# Patient Record
Sex: MALE | Race: OTHER | Employment: OTHER | URBAN - METROPOLITAN AREA
[De-identification: names, ages, dates, MRNs, and addresses within clinical notes are randomized per-mention and may not be internally consistent; named-entity substitution may affect disease eponyms.]

---

## 2023-01-16 ENCOUNTER — HOSPITAL ENCOUNTER (INPATIENT)
Facility: HOSPITAL | Age: 63
LOS: 22 days | Discharge: HOME | DRG: 871 | End: 2023-02-07
Attending: INTERNAL MEDICINE | Admitting: INTERNAL MEDICINE
Payer: MEDICAID

## 2023-01-16 ENCOUNTER — HOSPITAL ENCOUNTER (EMERGENCY)
Facility: HOSPITAL | Age: 63
Discharge: ANOTHER HOSPITAL | End: 2023-01-16
Attending: STUDENT IN AN ORGANIZED HEALTH CARE EDUCATION/TRAINING PROGRAM
Payer: MEDICAID

## 2023-01-16 VITALS
TEMPERATURE: 98 F | WEIGHT: 245 LBS | HEIGHT: 68 IN | BODY MASS INDEX: 37.13 KG/M2 | RESPIRATION RATE: 21 BRPM | OXYGEN SATURATION: 97 % | HEART RATE: 75 BPM | SYSTOLIC BLOOD PRESSURE: 99 MMHG | DIASTOLIC BLOOD PRESSURE: 54 MMHG

## 2023-01-16 DIAGNOSIS — R11.0 NAUSEA: ICD-10-CM

## 2023-01-16 DIAGNOSIS — D64.9 ANEMIA, UNSPECIFIED TYPE: ICD-10-CM

## 2023-01-16 DIAGNOSIS — L03.90 CELLULITIS, UNSPECIFIED CELLULITIS SITE: ICD-10-CM

## 2023-01-16 DIAGNOSIS — R00.0 TACHYARRHYTHMIA: ICD-10-CM

## 2023-01-16 DIAGNOSIS — N17.9 AKI (ACUTE KIDNEY INJURY): ICD-10-CM

## 2023-01-16 DIAGNOSIS — K70.31 ALCOHOLIC CIRRHOSIS OF LIVER WITH ASCITES: ICD-10-CM

## 2023-01-16 DIAGNOSIS — D68.9 COAGULOPATHY: ICD-10-CM

## 2023-01-16 DIAGNOSIS — M48.061 SPINAL STENOSIS OF LUMBAR REGION, UNSPECIFIED WHETHER NEUROGENIC CLAUDICATION PRESENT: ICD-10-CM

## 2023-01-16 DIAGNOSIS — Z51.5 PALLIATIVE CARE ENCOUNTER: ICD-10-CM

## 2023-01-16 DIAGNOSIS — E87.79 OTHER HYPERVOLEMIA: ICD-10-CM

## 2023-01-16 DIAGNOSIS — N17.0 ATN (ACUTE TUBULAR NECROSIS): ICD-10-CM

## 2023-01-16 DIAGNOSIS — I48.92 ATRIAL FLUTTER: ICD-10-CM

## 2023-01-16 DIAGNOSIS — M54.31 SCIATICA OF RIGHT SIDE: Primary | ICD-10-CM

## 2023-01-16 DIAGNOSIS — R94.31 QT PROLONGATION: ICD-10-CM

## 2023-01-16 DIAGNOSIS — Z71.89 ADVANCED CARE PLANNING/COUNSELING DISCUSSION: ICD-10-CM

## 2023-01-16 DIAGNOSIS — K92.1 GASTROINTESTINAL HEMORRHAGE WITH MELENA: Primary | ICD-10-CM

## 2023-01-16 DIAGNOSIS — K92.2 ACUTE UPPER GI BLEED: ICD-10-CM

## 2023-01-16 DIAGNOSIS — I48.0 PAROXYSMAL ATRIAL FIBRILLATION: ICD-10-CM

## 2023-01-16 DIAGNOSIS — R00.0 TACHYCARDIA: ICD-10-CM

## 2023-01-16 DIAGNOSIS — Z71.89 GOALS OF CARE, COUNSELING/DISCUSSION: ICD-10-CM

## 2023-01-16 DIAGNOSIS — I27.20 PULMONARY HYPERTENSION: ICD-10-CM

## 2023-01-16 DIAGNOSIS — A41.9 SEPSIS, DUE TO UNSPECIFIED ORGANISM, UNSPECIFIED WHETHER ACUTE ORGAN DYSFUNCTION PRESENT: ICD-10-CM

## 2023-01-16 DIAGNOSIS — F10.930 ALCOHOL WITHDRAWAL SYNDROME WITHOUT COMPLICATION: ICD-10-CM

## 2023-01-16 DIAGNOSIS — R57.8 HEMORRHAGIC SHOCK: ICD-10-CM

## 2023-01-16 DIAGNOSIS — M54.9 DORSALGIA: ICD-10-CM

## 2023-01-16 DIAGNOSIS — M48.00 SPINAL STENOSIS, UNSPECIFIED SPINAL REGION: ICD-10-CM

## 2023-01-16 DIAGNOSIS — M51.26 LUMBAR HERNIATED DISC: ICD-10-CM

## 2023-01-16 DIAGNOSIS — K70.9 ALCOHOLIC LIVER DISEASE: ICD-10-CM

## 2023-01-16 DIAGNOSIS — I48.91 ATRIAL FIBRILLATION: ICD-10-CM

## 2023-01-16 DIAGNOSIS — G83.4 CAUDA EQUINA SYNDROME: ICD-10-CM

## 2023-01-16 DIAGNOSIS — K92.2 GASTROINTESTINAL HEMORRHAGE, UNSPECIFIED GASTROINTESTINAL HEMORRHAGE TYPE: ICD-10-CM

## 2023-01-16 DIAGNOSIS — M48.00 SPINAL STENOSIS: ICD-10-CM

## 2023-01-16 DIAGNOSIS — I48.91 ATRIAL FIBRILLATION WITH RAPID VENTRICULAR RESPONSE: ICD-10-CM

## 2023-01-16 DIAGNOSIS — R53.81 DEBILITY: ICD-10-CM

## 2023-01-16 DIAGNOSIS — K56.609 SMALL BOWEL OBSTRUCTION: ICD-10-CM

## 2023-01-16 DIAGNOSIS — I49.9 ARRHYTHMIA: ICD-10-CM

## 2023-01-16 DIAGNOSIS — R18.8 OTHER ASCITES: ICD-10-CM

## 2023-01-16 PROBLEM — R74.8 ELEVATED LIVER ENZYMES: Status: ACTIVE | Noted: 2023-01-16

## 2023-01-16 PROBLEM — E11.9 DIABETES: Status: ACTIVE | Noted: 2023-01-16

## 2023-01-16 PROBLEM — F10.10 ALCOHOL ABUSE: Status: ACTIVE | Noted: 2023-01-16

## 2023-01-16 PROBLEM — R65.20 SEVERE SEPSIS: Status: ACTIVE | Noted: 2023-01-16

## 2023-01-16 PROBLEM — R93.3 ABNORMAL CT SCAN, GASTROINTESTINAL TRACT: Status: ACTIVE | Noted: 2023-01-16

## 2023-01-16 LAB
ABO + RH BLD: NORMAL
ABO + RH BLD: NORMAL
AFP SERPL-MCNC: <2 NG/ML (ref 0–8.4)
ALBUMIN SERPL BCP-MCNC: 1.6 G/DL (ref 3.5–5.2)
ALBUMIN SERPL BCP-MCNC: 1.7 G/DL (ref 3.5–5.2)
ALBUMIN SERPL BCP-MCNC: 1.7 G/DL (ref 3.5–5.2)
ALP SERPL-CCNC: 264 U/L (ref 55–135)
ALP SERPL-CCNC: 273 U/L (ref 55–135)
ALP SERPL-CCNC: 274 U/L (ref 55–135)
ALT SERPL W/O P-5'-P-CCNC: 77 U/L (ref 10–44)
ALT SERPL W/O P-5'-P-CCNC: 81 U/L (ref 10–44)
ALT SERPL W/O P-5'-P-CCNC: 81 U/L (ref 10–44)
ANION GAP SERPL CALC-SCNC: 12 MMOL/L (ref 8–16)
ANION GAP SERPL CALC-SCNC: 12 MMOL/L (ref 8–16)
ANION GAP SERPL CALC-SCNC: 13 MMOL/L (ref 8–16)
APAP SERPL-MCNC: <3 UG/ML (ref 10–20)
AST SERPL-CCNC: 177 U/L (ref 10–40)
AST SERPL-CCNC: 181 U/L (ref 10–40)
AST SERPL-CCNC: 186 U/L (ref 10–40)
BACTERIA #/AREA URNS HPF: ABNORMAL /HPF
BASOPHILS # BLD AUTO: 0.03 K/UL (ref 0–0.2)
BASOPHILS # BLD AUTO: 0.04 K/UL (ref 0–0.2)
BASOPHILS NFR BLD: 0.2 % (ref 0–1.9)
BASOPHILS NFR BLD: 0.3 % (ref 0–1.9)
BILIRUB SERPL-MCNC: 1.4 MG/DL (ref 0.1–1)
BILIRUB SERPL-MCNC: 1.5 MG/DL (ref 0.1–1)
BILIRUB SERPL-MCNC: 1.5 MG/DL (ref 0.1–1)
BILIRUB UR QL STRIP: NEGATIVE
BLD GP AB SCN CELLS X3 SERPL QL: NORMAL
BLD GP AB SCN CELLS X3 SERPL QL: NORMAL
BLD PROD TYP BPU: NORMAL
BLOOD UNIT EXPIRATION DATE: NORMAL
BLOOD UNIT TYPE CODE: 600
BLOOD UNIT TYPE: NORMAL
BUN SERPL-MCNC: 26 MG/DL (ref 8–23)
CALCIUM SERPL-MCNC: 6.8 MG/DL (ref 8.7–10.5)
CALCIUM SERPL-MCNC: 6.9 MG/DL (ref 8.7–10.5)
CALCIUM SERPL-MCNC: 7 MG/DL (ref 8.7–10.5)
CHLORIDE SERPL-SCNC: 100 MMOL/L (ref 95–110)
CHLORIDE SERPL-SCNC: 101 MMOL/L (ref 95–110)
CHLORIDE SERPL-SCNC: 101 MMOL/L (ref 95–110)
CLARITY UR: ABNORMAL
CO2 SERPL-SCNC: 17 MMOL/L (ref 23–29)
CODING SYSTEM: NORMAL
COLOR UR: YELLOW
CREAT SERPL-MCNC: 4.6 MG/DL (ref 0.5–1.4)
CREAT SERPL-MCNC: 4.7 MG/DL (ref 0.5–1.4)
CREAT SERPL-MCNC: 4.7 MG/DL (ref 0.5–1.4)
CRP SERPL-MCNC: 100.2 MG/L (ref 0–8.2)
DIFFERENTIAL METHOD: ABNORMAL
DISPENSE STATUS: NORMAL
EOSINOPHIL # BLD AUTO: 0.1 K/UL (ref 0–0.5)
EOSINOPHIL NFR BLD: 0.5 % (ref 0–8)
EOSINOPHIL NFR BLD: 0.5 % (ref 0–8)
EOSINOPHIL NFR BLD: 0.6 % (ref 0–8)
EOSINOPHIL NFR BLD: 0.6 % (ref 0–8)
EOSINOPHIL NFR BLD: 0.7 % (ref 0–8)
EOSINOPHIL NFR BLD: 0.8 % (ref 0–8)
ERYTHROCYTE [DISTWIDTH] IN BLOOD BY AUTOMATED COUNT: 19.4 % (ref 11.5–14.5)
ERYTHROCYTE [DISTWIDTH] IN BLOOD BY AUTOMATED COUNT: 19.6 % (ref 11.5–14.5)
ERYTHROCYTE [DISTWIDTH] IN BLOOD BY AUTOMATED COUNT: 19.8 % (ref 11.5–14.5)
ERYTHROCYTE [DISTWIDTH] IN BLOOD BY AUTOMATED COUNT: 19.9 % (ref 11.5–14.5)
ERYTHROCYTE [DISTWIDTH] IN BLOOD BY AUTOMATED COUNT: 20 % (ref 11.5–14.5)
ERYTHROCYTE [DISTWIDTH] IN BLOOD BY AUTOMATED COUNT: 20.1 % (ref 11.5–14.5)
ERYTHROCYTE [SEDIMENTATION RATE] IN BLOOD BY PHOTOMETRIC METHOD: 38 MM/HR (ref 0–23)
EST. GFR  (NO RACE VARIABLE): 13 ML/MIN/1.73 M^2
EST. GFR  (NO RACE VARIABLE): 13 ML/MIN/1.73 M^2
EST. GFR  (NO RACE VARIABLE): 14 ML/MIN/1.73 M^2
ETHANOL SERPL-MCNC: <10 MG/DL
GGT SERPL-CCNC: 645 U/L (ref 8–55)
GLUCOSE SERPL-MCNC: 85 MG/DL (ref 70–110)
GLUCOSE SERPL-MCNC: 88 MG/DL (ref 70–110)
GLUCOSE SERPL-MCNC: 92 MG/DL (ref 70–110)
GLUCOSE UR QL STRIP: ABNORMAL
HAV IGM SERPL QL IA: NORMAL
HBV CORE IGM SERPL QL IA: NORMAL
HBV SURFACE AG SERPL QL IA: NORMAL
HCT VFR BLD AUTO: 23 % (ref 40–54)
HCT VFR BLD AUTO: 23.3 % (ref 40–54)
HCT VFR BLD AUTO: 23.9 % (ref 40–54)
HCT VFR BLD AUTO: 24.6 % (ref 40–54)
HCT VFR BLD AUTO: 25.8 % (ref 40–54)
HCT VFR BLD AUTO: 26.3 % (ref 40–54)
HCV AB SERPL QL IA: NORMAL
HGB BLD-MCNC: 7.5 G/DL (ref 14–18)
HGB BLD-MCNC: 7.5 G/DL (ref 14–18)
HGB BLD-MCNC: 7.7 G/DL (ref 14–18)
HGB BLD-MCNC: 7.8 G/DL (ref 14–18)
HGB BLD-MCNC: 8.2 G/DL (ref 14–18)
HGB BLD-MCNC: 8.4 G/DL (ref 14–18)
HGB UR QL STRIP: ABNORMAL
HIV 1+2 AB+HIV1 P24 AG SERPL QL IA: NORMAL
HYALINE CASTS #/AREA URNS LPF: 3 /LPF
IMM GRANULOCYTES # BLD AUTO: 0.07 K/UL (ref 0–0.04)
IMM GRANULOCYTES # BLD AUTO: 0.09 K/UL (ref 0–0.04)
IMM GRANULOCYTES # BLD AUTO: 0.09 K/UL (ref 0–0.04)
IMM GRANULOCYTES # BLD AUTO: 0.1 K/UL (ref 0–0.04)
IMM GRANULOCYTES # BLD AUTO: 0.1 K/UL (ref 0–0.04)
IMM GRANULOCYTES # BLD AUTO: 0.11 K/UL (ref 0–0.04)
IMM GRANULOCYTES NFR BLD AUTO: 0.5 % (ref 0–0.5)
IMM GRANULOCYTES NFR BLD AUTO: 0.7 % (ref 0–0.5)
IMM GRANULOCYTES NFR BLD AUTO: 0.7 % (ref 0–0.5)
IMM GRANULOCYTES NFR BLD AUTO: 0.8 % (ref 0–0.5)
IMM GRANULOCYTES NFR BLD AUTO: 0.8 % (ref 0–0.5)
IMM GRANULOCYTES NFR BLD AUTO: 0.9 % (ref 0–0.5)
INR PPP: 2 (ref 0.8–1.2)
KETONES UR QL STRIP: NEGATIVE
LACTATE SERPL-SCNC: 2.3 MMOL/L (ref 0.5–2.2)
LACTATE SERPL-SCNC: 3.8 MMOL/L (ref 0.5–2.2)
LACTATE SERPL-SCNC: 3.9 MMOL/L (ref 0.5–2.2)
LEUKOCYTE ESTERASE UR QL STRIP: ABNORMAL
LIPASE SERPL-CCNC: 74 U/L (ref 4–60)
LYMPHOCYTES # BLD AUTO: 1 K/UL (ref 1–4.8)
LYMPHOCYTES # BLD AUTO: 1.2 K/UL (ref 1–4.8)
LYMPHOCYTES # BLD AUTO: 1.4 K/UL (ref 1–4.8)
LYMPHOCYTES # BLD AUTO: 1.5 K/UL (ref 1–4.8)
LYMPHOCYTES NFR BLD: 10.1 % (ref 18–48)
LYMPHOCYTES NFR BLD: 10.8 % (ref 18–48)
LYMPHOCYTES NFR BLD: 11.3 % (ref 18–48)
LYMPHOCYTES NFR BLD: 7.7 % (ref 18–48)
LYMPHOCYTES NFR BLD: 9.3 % (ref 18–48)
LYMPHOCYTES NFR BLD: 9.5 % (ref 18–48)
MCH RBC QN AUTO: 31.4 PG (ref 27–31)
MCH RBC QN AUTO: 32.4 PG (ref 27–31)
MCH RBC QN AUTO: 32.5 PG (ref 27–31)
MCH RBC QN AUTO: 32.6 PG (ref 27–31)
MCH RBC QN AUTO: 32.9 PG (ref 27–31)
MCH RBC QN AUTO: 32.9 PG (ref 27–31)
MCHC RBC AUTO-ENTMCNC: 31.2 G/DL (ref 32–36)
MCHC RBC AUTO-ENTMCNC: 31.7 G/DL (ref 32–36)
MCHC RBC AUTO-ENTMCNC: 32.2 G/DL (ref 32–36)
MCHC RBC AUTO-ENTMCNC: 32.2 G/DL (ref 32–36)
MCHC RBC AUTO-ENTMCNC: 32.6 G/DL (ref 32–36)
MCHC RBC AUTO-ENTMCNC: 32.6 G/DL (ref 32–36)
MCV RBC AUTO: 100 FL (ref 82–98)
MCV RBC AUTO: 101 FL (ref 82–98)
MCV RBC AUTO: 102 FL (ref 82–98)
MCV RBC AUTO: 102 FL (ref 82–98)
MICROSCOPIC COMMENT: ABNORMAL
MONOCYTES # BLD AUTO: 1.4 K/UL (ref 0.3–1)
MONOCYTES # BLD AUTO: 1.5 K/UL (ref 0.3–1)
MONOCYTES # BLD AUTO: 1.6 K/UL (ref 0.3–1)
MONOCYTES NFR BLD: 10.9 % (ref 4–15)
MONOCYTES NFR BLD: 10.9 % (ref 4–15)
MONOCYTES NFR BLD: 11.2 % (ref 4–15)
MONOCYTES NFR BLD: 11.3 % (ref 4–15)
MONOCYTES NFR BLD: 11.3 % (ref 4–15)
MONOCYTES NFR BLD: 12.2 % (ref 4–15)
NEUTROPHILS # BLD AUTO: 10.1 K/UL (ref 1.8–7.7)
NEUTROPHILS # BLD AUTO: 10.3 K/UL (ref 1.8–7.7)
NEUTROPHILS # BLD AUTO: 9.4 K/UL (ref 1.8–7.7)
NEUTROPHILS # BLD AUTO: 9.7 K/UL (ref 1.8–7.7)
NEUTROPHILS # BLD AUTO: 9.8 K/UL (ref 1.8–7.7)
NEUTROPHILS # BLD AUTO: 9.9 K/UL (ref 1.8–7.7)
NEUTROPHILS NFR BLD: 74.7 % (ref 38–73)
NEUTROPHILS NFR BLD: 76.8 % (ref 38–73)
NEUTROPHILS NFR BLD: 77 % (ref 38–73)
NEUTROPHILS NFR BLD: 77.7 % (ref 38–73)
NEUTROPHILS NFR BLD: 78.6 % (ref 38–73)
NEUTROPHILS NFR BLD: 79.3 % (ref 38–73)
NITRITE UR QL STRIP: NEGATIVE
NRBC BLD-RTO: 1 /100 WBC
NUM UNITS TRANS PACKED RBC: NORMAL
OB PNL STL: POSITIVE
OSMOLALITY UR: 302 MOSM/KG (ref 50–1200)
PH UR STRIP: 6 [PH] (ref 5–8)
PLATELET # BLD AUTO: 212 K/UL (ref 150–450)
PLATELET # BLD AUTO: 219 K/UL (ref 150–450)
PLATELET # BLD AUTO: 233 K/UL (ref 150–450)
PLATELET # BLD AUTO: 235 K/UL (ref 150–450)
PLATELET # BLD AUTO: 237 K/UL (ref 150–450)
PLATELET # BLD AUTO: 247 K/UL (ref 150–450)
PMV BLD AUTO: 10.7 FL (ref 9.2–12.9)
PMV BLD AUTO: 10.9 FL (ref 9.2–12.9)
PMV BLD AUTO: 10.9 FL (ref 9.2–12.9)
PMV BLD AUTO: 11 FL (ref 9.2–12.9)
PMV BLD AUTO: 11.1 FL (ref 9.2–12.9)
PMV BLD AUTO: 11.6 FL (ref 9.2–12.9)
POCT GLUCOSE: 141 MG/DL (ref 70–110)
POCT GLUCOSE: 163 MG/DL (ref 70–110)
POTASSIUM SERPL-SCNC: 4.3 MMOL/L (ref 3.5–5.1)
POTASSIUM SERPL-SCNC: 4.5 MMOL/L (ref 3.5–5.1)
POTASSIUM SERPL-SCNC: 4.5 MMOL/L (ref 3.5–5.1)
PROT SERPL-MCNC: 5.8 G/DL (ref 6–8.4)
PROT SERPL-MCNC: 6 G/DL (ref 6–8.4)
PROT SERPL-MCNC: 6.1 G/DL (ref 6–8.4)
PROT UR QL STRIP: ABNORMAL
PROTHROMBIN TIME: 20.2 SEC (ref 9–12.5)
RBC # BLD AUTO: 2.28 M/UL (ref 4.6–6.2)
RBC # BLD AUTO: 2.31 M/UL (ref 4.6–6.2)
RBC # BLD AUTO: 2.34 M/UL (ref 4.6–6.2)
RBC # BLD AUTO: 2.41 M/UL (ref 4.6–6.2)
RBC # BLD AUTO: 2.58 M/UL (ref 4.6–6.2)
RBC # BLD AUTO: 2.61 M/UL (ref 4.6–6.2)
RBC #/AREA URNS HPF: 2 /HPF (ref 0–4)
SODIUM SERPL-SCNC: 129 MMOL/L (ref 136–145)
SODIUM SERPL-SCNC: 130 MMOL/L (ref 136–145)
SODIUM SERPL-SCNC: 131 MMOL/L (ref 136–145)
SODIUM UR-SCNC: 25 MMOL/L (ref 20–250)
SP GR UR STRIP: 1.03 (ref 1–1.03)
SQUAMOUS #/AREA URNS HPF: 1 /HPF
URN SPEC COLLECT METH UR: ABNORMAL
UROBILINOGEN UR STRIP-ACNC: NEGATIVE EU/DL
WBC # BLD AUTO: 12.27 K/UL (ref 3.9–12.7)
WBC # BLD AUTO: 12.41 K/UL (ref 3.9–12.7)
WBC # BLD AUTO: 12.91 K/UL (ref 3.9–12.7)
WBC # BLD AUTO: 12.99 K/UL (ref 3.9–12.7)
WBC # BLD AUTO: 13.01 K/UL (ref 3.9–12.7)
WBC # BLD AUTO: 13.11 K/UL (ref 3.9–12.7)
WBC #/AREA URNS HPF: 5 /HPF (ref 0–5)

## 2023-01-16 PROCEDURE — 86140 C-REACTIVE PROTEIN: CPT | Performed by: STUDENT IN AN ORGANIZED HEALTH CARE EDUCATION/TRAINING PROGRAM

## 2023-01-16 PROCEDURE — 99285 EMERGENCY DEPT VISIT HI MDM: CPT | Mod: 25

## 2023-01-16 PROCEDURE — 85025 COMPLETE CBC W/AUTO DIFF WBC: CPT | Mod: 91 | Performed by: PLASTIC SURGERY

## 2023-01-16 PROCEDURE — 63600175 PHARM REV CODE 636 W HCPCS: Performed by: EMERGENCY MEDICINE

## 2023-01-16 PROCEDURE — 84300 ASSAY OF URINE SODIUM: CPT | Performed by: INTERNAL MEDICINE

## 2023-01-16 PROCEDURE — 25000003 PHARM REV CODE 250: Performed by: INTERNAL MEDICINE

## 2023-01-16 PROCEDURE — 99292 CRITICAL CARE ADDL 30 MIN: CPT | Mod: ,,, | Performed by: INTERNAL MEDICINE

## 2023-01-16 PROCEDURE — 87040 BLOOD CULTURE FOR BACTERIA: CPT | Performed by: EMERGENCY MEDICINE

## 2023-01-16 PROCEDURE — 81000 URINALYSIS NONAUTO W/SCOPE: CPT | Mod: 59 | Performed by: EMERGENCY MEDICINE

## 2023-01-16 PROCEDURE — 86381 MITOCHONDRIAL ANTIBODY EACH: CPT | Performed by: STUDENT IN AN ORGANIZED HEALTH CARE EDUCATION/TRAINING PROGRAM

## 2023-01-16 PROCEDURE — 99292 PR CRITICAL CARE, ADDL 30 MIN: ICD-10-PCS | Mod: ,,, | Performed by: INTERNAL MEDICINE

## 2023-01-16 PROCEDURE — 80053 COMPREHEN METABOLIC PANEL: CPT | Mod: 91 | Performed by: EMERGENCY MEDICINE

## 2023-01-16 PROCEDURE — 63600175 PHARM REV CODE 636 W HCPCS: Performed by: STUDENT IN AN ORGANIZED HEALTH CARE EDUCATION/TRAINING PROGRAM

## 2023-01-16 PROCEDURE — 82272 OCCULT BLD FECES 1-3 TESTS: CPT | Performed by: STUDENT IN AN ORGANIZED HEALTH CARE EDUCATION/TRAINING PROGRAM

## 2023-01-16 PROCEDURE — 87389 HIV-1 AG W/HIV-1&-2 AB AG IA: CPT | Performed by: STUDENT IN AN ORGANIZED HEALTH CARE EDUCATION/TRAINING PROGRAM

## 2023-01-16 PROCEDURE — 99221 PR INITIAL HOSPITAL CARE,LEVL I: ICD-10-PCS | Mod: ,,, | Performed by: NEUROLOGICAL SURGERY

## 2023-01-16 PROCEDURE — 86235 NUCLEAR ANTIGEN ANTIBODY: CPT | Performed by: STUDENT IN AN ORGANIZED HEALTH CARE EDUCATION/TRAINING PROGRAM

## 2023-01-16 PROCEDURE — 86900 BLOOD TYPING SEROLOGIC ABO: CPT | Performed by: STUDENT IN AN ORGANIZED HEALTH CARE EDUCATION/TRAINING PROGRAM

## 2023-01-16 PROCEDURE — 20000000 HC ICU ROOM

## 2023-01-16 PROCEDURE — 36430 TRANSFUSION BLD/BLD COMPNT: CPT

## 2023-01-16 PROCEDURE — 82977 ASSAY OF GGT: CPT | Performed by: STUDENT IN AN ORGANIZED HEALTH CARE EDUCATION/TRAINING PROGRAM

## 2023-01-16 PROCEDURE — 85652 RBC SED RATE AUTOMATED: CPT | Performed by: STUDENT IN AN ORGANIZED HEALTH CARE EDUCATION/TRAINING PROGRAM

## 2023-01-16 PROCEDURE — 27000221 HC OXYGEN, UP TO 24 HOURS

## 2023-01-16 PROCEDURE — 86920 COMPATIBILITY TEST SPIN: CPT | Performed by: STUDENT IN AN ORGANIZED HEALTH CARE EDUCATION/TRAINING PROGRAM

## 2023-01-16 PROCEDURE — 83605 ASSAY OF LACTIC ACID: CPT | Performed by: STUDENT IN AN ORGANIZED HEALTH CARE EDUCATION/TRAINING PROGRAM

## 2023-01-16 PROCEDURE — 25000003 PHARM REV CODE 250: Performed by: STUDENT IN AN ORGANIZED HEALTH CARE EDUCATION/TRAINING PROGRAM

## 2023-01-16 PROCEDURE — 83605 ASSAY OF LACTIC ACID: CPT | Mod: 91 | Performed by: EMERGENCY MEDICINE

## 2023-01-16 PROCEDURE — 99291 PR CRITICAL CARE, E/M 30-74 MINUTES: ICD-10-PCS | Mod: ,,, | Performed by: INTERNAL MEDICINE

## 2023-01-16 PROCEDURE — 96375 TX/PRO/DX INJ NEW DRUG ADDON: CPT

## 2023-01-16 PROCEDURE — 80053 COMPREHEN METABOLIC PANEL: CPT | Mod: 91 | Performed by: PLASTIC SURGERY

## 2023-01-16 PROCEDURE — 94761 N-INVAS EAR/PLS OXIMETRY MLT: CPT

## 2023-01-16 PROCEDURE — 80053 COMPREHEN METABOLIC PANEL: CPT | Performed by: STUDENT IN AN ORGANIZED HEALTH CARE EDUCATION/TRAINING PROGRAM

## 2023-01-16 PROCEDURE — 86376 MICROSOMAL ANTIBODY EACH: CPT | Performed by: STUDENT IN AN ORGANIZED HEALTH CARE EDUCATION/TRAINING PROGRAM

## 2023-01-16 PROCEDURE — P9016 RBC LEUKOCYTES REDUCED: HCPCS | Performed by: EMERGENCY MEDICINE

## 2023-01-16 PROCEDURE — 86920 COMPATIBILITY TEST SPIN: CPT | Performed by: EMERGENCY MEDICINE

## 2023-01-16 PROCEDURE — 99221 1ST HOSP IP/OBS SF/LOW 40: CPT | Mod: ,,, | Performed by: NEUROLOGICAL SURGERY

## 2023-01-16 PROCEDURE — 85610 PROTHROMBIN TIME: CPT | Performed by: STUDENT IN AN ORGANIZED HEALTH CARE EDUCATION/TRAINING PROGRAM

## 2023-01-16 PROCEDURE — 85025 COMPLETE CBC W/AUTO DIFF WBC: CPT | Mod: 91 | Performed by: EMERGENCY MEDICINE

## 2023-01-16 PROCEDURE — S0030 INJECTION, METRONIDAZOLE: HCPCS | Performed by: STUDENT IN AN ORGANIZED HEALTH CARE EDUCATION/TRAINING PROGRAM

## 2023-01-16 PROCEDURE — 80143 DRUG ASSAY ACETAMINOPHEN: CPT | Performed by: STUDENT IN AN ORGANIZED HEALTH CARE EDUCATION/TRAINING PROGRAM

## 2023-01-16 PROCEDURE — 99223 1ST HOSP IP/OBS HIGH 75: CPT | Mod: ,,, | Performed by: INTERNAL MEDICINE

## 2023-01-16 PROCEDURE — 86900 BLOOD TYPING SEROLOGIC ABO: CPT | Mod: 91 | Performed by: STUDENT IN AN ORGANIZED HEALTH CARE EDUCATION/TRAINING PROGRAM

## 2023-01-16 PROCEDURE — 85025 COMPLETE CBC W/AUTO DIFF WBC: CPT | Mod: 91 | Performed by: STUDENT IN AN ORGANIZED HEALTH CARE EDUCATION/TRAINING PROGRAM

## 2023-01-16 PROCEDURE — 80074 ACUTE HEPATITIS PANEL: CPT | Performed by: STUDENT IN AN ORGANIZED HEALTH CARE EDUCATION/TRAINING PROGRAM

## 2023-01-16 PROCEDURE — 99291 CRITICAL CARE FIRST HOUR: CPT | Mod: ,,, | Performed by: INTERNAL MEDICINE

## 2023-01-16 PROCEDURE — 82103 ALPHA-1-ANTITRYPSIN TOTAL: CPT | Performed by: STUDENT IN AN ORGANIZED HEALTH CARE EDUCATION/TRAINING PROGRAM

## 2023-01-16 PROCEDURE — 80307 DRUG TEST PRSMV CHEM ANLYZR: CPT | Performed by: EMERGENCY MEDICINE

## 2023-01-16 PROCEDURE — 63600175 PHARM REV CODE 636 W HCPCS: Mod: JA | Performed by: STUDENT IN AN ORGANIZED HEALTH CARE EDUCATION/TRAINING PROGRAM

## 2023-01-16 PROCEDURE — 83690 ASSAY OF LIPASE: CPT | Performed by: EMERGENCY MEDICINE

## 2023-01-16 PROCEDURE — 93010 ELECTROCARDIOGRAM REPORT: CPT | Mod: ,,, | Performed by: INTERNAL MEDICINE

## 2023-01-16 PROCEDURE — C9113 INJ PANTOPRAZOLE SODIUM, VIA: HCPCS | Performed by: STUDENT IN AN ORGANIZED HEALTH CARE EDUCATION/TRAINING PROGRAM

## 2023-01-16 PROCEDURE — 80321 ALCOHOLS BIOMARKERS 1OR 2: CPT | Performed by: STUDENT IN AN ORGANIZED HEALTH CARE EDUCATION/TRAINING PROGRAM

## 2023-01-16 PROCEDURE — 83935 ASSAY OF URINE OSMOLALITY: CPT | Performed by: INTERNAL MEDICINE

## 2023-01-16 PROCEDURE — 93010 EKG 12-LEAD: ICD-10-PCS | Mod: ,,, | Performed by: INTERNAL MEDICINE

## 2023-01-16 PROCEDURE — 86036 ANCA SCREEN EACH ANTIBODY: CPT | Performed by: STUDENT IN AN ORGANIZED HEALTH CARE EDUCATION/TRAINING PROGRAM

## 2023-01-16 PROCEDURE — 25000003 PHARM REV CODE 250: Performed by: NURSE PRACTITIONER

## 2023-01-16 PROCEDURE — C9113 INJ PANTOPRAZOLE SODIUM, VIA: HCPCS | Performed by: EMERGENCY MEDICINE

## 2023-01-16 PROCEDURE — 96365 THER/PROPH/DIAG IV INF INIT: CPT

## 2023-01-16 PROCEDURE — 25000003 PHARM REV CODE 250: Performed by: EMERGENCY MEDICINE

## 2023-01-16 PROCEDURE — 82105 ALPHA-FETOPROTEIN SERUM: CPT | Performed by: STUDENT IN AN ORGANIZED HEALTH CARE EDUCATION/TRAINING PROGRAM

## 2023-01-16 PROCEDURE — 82077 ASSAY SPEC XCP UR&BREATH IA: CPT | Performed by: EMERGENCY MEDICINE

## 2023-01-16 PROCEDURE — 99900035 HC TECH TIME PER 15 MIN (STAT)

## 2023-01-16 PROCEDURE — 93005 ELECTROCARDIOGRAM TRACING: CPT

## 2023-01-16 PROCEDURE — 99223 PR INITIAL HOSPITAL CARE,LEVL III: ICD-10-PCS | Mod: ,,, | Performed by: INTERNAL MEDICINE

## 2023-01-16 RX ORDER — RIVAROXABAN 20 MG/1
20 TABLET, FILM COATED ORAL DAILY
COMMUNITY
Start: 2023-01-09

## 2023-01-16 RX ORDER — INSULIN ASPART 100 [IU]/ML
0-5 INJECTION, SOLUTION INTRAVENOUS; SUBCUTANEOUS
Status: DISCONTINUED | OUTPATIENT
Start: 2023-01-16 | End: 2023-02-07 | Stop reason: HOSPADM

## 2023-01-16 RX ORDER — GABAPENTIN 300 MG/1
300 CAPSULE ORAL 2 TIMES DAILY
COMMUNITY
Start: 2022-09-07 | End: 2023-01-16

## 2023-01-16 RX ORDER — PANTOPRAZOLE SODIUM 40 MG/10ML
40 INJECTION, POWDER, LYOPHILIZED, FOR SOLUTION INTRAVENOUS 2 TIMES DAILY
Status: DISCONTINUED | OUTPATIENT
Start: 2023-01-16 | End: 2023-01-17

## 2023-01-16 RX ORDER — OXYCODONE AND ACETAMINOPHEN 5; 325 MG/1; MG/1
1 TABLET ORAL
Status: COMPLETED | OUTPATIENT
Start: 2023-01-16 | End: 2023-01-16

## 2023-01-16 RX ORDER — ASPIRIN 81 MG/1
81 TABLET ORAL DAILY
Status: ON HOLD | COMMUNITY
End: 2023-02-07 | Stop reason: HOSPADM

## 2023-01-16 RX ORDER — LIDOCAINE HYDROCHLORIDE 10 MG/ML
10 INJECTION INFILTRATION; PERINEURAL ONCE
Status: DISCONTINUED | OUTPATIENT
Start: 2023-01-17 | End: 2023-01-16

## 2023-01-16 RX ORDER — TRAMADOL HYDROCHLORIDE 50 MG/1
50 TABLET ORAL
COMMUNITY
Start: 2022-09-14 | End: 2023-01-16

## 2023-01-16 RX ORDER — NOREPINEPHRINE BITARTRATE/D5W 4MG/250ML
0-3 PLASTIC BAG, INJECTION (ML) INTRAVENOUS CONTINUOUS
Status: DISCONTINUED | OUTPATIENT
Start: 2023-01-16 | End: 2023-01-16

## 2023-01-16 RX ORDER — IBUPROFEN 200 MG
24 TABLET ORAL
Status: DISCONTINUED | OUTPATIENT
Start: 2023-01-16 | End: 2023-02-07 | Stop reason: HOSPADM

## 2023-01-16 RX ORDER — CYCLOBENZAPRINE HCL 10 MG
10 TABLET ORAL 2 TIMES DAILY PRN
Status: ON HOLD | COMMUNITY
Start: 2022-09-07 | End: 2023-01-27 | Stop reason: ALTCHOICE

## 2023-01-16 RX ORDER — CEFDINIR 300 MG/1
300 CAPSULE ORAL EVERY 12 HOURS
COMMUNITY
Start: 2022-11-10 | End: 2023-01-16

## 2023-01-16 RX ORDER — LISINOPRIL AND HYDROCHLOROTHIAZIDE 10; 12.5 MG/1; MG/1
1 TABLET ORAL DAILY
Status: ON HOLD | COMMUNITY
Start: 2023-01-12 | End: 2023-02-07 | Stop reason: HOSPADM

## 2023-01-16 RX ORDER — DIAZEPAM 10 MG/2ML
5 INJECTION INTRAMUSCULAR EVERY 8 HOURS
Status: DISCONTINUED | OUTPATIENT
Start: 2023-01-16 | End: 2023-01-17

## 2023-01-16 RX ORDER — IBUPROFEN 200 MG
1 TABLET ORAL EVERY MORNING
Status: ON HOLD | COMMUNITY
Start: 2022-11-10 | End: 2023-01-27 | Stop reason: ALTCHOICE

## 2023-01-16 RX ORDER — DOXYCYCLINE HYCLATE 100 MG
100 TABLET ORAL 2 TIMES DAILY
COMMUNITY
Start: 2022-11-10 | End: 2023-01-16

## 2023-01-16 RX ORDER — MICONAZOLE NITRATE 2 %
CREAM (GRAM) TOPICAL
Status: ON HOLD | COMMUNITY
Start: 2022-11-15 | End: 2023-01-27 | Stop reason: ALTCHOICE

## 2023-01-16 RX ORDER — THIAMINE HCL 100 MG
100 TABLET ORAL EVERY 8 HOURS
Status: DISCONTINUED | OUTPATIENT
Start: 2023-01-18 | End: 2023-01-23

## 2023-01-16 RX ORDER — CEFTRIAXONE 2 G/50ML
2 INJECTION, SOLUTION INTRAVENOUS
Status: COMPLETED | OUTPATIENT
Start: 2023-01-16 | End: 2023-01-16

## 2023-01-16 RX ORDER — PANTOPRAZOLE SODIUM 40 MG/10ML
80 INJECTION, POWDER, LYOPHILIZED, FOR SOLUTION INTRAVENOUS
Status: COMPLETED | OUTPATIENT
Start: 2023-01-16 | End: 2023-01-16

## 2023-01-16 RX ORDER — LIDOCAINE HYDROCHLORIDE 10 MG/ML
1 INJECTION, SOLUTION EPIDURAL; INFILTRATION; INTRACAUDAL; PERINEURAL ONCE
Status: COMPLETED | OUTPATIENT
Start: 2023-01-17 | End: 2023-01-17

## 2023-01-16 RX ORDER — METHOCARBAMOL 500 MG/1
1000 TABLET, FILM COATED ORAL
Status: COMPLETED | OUTPATIENT
Start: 2023-01-16 | End: 2023-01-16

## 2023-01-16 RX ORDER — METOPROLOL TARTRATE 100 MG/1
100 TABLET ORAL DAILY
COMMUNITY
Start: 2023-01-03 | End: 2023-06-05 | Stop reason: ALTCHOICE

## 2023-01-16 RX ORDER — DIAZEPAM 5 MG/1
10 TABLET ORAL 4 TIMES DAILY
Status: CANCELLED | OUTPATIENT
Start: 2023-01-16

## 2023-01-16 RX ORDER — METRONIDAZOLE 500 MG/100ML
500 INJECTION, SOLUTION INTRAVENOUS
Status: DISCONTINUED | OUTPATIENT
Start: 2023-01-16 | End: 2023-01-20

## 2023-01-16 RX ORDER — TRAZODONE HYDROCHLORIDE 50 MG/1
50 TABLET ORAL NIGHTLY PRN
COMMUNITY
Start: 2022-11-02 | End: 2023-12-12 | Stop reason: DRUGHIGH

## 2023-01-16 RX ORDER — GLUCAGON 1 MG
1 KIT INJECTION
Status: DISCONTINUED | OUTPATIENT
Start: 2023-01-16 | End: 2023-02-07 | Stop reason: HOSPADM

## 2023-01-16 RX ORDER — FENTANYL CITRATE 50 UG/ML
25 INJECTION, SOLUTION INTRAMUSCULAR; INTRAVENOUS
Status: COMPLETED | OUTPATIENT
Start: 2023-01-16 | End: 2023-01-16

## 2023-01-16 RX ORDER — MIDODRINE HYDROCHLORIDE 5 MG/1
5 TABLET ORAL EVERY 8 HOURS
Status: DISCONTINUED | OUTPATIENT
Start: 2023-01-16 | End: 2023-01-16

## 2023-01-16 RX ORDER — METFORMIN HYDROCHLORIDE 500 MG/1
1000 TABLET ORAL 2 TIMES DAILY
COMMUNITY
Start: 2022-12-02 | End: 2023-03-22

## 2023-01-16 RX ORDER — SIMVASTATIN 40 MG/1
40 TABLET, FILM COATED ORAL NIGHTLY
Status: ON HOLD | COMMUNITY
Start: 2022-10-31 | End: 2023-02-07 | Stop reason: HOSPADM

## 2023-01-16 RX ORDER — OMEGA-3 FATTY ACIDS 1000 MG
2 CAPSULE ORAL DAILY
COMMUNITY
End: 2023-12-12

## 2023-01-16 RX ORDER — FOLIC ACID 1 MG/1
1 TABLET ORAL DAILY
Status: DISCONTINUED | OUTPATIENT
Start: 2023-01-16 | End: 2023-01-23

## 2023-01-16 RX ORDER — OCTREOTIDE ACETATE 50 UG/ML
50 INJECTION, SOLUTION INTRAVENOUS; SUBCUTANEOUS ONCE
Status: COMPLETED | OUTPATIENT
Start: 2023-01-16 | End: 2023-01-16

## 2023-01-16 RX ORDER — IBUPROFEN 200 MG
16 TABLET ORAL
Status: DISCONTINUED | OUTPATIENT
Start: 2023-01-16 | End: 2023-02-07 | Stop reason: HOSPADM

## 2023-01-16 RX ORDER — LORAZEPAM 2 MG/ML
2 INJECTION INTRAMUSCULAR EVERY 4 HOURS PRN
Status: DISCONTINUED | OUTPATIENT
Start: 2023-01-16 | End: 2023-01-19

## 2023-01-16 RX ORDER — MIDODRINE HYDROCHLORIDE 5 MG/1
10 TABLET ORAL EVERY 8 HOURS
Status: DISCONTINUED | OUTPATIENT
Start: 2023-01-16 | End: 2023-01-23

## 2023-01-16 RX ORDER — NOREPINEPHRINE BITARTRATE/D5W 4MG/250ML
0-.2 PLASTIC BAG, INJECTION (ML) INTRAVENOUS CONTINUOUS
Status: DISCONTINUED | OUTPATIENT
Start: 2023-01-16 | End: 2023-01-17

## 2023-01-16 RX ADMIN — VANCOMYCIN HYDROCHLORIDE 2000 MG: 500 INJECTION, POWDER, LYOPHILIZED, FOR SOLUTION INTRAVENOUS at 09:01

## 2023-01-16 RX ADMIN — OCTREOTIDE ACETATE 50 MCG: 50 INJECTION, SOLUTION INTRAVENOUS; SUBCUTANEOUS at 03:01

## 2023-01-16 RX ADMIN — MIDODRINE HYDROCHLORIDE 10 MG: 5 TABLET ORAL at 10:01

## 2023-01-16 RX ADMIN — THIAMINE HYDROCHLORIDE 500 MG: 100 INJECTION, SOLUTION INTRAMUSCULAR; INTRAVENOUS at 03:01

## 2023-01-16 RX ADMIN — THERA TABS 1 TABLET: TAB at 04:01

## 2023-01-16 RX ADMIN — FOLIC ACID 1 MG: 1 TABLET ORAL at 04:01

## 2023-01-16 RX ADMIN — PANTOPRAZOLE SODIUM 8 MG/HR: 40 INJECTION, POWDER, FOR SOLUTION INTRAVENOUS at 10:01

## 2023-01-16 RX ADMIN — PANTOPRAZOLE SODIUM 40 MG: 40 INJECTION, POWDER, FOR SOLUTION INTRAVENOUS at 08:01

## 2023-01-16 RX ADMIN — DIAZEPAM 5 MG: 5 INJECTION, SOLUTION INTRAMUSCULAR; INTRAVENOUS at 03:01

## 2023-01-16 RX ADMIN — SODIUM CHLORIDE 1000 ML: 0.9 INJECTION, SOLUTION INTRAVENOUS at 06:01

## 2023-01-16 RX ADMIN — SODIUM CHLORIDE, SODIUM LACTATE, POTASSIUM CHLORIDE, AND CALCIUM CHLORIDE 2333 ML: .6; .31; .03; .02 INJECTION, SOLUTION INTRAVENOUS at 08:01

## 2023-01-16 RX ADMIN — METRONIDAZOLE 500 MG: 500 INJECTION, SOLUTION INTRAVENOUS at 08:01

## 2023-01-16 RX ADMIN — THIAMINE HYDROCHLORIDE 500 MG: 100 INJECTION, SOLUTION INTRAMUSCULAR; INTRAVENOUS at 10:01

## 2023-01-16 RX ADMIN — OCTREOTIDE ACETATE 50 MCG/HR: 500 INJECTION, SOLUTION INTRAVENOUS; SUBCUTANEOUS at 03:01

## 2023-01-16 RX ADMIN — NOREPINEPHRINE BITARTRATE 0.02 MCG/KG/MIN: 4 INJECTION, SOLUTION INTRAVENOUS at 10:01

## 2023-01-16 RX ADMIN — MIDODRINE HYDROCHLORIDE 5 MG: 5 TABLET ORAL at 05:01

## 2023-01-16 RX ADMIN — CEFEPIME 2 G: 2 INJECTION, POWDER, FOR SOLUTION INTRAVENOUS at 08:01

## 2023-01-16 RX ADMIN — PIPERACILLIN SODIUM AND TAZOBACTAM SODIUM 4.5 G: 4; .5 INJECTION, POWDER, LYOPHILIZED, FOR SOLUTION INTRAVENOUS at 01:01

## 2023-01-16 RX ADMIN — PANTOPRAZOLE SODIUM 80 MG: 40 INJECTION, POWDER, LYOPHILIZED, FOR SOLUTION INTRAVENOUS at 08:01

## 2023-01-16 RX ADMIN — PHYTONADIONE 10 MG: 10 INJECTION, EMULSION INTRAMUSCULAR; INTRAVENOUS; SUBCUTANEOUS at 04:01

## 2023-01-16 RX ADMIN — FENTANYL CITRATE 25 MCG: 50 INJECTION INTRAMUSCULAR; INTRAVENOUS at 06:01

## 2023-01-16 RX ADMIN — METHOCARBAMOL TABLETS 1000 MG: 500 TABLET, COATED ORAL at 03:01

## 2023-01-16 RX ADMIN — SODIUM CHLORIDE, SODIUM LACTATE, POTASSIUM CHLORIDE, AND CALCIUM CHLORIDE 200 ML: .6; .31; .03; .02 INJECTION, SOLUTION INTRAVENOUS at 08:01

## 2023-01-16 RX ADMIN — CEFTRIAXONE 2 G: 2 INJECTION, SOLUTION INTRAVENOUS at 08:01

## 2023-01-16 RX ADMIN — OXYCODONE HYDROCHLORIDE AND ACETAMINOPHEN 1 TABLET: 5; 325 TABLET ORAL at 03:01

## 2023-01-16 NOTE — ED NOTES
Pt c/o sciatica pain x 2 months that became unbearable today. States he's been unable to stand or walk, and has lost control of his bowels recently. Pt has not seen a neurologist, stating he has been unable to find one that takes Medicaid. Pt AAOx3. Respirations even and unlabored. Skin is warm and dry. NAD noted. Girlfriend at bedside. CB within reach.    APPEARANCE: Alert, oriented and in no acute distress.  CARDIAC: Hypertensive. Normal rate. Pt denies chest pain.   PERIPHERAL VASCULAR: Normal cap refill. No edema. Warm to touch.    RESPIRATORY: Respirations are even and unlabored. Normal rate. Pt denies SOB. Symmetrical chest expansion bilaterally. No obvious signs of distress.  GASTRO: Abdomen soft, non-tender, no distention.  MUSC: Back pain. Full ROM. No bony tenderness or soft tissue tenderness. No obvious deformity.  SKIN: Skin is warm and dry.  NEURO: Cely coma scale: eyes open spontaneously-4, oriented & converses-5, obeys commands-6. No neurological abnormalities.   MENTAL STATUS: Awake, alert and aware of environment.

## 2023-01-16 NOTE — PHARMACY MED REC
"[facility]           Pharmacy  Admission Medication History     The home medication history was taken by Sylvia Vera.      Medication history obtained from Medications listed below were obtained from: Patient/family, Patients partner verified home medications    Based on information gathered for medication list, you may go to "Admission" then "Reconcile Home Medications" tabs to review and/or act upon those items.     The home medication list has been updated by the Pharmacy department.   Please read ALL comments highlighted in yellow.   Please address this information as you see fit.    Feel free to contact us if you have any questions or require assistance.    The medications listed below were removed from the home medication list.  Please reorder if appropriate:    Patient reports NOT TAKING the following medication(s):  Omnicef 300mg  Vibra- tabs 100mg  Gabapentin 300mg  Tramadol 50mg      No current facility-administered medications on file prior to encounter.     Current Outpatient Medications on File Prior to Encounter   Medication Sig Dispense Refill    aspirin (ECOTRIN) 81 MG EC tablet Take 81 mg by mouth once daily.      lisinopriL-hydrochlorothiazide (PRINZIDE,ZESTORETIC) 10-12.5 mg per tablet Take 1 tablet by mouth once daily.      metFORMIN (GLUCOPHAGE) 500 MG tablet Take 1,000 mg by mouth 2 (two) times daily.      metoprolol tartrate (LOPRESSOR) 100 MG tablet Take 100 mg by mouth once daily.      omega-3 fatty acids 1,000 mg Cap Take 2 g by mouth once daily.      simvastatin (ZOCOR) 40 MG tablet Take 40 mg by mouth every evening.      traZODone (DESYREL) 50 MG tablet Take 50 mg by mouth nightly as needed.      VITAMIN B COMPLEX ORAL Take 1 tablet by mouth once daily.      XARELTO 20 mg Tab Take 20 mg by mouth once daily.      cyclobenzaprine (FLEXERIL) 10 MG tablet Take 10 mg by mouth 2 (two) times daily as needed.      nicotine (NICODERM CQ) 14 mg/24 hr 1 patch every morning.  "     nicotine, polacrilex, (NICORETTE) 2 mg Gum SMARTSI Each By Mouth Every 2 Hours PRN         Please address this information as you see fit.  Feel free to contact us if you have any questions or require assistance.    Sylvia Vera  [phone]              .

## 2023-01-16 NOTE — PROGRESS NOTES
Nurses Note -- 4 Eyes      1/16/2023   1:45 PM      Skin assessed during: Admit      [x] No Pressure Injuries Present    [x]Prevention Measures Documented      [] Yes- Altered Skin Integrity Present or Discovered   [] LDA Added if Not in Epic (Describe Wound)   [] New Altered Skin Integrity was Present on Admit and Documented in LDA   [] Wound Image Taken    Wound Care Consulted? No    Attending Nurse:  Luzma Reed RN     Second RN/Staff Member:  Prasad Melendez RN      [FreeTextEntry1] : anterior leg [de-identified] : Healed

## 2023-01-16 NOTE — ASSESSMENT & PLAN NOTE
- Maintain K > 4, Mag > 2 and Ca/iCal WNL to decrease arrhythmogenic potential  - On lopressor 100 mg QD, holding as pt likely has GI bleed  - Holding anticoagulation in the setting of GI bleed

## 2023-01-16 NOTE — CONSULTS
[facility]  Gastroenterology  Consult Note    Patient Name: Jonny Isabel  MRN: [id number]  Admission Date: 1/16/2023  Hospital Length of Stay: 0 days  Code Status: Full Code   Attending Provider: Obdulio Winchester MD   Consulting Provider: Keven Ya MD  Primary Care Physician: Yuli Pérez Mai, MD  Principal Problem:<principal problem not specified>    Inpatient consult to Gastroenterology  Consult performed by: Keven Ya MD  Consult ordered by: Maribel Mariano DO      Subjective:     HPI: Jonny Isabel is a 62 y.o. male with history of alcoholic liver disease, alcohol use disorder, Afib (s/p ablation and DCCV x2, on Xarelto), HTN who presented to [facility] for LE weakness x 1 week. Was also having melenic stools. Imaging showed epidural abscess.  Imaging showed large L3/L4 herniated disc with moderate to severe stenosis. Possible epidural abscess noted on CT. Transferred to [facility] for possible acute NSGY intervention, now evaluated by NSGY since arrival and no acute need for intervention, needs medical optimization.    Melena started 2 weeks ago. Brown stools before. Describes black tarry stools, daily 2-3 times per day. Denies NSAID use. Takes xarelto for Afib. Drinks daily, 6 beers + 1 pint of jaylon daily. Denies abdominal pain, n/v. Last Bm was yesterday and was melenic.  No prior EGDs.    CT A/P suggestive of partial SBO with possible transition point within midline mid-lower abdomen. Denies n/v. Passing stool and gas.    Hgb 8.4 on arrival. Baseline around 10-11. Hypotensive on arrival, 84/49 but improved with IVFs, not currently on pressors.         Past Medical History:   Diagnosis Date    A-fib        History reviewed. No pertinent surgical history.    History reviewed. No pertinent family history.    Social History     Socioeconomic History    Marital status:    Substance and Sexual Activity    Alcohol use: Yes     Alcohol/week: 6.0 standard drinks     Types: 6 Cans  of beer per week       Current Facility-Administered Medications on File Prior to Encounter   Medication Dose Route Frequency Provider Last Rate Last Admin    [COMPLETED] cefTRIAXone 2 gram/50 mL IVPB 2 g  2 g Intravenous ED 1 Time Jelena Isabel MD   Stopped at 01/16/23 0926    [COMPLETED] fentaNYL 50 mcg/mL injection 25 mcg  25 mcg Intravenous ED 1 Time Luan Lemus MD   25 mcg at 01/16/23 0653    [COMPLETED] lactated ringers bolus 200 mL  200 mL Intravenous ED 1 Time Jelena Isabel MD   Stopped at 01/16/23 0841    [COMPLETED] lactated ringers bolus 3,333 mL  30 mL/kg Intravenous ED 1 Time Jelena Isabel MD   Stopped at 01/16/23 0943    [COMPLETED] methocarbamoL tablet 1,000 mg  1,000 mg Oral ED 1 Time Luan Lemus MD   1,000 mg at 01/16/23 0303    [COMPLETED] oxyCODONE-acetaminophen 5-325 mg per tablet 1 tablet  1 tablet Oral ED 1 Time Luan Lemus MD   1 tablet at 01/16/23 0304    [COMPLETED] pantoprazole injection 80 mg  80 mg Intravenous ED 1 Time Jelena Isabel MD   80 mg at 01/16/23 0814    [COMPLETED] sodium chloride 0.9% bolus 1,000 mL 1,000 mL  1,000 mL Intravenous ED 1 Time Luan Lemus MD   Stopped at 01/16/23 0732    [COMPLETED] vancomycin 2 g in dextrose 5 % 500 mL IVPB  2,000 mg Intravenous Once Jelena Isabel  mL/hr at 01/16/23 0945 2,000 mg at 01/16/23 0945    [DISCONTINUED] pantoprazole 40 mg in  mL infusion (ready to mix system)  8 mg/hr Intravenous Continuous Jelena Isabel MD 20 mL/hr at 01/16/23 1041 8 mg/hr at 01/16/23 1041    [DISCONTINUED] piperacillin-tazobactam 4.5 g in dextrose 5 % 100 mL IVPB (ready to mix system)  4.5 g Intravenous ED 1 Time Jelena Isabel MD        [DISCONTINUED] sodium chloride 0.9% bolus 2,052 mL 2,052 mL  30 mL/kg (Ideal) Intravenous Once Jelena Isabel MD        [DISCONTINUED] vancomycin 2 g in dextrose 5 % 500 mL IVPB  2,000 mg Intravenous Once Jelena Isabel MD         Current Outpatient Medications on File Prior to Encounter   Medication Sig  Dispense Refill    aspirin (ECOTRIN) 81 MG EC tablet Take 81 mg by mouth once daily.      cyclobenzaprine (FLEXERIL) 10 MG tablet Take 10 mg by mouth 2 (two) times daily as needed.      lisinopriL-hydrochlorothiazide (PRINZIDE,ZESTORETIC) 10-12.5 mg per tablet Take 1 tablet by mouth once daily.      metFORMIN (GLUCOPHAGE) 500 MG tablet Take 1,000 mg by mouth 2 (two) times daily.      metoprolol tartrate (LOPRESSOR) 100 MG tablet Take 100 mg by mouth once daily.      nicotine (NICODERM CQ) 14 mg/24 hr 1 patch every morning.      nicotine, polacrilex, (NICORETTE) 2 mg Gum SMARTSI Each By Mouth Every 2 Hours PRN      omega-3 fatty acids 1,000 mg Cap Take 2 g by mouth once daily.      simvastatin (ZOCOR) 40 MG tablet Take 40 mg by mouth every evening.      traZODone (DESYREL) 50 MG tablet Take 50 mg by mouth nightly as needed.      VITAMIN B COMPLEX ORAL Take 1 tablet by mouth once daily.      XARELTO 20 mg Tab Take 20 mg by mouth once daily.      [DISCONTINUED] cefdinir (OMNICEF) 300 MG capsule Take 300 mg by mouth every 12 (twelve) hours.      [DISCONTINUED] doxycycline (VIBRA-TABS) 100 MG tablet Take 100 mg by mouth 2 (two) times daily.      [DISCONTINUED] gabapentin (NEURONTIN) 300 MG capsule Take 300 mg by mouth 2 (two) times daily.      [DISCONTINUED] traMADoL (ULTRAM) 50 mg tablet Take 50 mg by mouth.         Review of patient's allergies indicates:  No Known Allergies    Review of Systems   Constitutional:  Negative for chills and fever.   HENT:  Negative for congestion and sore throat.    Eyes:  Negative for blurred vision and double vision.   Respiratory:  Negative for cough and shortness of breath.    Cardiovascular:  Negative for chest pain and palpitations.   Gastrointestinal:         See HPI   Genitourinary:  Negative for frequency and urgency.   Musculoskeletal:  Negative for joint pain and myalgias.   Skin:  Negative for itching and rash.   Neurological:  Negative for sensory change and focal  weakness.      Objective:     Vitals:    01/16/23 1503   BP: (!) 93/52   Pulse: 71   Resp: 10   Temp:          Constitutional:  obese, not in acute distress and well developed  HENT: Head: Normal, normocephalic, atraumatic.  Eyes: conjunctiva clear and sclera nonicteric  Cardiovascular: regular rate and rhythm and no murmur  Respiratory: normal chest expansion & respiratory effort   and no accessory muscle use  GI: soft, distended but nontender, without masses or organomegaly  Musculoskeletal: no muscular tenderness noted  Skin: normal color  Neurological: alert, oriented x3 but somewhat drowsy  Psychiatric: mood and affect are within normal limits, pt is a good historian; no memory problems were noted      Significant Labs:  Recent Labs   Lab 01/16/23  0759 01/16/23  0855 01/16/23  1318   HGB 7.8* 7.7* 8.4*       Lab Results   Component Value Date    WBC 13.11 (H) 01/16/2023    HGB 8.4 (L) 01/16/2023    HCT 25.8 (L) 01/16/2023     (H) 01/16/2023     01/16/2023       Lab Results   Component Value Date     (L) 01/16/2023    K 4.5 01/16/2023     01/16/2023    CO2 17 (L) 01/16/2023    BUN 26 (H) 01/16/2023    CREATININE 4.7 (H) 01/16/2023    CALCIUM 6.8 (LL) 01/16/2023    ANIONGAP 12 01/16/2023       Lab Results   Component Value Date    ALT 81 (H) 01/16/2023     (H) 01/16/2023    ALKPHOS 273 (H) 01/16/2023    BILITOT 1.5 (H) 01/16/2023       Lab Results   Component Value Date    INR 2.0 (H) 01/16/2023       Significant Imaging:  Reviewed pertinent radiology findings.       Assessment/Plan:     Jonny Isabel is a 62 y.o. male with history of alcoholic liver disease, alcohol use disorder, Afib (s/p ablation and DCCV x2, on Xarelto), HTN who was transferred from [place] for [facility] eval with concern for acute LE weakness with cord compression and possible epidural abscess on imaging. Also having melena for past 2 weeks with Hgb below baseline. No acute intervention per NSGY, needs medical  optimization. Hgb below baseline. Hypotensive but not requiring pressors at this time. Has concern for partial SBO on imaging but is passing stool and gas without n/v. Has an element of alcoholic hepatitis present and although plts wnl, presence of ascites is concerning for portal HTN although no prior dx of cirrhosis. Would empirically start octreotide. INR elevated, suspect combination of liver disease and Xarelto. Last dose of Xarelto 1/15 Am. Planning EGD 1/17.    Problem List:  Melena  Alcoholic liver disease  Afib (on xarelto)    Recommendations:  - Plan for EGD 1/17  - Trend Hgb q8 hrs. Transfuse for Hgb > 7, unless otherwise indicated  - Maintain IV access with 2 large bore Ivs  - Intravascular resuscitation/support with IVFs   - Ok for clear liquid diet from GI perspective  - Would hold off on any NG placement with coagulopathy and unknown presence of varices  - Hold all NSAIDs and anticoagulants, unless contraindicated  - Continue IV pantoprazole 40mg BID  - Octreotide bolus 50mcg, then gtt at 50mcg/hr.  - On broad spectrum abx. Complete at least 5 day course for SBP ppx.  - Please correct any coagulopathy with platelets and FFP for goal of platelets >50K and INR <2.0  - Please notify GI team if there is significant change in patient's clinical status      Thank you for involving us in the care of Jonny Isabel. Please call with any additional questions, concerns or changes in the patient's clinical status. We will continue to follow.     Keven Ya MD  Gastroenterology Fellow PGY IV  [facility]

## 2023-01-16 NOTE — HPI
Mr. Fuller is a 62-year-old male with a PMHx of A-fibb (on Eliquis), hypertension, DM 2 (not insulin dependent) presents as transfer from OSH for chronic lower back that has been worsening for the past week. Pt states that the pain is radiating from his right buttock down to his legs. Pt was unable to get out of bed this AM due to the pain. HE endorses having fecal incontinence for the past week with black tarry stools. He denies any fevers, chills, abdominal pain, urinary incontinence, or saddle anesthesia.     At OSH ED CT lumbar spine ordered revealing Prominent, cystic, multiloculated predominantly gas attenuation epidural  structure within the spinal canal at the level of L3-L4 resulting in severe narrowing of the spinal canal with mass effect upon the thecal sac and  vacuum disc phenomena concerning for diskitis or an epidural abscess. MRI shows large disc extrusion at L3-4 causing severe spinal canal stenosis, with moderate spinal canal stenosis at L2-3 and L4-5 and moderate neural foraminal narrowing L4-5 and L5-S1. Pt transferred to [facility] for further intervention with neurosurgery. Admitted to MICU for NSGY and GI evaluation . Hgb stable. EGD shows small varices with gastropathy, no active bleeds. Worsening MARTITA with minimal UOP, HRS protocol started with levo, midodrine, octreotide and albumin trailed, has since been stopped due to low suspicion. Nephrology following recommend HD in setting of Acute Renal Failure. NSGY initially planned surgical intervention for 1/23 but determine patient not medically optimized and currently suggesting multimodal pain control. A-fibb RVR 1/23 overnight, started on Amiodarone gtt, transitioned to home lopressor. Ongoing HD needs.     Stepped down to hospital medicine on 1/25. On 1/27, pt developed afib with rvr, refractory to IV Lopresor x 2, developed hypotension subsequently and was stepped back up to MICU.

## 2023-01-16 NOTE — HPI
61 yo M with PMH of alcoholic hepatitis (drinks a 6 pack of beer per day and bottle of marie), Afib on Xarelto, central obesity, HTN, GI bleed, unknown CKD vs MARTITA on admission, anemia, chronic hyponatremia who presents due to inability to get out of bed. He reports that he has been having fecal incontinence for 2 months now and difficulty walking for the last 4 days (with single person assistance and rolling walker) and inability to get out of bed today. He denies saddle anesthesia or urinary incontinence or issues voiding his bladder. He has also  been having dark and tarry stools.     CT L spine and MRI L spine at OSH showed large L3/L4 herniated disc with moderate to severe stenosis. [place] [facility] [place] intervention.

## 2023-01-16 NOTE — ASSESSMENT & PLAN NOTE
- Vitals q4h while awake  - CIWA monitoring  - Ativan 2mg q4 PRN if 2 criteria are met: Systolic BP>160, Diastolic BP >110 or Pulse >110  - Start Vitamin supplementation- Thiamine, Folic acid, Vit. B12, and Multivitamin   - Will start valium taper at 5 mg Q8H

## 2023-01-16 NOTE — ASSESSMENT & PLAN NOTE
This patient does have evidence of infective focus  My overall impression is septic shock.  Source: Unknown  Antibiotics given-   Antibiotics (From admission, onward)    Start     Stop Route Frequency Ordered    01/16/23 2100  ceFEPIme (MAXIPIME) 2 g in dextrose 5 % in water (D5W) 5 % 50 mL IVPB (MB+)         -- IV Every 24 hours (non-standard times) 01/16/23 1354    01/16/23 2100  metronidazole IVPB 500 mg         -- IV Every 8 hours (non-standard times) 01/16/23 1354    01/16/23 1256  vancomycin - pharmacy to dose  (vancomycin IVPB)        [facility].    -- IV pharmacy to manage frequency 01/16/23 1256        Latest lactate reviewed-  Recent Labs   Lab 01/16/23  0855   LACTATE 3.9*     Organ dysfunction indicated by Acute kidney injury    - Was on Vancomycin and Zosyn   - Will switch to Vancomycin, Cefepime, and Flagyl   - BCx pending   - Will de-escalate once appropriate

## 2023-01-16 NOTE — CONSULTS
[facility]  Neurosurgery  Consult Note    Inpatient consult to Neurosurgery  Consult performed by: Krista Finch MD  Consult ordered by: Maribel Mariano DO      Subjective:     Chief Complaint/Reason for Admission: weakness    History of Present Illness: 63 yo M with PMH of alcoholic hepatitis (drinks a 6 pack of beer per day and bottle of marie), Afib on Xarelto, central obesity, HTN, GI bleed, unknown CKD vs MARTITA on admission, anemia, chronic hyponatremia who presents due to inability to get out of bed. He reports that he has been having fecal incontinence for 2 months now and difficulty walking for the last 4 days (with single person assistance and rolling walker) and inability to get out of bed today. He denies saddle anesthesia or urinary incontinence or issues voiding his bladder. He has also  been having dark and tarry stools.     CT L spine and MRI L spine at OSH showed large L3/L4 herniated disc with moderate to severe stenosis. [place] [facility] [place]       Medications Prior to Admission   Medication Sig Dispense Refill Last Dose    aspirin (ECOTRIN) 81 MG EC tablet Take 81 mg by mouth once daily.       cyclobenzaprine (FLEXERIL) 10 MG tablet Take 10 mg by mouth 2 (two) times daily as needed.       lisinopriL-hydrochlorothiazide (PRINZIDE,ZESTORETIC) 10-12.5 mg per tablet Take 1 tablet by mouth once daily.       metFORMIN (GLUCOPHAGE) 500 MG tablet Take 1,000 mg by mouth 2 (two) times daily.       metoprolol tartrate (LOPRESSOR) 100 MG tablet Take 100 mg by mouth once daily.       nicotine (NICODERM CQ) 14 mg/24 hr 1 patch every morning.       nicotine, polacrilex, (NICORETTE) 2 mg Gum SMARTSI Each By Mouth Every 2 Hours PRN       omega-3 fatty acids 1,000 mg Cap Take 2 g by mouth once daily.       simvastatin (ZOCOR) 40 MG tablet Take 40 mg by mouth every evening.       traZODone (DESYREL) 50 MG tablet Take 50 mg by mouth nightly  as needed.       VITAMIN B COMPLEX ORAL Take 1 tablet by mouth once daily.       XARELTO 20 mg Tab Take 20 mg by mouth once daily.          Review of patient's allergies indicates:  No Known Allergies    No past medical history on file.  No past surgical history on file.  Family History    None       Tobacco Use    Smoking status: Not on file    Smokeless tobacco: Not on file   Substance and Sexual Activity    Alcohol use: Not on file    Drug use: Not on file    Sexual activity: Not on file     Review of Systems   Gastrointestinal:  Positive for blood in stool.   Musculoskeletal:  Positive for back pain.   All other systems reviewed and are negative.  Objective:     Weight: 111.1 kg (244 lb 14.9 oz)  Body mass index is 37.24 kg/m².  Vital Signs (Most Recent):  Temp: 97.4 °F (36.3 °C) (01/16/23 1240)  Pulse: 72 (01/16/23 1400)  Resp: 20 (01/16/23 1400)  BP: (!) 101/47 (01/16/23 1330)  SpO2: 100 % (01/16/23 1400)   Vital Signs (24h Range):  Temp:  [97.4 °F (36.3 °C)-98.7 °F (37.1 °C)] 97.4 °F (36.3 °C)  Pulse:  [64-83] 72  Resp:  [10-42] 20  SpO2:  [90 %-100 %] 100 %  BP: ()/() 101/47                          Urethral Catheter 01/16/23 1347 Latex 14 Fr. (Active)       Physical Exam  Constitutional:       Appearance: He is well-developed and well-nourished.      Comments: obese   Eyes:      Extraocular Movements: EOM normal.      Conjunctiva/sclera: Conjunctivae normal.      Pupils: Pupils are equal, round, and reactive to light.   Cardiovascular:      Pulses: Normal pulses.   Abdominal:      Palpations: Abdomen is soft.   Neurological:      Mental Status: He is alert and oriented to person, place, and time.      Comments: AAOx4  PERRL  CN intact  BUE 5/5  BLE 4- hip flexion, 4 to 4+ ke, 5 df/pf/ehl  Reflexes normal  Poor rectal tone  No saddle anesthesia  SILT   Psychiatric:         Mood and Affect: Mood and affect normal.       Physical Exam:    Constitutional: He appears well-developed and  well-nourished.   obese     Eyes: Pupils are equal, round, and reactive to light. Conjunctivae and EOM are normal.     Cardiovascular: Normal pulses.     Abdominal: Soft.     Psych/Behavior: He is alert. He is oriented to person, place, and time. He has a normal mood and affect.     Neurological:   AAOx4  PERRL  CN intact  BUE 5/5  BLE 4- hip flexion, 4 to 4+ ke, 5 df/pf/ehl  Reflexes normal  Poor rectal tone  No saddle anesthesia  SILT     Significant Labs:  Recent Labs   Lab 01/16/23  0449 01/16/23  0759 01/16/23  0855   GLU 92 85 88   * 130* 129*   K 4.3 4.5 4.5    101 100   CO2 17* 17* 17*   BUN 26* 26* 26*   CREATININE 4.6* 4.7* 4.7*   CALCIUM 7.0* 6.9* 6.8*     Recent Labs   Lab 01/16/23  0759 01/16/23  0855 01/16/23  1318   WBC 13.01* 12.99* 13.11*   HGB 7.8* 7.7* 8.4*   HCT 24.6* 23.9* 25.8*    247 233     Recent Labs   Lab 01/16/23  0449   INR 2.0*     Microbiology Results (last 7 days)       Procedure Component Value Units Date/Time    Blood culture [[id number]]     Order Status: Canceled Specimen: Blood     Blood culture [[id number]]     Order Status: Canceled Specimen: Blood           All pertinent labs from the last 24 hours have been reviewed.    Significant Diagnostics:  I have reviewed all pertinent imaging results/findings within the past 24 hours.    Assessment/Plan:     Lumbar herniated disc  63 yo M with PMH of alcoholic hepatitis (drinks a 6 pack of beer per day and bottle of marie), Afib on Xarelto, central obesity, HTN, GI bleed, unknown CKD vs MARTITA on admission, anemia, chronic hyponatremia who presents due to inability to get out of bed. He reports that he has been having fecal incontinence for 2 months now and difficulty walking for the last 4 days (with single person assistance and rolling walker) and inability to get out of bed today. He denies saddle anesthesia or urinary incontinence or issues voiding his bladder. He has also  been having dark and tarry stools.     -  admitted to MICU, q4 neuro checks  - all labs and diagnostics reviewed  - CT L spine and MRI L spine at OSH showed large L3/L4 herniated disc with moderate to severe stenosis. Some air in disc space.  - no acute neurosurgical intervention, patient with clinical findings of subacute to chronic symptoms  - no HOB restrictions   - hold Xarelto, PT and INR elevated on admission // will need Xarelto held for 3-5 days prior to OR and/or coagulapathy corrected  - GI consulted for GI bleed, f/u recs  - Cr elevated to 4.7 on admission, unclear baseline, workup per MICU  - ok for diet at this time per primary  - will tentatively plan for lumbar decompression/discectomy during this admission  - patient will need medical optimization and clearance for surgery prior to OR, appreciate assistance from primary team  - nsgy will continue to follow    Dispo: ongoing        Thank you for your consult. I will follow-up with patient. Please contact us if you have any additional questions.    Krista Finch MD  Neurosurgery  [facility]

## 2023-01-16 NOTE — PROGRESS NOTES
Pharmacokinetic Initial Assessment: IV Vancomycin    Assessment/Plan:    Vancomycin 2000 mg was initiated prior to transfer from [place] ED on 1/16 at 0945.   MARTITA patient's SCr = 4.7 mg/dL   Desired empiric serum trough concentration is 15 to 20 mcg/mL  Draw vancomycin random level on 1/17 with am labs and redose when level < 20 mcg/mL.   Pharmacy will continue to follow and monitor vancomycin.      Please contact pharmacy at extension [phone] with any questions regarding this assessment.     Thank you for the consult,   Elias Anthony       Patient brief summary:  Jonny Isabel is a 62 y.o. male initiated on antimicrobial therapy with IV Vancomycin for treatment of suspected bacteremia    Drug Allergies:   Review of patient's allergies indicates:  No Known Allergies    Actual Body Weight:   111.1 kg    Renal Function:   Estimated Creatinine Clearance: 20 mL/min (A) (based on SCr of 4.7 mg/dL (H)).,     Dialysis Method (if applicable):  N/A    CBC (last 72 hours):  Recent Labs   Lab Result Units 01/16/23  0449 01/16/23  0603 01/16/23  0759 01/16/23  0855   WBC K/uL 12.27 12.91* 13.01* 12.99*   Hemoglobin g/dL 7.5* 7.5* 7.8* 7.7*   Hematocrit % 23.0* 23.3* 24.6* 23.9*   Platelets K/uL 219 237 235 247   Gran % % 77.0* 76.8* 74.7* 77.7*   Lymph % % 10.1* 10.8* 11.3* 9.5*   Mono % % 11.2 10.9 12.2 11.3   Eosinophil % % 0.7 0.6 0.8 0.5   Basophil % % 0.2 0.2 0.2 0.3   Differential Method  Automated Automated Automated Automated       Metabolic Panel (last 72 hours):  Recent Labs   Lab Result Units 01/16/23  0449 01/16/23  0759 01/16/23  0855 01/16/23  1101   Sodium mmol/L 131* 130* 129*  --    Potassium mmol/L 4.3 4.5 4.5  --    Chloride mmol/L 101 101 100  --    CO2 mmol/L 17* 17* 17*  --    Glucose mg/dL 92 85 88  --    Glucose, UA   --   --   --  Trace*   BUN mg/dL 26* 26* 26*  --    Creatinine mg/dL 4.6* 4.7* 4.7*  --    Albumin g/dL 1.6* 1.7* 1.7*  --    Total Bilirubin mg/dL 1.4* 1.5* 1.5*  --    Alkaline Phosphatase  U/L 264* 274* 273*  --    AST U/L 177* 186* 181*  --    ALT U/L 77* 81* 81*  --        Drug levels (last 3 results):  No results for input(s): VANCOMYCINRA, VANCORANDOM, VANCOMYCINPE, VANCOPEAK, VANCOMYCINTR, VANCOTROUGH in the last 72 hours.    Microbiologic Results:  Microbiology Results (last 7 days)       Procedure Component Value Units Date/Time    Blood culture [[id number]]     Order Status: Canceled Specimen: Blood     Blood culture [[id number]]     Order Status: Canceled Specimen: Blood

## 2023-01-16 NOTE — ED NOTES
Blood consent signed    Pt has received 1000cc of NS prior to sepsis orders   Will stop LR infusion at 200/cc pre hour and hang remains 30ml/kg per sepsis orders of LR

## 2023-01-16 NOTE — ASSESSMENT & PLAN NOTE
Concerning for variceal bleed given hx of alcohol abuse.     - GI consulted, recommend clear liquid for now   -Transfuse pRBC for Hb < 7 g/dL (Consider a higher Hb target if there is clinical evidence of intravascular volume depletion or comorbidities, such as CAD or if high suspicion of vigorous active ongoing bleeding or an uncorrected coagulopathy exists.).  - Will give vitamin K given Pt/INR > 2   - PPI 80 mg IV bolus once, then IV PPI 40 BID  - Octreotide gtt   -Avoid nonsteroidal agents, antiplatelet agents and anticoagulants if possible in patients without absolute contraindications. (consult managing provider if required for cardiovascular protection).

## 2023-01-16 NOTE — PROGRESS NOTES
Pt admitted to SICU 13580 from OSH via transport @ 1230. Attached to bedside monitor and oxygen, all VSS upon arrival. Critical care resident and charge RN notified of patient arrival and at bedside. New orders received and implemented. Full assessment completed, immediate needs of patient addressed. No other significant events upon admit. Dr. Winchester and neurosurgery resident at bedside to assess patient. POC reviewed with patient and significant other, all questions answered.

## 2023-01-16 NOTE — ASSESSMENT & PLAN NOTE
CT Lumbar Spine Without Contrast Result Date: 1/16/2023  1.  Prominent, cystic, multiloculated predominantly gas attenuation structure within the spinal canal at the level of L3-L4 with dimensions as above.  This is favored to be epidural in location and results in severe narrowing of the spinal canal with mass effect upon the thecal sac.  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 Large disc extrusion at L3-4, contributing to severe spinal canal stenosis, as above. Additional lumbar spondylosis, contributing to moderate spinal canal stenosis at L2-3 and L4-5 and moderate neural foraminal narrowing L4-5 and L5-S1    - Neuro checks q1h   - NSGY consulted

## 2023-01-16 NOTE — PROGRESS NOTES
Pharmacist Renal Dose Adjustment Note    Jonny Isabel is a 62 y.o. male being treated with the medication cefepime.     Patient Data:    Vital Signs (Most Recent):  Temp: 97.4 °F (36.3 °C) (01/16/23 1240)  Pulse: 83 (01/16/23 1345)  Resp: (!) 26 (01/16/23 1345)  BP: (!) 101/47 (01/16/23 1330)  SpO2: 98 % (01/16/23 1345)   Vital Signs (72h Range):  Temp:  [97.4 °F (36.3 °C)-98.7 °F (37.1 °C)]   Pulse:  [64-83]   Resp:  [10-42]   BP: ()/()   SpO2:  [90 %-100 %]      Recent Labs   Lab 01/16/23  0449 01/16/23  0759 01/16/23  0855   CREATININE 4.6* 4.7* 4.7*     Serum creatinine: 4.7 mg/dL (H) 01/16/23 0855  Estimated creatinine clearance: 20 mL/min (A)    Medication: Cefepime 2 g every 8 hours dose will be changed to medication cefepime 2 g every 24 hours.     Pharmacist's Name: Elias Anthony  Pharmacist's Extension: [phone]

## 2023-01-16 NOTE — ASSESSMENT & PLAN NOTE
Creatinine 4.7 on admit, baseline around 0.8. Pre-renal vs ATN. Less likely obstruction as pt has gomez     - Check urine lytes and renal ultrasound  - Check urine protein creatinine ratio.  - Strict I&Os and daily weights   - Avoid nephrotoxic agents such as NSAIDs, gadolinium and IV radiocontrast.  - Renally dose meds to current GFR.  - Maintain MAP > 65.

## 2023-01-16 NOTE — ASSESSMENT & PLAN NOTE
63 yo M with PMH of alcoholic hepatitis (drinks a 6 pack of beer per day and bottle of marie), Afib on Xarelto, central obesity, HTN, GI bleed, unknown CKD vs MARTITA on admission, anemia, chronic hyponatremia who presents due to inability to get out of bed. He reports that he has been having fecal incontinence for 2 months now and difficulty walking for the last 4 days (with single person assistance and rolling walker) and inability to get out of bed today. He denies saddle anesthesia or urinary incontinence or issues voiding his bladder. He has also  been having dark and tarry stools.     - admitted to MICU, q4 neuro checks  - all labs and diagnostics reviewed  - CT L spine and MRI L spine at OSH showed large L3/L4 herniated disc with moderate to severe stenosis. Some air in disc space.  - no acute neurosurgical intervention, patient with clinical findings of subacute to chronic symptoms  - no HOB restrictions   - hold Xarelto, PT and INR elevated on admission // will need Xarelto held for 3-5 days prior to OR and/or coagulapathy corrected  - GI consulted for GI bleed, f/u recs  - Cr elevated to 4.7 on admission, unclear baseline, workup per MICU  - ok for diet at this time per primary  - will tentatively plan for lumbar decompression/discectomy during this admission  - patient will need medical optimization and clearance for surgery prior to OR, appreciate assistance from primary team  - nsgy will continue to follow    Dispo: ongoing

## 2023-01-16 NOTE — PROVIDER TRANSFER
Outside Transfer Acceptance Note / Regional Referral Center    Referring facility: [facility]   Referring provider: TOMÁS DUNNE  Accepting facility: [facility]  Accepting provider: BHUMI DE LA FUENTE  Reason for transfer: Higher Level of Care   Transfer diagnosis: Spinal stenosis  Transfer specialty requested: Hospital Medicine  Transfer specialty notified: yes  Transfer level: NUMBER 1-5: 1  Bed type requested: ICU  Isolation status: No active isolations   Admission class or status: IP- Inpatient    Narrative     62-year-old m with PMH chronic back pain which radiates into his right leg presented to [facility] (0300 h) with a one-week h/o increasing back and right leg pain.  Also, patient reports one week hx fecal incontinence and black stools.  He says that he does not have urinary incontinence or saddle anesthesia.  He denies using IV drugs.  He reports taking Eliquis for unknown reason with further hx pending.    On presentation /103, HR 72.  BMI 37.2 PEx pos for decreased rectal tone and dark stool pos for blood.  Abdomen is soft with normal bowel sounds.  No distension or abd tenderness.  Sensation and muscle strength to BL lower extremities intact.    Labs (0449 h) WBC 12.2, Hb 7.5, Plts 219, INR 2.0, Na 131, K 4.3, CO2 17, BUN 26, Cr 4.6 alk phos 264, T bili 1.4, , ALT 77, calcium 7.0, FOBT pos.  Repeat Hb (0759 h) unchanged (7.5 > 7.8)    EKG NSR with low-voltage QRS and prolonged QTc (518).  No acute changes    CT lumbar spine WO contrast pos for prominent cystic multiloculated predominantly gas attenuation structure within the spinal canal at the level of the L3-L4 likely epidural in location and resulting in severe narrowing of the spinal cord with mass effect upon thecal sac.    Patient given IVF (3.3 L).  BC obtained and patient started on zosyn and vancomycin.  pRBC x1 ordered and will be given when available.  Pending labs:  UTOX, ethanol, LA and CBC.       An MRI of the lumbar spine WO contrast is underway.    VS (0905 h) BP 97/51, HR 76    Transfer requested for urgent neurosurgical evaluation with concern for critical spinal stenosis and gastroenterology.      Transfer diagnosis:  Lumbar spinal stenosis with concern for epidural abscess, severe sepsis, hemorrhagic shock, GIB, MARTITA, acute blood loss anemia, coagulopathy (Eliquis).      Level one transfer for admission to surgical intensive care    Update    MRI WO contrast   1. 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  2. Large disc extrusion at L3-4, contributing to severe spinal canal stenosis  3. Additional lumbar spondylosis, contributing to moderate spinal canal stenosis at L2-3 and L4-5 and moderate neural foraminal narrowing L4-5 and L5-S1    CT abdomen without contrast  1. Findings concerning for developing small bowel obstruction with suspected transition point seen within the midline mid to lower abdomen at the level of L4.  2. Hepatomegaly and suspected hepatic steatosis.    3. Findings suggesting fluid overload/third-spacing   4. Question subtle nodularity with thickening of the peritoneum   5. Colonic diverticulosis.  6. Moderate to advanced systemic calcific atherosclerosis

## 2023-01-16 NOTE — H&P
[facility]  Critical Care Medicine  History & Physical    Patient Name: Jonny Isabel  MRN: [id number]  Admission Date: 1/16/2023  Hospital Length of Stay: 0 days  Code Status: Full Code  Attending Physician: Obdulio Winchester MD   Primary Care Provider: Yuli Pérez Mai, MD   Principal Problem: <principal problem not specified>    Subjective:     HPI:  Mr. Fuller is a 62-year-old male with a PMHx of A-fibb (on Eliquis), hypertension, DM 2 (not insulin dependent) presents as transfer from OSH for chronic lower back that has been worsening for the past week. Pt states that the pain is radiating from his right buttock down to his legs. Pt was unable to get out of bed this AM due to the pain. HE endorses having fecal incontinence for the past week with black tarry stools. He denies any fevers, chills, abdominal pain, urinary incontinence, or saddle anesthesia.     At OSH ED CT lumbar spine ordered revealing Prominent, cystic, multiloculated predominantly gas attenuation epidural  structure within the spinal canal at the level of L3-L4 resulting in severe narrowing of the spinal canal with mass effect upon the thecal sac and  vacuum disc phenomena concerning for diskitis or an epidural abscess. MRI shows large disc extrusion at L3-4 causing severe spinal canal stenosis, with moderate spinal canal stenosis at L2-3 and L4-5 and moderate neural foraminal narrowing L4-5 and L5-S1. Patient had no tenderness overlying spinous process but still endorsed sever pain, received percocet and robaxin. Of note, KENNETH showed blood in stool. The patient has a white count of 12.9, hemoglobin of 7.5, creatinine 4.6, Lactate of 3.9. Pt given Rocephin, Zosyn and Vanc.    Pt transferred to [facility] for further intervention with neurosurgery.       Hospital/ICU Course:  No notes on file     Past Medical History:   Diagnosis Date    A-fib        History reviewed. No pertinent surgical history.    Review of patient's allergies  indicates:  No Known Allergies    Family History    None       Tobacco Use    Smoking status: Not on file    Smokeless tobacco: Not on file   Substance and Sexual Activity    Alcohol use: Yes     Alcohol/week: 6.0 standard drinks     Types: 6 Cans of beer per week    Drug use: Not on file    Sexual activity: Not on file      Review of Systems   Constitutional:  Positive for activity change. Negative for chills and fever.   HENT:  Negative for congestion and trouble swallowing.    Eyes:  Negative for visual disturbance.   Respiratory:  Negative for cough, shortness of breath, wheezing and stridor.    Cardiovascular:  Negative for chest pain and leg swelling.   Gastrointestinal:  Positive for abdominal distention and blood in stool. Negative for abdominal pain, diarrhea, nausea and vomiting.        +fecal incontinence   Endocrine: Negative for polyuria.   Genitourinary:  Negative for difficulty urinating and dysuria.   Musculoskeletal:  Positive for back pain. Negative for arthralgias and myalgias.   Skin:  Negative for color change and rash.   Neurological:  Negative for dizziness, weakness, numbness and headaches.   Psychiatric/Behavioral:  Positive for confusion. Negative for agitation.    Objective:     Vital Signs (Most Recent):  Temp: 97.4 °F (36.3 °C) (01/16/23 1240)  Pulse: 72 (01/16/23 1400)  Resp: 20 (01/16/23 1400)  BP: (!) 101/47 (01/16/23 1330)  SpO2: 100 % (01/16/23 1400)   Vital Signs (24h Range):  Temp:  [97.4 °F (36.3 °C)-98.7 °F (37.1 °C)] 97.4 °F (36.3 °C)  Pulse:  [64-83] 72  Resp:  [10-42] 20  SpO2:  [90 %-100 %] 100 %  BP: ()/() 101/47   Weight: 111.1 kg (244 lb 14.9 oz)  Body mass index is 37.24 kg/m².    No intake or output data in the 24 hours ending 01/16/23 1423    Physical Exam  Vitals and nursing note reviewed.   Constitutional:       General: He is not in acute distress.     Appearance: He is obese. He is not ill-appearing.   HENT:      Head: Normocephalic and atraumatic.       Right Ear: External ear normal.      Left Ear: External ear normal.      Nose: Nose normal.      Mouth/Throat:      Mouth: Mucous membranes are moist.      Pharynx: Oropharynx is clear.   Eyes:      General:         Right eye: No discharge.         Left eye: No discharge.      Extraocular Movements: Extraocular movements intact.      Conjunctiva/sclera: Conjunctivae normal.      Pupils: Pupils are equal, round, and reactive to light.   Cardiovascular:      Rate and Rhythm: Normal rate and regular rhythm.      Pulses: Normal pulses.      Heart sounds: Normal heart sounds. No murmur heard.  Pulmonary:      Effort: Pulmonary effort is normal. No respiratory distress.      Breath sounds: No wheezing or rales.   Abdominal:      General: There is distension.      Tenderness: There is no abdominal tenderness. There is no guarding.   Musculoskeletal:         General: No swelling. Normal range of motion.      Cervical back: Normal range of motion.      Right lower leg: No edema.      Left lower leg: No edema.   Skin:     General: Skin is warm and dry.      Coloration: Skin is not jaundiced.   Neurological:      General: No focal deficit present.      Mental Status: He is alert. He is disoriented.      Cranial Nerves: Cranial nerves 2-12 are intact.      Comments: Patient altered  Responding to questions appropriately but confused about timing of events  Poor recall  + fecal incontinence   Psychiatric:         Mood and Affect: Mood normal.         Behavior: Behavior normal.       Vents:     Lines/Drains/Airways       Drain  Duration                  Urethral Catheter 01/16/23 1347 Latex 14 Fr. <1 day                  Significant Labs:    CBC/Anemia Profile:  Recent Labs   Lab 01/16/23  0542 01/16/23  0603 01/16/23  0759 01/16/23  0855 01/16/23  1318   WBC  --    < > 13.01* 12.99* 13.11*   HGB  --    < > 7.8* 7.7* 8.4*   HCT  --    < > 24.6* 23.9* 25.8*   PLT  --    < > 235 247 233   MCV  --    < > 102* 102* 100*   RDW   --    < > 20.1* 20.0* 19.4*   OCCULTBLOOD Positive*  --   --   --   --     < > = values in this interval not displayed.        Chemistries:  Recent Labs   Lab 01/16/23 0449 01/16/23 0759 01/16/23  0855   * 130* 129*   K 4.3 4.5 4.5    101 100   CO2 17* 17* 17*   BUN 26* 26* 26*   CREATININE 4.6* 4.7* 4.7*   CALCIUM 7.0* 6.9* 6.8*   ALBUMIN 1.6* 1.7* 1.7*   PROT 5.8* 6.1 6.0   BILITOT 1.4* 1.5* 1.5*   ALKPHOS 264* 274* 273*   ALT 77* 81* 81*   * 186* 181*         BMP:   Recent Labs   Lab 01/16/23  0855   GLU 88   *   K 4.5      CO2 17*   BUN 26*   CREATININE 4.7*   CALCIUM 6.8*     CMP:   Recent Labs   Lab 01/16/23 0449 01/16/23  0759 01/16/23  0855   * 130* 129*   K 4.3 4.5 4.5    101 100   CO2 17* 17* 17*   GLU 92 85 88   BUN 26* 26* 26*   CREATININE 4.6* 4.7* 4.7*   CALCIUM 7.0* 6.9* 6.8*   PROT 5.8* 6.1 6.0   ALBUMIN 1.6* 1.7* 1.7*   BILITOT 1.4* 1.5* 1.5*   ALKPHOS 264* 274* 273*   * 186* 181*   ALT 77* 81* 81*   ANIONGAP 13 12 12       Coagulation:   Recent Labs   Lab 01/16/23 0449   INR 2.0*     Lactic Acid:   Recent Labs   Lab 01/16/23  0855 01/16/23  1318   LACTATE 3.9* 3.8*     Significant Imaging: I have reviewed all pertinent imaging results/findings within the past 24 hours.    Assessment/Plan:     Neuro  Spinal stenosis  CT Lumbar Spine Without Contrast Result Date: 1/16/2023  1.  Prominent, cystic, multiloculated predominantly gas attenuation structure within the spinal canal at the level of L3-L4 with dimensions as above.  This is favored to be epidural in location and results in severe narrowing of the spinal canal with mass effect upon the thecal sac.  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 Large disc extrusion at L3-4, contributing to severe spinal canal stenosis, as above. Additional lumbar spondylosis, contributing to moderate spinal canal stenosis at  L2-3 and L4-5 and moderate neural foraminal narrowing L4-5 and L5-S1    - Neuro checks q1h   - NSGY consulted     Psychiatric  Alcohol abuse  - Vitals q4h while awake  - CIWA monitoring  - Ativan 2mg q4 PRN if 2 criteria are met: Systolic BP>160, Diastolic BP >110 or Pulse >110  - Start Vitamin supplementation- Thiamine, Folic acid, Vit. B12, and Multivitamin   - Will start valium taper at 5 mg Q8H       Cardiac/Vascular  A-fib  - Maintain K > 4, Mag > 2 and Ca/iCal WNL to decrease arrhythmogenic potential  - On lopressor 100 mg QD, holding as pt likely has GI bleed  - Holding anticoagulation in the setting of GI bleed       Renal/  MARTITA (acute kidney injury)  Creatinine 4.7 on admit, baseline around 0.8. Pre-renal vs ATN. Less likely obstruction as pt has gomez     - Check urine lytes and renal ultrasound  - Check urine protein creatinine ratio.  - Strict I&Os and daily weights   - Avoid nephrotoxic agents such as NSAIDs, gadolinium and IV radiocontrast.  - Renally dose meds to current GFR.  - Maintain MAP > 65.      ID  Severe sepsis  This patient does have evidence of infective focus  My overall impression is septic shock.  Source: Unknown  Antibiotics given-   Antibiotics (From admission, onward)    Start     Stop Route Frequency Ordered    01/16/23 2100  ceFEPIme (MAXIPIME) 2 g in dextrose 5 % in water (D5W) 5 % 50 mL IVPB (MB+)         -- IV Every 24 hours (non-standard times) 01/16/23 1354    01/16/23 2100  metronidazole IVPB 500 mg         -- IV Every 8 hours (non-standard times) 01/16/23 1354    01/16/23 1256  vancomycin - pharmacy to dose  (vancomycin IVPB)        See Hyperspace for full Linked Orders Report.    -- IV pharmacy to manage frequency 01/16/23 1256        Latest lactate reviewed-  Recent Labs   Lab 01/16/23  0855   LACTATE 3.9*     Organ dysfunction indicated by Acute kidney injury    - Was on Vancomycin and Zosyn   - Will switch to Vancomycin, Cefepime, and Flagyl   - BCx pending   - Will  de-escalate once appropriate         Endocrine  Diabetes  -Last A1c reviewed-   Lab Results   Component Value Date    HGBA1C 5.3 11/10/2022       Home Antihyperglycemic Regiment:  - Metformin  1000 mg BID     Inpatient Antihyperglycemic Regiment:  Antihyperglycemics (From admission, onward)    Start     Stop Route Frequency Ordered    01/16/23 1507  insulin aspart U-100 pen 0-5 Units         -- SubQ Before meals & nightly PRN 01/16/23 1407        - LDSSI  - Clear liquid diet for now     Blood Sugars (AccuCheck):  No results for input(s): POCTGLUCOSE in the last 72 hours.        GI  Small bowel obstruction  CT Abdomen Pelvis  Without Contras . Findings concerning for developing small bowel obstruction with suspected transition point seen within the midline mid to lower abdomen at the level of L4  Pt without symptoms of nausea or vomiting. Reportedly had a BM at the outside facility on 1/16/2023. Abdomen is distended.     - Holding of NGT for now given no significant symptoms   - Monitor BM and GI symptoms, if worsen will notify GS       Elevated liver enzymes  Likely 2/2 to underlying alcohol abuse and hepatic steatosis.     - Daily CMP, PT/INR   - Complete workup up with tylenol level, acute hep panel, a1-antitrypsin, anti smith antibody, HIV, anti mitochondrial antibody, anca, anti-liver, aFP, PETH pending   - Liver US with doppler pending     GI bleed  Concerning for variceal bleed given hx of alcohol abuse.     - GI consulted, recommend clear liquid for now   -Transfuse pRBC for Hb < 7 g/dL (Consider a higher Hb target if there is clinical evidence of intravascular volume depletion or comorbidities, such as CAD or if high suspicion of vigorous active ongoing bleeding or an uncorrected coagulopathy exists.).  - Will give vitamin K given Pt/INR > 2   - PPI 80 mg IV bolus once, then IV PPI 40 BID  - Octreotide gtt   -Avoid nonsteroidal agents, antiplatelet agents and anticoagulants if possible in patients without  absolute contraindications. (consult managing provider if required for cardiovascular protection).              Critical Care Daily Checklist:    A: Awake: RASS Goal/Actual Goal:    Actual:     B: Spontaneous Breathing Trial Performed?     C: SAT & SBT Coordinated?  N/A                      D: Delirium: CAM-ICU     E: Early Mobility Performed? Yes   F: Feeding Goal:    Status:     Current Diet Order   Procedures    Diet clear liquid      AS: Analgesia/Sedation N/A   T: Thromboembolic Prophylaxis CI   H: HOB > 300 Yes   U: Stress Ulcer Prophylaxis (if needed) PI   G: Glucose Control Yes   B: Bowel Function     I: Indwelling Catheter (Lines & Bose) Necessity Bose, PIV    D: De-escalation of Antimicrobials/Pharmacotherapies No     Plan for the day/ETD Stabilization     Code Status:  Family/Goals of Care: Full Code         Critical secondary to Patient has a condition that poses threat to life and bodily function: GI bleed, epidural abscess, MARTITA     Critical care was time spent personally by me on the following activities: development of treatment plan with patient or surrogate and bedside caregivers, discussions with consultants, evaluation of patient's response to treatment, examination of patient, ordering and performing treatments and interventions, ordering and review of laboratory studies, ordering and review of radiographic studies, pulse oximetry, re-evaluation of patient's condition. This critical care time did not overlap with that of any other provider or involve time for any procedures.     Maribel Mariano,   Critical Care Medicine  [facility]

## 2023-01-16 NOTE — ASSESSMENT & PLAN NOTE
CT Abdomen Pelvis  Without Contras . Findings concerning for developing small bowel obstruction with suspected transition point seen within the midline mid to lower abdomen at the level of L4  Pt without symptoms of nausea or vomiting. Reportedly had a BM at the outside facility on 1/16/2023. Abdomen is distended.     - Holding of NGT for now given no significant symptoms   - Monitor BM and GI symptoms, if worsen will notify GS

## 2023-01-16 NOTE — PROVIDER PROGRESS NOTES - EMERGENCY DEPT.
Encounter Date: 1/16/2023    ED Physician Progress Notes        Physician Note:   Critical Care    Date/Time: 1/16/2023 9:39 AM  Performed by: Jelena Isabel MD  Authorized by: Jelena Isabel MD   Total critical care time (exclusive of procedural time) : 120 minutes  Critical care time was exclusive of separately billable procedures and treating other patients.  Critical care was necessary to treat or prevent imminent or life-threatening deterioration of the following conditions: dehydration, metabolic crisis, sepsis, renal failure, CNS failure or compromise and hepatic failure.  Critical care was time spent personally by me on the following activities: development of treatment plan with patient or surrogate, discussions with consultants, evaluation of patient's response to treatment, examination of patient, obtaining history from patient or surrogate, ordering and performing treatments and interventions, ordering and review of laboratory studies, ordering and review of radiographic studies, pulse oximetry, re-evaluation of patient's condition and review of old charts.       0800: Dr. Wilfrid Ross, pulmonary critical Care consulted for guidance in management.    The patient was signed out to me at 0700 awaiting transfer for neurosurgery.    0830: The patient was seen by Dr. Medel.  He feels that with the air in the epidural space and in the disc space of the lumbar spine, the patient likely has diskitis or an epidural abscess.  The patient has a white count of 12.9, hemoglobin of 7.5, creatinine 4.6.  At this time, the patient will be given antibiotics, blood cultures drawn, lactic acid sent.  MRI is pending.  Case discussed with the house supervisor, Dr. Vela and the transfer center.  The patient will be escalated to a level 1 transfer for multiple emergent issues including epidural abscess versus diskitis needing emergent neurosurgical evaluation, MARTITA, severe dehydration, sepsis, elevated liver functions  with a history of alcohol abuse, GI bleed with melanotic stools, hypotension.  The case was discussed with Dr. Garces at [facility] main campus and the patient will be accepted in transfer requiring multiple specialty involvement.

## 2023-01-16 NOTE — ASSESSMENT & PLAN NOTE
-Last A1c reviewed-   Lab Results   Component Value Date    HGBA1C 5.3 11/10/2022       Home Antihyperglycemic Regiment:  - Metformin  1000 mg BID     Inpatient Antihyperglycemic Regiment:  Antihyperglycemics (From admission, onward)    Start     Stop Route Frequency Ordered    01/16/23 1507  insulin aspart U-100 pen 0-5 Units         -- SubQ Before meals & nightly PRN 01/16/23 1407        - LDSSI  - Clear liquid diet for now     Blood Sugars (AccuCheck):  No results for input(s): POCTGLUCOSE in the last 72 hours.

## 2023-01-16 NOTE — ED PROVIDER NOTES
Encounter Date: 1/16/2023       History     Chief Complaint   Patient presents with    Sciatica     Presents via  EMS with c/o R sciatica. Pt reports chronic sciatic pain that normally resolves on its own. Pt took an unknown pain pill given to him by his girlfriend but did not get relief.     62-year-old male history afib on Xarelto, hypertension, chronic lower back presents with worsening week.  He reports pain starts in his right buttock and radiates down his legs.  He has been his girlfriend has but does not know the name of it without any relief of his symptoms.  Symptoms were so severe that he was not able to get out of bed so presented to the emergency department for evaluation.  He reports for the past week having fecal incontinence.  Denies any urinary incontinence, or saddle anesthesia.  Reports his stools as being black and tarry.  Denies any abdominal pain.  Denies any fevers or chills.  Denies any IV drug use or skin-popping.      Review of patient's allergies indicates:  No Known Allergies  No past medical history on file.  No past surgical history on file.  No family history on file.     Review of Systems   Constitutional:  Negative for chills and fever.   HENT:  Negative for congestion and rhinorrhea.    Eyes:  Negative for pain.   Respiratory:  Negative for cough and shortness of breath.    Cardiovascular:  Negative for chest pain and leg swelling.   Gastrointestinal:  Negative for abdominal pain, nausea and vomiting.   Endocrine: Negative for polyuria.   Genitourinary:  Negative for dysuria and hematuria.   Musculoskeletal:  Positive for back pain. Negative for gait problem and neck pain.   Skin:  Negative for rash.   Allergic/Immunologic: Negative for immunocompromised state.   Neurological:  Positive for dizziness. Negative for weakness and headaches.     Physical Exam     Initial Vitals [01/16/23 0009]   BP Pulse Resp Temp SpO2   (!) 158/103 72 16 98.7 °F (37.1 °C) 97 %      MAP       --          Physical Exam    Constitutional: He appears well-developed and well-nourished. He is not diaphoretic. No distress.   HENT:   Head: Normocephalic and atraumatic.   Eyes: Conjunctivae and EOM are normal. Pupils are equal, round, and reactive to light.   Neck: No JVD present.   Normal range of motion.  Cardiovascular:  Normal rate and regular rhythm.           Pulses:       Radial pulses are 2+ on the right side and 2+ on the left side.        Posterior tibial pulses are 2+ on the right side and 2+ on the left side.   Pulmonary/Chest: Breath sounds normal. No respiratory distress.   Abdominal: Abdomen is soft. Bowel sounds are normal. He exhibits no distension. There is no abdominal tenderness.   Rectal exam with dark stool, positive for blood. There is no rebound and no guarding.   Musculoskeletal:         General: No tenderness or edema. Normal range of motion.      Cervical back: Normal range of motion.     Lymphadenopathy:     He has no cervical adenopathy.   Neurological: He is alert and oriented to person, place, and time.   Decreased rectal tone, sensation intact to bilateral lower extremities.   Skin: Skin is warm. Capillary refill takes less than 2 seconds.   Psychiatric: He has a normal mood and affect.       ED Course   Procedures  Labs Reviewed   COMPREHENSIVE METABOLIC PANEL - Abnormal; Notable for the following components:       Result Value    Sodium 131 (*)     CO2 17 (*)     BUN 26 (*)     Creatinine 4.6 (*)     Calcium 7.0 (*)     Total Protein 5.8 (*)     Albumin 1.6 (*)     Total Bilirubin 1.4 (*)     Alkaline Phosphatase 264 (*)      (*)     ALT 77 (*)     eGFR 14 (*)     All other components within normal limits   CBC W/ AUTO DIFFERENTIAL - Abnormal; Notable for the following components:    RBC 2.28 (*)     Hemoglobin 7.5 (*)     Hematocrit 23.0 (*)      (*)     MCH 32.9 (*)     RDW 19.6 (*)     Immature Granulocytes 0.8 (*)     Gran # (ANC) 9.4 (*)     Immature Grans (Abs) 0.10  (*)     Mono # 1.4 (*)     nRBC 1 (*)     Gran % 77.0 (*)     Lymph % 10.1 (*)     All other components within normal limits   PROTIME-INR - Abnormal; Notable for the following components:    Prothrombin Time 20.2 (*)     INR 2.0 (*)     All other components within normal limits   OCCULT BLOOD X 1, STOOL - Abnormal; Notable for the following components:    Occult Blood Positive (*)     All other components within normal limits   CBC W/ AUTO DIFFERENTIAL - Abnormal; Notable for the following components:    WBC 12.91 (*)     RBC 2.31 (*)     Hemoglobin 7.5 (*)     Hematocrit 23.3 (*)      (*)     MCH 32.5 (*)     RDW 19.8 (*)     Immature Granulocytes 0.7 (*)     Gran # (ANC) 9.9 (*)     Immature Grans (Abs) 0.09 (*)     Mono # 1.4 (*)     nRBC 1 (*)     Gran % 76.8 (*)     Lymph % 10.8 (*)     All other components within normal limits   URINALYSIS, REFLEX TO URINE CULTURE   TYPE & SCREEN          Imaging Results               CT Lumbar Spine Without Contrast (Final result)  Result time 01/16/23 04:56:44      Final result by Thomas Welsh MD (01/16/23 04:56:44)                   Impression:      1.  Prominent, cystic, multiloculated predominantly gas attenuation structure within the spinal canal at the level of L3-L4 with dimensions as above.  This is favored to be epidural in location and results in severe narrowing of the spinal canal with mass effect upon the thecal sac.  Findings are nonspecific although may represent extruded or sequestered vacuum disc material given proximity to the intervertebral disc space and the presence of vacuum disc phenomena.  Infectious or other neoplastic process not excluded.  Further evaluation advised with contrast enhanced MRI if there are no patient contraindications as well as consideration for neurosurgical consultation.    2.  Additional advanced multilevel degenerative changes of the lumbar spine, noting moderate to severe bilateral neural foraminal narrowing at  L4-L5 and L5-S1.  Please see above for level by level details.  This may also be assessed at the time of MRI.    This report was flagged in Epic as abnormal.      Electronically signed by: Thomas Welsh MD  Date:    01/16/2023  Time:    04:56               Narrative:    EXAMINATION:  CT LUMBAR SPINE WITHOUT CONTRAST    CLINICAL HISTORY:  Low back pain, progressive neurologic deficit;    TECHNIQUE:  Low-dose axial, sagittal and coronal reformations are obtained through the lumbar spine.  Contrast was not administered.    COMPARISON:  Lumbar spine radiograph series 10/07/2009    FINDINGS:  Lumbar spine alignment appears within normal limits.  Lumbar vertebral body heights are adequately maintained.  No definite acute displaced fracture identified.  There is multilevel anterior osteophyte formation.  There is moderate intervertebral disc space height loss at the levels of L3-L4, L4-L5, and L5-S1 with associated vacuum disc phenomena.  There is somewhat prominent epidural fat throughout the lower lumbar spine which may relate to component of epidural lipomatosis.    There are degenerative changes of the lumbar spine as detailed below:    L1-L2: There is a mild circumferential disc bulge.  No significant spinal canal stenosis or neural foraminal narrowing.    L2-L3: There is a circumferential disc bulge, ligamentum flavum thickening, and mild bilateral facet arthropathy.  There is prominence of the epidural fat.  There is mild to moderate spinal canal stenosis.  There is mild to moderate bilateral neural foraminal narrowing.    L3-L4: There is intervertebral disc space height loss and prominent vacuum disc phenomena present.  Along the inferior posterior margin of the L3 vertebral body, there is a prominent multiloculated gas attenuation structure occupying a significant portion of the spinal canal.  This measures approximately 1.6 x 1.3 x 1.7 cm (AP by transverse by craniocaudal dimensions).  This is favored to be  epidural in location and results in severe narrowing of the spinal canal.  This is nonspecific, although may potentially represent extruded or sequestered vacuum disc material given proximity to the intervertebral disc space and presence of vacuum disc phenomena.  Infectious or neoplastic process not excluded, noting the endplates appear relatively well preserved without evidence of aggressive destructive change to suggest discitis at this level.    Additionally, there is bilateral facet arthropathy with moderate right-sided and severe left-sided neural foraminal narrowing.    L4-L5: There is a circumferential disc bulge, ligamentum flavum thickening, and bilateral facet arthropathy.  There is moderate to severe spinal canal stenosis.  There is severe bilateral neural foraminal narrowing, right greater than left.    L5-S1: There is a circumferential disc bulge and bilateral facet arthropathy.  There is mild spinal canal stenosis.  There is severe bilateral neural foraminal narrowing.    Limited views of the posterior abdomen demonstrate moderate calcification of the abdominal aorta and branch vessels.  There is no retroperitoneal hematoma.  Paraspinal soft tissues are unremarkable.                                       Medications   sodium chloride 0.9% bolus 1,000 mL 1,000 mL (1,000 mLs Intravenous New Bag 1/16/23 0655)   oxyCODONE-acetaminophen 5-325 mg per tablet 1 tablet (1 tablet Oral Given 1/16/23 0304)   methocarbamoL tablet 1,000 mg (1,000 mg Oral Given 1/16/23 0303)   fentaNYL 50 mcg/mL injection 25 mcg (25 mcg Intravenous Given 1/16/23 0653)     Medical Decision Making:   Initial Assessment:   62 year old patient presenting 2/2 back pain most consistent with sciatica.  Given decreased rectal tone and reports of fecal incontinence concern for possible cauda equina.  Will obtain CT of the lumbar spine to assess for any evidence of significant spinal canal narrowing  Patient has no unexplained weight loss. No  recent fevers, rigors, malaise, or recent infection.  No history of IVDU or skin-popping.  Patient does not have any history concerning for saddle anesthesia/perianal sensory loss. Patient does not have urinary retention or inability to control urine from overflow.  Patient has no tenderness overlying spinous process.  Will give Percocet and Robaxin for pain.   Clinical Tests:   Lab Tests: Ordered and Reviewed  Radiological Study: Ordered and Reviewed           ED Course as of 01/16/23 1857   Mon Jan 16, 2023   0618 Hemoglobin(!): 7.5 [AS]   0618 Creatinine(!): 4.6 [AS]   0618 AST(!): 177 [AS]   0625 Occult Blood(!): Positive [AS]   0631 Spoke with the transfer center currently on neurosurgical diversion.  Recommendation to admit to the hospital here until neurosurgical diversion is lifted at main [place]. [AS]   0703 Spoke with [facility] who does not feel comfortable admitting patient given concern for possible cauda equina and not having neurosurgery on site.  Spoke with the transfer center and they are currently still on diversion and patient on waiting list.  Given patient needs management of his anemia and MARTITA hospitalization.  Given need for admission patient case escalated to house supervisor.  [AS]   0715 Patient care signed out to Dr. Isabel pending disposition. [AS]   0800 Dr. Wilfrid Ross, pulmonary critical Care consulted for guidance in management. [ST]   0838 The patient was signed out to me at 0700 awaiting transfer for neurosurgery. [ST]      ED Course User Index  [AS] Luan Lemus MD  [ST] Jelena Isabel MD              Please put in 60 minutes of critical care due to patient having a high risk of neurological and cardiopulmonary failure.   Separate from teaching and exclusive of procedure and ekg time  Includes:  Time at bedside  Time reviewing test results  Time discussing case with staff  Time documenting the medical record  Time spent with family members  Time spent with  consults  Management        This dictation has been generated using M-Modal Fluency Direct dictation; some phonetic errors may occur.          Clinical Impression:   Final diagnoses:  [M54.31] Sciatica of right side (Primary)  [N17.9] MARTITA (acute kidney injury)  [D64.9] Anemia, unspecified type               Luan Lemus MD  01/16/23 0716       Luan Lemus MD  01/16/23 9126

## 2023-01-16 NOTE — ASSESSMENT & PLAN NOTE
Likely 2/2 to underlying alcohol abuse and hepatic steatosis.     - Daily CMP, PT/INR   - Complete workup up with tylenol level, acute hep panel, a1-antitrypsin, anti smith antibody, HIV, anti mitochondrial antibody, anca, anti-liver, aFP, PETH pending   - Liver US with doppler pending

## 2023-01-16 NOTE — CONSULTS
Critical Care Consult Note    Inpatient consult to Pulmonology  Consult performed by: Tenzin Parikh MD  Consult ordered by: Jelena Isabel MD  Reason for consult: Hypotension, lumbar spine mass, concern for cauda equina  Assessment/Recommendations:     1. Epidural abscess with spinal compression, lumbar spine  - as noted on CT scan  - obtain BCx  - vanc/rocephin  - still with sensation and mobility of lower extremities  - MRI ordered and pending  - emergent transfer to [facility] for neurosurgical evaluation    2. UGIB  - reports of melanotic stool per ER  - protonix 80mg IV x1, then continue 40mg IV BID  - type and screen, plt >50k and Hgb >7  - per chart review, Xarelto listed as home med, though patient denies anticoagulation use  - GI evaluation    3. Hypotension  - in setting of possible epidural abscess and GIB  - treatment as above  - s/p 30cc/kg fluid resuscitation  - levo if needed to maintain MAP >65    4. MARTITA  - unclear etiology at present, likely pre-renal from problems as above  - monitor UOP, maintain MAP as above, trend Cr  - no hydro on CT abdomen    Patient seen and discussed with Dr. Westbrook. Patient being transferred to [facility] for further management.    Tenzin Parikh MD  Pulmonary / Critical Care      SUBJECTIVE:     History of Present Illness:  Patient is a 62 y.o. male presents with pain in buttocks extending down both legs and melanotic stools. Workup in ER notable for multiloculated gas attenuation structure within the spinal canal at L3-L4 concerning for epidural abscess. Difficulty in transferring to alternative facility with neurosurgical services. Critical care consulted for management.    Evaluated at bedside, patient resting comfortably in no distress. Vitals notable for soft BP with MAPs 60-65. Patient is awake and seemingly alert, answering questions appropriately, though somewhat slow in his responses. He endorses lower back and leg pains. Examination shows ability to raise both  lower extremities against gravity and sustain in the air. Sensation intact. Moving feet and toes bilaterally. Bilateral patellar reflexes diminished. Symptoms present for several weeks and have progressed. Difficulty in standing and walking now. No UOP with no urine noted in bladder. CT imaging with concern for epidural abscess with spinal compression. Vanc and rocephin ordered. ER discussed with [facility], plan for transfer for neurosurgical evaluation.     Review of patient's allergies indicates:  No Known Allergies  No past medical history on file.  No past surgical history on file.  No family history on file.     Review of Systems   Constitutional:  Negative for chills, fever and weight loss.   HENT:  Negative for congestion and sinus pain.    Eyes:  Negative for blurred vision and double vision.   Respiratory:  Negative for cough, hemoptysis, sputum production and shortness of breath.    Cardiovascular:  Positive for leg swelling. Negative for orthopnea and PND.   Gastrointestinal:  Positive for melena. Negative for abdominal pain, heartburn, nausea and vomiting.   Genitourinary:  Negative for dysuria and hematuria.        Decreased UOP   Skin:  Negative for itching and rash.   Neurological:  Positive for weakness (bilateral lower extremities). Negative for sensory change and seizures.   OBJECTIVE:     Vital Signs:  Temp:  [97.4 °F (36.3 °C)-98.4 °F (36.9 °C)]   Pulse:  [64-82]   Resp:  [10-42]   BP: ()/(42-62)   SpO2:  [90 %-100 %]     Physical Exam  Constitutional:       General: He is not in acute distress.     Appearance: He is obese. He is not toxic-appearing.   HENT:      Head: Normocephalic and atraumatic.      Mouth/Throat:      Mouth: Mucous membranes are moist.      Pharynx: Oropharynx is clear.   Eyes:      Extraocular Movements: Extraocular movements intact.      Pupils: Pupils are equal, round, and reactive to light.   Cardiovascular:      Rate and Rhythm: Normal rate and regular rhythm.    Pulmonary:      Effort: Pulmonary effort is normal.      Breath sounds: Normal breath sounds. No wheezing, rhonchi or rales.   Abdominal:      Palpations: Abdomen is soft.      Tenderness: There is no abdominal tenderness. There is no guarding.   Musculoskeletal:      Cervical back: Normal range of motion and neck supple.      Right lower leg: Edema present.      Left lower leg: Edema present.   Skin:     General: Skin is warm and dry.   Neurological:      Comments: Able to lift bilateral lower extremities against gravity and sustain in the air for 5 seconds. Sensation in bilateral lower extremities intact and equal. Negative babinski. Patellar reflexes diminished bilaterally     Laboratory:  I have reviewed all pertinent lab results within the past 24 hours.    Diagnostic Results:  Labs: Reviewed  CT: Reviewed  MRI: Reviewed    ASSESSMENT/PLAN:     See above    Pt seen and examined with Pulmonary/Critical Care team. Critical Care time was spent validating the history and physical exam, reviewing the lab and imaging results, and discussing the care of the patient with the bedside nurse. The following additional comments are made:    Sub acute onset of paraparesis. LE 3/5 bilat.  No sensory deficits. Has absence of LE DTRs but pt is diabetic.  CT shows compression of distal cord and cauda equina by gas containing epidural opacity.  Suspicion for epidural abscess. Also suspicion for discitis vs intervertebral disc vacuum phenomenon.  Also acute GIB with hypotension & MARTITA.  Now on vasopressors. Repeat hH/H pending. Needs urgent Neurosurgical evaluation while we manage his other acute medical problems.  Transfer to [facility].     Critical Care time 55 minutes    Prasad Westbrook MD  Phone [phone]

## 2023-01-16 NOTE — SUBJECTIVE & OBJECTIVE
Medications Prior to Admission   Medication Sig Dispense Refill Last Dose    aspirin (ECOTRIN) 81 MG EC tablet Take 81 mg by mouth once daily.       cyclobenzaprine (FLEXERIL) 10 MG tablet Take 10 mg by mouth 2 (two) times daily as needed.       lisinopriL-hydrochlorothiazide (PRINZIDE,ZESTORETIC) 10-12.5 mg per tablet Take 1 tablet by mouth once daily.       metFORMIN (GLUCOPHAGE) 500 MG tablet Take 1,000 mg by mouth 2 (two) times daily.       metoprolol tartrate (LOPRESSOR) 100 MG tablet Take 100 mg by mouth once daily.       nicotine (NICODERM CQ) 14 mg/24 hr 1 patch every morning.       nicotine, polacrilex, (NICORETTE) 2 mg Gum SMARTSI Each By Mouth Every 2 Hours PRN       omega-3 fatty acids 1,000 mg Cap Take 2 g by mouth once daily.       simvastatin (ZOCOR) 40 MG tablet Take 40 mg by mouth every evening.       traZODone (DESYREL) 50 MG tablet Take 50 mg by mouth nightly as needed.       VITAMIN B COMPLEX ORAL Take 1 tablet by mouth once daily.       XARELTO 20 mg Tab Take 20 mg by mouth once daily.          Review of patient's allergies indicates:  No Known Allergies    No past medical history on file.  No past surgical history on file.  Family History    None       Tobacco Use    Smoking status: Not on file    Smokeless tobacco: Not on file   Substance and Sexual Activity    Alcohol use: Not on file    Drug use: Not on file    Sexual activity: Not on file     Review of Systems   Gastrointestinal:  Positive for blood in stool.   Musculoskeletal:  Positive for back pain.   All other systems reviewed and are negative.  Objective:     Weight: 111.1 kg (244 lb 14.9 oz)  Body mass index is 37.24 kg/m².  Vital Signs (Most Recent):  Temp: 97.4 °F (36.3 °C) (23 1240)  Pulse: 72 (23 1400)  Resp: 20 (23 1400)  BP: (!) 101/47 (23 1330)  SpO2: 100 % (23 1400)   Vital Signs (24h Range):  Temp:  [97.4 °F (36.3 °C)-98.7 °F (37.1 °C)] 97.4 °F (36.3 °C)  Pulse:  [64-83] 72  Resp:  [10-42]  20  SpO2:  [90 %-100 %] 100 %  BP: ()/() 101/47                          Urethral Catheter 01/16/23 1347 Latex 14 Fr. (Active)       Physical Exam  Constitutional:       Appearance: He is well-developed and well-nourished.      Comments: obese   Eyes:      Extraocular Movements: EOM normal.      Conjunctiva/sclera: Conjunctivae normal.      Pupils: Pupils are equal, round, and reactive to light.   Cardiovascular:      Pulses: Normal pulses.   Abdominal:      Palpations: Abdomen is soft.   Neurological:      Mental Status: He is alert and oriented to person, place, and time.      Comments: AAOx4  PERRL  CN intact  BUE 5/5  BLE 4- hip flexion, 4 to 4+ ke, 5 df/pf/ehl  Reflexes normal  Poor rectal tone  No saddle anesthesia  SILT   Psychiatric:         Mood and Affect: Mood and affect normal.       Physical Exam:    Constitutional: He appears well-developed and well-nourished.   obese     Eyes: Pupils are equal, round, and reactive to light. Conjunctivae and EOM are normal.     Cardiovascular: Normal pulses.     Abdominal: Soft.     Psych/Behavior: He is alert. He is oriented to person, place, and time. He has a normal mood and affect.     Neurological:   AAOx4  PERRL  CN intact  BUE 5/5  BLE 4- hip flexion, 4 to 4+ ke, 5 df/pf/ehl  Reflexes normal  Poor rectal tone  No saddle anesthesia  SILT     Significant Labs:  Recent Labs   Lab 01/16/23  0449 01/16/23  0759 01/16/23  0855   GLU 92 85 88   * 130* 129*   K 4.3 4.5 4.5    101 100   CO2 17* 17* 17*   BUN 26* 26* 26*   CREATININE 4.6* 4.7* 4.7*   CALCIUM 7.0* 6.9* 6.8*     Recent Labs   Lab 01/16/23  0759 01/16/23  0855 01/16/23  1318   WBC 13.01* 12.99* 13.11*   HGB 7.8* 7.7* 8.4*   HCT 24.6* 23.9* 25.8*    247 233     Recent Labs   Lab 01/16/23  0449   INR 2.0*     Microbiology Results (last 7 days)       Procedure Component Value Units Date/Time    Blood culture [[id number]]     Order Status: Canceled Specimen: Blood     Blood culture  [[id number]]     Order Status: Canceled Specimen: Blood           All pertinent labs from the last 24 hours have been reviewed.    Significant Diagnostics:  I have reviewed all pertinent imaging results/findings within the past 24 hours.

## 2023-01-16 NOTE — SUBJECTIVE & OBJECTIVE
Past Medical History:   Diagnosis Date    A-fib        History reviewed. No pertinent surgical history.    Review of patient's allergies indicates:  No Known Allergies    Family History    None       Tobacco Use    Smoking status: Not on file    Smokeless tobacco: Not on file   Substance and Sexual Activity    Alcohol use: Yes     Alcohol/week: 6.0 standard drinks     Types: 6 Cans of beer per week    Drug use: Not on file    Sexual activity: Not on file      Review of Systems   Constitutional:  Positive for activity change. Negative for chills and fever.   HENT:  Negative for congestion and trouble swallowing.    Eyes:  Negative for visual disturbance.   Respiratory:  Negative for cough, shortness of breath, wheezing and stridor.    Cardiovascular:  Negative for chest pain and leg swelling.   Gastrointestinal:  Positive for abdominal distention and blood in stool. Negative for abdominal pain, diarrhea, nausea and vomiting.        +fecal incontinence   Endocrine: Negative for polyuria.   Genitourinary:  Negative for difficulty urinating and dysuria.   Musculoskeletal:  Positive for back pain. Negative for arthralgias and myalgias.   Skin:  Negative for color change and rash.   Neurological:  Negative for dizziness, weakness, numbness and headaches.   Psychiatric/Behavioral:  Positive for confusion. Negative for agitation.    Objective:     Vital Signs (Most Recent):  Temp: 97.4 °F (36.3 °C) (01/16/23 1240)  Pulse: 72 (01/16/23 1400)  Resp: 20 (01/16/23 1400)  BP: (!) 101/47 (01/16/23 1330)  SpO2: 100 % (01/16/23 1400)   Vital Signs (24h Range):  Temp:  [97.4 °F (36.3 °C)-98.7 °F (37.1 °C)] 97.4 °F (36.3 °C)  Pulse:  [64-83] 72  Resp:  [10-42] 20  SpO2:  [90 %-100 %] 100 %  BP: ()/() 101/47   Weight: 111.1 kg (244 lb 14.9 oz)  Body mass index is 37.24 kg/m².    No intake or output data in the 24 hours ending 01/16/23 1423    Physical Exam  Vitals and nursing note reviewed.   Constitutional:       General:  He is not in acute distress.     Appearance: He is obese. He is not ill-appearing.   HENT:      Head: Normocephalic and atraumatic.      Right Ear: External ear normal.      Left Ear: External ear normal.      Nose: Nose normal.      Mouth/Throat:      Mouth: Mucous membranes are moist.      Pharynx: Oropharynx is clear.   Eyes:      General:         Right eye: No discharge.         Left eye: No discharge.      Extraocular Movements: Extraocular movements intact.      Conjunctiva/sclera: Conjunctivae normal.      Pupils: Pupils are equal, round, and reactive to light.   Cardiovascular:      Rate and Rhythm: Normal rate and regular rhythm.      Pulses: Normal pulses.      Heart sounds: Normal heart sounds. No murmur heard.  Pulmonary:      Effort: Pulmonary effort is normal. No respiratory distress.      Breath sounds: No wheezing or rales.   Abdominal:      General: There is distension.      Tenderness: There is no abdominal tenderness. There is no guarding.   Musculoskeletal:         General: No swelling. Normal range of motion.      Cervical back: Normal range of motion.      Right lower leg: No edema.      Left lower leg: No edema.   Skin:     General: Skin is warm and dry.      Coloration: Skin is not jaundiced.   Neurological:      General: No focal deficit present.      Mental Status: He is alert. He is disoriented.      Cranial Nerves: Cranial nerves 2-12 are intact.      Comments: Patient altered  Responding to questions appropriately but confused about timing of events  Poor recall  + fecal incontinence   Psychiatric:         Mood and Affect: Mood normal.         Behavior: Behavior normal.       Vents:     Lines/Drains/Airways       Drain  Duration                  Urethral Catheter 01/16/23 1347 Latex 14 Fr. <1 day                  Significant Labs:    CBC/Anemia Profile:  Recent Labs   Lab 01/16/23  0542 01/16/23  0603 01/16/23  0759 01/16/23  0855 01/16/23  1318   WBC  --    < > 13.01* 12.99* 13.11*    HGB  --    < > 7.8* 7.7* 8.4*   HCT  --    < > 24.6* 23.9* 25.8*   PLT  --    < > 235 247 233   MCV  --    < > 102* 102* 100*   RDW  --    < > 20.1* 20.0* 19.4*   OCCULTBLOOD Positive*  --   --   --   --     < > = values in this interval not displayed.        Chemistries:  Recent Labs   Lab 01/16/23 0449 01/16/23 0759 01/16/23  0855   * 130* 129*   K 4.3 4.5 4.5    101 100   CO2 17* 17* 17*   BUN 26* 26* 26*   CREATININE 4.6* 4.7* 4.7*   CALCIUM 7.0* 6.9* 6.8*   ALBUMIN 1.6* 1.7* 1.7*   PROT 5.8* 6.1 6.0   BILITOT 1.4* 1.5* 1.5*   ALKPHOS 264* 274* 273*   ALT 77* 81* 81*   * 186* 181*         BMP:   Recent Labs   Lab 01/16/23  0855   GLU 88   *   K 4.5      CO2 17*   BUN 26*   CREATININE 4.7*   CALCIUM 6.8*     CMP:   Recent Labs   Lab 01/16/23 0449 01/16/23  0759 01/16/23  0855   * 130* 129*   K 4.3 4.5 4.5    101 100   CO2 17* 17* 17*   GLU 92 85 88   BUN 26* 26* 26*   CREATININE 4.6* 4.7* 4.7*   CALCIUM 7.0* 6.9* 6.8*   PROT 5.8* 6.1 6.0   ALBUMIN 1.6* 1.7* 1.7*   BILITOT 1.4* 1.5* 1.5*   ALKPHOS 264* 274* 273*   * 186* 181*   ALT 77* 81* 81*   ANIONGAP 13 12 12       Coagulation:   Recent Labs   Lab 01/16/23  0449   INR 2.0*     Lactic Acid:   Recent Labs   Lab 01/16/23  0855 01/16/23  1318   LACTATE 3.9* 3.8*     Significant Imaging: I have reviewed all pertinent imaging results/findings within the past 24 hours.

## 2023-01-17 ENCOUNTER — ANESTHESIA EVENT (OUTPATIENT)
Dept: ENDOSCOPY | Facility: HOSPITAL | Age: 63
DRG: 871 | End: 2023-01-17
Payer: MEDICAID

## 2023-01-17 ENCOUNTER — ANESTHESIA (OUTPATIENT)
Dept: ENDOSCOPY | Facility: HOSPITAL | Age: 63
DRG: 871 | End: 2023-01-17
Payer: MEDICAID

## 2023-01-17 LAB
ALBUMIN FLD-MCNC: 0.6 G/DL
ALBUMIN SERPL BCP-MCNC: 1.6 G/DL (ref 3.5–5.2)
ALP SERPL-CCNC: 240 U/L (ref 55–135)
ALT SERPL W/O P-5'-P-CCNC: 74 U/L (ref 10–44)
AMMONIA PLAS-SCNC: 75 UMOL/L (ref 10–50)
ANION GAP SERPL CALC-SCNC: 15 MMOL/L (ref 8–16)
APPEARANCE FLD: NORMAL
ASCENDING AORTA: 3.59 CM
AST SERPL-CCNC: 169 U/L (ref 10–40)
AV INDEX (PROSTH): 0.85
AV MEAN GRADIENT: 7 MMHG
AV PEAK GRADIENT: 17 MMHG
AV VALVE AREA: 3.67 CM2
AV VELOCITY RATIO: 0.77
BASOPHILS # BLD AUTO: 0.04 K/UL (ref 0–0.2)
BASOPHILS # BLD AUTO: 0.05 K/UL (ref 0–0.2)
BASOPHILS # BLD AUTO: 0.06 K/UL (ref 0–0.2)
BASOPHILS # BLD AUTO: 0.06 K/UL (ref 0–0.2)
BASOPHILS NFR BLD: 0.3 % (ref 0–1.9)
BASOPHILS NFR BLD: 0.3 % (ref 0–1.9)
BASOPHILS NFR BLD: 0.4 % (ref 0–1.9)
BASOPHILS NFR BLD: 0.4 % (ref 0–1.9)
BILIRUB SERPL-MCNC: 2.3 MG/DL (ref 0.1–1)
BLD PROD TYP BPU: NORMAL
BLOOD UNIT EXPIRATION DATE: NORMAL
BLOOD UNIT TYPE CODE: 6200
BLOOD UNIT TYPE: NORMAL
BNP SERPL-MCNC: 700 PG/ML (ref 0–99)
BODY FLD TYPE: NORMAL
BODY FLUID SOURCE, LDH: NORMAL
BSA FOR ECHO PROCEDURE: 2.3 M2
BUN SERPL-MCNC: 28 MG/DL (ref 8–23)
C3 SERPL-MCNC: 111 MG/DL (ref 50–180)
C4 SERPL-MCNC: 35 MG/DL (ref 11–44)
CALCIUM SERPL-MCNC: 7 MG/DL (ref 8.7–10.5)
CHLORIDE SERPL-SCNC: 99 MMOL/L (ref 95–110)
CK SERPL-CCNC: 179 U/L (ref 20–200)
CK SERPL-CCNC: 254 U/L (ref 20–200)
CO2 SERPL-SCNC: 14 MMOL/L (ref 23–29)
CODING SYSTEM: NORMAL
COLOR FLD: YELLOW
CREAT SERPL-MCNC: 5 MG/DL (ref 0.5–1.4)
CREAT UR-MCNC: 115 MG/DL (ref 23–375)
CV ECHO LV RWT: 0.4 CM
DIFFERENTIAL METHOD: ABNORMAL
DISPENSE STATUS: NORMAL
DOP CALC AO PEAK VEL: 2.05 M/S
DOP CALC AO VTI: 35.77 CM
DOP CALC LVOT AREA: 4.3 CM2
DOP CALC LVOT DIAMETER: 2.34 CM
DOP CALC LVOT PEAK VEL: 1.58 M/S
DOP CALC LVOT STROKE VOLUME: 131.19 CM3
DOP CALCLVOT PEAK VEL VTI: 30.52 CM
E WAVE DECELERATION TIME: 162.82 MSEC
E/A RATIO: 1.32
E/E' RATIO: 12 M/S
ECHO LV POSTERIOR WALL: 0.96 CM (ref 0.6–1.1)
EJECTION FRACTION: 65 %
EOSINOPHIL # BLD AUTO: 0.1 K/UL (ref 0–0.5)
EOSINOPHIL NFR BLD: 0.5 % (ref 0–8)
EOSINOPHIL NFR BLD: 0.7 % (ref 0–8)
EOSINOPHIL NFR BLD: 0.9 % (ref 0–8)
EOSINOPHIL NFR BLD: 0.9 % (ref 0–8)
ERYTHROCYTE [DISTWIDTH] IN BLOOD BY AUTOMATED COUNT: 19.9 % (ref 11.5–14.5)
ERYTHROCYTE [DISTWIDTH] IN BLOOD BY AUTOMATED COUNT: 20.1 % (ref 11.5–14.5)
ERYTHROCYTE [DISTWIDTH] IN BLOOD BY AUTOMATED COUNT: 20.6 % (ref 11.5–14.5)
ERYTHROCYTE [DISTWIDTH] IN BLOOD BY AUTOMATED COUNT: 21.6 % (ref 11.5–14.5)
EST. GFR  (NO RACE VARIABLE): 12.4 ML/MIN/1.73 M^2
FERRITIN SERPL-MCNC: 135 NG/ML (ref 20–300)
FRACTIONAL SHORTENING: 39 % (ref 28–44)
GLUCOSE SERPL-MCNC: 148 MG/DL (ref 70–110)
GRAM STN SPEC: NORMAL
GRAM STN SPEC: NORMAL
HBV SURFACE AG SERPL QL IA: NORMAL
HCT VFR BLD AUTO: 25.5 % (ref 40–54)
HCT VFR BLD AUTO: 25.6 % (ref 40–54)
HCT VFR BLD AUTO: 26.1 % (ref 40–54)
HCT VFR BLD AUTO: 29.6 % (ref 40–54)
HCV AB SERPL QL IA: NORMAL
HGB BLD-MCNC: 8.1 G/DL (ref 14–18)
HGB BLD-MCNC: 8.2 G/DL (ref 14–18)
HGB BLD-MCNC: 8.4 G/DL (ref 14–18)
HGB BLD-MCNC: 9.4 G/DL (ref 14–18)
IMM GRANULOCYTES # BLD AUTO: 0.09 K/UL (ref 0–0.04)
IMM GRANULOCYTES # BLD AUTO: 0.13 K/UL (ref 0–0.04)
IMM GRANULOCYTES # BLD AUTO: 0.18 K/UL (ref 0–0.04)
IMM GRANULOCYTES # BLD AUTO: 0.25 K/UL (ref 0–0.04)
IMM GRANULOCYTES NFR BLD AUTO: 0.7 % (ref 0–0.5)
IMM GRANULOCYTES NFR BLD AUTO: 0.9 % (ref 0–0.5)
IMM GRANULOCYTES NFR BLD AUTO: 1.2 % (ref 0–0.5)
IMM GRANULOCYTES NFR BLD AUTO: 1.8 % (ref 0–0.5)
INR PPP: 1.5 (ref 0.8–1.2)
INTERVENTRICULAR SEPTUM: 0.93 CM (ref 0.6–1.1)
IRON SERPL-MCNC: 31 UG/DL (ref 45–160)
LA MAJOR: 5.97 CM
LA MINOR: 6.24 CM
LA WIDTH: 3.1 CM
LACTATE SERPL-SCNC: 1.8 MMOL/L (ref 0.5–2.2)
LACTATE SERPL-SCNC: 2 MMOL/L (ref 0.5–2.2)
LDH FLD L TO P-CCNC: 102 U/L
LEFT ATRIUM SIZE: 4.59 CM
LEFT ATRIUM VOLUME INDEX MOD: 18.5 ML/M2
LEFT ATRIUM VOLUME INDEX: 33.2 ML/M2
LEFT ATRIUM VOLUME MOD: 41.09 CM3
LEFT ATRIUM VOLUME: 73.8 CM3
LEFT INTERNAL DIMENSION IN SYSTOLE: 2.95 CM (ref 2.1–4)
LEFT VENTRICLE DIASTOLIC VOLUME INDEX: 48.81 ML/M2
LEFT VENTRICLE DIASTOLIC VOLUME: 108.36 ML
LEFT VENTRICLE MASS INDEX: 72 G/M2
LEFT VENTRICLE SYSTOLIC VOLUME INDEX: 15.1 ML/M2
LEFT VENTRICLE SYSTOLIC VOLUME: 33.52 ML
LEFT VENTRICULAR INTERNAL DIMENSION IN DIASTOLE: 4.82 CM (ref 3.5–6)
LEFT VENTRICULAR MASS: 158.79 G
LV LATERAL E/E' RATIO: 12 M/S
LV SEPTAL E/E' RATIO: 12 M/S
LYMPHOCYTES # BLD AUTO: 1.1 K/UL (ref 1–4.8)
LYMPHOCYTES # BLD AUTO: 1.3 K/UL (ref 1–4.8)
LYMPHOCYTES # BLD AUTO: 1.4 K/UL (ref 1–4.8)
LYMPHOCYTES # BLD AUTO: 1.4 K/UL (ref 1–4.8)
LYMPHOCYTES NFR BLD: 7.5 % (ref 18–48)
LYMPHOCYTES NFR BLD: 9.3 % (ref 18–48)
LYMPHOCYTES NFR BLD: 9.5 % (ref 18–48)
LYMPHOCYTES NFR BLD: 9.7 % (ref 18–48)
LYMPHOCYTES NFR FLD MANUAL: 9 %
MAGNESIUM SERPL-MCNC: 1.4 MG/DL (ref 1.6–2.6)
MCH RBC QN AUTO: 31.5 PG (ref 27–31)
MCH RBC QN AUTO: 32 PG (ref 27–31)
MCH RBC QN AUTO: 32.3 PG (ref 27–31)
MCH RBC QN AUTO: 32.8 PG (ref 27–31)
MCHC RBC AUTO-ENTMCNC: 31.8 G/DL (ref 32–36)
MCHC RBC AUTO-ENTMCNC: 31.8 G/DL (ref 32–36)
MCHC RBC AUTO-ENTMCNC: 32 G/DL (ref 32–36)
MCHC RBC AUTO-ENTMCNC: 32.2 G/DL (ref 32–36)
MCV RBC AUTO: 101 FL (ref 82–98)
MCV RBC AUTO: 101 FL (ref 82–98)
MCV RBC AUTO: 102 FL (ref 82–98)
MCV RBC AUTO: 99 FL (ref 82–98)
MESOTHL CELL NFR FLD MANUAL: 3 %
MONOCYTES # BLD AUTO: 1.3 K/UL (ref 0.3–1)
MONOCYTES # BLD AUTO: 1.4 K/UL (ref 0.3–1)
MONOCYTES # BLD AUTO: 1.7 K/UL (ref 0.3–1)
MONOCYTES # BLD AUTO: 1.8 K/UL (ref 0.3–1)
MONOCYTES NFR BLD: 10 % (ref 4–15)
MONOCYTES NFR BLD: 11.6 % (ref 4–15)
MONOCYTES NFR BLD: 11.7 % (ref 4–15)
MONOCYTES NFR BLD: 9.5 % (ref 4–15)
MONOS+MACROS NFR FLD MANUAL: 38 %
MV A" WAVE DURATION": 14.84 MSEC
MV PEAK A VEL: 0.82 M/S
MV PEAK E VEL: 1.08 M/S
MV STENOSIS PRESSURE HALF TIME: 47.22 MS
MV VALVE AREA P 1/2 METHOD: 4.66 CM2
NEUTROPHILS # BLD AUTO: 10.6 K/UL (ref 1.8–7.7)
NEUTROPHILS # BLD AUTO: 10.7 K/UL (ref 1.8–7.7)
NEUTROPHILS # BLD AUTO: 11.4 K/UL (ref 1.8–7.7)
NEUTROPHILS # BLD AUTO: 11.8 K/UL (ref 1.8–7.7)
NEUTROPHILS NFR BLD: 77 % (ref 38–73)
NEUTROPHILS NFR BLD: 77.2 % (ref 38–73)
NEUTROPHILS NFR BLD: 78.5 % (ref 38–73)
NEUTROPHILS NFR BLD: 79.5 % (ref 38–73)
NEUTROPHILS NFR FLD MANUAL: 50 %
NRBC BLD-RTO: 1 /100 WBC
NUM UNITS TRANS PACKED RBC: NORMAL
PHOSPHATE SERPL-MCNC: 5.3 MG/DL (ref 2.7–4.5)
PISA TR MAX VEL: 2.62 M/S
PLATELET # BLD AUTO: 238 K/UL (ref 150–450)
PLATELET # BLD AUTO: 238 K/UL (ref 150–450)
PLATELET # BLD AUTO: 250 K/UL (ref 150–450)
PLATELET # BLD AUTO: 329 K/UL (ref 150–450)
PMV BLD AUTO: 11.1 FL (ref 9.2–12.9)
PMV BLD AUTO: 11.2 FL (ref 9.2–12.9)
PMV BLD AUTO: 11.2 FL (ref 9.2–12.9)
PMV BLD AUTO: 12.2 FL (ref 9.2–12.9)
POCT GLUCOSE: 167 MG/DL (ref 70–110)
POCT GLUCOSE: 179 MG/DL (ref 70–110)
POCT GLUCOSE: 179 MG/DL (ref 70–110)
POCT GLUCOSE: 195 MG/DL (ref 70–110)
POTASSIUM SERPL-SCNC: 4.7 MMOL/L (ref 3.5–5.1)
PROT FLD-MCNC: 1.4 G/DL
PROT SERPL-MCNC: 5.8 G/DL (ref 6–8.4)
PROT UR-MCNC: 569 MG/DL (ref 0–15)
PROT/CREAT UR: 4.95 MG/G{CREAT} (ref 0–0.2)
PROTHROMBIN TIME: 15.2 SEC (ref 9–12.5)
PULM VEIN S/D RATIO: 1.25
PV PEAK D VEL: 0.53 M/S
PV PEAK S VEL: 0.66 M/S
RA MAJOR: 5.64 CM
RA PRESSURE: 3 MMHG
RA WIDTH: 3.46 CM
RBC # BLD AUTO: 2.53 M/UL (ref 4.6–6.2)
RBC # BLD AUTO: 2.54 M/UL (ref 4.6–6.2)
RBC # BLD AUTO: 2.56 M/UL (ref 4.6–6.2)
RBC # BLD AUTO: 2.98 M/UL (ref 4.6–6.2)
RIGHT VENTRICULAR END-DIASTOLIC DIMENSION: 3.37 CM
SATURATED IRON: 21 % (ref 20–50)
SINUS: 3.5 CM
SODIUM SERPL-SCNC: 128 MMOL/L (ref 136–145)
SPECIMEN SOURCE: NORMAL
SPECIMEN SOURCE: NORMAL
STJ: 2.57 CM
TDI LATERAL: 0.09 M/S
TDI SEPTAL: 0.09 M/S
TDI: 0.09 M/S
TOTAL IRON BINDING CAPACITY: 151 UG/DL (ref 250–450)
TR MAX PG: 27 MMHG
TRANSFERRIN SERPL-MCNC: 102 MG/DL (ref 200–375)
TRICUSPID ANNULAR PLANE SYSTOLIC EXCURSION: 2.08 CM
TV REST PULMONARY ARTERY PRESSURE: 30 MMHG
URATE SERPL-MCNC: 13 MG/DL (ref 3.4–7)
VANCOMYCIN SERPL-MCNC: 19.9 UG/ML
WBC # BLD AUTO: 13.31 K/UL (ref 3.9–12.7)
WBC # BLD AUTO: 13.92 K/UL (ref 3.9–12.7)
WBC # BLD AUTO: 14.8 K/UL (ref 3.9–12.7)
WBC # BLD AUTO: 14.98 K/UL (ref 3.9–12.7)
WBC # FLD: 338 /CU MM

## 2023-01-17 PROCEDURE — 43235 EGD DIAGNOSTIC BRUSH WASH: CPT | Mod: ,,, | Performed by: STUDENT IN AN ORGANIZED HEALTH CARE EDUCATION/TRAINING PROGRAM

## 2023-01-17 PROCEDURE — D9220A PRA ANESTHESIA: Mod: ANES,,, | Performed by: ANESTHESIOLOGY

## 2023-01-17 PROCEDURE — 82550 ASSAY OF CK (CPK): CPT | Mod: 91 | Performed by: STUDENT IN AN ORGANIZED HEALTH CARE EDUCATION/TRAINING PROGRAM

## 2023-01-17 PROCEDURE — 83605 ASSAY OF LACTIC ACID: CPT | Mod: 91 | Performed by: STUDENT IN AN ORGANIZED HEALTH CARE EDUCATION/TRAINING PROGRAM

## 2023-01-17 PROCEDURE — 94761 N-INVAS EAR/PLS OXIMETRY MLT: CPT

## 2023-01-17 PROCEDURE — 36430 TRANSFUSION BLD/BLD COMPNT: CPT

## 2023-01-17 PROCEDURE — 83521 IG LIGHT CHAINS FREE EACH: CPT | Performed by: STUDENT IN AN ORGANIZED HEALTH CARE EDUCATION/TRAINING PROGRAM

## 2023-01-17 PROCEDURE — 88112 PR  CYTOPATH, CELL ENHANCE TECH: ICD-10-PCS | Mod: 26,,, | Performed by: STUDENT IN AN ORGANIZED HEALTH CARE EDUCATION/TRAINING PROGRAM

## 2023-01-17 PROCEDURE — 63600175 PHARM REV CODE 636 W HCPCS: Performed by: STUDENT IN AN ORGANIZED HEALTH CARE EDUCATION/TRAINING PROGRAM

## 2023-01-17 PROCEDURE — 88341 IMHCHEM/IMCYTCHM EA ADD ANTB: CPT | Performed by: STUDENT IN AN ORGANIZED HEALTH CARE EDUCATION/TRAINING PROGRAM

## 2023-01-17 PROCEDURE — 88341 PR IHC OR ICC EACH ADD'L SINGLE ANTIBODY  STAINPR: ICD-10-PCS | Mod: 26,,, | Performed by: STUDENT IN AN ORGANIZED HEALTH CARE EDUCATION/TRAINING PROGRAM

## 2023-01-17 PROCEDURE — 85025 COMPLETE CBC W/AUTO DIFF WBC: CPT | Mod: 91 | Performed by: STUDENT IN AN ORGANIZED HEALTH CARE EDUCATION/TRAINING PROGRAM

## 2023-01-17 PROCEDURE — 27000221 HC OXYGEN, UP TO 24 HOURS

## 2023-01-17 PROCEDURE — 99900035 HC TECH TIME PER 15 MIN (STAT)

## 2023-01-17 PROCEDURE — P9016 RBC LEUKOCYTES REDUCED: HCPCS | Performed by: STUDENT IN AN ORGANIZED HEALTH CARE EDUCATION/TRAINING PROGRAM

## 2023-01-17 PROCEDURE — 85610 PROTHROMBIN TIME: CPT | Performed by: STUDENT IN AN ORGANIZED HEALTH CARE EDUCATION/TRAINING PROGRAM

## 2023-01-17 PROCEDURE — D9220A PRA ANESTHESIA: ICD-10-PCS | Mod: CRNA,,, | Performed by: STUDENT IN AN ORGANIZED HEALTH CARE EDUCATION/TRAINING PROGRAM

## 2023-01-17 PROCEDURE — 25000003 PHARM REV CODE 250: Performed by: STUDENT IN AN ORGANIZED HEALTH CARE EDUCATION/TRAINING PROGRAM

## 2023-01-17 PROCEDURE — 89051 BODY FLUID CELL COUNT: CPT | Performed by: STUDENT IN AN ORGANIZED HEALTH CARE EDUCATION/TRAINING PROGRAM

## 2023-01-17 PROCEDURE — 83605 ASSAY OF LACTIC ACID: CPT | Performed by: STUDENT IN AN ORGANIZED HEALTH CARE EDUCATION/TRAINING PROGRAM

## 2023-01-17 PROCEDURE — 82042 OTHER SOURCE ALBUMIN QUAN EA: CPT | Performed by: STUDENT IN AN ORGANIZED HEALTH CARE EDUCATION/TRAINING PROGRAM

## 2023-01-17 PROCEDURE — C9113 INJ PANTOPRAZOLE SODIUM, VIA: HCPCS | Performed by: STUDENT IN AN ORGANIZED HEALTH CARE EDUCATION/TRAINING PROGRAM

## 2023-01-17 PROCEDURE — 88342 IMHCHEM/IMCYTCHM 1ST ANTB: CPT | Performed by: STUDENT IN AN ORGANIZED HEALTH CARE EDUCATION/TRAINING PROGRAM

## 2023-01-17 PROCEDURE — 88342 IMHCHEM/IMCYTCHM 1ST ANTB: CPT | Mod: 26,,, | Performed by: STUDENT IN AN ORGANIZED HEALTH CARE EDUCATION/TRAINING PROGRAM

## 2023-01-17 PROCEDURE — D9220A PRA ANESTHESIA: ICD-10-PCS | Mod: ANES,,, | Performed by: ANESTHESIOLOGY

## 2023-01-17 PROCEDURE — 84466 ASSAY OF TRANSFERRIN: CPT | Performed by: STUDENT IN AN ORGANIZED HEALTH CARE EDUCATION/TRAINING PROGRAM

## 2023-01-17 PROCEDURE — 99223 PR INITIAL HOSPITAL CARE,LEVL III: ICD-10-PCS | Mod: ,,, | Performed by: INTERNAL MEDICINE

## 2023-01-17 PROCEDURE — 25000003 PHARM REV CODE 250: Performed by: INTERNAL MEDICINE

## 2023-01-17 PROCEDURE — 20000000 HC ICU ROOM

## 2023-01-17 PROCEDURE — 80202 ASSAY OF VANCOMYCIN: CPT | Performed by: INTERNAL MEDICINE

## 2023-01-17 PROCEDURE — 99232 PR SUBSEQUENT HOSPITAL CARE,LEVL II: ICD-10-PCS | Mod: ,,, | Performed by: NEUROLOGICAL SURGERY

## 2023-01-17 PROCEDURE — 88342 CHG IMMUNOCYTOCHEMISTRY: ICD-10-PCS | Mod: 26,,, | Performed by: STUDENT IN AN ORGANIZED HEALTH CARE EDUCATION/TRAINING PROGRAM

## 2023-01-17 PROCEDURE — 99232 SBSQ HOSP IP/OBS MODERATE 35: CPT | Mod: ,,, | Performed by: NEUROLOGICAL SURGERY

## 2023-01-17 PROCEDURE — 82550 ASSAY OF CK (CPK): CPT | Performed by: INTERNAL MEDICINE

## 2023-01-17 PROCEDURE — 86334 IMMUNOFIX E-PHORESIS SERUM: CPT | Performed by: STUDENT IN AN ORGANIZED HEALTH CARE EDUCATION/TRAINING PROGRAM

## 2023-01-17 PROCEDURE — 84165 PROTEIN E-PHORESIS SERUM: CPT | Mod: 26,,, | Performed by: PATHOLOGY

## 2023-01-17 PROCEDURE — 86160 COMPLEMENT ANTIGEN: CPT | Mod: 59 | Performed by: STUDENT IN AN ORGANIZED HEALTH CARE EDUCATION/TRAINING PROGRAM

## 2023-01-17 PROCEDURE — 87205 SMEAR GRAM STAIN: CPT | Performed by: STUDENT IN AN ORGANIZED HEALTH CARE EDUCATION/TRAINING PROGRAM

## 2023-01-17 PROCEDURE — 87075 CULTR BACTERIA EXCEPT BLOOD: CPT | Performed by: STUDENT IN AN ORGANIZED HEALTH CARE EDUCATION/TRAINING PROGRAM

## 2023-01-17 PROCEDURE — 43235 PR EGD, FLEX, DIAGNOSTIC: ICD-10-PCS | Mod: ,,, | Performed by: STUDENT IN AN ORGANIZED HEALTH CARE EDUCATION/TRAINING PROGRAM

## 2023-01-17 PROCEDURE — 88305 TISSUE EXAM BY PATHOLOGIST: CPT | Performed by: STUDENT IN AN ORGANIZED HEALTH CARE EDUCATION/TRAINING PROGRAM

## 2023-01-17 PROCEDURE — 88112 CYTOPATH CELL ENHANCE TECH: CPT | Performed by: STUDENT IN AN ORGANIZED HEALTH CARE EDUCATION/TRAINING PROGRAM

## 2023-01-17 PROCEDURE — 86334 PATHOLOGIST INTERPRETATION IFE: ICD-10-PCS | Mod: 26,,, | Performed by: PATHOLOGY

## 2023-01-17 PROCEDURE — 84165 PROTEIN E-PHORESIS SERUM: CPT | Performed by: STUDENT IN AN ORGANIZED HEALTH CARE EDUCATION/TRAINING PROGRAM

## 2023-01-17 PROCEDURE — 37000008 HC ANESTHESIA 1ST 15 MINUTES: Performed by: STUDENT IN AN ORGANIZED HEALTH CARE EDUCATION/TRAINING PROGRAM

## 2023-01-17 PROCEDURE — 63600175 PHARM REV CODE 636 W HCPCS: Mod: JG | Performed by: INTERNAL MEDICINE

## 2023-01-17 PROCEDURE — 82140 ASSAY OF AMMONIA: CPT | Performed by: STUDENT IN AN ORGANIZED HEALTH CARE EDUCATION/TRAINING PROGRAM

## 2023-01-17 PROCEDURE — 84157 ASSAY OF PROTEIN OTHER: CPT | Performed by: STUDENT IN AN ORGANIZED HEALTH CARE EDUCATION/TRAINING PROGRAM

## 2023-01-17 PROCEDURE — 83615 LACTATE (LD) (LDH) ENZYME: CPT | Performed by: STUDENT IN AN ORGANIZED HEALTH CARE EDUCATION/TRAINING PROGRAM

## 2023-01-17 PROCEDURE — 37000009 HC ANESTHESIA EA ADD 15 MINS: Performed by: STUDENT IN AN ORGANIZED HEALTH CARE EDUCATION/TRAINING PROGRAM

## 2023-01-17 PROCEDURE — D9220A PRA ANESTHESIA: Mod: CRNA,,, | Performed by: STUDENT IN AN ORGANIZED HEALTH CARE EDUCATION/TRAINING PROGRAM

## 2023-01-17 PROCEDURE — 83880 ASSAY OF NATRIURETIC PEPTIDE: CPT | Performed by: INTERNAL MEDICINE

## 2023-01-17 PROCEDURE — 87070 CULTURE OTHR SPECIMN AEROBIC: CPT | Performed by: STUDENT IN AN ORGANIZED HEALTH CARE EDUCATION/TRAINING PROGRAM

## 2023-01-17 PROCEDURE — P9047 ALBUMIN (HUMAN), 25%, 50ML: HCPCS | Mod: JG | Performed by: INTERNAL MEDICINE

## 2023-01-17 PROCEDURE — 83735 ASSAY OF MAGNESIUM: CPT | Performed by: STUDENT IN AN ORGANIZED HEALTH CARE EDUCATION/TRAINING PROGRAM

## 2023-01-17 PROCEDURE — 86160 COMPLEMENT ANTIGEN: CPT | Performed by: STUDENT IN AN ORGANIZED HEALTH CARE EDUCATION/TRAINING PROGRAM

## 2023-01-17 PROCEDURE — 86334 IMMUNOFIX E-PHORESIS SERUM: CPT | Mod: 26,,, | Performed by: PATHOLOGY

## 2023-01-17 PROCEDURE — 99291 CRITICAL CARE FIRST HOUR: CPT | Mod: ,,, | Performed by: INTERNAL MEDICINE

## 2023-01-17 PROCEDURE — 84156 ASSAY OF PROTEIN URINE: CPT | Performed by: STUDENT IN AN ORGANIZED HEALTH CARE EDUCATION/TRAINING PROGRAM

## 2023-01-17 PROCEDURE — 80053 COMPREHEN METABOLIC PANEL: CPT | Performed by: STUDENT IN AN ORGANIZED HEALTH CARE EDUCATION/TRAINING PROGRAM

## 2023-01-17 PROCEDURE — 82595 ASSAY OF CRYOGLOBULIN: CPT | Performed by: STUDENT IN AN ORGANIZED HEALTH CARE EDUCATION/TRAINING PROGRAM

## 2023-01-17 PROCEDURE — 86803 HEPATITIS C AB TEST: CPT | Performed by: STUDENT IN AN ORGANIZED HEALTH CARE EDUCATION/TRAINING PROGRAM

## 2023-01-17 PROCEDURE — 84100 ASSAY OF PHOSPHORUS: CPT | Performed by: STUDENT IN AN ORGANIZED HEALTH CARE EDUCATION/TRAINING PROGRAM

## 2023-01-17 PROCEDURE — 88305 TISSUE EXAM BY PATHOLOGIST: CPT | Mod: 26,,, | Performed by: STUDENT IN AN ORGANIZED HEALTH CARE EDUCATION/TRAINING PROGRAM

## 2023-01-17 PROCEDURE — 82728 ASSAY OF FERRITIN: CPT | Performed by: STUDENT IN AN ORGANIZED HEALTH CARE EDUCATION/TRAINING PROGRAM

## 2023-01-17 PROCEDURE — 88305 TISSUE EXAM BY PATHOLOGIST: ICD-10-PCS | Mod: 26,,, | Performed by: STUDENT IN AN ORGANIZED HEALTH CARE EDUCATION/TRAINING PROGRAM

## 2023-01-17 PROCEDURE — 87340 HEPATITIS B SURFACE AG IA: CPT | Performed by: STUDENT IN AN ORGANIZED HEALTH CARE EDUCATION/TRAINING PROGRAM

## 2023-01-17 PROCEDURE — 84165 PATHOLOGIST INTERPRETATION SPE: ICD-10-PCS | Mod: 26,,, | Performed by: PATHOLOGY

## 2023-01-17 PROCEDURE — S0030 INJECTION, METRONIDAZOLE: HCPCS | Performed by: STUDENT IN AN ORGANIZED HEALTH CARE EDUCATION/TRAINING PROGRAM

## 2023-01-17 PROCEDURE — 99291 PR CRITICAL CARE, E/M 30-74 MINUTES: ICD-10-PCS | Mod: ,,, | Performed by: INTERNAL MEDICINE

## 2023-01-17 PROCEDURE — 99223 1ST HOSP IP/OBS HIGH 75: CPT | Mod: ,,, | Performed by: INTERNAL MEDICINE

## 2023-01-17 PROCEDURE — 84550 ASSAY OF BLOOD/URIC ACID: CPT | Performed by: STUDENT IN AN ORGANIZED HEALTH CARE EDUCATION/TRAINING PROGRAM

## 2023-01-17 PROCEDURE — 88341 IMHCHEM/IMCYTCHM EA ADD ANTB: CPT | Mod: 26,,, | Performed by: STUDENT IN AN ORGANIZED HEALTH CARE EDUCATION/TRAINING PROGRAM

## 2023-01-17 PROCEDURE — 88112 CYTOPATH CELL ENHANCE TECH: CPT | Mod: 26,,, | Performed by: STUDENT IN AN ORGANIZED HEALTH CARE EDUCATION/TRAINING PROGRAM

## 2023-01-17 PROCEDURE — 43235 EGD DIAGNOSTIC BRUSH WASH: CPT | Performed by: STUDENT IN AN ORGANIZED HEALTH CARE EDUCATION/TRAINING PROGRAM

## 2023-01-17 RX ORDER — MIDAZOLAM HYDROCHLORIDE 1 MG/ML
INJECTION, SOLUTION INTRAMUSCULAR; INTRAVENOUS
Status: DISCONTINUED | OUTPATIENT
Start: 2023-01-17 | End: 2023-01-17

## 2023-01-17 RX ORDER — NOREPINEPHRINE BITARTRATE/D5W 4MG/250ML
0-.2 PLASTIC BAG, INJECTION (ML) INTRAVENOUS CONTINUOUS
Status: DISPENSED | OUTPATIENT
Start: 2023-01-17 | End: 2023-01-17

## 2023-01-17 RX ORDER — HYDROCODONE BITARTRATE AND ACETAMINOPHEN 500; 5 MG/1; MG/1
TABLET ORAL
Status: DISCONTINUED | OUTPATIENT
Start: 2023-01-17 | End: 2023-02-01

## 2023-01-17 RX ORDER — PROPOFOL 10 MG/ML
VIAL (ML) INTRAVENOUS CONTINUOUS PRN
Status: DISCONTINUED | OUTPATIENT
Start: 2023-01-17 | End: 2023-01-17

## 2023-01-17 RX ORDER — PROPOFOL 10 MG/ML
VIAL (ML) INTRAVENOUS
Status: DISCONTINUED | OUTPATIENT
Start: 2023-01-17 | End: 2023-01-17

## 2023-01-17 RX ORDER — NOREPINEPHRINE BITARTRATE/D5W 4MG/250ML
0-.2 PLASTIC BAG, INJECTION (ML) INTRAVENOUS CONTINUOUS
Status: ACTIVE | OUTPATIENT
Start: 2023-01-18 | End: 2023-01-18

## 2023-01-17 RX ORDER — SODIUM BICARBONATE 650 MG/1
1300 TABLET ORAL 3 TIMES DAILY
Status: DISCONTINUED | OUTPATIENT
Start: 2023-01-17 | End: 2023-01-22

## 2023-01-17 RX ORDER — ALBUMIN HUMAN 250 G/1000ML
25 SOLUTION INTRAVENOUS 2 TIMES DAILY
Status: COMPLETED | OUTPATIENT
Start: 2023-01-17 | End: 2023-01-20

## 2023-01-17 RX ORDER — PANTOPRAZOLE SODIUM 40 MG/1
40 TABLET, DELAYED RELEASE ORAL
Status: DISCONTINUED | OUTPATIENT
Start: 2023-01-17 | End: 2023-01-22

## 2023-01-17 RX ORDER — ALBUMIN HUMAN 250 G/1000ML
25 SOLUTION INTRAVENOUS 3 TIMES DAILY
Status: DISCONTINUED | OUTPATIENT
Start: 2023-01-17 | End: 2023-01-17

## 2023-01-17 RX ORDER — LIDOCAINE HYDROCHLORIDE 20 MG/ML
INJECTION INTRAVENOUS
Status: DISCONTINUED | OUTPATIENT
Start: 2023-01-17 | End: 2023-01-17

## 2023-01-17 RX ORDER — MAGNESIUM SULFATE HEPTAHYDRATE 40 MG/ML
2 INJECTION, SOLUTION INTRAVENOUS ONCE
Status: COMPLETED | OUTPATIENT
Start: 2023-01-17 | End: 2023-01-17

## 2023-01-17 RX ORDER — ETOMIDATE 2 MG/ML
INJECTION INTRAVENOUS
Status: DISCONTINUED | OUTPATIENT
Start: 2023-01-17 | End: 2023-01-17

## 2023-01-17 RX ORDER — LACTULOSE 10 G/15ML
20 SOLUTION ORAL 3 TIMES DAILY
Status: DISCONTINUED | OUTPATIENT
Start: 2023-01-17 | End: 2023-01-18

## 2023-01-17 RX ADMIN — LACTULOSE 20 G: 20 SOLUTION ORAL at 03:01

## 2023-01-17 RX ADMIN — THERA TABS 1 TABLET: TAB at 08:01

## 2023-01-17 RX ADMIN — MIDAZOLAM HYDROCHLORIDE 2 MG: 1 INJECTION, SOLUTION INTRAMUSCULAR; INTRAVENOUS at 01:01

## 2023-01-17 RX ADMIN — ETOMIDATE 2 MG: 2 INJECTION INTRAVENOUS at 01:01

## 2023-01-17 RX ADMIN — SODIUM CHLORIDE: 0.9 INJECTION, SOLUTION INTRAVENOUS at 01:01

## 2023-01-17 RX ADMIN — ALBUMIN (HUMAN) 25 G: 12.5 SOLUTION INTRAVENOUS at 11:01

## 2023-01-17 RX ADMIN — MAGNESIUM SULFATE 2 G: 2 INJECTION INTRAVENOUS at 05:01

## 2023-01-17 RX ADMIN — THIAMINE HYDROCHLORIDE 500 MG: 100 INJECTION, SOLUTION INTRAMUSCULAR; INTRAVENOUS at 11:01

## 2023-01-17 RX ADMIN — ETOMIDATE 6 MG: 2 INJECTION INTRAVENOUS at 01:01

## 2023-01-17 RX ADMIN — PANTOPRAZOLE SODIUM 40 MG: 40 INJECTION, POWDER, FOR SOLUTION INTRAVENOUS at 08:01

## 2023-01-17 RX ADMIN — CEFEPIME 2 G: 2 INJECTION, POWDER, FOR SOLUTION INTRAVENOUS at 09:01

## 2023-01-17 RX ADMIN — MIDODRINE HYDROCHLORIDE 10 MG: 5 TABLET ORAL at 03:01

## 2023-01-17 RX ADMIN — OCTREOTIDE ACETATE 50 MCG/HR: 500 INJECTION, SOLUTION INTRAVENOUS; SUBCUTANEOUS at 10:01

## 2023-01-17 RX ADMIN — THIAMINE HYDROCHLORIDE 500 MG: 100 INJECTION, SOLUTION INTRAMUSCULAR; INTRAVENOUS at 06:01

## 2023-01-17 RX ADMIN — Medication 0.02 MCG/KG/MIN: at 06:01

## 2023-01-17 RX ADMIN — OCTREOTIDE ACETATE 50 MCG/HR: 500 INJECTION, SOLUTION INTRAVENOUS; SUBCUTANEOUS at 11:01

## 2023-01-17 RX ADMIN — GLYCOPYRROLATE 0.2 MG: 0.2 INJECTION, SOLUTION INTRAMUSCULAR; INTRAVENOUS at 01:01

## 2023-01-17 RX ADMIN — PANTOPRAZOLE SODIUM 40 MG: 40 TABLET, DELAYED RELEASE ORAL at 03:01

## 2023-01-17 RX ADMIN — LIDOCAINE HYDROCHLORIDE 100 MG: 20 INJECTION INTRAVENOUS at 01:01

## 2023-01-17 RX ADMIN — METRONIDAZOLE 500 MG: 500 INJECTION, SOLUTION INTRAVENOUS at 01:01

## 2023-01-17 RX ADMIN — MIDODRINE HYDROCHLORIDE 10 MG: 5 TABLET ORAL at 09:01

## 2023-01-17 RX ADMIN — OCTREOTIDE ACETATE 50 MCG/HR: 500 INJECTION, SOLUTION INTRAVENOUS; SUBCUTANEOUS at 12:01

## 2023-01-17 RX ADMIN — SODIUM CHLORIDE, POTASSIUM CHLORIDE, SODIUM LACTATE AND CALCIUM CHLORIDE 1000 ML: 600; 310; 30; 20 INJECTION, SOLUTION INTRAVENOUS at 01:01

## 2023-01-17 RX ADMIN — METRONIDAZOLE 500 MG: 500 INJECTION, SOLUTION INTRAVENOUS at 04:01

## 2023-01-17 RX ADMIN — PROPOFOL 10 MG: 10 INJECTION, EMULSION INTRAVENOUS at 01:01

## 2023-01-17 RX ADMIN — LACTULOSE 20 G: 20 SOLUTION ORAL at 10:01

## 2023-01-17 RX ADMIN — THIAMINE HYDROCHLORIDE 500 MG: 100 INJECTION, SOLUTION INTRAMUSCULAR; INTRAVENOUS at 03:01

## 2023-01-17 RX ADMIN — LIDOCAINE HYDROCHLORIDE 10 MG: 10 INJECTION, SOLUTION EPIDURAL; INFILTRATION; INTRACAUDAL at 12:01

## 2023-01-17 RX ADMIN — SODIUM BICARBONATE 1300 MG: 650 TABLET ORAL at 09:01

## 2023-01-17 RX ADMIN — METRONIDAZOLE 500 MG: 500 INJECTION, SOLUTION INTRAVENOUS at 09:01

## 2023-01-17 RX ADMIN — MIDODRINE HYDROCHLORIDE 10 MG: 5 TABLET ORAL at 06:01

## 2023-01-17 RX ADMIN — Medication 0.06 MCG/KG/MIN: at 05:01

## 2023-01-17 RX ADMIN — FOLIC ACID 1 MG: 1 TABLET ORAL at 08:01

## 2023-01-17 RX ADMIN — Medication 50 MCG/KG/MIN: at 01:01

## 2023-01-17 RX ADMIN — LACTULOSE 20 G: 20 SOLUTION ORAL at 09:01

## 2023-01-17 RX ADMIN — NOREPINEPHRINE BITARTRATE 0.04 MCG/KG/MIN: 4 INJECTION, SOLUTION INTRAVENOUS at 11:01

## 2023-01-17 NOTE — SUBJECTIVE & OBJECTIVE
Interval History/Significant Events: Increase midodrine to 10 mg TID, octreotide for concerning of HRS. Started on Levo overnight. Hgb stable. EGD on 1/17. Worsening MARTITA with minimal UOP. Nephrology consult     Review of Systems   Constitutional:  Positive for activity change. Negative for chills and fever.   HENT:  Negative for congestion and trouble swallowing.    Eyes:  Negative for visual disturbance.   Respiratory:  Negative for cough, shortness of breath, wheezing and stridor.    Cardiovascular:  Negative for chest pain and leg swelling.   Gastrointestinal:  Positive for abdominal distention and blood in stool. Negative for abdominal pain, diarrhea, nausea and vomiting.        +fecal incontinence   Endocrine: Negative for polyuria.   Genitourinary:  Negative for difficulty urinating and dysuria.   Musculoskeletal:  Positive for back pain. Negative for arthralgias and myalgias.   Skin:  Negative for color change and rash.   Neurological:  Negative for dizziness, weakness, numbness and headaches.   Psychiatric/Behavioral:  Positive for confusion. Negative for agitation.    Objective:     Vital Signs (Most Recent):  Temp: 98.4 °F (36.9 °C) (01/17/23 0700)  Pulse: 80 (01/17/23 0815)  Resp: (!) 22 (01/17/23 0815)  BP: (!) 99/57 (01/17/23 0815)  SpO2: 98 % (01/17/23 0815)   Vital Signs (24h Range):  Temp:  [97.4 °F (36.3 °C)-98.4 °F (36.9 °C)] 98.4 °F (36.9 °C)  Pulse:  [64-84] 80  Resp:  [9-29] 22  SpO2:  [92 %-100 %] 98 %  BP: ()/(42-62) 99/57   Weight: 111.1 kg (244 lb 14.9 oz)  Body mass index is 37.24 kg/m².      Intake/Output Summary (Last 24 hours) at 1/17/2023 0841  Last data filed at 1/17/2023 0500  Gross per 24 hour   Intake 767.26 ml   Output 5 ml   Net 762.26 ml       Physical Exam  Vitals and nursing note reviewed.   Constitutional:       General: He is not in acute distress.     Appearance: He is obese. He is not ill-appearing.   HENT:      Head: Normocephalic and atraumatic.      Right Ear:  External ear normal.      Left Ear: External ear normal.      Nose: Nose normal.      Mouth/Throat:      Mouth: Mucous membranes are moist.      Pharynx: Oropharynx is clear.   Eyes:      General:         Right eye: No discharge.         Left eye: No discharge.      Extraocular Movements: Extraocular movements intact.      Conjunctiva/sclera: Conjunctivae normal.      Pupils: Pupils are equal, round, and reactive to light.   Cardiovascular:      Rate and Rhythm: Normal rate and regular rhythm.      Pulses: Normal pulses.      Heart sounds: Normal heart sounds. No murmur heard.  Pulmonary:      Effort: Pulmonary effort is normal. No respiratory distress.      Breath sounds: No wheezing or rales.   Abdominal:      General: There is distension.      Tenderness: There is no abdominal tenderness. There is no guarding.   Musculoskeletal:         General: No swelling. Normal range of motion.      Cervical back: Normal range of motion.      Right lower leg: No edema.      Left lower leg: No edema.   Skin:     General: Skin is warm and dry.      Coloration: Skin is not jaundiced.   Neurological:      General: No focal deficit present.      Mental Status: He is alert. He is disoriented.      Cranial Nerves: Cranial nerves 2-12 are intact.      Comments: Patient altered  Responding to questions appropriately but confused about timing of events  Poor recall  + fecal incontinence   Psychiatric:         Mood and Affect: Mood normal.         Behavior: Behavior normal.       Vents:     Lines/Drains/Airways       Drain  Duration                  Urethral Catheter 01/16/23 1347 Latex 14 Fr. <1 day              Peripheral Intravenous Line  Duration                  Peripheral IV - Single Lumen 01/16/23 0700 20 G Left Forearm 1 day         Peripheral IV - Single Lumen 01/16/23 0700 20 G Left Hand 1 day         Peripheral IV - Single Lumen 01/16/23 0700 20 G Right Wrist 1 day                  Significant Labs:    CBC/Anemia  Profile:  Recent Labs   Lab 01/16/23  0542 01/16/23  0603 01/16/23  1838 01/17/23  0014 01/17/23  0332   WBC  --    < > 12.41 13.31* 13.92*   HGB  --    < > 8.2* 8.1* 8.2*   HCT  --    < > 26.3* 25.5* 25.6*   PLT  --    < > 212 238 238   MCV  --    < > 101* 101* 101*   RDW  --    < > 19.9* 20.1* 19.9*   OCCULTBLOOD Positive*  --   --   --   --     < > = values in this interval not displayed.        Chemistries:  Recent Labs   Lab 01/16/23  0759 01/16/23  0855 01/17/23  0332   * 129* 128*   K 4.5 4.5 4.7    100 99   CO2 17* 17* 14*   BUN 26* 26* 28*   CREATININE 4.7* 4.7* 5.0*   CALCIUM 6.9* 6.8* 7.0*   ALBUMIN 1.7* 1.7* 1.6*   PROT 6.1 6.0 5.8*   BILITOT 1.5* 1.5* 2.3*   ALKPHOS 274* 273* 240*   ALT 81* 81* 74*   * 181* 169*   MG  --   --  1.4*   PHOS  --   --  5.3*       All pertinent labs within the past 24 hours have been reviewed.    Significant Imaging:  I have reviewed all pertinent imaging results/findings within the past 24 hours.

## 2023-01-17 NOTE — EICU
Intervention Initiated From:  Bedside    Dione intervened regarding:  Documentation and Time-Out    Comments: Called into room via eLert to do time-out for L Paracentesis & for assistance w/ documentation (see time-out record).  Clear yellow fluid obtained from paracentesis. Patient tolerated well.   Time elapsed:  15 mins

## 2023-01-17 NOTE — ASSESSMENT & PLAN NOTE
-Last A1c reviewed-   Lab Results   Component Value Date    HGBA1C 5.3 11/10/2022       Home Antihyperglycemic Regiment:  - Metformin  1000 mg BID     Inpatient Antihyperglycemic Regiment:  Antihyperglycemics (From admission, onward)    Start     Stop Route Frequency Ordered    01/16/23 1507  insulin aspart U-100 pen 0-5 Units         -- SubQ Before meals & nightly PRN 01/16/23 1407        - LDSSI  - Clear liquid diet for now     Blood Sugars (AccuCheck):    Recent Labs     01/16/23  1545 01/16/23  2043   POCTGLUCOSE 141* 163*

## 2023-01-17 NOTE — PROGRESS NOTES
Pharmacokinetic Assessment Follow Up: IV Vancomycin    Vancomycin serum concentration assessment(s):    The random level was drawn correctly and can be used to guide therapy at this time. The measurement is within the desired definitive target range of 10 to 20 mcg/mL.    Patient in MARTITA, not making any urine, Nephrology is following and does not plan on RRT today  Vancomycin Regimen Plan:    Will hold on redosing vancomycin today     Re-dose when the random level is less than 20 mcg/mL, next level to be drawn at 0300 on 1/18    Drug levels (last 3 results):  Recent Labs   Lab Result Units 01/17/23  0332   Vancomycin, Random ug/mL 19.9       Pharmacy will continue to follow and monitor vancomycin.    Please contact pharmacy at extension [phone] for questions regarding this assessment.    Thank you for the consult,   Earline Hernandez       Patient brief summary:  Jonny Isabel is a 62 y.o. male initiated on antimicrobial therapy with IV Vancomycin for treatment of bacteremia    The patient's current regimen is pulse dose based on level and renal function    Drug Allergies:   Review of patient's allergies indicates:  No Known Allergies    Actual Body Weight:   110.7 kg    Renal Function:   Estimated Creatinine Clearance: 18.7 mL/min (A) (based on SCr of 5 mg/dL (H)).,     Dialysis Method (if applicable):  N/A    CBC (last 72 hours):  Recent Labs   Lab Result Units 01/16/23  0449 01/16/23  0603 01/16/23  0759 01/16/23  0855 01/16/23  1318 01/16/23  1838 01/17/23  0014 01/17/23  0332 01/17/23  1218   WBC K/uL 12.27 12.91* 13.01* 12.99* 13.11* 12.41 13.31* 13.92* 14.98*   Hemoglobin g/dL 7.5* 7.5* 7.8* 7.7* 8.4* 8.2* 8.1* 8.2* 8.4*   Hematocrit % 23.0* 23.3* 24.6* 23.9* 25.8* 26.3* 25.5* 25.6* 26.1*   Platelets K/uL 219 237 235 247 233 212 238 238 329   Gran % % 77.0* 76.8* 74.7* 77.7* 78.6* 79.3* 79.5* 77.2* 78.5*   Lymph % % 10.1* 10.8* 11.3* 9.5* 9.3* 7.7* 9.5* 9.7* 7.5*   Mono % % 11.2 10.9 12.2 11.3 10.9 11.3 9.5 10.0 11.7    Eosinophil % % 0.7 0.6 0.8 0.5 0.5 0.6 0.5 0.9 0.7   Basophil % % 0.2 0.2 0.2 0.3 0.2 0.2 0.3 0.4 0.4   Differential Method  Automated Automated Automated Automated Automated Automated Automated Automated Automated       Metabolic Panel (last 72 hours):  Recent Labs   Lab Result Units 01/16/23  0449 01/16/23  0759 01/16/23  0855 01/16/23  1101 01/16/23  2135 01/17/23  0332   Sodium mmol/L 131* 130* 129*  --   --  128*   Sodium, Urine mmol/L  --   --   --   --  25  --    Potassium mmol/L 4.3 4.5 4.5  --   --  4.7   Chloride mmol/L 101 101 100  --   --  99   CO2 mmol/L 17* 17* 17*  --   --  14*   Glucose mg/dL 92 85 88  --   --  148*   Glucose, UA   --   --   --  Trace*  --   --    BUN mg/dL 26* 26* 26*  --   --  28*   Creatinine mg/dL 4.6* 4.7* 4.7*  --   --  5.0*   Albumin g/dL 1.6* 1.7* 1.7*  --   --  1.6*   Total Bilirubin mg/dL 1.4* 1.5* 1.5*  --   --  2.3*   Alkaline Phosphatase U/L 264* 274* 273*  --   --  240*   AST U/L 177* 186* 181*  --   --  169*   ALT U/L 77* 81* 81*  --   --  74*   Magnesium mg/dL  --   --   --   --   --  1.4*   Phosphorus mg/dL  --   --   --   --   --  5.3*       Vancomycin Administrations:  vancomycin given in the last 96 hours                     vancomycin 2 g in dextrose 5 % 500 mL IVPB (mg) 2,000 mg New Bag 01/16/23 0945                    Microbiologic Results:  Microbiology Results (last 7 days)       Procedure Component Value Units Date/Time    Gram stain [[id number]] Collected: 01/17/23 0003    Order Status: Completed Specimen: Ascites Updated: 01/17/23 0207     Gram Stain Result Rare WBC's      No organisms seen    Aerobic culture [[id number]] Collected: 01/17/23 0003    Order Status: Sent Specimen: Ascites Updated: 01/17/23 0101    Culture, Anaerobic [[id number]] Collected: 01/17/23 0003    Order Status: Sent Specimen: Ascites Updated: 01/17/23 0100    Blood culture [[id number]]     Order Status: Canceled Specimen: Blood     Blood culture [[id number]]     Order Status: Canceled  Specimen: Blood

## 2023-01-17 NOTE — ASSESSMENT & PLAN NOTE
61 yo M with PMH of alcoholic hepatitis (drinks a 6 pack of beer per day and bottle of marie), Afib on Xarelto, central obesity, HTN, GI bleed, unknown CKD vs MARTITA on admission, anemia, chronic hyponatremia who presents due to inability to get out of bed. He reports that he has been having fecal incontinence for 2 months now and difficulty walking for the last 4 days (with single person assistance and rolling walker) and inability to get out of bed today. He denies saddle anesthesia or urinary incontinence or issues voiding his bladder. He has also  been having dark and tarry stools.     --Admitted to MICU   -q4 neuro checks  --All labs and diagnostics reviewed   -CT L spine and MRI L spine at OSH showed large L3/L4 herniated disc with moderate to severe stenosis. Some air in disc space.   MRI L spine wo 1/16: large L3/L4 herniated disc with moderate to severe stenosis  - no acute neurosurgical intervention, patient with clinical findings of subacute to chronic symptoms  - no HOB restrictions   - hold Xarelto, PT and INR elevated on admission // will need Xarelto held for 3-5 days prior to OR and/or coagulapathy corrected  - GI consulted for GI bleed, f/u recs  - Cr elevated to 4.7 on admission, unclear baseline, workup per MICU  - ok for diet at this time per primary  - will tentatively plan for lumbar decompression/discectomy during this admission  - patient will need medical optimization and clearance for surgery prior to OR, appreciate assistance from primary team  - nsgy will continue to follow    Dispo: ongoing

## 2023-01-17 NOTE — HPI
62 year old male with a history of alcohol abuse, afib/flutter on xarelto, HTN presents with concern for severe spinal canal stenosis/vacuum disc phenomena, concern for a GI bleed and MARTITA. He presents at behest of his girlfriend after being unable to stand, he has a history of sciatica but reports that this is worse. He has had fecal incontinence for the last week.     He has no report of kidney issues in the past and reports no family history of such either. At time of consultation he is pending an EGD for GI bleed. CT abdomen with large liver and concern for SBO per report, no hydronephrosis. UA with 2+ protein, 1+ occult blood, urine sodium 25 and urine osm 302. On admission serum creatinine 5 (baseline 0.8).

## 2023-01-17 NOTE — TREATMENT PLAN
BRIEF GI TREATMENT PLAN    Impression on EGD today:                         - Small (< 5 mm) esophageal varices.                          - Portal hypertensive gastropathy.                          - Erythematous mucosa in the antrum.                          - Normal examined duodenum.                          - No specimens collected.   Recommendation:                                  - The findings and recommendations were discussed                          with the patient.                          - Use a proton pump inhibitor PO BID.                          - Observe patient's clinical course.                          - Observe patient in ICU for observation. Signing off.       Jose Arias MD  PGY-IV, Gastroenterology

## 2023-01-17 NOTE — ASSESSMENT & PLAN NOTE
Unsure of etiology at this time  Size of liver suggest other etiology than cirrhosis, urine sodium 26 additionally argues against ATN. CT with no signs of obstruction.     Urine microscopy: no casts identified, amphophils material (though only 5 cc obtained)    At this time differential is large with possible infiltrative disease on background of alcoholism    Recommendations:  Follow up    -KEATON   -C3/4   -ANCA   -SPEP   -LILO   -light chain   -Hep B   -Hep C   -HIV   -iron/tibc/ferritin   -urine protein creatinine ratio   -cryo   -CK   -uric acid

## 2023-01-17 NOTE — CONSULTS
[facility]  Nephrology  Consult Note    Patient Name: Jonny Isabel  MRN: [id number]  Admission Date: 1/16/2023  Hospital Length of Stay: 1 days  Attending Provider: Genaro Ortiz MD   Primary Care Physician: Yuli Pérez Mai, MD  Principal Problem:Spinal stenosis    Inpatient consult to Nephrology  Consult performed by: Otoniel Cat MD  Consult ordered by: Adelita Shafer DNP        Subjective:     HPI: 62 year old male with a history of alcohol abuse, afib/flutter on xarelto, HTN presents with concern for severe spinal canal stenosis/vacuum disc phenomena, concern for a GI bleed and MARTITA. He presents at behest of his girlfriend after being unable to stand, he has a history of sciatica but reports that this is worse. He has had fecal incontinence for the last week.     He has no report of kidney issues in the past and reports no family history of such either. At time of consultation he is pending an EGD for GI bleed. CT abdomen with large liver and concern for SBO per report, no hydronephrosis. UA with 2+ protein, 1+ occult blood, urine sodium 25 and urine osm 302. On admission serum creatinine 5 (baseline 0.8).      Past Medical History:   Diagnosis Date    A-fib        History reviewed. No pertinent surgical history.    Review of patient's allergies indicates:  No Known Allergies  Current Facility-Administered Medications   Medication Frequency    0.9%  NaCl infusion (for blood administration) Q24H PRN    ceFEPIme (MAXIPIME) 2 g in dextrose 5 % in water (D5W) 5 % 50 mL IVPB (MB+) Q24H    dextrose 10% bolus 125 mL 125 mL PRN    dextrose 10% bolus 250 mL 250 mL PRN    folic acid tablet 1 mg Daily    glucagon (human recombinant) injection 1 mg PRN    glucose chewable tablet 16 g PRN    glucose chewable tablet 24 g PRN    insulin aspart U-100 pen 0-5 Units QID (AC + HS) PRN    lactulose 20 gram/30 mL solution Soln 20 g TID    LORazepam injection 2 mg Q4H PRN    metronidazole  IVPB 500 mg Q8H    midodrine tablet 10 mg Q8H    multivitamin tablet Daily    NORepinephrine bitartrate-D5W 4 mg/250 mL (16 mcg/mL) PERIPHERAL access infusion Continuous    octreotide (SANDOSTATIN) 500 mcg in sodium chloride 0.9% 100 mL infusion Continuous    pantoprazole injection 40 mg BID    thiamine (B-1) 500 mg in dextrose 5 % 100 mL IVPB Q8H    Followed by    [START ON 1/18/2023] thiamine tablet 100 mg Q8H    vancomycin - pharmacy to dose pharmacy to manage frequency     Family History    None       Tobacco Use    Smoking status: Not on file    Smokeless tobacco: Not on file   Substance and Sexual Activity    Alcohol use: Yes     Alcohol/week: 6.0 standard drinks     Types: 6 Cans of beer per week    Drug use: Not on file    Sexual activity: Not on file     Review of Systems   Gastrointestinal:  Positive for abdominal pain.   Musculoskeletal:  Positive for myalgias.   Neurological:  Positive for weakness.   Objective:     Vital Signs (Most Recent):  Temp: 97.8 °F (36.6 °C) (01/17/23 1100)  Pulse: 76 (01/17/23 1330)  Resp: 17 (01/17/23 1330)  BP: (!) 100/53 (01/17/23 1330)  SpO2: 96 % (01/17/23 1330)   Vital Signs (24h Range):  Temp:  [97.4 °F (36.3 °C)-98.4 °F (36.9 °C)] 97.8 °F (36.6 °C)  Pulse:  [67-84] 76  Resp:  [9-30] 17  SpO2:  [78 %-100 %] 96 %  BP: ()/(33-69) 100/53     Weight: 110.7 kg (244 lb) (01/17/23 1030)  Body mass index is 37.1 kg/m².  Body surface area is 2.3 meters squared.    I/O last 3 completed shifts:  In: 767.3 [I.V.:179.6; IV Piggyback:587.6]  Out: 5 [Urine:5]    Physical Exam  Constitutional:       General: He is not in acute distress.     Appearance: He is obese. He is not ill-appearing or toxic-appearing.   HENT:      Head: Normocephalic and atraumatic.      Nose: Nose normal. No congestion or rhinorrhea.      Mouth/Throat:      Mouth: Mucous membranes are moist.   Eyes:      General: No scleral icterus.        Right eye: No discharge.         Left eye: No  discharge.      Pupils: Pupils are equal, round, and reactive to light.   Cardiovascular:      Rate and Rhythm: Normal rate.      Heart sounds: No murmur heard.  Pulmonary:      Effort: Pulmonary effort is normal. No respiratory distress.   Abdominal:      General: There is distension.      Tenderness: There is abdominal tenderness.   Musculoskeletal:      Right lower leg: Edema present.      Left lower leg: Edema present.   Neurological:      Mental Status: He is alert.       Significant Labs:  All labs within the past 24 hours have been reviewed.    Significant Imaging:  Labs: Reviewed    Assessment/Plan:     * Spinal stenosis  Per primary    MARTITA (acute kidney injury)  Unsure of etiology at this time  Size of liver suggest other etiology than cirrhosis, urine sodium 26 additionally argues against ATN    Urine microscopy: no casts identified, amphophils material (though only 5 cc obtained)    At this time differential is large with possible infiltrative disease on background of alcoholism    Recommendations:  Follow up    -KEATON   -C3/4   -ANCA   -SPEP   -LILO   -light chain   -Hep B   -Hep C   -HIV   -iron/tibc/ferritin   -urine protein creatinine ratio   -cryo   -CK   -uric acid        Thank you for your consult. I will follow-up with patient. Please contact us if you have any additional questions.    Otoniel Cat MD  Nephrology  [facility]

## 2023-01-17 NOTE — ASSESSMENT & PLAN NOTE
Concerning for variceal bleed given hx of alcohol abuse.     - GI consulted, recommend clear liquid for now   -Transfuse pRBC for Hb < 7 g/dL (Consider a higher Hb target if there is clinical evidence of intravascular volume depletion or comorbidities, such as CAD or if high suspicion of vigorous active ongoing bleeding or an uncorrected coagulopathy exists.).  - s/p vitamin K given Pt/INR > 2   - PPI 80 mg IV bolus once, then IV PPI 40 BID  - Octreotide gtt   -Avoid nonsteroidal agents, antiplatelet agents and anticoagulants if possible in patients without absolute contraindications. (consult managing provider if required for cardiovascular protection).  - EGD today

## 2023-01-17 NOTE — HPI
Jonny Isabel is a 62M with history of alcoholic liver disease, alcohol use disorder, Afib (s/p ablation and DCCV x2, on Xarelto), HTN, GIB, and chronic hyponatremia who presented as a transfer for epidural abscess and L3/L4 disc herniation. He was admitted to the MICU and NSGY was consulted for evaluation. Ultimately surgical intervention has been deferred until he is medically optimized/stable. GI was also consulted for ongoing melena. An EGD was performed which showed esophageal varices and portal gastropathy but no active bleeds. He has had a prolonged course while here with renal failure now on HD, a fib with RVR, SBP, and cellulitis. General surgery has been previously consulted due to concern for SBO based on a radiology report. At the time he was passing flatus and having melena and so it was felt he was not obstructed and surgery signed off. Yesterday he was noted to be more distended and so an KUB was obtained which showed dilated loops of bowel. This was followed by a CT which showed diffuse small bowel dilatation up to 4.9 cm without a TP. There was no PO contrast in the cecum yet. Also noted to have amuch more ascites in the abdomen compared to his prior scan. He has continued to have bowel function however and is on lactulose to help prevent HE. He denies abdominal pain, nausea, or vomiting. General surgery reconsulted due to concern for SBO. He denies former abdominal surgeries or hernias.

## 2023-01-17 NOTE — PROGRESS NOTES
Mariano, DO made aware of patient with <10cc uop since admit this afternoon, unable to obtain urine protein/creatinine and urine sodium.

## 2023-01-17 NOTE — PROVATION PATIENT INSTRUCTIONS
Discharge Summary/Instructions after an Endoscopic Procedure  Patient Name: Jonny Isabel  Patient MRN: [id number]  Patient YOB: 1960  Tuesday, January 17, 2023  Dewayne Navas MD  Dear patient,  As a result of recent federal legislation (The Federal Cures Act), you may   receive lab or pathology results from your procedure in your [facility]   account before your physician is able to contact you. Your physician or   their representative will relay the results to you with their   recommendations at their soonest availability.  Thank you,  RESTRICTIONS:  During your procedure today, you received medications for sedation.  These   medications may affect your judgment, balance and coordination.  Therefore,   for 24 hours, you have the following restrictions:   - DO NOT drive a car, operate machinery, make legal/financial decisions,   sign important papers or drink alcohol.    ACTIVITY:  Today: no heavy lifting, straining or running due to procedural   sedation/anesthesia.  The following day: return to full activity including work.  DIET:  Eat and drink normally unless instructed otherwise.     TREATMENT FOR COMMON SIDE EFFECTS:  - Mild abdominal pain, nausea, belching, bloating or excessive gas:  rest,   eat lightly and use a heating pad.  - Sore Throat: treat with throat lozenges and/or gargle with warm salt   water.  - Because air was used during the procedure, expelling large amounts of air   from your rectum or belching is normal.  - If a bowel prep was taken, you may not have a bowel movement for 1-3 days.    This is normal.  SYMPTOMS TO WATCH FOR AND REPORT TO YOUR PHYSICIAN:  1. Abdominal pain or bloating, other than gas cramps.  2. Chest pain.  3. Back pain.  4. Signs of infection such as: chills or fever occurring within 24 hours   after the procedure.  5. Rectal bleeding, which would show as bright red, maroon, or black stools.   (A tablespoon of blood from the rectum is not serious, especially  if   hemorrhoids are present.)  6. Vomiting.  7. Weakness or dizziness.  GO DIRECTLY TO THE NEAREST EMERGENCY ROOM IF YOU HAVE ANY OF THE FOLLOWING:      Difficulty breathing              Chills and/or fever over 101 F   Persistent vomiting and/or vomiting blood   Severe abdominal pain   Severe chest pain   Black, tarry stools   Bleeding- more than one tablespoon   Any other symptom or condition that you feel may need urgent attention  Your doctor recommends these additional instructions:  If any biopsies were taken, your doctors clinic will contact you in 1 to 2   weeks with any results.  - The findings and recommendations were discussed with the patient.   - Use a proton pump inhibitor PO BID.   - Observe patient's clinical course.   - Observe patient in ICU for observation.  For questions, problems or results please call your physician - Dewayne Navas MD at Work:  [phone].  [facility] [place] [facility], EMERGENCY ROOM PHONE NUMBER: [phone]  IF A COMPLICATION OR EMERGENCY SITUATION ARISES AND YOU ARE UNABLE TO REACH   YOUR PHYSICIAN - GO DIRECTLY TO THE EMERGENCY ROOM.  Dewayne Navas MD  1/17/2023 2:11:12 PM  This report has been verified and signed electronically.  Dear patient,  As a result of recent federal legislation (The Federal Cures Act), you may   receive lab or pathology results from your procedure in your [facility]   account before your physician is able to contact you. Your physician or   their representative will relay the results to you with their   recommendations at their soonest availability.  Thank you,  PROVATION

## 2023-01-17 NOTE — SUBJECTIVE & OBJECTIVE
"Interval History: 1/17: Patient with hypotension & SOB overnight requiring pressors & emergency paracentesis @ bedside, H/H stable w/ transfusion. No acute NSGY intervention at this time due to systemic illness.    Medications:  Continuous Infusions:   NORepinephrine bitartrate-D5W 0.02 mcg/kg/min (01/17/23 0647)    octreotide (SANDOSTATIN) infusion 50 mcg/hr (01/17/23 0500)     Scheduled Meds:   ceFEPime (MAXIPIME) IVPB  2 g Intravenous Q24H    diazePAM  5 mg Intravenous Q8H    folic acid  1 mg Oral Daily    metronidazole  500 mg Intravenous Q8H    midodrine  10 mg Oral Q8H    multivitamin  1 tablet Oral Daily    pantoprazole  40 mg Intravenous BID    thiamine (VITAMIN B1) IVPB  500 mg Intravenous Q8H    Followed by    [START ON 1/18/2023] thiamine  100 mg Oral Q8H     PRN Meds:dextrose 10%, dextrose 10%, glucagon (human recombinant), glucose, glucose, insulin aspart U-100, lorazepam, Pharmacy to dose Vancomycin consult **AND** vancomycin - pharmacy to dose     Review of Systems  Objective:     Weight: 111.1 kg (244 lb 14.9 oz)  Body mass index is 37.24 kg/m².  Vital Signs (Most Recent):  Temp: 97.7 °F (36.5 °C) (01/17/23 0301)  Pulse: 82 (01/17/23 0715)  Resp: 15 (01/17/23 0715)  BP: (!) 110/56 (01/17/23 0715)  SpO2: 98 % (01/17/23 0715)   Vital Signs (24h Range):  Temp:  [97.4 °F (36.3 °C)-98.4 °F (36.9 °C)] 97.7 °F (36.5 °C)  Pulse:  [64-84] 82  Resp:  [9-42] 15  SpO2:  [92 %-100 %] 98 %  BP: ()/(42-62) 110/56                          Urethral Catheter 01/16/23 1347 Latex 14 Fr. (Active)   Site Assessment Clean;Intact;Dry 01/17/23 0301   Collection Container Urimeter 01/17/23 0301   Securement Method secured to top of thigh w/ adhesive device 01/17/23 0301   Catheter Care Performed yes 01/17/23 0301   Reason for Continuing Urinary Catheterization Critically ill in ICU and requiring hourly monitoring of intake/output 01/17/23 0301   CAUTI Prevention Bundle Securement Device in place with 1" slack;Intact " seal between catheter & drainage tubing;Drainage bag/urimeter off the floor;Sheeting clip in use;No dependent loops or kinks;Drainage bag/urimeter not overfilled (<2/3 full);Drainage bag/urimeter below bladder 01/17/23 0301   Output (mL) 0 mL 01/16/23 2001       Physical Exam  Neurosurgery Physical Exam  Constitutional:       Appearance: He is well-developed and well-nourished.      Comments: obese   Eyes:      Extraocular Movements: EOM normal.      Conjunctiva/sclera: Conjunctivae normal.      Pupils: Pupils are equal, round, and reactive to light.   Cardiovascular:      Pulses: Normal pulses.   Abdominal:      Palpations: Abdomen is soft.   Neurological:      Mental Status: He is alert and oriented to person, place, and time.      Comments: AAOx4  PERRL  CN intact  BUE 5/5  BLE 4- hip flexion, 4 to 4+ ke, 5 df/pf/ehl  Reflexes normal  Poor rectal tone  No saddle anesthesia  SILT   Psychiatric:         Mood and Affect: Mood and affect normal    Significant Labs:  Recent Labs   Lab 01/16/23  0759 01/16/23  0855 01/17/23  0332   GLU 85 88 148*   * 129* 128*   K 4.5 4.5 4.7    100 99   CO2 17* 17* 14*   BUN 26* 26* 28*   CREATININE 4.7* 4.7* 5.0*   CALCIUM 6.9* 6.8* 7.0*   MG  --   --  1.4*     Recent Labs   Lab 01/16/23  1838 01/17/23  0014 01/17/23  0332   WBC 12.41 13.31* 13.92*   HGB 8.2* 8.1* 8.2*   HCT 26.3* 25.5* 25.6*    238 238     Recent Labs   Lab 01/16/23  0449 01/17/23  0332   INR 2.0* 1.5*     Microbiology Results (last 7 days)       Procedure Component Value Units Date/Time    Gram stain [[id number]] Collected: 01/17/23 0003    Order Status: Completed Specimen: Ascites Updated: 01/17/23 0207     Gram Stain Result Rare WBC's      No organisms seen    Aerobic culture [[id number]] Collected: 01/17/23 0003    Order Status: Sent Specimen: Ascites Updated: 01/17/23 0101    Culture, Anaerobic [[id number]] Collected: 01/17/23 0003    Order Status: Sent Specimen: Ascites Updated: 01/17/23 0100     Blood culture [[id number]]     Order Status: Canceled Specimen: Blood     Blood culture [[id number]]     Order Status: Canceled Specimen: Blood           ABGs: No results for input(s): PH, PCO2, PO2, HCO3, POCSATURATED, BE in the last 48 hours.  Cardiac markers: No results for input(s): CKMB, CPKMB, TROPONINT, TROPONINI, MYOGLOBIN in the last 48 hours.  CMP:   Recent Labs   Lab 01/16/23  0759 01/16/23  0855 01/17/23  0332   GLU 85 88 148*   CALCIUM 6.9* 6.8* 7.0*   ALBUMIN 1.7* 1.7* 1.6*   PROT 6.1 6.0 5.8*   * 129* 128*   K 4.5 4.5 4.7   CO2 17* 17* 14*    100 99   BUN 26* 26* 28*   CREATININE 4.7* 4.7* 5.0*   ALKPHOS 274* 273* 240*   ALT 81* 81* 74*   * 181* 169*   BILITOT 1.5* 1.5* 2.3*     CRP:   Recent Labs   Lab 01/16/23  1318   .2*     ESR: No results for input(s): POCESR, ERYTHROCYTES in the last 48 hours.  LFTs:   Recent Labs   Lab 01/16/23  0759 01/16/23  0855 01/17/23  0332   ALT 81* 81* 74*   * 181* 169*   ALKPHOS 274* 273* 240*   BILITOT 1.5* 1.5* 2.3*   PROT 6.1 6.0 5.8*   ALBUMIN 1.7* 1.7* 1.6*     Procalcitonin: No results for input(s): PROCAL in the last 48 hours.    Significant Diagnostics:  I have reviewed all pertinent imaging results/findings within the past 24 hours.

## 2023-01-17 NOTE — ANESTHESIA PREPROCEDURE EVALUATION
[facility]  Anesthesia Pre-Operative Evaluation         Patient Name: Jonny Isabel  YOB: 1960  MRN: [id number]    SUBJECTIVE:     Pre-operative evaluation for Procedure(s) (LRB):  EGD (ESOPHAGOGASTRODUODENOSCOPY) (N/A)     01/17/2023    Jonny Isabel is a 62 y.o. male w/ a significant PMHx of alcohol abuse, afib/flutter on xarelto, HTN presents with concern for severe spinal canal stenosis/vacuum disc phenomena, concern for a GI bleed and MARTITA who presents for the above procedure.      LDA: None documented.    Prev airway: None documented.     Drips: None documented.    Patient Active Problem List   Diagnosis    Spinal stenosis    GI bleed    MARTITA (acute kidney injury)    Diabetes    Elevated liver enzymes    Alcohol abuse    Severe sepsis    A-fib    Lumbar herniated disc    Alcoholic cirrhosis of liver with ascites    Coagulopathy    Alcohol withdrawal syndrome without complication    Abnormal CT scan, gastrointestinal tract       Review of patient's allergies indicates:  No Known Allergies    Current Inpatient Medications:   ceFEPime (MAXIPIME) IVPB  2 g Intravenous Q24H    folic acid  1 mg Oral Daily    lactulose  20 g Oral TID    metronidazole  500 mg Intravenous Q8H    midodrine  10 mg Oral Q8H    multivitamin  1 tablet Oral Daily    pantoprazole  40 mg Oral BID AC    thiamine (VITAMIN B1) IVPB  500 mg Intravenous Q8H    Followed by    [START ON 1/18/2023] thiamine  100 mg Oral Q8H       No current facility-administered medications on file prior to encounter.     Current Outpatient Medications on File Prior to Encounter   Medication Sig Dispense Refill    aspirin (ECOTRIN) 81 MG EC tablet Take 81 mg by mouth once daily.      cyclobenzaprine (FLEXERIL) 10 MG tablet Take 10 mg by mouth 2 (two) times daily as needed.      lisinopriL-hydrochlorothiazide (PRINZIDE,ZESTORETIC) 10-12.5 mg per tablet Take 1 tablet by mouth once daily.      metFORMIN  (GLUCOPHAGE) 500 MG tablet Take 1,000 mg by mouth 2 (two) times daily.      metoprolol tartrate (LOPRESSOR) 100 MG tablet Take 100 mg by mouth once daily.      nicotine (NICODERM CQ) 14 mg/24 hr 1 patch every morning.      nicotine, polacrilex, (NICORETTE) 2 mg Gum SMARTSI Each By Mouth Every 2 Hours PRN      omega-3 fatty acids 1,000 mg Cap Take 2 g by mouth once daily.      simvastatin (ZOCOR) 40 MG tablet Take 40 mg by mouth every evening.      traZODone (DESYREL) 50 MG tablet Take 50 mg by mouth nightly as needed.      VITAMIN B COMPLEX ORAL Take 1 tablet by mouth once daily.      XARELTO 20 mg Tab Take 20 mg by mouth once daily.         History reviewed. No pertinent surgical history.    Social History     Socioeconomic History    Marital status:    Substance and Sexual Activity    Alcohol use: Yes     Alcohol/week: 6.0 standard drinks     Types: 6 Cans of beer per week       OBJECTIVE:     Vital Signs Range (Last 24H):  Temp:  [36.3 °C (97.4 °F)-36.9 °C (98.4 °F)]   Pulse:  [67-96]   Resp:  [9-30]   BP: ()/(33-69)   SpO2:  [78 %-100 %]       CBC:   Recent Labs     23  0332 23  1218   WBC 13.92* 14.98*   RBC 2.54* 2.56*   HGB 8.2* 8.4*   HCT 25.6* 26.1*    329   * 102*   MCH 32.3* 32.8*   MCHC 32.0 32.2       CMP:   Recent Labs     23  0855 23  0332   * 128*   K 4.5 4.7    99   CO2 17* 14*   BUN 26* 28*   CREATININE 4.7* 5.0*   GLU 88 148*   MG  --  1.4*   PHOS  --  5.3*   CALCIUM 6.8* 7.0*   ALBUMIN 1.7* 1.6*   PROT 6.0 5.8*   ALKPHOS 273* 240*   ALT 81* 74*   * 169*   BILITOT 1.5* 2.3*       INR:  Recent Labs     23  0449 23   INR 2.0* 1.5*       Diagnostic Studies: No relevant studies.    EKG: No recent studies available.    2D ECHO:  No results found for this or any previous visit.      ASSESSMENT/PLAN:           Pre-op Assessment    I have reviewed the Patient Summary Reports.       I have reviewed the  Medications.     Review of Systems  Anesthesia Hx:  No problems with previous Anesthesia    Hematology/Oncology:  Hematology Normal   Oncology Normal     EENT/Dental:EENT/Dental Normal   Cardiovascular:  Cardiovascular Normal     Pulmonary:  Pulmonary Normal    Renal/:   Chronic Renal Disease    Hepatic/GI:   Liver Disease,    Neurological:  Neurology Normal    Endocrine:   Diabetes    Psych:  Psychiatric Normal           Physical Exam  General: Well nourished, Cooperative, Alert and Lethargic    Airway:  Mallampati: III / II  Mouth Opening: Normal  TM Distance: Normal  Tongue: Normal  Neck ROM: Normal ROM    Chest/Lungs:  Normal Respiratory Rate    Heart:  Rate: Normal        Anesthesia Plan  Type of Anesthesia, risks & benefits discussed:    Anesthesia Type: Gen ETT, MAC, Gen Natural Airway  Intra-op Monitoring Plan: Standard ASA Monitors  Post Op Pain Control Plan: multimodal analgesia  Induction:  IV  Airway Plan: Direct, Post-Induction  Informed Consent: Informed consent signed with the Patient and all parties understand the risks and agree with anesthesia plan.  All questions answered.   ASA Score: 3  Day of Surgery Review of History & Physical: H&P Update referred to the surgeon/provider.    Ready For Surgery From Anesthesia Perspective.     .

## 2023-01-17 NOTE — TRANSFER OF CARE
"Anesthesia Transfer of Care Note    Patient: Jonny Isabel    Procedure(s) Performed: Procedure(s) (LRB):  EGD (ESOPHAGOGASTRODUODENOSCOPY) (N/A)    Patient location: ICU    Anesthesia Type: general    Post pain: adequate analgesia    Post assessment: no apparent anesthetic complications    Post vital signs: stable    Level of consciousness: sedated    Nausea/Vomiting: no nausea/vomiting    Complications: none    Transfer of care protocol was followedComments: Bedside procedure in ICU [id number]. Report given to ICU RN prior to anesthesia stop.      Last vitals:   Visit Vitals  BP (!) 100/53   Pulse 76   Temp 36.6 °C (97.8 °F) (Oral)   Resp 17   Ht 5' 8" (1.727 m)   Wt 110.7 kg (244 lb)   SpO2 96%   BMI 37.10 kg/m²     "

## 2023-01-17 NOTE — SUBJECTIVE & OBJECTIVE
Current Facility-Administered Medications on File Prior to Encounter   Medication    [COMPLETED] cefTRIAXone 2 gram/50 mL IVPB 2 g    [COMPLETED] fentaNYL 50 mcg/mL injection 25 mcg    [COMPLETED] lactated ringers bolus 200 mL    [COMPLETED] lactated ringers bolus 3,333 mL    [COMPLETED] methocarbamoL tablet 1,000 mg    [COMPLETED] oxyCODONE-acetaminophen 5-325 mg per tablet 1 tablet    [COMPLETED] pantoprazole injection 80 mg    [COMPLETED] sodium chloride 0.9% bolus 1,000 mL 1,000 mL    [COMPLETED] vancomycin 2 g in dextrose 5 % 500 mL IVPB    [DISCONTINUED] pantoprazole 40 mg in  mL infusion (ready to mix system)    [DISCONTINUED] piperacillin-tazobactam 4.5 g in dextrose 5 % 100 mL IVPB (ready to mix system)    [DISCONTINUED] sodium chloride 0.9% bolus 2,052 mL 2,052 mL    [DISCONTINUED] vancomycin 2 g in dextrose 5 % 500 mL IVPB     Current Outpatient Medications on File Prior to Encounter   Medication Sig    aspirin (ECOTRIN) 81 MG EC tablet Take 81 mg by mouth once daily.    cyclobenzaprine (FLEXERIL) 10 MG tablet Take 10 mg by mouth 2 (two) times daily as needed.    lisinopriL-hydrochlorothiazide (PRINZIDE,ZESTORETIC) 10-12.5 mg per tablet Take 1 tablet by mouth once daily.    metFORMIN (GLUCOPHAGE) 500 MG tablet Take 1,000 mg by mouth 2 (two) times daily.    metoprolol tartrate (LOPRESSOR) 100 MG tablet Take 100 mg by mouth once daily.    nicotine (NICODERM CQ) 14 mg/24 hr 1 patch every morning.    nicotine, polacrilex, (NICORETTE) 2 mg Gum SMARTSI Each By Mouth Every 2 Hours PRN    omega-3 fatty acids 1,000 mg Cap Take 2 g by mouth once daily.    simvastatin (ZOCOR) 40 MG tablet Take 40 mg by mouth every evening.    traZODone (DESYREL) 50 MG tablet Take 50 mg by mouth nightly as needed.    VITAMIN B COMPLEX ORAL Take 1 tablet by mouth once daily.    XARELTO 20 mg Tab Take 20 mg by mouth once daily.    [DISCONTINUED] cefdinir (OMNICEF) 300 MG capsule Take 300 mg by mouth every 12 (twelve) hours.     [DISCONTINUED] doxycycline (VIBRA-TABS) 100 MG tablet Take 100 mg by mouth 2 (two) times daily.    [DISCONTINUED] gabapentin (NEURONTIN) 300 MG capsule Take 300 mg by mouth 2 (two) times daily.    [DISCONTINUED] traMADoL (ULTRAM) 50 mg tablet Take 50 mg by mouth.       Review of patient's allergies indicates:  No Known Allergies    Past Medical History:   Diagnosis Date    A-fib      History reviewed. No pertinent surgical history.  Family History    None       Tobacco Use    Smoking status: Not on file    Smokeless tobacco: Not on file   Substance and Sexual Activity    Alcohol use: Yes     Alcohol/week: 6.0 standard drinks     Types: 6 Cans of beer per week    Drug use: Not on file    Sexual activity: Not on file     Review of Systems   Constitutional: Negative.  Negative for activity change, chills and fever.   HENT: Negative.     Eyes: Negative.    Respiratory: Negative.  Negative for cough and shortness of breath.    Cardiovascular: Negative.  Negative for chest pain and palpitations.   Gastrointestinal: Negative.  Negative for abdominal distention, abdominal pain, constipation, diarrhea, nausea and vomiting.   Endocrine: Negative.    Genitourinary: Negative.  Negative for dysuria.   Musculoskeletal: Negative.    Skin: Negative.    Allergic/Immunologic: Negative.    Neurological: Negative.    Hematological: Negative.    Psychiatric/Behavioral: Negative.     Objective:     Vital Signs (Most Recent):  Temp: 97.4 °F (36.3 °C) (01/16/23 1901)  Pulse: 80 (01/16/23 2045)  Resp: 10 (01/16/23 2045)  BP: (!) 97/49 (01/16/23 2030)  SpO2: 98 % (01/16/23 2045)   Vital Signs (24h Range):  Temp:  [97.4 °F (36.3 °C)-98.7 °F (37.1 °C)] 97.4 °F (36.3 °C)  Pulse:  [64-83] 80  Resp:  [9-42] 10  SpO2:  [90 %-100 %] 98 %  BP: ()/() 97/49     Weight: 111.1 kg (244 lb 14.9 oz)  Body mass index is 37.24 kg/m².    Physical Exam  Constitutional:       Appearance: Normal appearance.   HENT:      Head: Normocephalic.    Cardiovascular:      Rate and Rhythm: Normal rate.   Pulmonary:      Effort: Pulmonary effort is normal. No respiratory distress.   Abdominal:      General: Abdomen is flat. There is no distension.      Tenderness: There is no abdominal tenderness.      Hernia: No hernia is present.   Skin:     General: Skin is warm and dry.      Capillary Refill: Capillary refill takes less than 2 seconds.   Neurological:      General: No focal deficit present.      Mental Status: He is alert.   Psychiatric:         Mood and Affect: Mood normal.       Significant Labs:  I have reviewed all pertinent lab results within the past 24 hours.  CBC:   Recent Labs   Lab 01/16/23  1838   WBC 12.41   RBC 2.61*   HGB 8.2*   HCT 26.3*      *   MCH 31.4*   MCHC 31.2*     CMP:   Recent Labs   Lab 01/16/23  0855   GLU 88   CALCIUM 6.8*   ALBUMIN 1.7*   PROT 6.0   *   K 4.5   CO2 17*      BUN 26*   CREATININE 4.7*   ALKPHOS 273*   ALT 81*   *   BILITOT 1.5*       Significant Diagnostics:  I have reviewed all pertinent imaging results/findings within the past 24 hours.    CT scan is not impressive for an SBO.

## 2023-01-17 NOTE — ANESTHESIA POSTPROCEDURE EVALUATION
Anesthesia Post Evaluation    Patient: Jonny Isabel    Procedure(s) Performed: Procedure(s) (LRB):  EGD (ESOPHAGOGASTRODUODENOSCOPY) (N/A)    Final Anesthesia Type: general      Patient location during evaluation: ICU  Patient participation: No - Unable to Participate, Sedation  Level of consciousness: awake and alert and sedated  Post-procedure vital signs: reviewed and stable  Pain management: adequate  Airway patency: patent    PONV status at discharge: No PONV  Anesthetic complications: no      Cardiovascular status: hemodynamically stable  Respiratory status: unassisted and spontaneous ventilation  Hydration status: euvolemic  Follow-up not needed.          Vitals Value Taken Time   BP 96/55 01/17/23 1516   Temp  01/17/23 1524   Pulse 81 01/17/23 1523   Resp 26 01/17/23 1523   SpO2 94 % 01/17/23 1523   Vitals shown include unvalidated device data.      No case tracking events are documented in the log.      Pain/Terrence Score: Pain Rating Prior to Med Admin: 10 (1/16/2023  6:53 AM)  Pain Rating Post Med Admin: 4 (1/16/2023  7:38 AM)

## 2023-01-17 NOTE — HOSPITAL COURSE
Admitted to MICU for NSGY and GI evaluation . Hgb stable. EGD shows small varices with gastropathy, no active bleeds. Worsening MARTITA with minimal UOP, HRS protocol started with levo, midodrine, octreotide and albumin trailed, has since been stopped due to low suspicion. Nephrology following recommend HD in setting of Acute Renal Failure (s/p 1 session SLED, s/p 1 session HD). NSGY initially planned surgical intervention for 1/23 but determine patient not medically optimized and currently suggesting multimodal pain control. A-fibb RVR 1/23 overnight, started on Amiodarone gtt, transitioned to home lopressor. Lasix trailed with good U/O. Added Torsemide for diuresis. Amio gtt restarted due to a-fibb with RVR, transitioned to PO. Continues to have rate control on amiodarone and metoprolol succinate. Stable for discharge to [facility] once approved by his insurance.

## 2023-01-17 NOTE — PROCEDURES
"Jonny Isabel is a 62 y.o. male patient.    Temp: 97.4 °F (36.3 °C) (23)  Pulse: 78 (23)  Resp: 13 (23)  BP: (!) 97/55 (23)  SpO2: 100 % (23)  Weight: 111.1 kg (244 lb 14.9 oz) (23)       Paracentesis    Date/Time: 2023 11:52 PM  Location procedure was performed: [facility] CR[facility]  Performed by: Morro Stout MD  Authorized by: Morro Stout MD   Assisting provider: Lobo Voss MD  Consent Done: Yes  Consent: Written consent obtained.  Patient identity confirmed: , MRN, name and verbally with patient  Time out: Immediately prior to procedure a "time out" was called to verify the correct patient, procedure, equipment, support staff and site/side marked as required.  Initial or subsequent exam: initial  Procedure purpose: diagnostic  Indications: new onset ascites  Anesthesia: local infiltration    Anesthesia:  Local Anesthetic: lidocaine 1% with epinephrine  Needle gauge: 18  Puncture site: left lower quadrant  Fluid appearance: clear  Complications: No  Estimated blood loss (mL): 1  Specimens: Yes  Implants: No  Comments: Total 50 cc ascitic fluid        2023    "

## 2023-01-17 NOTE — PLAN OF CARE
01/17/23 1237   Discharge Assessment   Assessment Type Discharge Planning Assessment   Confirmed/corrected address, phone number and insurance Yes   Confirmed Demographics Correct on Facesheet   Source of Information patient   When was your last doctors appointment? 10/01/22   Does patient/caregiver understand observation status Yes   People in Home significant other   Do you expect to return to your current living situation? Yes   Do you have help at home or someone to help you manage your care at home? Yes   Who are your caregiver(s) and their phone number(s)? Adelita Griffin [phone]   Prior to hospitilization cognitive status: No Deficits   Current cognitive status: No Deficits   Home Layout Other (see comments)  (1 flight of stairs in home)   Equipment Currently Used at Home none   Readmission within 30 days? No   Do you currently have service(s) that help you manage your care at home? No   Do you take prescription medications? Yes   Do you have prescription coverage? Yes   Coverage Medicaid   Do you have any problems affording any of your prescribed medications? No   Is the patient taking medications as prescribed? yes   Who is going to help you get home at discharge? Adelita Zieglerider (S.O.)   How do you get to doctors appointments? family or friend will provide;car, drives self   Are you on dialysis? No   Do you take coumadin? No   Discharge Plan A Home   Discharge Plan B Home with family   Discharge Plan discussed with: Patient     [facility]  Discharge Assessment    Primary Care Provider: Yuli Pérez Mai, MD     SW spoke to pt to discuss discharge planning.   Patient lives with Adelita Griffin, girlfriend. Post hospital stay Adelita will be his support person and patient has transportation at discharge.  There have been no hospitalizations within the last 30 days per patient. Verified patient's PCP and preferred Pharmacy.  Patient states not on Coumadin and is not receiving dialysis.   All questions answered regarding Case Management Discharge Planning, patient verbalized understanding.

## 2023-01-17 NOTE — CONSULTS
[facility]  General Surgery  Consult Note    Patient Name: Jonny Isabel  MRN: [id number]  Code Status: Full Code  Admission Date: 1/16/2023  Hospital Length of Stay: 0 days  Attending Physician: Obdulio Winchester MD  Primary Care Provider: Yuli Pérez Mai, MD    Patient information was obtained from patient and past medical records.     Inpatient consult to General Surgery  Consult performed by: PRATEEK Bustos MD  Consult ordered by: Obdulio Winchester MD  Reason for consult: SBO        Subjective:     Principal Problem: CT scan suggesting SBO    History of Present Illness: Jonny Isabel is a 62M with history of alcoholic liver disease, alcohol use disorder, Afib (s/p ablation and DCCV x2, on Xarelto), HTN who presents with epidural abscess and L3/L4 disc herniation. NSGY following.    A CT showed possible SBO and based on this radiology report,general surgery was consulted for assistance in management.    Has been having melena for a few weeks, including yesterday. He is passing gas. He has no history of nausea or emesis. He does not have abdominal pain. He is not distended.          Current Facility-Administered Medications on File Prior to Encounter   Medication    [COMPLETED] cefTRIAXone 2 gram/50 mL IVPB 2 g    [COMPLETED] fentaNYL 50 mcg/mL injection 25 mcg    [COMPLETED] lactated ringers bolus 200 mL    [COMPLETED] lactated ringers bolus 3,333 mL    [COMPLETED] methocarbamoL tablet 1,000 mg    [COMPLETED] oxyCODONE-acetaminophen 5-325 mg per tablet 1 tablet    [COMPLETED] pantoprazole injection 80 mg    [COMPLETED] sodium chloride 0.9% bolus 1,000 mL 1,000 mL    [COMPLETED] vancomycin 2 g in dextrose 5 % 500 mL IVPB    [DISCONTINUED] pantoprazole 40 mg in  mL infusion (ready to mix system)    [DISCONTINUED] piperacillin-tazobactam 4.5 g in dextrose 5 % 100 mL IVPB (ready to mix system)    [DISCONTINUED] sodium chloride 0.9% bolus 2,052 mL 2,052 mL     [DISCONTINUED] vancomycin 2 g in dextrose 5 % 500 mL IVPB     Current Outpatient Medications on File Prior to Encounter   Medication Sig    aspirin (ECOTRIN) 81 MG EC tablet Take 81 mg by mouth once daily.    cyclobenzaprine (FLEXERIL) 10 MG tablet Take 10 mg by mouth 2 (two) times daily as needed.    lisinopriL-hydrochlorothiazide (PRINZIDE,ZESTORETIC) 10-12.5 mg per tablet Take 1 tablet by mouth once daily.    metFORMIN (GLUCOPHAGE) 500 MG tablet Take 1,000 mg by mouth 2 (two) times daily.    metoprolol tartrate (LOPRESSOR) 100 MG tablet Take 100 mg by mouth once daily.    nicotine (NICODERM CQ) 14 mg/24 hr 1 patch every morning.    nicotine, polacrilex, (NICORETTE) 2 mg Gum SMARTSI Each By Mouth Every 2 Hours PRN    omega-3 fatty acids 1,000 mg Cap Take 2 g by mouth once daily.    simvastatin (ZOCOR) 40 MG tablet Take 40 mg by mouth every evening.    traZODone (DESYREL) 50 MG tablet Take 50 mg by mouth nightly as needed.    VITAMIN B COMPLEX ORAL Take 1 tablet by mouth once daily.    XARELTO 20 mg Tab Take 20 mg by mouth once daily.    [DISCONTINUED] cefdinir (OMNICEF) 300 MG capsule Take 300 mg by mouth every 12 (twelve) hours.    [DISCONTINUED] doxycycline (VIBRA-TABS) 100 MG tablet Take 100 mg by mouth 2 (two) times daily.    [DISCONTINUED] gabapentin (NEURONTIN) 300 MG capsule Take 300 mg by mouth 2 (two) times daily.    [DISCONTINUED] traMADoL (ULTRAM) 50 mg tablet Take 50 mg by mouth.       Review of patient's allergies indicates:  No Known Allergies    Past Medical History:   Diagnosis Date    A-fib      History reviewed. No pertinent surgical history.  Family History    None       Tobacco Use    Smoking status: Not on file    Smokeless tobacco: Not on file   Substance and Sexual Activity    Alcohol use: Yes     Alcohol/week: 6.0 standard drinks     Types: 6 Cans of beer per week    Drug use: Not on file    Sexual activity: Not on file     Review of Systems   Constitutional:  Negative.  Negative for activity change, chills and fever.   HENT: Negative.     Eyes: Negative.    Respiratory: Negative.  Negative for cough and shortness of breath.    Cardiovascular: Negative.  Negative for chest pain and palpitations.   Gastrointestinal: Negative.  Negative for abdominal distention, abdominal pain, constipation, diarrhea, nausea and vomiting.   Endocrine: Negative.    Genitourinary: Negative.  Negative for dysuria.   Musculoskeletal: Negative.    Skin: Negative.    Allergic/Immunologic: Negative.    Neurological: Negative.    Hematological: Negative.    Psychiatric/Behavioral: Negative.     Objective:     Vital Signs (Most Recent):  Temp: 97.4 °F (36.3 °C) (01/16/23 1901)  Pulse: 80 (01/16/23 2045)  Resp: 10 (01/16/23 2045)  BP: (!) 97/49 (01/16/23 2030)  SpO2: 98 % (01/16/23 2045)   Vital Signs (24h Range):  Temp:  [97.4 °F (36.3 °C)-98.7 °F (37.1 °C)] 97.4 °F (36.3 °C)  Pulse:  [64-83] 80  Resp:  [9-42] 10  SpO2:  [90 %-100 %] 98 %  BP: ()/() 97/49     Weight: 111.1 kg (244 lb 14.9 oz)  Body mass index is 37.24 kg/m².    Physical Exam  Constitutional:       Appearance: Normal appearance.   HENT:      Head: Normocephalic.   Cardiovascular:      Rate and Rhythm: Normal rate.   Pulmonary:      Effort: Pulmonary effort is normal. No respiratory distress.   Abdominal:      General: Abdomen is flat. There is no distension.      Tenderness: There is no abdominal tenderness.      Hernia: No hernia is present.   Skin:     General: Skin is warm and dry.      Capillary Refill: Capillary refill takes less than 2 seconds.   Neurological:      General: No focal deficit present.      Mental Status: He is alert.   Psychiatric:         Mood and Affect: Mood normal.       Significant Labs:  I have reviewed all pertinent lab results within the past 24 hours.  CBC:   Recent Labs   Lab 01/16/23  1838   WBC 12.41   RBC 2.61*   HGB 8.2*   HCT 26.3*      *   MCH 31.4*   MCHC 31.2*     CMP:    Recent Labs   Lab 01/16/23  0855   GLU 88   CALCIUM 6.8*   ALBUMIN 1.7*   PROT 6.0   *   K 4.5   CO2 17*      BUN 26*   CREATININE 4.7*   ALKPHOS 273*   ALT 81*   *   BILITOT 1.5*       Significant Diagnostics:  I have reviewed all pertinent imaging results/findings within the past 24 hours.    CT scan is not impressive for an SBO.      Assessment/Plan:     Abnormal CT scan, gastrointestinal tract  Jonny Isabel is a 62M with history of alcoholic liver disease, alcohol use disorder, Afib (s/p ablation and DCCV x2, on Xarelto), HTN who presents with epidural abscess and L3/L4 disc herniation. NSGY following.    A CT showed possible SBO and based on this radiology report, general surgery was consulted for assistance in management.    Has been having melena for a few weeks, including yesterday per GI not and. He is passing gas. He has no history of nausea or emesis. He does not have abdominal pain. He is not distended.    GI is assisting with melena.    While the CT read suggests SBO, it is not impressive on my review, and the patient has no clinical symptom of small bowel obstruction.    Where there is no problem, there can be no solution.     General surgery will sign off.    Don't hesitate to reach out if he develops symptoms of SBO, and we will be happy to assist.        VTE Risk Mitigation (From admission, onward)    None          Thank you for your consult. I will sign off. Please contact us if you have any additional questions.    BRENDA Bustos MD  General Surgery  [facility]

## 2023-01-17 NOTE — NURSING
Pt remains afebrile. Pt became hypotensive. Levo gtt initiated to maintain MAP>60. Vital signs now stable. 3 L NC sat >95%. MD aware of <5cc UOP. Urine protein/creatinine/sodium sample sent. BG monitored. Bedside diagnostic paracentesis performed late PM. Pt tolerated well. No c/o of abdominal pain or nausea at this time. Lytes/Labs monitored and replaced PRN. Bed locked and in low position. Call light and suction within reach. Turned q2h. POC reviewed with patient at bedside. Questions/concerns addressed.

## 2023-01-17 NOTE — ASSESSMENT & PLAN NOTE
Jonny Isabel is a 62M with history of alcoholic liver disease, alcohol use disorder, Afib (s/p ablation and DCCV x2, on Xarelto), HTN who presents with epidural abscess and L3/L4 disc herniation. NSGY following.    A CT showed possible SBO and based on this radiology report,general surgery was consulted for assistance in management.    Has been having melena for a few weeks, including yesterday per GI note and this morning on my history. He is passing gas. He has no history of nausea or emesis. He does not have abdominal pain. He is not distended.    Where there is no problem, there can be no solution.     General surgery will sign off.    Don't hesitate to reach out if he develops symptoms of SBO, and we will be happy to assist.

## 2023-01-17 NOTE — SUBJECTIVE & OBJECTIVE
Past Medical History:   Diagnosis Date    A-fib        History reviewed. No pertinent surgical history.    Review of patient's allergies indicates:  No Known Allergies  Current Facility-Administered Medications   Medication Frequency    0.9%  NaCl infusion (for blood administration) Q24H PRN    ceFEPIme (MAXIPIME) 2 g in dextrose 5 % in water (D5W) 5 % 50 mL IVPB (MB+) Q24H    dextrose 10% bolus 125 mL 125 mL PRN    dextrose 10% bolus 250 mL 250 mL PRN    folic acid tablet 1 mg Daily    glucagon (human recombinant) injection 1 mg PRN    glucose chewable tablet 16 g PRN    glucose chewable tablet 24 g PRN    insulin aspart U-100 pen 0-5 Units QID (AC + HS) PRN    lactulose 20 gram/30 mL solution Soln 20 g TID    LORazepam injection 2 mg Q4H PRN    metronidazole IVPB 500 mg Q8H    midodrine tablet 10 mg Q8H    multivitamin tablet Daily    NORepinephrine bitartrate-D5W 4 mg/250 mL (16 mcg/mL) PERIPHERAL access infusion Continuous    octreotide (SANDOSTATIN) 500 mcg in sodium chloride 0.9% 100 mL infusion Continuous    pantoprazole injection 40 mg BID    thiamine (B-1) 500 mg in dextrose 5 % 100 mL IVPB Q8H    Followed by    [START ON 1/18/2023] thiamine tablet 100 mg Q8H    vancomycin - pharmacy to dose pharmacy to manage frequency     Family History    None       Tobacco Use    Smoking status: Not on file    Smokeless tobacco: Not on file   Substance and Sexual Activity    Alcohol use: Yes     Alcohol/week: 6.0 standard drinks     Types: 6 Cans of beer per week    Drug use: Not on file    Sexual activity: Not on file     Review of Systems   Gastrointestinal:  Positive for abdominal pain.   Musculoskeletal:  Positive for myalgias.   Neurological:  Positive for weakness.   Objective:     Vital Signs (Most Recent):  Temp: 97.8 °F (36.6 °C) (01/17/23 1100)  Pulse: 76 (01/17/23 1330)  Resp: 17 (01/17/23 1330)  BP: (!) 100/53 (01/17/23 1330)  SpO2: 96 % (01/17/23 1330)   Vital Signs (24h Range):  Temp:  [97.4 °F (36.3  °C)-98.4 °F (36.9 °C)] 97.8 °F (36.6 °C)  Pulse:  [67-84] 76  Resp:  [9-30] 17  SpO2:  [78 %-100 %] 96 %  BP: ()/(33-69) 100/53     Weight: 110.7 kg (244 lb) (01/17/23 1030)  Body mass index is 37.1 kg/m².  Body surface area is 2.3 meters squared.    I/O last 3 completed shifts:  In: 767.3 [I.V.:179.6; IV Piggyback:587.6]  Out: 5 [Urine:5]    Physical Exam  Constitutional:       General: He is not in acute distress.     Appearance: He is obese. He is not ill-appearing or toxic-appearing.   HENT:      Head: Normocephalic and atraumatic.      Nose: Nose normal. No congestion or rhinorrhea.      Mouth/Throat:      Mouth: Mucous membranes are moist.   Eyes:      General: No scleral icterus.        Right eye: No discharge.         Left eye: No discharge.      Pupils: Pupils are equal, round, and reactive to light.   Cardiovascular:      Rate and Rhythm: Normal rate.      Heart sounds: No murmur heard.  Pulmonary:      Effort: Pulmonary effort is normal. No respiratory distress.   Abdominal:      General: There is distension.      Tenderness: There is abdominal tenderness.   Musculoskeletal:      Right lower leg: Edema present.      Left lower leg: Edema present.   Neurological:      Mental Status: He is alert.       Significant Labs:  All labs within the past 24 hours have been reviewed.    Significant Imaging:  Labs: Reviewed

## 2023-01-17 NOTE — PROGRESS NOTES
[facility]  Critical Care Medicine  Progress Note    Patient Name: Jonny Isabel  MRN: [id number]  Admission Date: 1/16/2023  Hospital Length of Stay: 1 days  Code Status: Full Code  Attending Provider: Genaro Ortiz MD  Primary Care Provider: Yuli Pérez Mai, MD   Principal Problem: Spinal stenosis    Subjective:     HPI:  Mr. Fuller is a 62-year-old male with a PMHx of A-fibb (on Eliquis), hypertension, DM 2 (not insulin dependent) presents as transfer from OSH for chronic lower back that has been worsening for the past week. Pt states that the pain is radiating from his right buttock down to his legs. Pt was unable to get out of bed this AM due to the pain. HE endorses having fecal incontinence for the past week with black tarry stools. He denies any fevers, chills, abdominal pain, urinary incontinence, or saddle anesthesia.     At OSH ED CT lumbar spine ordered revealing Prominent, cystic, multiloculated predominantly gas attenuation epidural  structure within the spinal canal at the level of L3-L4 resulting in severe narrowing of the spinal canal with mass effect upon the thecal sac and  vacuum disc phenomena concerning for diskitis or an epidural abscess. MRI shows large disc extrusion at L3-4 causing severe spinal canal stenosis, with moderate spinal canal stenosis at L2-3 and L4-5 and moderate neural foraminal narrowing L4-5 and L5-S1. Patient had no tenderness overlying spinous process but still endorsed sever pain, received percocet and robaxin. Of note, KENNETH showed blood in stool. The patient has a white count of 12.9, hemoglobin of 7.5, creatinine 4.6, Lactate of 3.9. Pt given Rocephin, Zosyn and Vanc.    [place] [facility] [place]       Hospital/ICU Course:  Admitted to MICU for NSGY and GI evaluation . Started on midodrine, octreotide for concerning of HRS. Started on Levo overnight. Hgb stable. EGD on 1/17. Worsening MARTITA with minimal UOP.        Interval History/Significant Events: Increase midodrine to 10 mg TID, octreotide for concerning of HRS. Started on Levo overnight. Hgb stable. EGD on 1/17. Worsening MARTITA with minimal UOP. Nephrology consult     Review of Systems   Constitutional:  Positive for activity change. Negative for chills and fever.   HENT:  Negative for congestion and trouble swallowing.    Eyes:  Negative for visual disturbance.   Respiratory:  Negative for cough, shortness of breath, wheezing and stridor.    Cardiovascular:  Negative for chest pain and leg swelling.   Gastrointestinal:  Positive for abdominal distention and blood in stool. Negative for abdominal pain, diarrhea, nausea and vomiting.        +fecal incontinence   Endocrine: Negative for polyuria.   Genitourinary:  Negative for difficulty urinating and dysuria.   Musculoskeletal:  Positive for back pain. Negative for arthralgias and myalgias.   Skin:  Negative for color change and rash.   Neurological:  Negative for dizziness, weakness, numbness and headaches.   Psychiatric/Behavioral:  Positive for confusion. Negative for agitation.    Objective:     Vital Signs (Most Recent):  Temp: 98.4 °F (36.9 °C) (01/17/23 0700)  Pulse: 80 (01/17/23 0815)  Resp: (!) 22 (01/17/23 0815)  BP: (!) 99/57 (01/17/23 0815)  SpO2: 98 % (01/17/23 0815)   Vital Signs (24h Range):  Temp:  [97.4 °F (36.3 °C)-98.4 °F (36.9 °C)] 98.4 °F (36.9 °C)  Pulse:  [64-84] 80  Resp:  [9-29] 22  SpO2:  [92 %-100 %] 98 %  BP: ()/(42-62) 99/57   Weight: 111.1 kg (244 lb 14.9 oz)  Body mass index is 37.24 kg/m².      Intake/Output Summary (Last 24 hours) at 1/17/2023 0841  Last data filed at 1/17/2023 0500  Gross per 24 hour   Intake 767.26 ml   Output 5 ml   Net 762.26 ml       Physical Exam  Vitals and nursing note reviewed.   Constitutional:       General: He is not in acute distress.     Appearance: He is obese. He is not ill-appearing.   HENT:      Head: Normocephalic and atraumatic.      Right  Ear: External ear normal.      Left Ear: External ear normal.      Nose: Nose normal.      Mouth/Throat:      Mouth: Mucous membranes are moist.      Pharynx: Oropharynx is clear.   Eyes:      General:         Right eye: No discharge.         Left eye: No discharge.      Extraocular Movements: Extraocular movements intact.      Conjunctiva/sclera: Conjunctivae normal.      Pupils: Pupils are equal, round, and reactive to light.   Cardiovascular:      Rate and Rhythm: Normal rate and regular rhythm.      Pulses: Normal pulses.      Heart sounds: Normal heart sounds. No murmur heard.  Pulmonary:      Effort: Pulmonary effort is normal. No respiratory distress.      Breath sounds: No wheezing or rales.   Abdominal:      General: There is distension.      Tenderness: There is no abdominal tenderness. There is no guarding.   Musculoskeletal:         General: No swelling. Normal range of motion.      Cervical back: Normal range of motion.      Right lower leg: No edema.      Left lower leg: No edema.   Skin:     General: Skin is warm and dry.      Coloration: Skin is not jaundiced.   Neurological:      General: No focal deficit present.      Mental Status: He is alert. He is disoriented.      Cranial Nerves: Cranial nerves 2-12 are intact.      Comments: Patient altered  Responding to questions appropriately but confused about timing of events  Poor recall  + fecal incontinence   Psychiatric:         Mood and Affect: Mood normal.         Behavior: Behavior normal.       Vents:     Lines/Drains/Airways       Drain  Duration                  Urethral Catheter 01/16/23 1347 Latex 14 Fr. <1 day              Peripheral Intravenous Line  Duration                  Peripheral IV - Single Lumen 01/16/23 0700 20 G Left Forearm 1 day         Peripheral IV - Single Lumen 01/16/23 0700 20 G Left Hand 1 day         Peripheral IV - Single Lumen 01/16/23 0700 20 G Right Wrist 1 day                  Significant Labs:    CBC/Anemia  Profile:  Recent Labs   Lab 01/16/23  0542 01/16/23  0603 01/16/23  1838 01/17/23  0014 01/17/23  0332   WBC  --    < > 12.41 13.31* 13.92*   HGB  --    < > 8.2* 8.1* 8.2*   HCT  --    < > 26.3* 25.5* 25.6*   PLT  --    < > 212 238 238   MCV  --    < > 101* 101* 101*   RDW  --    < > 19.9* 20.1* 19.9*   OCCULTBLOOD Positive*  --   --   --   --     < > = values in this interval not displayed.        Chemistries:  Recent Labs   Lab 01/16/23  0759 01/16/23  0855 01/17/23  0332   * 129* 128*   K 4.5 4.5 4.7    100 99   CO2 17* 17* 14*   BUN 26* 26* 28*   CREATININE 4.7* 4.7* 5.0*   CALCIUM 6.9* 6.8* 7.0*   ALBUMIN 1.7* 1.7* 1.6*   PROT 6.1 6.0 5.8*   BILITOT 1.5* 1.5* 2.3*   ALKPHOS 274* 273* 240*   ALT 81* 81* 74*   * 181* 169*   MG  --   --  1.4*   PHOS  --   --  5.3*       All pertinent labs within the past 24 hours have been reviewed.    Significant Imaging:  I have reviewed all pertinent imaging results/findings within the past 24 hours.      ABG  No results for input(s): PH, PO2, PCO2, HCO3, BE in the last 168 hours.  Assessment/Plan:     Neuro  * Spinal stenosis  CT Lumbar Spine Without Contrast Result Date: 1/16/2023  1.  Prominent, cystic, multiloculated predominantly gas attenuation structure within the spinal canal at the level of L3-L4 with dimensions as above.  This is favored to be epidural in location and results in severe narrowing of the spinal canal with mass effect upon the thecal sac.  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 Large disc extrusion at L3-4, contributing to severe spinal canal stenosis, as above. Additional lumbar spondylosis, contributing to moderate spinal canal stenosis at L2-3 and L4-5 and moderate neural foraminal narrowing L4-5 and L5-S1    - Neuro checks q1h   - NSGY consulted     Psychiatric  Alcohol abuse  - Vitals q4h while awake  - CIWA monitoring  - Ativan 2mg q4 PRN  if 2 criteria are met: Systolic BP>160, Diastolic BP >110 or Pulse >110  - Start Vitamin supplementation- Thiamine, Folic acid, Vit. B12, and Multivitamin   - Will start valium taper at 5 mg Q8H       Cardiac/Vascular  A-fib  - Maintain K > 4, Mag > 2 and Ca/iCal WNL to decrease arrhythmogenic potential  - On lopressor 100 mg QD, holding as pt likely has GI bleed  - Holding anticoagulation in the setting of GI bleed       Renal/  MARTITA (acute kidney injury)  Creatinine 4.7 on admit, baseline around 0.8. Pre-renal vs ATN. Less likely obstruction as pt has gomez     - Check urine lytes and renal ultrasound  - Check urine protein creatinine ratio.  - Strict I&Os and daily weights   - Avoid nephrotoxic agents such as NSAIDs, gadolinium and IV radiocontrast.  - Renally dose meds to current GFR.  - Maintain MAP > 65.      ID  Severe sepsis  This patient does have evidence of infective focus  My overall impression is septic shock.  Source: Unknown  Antibiotics given-   Antibiotics (From admission, onward)    Start     Stop Route Frequency Ordered    01/16/23 2100  ceFEPIme (MAXIPIME) 2 g in dextrose 5 % in water (D5W) 5 % 50 mL IVPB (MB+)         -- IV Every 24 hours (non-standard times) 01/16/23 1354    01/16/23 2100  metronidazole IVPB 500 mg         -- IV Every 8 hours (non-standard times) 01/16/23 1354    01/16/23 1256  vancomycin - pharmacy to dose  (vancomycin IVPB)        See [facility] for full Linked Orders Report.    -- IV pharmacy to manage frequency 01/16/23 1256        Latest lactate reviewed-  Recent Labs   Lab 01/16/23  1838 01/17/23  0014 01/17/23  0332   LACTATE 2.3* 2.0 1.8     Organ dysfunction indicated by Acute kidney injury    - Was on Vancomycin and Zosyn   - Will switch to Vancomycin, Cefepime, and Flagyl   - BCx pending   - Will de-escalate once appropriate         Endocrine  Diabetes  -Last A1c reviewed-   Lab Results   Component Value Date    HGBA1C 5.3 11/10/2022       Home Antihyperglycemic  Regiment:  - Metformin  1000 mg BID     Inpatient Antihyperglycemic Regiment:  Antihyperglycemics (From admission, onward)    Start     Stop Route Frequency Ordered    01/16/23 1507  insulin aspart U-100 pen 0-5 Units         -- SubQ Before meals & nightly PRN 01/16/23 1407        - LDSSI  - Clear liquid diet for now     Blood Sugars (AccuCheck):    Recent Labs     01/16/23  1545 01/16/23  2043   POCTGLUCOSE 141* 163*           GI  Alcoholic cirrhosis of liver with ascites  S/p diagnostic paracentesis on 1/17 with , 50% seg. Cytology pending     Elevated liver enzymes  Likely 2/2 to underlying alcohol abuse and hepatic steatosis.     - Daily CMP, PT/INR   - Complete workup up with tylenol level, acute hep panel, a1-antitrypsin, anti smith antibody, HIV, anti mitochondrial antibody, anca, anti-liver, aFP, PETH pending   - Liver US with doppler pending     GI bleed  Concerning for variceal bleed given hx of alcohol abuse.     - GI consulted, recommend clear liquid for now   -Transfuse pRBC for Hb < 7 g/dL (Consider a higher Hb target if there is clinical evidence of intravascular volume depletion or comorbidities, such as CAD or if high suspicion of vigorous active ongoing bleeding or an uncorrected coagulopathy exists.).  - s/p vitamin K given Pt/INR > 2   - PPI 80 mg IV bolus once, then IV PPI 40 BID  - Octreotide gtt   -Avoid nonsteroidal agents, antiplatelet agents and anticoagulants if possible in patients without absolute contraindications. (consult managing provider if required for cardiovascular protection).  - EGD today          Critical Care Daily Checklist:    A: Awake: RASS Goal/Actual Goal:    Actual: Braxton Agitation Sedation Scale (RASS): Restless   B: Spontaneous Breathing Trial Performed?     C: SAT & SBT Coordinated?  N/A                      D: Delirium: CAM-ICU Overall CAM-ICU: Negative   E: Early Mobility Performed? Yes   F: Feeding Goal:    Status:     Current Diet Order   Procedures     Diet NPO      AS: Analgesia/Sedation N/A   T: Thromboembolic Prophylaxis CI   H: HOB > 300 Yes   U: Stress Ulcer Prophylaxis (if needed) PPI   G: Glucose Control Yes   B: Bowel Function     I: Indwelling Catheter (Lines & Bose) Necessity Bose, PIV   D: De-escalation of Antimicrobials/Pharmacotherapies No    Plan for the day/ETD EGD     Code Status:  Family/Goals of Care: Full Code         Critical secondary to Patient has a condition that poses threat to life and bodily function: GI Bleed, Epidural abscess       Critical care was time spent personally by me on the following activities: development of treatment plan with patient or surrogate and bedside caregivers, discussions with consultants, evaluation of patient's response to treatment, examination of patient, ordering and performing treatments and interventions, ordering and review of laboratory studies, ordering and review of radiographic studies, pulse oximetry, re-evaluation of patient's condition. This critical care time did not overlap with that of any other provider or involve time for any procedures.     Maribel Mariano,   Critical Care Medicine  [facility]

## 2023-01-17 NOTE — PROGRESS NOTES
[facility]  Neurosurgery  Progress Note    Subjective:     History of Present Illness: 63 yo M with PMH of alcoholic hepatitis (drinks a 6 pack of beer per day and bottle of marei), Afib on Xarelto, central obesity, HTN, GI bleed, unknown CKD vs MARTITA on admission, anemia, chronic hyponatremia who presents due to inability to get out of bed. He reports that he has been having fecal incontinence for 2 months now and difficulty walking for the last 4 days (with single person assistance and rolling walker) and inability to get out of bed today. He denies saddle anesthesia or urinary incontinence or issues voiding his bladder. He has also  been having dark and tarry stools.     CT L spine and MRI L spine at OSH showed large L3/L4 herniated disc with moderate to severe stenosis. [place] [facility] [place]       Post-Op Info:  Procedure(s) (LRB):  EGD (ESOPHAGOGASTRODUODENOSCOPY) (N/A)   Day of Surgery     Interval History: 1/17: Patient with hypotension & SOB overnight requiring pressors & emergency paracentesis @ bedside, H/H stable w/ transfusion. No acute NSGY intervention at this time due to systemic illness.    Medications:  Continuous Infusions:   NORepinephrine bitartrate-D5W 0.02 mcg/kg/min (01/17/23 0647)    octreotide (SANDOSTATIN) infusion 50 mcg/hr (01/17/23 0500)     Scheduled Meds:   ceFEPime (MAXIPIME) IVPB  2 g Intravenous Q24H    diazePAM  5 mg Intravenous Q8H    folic acid  1 mg Oral Daily    metronidazole  500 mg Intravenous Q8H    midodrine  10 mg Oral Q8H    multivitamin  1 tablet Oral Daily    pantoprazole  40 mg Intravenous BID    thiamine (VITAMIN B1) IVPB  500 mg Intravenous Q8H    Followed by    [START ON 1/18/2023] thiamine  100 mg Oral Q8H     PRN Meds:dextrose 10%, dextrose 10%, glucagon (human recombinant), glucose, glucose, insulin aspart U-100, lorazepam, Pharmacy to dose Vancomycin consult **AND** vancomycin -  "pharmacy to dose     Review of Systems  Objective:     Weight: 111.1 kg (244 lb 14.9 oz)  Body mass index is 37.24 kg/m².  Vital Signs (Most Recent):  Temp: 97.7 °F (36.5 °C) (01/17/23 0301)  Pulse: 82 (01/17/23 0715)  Resp: 15 (01/17/23 0715)  BP: (!) 110/56 (01/17/23 0715)  SpO2: 98 % (01/17/23 0715)   Vital Signs (24h Range):  Temp:  [97.4 °F (36.3 °C)-98.4 °F (36.9 °C)] 97.7 °F (36.5 °C)  Pulse:  [64-84] 82  Resp:  [9-42] 15  SpO2:  [92 %-100 %] 98 %  BP: ()/(42-62) 110/56                          Urethral Catheter 01/16/23 1347 Latex 14 Fr. (Active)   Site Assessment Clean;Intact;Dry 01/17/23 0301   Collection Container Urimeter 01/17/23 0301   Securement Method secured to top of thigh w/ adhesive device 01/17/23 0301   Catheter Care Performed yes 01/17/23 0301   Reason for Continuing Urinary Catheterization Critically ill in ICU and requiring hourly monitoring of intake/output 01/17/23 0301   CAUTI Prevention Bundle Securement Device in place with 1" slack;Intact seal between catheter & drainage tubing;Drainage bag/urimeter off the floor;Sheeting clip in use;No dependent loops or kinks;Drainage bag/urimeter not overfilled (<2/3 full);Drainage bag/urimeter below bladder 01/17/23 0301   Output (mL) 0 mL 01/16/23 2001       Physical Exam  Neurosurgery Physical Exam  Constitutional:       Appearance: He is well-developed and well-nourished.      Comments: obese   Eyes:      Extraocular Movements: EOM normal.      Conjunctiva/sclera: Conjunctivae normal.      Pupils: Pupils are equal, round, and reactive to light.   Cardiovascular:      Pulses: Normal pulses.   Abdominal:      Palpations: Abdomen is soft.   Neurological:      Mental Status: He is alert and oriented to person, place, and time.      Comments: AAOx4  PERRL  CN intact  BUE 5/5  BLE 4- hip flexion, 4 to 4+ ke, 5 df/pf/ehl  Reflexes normal  Poor rectal tone  No saddle anesthesia  SILT   Psychiatric:         Mood and Affect: Mood and affect " normal    Significant Labs:  Recent Labs   Lab 01/16/23  0759 01/16/23  0855 01/17/23 0332   GLU 85 88 148*   * 129* 128*   K 4.5 4.5 4.7    100 99   CO2 17* 17* 14*   BUN 26* 26* 28*   CREATININE 4.7* 4.7* 5.0*   CALCIUM 6.9* 6.8* 7.0*   MG  --   --  1.4*     Recent Labs   Lab 01/16/23  1838 01/17/23  0014 01/17/23 0332   WBC 12.41 13.31* 13.92*   HGB 8.2* 8.1* 8.2*   HCT 26.3* 25.5* 25.6*    238 238     Recent Labs   Lab 01/16/23  0449 01/17/23 0332   INR 2.0* 1.5*     Microbiology Results (last 7 days)       Procedure Component Value Units Date/Time    Gram stain [[id number]] Collected: 01/17/23 0003    Order Status: Completed Specimen: Ascites Updated: 01/17/23 0207     Gram Stain Result Rare WBC's      No organisms seen    Aerobic culture [[id number]] Collected: 01/17/23 0003    Order Status: Sent Specimen: Ascites Updated: 01/17/23 0101    Culture, Anaerobic [[id number]] Collected: 01/17/23 0003    Order Status: Sent Specimen: Ascites Updated: 01/17/23 0100    Blood culture [[id number]]     Order Status: Canceled Specimen: Blood     Blood culture [[id number]]     Order Status: Canceled Specimen: Blood           ABGs: No results for input(s): PH, PCO2, PO2, HCO3, POCSATURATED, BE in the last 48 hours.  Cardiac markers: No results for input(s): CKMB, CPKMB, TROPONINT, TROPONINI, MYOGLOBIN in the last 48 hours.  CMP:   Recent Labs   Lab 01/16/23  0759 01/16/23  0855 01/17/23 0332   GLU 85 88 148*   CALCIUM 6.9* 6.8* 7.0*   ALBUMIN 1.7* 1.7* 1.6*   PROT 6.1 6.0 5.8*   * 129* 128*   K 4.5 4.5 4.7   CO2 17* 17* 14*    100 99   BUN 26* 26* 28*   CREATININE 4.7* 4.7* 5.0*   ALKPHOS 274* 273* 240*   ALT 81* 81* 74*   * 181* 169*   BILITOT 1.5* 1.5* 2.3*     CRP:   Recent Labs   Lab 01/16/23  1318   .2*     ESR: No results for input(s): POCESR, ERYTHROCYTES in the last 48 hours.  LFTs:   Recent Labs   Lab 01/16/23  0759 01/16/23  0855 01/17/23  0332   ALT 81* 81* 74*   AST  186* 181* 169*   ALKPHOS 274* 273* 240*   BILITOT 1.5* 1.5* 2.3*   PROT 6.1 6.0 5.8*   ALBUMIN 1.7* 1.7* 1.6*     Procalcitonin: No results for input(s): PROCAL in the last 48 hours.    Significant Diagnostics:  I have reviewed all pertinent imaging results/findings within the past 24 hours.    Assessment/Plan:     Lumbar herniated disc  61 yo M with PMH of alcoholic hepatitis (drinks a 6 pack of beer per day and bottle of marie), Afib on Xarelto, central obesity, HTN, GI bleed, unknown CKD vs MARTITA on admission, anemia, chronic hyponatremia who presents due to inability to get out of bed. He reports that he has been having fecal incontinence for 2 months now and difficulty walking for the last 4 days (with single person assistance and rolling walker) and inability to get out of bed today. He denies saddle anesthesia or urinary incontinence or issues voiding his bladder. He has also  been having dark and tarry stools.     --Admitted to MICU   -q4 neuro checks  --All labs and diagnostics reviewed   -CT L spine and MRI L spine at OSH showed large L3/L4 herniated disc with moderate to severe stenosis. Some air in disc space.   MRI L spine wo 1/16: large L3/L4 herniated disc with moderate to severe stenosis  - no acute neurosurgical intervention, patient with clinical findings of subacute to chronic symptoms  - no HOB restrictions   - hold Xarelto, PT and INR elevated on admission // will need Xarelto held for 3-5 days prior to OR and/or coagulapathy corrected  - GI consulted for GI bleed, f/u recs  - Cr elevated to 4.7 on admission, unclear baseline, workup per MICU  - ok for diet at this time per primary  - will tentatively plan for lumbar decompression/discectomy during this admission  - patient will need medical optimization and clearance for surgery prior to OR, appreciate assistance from primary team  - nsgy will continue to follow    Dispo: ongoing        Tim Renae MD  Neurosurgery  [facility] - Surgical  Intensive Care

## 2023-01-17 NOTE — ASSESSMENT & PLAN NOTE
This patient does have evidence of infective focus  My overall impression is septic shock.  Source: Unknown  Antibiotics given-   Antibiotics (From admission, onward)    Start     Stop Route Frequency Ordered    01/16/23 2100  ceFEPIme (MAXIPIME) 2 g in dextrose 5 % in water (D5W) 5 % 50 mL IVPB (MB+)         -- IV Every 24 hours (non-standard times) 01/16/23 1354    01/16/23 2100  metronidazole IVPB 500 mg         -- IV Every 8 hours (non-standard times) 01/16/23 1354    01/16/23 1256  vancomycin - pharmacy to dose  (vancomycin IVPB)        See [facility] for full Linked Orders Report.    -- IV pharmacy to manage frequency 01/16/23 1256        Latest lactate reviewed-  Recent Labs   Lab 01/16/23  1838 01/17/23  0014 01/17/23  0332   LACTATE 2.3* 2.0 1.8     Organ dysfunction indicated by Acute kidney injury    - Was on Vancomycin and Zosyn   - Will switch to Vancomycin, Cefepime, and Flagyl   - BCx pending   - Will de-escalate once appropriate

## 2023-01-18 LAB
A1AT SERPL-MCNC: 217 MG/DL (ref 100–190)
ALBUMIN SERPL BCP-MCNC: 2 G/DL (ref 3.5–5.2)
ALBUMIN SERPL ELPH-MCNC: 1.97 G/DL (ref 3.35–5.55)
ALP SERPL-CCNC: 225 U/L (ref 55–135)
ALPHA1 GLOB SERPL ELPH-MCNC: 0.46 G/DL (ref 0.17–0.41)
ALPHA2 GLOB SERPL ELPH-MCNC: 0.8 G/DL (ref 0.43–0.99)
ALT SERPL W/O P-5'-P-CCNC: 66 U/L (ref 10–44)
ANION GAP SERPL CALC-SCNC: 14 MMOL/L (ref 8–16)
AST SERPL-CCNC: 136 U/L (ref 10–40)
B-GLOBULIN SERPL ELPH-MCNC: 0.91 G/DL (ref 0.5–1.1)
BASOPHILS # BLD AUTO: 0.03 K/UL (ref 0–0.2)
BASOPHILS # BLD AUTO: 0.04 K/UL (ref 0–0.2)
BASOPHILS # BLD AUTO: 0.04 K/UL (ref 0–0.2)
BASOPHILS # BLD AUTO: 0.05 K/UL (ref 0–0.2)
BASOPHILS NFR BLD: 0.2 % (ref 0–1.9)
BASOPHILS NFR BLD: 0.3 % (ref 0–1.9)
BASOPHILS NFR BLD: 0.3 % (ref 0–1.9)
BASOPHILS NFR BLD: 0.4 % (ref 0–1.9)
BILIRUB SERPL-MCNC: 2.4 MG/DL (ref 0.1–1)
BUN SERPL-MCNC: 29 MG/DL (ref 8–23)
CALCIUM SERPL-MCNC: 6.8 MG/DL (ref 8.7–10.5)
CHLORIDE SERPL-SCNC: 99 MMOL/L (ref 95–110)
CO2 SERPL-SCNC: 15 MMOL/L (ref 23–29)
CREAT SERPL-MCNC: 6 MG/DL (ref 0.5–1.4)
CREAT UR-MCNC: 150 MG/DL (ref 23–375)
DIFFERENTIAL METHOD: ABNORMAL
EOSINOPHIL # BLD AUTO: 0.1 K/UL (ref 0–0.5)
EOSINOPHIL NFR BLD: 0.6 % (ref 0–8)
EOSINOPHIL NFR BLD: 0.7 % (ref 0–8)
EOSINOPHIL NFR BLD: 0.8 % (ref 0–8)
EOSINOPHIL NFR BLD: 1 % (ref 0–8)
ERYTHROCYTE [DISTWIDTH] IN BLOOD BY AUTOMATED COUNT: 21 % (ref 11.5–14.5)
ERYTHROCYTE [DISTWIDTH] IN BLOOD BY AUTOMATED COUNT: 21 % (ref 11.5–14.5)
ERYTHROCYTE [DISTWIDTH] IN BLOOD BY AUTOMATED COUNT: 21.1 % (ref 11.5–14.5)
ERYTHROCYTE [DISTWIDTH] IN BLOOD BY AUTOMATED COUNT: 21.1 % (ref 11.5–14.5)
EST. GFR  (NO RACE VARIABLE): 10 ML/MIN/1.73 M^2
GAMMA GLOB SERPL ELPH-MCNC: 1.56 G/DL (ref 0.67–1.58)
GLUCOSE SERPL-MCNC: 178 MG/DL (ref 70–110)
HCT VFR BLD AUTO: 26.7 % (ref 40–54)
HCT VFR BLD AUTO: 27.2 % (ref 40–54)
HCT VFR BLD AUTO: 27.3 % (ref 40–54)
HCT VFR BLD AUTO: 27.6 % (ref 40–54)
HGB BLD-MCNC: 8.5 G/DL (ref 14–18)
HGB BLD-MCNC: 8.5 G/DL (ref 14–18)
HGB BLD-MCNC: 8.6 G/DL (ref 14–18)
HGB BLD-MCNC: 8.9 G/DL (ref 14–18)
IMM GRANULOCYTES # BLD AUTO: 0.13 K/UL (ref 0–0.04)
IMM GRANULOCYTES # BLD AUTO: 0.15 K/UL (ref 0–0.04)
IMM GRANULOCYTES NFR BLD AUTO: 0.9 % (ref 0–0.5)
IMM GRANULOCYTES NFR BLD AUTO: 1 % (ref 0–0.5)
IMM GRANULOCYTES NFR BLD AUTO: 1.1 % (ref 0–0.5)
IMM GRANULOCYTES NFR BLD AUTO: 1.1 % (ref 0–0.5)
INR PPP: 1.3 (ref 0.8–1.2)
INTERPRETATION SERPL IFE-IMP: NORMAL
KAPPA LC SER QL IA: 30.38 MG/DL (ref 0.33–1.94)
KAPPA LC/LAMBDA SER IA: 1.98 (ref 0.26–1.65)
LAMBDA LC SER QL IA: 15.35 MG/DL (ref 0.57–2.63)
LYMPHOCYTES # BLD AUTO: 1.1 K/UL (ref 1–4.8)
LYMPHOCYTES # BLD AUTO: 1.5 K/UL (ref 1–4.8)
LYMPHOCYTES NFR BLD: 10.5 % (ref 18–48)
LYMPHOCYTES NFR BLD: 7.3 % (ref 18–48)
LYMPHOCYTES NFR BLD: 8.2 % (ref 18–48)
LYMPHOCYTES NFR BLD: 8.2 % (ref 18–48)
MAGNESIUM SERPL-MCNC: 1.7 MG/DL (ref 1.6–2.6)
MCH RBC QN AUTO: 30.9 PG (ref 27–31)
MCH RBC QN AUTO: 31 PG (ref 27–31)
MCH RBC QN AUTO: 31.7 PG (ref 27–31)
MCH RBC QN AUTO: 31.8 PG (ref 27–31)
MCHC RBC AUTO-ENTMCNC: 31.3 G/DL (ref 32–36)
MCHC RBC AUTO-ENTMCNC: 31.5 G/DL (ref 32–36)
MCHC RBC AUTO-ENTMCNC: 31.8 G/DL (ref 32–36)
MCHC RBC AUTO-ENTMCNC: 32.2 G/DL (ref 32–36)
MCV RBC AUTO: 100 FL (ref 82–98)
MCV RBC AUTO: 99 FL (ref 82–98)
MITOCHONDRIA AB TITR SER IF: NORMAL {TITER}
MONOCYTES # BLD AUTO: 1.5 K/UL (ref 0.3–1)
MONOCYTES # BLD AUTO: 1.6 K/UL (ref 0.3–1)
MONOCYTES # BLD AUTO: 1.7 K/UL (ref 0.3–1)
MONOCYTES # BLD AUTO: 1.7 K/UL (ref 0.3–1)
MONOCYTES NFR BLD: 10.9 % (ref 4–15)
MONOCYTES NFR BLD: 11.4 % (ref 4–15)
MONOCYTES NFR BLD: 12.1 % (ref 4–15)
MONOCYTES NFR BLD: 12.4 % (ref 4–15)
NEUTROPHILS # BLD AUTO: 10.5 K/UL (ref 1.8–7.7)
NEUTROPHILS # BLD AUTO: 10.5 K/UL (ref 1.8–7.7)
NEUTROPHILS # BLD AUTO: 10.7 K/UL (ref 1.8–7.7)
NEUTROPHILS # BLD AUTO: 11.5 K/UL (ref 1.8–7.7)
NEUTROPHILS NFR BLD: 74.9 % (ref 38–73)
NEUTROPHILS NFR BLD: 77.7 % (ref 38–73)
NEUTROPHILS NFR BLD: 78.3 % (ref 38–73)
NEUTROPHILS NFR BLD: 79.7 % (ref 38–73)
NRBC BLD-RTO: 1 /100 WBC
NRBC BLD-RTO: 2 /100 WBC
PHOSPHATE SERPL-MCNC: 5.1 MG/DL (ref 2.7–4.5)
PLATELET # BLD AUTO: 182 K/UL (ref 150–450)
PLATELET # BLD AUTO: 188 K/UL (ref 150–450)
PLATELET # BLD AUTO: 199 K/UL (ref 150–450)
PLATELET # BLD AUTO: 235 K/UL (ref 150–450)
PMV BLD AUTO: 10.7 FL (ref 9.2–12.9)
PMV BLD AUTO: 10.9 FL (ref 9.2–12.9)
PMV BLD AUTO: 10.9 FL (ref 9.2–12.9)
PMV BLD AUTO: 11 FL (ref 9.2–12.9)
POCT GLUCOSE: 137 MG/DL (ref 70–110)
POCT GLUCOSE: 142 MG/DL (ref 70–110)
POCT GLUCOSE: 182 MG/DL (ref 70–110)
POCT GLUCOSE: 187 MG/DL (ref 70–110)
POTASSIUM SERPL-SCNC: 4.6 MMOL/L (ref 3.5–5.1)
PROT SERPL-MCNC: 5.7 G/DL (ref 6–8.4)
PROT SERPL-MCNC: 5.8 G/DL (ref 6–8.4)
PROT UR-MCNC: 322 MG/DL (ref 0–15)
PROT/CREAT UR: 2.15 MG/G{CREAT} (ref 0–0.2)
PROTHROMBIN TIME: 13.3 SEC (ref 9–12.5)
RBC # BLD AUTO: 2.68 M/UL (ref 4.6–6.2)
RBC # BLD AUTO: 2.75 M/UL (ref 4.6–6.2)
RBC # BLD AUTO: 2.77 M/UL (ref 4.6–6.2)
RBC # BLD AUTO: 2.8 M/UL (ref 4.6–6.2)
SODIUM SERPL-SCNC: 128 MMOL/L (ref 136–145)
TROPONIN I SERPL DL<=0.01 NG/ML-MCNC: 0.01 NG/ML (ref 0–0.03)
TROPONIN I SERPL DL<=0.01 NG/ML-MCNC: 0.01 NG/ML (ref 0–0.03)
VANCOMYCIN SERPL-MCNC: 15.5 UG/ML
WBC # BLD AUTO: 13.39 K/UL (ref 3.9–12.7)
WBC # BLD AUTO: 13.83 K/UL (ref 3.9–12.7)
WBC # BLD AUTO: 13.97 K/UL (ref 3.9–12.7)
WBC # BLD AUTO: 14.46 K/UL (ref 3.9–12.7)

## 2023-01-18 PROCEDURE — 97165 OT EVAL LOW COMPLEX 30 MIN: CPT

## 2023-01-18 PROCEDURE — 99232 SBSQ HOSP IP/OBS MODERATE 35: CPT | Mod: ,,, | Performed by: NEUROLOGICAL SURGERY

## 2023-01-18 PROCEDURE — 25000003 PHARM REV CODE 250

## 2023-01-18 PROCEDURE — 25000003 PHARM REV CODE 250: Performed by: INTERNAL MEDICINE

## 2023-01-18 PROCEDURE — 84484 ASSAY OF TROPONIN QUANT: CPT | Performed by: STUDENT IN AN ORGANIZED HEALTH CARE EDUCATION/TRAINING PROGRAM

## 2023-01-18 PROCEDURE — 99291 CRITICAL CARE FIRST HOUR: CPT | Mod: ,,, | Performed by: INTERNAL MEDICINE

## 2023-01-18 PROCEDURE — 99291 PR CRITICAL CARE, E/M 30-74 MINUTES: ICD-10-PCS | Mod: ,,, | Performed by: INTERNAL MEDICINE

## 2023-01-18 PROCEDURE — 97162 PT EVAL MOD COMPLEX 30 MIN: CPT

## 2023-01-18 PROCEDURE — 99900035 HC TECH TIME PER 15 MIN (STAT)

## 2023-01-18 PROCEDURE — 85025 COMPLETE CBC W/AUTO DIFF WBC: CPT | Mod: 91 | Performed by: STUDENT IN AN ORGANIZED HEALTH CARE EDUCATION/TRAINING PROGRAM

## 2023-01-18 PROCEDURE — 93010 EKG 12-LEAD: ICD-10-PCS | Mod: ,,, | Performed by: INTERNAL MEDICINE

## 2023-01-18 PROCEDURE — 85025 COMPLETE CBC W/AUTO DIFF WBC: CPT | Mod: 91

## 2023-01-18 PROCEDURE — 84100 ASSAY OF PHOSPHORUS: CPT | Performed by: STUDENT IN AN ORGANIZED HEALTH CARE EDUCATION/TRAINING PROGRAM

## 2023-01-18 PROCEDURE — 63600175 PHARM REV CODE 636 W HCPCS: Performed by: INTERNAL MEDICINE

## 2023-01-18 PROCEDURE — 93010 ELECTROCARDIOGRAM REPORT: CPT | Mod: ,,, | Performed by: INTERNAL MEDICINE

## 2023-01-18 PROCEDURE — 51798 US URINE CAPACITY MEASURE: CPT

## 2023-01-18 PROCEDURE — 20000000 HC ICU ROOM

## 2023-01-18 PROCEDURE — 85610 PROTHROMBIN TIME: CPT | Performed by: STUDENT IN AN ORGANIZED HEALTH CARE EDUCATION/TRAINING PROGRAM

## 2023-01-18 PROCEDURE — 27000221 HC OXYGEN, UP TO 24 HOURS

## 2023-01-18 PROCEDURE — 25000003 PHARM REV CODE 250: Performed by: STUDENT IN AN ORGANIZED HEALTH CARE EDUCATION/TRAINING PROGRAM

## 2023-01-18 PROCEDURE — 97112 NEUROMUSCULAR REEDUCATION: CPT

## 2023-01-18 PROCEDURE — 63600175 PHARM REV CODE 636 W HCPCS: Mod: JG | Performed by: INTERNAL MEDICINE

## 2023-01-18 PROCEDURE — 97530 THERAPEUTIC ACTIVITIES: CPT

## 2023-01-18 PROCEDURE — 80053 COMPREHEN METABOLIC PANEL: CPT | Performed by: STUDENT IN AN ORGANIZED HEALTH CARE EDUCATION/TRAINING PROGRAM

## 2023-01-18 PROCEDURE — 99233 PR SUBSEQUENT HOSPITAL CARE,LEVL III: ICD-10-PCS | Mod: ,,, | Performed by: INTERNAL MEDICINE

## 2023-01-18 PROCEDURE — 99233 SBSQ HOSP IP/OBS HIGH 50: CPT | Mod: ,,, | Performed by: INTERNAL MEDICINE

## 2023-01-18 PROCEDURE — 83735 ASSAY OF MAGNESIUM: CPT | Performed by: STUDENT IN AN ORGANIZED HEALTH CARE EDUCATION/TRAINING PROGRAM

## 2023-01-18 PROCEDURE — 80202 ASSAY OF VANCOMYCIN: CPT | Performed by: INTERNAL MEDICINE

## 2023-01-18 PROCEDURE — 93005 ELECTROCARDIOGRAM TRACING: CPT

## 2023-01-18 PROCEDURE — P9047 ALBUMIN (HUMAN), 25%, 50ML: HCPCS | Mod: JG | Performed by: INTERNAL MEDICINE

## 2023-01-18 PROCEDURE — 82570 ASSAY OF URINE CREATININE: CPT | Performed by: STUDENT IN AN ORGANIZED HEALTH CARE EDUCATION/TRAINING PROGRAM

## 2023-01-18 PROCEDURE — 94761 N-INVAS EAR/PLS OXIMETRY MLT: CPT

## 2023-01-18 PROCEDURE — S0030 INJECTION, METRONIDAZOLE: HCPCS | Performed by: STUDENT IN AN ORGANIZED HEALTH CARE EDUCATION/TRAINING PROGRAM

## 2023-01-18 PROCEDURE — 63600175 PHARM REV CODE 636 W HCPCS: Mod: JA | Performed by: STUDENT IN AN ORGANIZED HEALTH CARE EDUCATION/TRAINING PROGRAM

## 2023-01-18 PROCEDURE — 99232 PR SUBSEQUENT HOSPITAL CARE,LEVL II: ICD-10-PCS | Mod: ,,, | Performed by: NEUROLOGICAL SURGERY

## 2023-01-18 RX ORDER — LACTULOSE 10 G/15ML
30 SOLUTION ORAL 3 TIMES DAILY
Status: DISCONTINUED | OUTPATIENT
Start: 2023-01-18 | End: 2023-01-19

## 2023-01-18 RX ORDER — LIDOCAINE AND PRILOCAINE 25; 25 MG/G; MG/G
CREAM TOPICAL ONCE
Status: DISCONTINUED | OUTPATIENT
Start: 2023-01-18 | End: 2023-01-18

## 2023-01-18 RX ORDER — LIDOCAINE AND PRILOCAINE 25; 25 MG/G; MG/G
CREAM TOPICAL
Status: DISCONTINUED | OUTPATIENT
Start: 2023-01-18 | End: 2023-02-07 | Stop reason: HOSPADM

## 2023-01-18 RX ORDER — MUPIROCIN 20 MG/G
OINTMENT TOPICAL 2 TIMES DAILY
Status: COMPLETED | OUTPATIENT
Start: 2023-01-18 | End: 2023-01-22

## 2023-01-18 RX ORDER — LORAZEPAM 0.5 MG/1
1 TABLET ORAL ONCE
Status: COMPLETED | OUTPATIENT
Start: 2023-01-18 | End: 2023-01-18

## 2023-01-18 RX ORDER — SCOLOPAMINE TRANSDERMAL SYSTEM 1 MG/1
1 PATCH, EXTENDED RELEASE TRANSDERMAL
Status: DISCONTINUED | OUTPATIENT
Start: 2023-01-18 | End: 2023-01-22

## 2023-01-18 RX ORDER — LACTULOSE 10 G/15ML
30 SOLUTION ORAL 3 TIMES DAILY
Status: CANCELLED | OUTPATIENT
Start: 2023-01-18

## 2023-01-18 RX ORDER — GABAPENTIN 100 MG/1
100 CAPSULE ORAL ONCE
Status: COMPLETED | OUTPATIENT
Start: 2023-01-18 | End: 2023-01-18

## 2023-01-18 RX ADMIN — MUPIROCIN: 20 OINTMENT TOPICAL at 09:01

## 2023-01-18 RX ADMIN — OCTREOTIDE ACETATE 50 MCG/HR: 500 INJECTION, SOLUTION INTRAVENOUS; SUBCUTANEOUS at 06:01

## 2023-01-18 RX ADMIN — MUPIROCIN: 20 OINTMENT TOPICAL at 08:01

## 2023-01-18 RX ADMIN — PANTOPRAZOLE SODIUM 40 MG: 40 TABLET, DELAYED RELEASE ORAL at 06:01

## 2023-01-18 RX ADMIN — VANCOMYCIN HYDROCHLORIDE 500 MG: 500 INJECTION, POWDER, LYOPHILIZED, FOR SOLUTION INTRAVENOUS at 08:01

## 2023-01-18 RX ADMIN — THIAMINE HCL TAB 100 MG 100 MG: 100 TAB at 01:01

## 2023-01-18 RX ADMIN — SCOPALAMINE 1 PATCH: 1 PATCH, EXTENDED RELEASE TRANSDERMAL at 01:01

## 2023-01-18 RX ADMIN — LACTULOSE 30 G: 20 SOLUTION ORAL at 09:01

## 2023-01-18 RX ADMIN — SODIUM BICARBONATE 1300 MG: 650 TABLET ORAL at 09:01

## 2023-01-18 RX ADMIN — LACTULOSE 20 G: 20 SOLUTION ORAL at 08:01

## 2023-01-18 RX ADMIN — MIDODRINE HYDROCHLORIDE 10 MG: 5 TABLET ORAL at 06:01

## 2023-01-18 RX ADMIN — CEFEPIME 2 G: 2 INJECTION, POWDER, FOR SOLUTION INTRAVENOUS at 09:01

## 2023-01-18 RX ADMIN — SODIUM BICARBONATE 1300 MG: 650 TABLET ORAL at 08:01

## 2023-01-18 RX ADMIN — LACTULOSE 30 G: 20 SOLUTION ORAL at 03:01

## 2023-01-18 RX ADMIN — ALBUMIN (HUMAN) 25 G: 12.5 SOLUTION INTRAVENOUS at 08:01

## 2023-01-18 RX ADMIN — PANTOPRAZOLE SODIUM 40 MG: 40 TABLET, DELAYED RELEASE ORAL at 03:01

## 2023-01-18 RX ADMIN — METRONIDAZOLE 500 MG: 500 INJECTION, SOLUTION INTRAVENOUS at 04:01

## 2023-01-18 RX ADMIN — ALBUMIN (HUMAN) 25 G: 12.5 SOLUTION INTRAVENOUS at 10:01

## 2023-01-18 RX ADMIN — LIDOCAINE AND PRILOCAINE: 25; 25 CREAM TOPICAL at 04:01

## 2023-01-18 RX ADMIN — METRONIDAZOLE 500 MG: 500 INJECTION, SOLUTION INTRAVENOUS at 12:01

## 2023-01-18 RX ADMIN — GABAPENTIN 100 MG: 100 CAPSULE ORAL at 03:01

## 2023-01-18 RX ADMIN — MIDODRINE HYDROCHLORIDE 10 MG: 5 TABLET ORAL at 09:01

## 2023-01-18 RX ADMIN — SODIUM BICARBONATE 1300 MG: 650 TABLET ORAL at 03:01

## 2023-01-18 RX ADMIN — THIAMINE HCL TAB 100 MG 100 MG: 100 TAB at 09:01

## 2023-01-18 RX ADMIN — LORAZEPAM 1 MG: 0.5 TABLET ORAL at 12:01

## 2023-01-18 RX ADMIN — THIAMINE HYDROCHLORIDE 500 MG: 100 INJECTION, SOLUTION INTRAMUSCULAR; INTRAVENOUS at 06:01

## 2023-01-18 RX ADMIN — MIDODRINE HYDROCHLORIDE 10 MG: 5 TABLET ORAL at 01:01

## 2023-01-18 RX ADMIN — THERA TABS 1 TABLET: TAB at 08:01

## 2023-01-18 RX ADMIN — OCTREOTIDE ACETATE 50 MCG/HR: 500 INJECTION, SOLUTION INTRAVENOUS; SUBCUTANEOUS at 04:01

## 2023-01-18 RX ADMIN — FOLIC ACID 1 MG: 1 TABLET ORAL at 08:01

## 2023-01-18 RX ADMIN — METRONIDAZOLE 500 MG: 500 INJECTION, SOLUTION INTRAVENOUS at 09:01

## 2023-01-18 NOTE — PLAN OF CARE
Problem: Physical Therapy  Goal: Physical Therapy Goal  Description: Goals to be met by: 2/15/23     Patient will increase functional independence with mobility by performin. Supine to sit with MInimal Assistance  2. Rolling to either side with Minimal Assistance.  3. Sit to stand transfer with Minimal Assistance  4. Bed to chair transfer with Moderate Assistance using AD if needed   5. Gait  x 30 feet with Minimal Assistance using AD if needed.   6. Sitting at edge of bed x10 minutes with Supervision to increase tolerance to activity.    Outcome: Ongoing, Progressing     Evaluation complete, goals appropriate. 2023

## 2023-01-18 NOTE — ASSESSMENT & PLAN NOTE
Concerning for variceal bleed given hx of alcohol abuse.     - GI consulted, recommend clear liquid for now   -Transfuse pRBC for Hb < 7 g/dL (Consider a higher Hb target if there is clinical evidence of intravascular volume depletion or comorbidities, such as CAD or if high suspicion of vigorous active ongoing bleeding or an uncorrected coagulopathy exists.).  - s/p vitamin K given Pt/INR > 2   - PPI 80 mg IV bolus once, then IV PPI 40 BID  - Octreotide gtt   -Avoid nonsteroidal agents, antiplatelet agents and anticoagulants if possible in patients without absolute contraindications. (consult managing provider if required for cardiovascular protection).  - EGD showed  Small (< 5 mm) esophageal varices with portal hypertensive gastropathy

## 2023-01-18 NOTE — PROGRESS NOTES
Pharmacokinetic Assessment Follow Up: IV Vancomycin    Vancomycin serum concentration assessment(s):    The random level was drawn correctly and can be used to guide therapy at this time. The measurement is within the desired definitive target range of 10 to 20 mcg/mL.    Patient in MARTITA, not making any urine, Nephrology is following and does not plan on RRT today  Vancomycin Regimen Plan:    Will give 500 mg IV once now     Re-dose when the random level is less than 20 mcg/mL, next level to be drawn at 0300 on 1/19    Drug levels (last 3 results):  Recent Labs   Lab Result Units 01/17/23  0332 01/18/23  0452   Vancomycin, Random ug/mL 19.9 15.5         Pharmacy will continue to follow and monitor vancomycin.    Please contact pharmacy at extension [phone] for questions regarding this assessment.    Thank you for the consult,   Earline Hernandez       Patient brief summary:  Jonny Isabel is a 62 y.o. male initiated on antimicrobial therapy with IV Vancomycin for treatment of bacteremia    The patient's current regimen is pulse dose based on level and renal function    Drug Allergies:   Review of patient's allergies indicates:  No Known Allergies    Actual Body Weight:   110.7 kg    Renal Function:   Estimated Creatinine Clearance: 15.6 mL/min (A) (based on SCr of 6 mg/dL (H)).,     Dialysis Method (if applicable):  N/A    CBC (last 72 hours):  Recent Labs   Lab Result Units 01/16/23  0449 01/16/23  0603 01/16/23  0759 01/16/23  0855 01/16/23  1318 01/16/23  1838 01/17/23  0014 01/17/23  0332 01/17/23  1218 01/17/23  1803 01/18/23  0009 01/18/23  0627 01/18/23  1125   WBC K/uL 12.27 12.91* 13.01* 12.99* 13.11* 12.41 13.31* 13.92* 14.98* 14.80* 13.97* 13.83* 13.39*   Hemoglobin g/dL 7.5* 7.5* 7.8* 7.7* 8.4* 8.2* 8.1* 8.2* 8.4* 9.4* 8.5* 8.6* 8.5*   Hematocrit % 23.0* 23.3* 24.6* 23.9* 25.8* 26.3* 25.5* 25.6* 26.1* 29.6* 27.2* 27.3* 26.7*   Platelets K/uL 219 237 235 247 233 212 238 238 329 250 235 199 182   Gran % % 77.0*  76.8* 74.7* 77.7* 78.6* 79.3* 79.5* 77.2* 78.5* 77.0* 74.9* 77.7* 78.3*   Lymph % % 10.1* 10.8* 11.3* 9.5* 9.3* 7.7* 9.5* 9.7* 7.5* 9.3* 10.5* 8.2* 8.2*   Mono % % 11.2 10.9 12.2 11.3 10.9 11.3 9.5 10.0 11.7 11.6 12.1 12.4 11.4   Eosinophil % % 0.7 0.6 0.8 0.5 0.5 0.6 0.5 0.9 0.7 0.9 1.0 0.6 0.7   Basophil % % 0.2 0.2 0.2 0.3 0.2 0.2 0.3 0.4 0.4 0.3 0.4 0.2 0.3   Differential Method  Automated Automated Automated Automated Automated Automated Automated Automated Automated Automated Automated Automated Automated         Metabolic Panel (last 72 hours):  Recent Labs   Lab Result Units 01/16/23  0449 01/16/23  0759 01/16/23  0855 01/16/23  1101 01/16/23  2135 01/17/23  0332 01/17/23  1505 01/17/23  1524 01/18/23  0256 01/18/23  1125   Sodium mmol/L 131* 130* 129*  --   --  128*  --   --  128*  --    Sodium, Urine mmol/L  --   --   --   --  25  --   --   --   --   --    Potassium mmol/L 4.3 4.5 4.5  --   --  4.7  --   --  4.6  --    Chloride mmol/L 101 101 100  --   --  99  --   --  99  --    CO2 mmol/L 17* 17* 17*  --   --  14*  --   --  15*  --    Glucose mg/dL 92 85 88  --   --  148*  --   --  178*  --    Glucose, UA   --   --   --  Trace*  --   --   --   --   --   --    BUN mg/dL 26* 26* 26*  --   --  28*  --   --  29*  --    Creatinine mg/dL 4.6* 4.7* 4.7*  --   --  5.0*  --   --  6.0*  --    Creatinine, Urine mg/dL  --   --   --   --   --   --   --  115.0  --  150.0   Albumin g/dL 1.6* 1.7* 1.7*  --   --  1.6* 1.97*  --  2.0*  --    Total Bilirubin mg/dL 1.4* 1.5* 1.5*  --   --  2.3*  --   --  2.4*  --    Alkaline Phosphatase U/L 264* 274* 273*  --   --  240*  --   --  225*  --    AST U/L 177* 186* 181*  --   --  169*  --   --  136*  --    ALT U/L 77* 81* 81*  --   --  74*  --   --  66*  --    Magnesium mg/dL  --   --   --   --   --  1.4*  --   --  1.7  --    Phosphorus mg/dL  --   --   --   --   --  5.3*  --   --  5.1*  --          Vancomycin Administrations:  vancomycin given in the last 96 hours                      vancomycin 2 g in dextrose 5 % 500 mL IVPB (mg) 2,000 mg New Bag 01/16/23 0945                    Microbiologic Results:  Microbiology Results (last 7 days)       Procedure Component Value Units Date/Time    Culture, Anaerobic [[id number]] Collected: 01/17/23 0003    Order Status: Completed Specimen: Ascites Updated: 01/18/23 0721     Anaerobic Culture Culture in progress    Aerobic culture [[id number]] Collected: 01/17/23 0003    Order Status: Completed Specimen: Ascites Updated: 01/18/23 0633     Aerobic Bacterial Culture No growth    Gram stain [[id number]] Collected: 01/17/23 0003    Order Status: Completed Specimen: Ascites Updated: 01/17/23 0207     Gram Stain Result Rare WBC's      No organisms seen    Blood culture [[id number]]     Order Status: Canceled Specimen: Blood     Blood culture [[id number]]     Order Status: Canceled Specimen: Blood

## 2023-01-18 NOTE — PROGRESS NOTES
[facility]  Critical Care Medicine  Progress Note    Patient Name: Jonny Isabel  MRN: [id number]  Admission Date: 1/16/2023  Hospital Length of Stay: 2 days  Code Status: Full Code  Attending Provider: Genaro Ortiz MD  Primary Care Provider: Yuli Pérez Mai, MD   Principal Problem: Spinal stenosis    Subjective:     HPI:  Mr. Fuller is a 62-year-old male with a PMHx of A-fibb (on Eliquis), hypertension, DM 2 (not insulin dependent) presents as transfer from OSH for chronic lower back that has been worsening for the past week. Pt states that the pain is radiating from his right buttock down to his legs. Pt was unable to get out of bed this AM due to the pain. HE endorses having fecal incontinence for the past week with black tarry stools. He denies any fevers, chills, abdominal pain, urinary incontinence, or saddle anesthesia.     At OSH ED CT lumbar spine ordered revealing Prominent, cystic, multiloculated predominantly gas attenuation epidural  structure within the spinal canal at the level of L3-L4 resulting in severe narrowing of the spinal canal with mass effect upon the thecal sac and  vacuum disc phenomena concerning for diskitis or an epidural abscess. MRI shows large disc extrusion at L3-4 causing severe spinal canal stenosis, with moderate spinal canal stenosis at L2-3 and L4-5 and moderate neural foraminal narrowing L4-5 and L5-S1. Patient had no tenderness overlying spinous process but still endorsed sever pain, received percocet and robaxin. Of note, KENNETH showed blood in stool. The patient has a white count of 12.9, hemoglobin of 7.5, creatinine 4.6, Lactate of 3.9. Pt given Rocephin, Zosyn and Vanc.    Pt transferred to [facility] [place] for further intervention with neurosurgery.       Hospital/ICU Course:  Admitted to MICU for NSGY and GI evaluation . Started on midodrine, octreotide for concerning of HRS. Started on Levo overnight. Hgb stable. EGD shows small varices with gastropathy, no  active bleeds. Worsening MARTITA with minimal UOP, HRS protocol started with midodrine, octreotide and albumin.       Interval History/Significant Events: Worsening MARTITA with minimal UOP. Per Nephro, Albumin added, continue midodrine and octreotide for concerning of HRS. Hgb stable. EGD with small <5mm varies and Portal hypertensive gastropathy.       Review of Systems   Constitutional:  Positive for activity change. Negative for chills and fever.   HENT:  Negative for congestion and trouble swallowing.    Eyes:  Negative for visual disturbance.   Respiratory:  Negative for cough, shortness of breath, wheezing and stridor.    Cardiovascular:  Negative for chest pain and leg swelling.   Gastrointestinal:  Positive for abdominal distention and blood in stool. Negative for abdominal pain, diarrhea, nausea and vomiting.        +fecal incontinence   Endocrine: Negative for polyuria.   Genitourinary:  Negative for difficulty urinating and dysuria.   Musculoskeletal:  Positive for back pain. Negative for arthralgias and myalgias.   Skin:  Negative for color change and rash.   Neurological:  Negative for dizziness, weakness, numbness and headaches.   Psychiatric/Behavioral:  Positive for confusion. Negative for agitation.    Objective:     Vital Signs (Most Recent):  Temp: 98 °F (36.7 °C) (01/18/23 0301)  Pulse: 77 (01/18/23 0700)  Resp: 16 (01/18/23 0700)  BP: (!) 105/53 (01/18/23 0700)  SpO2: 95 % (01/18/23 0700)   Vital Signs (24h Range):  Temp:  [97.7 °F (36.5 °C)-98 °F (36.7 °C)] 98 °F (36.7 °C)  Pulse:  [70-96] 77  Resp:  [10-32] 16  SpO2:  [72 %-100 %] 95 %  BP: ()/(33-80) 105/53   Weight: 110.7 kg (244 lb)  Body mass index is 37.1 kg/m².      Intake/Output Summary (Last 24 hours) at 1/18/2023 0808  Last data filed at 1/18/2023 0700  Gross per 24 hour   Intake 3130.64 ml   Output 40 ml   Net 3090.64 ml         Physical Exam  Vitals and nursing note reviewed.   Constitutional:       General: He is not in acute  distress.     Appearance: He is obese. He is not ill-appearing.   HENT:      Head: Normocephalic and atraumatic.      Right Ear: External ear normal.      Left Ear: External ear normal.      Nose: Nose normal.      Mouth/Throat:      Mouth: Mucous membranes are moist.      Pharynx: Oropharynx is clear.   Eyes:      General:         Right eye: No discharge.         Left eye: No discharge.      Extraocular Movements: Extraocular movements intact.      Conjunctiva/sclera: Conjunctivae normal.      Pupils: Pupils are equal, round, and reactive to light.   Cardiovascular:      Rate and Rhythm: Normal rate and regular rhythm.      Pulses: Normal pulses.      Heart sounds: Normal heart sounds. No murmur heard.  Pulmonary:      Effort: Pulmonary effort is normal. No respiratory distress.      Breath sounds: No wheezing or rales.   Abdominal:      General: There is distension.      Tenderness: There is no abdominal tenderness. There is no guarding.   Musculoskeletal:         General: No swelling. Normal range of motion.      Cervical back: Normal range of motion.      Right lower leg: No edema.      Left lower leg: No edema.   Skin:     General: Skin is warm and dry.      Coloration: Skin is not jaundiced.   Neurological:      General: No focal deficit present.      Mental Status: He is alert. He is disoriented.      Cranial Nerves: Cranial nerves 2-12 are intact.      Comments: Patient altered  Responding to questions appropriately but confused about timing of events  Poor recall  + fecal incontinence   Psychiatric:         Mood and Affect: Mood normal.         Behavior: Behavior normal.       Vents:     Lines/Drains/Airways       Drain  Duration                  Urethral Catheter 01/16/23 1347 Latex 14 Fr. 1 day              Peripheral Intravenous Line  Duration                  Peripheral IV - Single Lumen 01/16/23 0700 20 G Left Forearm 2 days         Peripheral IV - Single Lumen 01/16/23 0700 20 G Left Hand 2 days          Peripheral IV - Single Lumen 01/16/23 0700 20 G Right Wrist 2 days                  Significant Labs:    CBC/Anemia Profile:  Recent Labs   Lab 01/17/23  1505 01/17/23  1803 01/18/23  0009 01/18/23  0627   WBC  --  14.80* 13.97* 13.83*   HGB  --  9.4* 8.5* 8.6*   HCT  --  29.6* 27.2* 27.3*   PLT  --  250 235 199   MCV  --  99* 99* 99*   RDW  --  20.6* 21.0* 21.1*   IRON 31*  --   --   --    FERRITIN 135  --   --   --           Chemistries:  Recent Labs   Lab 01/16/23  0855 01/17/23  0332 01/18/23  0256   * 128* 128*   K 4.5 4.7 4.6    99 99   CO2 17* 14* 15*   BUN 26* 28* 29*   CREATININE 4.7* 5.0* 6.0*   CALCIUM 6.8* 7.0* 6.8*   ALBUMIN 1.7* 1.6* 2.0*   PROT 6.0 5.8* 5.8*   BILITOT 1.5* 2.3* 2.4*   ALKPHOS 273* 240* 225*   ALT 81* 74* 66*   * 169* 136*   MG  --  1.4* 1.7   PHOS  --  5.3* 5.1*         All pertinent labs within the past 24 hours have been reviewed.    Significant Imaging:  I have reviewed all pertinent imaging results/findings within the past 24 hours.      ABG  No results for input(s): PH, PO2, PCO2, HCO3, BE in the last 168 hours.  Assessment/Plan:     Neuro  * Spinal stenosis  CT Lumbar Spine Without Contrast Result Date: 1/16/2023  1.  Prominent, cystic, multiloculated predominantly gas attenuation structure within the spinal canal at the level of L3-L4 with dimensions as above.  This is favored to be epidural in location and results in severe narrowing of the spinal canal with mass effect upon the thecal sac.  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 Large disc extrusion at L3-4, contributing to severe spinal canal stenosis, as above. Additional lumbar spondylosis, contributing to moderate spinal canal stenosis at L2-3 and L4-5 and moderate neural foraminal narrowing L4-5 and L5-S1    - Neuro checks q1h   - NSGY consulted, will wait till pt more medically stable before intervening      Psychiatric  Alcohol abuse  - Vitals q4h while awake  - CIWA monitoring  - Ativan 2mg q4 PRN if 2 criteria are met: Systolic BP>160, Diastolic BP >110 or Pulse >110  - Start Vitamin supplementation- Thiamine, Folic acid, Vit. B12, and Multivitamin   - Will start valium taper at 5 mg Q8H       Cardiac/Vascular  A-fib  - Maintain K > 4, Mag > 2 and Ca/iCal WNL to decrease arrhythmogenic potential  - On lopressor 100 mg QD, holding as pt likely has GI bleed  - Holding anticoagulation in the setting of GI bleed       Renal/  MARTITA (acute kidney injury)  Creatinine 4.7 on admit, baseline around 0.8. Pre-renal vs ATN. Less likely obstruction as pt has gomez     - Check urine lytes and renal ultrasound  - Check urine protein creatinine ratio.  - Strict I&Os and daily weights   - Avoid nephrotoxic agents such as NSAIDs, gadolinium and IV radiocontrast.  - Renally dose meds to current GFR.  - Maintain MAP > 65. Using levophed as needed  - Started on HRS protocol with midodrine 10mg TID, Octreotide, and Albumin 25g BID.       ID  Severe sepsis  This patient does have evidence of infective focus  My overall impression is septic shock.  Source: Unknown  Antibiotics given-   Antibiotics (From admission, onward)    Start     Stop Route Frequency Ordered    01/16/23 2100  ceFEPIme (MAXIPIME) 2 g in dextrose 5 % in water (D5W) 5 % 50 mL IVPB (MB+)         -- IV Every 24 hours (non-standard times) 01/16/23 1354    01/16/23 2100  metronidazole IVPB 500 mg         -- IV Every 8 hours (non-standard times) 01/16/23 1354    01/16/23 1256  vancomycin - pharmacy to dose  (vancomycin IVPB)        See Hyperspace for full Linked Orders Report.    -- IV pharmacy to manage frequency 01/16/23 1256        Latest lactate reviewed-  Recent Labs   Lab 01/16/23  1838 01/17/23  0014 01/17/23  0332   LACTATE 2.3* 2.0 1.8     Organ dysfunction indicated by Acute kidney injury    - Was on Vancomycin and Zosyn   - Will switch to Vancomycin, Cefepime,  and Flagyl   - BCx pending   - Will de-escalate once appropriate         Endocrine  Diabetes  -Last A1c reviewed-   Lab Results   Component Value Date    HGBA1C 5.3 11/10/2022       Home Antihyperglycemic Regiment:  - Metformin  1000 mg BID     Inpatient Antihyperglycemic Regiment:  Antihyperglycemics (From admission, onward)    Start     Stop Route Frequency Ordered    01/16/23 1507  insulin aspart U-100 pen 0-5 Units         -- SubQ Before meals & nightly PRN 01/16/23 1407        - LDSSI  - Clear liquid diet for now     Blood Sugars (AccuCheck):    Recent Labs     01/16/23  1545 01/16/23  2043   POCTGLUCOSE 141* 163*           GI  Alcoholic cirrhosis of liver with ascites  S/p diagnostic paracentesis on 1/17 with , 50% seg. Cytology pending     Elevated liver enzymes  Likely 2/2 to underlying alcohol abuse and hepatic steatosis.     - Daily CMP, PT/INR   - Complete workup up with tylenol level, acute hep panel, a1-antitrypsin, anti smith antibody, HIV, anti mitochondrial antibody, anca, anti-liver, aFP, PETH pending   - Liver US with doppler pending     GI bleed  Concerning for variceal bleed given hx of alcohol abuse.     - GI consulted, recommend clear liquid for now   -Transfuse pRBC for Hb < 7 g/dL (Consider a higher Hb target if there is clinical evidence of intravascular volume depletion or comorbidities, such as CAD or if high suspicion of vigorous active ongoing bleeding or an uncorrected coagulopathy exists.).  - s/p vitamin K given Pt/INR > 2   - PPI 80 mg IV bolus once, then IV PPI 40 BID  - Octreotide gtt   -Avoid nonsteroidal agents, antiplatelet agents and anticoagulants if possible in patients without absolute contraindications. (consult managing provider if required for cardiovascular protection).  - EGD showed  Small (< 5 mm) esophageal varices with portal hypertensive gastropathy           Critical Care Daily Checklist:    A: Awake: RASS Goal/Actual Goal:    Actual: Braxton Agitation  Sedation Scale (RASS): Alert and calm   B: Spontaneous Breathing Trial Performed?     C: SAT & SBT Coordinated?  N/A                      D: Delirium: CAM-ICU Overall CAM-ICU: Negative   E: Early Mobility Performed? Yes   F: Feeding Goal:    Status:     Current Diet Order   Procedures    Diet clear liquid      AS: Analgesia/Sedation No   T: Thromboembolic Prophylaxis No   H: HOB > 300 Yes   U: Stress Ulcer Prophylaxis (if needed) Yes   G: Glucose Control Yes   B: Bowel Function     I: Indwelling Catheter (Lines & Bose) Necessity Yes   D: De-escalation of Antimicrobials/Pharmacotherapies Yes    Plan for the day/ETD Continue to monitor    Code Status:  Family/Goals of Care: Full Code         Critical secondary to Patient has a condition that poses threat to life and bodily function: severe spinal stenosis and HRS requiring levophed.     Critical care was time spent personally by me on the following activities: development of treatment plan with patient or surrogate and bedside caregivers, discussions with consultants, evaluation of patient's response to treatment, examination of patient, ordering and performing treatments and interventions, ordering and review of laboratory studies, ordering and review of radiographic studies, pulse oximetry, re-evaluation of patient's condition. This critical care time did not overlap with that of any other provider or involve time for any procedures.     Erika Ocasio,   Critical Care Medicine  [facility]

## 2023-01-18 NOTE — SUBJECTIVE & OBJECTIVE
Interval History: no acute events overnight    Review of patient's allergies indicates:  No Known Allergies  Current Facility-Administered Medications   Medication Frequency    0.9%  NaCl infusion (for blood administration) Q24H PRN    albumin human 25% bottle 25 g BID    ceFEPIme (MAXIPIME) 2 g in dextrose 5 % in water (D5W) 5 % 50 mL IVPB (MB+) Q24H    dextrose 10% bolus 125 mL 125 mL PRN    dextrose 10% bolus 250 mL 250 mL PRN    folic acid tablet 1 mg Daily    glucagon (human recombinant) injection 1 mg PRN    glucose chewable tablet 16 g PRN    glucose chewable tablet 24 g PRN    insulin aspart U-100 pen 0-5 Units QID (AC + HS) PRN    lactulose 20 gram/30 mL solution Soln 20 g TID    LIDOcaine-prilocaine cream PRN    LORazepam injection 2 mg Q4H PRN    metronidazole IVPB 500 mg Q8H    midodrine tablet 10 mg Q8H    multivitamin tablet Daily    mupirocin 2 % ointment BID    NORepinephrine bitartrate-D5W 4 mg/250 mL (16 mcg/mL) PERIPHERAL access infusion Continuous    octreotide (SANDOSTATIN) 500 mcg in sodium chloride 0.9% 100 mL infusion Continuous    pantoprazole EC tablet 40 mg BID AC    scopolamine 1.3-1.5 mg (1 mg over 3 days) 1 patch Q3 Days    sodium bicarbonate tablet 1,300 mg TID    thiamine tablet 100 mg Q8H    vancomycin - pharmacy to dose pharmacy to manage frequency       Objective:     Vital Signs (Most Recent):  Temp: 98 °F (36.7 °C) (01/18/23 0800)  Pulse: 71 (01/18/23 1315)  Resp: 12 (01/18/23 1315)  BP: (!) 101/56 (01/18/23 1315)  SpO2: 97 % (01/18/23 1315)   Vital Signs (24h Range):  Temp:  [97.7 °F (36.5 °C)-98 °F (36.7 °C)] 98 °F (36.7 °C)  Pulse:  [69-94] 71  Resp:  [10-32] 12  SpO2:  [72 %-100 %] 97 %  BP: ()/(38-80) 101/56     Weight: 110.7 kg (244 lb) (01/17/23 1030)  Body mass index is 37.1 kg/m².  Body surface area is 2.3 meters squared.    I/O last 3 completed shifts:  In: 3826.1 [P.O.:480; I.V.:924.6; Blood:350; IV Piggyback:2071.6]  Out: 45 [Urine:45]    Physical  Exam  Constitutional:       General: He is not in acute distress.     Appearance: He is obese. He is not ill-appearing or toxic-appearing.   HENT:      Nose: Nose normal. No congestion or rhinorrhea.      Mouth/Throat:      Mouth: Mucous membranes are moist.   Eyes:      General: No scleral icterus.        Right eye: No discharge.         Left eye: No discharge.      Pupils: Pupils are equal, round, and reactive to light.   Cardiovascular:      Rate and Rhythm: Normal rate.   Pulmonary:      Effort: Pulmonary effort is normal. No respiratory distress.   Abdominal:      General: Abdomen is flat. There is distension.      Tenderness: There is no abdominal tenderness.   Musculoskeletal:      Cervical back: Normal range of motion. No rigidity.      Right lower leg: Edema present.      Left lower leg: Edema present.   Lymphadenopathy:      Cervical: No cervical adenopathy.   Skin:     Coloration: Skin is not jaundiced.      Findings: No lesion.   Neurological:      General: No focal deficit present.      Mental Status: He is alert.       Significant Labs:  All labs within the past 24 hours have been reviewed.     Significant Imaging:  Labs: Reviewed

## 2023-01-18 NOTE — SUBJECTIVE & OBJECTIVE
Interval History: 1/18: NAEON, AFVSS, Exam stable, off of levophed for pressures. Still poor candidate for surgical intervention but improving; will consider for OR Monday pending interval medical optimization & risk stratification     Medications:  Continuous Infusions:   octreotide (SANDOSTATIN) infusion 50 mcg/hr (01/18/23 1600)     Scheduled Meds:   albumin human 25%  25 g Intravenous BID    ceFEPime (MAXIPIME) IVPB  2 g Intravenous Q24H    folic acid  1 mg Oral Daily    lactulose  30 g Oral TID    metronidazole  500 mg Intravenous Q8H    midodrine  10 mg Oral Q8H    multivitamin  1 tablet Oral Daily    mupirocin   Nasal BID    pantoprazole  40 mg Oral BID AC    scopolamine  1 patch Transdermal Q3 Days    sodium bicarbonate  1,300 mg Oral TID    thiamine  100 mg Oral Q8H     PRN Meds:sodium chloride, dextrose 10%, dextrose 10%, glucagon (human recombinant), glucose, glucose, insulin aspart U-100, LIDOcaine-prilocaine, lorazepam, Pharmacy to dose Vancomycin consult **AND** vancomycin - pharmacy to dose     Review of Systems  Objective:     Weight: 110.7 kg (244 lb)  Body mass index is 37.1 kg/m².  Vital Signs (Most Recent):  Temp: 98 °F (36.7 °C) (01/18/23 0800)  Pulse: 69 (01/18/23 1430)  Resp: 10 (01/18/23 1430)  BP: (!) 103/59 (01/18/23 1430)  SpO2: 96 % (01/18/23 1430)   Vital Signs (24h Range):  Temp:  [97.7 °F (36.5 °C)-98 °F (36.7 °C)] 98 °F (36.7 °C)  Pulse:  [69-84] 69  Resp:  [10-32] 10  SpO2:  [80 %-98 %] 96 %  BP: ()/(41-80) 103/59     Date 01/18/23 0700 - 01/19/23 0659   Shift 2889-8706 5643-1358 7824-2573 24 Hour Total   INTAKE   I.V.(mL/kg) 212.2(1.9) 28.3(0.3)  240.5(2.2)   IV Piggyback 297.1   297.1   Shift Total(mL/kg) 509.3(4.6) 28.3(0.3)  537.7(4.9)   OUTPUT   Urine(mL/kg/hr) 25(0) 15  40   Emesis/NG output 100   100   Shift Total(mL/kg) 125(1.1) 15(0.1)  140(1.3)   Weight (kg) 110.7 110.7 110.7 110.7                        Urethral Catheter 01/16/23 1347 Latex 14 Fr. (Active)   Site  "Assessment Clean;Intact;Dry 01/17/23 0301   Collection Container Urimeter 01/17/23 0301   Securement Method secured to top of thigh w/ adhesive device 01/17/23 0301   Catheter Care Performed yes 01/17/23 0301   Reason for Continuing Urinary Catheterization Critically ill in ICU and requiring hourly monitoring of intake/output 01/17/23 0301   CAUTI Prevention Bundle Securement Device in place with 1" slack;Intact seal between catheter & drainage tubing;Drainage bag/urimeter off the floor;Sheeting clip in use;No dependent loops or kinks;Drainage bag/urimeter not overfilled (<2/3 full);Drainage bag/urimeter below bladder 01/17/23 0301   Output (mL) 0 mL 01/16/23 2001       Physical Exam  Neurosurgery Physical Exam  Constitutional:       Appearance: He is well-developed and well-nourished.      Comments: obese   Eyes:      Extraocular Movements: EOM normal.      Conjunctiva/sclera: Conjunctivae normal.      Pupils: Pupils are equal, round, and reactive to light.   Cardiovascular:      Pulses: Normal pulses.   Abdominal:      Palpations: Abdomen is soft.   Neurological:      Mental Status: He is alert and oriented to person, place, and time.      Comments: AAOx4  PERRL  CN intact  BUE 5/5  BLE 4- hip flexion, 4 to 4+ ke, 5 df/pf/ehl  Reflexes normal  Poor rectal tone  No saddle anesthesia  SILT   Psychiatric:         Mood and Affect: Mood and affect normal    Significant Labs:  Recent Labs   Lab 01/17/23  0332 01/18/23  0256   * 178*   * 128*   K 4.7 4.6   CL 99 99   CO2 14* 15*   BUN 28* 29*   CREATININE 5.0* 6.0*   CALCIUM 7.0* 6.8*   MG 1.4* 1.7       Recent Labs   Lab 01/18/23  0009 01/18/23  0627 01/18/23  1125   WBC 13.97* 13.83* 13.39*   HGB 8.5* 8.6* 8.5*   HCT 27.2* 27.3* 26.7*    199 182       Recent Labs   Lab 01/17/23  0332 01/18/23  0256   INR 1.5* 1.3*       Microbiology Results (last 7 days)       Procedure Component Value Units Date/Time    Culture, Anaerobic [[id number]] Collected: " 01/17/23 0003    Order Status: Completed Specimen: Ascites Updated: 01/18/23 0721     Anaerobic Culture Culture in progress    Aerobic culture [[id number]] Collected: 01/17/23 0003    Order Status: Completed Specimen: Ascites Updated: 01/18/23 0633     Aerobic Bacterial Culture No growth    Gram stain [[id number]] Collected: 01/17/23 0003    Order Status: Completed Specimen: Ascites Updated: 01/17/23 0207     Gram Stain Result Rare WBC's      No organisms seen    Blood culture [[id number]]     Order Status: Canceled Specimen: Blood     Blood culture [[id number]]     Order Status: Canceled Specimen: Blood           ABGs: No results for input(s): PH, PCO2, PO2, HCO3, POCSATURATED, BE in the last 48 hours.  Cardiac markers:   Recent Labs   Lab 01/18/23 0256 01/18/23 0627   TROPONINI 0.015 0.014     CMP:   Recent Labs   Lab 01/17/23 0332 01/18/23 0256   * 178*   CALCIUM 7.0* 6.8*   ALBUMIN 1.6* 2.0*   PROT 5.8* 5.8*   * 128*   K 4.7 4.6   CO2 14* 15*   CL 99 99   BUN 28* 29*   CREATININE 5.0* 6.0*   ALKPHOS 240* 225*   ALT 74* 66*   * 136*   BILITOT 2.3* 2.4*       CRP:   No results for input(s): CRP in the last 48 hours.    ESR: No results for input(s): POCESR, ERYTHROCYTES in the last 48 hours.  LFTs:   Recent Labs   Lab 01/17/23 0332 01/18/23 0256   ALT 74* 66*   * 136*   ALKPHOS 240* 225*   BILITOT 2.3* 2.4*   PROT 5.8* 5.8*   ALBUMIN 1.6* 2.0*       Procalcitonin: No results for input(s): PROCAL in the last 48 hours.    Significant Diagnostics:  I have reviewed all pertinent imaging results/findings within the past 24 hours.

## 2023-01-18 NOTE — ASSESSMENT & PLAN NOTE
Creatinine 4.7 on admit, baseline around 0.8. Pre-renal vs ATN. Less likely obstruction as pt has gomez     - Check urine lytes and renal ultrasound  - Check urine protein creatinine ratio.  - Strict I&Os and daily weights   - Avoid nephrotoxic agents such as NSAIDs, gadolinium and IV radiocontrast.  - Renally dose meds to current GFR.  - Maintain MAP > 65. Using levophed as needed  - Started on HRS protocol with midodrine 10mg TID, Octreotide, and Albumin 25g BID.

## 2023-01-18 NOTE — SUBJECTIVE & OBJECTIVE
Interval History/Significant Events: Worsening MARTITA with minimal UOP. Per Nephro, Albumin added, continue midodrine and octreotide for concerning of HRS. Hgb stable. EGD with small <5mm varies and Portal hypertensive gastropathy.       Review of Systems   Constitutional:  Positive for activity change. Negative for chills and fever.   HENT:  Negative for congestion and trouble swallowing.    Eyes:  Negative for visual disturbance.   Respiratory:  Negative for cough, shortness of breath, wheezing and stridor.    Cardiovascular:  Negative for chest pain and leg swelling.   Gastrointestinal:  Positive for abdominal distention and blood in stool. Negative for abdominal pain, diarrhea, nausea and vomiting.        +fecal incontinence   Endocrine: Negative for polyuria.   Genitourinary:  Negative for difficulty urinating and dysuria.   Musculoskeletal:  Positive for back pain. Negative for arthralgias and myalgias.   Skin:  Negative for color change and rash.   Neurological:  Negative for dizziness, weakness, numbness and headaches.   Psychiatric/Behavioral:  Positive for confusion. Negative for agitation.    Objective:     Vital Signs (Most Recent):  Temp: 98 °F (36.7 °C) (01/18/23 0301)  Pulse: 77 (01/18/23 0700)  Resp: 16 (01/18/23 0700)  BP: (!) 105/53 (01/18/23 0700)  SpO2: 95 % (01/18/23 0700)   Vital Signs (24h Range):  Temp:  [97.7 °F (36.5 °C)-98 °F (36.7 °C)] 98 °F (36.7 °C)  Pulse:  [70-96] 77  Resp:  [10-32] 16  SpO2:  [72 %-100 %] 95 %  BP: ()/(33-80) 105/53   Weight: 110.7 kg (244 lb)  Body mass index is 37.1 kg/m².      Intake/Output Summary (Last 24 hours) at 1/18/2023 0808  Last data filed at 1/18/2023 0700  Gross per 24 hour   Intake 3130.64 ml   Output 40 ml   Net 3090.64 ml         Physical Exam  Vitals and nursing note reviewed.   Constitutional:       General: He is not in acute distress.     Appearance: He is obese. He is not ill-appearing.   HENT:      Head: Normocephalic and atraumatic.       Right Ear: External ear normal.      Left Ear: External ear normal.      Nose: Nose normal.      Mouth/Throat:      Mouth: Mucous membranes are moist.      Pharynx: Oropharynx is clear.   Eyes:      General:         Right eye: No discharge.         Left eye: No discharge.      Extraocular Movements: Extraocular movements intact.      Conjunctiva/sclera: Conjunctivae normal.      Pupils: Pupils are equal, round, and reactive to light.   Cardiovascular:      Rate and Rhythm: Normal rate and regular rhythm.      Pulses: Normal pulses.      Heart sounds: Normal heart sounds. No murmur heard.  Pulmonary:      Effort: Pulmonary effort is normal. No respiratory distress.      Breath sounds: No wheezing or rales.   Abdominal:      General: There is distension.      Tenderness: There is no abdominal tenderness. There is no guarding.   Musculoskeletal:         General: No swelling. Normal range of motion.      Cervical back: Normal range of motion.      Right lower leg: No edema.      Left lower leg: No edema.   Skin:     General: Skin is warm and dry.      Coloration: Skin is not jaundiced.   Neurological:      General: No focal deficit present.      Mental Status: He is alert. He is disoriented.      Cranial Nerves: Cranial nerves 2-12 are intact.      Comments: Patient altered  Responding to questions appropriately but confused about timing of events  Poor recall  + fecal incontinence   Psychiatric:         Mood and Affect: Mood normal.         Behavior: Behavior normal.       Vents:     Lines/Drains/Airways       Drain  Duration                  Urethral Catheter 01/16/23 1347 Latex 14 Fr. 1 day              Peripheral Intravenous Line  Duration                  Peripheral IV - Single Lumen 01/16/23 0700 20 G Left Forearm 2 days         Peripheral IV - Single Lumen 01/16/23 0700 20 G Left Hand 2 days         Peripheral IV - Single Lumen 01/16/23 0700 20 G Right Wrist 2 days                  Significant Labs:    CBC/Anemia  Profile:  Recent Labs   Lab 01/17/23  1505 01/17/23  1803 01/18/23  0009 01/18/23  0627   WBC  --  14.80* 13.97* 13.83*   HGB  --  9.4* 8.5* 8.6*   HCT  --  29.6* 27.2* 27.3*   PLT  --  250 235 199   MCV  --  99* 99* 99*   RDW  --  20.6* 21.0* 21.1*   IRON 31*  --   --   --    FERRITIN 135  --   --   --           Chemistries:  Recent Labs   Lab 01/16/23  0855 01/17/23  0332 01/18/23  0256   * 128* 128*   K 4.5 4.7 4.6    99 99   CO2 17* 14* 15*   BUN 26* 28* 29*   CREATININE 4.7* 5.0* 6.0*   CALCIUM 6.8* 7.0* 6.8*   ALBUMIN 1.7* 1.6* 2.0*   PROT 6.0 5.8* 5.8*   BILITOT 1.5* 2.3* 2.4*   ALKPHOS 273* 240* 225*   ALT 81* 74* 66*   * 169* 136*   MG  --  1.4* 1.7   PHOS  --  5.3* 5.1*         All pertinent labs within the past 24 hours have been reviewed.    Significant Imaging:  I have reviewed all pertinent imaging results/findings within the past 24 hours.

## 2023-01-18 NOTE — ASSESSMENT & PLAN NOTE
Unsure of etiology at this time  Size of liver suggest other etiology than cirrhosis, urine sodium 26 additionally argues for ATN. CT and US renal with no signs of obstruction.     Urine microscopy: (1/17) no casts identified, amorphous material (though only 5 cc obtained). Repeat with few granular casts (1/18).   Urine protein 4.59g and repeat with 2.5  C3/4 - 111/35  LILO with ratio of 1.98  Hep B and C negative  Uric acid 13    HIV negative  Iron 31, TIBC 151, Tsat 21, Transferrin 102, ferritin 135  -KEATON  -ANCA  -Cryo     At this time differential is large with possible infiltrative disease on background of alcoholism; EGD with small varices. There may be a component of HRS physiology but unsure of overall time course regarding MARTITA.     Recommendations:  Need outside records from OSH.  Continue with HRS treatment  Would recommend hepatology consult  -Keep MAP > 65  -Keep hemoglobin > 7  -Strict ins and outs  -Avoid nephrotoxic agents if possible  -Avoid drastic hemodynamic changes if possible

## 2023-01-18 NOTE — ASSESSMENT & PLAN NOTE
CT Lumbar Spine Without Contrast Result Date: 1/16/2023  1.  Prominent, cystic, multiloculated predominantly gas attenuation structure within the spinal canal at the level of L3-L4 with dimensions as above.  This is favored to be epidural in location and results in severe narrowing of the spinal canal with mass effect upon the thecal sac.  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 Large disc extrusion at L3-4, contributing to severe spinal canal stenosis, as above. Additional lumbar spondylosis, contributing to moderate spinal canal stenosis at L2-3 and L4-5 and moderate neural foraminal narrowing L4-5 and L5-S1    - Neuro checks q1h   - NSGY consulted, will wait till pt more medically stable before intervening

## 2023-01-18 NOTE — PLAN OF CARE
"      SICU PLAN OF CARE NOTE    Dx: Spinal stenosis    Vital Signs: BP (!) 92/55   Pulse 76   Temp 97.7 °F (36.5 °C) (Oral)   Resp 17   Ht 5' 8" (1.727 m)   Wt 110.7 kg (244 lb)   SpO2 (!) 93%   BMI 37.10 kg/m²     SHIFT EVENTS:  VSS / No acute events this shift.     Neuro: AAO x4, Follows Commands, and Moves All Extremities    Respiratory: Nasal Cannula 2L    Cardiac: NSR; HR 70s    Diet: Clear Liquid    Gtts: Norepinephrine and Octreotide    Urine Output: Urinary Catheter 35 ml/shift     Labs: CBC Q6; daily    Accuchecks: ACHS.    SKIN NOTE:  Skin: No skin tears not skin breakdown noted at this time.    Skin precautions maintained including:  Sacrum and heels with foam dressing in place for pressure protection. Frequent weight shift assistance provided Q2 hr to prevent breakdown. Bed plugged in and mattress inflated; Adhesive use limited. Heels elevated off bed. Pressure points protected and positioning supports utilized.  Skin-to-device areas padded. Skin-to-skin areas padded    POC reviewed with patient and family; questions and concerns addressed. See flow sheets for full assessment details.     "

## 2023-01-18 NOTE — PT/OT/SLP EVAL
Occupational Therapy  Co Evaluation    Name: Jonny Isabel  MRN: [id number]  Admitting Diagnosis: Spinal stenosis  Recent Surgery: Procedure(s) (LRB):  EGD (ESOPHAGOGASTRODUODENOSCOPY) (N/A) 1 Day Post-Op  Pt with hx of ETOH abuse. Admitted from OSH with low back pain with large L3/4 herniated disc. Emergent paracentesis completed.   Potential surgical candidate this admission.     Recommendations:     Discharge Recommendations: rehabilitation facility      Assessment:     Jonny Isabel is a 62 y.o. male with a medical diagnosis of Spinal stenosis.  Performance deficits affecting function: weakness, impaired endurance, impaired self care skills, impaired functional mobility, gait instability, impaired balance, impaired cognition, decreased coordination, decreased safety awareness, pain, impaired coordination, impaired fine motor.  Pt tolerated session fair. MAP remained >64, but nsg did increase pressor support prior to session. Pt is not safe to return home and not performing at prior level of function. Anticipate pt will benefit from IP REHAB prior to return home.     Rehab Prognosis: Good; patient would benefit from acute skilled OT services to address these deficits and reach maximum level of function.       Plan:     Patient to be seen 3 x/week to address the above listed problems via self-care/home management, therapeutic activities, therapeutic exercises  Plan of Care Expires:    Plan of Care Reviewed with: patient    Subjective     Pt reports persistent dizziness.   Pt agreeable to therapy.       Occupational Profile:  Pt lives with girlfriend in first floor apartment. Pt using SPC prior to arrival. Pt reports girlfriend assists with dressing and bathing and he reports independence with toileting and standing g/h skills. Girlfriend works and can provide some assist upon d/c.   Pt with SPC, shower chair.   Pt does not drive or work.     Pain/Comfort:  Pain Rating 1: 10/10  Location 1: back  Pain Addressed 1:  Reposition, Distraction, Nurse notified    Patients cultural, spiritual, Restorationism conflicts given the current situation: no    Objective:     Communicated with: nsg prior to session.  Patient found supine with 2 LPM via NC, gomez, tele, pulse ox, BP cuff and 3 peripheral IV.   Coeval completed this date to optimize functional performance and safety given impaired tolerance for activity in setting of ICU.     General Precautions: Standard, fall  O  Occupational Performance:    Bed Mobility:    Rolling left with MAX A x 2.   Rolling right with MOD A   Supine<>sit with TOTAL A x 3.     Activities of Daily Living:  Pt able to wash face with left dominant hand with MIN A for functional use of UE and MOD A for postural control.  LE dressing: TOTAL A     Cognitive/Visual Perceptual:   Pt alert and oriented to self and place.   Pt following most simple one step commands.     Physical Exam:  Pt is left hand dominant and demo Good  and grossly 3/5 strength. Impaired coordination noted which limits functional use of UE's.     AMPAC 6 Click ADL:  AMPAC Total Score: 8    Treatment & Education:  Pt tolerated sitting EOB approx 10 min with MOD A for postural control. Pt with posterior lean. Pt reports persistent dizziness with BP monitored and remained stable. Impaired sitting balance limiting progression standing trial this date  Education provided re: OT POC and safety with functional mobility/ADL skills.       Patient left supine with all lines intact, call button in reach, and nsg notified    GOALS:   Multidisciplinary Problems       Occupational Therapy Goals          Problem: Occupational Therapy    Goal Priority Disciplines Outcome Interventions   Occupational Therapy Goal     OT, PT/OT Ongoing, Progressing    Description: Goals to be met by: 14 days 2/1/23     Patient will increase functional independence with ADLs by performing:    Pt to completed self feeding independently.   Pt to complete standing g/h skills with  CGA.   Pt to complete UE dressing with set-up  Pt to complete LE dressing with MIN with AE as needed.   Pt to complete toileting with MIN A   Pt to complete t/f to BSC with MIN A                        History:     Past Medical History:   Diagnosis Date    A-fib          Past Surgical History:   Procedure Laterality Date    ESOPHAGOGASTRODUODENOSCOPY N/A 1/17/2023    Procedure: EGD (ESOPHAGOGASTRODUODENOSCOPY);  Surgeon: Dewayne Navas MD;  Location: [address];  Service: Endoscopy;  Laterality: N/A;       Time Tracking:     OT Date of Treatment: 01/18/23  OT Start Time: 0816  OT Stop Time: 0840  OT Total Time (min): 24 min    Billable Minutes:Evaluation 12  Therapeutic Activity 12    1/18/2023

## 2023-01-18 NOTE — PT/OT/SLP EVAL
Physical Therapy Co-Evaluation and Treatment with OT    Patient Name:  Jonny Isabel   MRN:  [id number]    Recommendations:     Discharge Recommendations: rehabilitation facility   Discharge Equipment Recommendations:  (will determine closer to discharge)   Barriers to discharge: Decreased caregiver support    Assessment:     Jonny Isabel is a 62 y.o. male admitted with a medical diagnosis of Spinal stenosis.  He presents with the following impairments/functional limitations: weakness, impaired endurance, impaired functional mobility, gait instability, impaired balance, decreased coordination, decreased safety awareness, pain. Pt tolerated treatment well, but pain and dizziness limited activity. Pt has an increased risk of falls and an increased burden of care. Pt functions at a level significantly lower than baseline and would benefit from skilled therapy 3x/wk to progress towards goals. Upon discharge, pt would benefit from further therapy at IP rehab.      Rehab Prognosis: Good; patient would benefit from acute skilled PT services to address these deficits and reach maximum level of function.    Recent Surgery: Procedure(s) (LRB):  EGD (ESOPHAGOGASTRODUODENOSCOPY) (N/A) 1 Day Post-Op    Plan:     During this hospitalization, patient to be seen 3 x/week to address the identified rehab impairments via gait training, therapeutic activities, neuromuscular re-education and progress toward the following goals:    Plan of Care Expires:  02/23/23    Subjective     Chief Complaint: pain  Patient/Family Comments/goals: decrease pain and return to PLOF  Pain/Comfort:  Pain Rating 1: 10/10 (back)  Pain Addressed 1: Distraction, Reposition    Patients cultural, spiritual, Christian conflicts given the current situation: no    Living Environment:  Pt lives in first-floor apt and no ALYSSA with significant other who works. Pt is retired and drives self.    Prior to admission, patient required assistance from his girlfriend with  bathing and dressing. Pt was IND with toileting and ambulation.  Equipment used at home: shower chair, cane, straight, walker, standard (tub/shower combo).  DME owned (not currently used): none.  Upon discharge, patient will have assistance from girlfriend at times.    Objective:     Communicated with RN prior to session.  Patient found supine with gomez catheter, blood pressure cuff, telemetry, pulse ox (continuous), oxygen, peripheral IV  upon PT entry to room.    General Precautions: Standard, fall  Orthopedic Precautions:    Braces:    Respiratory Status: Nasal cannula, flow 2 L/min    Exams:  Cognitive Exam:  Patient is oriented to Person, Place, Time, and Situation  RLE ROM: WNL  RLE Strength: WNL  LLE ROM: WNL  LLE Strength: WNL    Functional Mobility:  Bed Mobility:   PT provided verbal cues for sequencing and U/LE placement for logrolling.    Rolling Left:  maximal assistance and of 2 persons  Rolling Right: moderate assistance  Supine to Sit: total assistance and of 3 persons  Sit to Supine: total assistance    Balance: Pt sat EOB ~10 min with ModA. PT provided verbal and tactile cues to lean forward and maintain neutral position. PT also provided verbal cues for hand placement and scooting to improve balance. Pt reported some dizziness, but VSS and MAP>64. RN administered levo during session.     Due to pt complex medical condition, the skill of 2 licensed therapists is needed to maximize treatment session and progression towards goals   Pt white board updated with current therapists name and level of mobility assistance needed.      AM-PAC 6 CLICK MOBILITY  Total Score:8       Treatment & Education:  Pt verbally instructed in role of PT and POC. Pt expressed verbal understanding of such. Pt also verbally instructed in log rolling technique. Pt expressed verbal understanding of such and demonstrated technique with cues.     Patient left supine with all lines intact, call button in reach, and RN  notified.    GOALS:   Multidisciplinary Problems       Physical Therapy Goals          Problem: Physical Therapy    Goal Priority Disciplines Outcome Goal Variances Interventions   Physical Therapy Goal     PT, PT/OT Ongoing, Progressing     Description: Goals to be met by: 2/15/23     Patient will increase functional independence with mobility by performin. Supine to sit with MInimal Assistance  2. Rolling to Right with Minimal Assistance.  3. Sit to stand transfer with Minimal Assistance  4. Bed to chair transfer with Moderate Assistance using AD if needed   5. Gait  x 30 feet with Minimal Assistance using AD if needed.   6. Sitting at edge of bed x10 minutes with Supervision to increase tolerance to activity                          History:     Past Medical History:   Diagnosis Date    A-fib        Past Surgical History:   Procedure Laterality Date    ESOPHAGOGASTRODUODENOSCOPY N/A 2023    Procedure: EGD (ESOPHAGOGASTRODUODENOSCOPY);  Surgeon: Dewayne Navas MD;  Location: [address]);  Service: Endoscopy;  Laterality: N/A;       Time Tracking:     PT Received On: 23  PT Start Time: 0816     PT Stop Time: 08  PT Total Time (min): 23 min     Billable Minutes: Evaluation 10 min and Neuromuscular Re-education 13 min      2023

## 2023-01-18 NOTE — SUBJECTIVE & OBJECTIVE
Interval History/Significant Events: Worsening MARTITA with minimal UOP. Awaiting nephrology recc's. Levophed started to maintain MAP of >80-85      Review of Systems   Constitutional:  Positive for activity change. Negative for chills and fever.   HENT:  Negative for congestion and trouble swallowing.    Eyes:  Negative for visual disturbance.   Respiratory:  Negative for cough, shortness of breath, wheezing and stridor.    Cardiovascular:  Negative for chest pain and leg swelling.   Gastrointestinal:  Positive for abdominal distention and blood in stool. Negative for abdominal pain, diarrhea, nausea and vomiting.        +fecal incontinence   Endocrine: Negative for polyuria.   Genitourinary:  Negative for difficulty urinating and dysuria.   Musculoskeletal:  Positive for back pain. Negative for arthralgias and myalgias.   Skin:  Negative for color change and rash.   Neurological:  Negative for dizziness, weakness, numbness and headaches.   Psychiatric/Behavioral:  Positive for confusion. Negative for agitation.    Objective:     Vital Signs (Most Recent):  Temp: 98 °F (36.7 °C) (01/18/23 0301)  Pulse: 71 (01/18/23 1315)  Resp: 12 (01/18/23 1315)  BP: (!) 101/56 (01/18/23 1315)  SpO2: 97 % (01/18/23 1315)   Vital Signs (24h Range):  Temp:  [97.7 °F (36.5 °C)-98 °F (36.7 °C)] 98 °F (36.7 °C)  Pulse:  [69-96] 71  Resp:  [10-32] 12  SpO2:  [72 %-100 %] 97 %  BP: ()/(38-80) 101/56   Weight: 110.7 kg (244 lb)  Body mass index is 37.1 kg/m².      Intake/Output Summary (Last 24 hours) at 1/18/2023 1335  Last data filed at 1/18/2023 1300  Gross per 24 hour   Intake 3406.99 ml   Output 160 ml   Net 3246.99 ml         Physical Exam  Vitals and nursing note reviewed.   Constitutional:       General: He is not in acute distress.     Appearance: He is obese. He is not ill-appearing.   HENT:      Head: Normocephalic and atraumatic.      Right Ear: External ear normal.      Left Ear: External ear normal.      Nose: Nose normal.       Mouth/Throat:      Mouth: Mucous membranes are moist.      Pharynx: Oropharynx is clear.   Eyes:      General:         Right eye: No discharge.         Left eye: No discharge.      Extraocular Movements: Extraocular movements intact.      Conjunctiva/sclera: Conjunctivae normal.      Pupils: Pupils are equal, round, and reactive to light.   Cardiovascular:      Rate and Rhythm: Normal rate and regular rhythm.      Pulses: Normal pulses.      Heart sounds: Normal heart sounds. No murmur heard.  Pulmonary:      Effort: Pulmonary effort is normal. No respiratory distress.      Breath sounds: No wheezing or rales.   Abdominal:      General: There is distension.      Tenderness: There is no abdominal tenderness. There is no guarding.   Musculoskeletal:         General: No swelling. Normal range of motion.      Cervical back: Normal range of motion.      Right lower leg: No edema.      Left lower leg: No edema.   Skin:     General: Skin is warm and dry.      Coloration: Skin is not jaundiced.   Neurological:      General: No focal deficit present.      Mental Status: He is alert. He is disoriented.      Cranial Nerves: Cranial nerves 2-12 are intact.      Comments: Patient altered  Responding to questions appropriately but confused about timing of events  Poor recall  + fecal incontinence   Psychiatric:         Mood and Affect: Mood normal.         Behavior: Behavior normal.       Vents:     Lines/Drains/Airways       Drain  Duration                  Urethral Catheter 01/16/23 1347 Latex 14 Fr. 1 day              Peripheral Intravenous Line  Duration                  Peripheral IV - Single Lumen 01/16/23 0700 20 G Left Forearm 2 days         Peripheral IV - Single Lumen 01/16/23 0700 20 G Left Hand 2 days         Peripheral IV - Single Lumen 01/16/23 0700 20 G Right Wrist 2 days                  Significant Labs:    CBC/Anemia Profile:  Recent Labs   Lab 01/17/23  1505 01/17/23  1803 01/18/23  0009 01/18/23  0627  01/18/23  1125   WBC  --    < > 13.97* 13.83* 13.39*   HGB  --    < > 8.5* 8.6* 8.5*   HCT  --    < > 27.2* 27.3* 26.7*   PLT  --    < > 235 199 182   MCV  --    < > 99* 99* 100*   RDW  --    < > 21.0* 21.1* 21.0*   IRON 31*  --   --   --   --    FERRITIN 135  --   --   --   --     < > = values in this interval not displayed.          Chemistries:  Recent Labs   Lab 01/17/23  0332 01/18/23  0256   * 128*   K 4.7 4.6   CL 99 99   CO2 14* 15*   BUN 28* 29*   CREATININE 5.0* 6.0*   CALCIUM 7.0* 6.8*   ALBUMIN 1.6* 2.0*   PROT 5.8* 5.8*   BILITOT 2.3* 2.4*   ALKPHOS 240* 225*   ALT 74* 66*   * 136*   MG 1.4* 1.7   PHOS 5.3* 5.1*         All pertinent labs within the past 24 hours have been reviewed.    Significant Imaging:  I have reviewed all pertinent imaging results/findings within the past 24 hours.

## 2023-01-18 NOTE — PROGRESS NOTES
[facility]  Neurosurgery  Progress Note    Subjective:     History of Present Illness: 61 yo M with PMH of alcoholic hepatitis (drinks a 6 pack of beer per day and bottle of marie), Afib on Xarelto, central obesity, HTN, GI bleed, unknown CKD vs MARTITA on admission, anemia, chronic hyponatremia who presents due to inability to get out of bed. He reports that he has been having fecal incontinence for 2 months now and difficulty walking for the last 4 days (with single person assistance and rolling walker) and inability to get out of bed today. He denies saddle anesthesia or urinary incontinence or issues voiding his bladder. He has also  been having dark and tarry stools.     CT L spine and MRI L spine at OSH showed large L3/L4 herniated disc with moderate to severe stenosis. Patient transported to [facility] [place] for possible neurosurgical intervention.       Post-Op Info:  Procedure(s) (LRB):  EGD (ESOPHAGOGASTRODUODENOSCOPY) (N/A)   1 Day Post-Op     Interval History: 1/18: NAEON, JAMESVSS, Exam stable, off of levophed for pressures. Still poor candidate for surgical intervention but improving; will consider for OR Monday pending interval medical optimization & risk stratification     Medications:  Continuous Infusions:   octreotide (SANDOSTATIN) infusion 50 mcg/hr (01/18/23 1600)     Scheduled Meds:   albumin human 25%  25 g Intravenous BID    ceFEPime (MAXIPIME) IVPB  2 g Intravenous Q24H    folic acid  1 mg Oral Daily    lactulose  30 g Oral TID    metronidazole  500 mg Intravenous Q8H    midodrine  10 mg Oral Q8H    multivitamin  1 tablet Oral Daily    mupirocin   Nasal BID    pantoprazole  40 mg Oral BID AC    scopolamine  1 patch Transdermal Q3 Days    sodium bicarbonate  1,300 mg Oral TID    thiamine  100 mg Oral Q8H     PRN Meds:sodium chloride, dextrose 10%, dextrose 10%, glucagon (human recombinant), glucose, glucose, insulin aspart U-100, LIDOcaine-prilocaine, lorazepam, Pharmacy to  "dose Vancomycin consult **AND** vancomycin - pharmacy to dose     Review of Systems  Objective:     Weight: 110.7 kg (244 lb)  Body mass index is 37.1 kg/m².  Vital Signs (Most Recent):  Temp: 98 °F (36.7 °C) (01/18/23 0800)  Pulse: 69 (01/18/23 1430)  Resp: 10 (01/18/23 1430)  BP: (!) 103/59 (01/18/23 1430)  SpO2: 96 % (01/18/23 1430)   Vital Signs (24h Range):  Temp:  [97.7 °F (36.5 °C)-98 °F (36.7 °C)] 98 °F (36.7 °C)  Pulse:  [69-84] 69  Resp:  [10-32] 10  SpO2:  [80 %-98 %] 96 %  BP: ()/(41-80) 103/59     Date 01/18/23 0700 - 01/19/23 0659   Shift 3809-8891 5594-3593 1622-5266 24 Hour Total   INTAKE   I.V.(mL/kg) 212.2(1.9) 28.3(0.3)  240.5(2.2)   IV Piggyback 297.1   297.1   Shift Total(mL/kg) 509.3(4.6) 28.3(0.3)  537.7(4.9)   OUTPUT   Urine(mL/kg/hr) 25(0) 15  40   Emesis/NG output 100   100   Shift Total(mL/kg) 125(1.1) 15(0.1)  140(1.3)   Weight (kg) 110.7 110.7 110.7 110.7                        Urethral Catheter 01/16/23 1347 Latex 14 Fr. (Active)   Site Assessment Clean;Intact;Dry 01/17/23 0301   Collection Container Urimeter 01/17/23 0301   Securement Method secured to top of thigh w/ adhesive device 01/17/23 0301   Catheter Care Performed yes 01/17/23 0301   Reason for Continuing Urinary Catheterization Critically ill in ICU and requiring hourly monitoring of intake/output 01/17/23 0301   CAUTI Prevention Bundle Securement Device in place with 1" slack;Intact seal between catheter & drainage tubing;Drainage bag/urimeter off the floor;Sheeting clip in use;No dependent loops or kinks;Drainage bag/urimeter not overfilled (<2/3 full);Drainage bag/urimeter below bladder 01/17/23 0301   Output (mL) 0 mL 01/16/23 2001       Physical Exam  Neurosurgery Physical Exam  Constitutional:       Appearance: He is well-developed and well-nourished.      Comments: obese   Eyes:      Extraocular Movements: EOM normal.      Conjunctiva/sclera: Conjunctivae normal.      Pupils: Pupils are equal, round, and reactive " to light.   Cardiovascular:      Pulses: Normal pulses.   Abdominal:      Palpations: Abdomen is soft.   Neurological:      Mental Status: He is alert and oriented to person, place, and time.      Comments: AAOx4  PERRL  CN intact  BUE 5/5  BLE 4- hip flexion, 4 to 4+ ke, 5 df/pf/ehl  Reflexes normal  Poor rectal tone  No saddle anesthesia  SILT   Psychiatric:         Mood and Affect: Mood and affect normal    Significant Labs:  Recent Labs   Lab 01/17/23 0332 01/18/23  0256   * 178*   * 128*   K 4.7 4.6   CL 99 99   CO2 14* 15*   BUN 28* 29*   CREATININE 5.0* 6.0*   CALCIUM 7.0* 6.8*   MG 1.4* 1.7       Recent Labs   Lab 01/18/23  0009 01/18/23  0627 01/18/23  1125   WBC 13.97* 13.83* 13.39*   HGB 8.5* 8.6* 8.5*   HCT 27.2* 27.3* 26.7*    199 182       Recent Labs   Lab 01/17/23  0332 01/18/23  0256   INR 1.5* 1.3*       Microbiology Results (last 7 days)       Procedure Component Value Units Date/Time    Culture, Anaerobic [[id number]] Collected: 01/17/23 0003    Order Status: Completed Specimen: Ascites Updated: 01/18/23 0721     Anaerobic Culture Culture in progress    Aerobic culture [[id number]] Collected: 01/17/23 0003    Order Status: Completed Specimen: Ascites Updated: 01/18/23 0633     Aerobic Bacterial Culture No growth    Gram stain [[id number]] Collected: 01/17/23 0003    Order Status: Completed Specimen: Ascites Updated: 01/17/23 0207     Gram Stain Result Rare WBC's      No organisms seen    Blood culture [[id number]]     Order Status: Canceled Specimen: Blood     Blood culture [[id number]]     Order Status: Canceled Specimen: Blood           ABGs: No results for input(s): PH, PCO2, PO2, HCO3, POCSATURATED, BE in the last 48 hours.  Cardiac markers:   Recent Labs   Lab 01/18/23  0256 01/18/23 0627   TROPONINI 0.015 0.014     CMP:   Recent Labs   Lab 01/17/23 0332 01/18/23  0256   * 178*   CALCIUM 7.0* 6.8*   ALBUMIN 1.6* 2.0*   PROT 5.8* 5.8*   * 128*   K 4.7 4.6   CO2  14* 15*   CL 99 99   BUN 28* 29*   CREATININE 5.0* 6.0*   ALKPHOS 240* 225*   ALT 74* 66*   * 136*   BILITOT 2.3* 2.4*       CRP:   No results for input(s): CRP in the last 48 hours.    ESR: No results for input(s): POCESR, ERYTHROCYTES in the last 48 hours.  LFTs:   Recent Labs   Lab 01/17/23  0332 01/18/23  0256   ALT 74* 66*   * 136*   ALKPHOS 240* 225*   BILITOT 2.3* 2.4*   PROT 5.8* 5.8*   ALBUMIN 1.6* 2.0*       Procalcitonin: No results for input(s): PROCAL in the last 48 hours.    Significant Diagnostics:  I have reviewed all pertinent imaging results/findings within the past 24 hours.    Assessment/Plan:     Lumbar herniated disc  63 yo M with PMH of alcoholic hepatitis (drinks a 6 pack of beer per day and bottle of marie), Afib on Xarelto, central obesity, HTN, GI bleed, unknown CKD vs MARTITA on admission, anemia, chronic hyponatremia who presents due to inability to get out of bed. He reports that he has been having fecal incontinence for 2 months now and difficulty walking for the last 4 days (with single person assistance and rolling walker) and inability to get out of bed today. He denies saddle anesthesia or urinary incontinence or issues voiding his bladder. He has also  been having dark and tarry stools.     --Admitted to MICU   -q4 neuro checks  --All labs and diagnostics reviewed   -CT L spine and MRI L spine at OSH showed large L3/L4 herniated disc with moderate to severe stenosis. Some air in disc space.   MRI L spine wo 1/16: large L3/L4 herniated disc with moderate to severe stenosis  - no acute neurosurgical intervention, patient with clinical findings of subacute to chronic symptoms   -Will tentatively plan for OR Monday for laminectomy/discectomy pending medical stablization/optimization &  Documented risk stratification  - no HOB restrictions   - hold Xarelto, PT and INR elevated on admission // will need Xarelto held for 3-5 days prior to OR and/or coagulapathy corrected  - GI  consulted for GI bleed, f/u recs  - Cr elevated to 4.7 on admission, unclear baseline, workup per MICU  - ok for diet at this time per primary  - MICU/HM for all primary medical issues  - nsgy will continue to follow    Dispo: ongoing        Tim Renae MD  Neurosurgery  [facility]

## 2023-01-18 NOTE — PLAN OF CARE
Plan of care reviewed with pt and pt's significant other present at bedside, and verbalization of understanding obtained (but reinforcement of education required).  Emotional support offered.    Problem: Adult Inpatient Plan of Care  Goal: Plan of Care Review  Outcome: Ongoing, Progressing

## 2023-01-18 NOTE — ASSESSMENT & PLAN NOTE
61 yo M with PMH of alcoholic hepatitis (drinks a 6 pack of beer per day and bottle of marie), Afib on Xarelto, central obesity, HTN, GI bleed, unknown CKD vs MARTITA on admission, anemia, chronic hyponatremia who presents due to inability to get out of bed. He reports that he has been having fecal incontinence for 2 months now and difficulty walking for the last 4 days (with single person assistance and rolling walker) and inability to get out of bed today. He denies saddle anesthesia or urinary incontinence or issues voiding his bladder. He has also  been having dark and tarry stools.     --Admitted to MICU   -q4 neuro checks  --All labs and diagnostics reviewed   -CT L spine and MRI L spine at OSH showed large L3/L4 herniated disc with moderate to severe stenosis. Some air in disc space.   MRI L spine wo 1/16: large L3/L4 herniated disc with moderate to severe stenosis  - no acute neurosurgical intervention, patient with clinical findings of subacute to chronic symptoms   -Will tentatively plan for OR Monday for laminectomy/discectomy pending medical stablization/optimization &  Documented risk stratification  - no HOB restrictions   - hold Xarelto, PT and INR elevated on admission // will need Xarelto held for 3-5 days prior to OR and/or coagulapathy corrected  - GI consulted for GI bleed, f/u recs  - Cr elevated to 4.7 on admission, unclear baseline, workup per MICU  - ok for diet at this time per primary  - MICU/ for all primary medical issues  - nsgy will continue to follow    Dispo: ongoing

## 2023-01-18 NOTE — PLAN OF CARE
Problem: Occupational Therapy  Goal: Occupational Therapy Goal  Description: Goals to be met by: 14 days 2/1/23     Patient will increase functional independence with ADLs by performing:    Pt to completed self feeding independently.   Pt to complete standing g/h skills with CGA.   Pt to complete UE dressing with set-up  Pt to complete LE dressing with MIN with AE as needed.   Pt to complete toileting with MIN A   Pt to complete t/f to BSC with MIN A   Outcome: Ongoing, Progressing

## 2023-01-18 NOTE — PROGRESS NOTES
[facility]  Nephrology  Progress Note    Patient Name: Jonny Isabel  MRN: [id number]  Admission Date: 1/16/2023  Hospital Length of Stay: 2 days  Attending Provider: Genaro Ortiz MD   Primary Care Physician: Yuli Pérez Mai, MD  Principal Problem:Spinal stenosis    Subjective:     HPI: 62 year old male with a history of alcohol abuse, afib/flutter on xarelto, HTN presents with concern for severe spinal canal stenosis/vacuum disc phenomena, concern for a GI bleed and MARTITA. He presents at behest of his girlfriend after being unable to stand, he has a history of sciatica but reports that this is worse. He has had fecal incontinence for the last week.     He has no report of kidney issues in the past and reports no family history of such either. At time of consultation he is pending an EGD for GI bleed. CT abdomen with large liver and concern for SBO per report, no hydronephrosis. UA with 2+ protein, 1+ occult blood, urine sodium 25 and urine osm 302. On admission serum creatinine 5 (baseline 0.8).      Interval History: no acute events overnight    Review of patient's allergies indicates:  No Known Allergies  Current Facility-Administered Medications   Medication Frequency    0.9%  NaCl infusion (for blood administration) Q24H PRN    albumin human 25% bottle 25 g BID    ceFEPIme (MAXIPIME) 2 g in dextrose 5 % in water (D5W) 5 % 50 mL IVPB (MB+) Q24H    dextrose 10% bolus 125 mL 125 mL PRN    dextrose 10% bolus 250 mL 250 mL PRN    folic acid tablet 1 mg Daily    glucagon (human recombinant) injection 1 mg PRN    glucose chewable tablet 16 g PRN    glucose chewable tablet 24 g PRN    insulin aspart U-100 pen 0-5 Units QID (AC + HS) PRN    lactulose 20 gram/30 mL solution Soln 20 g TID    LIDOcaine-prilocaine cream PRN    LORazepam injection 2 mg Q4H PRN    metronidazole IVPB 500 mg Q8H    midodrine tablet 10 mg Q8H    multivitamin tablet Daily    mupirocin 2 % ointment BID    NORepinephrine  bitartrate-D5W 4 mg/250 mL (16 mcg/mL) PERIPHERAL access infusion Continuous    octreotide (SANDOSTATIN) 500 mcg in sodium chloride 0.9% 100 mL infusion Continuous    pantoprazole EC tablet 40 mg BID AC    scopolamine 1.3-1.5 mg (1 mg over 3 days) 1 patch Q3 Days    sodium bicarbonate tablet 1,300 mg TID    thiamine tablet 100 mg Q8H    vancomycin - pharmacy to dose pharmacy to manage frequency       Objective:     Vital Signs (Most Recent):  Temp: 98 °F (36.7 °C) (01/18/23 0800)  Pulse: 71 (01/18/23 1315)  Resp: 12 (01/18/23 1315)  BP: (!) 101/56 (01/18/23 1315)  SpO2: 97 % (01/18/23 1315)   Vital Signs (24h Range):  Temp:  [97.7 °F (36.5 °C)-98 °F (36.7 °C)] 98 °F (36.7 °C)  Pulse:  [69-94] 71  Resp:  [10-32] 12  SpO2:  [72 %-100 %] 97 %  BP: ()/(38-80) 101/56     Weight: 110.7 kg (244 lb) (01/17/23 1030)  Body mass index is 37.1 kg/m².  Body surface area is 2.3 meters squared.    I/O last 3 completed shifts:  In: 3826.1 [P.O.:480; I.V.:924.6; Blood:350; IV Piggyback:2071.6]  Out: 45 [Urine:45]    Physical Exam  Constitutional:       General: He is not in acute distress.     Appearance: He is obese. He is not ill-appearing or toxic-appearing.   HENT:      Nose: Nose normal. No congestion or rhinorrhea.      Mouth/Throat:      Mouth: Mucous membranes are moist.   Eyes:      General: No scleral icterus.        Right eye: No discharge.         Left eye: No discharge.      Pupils: Pupils are equal, round, and reactive to light.   Cardiovascular:      Rate and Rhythm: Normal rate.   Pulmonary:      Effort: Pulmonary effort is normal. No respiratory distress.   Abdominal:      General: Abdomen is flat. There is distension.      Tenderness: There is no abdominal tenderness.   Musculoskeletal:      Cervical back: Normal range of motion. No rigidity.      Right lower leg: Edema present.      Left lower leg: Edema present.   Lymphadenopathy:      Cervical: No cervical adenopathy.   Skin:     Coloration: Skin is not  jaundiced.      Findings: No lesion.   Neurological:      General: No focal deficit present.      Mental Status: He is alert.       Significant Labs:  All labs within the past 24 hours have been reviewed.     Significant Imaging:  Labs: Reviewed    Assessment/Plan:     * Spinal stenosis  Per primary    MARTITA (acute kidney injury)  Unsure of etiology at this time  Size of liver suggest other etiology than cirrhosis, urine sodium 26 additionally argues for ATN. CT and US renal with no signs of obstruction.     Urine microscopy: (1/17) no casts identified, amorphous material (though only 5 cc obtained). Repeat with few granular casts (1/18).   Urine protein 4.59g and repeat with 2.5  C3/4 - 111/35  LILO with ratio of 1.98  Hep B and C negative  Uric acid 13    HIV negative  Iron 31, TIBC 151, Tsat 21, Transferrin 102, ferritin 135  -KEATON  -ANCA  -Cryo     At this time differential is large with possible infiltrative disease on background of alcoholism; EGD with small varices. There may be a component of HRS physiology but unsure of overall time course regarding MARTITA.     Recommendations:  Need outside records from OSH.  Continue with HRS treatment  Would recommend hepatology consult  -Keep MAP > 65  -Keep hemoglobin > 7  -Strict ins and outs  -Avoid nephrotoxic agents if possible  -Avoid drastic hemodynamic changes if possible               Thank you for your consult. I will follow-up with patient. Please contact us if you have any additional questions.    Otoniel Cat MD  Nephrology  [facility]    ATTENDING PHYSICIAN ATTESTATION  I have personally verified the history and examined the patient. I thoroughly reviewed the demographic, clinical, laboratorial and imaging information available in medical records. I agree with the assessment and recommendations provided by the subspecialty resident who was under my supervision.

## 2023-01-19 LAB
ALBUMIN SERPL BCP-MCNC: 2.4 G/DL (ref 3.5–5.2)
ALP SERPL-CCNC: 194 U/L (ref 55–135)
ALT SERPL W/O P-5'-P-CCNC: 56 U/L (ref 10–44)
AMPHETAMINES SERPL QL: NEGATIVE
ANCA AB TITR SER IF: NORMAL TITER
ANION GAP SERPL CALC-SCNC: 11 MMOL/L (ref 8–16)
AST SERPL-CCNC: 99 U/L (ref 10–40)
BARBITURATES SERPL QL SCN: NEGATIVE
BASOPHILS # BLD AUTO: 0.04 K/UL (ref 0–0.2)
BASOPHILS # BLD AUTO: 0.06 K/UL (ref 0–0.2)
BASOPHILS # BLD AUTO: 0.06 K/UL (ref 0–0.2)
BASOPHILS NFR BLD: 0.3 % (ref 0–1.9)
BASOPHILS NFR BLD: 0.3 % (ref 0–1.9)
BASOPHILS NFR BLD: 0.4 % (ref 0–1.9)
BENZODIAZ SERPL QL SCN: NEGATIVE
BILIRUB SERPL-MCNC: 2 MG/DL (ref 0.1–1)
BUN SERPL-MCNC: 32 MG/DL (ref 8–23)
BZE SERPL QL: NEGATIVE
CALCIUM SERPL-MCNC: 7.3 MG/DL (ref 8.7–10.5)
CARBOXYTHC SERPL QL SCN: POSITIVE
CHLORIDE SERPL-SCNC: 101 MMOL/L (ref 95–110)
CLINICAL BIOCHEMIST REVIEW: NORMAL
CO2 SERPL-SCNC: 16 MMOL/L (ref 23–29)
CREAT SERPL-MCNC: 6.5 MG/DL (ref 0.5–1.4)
CREAT UR-MCNC: 197 MG/DL (ref 23–375)
CREAT UR-MCNC: 197 MG/DL (ref 23–375)
DIFFERENTIAL METHOD: ABNORMAL
EOSINOPHIL # BLD AUTO: 0.1 K/UL (ref 0–0.5)
EOSINOPHIL NFR BLD: 0.7 % (ref 0–8)
ERYTHROCYTE [DISTWIDTH] IN BLOOD BY AUTOMATED COUNT: 21.2 % (ref 11.5–14.5)
ERYTHROCYTE [DISTWIDTH] IN BLOOD BY AUTOMATED COUNT: 21.2 % (ref 11.5–14.5)
ERYTHROCYTE [DISTWIDTH] IN BLOOD BY AUTOMATED COUNT: 21.4 % (ref 11.5–14.5)
EST. GFR  (NO RACE VARIABLE): 9.1 ML/MIN/1.73 M^2
ETHANOL SERPL QL SCN: NEGATIVE
GLUCOSE SERPL-MCNC: 137 MG/DL (ref 70–110)
HCT VFR BLD AUTO: 27.5 % (ref 40–54)
HCT VFR BLD AUTO: 27.7 % (ref 40–54)
HCT VFR BLD AUTO: 29.5 % (ref 40–54)
HGB BLD-MCNC: 8.8 G/DL (ref 14–18)
HGB BLD-MCNC: 9 G/DL (ref 14–18)
HGB BLD-MCNC: 9.6 G/DL (ref 14–18)
IGA SERPL-MCNC: 505 MG/DL (ref 40–350)
IMM GRANULOCYTES # BLD AUTO: 0.12 K/UL (ref 0–0.04)
IMM GRANULOCYTES # BLD AUTO: 0.14 K/UL (ref 0–0.04)
IMM GRANULOCYTES # BLD AUTO: 0.19 K/UL (ref 0–0.04)
IMM GRANULOCYTES NFR BLD AUTO: 0.8 % (ref 0–0.5)
IMM GRANULOCYTES NFR BLD AUTO: 0.9 % (ref 0–0.5)
IMM GRANULOCYTES NFR BLD AUTO: 1.1 % (ref 0–0.5)
INR PPP: 1.4 (ref 0.8–1.2)
LKM AB SER-ACNC: 1.5 UNITS
LYMPHOCYTES # BLD AUTO: 0.9 K/UL (ref 1–4.8)
LYMPHOCYTES # BLD AUTO: 0.9 K/UL (ref 1–4.8)
LYMPHOCYTES # BLD AUTO: 1 K/UL (ref 1–4.8)
LYMPHOCYTES NFR BLD: 5.3 % (ref 18–48)
LYMPHOCYTES NFR BLD: 6.2 % (ref 18–48)
LYMPHOCYTES NFR BLD: 6.3 % (ref 18–48)
MAGNESIUM SERPL-MCNC: 1.6 MG/DL (ref 1.6–2.6)
MCH RBC QN AUTO: 31.7 PG (ref 27–31)
MCH RBC QN AUTO: 31.8 PG (ref 27–31)
MCH RBC QN AUTO: 32 PG (ref 27–31)
MCHC RBC AUTO-ENTMCNC: 31.8 G/DL (ref 32–36)
MCHC RBC AUTO-ENTMCNC: 32.5 G/DL (ref 32–36)
MCHC RBC AUTO-ENTMCNC: 32.7 G/DL (ref 32–36)
MCV RBC AUTO: 100 FL (ref 82–98)
MCV RBC AUTO: 97 FL (ref 82–98)
MCV RBC AUTO: 98 FL (ref 82–98)
METHADONE SERPL QL SCN: NEGATIVE
MONOCYTES # BLD AUTO: 1.7 K/UL (ref 0.3–1)
MONOCYTES # BLD AUTO: 1.7 K/UL (ref 0.3–1)
MONOCYTES # BLD AUTO: 2 K/UL (ref 0.3–1)
MONOCYTES NFR BLD: 11.1 % (ref 4–15)
MONOCYTES NFR BLD: 11.4 % (ref 4–15)
MONOCYTES NFR BLD: 11.4 % (ref 4–15)
NEUTROPHILS # BLD AUTO: 12.1 K/UL (ref 1.8–7.7)
NEUTROPHILS # BLD AUTO: 12.2 K/UL (ref 1.8–7.7)
NEUTROPHILS # BLD AUTO: 13.9 K/UL (ref 1.8–7.7)
NEUTROPHILS NFR BLD: 80.6 % (ref 38–73)
NEUTROPHILS NFR BLD: 80.6 % (ref 38–73)
NEUTROPHILS NFR BLD: 81.2 % (ref 38–73)
NRBC BLD-RTO: 2 /100 WBC
OPIATES SERPL QL SCN: NEGATIVE
P-ANCA TITR SER IF: NORMAL TITER
PATHOLOGIST INTERPRETATION IFE: NORMAL
PATHOLOGIST INTERPRETATION SPE: NORMAL
PCP SERPL QL SCN: NEGATIVE
PHOSPHATE SERPL-MCNC: 5.1 MG/DL (ref 2.7–4.5)
PLATELET # BLD AUTO: 170 K/UL (ref 150–450)
PLATELET # BLD AUTO: 182 K/UL (ref 150–450)
PLATELET # BLD AUTO: 185 K/UL (ref 150–450)
PLPETH BLD-MCNC: 591 NG/ML
PMV BLD AUTO: 10.7 FL (ref 9.2–12.9)
PMV BLD AUTO: 10.7 FL (ref 9.2–12.9)
PMV BLD AUTO: 10.8 FL (ref 9.2–12.9)
POCT GLUCOSE: 140 MG/DL (ref 70–110)
POCT GLUCOSE: 140 MG/DL (ref 70–110)
POCT GLUCOSE: 141 MG/DL (ref 70–110)
POPETH BLD-MCNC: 903 NG/ML
POTASSIUM SERPL-SCNC: 4.3 MMOL/L (ref 3.5–5.1)
PROPOXYPH SERPL QL: NEGATIVE
PROT SERPL-MCNC: 6.1 G/DL (ref 6–8.4)
PROT UR-MCNC: 176 MG/DL (ref 0–15)
PROT/CREAT UR: 0.89 MG/G{CREAT} (ref 0–0.2)
PROTHROMBIN TIME: 14.2 SEC (ref 9–12.5)
RBC # BLD AUTO: 2.78 M/UL (ref 4.6–6.2)
RBC # BLD AUTO: 2.83 M/UL (ref 4.6–6.2)
RBC # BLD AUTO: 3 M/UL (ref 4.6–6.2)
SODIUM SERPL-SCNC: 128 MMOL/L (ref 136–145)
SODIUM UR-SCNC: 10 MMOL/L (ref 20–250)
VANCOMYCIN SERPL-MCNC: 20.3 UG/ML
WBC # BLD AUTO: 15.04 K/UL (ref 3.9–12.7)
WBC # BLD AUTO: 15.12 K/UL (ref 3.9–12.7)
WBC # BLD AUTO: 17.15 K/UL (ref 3.9–12.7)

## 2023-01-19 PROCEDURE — 80202 ASSAY OF VANCOMYCIN: CPT | Performed by: INTERNAL MEDICINE

## 2023-01-19 PROCEDURE — 84300 ASSAY OF URINE SODIUM: CPT | Performed by: STUDENT IN AN ORGANIZED HEALTH CARE EDUCATION/TRAINING PROGRAM

## 2023-01-19 PROCEDURE — 63600175 PHARM REV CODE 636 W HCPCS: Mod: JG | Performed by: INTERNAL MEDICINE

## 2023-01-19 PROCEDURE — 85025 COMPLETE CBC W/AUTO DIFF WBC: CPT

## 2023-01-19 PROCEDURE — 25000003 PHARM REV CODE 250

## 2023-01-19 PROCEDURE — 25000003 PHARM REV CODE 250: Performed by: INTERNAL MEDICINE

## 2023-01-19 PROCEDURE — 20000000 HC ICU ROOM

## 2023-01-19 PROCEDURE — 84100 ASSAY OF PHOSPHORUS: CPT | Performed by: STUDENT IN AN ORGANIZED HEALTH CARE EDUCATION/TRAINING PROGRAM

## 2023-01-19 PROCEDURE — 27000221 HC OXYGEN, UP TO 24 HOURS

## 2023-01-19 PROCEDURE — 99900035 HC TECH TIME PER 15 MIN (STAT)

## 2023-01-19 PROCEDURE — 99233 PR SUBSEQUENT HOSPITAL CARE,LEVL III: ICD-10-PCS | Mod: ,,, | Performed by: INTERNAL MEDICINE

## 2023-01-19 PROCEDURE — 99291 CRITICAL CARE FIRST HOUR: CPT | Mod: ,,, | Performed by: INTERNAL MEDICINE

## 2023-01-19 PROCEDURE — 85610 PROTHROMBIN TIME: CPT | Performed by: STUDENT IN AN ORGANIZED HEALTH CARE EDUCATION/TRAINING PROGRAM

## 2023-01-19 PROCEDURE — 83735 ASSAY OF MAGNESIUM: CPT | Performed by: STUDENT IN AN ORGANIZED HEALTH CARE EDUCATION/TRAINING PROGRAM

## 2023-01-19 PROCEDURE — P9047 ALBUMIN (HUMAN), 25%, 50ML: HCPCS | Mod: JG | Performed by: INTERNAL MEDICINE

## 2023-01-19 PROCEDURE — S0030 INJECTION, METRONIDAZOLE: HCPCS | Performed by: STUDENT IN AN ORGANIZED HEALTH CARE EDUCATION/TRAINING PROGRAM

## 2023-01-19 PROCEDURE — 80053 COMPREHEN METABOLIC PANEL: CPT | Performed by: STUDENT IN AN ORGANIZED HEALTH CARE EDUCATION/TRAINING PROGRAM

## 2023-01-19 PROCEDURE — 25000003 PHARM REV CODE 250: Performed by: STUDENT IN AN ORGANIZED HEALTH CARE EDUCATION/TRAINING PROGRAM

## 2023-01-19 PROCEDURE — 82784 ASSAY IGA/IGD/IGG/IGM EACH: CPT | Performed by: STUDENT IN AN ORGANIZED HEALTH CARE EDUCATION/TRAINING PROGRAM

## 2023-01-19 PROCEDURE — 82570 ASSAY OF URINE CREATININE: CPT | Performed by: STUDENT IN AN ORGANIZED HEALTH CARE EDUCATION/TRAINING PROGRAM

## 2023-01-19 PROCEDURE — 94761 N-INVAS EAR/PLS OXIMETRY MLT: CPT

## 2023-01-19 PROCEDURE — 63600175 PHARM REV CODE 636 W HCPCS: Mod: JA | Performed by: STUDENT IN AN ORGANIZED HEALTH CARE EDUCATION/TRAINING PROGRAM

## 2023-01-19 PROCEDURE — 99291 PR CRITICAL CARE, E/M 30-74 MINUTES: ICD-10-PCS | Mod: ,,, | Performed by: INTERNAL MEDICINE

## 2023-01-19 PROCEDURE — 99233 SBSQ HOSP IP/OBS HIGH 50: CPT | Mod: ,,, | Performed by: INTERNAL MEDICINE

## 2023-01-19 PROCEDURE — 63600175 PHARM REV CODE 636 W HCPCS: Mod: JB

## 2023-01-19 RX ORDER — OCTREOTIDE ACETATE 50 UG/ML
100 INJECTION, SOLUTION INTRAVENOUS; SUBCUTANEOUS EVERY 8 HOURS
Status: DISCONTINUED | OUTPATIENT
Start: 2023-01-19 | End: 2023-01-20

## 2023-01-19 RX ORDER — NOREPINEPHRINE BITARTRATE/D5W 4MG/250ML
0-3 PLASTIC BAG, INJECTION (ML) INTRAVENOUS CONTINUOUS
Status: DISCONTINUED | OUTPATIENT
Start: 2023-01-19 | End: 2023-01-19

## 2023-01-19 RX ORDER — LACTULOSE 10 G/15ML
30 SOLUTION ORAL 4 TIMES DAILY
Status: DISCONTINUED | OUTPATIENT
Start: 2023-01-19 | End: 2023-01-22

## 2023-01-19 RX ORDER — LORAZEPAM 2 MG/ML
1 INJECTION INTRAMUSCULAR EVERY 4 HOURS PRN
Status: DISCONTINUED | OUTPATIENT
Start: 2023-01-19 | End: 2023-01-24

## 2023-01-19 RX ORDER — NOREPINEPHRINE BITARTRATE/D5W 4MG/250ML
0-3 PLASTIC BAG, INJECTION (ML) INTRAVENOUS CONTINUOUS
Status: DISCONTINUED | OUTPATIENT
Start: 2023-01-19 | End: 2023-01-20

## 2023-01-19 RX ADMIN — LACTULOSE 30 G: 20 SOLUTION ORAL at 01:01

## 2023-01-19 RX ADMIN — CEFEPIME 2 G: 2 INJECTION, POWDER, FOR SOLUTION INTRAVENOUS at 09:01

## 2023-01-19 RX ADMIN — SODIUM BICARBONATE 1300 MG: 650 TABLET ORAL at 08:01

## 2023-01-19 RX ADMIN — MIDODRINE HYDROCHLORIDE 10 MG: 5 TABLET ORAL at 03:01

## 2023-01-19 RX ADMIN — SODIUM BICARBONATE 1300 MG: 650 TABLET ORAL at 03:01

## 2023-01-19 RX ADMIN — OCTREOTIDE ACETATE 100 MCG: 50 INJECTION, SOLUTION INTRAVENOUS; SUBCUTANEOUS at 09:01

## 2023-01-19 RX ADMIN — OCTREOTIDE ACETATE 100 MCG: 50 INJECTION, SOLUTION INTRAVENOUS; SUBCUTANEOUS at 03:01

## 2023-01-19 RX ADMIN — FOLIC ACID 1 MG: 1 TABLET ORAL at 08:01

## 2023-01-19 RX ADMIN — ALBUMIN (HUMAN) 25 G: 12.5 SOLUTION INTRAVENOUS at 09:01

## 2023-01-19 RX ADMIN — MIDODRINE HYDROCHLORIDE 10 MG: 5 TABLET ORAL at 05:01

## 2023-01-19 RX ADMIN — METRONIDAZOLE 500 MG: 500 INJECTION, SOLUTION INTRAVENOUS at 08:01

## 2023-01-19 RX ADMIN — PANTOPRAZOLE SODIUM 40 MG: 40 TABLET, DELAYED RELEASE ORAL at 05:01

## 2023-01-19 RX ADMIN — THIAMINE HCL TAB 100 MG 100 MG: 100 TAB at 05:01

## 2023-01-19 RX ADMIN — METRONIDAZOLE 500 MG: 500 INJECTION, SOLUTION INTRAVENOUS at 04:01

## 2023-01-19 RX ADMIN — LACTULOSE 30 G: 20 SOLUTION ORAL at 05:01

## 2023-01-19 RX ADMIN — PANTOPRAZOLE SODIUM 40 MG: 40 TABLET, DELAYED RELEASE ORAL at 03:01

## 2023-01-19 RX ADMIN — MUPIROCIN: 20 OINTMENT TOPICAL at 08:01

## 2023-01-19 RX ADMIN — THERA TABS 1 TABLET: TAB at 08:01

## 2023-01-19 RX ADMIN — LORAZEPAM 2 MG: 2 INJECTION INTRAMUSCULAR; INTRAVENOUS at 07:01

## 2023-01-19 RX ADMIN — NOREPINEPHRINE BITARTRATE 0.02 MCG/KG/MIN: 4 INJECTION, SOLUTION INTRAVENOUS at 04:01

## 2023-01-19 RX ADMIN — METRONIDAZOLE 500 MG: 500 INJECTION, SOLUTION INTRAVENOUS at 01:01

## 2023-01-19 RX ADMIN — OCTREOTIDE ACETATE 50 MCG/HR: 500 INJECTION, SOLUTION INTRAVENOUS; SUBCUTANEOUS at 03:01

## 2023-01-19 RX ADMIN — MUPIROCIN: 20 OINTMENT TOPICAL at 10:01

## 2023-01-19 RX ADMIN — THIAMINE HCL TAB 100 MG 100 MG: 100 TAB at 09:01

## 2023-01-19 RX ADMIN — LACTULOSE 30 G: 20 SOLUTION ORAL at 08:01

## 2023-01-19 RX ADMIN — THIAMINE HCL TAB 100 MG 100 MG: 100 TAB at 03:01

## 2023-01-19 NOTE — PROGRESS NOTES
Pharmacokinetic Assessment Follow Up: IV Vancomycin    Vancomycin serum concentration assessment(s):    The random level was drawn correctly and can be used to guide therapy at this time. The measurement is within the desired definitive target range of 10 to 20 mcg/mL.    Patient in MARTITA, not making any urine, Nephrology is following and does not plan on RRT today    Vancomycin Regimen Plan:    Will hold on redosing vancomycin today    Re-dose when the random level is less than 20 mcg/mL, next level to be drawn at 0300 on 1/20 with AM labs    Drug levels (last 3 results):  Recent Labs   Lab Result Units 01/17/23  0332 01/18/23  0452 01/19/23  0420   Vancomycin, Random ug/mL 19.9 15.5 20.3         Pharmacy will continue to follow and monitor vancomycin.    Please contact pharmacy at extension [phone] for questions regarding this assessment.    Thank you for the consult,   Earline Hernandez       Patient brief summary:  Jonny Isabel is a 62 y.o. male initiated on antimicrobial therapy with IV Vancomycin for treatment of bacteremia    The patient's current regimen is pulse dose based on level and renal function    Drug Allergies:   Review of patient's allergies indicates:  No Known Allergies    Actual Body Weight:   110.7 kg    Renal Function:   Estimated Creatinine Clearance: 14.4 mL/min (A) (based on SCr of 6.5 mg/dL (H)).,     Dialysis Method (if applicable):  N/A    CBC (last 72 hours):  Recent Labs   Lab Result Units 01/16/23  0759 01/16/23  0855 01/16/23  1318 01/16/23  1838 01/17/23  0014 01/17/23  0332 01/17/23  1218 01/17/23  1803 01/18/23  0009 01/18/23  0627 01/18/23  1125 01/18/23  1928 01/19/23  0420   WBC K/uL 13.01* 12.99* 13.11* 12.41 13.31* 13.92* 14.98* 14.80* 13.97* 13.83* 13.39* 14.46* 15.12*   Hemoglobin g/dL 7.8* 7.7* 8.4* 8.2* 8.1* 8.2* 8.4* 9.4* 8.5* 8.6* 8.5* 8.9* 9.0*   Hematocrit % 24.6* 23.9* 25.8* 26.3* 25.5* 25.6* 26.1* 29.6* 27.2* 27.3* 26.7* 27.6* 27.5*   Platelets K/uL 235 247 233 212 238 238  329 250 235 199 182 188 185   Gran % % 74.7* 77.7* 78.6* 79.3* 79.5* 77.2* 78.5* 77.0* 74.9* 77.7* 78.3* 79.7* 80.6*   Lymph % % 11.3* 9.5* 9.3* 7.7* 9.5* 9.7* 7.5* 9.3* 10.5* 8.2* 8.2* 7.3* 6.2*   Mono % % 12.2 11.3 10.9 11.3 9.5 10.0 11.7 11.6 12.1 12.4 11.4 10.9 11.4   Eosinophil % % 0.8 0.5 0.5 0.6 0.5 0.9 0.7 0.9 1.0 0.6 0.7 0.8 0.7   Basophil % % 0.2 0.3 0.2 0.2 0.3 0.4 0.4 0.3 0.4 0.2 0.3 0.3 0.3   Differential Method  Automated Automated Automated Automated Automated Automated Automated Automated Automated Automated Automated Automated Automated         Metabolic Panel (last 72 hours):  Recent Labs   Lab Result Units 01/16/23  0759 01/16/23  0855 01/16/23  1101 01/16/23  2135 01/17/23  0332 01/17/23  1505 01/17/23  1524 01/18/23  0256 01/18/23  1125 01/19/23  0420   Sodium mmol/L 130* 129*  --   --  128*  --   --  128*  --  128*   Sodium, Urine mmol/L  --   --   --  25  --   --   --   --   --   --    Potassium mmol/L 4.5 4.5  --   --  4.7  --   --  4.6  --  4.3   Chloride mmol/L 101 100  --   --  99  --   --  99  --  101   CO2 mmol/L 17* 17*  --   --  14*  --   --  15*  --  16*   Glucose mg/dL 85 88  --   --  148*  --   --  178*  --  137*   Glucose, UA   --   --  Trace*  --   --   --   --   --   --   --    BUN mg/dL 26* 26*  --   --  28*  --   --  29*  --  32*   Creatinine mg/dL 4.7* 4.7*  --   --  5.0*  --   --  6.0*  --  6.5*   Creatinine, Urine mg/dL  --   --   --   --   --   --  115.0  --  150.0  --    Albumin g/dL 1.7* 1.7*  --   --  1.6* 1.97*  --  2.0*  --  2.4*   Total Bilirubin mg/dL 1.5* 1.5*  --   --  2.3*  --   --  2.4*  --  2.0*   Alkaline Phosphatase U/L 274* 273*  --   --  240*  --   --  225*  --  194*   AST U/L 186* 181*  --   --  169*  --   --  136*  --  99*   ALT U/L 81* 81*  --   --  74*  --   --  66*  --  56*   Magnesium mg/dL  --   --   --   --  1.4*  --   --  1.7  --  1.6   Phosphorus mg/dL  --   --   --   --  5.3*  --   --  5.1*  --  5.1*         Vancomycin Administrations:  vancomycin  given in the last 96 hours                     vancomycin 2 g in dextrose 5 % 500 mL IVPB (mg) 2,000 mg New Bag 01/16/23 0945                    Microbiologic Results:  Microbiology Results (last 7 days)       Procedure Component Value Units Date/Time    Culture, Anaerobic [[id number]] Collected: 01/17/23 0003    Order Status: Completed Specimen: Ascites Updated: 01/18/23 0721     Anaerobic Culture Culture in progress    Aerobic culture [[id number]] Collected: 01/17/23 0003    Order Status: Completed Specimen: Ascites Updated: 01/18/23 0633     Aerobic Bacterial Culture No growth    Gram stain [[id number]] Collected: 01/17/23 0003    Order Status: Completed Specimen: Ascites Updated: 01/17/23 0207     Gram Stain Result Rare WBC's      No organisms seen    Blood culture [[id number]]     Order Status: Canceled Specimen: Blood     Blood culture [[id number]]     Order Status: Canceled Specimen: Blood

## 2023-01-19 NOTE — ASSESSMENT & PLAN NOTE
Unsure of etiology at this time  Size of liver suggest other etiology than cirrhosis, urine sodium 26 additionally argues for ATN. CT and US renal with no signs of obstruction.     Urine microscopy: (1/17) no casts identified, amorphous material (though only 5 cc obtained). Repeat with few granular casts (1/18). Repeat on 1/19 with granular casts  Urine protein 4.59g and repeat with 2.5  C3/4 - 111/35  LILO with ratio of 1.98  Hep B and C negative  Uric acid 13    HIV negative  Iron 31, TIBC 151, Tsat 21, Transferrin 102, ferritin 135  -KEATON  -ANCA  -Cryo     At this time differential is large with possible infiltrative disease on background of alcoholism; EGD with small varices. There may be a component of HRS physiology but unsure of overall time course regarding MARTITA.     Recommendations:  Need outside records from OSH.    Would recommend hepatology consult  Repeat urine protein creatinine ratio, repeat urine sodium and creatinine.   Ordered IgA level  -Keep MAP > 65  -Keep hemoglobin > 7  -Strict ins and outs  -Avoid nephrotoxic agents if possible  -Avoid drastic hemodynamic changes if possible

## 2023-01-19 NOTE — ASSESSMENT & PLAN NOTE
63 yo M with PMH of alcoholic hepatitis (drinks a 6 pack of beer per day and bottle of marie), Afib on Xarelto, central obesity, HTN, GI bleed, unknown CKD vs MARTITA on admission, anemia, chronic hyponatremia who presents due to inability to get out of bed. He reports that he has been having fecal incontinence for 2 months now and difficulty walking for the last 4 days (with single person assistance and rolling walker) and inability to get out of bed today. He denies saddle anesthesia or urinary incontinence or issues voiding his bladder. He has also  been having dark and tarry stools.     --Admitted to MICU   -q4 neuro checks  --All labs and diagnostics reviewed   -CT L spine and MRI L spine at OSH showed large L3/L4 herniated disc with moderate to severe stenosis. Some air in disc space.   MRI L spine wo 1/16: large L3/L4 herniated disc with moderate to severe stenosis  - no acute neurosurgical intervention, patient with clinical findings of subacute to chronic symptoms   -Will tentatively plan for OR Monday for laminectomy/discectomy pending medical stablization/optimization &  Documented risk stratification  - no HOB restrictions   - hold Xarelto, PT and INR elevated on admission // will need Xarelto held for 3-5 days prior to OR and/or coagulapathy corrected  - GI consulted for GI bleed, f/u recs  - Cr elevated to 4.7 on admission, unclear baseline, workup per MICU  - ok for diet at this time per primary  - MICU/ for all primary medical issues  - nsgy will continue to follow    Dispo: ongoing

## 2023-01-19 NOTE — SUBJECTIVE & OBJECTIVE
Interval History: 1/19: Exam stable,     Medications:  Continuous Infusions:   NORepinephrine bitartrate-D5W 0.02 mcg/kg/min (01/19/23 1700)     Scheduled Meds:   albumin human 25%  25 g Intravenous BID    ceFEPime (MAXIPIME) IVPB  2 g Intravenous Q24H    folic acid  1 mg Oral Daily    lactulose  30 g Oral QID    metronidazole  500 mg Intravenous Q8H    midodrine  10 mg Oral Q8H    multivitamin  1 tablet Oral Daily    mupirocin   Nasal BID    octreotide  100 mcg Subcutaneous Q8H    pantoprazole  40 mg Oral BID AC    scopolamine  1 patch Transdermal Q3 Days    sodium bicarbonate  1,300 mg Oral TID    thiamine  100 mg Oral Q8H     PRN Meds:sodium chloride, dextrose 10%, dextrose 10%, glucagon (human recombinant), glucose, glucose, insulin aspart U-100, LIDOcaine-prilocaine, lorazepam, Pharmacy to dose Vancomycin consult **AND** vancomycin - pharmacy to dose     Review of Systems  Objective:     Weight: 110.7 kg (244 lb)  Body mass index is 37.1 kg/m².  Vital Signs (Most Recent):  Temp: 97.7 °F (36.5 °C) (01/19/23 1500)  Pulse: 70 (01/19/23 1700)  Resp: 13 (01/19/23 1700)  BP: 124/62 (01/19/23 1645)  SpO2: (!) 92 % (01/19/23 1700)   Vital Signs (24h Range):  Temp:  [97.5 °F (36.4 °C)-97.7 °F (36.5 °C)] 97.7 °F (36.5 °C)  Pulse:  [61-80] 70  Resp:  [9-29] 13  SpO2:  [84 %-100 %] 92 %  BP: ()/(51-65) 124/62     Date 01/19/23 0700 - 01/20/23 0659   Shift 6847-2151 1289-6396 7743-9534 24 Hour Total   INTAKE   I.V.(mL/kg) 155.7(1.4) 4(0)  159.7(1.4)   IV Piggyback 43.9 55  98.9   Shift Total(mL/kg) 199.5(1.8) 59(0.5)  258.5(2.3)   OUTPUT   Urine(mL/kg/hr) 10(0)   10   Shift Total(mL/kg) 10(0.1)   10(0.1)   Weight (kg) 110.7 110.7 110.7 110.7                Oxygen Concentration (%):  [28] 28         Urethral Catheter 01/16/23 1347 Latex 14 Fr. (Active)   Site Assessment Clean;Intact;Dry 01/17/23 0301   Collection Container Urimeter 01/17/23 0301   Securement Method secured to top of thigh w/ adhesive device 01/17/23  "0301   Catheter Care Performed yes 01/17/23 0301   Reason for Continuing Urinary Catheterization Critically ill in ICU and requiring hourly monitoring of intake/output 01/17/23 0301   CAUTI Prevention Bundle Securement Device in place with 1" slack;Intact seal between catheter & drainage tubing;Drainage bag/urimeter off the floor;Sheeting clip in use;No dependent loops or kinks;Drainage bag/urimeter not overfilled (<2/3 full);Drainage bag/urimeter below bladder 01/17/23 0301   Output (mL) 0 mL 01/16/23 2001       Physical Exam  Neurosurgery Physical Exam  Constitutional:       Appearance: He is well-developed and well-nourished.      Comments: obese   Eyes:      Extraocular Movements: EOM normal.      Conjunctiva/sclera: Conjunctivae normal.      Pupils: Pupils are equal, round, and reactive to light.   Cardiovascular:      Pulses: Normal pulses.   Abdominal:      Palpations: Abdomen is soft.   Neurological:      Mental Status: He is alert and oriented to person, place, and time.      Comments: AAOx4  PERRL  CN intact  BUE 5/5  BLE 4- hip flexion, 4 to 4+ ke, 5 df/pf/ehl  Reflexes normal  Poor rectal tone  No saddle anesthesia  SILT   Psychiatric:         Mood and Affect: Mood and affect normal    Significant Labs:  Recent Labs   Lab 01/18/23 0256 01/19/23  0420   * 137*   * 128*   K 4.6 4.3   CL 99 101   CO2 15* 16*   BUN 29* 32*   CREATININE 6.0* 6.5*   CALCIUM 6.8* 7.3*   MG 1.7 1.6       Recent Labs   Lab 01/18/23  1928 01/19/23  0420 01/19/23  1152   WBC 14.46* 15.12* 15.04*   HGB 8.9* 9.0* 8.8*   HCT 27.6* 27.5* 27.7*    185 170       Recent Labs   Lab 01/18/23  0256 01/19/23  0420   INR 1.3* 1.4*       Microbiology Results (last 7 days)       Procedure Component Value Units Date/Time    Culture, Anaerobic [[id number]] Collected: 01/17/23 0003    Order Status: Completed Specimen: Ascites Updated: 01/19/23 0849     Anaerobic Culture Culture in progress    Aerobic culture [[id number]] " Collected: 01/17/23 0003    Order Status: Completed Specimen: Ascites Updated: 01/18/23 0633     Aerobic Bacterial Culture No growth    Gram stain [[id number]] Collected: 01/17/23 0003    Order Status: Completed Specimen: Ascites Updated: 01/17/23 0207     Gram Stain Result Rare WBC's      No organisms seen    Blood culture [[id number]]     Order Status: Canceled Specimen: Blood     Blood culture [[id number]]     Order Status: Canceled Specimen: Blood           ABGs: No results for input(s): PH, PCO2, PO2, HCO3, POCSATURATED, BE in the last 48 hours.  Cardiac markers:   Recent Labs   Lab 01/18/23 0256 01/18/23  0627   TROPONINI 0.015 0.014       CMP:   Recent Labs   Lab 01/18/23 0256 01/19/23  0420   * 137*   CALCIUM 6.8* 7.3*   ALBUMIN 2.0* 2.4*   PROT 5.8* 6.1   * 128*   K 4.6 4.3   CO2 15* 16*   CL 99 101   BUN 29* 32*   CREATININE 6.0* 6.5*   ALKPHOS 225* 194*   ALT 66* 56*   * 99*   BILITOT 2.4* 2.0*       CRP:   No results for input(s): CRP in the last 48 hours.    ESR: No results for input(s): POCESR, ERYTHROCYTES in the last 48 hours.  LFTs:   Recent Labs   Lab 01/18/23 0256 01/19/23  0420   ALT 66* 56*   * 99*   ALKPHOS 225* 194*   BILITOT 2.4* 2.0*   PROT 5.8* 6.1   ALBUMIN 2.0* 2.4*       Procalcitonin: No results for input(s): PROCAL in the last 48 hours.    Significant Diagnostics:  I have reviewed all pertinent imaging results/findings within the past 24 hours.

## 2023-01-19 NOTE — SUBJECTIVE & OBJECTIVE
Interval History: no acute events overnight. Worsening serum creatinine.     Review of patient's allergies indicates:  No Known Allergies  Current Facility-Administered Medications   Medication Frequency    0.9%  NaCl infusion (for blood administration) Q24H PRN    albumin human 25% bottle 25 g BID    ceFEPIme (MAXIPIME) 2 g in dextrose 5 % in water (D5W) 5 % 50 mL IVPB (MB+) Q24H    dextrose 10% bolus 125 mL 125 mL PRN    dextrose 10% bolus 250 mL 250 mL PRN    folic acid tablet 1 mg Daily    glucagon (human recombinant) injection 1 mg PRN    glucose chewable tablet 16 g PRN    glucose chewable tablet 24 g PRN    insulin aspart U-100 pen 0-5 Units QID (AC + HS) PRN    lactulose 20 gram/30 mL solution Soln 30 g QID    LIDOcaine-prilocaine cream PRN    LORazepam injection 2 mg Q4H PRN    metronidazole IVPB 500 mg Q8H    midodrine tablet 10 mg Q8H    multivitamin tablet Daily    mupirocin 2 % ointment BID    NORepinephrine 4 mg in dextrose 5% 250 mL infusion (premix) (titrating) Continuous    octreotide injection 100 mcg Q8H    pantoprazole EC tablet 40 mg BID AC    scopolamine 1.3-1.5 mg (1 mg over 3 days) 1 patch Q3 Days    sodium bicarbonate tablet 1,300 mg TID    thiamine tablet 100 mg Q8H    vancomycin - pharmacy to dose pharmacy to manage frequency       Objective:     Vital Signs (Most Recent):  Temp: 97.7 °F (36.5 °C) (01/19/23 1100)  Pulse: 71 (01/19/23 1200)  Resp: 19 (01/19/23 1200)  BP: (!) 102/55 (01/19/23 1200)  SpO2: 100 % (01/19/23 1200)   Vital Signs (24h Range):  Temp:  [97.5 °F (36.4 °C)-97.7 °F (36.5 °C)] 97.7 °F (36.5 °C)  Pulse:  [61-80] 71  Resp:  [9-29] 19  SpO2:  [84 %-100 %] 100 %  BP: ()/(51-65) 102/55     Weight: 110.7 kg (244 lb) (01/17/23 1030)  Body mass index is 37.1 kg/m².  Body surface area is 2.3 meters squared.    I/O last 3 completed shifts:  In: 1798.1 [I.V.:916.4; IV Piggyback:881.7]  Out: 205 [Urine:105; Emesis/NG output:100]    Physical Exam  Constitutional:        General: He is not in acute distress.     Appearance: He is obese. He is not ill-appearing or toxic-appearing.   HENT:      Nose: Nose normal. No congestion or rhinorrhea.      Mouth/Throat:      Mouth: Mucous membranes are moist.   Eyes:      General: No scleral icterus.        Right eye: No discharge.         Left eye: No discharge.      Pupils: Pupils are equal, round, and reactive to light.   Cardiovascular:      Rate and Rhythm: Normal rate.   Pulmonary:      Effort: Pulmonary effort is normal. No respiratory distress.   Abdominal:      General: Abdomen is flat. There is distension.      Tenderness: There is no abdominal tenderness.   Musculoskeletal:      Cervical back: Normal range of motion. No rigidity.      Right lower leg: Edema present.      Left lower leg: Edema present.   Lymphadenopathy:      Cervical: No cervical adenopathy.   Skin:     Coloration: Skin is not jaundiced.      Findings: No lesion.   Neurological:      General: No focal deficit present.      Mental Status: He is alert.       Significant Labs:  All labs within the past 24 hours have been reviewed.     Significant Imaging:  Labs: Reviewed

## 2023-01-19 NOTE — PROGRESS NOTES
[facility]  Neurosurgery  Progress Note    Subjective:     History of Present Illness: 61 yo M with PMH of alcoholic hepatitis (drinks a 6 pack of beer per day and bottle of marie), Afib on Xarelto, central obesity, HTN, GI bleed, unknown CKD vs MARTITA on admission, anemia, chronic hyponatremia who presents due to inability to get out of bed. He reports that he has been having fecal incontinence for 2 months now and difficulty walking for the last 4 days (with single person assistance and rolling walker) and inability to get out of bed today. He denies saddle anesthesia or urinary incontinence or issues voiding his bladder. He has also  been having dark and tarry stools.     CT L spine and MRI L spine at OSH showed large L3/L4 herniated disc with moderate to severe stenosis. Patient transported to [facility] [place] for possible neurosurgical intervention.       Post-Op Info:  Procedure(s) (LRB):  EGD (ESOPHAGOGASTRODUODENOSCOPY) (N/A)   2 Days Post-Op     Interval History: 1/19: Exam stable,     Medications:  Continuous Infusions:   NORepinephrine bitartrate-D5W 0.02 mcg/kg/min (01/19/23 1700)     Scheduled Meds:   albumin human 25%  25 g Intravenous BID    ceFEPime (MAXIPIME) IVPB  2 g Intravenous Q24H    folic acid  1 mg Oral Daily    lactulose  30 g Oral QID    metronidazole  500 mg Intravenous Q8H    midodrine  10 mg Oral Q8H    multivitamin  1 tablet Oral Daily    mupirocin   Nasal BID    octreotide  100 mcg Subcutaneous Q8H    pantoprazole  40 mg Oral BID AC    scopolamine  1 patch Transdermal Q3 Days    sodium bicarbonate  1,300 mg Oral TID    thiamine  100 mg Oral Q8H     PRN Meds:sodium chloride, dextrose 10%, dextrose 10%, glucagon (human recombinant), glucose, glucose, insulin aspart U-100, LIDOcaine-prilocaine, lorazepam, Pharmacy to dose Vancomycin consult **AND** vancomycin - pharmacy to dose     Review of Systems  Objective:     Weight: 110.7 kg (244 lb)  Body mass index is 37.1  "kg/m².  Vital Signs (Most Recent):  Temp: 97.7 °F (36.5 °C) (01/19/23 1500)  Pulse: 70 (01/19/23 1700)  Resp: 13 (01/19/23 1700)  BP: 124/62 (01/19/23 1645)  SpO2: (!) 92 % (01/19/23 1700)   Vital Signs (24h Range):  Temp:  [97.5 °F (36.4 °C)-97.7 °F (36.5 °C)] 97.7 °F (36.5 °C)  Pulse:  [61-80] 70  Resp:  [9-29] 13  SpO2:  [84 %-100 %] 92 %  BP: ()/(51-65) 124/62     Date 01/19/23 0700 - 01/20/23 0659   Shift 9664-8142 9785-0739 9942-6924 24 Hour Total   INTAKE   I.V.(mL/kg) 155.7(1.4) 4(0)  159.7(1.4)   IV Piggyback 43.9 55  98.9   Shift Total(mL/kg) 199.5(1.8) 59(0.5)  258.5(2.3)   OUTPUT   Urine(mL/kg/hr) 10(0)   10   Shift Total(mL/kg) 10(0.1)   10(0.1)   Weight (kg) 110.7 110.7 110.7 110.7                Oxygen Concentration (%):  [28] 28         Urethral Catheter 01/16/23 1347 Latex 14 Fr. (Active)   Site Assessment Clean;Intact;Dry 01/17/23 0301   Collection Container Urimeter 01/17/23 0301   Securement Method secured to top of thigh w/ adhesive device 01/17/23 0301   Catheter Care Performed yes 01/17/23 0301   Reason for Continuing Urinary Catheterization Critically ill in ICU and requiring hourly monitoring of intake/output 01/17/23 0301   CAUTI Prevention Bundle Securement Device in place with 1" slack;Intact seal between catheter & drainage tubing;Drainage bag/urimeter off the floor;Sheeting clip in use;No dependent loops or kinks;Drainage bag/urimeter not overfilled (<2/3 full);Drainage bag/urimeter below bladder 01/17/23 0301   Output (mL) 0 mL 01/16/23 2001       Physical Exam  Neurosurgery Physical Exam  Constitutional:       Appearance: He is well-developed and well-nourished.      Comments: obese   Eyes:      Extraocular Movements: EOM normal.      Conjunctiva/sclera: Conjunctivae normal.      Pupils: Pupils are equal, round, and reactive to light.   Cardiovascular:      Pulses: Normal pulses.   Abdominal:      Palpations: Abdomen is soft.   Neurological:      Mental Status: He is alert and " oriented to person, place, and time.      Comments: AAOx4  PERRL  CN intact  BUE 5/5  BLE 4- hip flexion, 4 to 4+ ke, 5 df/pf/ehl  Reflexes normal  Poor rectal tone  No saddle anesthesia  SILT   Psychiatric:         Mood and Affect: Mood and affect normal    Significant Labs:  Recent Labs   Lab 01/18/23 0256 01/19/23  0420   * 137*   * 128*   K 4.6 4.3   CL 99 101   CO2 15* 16*   BUN 29* 32*   CREATININE 6.0* 6.5*   CALCIUM 6.8* 7.3*   MG 1.7 1.6       Recent Labs   Lab 01/18/23  1928 01/19/23  0420 01/19/23  1152   WBC 14.46* 15.12* 15.04*   HGB 8.9* 9.0* 8.8*   HCT 27.6* 27.5* 27.7*    185 170       Recent Labs   Lab 01/18/23 0256 01/19/23  0420   INR 1.3* 1.4*       Microbiology Results (last 7 days)       Procedure Component Value Units Date/Time    Culture, Anaerobic [[id number]] Collected: 01/17/23 0003    Order Status: Completed Specimen: Ascites Updated: 01/19/23 0849     Anaerobic Culture Culture in progress    Aerobic culture [[id number]] Collected: 01/17/23 0003    Order Status: Completed Specimen: Ascites Updated: 01/18/23 0633     Aerobic Bacterial Culture No growth    Gram stain [[id number]] Collected: 01/17/23 0003    Order Status: Completed Specimen: Ascites Updated: 01/17/23 0207     Gram Stain Result Rare WBC's      No organisms seen    Blood culture [[id number]]     Order Status: Canceled Specimen: Blood     Blood culture [[id number]]     Order Status: Canceled Specimen: Blood           ABGs: No results for input(s): PH, PCO2, PO2, HCO3, POCSATURATED, BE in the last 48 hours.  Cardiac markers:   Recent Labs   Lab 01/18/23 0256 01/18/23  0627   TROPONINI 0.015 0.014       CMP:   Recent Labs   Lab 01/18/23 0256 01/19/23  0420   * 137*   CALCIUM 6.8* 7.3*   ALBUMIN 2.0* 2.4*   PROT 5.8* 6.1   * 128*   K 4.6 4.3   CO2 15* 16*   CL 99 101   BUN 29* 32*   CREATININE 6.0* 6.5*   ALKPHOS 225* 194*   ALT 66* 56*   * 99*   BILITOT 2.4* 2.0*       CRP:   No results for  input(s): CRP in the last 48 hours.    ESR: No results for input(s): POCESR, ERYTHROCYTES in the last 48 hours.  LFTs:   Recent Labs   Lab 01/18/23  0256 01/19/23  0420   ALT 66* 56*   * 99*   ALKPHOS 225* 194*   BILITOT 2.4* 2.0*   PROT 5.8* 6.1   ALBUMIN 2.0* 2.4*       Procalcitonin: No results for input(s): PROCAL in the last 48 hours.    Significant Diagnostics:  I have reviewed all pertinent imaging results/findings within the past 24 hours.    Assessment/Plan:     Lumbar herniated disc  63 yo M with PMH of alcoholic hepatitis (drinks a 6 pack of beer per day and bottle of marie), Afib on Xarelto, central obesity, HTN, GI bleed, unknown CKD vs MARTITA on admission, anemia, chronic hyponatremia who presents due to inability to get out of bed. He reports that he has been having fecal incontinence for 2 months now and difficulty walking for the last 4 days (with single person assistance and rolling walker) and inability to get out of bed today. He denies saddle anesthesia or urinary incontinence or issues voiding his bladder. He has also  been having dark and tarry stools.     --Admitted to MICU   -q4 neuro checks  --All labs and diagnostics reviewed   -CT L spine and MRI L spine at OSH showed large L3/L4 herniated disc with moderate to severe stenosis. Some air in disc space.   MRI L spine wo 1/16: large L3/L4 herniated disc with moderate to severe stenosis  - no acute neurosurgical intervention, patient with clinical findings of subacute to chronic symptoms   -Will tentatively plan for OR Monday for laminectomy/discectomy pending medical stablization/optimization &  Documented risk stratification  - no HOB restrictions   - hold Xarelto, PT and INR elevated on admission // will need Xarelto held for 3-5 days prior to OR and/or coagulapathy corrected  - GI consulted for GI bleed, f/u recs  - Cr elevated to 4.7 on admission, unclear baseline, workup per MICU  - ok for diet at this time per primary  - MICU/  for all primary medical issues  - nsgy will continue to follow    Dispo: ongoing        Aamir Celaya MD  Neurosurgery  [facility]

## 2023-01-19 NOTE — PLAN OF CARE
"      SICU PLAN OF CARE NOTE    Dx: Spinal stenosis    Vital Signs: BP (!) 118/54   Pulse 77   Temp 97.7 °F (36.5 °C) (Oral)   Resp 15   Ht 5' 8" (1.727 m)   Wt 110.7 kg (244 lb)   SpO2 (!) 94%   BMI 37.10 kg/m²     SHIFT EVENTS:  VSS / No acute events this shift.     Neuro: AAO x4, Follows Commands, and Moves All Extremities    Respiratory: Nasal Cannula    Cardiac: NSR; HR 60-70s    Diet: Clear Liquid    Gtts: Octreotide    Urine Output: Urinary Catheter 30 ml/shift     Labs: CBC Q8; daily     Accuchecks: HILDA.    SKIN NOTE:  Skin: No skin tears not skin breakdown noted at this time.     Skin precautions maintained including:  Sacrum and heels with foam dressing in place for pressure protection. Frequent weight shift assistance provided Q2 hr to prevent breakdown. Bed plugged in and mattress inflated; Adhesive use limited. Heels elevated off bed. Pressure points protected and positioning supports utilized.  Skin-to-device areas padded. Skin-to-skin areas padded     POC reviewed with patient and family; questions and concerns addressed. See flow sheets for full assessment details.     "

## 2023-01-19 NOTE — PROGRESS NOTES
[facility]  Nephrology  Progress Note    Patient Name: Jonny Isabel  MRN: [id number]  Admission Date: 1/16/2023  Hospital Length of Stay: 3 days  Attending Provider: Genaro Ortiz MD   Primary Care Physician: Yuli Pérez Mai, MD  Principal Problem:Spinal stenosis    Subjective:     HPI: 62 year old male with a history of alcohol abuse, afib/flutter on xarelto, HTN presents with concern for severe spinal canal stenosis/vacuum disc phenomena, concern for a GI bleed and MARTITA. He presents at behest of his girlfriend after being unable to stand, he has a history of sciatica but reports that this is worse. He has had fecal incontinence for the last week.     He has no report of kidney issues in the past and reports no family history of such either. At time of consultation he is pending an EGD for GI bleed. CT abdomen with large liver and concern for SBO per report, no hydronephrosis. UA with 2+ protein, 1+ occult blood, urine sodium 25 and urine osm 302. On admission serum creatinine 5 (baseline 0.8).      Interval History: no acute events overnight. Worsening serum creatinine.     Review of patient's allergies indicates:  No Known Allergies  Current Facility-Administered Medications   Medication Frequency    0.9%  NaCl infusion (for blood administration) Q24H PRN    albumin human 25% bottle 25 g BID    ceFEPIme (MAXIPIME) 2 g in dextrose 5 % in water (D5W) 5 % 50 mL IVPB (MB+) Q24H    dextrose 10% bolus 125 mL 125 mL PRN    dextrose 10% bolus 250 mL 250 mL PRN    folic acid tablet 1 mg Daily    glucagon (human recombinant) injection 1 mg PRN    glucose chewable tablet 16 g PRN    glucose chewable tablet 24 g PRN    insulin aspart U-100 pen 0-5 Units QID (AC + HS) PRN    lactulose 20 gram/30 mL solution Soln 30 g QID    LIDOcaine-prilocaine cream PRN    LORazepam injection 2 mg Q4H PRN    metronidazole IVPB 500 mg Q8H    midodrine tablet 10 mg Q8H    multivitamin tablet Daily    mupirocin 2 % ointment  BID    NORepinephrine 4 mg in dextrose 5% 250 mL infusion (premix) (titrating) Continuous    octreotide injection 100 mcg Q8H    pantoprazole EC tablet 40 mg BID AC    scopolamine 1.3-1.5 mg (1 mg over 3 days) 1 patch Q3 Days    sodium bicarbonate tablet 1,300 mg TID    thiamine tablet 100 mg Q8H    vancomycin - pharmacy to dose pharmacy to manage frequency       Objective:     Vital Signs (Most Recent):  Temp: 97.7 °F (36.5 °C) (01/19/23 1100)  Pulse: 71 (01/19/23 1200)  Resp: 19 (01/19/23 1200)  BP: (!) 102/55 (01/19/23 1200)  SpO2: 100 % (01/19/23 1200)   Vital Signs (24h Range):  Temp:  [97.5 °F (36.4 °C)-97.7 °F (36.5 °C)] 97.7 °F (36.5 °C)  Pulse:  [61-80] 71  Resp:  [9-29] 19  SpO2:  [84 %-100 %] 100 %  BP: ()/(51-65) 102/55     Weight: 110.7 kg (244 lb) (01/17/23 1030)  Body mass index is 37.1 kg/m².  Body surface area is 2.3 meters squared.    I/O last 3 completed shifts:  In: 1798.1 [I.V.:916.4; IV Piggyback:881.7]  Out: 205 [Urine:105; Emesis/NG output:100]    Physical Exam  Constitutional:       General: He is not in acute distress.     Appearance: He is obese. He is not ill-appearing or toxic-appearing.   HENT:      Nose: Nose normal. No congestion or rhinorrhea.      Mouth/Throat:      Mouth: Mucous membranes are moist.   Eyes:      General: No scleral icterus.        Right eye: No discharge.         Left eye: No discharge.      Pupils: Pupils are equal, round, and reactive to light.   Cardiovascular:      Rate and Rhythm: Normal rate.   Pulmonary:      Effort: Pulmonary effort is normal. No respiratory distress.   Abdominal:      General: Abdomen is flat. There is distension.      Tenderness: There is no abdominal tenderness.   Musculoskeletal:      Cervical back: Normal range of motion. No rigidity.      Right lower leg: Edema present.      Left lower leg: Edema present.   Lymphadenopathy:      Cervical: No cervical adenopathy.   Skin:     Coloration: Skin is not jaundiced.      Findings: No  lesion.   Neurological:      General: No focal deficit present.      Mental Status: He is alert.       Significant Labs:  All labs within the past 24 hours have been reviewed.     Significant Imaging:  Labs: Reviewed    Assessment/Plan:     * Spinal stenosis  Per primary    MARTITA (acute kidney injury)  Unsure of etiology at this time  Size of liver suggest other etiology than cirrhosis, urine sodium 26 additionally argues for ATN. CT and US renal with no signs of obstruction.     Urine microscopy: (1/17) no casts identified, amorphous material (though only 5 cc obtained). Repeat with few granular casts (1/18). Repeat on 1/19 with granular casts  Urine protein 4.59g and repeat with 2.5  C3/4 - 111/35  LILO with ratio of 1.98  Hep B and C negative  Uric acid 13    HIV negative  Iron 31, TIBC 151, Tsat 21, Transferrin 102, ferritin 135  -KEATON  -ANCA  -Cryo     At this time differential is large with possible infiltrative disease on background of alcoholism; EGD with small varices. There may be a component of HRS physiology but unsure of overall time course regarding MARTITA.     Recommendations:  Need outside records from OSH.    Would recommend hepatology consult  Repeat urine protein creatinine ratio, repeat urine sodium and creatinine.   Ordered IgA level  -Keep MAP > 65  -Keep hemoglobin > 7  -Strict ins and outs  -Avoid nephrotoxic agents if possible  -Avoid drastic hemodynamic changes if possible               Thank you for your consult. I will follow-up with patient. Please contact us if you have any additional questions.    Otoniel Cat MD  Nephrology  [facility]    ATTENDING PHYSICIAN ATTESTATION  I have personally verified the history and examined the patient. I thoroughly reviewed the demographic, clinical, laboratorial and imaging information available in medical records. I agree with the assessment and recommendations provided by the subspecialty resident who was under my supervision.

## 2023-01-19 NOTE — CONSULTS
"20g x 1 3/4" PIV placed to left forearm using usg and 18g x 1 3/4" PIV placed to right upper arm using usg  "

## 2023-01-20 PROBLEM — N17.0 ATN (ACUTE TUBULAR NECROSIS): Status: ACTIVE | Noted: 2023-01-20

## 2023-01-20 LAB
ALBUMIN SERPL BCP-MCNC: 2.6 G/DL (ref 3.5–5.2)
ALLENS TEST: ABNORMAL
ALP SERPL-CCNC: 188 U/L (ref 55–135)
ALT SERPL W/O P-5'-P-CCNC: 44 U/L (ref 10–44)
ANION GAP SERPL CALC-SCNC: 17 MMOL/L (ref 8–16)
ANTI SM ANTIBODY: 0.1 RATIO (ref 0–0.99)
ANTI-SM INTERPRETATION: NEGATIVE
AST SERPL-CCNC: 76 U/L (ref 10–40)
BACTERIA SPEC AEROBE CULT: NO GROWTH
BASOPHILS # BLD AUTO: 0.06 K/UL (ref 0–0.2)
BASOPHILS # BLD AUTO: 0.09 K/UL (ref 0–0.2)
BASOPHILS # BLD AUTO: 0.1 K/UL (ref 0–0.2)
BASOPHILS NFR BLD: 0.4 % (ref 0–1.9)
BASOPHILS NFR BLD: 0.6 % (ref 0–1.9)
BASOPHILS NFR BLD: 0.7 % (ref 0–1.9)
BILIRUB SERPL-MCNC: 2.4 MG/DL (ref 0.1–1)
BUN SERPL-MCNC: 35 MG/DL (ref 8–23)
CALCIUM SERPL-MCNC: 7.7 MG/DL (ref 8.7–10.5)
CHLORIDE SERPL-SCNC: 98 MMOL/L (ref 95–110)
CO2 SERPL-SCNC: 14 MMOL/L (ref 23–29)
CREAT SERPL-MCNC: 6.9 MG/DL (ref 0.5–1.4)
DELSYS: ABNORMAL
DIFFERENTIAL METHOD: ABNORMAL
EOSINOPHIL # BLD AUTO: 0.1 K/UL (ref 0–0.5)
EOSINOPHIL # BLD AUTO: 0.1 K/UL (ref 0–0.5)
EOSINOPHIL # BLD AUTO: 0.2 K/UL (ref 0–0.5)
EOSINOPHIL NFR BLD: 0.8 % (ref 0–8)
EOSINOPHIL NFR BLD: 0.9 % (ref 0–8)
EOSINOPHIL NFR BLD: 1 % (ref 0–8)
ERYTHROCYTE [DISTWIDTH] IN BLOOD BY AUTOMATED COUNT: 21.4 % (ref 11.5–14.5)
ERYTHROCYTE [DISTWIDTH] IN BLOOD BY AUTOMATED COUNT: 21.6 % (ref 11.5–14.5)
ERYTHROCYTE [DISTWIDTH] IN BLOOD BY AUTOMATED COUNT: 21.7 % (ref 11.5–14.5)
EST. GFR  (NO RACE VARIABLE): 8.4 ML/MIN/1.73 M^2
GLUCOSE SERPL-MCNC: 131 MG/DL (ref 70–110)
HCO3 UR-SCNC: 15.6 MMOL/L (ref 24–28)
HCT VFR BLD AUTO: 29.4 % (ref 40–54)
HCT VFR BLD AUTO: 29.5 % (ref 40–54)
HCT VFR BLD AUTO: 29.7 % (ref 40–54)
HGB BLD-MCNC: 9.3 G/DL (ref 14–18)
HGB BLD-MCNC: 9.4 G/DL (ref 14–18)
HGB BLD-MCNC: 9.5 G/DL (ref 14–18)
IMM GRANULOCYTES # BLD AUTO: 0.16 K/UL (ref 0–0.04)
IMM GRANULOCYTES # BLD AUTO: 0.18 K/UL (ref 0–0.04)
IMM GRANULOCYTES # BLD AUTO: 0.19 K/UL (ref 0–0.04)
IMM GRANULOCYTES NFR BLD AUTO: 1 % (ref 0–0.5)
IMM GRANULOCYTES NFR BLD AUTO: 1.2 % (ref 0–0.5)
IMM GRANULOCYTES NFR BLD AUTO: 1.3 % (ref 0–0.5)
INR PPP: 1.5 (ref 0.8–1.2)
LYMPHOCYTES # BLD AUTO: 0.8 K/UL (ref 1–4.8)
LYMPHOCYTES # BLD AUTO: 1 K/UL (ref 1–4.8)
LYMPHOCYTES # BLD AUTO: 1.2 K/UL (ref 1–4.8)
LYMPHOCYTES NFR BLD: 5.6 % (ref 18–48)
LYMPHOCYTES NFR BLD: 7 % (ref 18–48)
LYMPHOCYTES NFR BLD: 7.4 % (ref 18–48)
MAGNESIUM SERPL-MCNC: 1.6 MG/DL (ref 1.6–2.6)
MCH RBC QN AUTO: 31.2 PG (ref 27–31)
MCH RBC QN AUTO: 31.4 PG (ref 27–31)
MCH RBC QN AUTO: 31.8 PG (ref 27–31)
MCHC RBC AUTO-ENTMCNC: 31.6 G/DL (ref 32–36)
MCHC RBC AUTO-ENTMCNC: 31.6 G/DL (ref 32–36)
MCHC RBC AUTO-ENTMCNC: 32.2 G/DL (ref 32–36)
MCV RBC AUTO: 99 FL (ref 82–98)
MODE: ABNORMAL
MONOCYTES # BLD AUTO: 1.8 K/UL (ref 0.3–1)
MONOCYTES # BLD AUTO: 1.9 K/UL (ref 0.3–1)
MONOCYTES # BLD AUTO: 1.9 K/UL (ref 0.3–1)
MONOCYTES NFR BLD: 11.9 % (ref 4–15)
MONOCYTES NFR BLD: 11.9 % (ref 4–15)
MONOCYTES NFR BLD: 13.3 % (ref 4–15)
NEUTROPHILS # BLD AUTO: 11.2 K/UL (ref 1.8–7.7)
NEUTROPHILS # BLD AUTO: 11.9 K/UL (ref 1.8–7.7)
NEUTROPHILS # BLD AUTO: 12.8 K/UL (ref 1.8–7.7)
NEUTROPHILS NFR BLD: 76.8 % (ref 38–73)
NEUTROPHILS NFR BLD: 78.2 % (ref 38–73)
NEUTROPHILS NFR BLD: 80 % (ref 38–73)
NRBC BLD-RTO: 2 /100 WBC
NRBC BLD-RTO: 3 /100 WBC
NRBC BLD-RTO: 3 /100 WBC
PCO2 BLDA: 34 MMHG (ref 35–45)
PH SMN: 7.27 [PH] (ref 7.35–7.45)
PHOSPHATE SERPL-MCNC: 5.2 MG/DL (ref 2.7–4.5)
PLATELET # BLD AUTO: 178 K/UL (ref 150–450)
PLATELET # BLD AUTO: 185 K/UL (ref 150–450)
PLATELET # BLD AUTO: 187 K/UL (ref 150–450)
PMV BLD AUTO: 10.7 FL (ref 9.2–12.9)
PMV BLD AUTO: 10.9 FL (ref 9.2–12.9)
PMV BLD AUTO: 11.1 FL (ref 9.2–12.9)
PO2 BLDA: 66 MMHG (ref 80–100)
POC BE: -11 MMOL/L
POC SATURATED O2: 90 % (ref 95–100)
POC TCO2: 17 MMOL/L (ref 23–27)
POCT GLUCOSE: 140 MG/DL (ref 70–110)
POCT GLUCOSE: 142 MG/DL (ref 70–110)
POCT GLUCOSE: 154 MG/DL (ref 70–110)
POCT GLUCOSE: 165 MG/DL (ref 70–110)
POTASSIUM SERPL-SCNC: 4.3 MMOL/L (ref 3.5–5.1)
PROT SERPL-MCNC: 6.4 G/DL (ref 6–8.4)
PROTHROMBIN TIME: 15.7 SEC (ref 9–12.5)
RBC # BLD AUTO: 2.96 M/UL (ref 4.6–6.2)
RBC # BLD AUTO: 2.99 M/UL (ref 4.6–6.2)
RBC # BLD AUTO: 3.01 M/UL (ref 4.6–6.2)
SAMPLE: ABNORMAL
SITE: ABNORMAL
SODIUM SERPL-SCNC: 129 MMOL/L (ref 136–145)
SP02: 93
VANCOMYCIN SERPL-MCNC: 21 UG/ML
WBC # BLD AUTO: 14.55 K/UL (ref 3.9–12.7)
WBC # BLD AUTO: 14.9 K/UL (ref 3.9–12.7)
WBC # BLD AUTO: 16.36 K/UL (ref 3.9–12.7)

## 2023-01-20 PROCEDURE — 63600175 PHARM REV CODE 636 W HCPCS: Performed by: STUDENT IN AN ORGANIZED HEALTH CARE EDUCATION/TRAINING PROGRAM

## 2023-01-20 PROCEDURE — S0030 INJECTION, METRONIDAZOLE: HCPCS | Performed by: STUDENT IN AN ORGANIZED HEALTH CARE EDUCATION/TRAINING PROGRAM

## 2023-01-20 PROCEDURE — 25000003 PHARM REV CODE 250: Performed by: STUDENT IN AN ORGANIZED HEALTH CARE EDUCATION/TRAINING PROGRAM

## 2023-01-20 PROCEDURE — 83735 ASSAY OF MAGNESIUM: CPT | Performed by: STUDENT IN AN ORGANIZED HEALTH CARE EDUCATION/TRAINING PROGRAM

## 2023-01-20 PROCEDURE — 99233 SBSQ HOSP IP/OBS HIGH 50: CPT | Mod: ,,, | Performed by: INTERNAL MEDICINE

## 2023-01-20 PROCEDURE — 84100 ASSAY OF PHOSPHORUS: CPT | Performed by: STUDENT IN AN ORGANIZED HEALTH CARE EDUCATION/TRAINING PROGRAM

## 2023-01-20 PROCEDURE — 99900035 HC TECH TIME PER 15 MIN (STAT)

## 2023-01-20 PROCEDURE — 80202 ASSAY OF VANCOMYCIN: CPT | Performed by: INTERNAL MEDICINE

## 2023-01-20 PROCEDURE — 99233 PR SUBSEQUENT HOSPITAL CARE,LEVL III: ICD-10-PCS | Mod: ,,, | Performed by: INTERNAL MEDICINE

## 2023-01-20 PROCEDURE — 99232 PR SUBSEQUENT HOSPITAL CARE,LEVL II: ICD-10-PCS | Mod: ,,, | Performed by: NEUROLOGICAL SURGERY

## 2023-01-20 PROCEDURE — 94761 N-INVAS EAR/PLS OXIMETRY MLT: CPT

## 2023-01-20 PROCEDURE — 63600175 PHARM REV CODE 636 W HCPCS: Mod: JG | Performed by: INTERNAL MEDICINE

## 2023-01-20 PROCEDURE — 97535 SELF CARE MNGMENT TRAINING: CPT

## 2023-01-20 PROCEDURE — 20000000 HC ICU ROOM

## 2023-01-20 PROCEDURE — 25000003 PHARM REV CODE 250

## 2023-01-20 PROCEDURE — 63600175 PHARM REV CODE 636 W HCPCS: Mod: JB

## 2023-01-20 PROCEDURE — 25000003 PHARM REV CODE 250: Performed by: INTERNAL MEDICINE

## 2023-01-20 PROCEDURE — 80053 COMPREHEN METABOLIC PANEL: CPT | Performed by: STUDENT IN AN ORGANIZED HEALTH CARE EDUCATION/TRAINING PROGRAM

## 2023-01-20 PROCEDURE — 85025 COMPLETE CBC W/AUTO DIFF WBC: CPT

## 2023-01-20 PROCEDURE — 85610 PROTHROMBIN TIME: CPT | Performed by: STUDENT IN AN ORGANIZED HEALTH CARE EDUCATION/TRAINING PROGRAM

## 2023-01-20 PROCEDURE — 82803 BLOOD GASES ANY COMBINATION: CPT

## 2023-01-20 PROCEDURE — 36600 WITHDRAWAL OF ARTERIAL BLOOD: CPT

## 2023-01-20 PROCEDURE — 97530 THERAPEUTIC ACTIVITIES: CPT

## 2023-01-20 PROCEDURE — 99232 SBSQ HOSP IP/OBS MODERATE 35: CPT | Mod: ,,, | Performed by: NEUROLOGICAL SURGERY

## 2023-01-20 PROCEDURE — P9047 ALBUMIN (HUMAN), 25%, 50ML: HCPCS | Mod: JG | Performed by: INTERNAL MEDICINE

## 2023-01-20 RX ORDER — METRONIDAZOLE 500 MG/1
500 TABLET ORAL EVERY 8 HOURS
Status: DISCONTINUED | OUTPATIENT
Start: 2023-01-20 | End: 2023-01-22

## 2023-01-20 RX ORDER — HEPARIN SODIUM 5000 [USP'U]/ML
5000 INJECTION, SOLUTION INTRAVENOUS; SUBCUTANEOUS EVERY 8 HOURS
Status: DISCONTINUED | OUTPATIENT
Start: 2023-01-20 | End: 2023-01-31

## 2023-01-20 RX ORDER — NOREPINEPHRINE BITARTRATE/D5W 4MG/250ML
0-.2 PLASTIC BAG, INJECTION (ML) INTRAVENOUS CONTINUOUS
Status: DISCONTINUED | OUTPATIENT
Start: 2023-01-20 | End: 2023-01-20

## 2023-01-20 RX ADMIN — LACTULOSE 30 G: 20 SOLUTION ORAL at 01:01

## 2023-01-20 RX ADMIN — SODIUM BICARBONATE 1300 MG: 650 TABLET ORAL at 09:01

## 2023-01-20 RX ADMIN — MIDODRINE HYDROCHLORIDE 10 MG: 5 TABLET ORAL at 09:01

## 2023-01-20 RX ADMIN — THIAMINE HCL TAB 100 MG 100 MG: 100 TAB at 05:01

## 2023-01-20 RX ADMIN — MUPIROCIN: 20 OINTMENT TOPICAL at 09:01

## 2023-01-20 RX ADMIN — MUPIROCIN: 20 OINTMENT TOPICAL at 08:01

## 2023-01-20 RX ADMIN — PANTOPRAZOLE SODIUM 40 MG: 40 TABLET, DELAYED RELEASE ORAL at 04:01

## 2023-01-20 RX ADMIN — HEPARIN SODIUM 5000 UNITS: 5000 INJECTION INTRAVENOUS; SUBCUTANEOUS at 02:01

## 2023-01-20 RX ADMIN — MIDODRINE HYDROCHLORIDE 10 MG: 5 TABLET ORAL at 03:01

## 2023-01-20 RX ADMIN — METRONIDAZOLE 500 MG: 500 TABLET ORAL at 03:01

## 2023-01-20 RX ADMIN — ALBUMIN (HUMAN) 25 G: 12.5 SOLUTION INTRAVENOUS at 09:01

## 2023-01-20 RX ADMIN — THERA TABS 1 TABLET: TAB at 09:01

## 2023-01-20 RX ADMIN — PANTOPRAZOLE SODIUM 40 MG: 40 TABLET, DELAYED RELEASE ORAL at 06:01

## 2023-01-20 RX ADMIN — OCTREOTIDE ACETATE 100 MCG: 50 INJECTION, SOLUTION INTRAVENOUS; SUBCUTANEOUS at 05:01

## 2023-01-20 RX ADMIN — HEPARIN SODIUM 5000 UNITS: 5000 INJECTION INTRAVENOUS; SUBCUTANEOUS at 09:01

## 2023-01-20 RX ADMIN — THIAMINE HCL TAB 100 MG 100 MG: 100 TAB at 09:01

## 2023-01-20 RX ADMIN — MIDODRINE HYDROCHLORIDE 10 MG: 5 TABLET ORAL at 05:01

## 2023-01-20 RX ADMIN — CEFEPIME 2 G: 2 INJECTION, POWDER, FOR SOLUTION INTRAVENOUS at 09:01

## 2023-01-20 RX ADMIN — FOLIC ACID 1 MG: 1 TABLET ORAL at 09:01

## 2023-01-20 RX ADMIN — LACTULOSE 30 G: 20 SOLUTION ORAL at 09:01

## 2023-01-20 RX ADMIN — OCTREOTIDE ACETATE 100 MCG: 50 INJECTION, SOLUTION INTRAVENOUS; SUBCUTANEOUS at 02:01

## 2023-01-20 RX ADMIN — METRONIDAZOLE 500 MG: 500 TABLET ORAL at 09:01

## 2023-01-20 RX ADMIN — METRONIDAZOLE 500 MG: 500 INJECTION, SOLUTION INTRAVENOUS at 05:01

## 2023-01-20 RX ADMIN — THIAMINE HCL TAB 100 MG 100 MG: 100 TAB at 03:01

## 2023-01-20 RX ADMIN — SODIUM BICARBONATE 1300 MG: 650 TABLET ORAL at 03:01

## 2023-01-20 NOTE — ASSESSMENT & PLAN NOTE
Unsure of etiology at this time  Size of liver suggest other etiology than cirrhosis, however also has esophageal varices, possible HRS. CT and US renal with no signs of obstruction.     Urine microscopy: (1/17) no casts identified, amorphous material (though only 5 cc obtained). Repeat with few granular casts (1/18). Repeat on 1/19 with granular casts  Urine protein 4.59g and repeat with 2.5  C3/4 - 111/35  LILO with ratio of 1.98  Hep B and C negative  Uric acid 13    HIV negative  Iron 31, TIBC 151, Tsat 21, Transferrin 102, ferritin 135  -KEATON  -ANCA  -Cryo     At this time differential is large with possible infiltrative disease on background of alcoholism; EGD with small varices. There may be a component of HRS physiology but unsure of overall time course regarding MARTITA.     1/19: repeat Urine sodium 10, urine protein creatinine ratio 0.89. IgA 505    Recommendations:    -Keep MAP > 65  -Keep hemoglobin > 7  -Strict ins and outs  -Avoid nephrotoxic agents if possible  -Avoid drastic hemodynamic changes if possible

## 2023-01-20 NOTE — SUBJECTIVE & OBJECTIVE
Interval History: 1/20: poor exam this morning, neuro exam confounded. patient was recently given ativan and difficult to keep awake. Worsening Cr, unclear what process is going on still.         Medications:  Continuous Infusions:      Scheduled Meds:   ceFEPime (MAXIPIME) IVPB  2 g Intravenous Q24H    folic acid  1 mg Oral Daily    heparin (porcine)  5,000 Units Subcutaneous Q8H    lactulose  30 g Oral QID    metroNIDAZOLE  500 mg Oral Q8H    midodrine  10 mg Oral Q8H    multivitamin  1 tablet Oral Daily    mupirocin   Nasal BID    pantoprazole  40 mg Oral BID AC    scopolamine  1 patch Transdermal Q3 Days    sodium bicarbonate  1,300 mg Oral TID    thiamine  100 mg Oral Q8H     PRN Meds:sodium chloride, dextrose 10%, dextrose 10%, glucagon (human recombinant), glucose, glucose, insulin aspart U-100, LIDOcaine-prilocaine, lorazepam     Review of Systems  Objective:     Weight: 110.7 kg (244 lb)  Body mass index is 37.1 kg/m².  Vital Signs (Most Recent):  Temp: 97.6 °F (36.4 °C) (01/20/23 1615)  Pulse: 88 (01/20/23 1715)  Resp: (!) 22 (01/20/23 1715)  BP: 127/61 (01/20/23 1700)  SpO2: 95 % (01/20/23 1715)   Vital Signs (24h Range):  Temp:  [97.6 °F (36.4 °C)-97.9 °F (36.6 °C)] 97.6 °F (36.4 °C)  Pulse:  [69-98] 88  Resp:  [10-59] 22  SpO2:  [88 %-97 %] 95 %  BP: (109-135)/(51-82) 127/61     Date 01/20/23 0700 - 01/21/23 0659   Shift 4386-4240 0726-0227 1142-7385 24 Hour Total   INTAKE   P.O. 100   100   I.V.(mL/kg) 27.3(0.2)   27.3(0.2)   Blood 100   100   IV Piggyback 99.2   99.2   Shift Total(mL/kg) 326.5(3)   326.5(3)   OUTPUT   Urine(mL/kg/hr) 60(0.1) 22  82   Shift Total(mL/kg) 60(0.5) 22(0.2)  82(0.7)   Weight (kg) 110.7 110.7 110.7 110.7                          Urethral Catheter 01/16/23 1347 Latex 14 Fr. (Active)   Site Assessment Clean;Intact;Dry 01/17/23 0301   Collection Container Urimeter 01/17/23 0301   Securement Method secured to top of thigh w/ adhesive device 01/17/23 0301   Catheter Care  "Performed yes 01/17/23 0301   Reason for Continuing Urinary Catheterization Critically ill in ICU and requiring hourly monitoring of intake/output 01/17/23 0301   CAUTI Prevention Bundle Securement Device in place with 1" slack;Intact seal between catheter & drainage tubing;Drainage bag/urimeter off the floor;Sheeting clip in use;No dependent loops or kinks;Drainage bag/urimeter not overfilled (<2/3 full);Drainage bag/urimeter below bladder 01/17/23 0301   Output (mL) 0 mL 01/16/23 2001       Physical Exam  Neurosurgery Physical Exam  Constitutional:       Appearance: He is well-developed and well-nourished.      Comments: obese   Eyes:      Extraocular Movements: EOM normal.      Conjunctiva/sclera: Conjunctivae normal.      Pupils: Pupils are equal, round, and reactive to light.   Cardiovascular:      Pulses: Normal pulses.   Abdominal:      Palpations: Abdomen is soft.   Neurological:      Mental Status: He is alert and oriented to person, place, and time.       AAOx1, hard to awaken and keep awake  PERRL  CN grossly intact  BUE 4-/5  BLE 1 hip flexion, 2 ke, 4+ df/pf/ehl  Reflexes normal  Poor rectal tone  No saddle anesthesia  SILT     Patient somewhat sedated this morning, neuro exam with confounding factors        Psychiatric:         Mood and Affect: Mood and affect normal    Significant Labs:  Recent Labs   Lab 01/19/23 0420 01/20/23 0316   * 131*   * 129*   K 4.3 4.3    98   CO2 16* 14*   BUN 32* 35*   CREATININE 6.5* 6.9*   CALCIUM 7.3* 7.7*   MG 1.6 1.6       Recent Labs   Lab 01/19/23 2014 01/20/23 0316 01/20/23  1130   WBC 17.15* 16.36* 14.90*   HGB 9.6* 9.4* 9.3*   HCT 29.5* 29.7* 29.4*    185 187       Recent Labs   Lab 01/19/23 0420 01/20/23 0316   INR 1.4* 1.5*       Microbiology Results (last 7 days)       Procedure Component Value Units Date/Time    Aerobic culture [[id number]] Collected: 01/17/23 0003    Order Status: Completed Specimen: Ascites Updated: 01/20/23 " 1239     Aerobic Bacterial Culture No growth    Culture, Anaerobic [[id number]] Collected: 01/17/23 0003    Order Status: Completed Specimen: Ascites Updated: 01/19/23 0849     Anaerobic Culture Culture in progress    Gram stain [[id number]] Collected: 01/17/23 0003    Order Status: Completed Specimen: Ascites Updated: 01/17/23 0207     Gram Stain Result Rare WBC's      No organisms seen    Blood culture [[id number]]     Order Status: Canceled Specimen: Blood     Blood culture [[id number]]     Order Status: Canceled Specimen: Blood           ABGs:   Recent Labs   Lab 01/20/23  1356   PH 7.270*   PCO2 34.0*   PO2 66*   HCO3 15.6*   POCSATURATED 90*   BE -11     Cardiac markers: No results for input(s): CKMB, CPKMB, TROPONINT, TROPONINI, MYOGLOBIN in the last 48 hours.    CMP:   Recent Labs   Lab 01/19/23  0420 01/20/23  0316   * 131*   CALCIUM 7.3* 7.7*   ALBUMIN 2.4* 2.6*   PROT 6.1 6.4   * 129*   K 4.3 4.3   CO2 16* 14*    98   BUN 32* 35*   CREATININE 6.5* 6.9*   ALKPHOS 194* 188*   ALT 56* 44   AST 99* 76*   BILITOT 2.0* 2.4*       CRP:   No results for input(s): CRP in the last 48 hours.    ESR: No results for input(s): POCESR, ERYTHROCYTES in the last 48 hours.  LFTs:   Recent Labs   Lab 01/19/23  0420 01/20/23  0316   ALT 56* 44   AST 99* 76*   ALKPHOS 194* 188*   BILITOT 2.0* 2.4*   PROT 6.1 6.4   ALBUMIN 2.4* 2.6*       Procalcitonin: No results for input(s): PROCAL in the last 48 hours.    Significant Diagnostics:  I have reviewed all pertinent imaging results/findings within the past 24 hours.

## 2023-01-20 NOTE — PT/OT/SLP PROGRESS
Occupational Therapy  Co-Treatment    Name: Jonny Isabel  MRN: [id number]  Admitting Diagnosis:  Spinal stenosis  3 Days Post-Op  Pt is scheduled for surgery 1/23/12.     Recommendations:     Discharge Recommendations: rehabilitation facility    Assessment:     Jonny Isabel is a 62 y.o. male with a medical diagnosis of Spinal stenosis.  Performance deficits affecting function are strength, weakness, impaired endurance, impaired self care skills, impaired functional mobility, impaired balance, decreased coordination, decreased upper extremity function, decreased lower extremity function, decreased safety awareness. Pt tolerated activity and participated in bed mobility and attempted self-care skills but was limited by his poor coordination and overall impaired strength. Pt is not safe to return home at this time and anticipate he will need IP rehab to return home.     Rehab Prognosis:  Fair; patient would benefit from acute skilled OT services to address these deficits and reach maximum level of function.     Co-tx completed to optimize functional performance and safety given pt impaired tolerance for activity in the setting of the ICU.      Plan:     Patient to be seen 3 x/week to address the above listed problems via self-care/home management, therapeutic activities, therapeutic exercises  Plan of Care Expires: 02/17/23  Plan of Care Reviewed with: patient, girlfriend    Subjective     Pt denied pain after asked.   Pt expressed need for BM multiple times and was able to have BM in bed pan.     Objective:     Communicated with: nsmichael prior to session.  Patient found supine with gomez catheter, peripheral IV, telemetry, blood pressure cuff upon OT entry to room.  Girlfriend present in room.     General Precautions: Standard, fall       Occupational Performance:     Bed Mobility:    Patient completed Rolling/Turning to Left with  total assistance x 2  Patient completed Rolling/Turning to Right with total assistance x  2  Patient completed Supine to Sit with total assistance x 3  Patient completed Sit to Supine with total assistance x 3    Balance:  Pt sat EOB for 6 mins with CGA.     Activities of Daily Living:  Grooming: Wash face with Total A, attempted hand over hand at EOB, but therapist completed the activity due to pt poor coordination.   Toileting: Pt required total A for perineal hygiene following BM supine on bed pan.       AMPAC 6 Click ADL: 14    Treatment & Education:  Ed pt with OT POC and safety with functional mobility and ADL skills.  Pt sat EOB and attempted to don/doff  glasses, but due to  poor B coordination and unstable hands, therapist don/doff his glasses.  Pt attempted to wash his face EOB, but required Total A. OT attempted hand over hand, but was unsuccessful, therapist completed washing his face.      Patient left supine HOB elevated with all lines intact, call button in reach, nsg notified, and nsg and girlfriend present    GOALS:   Multidisciplinary Problems       Occupational Therapy Goals          Problem: Occupational Therapy    Goal Priority Disciplines Outcome Interventions   Occupational Therapy Goal     OT, PT/OT Ongoing, Progressing    Description: Goals to be met by: 14 days 2/1/23     Patient will increase functional independence with ADLs by performing:    Pt to completed self feeding independently.   Pt to complete standing g/h skills with CGA.   Pt to complete UE dressing with set-up  Pt to complete LE dressing with MIN with AE as needed.   Pt to complete toileting with MIN A   Pt to complete t/f to BSC with MIN A                        Time Tracking:     OT Date of Treatment: 01/20/23  OT Start Time: 1050  OT Stop Time: 1055  OT Start Time 2: 11:00  OT Stop Time 2: 11:15  OT Total Time (min): 20 min  Pt requested therapist to leave during BM at 10:55, then after pt completed BM, therapists returned at 11:15 to continue session.     Billable Minutes:Self Care/Home Management 20  min    OT/KARLEE: OT          1/20/2023

## 2023-01-20 NOTE — ASSESSMENT & PLAN NOTE
Likely 2/2 to underlying alcohol abuse and hepatic steatosis.     - Daily CMP, PT/INR   - Complete workup up with tylenol level, acute hep panel, a1-antitrypsin, anti smith antibody, HIV, anti mitochondrial antibody, anca, anti-liver, aFP, PETH pending   - Liver US with doppler shows epatomegaly with hepatic steatosis, cholelithiasis versus gallbladder sludge, small volume ascites, and left pleural effusion.

## 2023-01-20 NOTE — ASSESSMENT & PLAN NOTE
63 yo M with PMH of alcoholic hepatitis (drinks a 6 pack of beer per day and bottle of marie), Afib on Xarelto, central obesity, HTN, GI bleed, unknown CKD vs MARTITA on admission, anemia, chronic hyponatremia who presents due to inability to get out of bed. He reports that he has been having fecal incontinence for 2 months now and difficulty walking for the last 4 days (with single person assistance and rolling walker) and inability to get out of bed today. He denies saddle anesthesia or urinary incontinence or issues voiding his bladder. He has also  been having dark and tarry stools.     --Admitted to MICU   -q4 neuro checks  --All labs and diagnostics reviewed   -CT L spine and MRI L spine at OSH showed large L3/L4 herniated disc with moderate to severe stenosis. Some air in disc space.   MRI L spine wo 1/16: large L3/L4 herniated disc with moderate to severe stenosis  - no acute neurosurgical intervention, patient with clinical findings of subacute to chronic symptoms   -Will tentatively plan for OR Monday for laminectomy/discectomy pending medical stablization/optimization &  documented risk stratification  - appreciated risk stratification from primary team, patient with multiple ongoing comorbidities  - no HOB restrictions   - MAPs >65, requiring pressors  - hold Xarelto, PT and INR elevated on admission // will need Xarelto held for 3-5 days prior to OR and/or coagulapathy corrected  - GI consulted for GI bleed, EGD negative for bleed but showed esophageal varices. Concern for infiltrative liver process on top of alcoholic cirrhosis  - nephro consulted; Cr continues to rise  - ok for diet at this time per primary, NPO Sunday night for tentative surgery on Monday for lumbar decompression  - MICU/ for all primary medical issues  - nsgy will continue to follow    Dispo: ongoing, tentative plan for OR Monday with neurosurgery for lumbar decompression if medically stable

## 2023-01-20 NOTE — ASSESSMENT & PLAN NOTE
This patient does have evidence of infective focus  My overall impression is septic shock.  Source: Unknown  Antibiotics given-   Antibiotics (From admission, onward)    Start     Stop Route Frequency Ordered    01/20/23 1400  metroNIDAZOLE tablet 500 mg         -- Oral Every 8 hours 01/20/23 1109    01/18/23 0900  mupirocin 2 % ointment         01/23 0859 Nasl 2 times daily 01/18/23 0354    01/16/23 2100  ceFEPIme (MAXIPIME) 2 g in dextrose 5 % in water (D5W) 5 % 50 mL IVPB (MB+)         -- IV Every 24 hours (non-standard times) 01/16/23 1354        Latest lactate reviewed-  No results for input(s): LACTATE in the last 72 hours.  Organ dysfunction indicated by Acute kidney injury    - On Vancomycin, Cefepime, and Flagyl, Vanc d/c 1/20  - BCx negative  - SBP work up negative  - Will de-escalate once appropriate

## 2023-01-20 NOTE — PROGRESS NOTES
[facility]  Nephrology  Progress Note    Patient Name: Jonny Isabel  MRN: [id number]  Admission Date: 1/16/2023  Hospital Length of Stay: 4 days  Attending Provider: Genaro Ortiz MD   Primary Care Physician: Yuli Pérez Mai, MD  Principal Problem:Spinal stenosis    Subjective:     HPI: 62 year old male with a history of alcohol abuse, afib/flutter on xarelto, HTN presents with concern for severe spinal canal stenosis/vacuum disc phenomena, concern for a GI bleed and MARTITA. He presents at behest of his girlfriend after being unable to stand, he has a history of sciatica but reports that this is worse. He has had fecal incontinence for the last week.     He has no report of kidney issues in the past and reports no family history of such either. At time of consultation he is pending an EGD for GI bleed. CT abdomen with large liver and concern for SBO per report, no hydronephrosis. UA with 2+ protein, 1+ occult blood, urine sodium 25 and urine osm 302. On admission serum creatinine 5 (baseline 0.8).      Interval History: IgA 505, urine protein 0.89, urine sodium 10    Review of patient's allergies indicates:  No Known Allergies  Current Facility-Administered Medications   Medication Frequency    0.9%  NaCl infusion (for blood administration) Q24H PRN    ceFEPIme (MAXIPIME) 2 g in dextrose 5 % in water (D5W) 5 % 50 mL IVPB (MB+) Q24H    dextrose 10% bolus 125 mL 125 mL PRN    dextrose 10% bolus 250 mL 250 mL PRN    folic acid tablet 1 mg Daily    glucagon (human recombinant) injection 1 mg PRN    glucose chewable tablet 16 g PRN    glucose chewable tablet 24 g PRN    heparin (porcine) injection 5,000 Units Q8H    insulin aspart U-100 pen 0-5 Units QID (AC + HS) PRN    lactulose 20 gram/30 mL solution Soln 30 g QID    LIDOcaine-prilocaine cream PRN    LORazepam injection 1 mg Q4H PRN    metroNIDAZOLE tablet 500 mg Q8H    midodrine tablet 10 mg Q8H    multivitamin tablet Daily    mupirocin 2 %  ointment BID    NORepinephrine bitartrate-D5W 4 mg/250 mL (16 mcg/mL) PERIPHERAL access infusion Continuous    octreotide injection 100 mcg Q8H    pantoprazole EC tablet 40 mg BID AC    scopolamine 1.3-1.5 mg (1 mg over 3 days) 1 patch Q3 Days    sodium bicarbonate tablet 1,300 mg TID    thiamine tablet 100 mg Q8H       Objective:     Vital Signs (Most Recent):  Temp: 97.8 °F (36.6 °C) (01/20/23 1200)  Pulse: 75 (01/20/23 1215)  Resp: (!) 23 (01/20/23 1215)  BP: 115/65 (01/20/23 1200)  SpO2: 95 % (01/20/23 1215)   Vital Signs (24h Range):  Temp:  [97.6 °F (36.4 °C)-97.9 °F (36.6 °C)] 97.8 °F (36.6 °C)  Pulse:  [67-98] 75  Resp:  [10-59] 23  SpO2:  [88 %-97 %] 95 %  BP: ()/(51-82) 115/65     Weight: 110.7 kg (244 lb) (01/17/23 1030)  Body mass index is 37.1 kg/m².  Body surface area is 2.3 meters squared.    I/O last 3 completed shifts:  In: 1075.3 [I.V.:590.9; IV Piggyback:484.3]  Out: 150 [Urine:150]    Physical Exam  Constitutional:       General: He is not in acute distress.     Appearance: He is obese. He is not ill-appearing or toxic-appearing.   HENT:      Nose: Nose normal. No congestion or rhinorrhea.      Mouth/Throat:      Mouth: Mucous membranes are moist.   Eyes:      General: No scleral icterus.        Right eye: No discharge.         Left eye: No discharge.      Pupils: Pupils are equal, round, and reactive to light.   Cardiovascular:      Rate and Rhythm: Normal rate.   Pulmonary:      Effort: Pulmonary effort is normal. No respiratory distress.   Abdominal:      General: Abdomen is flat. There is distension.      Tenderness: There is no abdominal tenderness.   Musculoskeletal:      Cervical back: Normal range of motion. No rigidity.      Right lower leg: Edema present.      Left lower leg: Edema present.   Lymphadenopathy:      Cervical: No cervical adenopathy.   Skin:     Coloration: Skin is not jaundiced.      Findings: No lesion.   Neurological:      General: No focal deficit present.       Mental Status: He is alert.       Significant Labs:  All labs within the past 24 hours have been reviewed.     Significant Imaging:  Labs: Reviewed    Assessment/Plan:     * Spinal stenosis  Per primary    MARTITA (acute kidney injury)  Unsure of etiology at this time  Size of liver suggest other etiology than cirrhosis, however also has esophageal varices, possible HRS. CT and US renal with no signs of obstruction.     Urine microscopy: (1/17) no casts identified, amorphous material (though only 5 cc obtained). Repeat with few granular casts (1/18). Repeat on 1/19 with granular casts  Urine protein 4.59g and repeat with 2.5  C3/4 - 111/35  LILO with ratio of 1.98  Hep B and C negative  Uric acid 13    HIV negative  Iron 31, TIBC 151, Tsat 21, Transferrin 102, ferritin 135  -KEATON  -ANCA  -Cryo     At this time differential is large with possible infiltrative disease on background of alcoholism; EGD with small varices. There may be a component of HRS physiology but unsure of overall time course regarding MARTITA.     1/19: repeat Urine sodium 10, urine protein creatinine ratio 0.89. IgA 505.     Recommendations:  -Repeating urine microscopy, difficult to call this HRS at this time with MAP already at 85 and worsening outcome  -Currently on room air and laying flat without issue, no need for RRT or lasix for volume removal at this time.  -Keep MAP > 65  -Keep hemoglobin > 7  -Strict ins and outs  -Avoid nephrotoxic agents if possible  -Avoid drastic hemodynamic changes if possible               Thank you for your consult. I will follow-up with patient. Please contact us if you have any additional questions.    Otoniel Cat MD  Nephrology  [facility]    ATTENDING PHYSICIAN ATTESTATION  I have personally verified the history and examined the patient. I thoroughly reviewed the demographic, clinical, laboratorial and imaging information available in medical records. I agree with the assessment and  recommendations provided by the subspecialty resident who was under my supervision.

## 2023-01-20 NOTE — PLAN OF CARE
"      SICU PLAN OF CARE NOTE    Dx: Spinal stenosis    Shift Events: Levophed d/c'd, multiple bowel movements    Goals of Care: comfort, maintain MAP >65, encourage activity, nutrition    Neuro: Follows Commands and Moves All Extremities; pt slow to respond, oriented x4    Vital Signs: /64   Pulse 81   Temp 97.8 °F (36.6 °C) (Oral)   Resp (!) 34   Ht 5' 8" (1.727 m)   Wt 110.7 kg (244 lb)   SpO2 (!) 94%   BMI 37.10 kg/m²     Respiratory: Room Air    Diet: Clear Liquid    Gtts: n/a    Urine Output: Urinary Catheter 5-10 cc/hour    Drains:                  Labs/Accuchecks: accuchecks ac & HS, labs qam.    Skin: multiple tatoos, skin intact       "

## 2023-01-20 NOTE — PROGRESS NOTES
[facility]  Critical Care Medicine  Progress Note    Patient Name: Jonny Isabel  MRN: [id number]  Admission Date: 1/16/2023  Hospital Length of Stay: 4 days  Code Status: Full Code  Attending Provider: Genaro Ortiz MD  Primary Care Provider: Yuli Pérez Mai, MD   Principal Problem: Spinal stenosis    Subjective:     HPI:  Mr. Fuller is a 62-year-old male with a PMHx of A-fibb (on Eliquis), hypertension, DM 2 (not insulin dependent) presents as transfer from OSH for chronic lower back that has been worsening for the past week. Pt states that the pain is radiating from his right buttock down to his legs. Pt was unable to get out of bed this AM due to the pain. HE endorses having fecal incontinence for the past week with black tarry stools. He denies any fevers, chills, abdominal pain, urinary incontinence, or saddle anesthesia.     At OSH ED CT lumbar spine ordered revealing Prominent, cystic, multiloculated predominantly gas attenuation epidural  structure within the spinal canal at the level of L3-L4 resulting in severe narrowing of the spinal canal with mass effect upon the thecal sac and  vacuum disc phenomena concerning for diskitis or an epidural abscess. MRI shows large disc extrusion at L3-4 causing severe spinal canal stenosis, with moderate spinal canal stenosis at L2-3 and L4-5 and moderate neural foraminal narrowing L4-5 and L5-S1. Patient had no tenderness overlying spinous process but still endorsed sever pain, received percocet and robaxin. Of note, KENNETH showed blood in stool. The patient has a white count of 12.9, hemoglobin of 7.5, creatinine 4.6, Lactate of 3.9. Pt given Rocephin, Zosyn and Vanc.    [place] [facility] [place] with neurosurgery.       Hospital/ICU Course:  Admitted to MICU for NSGY and GI evaluation . Started on midodrine, octreotide for concerning of HRS. Started on Levo overnight. Hgb stable. EGD shows small varices with gastropathy, no  active bleeds. Worsening MARTITA with minimal UOP, HRS protocol started with midodrine, octreotide and albumin.       Interval History/Significant Events: Worsening MARTITA with minimal UOP. Awaiting nephrology recc's. Levophed started to maintain MAP of >80-85      Review of Systems   Constitutional:  Positive for activity change. Negative for chills and fever.   HENT:  Negative for congestion and trouble swallowing.    Eyes:  Negative for visual disturbance.   Respiratory:  Negative for cough, shortness of breath, wheezing and stridor.    Cardiovascular:  Negative for chest pain and leg swelling.   Gastrointestinal:  Positive for abdominal distention and blood in stool. Negative for abdominal pain, diarrhea, nausea and vomiting.        +fecal incontinence   Endocrine: Negative for polyuria.   Genitourinary:  Negative for difficulty urinating and dysuria.   Musculoskeletal:  Positive for back pain. Negative for arthralgias and myalgias.   Skin:  Negative for color change and rash.   Neurological:  Negative for dizziness, weakness, numbness and headaches.   Psychiatric/Behavioral:  Positive for confusion. Negative for agitation.    Objective:     Vital Signs (Most Recent):  Temp: 98 °F (36.7 °C) (01/18/23 0301)  Pulse: 71 (01/18/23 1315)  Resp: 12 (01/18/23 1315)  BP: (!) 101/56 (01/18/23 1315)  SpO2: 97 % (01/18/23 1315)   Vital Signs (24h Range):  Temp:  [97.7 °F (36.5 °C)-98 °F (36.7 °C)] 98 °F (36.7 °C)  Pulse:  [69-96] 71  Resp:  [10-32] 12  SpO2:  [72 %-100 %] 97 %  BP: ()/(38-80) 101/56   Weight: 110.7 kg (244 lb)  Body mass index is 37.1 kg/m².      Intake/Output Summary (Last 24 hours) at 1/18/2023 1335  Last data filed at 1/18/2023 1300  Gross per 24 hour   Intake 3406.99 ml   Output 160 ml   Net 3246.99 ml         Physical Exam  Vitals and nursing note reviewed.   Constitutional:       General: He is not in acute distress.     Appearance: He is obese. He is not ill-appearing.   HENT:      Head:  Normocephalic and atraumatic.      Right Ear: External ear normal.      Left Ear: External ear normal.      Nose: Nose normal.      Mouth/Throat:      Mouth: Mucous membranes are moist.      Pharynx: Oropharynx is clear.   Eyes:      General:         Right eye: No discharge.         Left eye: No discharge.      Extraocular Movements: Extraocular movements intact.      Conjunctiva/sclera: Conjunctivae normal.      Pupils: Pupils are equal, round, and reactive to light.   Cardiovascular:      Rate and Rhythm: Normal rate and regular rhythm.      Pulses: Normal pulses.      Heart sounds: Normal heart sounds. No murmur heard.  Pulmonary:      Effort: Pulmonary effort is normal. No respiratory distress.      Breath sounds: No wheezing or rales.   Abdominal:      General: There is distension.      Tenderness: There is no abdominal tenderness. There is no guarding.   Musculoskeletal:         General: No swelling. Normal range of motion.      Cervical back: Normal range of motion.      Right lower leg: No edema.      Left lower leg: No edema.   Skin:     General: Skin is warm and dry.      Coloration: Skin is not jaundiced.   Neurological:      General: No focal deficit present.      Mental Status: He is alert. He is disoriented.      Cranial Nerves: Cranial nerves 2-12 are intact.      Comments: Patient altered  Responding to questions appropriately but confused about timing of events  Poor recall  + fecal incontinence   Psychiatric:         Mood and Affect: Mood normal.         Behavior: Behavior normal.       Vents:     Lines/Drains/Airways       Drain  Duration                  Urethral Catheter 01/16/23 1347 Latex 14 Fr. 1 day              Peripheral Intravenous Line  Duration                  Peripheral IV - Single Lumen 01/16/23 0700 20 G Left Forearm 2 days         Peripheral IV - Single Lumen 01/16/23 0700 20 G Left Hand 2 days         Peripheral IV - Single Lumen 01/16/23 0700 20 G Right Wrist 2 days                   Significant Labs:    CBC/Anemia Profile:  Recent Labs   Lab 01/17/23  1505 01/17/23  1803 01/18/23  0009 01/18/23  0627 01/18/23  1125   WBC  --    < > 13.97* 13.83* 13.39*   HGB  --    < > 8.5* 8.6* 8.5*   HCT  --    < > 27.2* 27.3* 26.7*   PLT  --    < > 235 199 182   MCV  --    < > 99* 99* 100*   RDW  --    < > 21.0* 21.1* 21.0*   IRON 31*  --   --   --   --    FERRITIN 135  --   --   --   --     < > = values in this interval not displayed.          Chemistries:  Recent Labs   Lab 01/17/23  0332 01/18/23  0256   * 128*   K 4.7 4.6   CL 99 99   CO2 14* 15*   BUN 28* 29*   CREATININE 5.0* 6.0*   CALCIUM 7.0* 6.8*   ALBUMIN 1.6* 2.0*   PROT 5.8* 5.8*   BILITOT 2.3* 2.4*   ALKPHOS 240* 225*   ALT 74* 66*   * 136*   MG 1.4* 1.7   PHOS 5.3* 5.1*         All pertinent labs within the past 24 hours have been reviewed.    Significant Imaging:  I have reviewed all pertinent imaging results/findings within the past 24 hours.      ABG  No results for input(s): PH, PO2, PCO2, HCO3, BE in the last 168 hours.  Assessment/Plan:     Neuro  * Spinal stenosis  CT Lumbar Spine Without Contrast Result Date: 1/16/2023  1.  Prominent, cystic, multiloculated predominantly gas attenuation structure within the spinal canal at the level of L3-L4 with dimensions as above.  This is favored to be epidural in location and results in severe narrowing of the spinal canal with mass effect upon the thecal sac.  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 Large disc extrusion at L3-4, contributing to severe spinal canal stenosis, as above. Additional lumbar spondylosis, contributing to moderate spinal canal stenosis at L2-3 and L4-5 and moderate neural foraminal narrowing L4-5 and L5-S1    - Neuro checks q1h   - NSGY consulted, will wait till pt more medically stable before intervening     Psychiatric  Alcohol abuse  - Vitals q4h while awake  -  CIWA monitoring  - Ativan 2mg q4 PRN if 2 criteria are met: Systolic BP>160, Diastolic BP >110 or Pulse >110  - Start Vitamin supplementation- Thiamine, Folic acid, Vit. B12, and Multivitamin   - Will start valium taper at 5 mg Q8H       Cardiac/Vascular  A-fib  - Maintain K > 4, Mag > 2 and Ca/iCal WNL to decrease arrhythmogenic potential  - On lopressor 100 mg QD, holding as pt likely has GI bleed  - Holding anticoagulation in the setting of GI bleed       Renal/  MARTITA (acute kidney injury)  Creatinine 4.7 on admit, baseline around 0.8. Pre-renal vs ATN. Less likely obstruction as pt has gomez     - Check urine lytes and renal ultrasound  - Check urine protein creatinine ratio.  - Strict I&Os and daily weights   - Avoid nephrotoxic agents such as NSAIDs, gadolinium and IV radiocontrast.  - Renally dose meds to current GFR.  - Maintain MAP > 65. Using levophed as needed  - Started on HRS protocol with midodrine 10mg TID, Octreotide, and Albumin 25g BID.       ID  Severe sepsis  This patient does have evidence of infective focus  My overall impression is septic shock.  Source: Unknown  Antibiotics given-   Antibiotics (From admission, onward)    Start     Stop Route Frequency Ordered    01/16/23 2100  ceFEPIme (MAXIPIME) 2 g in dextrose 5 % in water (D5W) 5 % 50 mL IVPB (MB+)         -- IV Every 24 hours (non-standard times) 01/16/23 1354    01/16/23 2100  metronidazole IVPB 500 mg         -- IV Every 8 hours (non-standard times) 01/16/23 1354    01/16/23 1256  vancomycin - pharmacy to dose  (vancomycin IVPB)        See Hyperspace for full Linked Orders Report.    -- IV pharmacy to manage frequency 01/16/23 1256        Latest lactate reviewed-  Recent Labs   Lab 01/16/23  1838 01/17/23  0014 01/17/23  0332   LACTATE 2.3* 2.0 1.8     Organ dysfunction indicated by Acute kidney injury    - Was on Vancomycin and Zosyn   - Will switch to Vancomycin, Cefepime, and Flagyl   - BCx pending   - Will de-escalate once  appropriate         Endocrine  Diabetes  -Last A1c reviewed-   Lab Results   Component Value Date    HGBA1C 5.3 11/10/2022       Home Antihyperglycemic Regiment:  - Metformin  1000 mg BID     Inpatient Antihyperglycemic Regiment:  Antihyperglycemics (From admission, onward)    Start     Stop Route Frequency Ordered    01/16/23 1507  insulin aspart U-100 pen 0-5 Units         -- SubQ Before meals & nightly PRN 01/16/23 1407        - LDSSI  - Clear liquid diet for now     Blood Sugars (AccuCheck):    Recent Labs     01/16/23  1545 01/16/23  2043   POCTGLUCOSE 141* 163*           GI  Alcoholic cirrhosis of liver with ascites  S/p diagnostic paracentesis on 1/17 with , 50% seg. Cytology pending     Elevated liver enzymes  Likely 2/2 to underlying alcohol abuse and hepatic steatosis.     - Daily CMP, PT/INR   - Complete workup up with tylenol level, acute hep panel, a1-antitrypsin, anti smith antibody, HIV, anti mitochondrial antibody, anca, anti-liver, aFP, PETH pending   - Liver US with doppler pending     GI bleed  Concerning for variceal bleed given hx of alcohol abuse.     - GI consulted, recommend clear liquid for now   -Transfuse pRBC for Hb < 7 g/dL (Consider a higher Hb target if there is clinical evidence of intravascular volume depletion or comorbidities, such as CAD or if high suspicion of vigorous active ongoing bleeding or an uncorrected coagulopathy exists.).  - s/p vitamin K given Pt/INR > 2   - PPI 80 mg IV bolus once, then IV PPI 40 BID  - Octreotide gtt   -Avoid nonsteroidal agents, antiplatelet agents and anticoagulants if possible in patients without absolute contraindications. (consult managing provider if required for cardiovascular protection).  - EGD showed  Small (< 5 mm) esophageal varices with portal hypertensive gastropathy           Critical Care Daily Checklist:    A: Awake: RASS Goal/Actual Goal:    Actual: Braxton Agitation Sedation Scale (RASS): Drowsy   B: Spontaneous Breathing  Trial Performed?     C: SAT & SBT Coordinated?  N/A                      D: Delirium: CAM-ICU Overall CAM-ICU: Negative   E: Early Mobility Performed? Yes   F: Feeding Goal:    Status:     Current Diet Order   Procedures    Diet clear liquid      AS: Analgesia/Sedation No   T: Thromboembolic Prophylaxis YEs   H: HOB > 300 Yes   U: Stress Ulcer Prophylaxis (if needed) Yes   G: Glucose Control Yes   B: Bowel Function     I: Indwelling Catheter (Lines & Bose) Necessity Yes   D: De-escalation of Antimicrobials/Pharmacotherapies Yes    Plan for the day/ETD Continue to monitor    Code Status:  Family/Goals of Care: Full Code         Critical secondary to Patient has a condition that poses threat to life and bodily function: Acute Renal Failure      Critical care was time spent personally by me on the following activities: development of treatment plan with patient or surrogate and bedside caregivers, discussions with consultants, evaluation of patient's response to treatment, examination of patient, ordering and performing treatments and interventions, ordering and review of laboratory studies, ordering and review of radiographic studies, pulse oximetry, re-evaluation of patient's condition. This critical care time did not overlap with that of any other provider or involve time for any procedures.     Erika Ocasio,   Critical Care Medicine  [facility]

## 2023-01-20 NOTE — PLAN OF CARE
Problem: Physical Therapy  Goal: Physical Therapy Goal  Description: Goals to be met by: 2/15/23     Patient will increase functional independence with mobility by performin. Supine to sit with MInimal Assistance-not met  2. Rolling to Right with Minimal Assistance. -not met  3. Sit to stand transfer with Minimal Assistance-not met  4. Bed to chair transfer with Moderate Assistance using AD if needed -not met   5. Gait  x 30 feet with Minimal Assistance using AD if needed. -not met  6. Sitting at edge of bed x10 minutes with Supervision -not met    Outcome: Ongoing, Progressing   Goals remain appropriate. 2023

## 2023-01-20 NOTE — PROGRESS NOTES
[facility]  Critical Care Medicine  Progress Note    Patient Name: Jonny Isabel  MRN: [id number]  Admission Date: 1/16/2023  Hospital Length of Stay: 4 days  Code Status: Full Code  Attending Provider: Genaro Ortiz MD  Primary Care Provider: Yuli Pérez Mai, MD   Principal Problem: Spinal stenosis    Subjective:     HPI:  Mr. Fuller is a 62-year-old male with a PMHx of A-fibb (on Eliquis), hypertension, DM 2 (not insulin dependent) presents as transfer from OSH for chronic lower back that has been worsening for the past week. Pt states that the pain is radiating from his right buttock down to his legs. Pt was unable to get out of bed this AM due to the pain. HE endorses having fecal incontinence for the past week with black tarry stools. He denies any fevers, chills, abdominal pain, urinary incontinence, or saddle anesthesia.     At OSH ED CT lumbar spine ordered revealing Prominent, cystic, multiloculated predominantly gas attenuation epidural  structure within the spinal canal at the level of L3-L4 resulting in severe narrowing of the spinal canal with mass effect upon the thecal sac and  vacuum disc phenomena concerning for diskitis or an epidural abscess. MRI shows large disc extrusion at L3-4 causing severe spinal canal stenosis, with moderate spinal canal stenosis at L2-3 and L4-5 and moderate neural foraminal narrowing L4-5 and L5-S1. Patient had no tenderness overlying spinous process but still endorsed sever pain, received percocet and robaxin. Of note, KENNETH showed blood in stool. The patient has a white count of 12.9, hemoglobin of 7.5, creatinine 4.6, Lactate of 3.9. Pt given Rocephin, Zosyn and Vanc.    Pt transferred to [facility] for further intervention with neurosurgery.       Hospital/ICU Course:  Admitted to MICU for NSGY and GI evaluation . Started on midodrine, octreotide for concerning of HRS. Started on Levo overnight. Hgb stable. EGD shows small varices with gastropathy, no  active bleeds. Worsening MARTITA with minimal UOP, HRS protocol started with midodrine, octreotide and albumin. MARTITA continues to worsen <150cc U/O over 24 hours. NSGY planned for surgery on Monday.       Interval History/Significant Events: Worsening MARTITA with minimal UOP. Awaiting nephrology recc's. MAP's better controlled with Levo gtt overnight, off this morning. NSGY planned for surgery on Monday.       Review of Systems   Constitutional:  Positive for activity change. Negative for chills and fever.   HENT:  Negative for congestion and trouble swallowing.    Eyes:  Negative for visual disturbance.   Respiratory:  Negative for cough, shortness of breath, wheezing and stridor.    Cardiovascular:  Negative for chest pain and leg swelling.   Gastrointestinal:  Positive for abdominal distention and blood in stool. Negative for abdominal pain, diarrhea, nausea and vomiting.        +fecal incontinence   Endocrine: Negative for polyuria.   Genitourinary:  Negative for difficulty urinating and dysuria.   Musculoskeletal:  Positive for back pain. Negative for arthralgias and myalgias.   Skin:  Negative for color change and rash.   Neurological:  Negative for dizziness, weakness, numbness and headaches.   Psychiatric/Behavioral:  Positive for confusion. Negative for agitation.    Objective:     Vital Signs (Most Recent):  Temp: 97.8 °F (36.6 °C) (01/20/23 1115)  Pulse: 92 (01/20/23 1115)  Resp: 20 (01/20/23 1115)  BP: 120/82 (01/20/23 1100)  SpO2: (!) 88 % (01/20/23 1115)   Vital Signs (24h Range):  Temp:  [97.6 °F (36.4 °C)-97.9 °F (36.6 °C)] 97.8 °F (36.6 °C)  Pulse:  [67-98] 92  Resp:  [10-59] 20  SpO2:  [88 %-100 %] 88 %  BP: ()/(51-82) 120/82   Weight: 110.7 kg (244 lb)  Body mass index is 37.1 kg/m².      Intake/Output Summary (Last 24 hours) at 1/20/2023 1125  Last data filed at 1/20/2023 1100  Gross per 24 hour   Intake 678.09 ml   Output 140 ml   Net 538.09 ml         Physical Exam  Vitals and nursing note  reviewed.   Constitutional:       General: He is not in acute distress.     Appearance: He is obese. He is not ill-appearing.   HENT:      Head: Normocephalic and atraumatic.      Right Ear: External ear normal.      Left Ear: External ear normal.      Nose: Nose normal.      Mouth/Throat:      Mouth: Mucous membranes are moist.      Pharynx: Oropharynx is clear.   Eyes:      General:         Right eye: No discharge.         Left eye: No discharge.      Extraocular Movements: Extraocular movements intact.      Conjunctiva/sclera: Conjunctivae normal.      Pupils: Pupils are equal, round, and reactive to light.   Cardiovascular:      Rate and Rhythm: Normal rate and regular rhythm.      Pulses: Normal pulses.      Heart sounds: Normal heart sounds. No murmur heard.  Pulmonary:      Effort: Pulmonary effort is normal. No respiratory distress.      Breath sounds: No wheezing or rales.   Abdominal:      General: There is distension.      Tenderness: There is no abdominal tenderness. There is no guarding.   Musculoskeletal:         General: No swelling. Normal range of motion.      Cervical back: Normal range of motion.      Right lower leg: No edema.      Left lower leg: No edema.   Skin:     General: Skin is warm and dry.      Coloration: Skin is not jaundiced.   Neurological:      General: No focal deficit present.      Mental Status: He is alert. He is disoriented.      Cranial Nerves: Cranial nerves 2-12 are intact.      Comments: Patient altered  Responding to questions appropriately but confused about timing of events  Poor recall  + fecal incontinence   Psychiatric:         Mood and Affect: Mood normal.         Behavior: Behavior normal.       Vents:  Oxygen Concentration (%): 28 (01/19/23 0407)  Lines/Drains/Airways       Drain  Duration                  Urethral Catheter 01/16/23 1347 Latex 14 Fr. 3 days              Peripheral Intravenous Line  Duration                  Peripheral IV - Single Lumen 01/19/23  1515 18 G;1 3/4 in Right Upper Arm <1 day         Peripheral IV - Single Lumen 01/19/23 1515 20 G;1 3/4 in Left Forearm <1 day                  Significant Labs:    CBC/Anemia Profile:  Recent Labs   Lab 01/19/23  1152 01/19/23 2014 01/20/23  0316   WBC 15.04* 17.15* 16.36*   HGB 8.8* 9.6* 9.4*   HCT 27.7* 29.5* 29.7*    182 185   * 98 99*   RDW 21.2* 21.4* 21.4*          Chemistries:  Recent Labs   Lab 01/19/23  0420 01/20/23  0316   * 129*   K 4.3 4.3    98   CO2 16* 14*   BUN 32* 35*   CREATININE 6.5* 6.9*   CALCIUM 7.3* 7.7*   ALBUMIN 2.4* 2.6*   PROT 6.1 6.4   BILITOT 2.0* 2.4*   ALKPHOS 194* 188*   ALT 56* 44   AST 99* 76*   MG 1.6 1.6   PHOS 5.1* 5.2*         All pertinent labs within the past 24 hours have been reviewed.    Significant Imaging:  I have reviewed all pertinent imaging results/findings within the past 24 hours.    ABG  No results for input(s): PH, PO2, PCO2, HCO3, BE in the last 168 hours.  Assessment/Plan:     Neuro  * Spinal stenosis  CT Lumbar Spine Without Contrast Result Date: 1/16/2023  1.  Prominent, cystic, multiloculated predominantly gas attenuation structure within the spinal canal at the level of L3-L4 with dimensions as above.  This is favored to be epidural in location and results in severe narrowing of the spinal canal with mass effect upon the thecal sac.  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 Large disc extrusion at L3-4, contributing to severe spinal canal stenosis, as above. Additional lumbar spondylosis, contributing to moderate spinal canal stenosis at L2-3 and L4-5 and moderate neural foraminal narrowing L4-5 and L5-S1    - Neuro checks q1h   - NSGY consulted, planning for surgery on Monday    Psychiatric  Alcohol abuse  - Vitals q4h while awake  - CIWA monitoring  - Ativan 2mg q4 PRN if 2 criteria are met: Systolic BP>160, Diastolic BP >110 or Pulse  >110  - Start Vitamin supplementation- Thiamine, Folic acid, Vit. B12, and Multivitamin   - Will start valium taper at 5 mg Q8H       Cardiac/Vascular  A-fib  - Maintain K > 4, Mag > 2 and Ca/iCal WNL to decrease arrhythmogenic potential  - On lopressor 100 mg QD, holding as pt likely has GI bleed  - Holding anticoagulation in the setting of GI bleed   - Starting Heparin gtt ppx in the setting of no acute GI bleed      Renal/  MARTITA (acute kidney injury)  Creatinine 4.7 on admit, baseline around 0.8. Pre-renal vs ATN. Less likely obstruction as pt has gomez     - Check urine lytes and renal ultrasound  - Check urine protein creatinine ratio.  - Strict I&Os and daily weights   - Avoid nephrotoxic agents such as NSAIDs, gadolinium and IV radiocontrast.  - Renally dose meds to current GFR.  - Maintain MAP > 65. Using levophed as needed  - Started on HRS protocol with midodrine 10mg TID, Octreotide, and Albumin 25g BID.       ID  Severe sepsis  This patient does have evidence of infective focus  My overall impression is septic shock.  Source: Unknown  Antibiotics given-   Antibiotics (From admission, onward)      Start     Stop Route Frequency Ordered    01/20/23 1400  metroNIDAZOLE tablet 500 mg         -- Oral Every 8 hours 01/20/23 1109    01/18/23 0900  mupirocin 2 % ointment         01/23 0859 Nasl 2 times daily 01/18/23 0354    01/16/23 2100  ceFEPIme (MAXIPIME) 2 g in dextrose 5 % in water (D5W) 5 % 50 mL IVPB (MB+)         -- IV Every 24 hours (non-standard times) 01/16/23 1354          Latest lactate reviewed-  No results for input(s): LACTATE in the last 72 hours.  Organ dysfunction indicated by Acute kidney injury    - On Vancomycin, Cefepime, and Flagyl, Vanc d/c 1/20  - BCx negative  - SBP work up negative  - Will de-escalate once appropriate         Endocrine  Diabetes  -Last A1c reviewed-   Lab Results   Component Value Date    HGBA1C 5.3 11/10/2022       Home Antihyperglycemic Regiment:  - Metformin   1000 mg BID     Inpatient Antihyperglycemic Regiment:  Antihyperglycemics (From admission, onward)      Start     Stop Route Frequency Ordered    01/16/23 1507  insulin aspart U-100 pen 0-5 Units         -- SubQ Before meals & nightly PRN 01/16/23 1407          - LDSSI  - Clear liquid diet for now     Blood Sugars (AccuCheck):    Recent Labs     01/16/23  1545 01/16/23  2043   POCTGLUCOSE 141* 163*           GI  Alcoholic cirrhosis of liver with ascites  S/p diagnostic paracentesis on 1/17 with , 50% seg. Cytology pending     Elevated liver enzymes  Likely 2/2 to underlying alcohol abuse and hepatic steatosis.     - Daily CMP, PT/INR   - Complete workup up with tylenol level, acute hep panel, a1-antitrypsin, anti smith antibody, HIV, anti mitochondrial antibody, anca, anti-liver, aFP, PETH pending   - Liver US with doppler pending     GI bleed  Concerning for variceal bleed given hx of alcohol abuse.     - GI consulted, recommend clear liquid for now   -Transfuse pRBC for Hb < 7 g/dL (Consider a higher Hb target if there is clinical evidence of intravascular volume depletion or comorbidities, such as CAD or if high suspicion of vigorous active ongoing bleeding or an uncorrected coagulopathy exists.).  - s/p vitamin K given Pt/INR > 2   - PPI 80 mg IV bolus once, then IV PPI 40 BID  - Octreotide gtt   -Avoid nonsteroidal agents, antiplatelet agents and anticoagulants if possible in patients without absolute contraindications. (consult managing provider if required for cardiovascular protection).  - EGD showed  Small (< 5 mm) esophageal varices with portal hypertensive gastropathy           Critical Care Daily Checklist:    A: Awake: RASS Goal/Actual Goal:    Actual: Braxton Agitation Sedation Scale (RASS): Drowsy   B: Spontaneous Breathing Trial Performed?     C: SAT & SBT Coordinated?  N/A                      D: Delirium: CAM-ICU Overall CAM-ICU: Negative   E: Early Mobility Performed? No   F: Feeding Goal:     Status:     Current Diet Order   Procedures    Diet clear liquid      AS: Analgesia/Sedation No   T: Thromboembolic Prophylaxis Yes   H: HOB > 300 Yes   U: Stress Ulcer Prophylaxis (if needed) Yes   G: Glucose Control Yes   B: Bowel Function Stool Occurrence: 1   I: Indwelling Catheter (Lines & Bose) Necessity Yes   D: De-escalation of Antimicrobials/Pharmacotherapies Yes    Plan for the day/ETD Continue to monitor    Code Status:  Family/Goals of Care: Full Code         Critical secondary to Patient has a condition that poses threat to life and bodily function: Acute Renal Failure      Critical care was time spent personally by me on the following activities: development of treatment plan with patient or surrogate and bedside caregivers, discussions with consultants, evaluation of patient's response to treatment, examination of patient, ordering and performing treatments and interventions, ordering and review of laboratory studies, ordering and review of radiographic studies, pulse oximetry, re-evaluation of patient's condition. This critical care time did not overlap with that of any other provider or involve time for any procedures.     Erika Ocasio,   Critical Care Medicine  [facility]

## 2023-01-20 NOTE — PLAN OF CARE
Pt free from falls and injury this shift. All VSS at this time. Sats >95% on RA currently. MAPs maintained above 85. Levo @ 0.02. Abdomen rounded/distended/taut, no BM this shift. Bose intact, 15cc uop this shift, team aware. No other significant events this shift. POC reviewed with pt and SO, all questions answered.

## 2023-01-20 NOTE — PROGRESS NOTES
Therapy with Vancomycin complete and/or consult discontinued by provider.  Pharmacy will sign off, please re-consult as needed.     Salvador Dumont Pharm.D  Ext: [phone]

## 2023-01-20 NOTE — ASSESSMENT & PLAN NOTE
Creatinine 4.7 on admit, baseline around 0.8. Pre-renal vs ATN. Less likely obstruction as pt has gomez     - Check urine lytes and renal ultrasound  - Check urine protein creatinine ratio.  - Strict I&Os and daily weights   - Avoid nephrotoxic agents such as NSAIDs, gadolinium and IV radiocontrast.  - Renally dose meds to current GFR.  - Maintain MAP > 65. Off of levophed  - Started on HRS protocol with midodrine 10mg TID, Octreotide, and Albumin 25g BID. Stopped due to low suspicion

## 2023-01-20 NOTE — PT/OT/SLP PROGRESS
Physical Therapy  Co-Treatment with OT    Patient Name:  Jonny Isabel   MRN:  [id number]    Recommendations:     Discharge Recommendations: rehabilitation facility  Discharge Equipment Recommendations:  (will determine DME Needs closer to discharge)  Barriers to discharge: Decreased caregiver support family will not be able to assist at current functional level.     Assessment:     Jonny Isabel is a 62 y.o. male admitted with a medical diagnosis of Spinal stenosis.  He presents with the following impairments/functional limitations: weakness, impaired endurance, impaired functional mobility, gait instability, impaired balance, decreased safety awareness, decreased coordination, impaired cognition pt tolerated treatment fair but cont at significant increased burden of care and significant increased fall risk. Pt will benefit from cont skilled PT 3x/wk to progress physically and will need inpt rehab when medically stable. Pt was slightly confused during treatment and has tremors in BUE during treatment.     Rehab Prognosis: Good; patient would benefit from acute skilled PT services to address these deficits and reach maximum level of function.    Recent Surgery: Procedure(s) (LRB):  EGD (ESOPHAGOGASTRODUODENOSCOPY) (N/A) 3 Days Post-Op    Plan:     During this hospitalization, patient to be seen 3 x/week to address the identified rehab impairments via gait training, therapeutic activities, therapeutic exercises, neuromuscular re-education and progress toward the following goals:    Plan of Care Expires:  02/23/23    Subjective     Chief Complaint: pt stated that he needed to have a bowel movement.   Patient/Family Comments/goals: to be able to get around without pain .  Pain/Comfort:  Pain Rating 1: 0/10  Pain Rating Post-Intervention 1: 0/10      Objective:     Communicated with nurse prior to session.  Patient found supine with telemetry, pulse ox (continuous), blood pressure cuff, SCD, gomez catheter (hep lock IV)  upon PT entry to room.     General Precautions: Standard, fall  Orthopedic Precautions:    Braces:    Respiratory Status: Room air     Functional Mobility:  Bed Mobility:  pt needed verbal cues for hand placement and sequencing for functional mobility.    Rolling Left:  total assistance and of 2 persons  Rolling Right: total assistance and of 2 persons  Supine to Sit: total assistance and of 3 persons  Sit to Supine: total assistance and of 3 persons    Balance: pt sat on EOB x 6 min with CGA.      Due to pt complex medical condition, the skill of 2 licensed therapists is needed to maximize treatment session and progression towards goals  Pt white board updated with current therapists name and level of mobility assistance needed.         AM-PAC 6 CLICK MOBILITY  Turning over in bed (including adjusting bedclothes, sheets and blankets)?: 2  Sitting down on and standing up from a chair with arms (e.g., wheelchair, bedside commode, etc.): 1  Moving from lying on back to sitting on the side of the bed?: 2  Moving to and from a bed to a chair (including a wheelchair)?: 1  Need to walk in hospital room?: 1  Climbing 3-5 steps with a railing?: 1  Basic Mobility Total Score: 8       Treatment & Education:  Pt and girlfriend received verbal instructions in PT POC and both verbally expressed understanding of such.     Patient left supine with all lines intact, call button in reach, RN  notified, and girlfriend present..    GOALS:   Multidisciplinary Problems       Physical Therapy Goals          Problem: Physical Therapy    Goal Priority Disciplines Outcome Goal Variances Interventions   Physical Therapy Goal     PT, PT/OT Ongoing, Progressing     Description: Goals to be met by: 2/15/23     Patient will increase functional independence with mobility by performin. Supine to sit with MInimal Assistance-not met  2. Rolling to Right with Minimal Assistance. -not met  3. Sit to stand transfer with Minimal Assistance-not  met  4. Bed to chair transfer with Moderate Assistance using AD if needed -not met   5. Gait  x 30 feet with Minimal Assistance using AD if needed. -not met  6. Sitting at edge of bed x10 minutes with Supervision -not met                         Time Tracking:     PT Received On: 01/20/23  PT Start Time: 1050     PT Stop Time: 1055  PT Total Time (min): 5 min     2nd PT start time: 10:59  2nd PT end time: 11:13  2nd PT time 14 min  (5 min+ 14 min = 19 min total)    Billable Minutes: Therapeutic Activity 19 min       PT/PTA: PT     PTA Visit Number: 0     01/20/2023

## 2023-01-20 NOTE — SUBJECTIVE & OBJECTIVE
Interval History/Significant Events: Worsening MARTITA with minimal UOP. Awaiting nephrology recc's. MAP's better controlled with Levo gtt.       Review of Systems   Constitutional:  Positive for activity change. Negative for chills and fever.   HENT:  Negative for congestion and trouble swallowing.    Eyes:  Negative for visual disturbance.   Respiratory:  Negative for cough, shortness of breath, wheezing and stridor.    Cardiovascular:  Negative for chest pain and leg swelling.   Gastrointestinal:  Positive for abdominal distention and blood in stool. Negative for abdominal pain, diarrhea, nausea and vomiting.        +fecal incontinence   Endocrine: Negative for polyuria.   Genitourinary:  Negative for difficulty urinating and dysuria.   Musculoskeletal:  Positive for back pain. Negative for arthralgias and myalgias.   Skin:  Negative for color change and rash.   Neurological:  Negative for dizziness, weakness, numbness and headaches.   Psychiatric/Behavioral:  Positive for confusion. Negative for agitation.    Objective:     Vital Signs (Most Recent):  Temp: 97.8 °F (36.6 °C) (01/20/23 0715)  Pulse: 92 (01/20/23 0830)  Resp: (!) 59 (01/20/23 0830)  BP: 131/75 (01/20/23 0815)  SpO2: (!) 93 % (01/20/23 0830)   Vital Signs (24h Range):  Temp:  [97.6 °F (36.4 °C)-97.9 °F (36.6 °C)] 97.8 °F (36.6 °C)  Pulse:  [67-93] 92  Resp:  [10-59] 59  SpO2:  [92 %-100 %] 93 %  BP: ()/(51-75) 131/75   Weight: 110.7 kg (244 lb)  Body mass index is 37.1 kg/m².      Intake/Output Summary (Last 24 hours) at 1/20/2023 0848  Last data filed at 1/20/2023 0835  Gross per 24 hour   Intake 699.43 ml   Output 135 ml   Net 564.43 ml         Physical Exam  Vitals and nursing note reviewed.   Constitutional:       General: He is not in acute distress.     Appearance: He is obese. He is not ill-appearing.   HENT:      Head: Normocephalic and atraumatic.      Right Ear: External ear normal.      Left Ear: External ear normal.      Nose: Nose  normal.      Mouth/Throat:      Mouth: Mucous membranes are moist.      Pharynx: Oropharynx is clear.   Eyes:      General:         Right eye: No discharge.         Left eye: No discharge.      Extraocular Movements: Extraocular movements intact.      Conjunctiva/sclera: Conjunctivae normal.      Pupils: Pupils are equal, round, and reactive to light.   Cardiovascular:      Rate and Rhythm: Normal rate and regular rhythm.      Pulses: Normal pulses.      Heart sounds: Normal heart sounds. No murmur heard.  Pulmonary:      Effort: Pulmonary effort is normal. No respiratory distress.      Breath sounds: No wheezing or rales.   Abdominal:      General: There is distension.      Tenderness: There is no abdominal tenderness. There is no guarding.   Musculoskeletal:         General: No swelling. Normal range of motion.      Cervical back: Normal range of motion.      Right lower leg: No edema.      Left lower leg: No edema.   Skin:     General: Skin is warm and dry.      Coloration: Skin is not jaundiced.   Neurological:      General: No focal deficit present.      Mental Status: He is alert. He is disoriented.      Cranial Nerves: Cranial nerves 2-12 are intact.      Comments: Patient altered  Responding to questions appropriately but confused about timing of events  Poor recall  + fecal incontinence   Psychiatric:         Mood and Affect: Mood normal.         Behavior: Behavior normal.       Vents:  Oxygen Concentration (%): 28 (01/19/23 0407)  Lines/Drains/Airways       Drain  Duration                  Urethral Catheter 01/16/23 1347 Latex 14 Fr. 3 days              Peripheral Intravenous Line  Duration                  Peripheral IV - Single Lumen 01/19/23 1515 18 G;1 3/4 in Right Upper Arm <1 day         Peripheral IV - Single Lumen 01/19/23 1515 20 G;1 3/4 in Left Forearm <1 day                  Significant Labs:    CBC/Anemia Profile:  Recent Labs   Lab 01/19/23  1152 01/19/23 2014 01/20/23  0316   WBC 15.04*  17.15* 16.36*   HGB 8.8* 9.6* 9.4*   HCT 27.7* 29.5* 29.7*    182 185   * 98 99*   RDW 21.2* 21.4* 21.4*          Chemistries:  Recent Labs   Lab 01/19/23  0420 01/20/23  0316   * 129*   K 4.3 4.3    98   CO2 16* 14*   BUN 32* 35*   CREATININE 6.5* 6.9*   CALCIUM 7.3* 7.7*   ALBUMIN 2.4* 2.6*   PROT 6.1 6.4   BILITOT 2.0* 2.4*   ALKPHOS 194* 188*   ALT 56* 44   AST 99* 76*   MG 1.6 1.6   PHOS 5.1* 5.2*         All pertinent labs within the past 24 hours have been reviewed.    Significant Imaging:  I have reviewed all pertinent imaging results/findings within the past 24 hours.

## 2023-01-20 NOTE — SUBJECTIVE & OBJECTIVE
Interval History/Significant Events: Worsening MARTITA however U/O slightly improving. Per nephrology, serum creatinine appears to be cresting, no need for dialysis at this time. NSGY planned for surgery on Monday.       Review of Systems   Constitutional:  Positive for activity change. Negative for chills and fever.   HENT:  Negative for congestion and trouble swallowing.    Eyes:  Negative for visual disturbance.   Respiratory:  Negative for cough, shortness of breath, wheezing and stridor.    Cardiovascular:  Negative for chest pain and leg swelling.   Gastrointestinal:  Positive for abdominal distention and blood in stool. Negative for abdominal pain, diarrhea, nausea and vomiting.        +fecal incontinence   Endocrine: Negative for polyuria.   Genitourinary:  Negative for difficulty urinating and dysuria.   Musculoskeletal:  Positive for back pain. Negative for arthralgias and myalgias.   Skin:  Negative for color change and rash.   Neurological:  Negative for dizziness, weakness, numbness and headaches.   Psychiatric/Behavioral:  Positive for confusion. Negative for agitation.    Objective:     Vital Signs (Most Recent):  Temp: 97.8 °F (36.6 °C) (01/20/23 1115)  Pulse: 92 (01/20/23 1115)  Resp: 20 (01/20/23 1115)  BP: 120/82 (01/20/23 1100)  SpO2: (!) 88 % (01/20/23 1115)   Vital Signs (24h Range):  Temp:  [97.6 °F (36.4 °C)-97.9 °F (36.6 °C)] 97.8 °F (36.6 °C)  Pulse:  [67-98] 92  Resp:  [10-59] 20  SpO2:  [88 %-100 %] 88 %  BP: ()/(51-82) 120/82   Weight: 110.7 kg (244 lb)  Body mass index is 37.1 kg/m².      Intake/Output Summary (Last 24 hours) at 1/20/2023 1125  Last data filed at 1/20/2023 1100  Gross per 24 hour   Intake 678.09 ml   Output 140 ml   Net 538.09 ml         Physical Exam  Vitals and nursing note reviewed.   Constitutional:       General: He is not in acute distress.     Appearance: He is obese. He is not ill-appearing.   HENT:      Head: Normocephalic and atraumatic.      Right Ear:  External ear normal.      Left Ear: External ear normal.      Nose: Nose normal.      Mouth/Throat:      Mouth: Mucous membranes are moist.      Pharynx: Oropharynx is clear.   Eyes:      General:         Right eye: No discharge.         Left eye: No discharge.      Extraocular Movements: Extraocular movements intact.      Conjunctiva/sclera: Conjunctivae normal.      Pupils: Pupils are equal, round, and reactive to light.   Cardiovascular:      Rate and Rhythm: Normal rate and regular rhythm.      Pulses: Normal pulses.      Heart sounds: Normal heart sounds. No murmur heard.  Pulmonary:      Effort: Pulmonary effort is normal. No respiratory distress.      Breath sounds: No wheezing or rales.   Abdominal:      General: There is distension.      Tenderness: There is no abdominal tenderness. There is no guarding.   Musculoskeletal:         General: No swelling. Normal range of motion.      Cervical back: Normal range of motion.      Right lower leg: No edema.      Left lower leg: No edema.   Skin:     General: Skin is warm and dry.      Coloration: Skin is not jaundiced.   Neurological:      General: No focal deficit present.      Mental Status: He is alert. He is disoriented.      Cranial Nerves: Cranial nerves 2-12 are intact.      Comments: Patient altered  Responding to questions appropriately but confused about timing of events  Poor recall  + fecal incontinence   Psychiatric:         Mood and Affect: Mood normal.         Behavior: Behavior normal.       Vents:  Oxygen Concentration (%): 28 (01/19/23 0407)  Lines/Drains/Airways       Drain  Duration                  Urethral Catheter 01/16/23 1347 Latex 14 Fr. 3 days              Peripheral Intravenous Line  Duration                  Peripheral IV - Single Lumen 01/19/23 1515 18 G;1 3/4 in Right Upper Arm <1 day         Peripheral IV - Single Lumen 01/19/23 1515 20 G;1 3/4 in Left Forearm <1 day                  Significant Labs:    CBC/Anemia Profile:  Recent  Labs   Lab 01/19/23  1152 01/19/23 2014 01/20/23 0316   WBC 15.04* 17.15* 16.36*   HGB 8.8* 9.6* 9.4*   HCT 27.7* 29.5* 29.7*    182 185   * 98 99*   RDW 21.2* 21.4* 21.4*          Chemistries:  Recent Labs   Lab 01/19/23  0420 01/20/23 0316   * 129*   K 4.3 4.3    98   CO2 16* 14*   BUN 32* 35*   CREATININE 6.5* 6.9*   CALCIUM 7.3* 7.7*   ALBUMIN 2.4* 2.6*   PROT 6.1 6.4   BILITOT 2.0* 2.4*   ALKPHOS 194* 188*   ALT 56* 44   AST 99* 76*   MG 1.6 1.6   PHOS 5.1* 5.2*         All pertinent labs within the past 24 hours have been reviewed.    Significant Imaging:  I have reviewed all pertinent imaging results/findings within the past 24 hours.

## 2023-01-20 NOTE — ASSESSMENT & PLAN NOTE
CT Lumbar Spine Without Contrast Result Date: 1/16/2023  1.  Prominent, cystic, multiloculated predominantly gas attenuation structure within the spinal canal at the level of L3-L4 with dimensions as above.  This is favored to be epidural in location and results in severe narrowing of the spinal canal with mass effect upon the thecal sac.  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 Large disc extrusion at L3-4, contributing to severe spinal canal stenosis, as above. Additional lumbar spondylosis, contributing to moderate spinal canal stenosis at L2-3 and L4-5 and moderate neural foraminal narrowing L4-5 and L5-S1    - Neuro checks q1h   - NSGY consulted, planning for surgery on Monday

## 2023-01-20 NOTE — ASSESSMENT & PLAN NOTE
- Maintain K > 4, Mag > 2 and Ca/iCal WNL to decrease arrhythmogenic potential  - On lopressor 100 mg QD, holding as pt likely has GI bleed  - Holding anticoagulation in the setting of GI bleed   - Starting Heparin gtt ppx in the setting of no acute GI bleed

## 2023-01-21 PROBLEM — R93.3 ABNORMAL CT SCAN, GASTROINTESTINAL TRACT: Status: RESOLVED | Noted: 2023-01-16 | Resolved: 2023-01-21

## 2023-01-21 PROBLEM — E87.70 HYPERVOLEMIA: Status: ACTIVE | Noted: 2023-01-21

## 2023-01-21 PROBLEM — Z71.89 GOALS OF CARE, COUNSELING/DISCUSSION: Status: ACTIVE | Noted: 2023-01-21

## 2023-01-21 LAB
ALBUMIN SERPL BCP-MCNC: 2.4 G/DL (ref 3.5–5.2)
ALBUMIN SERPL BCP-MCNC: 2.4 G/DL (ref 3.5–5.2)
ALLENS TEST: ABNORMAL
ALP SERPL-CCNC: 170 U/L (ref 55–135)
ALP SERPL-CCNC: 184 U/L (ref 55–135)
ALT SERPL W/O P-5'-P-CCNC: 36 U/L (ref 10–44)
ALT SERPL W/O P-5'-P-CCNC: 41 U/L (ref 10–44)
ANION GAP SERPL CALC-SCNC: 16 MMOL/L (ref 8–16)
ANION GAP SERPL CALC-SCNC: 18 MMOL/L (ref 8–16)
ANISOCYTOSIS BLD QL SMEAR: SLIGHT
ANISOCYTOSIS BLD QL SMEAR: SLIGHT
AST SERPL-CCNC: 65 U/L (ref 10–40)
AST SERPL-CCNC: 70 U/L (ref 10–40)
BACTERIA BLD CULT: NORMAL
BACTERIA BLD CULT: NORMAL
BASOPHILS # BLD AUTO: 0.09 K/UL (ref 0–0.2)
BASOPHILS # BLD AUTO: 0.12 K/UL (ref 0–0.2)
BASOPHILS NFR BLD: 0.7 % (ref 0–1.9)
BASOPHILS NFR BLD: 0.9 % (ref 0–1.9)
BILIRUB SERPL-MCNC: 2.8 MG/DL (ref 0.1–1)
BILIRUB SERPL-MCNC: 3.1 MG/DL (ref 0.1–1)
BUN SERPL-MCNC: 39 MG/DL (ref 8–23)
BUN SERPL-MCNC: 39 MG/DL (ref 8–23)
BURR CELLS BLD QL SMEAR: ABNORMAL
CALCIUM SERPL-MCNC: 7.7 MG/DL (ref 8.7–10.5)
CALCIUM SERPL-MCNC: 8 MG/DL (ref 8.7–10.5)
CHLORIDE SERPL-SCNC: 100 MMOL/L (ref 95–110)
CHLORIDE SERPL-SCNC: 99 MMOL/L (ref 95–110)
CO2 SERPL-SCNC: 15 MMOL/L (ref 23–29)
CO2 SERPL-SCNC: 15 MMOL/L (ref 23–29)
CREAT SERPL-MCNC: 7.3 MG/DL (ref 0.5–1.4)
CREAT SERPL-MCNC: 7.5 MG/DL (ref 0.5–1.4)
DELSYS: ABNORMAL
DIFFERENTIAL METHOD: ABNORMAL
DIFFERENTIAL METHOD: ABNORMAL
EOSINOPHIL # BLD AUTO: 0.1 K/UL (ref 0–0.5)
EOSINOPHIL # BLD AUTO: 0.1 K/UL (ref 0–0.5)
EOSINOPHIL NFR BLD: 0.8 % (ref 0–8)
EOSINOPHIL NFR BLD: 1 % (ref 0–8)
ERYTHROCYTE [DISTWIDTH] IN BLOOD BY AUTOMATED COUNT: 21.3 % (ref 11.5–14.5)
ERYTHROCYTE [DISTWIDTH] IN BLOOD BY AUTOMATED COUNT: 21.7 % (ref 11.5–14.5)
EST. GFR  (NO RACE VARIABLE): 7.6 ML/MIN/1.73 M^2
EST. GFR  (NO RACE VARIABLE): 7.9 ML/MIN/1.73 M^2
FLOW: 2
GLUCOSE SERPL-MCNC: 121 MG/DL (ref 70–110)
GLUCOSE SERPL-MCNC: 124 MG/DL (ref 70–110)
HCO3 UR-SCNC: 17.4 MMOL/L (ref 24–28)
HCT VFR BLD AUTO: 27.5 % (ref 40–54)
HCT VFR BLD AUTO: 27.9 % (ref 40–54)
HGB BLD-MCNC: 8.8 G/DL (ref 14–18)
HGB BLD-MCNC: 9.1 G/DL (ref 14–18)
HYPOCHROMIA BLD QL SMEAR: ABNORMAL
HYPOCHROMIA BLD QL SMEAR: ABNORMAL
IMM GRANULOCYTES # BLD AUTO: 0.21 K/UL (ref 0–0.04)
IMM GRANULOCYTES # BLD AUTO: 0.3 K/UL (ref 0–0.04)
IMM GRANULOCYTES NFR BLD AUTO: 1.5 % (ref 0–0.5)
IMM GRANULOCYTES NFR BLD AUTO: 2.2 % (ref 0–0.5)
INR PPP: 1.7 (ref 0.8–1.2)
LACTATE SERPL-SCNC: 1 MMOL/L (ref 0.5–2.2)
LYMPHOCYTES # BLD AUTO: 1 K/UL (ref 1–4.8)
LYMPHOCYTES # BLD AUTO: 1.2 K/UL (ref 1–4.8)
LYMPHOCYTES NFR BLD: 7.6 % (ref 18–48)
LYMPHOCYTES NFR BLD: 8.5 % (ref 18–48)
MAGNESIUM SERPL-MCNC: 1.6 MG/DL (ref 1.6–2.6)
MCH RBC QN AUTO: 31.5 PG (ref 27–31)
MCH RBC QN AUTO: 31.7 PG (ref 27–31)
MCHC RBC AUTO-ENTMCNC: 32 G/DL (ref 32–36)
MCHC RBC AUTO-ENTMCNC: 32.6 G/DL (ref 32–36)
MCV RBC AUTO: 97 FL (ref 82–98)
MCV RBC AUTO: 99 FL (ref 82–98)
MODE: ABNORMAL
MONOCYTES # BLD AUTO: 2 K/UL (ref 0.3–1)
MONOCYTES # BLD AUTO: 2.1 K/UL (ref 0.3–1)
MONOCYTES NFR BLD: 14.9 % (ref 4–15)
MONOCYTES NFR BLD: 15.1 % (ref 4–15)
NEUTROPHILS # BLD AUTO: 10 K/UL (ref 1.8–7.7)
NEUTROPHILS # BLD AUTO: 10.1 K/UL (ref 1.8–7.7)
NEUTROPHILS NFR BLD: 73.2 % (ref 38–73)
NEUTROPHILS NFR BLD: 73.6 % (ref 38–73)
NRBC BLD-RTO: 3 /100 WBC
NRBC BLD-RTO: 3 /100 WBC
PCO2 BLDA: 40.5 MMHG (ref 35–45)
PH SMN: 7.24 [PH] (ref 7.35–7.45)
PHOSPHATE SERPL-MCNC: 5.3 MG/DL (ref 2.7–4.5)
PLATELET # BLD AUTO: 174 K/UL (ref 150–450)
PLATELET # BLD AUTO: 184 K/UL (ref 150–450)
PLATELET BLD QL SMEAR: ABNORMAL
PLATELET BLD QL SMEAR: ABNORMAL
PMV BLD AUTO: 11.3 FL (ref 9.2–12.9)
PMV BLD AUTO: 11.5 FL (ref 9.2–12.9)
PO2 BLDA: 38 MMHG (ref 40–60)
POC BE: -10 MMOL/L
POC SATURATED O2: 63 % (ref 95–100)
POC TCO2: 19 MMOL/L (ref 24–29)
POCT GLUCOSE: 124 MG/DL (ref 70–110)
POCT GLUCOSE: 131 MG/DL (ref 70–110)
POIKILOCYTOSIS BLD QL SMEAR: SLIGHT
POIKILOCYTOSIS BLD QL SMEAR: SLIGHT
POLYCHROMASIA BLD QL SMEAR: ABNORMAL
POLYCHROMASIA BLD QL SMEAR: ABNORMAL
POTASSIUM SERPL-SCNC: 4.1 MMOL/L (ref 3.5–5.1)
POTASSIUM SERPL-SCNC: 4.2 MMOL/L (ref 3.5–5.1)
PROT SERPL-MCNC: 5.9 G/DL (ref 6–8.4)
PROT SERPL-MCNC: 6.4 G/DL (ref 6–8.4)
PROTHROMBIN TIME: 16.9 SEC (ref 9–12.5)
RBC # BLD AUTO: 2.78 M/UL (ref 4.6–6.2)
RBC # BLD AUTO: 2.89 M/UL (ref 4.6–6.2)
SAMPLE: ABNORMAL
SITE: ABNORMAL
SODIUM SERPL-SCNC: 130 MMOL/L (ref 136–145)
SODIUM SERPL-SCNC: 133 MMOL/L (ref 136–145)
TARGETS BLD QL SMEAR: ABNORMAL
TARGETS BLD QL SMEAR: ABNORMAL
WBC # BLD AUTO: 13.65 K/UL (ref 3.9–12.7)
WBC # BLD AUTO: 13.74 K/UL (ref 3.9–12.7)

## 2023-01-21 PROCEDURE — 25000003 PHARM REV CODE 250: Performed by: STUDENT IN AN ORGANIZED HEALTH CARE EDUCATION/TRAINING PROGRAM

## 2023-01-21 PROCEDURE — 63600175 PHARM REV CODE 636 W HCPCS: Performed by: STUDENT IN AN ORGANIZED HEALTH CARE EDUCATION/TRAINING PROGRAM

## 2023-01-21 PROCEDURE — 99291 CRITICAL CARE FIRST HOUR: CPT | Mod: ,,, | Performed by: INTERNAL MEDICINE

## 2023-01-21 PROCEDURE — 25000003 PHARM REV CODE 250: Performed by: INTERNAL MEDICINE

## 2023-01-21 PROCEDURE — 83735 ASSAY OF MAGNESIUM: CPT | Performed by: STUDENT IN AN ORGANIZED HEALTH CARE EDUCATION/TRAINING PROGRAM

## 2023-01-21 PROCEDURE — 82803 BLOOD GASES ANY COMBINATION: CPT

## 2023-01-21 PROCEDURE — 82800 BLOOD PH: CPT

## 2023-01-21 PROCEDURE — 94761 N-INVAS EAR/PLS OXIMETRY MLT: CPT

## 2023-01-21 PROCEDURE — 20600001 HC STEP DOWN PRIVATE ROOM

## 2023-01-21 PROCEDURE — 99233 SBSQ HOSP IP/OBS HIGH 50: CPT | Mod: ,,, | Performed by: INTERNAL MEDICINE

## 2023-01-21 PROCEDURE — 84100 ASSAY OF PHOSPHORUS: CPT | Performed by: STUDENT IN AN ORGANIZED HEALTH CARE EDUCATION/TRAINING PROGRAM

## 2023-01-21 PROCEDURE — 80053 COMPREHEN METABOLIC PANEL: CPT | Performed by: STUDENT IN AN ORGANIZED HEALTH CARE EDUCATION/TRAINING PROGRAM

## 2023-01-21 PROCEDURE — 99233 SBSQ HOSP IP/OBS HIGH 50: CPT | Mod: ,,, | Performed by: NEUROLOGICAL SURGERY

## 2023-01-21 PROCEDURE — 85610 PROTHROMBIN TIME: CPT | Performed by: STUDENT IN AN ORGANIZED HEALTH CARE EDUCATION/TRAINING PROGRAM

## 2023-01-21 PROCEDURE — 99233 PR SUBSEQUENT HOSPITAL CARE,LEVL III: ICD-10-PCS | Mod: ,,, | Performed by: NEUROLOGICAL SURGERY

## 2023-01-21 PROCEDURE — 25000003 PHARM REV CODE 250

## 2023-01-21 PROCEDURE — 83605 ASSAY OF LACTIC ACID: CPT | Performed by: STUDENT IN AN ORGANIZED HEALTH CARE EDUCATION/TRAINING PROGRAM

## 2023-01-21 PROCEDURE — 80053 COMPREHEN METABOLIC PANEL: CPT | Mod: 91 | Performed by: STUDENT IN AN ORGANIZED HEALTH CARE EDUCATION/TRAINING PROGRAM

## 2023-01-21 PROCEDURE — 99233 PR SUBSEQUENT HOSPITAL CARE,LEVL III: ICD-10-PCS | Mod: ,,, | Performed by: INTERNAL MEDICINE

## 2023-01-21 PROCEDURE — 99291 PR CRITICAL CARE, E/M 30-74 MINUTES: ICD-10-PCS | Mod: ,,, | Performed by: INTERNAL MEDICINE

## 2023-01-21 PROCEDURE — 85025 COMPLETE CBC W/AUTO DIFF WBC: CPT | Mod: 91

## 2023-01-21 PROCEDURE — 99900035 HC TECH TIME PER 15 MIN (STAT)

## 2023-01-21 RX ORDER — MAGNESIUM SULFATE HEPTAHYDRATE 40 MG/ML
2 INJECTION, SOLUTION INTRAVENOUS ONCE
Status: COMPLETED | OUTPATIENT
Start: 2023-01-21 | End: 2023-01-21

## 2023-01-21 RX ORDER — FUROSEMIDE 10 MG/ML
INJECTION INTRAMUSCULAR; INTRAVENOUS
Status: CANCELLED | OUTPATIENT
Start: 2023-01-21

## 2023-01-21 RX ADMIN — HEPARIN SODIUM 5000 UNITS: 5000 INJECTION INTRAVENOUS; SUBCUTANEOUS at 10:01

## 2023-01-21 RX ADMIN — FOLIC ACID 1 MG: 1 TABLET ORAL at 08:01

## 2023-01-21 RX ADMIN — HEPARIN SODIUM 5000 UNITS: 5000 INJECTION INTRAVENOUS; SUBCUTANEOUS at 02:01

## 2023-01-21 RX ADMIN — CHLOROTHIAZIDE SODIUM 250 MG: 500 INJECTION, POWDER, LYOPHILIZED, FOR SOLUTION INTRAVENOUS at 03:01

## 2023-01-21 RX ADMIN — THERA TABS 1 TABLET: TAB at 08:01

## 2023-01-21 RX ADMIN — SCOPALAMINE 1 PATCH: 1 PATCH, EXTENDED RELEASE TRANSDERMAL at 02:01

## 2023-01-21 RX ADMIN — MIDODRINE HYDROCHLORIDE 10 MG: 5 TABLET ORAL at 10:01

## 2023-01-21 RX ADMIN — MUPIROCIN: 20 OINTMENT TOPICAL at 08:01

## 2023-01-21 RX ADMIN — THIAMINE HCL TAB 100 MG 100 MG: 100 TAB at 10:01

## 2023-01-21 RX ADMIN — CEFEPIME 2 G: 2 INJECTION, POWDER, FOR SOLUTION INTRAVENOUS at 08:01

## 2023-01-21 RX ADMIN — LACTULOSE 30 G: 20 SOLUTION ORAL at 08:01

## 2023-01-21 RX ADMIN — HEPARIN SODIUM 5000 UNITS: 5000 INJECTION INTRAVENOUS; SUBCUTANEOUS at 05:01

## 2023-01-21 RX ADMIN — PANTOPRAZOLE SODIUM 40 MG: 40 TABLET, DELAYED RELEASE ORAL at 05:01

## 2023-01-21 RX ADMIN — SODIUM BICARBONATE 1300 MG: 650 TABLET ORAL at 08:01

## 2023-01-21 RX ADMIN — THIAMINE HCL TAB 100 MG 100 MG: 100 TAB at 05:01

## 2023-01-21 RX ADMIN — MIDODRINE HYDROCHLORIDE 10 MG: 5 TABLET ORAL at 05:01

## 2023-01-21 RX ADMIN — LACTULOSE 30 G: 20 SOLUTION ORAL at 09:01

## 2023-01-21 RX ADMIN — METRONIDAZOLE 500 MG: 500 TABLET ORAL at 05:01

## 2023-01-21 RX ADMIN — METRONIDAZOLE 500 MG: 500 TABLET ORAL at 10:01

## 2023-01-21 RX ADMIN — FUROSEMIDE 200 MG: 10 INJECTION, SOLUTION INTRAMUSCULAR; INTRAVENOUS at 04:01

## 2023-01-21 RX ADMIN — LACTULOSE 30 G: 20 SOLUTION ORAL at 02:01

## 2023-01-21 RX ADMIN — MAGNESIUM SULFATE 2 G: 2 INJECTION INTRAVENOUS at 04:01

## 2023-01-21 NOTE — ASSESSMENT & PLAN NOTE
Surgery planned for Monday  Needs Risk Stratification. So far:  High risk due to recent GI bleed, coagulapathy, sepsis, SPB with negative cultures, acute renal failure, MELD 31.   Likely has chronic liver and kidney disease. Suspicion for infiltrative process in kidneys.   - may need to push back surgery to a later date  - follow pt through weekend  - xarelto on hold, on heparin    ECHO  /117/23:    The left ventricle is small with normal systolic function.   The estimated ejection fraction is 65%.   Normal left ventricular diastolic function.   Normal right ventricular size with normal right ventricular systolic function.   Normal central venous pressure (3 mmHg).   The estimated PA systolic pressure is 30 mmHg.   Mild tricuspid regurgitation.

## 2023-01-21 NOTE — ASSESSMENT & PLAN NOTE
Likely 2/2 to underlying alcohol abuse and hepatic steatosis.      - Daily CMP, PT/INR   - Complete workup up with tylenol level, acute hep panel, a1-antitrypsin, anti smith antibody, HIV, anti mitochondrial antibody, anca, anti-liver, aFP, PETH pending   - Liver US with doppler shows epatomegaly with hepatic steatosis, cholelithiasis versus gallbladder sludge, small volume ascites, and left pleural effusion.     MELD-Na score: 31 at 1/21/2023  3:11 PM  MELD score: 30 at 1/21/2023  3:11 PM  Calculated from:  Serum Creatinine: 7.5 mg/dL (Using max of 4 mg/dL) at 1/21/2023  3:11 PM  Serum Sodium: 133 mmol/L at 1/21/2023  3:11 PM  Total Bilirubin: 3.1 mg/dL at 1/21/2023  3:11 PM  INR(ratio): 1.7 at 1/21/2023  3:14 AM  Age: 62 years

## 2023-01-21 NOTE — ASSESSMENT & PLAN NOTE
S/p diagnostic paracentesis on 1/17 with , 50% seg. Cytology pending    EGD revealed esophageal varicies  - On Lactulose, midodrine  - cefepime and flagyl started 1/16 for presumed SBP, cultures for ascites 1/17 are no growth so far

## 2023-01-21 NOTE — PROGRESS NOTES
[facility] - [facility]ensive [facility]  Critical Care Medicine  Progress Note    Patient Name: Jonny Isabel  MRN: [id number]  Admission Date: 1/16/2023  Hospital Length of Stay: 5 days  Code Status: Full Code  Attending Provider: Genaro Ortiz MD  Primary Care Provider: Yuli Pérez Mai, MD   Principal Problem: Spinal stenosis    Subjective:     HPI:  Mr. Fuller is a 62-year-old male with a PMHx of A-fibb (on Eliquis), hypertension, DM 2 (not insulin dependent) presents as transfer from OSH for chronic lower back that has been worsening for the past week. Pt states that the pain is radiating from his right buttock down to his legs. Pt was unable to get out of bed this AM due to the pain. HE endorses having fecal incontinence for the past week with black tarry stools. He denies any fevers, chills, abdominal pain, urinary incontinence, or saddle anesthesia.     At OSH ED CT lumbar spine ordered revealing Prominent, cystic, multiloculated predominantly gas attenuation epidural  structure within the spinal canal at the level of L3-L4 resulting in severe narrowing of the spinal canal with mass effect upon the thecal sac and  vacuum disc phenomena concerning for diskitis or an epidural abscess. MRI shows large disc extrusion at L3-4 causing severe spinal canal stenosis, with moderate spinal canal stenosis at L2-3 and L4-5 and moderate neural foraminal narrowing L4-5 and L5-S1. Patient had no tenderness overlying spinous process but still endorsed sever pain, received percocet and robaxin. Of note, KENNETH showed blood in stool. The patient has a white count of 12.9, hemoglobin of 7.5, creatinine 4.6, Lactate of 3.9. Pt given Rocephin, Zosyn and Vanc.    [place] [facility] [place]       Hospital/ICU Course:  Admitted to MICU for NSGY and GI evaluation . Hgb stable. EGD shows small varices with gastropathy, no active bleeds. Worsening MARTITA with minimal UOP, HRS protocol started with levo,  midodrine, octreotide and albumin trailed, has since been stopped due to low suspicion. Nephrology following. NSGY planned for surgery on Monday. U/O seems to be slightly improving. HDS.      Interval History/Significant Events: Worsening MARTITA however U/O slightly improving. Per nephrology, serum creatinine appears to be cresting, no need for dialysis at this time. NSGY planned for surgery on Monday.       Review of Systems   Constitutional:  Positive for activity change. Negative for chills and fever.   HENT:  Negative for congestion and trouble swallowing.    Eyes:  Negative for visual disturbance.   Respiratory:  Negative for cough, shortness of breath, wheezing and stridor.    Cardiovascular:  Negative for chest pain and leg swelling.   Gastrointestinal:  Positive for abdominal distention and blood in stool. Negative for abdominal pain, diarrhea, nausea and vomiting.        +fecal incontinence   Endocrine: Negative for polyuria.   Genitourinary:  Negative for difficulty urinating and dysuria.   Musculoskeletal:  Positive for back pain. Negative for arthralgias and myalgias.   Skin:  Negative for color change and rash.   Neurological:  Negative for dizziness, weakness, numbness and headaches.   Psychiatric/Behavioral:  Positive for confusion. Negative for agitation.    Objective:     Vital Signs (Most Recent):  Temp: 98.1 °F (36.7 °C) (01/21/23 0700)  Pulse: 82 (01/21/23 1000)  Resp: 13 (01/21/23 1000)  BP: 136/69 (01/21/23 1000)  SpO2: 96 % (01/21/23 1000)   Vital Signs (24h Range):  Temp:  [97.4 °F (36.3 °C)-98.1 °F (36.7 °C)] 98.1 °F (36.7 °C)  Pulse:  [] 82  Resp:  [10-34] 13  SpO2:  [88 %-97 %] 96 %  BP: ()/(53-69) 136/69   Weight: 110 kg (242 lb 8.1 oz)  Body mass index is 36.87 kg/m².      Intake/Output Summary (Last 24 hours) at 1/21/2023 1114  Last data filed at 1/21/2023 0600  Gross per 24 hour   Intake 298.99 ml   Output 162 ml   Net 136.99 ml       Physical Exam  Vitals and nursing note  reviewed.   Constitutional:       General: He is not in acute distress.     Appearance: He is obese. He is not ill-appearing.   HENT:      Head: Normocephalic and atraumatic.      Right Ear: External ear normal.      Left Ear: External ear normal.      Nose: Nose normal.      Mouth/Throat:      Mouth: Mucous membranes are moist.      Pharynx: Oropharynx is clear.   Eyes:      General:         Right eye: No discharge.         Left eye: No discharge.      Extraocular Movements: Extraocular movements intact.      Conjunctiva/sclera: Conjunctivae normal.      Pupils: Pupils are equal, round, and reactive to light.   Cardiovascular:      Rate and Rhythm: Normal rate and regular rhythm.      Pulses: Normal pulses.      Heart sounds: Normal heart sounds. No murmur heard.  Pulmonary:      Effort: Pulmonary effort is normal. No respiratory distress.      Breath sounds: No wheezing or rales.   Abdominal:      General: There is distension.      Tenderness: There is no abdominal tenderness. There is no guarding.   Musculoskeletal:         General: No swelling. Normal range of motion.      Cervical back: Normal range of motion.      Right lower leg: No edema.      Left lower leg: No edema.   Skin:     General: Skin is warm and dry.      Coloration: Skin is not jaundiced.   Neurological:      General: No focal deficit present.      Mental Status: He is alert. He is disoriented.      Cranial Nerves: Cranial nerves 2-12 are intact.      Comments: Patient altered  Responding to questions appropriately but confused about timing of events  Poor recall  + fecal incontinence   Psychiatric:         Mood and Affect: Mood normal.         Behavior: Behavior normal.       Vents:  Oxygen Concentration (%): 28 (01/19/23 0407)  Lines/Drains/Airways       Drain  Duration                  Urethral Catheter 01/16/23 1347 Latex 14 Fr. 4 days              Peripheral Intravenous Line  Duration                  Peripheral IV - Single Lumen 01/19/23  1515 18 G;1 3/4 in Right Upper Arm 1 day         Peripheral IV - Single Lumen 01/19/23 1515 20 G;1 3/4 in Left Forearm 1 day                  Significant Labs:    CBC/Anemia Profile:  Recent Labs   Lab 01/20/23  1130 01/20/23  1948 01/21/23  0314   WBC 14.90* 14.55* 13.74*   HGB 9.3* 9.5* 8.8*   HCT 29.4* 29.5* 27.5*    178 174   MCV 99* 99* 99*   RDW 21.6* 21.7* 21.3*        Chemistries:  Recent Labs   Lab 01/20/23  0316 01/21/23  0314   * 130*   K 4.3 4.1   CL 98 99   CO2 14* 15*   BUN 35* 39*   CREATININE 6.9* 7.3*   CALCIUM 7.7* 7.7*   ALBUMIN 2.6* 2.4*   PROT 6.4 5.9*   BILITOT 2.4* 2.8*   ALKPHOS 188* 170*   ALT 44 36   AST 76* 65*   MG 1.6 1.6   PHOS 5.2* 5.3*       All pertinent labs within the past 24 hours have been reviewed.    Significant Imaging:  I have reviewed all pertinent imaging results/findings within the past 24 hours.    ABG  Recent Labs   Lab 01/20/23  1356   PH 7.270*   PO2 66*   PCO2 34.0*   HCO3 15.6*   BE -11     Assessment/Plan:     Neuro  * Spinal stenosis  CT Lumbar Spine Without Contrast Result Date: 1/16/2023  1.  Prominent, cystic, multiloculated predominantly gas attenuation structure within the spinal canal at the level of L3-L4 with dimensions as above.  This is favored to be epidural in location and results in severe narrowing of the spinal canal with mass effect upon the thecal sac.  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 Large disc extrusion at L3-4, contributing to severe spinal canal stenosis, as above. Additional lumbar spondylosis, contributing to moderate spinal canal stenosis at L2-3 and L4-5 and moderate neural foraminal narrowing L4-5 and L5-S1    - Neuro checks q1h   - NSGY consulted, planning for surgery on Monday    Psychiatric  Alcohol abuse  - Vitals q4h while awake  - CIWA monitoring  - Ativan 2mg q4 PRN if 2 criteria are met: Systolic BP>160, Diastolic BP >110  or Pulse >110  - Start Vitamin supplementation- Thiamine, Folic acid, Vit. B12, and Multivitamin   - Will start valium taper at 5 mg Q8H       Cardiac/Vascular  A-fib  - Maintain K > 4, Mag > 2 and Ca/iCal WNL to decrease arrhythmogenic potential  - On lopressor 100 mg QD, holding as pt likely has GI bleed  - Holding anticoagulation in the setting of GI bleed   - Starting Heparin gtt ppx in the setting of no acute GI bleed      Renal/  MARTITA (acute kidney injury)  Creatinine 4.7 on admit, baseline around 0.8. Pre-renal vs ATN. Less likely obstruction as pt has gomez     - Check urine lytes and renal ultrasound  - Check urine protein creatinine ratio.  - Strict I&Os and daily weights   - Avoid nephrotoxic agents such as NSAIDs, gadolinium and IV radiocontrast.  - Renally dose meds to current GFR.  - Maintain MAP > 65. Off of levophed  - Started on HRS protocol with midodrine 10mg TID, Octreotide, and Albumin 25g BID. Stopped due to low suspicion      ID  Severe sepsis  This patient does have evidence of infective focus  My overall impression is septic shock.  Source: Unknown  Antibiotics given-   Antibiotics (From admission, onward)      Start     Stop Route Frequency Ordered    01/20/23 1400  metroNIDAZOLE tablet 500 mg         -- Oral Every 8 hours 01/20/23 1109    01/18/23 0900  mupirocin 2 % ointment         01/23 0859 Nasl 2 times daily 01/18/23 0354    01/16/23 2100  ceFEPIme (MAXIPIME) 2 g in dextrose 5 % in water (D5W) 5 % 50 mL IVPB (MB+)         -- IV Every 24 hours (non-standard times) 01/16/23 1354          Latest lactate reviewed-  No results for input(s): LACTATE in the last 72 hours.  Organ dysfunction indicated by Acute kidney injury    - On Vancomycin, Cefepime, and Flagyl, Vanc d/c 1/20  - BCx negative  - SBP work up negative  - Will de-escalate once appropriate         Endocrine  Diabetes  -Last A1c reviewed-   Lab Results   Component Value Date    HGBA1C 5.3 11/10/2022       Home Antihyperglycemic  Regiment:  - Metformin  1000 mg BID     Inpatient Antihyperglycemic Regiment:  Antihyperglycemics (From admission, onward)      Start     Stop Route Frequency Ordered    01/16/23 1507  insulin aspart U-100 pen 0-5 Units         -- SubQ Before meals & nightly PRN 01/16/23 1407          - LDSSI  - Clear liquid diet for now     Blood Sugars (AccuCheck):    Recent Labs     01/16/23  1545 01/16/23  2043   POCTGLUCOSE 141* 163*           GI  Alcoholic cirrhosis of liver with ascites  S/p diagnostic paracentesis on 1/17 with , 50% seg. Cytology pending     Elevated liver enzymes  Likely 2/2 to underlying alcohol abuse and hepatic steatosis.     - Daily CMP, PT/INR   - Complete workup up with tylenol level, acute hep panel, a1-antitrypsin, anti smith antibody, HIV, anti mitochondrial antibody, anca, anti-liver, aFP, PETH pending   - Liver US with doppler shows epatomegaly with hepatic steatosis, cholelithiasis versus gallbladder sludge, small volume ascites, and left pleural effusion.    GI bleed  Concerning for variceal bleed given hx of alcohol abuse.     - GI consulted, recommend clear liquid for now   -Transfuse pRBC for Hb < 7 g/dL (Consider a higher Hb target if there is clinical evidence of intravascular volume depletion or comorbidities, such as CAD or if high suspicion of vigorous active ongoing bleeding or an uncorrected coagulopathy exists.).  - s/p vitamin K given Pt/INR > 2   - PPI 80 mg IV bolus once, then IV PPI 40 BID  -Avoid nonsteroidal agents, antiplatelet agents and anticoagulants if possible in patients without absolute contraindications. (consult managing provider if required for cardiovascular protection).  - EGD showed  Small (< 5 mm) esophageal varices with portal hypertensive gastropathy             Critical Care Daily Checklist:    A: Awake: RASS Goal/Actual Goal:    Actual: Braxton Agitation Sedation Scale (RASS): Drowsy   B: Spontaneous Breathing Trial Performed?     C: SAT & SBT  Coordinated?  N/A                      D: Delirium: CAM-ICU Overall CAM-ICU: Negative   E: Early Mobility Performed? No   F: Feeding Goal:    Status:     Current Diet Order   Procedures    Diet clear liquid      AS: Analgesia/Sedation No   T: Thromboembolic Prophylaxis Yes   H: HOB > 300 Yes   U: Stress Ulcer Prophylaxis (if needed) Yes   G: Glucose Control Yes   B: Bowel Function Stool Occurrence: 1   I: Indwelling Catheter (Lines & Bose) Necessity Yes   D: De-escalation of Antimicrobials/Pharmacotherapies Yes    Plan for the day/ETD Continue to monitor    Code Status:  Family/Goals of Care: Full Code         Critical secondary to Patient has a condition that poses threat to life and bodily function: Acute Renal Failure      Critical care was time spent personally by me on the following activities: development of treatment plan with patient or surrogate and bedside caregivers, discussions with consultants, evaluation of patient's response to treatment, examination of patient, ordering and performing treatments and interventions, ordering and review of laboratory studies, ordering and review of radiographic studies, pulse oximetry, re-evaluation of patient's condition. This critical care time did not overlap with that of any other provider or involve time for any procedures.     Erika Ocasio,   Critical Care Medicine  [facility] - [facility]

## 2023-01-21 NOTE — ASSESSMENT & PLAN NOTE
61 yo M with PMH of alcoholic hepatitis (drinks a 6 pack of beer per day and bottle of marie), Afib on Xarelto, central obesity, HTN, GI bleed, unknown CKD vs MARTITA on admission, anemia, chronic hyponatremia who presents due to inability to get out of bed. He reports that he has been having fecal incontinence for 2 months now and difficulty walking for the last 4 days (with single person assistance and rolling walker) and inability to get out of bed today. He denies saddle anesthesia or urinary incontinence or issues voiding his bladder. He has also  been having dark and tarry stools.     1/21 pt still with ongoing medical issues, confused this morning with poor effort and cooperation on exam. Requesting medical optimization and risk stratification before clearing and proceeding with surgery.    --Admitted to MICU   -q1 neuro checks  --All labs and diagnostics reviewed   -CT L spine and MRI L spine at OSH showed large L3/L4 herniated disc with moderate to severe stenosis. Some air in disc space.   MRI L spine wo 1/16: large L3/L4 herniated disc with moderate to severe stenosis  - no acute neurosurgical intervention, patient with clinical findings of subacute to chronic symptoms   -Will tentatively plan for OR Monday for laminectomy/discectomy pending medical stablization/optimization &  documented risk stratification  - appreciated risk stratification from primary team, patient with multiple ongoing comorbidities  - no HOB restrictions   - MAPs >65, requiring pressors  - hold Xarelto, PT and INR elevated on admission // will need Xarelto held for 3-5 days prior to OR and/or coagulapathy corrected  - GI consulted for GI bleed, EGD negative for bleed but showed esophageal varices. Concern for infiltrative liver process on top of alcoholic cirrhosis  - nephro consulted; Cr continues to rise  - ok for diet at this time per primary, NPO Sunday night for tentative surgery on Monday for lumbar decompression  - MICU/ for  all primary medical issues  - nsgy will continue to follow    Dispo: ongoing, tentative plan for OR Monday with neurosurgery for lumbar decompression if medically stable

## 2023-01-21 NOTE — RESIDENT HANDOFF
Critical Care Handoff     Primary Team: [facility] CRITICAL CARE MEDICINE TEAM 1 Room Number: [id number]     Patient Name: Jonny Isabel MRN: [id number]     Date of Birth: [id number] Allergies: Patient has no known allergies.     Age: 62 y.o. Admit Date: 1/16/2023     Sex: male  BMI: Body mass index is 36.87 kg/m².     Code Status: Full Code        llness Level (current clinical status): Watcher - No    Reason for Admission: Spinal stenosis    Brief HPI (pertinent PMH and diagnosis or differential diagnosis): Mr. Fuller is a 62-year-old male with a PMHx of A-fibb (on Eliquis), hypertension, DM 2 (not insulin dependent) presents as transfer from OSH for chronic lower back that has been worsening for the past week. Pt states that the pain is radiating from his right buttock down to his legs. Pt was unable to get out of bed this AM due to the pain. HE endorses having fecal incontinence for the past week with black tarry stools. He denies any fevers, chills, abdominal pain, urinary incontinence, or saddle anesthesia.     At OSH ED CT lumbar spine ordered revealing Prominent, cystic, multiloculated predominantly gas attenuation epidural  structure within the spinal canal at the level of L3-L4 resulting in severe narrowing of the spinal canal with mass effect upon the thecal sac and  vacuum disc phenomena concerning for diskitis or an epidural abscess. MRI shows large disc extrusion at L3-4 causing severe spinal canal stenosis, with moderate spinal canal stenosis at L2-3 and L4-5 and moderate neural foraminal narrowing L4-5 and L5-S1. Patient had no tenderness overlying spinous process but still endorsed sever pain, received percocet and robaxin. Of note, KENNETH showed blood in stool. The patient has a white count of 12.9, hemoglobin of 7.5, creatinine 4.6, Lactate of 3.9. Pt given Rocephin, Zosyn and Vanc.    Pt transferred to [facility] for further intervention with neurosurgery.        Hospital Course (updated, brief assessment by system or  problem, significant events): Admitted to MICU for NSGY and GI evaluation . Hgb stable. EGD shows small varices with gastropathy, no active bleeds. Worsening MARTITA with minimal UOP, HRS protocol started with levo, midodrine, octreotide and albumin trailed, has since been stopped due to low suspicion. Nephrology following. NSGY planned for surgery on Monday. U/O seems to be slightly improving. HDS.     Tasks (specific, using if-then statements): If Hbg <7, transfuse with pRBC, stop ppx heparin, repeat EGD    Contingency Plan (special circumstances anticipated and plan):   Nephrology following regarding worsening kidney function  Planned for NSGY on Monday for L3/L4 compression

## 2023-01-21 NOTE — ASSESSMENT & PLAN NOTE
Patient with Persistent (7 days or more) atrial fibrillation which is controlled currently with Beta Blocker. Patient is currently in atrial fibrillation.[id number] Score: 1  Hx of GI bleeding-  varicies on EGD. Anticoagulation not indicated due to holding NOAC in anticipation of surgery..    - Maintain K > 4, Mag > 2 and Ca/iCal WNL to decrease arrhythmogenic potential  - On lopressor 100 mg QD, holding as pt likely has GI bleed  - Holding anticoagulation in the setting of GI bleed   - Starting Heparin gtt ppx in the setting of no acute GI bleed

## 2023-01-21 NOTE — ASSESSMENT & PLAN NOTE
Concerning for variceal bleed given hx of alcohol abuse.     - GI consulted, recommend clear liquid for now   -Transfuse pRBC for Hb < 7 g/dL (Consider a higher Hb target if there is clinical evidence of intravascular volume depletion or comorbidities, such as CAD or if high suspicion of vigorous active ongoing bleeding or an uncorrected coagulopathy exists.).  - s/p vitamin K given Pt/INR > 2   - PPI 80 mg IV bolus once, then IV PPI 40 BID  -Avoid nonsteroidal agents, antiplatelet agents and anticoagulants if possible in patients without absolute contraindications. (consult managing provider if required for cardiovascular protection).  - EGD showed  Small (< 5 mm) esophageal varices with portal hypertensive gastropathy

## 2023-01-21 NOTE — PLAN OF CARE
Pt free from falls and injury this shift. All VSS at this time. Patient disoriented to time this afternoon. Was able to take meds this morning with sips, this afternoon patient had more delayed responses and not alert while sipping water, coughing as well. Sats >95% on RA currently. MAPs maintained above 65 without issues. Abdomen rounded/distended/taut, multiple BM's this shift. Bose intact, diuril and lasix given without significant response. 235cc uop total this shift, team aware. No other significant events this shift. POC reviewed with pt and SO, all questions answered.

## 2023-01-21 NOTE — SUBJECTIVE & OBJECTIVE
Interval History: see above    Review of Systems  Objective:     Vital Signs (Most Recent):  Temp: 97.7 °F (36.5 °C) (01/21/23 1500)  Pulse: 90 (01/21/23 1615)  Resp: 13 (01/21/23 1615)  BP: 138/72 (01/21/23 1600)  SpO2: 96 % (01/21/23 1615) Vital Signs (24h Range):  Temp:  [97.4 °F (36.3 °C)-98.1 °F (36.7 °C)] 97.7 °F (36.5 °C)  Pulse:  [] 90  Resp:  [10-30] 13  SpO2:  [93 %-100 %] 96 %  BP: ()/(53-72) 138/72     Weight: 110 kg (242 lb 8.1 oz)  Body mass index is 36.87 kg/m².    Intake/Output Summary (Last 24 hours) at 1/21/2023 1646  Last data filed at 1/21/2023 1400  Gross per 24 hour   Intake 198.99 ml   Output 232 ml   Net -33.01 ml      Physical Exam    Significant Labs: All pertinent labs within the past 24 hours have been reviewed.  CBC:   Recent Labs   Lab 01/20/23  1948 01/21/23  0314 01/21/23  1211   WBC 14.55* 13.74* 13.65*   HGB 9.5* 8.8* 9.1*   HCT 29.5* 27.5* 27.9*    174 184     CMP:   Recent Labs   Lab 01/20/23  0316 01/21/23  0314 01/21/23  1511   * 130* 133*   K 4.3 4.1 4.2   CL 98 99 100   CO2 14* 15* 15*   * 121* 124*   BUN 35* 39* 39*   CREATININE 6.9* 7.3* 7.5*   CALCIUM 7.7* 7.7* 8.0*   PROT 6.4 5.9* 6.4   ALBUMIN 2.6* 2.4* 2.4*   BILITOT 2.4* 2.8* 3.1*   ALKPHOS 188* 170* 184*   AST 76* 65* 70*   ALT 44 36 41   ANIONGAP 17* 16 18*       Significant Imaging: I have reviewed all pertinent imaging results/findings within the past 24 hours.

## 2023-01-21 NOTE — PROGRESS NOTES
[facility]  Hospital Medicine  IMR Transfer Acceptance  Note    Patient Name: Jonny Isabel  MRN: [id number]  Patient Class: IP- Inpatient   Admission Date: 1/16/2023  Length of Stay: 5 days  Attending Physician: Genaro Ortiz MD  Primary Care Provider: Yuli Pérez Mai, MD        Subjective:     Principal Problem:Spinal stenosis        HPI:  62-year-old male with a PMHx of A-fibb (on Eliquis), hypertension, DM 2 (not insulin dependent) presents as transfer from OSH for chronic lower back pain  that has been worsening for the past week. Pt states that the pain is radiating from his right buttock down to his legs. Pt was unable to get out of bed this AM due to the pain. HE endorses having fecal incontinence for the past week with black tarry stools. He denies any fevers, chills, abdominal pain, urinary incontinence, or saddle anesthesia.      At OSH ED CT lumbar spine ordered revealing Prominent, cystic, multiloculated predominantly gas attenuation epidural  structure within the spinal canal at the level of L3-L4 resulting in severe narrowing of the spinal canal with mass effect upon the thecal sac and  vacuum disc phenomena concerning for diskitis or an epidural abscess. MRI shows large disc extrusion at L3-4 causing severe spinal canal stenosis, with moderate spinal canal stenosis at L2-3 and L4-5 and moderate neural foraminal narrowing L4-5 and L5-S1. Patient had no tenderness overlying spinous process but still endorsed sever pain, received percocet and robaxin. Of note, KENNETH showed blood in stool. The patient has a white count of 12.9, hemoglobin of 7.5, creatinine 4.6, Lactate of 3.9. Pt given Rocephin, Zosyn and Vanc.     Pt transferred to [facility] for further intervention with neurosurgery.          Hospital Course (updated, brief assessment by system or problem, significant events): Admitted to MICU for NSGY and GI evaluation . Hgb stable. EGD shows small varices with gastropathy, no active  bleeds. Worsening MARTITA with minimal UOP, HRS protocol started with levo, midodrine, octreotide and albumin trailed, has since been stopped due to low suspicion. Nephrology following. NSGY planned for surgery on Monday. U/O seems to be slightly improving. HDS.      Tasks (specific, using if-then statements): If Hbg <7, transfuse with pRBC, stop ppx heparin, repeat EGD     Contingency Plan (special circumstances anticipated and plan):   Nephrology following regarding worsening kidney function  Planned for NSGY on Monday for L3/L4 compression          Overview/Hospital Course:  No notes on file    Interval History: see above    Review of Systems  Objective:     Vital Signs (Most Recent):  Temp: 97.7 °F (36.5 °C) (01/21/23 1500)  Pulse: 90 (01/21/23 1615)  Resp: 13 (01/21/23 1615)  BP: 138/72 (01/21/23 1600)  SpO2: 96 % (01/21/23 1615) Vital Signs (24h Range):  Temp:  [97.4 °F (36.3 °C)-98.1 °F (36.7 °C)] 97.7 °F (36.5 °C)  Pulse:  [] 90  Resp:  [10-30] 13  SpO2:  [93 %-100 %] 96 %  BP: ()/(53-72) 138/72     Weight: 110 kg (242 lb 8.1 oz)  Body mass index is 36.87 kg/m².    Intake/Output Summary (Last 24 hours) at 1/21/2023 1646  Last data filed at 1/21/2023 1400  Gross per 24 hour   Intake 198.99 ml   Output 232 ml   Net -33.01 ml      Physical Exam    Significant Labs: All pertinent labs within the past 24 hours have been reviewed.  CBC:   Recent Labs   Lab 01/20/23  1948 01/21/23  0314 01/21/23  1211   WBC 14.55* 13.74* 13.65*   HGB 9.5* 8.8* 9.1*   HCT 29.5* 27.5* 27.9*    174 184     CMP:   Recent Labs   Lab 01/20/23  0316 01/21/23  0314 01/21/23  1511   * 130* 133*   K 4.3 4.1 4.2   CL 98 99 100   CO2 14* 15* 15*   * 121* 124*   BUN 35* 39* 39*   CREATININE 6.9* 7.3* 7.5*   CALCIUM 7.7* 7.7* 8.0*   PROT 6.4 5.9* 6.4   ALBUMIN 2.6* 2.4* 2.4*   BILITOT 2.4* 2.8* 3.1*   ALKPHOS 188* 170* 184*   AST 76* 65* 70*   ALT 44 36 41   ANIONGAP 17* 16 18*       Significant Imaging: I have  reviewed all pertinent imaging results/findings within the past 24 hours.      Assessment/Plan:      * Spinal stenosis  Surgery planned for Monday  Needs Risk Stratification. So far:  High risk due to recent GI bleed, coagulapathy, sepsis, SPB with negative cultures, acute renal failure, MELD 31.   Likely has chronic liver and kidney disease. Suspicion for infiltrative process in kidneys.   - may need to push back surgery to a later date  - follow pt through weekend  - xarelto on hold, on heparin    ECHO  /117/23:    The left ventricle is small with normal systolic function.   The estimated ejection fraction is 65%.   Normal left ventricular diastolic function.   Normal right ventricular size with normal right ventricular systolic function.   Normal central venous pressure (3 mmHg).   The estimated PA systolic pressure is 30 mmHg.   Mild tricuspid regurgitation.    Lumbar herniated disc  With Spinal stenosis, concern for epidural abscess  CT Lumbar Spine Without Contrast Result Date: 1/16/2023  1.  Prominent, cystic, multiloculated predominantly gas attenuation structure within the spinal canal at the level of L3-L4 with dimensions as above.  This is favored to be epidural in location and results in severe narrowing of the spinal canal with mass effect upon the thecal sac.  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 Large disc extrusion at L3-4, contributing to severe spinal canal stenosis, as above. Additional lumbar spondylosis, contributing to moderate spinal canal stenosis at L2-3 and L4-5 and moderate neural foraminal narrowing L4-5 and L5-S1      Per Neurosurgery    -Will tentatively plan for OR Monday for laminectomy/discectomy pending medical stablization/optimization &  documented risk stratification  - appreciated risk stratification from primary team, patient with multiple ongoing comorbidities  - no HOB  restrictions   - MAPs >65, requiring pressors  - hold Xarelto, PT and INR elevated on admission // will need Xarelto held for 3-5 days prior to OR and/or coagulapathy corrected      Diabetes    -Last A1c reviewed-         Lab Results   Component Value Date     HGBA1C 5.3 11/10/2022         Home Antihyperglycemic Regiment:  - Metformin  1000 mg BID      Inpatient Antihyperglycemic Regiment:  Antihyperglycemics (From admission, onward)        Start     Stop Route Frequency Ordered     01/16/23 1507   insulin aspart U-100 pen 0-5 Units         -- SubQ Before meals & nightly PRN 01/16/23 1407             - LDSSI  - Clear liquid diet for now      Blood Sugars (AccuCheck):          Recent Labs     01/16/23  1545 01/16/23 2043   POCTGLUCOSE 141* 163*       ATN (acute tubular necrosis)  Patient with acute kidney injury likely due to acute tubular necrosis vs ARS.  MARTITA is currently worsening. Labs reviewed- Renal function/electrolytes with Estimated Creatinine Clearance: 12.4 mL/min (A) (based on SCr of 7.5 mg/dL (H)). according to latest data. Monitor urine output and serial BMP and adjust therapy as needed. Avoid nephrotoxins and renally dose meds for GFR listed above.     Creatinine 4.7 on admit, baseline around 0.8. Pre-renal vs ATN. Less likely obstruction as pt has gomez      - Check urine lytes and renal ultrasound  - Check urine protein creatinine ratio.  - Strict I&Os and daily weights   - Avoid nephrotoxic agents such as NSAIDs, gadolinium and IV radiocontrast.  - Renally dose meds to current GFR.  - Maintain MAP > 65. Off of levophed  - Started on HRS protocol with midodrine 10mg TID, Octreotide, and Albumin 25g BID. Stopped due to low suspicion    1/21-  Cr 7.5 , Nephrology following:  Urine sodium 10, urine protein creatinine ratio 0.89. IgA 505. Urine microscopy: moderated (2-3 per field) granular casts with calcium oxylate crystals embedded in casts.  Uo improving.          MARTITA (acute kidney injury)  Present  upon admission  MARTITA on CKD  US - suggestive of medical renal disease  See ATN      JAYY-fib  Patient with Persistent (7 days or more) atrial fibrillation which is controlled currently with Beta Blocker. Patient is currently in atrial fibrillation.[id number] Score: 1  Hx of GI bleeding-  varicies on EGD. Anticoagulation not indicated due to holding NOAC in anticipation of surgery..    - Maintain K > 4, Mag > 2 and Ca/iCal WNL to decrease arrhythmogenic potential  - On lopressor 100 mg QD, holding as pt likely has GI bleed  - Holding anticoagulation in the setting of GI bleed   - Starting Heparin gtt ppx in the setting of no acute GI bleed          Alcohol withdrawal syndrome without complication  On thiamine,   /72  Pulse 90     Alcohol abuse   Vitals q4h while awake  - CIWA monitoring  - Ativan 2mg q4 PRN if 2 criteria are met: Systolic BP>160, Diastolic BP >110 or Pulse >110  - Start Vitamin supplementation- Thiamine, Folic acid, Vit. B12, and Multivitamin   - Will start valium taper at 5 mg Q8H       Alcoholic cirrhosis of liver with ascites  S/p diagnostic paracentesis on 1/17 with , 50% seg. Cytology pending    EGD revealed esophageal varicies  - On Lactulose, midodrine  - cefepime and flagyl started 1/16 for presumed SBP, cultures for ascites 1/17 are no growth so far    Elevated liver enzymes  Likely 2/2 to underlying alcohol abuse and hepatic steatosis.      - Daily CMP, PT/INR   - Complete workup up with tylenol level, acute hep panel, a1-antitrypsin, anti smith antibody, HIV, anti mitochondrial antibody, anca, anti-liver, aFP, PETH pending   - Liver US with doppler shows epatomegaly with hepatic steatosis, cholelithiasis versus gallbladder sludge, small volume ascites, and left pleural effusion.     MELD-Na score: 31 at 1/21/2023  3:11 PM  MELD score: 30 at 1/21/2023  3:11 PM  Calculated from:  Serum Creatinine: 7.5 mg/dL (Using max of 4 mg/dL) at 1/21/2023  3:11 PM  Serum Sodium: 133 mmol/L at  1/21/2023  3:11 PM  Total Bilirubin: 3.1 mg/dL at 1/21/2023  3:11 PM  INR(ratio): 1.7 at 1/21/2023  3:14 AM  Age: 62 years    Coagulopathy  INR 1.7    Severe sepsis  This patient does have evidence of infective focus  My overall impression is septic shock.  Source: Unknown  Antibiotics given-   Antibiotics (From admission, onward)        Start     Stop Route Frequency Ordered     01/20/23 1400   metroNIDAZOLE tablet 500 mg         -- Oral Every 8 hours 01/20/23 1109     01/18/23 0900   mupirocin 2 % ointment         01/23 0859 Nasl 2 times daily 01/18/23 0354     01/16/23 2100   ceFEPIme (MAXIPIME) 2 g in dextrose 5 % in water (D5W) 5 % 50 mL IVPB (MB+)         -- IV Every 24 hours (non-standard times) 01/16/23 1354             Latest lactate reviewed-  No results for input(s): LACTATE in the last 72 hours.  Organ dysfunction indicated by Acute kidney injury     - On Vancomycin, Cefepime, and Flagyl, Vanc d/c 1/20  - BCx negative  - SBP work up negative  - Will de-escalate once appropriate     GI bleed  Concerning for variceal bleed given hx of alcohol abuse.      - GI consulted, recommend clear liquid for now   -Transfuse pRBC for Hb < 7 g/dL (Consider a higher Hb target if there is clinical evidence of intravascular volume depletion or comorbidities, such as CAD or if high suspicion of vigorous active ongoing bleeding or an uncorrected coagulopathy exists.).  - s/p vitamin K given Pt/INR > 2   - PPI 80 mg IV bolus once, then IV PPI 40 BID  -Avoid nonsteroidal agents, antiplatelet agents and anticoagulants if possible in patients without absolute contraindications. (consult managing provider if required for cardiovascular protection).  - EGD showed  Small (< 5 mm) esophageal varices with portal hypertensive gastropathy      Goals of care, counseling/discussion  Advance Care Planning   Per ICU upon admission:  - Pt has three sons that he is estranged from & has not seen in 20 years. Pt identified his girlfriend of  15 years, Adelita Bingham, as his surrogate decision maker in the event that he becomes incapacitated & cannot make decisions for himself           VTE Risk Mitigation (From admission, onward)         Ordered     heparin (porcine) injection 5,000 Units  Every 8 hours         01/20/23 1108     Place sequential compression device  Until discontinued         01/18/23 0912                Discharge Planning   DALILA: 1/26/2023     Code Status: Full Code   Is the patient medically ready for discharge?:     Reason for patient still in hospital (select all that apply): Patient trending condition  Discharge Plan A: Home          Pushpa Rasmussen MD  Senior Hospitalist  Department of Hospital Medicine  [facility]  [id number], [phone]    [facility] - Surgical Intensive Care

## 2023-01-21 NOTE — PROGRESS NOTES
[facility]  Nephrology  Progress Note    Patient Name: Jonny Isabel  MRN: [id number]  Admission Date: 1/16/2023  Hospital Length of Stay: 5 days  Attending Provider: Genaro Ortiz MD   Primary Care Physician: Yuli Pérez Mai, MD  Principal Problem:Spinal stenosis    Subjective:     HPI: 62 year old male with a history of alcohol abuse, afib/flutter on xarelto, HTN presents with concern for severe spinal canal stenosis/vacuum disc phenomena, concern for a GI bleed and MARTITA. He presents at behest of his girlfriend after being unable to stand, he has a history of sciatica but reports that this is worse. He has had fecal incontinence for the last week.     He has no report of kidney issues in the past and reports no family history of such either. At time of consultation he is pending an EGD for GI bleed. CT abdomen with large liver and concern for SBO per report, no hydronephrosis. UA with 2+ protein, 1+ occult blood, urine sodium 25 and urine osm 302. On admission serum creatinine 5 (baseline 0.8).      Interval History: T bili increasing, however UOP appears to be improving, 1/20 with 120 UOP and then 1/21 with 192 UOP. Serum creatinine also appears to be cresting with delta from 1/19 to 1/21 improving as well    Review of patient's allergies indicates:  No Known Allergies  Current Facility-Administered Medications   Medication Frequency    0.9%  NaCl infusion (for blood administration) Q24H PRN    ceFEPIme (MAXIPIME) 2 g in dextrose 5 % in water (D5W) 5 % 50 mL IVPB (MB+) Q24H    dextrose 10% bolus 125 mL 125 mL PRN    dextrose 10% bolus 250 mL 250 mL PRN    folic acid tablet 1 mg Daily    glucagon (human recombinant) injection 1 mg PRN    glucose chewable tablet 16 g PRN    glucose chewable tablet 24 g PRN    heparin (porcine) injection 5,000 Units Q8H    insulin aspart U-100 pen 0-5 Units QID (AC + HS) PRN    lactulose 20 gram/30 mL solution Soln 30 g QID    LIDOcaine-prilocaine cream PRN     LORazepam injection 1 mg Q4H PRN    metroNIDAZOLE tablet 500 mg Q8H    midodrine tablet 10 mg Q8H    multivitamin tablet Daily    mupirocin 2 % ointment BID    pantoprazole EC tablet 40 mg BID AC    scopolamine 1.3-1.5 mg (1 mg over 3 days) 1 patch Q3 Days    sodium bicarbonate tablet 1,300 mg TID    thiamine tablet 100 mg Q8H       Objective:     Vital Signs (Most Recent):  Temp: 98.1 °F (36.7 °C) (01/21/23 0700)  Pulse: 88 (01/21/23 0800)  Resp: (!) 22 (01/21/23 0800)  BP: 121/60 (01/21/23 0800)  SpO2: 96 % (01/21/23 0800)   Vital Signs (24h Range):  Temp:  [97.4 °F (36.3 °C)-98.1 °F (36.7 °C)] 98.1 °F (36.7 °C)  Pulse:  [] 88  Resp:  [10-59] 22  SpO2:  [88 %-97 %] 96 %  BP: ()/(53-82) 121/60     Weight: 110 kg (242 lb 8.1 oz) (01/18/23 1808)  Body mass index is 36.87 kg/m².  Body surface area is 2.3 meters squared.    I/O last 3 completed shifts:  In: 843.3 [P.O.:150; I.V.:299; Blood:100; IV Piggyback:294.3]  Out: 302 [Urine:302]    Physical Exam  Constitutional:       General: He is not in acute distress.     Appearance: He is obese. He is not ill-appearing or toxic-appearing.   HENT:      Nose: Nose normal. No congestion or rhinorrhea.      Mouth/Throat:      Mouth: Mucous membranes are moist.   Eyes:      General: No scleral icterus.        Right eye: No discharge.         Left eye: No discharge.      Pupils: Pupils are equal, round, and reactive to light.   Cardiovascular:      Rate and Rhythm: Normal rate.   Pulmonary:      Effort: Pulmonary effort is normal. No respiratory distress.   Abdominal:      General: Abdomen is flat. There is distension.      Tenderness: There is no abdominal tenderness.   Musculoskeletal:      Cervical back: Normal range of motion. No rigidity.      Right lower leg: Edema present.      Left lower leg: Edema present.   Lymphadenopathy:      Cervical: No cervical adenopathy.   Skin:     Coloration: Skin is not jaundiced.      Findings: No lesion.   Neurological:       General: No focal deficit present.      Mental Status: He is alert.       Significant Labs:  All labs within the past 24 hours have been reviewed.     Significant Imaging:  Labs: Reviewed    Assessment/Plan:     * Spinal stenosis  Per primary, possible surgery on 1/23    MARTITA (acute kidney injury)  Unsure of etiology at this time  Size of liver suggest other etiology than cirrhosis, however also has esophageal varices, possible HRS. CT and US renal with no signs of obstruction.     Urine microscopy: (1/17) no casts identified, amorphous material (though only 5 cc obtained). Repeat with few granular casts (1/18). Repeat on 1/19 with granular casts. 1/20: moderated (2-3 per field) granular casts with calcium oxylate crystals embedded in casts.     Urine protein 4.59g and repeat with 2.5  C3/4 - 111/35  LILO with ratio of 1.98  Hep B and C negative  Uric acid 13    HIV negative  Iron 31, TIBC 151, Tsat 21, Transferrin 102, ferritin 135  KEATON negative  ANCA negative  -Cryo pending    At this time differential is large with possible infiltrative disease on background of alcoholism; EGD with small varices. There may be a component of HRS physiology but unsure of overall time course regarding MARTITA.     1/19: repeat Urine sodium 10, urine protein creatinine ratio 0.89. IgA 505. Urine microscopy: moderated (2-3 per field) granular casts with calcium oxylate crystals embedded in casts.       Overall impression: appears to be improving with UOP increasing from 1/20 to 1/21 and serum creatinine appears to be cresting.     Recommendations:  - Urine microscopy   - No need for RRT or lasix for volume removal  - No need for IVF  -Keep MAP > 65  -Keep hemoglobin > 7  -Strict ins and outs  -Avoid nephrotoxic agents if possible  -Avoid drastic hemodynamic changes if possible               Thank you for your consult. I will follow-up with patient. Please contact us if you have any additional questions.    Otoniel Cat  MD  Nephrology  [facility]    ATTENDING PHYSICIAN ATTESTATION  I have personally verified the history and examined the patient. I thoroughly reviewed the demographic, clinical, laboratorial and imaging information available in medical records. I agree with the assessment and recommendations provided by the subspecialty resident who was under my supervision.

## 2023-01-21 NOTE — SUBJECTIVE & OBJECTIVE
Interval History: T bili increasing, however UOP appears to be improving, 1/20 with 120 UOP and then 1/21 with 192 UOP. Serum creatinine also appears to be cresting with delta from 1/19 to 1/21 improving as well    Review of patient's allergies indicates:  No Known Allergies  Current Facility-Administered Medications   Medication Frequency    0.9%  NaCl infusion (for blood administration) Q24H PRN    ceFEPIme (MAXIPIME) 2 g in dextrose 5 % in water (D5W) 5 % 50 mL IVPB (MB+) Q24H    dextrose 10% bolus 125 mL 125 mL PRN    dextrose 10% bolus 250 mL 250 mL PRN    folic acid tablet 1 mg Daily    glucagon (human recombinant) injection 1 mg PRN    glucose chewable tablet 16 g PRN    glucose chewable tablet 24 g PRN    heparin (porcine) injection 5,000 Units Q8H    insulin aspart U-100 pen 0-5 Units QID (AC + HS) PRN    lactulose 20 gram/30 mL solution Soln 30 g QID    LIDOcaine-prilocaine cream PRN    LORazepam injection 1 mg Q4H PRN    metroNIDAZOLE tablet 500 mg Q8H    midodrine tablet 10 mg Q8H    multivitamin tablet Daily    mupirocin 2 % ointment BID    pantoprazole EC tablet 40 mg BID AC    scopolamine 1.3-1.5 mg (1 mg over 3 days) 1 patch Q3 Days    sodium bicarbonate tablet 1,300 mg TID    thiamine tablet 100 mg Q8H       Objective:     Vital Signs (Most Recent):  Temp: 98.1 °F (36.7 °C) (01/21/23 0700)  Pulse: 88 (01/21/23 0800)  Resp: (!) 22 (01/21/23 0800)  BP: 121/60 (01/21/23 0800)  SpO2: 96 % (01/21/23 0800)   Vital Signs (24h Range):  Temp:  [97.4 °F (36.3 °C)-98.1 °F (36.7 °C)] 98.1 °F (36.7 °C)  Pulse:  [] 88  Resp:  [10-59] 22  SpO2:  [88 %-97 %] 96 %  BP: ()/(53-82) 121/60     Weight: 110 kg (242 lb 8.1 oz) (01/18/23 1808)  Body mass index is 36.87 kg/m².  Body surface area is 2.3 meters squared.    I/O last 3 completed shifts:  In: 843.3 [P.O.:150; I.V.:299; Blood:100; IV Piggyback:294.3]  Out: 302 [Urine:302]    Physical Exam  Constitutional:       General: He is not in acute distress.      Appearance: He is obese. He is not ill-appearing or toxic-appearing.   HENT:      Nose: Nose normal. No congestion or rhinorrhea.      Mouth/Throat:      Mouth: Mucous membranes are moist.   Eyes:      General: No scleral icterus.        Right eye: No discharge.         Left eye: No discharge.      Pupils: Pupils are equal, round, and reactive to light.   Cardiovascular:      Rate and Rhythm: Normal rate.   Pulmonary:      Effort: Pulmonary effort is normal. No respiratory distress.   Abdominal:      General: Abdomen is flat. There is distension.      Tenderness: There is no abdominal tenderness.   Musculoskeletal:      Cervical back: Normal range of motion. No rigidity.      Right lower leg: Edema present.      Left lower leg: Edema present.   Lymphadenopathy:      Cervical: No cervical adenopathy.   Skin:     Coloration: Skin is not jaundiced.      Findings: No lesion.   Neurological:      General: No focal deficit present.      Mental Status: He is alert.       Significant Labs:  All labs within the past 24 hours have been reviewed.     Significant Imaging:  Labs: Reviewed

## 2023-01-21 NOTE — PROGRESS NOTES
[facility]  Neurosurgery  Progress Note    Subjective:     History of Present Illness: 63 yo M with PMH of alcoholic hepatitis (drinks a 6 pack of beer per day and bottle of marie), Afib on Xarelto, central obesity, HTN, GI bleed, unknown CKD vs MARTITA on admission, anemia, chronic hyponatremia who presents due to inability to get out of bed. He reports that he has been having fecal incontinence for 2 months now and difficulty walking for the last 4 days (with single person assistance and rolling walker) and inability to get out of bed today. He denies saddle anesthesia or urinary incontinence or issues voiding his bladder. He has also  been having dark and tarry stools.     CT L spine and MRI L spine at OSH showed large L3/L4 herniated disc with moderate to severe stenosis. Patient transported to [facility] for possible neurosurgical intervention.       Post-Op Info:  Procedure(s) (LRB):  EGD (ESOPHAGOGASTRODUODENOSCOPY) (N/A)   4 Days Post-Op     Interval History: pt still with ongoing medical issues, confused this morning with poor effort and cooperation on exam. Requesting medical optimization and risk stratification before clearing and proceeding with surgery.        Medications:  Continuous Infusions:      Scheduled Meds:   ceFEPime (MAXIPIME) IVPB  2 g Intravenous Q24H    folic acid  1 mg Oral Daily    heparin (porcine)  5,000 Units Subcutaneous Q8H    lactulose  30 g Oral QID    metroNIDAZOLE  500 mg Oral Q8H    midodrine  10 mg Oral Q8H    multivitamin  1 tablet Oral Daily    mupirocin   Nasal BID    pantoprazole  40 mg Oral BID AC    scopolamine  1 patch Transdermal Q3 Days    sodium bicarbonate  1,300 mg Oral TID    thiamine  100 mg Oral Q8H     PRN Meds:sodium chloride, dextrose 10%, dextrose 10%, glucagon (human recombinant), glucose, glucose, insulin aspart U-100, LIDOcaine-prilocaine, lorazepam     Review of Systems  Objective:     Weight: 110 kg (242 lb 8.1 oz)  Body mass  "index is 36.87 kg/m².  Vital Signs (Most Recent):  Temp: 98.1 °F (36.7 °C) (01/21/23 0700)  Pulse: 82 (01/21/23 1000)  Resp: 13 (01/21/23 1000)  BP: 136/69 (01/21/23 1000)  SpO2: 96 % (01/21/23 1000)   Vital Signs (24h Range):  Temp:  [97.4 °F (36.3 °C)-98.1 °F (36.7 °C)] 98.1 °F (36.7 °C)  Pulse:  [] 82  Resp:  [10-34] 13  SpO2:  [88 %-97 %] 96 %  BP: ()/(53-82) 136/69                            Urethral Catheter 01/16/23 1347 Latex 14 Fr. (Active)   Site Assessment Clean;Intact;Dry 01/17/23 0301   Collection Container Urimeter 01/17/23 0301   Securement Method secured to top of thigh w/ adhesive device 01/17/23 0301   Catheter Care Performed yes 01/17/23 0301   Reason for Continuing Urinary Catheterization Critically ill in ICU and requiring hourly monitoring of intake/output 01/17/23 0301   CAUTI Prevention Bundle Securement Device in place with 1" slack;Intact seal between catheter & drainage tubing;Drainage bag/urimeter off the floor;Sheeting clip in use;No dependent loops or kinks;Drainage bag/urimeter not overfilled (<2/3 full);Drainage bag/urimeter below bladder 01/17/23 0301   Output (mL) 0 mL 01/16/23 2001       Physical Exam  Neurosurgery Physical Exam  Constitutional:       Appearance: He is well-developed and well-nourished.      Comments: obese   Eyes:      Extraocular Movements: EOM normal.      Conjunctiva/sclera: Conjunctivae normal.      Pupils: Pupils are equal, round, and reactive to light.   Cardiovascular:      Pulses: Normal pulses.   Abdominal:      Palpations: Abdomen is soft.   Neurological:      Mental Status: He is alert and oriented to person, place, and time.       AAOx1, confused, hard to awaken and keep awake  PERRL  CN grossly intact  BUE 4-/5  BLE 1 hip flexion, 2 ke, 3 df/pf/ehl  SILT     Patient somewhat sedated this morning, difficult to max assess        Psychiatric:         Mood and Affect: Mood and affect normal    Significant Labs:  Recent Labs   Lab " 01/20/23  0316 01/21/23  0314   * 121*   * 130*   K 4.3 4.1   CL 98 99   CO2 14* 15*   BUN 35* 39*   CREATININE 6.9* 7.3*   CALCIUM 7.7* 7.7*   MG 1.6 1.6       Recent Labs   Lab 01/20/23  1130 01/20/23  1948 01/21/23  0314   WBC 14.90* 14.55* 13.74*   HGB 9.3* 9.5* 8.8*   HCT 29.4* 29.5* 27.5*    178 174       Recent Labs   Lab 01/20/23 0316 01/21/23  0314   INR 1.5* 1.7*       Microbiology Results (last 7 days)       Procedure Component Value Units Date/Time    Aerobic culture [[id number]] Collected: 01/17/23 0003    Order Status: Completed Specimen: Ascites Updated: 01/20/23 1239     Aerobic Bacterial Culture No growth    Culture, Anaerobic [[id number]] Collected: 01/17/23 0003    Order Status: Completed Specimen: Ascites Updated: 01/19/23 0849     Anaerobic Culture Culture in progress    Gram stain [[id number]] Collected: 01/17/23 0003    Order Status: Completed Specimen: Ascites Updated: 01/17/23 0207     Gram Stain Result Rare WBC's      No organisms seen    Blood culture [[id number]]     Order Status: Canceled Specimen: Blood     Blood culture [[id number]]     Order Status: Canceled Specimen: Blood           ABGs:   Recent Labs   Lab 01/20/23  1356   PH 7.270*   PCO2 34.0*   PO2 66*   HCO3 15.6*   POCSATURATED 90*   BE -11       Cardiac markers: No results for input(s): CKMB, CPKMB, TROPONINT, TROPONINI, MYOGLOBIN in the last 48 hours.    CMP:   Recent Labs   Lab 01/20/23 0316 01/21/23  0314   * 121*   CALCIUM 7.7* 7.7*   ALBUMIN 2.6* 2.4*   PROT 6.4 5.9*   * 130*   K 4.3 4.1   CO2 14* 15*   CL 98 99   BUN 35* 39*   CREATININE 6.9* 7.3*   ALKPHOS 188* 170*   ALT 44 36   AST 76* 65*   BILITOT 2.4* 2.8*       CRP:   No results for input(s): CRP in the last 48 hours.    ESR: No results for input(s): POCESR, ERYTHROCYTES in the last 48 hours.  LFTs:   Recent Labs   Lab 01/20/23 0316 01/21/23 0314   ALT 44 36   AST 76* 65*   ALKPHOS 188* 170*   BILITOT 2.4* 2.8*   PROT 6.4 5.9*    ALBUMIN 2.6* 2.4*       Procalcitonin: No results for input(s): PROCAL in the last 48 hours.    Significant Diagnostics:  I have reviewed all pertinent imaging results/findings within the past 24 hours.    Assessment/Plan:     Lumbar herniated disc  63 yo M with PMH of alcoholic hepatitis (drinks a 6 pack of beer per day and bottle of marie), Afib on Xarelto, central obesity, HTN, GI bleed, unknown CKD vs MARTITA on admission, anemia, chronic hyponatremia who presents due to inability to get out of bed. He reports that he has been having fecal incontinence for 2 months now and difficulty walking for the last 4 days (with single person assistance and rolling walker) and inability to get out of bed today. He denies saddle anesthesia or urinary incontinence or issues voiding his bladder. He has also  been having dark and tarry stools.     1/21 pt still with ongoing medical issues, confused this morning with poor effort and cooperation on exam. Requesting medical optimization and risk stratification before clearing and proceeding with surgery.    --Admitted to MICU   -q1 neuro checks  --All labs and diagnostics reviewed   -CT L spine and MRI L spine at OSH showed large L3/L4 herniated disc with moderate to severe stenosis. Some air in disc space.   MRI L spine wo 1/16: large L3/L4 herniated disc with moderate to severe stenosis  - no acute neurosurgical intervention, patient with clinical findings of subacute to chronic symptoms   -Will tentatively plan for OR Monday for laminectomy/discectomy pending medical stablization/optimization &  documented risk stratification  - appreciated risk stratification from primary team, patient with multiple ongoing comorbidities  - no HOB restrictions   - MAPs >65, requiring pressors  - hold Xarelto, PT and INR elevated on admission // will need Xarelto held for 3-5 days prior to OR and/or coagulapathy corrected  - GI consulted for GI bleed, EGD negative for bleed but showed esophageal  varices. Concern for infiltrative liver process on top of alcoholic cirrhosis  - nephro consulted; Cr continues to rise  - ok for diet at this time per primary, NPO Sunday night for tentative surgery on Monday for lumbar decompression  - MICU/HM for all primary medical issues  - nsgy will continue to follow    Dispo: ongoing, tentative plan for OR Monday with neurosurgery for lumbar decompression if medically stable        Tyler Ruelas MD  Neurosurgery  [facility]

## 2023-01-21 NOTE — ASSESSMENT & PLAN NOTE
Advance Care Planning   Per ICU upon admission:  - Pt has three sons that he is estranged from & has not seen in 20 years. Pt identified his girlfriend of 15 years, Adelita Bingham, as his surrogate decision maker in the event that he becomes incapacitated & cannot make decisions for himself

## 2023-01-21 NOTE — SUBJECTIVE & OBJECTIVE
Interval History/Significant Events: Mentation worsening, somnolent. Worsening MARTITA. Triaylsis line placed for HD. NSGY planned for surgery on Monday.       Review of Systems   Constitutional:  Positive for activity change. Negative for chills and fever.   HENT:  Negative for congestion and trouble swallowing.    Eyes:  Negative for visual disturbance.   Respiratory:  Negative for cough, shortness of breath, wheezing and stridor.    Cardiovascular:  Negative for chest pain and leg swelling.   Gastrointestinal:  Positive for abdominal distention and blood in stool. Negative for abdominal pain, diarrhea, nausea and vomiting.        +fecal incontinence   Endocrine: Negative for polyuria.   Genitourinary:  Negative for difficulty urinating and dysuria.   Musculoskeletal:  Positive for back pain. Negative for arthralgias and myalgias.   Skin:  Negative for color change and rash.   Neurological:  Negative for dizziness, weakness, numbness and headaches.   Psychiatric/Behavioral:  Positive for confusion. Negative for agitation.    Objective:     Vital Signs (Most Recent):  Temp: 98.1 °F (36.7 °C) (01/21/23 0700)  Pulse: 82 (01/21/23 1000)  Resp: 13 (01/21/23 1000)  BP: 136/69 (01/21/23 1000)  SpO2: 96 % (01/21/23 1000)   Vital Signs (24h Range):  Temp:  [97.4 °F (36.3 °C)-98.1 °F (36.7 °C)] 98.1 °F (36.7 °C)  Pulse:  [] 82  Resp:  [10-34] 13  SpO2:  [88 %-97 %] 96 %  BP: ()/(53-69) 136/69   Weight: 110 kg (242 lb 8.1 oz)  Body mass index is 36.87 kg/m².      Intake/Output Summary (Last 24 hours) at 1/21/2023 1114  Last data filed at 1/21/2023 0600  Gross per 24 hour   Intake 298.99 ml   Output 162 ml   Net 136.99 ml       Physical Exam  Vitals and nursing note reviewed.   Constitutional:       General: He is not in acute distress.     Appearance: He is obese. He is not ill-appearing.   HENT:      Head: Normocephalic and atraumatic.      Right Ear: External ear normal.      Left Ear: External ear normal.       Nose: Nose normal.      Mouth/Throat:      Mouth: Mucous membranes are moist.      Pharynx: Oropharynx is clear.   Eyes:      General:         Right eye: No discharge.         Left eye: No discharge.      Extraocular Movements: Extraocular movements intact.      Conjunctiva/sclera: Conjunctivae normal.      Pupils: Pupils are equal, round, and reactive to light.   Cardiovascular:      Rate and Rhythm: Normal rate and regular rhythm.      Pulses: Normal pulses.      Heart sounds: Normal heart sounds. No murmur heard.  Pulmonary:      Effort: Pulmonary effort is normal. No respiratory distress.      Breath sounds: No wheezing or rales.   Abdominal:      General: There is distension.      Tenderness: There is no abdominal tenderness. There is no guarding.   Musculoskeletal:         General: No swelling. Normal range of motion.      Cervical back: Normal range of motion.      Right lower leg: No edema.      Left lower leg: No edema.   Skin:     General: Skin is warm and dry.      Coloration: Skin is not jaundiced.   Neurological:      General: No focal deficit present.      Mental Status: He is alert. He is disoriented.      Cranial Nerves: Cranial nerves 2-12 are intact.      Comments: Patient altered  Responding to questions appropriately but confused about timing of events  Poor recall  + fecal incontinence   Psychiatric:         Mood and Affect: Mood normal.         Behavior: Behavior normal.       Vents:  Oxygen Concentration (%): 28 (01/19/23 0407)  Lines/Drains/Airways       Drain  Duration                  Urethral Catheter 01/16/23 1347 Latex 14 Fr. 4 days              Peripheral Intravenous Line  Duration                  Peripheral IV - Single Lumen 01/19/23 1515 18 G;1 3/4 in Right Upper Arm 1 day         Peripheral IV - Single Lumen 01/19/23 1515 20 G;1 3/4 in Left Forearm 1 day                  Significant Labs:    CBC/Anemia Profile:  Recent Labs   Lab 01/20/23  1130 01/20/23  1948 01/21/23  0314   WBC  14.90* 14.55* 13.74*   HGB 9.3* 9.5* 8.8*   HCT 29.4* 29.5* 27.5*    178 174   MCV 99* 99* 99*   RDW 21.6* 21.7* 21.3*        Chemistries:  Recent Labs   Lab 01/20/23 0316 01/21/23  0314   * 130*   K 4.3 4.1   CL 98 99   CO2 14* 15*   BUN 35* 39*   CREATININE 6.9* 7.3*   CALCIUM 7.7* 7.7*   ALBUMIN 2.6* 2.4*   PROT 6.4 5.9*   BILITOT 2.4* 2.8*   ALKPHOS 188* 170*   ALT 44 36   AST 76* 65*   MG 1.6 1.6   PHOS 5.2* 5.3*       All pertinent labs within the past 24 hours have been reviewed.    Significant Imaging:  I have reviewed all pertinent imaging results/findings within the past 24 hours.

## 2023-01-21 NOTE — ASSESSMENT & PLAN NOTE
With Spinal stenosis, concern for epidural abscess  CT Lumbar Spine Without Contrast Result Date: 1/16/2023  1.  Prominent, cystic, multiloculated predominantly gas attenuation structure within the spinal canal at the level of L3-L4 with dimensions as above.  This is favored to be epidural in location and results in severe narrowing of the spinal canal with mass effect upon the thecal sac.  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 Large disc extrusion at L3-4, contributing to severe spinal canal stenosis, as above. Additional lumbar spondylosis, contributing to moderate spinal canal stenosis at L2-3 and L4-5 and moderate neural foraminal narrowing L4-5 and L5-S1      Per Neurosurgery    -Will tentatively plan for OR Monday for laminectomy/discectomy pending medical stablization/optimization &  documented risk stratification  - appreciated risk stratification from primary team, patient with multiple ongoing comorbidities  - no HOB restrictions   - MAPs >65, requiring pressors  - hold Xarelto, PT and INR elevated on admission // will need Xarelto held for 3-5 days prior to OR and/or coagulapathy corrected

## 2023-01-21 NOTE — PROGRESS NOTES
[facility] - [facility]  Neurosurgery  Progress Note    Subjective:     History of Present Illness: 63 yo M with PMH of alcoholic hepatitis (drinks a 6 pack of beer per day and bottle of marie), Afib on Xarelto, central obesity, HTN, GI bleed, unknown CKD vs MARTITA on admission, anemia, chronic hyponatremia who presents due to inability to get out of bed. He reports that he has been having fecal incontinence for 2 months now and difficulty walking for the last 4 days (with single person assistance and rolling walker) and inability to get out of bed today. He denies saddle anesthesia or urinary incontinence or issues voiding his bladder. He has also  been having dark and tarry stools.     CT L spine and MRI L spine at OSH showed large L3/L4 herniated disc with moderate to severe stenosis. Patient transported to [facility] for possible neurosurgical intervention.       Post-Op Info:  Procedure(s) (LRB):  EGD (ESOPHAGOGASTRODUODENOSCOPY) (N/A)   3 Days Post-Op     Interval History: 1/20: poor exam this morning, neuro exam confounded. patient was recently given ativan and difficult to keep awake. Worsening Cr, unclear what process is going on still.         Medications:  Continuous Infusions:      Scheduled Meds:   ceFEPime (MAXIPIME) IVPB  2 g Intravenous Q24H    folic acid  1 mg Oral Daily    heparin (porcine)  5,000 Units Subcutaneous Q8H    lactulose  30 g Oral QID    metroNIDAZOLE  500 mg Oral Q8H    midodrine  10 mg Oral Q8H    multivitamin  1 tablet Oral Daily    mupirocin   Nasal BID    pantoprazole  40 mg Oral BID AC    scopolamine  1 patch Transdermal Q3 Days    sodium bicarbonate  1,300 mg Oral TID    thiamine  100 mg Oral Q8H     PRN Meds:sodium chloride, dextrose 10%, dextrose 10%, glucagon (human recombinant), glucose, glucose, insulin aspart U-100, LIDOcaine-prilocaine, lorazepam     Review of Systems  Objective:     Weight: 110.7 kg (244 lb)  Body mass index is 37.1 kg/m².  Vital Signs  "(Most Recent):  Temp: 97.6 °F (36.4 °C) (01/20/23 1615)  Pulse: 88 (01/20/23 1715)  Resp: (!) 22 (01/20/23 1715)  BP: 127/61 (01/20/23 1700)  SpO2: 95 % (01/20/23 1715)   Vital Signs (24h Range):  Temp:  [97.6 °F (36.4 °C)-97.9 °F (36.6 °C)] 97.6 °F (36.4 °C)  Pulse:  [69-98] 88  Resp:  [10-59] 22  SpO2:  [88 %-97 %] 95 %  BP: (109-135)/(51-82) 127/61     Date 01/20/23 0700 - 01/21/23 0659   Shift 4800-2538 1451-4329 3575-6231 24 Hour Total   INTAKE   P.O. 100   100   I.V.(mL/kg) 27.3(0.2)   27.3(0.2)   Blood 100   100   IV Piggyback 99.2   99.2   Shift Total(mL/kg) 326.5(3)   326.5(3)   OUTPUT   Urine(mL/kg/hr) 60(0.1) 22  82   Shift Total(mL/kg) 60(0.5) 22(0.2)  82(0.7)   Weight (kg) 110.7 110.7 110.7 110.7                          Urethral Catheter 01/16/23 1347 Latex 14 Fr. (Active)   Site Assessment Clean;Intact;Dry 01/17/23 0301   Collection Container Urimeter 01/17/23 0301   Securement Method secured to top of thigh w/ adhesive device 01/17/23 0301   Catheter Care Performed yes 01/17/23 0301   Reason for Continuing Urinary Catheterization Critically ill in ICU and requiring hourly monitoring of intake/output 01/17/23 0301   CAUTI Prevention Bundle Securement Device in place with 1" slack;Intact seal between catheter & drainage tubing;Drainage bag/urimeter off the floor;Sheeting clip in use;No dependent loops or kinks;Drainage bag/urimeter not overfilled (<2/3 full);Drainage bag/urimeter below bladder 01/17/23 0301   Output (mL) 0 mL 01/16/23 2001       Physical Exam  Neurosurgery Physical Exam  Constitutional:       Appearance: He is well-developed and well-nourished.      Comments: obese   Eyes:      Extraocular Movements: EOM normal.      Conjunctiva/sclera: Conjunctivae normal.      Pupils: Pupils are equal, round, and reactive to light.   Cardiovascular:      Pulses: Normal pulses.   Abdominal:      Palpations: Abdomen is soft.   Neurological:      Mental Status: He is alert and oriented to person, place, " and time.       AAOx1, hard to awaken and keep awake  PERRL  CN grossly intact  BUE 4-/5  BLE 1 hip flexion, 2 ke, 4+ df/pf/ehl  Reflexes normal  Poor rectal tone  No saddle anesthesia  SILT     Patient somewhat sedated this morning, neuro exam with confounding factors        Psychiatric:         Mood and Affect: Mood and affect normal    Significant Labs:  Recent Labs   Lab 01/19/23 0420 01/20/23  0316   * 131*   * 129*   K 4.3 4.3    98   CO2 16* 14*   BUN 32* 35*   CREATININE 6.5* 6.9*   CALCIUM 7.3* 7.7*   MG 1.6 1.6       Recent Labs   Lab 01/19/23 2014 01/20/23 0316 01/20/23  1130   WBC 17.15* 16.36* 14.90*   HGB 9.6* 9.4* 9.3*   HCT 29.5* 29.7* 29.4*    185 187       Recent Labs   Lab 01/19/23 0420 01/20/23 0316   INR 1.4* 1.5*       Microbiology Results (last 7 days)       Procedure Component Value Units Date/Time    Aerobic culture [[id number]] Collected: 01/17/23 0003    Order Status: Completed Specimen: Ascites Updated: 01/20/23 1239     Aerobic Bacterial Culture No growth    Culture, Anaerobic [[id number]] Collected: 01/17/23 0003    Order Status: Completed Specimen: Ascites Updated: 01/19/23 0849     Anaerobic Culture Culture in progress    Gram stain [[id number]] Collected: 01/17/23 0003    Order Status: Completed Specimen: Ascites Updated: 01/17/23 0207     Gram Stain Result Rare WBC's      No organisms seen    Blood culture [[id number]]     Order Status: Canceled Specimen: Blood     Blood culture [[id number]]     Order Status: Canceled Specimen: Blood           ABGs:   Recent Labs   Lab 01/20/23  1356   PH 7.270*   PCO2 34.0*   PO2 66*   HCO3 15.6*   POCSATURATED 90*   BE -11     Cardiac markers: No results for input(s): CKMB, CPKMB, TROPONINT, TROPONINI, MYOGLOBIN in the last 48 hours.    CMP:   Recent Labs   Lab 01/19/23 0420 01/20/23 0316   * 131*   CALCIUM 7.3* 7.7*   ALBUMIN 2.4* 2.6*   PROT 6.1 6.4   * 129*   K 4.3 4.3   CO2 16* 14*    98   BUN 32*  35*   CREATININE 6.5* 6.9*   ALKPHOS 194* 188*   ALT 56* 44   AST 99* 76*   BILITOT 2.0* 2.4*       CRP:   No results for input(s): CRP in the last 48 hours.    ESR: No results for input(s): POCESR, ERYTHROCYTES in the last 48 hours.  LFTs:   Recent Labs   Lab 01/19/23  0420 01/20/23  0316   ALT 56* 44   AST 99* 76*   ALKPHOS 194* 188*   BILITOT 2.0* 2.4*   PROT 6.1 6.4   ALBUMIN 2.4* 2.6*       Procalcitonin: No results for input(s): PROCAL in the last 48 hours.    Significant Diagnostics:  I have reviewed all pertinent imaging results/findings within the past 24 hours.    Assessment/Plan:     Lumbar herniated disc  63 yo M with PMH of alcoholic hepatitis (drinks a 6 pack of beer per day and bottle of marie), Afib on Xarelto, central obesity, HTN, GI bleed, unknown CKD vs MARTITA on admission, anemia, chronic hyponatremia who presents due to inability to get out of bed. He reports that he has been having fecal incontinence for 2 months now and difficulty walking for the last 4 days (with single person assistance and rolling walker) and inability to get out of bed today. He denies saddle anesthesia or urinary incontinence or issues voiding his bladder. He has also  been having dark and tarry stools.     --Admitted to MICU   -q4 neuro checks  --All labs and diagnostics reviewed   -CT L spine and MRI L spine at OSH showed large L3/L4 herniated disc with moderate to severe stenosis. Some air in disc space.   MRI L spine wo 1/16: large L3/L4 herniated disc with moderate to severe stenosis  - no acute neurosurgical intervention, patient with clinical findings of subacute to chronic symptoms   -Will tentatively plan for OR Monday for laminectomy/discectomy pending medical stablization/optimization &  documented risk stratification  - appreciated risk stratification from primary team, patient with multiple ongoing comorbidities  - no HOB restrictions   - MAPs >65, requiring pressors  - hold Xarelto, PT and INR elevated on  admission // will need Xarelto held for 3-5 days prior to OR and/or coagulapathy corrected  - GI consulted for GI bleed, EGD negative for bleed but showed esophageal varices. Concern for infiltrative liver process on top of alcoholic cirrhosis  - nephro consulted; Cr continues to rise  - ok for diet at this time per primary, NPO Sunday night for tentative surgery on Monday for lumbar decompression  - MICU/ for all primary medical issues  - nsgy will continue to follow    Dispo: ongoing, tentative plan for OR Monday with neurosurgery for lumbar decompression if medically stable        Krista Finch MD  Neurosurgery  [facility]

## 2023-01-21 NOTE — ASSESSMENT & PLAN NOTE
Vitals q4h while awake  - CIWA monitoring  - Ativan 2mg q4 PRN if 2 criteria are met: Systolic BP>160, Diastolic BP >110 or Pulse >110  - Start Vitamin supplementation- Thiamine, Folic acid, Vit. B12, and Multivitamin   - Will start valium taper at 5 mg Q8H

## 2023-01-21 NOTE — HPI
62-year-old male with a PMHx of A-fibb (on Eliquis), hypertension, DM 2 (not insulin dependent) presents as transfer from OSH for chronic lower back pain  that has been worsening for the past week. Pt states that the pain is radiating from his right buttock down to his legs. Pt was unable to get out of bed this AM due to the pain. HE endorses having fecal incontinence for the past week with black tarry stools. He denies any fevers, chills, abdominal pain, urinary incontinence, or saddle anesthesia.      At OSH ED CT lumbar spine ordered revealing Prominent, cystic, multiloculated predominantly gas attenuation epidural  structure within the spinal canal at the level of L3-L4 resulting in severe narrowing of the spinal canal with mass effect upon the thecal sac and  vacuum disc phenomena concerning for diskitis or an epidural abscess. MRI shows large disc extrusion at L3-4 causing severe spinal canal stenosis, with moderate spinal canal stenosis at L2-3 and L4-5 and moderate neural foraminal narrowing L4-5 and L5-S1. Patient had no tenderness overlying spinous process but still endorsed sever pain, received percocet and robaxin. Of note, KENNETH showed blood in stool. The patient has a white count of 12.9, hemoglobin of 7.5, creatinine 4.6, Lactate of 3.9. Pt given Rocephin, Zosyn and Vanc.     Pt transferred to [facility] [place] for further intervention with neurosurgery.          Hospital Course (updated, brief assessment by system or problem, significant events): Admitted to MICU for NSGY and GI evaluation . Hgb stable. EGD shows small varices with gastropathy, no active bleeds. Worsening MARTITA with minimal UOP, HRS protocol started with levo, midodrine, octreotide and albumin trailed, has since been stopped due to low suspicion. Nephrology following. NSGY planned for surgery on Monday. U/O seems to be slightly improving. HDS.      Tasks (specific, using if-then statements): If Hbg <7, transfuse with pRBC, stop ppx heparin, repeat  EGD     Contingency Plan (special circumstances anticipated and plan):   Nephrology following regarding worsening kidney function  Planned for NSGY on Monday for L3/L4 compression

## 2023-01-21 NOTE — ASSESSMENT & PLAN NOTE
Unsure of etiology at this time  Size of liver suggest other etiology than cirrhosis, however also has esophageal varices, possible HRS. CT and US renal with no signs of obstruction.     Urine microscopy: (1/17) no casts identified, amorphous material (though only 5 cc obtained). Repeat with few granular casts (1/18). Repeat on 1/19 with granular casts. 1/20: moderated (2-3 per field) granular casts with calcium oxylate crystals embedded in casts.     Urine protein 4.59g and repeat with 2.5  C3/4 - 111/35  LILO with ratio of 1.98  Hep B and C negative  Uric acid 13    HIV negative  Iron 31, TIBC 151, Tsat 21, Transferrin 102, ferritin 135  KEATON negative  ANCA negative  -Cryo pending    At this time differential is large with possible infiltrative disease on background of alcoholism; EGD with small varices. There may be a component of HRS physiology but unsure of overall time course regarding MARTITA.     1/19: repeat Urine sodium 10, urine protein creatinine ratio 0.89. IgA 505. Urine microscopy: moderated (2-3 per field) granular casts with calcium oxylate crystals embedded in casts.       Overall impression: appears to be improving with UOP increasing from 1/20 to 1/21 and serum creatinine appears to be cresting.     Recommendations:  - Urine microscopy   - No need for RRT or lasix for volume removal  - No need for IVF  -Keep MAP > 65  -Keep hemoglobin > 7  -Strict ins and outs  -Avoid nephrotoxic agents if possible  -Avoid drastic hemodynamic changes if possible

## 2023-01-21 NOTE — PROGRESS NOTES
MD Nathan and CC team made aware of pt increase in AMS and choking while swallowing water, 2pm and 3pm meds held. VBG obtained and reviewed with MD Nathan, no new orders at this time.

## 2023-01-21 NOTE — ASSESSMENT & PLAN NOTE
Patient with acute kidney injury likely due to acute tubular necrosis vs ARS.  MARTITA is currently worsening. Labs reviewed- Renal function/electrolytes with Estimated Creatinine Clearance: 12.4 mL/min (A) (based on SCr of 7.5 mg/dL (H)). according to latest data. Monitor urine output and serial BMP and adjust therapy as needed. Avoid nephrotoxins and renally dose meds for GFR listed above.     Creatinine 4.7 on admit, baseline around 0.8. Pre-renal vs ATN. Less likely obstruction as pt has gomez      - Check urine lytes and renal ultrasound  - Check urine protein creatinine ratio.  - Strict I&Os and daily weights   - Avoid nephrotoxic agents such as NSAIDs, gadolinium and IV radiocontrast.  - Renally dose meds to current GFR.  - Maintain MAP > 65. Off of levophed  - Started on HRS protocol with midodrine 10mg TID, Octreotide, and Albumin 25g BID. Stopped due to low suspicion    1/21-  Cr 7.5 , Nephrology following:  Urine sodium 10, urine protein creatinine ratio 0.89. IgA 505. Urine microscopy: moderated (2-3 per field) granular casts with calcium oxylate crystals embedded in casts.  Uo improving.

## 2023-01-21 NOTE — SUBJECTIVE & OBJECTIVE
"Interval History: pt still with ongoing medical issues, confused this morning with poor effort and cooperation on exam. Requesting medical optimization and risk stratification before clearing and proceeding with surgery.        Medications:  Continuous Infusions:      Scheduled Meds:   ceFEPime (MAXIPIME) IVPB  2 g Intravenous Q24H    folic acid  1 mg Oral Daily    heparin (porcine)  5,000 Units Subcutaneous Q8H    lactulose  30 g Oral QID    metroNIDAZOLE  500 mg Oral Q8H    midodrine  10 mg Oral Q8H    multivitamin  1 tablet Oral Daily    mupirocin   Nasal BID    pantoprazole  40 mg Oral BID AC    scopolamine  1 patch Transdermal Q3 Days    sodium bicarbonate  1,300 mg Oral TID    thiamine  100 mg Oral Q8H     PRN Meds:sodium chloride, dextrose 10%, dextrose 10%, glucagon (human recombinant), glucose, glucose, insulin aspart U-100, LIDOcaine-prilocaine, lorazepam     Review of Systems  Objective:     Weight: 110 kg (242 lb 8.1 oz)  Body mass index is 36.87 kg/m².  Vital Signs (Most Recent):  Temp: 98.1 °F (36.7 °C) (01/21/23 0700)  Pulse: 82 (01/21/23 1000)  Resp: 13 (01/21/23 1000)  BP: 136/69 (01/21/23 1000)  SpO2: 96 % (01/21/23 1000)   Vital Signs (24h Range):  Temp:  [97.4 °F (36.3 °C)-98.1 °F (36.7 °C)] 98.1 °F (36.7 °C)  Pulse:  [] 82  Resp:  [10-34] 13  SpO2:  [88 %-97 %] 96 %  BP: ()/(53-82) 136/69                            Urethral Catheter 01/16/23 1347 Latex 14 Fr. (Active)   Site Assessment Clean;Intact;Dry 01/17/23 0301   Collection Container Urimeter 01/17/23 0301   Securement Method secured to top of thigh w/ adhesive device 01/17/23 0301   Catheter Care Performed yes 01/17/23 0301   Reason for Continuing Urinary Catheterization Critically ill in ICU and requiring hourly monitoring of intake/output 01/17/23 0301   CAUTI Prevention Bundle Securement Device in place with 1" slack;Intact seal between catheter & drainage tubing;Drainage bag/urimeter off the floor;Sheeting clip in use;No " dependent loops or kinks;Drainage bag/urimeter not overfilled (<2/3 full);Drainage bag/urimeter below bladder 01/17/23 0301   Output (mL) 0 mL 01/16/23 2001       Physical Exam  Neurosurgery Physical Exam  Constitutional:       Appearance: He is well-developed and well-nourished.      Comments: obese   Eyes:      Extraocular Movements: EOM normal.      Conjunctiva/sclera: Conjunctivae normal.      Pupils: Pupils are equal, round, and reactive to light.   Cardiovascular:      Pulses: Normal pulses.   Abdominal:      Palpations: Abdomen is soft.   Neurological:      Mental Status: He is alert and oriented to person, place, and time.       AAOx1, confused, hard to awaken and keep awake  PERRL  CN grossly intact  BUE 4-/5  BLE 1 hip flexion, 2 ke, 3 df/pf/ehl  SILT     Patient somewhat sedated this morning, difficult to max assess        Psychiatric:         Mood and Affect: Mood and affect normal    Significant Labs:  Recent Labs   Lab 01/20/23 0316 01/21/23  0314   * 121*   * 130*   K 4.3 4.1   CL 98 99   CO2 14* 15*   BUN 35* 39*   CREATININE 6.9* 7.3*   CALCIUM 7.7* 7.7*   MG 1.6 1.6       Recent Labs   Lab 01/20/23  1130 01/20/23  1948 01/21/23  0314   WBC 14.90* 14.55* 13.74*   HGB 9.3* 9.5* 8.8*   HCT 29.4* 29.5* 27.5*    178 174       Recent Labs   Lab 01/20/23  0316 01/21/23  0314   INR 1.5* 1.7*       Microbiology Results (last 7 days)       Procedure Component Value Units Date/Time    Aerobic culture [[id number]] Collected: 01/17/23 0003    Order Status: Completed Specimen: Ascites Updated: 01/20/23 1239     Aerobic Bacterial Culture No growth    Culture, Anaerobic [[id number]] Collected: 01/17/23 0003    Order Status: Completed Specimen: Ascites Updated: 01/19/23 0849     Anaerobic Culture Culture in progress    Gram stain [[id number]] Collected: 01/17/23 0003    Order Status: Completed Specimen: Ascites Updated: 01/17/23 0207     Gram Stain Result Rare WBC's      No organisms seen     Blood culture [[id number]]     Order Status: Canceled Specimen: Blood     Blood culture [[id number]]     Order Status: Canceled Specimen: Blood           ABGs:   Recent Labs   Lab 01/20/23  1356   PH 7.270*   PCO2 34.0*   PO2 66*   HCO3 15.6*   POCSATURATED 90*   BE -11       Cardiac markers: No results for input(s): CKMB, CPKMB, TROPONINT, TROPONINI, MYOGLOBIN in the last 48 hours.    CMP:   Recent Labs   Lab 01/20/23  0316 01/21/23  0314   * 121*   CALCIUM 7.7* 7.7*   ALBUMIN 2.6* 2.4*   PROT 6.4 5.9*   * 130*   K 4.3 4.1   CO2 14* 15*   CL 98 99   BUN 35* 39*   CREATININE 6.9* 7.3*   ALKPHOS 188* 170*   ALT 44 36   AST 76* 65*   BILITOT 2.4* 2.8*       CRP:   No results for input(s): CRP in the last 48 hours.    ESR: No results for input(s): POCESR, ERYTHROCYTES in the last 48 hours.  LFTs:   Recent Labs   Lab 01/20/23  0316 01/21/23 0314   ALT 44 36   AST 76* 65*   ALKPHOS 188* 170*   BILITOT 2.4* 2.8*   PROT 6.4 5.9*   ALBUMIN 2.6* 2.4*       Procalcitonin: No results for input(s): PROCAL in the last 48 hours.    Significant Diagnostics:  I have reviewed all pertinent imaging results/findings within the past 24 hours.

## 2023-01-22 LAB
ALBUMIN SERPL BCP-MCNC: 2.4 G/DL (ref 3.5–5.2)
ALLENS TEST: ABNORMAL
ALP SERPL-CCNC: 192 U/L (ref 55–135)
ALT SERPL W/O P-5'-P-CCNC: 43 U/L (ref 10–44)
AMMONIA PLAS-SCNC: 86 UMOL/L (ref 10–50)
ANION GAP SERPL CALC-SCNC: 19 MMOL/L (ref 8–16)
ANISOCYTOSIS BLD QL SMEAR: SLIGHT
AST SERPL-CCNC: 72 U/L (ref 10–40)
BASOPHILS # BLD AUTO: 0.15 K/UL (ref 0–0.2)
BASOPHILS NFR BLD: 1 % (ref 0–1.9)
BILIRUB SERPL-MCNC: 3.2 MG/DL (ref 0.1–1)
BUN SERPL-MCNC: 45 MG/DL (ref 8–23)
CALCIUM SERPL-MCNC: 8.3 MG/DL (ref 8.7–10.5)
CHLORIDE SERPL-SCNC: 99 MMOL/L (ref 95–110)
CO2 SERPL-SCNC: 12 MMOL/L (ref 23–29)
CREAT SERPL-MCNC: 7.6 MG/DL (ref 0.5–1.4)
DELSYS: ABNORMAL
DIFFERENTIAL METHOD: ABNORMAL
EOSINOPHIL # BLD AUTO: 0.1 K/UL (ref 0–0.5)
EOSINOPHIL NFR BLD: 0.7 % (ref 0–8)
ERYTHROCYTE [DISTWIDTH] IN BLOOD BY AUTOMATED COUNT: 21.9 % (ref 11.5–14.5)
EST. GFR  (NO RACE VARIABLE): 7.5 ML/MIN/1.73 M^2
GLUCOSE SERPL-MCNC: 138 MG/DL (ref 70–110)
HCO3 UR-SCNC: 16.2 MMOL/L (ref 24–28)
HCT VFR BLD AUTO: 28.9 % (ref 40–54)
HGB BLD-MCNC: 9.2 G/DL (ref 14–18)
IMM GRANULOCYTES # BLD AUTO: 0.49 K/UL (ref 0–0.04)
IMM GRANULOCYTES NFR BLD AUTO: 3.2 % (ref 0–0.5)
INR PPP: 1.6 (ref 0.8–1.2)
LACTATE SERPL-SCNC: 1 MMOL/L (ref 0.5–2.2)
LYMPHOCYTES # BLD AUTO: 1.2 K/UL (ref 1–4.8)
LYMPHOCYTES NFR BLD: 7.9 % (ref 18–48)
MAGNESIUM SERPL-MCNC: 1.7 MG/DL (ref 1.6–2.6)
MCH RBC QN AUTO: 31.2 PG (ref 27–31)
MCHC RBC AUTO-ENTMCNC: 31.8 G/DL (ref 32–36)
MCV RBC AUTO: 98 FL (ref 82–98)
MONOCYTES # BLD AUTO: 2.4 K/UL (ref 0.3–1)
MONOCYTES NFR BLD: 16 % (ref 4–15)
NEUTROPHILS # BLD AUTO: 10.9 K/UL (ref 1.8–7.7)
NEUTROPHILS NFR BLD: 71.2 % (ref 38–73)
NRBC BLD-RTO: 3 /100 WBC
PCO2 BLDA: 37.5 MMHG (ref 35–45)
PH SMN: 7.24 [PH] (ref 7.35–7.45)
PHOSPHATE SERPL-MCNC: 5.3 MG/DL (ref 2.7–4.5)
PLATELET # BLD AUTO: 190 K/UL (ref 150–450)
PLATELET BLD QL SMEAR: ABNORMAL
PMV BLD AUTO: 11.2 FL (ref 9.2–12.9)
PO2 BLDA: 34 MMHG (ref 40–60)
POC BE: -11 MMOL/L
POC SATURATED O2: 57 % (ref 95–100)
POC TCO2: 17 MMOL/L (ref 24–29)
POCT GLUCOSE: 121 MG/DL (ref 70–110)
POCT GLUCOSE: 125 MG/DL (ref 70–110)
POCT GLUCOSE: 133 MG/DL (ref 70–110)
POIKILOCYTOSIS BLD QL SMEAR: SLIGHT
POTASSIUM SERPL-SCNC: 4.3 MMOL/L (ref 3.5–5.1)
PROT SERPL-MCNC: 6.7 G/DL (ref 6–8.4)
PROTHROMBIN TIME: 16.1 SEC (ref 9–12.5)
RBC # BLD AUTO: 2.95 M/UL (ref 4.6–6.2)
SAMPLE: ABNORMAL
SITE: ABNORMAL
SODIUM SERPL-SCNC: 130 MMOL/L (ref 136–145)
TARGETS BLD QL SMEAR: ABNORMAL
WBC # BLD AUTO: 15.25 K/UL (ref 3.9–12.7)

## 2023-01-22 PROCEDURE — 99233 PR SUBSEQUENT HOSPITAL CARE,LEVL III: ICD-10-PCS | Mod: ,,, | Performed by: HOSPITALIST

## 2023-01-22 PROCEDURE — 20000000 HC ICU ROOM

## 2023-01-22 PROCEDURE — 25000003 PHARM REV CODE 250

## 2023-01-22 PROCEDURE — C9113 INJ PANTOPRAZOLE SODIUM, VIA: HCPCS | Performed by: STUDENT IN AN ORGANIZED HEALTH CARE EDUCATION/TRAINING PROGRAM

## 2023-01-22 PROCEDURE — 27201109 HC SYSTEM FECAL MANAGEMENT

## 2023-01-22 PROCEDURE — 36415 COLL VENOUS BLD VENIPUNCTURE: CPT | Performed by: STUDENT IN AN ORGANIZED HEALTH CARE EDUCATION/TRAINING PROGRAM

## 2023-01-22 PROCEDURE — 36415 COLL VENOUS BLD VENIPUNCTURE: CPT | Performed by: HOSPITALIST

## 2023-01-22 PROCEDURE — 25000003 PHARM REV CODE 250: Performed by: INTERNAL MEDICINE

## 2023-01-22 PROCEDURE — 82803 BLOOD GASES ANY COMBINATION: CPT

## 2023-01-22 PROCEDURE — 25000003 PHARM REV CODE 250: Performed by: STUDENT IN AN ORGANIZED HEALTH CARE EDUCATION/TRAINING PROGRAM

## 2023-01-22 PROCEDURE — 99900035 HC TECH TIME PER 15 MIN (STAT)

## 2023-01-22 PROCEDURE — 99291 CRITICAL CARE FIRST HOUR: CPT | Mod: ,,, | Performed by: INTERNAL MEDICINE

## 2023-01-22 PROCEDURE — 63600175 PHARM REV CODE 636 W HCPCS: Performed by: STUDENT IN AN ORGANIZED HEALTH CARE EDUCATION/TRAINING PROGRAM

## 2023-01-22 PROCEDURE — 94761 N-INVAS EAR/PLS OXIMETRY MLT: CPT

## 2023-01-22 PROCEDURE — 83605 ASSAY OF LACTIC ACID: CPT | Performed by: HOSPITALIST

## 2023-01-22 PROCEDURE — 63600175 PHARM REV CODE 636 W HCPCS

## 2023-01-22 PROCEDURE — 99233 SBSQ HOSP IP/OBS HIGH 50: CPT | Mod: ,,, | Performed by: HOSPITALIST

## 2023-01-22 PROCEDURE — 27000221 HC OXYGEN, UP TO 24 HOURS

## 2023-01-22 PROCEDURE — 90945 DIALYSIS ONE EVALUATION: CPT

## 2023-01-22 PROCEDURE — 85025 COMPLETE CBC W/AUTO DIFF WBC: CPT | Performed by: HOSPITALIST

## 2023-01-22 PROCEDURE — 83735 ASSAY OF MAGNESIUM: CPT | Performed by: HOSPITALIST

## 2023-01-22 PROCEDURE — 87040 BLOOD CULTURE FOR BACTERIA: CPT | Performed by: HOSPITALIST

## 2023-01-22 PROCEDURE — 99233 SBSQ HOSP IP/OBS HIGH 50: CPT | Mod: ,,, | Performed by: NEUROLOGICAL SURGERY

## 2023-01-22 PROCEDURE — 80053 COMPREHEN METABOLIC PANEL: CPT | Performed by: HOSPITALIST

## 2023-01-22 PROCEDURE — 25000003 PHARM REV CODE 250: Performed by: HOSPITALIST

## 2023-01-22 PROCEDURE — 84100 ASSAY OF PHOSPHORUS: CPT | Performed by: HOSPITALIST

## 2023-01-22 PROCEDURE — 99291 PR CRITICAL CARE, E/M 30-74 MINUTES: ICD-10-PCS | Mod: ,,, | Performed by: INTERNAL MEDICINE

## 2023-01-22 PROCEDURE — 85610 PROTHROMBIN TIME: CPT | Performed by: HOSPITALIST

## 2023-01-22 PROCEDURE — 82140 ASSAY OF AMMONIA: CPT | Performed by: HOSPITALIST

## 2023-01-22 PROCEDURE — 99233 PR SUBSEQUENT HOSPITAL CARE,LEVL III: ICD-10-PCS | Mod: ,,, | Performed by: NEUROLOGICAL SURGERY

## 2023-01-22 RX ORDER — HYDROCODONE BITARTRATE AND ACETAMINOPHEN 500; 5 MG/1; MG/1
TABLET ORAL CONTINUOUS
Status: DISCONTINUED | OUTPATIENT
Start: 2023-01-22 | End: 2023-01-23

## 2023-01-22 RX ORDER — MAGNESIUM SULFATE HEPTAHYDRATE 40 MG/ML
2 INJECTION, SOLUTION INTRAVENOUS
Status: DISCONTINUED | OUTPATIENT
Start: 2023-01-22 | End: 2023-01-23

## 2023-01-22 RX ORDER — LIDOCAINE HYDROCHLORIDE 10 MG/ML
INJECTION, SOLUTION EPIDURAL; INFILTRATION; INTRACAUDAL; PERINEURAL
Status: COMPLETED
Start: 2023-01-22 | End: 2023-01-22

## 2023-01-22 RX ORDER — LACTULOSE 10 G/15ML
200 SOLUTION ORAL; RECTAL 2 TIMES DAILY
Status: DISCONTINUED | OUTPATIENT
Start: 2023-01-22 | End: 2023-01-23

## 2023-01-22 RX ORDER — LACTULOSE 10 G/15ML
200 SOLUTION ORAL; RECTAL ONCE
Status: COMPLETED | OUTPATIENT
Start: 2023-01-22 | End: 2023-01-22

## 2023-01-22 RX ADMIN — PANTOPRAZOLE SODIUM 40 MG: 40 INJECTION, POWDER, FOR SOLUTION INTRAVENOUS at 09:01

## 2023-01-22 RX ADMIN — THERA TABS 1 TABLET: TAB at 08:01

## 2023-01-22 RX ADMIN — HEPARIN SODIUM 5000 UNITS: 5000 INJECTION INTRAVENOUS; SUBCUTANEOUS at 09:01

## 2023-01-22 RX ADMIN — LIDOCAINE HYDROCHLORIDE: 10 INJECTION, SOLUTION EPIDURAL; INFILTRATION; INTRACAUDAL at 02:01

## 2023-01-22 RX ADMIN — FOLIC ACID 1 MG: 1 TABLET ORAL at 08:01

## 2023-01-22 RX ADMIN — THIAMINE HCL TAB 100 MG 100 MG: 100 TAB at 06:01

## 2023-01-22 RX ADMIN — HEPARIN SODIUM 5000 UNITS: 5000 INJECTION INTRAVENOUS; SUBCUTANEOUS at 06:01

## 2023-01-22 RX ADMIN — MUPIROCIN: 20 OINTMENT TOPICAL at 08:01

## 2023-01-22 RX ADMIN — LACTULOSE 200 G: 10 SOLUTION ORAL at 05:01

## 2023-01-22 RX ADMIN — MIDODRINE HYDROCHLORIDE 10 MG: 5 TABLET ORAL at 06:01

## 2023-01-22 RX ADMIN — LACTULOSE 30 G: 20 SOLUTION ORAL at 08:01

## 2023-01-22 RX ADMIN — PANTOPRAZOLE SODIUM 40 MG: 40 TABLET, DELAYED RELEASE ORAL at 06:01

## 2023-01-22 RX ADMIN — SODIUM CHLORIDE: 9 INJECTION, SOLUTION INTRAVENOUS at 06:01

## 2023-01-22 RX ADMIN — LACTULOSE 200 G: 10 SOLUTION ORAL at 08:01

## 2023-01-22 RX ADMIN — METRONIDAZOLE 500 MG: 500 TABLET ORAL at 06:01

## 2023-01-22 RX ADMIN — CEFEPIME 2 G: 2 INJECTION, POWDER, FOR SOLUTION INTRAVENOUS at 08:01

## 2023-01-22 RX ADMIN — LORAZEPAM 1 MG: 2 INJECTION INTRAMUSCULAR; INTRAVENOUS at 10:01

## 2023-01-22 RX ADMIN — HEPARIN SODIUM 5000 UNITS: 5000 INJECTION INTRAVENOUS; SUBCUTANEOUS at 02:01

## 2023-01-22 RX ADMIN — SODIUM BICARBONATE 1300 MG: 650 TABLET ORAL at 08:01

## 2023-01-22 NOTE — SUBJECTIVE & OBJECTIVE
Interval History:  Much improved mentation this AM from previous day, status post SLED treatment     Review of patient's allergies indicates:  No Known Allergies  Current Facility-Administered Medications   Medication Frequency    0.9%  NaCl infusion (for blood administration) Q24H PRN    ceFEPIme (MAXIPIME) 2 g in dextrose 5 % in water (D5W) 5 % 50 mL IVPB (MB+) Q24H    dextrose 10% bolus 125 mL 125 mL PRN    dextrose 10% bolus 250 mL 250 mL PRN    folic acid tablet 1 mg Daily    glucagon (human recombinant) injection 1 mg PRN    glucose chewable tablet 16 g PRN    glucose chewable tablet 24 g PRN    heparin (porcine) injection 5,000 Units Q8H    insulin aspart U-100 pen 0-5 Units QID (AC + HS) PRN    lactulose 20 gram/30 mL solution Soln 30 g QID    LIDOcaine-prilocaine cream PRN    LORazepam injection 1 mg Q4H PRN    metroNIDAZOLE tablet 500 mg Q8H    midodrine tablet 10 mg Q8H    multivitamin tablet Daily    mupirocin 2 % ointment BID    pantoprazole EC tablet 40 mg BID AC    scopolamine 1.3-1.5 mg (1 mg over 3 days) 1 patch Q3 Days    sodium bicarbonate tablet 1,300 mg TID    thiamine tablet 100 mg Q8H       Objective:     Vital Signs (Most Recent):  Temp: 98.1 °F (36.7 °C) (01/22/23 0755)  Pulse: 95 (01/22/23 0755)  Resp: 18 (01/22/23 0755)  BP: 126/63 (01/22/23 0755)  SpO2: (!) 91 % (01/22/23 0755)   Vital Signs (24h Range):  Temp:  [97.5 °F (36.4 °C)-98.7 °F (37.1 °C)] 98.1 °F (36.7 °C)  Pulse:  [] 95  Resp:  [10-29] 18  SpO2:  [91 %-100 %] 91 %  BP: (103-142)/(58-72) 126/63     Weight: 110 kg (242 lb 8.1 oz) (01/18/23 1808)  Body mass index is 36.87 kg/m².  Body surface area is 2.3 meters squared.    I/O last 3 completed shifts:  In: 198.3 [P.O.:50; IV Piggyback:148.3]  Out: 535 [Urine:535]    Physical Exam  Constitutional:       General: He is not in acute distress.     Appearance: He is obese. He is not ill-appearing or toxic-appearing.   HENT:      Head: Normocephalic and atraumatic.      Nose:  Nose normal.      Mouth/Throat:      Mouth: Mucous membranes are moist.   Eyes:      General:         Right eye: No discharge.         Left eye: No discharge.      Pupils: Pupils are equal, round, and reactive to light.   Cardiovascular:      Heart sounds: Murmur heard.   Pulmonary:      Comments: tachypneic  Abdominal:      General: There is distension.      Tenderness: There is no abdominal tenderness.   Musculoskeletal:         General: Normal range of motion.      Cervical back: Normal range of motion.      Right lower leg: Edema present.      Left lower leg: Edema present.   Skin:     General: Skin is warm.      Coloration: Skin is pale.      Findings: No lesion.   Neurological:      Mental Status: He is alert. He is disoriented.      Comments: Improved from 1/22       Significant Labs:  All labs within the past 24 hours have been reviewed.     Significant Imaging:  Labs: Reviewed

## 2023-01-22 NOTE — EICU
Intervention Initiated From:  Bedside    Dione intervened regarding:  Documentation and Time-Out    Comments: call received for time out prior to trialysis insertion.  Time out completed.  Dr Ocasio and Dr Reynolds @ bs for procedure.  RIJ trialysis inserted.  +blood aspirated x 3 ports.  Stat cxr ordered.    Procedure end 1540

## 2023-01-22 NOTE — ASSESSMENT & PLAN NOTE
This patient does have evidence of infective focus  My overall impression is septic shock.  Source: Unknown  Antibiotics given-   Antibiotics (From admission, onward)        Start     Stop Route Frequency Ordered     01/20/23 1400   metroNIDAZOLE tablet 500 mg         -- Oral Every 8 hours 01/20/23 1109     01/18/23 0900   mupirocin 2 % ointment         01/23 0859 Nasl 2 times daily 01/18/23 0354     01/16/23 2100   ceFEPIme (MAXIPIME) 2 g in dextrose 5 % in water (D5W) 5 % 50 mL IVPB (MB+)         -- IV Every 24 hours (non-standard times) 01/16/23 1354             Latest lactate reviewed-  No results for input(s): LACTATE in the last 72 hours.  Organ dysfunction indicated by Acute kidney injury     - On Vancomycin, Cefepime, and Flagyl, Vanc d/c 1/20  - BCx negative - checked 1/22  - SBP work up negative  - Will de-escalate once appropriate   -covering for SBP

## 2023-01-22 NOTE — PROGRESS NOTES
[facility] - Telemetry [facility] Medicine  Progress Note    Patient Name: Jonny Isabel  MRN: [id number]  Patient Class: IP- Inpatient   Admission Date: 1/16/2023  Length of Stay: 6 days  Attending Physician: Pushpa Rasmussen MD  Primary Care Provider: Yuli Pérez Mai, MD        Subjective:     Principal Problem:Spinal stenosis        HPI:  62-year-old male with a PMHx of A-fibb (on Eliquis), hypertension, DM 2 (not insulin dependent) presents as transfer from OSH for chronic lower back pain  that has been worsening for the past week. Pt states that the pain is radiating from his right buttock down to his legs. Pt was unable to get out of bed this AM due to the pain. HE endorses having fecal incontinence for the past week with black tarry stools. He denies any fevers, chills, abdominal pain, urinary incontinence, or saddle anesthesia.      At OSH ED CT lumbar spine ordered revealing Prominent, cystic, multiloculated predominantly gas attenuation epidural  structure within the spinal canal at the level of L3-L4 resulting in severe narrowing of the spinal canal with mass effect upon the thecal sac and  vacuum disc phenomena concerning for diskitis or an epidural abscess. MRI shows large disc extrusion at L3-4 causing severe spinal canal stenosis, with moderate spinal canal stenosis at L2-3 and L4-5 and moderate neural foraminal narrowing L4-5 and L5-S1. Patient had no tenderness overlying spinous process but still endorsed sever pain, received percocet and robaxin. Of note, KENNETH showed blood in stool. The patient has a white count of 12.9, hemoglobin of 7.5, creatinine 4.6, Lactate of 3.9. Pt given Rocephin, Zosyn and Vanc.     Pt transferred to [facility] for further intervention with neurosurgery.          Hospital Course (updated, brief assessment by system or problem, significant events): Admitted to MICU for NSGY and GI evaluation . Hgb stable. EGD shows small varices with gastropathy, no active bleeds. Worsening MARTITA  with minimal UOP, HRS protocol started with levo, midodrine, octreotide and albumin trailed, has since been stopped due to low suspicion. Nephrology following. NSGY planned for surgery on Monday. U/O seems to be slightly improving. HDS.      Tasks (specific, using if-then statements): If Hbg <7, transfuse with pRBC, stop ppx heparin, repeat EGD     Contingency Plan (special circumstances anticipated and plan):   Nephrology following regarding worsening kidney function  Planned for NSGY on Monday for L3/L4 compression          Overview/Hospital Course:  1/22 -  cc. Lab pending. Transferred to step down unit. He may need a line a dialysis today. Nephrology following. /67   Pulse 101   Temp 98.7 °F (37.1 °C) (Axillary)   Resp 21 SpO2 98%  on 2 liters NC. Cultures still neg.   -  getting more and more confused and currently scoring a 14 on his CIWA. He states he is seeing bugs, tremors, confusion, anxious. give 1mg PRN ativan for CIWA.   - going back to ICU for line and HD, lactulose, xray abd, cxr done and pending. Pt is encephalpathic due to high ammonia, Uremia, alcohol withdrawal, and alcohol encephalopathy, and metabolic encephopathy due to sepsis.       Interval History: see above    Review of Systems   Constitutional:  Positive for activity change.   HENT:  Negative for trouble swallowing.    Respiratory:  Negative for shortness of breath.    Cardiovascular:  Negative for chest pain.   Gastrointestinal:  Positive for abdominal distention and diarrhea. Negative for constipation and nausea.   Genitourinary:  Negative for difficulty urinating.   Musculoskeletal:  Negative for neck stiffness.   Neurological:  Positive for speech difficulty.   Psychiatric/Behavioral:  Positive for agitation, confusion and hallucinations. Negative for behavioral problems. The patient is hyperactive.    Objective:     Vital Signs (Most Recent):  Temp: 98.2 °F (36.8 °C) (01/22/23 1132)  Pulse: 87 (01/22/23 1132)  Resp: 18  (01/22/23 1132)  BP: 123/62 (01/22/23 1132)  SpO2: 95 % (01/22/23 1132) Vital Signs (24h Range):  Temp:  [97.5 °F (36.4 °C)-98.7 °F (37.1 °C)] 98.2 °F (36.8 °C)  Pulse:  [] 87  Resp:  [10-29] 18  SpO2:  [91 %-100 %] 95 %  BP: (103-142)/(58-72) 123/62     Weight: 110 kg (242 lb 8.1 oz)  Body mass index is 36.87 kg/m².    Intake/Output Summary (Last 24 hours) at 1/22/2023 1216  Last data filed at 1/22/2023 1122  Gross per 24 hour   Intake 99.34 ml   Output 710 ml   Net -610.66 ml      Physical Exam  Constitutional:       General: He is not in acute distress.     Appearance: Normal appearance. He is obese. He is ill-appearing. He is not toxic-appearing or diaphoretic.   HENT:      Head: Normocephalic and atraumatic.      Nose: Nose normal.      Mouth/Throat:      Mouth: Mucous membranes are moist.      Pharynx: Oropharynx is clear.   Eyes:      General: No scleral icterus.     Extraocular Movements: Extraocular movements intact.      Conjunctiva/sclera: Conjunctivae normal.      Pupils: Pupils are equal, round, and reactive to light.   Cardiovascular:      Rate and Rhythm: Regular rhythm. Tachycardia present.      Pulses: Normal pulses.      Heart sounds: Normal heart sounds.   Pulmonary:      Effort: Pulmonary effort is normal. No respiratory distress.      Breath sounds: Normal breath sounds. No stridor. No wheezing, rhonchi or rales.   Chest:      Chest wall: No tenderness.   Abdominal:      General: Abdomen is flat. Bowel sounds are normal. There is distension.      Palpations: Abdomen is soft. There is no mass.      Tenderness: There is no abdominal tenderness. There is no right CVA tenderness, guarding or rebound.   Musculoskeletal:         General: No swelling, tenderness, deformity or signs of injury. Normal range of motion.      Cervical back: Normal range of motion and neck supple. No rigidity or tenderness.      Right lower leg: Edema present.      Left lower leg: Edema present.   Skin:     General:  Skin is warm and dry.      Coloration: Skin is not jaundiced.      Findings: No erythema or rash.   Neurological:      General: No focal deficit present.      Mental Status: He is alert and oriented to person, place, and time. Mental status is at baseline.      Motor: No weakness.   Psychiatric:      Comments: Agitated with tremor earlier today, now somnolent after ativan       Significant Labs: All pertinent labs within the past 24 hours have been reviewed.  CBC:   Recent Labs   Lab 01/21/23  0314 01/21/23  1211 01/22/23  0937   WBC 13.74* 13.65* 15.25*   HGB 8.8* 9.1* 9.2*   HCT 27.5* 27.9* 28.9*    184 190     CMP:   Recent Labs   Lab 01/21/23  0314 01/21/23  1511 01/22/23  0936   * 133* 130*   K 4.1 4.2 4.3   CL 99 100 99   CO2 15* 15* 12*   * 124* 138*   BUN 39* 39* 45*   CREATININE 7.3* 7.5* 7.6*   CALCIUM 7.7* 8.0* 8.3*   PROT 5.9* 6.4 6.7   ALBUMIN 2.4* 2.4* 2.4*   BILITOT 2.8* 3.1* 3.2*   ALKPHOS 170* 184* 192*   AST 65* 70* 72*   ALT 36 41 43   ANIONGAP 16 18* 19*       Significant Imaging: I have reviewed all pertinent imaging results/findings within the past 24 hours.      Assessment/Plan:      * Spinal stenosis  Surgery planned for Monday  Needs Risk Stratification. So far:  High risk due to recent GI bleed, coagulapathy, sepsis, SPB with negative cultures, acute renal failure and may start HD soon, MELD 31.   Likely has chronic liver and kidney disease. Suspicion for infiltrative process in kidneys.   - may need to push back surgery to a later date  - follow pt through weekend  - xarelto on hold, on heparin    ECHO  /117/23:    The left ventricle is small with normal systolic function.   The estimated ejection fraction is 65%.   Normal left ventricular diastolic function.   Normal right ventricular size with normal right ventricular systolic function.   Normal central venous pressure (3 mmHg).   The estimated PA systolic pressure is 30 mmHg.   Mild tricuspid  regurgitation.    Lumbar herniated disc  With Spinal stenosis, concern for epidural abscess  CT Lumbar Spine Without Contrast Result Date: 1/16/2023  1.  Prominent, cystic, multiloculated predominantly gas attenuation structure within the spinal canal at the level of L3-L4 with dimensions as above.  This is favored to be epidural in location and results in severe narrowing of the spinal canal with mass effect upon the thecal sac.  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 Large disc extrusion at L3-4, contributing to severe spinal canal stenosis, as above. Additional lumbar spondylosis, contributing to moderate spinal canal stenosis at L2-3 and L4-5 and moderate neural foraminal narrowing L4-5 and L5-S1      Per Neurosurgery    -Will tentatively plan for OR Monday for laminectomy/discectomy pending medical stablization/optimization &  documented risk stratification  - appreciated risk stratification from primary team, patient with multiple ongoing comorbidities  - no HOB restrictions   - MAPs >65, requiring pressors  - hold Xarelto, PT and INR elevated on admission // will need Xarelto held for 3-5 days prior to OR and/or coagulapathy corrected      Diabetes    -Last A1c reviewed-         Lab Results   Component Value Date     HGBA1C 5.3 11/10/2022         Home Antihyperglycemic Regiment:  - Metformin  1000 mg BID      Inpatient Antihyperglycemic Regiment:  Antihyperglycemics (From admission, onward)        Start     Stop Route Frequency Ordered     01/16/23 1507   insulin aspart U-100 pen 0-5 Units         -- SubQ Before meals & nightly PRN 01/16/23 1407             - LDSSI  - Clear liquid diet for now      Blood Sugars (AccuCheck):     Recent Labs     01/20/23  0914 01/20/23  1117 01/20/23  1657 01/20/23  2118 01/21/23  0729 01/21/23  1211   POCTGLUCOSE 140* 165* 142* 154* 124* 131*         ATN (acute tubular  necrosis)  Patient with acute kidney injury likely due to acute tubular necrosis vs ARS.  MARTITA is currently worsening. Labs reviewed- Renal function/electrolytes with Estimated Creatinine Clearance: 12.4 mL/min (A) (based on SCr of 7.5 mg/dL (H)). according to latest data. Monitor urine output and serial BMP and adjust therapy as needed. Avoid nephrotoxins and renally dose meds for GFR listed above.     Creatinine 4.7 on admit, baseline around 0.8. Pre-renal vs ATN. Less likely obstruction as pt has gomez      - Check urine lytes and renal ultrasound  - Check urine protein creatinine ratio.  - Strict I&Os and daily weights   - Avoid nephrotoxic agents such as NSAIDs, gadolinium and IV radiocontrast.  - Renally dose meds to current GFR.  - Maintain MAP > 65. Off of levophed  - Started on HRS protocol with midodrine 10mg TID, Octreotide, and Albumin 25g BID. Stopped due to low suspicion    1/21-  Cr 7.5 , Nephrology following:  Urine sodium 10, urine protein creatinine ratio 0.89. IgA 505. Urine microscopy: moderated (2-3 per field) granular casts with calcium oxylate crystals embedded in casts.  Uo improving.   1/22- discuss w Nephrology, Hos Med and cc . May need line and HD         MARTITA (acute kidney injury)  Present upon admission  MARTITA on CKD  US - suggestive of medical renal disease  See ATN  - to ICU for line and HD      A-fib  Patient with Persistent (7 days or more) atrial fibrillation which is controlled currently with Beta Blocker. Patient is currently in atrial fibrillation.[id number] Score: 1  Hx of GI bleeding-  varicies on EGD. Anticoagulation not indicated due to holding NOAC in anticipation of surgery..    - Maintain K > 4, Mag > 2 and Ca/iCal WNL to decrease arrhythmogenic potential  - On lopressor 100 mg QD, holding as pt likely has GI bleed  - Holding anticoagulation in the setting of GI bleed   - Starting Heparin gtt ppx in the setting of no acute GI bleed          Alcohol withdrawal syndrome without  complication  On thiamine,   /72  Pulse 90   1/22- received ativan IV for CIWA protocol, hallucinations, agitation,  tremor, tachycarda.    Alcohol abuse   Vitals q4h while awake  - CIWA monitoring  - Ativan 2mg q4 PRN if 2 criteria are met: Systolic BP>160, Diastolic BP >110 or Pulse >110  - Start Vitamin supplementation- Thiamine, Folic acid, Vit. B12, and Multivitamin   - Will start valium taper at 5 mg Q8H       Alcoholic cirrhosis of liver with ascites  S/p diagnostic paracentesis on 1/17 with , 50% seg. Cytology pending , Consistent w SBP ( Subacute Bacterial peritonitis)   EGD revealed esophageal varicies  - On Lactulose, midodrine  - cefepime and flagyl started 1/16 for presumed SBP, cultures for ascites 1/17 are no growth so far  1/22- lactuose enema, ammonia 86    Elevated liver enzymes  Likely 2/2 to underlying alcohol abuse and hepatic steatosis.      - Daily CMP, PT/INR   - Complete workup up with tylenol level, acute hep panel, a1-antitrypsin, anti smith antibody, HIV, anti mitochondrial antibody, anca, anti-liver, aFP, PETH pending   - Liver US with doppler shows hepatomegaly with hepatic steatosis, cholelithiasis versus gallbladder sludge, small volume ascites, and left pleural effusion.     MELD-Na score: 31 at 1/21/2023  3:11 PM  MELD score: 30 at 1/21/2023  3:11 PM  Calculated from:  Serum Creatinine: 7.5 mg/dL (Using max of 4 mg/dL) at 1/21/2023  3:11 PM  Serum Sodium: 133 mmol/L at 1/21/2023  3:11 PM  Total Bilirubin: 3.1 mg/dL at 1/21/2023  3:11 PM  INR(ratio): 1.7 at 1/21/2023  3:14 AM  Age: 62 years    Coagulopathy  INR 1.7  Consider vit K    Severe sepsis  This patient does have evidence of infective focus  My overall impression is septic shock.  Source: Unknown  Antibiotics given-   Antibiotics (From admission, onward)        Start     Stop Route Frequency Ordered     01/20/23 1400   metroNIDAZOLE tablet 500 mg         -- Oral Every 8 hours 01/20/23 1109     01/18/23 0900    mupirocin 2 % ointment         01/23 0859 Nasl 2 times daily 01/18/23 0354     01/16/23 2100   ceFEPIme (MAXIPIME) 2 g in dextrose 5 % in water (D5W) 5 % 50 mL IVPB (MB+)         -- IV Every 24 hours (non-standard times) 01/16/23 1354             Latest lactate reviewed-  No results for input(s): LACTATE in the last 72 hours.  Organ dysfunction indicated by Acute kidney injury     - On Vancomycin, Cefepime, and Flagyl, Vanc d/c 1/20  - BCx negative - checked 1/22  - SBP work up negative  - Will de-escalate once appropriate   -covering for SBP    GI bleed  Concerning for variceal bleed given hx of alcohol abuse.      - GI consulted, recommend clear liquid for now   -Transfuse pRBC for Hb < 7 g/dL (Consider a higher Hb target if there is clinical evidence of intravascular volume depletion or comorbidities, such as CAD or if high suspicion of vigorous active ongoing bleeding or an uncorrected coagulopathy exists.).  - s/p vitamin K given Pt/INR > 2   - PPI 80 mg IV bolus once, then IV PPI 40 BID  -Avoid nonsteroidal agents, antiplatelet agents and anticoagulants if possible in patients without absolute contraindications. (consult managing provider if required for cardiovascular protection).  - EGD showed  Small (< 5 mm) esophageal varices with portal hypertensive gastropathy      Hypervolemia        Goals of care, counseling/discussion  Advance Care Planning   Per ICU upon admission:  - Pt has three sons that he is estranged from & has not seen in 20 years. Pt identified his girlfriend of 15 years, Adelita Bingham, as his surrogate decision maker in the event that he becomes incapacitated & cannot make decisions for himself         VTE Risk Mitigation (From admission, onward)         Ordered     heparin (porcine) injection 5,000 Units  Every 8 hours         01/20/23 1108     Place sequential compression device  Until discontinued         01/18/23 0912                Discharge Planning   DALILA: 1/26/2023     Code Status:  Full Code   Is the patient medically ready for discharge?: No    Reason for patient still in hospital (select all that apply): Patient trending condition  Discharge Plan A: Home          Pushpa Rasmussen MD  Senior Hospitalist  Department of Hospital Medicine  [facility]  [id number], [phone]    [facility] - Telemetry Stepdown

## 2023-01-22 NOTE — ASSESSMENT & PLAN NOTE
S/p diagnostic paracentesis on 1/17 with , 50% seg. Cytology pending , Consistent w SBP ( Subacute Bacterial peritonitis)   EGD revealed esophageal varicies  - On Lactulose, midodrine  - cefepime and flagyl started 1/16 for presumed SBP, cultures for ascites 1/17 are no growth so far  1/22- lactuose enema, ammonia 86

## 2023-01-22 NOTE — ASSESSMENT & PLAN NOTE
Patient with Persistent (7 days or more) atrial fibrillation which is controlled currently with Beta Blocker. Patient is currently in atrial fibrillation.[id number] Score: 1  Hx of GI bleeding-  varicies on EGD. Anticoagulation not indicated due to holding NOAC in anticipation of surgery..    - Maintain K > 4, Mag > 2 and Ca/iCal WNL to decrease arrhythmogenic potential  - On lopressor 100 mg QD, holding as pt likely has GI bleed  - Holding anticoagulation in the setting of GI bleed   - Starting Heparin gtt ppx in the setting of no acute GI bleed

## 2023-01-22 NOTE — HOSPITAL COURSE
Admitted to MICU for NSGY and GI evaluation . Hgb stable. EGD shows small varices with gastropathy, no active bleeds. Worsening MARTITA with minimal UOP, HRS protocol started with levo, midodrine, octreotide and albumin trailed, has since been stopped due to low suspicion. Nephrology following recommend HD in setting of Acute Renal Failure (s/p 1 session SLED, s/p 1 session HD). NSGY initially planned surgical intervention for 1/23 but determine patient not medically optimized and currently suggesting multimodal pain control. A-fib RVR 1/23 overnight, started on Amiodarone gtt, transitioned to home lopressor. Lasix trailed with good U/O. Added Torsemide for diuresis. Amio gtt restarted due to a-fib with RVR, transitioned to PO. Continues to have rate control on amiodarone and metoprolol succinate.  Hemoglobin stable with anticoagulation.  Creatinine improved to baseline with good urine output while inpatient.  Per nephrology recommendations, patient discharged without diuretics.  Follow-up requested with primary care and hepatology.    Patient declined placement to rehab and was discharged home with with outpatient therapy due to insurance not approving home health PT/OT.

## 2023-01-22 NOTE — ASSESSMENT & PLAN NOTE
Present upon admission  MARTITA on CKD  US - suggestive of medical renal disease  See ATN  - to ICU for line and HD

## 2023-01-22 NOTE — ASSESSMENT & PLAN NOTE
On thiamine,   /72  Pulse 90   1/22- received ativan IV for CIWA protocol, hallucinations, agitation,  tremor, tachycarda.

## 2023-01-22 NOTE — ASSESSMENT & PLAN NOTE
Likely 2/2 to underlying alcohol abuse and hepatic steatosis.      - Daily CMP, PT/INR   - Complete workup up with tylenol level, acute hep panel, a1-antitrypsin, anti smith antibody, HIV, anti mitochondrial antibody, anca, anti-liver, aFP, PETH pending   - Liver US with doppler shows hepatomegaly with hepatic steatosis, cholelithiasis versus gallbladder sludge, small volume ascites, and left pleural effusion.     MELD-Na score: 31 at 1/21/2023  3:11 PM  MELD score: 30 at 1/21/2023  3:11 PM  Calculated from:  Serum Creatinine: 7.5 mg/dL (Using max of 4 mg/dL) at 1/21/2023  3:11 PM  Serum Sodium: 133 mmol/L at 1/21/2023  3:11 PM  Total Bilirubin: 3.1 mg/dL at 1/21/2023  3:11 PM  INR(ratio): 1.7 at 1/21/2023  3:14 AM  Age: 62 years

## 2023-01-22 NOTE — CONSULTS
Consult received, chart reviewed, and patient evaluated at bedside. Medicine consulted for placement of temporary HD catheter for acute renal failure. Patient was encephalopathic and had BL upper extremity weakness. He was not consentable for procedure. No updated labs were available at the time of my evaluation. Per new hospital policy, any temporary catheters (non-urgent or urgent) should be placed by Anesthesia. If patient has need for emergent line or has other indication for critical care services, recommend escalation of care to ICU. Recommend STAT labs +/- imaging to evaluate worsening encephalopathy + weakness. If no urgent findings, recommend calling Anesthesia for HD catheter placement. Recommendations relayed to primary team.    Jose Preston MD  Department of Internal Medicine  [facility]

## 2023-01-22 NOTE — NURSING TRANSFER
Nursing Transfer Note      1/21/2023     Reason patient is being transferred: step down    Transfer To: [id number]    Transfer via bed    Transfer with cardiac monitoring    Transported by RN    Medicines sent: n/a    Any special needs or follow-up needed: n/a    Chart send with patient: Yes    Notified: n/a    Patient reassessed at: 1/21/23 1920    Upon arrival to floor: cardiac monitor applied, patient oriented to room, call bell in reach, and bed in lowest position

## 2023-01-22 NOTE — ASSESSMENT & PLAN NOTE
-Last A1c reviewed-         Lab Results   Component Value Date     HGBA1C 5.3 11/10/2022         Home Antihyperglycemic Regiment:  - Metformin  1000 mg BID      Inpatient Antihyperglycemic Regiment:  Antihyperglycemics (From admission, onward)        Start     Stop Route Frequency Ordered     01/16/23 1507   insulin aspart U-100 pen 0-5 Units         -- SubQ Before meals & nightly PRN 01/16/23 1407             - LDSSI  - Clear liquid diet for now      Blood Sugars (AccuCheck):     Recent Labs     01/20/23  0914 01/20/23  1117 01/20/23  1657 01/20/23  2118 01/21/23  0729 01/21/23  1211   POCTGLUCOSE 140* 165* 142* 154* 124* 131*

## 2023-01-22 NOTE — PROCEDURES
"Jonny Isabel is a 62 y.o. male patient.    Temp: 97.9 °F (36.6 °C) (01/22/23 1300)  Pulse: 84 (01/22/23 1501)  Resp: 14 (01/22/23 1501)  BP: 137/80 (01/22/23 1501)  SpO2: 97 % (01/22/23 1501)  Weight: 110 kg (242 lb 8.1 oz) (01/18/23 1808)  Height: 5' 8" (172.7 cm) (01/18/23 1808)       Central Line    Date/Time: 1/22/2023 4:08 PM  Performed by: Erika Ocasio DO  Authorized by: Erika Ocasio DO     Location procedure was performed:  [facility]  Assisting Provider:  Marielena Reynolds MD  Pre-operative diagnosis:  Acute Renal Failure  Post-operative diagnosis:  Acute Renal Failure  Indications:  Hemodialysis  Anesthesia:  Local infiltration  Local anesthetic:  Lidocaine 1% without epinephrine  Preparation:  Skin prepped with ChloraPrep  Location:  Right subclavian  Catheter type:  Trialysis  Catheter size:  13 Fr  Ultrasound guidance: Yes    Vessel Caliber:  Large  Manometry: No    Complications: No    Specimens: No    Implants: No  Termination Site: superior vena cava    1/22/2023    "

## 2023-01-22 NOTE — NURSING
Patient awake and alert, disorient to time but is fully aware of person, place and situation.arrived to unit transported by RN. Easily aroused throughout night, clear but delayed speech. CIWA & Neuro checks in place, minimal muscle tremors noted, generalized weakness, pupils reactive to light. Placed on 2L of O2 via nasal canula r/t patient c/o SOB, but of sats remained above 95 without o2.  Urinary Catheter noted for retention and reduced output. Clear liquid diet followed. During medication admin, coordination with swallowing noted, patient exhales instead of inhaling water, meds crushed and liquids thicken and tolerated well, required coaching with liquids. No apparent distress noted, will continue to follow plan of care.

## 2023-01-22 NOTE — AI DETERIORATION ALERT
RAPID RESPONSE NURSE PROACTIVE ROUNDING NOTE       Time of Visit: 1035    Admit Date: 2023  LOS: 6  Code Status: Full Code   Date of Visit: 2023  : 1960  Age: 62 y.o.  Sex: male  Race: Other  Bed: 8086/8086 A:   MRN: [id number]  Was the patient discharged from an ICU this admission? Yes   Was the patient discharged from a PACU within last 24 hours? No   Did the patient receive conscious sedation/general anesthesia in last 24 hours? No  Was the patient in the ED within the past 24 hours? No  Was the patient on NIPPV within the past 24 hours? No   Attending Physician: Pushpa Rasmussen MD  Primary Service: [facility] MED    Time spent at the bedside: 15 -30 min    SITUATION    Notified by Epic patient alert.  Reason for alert: AMS  Called to evaluate the patient for Neuro    BACKGROUND     Why is the patient in the hospital?: Spinal stenosis    Patient has a past medical history of A-fib.    Last Vitals:  Temp: 98.1 °F (36.7 °C) ( 0755)  Pulse: 88 ( 1049)  Resp: 18 ( 0755)  BP: 126/63 ( 0755)  SpO2: 99 % ( 1049)    24 Hours Vitals Range:  Temp:  [97.5 °F (36.4 °C)-98.7 °F (37.1 °C)]   Pulse:  []   Resp:  [10-29]   BP: (103-142)/(58-72)   SpO2:  [91 %-100 %]     Labs:  Recent Labs     23  0314 23  1211 23  0937   WBC 13.74* 13.65* 15.25*   HGB 8.8* 9.1* 9.2*   HCT 27.5* 27.9* 28.9*    184 190       Recent Labs     23  0316 23  0314 23  1511 23  0936   * 130* 133* 130*   K 4.3 4.1 4.2 4.3   CL 98 99 100 99   CO2 14* 15* 15* 12*   CREATININE 6.9* 7.3* 7.5* 7.6*   * 121* 124* 138*   PHOS 5.2* 5.3*  --  5.3*   MG 1.6 1.6  --  1.7        Recent Labs     23  1356 23  1510 23  1034   PH 7.270* 7.241* 7.244*   PCO2 34.0* 40.5 37.5   PO2 66* 38* 34*   HCO3 15.6* 17.4* 16.2*   POCSATURATED 90* 63* 57*   BE -11 -10 -11        ASSESSMENT    Physical Exam    INTERVENTIONS    The patient was seen for  Neurological problem. Staff concerns included mental status change. The following interventions were performed: POCT glucose.    RECOMMENDATIONS    -delirium percautions  -vbg  -continuous tele  -kub for abdominal distention     PROVIDER ESCALATION    Yes/No  no    Orders received and case discussed with NA.    Disposition: Remain in room 8086.    FOLLOW-UP    bedside RNElizabeth  updated on plan of care. Instructed to call the Rapid Response Nurse, Reddy Little RN at [phone] for additional questions or concerns.

## 2023-01-22 NOTE — ASSESSMENT & PLAN NOTE
Surgery planned for Monday  Needs Risk Stratification. So far:  High risk due to recent GI bleed, coagulapathy, sepsis, SPB with negative cultures, acute renal failure and may start HD soon, MELD 31.   Likely has chronic liver and kidney disease. Suspicion for infiltrative process in kidneys.   - may need to push back surgery to a later date  - follow pt through weekend  - xarelto on hold, on heparin    ECHO  /117/23:    The left ventricle is small with normal systolic function.   The estimated ejection fraction is 65%.   Normal left ventricular diastolic function.   Normal right ventricular size with normal right ventricular systolic function.   Normal central venous pressure (3 mmHg).   The estimated PA systolic pressure is 30 mmHg.   Mild tricuspid regurgitation.

## 2023-01-22 NOTE — SUBJECTIVE & OBJECTIVE
Interval History: see above    Review of Systems   Constitutional:  Positive for activity change.   HENT:  Negative for trouble swallowing.    Respiratory:  Negative for shortness of breath.    Cardiovascular:  Negative for chest pain.   Gastrointestinal:  Positive for abdominal distention and diarrhea. Negative for constipation and nausea.   Genitourinary:  Negative for difficulty urinating.   Musculoskeletal:  Negative for neck stiffness.   Neurological:  Positive for speech difficulty.   Psychiatric/Behavioral:  Positive for agitation, confusion and hallucinations. Negative for behavioral problems. The patient is hyperactive.    Objective:     Vital Signs (Most Recent):  Temp: 98.2 °F (36.8 °C) (01/22/23 1132)  Pulse: 87 (01/22/23 1132)  Resp: 18 (01/22/23 1132)  BP: 123/62 (01/22/23 1132)  SpO2: 95 % (01/22/23 1132) Vital Signs (24h Range):  Temp:  [97.5 °F (36.4 °C)-98.7 °F (37.1 °C)] 98.2 °F (36.8 °C)  Pulse:  [] 87  Resp:  [10-29] 18  SpO2:  [91 %-100 %] 95 %  BP: (103-142)/(58-72) 123/62     Weight: 110 kg (242 lb 8.1 oz)  Body mass index is 36.87 kg/m².    Intake/Output Summary (Last 24 hours) at 1/22/2023 1216  Last data filed at 1/22/2023 1122  Gross per 24 hour   Intake 99.34 ml   Output 710 ml   Net -610.66 ml      Physical Exam  Constitutional:       General: He is not in acute distress.     Appearance: Normal appearance. He is obese. He is ill-appearing. He is not toxic-appearing or diaphoretic.   HENT:      Head: Normocephalic and atraumatic.      Nose: Nose normal.      Mouth/Throat:      Mouth: Mucous membranes are moist.      Pharynx: Oropharynx is clear.   Eyes:      General: No scleral icterus.     Extraocular Movements: Extraocular movements intact.      Conjunctiva/sclera: Conjunctivae normal.      Pupils: Pupils are equal, round, and reactive to light.   Cardiovascular:      Rate and Rhythm: Regular rhythm. Tachycardia present.      Pulses: Normal pulses.      Heart sounds: Normal heart  sounds.   Pulmonary:      Effort: Pulmonary effort is normal. No respiratory distress.      Breath sounds: Normal breath sounds. No stridor. No wheezing, rhonchi or rales.   Chest:      Chest wall: No tenderness.   Abdominal:      General: Abdomen is flat. Bowel sounds are normal. There is distension.      Palpations: Abdomen is soft. There is no mass.      Tenderness: There is no abdominal tenderness. There is no right CVA tenderness, guarding or rebound.   Musculoskeletal:         General: No swelling, tenderness, deformity or signs of injury. Normal range of motion.      Cervical back: Normal range of motion and neck supple. No rigidity or tenderness.      Right lower leg: Edema present.      Left lower leg: Edema present.   Skin:     General: Skin is warm and dry.      Coloration: Skin is not jaundiced.      Findings: No erythema or rash.   Neurological:      General: No focal deficit present.      Mental Status: He is alert and oriented to person, place, and time. Mental status is at baseline.      Motor: No weakness.   Psychiatric:      Comments: Agitated with tremor earlier today, now somnolent after ativan       Significant Labs: All pertinent labs within the past 24 hours have been reviewed.  CBC:   Recent Labs   Lab 01/21/23  0314 01/21/23  1211 01/22/23  0937   WBC 13.74* 13.65* 15.25*   HGB 8.8* 9.1* 9.2*   HCT 27.5* 27.9* 28.9*    184 190     CMP:   Recent Labs   Lab 01/21/23  0314 01/21/23  1511 01/22/23  0936   * 133* 130*   K 4.1 4.2 4.3   CL 99 100 99   CO2 15* 15* 12*   * 124* 138*   BUN 39* 39* 45*   CREATININE 7.3* 7.5* 7.6*   CALCIUM 7.7* 8.0* 8.3*   PROT 5.9* 6.4 6.7   ALBUMIN 2.4* 2.4* 2.4*   BILITOT 2.8* 3.1* 3.2*   ALKPHOS 170* 184* 192*   AST 65* 70* 72*   ALT 36 41 43   ANIONGAP 16 18* 19*       Significant Imaging: I have reviewed all pertinent imaging results/findings within the past 24 hours.

## 2023-01-22 NOTE — PLAN OF CARE
CMICU DAILY GOALS       A: Awake    RASS: Goal -    Actual - RASS (Braxton Agitation-Sedation Scale): 1-->restless   Restraint necessity:    B: Breathe   SBT: Not intubated   C: Coordinate A & B, analgesics/sedatives   Pain: managed    SAT: Not intubated  D: Delirium   CAM-ICU: Overall CAM-ICU: Positive  E: Early(intubated/ Progressive (non-intubated) Mobility   MOVE Screen: Pass   Activity: Activity Management: Rolling - L1  FAS: Feeding/Nutrition   Diet order: Diet/Nutrition Received: clear liquid, Specialty Diet/Nutrition Received: dysphagia mechanically altered  T: Thrombus   DVT prophylaxis: VTE Required Core Measure: Pharmacological prophylaxis initiated/maintained  H: HOB Elevation   Head of Bed (HOB) Positioning: HOB at 30-45 degrees  U: Ulcer Prophylaxis   GI: yes  G: Glucose control   managed Glycemic Management: blood glucose monitored  S: Skin   Bathing/Skin Care: bath, partial, dressed/undressed, linen changed, incontinence care  Device Skin Pressure Protection: adhesive use limited, absorbent pad utilized/changed, skin-to-device areas padded, skin-to-skin areas padded  Pressure Reduction Devices: specialty bed utilized  Pressure Reduction Techniques: weight shift assistance provided, pressure points protected, positioned off wounds  Skin Protection: adhesive use limited, incontinence pads utilized, tubing/devices free from skin contact  B: Bowel Function   diarrhea   I: Indwelling Catheters   Bose necessity:      Urethral Catheter 01/16/23 1347 Latex 14 Fr.-Reason for Continuing Urinary Catheterization: Urinary retention   CVC necessity: Yes  D: De-escalation Antibiotics   Yes    Family/Goals of care/Code Status   Code Status: Full Code    24H Vital Sign Range  Temp:  [97.5 °F (36.4 °C)-98.7 °F (37.1 °C)]   Pulse:  []   Resp:  [11-24]   BP: (117-142)/(62-80)   SpO2:  [91 %-100 %]      Shift Events   No acute events throughout shift, Pt stepped up to MICU at 1300 for worsening mental status and  MARTITA . Trialysis line placed for CRRT without complication. Dialysis to begin as soon as RN is available for set up. Surgery still scheduled for tomorrow AM.     VS and assessment per flow sheet, patient progressing towards goals as tolerated, plan of care reviewed with family, all concerns addressed, will continue to monitor.    Erlin Villavicencio

## 2023-01-22 NOTE — PROGRESS NOTES
[facility] - Cardiac Medical ICU  Critical Care Medicine  Progress Note    Patient Name: Jonny Isabel  MRN: [id number]  Admission Date: 1/16/2023  Hospital Length of Stay: 6 days  Code Status: Full Code  Attending Provider: Genaro Ortiz MD  Primary Care Provider: Yuli Pérez Mai, MD   Principal Problem: Spinal stenosis    Subjective:     HPI:  Mr. Fuller is a 62-year-old male with a PMHx of A-fibb (on Eliquis), hypertension, DM 2 (not insulin dependent) presents as transfer from OSH for chronic lower back that has been worsening for the past week. Pt states that the pain is radiating from his right buttock down to his legs. Pt was unable to get out of bed this AM due to the pain. HE endorses having fecal incontinence for the past week with black tarry stools. He denies any fevers, chills, abdominal pain, urinary incontinence, or saddle anesthesia.     At OSH ED CT lumbar spine ordered revealing Prominent, cystic, multiloculated predominantly gas attenuation epidural  structure within the spinal canal at the level of L3-L4 resulting in severe narrowing of the spinal canal with mass effect upon the thecal sac and  vacuum disc phenomena concerning for diskitis or an epidural abscess. MRI shows large disc extrusion at L3-4 causing severe spinal canal stenosis, with moderate spinal canal stenosis at L2-3 and L4-5 and moderate neural foraminal narrowing L4-5 and L5-S1. Patient had no tenderness overlying spinous process but still endorsed sever pain, received percocet and robaxin. Of note, KENNETH showed blood in stool. The patient has a white count of 12.9, hemoglobin of 7.5, creatinine 4.6, Lactate of 3.9. Pt given Rocephin, Zosyn and Vanc.    Pt transferred to [place] for further intervention with neurosurgery.       Hospital/ICU Course:  Admitted to MICU for NSGY and GI evaluation . Hgb stable. EGD shows small varices with gastropathy, no active bleeds. Worsening MARTITA with minimal UOP, HRS protocol started with levo, midodrine,  octreotide and albumin trailed, has since been stopped due to low suspicion. Nephrology following. NSGY planned for surgery on Monday. U/O seems to be slightly improving. HDS.      Interval History/Significant Events: Mentation worsening, somnolent. Worsening MARTITA. Triaylsis line placed for HD. NSGY planned for surgery on Monday.       Review of Systems   Constitutional:  Positive for activity change. Negative for chills and fever.   HENT:  Negative for congestion and trouble swallowing.    Eyes:  Negative for visual disturbance.   Respiratory:  Negative for cough, shortness of breath, wheezing and stridor.    Cardiovascular:  Negative for chest pain and leg swelling.   Gastrointestinal:  Positive for abdominal distention and blood in stool. Negative for abdominal pain, diarrhea, nausea and vomiting.        +fecal incontinence   Endocrine: Negative for polyuria.   Genitourinary:  Negative for difficulty urinating and dysuria.   Musculoskeletal:  Positive for back pain. Negative for arthralgias and myalgias.   Skin:  Negative for color change and rash.   Neurological:  Negative for dizziness, weakness, numbness and headaches.   Psychiatric/Behavioral:  Positive for confusion. Negative for agitation.    Objective:     Vital Signs (Most Recent):  Temp: 98.1 °F (36.7 °C) (01/21/23 0700)  Pulse: 82 (01/21/23 1000)  Resp: 13 (01/21/23 1000)  BP: 136/69 (01/21/23 1000)  SpO2: 96 % (01/21/23 1000)   Vital Signs (24h Range):  Temp:  [97.4 °F (36.3 °C)-98.1 °F (36.7 °C)] 98.1 °F (36.7 °C)  Pulse:  [] 82  Resp:  [10-34] 13  SpO2:  [88 %-97 %] 96 %  BP: ()/(53-69) 136/69   Weight: 110 kg (242 lb 8.1 oz)  Body mass index is 36.87 kg/m².      Intake/Output Summary (Last 24 hours) at 1/21/2023 1114  Last data filed at 1/21/2023 0600  Gross per 24 hour   Intake 298.99 ml   Output 162 ml   Net 136.99 ml       Physical Exam  Vitals and nursing note reviewed.   Constitutional:       General: He is not in acute distress.      Appearance: He is obese. He is not ill-appearing.   HENT:      Head: Normocephalic and atraumatic.      Right Ear: External ear normal.      Left Ear: External ear normal.      Nose: Nose normal.      Mouth/Throat:      Mouth: Mucous membranes are moist.      Pharynx: Oropharynx is clear.   Eyes:      General:         Right eye: No discharge.         Left eye: No discharge.      Extraocular Movements: Extraocular movements intact.      Conjunctiva/sclera: Conjunctivae normal.      Pupils: Pupils are equal, round, and reactive to light.   Cardiovascular:      Rate and Rhythm: Normal rate and regular rhythm.      Pulses: Normal pulses.      Heart sounds: Normal heart sounds. No murmur heard.  Pulmonary:      Effort: Pulmonary effort is normal. No respiratory distress.      Breath sounds: No wheezing or rales.   Abdominal:      General: There is distension.      Tenderness: There is no abdominal tenderness. There is no guarding.   Musculoskeletal:         General: No swelling. Normal range of motion.      Cervical back: Normal range of motion.      Right lower leg: No edema.      Left lower leg: No edema.   Skin:     General: Skin is warm and dry.      Coloration: Skin is not jaundiced.   Neurological:      General: No focal deficit present.      Mental Status: He is alert. He is disoriented.      Cranial Nerves: Cranial nerves 2-12 are intact.      Comments: Patient altered  Responding to questions appropriately but confused about timing of events  Poor recall  + fecal incontinence   Psychiatric:         Mood and Affect: Mood normal.         Behavior: Behavior normal.       Vents:  Oxygen Concentration (%): 28 (01/19/23 0407)  Lines/Drains/Airways       Drain  Duration                  Urethral Catheter 01/16/23 1347 Latex 14 Fr. 4 days              Peripheral Intravenous Line  Duration                  Peripheral IV - Single Lumen 01/19/23 1515 18 G;1 3/4 in Right Upper Arm 1 day         Peripheral IV - Single Lumen  01/19/23 1515 20 G;1 3/4 in Left Forearm 1 day                  Significant Labs:    CBC/Anemia Profile:  Recent Labs   Lab 01/20/23  1130 01/20/23  1948 01/21/23  0314   WBC 14.90* 14.55* 13.74*   HGB 9.3* 9.5* 8.8*   HCT 29.4* 29.5* 27.5*    178 174   MCV 99* 99* 99*   RDW 21.6* 21.7* 21.3*        Chemistries:  Recent Labs   Lab 01/20/23  0316 01/21/23  0314   * 130*   K 4.3 4.1   CL 98 99   CO2 14* 15*   BUN 35* 39*   CREATININE 6.9* 7.3*   CALCIUM 7.7* 7.7*   ALBUMIN 2.6* 2.4*   PROT 6.4 5.9*   BILITOT 2.4* 2.8*   ALKPHOS 188* 170*   ALT 44 36   AST 76* 65*   MG 1.6 1.6   PHOS 5.2* 5.3*       All pertinent labs within the past 24 hours have been reviewed.    Significant Imaging:  I have reviewed all pertinent imaging results/findings within the past 24 hours.      ABG  Recent Labs   Lab 01/22/23  1034   PH 7.244*   PO2 34*   PCO2 37.5   HCO3 16.2*   BE -11     Assessment/Plan:     Neuro  * Spinal stenosis  CT Lumbar Spine Without Contrast Result Date: 1/16/2023  1.  Prominent, cystic, multiloculated predominantly gas attenuation structure within the spinal canal at the level of L3-L4 with dimensions as above.  This is favored to be epidural in location and results in severe narrowing of the spinal canal with mass effect upon the thecal sac.  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 Large disc extrusion at L3-4, contributing to severe spinal canal stenosis, as above. Additional lumbar spondylosis, contributing to moderate spinal canal stenosis at L2-3 and L4-5 and moderate neural foraminal narrowing L4-5 and L5-S1    - Neuro checks q1h   - NSGY consulted, planning for surgery on Monday    Psychiatric  Alcohol abuse  - Vitals q4h while awake  - CIWA monitoring  - Ativan 2mg q4 PRN if 2 criteria are met: Systolic BP>160, Diastolic BP >110 or Pulse >110  - Start Vitamin supplementation- Thiamine, Folic acid, Vit.  B12, and Multivitamin   - Will start valium taper at 5 mg Q8H       Cardiac/Vascular  A-fib  - Maintain K > 4, Mag > 2 and Ca/iCal WNL to decrease arrhythmogenic potential  - On lopressor 100 mg QD, holding as pt likely has GI bleed  - Holding anticoagulation in the setting of GI bleed   - Starting Heparin gtt ppx in the setting of no acute GI bleed      Renal/  MARTITA (acute kidney injury)  Creatinine 4.7 on admit, baseline around 0.8. Pre-renal vs ATN. Less likely obstruction as pt has gomez     - Check urine lytes and renal ultrasound  - Check urine protein creatinine ratio.  - Strict I&Os and daily weights   - Avoid nephrotoxic agents such as NSAIDs, gadolinium and IV radiocontrast.  - Renally dose meds to current GFR.  - Maintain MAP > 65. Off of levophed  - Started on HRS protocol with midodrine 10mg TID, Octreotide, and Albumin 25g BID. Stopped due to low suspicion      ID  Severe sepsis  This patient does have evidence of infective focus  My overall impression is septic shock.  Source: Unknown  Antibiotics given-   Antibiotics (From admission, onward)    Start     Stop Route Frequency Ordered    01/20/23 1400  metroNIDAZOLE tablet 500 mg         -- Oral Every 8 hours 01/20/23 1109    01/18/23 0900  mupirocin 2 % ointment         01/23 0859 Nasl 2 times daily 01/18/23 0354    01/16/23 2100  ceFEPIme (MAXIPIME) 2 g in dextrose 5 % in water (D5W) 5 % 50 mL IVPB (MB+)         -- IV Every 24 hours (non-standard times) 01/16/23 1354        Latest lactate reviewed-  No results for input(s): LACTATE in the last 72 hours.  Organ dysfunction indicated by Acute kidney injury    - On Vancomycin, Cefepime, and Flagyl, Vanc d/c 1/20  - BCx negative  - SBP work up negative  - Will de-escalate once appropriate         Endocrine  Diabetes  -Last A1c reviewed-   Lab Results   Component Value Date    HGBA1C 5.3 11/10/2022       Home Antihyperglycemic Regiment:  - Metformin  1000 mg BID     Inpatient Antihyperglycemic  Regiment:  Antihyperglycemics (From admission, onward)    Start     Stop Route Frequency Ordered    01/16/23 1507  insulin aspart U-100 pen 0-5 Units         -- SubQ Before meals & nightly PRN 01/16/23 1407        - LDSSI  - Clear liquid diet for now     Blood Sugars (AccuCheck):    Recent Labs     01/16/23  1545 01/16/23  2043   POCTGLUCOSE 141* 163*           GI  Alcoholic cirrhosis of liver with ascites  S/p diagnostic paracentesis on 1/17 with , 50% seg. Cytology pending     Elevated liver enzymes  Likely 2/2 to underlying alcohol abuse and hepatic steatosis.     - Daily CMP, PT/INR   - Complete workup up with tylenol level, acute hep panel, a1-antitrypsin, anti smith antibody, HIV, anti mitochondrial antibody, anca, anti-liver, aFP, PETH pending   - Liver US with doppler shows epatomegaly with hepatic steatosis, cholelithiasis versus gallbladder sludge, small volume ascites, and left pleural effusion.    GI bleed  Concerning for variceal bleed given hx of alcohol abuse.     - GI consulted, recommend clear liquid for now   -Transfuse pRBC for Hb < 7 g/dL (Consider a higher Hb target if there is clinical evidence of intravascular volume depletion or comorbidities, such as CAD or if high suspicion of vigorous active ongoing bleeding or an uncorrected coagulopathy exists.).  - s/p vitamin K given Pt/INR > 2   - PPI 80 mg IV bolus once, then IV PPI 40 BID  -Avoid nonsteroidal agents, antiplatelet agents and anticoagulants if possible in patients without absolute contraindications. (consult managing provider if required for cardiovascular protection).  - EGD showed  Small (< 5 mm) esophageal varices with portal hypertensive gastropathy             Critical Care Daily Checklist:    A: Awake: RASS Goal/Actual Goal:    Actual: Braxton Agitation Sedation Scale (RASS): Restless   B: Spontaneous Breathing Trial Performed?     C: SAT & SBT Coordinated?  N/A                      D: Delirium: CAM-ICU Overall CAM-ICU:  Positive   E: Early Mobility Performed? No   F: Feeding Goal:    Status:     Current Diet Order   Procedures    Diet clear liquid      AS: Analgesia/Sedation No     T: Thromboembolic Prophylaxis Yes   H: HOB > 300 Yes   U: Stress Ulcer Prophylaxis (if needed) Yes   G: Glucose Control Yes   B: Bowel Function Stool Occurrence: 1   I: Indwelling Catheter (Lines & Bose) Necessity Yes   D: De-escalation of Antimicrobials/Pharmacotherapies Yes    Plan for the day/ETD HD     Code Status:  Family/Goals of Care: Full Code         Critical secondary to Patient has a condition that poses threat to life and bodily function: Acute Renal Failure     Critical care was time spent personally by me on the following activities: development of treatment plan with patient or surrogate and bedside caregivers, discussions with consultants, evaluation of patient's response to treatment, examination of patient, ordering and performing treatments and interventions, ordering and review of laboratory studies, ordering and review of radiographic studies, pulse oximetry, re-evaluation of patient's condition. This critical care time did not overlap with that of any other provider or involve time for any procedures.     Erika Ocasio,   Critical Care Medicine  [facility] - Cardiac Medical ICU

## 2023-01-22 NOTE — ASSESSMENT & PLAN NOTE
Patient with acute kidney injury likely due to acute tubular necrosis vs ARS.  MARTITA is currently worsening. Labs reviewed- Renal function/electrolytes with Estimated Creatinine Clearance: 12.4 mL/min (A) (based on SCr of 7.5 mg/dL (H)). according to latest data. Monitor urine output and serial BMP and adjust therapy as needed. Avoid nephrotoxins and renally dose meds for GFR listed above.     Creatinine 4.7 on admit, baseline around 0.8. Pre-renal vs ATN. Less likely obstruction as pt has gomez      - Check urine lytes and renal ultrasound  - Check urine protein creatinine ratio.  - Strict I&Os and daily weights   - Avoid nephrotoxic agents such as NSAIDs, gadolinium and IV radiocontrast.  - Renally dose meds to current GFR.  - Maintain MAP > 65. Off of levophed  - Started on HRS protocol with midodrine 10mg TID, Octreotide, and Albumin 25g BID. Stopped due to low suspicion    1/21-  Cr 7.5 , Nephrology following:  Urine sodium 10, urine protein creatinine ratio 0.89. IgA 505. Urine microscopy: moderated (2-3 per field) granular casts with calcium oxylate crystals embedded in casts.  Uo improving.   1/22- discuss w Nephrology, Hos Med and cc . May need line and HD

## 2023-01-23 PROBLEM — Z76.89 ENCOUNTER FOR SOCIAL WORK INTERVENTION: Status: ACTIVE | Noted: 2023-01-23

## 2023-01-23 LAB
ALBUMIN SERPL BCP-MCNC: 2.5 G/DL (ref 3.5–5.2)
ALBUMIN SERPL BCP-MCNC: 2.5 G/DL (ref 3.5–5.2)
ALLENS TEST: ABNORMAL
ALP SERPL-CCNC: 222 U/L (ref 55–135)
ALT SERPL W/O P-5'-P-CCNC: 46 U/L (ref 10–44)
AMMONIA PLAS-SCNC: 65 UMOL/L (ref 10–50)
ANION GAP SERPL CALC-SCNC: 13 MMOL/L (ref 8–16)
ANION GAP SERPL CALC-SCNC: 15 MMOL/L (ref 8–16)
ANISOCYTOSIS BLD QL SMEAR: SLIGHT
AST SERPL-CCNC: 86 U/L (ref 10–40)
BASOPHILS # BLD AUTO: 0.15 K/UL (ref 0–0.2)
BASOPHILS NFR BLD: 1.1 % (ref 0–1.9)
BILIRUB SERPL-MCNC: 4 MG/DL (ref 0.1–1)
BUN SERPL-MCNC: 14 MG/DL (ref 8–23)
BUN SERPL-MCNC: 14 MG/DL (ref 8–23)
CALCIUM SERPL-MCNC: 7.9 MG/DL (ref 8.7–10.5)
CALCIUM SERPL-MCNC: 8 MG/DL (ref 8.7–10.5)
CHLORIDE SERPL-SCNC: 103 MMOL/L (ref 95–110)
CHLORIDE SERPL-SCNC: 104 MMOL/L (ref 95–110)
CO2 SERPL-SCNC: 19 MMOL/L (ref 23–29)
CO2 SERPL-SCNC: 20 MMOL/L (ref 23–29)
CREAT SERPL-MCNC: 2.9 MG/DL (ref 0.5–1.4)
CREAT SERPL-MCNC: 3 MG/DL (ref 0.5–1.4)
DELSYS: ABNORMAL
DIFFERENTIAL METHOD: ABNORMAL
EOSINOPHIL # BLD AUTO: 0.1 K/UL (ref 0–0.5)
EOSINOPHIL NFR BLD: 0.6 % (ref 0–8)
ERYTHROCYTE [DISTWIDTH] IN BLOOD BY AUTOMATED COUNT: 22.3 % (ref 11.5–14.5)
EST. GFR  (NO RACE VARIABLE): 22.9 ML/MIN/1.73 M^2
EST. GFR  (NO RACE VARIABLE): 23.9 ML/MIN/1.73 M^2
FINAL PATHOLOGIC DIAGNOSIS: NORMAL
FIO2: 21
GLUCOSE SERPL-MCNC: 113 MG/DL (ref 70–110)
GLUCOSE SERPL-MCNC: 113 MG/DL (ref 70–110)
HCO3 UR-SCNC: 19.7 MMOL/L (ref 24–28)
HCT VFR BLD AUTO: 29.1 % (ref 40–54)
HGB BLD-MCNC: 9.4 G/DL (ref 14–18)
HYPOCHROMIA BLD QL SMEAR: ABNORMAL
IMM GRANULOCYTES # BLD AUTO: 0.3 K/UL (ref 0–0.04)
IMM GRANULOCYTES NFR BLD AUTO: 2.2 % (ref 0–0.5)
INR PPP: 1.5 (ref 0.8–1.2)
LYMPHOCYTES # BLD AUTO: 0.9 K/UL (ref 1–4.8)
LYMPHOCYTES NFR BLD: 6.6 % (ref 18–48)
Lab: NORMAL
MAGNESIUM SERPL-MCNC: 1.7 MG/DL (ref 1.6–2.6)
MAGNESIUM SERPL-MCNC: 1.7 MG/DL (ref 1.6–2.6)
MCH RBC QN AUTO: 31.4 PG (ref 27–31)
MCHC RBC AUTO-ENTMCNC: 32.3 G/DL (ref 32–36)
MCV RBC AUTO: 97 FL (ref 82–98)
MICROSCOPIC EXAM: NORMAL
MODE: ABNORMAL
MONOCYTES # BLD AUTO: 2.4 K/UL (ref 0.3–1)
MONOCYTES NFR BLD: 17.4 % (ref 4–15)
NEUTROPHILS # BLD AUTO: 9.9 K/UL (ref 1.8–7.7)
NEUTROPHILS NFR BLD: 72.1 % (ref 38–73)
NRBC BLD-RTO: 3 /100 WBC
OVALOCYTES BLD QL SMEAR: ABNORMAL
PCO2 BLDA: 36.1 MMHG (ref 35–45)
PH SMN: 7.34 [PH] (ref 7.35–7.45)
PHOSPHATE SERPL-MCNC: 2.3 MG/DL (ref 2.7–4.5)
PHOSPHATE SERPL-MCNC: 2.3 MG/DL (ref 2.7–4.5)
PLATELET # BLD AUTO: 164 K/UL (ref 150–450)
PMV BLD AUTO: 11.7 FL (ref 9.2–12.9)
PO2 BLDA: 35 MMHG (ref 40–60)
POC BE: -6 MMOL/L
POC SATURATED O2: 64 % (ref 95–100)
POC TCO2: 21 MMOL/L (ref 24–29)
POCT GLUCOSE: 135 MG/DL (ref 70–110)
POCT GLUCOSE: 151 MG/DL (ref 70–110)
POCT GLUCOSE: 174 MG/DL (ref 70–110)
POIKILOCYTOSIS BLD QL SMEAR: SLIGHT
POLYCHROMASIA BLD QL SMEAR: ABNORMAL
POTASSIUM SERPL-SCNC: 4.4 MMOL/L (ref 3.5–5.1)
POTASSIUM SERPL-SCNC: 4.4 MMOL/L (ref 3.5–5.1)
PROT SERPL-MCNC: 6.6 G/DL (ref 6–8.4)
PROTHROMBIN TIME: 14.9 SEC (ref 9–12.5)
RBC # BLD AUTO: 2.99 M/UL (ref 4.6–6.2)
SAMPLE: ABNORMAL
SITE: ABNORMAL
SODIUM SERPL-SCNC: 137 MMOL/L (ref 136–145)
SODIUM SERPL-SCNC: 137 MMOL/L (ref 136–145)
SPHEROCYTES BLD QL SMEAR: ABNORMAL
TARGETS BLD QL SMEAR: ABNORMAL
VANCOMYCIN SERPL-MCNC: 10.8 UG/ML
WBC # BLD AUTO: 13.71 K/UL (ref 3.9–12.7)

## 2023-01-23 PROCEDURE — 25000003 PHARM REV CODE 250: Performed by: STUDENT IN AN ORGANIZED HEALTH CARE EDUCATION/TRAINING PROGRAM

## 2023-01-23 PROCEDURE — 80053 COMPREHEN METABOLIC PANEL: CPT | Performed by: STUDENT IN AN ORGANIZED HEALTH CARE EDUCATION/TRAINING PROGRAM

## 2023-01-23 PROCEDURE — 99232 PR SUBSEQUENT HOSPITAL CARE,LEVL II: ICD-10-PCS | Mod: ,,, | Performed by: NEUROLOGICAL SURGERY

## 2023-01-23 PROCEDURE — 83735 ASSAY OF MAGNESIUM: CPT | Performed by: STUDENT IN AN ORGANIZED HEALTH CARE EDUCATION/TRAINING PROGRAM

## 2023-01-23 PROCEDURE — 84100 ASSAY OF PHOSPHORUS: CPT | Performed by: STUDENT IN AN ORGANIZED HEALTH CARE EDUCATION/TRAINING PROGRAM

## 2023-01-23 PROCEDURE — 63600175 PHARM REV CODE 636 W HCPCS: Performed by: INTERNAL MEDICINE

## 2023-01-23 PROCEDURE — 82140 ASSAY OF AMMONIA: CPT | Performed by: STUDENT IN AN ORGANIZED HEALTH CARE EDUCATION/TRAINING PROGRAM

## 2023-01-23 PROCEDURE — 99900035 HC TECH TIME PER 15 MIN (STAT)

## 2023-01-23 PROCEDURE — 99233 SBSQ HOSP IP/OBS HIGH 50: CPT | Mod: ,,, | Performed by: INTERNAL MEDICINE

## 2023-01-23 PROCEDURE — 80100014 HC HEMODIALYSIS 1:1

## 2023-01-23 PROCEDURE — 25000003 PHARM REV CODE 250: Performed by: NURSE PRACTITIONER

## 2023-01-23 PROCEDURE — 63600175 PHARM REV CODE 636 W HCPCS

## 2023-01-23 PROCEDURE — 63600175 PHARM REV CODE 636 W HCPCS: Performed by: STUDENT IN AN ORGANIZED HEALTH CARE EDUCATION/TRAINING PROGRAM

## 2023-01-23 PROCEDURE — 20000000 HC ICU ROOM

## 2023-01-23 PROCEDURE — 94761 N-INVAS EAR/PLS OXIMETRY MLT: CPT

## 2023-01-23 PROCEDURE — 99233 PR SUBSEQUENT HOSPITAL CARE,LEVL III: ICD-10-PCS | Mod: ,,, | Performed by: INTERNAL MEDICINE

## 2023-01-23 PROCEDURE — 25000003 PHARM REV CODE 250: Performed by: INTERNAL MEDICINE

## 2023-01-23 PROCEDURE — C9113 INJ PANTOPRAZOLE SODIUM, VIA: HCPCS | Performed by: STUDENT IN AN ORGANIZED HEALTH CARE EDUCATION/TRAINING PROGRAM

## 2023-01-23 PROCEDURE — 80202 ASSAY OF VANCOMYCIN: CPT | Performed by: INTERNAL MEDICINE

## 2023-01-23 PROCEDURE — 83735 ASSAY OF MAGNESIUM: CPT | Mod: 91 | Performed by: STUDENT IN AN ORGANIZED HEALTH CARE EDUCATION/TRAINING PROGRAM

## 2023-01-23 PROCEDURE — 99232 SBSQ HOSP IP/OBS MODERATE 35: CPT | Mod: ,,, | Performed by: NEUROLOGICAL SURGERY

## 2023-01-23 PROCEDURE — 80069 RENAL FUNCTION PANEL: CPT | Performed by: STUDENT IN AN ORGANIZED HEALTH CARE EDUCATION/TRAINING PROGRAM

## 2023-01-23 PROCEDURE — 85025 COMPLETE CBC W/AUTO DIFF WBC: CPT | Performed by: STUDENT IN AN ORGANIZED HEALTH CARE EDUCATION/TRAINING PROGRAM

## 2023-01-23 PROCEDURE — C9113 INJ PANTOPRAZOLE SODIUM, VIA: HCPCS

## 2023-01-23 PROCEDURE — 82803 BLOOD GASES ANY COMBINATION: CPT

## 2023-01-23 PROCEDURE — 85610 PROTHROMBIN TIME: CPT | Performed by: STUDENT IN AN ORGANIZED HEALTH CARE EDUCATION/TRAINING PROGRAM

## 2023-01-23 RX ORDER — LACTULOSE 10 G/15ML
20 SOLUTION ORAL 3 TIMES DAILY
Status: DISCONTINUED | OUTPATIENT
Start: 2023-01-23 | End: 2023-01-23

## 2023-01-23 RX ORDER — THIAMINE HCL 100 MG
100 TABLET ORAL DAILY
Status: DISCONTINUED | OUTPATIENT
Start: 2023-01-24 | End: 2023-01-23

## 2023-01-23 RX ORDER — PANTOPRAZOLE SODIUM 40 MG/10ML
40 INJECTION, POWDER, LYOPHILIZED, FOR SOLUTION INTRAVENOUS DAILY
Status: DISCONTINUED | OUTPATIENT
Start: 2023-01-23 | End: 2023-01-23

## 2023-01-23 RX ORDER — LACTULOSE 10 G/15ML
20 SOLUTION ORAL; RECTAL 3 TIMES DAILY
Status: DISCONTINUED | OUTPATIENT
Start: 2023-01-23 | End: 2023-01-24

## 2023-01-23 RX ORDER — SODIUM CHLORIDE 9 MG/ML
INJECTION, SOLUTION INTRAVENOUS ONCE
Status: DISCONTINUED | OUTPATIENT
Start: 2023-01-23 | End: 2023-01-25

## 2023-01-23 RX ORDER — FOLIC ACID 5 MG/ML
1 INJECTION, SOLUTION INTRAMUSCULAR; INTRAVENOUS; SUBCUTANEOUS DAILY
Status: DISCONTINUED | OUTPATIENT
Start: 2023-01-23 | End: 2023-01-23

## 2023-01-23 RX ORDER — PANTOPRAZOLE SODIUM 40 MG/10ML
40 INJECTION, POWDER, LYOPHILIZED, FOR SOLUTION INTRAVENOUS 2 TIMES DAILY
Status: DISCONTINUED | OUTPATIENT
Start: 2023-01-23 | End: 2023-01-25

## 2023-01-23 RX ADMIN — LACTULOSE 200 G: 10 SOLUTION ORAL at 09:01

## 2023-01-23 RX ADMIN — HEPARIN SODIUM 5000 UNITS: 5000 INJECTION INTRAVENOUS; SUBCUTANEOUS at 05:01

## 2023-01-23 RX ADMIN — CEFEPIME 2 G: 2 INJECTION, POWDER, FOR SOLUTION INTRAVENOUS at 08:01

## 2023-01-23 RX ADMIN — PANTOPRAZOLE SODIUM 40 MG: 40 INJECTION, POWDER, FOR SOLUTION INTRAVENOUS at 09:01

## 2023-01-23 RX ADMIN — HEPARIN SODIUM 5000 UNITS: 5000 INJECTION INTRAVENOUS; SUBCUTANEOUS at 01:01

## 2023-01-23 RX ADMIN — HEPARIN SODIUM 5000 UNITS: 5000 INJECTION INTRAVENOUS; SUBCUTANEOUS at 10:01

## 2023-01-23 RX ADMIN — FOLIC ACID 1 MG: 5 INJECTION, SOLUTION INTRAMUSCULAR; INTRAVENOUS; SUBCUTANEOUS at 07:01

## 2023-01-23 RX ADMIN — PANTOPRAZOLE SODIUM 40 MG: 40 INJECTION, POWDER, FOR SOLUTION INTRAVENOUS at 10:01

## 2023-01-23 RX ADMIN — MAGNESIUM SULFATE 2 G: 2 INJECTION INTRAVENOUS at 06:01

## 2023-01-23 RX ADMIN — LACTULOSE 20 G: 10 SOLUTION ORAL at 10:01

## 2023-01-23 RX ADMIN — MIDODRINE HYDROCHLORIDE 10 MG: 5 TABLET ORAL at 05:01

## 2023-01-23 RX ADMIN — VANCOMYCIN HYDROCHLORIDE 500 MG: 500 INJECTION, POWDER, LYOPHILIZED, FOR SOLUTION INTRAVENOUS at 04:01

## 2023-01-23 RX ADMIN — SODIUM PHOSPHATE, MONOBASIC, MONOHYDRATE AND SODIUM PHOSPHATE, DIBASIC, ANHYDROUS 30 MMOL: 142; 276 INJECTION, SOLUTION INTRAVENOUS at 09:01

## 2023-01-23 RX ADMIN — THIAMINE HYDROCHLORIDE 100 MG: 100 INJECTION, SOLUTION INTRAMUSCULAR; INTRAVENOUS at 06:01

## 2023-01-23 NOTE — SUBJECTIVE & OBJECTIVE
Interval History/Significant Events:  NAEON.  Bicarb and Flagyl discontinued.  Patient still with continued change in mentation.  Will check blood gas to evaluate for possible CO2 retention.  Neurosurgery team will not perform surgical intervention today as patient is not medically optimized from their standpoint and intervention is not acutely necessary, they recommend multimodal pain management. Will restart vancomycin as patients with concern for right arm cellulitis. Will undergo iHD per Nephrology.  SLP evaluation for ability to take PO medications/aspiration risk.  SW/CM consult for family support status.    Review of Systems   Constitutional:  Positive for activity change. Negative for chills and fever.   HENT:  Negative for congestion and trouble swallowing.    Eyes:  Negative for visual disturbance.   Respiratory:  Negative for cough, shortness of breath, wheezing and stridor.    Cardiovascular:  Negative for chest pain and leg swelling.   Gastrointestinal:  Positive for abdominal distention and blood in stool. Negative for abdominal pain, diarrhea, nausea and vomiting. +fecal incontinence   Endocrine: Negative for polyuria.   Genitourinary:  Negative for difficulty urinating and dysuria.   Musculoskeletal:  Positive for back pain. Negative for arthralgias and myalgias.   Skin:  Negative for color change and rash.   Neurological:  Negative for dizziness, weakness, numbness and headaches.   Psychiatric/Behavioral:  Positive for confusion. Negative for agitation.    Objective:     Vital Signs (Most Recent):  Temp: 99.8 °F (37.7 °C) (01/23/23 1501)  Pulse: 102 (01/23/23 1617)  Resp: 18 (01/23/23 1617)  BP: (!) 168/89 (01/23/23 1600)  SpO2: (!) 94 % (01/23/23 1617)   Vital Signs (24h Range):  Temp:  [98.3 °F (36.8 °C)-99.8 °F (37.7 °C)] 99.8 °F (37.7 °C)  Pulse:  [102-121] 102  Resp:  [14-28] 18  SpO2:  [92 %-97 %] 94 %  BP: (120-169)/() 168/89   Weight: 110 kg (242 lb 8.1 oz)  Body mass index is 36.87  kg/m².      Intake/Output Summary (Last 24 hours) at 1/23/2023 1659  Last data filed at 1/23/2023 1600  Gross per 24 hour   Intake 3118.96 ml   Output 4942 ml   Net -1823.04 ml       Physical Exam  Vitals and nursing note reviewed.   Constitutional:       General: He is not in acute distress.     Appearance: He is obese. He is not ill-appearing.   HENT:      Head: Normocephalic and atraumatic.      Right Ear: External ear normal.      Left Ear: External ear normal.      Nose: Nose normal.      Mouth/Throat:      Mouth: Mucous membranes are moist.      Pharynx: Oropharynx is clear.   Eyes:      General:         Right eye: No discharge.         Left eye: No discharge.      Extraocular Movements: Extraocular movements intact.      Conjunctiva/sclera: Conjunctivae normal.      Pupils: Pupils are equal, round, and reactive to light.   Cardiovascular:      Rate and Rhythm: Normal rate and regular rhythm.      Pulses: Normal pulses.      Heart sounds: Normal heart sounds. No murmur heard.  Pulmonary:      Effort: Pulmonary effort is normal. No respiratory distress.      Breath sounds: No wheezing or rales.   Abdominal:      General: There is distension.      Tenderness: There is no abdominal tenderness. There is no guarding.   Musculoskeletal:         General: No swelling. Normal range of motion.      Cervical back: Normal range of motion.      Right lower leg: No edema.      Left lower leg: No edema.   Skin:     General: Skin is warm and dry. R arm with erythema      Coloration: Skin is not jaundiced.   Neurological:      General: No focal deficit present.      Mental Status: He is alert. He is disoriented.      Cranial Nerves: Cranial nerves 2-12 are intact.      Comments: Patient altered  Responding to questions appropriately but confused about timing of events  Poor recall  + fecal incontinence   Psychiatric:         Mood and Affect: Mood normal.         Behavior: Behavior normal.     Vents:  Oxygen Concentration (%):  36 (01/22/23 2040)  Lines/Drains/Airways       Central Venous Catheter Line  Duration             Trialysis (Dialysis) Catheter 01/22/23 1530 right internal jugular 1 day              Drain  Duration                  Urethral Catheter 01/16/23 1347 Latex 14 Fr. 7 days         Rectal Tube 01/22/23 1723 rectal tube w/ balloon (indicate number of mLs) <1 day              Peripheral Intravenous Line  Duration                  Peripheral IV - Single Lumen 01/19/23 1515 18 G;1 3/4 in Right Upper Arm 4 days                  Significant Labs:    CBC/Anemia Profile:  Recent Labs   Lab 01/22/23  0937 01/23/23  0444   WBC 15.25* 13.71*   HGB 9.2* 9.4*   HCT 28.9* 29.1*    164   MCV 98 97   RDW 21.9* 22.3*        Chemistries:  Recent Labs   Lab 01/22/23  0936 01/23/23  0444   * 137  137   K 4.3 4.4  4.4   CL 99 103  104   CO2 12* 19*  20*   BUN 45* 14  14   CREATININE 7.6* 3.0*  2.9*   CALCIUM 8.3* 8.0*  7.9*   ALBUMIN 2.4* 2.5*  2.5*   PROT 6.7 6.6   BILITOT 3.2* 4.0*   ALKPHOS 192* 222*   ALT 43 46*   AST 72* 86*   MG 1.7 1.7  1.7   PHOS 5.3* 2.3*  2.3*       BMP:   Recent Labs   Lab 01/23/23  0444   *  113*     137   K 4.4  4.4     104   CO2 19*  20*   BUN 14  14   CREATININE 3.0*  2.9*   CALCIUM 8.0*  7.9*   MG 1.7  1.7       Significant Imaging:  I have reviewed all pertinent imaging results/findings within the past 24 hours.

## 2023-01-23 NOTE — PROGRESS NOTES
[facility]  Critical Care Medicine  Progress Note    Patient Name: Jonny Isabel  MRN: [id number]  Admission Date: 1/16/2023  Hospital Length of Stay: 7 days  Code Status: Full Code  Attending Provider: Lance Martin*  Primary Care Provider: Yuli Pérez Mai, MD   Principal Problem: Spinal stenosis    Subjective:     HPI:  Mr. Fuller is a 62-year-old male with a PMHx of A-fibb (on Eliquis), hypertension, DM 2 (not insulin dependent) presents as transfer from OSH for chronic lower back that has been worsening for the past week. Pt states that the pain is radiating from his right buttock down to his legs. Pt was unable to get out of bed this AM due to the pain. HE endorses having fecal incontinence for the past week with black tarry stools. He denies any fevers, chills, abdominal pain, urinary incontinence, or saddle anesthesia.     At OSH ED CT lumbar spine ordered revealing Prominent, cystic, multiloculated predominantly gas attenuation epidural  structure within the spinal canal at the level of L3-L4 resulting in severe narrowing of the spinal canal with mass effect upon the thecal sac and  vacuum disc phenomena concerning for diskitis or an epidural abscess. MRI shows large disc extrusion at L3-4 causing severe spinal canal stenosis, with moderate spinal canal stenosis at L2-3 and L4-5 and moderate neural foraminal narrowing L4-5 and L5-S1. Patient had no tenderness overlying spinous process but still endorsed sever pain, received percocet and robaxin. Of note, KENNETH showed blood in stool. The patient has a white count of 12.9, hemoglobin of 7.5, creatinine 4.6, Lactate of 3.9. Pt given Rocephin, Zosyn and Vanc.    Pt transferred to [facility] for further intervention with neurosurgery.       Hospital/ICU Course:  Admitted to MICU for NSGY and GI evaluation . Hgb stable. EGD shows small varices with gastropathy, no active bleeds. Worsening MARTITA with minimal UOP, HRS protocol started with levo,  midodrine, octreotide and albumin trailed, has since been stopped due to low suspicion. Nephrology following. NSGY initially planned surgical intervention for 1/23 but determine patient not medically optimized and currently suggesting multimodal pain control.      Interval History/Significant Events:  NAEON.  Bicarb and Flagyl discontinued.  Patient still with continued change in mentation.  Will check blood gas to evaluate for possible CO2 retention.  Neurosurgery team will not perform surgical intervention today as patient is not medically optimized from their standpoint and intervention is not acutely necessary, they recommend multimodal pain management. Will restart vancomycin as patients with concern for right arm cellulitis. Will undergo iHD per Nephrology.  SLP evaluation for ability to take PO medications/aspiration risk.  SW/CM consult for family support status.    Review of Systems   Constitutional:  Positive for activity change. Negative for chills and fever.   HENT:  Negative for congestion and trouble swallowing.    Eyes:  Negative for visual disturbance.   Respiratory:  Negative for cough, shortness of breath, wheezing and stridor.    Cardiovascular:  Negative for chest pain and leg swelling.   Gastrointestinal:  Positive for abdominal distention and blood in stool. Negative for abdominal pain, diarrhea, nausea and vomiting. +fecal incontinence   Endocrine: Negative for polyuria.   Genitourinary:  Negative for difficulty urinating and dysuria.   Musculoskeletal:  Positive for back pain. Negative for arthralgias and myalgias.   Skin:  Negative for color change and rash.   Neurological:  Negative for dizziness, weakness, numbness and headaches.   Psychiatric/Behavioral:  Positive for confusion. Negative for agitation.    Objective:     Vital Signs (Most Recent):  Temp: 99.8 °F (37.7 °C) (01/23/23 1501)  Pulse: 102 (01/23/23 1617)  Resp: 18 (01/23/23 1617)  BP: (!) 168/89 (01/23/23 1600)  SpO2: (!) 94 %  (01/23/23 1617)   Vital Signs (24h Range):  Temp:  [98.3 °F (36.8 °C)-99.8 °F (37.7 °C)] 99.8 °F (37.7 °C)  Pulse:  [102-121] 102  Resp:  [14-28] 18  SpO2:  [92 %-97 %] 94 %  BP: (120-169)/() 168/89   Weight: 110 kg (242 lb 8.1 oz)  Body mass index is 36.87 kg/m².      Intake/Output Summary (Last 24 hours) at 1/23/2023 1659  Last data filed at 1/23/2023 1600  Gross per 24 hour   Intake 3118.96 ml   Output 4942 ml   Net -1823.04 ml       Physical Exam  Vitals and nursing note reviewed.   Constitutional:       General: He is not in acute distress.     Appearance: He is obese. He is not ill-appearing.   HENT:      Head: Normocephalic and atraumatic.      Right Ear: External ear normal.      Left Ear: External ear normal.      Nose: Nose normal.      Mouth/Throat:      Mouth: Mucous membranes are moist.      Pharynx: Oropharynx is clear.   Eyes:      General:         Right eye: No discharge.         Left eye: No discharge.      Extraocular Movements: Extraocular movements intact.      Conjunctiva/sclera: Conjunctivae normal.      Pupils: Pupils are equal, round, and reactive to light.   Cardiovascular:      Rate and Rhythm: Normal rate and regular rhythm.      Pulses: Normal pulses.      Heart sounds: Normal heart sounds. No murmur heard.  Pulmonary:      Effort: Pulmonary effort is normal. No respiratory distress.      Breath sounds: No wheezing or rales.   Abdominal:      General: There is distension.      Tenderness: There is no abdominal tenderness. There is no guarding.   Musculoskeletal:         General: No swelling. Normal range of motion.      Cervical back: Normal range of motion.      Right lower leg: No edema.      Left lower leg: No edema.   Skin:     General: Skin is warm and dry. R arm with erythema      Coloration: Skin is not jaundiced.   Neurological:      General: No focal deficit present.      Mental Status: He is alert. He is disoriented.      Cranial Nerves: Cranial nerves 2-12 are intact.       Comments: Patient altered  Responding to questions appropriately but confused about timing of events  Poor recall  + fecal incontinence   Psychiatric:         Mood and Affect: Mood normal.         Behavior: Behavior normal.     Vents:  Oxygen Concentration (%): 36 (01/22/23 2040)  Lines/Drains/Airways       Central Venous Catheter Line  Duration             Trialysis (Dialysis) Catheter 01/22/23 1530 right internal jugular 1 day              Drain  Duration                  Urethral Catheter 01/16/23 1347 Latex 14 Fr. 7 days         Rectal Tube 01/22/23 1723 rectal tube w/ balloon (indicate number of mLs) <1 day              Peripheral Intravenous Line  Duration                  Peripheral IV - Single Lumen 01/19/23 1515 18 G;1 3/4 in Right Upper Arm 4 days                  Significant Labs:    CBC/Anemia Profile:  Recent Labs   Lab 01/22/23  0937 01/23/23  0444   WBC 15.25* 13.71*   HGB 9.2* 9.4*   HCT 28.9* 29.1*    164   MCV 98 97   RDW 21.9* 22.3*        Chemistries:  Recent Labs   Lab 01/22/23  0936 01/23/23  0444   * 137  137   K 4.3 4.4  4.4   CL 99 103  104   CO2 12* 19*  20*   BUN 45* 14  14   CREATININE 7.6* 3.0*  2.9*   CALCIUM 8.3* 8.0*  7.9*   ALBUMIN 2.4* 2.5*  2.5*   PROT 6.7 6.6   BILITOT 3.2* 4.0*   ALKPHOS 192* 222*   ALT 43 46*   AST 72* 86*   MG 1.7 1.7  1.7   PHOS 5.3* 2.3*  2.3*       BMP:   Recent Labs   Lab 01/23/23  0444   *  113*     137   K 4.4  4.4     104   CO2 19*  20*   BUN 14  14   CREATININE 3.0*  2.9*   CALCIUM 8.0*  7.9*   MG 1.7  1.7       Significant Imaging:  I have reviewed all pertinent imaging results/findings within the past 24 hours.      ABG  Recent Labs   Lab 01/23/23  1618   PH 7.344*   PO2 35*   PCO2 36.1   HCO3 19.7*   BE -6     Assessment/Plan:     Neuro  * Spinal stenosis  CT Lumbar Spine Without Contrast Result Date: 1/16/2023  1.  Prominent, cystic, multiloculated predominantly gas attenuation structure within the  spinal canal at the level of L3-L4 with dimensions as above.  This is favored to be epidural in location and results in severe narrowing of the spinal canal with mass effect upon the thecal sac.  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 Large disc extrusion at L3-4, contributing to severe spinal canal stenosis, as above. Additional lumbar spondylosis, contributing to moderate spinal canal stenosis at L2-3 and L4-5 and moderate neural foraminal narrowing L4-5 and L5-S1    - neuro checks Q4H  - NSGY consulted, appreciate recommendations  - per Neurosurgery, patient is not currently medically optimized for intervention - suggesting multimodal pain control at this time    Psychiatric  Alcohol abuse  - Vitals q4h while awake  - CIWA monitoring  - Ativan 2mg q4 PRN if 2 criteria are met: Systolic BP>160, Diastolic BP >110 or Pulse >110  - Vitamin supplementation- Thiamine, Folic acid, Vit. B12, and Multivitamin   - S/P valium taper ?      Cardiac/Vascular  A-fib  - Maintain K > 4, Mag > 2 and Ca/iCal WNL to decrease arrhythmogenic potential  - Holding lopressor 100 mg QD,  given recent GI bleed and current AMS  - Holding anticoagulation in the setting of recent GI bleed and current AMS        Renal/  MARTITA (acute kidney injury)  Creatinine 4.7 on admit, baseline around 0.8. Pre-renal vs ATN. Less likely obstruction as pt has gomez.  Renal ultrasound with medical renal disease and no evidence of hydronephrosis.    - Nephrology consulted, appreciate recommendations  - iHD per nephrology  - Strict I&Os and daily weights   - Avoid nephrotoxic agents such as NSAIDs, gadolinium and IV radiocontrast.  - Renally dose meds to current GFR.  - Maintain MAP > 65. Off of levophed  - Started on HRS protocol with midodrine 10mg TID, Octreotide, and Albumin 25g BID. Stopped due to low suspicion and hypertension.      ID  Severe sepsis  This patient  does have evidence of infective focus  My overall impression is septic shock.  Source: Unknown  Antibiotics given-   Antibiotics (From admission, onward)    Start     Stop Route Frequency Ordered    01/23/23 1645  vancomycin (VANCOCIN) 500 mg in dextrose 5 % in water (D5W) 5 % 100 mL IVPB (MB+)         -- IV Once 01/23/23 1534    01/23/23 1630  vancomycin - pharmacy to dose  (vancomycin IVPB)        See [facility] for full Linked Orders Report.    -- IV pharmacy to manage frequency 01/23/23 1531    01/16/23 2100  ceFEPIme (MAXIPIME) 2 g in dextrose 5 % in water (D5W) 5 % 50 mL IVPB (MB+)         -- IV Every 24 hours (non-standard times) 01/16/23 1354        Latest lactate reviewed-  Recent Labs   Lab 01/21/23  1511 01/22/23  0936   LACTATE 1.0 1.0     Organ dysfunction indicated by Acute kidney injury    - On Vancomycin, Cefepime, and Flagyl, Vanc d/c 1/20 - vancomycin restarted given concern for right arm cellulitis  - BCx negative  - SBP work up negative  - Will de-escalate once appropriate         Endocrine  Diabetes  -Last A1c reviewed-   Lab Results   Component Value Date    HGBA1C 5.3 11/10/2022       Home Antihyperglycemic Regiment:  - Metformin  1000 mg BID     Inpatient Antihyperglycemic Regiment:  Antihyperglycemics (From admission, onward)    Start     Stop Route Frequency Ordered    01/16/23 1507  insulin aspart U-100 pen 0-5 Units         -- SubQ Before meals & nightly PRN 01/16/23 1407        - LDSSI  - Clear liquid diet for now     Blood Sugars (AccuCheck):    Recent Labs     01/22/23  0753 01/22/23  1130 01/22/23  2115 01/23/23  0919 01/23/23  1154 01/23/23  1615   POCTGLUCOSE 121* 133* 125* 135* 174* 151*           GI  Alcoholic cirrhosis of liver with ascites  S/p diagnostic paracentesis on 1/17 with , 50% seg. Cytology pending     Elevated liver enzymes  Likely 2/2 to underlying alcohol abuse and hepatic steatosis.     - Daily CMP, PT/INR   - Complete workup up with tylenol level, acute hep  panel, a1-antitrypsin, anti smith antibody, HIV, anti mitochondrial antibody, anca, anti-liver, aFP, PETH pending   - Liver US with doppler shows epatomegaly with hepatic steatosis, cholelithiasis versus gallbladder sludge, small volume ascites, and left pleural effusion.    GI bleed  Concerning for variceal bleed given hx of alcohol abuse.     - GI consulted, recommend clear liquid for now but SLP evaluation pending given AMS  -Transfuse pRBC for Hb < 7 g/dL (Consider a higher Hb target if there is clinical evidence of intravascular volume depletion or comorbidities, such as CAD or if high suspicion of vigorous active ongoing bleeding or an uncorrected coagulopathy exists.).  - s/p vitamin K given Pt/INR > 2   - PPI 80 mg IV bolus once, then IV PPI 40 BID - currently on IV PPI given AMS  -Avoid nonsteroidal agents, antiplatelet agents and anticoagulants if possible in patients without absolute contraindications. (consult managing provider if required for cardiovascular protection).  - EGD showed  Small (< 5 mm) esophageal varices with portal hypertensive gastropathy          Other  Encounter for social work intervention  Patient does not appear to have relatives.  He will need possible decision maker/power of  if his overall mental status does not improve.    - social work consulted for family assessment       Critical Care Daily Checklist:    A: Awake: RASS Goal/Actual Goal: RASS Goal: 0-->alert and calm  Actual: Braxton Agitation Sedation Scale (RASS): Restless   B: Spontaneous Breathing Trial Performed?     C: SAT & SBT Coordinated?  N/A                      D: Delirium: CAM-ICU Overall CAM-ICU: Positive   E: Early Mobility Performed? No   F: Feeding Goal: Goals: Meet % EEN, EPN by RD f/u date  Status: Nutrition Goal Status: new   Current Diet Order   Procedures    Diet clear liquid      AS: Analgesia/Sedation none   T: Thromboembolic Prophylaxis Yes   H: HOB > 300 Yes   U: Stress Ulcer  Prophylaxis (if needed) Yes   G: Glucose Control Yes   B: Bowel Function Stool Occurrence: 0   I: Indwelling Catheter (Lines & Bose) Necessity Yes   D: De-escalation of Antimicrobials/Pharmacotherapies Vancomycin + cefepime    Plan for the day/ETD HD, AMS eval    Code Status:  Family/Goals of Care: Full Code         Critical secondary to Patient has a condition that poses threat to life and bodily function: Acute Renal Failure     Critical care was time spent personally by me on the following activities: development of treatment plan with patient or surrogate and bedside caregivers, discussions with consultants, evaluation of patient's response to treatment, examination of patient, ordering and performing treatments and interventions, ordering and review of laboratory studies, ordering and review of radiographic studies, pulse oximetry, re-evaluation of patient's condition. This critical care time did not overlap with that of any other provider or involve time for any procedures.     Billie Tadeo MD  Internal Medicine, PGY-1  [facility]

## 2023-01-23 NOTE — ASSESSMENT & PLAN NOTE
Creatinine 4.7 on admit, baseline around 0.8. Pre-renal vs ATN. Less likely obstruction as pt has gomez.  Renal ultrasound with medical renal disease and no evidence of hydronephrosis.    - Nephrology consulted, appreciate recommendations  - iHD per nephrology  - Strict I&Os and daily weights   - Avoid nephrotoxic agents such as NSAIDs, gadolinium and IV radiocontrast.  - Renally dose meds to current GFR.  - Maintain MAP > 65. Off of levophed  - Started on HRS protocol with midodrine 10mg TID, Octreotide, and Albumin 25g BID. Stopped due to low suspicion and hypertension.

## 2023-01-23 NOTE — PLAN OF CARE
Recommendations     Diet per MD; Rec'd Regular diet.  TF recommendations: Novasource @ 40 mL/hr to provide 1920 kcals, 87 g of protein, 688 mL fluid.  TPN recommendations: 300g D, 90g AA + IV Lipids to provide 1880 kcals & 90 g of protein (GIR: 1.89).  RD to monitor & follow-up.     Goals: Meet % EEN, EPN by RD f/u date  Nutrition Goal Status: new  Communication of RD Recs: reviewed with RN

## 2023-01-23 NOTE — ASSESSMENT & PLAN NOTE
CT Lumbar Spine Without Contrast Result Date: 1/16/2023  1.  Prominent, cystic, multiloculated predominantly gas attenuation structure within the spinal canal at the level of L3-L4 with dimensions as above.  This is favored to be epidural in location and results in severe narrowing of the spinal canal with mass effect upon the thecal sac.  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 Large disc extrusion at L3-4, contributing to severe spinal canal stenosis, as above. Additional lumbar spondylosis, contributing to moderate spinal canal stenosis at L2-3 and L4-5 and moderate neural foraminal narrowing L4-5 and L5-S1    - neuro checks Q4H  - NSGY consulted, appreciate recommendations  - per Neurosurgery, patient is not currently medically optimized for intervention - suggesting multimodal pain control at this time

## 2023-01-23 NOTE — PT/OT/SLP DISCHARGE
Occupational Therapy Discharge Summary    Jonny Isabel  MRN: [id number]   Principal Problem: Spinal stenosis      Patient Discharged from acute Occupational Therapy on 1/23/2023  .  Please refer to prior OT note dated 1/20/23 for functional status.    Assessment:      Patient appropriate for care in another setting.    Objective:     GOALS:   Multidisciplinary Problems       Occupational Therapy Goals          Problem: Occupational Therapy    Goal Priority Disciplines Outcome Interventions   Occupational Therapy Goal     OT, PT/OT Ongoing, Progressing    Description: Goals to be met by: 14 days 2/1/23     Patient will increase functional independence with ADLs by performing:    Pt to completed self feeding independently.   Pt to complete standing g/h skills with CGA.   Pt to complete UE dressing with set-up  Pt to complete LE dressing with MIN with AE as needed.   Pt to complete toileting with MIN A   Pt to complete t/f to BSC with MIN A                        Reasons for Discontinuation of Therapy Services  Transfer to alternate level of care.      Plan:     Patient Discharged to: pt was t/f to ICU with change of MS and hypotension. Initial OT orders no longer valid and will need new orders prior to continued therapy.     1/23/2023

## 2023-01-23 NOTE — ASSESSMENT & PLAN NOTE
- Maintain K > 4, Mag > 2 and Ca/iCal WNL to decrease arrhythmogenic potential  - Holding lopressor 100 mg QD,  given recent GI bleed and current AMS  - Holding anticoagulation in the setting of recent GI bleed and current AMS

## 2023-01-23 NOTE — ASSESSMENT & PLAN NOTE
-Last A1c reviewed-   Lab Results   Component Value Date    HGBA1C 5.3 11/10/2022       Home Antihyperglycemic Regiment:  - Metformin  1000 mg BID     Inpatient Antihyperglycemic Regiment:  Antihyperglycemics (From admission, onward)    Start     Stop Route Frequency Ordered    01/16/23 1507  insulin aspart U-100 pen 0-5 Units         -- SubQ Before meals & nightly PRN 01/16/23 1407        - LDSSI  - Clear liquid diet for now     Blood Sugars (AccuCheck):    Recent Labs     01/22/23  0753 01/22/23  1130 01/22/23  2115 01/23/23  0919 01/23/23  1154 01/23/23  1615   POCTGLUCOSE 121* 133* 125* 135* 174* 151*

## 2023-01-23 NOTE — ASSESSMENT & PLAN NOTE
Concerning for variceal bleed given hx of alcohol abuse.     - GI consulted, recommend clear liquid for now but SLP evaluation pending given AMS  -Transfuse pRBC for Hb < 7 g/dL (Consider a higher Hb target if there is clinical evidence of intravascular volume depletion or comorbidities, such as CAD or if high suspicion of vigorous active ongoing bleeding or an uncorrected coagulopathy exists.).  - s/p vitamin K given Pt/INR > 2   - PPI 80 mg IV bolus once, then IV PPI 40 BID - currently on IV PPI given AMS  -Avoid nonsteroidal agents, antiplatelet agents and anticoagulants if possible in patients without absolute contraindications. (consult managing provider if required for cardiovascular protection).  - EGD showed  Small (< 5 mm) esophageal varices with portal hypertensive gastropathy

## 2023-01-23 NOTE — PLAN OF CARE
[facility] - Card[facility] Medical [facility]  Discharge Reassessment    Primary Care Provider: Yuli Pérez Mai, MD    Expected Discharge Date: 1/26/2023    Reassessment (most recent)       Discharge Reassessment - 01/23/23 1219          Discharge Reassessment    Assessment Type Discharge Planning Reassessment     Did the patient's condition or plan change since previous assessment? Yes     Discharge Plan discussed with: Spouse/sig other     Name(s) and Number(s) Adelita Elias, significant other/cp# [phone]     Communicated DALILA with patient/caregiver Date not available/Unable to determine     Discharge Plan A Rehab     Discharge Plan B Home Health     DME Needed Upon Discharge  other (see comments)   TBD    Discharge Barriers Identified None     Why the patient remains in the hospital Requires continued medical care        Post-Acute Status    Post-Acute Authorization Placement     Post-Acute Placement Status Pending medical clearance/testing     Coverage Medicaid - [facility]     Discharge Delays None known at this time                   Patient not stable for discharge at this time.   SW will continue to follow patient's progress to discharge from MICU.  SW remains available for any family concerns or needs.    Lynn Smith LMSW  [facility]  [phone]

## 2023-01-23 NOTE — PLAN OF CARE
CMICU DAILY GOALS       A: Awake    RASS: Goal - RASS Goal: 0-->alert and calm  Actual - RASS (Braxton Agitation-Sedation Scale): 1-->restless   Restraint necessity:    B: Breathe   SBT: Not intubated   C: Coordinate A & B, analgesics/sedatives   Pain: managed    SAT: Not intubated  D: Delirium   CAM-ICU: Overall CAM-ICU: Positive  E: Early(intubated/ Progressive (non-intubated) Mobility   MOVE Screen: Pass   Activity: Activity Management: Rolling - L1  FAS: Feeding/Nutrition   Diet order: Diet/Nutrition Received: NPO, Specialty Diet/Nutrition Received: dysphagia mechanically altered  T: Thrombus   DVT prophylaxis: VTE Required Core Measure: Pharmacological prophylaxis initiated/maintained  H: HOB Elevation   Head of Bed (HOB) Positioning: HOB at 30 degrees  U: Ulcer Prophylaxis   GI: yes  G: Glucose control   managed Glycemic Management: blood glucose monitored  S: Skin   Bathing/Skin Care: shaved face  Device Skin Pressure Protection: absorbent pad utilized/changed, adhesive use limited, positioning supports utilized, pressure points protected, skin-to-device areas padded, skin-to-skin areas padded  Pressure Reduction Devices: elbow protectors utilized, foam padding utilized, heel offloading device utilized, positioning supports utilized, pressure-redistributing mattress utilized  Pressure Reduction Techniques: frequent weight shift encouraged, heels elevated off bed, pressure points protected, weight shift assistance provided  Skin Protection: adhesive use limited, incontinence pads utilized, protective footwear used, silicone foam dressing in place, skin-to-device areas padded, skin-to-skin areas padded, transparent dressing maintained, tubing/devices free from skin contact  B: Bowel Function   no issues   I: Indwelling Catheters   Bose necessity:      Urethral Catheter 01/16/23 1347 Latex 14 Fr.-Reason for Continuing Urinary Catheterization: Critically ill in ICU and requiring hourly monitoring of  intake/output   CVC necessity: Yes  D: De-escalation Antibiotics   Yes    Family/Goals of care/Code Status   Code Status: Full Code    24H Vital Sign Range  Temp:  [98.3 °F (36.8 °C)-99.8 °F (37.7 °C)]   Pulse:  [102-121]   Resp:  [14-28]   BP: (120-169)/()   SpO2:  [92 %-97 %]      Shift Events   No acute events throughout shift, HD this afternoon.    VS and assessment per flow sheet, patient progressing towards goals as tolerated, plan of care reviewed with  Jonny Isabel and family , all concerns addressed, will continue to monitor.    Aurelia Briceño

## 2023-01-23 NOTE — ASSESSMENT & PLAN NOTE
63 yo M with PMH of alcoholic hepatitis (drinks a 6 pack of beer per day and bottle of marie), Afib on Xarelto, central obesity, HTN, GI bleed, unknown CKD vs MARTITA on admission, anemia, chronic hyponatremia who presents due to inability to get out of bed. He reports that he has been having fecal incontinence for 2 months now and difficulty walking for the last 4 days (with single person assistance and rolling walker) and inability to get out of bed today. He denies saddle anesthesia or urinary incontinence or issues voiding his bladder. He has also  been having dark and tarry stools.     --Admitted to MICU   -q1 neuro checks  --All labs and diagnostics reviewed   -CT L spine and MRI L spine at OSH showed large L3/L4 herniated disc with moderate to severe stenosis. Some air in disc space.   -MRI L spine wo 1/16: large L3/L4 herniated disc with moderate to severe stenosis  --no acute neurosurgical intervention, patient with clinical findings of subacute to chronic symptoms   -if medically stabilized, will plan for laminectomy/discectomy   --no HOB restrictions   --MAPs >65, requiring pressors  --hold Xarelto, PT and INR elevated on admission // will need Xarelto held for 3-5 days prior to OR and/or coagulapathy corrected  --GI consulted for GI bleed, EGD negative for bleed but showed esophageal varices. Concern for infiltrative liver process on top of alcoholic cirrhosis  --nephro consulted; Cr continues to rise, pt receiving SLED today and stepped back up to MICU  --ok for diet at this time per primary  --MICU/ for all primary medical issues  --NSGY will sign-off and defer all surgical intervention until patient is stable and optimized for surgery with favorable risk profile; please reach out when optimized    Dispo: ongoing per primary team, OR pending medical stabilization

## 2023-01-23 NOTE — ASSESSMENT & PLAN NOTE
Patient does not appear to have relatives.  He will need possible decision maker/power of  if his overall mental status does not improve.    - social work consulted for family assessment

## 2023-01-23 NOTE — ASSESSMENT & PLAN NOTE
This patient does have evidence of infective focus  My overall impression is septic shock.  Source: Unknown  Antibiotics given-   Antibiotics (From admission, onward)    Start     Stop Route Frequency Ordered    01/23/23 1645  vancomycin (VANCOCIN) 500 mg in dextrose 5 % in water (D5W) 5 % 100 mL IVPB (MB+)         -- IV Once 01/23/23 1534    01/23/23 1630  vancomycin - pharmacy to dose  (vancomycin IVPB)        [facility]    -- IV pharmacy to manage frequency 01/23/23 1531    01/16/23 2100  ceFEPIme (MAXIPIME) 2 g in dextrose 5 % in water (D5W) 5 % 50 mL IVPB (MB+)         -- IV Every 24 hours (non-standard times) 01/16/23 1354        Latest lactate reviewed-  Recent Labs   Lab 01/21/23  1511 01/22/23  0936   LACTATE 1.0 1.0     Organ dysfunction indicated by Acute kidney injury    - On Vancomycin, Cefepime, and Flagyl, Vanc d/c 1/20 - vancomycin restarted given concern for right arm cellulitis  - BCx negative  - SBP work up negative  - Will de-escalate once appropriate

## 2023-01-23 NOTE — SUBJECTIVE & OBJECTIVE
Interval History 1/23: Patient stepped intervally stepped back up to unit over the weekend w/ increasing AMS & MARTITA for CRRT. NSGY will defer all surgical intervention until patient is stable and optimized for surgery with favorable risk profile.      Medications:  Continuous Infusions:   sodium chloride 0.9% Stopped (01/23/23 0444)       Scheduled Meds:   ceFEPime (MAXIPIME) IVPB  2 g Intravenous Q24H    folic acid  1 mg Oral Daily    heparin (porcine)  5,000 Units Subcutaneous Q8H    lactulose  200 g Rectal BID    midodrine  10 mg Oral Q8H    multivitamin  1 tablet Oral Daily    pantoprozole (PROTONIX) IV  40 mg Intravenous Q12H    thiamine  100 mg Oral Q8H     PRN Meds:sodium chloride, dextrose 10%, dextrose 10%, glucagon (human recombinant), glucose, glucose, insulin aspart U-100, LIDOcaine-prilocaine, lorazepam, magnesium sulfate IVPB, sodium phosphate IVPB, sodium phosphate IVPB, sodium phosphate IVPB     Review of Systems  Objective:     Weight: 110 kg (242 lb 8.1 oz)  Body mass index is 36.87 kg/m².  Vital Signs (Most Recent):  Temp: 99.5 °F (37.5 °C) (01/23/23 0701)  Pulse: (!) 116 (01/23/23 0800)  Resp: (!) 24 (01/23/23 0800)  BP: 136/82 (01/23/23 0800)  SpO2: 96 % (01/23/23 0800)   Vital Signs (24h Range):  Temp:  [97.9 °F (36.6 °C)-99.5 °F (37.5 °C)] 99.5 °F (37.5 °C)  Pulse:  [] 116  Resp:  [11-27] 24  SpO2:  [92 %-100 %] 96 %  BP: (117-145)/() 136/82     Date 01/23/23 0700 - 01/24/23 0659   Shift 6434-6819 1546-0307 7519-9154 24 Hour Total   INTAKE   I.V.(mL/kg) 2151.5(19.6)   2151.5(19.6)   IV Piggyback 98.9   98.9   Shift Total(mL/kg) 2250.5(20.5)   2250.5(20.5)   OUTPUT   Shift Total(mL/kg)       Weight (kg) 110 110 110 110                Oxygen Concentration (%):  [36] 36         Urethral Catheter 01/16/23 1347 Latex 14 Fr. (Active)   Site Assessment Clean;Intact;Dry 01/17/23 0301   Collection Container Urimeter 01/17/23 0301   Securement Method secured to top of thigh w/ adhesive device  "01/17/23 0301   Catheter Care Performed yes 01/17/23 0301   Reason for Continuing Urinary Catheterization Critically ill in ICU and requiring hourly monitoring of intake/output 01/17/23 0301   CAUTI Prevention Bundle Securement Device in place with 1" slack;Intact seal between catheter & drainage tubing;Drainage bag/urimeter off the floor;Sheeting clip in use;No dependent loops or kinks;Drainage bag/urimeter not overfilled (<2/3 full);Drainage bag/urimeter below bladder 01/17/23 0301   Output (mL) 0 mL 01/16/23 2001       Physical Exam  Neurosurgery Physical Exam  Constitutional:       Appearance: He is well-developed and well-nourished.      Comments: obese   Eyes:      Extraocular Movements: EOM normal.      Conjunctiva/sclera: Conjunctivae normal.      Pupils: Pupils are equal, round, and reactive to light.   Cardiovascular:      Pulses: Normal pulses.   Abdominal:      Palpations: Abdomen is soft.   Neurological:      Mental Status: He is alert and oriented to person, place, and time.       AAOx1, confused, hard to awaken and keep awake  PERRL  CN grossly intact  BUE 4-/5  BLE 1 hip flexion, 2 ke, 3 df/pf/ehl  SILT     Psychiatric:         Mood and Affect: AMS    Significant Labs:  Recent Labs   Lab 01/21/23  1511 01/22/23  0936 01/23/23  0444   * 138* 113*  113*   * 130* 137  137   K 4.2 4.3 4.4  4.4    99 103  104   CO2 15* 12* 19*  20*   BUN 39* 45* 14  14   CREATININE 7.5* 7.6* 3.0*  2.9*   CALCIUM 8.0* 8.3* 8.0*  7.9*   MG  --  1.7 1.7  1.7       Recent Labs   Lab 01/21/23  1211 01/22/23  0937 01/23/23  0444   WBC 13.65* 15.25* 13.71*   HGB 9.1* 9.2* 9.4*   HCT 27.9* 28.9* 29.1*    190 164       Recent Labs   Lab 01/22/23  0937 01/23/23  0444   INR 1.6* 1.5*       Microbiology Results (last 7 days)       Procedure Component Value Units Date/Time    Blood culture [[id number]] Collected: 01/22/23 1052    Order Status: Completed Specimen: Blood from Peripheral, Right Wrist " Updated: 01/22/23 1915     Blood Culture, Routine No Growth to date    Aerobic culture [[id number]] Collected: 01/17/23 0003    Order Status: Completed Specimen: Ascites Updated: 01/20/23 1239     Aerobic Bacterial Culture No growth    Culture, Anaerobic [[id number]] Collected: 01/17/23 0003    Order Status: Completed Specimen: Ascites Updated: 01/19/23 0849     Anaerobic Culture Culture in progress    Gram stain [[id number]] Collected: 01/17/23 0003    Order Status: Completed Specimen: Ascites Updated: 01/17/23 0207     Gram Stain Result Rare WBC's      No organisms seen    Blood culture [[id number]]     Order Status: Canceled Specimen: Blood     Blood culture [[id number]]     Order Status: Canceled Specimen: Blood           ABGs:   Recent Labs   Lab 01/21/23  1510 01/22/23  1034   PH 7.241* 7.244*   PCO2 40.5 37.5   PO2 38* 34*   HCO3 17.4* 16.2*   POCSATURATED 63* 57*   BE -10 -11       Cardiac markers: No results for input(s): CKMB, CPKMB, TROPONINT, TROPONINI, MYOGLOBIN in the last 48 hours.    CMP:   Recent Labs   Lab 01/21/23  1511 01/22/23  0936 01/23/23  0444   * 138* 113*  113*   CALCIUM 8.0* 8.3* 8.0*  7.9*   ALBUMIN 2.4* 2.4* 2.5*  2.5*   PROT 6.4 6.7 6.6   * 130* 137  137   K 4.2 4.3 4.4  4.4   CO2 15* 12* 19*  20*    99 103  104   BUN 39* 45* 14  14   CREATININE 7.5* 7.6* 3.0*  2.9*   ALKPHOS 184* 192* 222*   ALT 41 43 46*   AST 70* 72* 86*   BILITOT 3.1* 3.2* 4.0*       CRP:   No results for input(s): CRP in the last 48 hours.    ESR: No results for input(s): POCESR, ERYTHROCYTES in the last 48 hours.  LFTs:   Recent Labs   Lab 01/21/23  1511 01/22/23  0936 01/23/23  0444   ALT 41 43 46*   AST 70* 72* 86*   ALKPHOS 184* 192* 222*   BILITOT 3.1* 3.2* 4.0*   PROT 6.4 6.7 6.6   ALBUMIN 2.4* 2.4* 2.5*  2.5*       Procalcitonin: No results for input(s): PROCAL in the last 48 hours.    Significant Diagnostics:  I have reviewed all pertinent imaging results/findings within the past 24  hours.

## 2023-01-23 NOTE — PROGRESS NOTES
[facility] - [facility]  Nephrology  Progress Note    Patient Name: Jonny Isabel  MRN: [id number]  Admission Date: 1/16/2023  Hospital Length of Stay: 7 days  Attending Provider: Lance Martin*   Primary Care Physician: Yuli Pérez Mai, MD  Principal Problem:Spinal stenosis    Subjective:     HPI: 62 year old male with a history of alcohol abuse, afib/flutter on xarelto, HTN presents with concern for severe spinal canal stenosis/vacuum disc phenomena, concern for a GI bleed and MARTITA. He presents at behest of his girlfriend after being unable to stand, he has a history of sciatica but reports that this is worse. He has had fecal incontinence for the last week.     He has no report of kidney issues in the past and reports no family history of such either. At time of consultation he is pending an EGD for GI bleed. CT abdomen with large liver and concern for SBO per report, no hydronephrosis. UA with 2+ protein, 1+ occult blood, urine sodium 25 and urine osm 302. On admission serum creatinine 5 (baseline 0.8).      Interval History:  Much improved mentation this AM from previous day, status post SLED treatment     Review of patient's allergies indicates:  No Known Allergies  Current Facility-Administered Medications   Medication Frequency    0.9%  NaCl infusion (for blood administration) Q24H PRN    ceFEPIme (MAXIPIME) 2 g in dextrose 5 % in water (D5W) 5 % 50 mL IVPB (MB+) Q24H    dextrose 10% bolus 125 mL 125 mL PRN    dextrose 10% bolus 250 mL 250 mL PRN    folic acid tablet 1 mg Daily    glucagon (human recombinant) injection 1 mg PRN    glucose chewable tablet 16 g PRN    glucose chewable tablet 24 g PRN    heparin (porcine) injection 5,000 Units Q8H    insulin aspart U-100 pen 0-5 Units QID (AC + HS) PRN    lactulose 20 gram/30 mL solution Soln 30 g QID    LIDOcaine-prilocaine cream PRN    LORazepam injection 1 mg Q4H PRN    metroNIDAZOLE tablet 500 mg Q8H    midodrine tablet 10 mg Q8H     multivitamin tablet Daily    mupirocin 2 % ointment BID    pantoprazole EC tablet 40 mg BID AC    scopolamine 1.3-1.5 mg (1 mg over 3 days) 1 patch Q3 Days    sodium bicarbonate tablet 1,300 mg TID    thiamine tablet 100 mg Q8H       Objective:     Vital Signs (Most Recent):  Temp: 98.1 °F (36.7 °C) (01/22/23 0755)  Pulse: 95 (01/22/23 0755)  Resp: 18 (01/22/23 0755)  BP: 126/63 (01/22/23 0755)  SpO2: (!) 91 % (01/22/23 0755)   Vital Signs (24h Range):  Temp:  [97.5 °F (36.4 °C)-98.7 °F (37.1 °C)] 98.1 °F (36.7 °C)  Pulse:  [] 95  Resp:  [10-29] 18  SpO2:  [91 %-100 %] 91 %  BP: (103-142)/(58-72) 126/63     Weight: 110 kg (242 lb 8.1 oz) (01/18/23 1808)  Body mass index is 36.87 kg/m².  Body surface area is 2.3 meters squared.    I/O last 3 completed shifts:  In: 198.3 [P.O.:50; IV Piggyback:148.3]  Out: 535 [Urine:535]    Physical Exam  Constitutional:       General: He is not in acute distress.     Appearance: He is obese. He is not ill-appearing or toxic-appearing.   HENT:      Head: Normocephalic and atraumatic.      Nose: Nose normal.      Mouth/Throat:      Mouth: Mucous membranes are moist.   Eyes:      General:         Right eye: No discharge.         Left eye: No discharge.      Pupils: Pupils are equal, round, and reactive to light.   Cardiovascular:      Heart sounds: Murmur heard.   Pulmonary:      Comments: tachypneic  Abdominal:      General: There is distension.      Tenderness: There is no abdominal tenderness.   Musculoskeletal:         General: Normal range of motion.      Cervical back: Normal range of motion.      Right lower leg: Edema present.      Left lower leg: Edema present.   Skin:     General: Skin is warm.      Coloration: Skin is pale.      Findings: No lesion.   Neurological:      Mental Status: He is alert. He is disoriented.      Comments: Improved from 1/22       Significant Labs:  All labs within the past 24 hours have been reviewed.     Significant Imaging:  Labs:  Reviewed    Assessment/Plan:     * Spinal stenosis  NSGY deferring surgery for later date    MARTITA (acute kidney injury)  Unsure of etiology at this time  Size of liver suggest other etiology than cirrhosis, however also has esophageal varices, possible HRS. CT and US renal with no signs of obstruction.     Urine microscopy: (1/17) no casts identified, amorphous material (though only 5 cc obtained). Repeat with few granular casts (1/18). Repeat on 1/19 with granular casts. 1/20: moderated (2-3 per field) granular casts with calcium oxylate crystals embedded in casts.     Urine protein 4.59g and repeat with 2.5  C3/4 - 111/35  LILO with ratio of 1.98  Hep B and C negative  Uric acid 13    HIV negative  Iron 31, TIBC 151, Tsat 21, Transferrin 102, ferritin 135  KEATON negative  ANCA negative  -Cryo pending    1/19: repeat Urine sodium 10, urine protein creatinine ratio 0.89. IgA 505. Urine microscopy: moderated (2-3 per field) granular casts with calcium oxylate crystals embedded in casts, overall findings consistent with ATN    Overall impression: appears to be improving with UOP increasing. He did require RRT 1/22 for metabolic encephalopathy and work of breathing    Recommendations:  -will order iHD 4 hours treatment with goal net 3 liter removal  - No need for RRT or lasix for volume removal  - No need for IVF  -Keep MAP > 65  -Keep hemoglobin > 7  -Strict ins and outs  -Avoid nephrotoxic agents if possible  -Avoid drastic hemodynamic changes if possible               Thank you for your consult. I will follow-up with patient. Please contact us if you have any additional questions.    Otoniel Cat MD  Nephrology  [facility] - Cardiac Medical ICU    ATTENDING PHYSICIAN ATTESTATION  I have personally verified the history and examined the patient. I thoroughly reviewed the demographic, clinical, laboratorial and imaging information available in medical records. I agree with the assessment and recommendations provided by  the subspecialty resident who was under my supervision.

## 2023-01-23 NOTE — ASSESSMENT & PLAN NOTE
- Vitals q4h while awake  - CIWA monitoring  - Ativan 2mg q4 PRN if 2 criteria are met: Systolic BP>160, Diastolic BP >110 or Pulse >110  - Vitamin supplementation- Thiamine, Folic acid, Vit. B12, and Multivitamin   - S/P valium taper ?

## 2023-01-23 NOTE — PT/OT/SLP DISCHARGE
Physical Therapy Discharge Summary    Name: Jonny Isabel  MRN: [id number]   Principal Problem: Spinal stenosis     Patient Discharged from acute Physical Therapy on  due to transfer to ICU  with  mentation, somnolent and worsening MARTITA. .  Please refer to prior PT noted date on  for functional status.     Assessment:     Patient has not met goals.    Objective:     GOALS:   Multidisciplinary Problems       Physical Therapy Goals          Problem: Physical Therapy    Goal Priority Disciplines Outcome Goal Variances Interventions   Physical Therapy Goal     PT, PT/OT Ongoing, Progressing     Description: Goals to be met by: 2/15/23     Patient will increase functional independence with mobility by performin. Supine to sit with MInimal Assistance-not met  2. Rolling to Right with Minimal Assistance. -not met  3. Sit to stand transfer with Minimal Assistance-not met  4. Bed to chair transfer with Moderate Assistance using AD if needed -not met   5. Gait  x 30 feet with Minimal Assistance using AD if needed. -not met  6. Sitting at edge of bed x10 minutes with Supervision -not met                         Reasons for Discontinuation of Therapy Services  Transfer to alternate level of care.      Plan:     Patient Discharged to:  to ICU, new orders will be needed when medically stable.  .      2023

## 2023-01-23 NOTE — SUBJECTIVE & OBJECTIVE
Interval History: 1/22: pt appears encephalopathic, poor effort on exam. Increasing WBC, Cr.     Medications:  Continuous Infusions:   sodium chloride 0.9% 200 mL/hr at 01/22/23 1852     Scheduled Meds:   ceFEPime (MAXIPIME) IVPB  2 g Intravenous Q24H    folic acid  1 mg Oral Daily    heparin (porcine)  5,000 Units Subcutaneous Q8H    lactulose  200 g Rectal BID    midodrine  10 mg Oral Q8H    multivitamin  1 tablet Oral Daily    pantoprozole (PROTONIX) IV  40 mg Intravenous Q12H    thiamine  100 mg Oral Q8H     PRN Meds:sodium chloride, dextrose 10%, dextrose 10%, glucagon (human recombinant), glucose, glucose, insulin aspart U-100, LIDOcaine-prilocaine, lorazepam, magnesium sulfate IVPB, sodium phosphate IVPB, sodium phosphate IVPB, sodium phosphate IVPB     Review of Systems  Unable to obtain due to AMS  Objective:     Weight: 110 kg (242 lb 8.1 oz)  Body mass index is 36.87 kg/m².  Vital Signs (Most Recent):  Temp: 98.3 °F (36.8 °C) (01/22/23 1905)  Pulse: (!) 118 (01/22/23 2040)  Resp: (!) 22 (01/22/23 2040)  BP: 133/75 (01/22/23 1905)  SpO2: 95 % (01/22/23 2040)   Vital Signs (24h Range):  Temp:  [97.5 °F (36.4 °C)-98.7 °F (37.1 °C)] 98.3 °F (36.8 °C)  Pulse:  [] 118  Resp:  [11-24] 22  SpO2:  [91 %-100 %] 95 %  BP: (117-142)/(62-80) 133/75     Date 01/22/23 0700 - 01/23/23 0659   Shift 2317-6790 3762-7754 7014-9328 24 Hour Total   INTAKE   Shift Total(mL/kg)       OUTPUT   Urine(mL/kg/hr) 250(0.3) 250  500   Drains 50 400  450   Other  501  501   Stool  500  500   Shift Total(mL/kg) 300(2.7) 1651(15)  1951(17.7)   Weight (kg) 110 110 110 110              Oxygen Concentration (%):  [36] 36         Open Drain 01/17/23 0800 Left;Anterior LLQ (Active)   Site Description Healing 01/22/23 1300   Dressing Status Clean;Dry;Intact 01/22/23 1905   Drainage Bright red 01/22/23 1905   Status Unclamped 01/22/23 1905   Output (mL) 400 mL 01/22/23 1800            Urethral Catheter 01/16/23 1347 Latex 14 Fr. (Active)  "  Site Assessment Clean;Intact 01/22/23 1905   Collection Container Urimeter 01/22/23 1905   Securement Method secured to top of thigh w/ adhesive device 01/22/23 1905   Catheter Care Performed yes 01/22/23 1300   Reason for Continuing Urinary Catheterization Critically ill in ICU and requiring hourly monitoring of intake/output 01/22/23 1905   CAUTI Prevention Bundle Securement Device in place with 1" slack;Intact seal between catheter & drainage tubing;Drainage bag/urimeter off the floor;Sheeting clip in use;No dependent loops or kinks 01/22/23 1905   Output (mL) 250 mL 01/22/23 1500            Fecal Incontinence  01/22/23 1723 (Active)   $ Fecal Management Supplies Fecal Management System (Supply) 01/22/23 1723   Application fecal incontinence  in place 01/22/23 1905   Drainage Method attached to drainage bag 01/22/23 1905   Securement secured to leg with leg strap 01/22/23 1905   Skin no breakdown 01/22/23 1905   Tolerance no signs/symptoms of discomfort 01/22/23 1905   Stool (mL) 500 mL 01/22/23 1800       Trialysis (Dialysis) Catheter 01/22/23 1530 right internal jugular (Active)   Line Necessity Review CRRT/HD 01/22/23 1905   Verification by X-ray Yes 01/22/23 1905   Site Assessment No drainage;No redness;No swelling;No warmth 01/22/23 1905   Line Securement Device Secured with sutures 01/22/23 1905   Dressing Type Biopatch in place 01/22/23 1905   Dressing Status Clean;Dry;Intact 01/22/23 1905   Dressing Intervention Integrity maintained 01/22/23 1905   Date on Dressing 01/22/23 01/22/23 1905   Dressing Due to be Changed 01/29/23 01/22/23 1905   Venous Patency/Care infusing 01/22/23 1905   Arterial Patency/Care infusing 01/22/23 1905   Distal Patency/Care normal saline locked 01/22/23 1905   Flows Good 01/22/23 1905   Waveform Not being transduced 01/22/23 1905       Physical Exam    Neurosurgery Physical Exam    Constitutional:       Appearance: He is well-developed and well-nourished.     "  Comments: obese   Eyes:      Extraocular Movements: EOM normal.      Conjunctiva/sclera: Conjunctivae normal.      Pupils: Pupils are equal, round, and reactive to light.   Cardiovascular:      Pulses: Normal pulses.   Abdominal:      Palpations: Abdomen is soft, distended   Neurological:      Mental Status: He is alert and oriented to person, place, not oriented to time        AAOx2, 3 with choices, confused  PERRL  CN grossly intact  BUE D 2/5, otherwise 3/5  BLE 2 HF/KE, 3+ DF/PF, limited effort    Significant Labs:  Recent Labs   Lab 01/21/23  0314 01/21/23  1511 01/22/23  0936   * 124* 138*   * 133* 130*   K 4.1 4.2 4.3   CL 99 100 99   CO2 15* 15* 12*   BUN 39* 39* 45*   CREATININE 7.3* 7.5* 7.6*   CALCIUM 7.7* 8.0* 8.3*   MG 1.6  --  1.7     Recent Labs   Lab 01/21/23  0314 01/21/23  1211 01/22/23  0937   WBC 13.74* 13.65* 15.25*   HGB 8.8* 9.1* 9.2*   HCT 27.5* 27.9* 28.9*    184 190     Recent Labs   Lab 01/21/23  0314 01/22/23  0937   INR 1.7* 1.6*     Microbiology Results (last 7 days)       Procedure Component Value Units Date/Time    Blood culture [[id number]] Collected: 01/22/23 1052    Order Status: Completed Specimen: Blood from Peripheral, Right Wrist Updated: 01/22/23 1915     Blood Culture, Routine No Growth to date    Aerobic culture [[id number]] Collected: 01/17/23 0003    Order Status: Completed Specimen: Ascites Updated: 01/20/23 1239     Aerobic Bacterial Culture No growth    Culture, Anaerobic [[id number]] Collected: 01/17/23 0003    Order Status: Completed Specimen: Ascites Updated: 01/19/23 0849     Anaerobic Culture Culture in progress    Gram stain [[id number]] Collected: 01/17/23 0003    Order Status: Completed Specimen: Ascites Updated: 01/17/23 0207     Gram Stain Result Rare WBC's      No organisms seen    Blood culture [[id number]]     Order Status: Canceled Specimen: Blood     Blood culture [[id number]]     Order Status: Canceled Specimen: Blood           All  pertinent labs from the last 24 hours have been reviewed.    Significant Diagnostics:  CT: No results found in the last 24 hours.  I have reviewed all pertinent imaging results/findings within the past 24 hours.

## 2023-01-23 NOTE — PLAN OF CARE
[facility] DAILY GOALS       A: Awake    RASS: Goal -    Actual - RASS (Braxton Agitation-Sedation Scale): 1-->restless   Restraint necessity:    B: Breathe   SBT: Not intubated   C: Coordinate A & B, analgesics/sedatives   Pain: managed    SAT: Not intubated  D: Delirium   CAM-ICU: Overall CAM-ICU: Positive  E: Early(intubated/ Progressive (non-intubated) Mobility   MOVE Screen: Fail   Activity: Activity Management: Rolling - L1  FAS: Feeding/Nutrition   Diet order: Diet/Nutrition Received: NPO, Specialty Diet/Nutrition Received: dysphagia mechanically altered  T: Thrombus   DVT prophylaxis: VTE Required Core Measure: Pharmacological prophylaxis initiated/maintained  H: HOB Elevation   Head of Bed (HOB) Positioning: HOB at 20-30 degrees  U: Ulcer Prophylaxis   GI: yes  G: Glucose control   managed Glycemic Management: blood glucose monitored  S: Skin   Bathing/Skin Care: bath, partial, dressed/undressed, linen changed, incontinence care  Device Skin Pressure Protection: absorbent pad utilized/changed  Pressure Reduction Devices: specialty bed utilized  Pressure Reduction Techniques: weight shift assistance provided  Skin Protection: protective footwear used  B: Bowel Function   no issues   I: Indwelling Catheters   Bose necessity:      Urethral Catheter 01/16/23 1347 Latex 14 Fr.-Reason for Continuing Urinary Catheterization: Critically ill in ICU and requiring hourly monitoring of intake/output   CVC necessity: No  D: De-escalation Antibiotics   Yes    Family/Goals of care/Code Status   Code Status: Full Code    24H Vital Sign Range  Temp:  [97.9 °F (36.6 °C)-98.7 °F (37.1 °C)]   Pulse:  []   Resp:  [11-27]   BP: (117-140)/(61-80)   SpO2:  [91 %-100 %]      Shift Events   Management of comfort and pain, 10 hours of crrt completed and tolerated well    VS and assessment per flow sheet, patient progressing towards goals as tolerated, plan of care reviewed with family, all concerns addressed, will continue to  monitor.    [facility] Ernie

## 2023-01-23 NOTE — ASSESSMENT & PLAN NOTE
61 yo M with PMH of alcoholic hepatitis (drinks a 6 pack of beer per day and bottle of marie), Afib on Xarelto, central obesity, HTN, GI bleed, unknown CKD vs MARTITA on admission, anemia, chronic hyponatremia who presents due to inability to get out of bed. He reports that he has been having fecal incontinence for 2 months now and difficulty walking for the last 4 days (with single person assistance and rolling walker) and inability to get out of bed today. He denies saddle anesthesia or urinary incontinence or issues voiding his bladder. He has also  been having dark and tarry stools.     --Admitted to MICU   -q4 neuro checks  --All labs and diagnostics reviewed   -CT L spine and MRI L spine at OSH showed large L3/L4 herniated disc with moderate to severe stenosis. Some air in disc space.   -MRI L spine wo 1/16: large L3/L4 herniated disc with moderate to severe stenosis  --no emergent neurosurgical intervention, patient with clinical findings of subacute to chronic symptoms   -if medically stabilized, will plan for laminectomy/discectomy    -OR tentatively scheduled for Monday 1/30, pending medical stability and ongoing risk vs benefit discussion. Pt will be high risk for surgery due to his medical co-morbidities, in particular cirrhosis with baseline elevated INR.  --Continue to hold systemic AC prior to possible surgery. Okay for SQH, hold Sunday evening. PT/INR elevated but improved since admission, continue to trend and optimize.   --no HOB restrictions from NSGY standpoint  --GIB: GI consulted for GI bleed, EGD negative for bleed but showed esophageal varices. Concern for infiltrative liver process on top of alcoholic cirrhosis  --MARTITA: Nephrology consulted, Cr improving, iHD per Nephrology   --ok for diet at this time per primary  --MICU/ for all primary medical issues  --NSGY will continue to follow peripherally, will plan to see pt on Sunday to reassess whether surgery would be beneficial and of greater  benefit over risk at this time. Please contact us with any questions/concerns.    Dispo: ongoing per primary team, OR pending medical stabilization

## 2023-01-23 NOTE — PROGRESS NOTES
[facility]  Neurosurgery  Progress Note    Subjective:     History of Present Illness: 61 yo M with PMH of alcoholic hepatitis (drinks a 6 pack of beer per day and bottle of marie), Afib on Xarelto, central obesity, HTN, GI bleed, unknown CKD vs MARTITA on admission, anemia, chronic hyponatremia who presents due to inability to get out of bed. He reports that he has been having fecal incontinence for 2 months now and difficulty walking for the last 4 days (with single person assistance and rolling walker) and inability to get out of bed today. He denies saddle anesthesia or urinary incontinence or issues voiding his bladder. He has also  been having dark and tarry stools.     CT L spine and MRI L spine at OSH showed large L3/L4 herniated disc with moderate to severe stenosis. Patient transported to [facility] for possible neurosurgical intervention.       Post-Op Info:  Procedure(s) (LRB):  EGD (ESOPHAGOGASTRODUODENOSCOPY) (N/A)   5 Days Post-Op     Interval History: 1/22: pt appears encephalopathic, poor effort on exam. Increasing WBC, Cr.     Medications:  Continuous Infusions:   sodium chloride 0.9% 200 mL/hr at 01/22/23 1852     Scheduled Meds:   ceFEPime (MAXIPIME) IVPB  2 g Intravenous Q24H    folic acid  1 mg Oral Daily    heparin (porcine)  5,000 Units Subcutaneous Q8H    lactulose  200 g Rectal BID    midodrine  10 mg Oral Q8H    multivitamin  1 tablet Oral Daily    pantoprozole (PROTONIX) IV  40 mg Intravenous Q12H    thiamine  100 mg Oral Q8H     PRN Meds:sodium chloride, dextrose 10%, dextrose 10%, glucagon (human recombinant), glucose, glucose, insulin aspart U-100, LIDOcaine-prilocaine, lorazepam, magnesium sulfate IVPB, sodium phosphate IVPB, sodium phosphate IVPB, sodium phosphate IVPB     Review of Systems  Unable to obtain due to AMS  Objective:     Weight: 110 kg (242 lb 8.1 oz)  Body mass index is 36.87 kg/m².  Vital Signs (Most Recent):  Temp: 98.3 °F (36.8 °C) (01/22/23  "1905)  Pulse: (!) 118 (01/22/23 2040)  Resp: (!) 22 (01/22/23 2040)  BP: 133/75 (01/22/23 1905)  SpO2: 95 % (01/22/23 2040)   Vital Signs (24h Range):  Temp:  [97.5 °F (36.4 °C)-98.7 °F (37.1 °C)] 98.3 °F (36.8 °C)  Pulse:  [] 118  Resp:  [11-24] 22  SpO2:  [91 %-100 %] 95 %  BP: (117-142)/(62-80) 133/75     Date 01/22/23 0700 - 01/23/23 0659   Shift 7398-8771 4776-7488 4850-4681 24 Hour Total   INTAKE   Shift Total(mL/kg)       OUTPUT   Urine(mL/kg/hr) 250(0.3) 250  500   Drains 50 400  450   Other  501  501   Stool  500  500   Shift Total(mL/kg) 300(2.7) 1651(15)  1951(17.7)   Weight (kg) 110 110 110 110              Oxygen Concentration (%):  [36] 36         Open Drain 01/17/23 0800 Left;Anterior LLQ (Active)   Site Description Healing 01/22/23 1300   Dressing Status Clean;Dry;Intact 01/22/23 1905   Drainage Bright red 01/22/23 1905   Status Unclamped 01/22/23 1905   Output (mL) 400 mL 01/22/23 1800            Urethral Catheter 01/16/23 1347 Latex 14 Fr. (Active)   Site Assessment Clean;Intact 01/22/23 1905   Collection Container Urimeter 01/22/23 1905   Securement Method secured to top of thigh w/ adhesive device 01/22/23 1905   Catheter Care Performed yes 01/22/23 1300   Reason for Continuing Urinary Catheterization Critically ill in ICU and requiring hourly monitoring of intake/output 01/22/23 1905   CAUTI Prevention Bundle Securement Device in place with 1" slack;Intact seal between catheter & drainage tubing;Drainage bag/urimeter off the floor;Sheeting clip in use;No dependent loops or kinks 01/22/23 1905   Output (mL) 250 mL 01/22/23 1500            Fecal Incontinence  01/22/23 1723 (Active)   $ Fecal Management Supplies Fecal Management System (Supply) 01/22/23 1723   Application fecal incontinence  in place 01/22/23 1905   Drainage Method attached to drainage bag 01/22/23 1905   Securement secured to leg with leg strap 01/22/23 1905   Skin no breakdown 01/22/23 1905   Tolerance no " signs/symptoms of discomfort 01/22/23 1905   Stool (mL) 500 mL 01/22/23 1800       Trialysis (Dialysis) Catheter 01/22/23 1530 right internal jugular (Active)   Line Necessity Review CRRT/HD 01/22/23 1905   Verification by X-ray Yes 01/22/23 1905   Site Assessment No drainage;No redness;No swelling;No warmth 01/22/23 1905   Line Securement Device Secured with sutures 01/22/23 1905   Dressing Type Biopatch in place 01/22/23 1905   Dressing Status Clean;Dry;Intact 01/22/23 1905   Dressing Intervention Integrity maintained 01/22/23 1905   Date on Dressing 01/22/23 01/22/23 1905   Dressing Due to be Changed 01/29/23 01/22/23 1905   Venous Patency/Care infusing 01/22/23 1905   Arterial Patency/Care infusing 01/22/23 1905   Distal Patency/Care normal saline locked 01/22/23 1905   Flows Good 01/22/23 1905   Waveform Not being transduced 01/22/23 1905       Physical Exam    Neurosurgery Physical Exam    Constitutional:       Appearance: He is well-developed and well-nourished.      Comments: obese   Eyes:      Extraocular Movements: EOM normal.      Conjunctiva/sclera: Conjunctivae normal.      Pupils: Pupils are equal, round, and reactive to light.   Cardiovascular:      Pulses: Normal pulses.   Abdominal:      Palpations: Abdomen is soft, distended   Neurological:      Mental Status: He is alert and oriented to person, place, not oriented to time        AAOx2, 3 with choices, confused  PERRL  CN grossly intact  BUE D 2/5, otherwise 3/5  BLE 2 HF/KE, 3+ DF/PF, limited effort    Significant Labs:  Recent Labs   Lab 01/21/23  0314 01/21/23  1511 01/22/23  0936   * 124* 138*   * 133* 130*   K 4.1 4.2 4.3   CL 99 100 99   CO2 15* 15* 12*   BUN 39* 39* 45*   CREATININE 7.3* 7.5* 7.6*   CALCIUM 7.7* 8.0* 8.3*   MG 1.6  --  1.7     Recent Labs   Lab 01/21/23  0314 01/21/23  1211 01/22/23  0937   WBC 13.74* 13.65* 15.25*   HGB 8.8* 9.1* 9.2*   HCT 27.5* 27.9* 28.9*    184 190     Recent Labs   Lab  01/21/23  0314 01/22/23  0937   INR 1.7* 1.6*     Microbiology Results (last 7 days)       Procedure Component Value Units Date/Time    Blood culture [[id number]] Collected: 01/22/23 1052    Order Status: Completed Specimen: Blood from Peripheral, Right Wrist Updated: 01/22/23 1915     Blood Culture, Routine No Growth to date    Aerobic culture [[id number]] Collected: 01/17/23 0003    Order Status: Completed Specimen: Ascites Updated: 01/20/23 1239     Aerobic Bacterial Culture No growth    Culture, Anaerobic [[id number]] Collected: 01/17/23 0003    Order Status: Completed Specimen: Ascites Updated: 01/19/23 0849     Anaerobic Culture Culture in progress    Gram stain [[id number]] Collected: 01/17/23 0003    Order Status: Completed Specimen: Ascites Updated: 01/17/23 0207     Gram Stain Result Rare WBC's      No organisms seen    Blood culture [[id number]]     Order Status: Canceled Specimen: Blood     Blood culture [[id number]]     Order Status: Canceled Specimen: Blood           All pertinent labs from the last 24 hours have been reviewed.    Significant Diagnostics:  CT: No results found in the last 24 hours.  I have reviewed all pertinent imaging results/findings within the past 24 hours.    Assessment/Plan:     Lumbar herniated disc  61 yo M with PMH of alcoholic hepatitis (drinks a 6 pack of beer per day and bottle of marie), Afib on Xarelto, central obesity, HTN, GI bleed, unknown CKD vs MARTITA on admission, anemia, chronic hyponatremia who presents due to inability to get out of bed. He reports that he has been having fecal incontinence for 2 months now and difficulty walking for the last 4 days (with single person assistance and rolling walker) and inability to get out of bed today. He denies saddle anesthesia or urinary incontinence or issues voiding his bladder. He has also  been having dark and tarry stools.     1/22 pt still with ongoing medical issues, stepped back up to MICU, getting CRRT. will delay surgery  in light of high risk stratification    --Admitted to MICU   -q1 neuro checks  --All labs and diagnostics reviewed   -CT L spine and MRI L spine at OSH showed large L3/L4 herniated disc with moderate to severe stenosis. Some air in disc space.   MRI L spine wo 1/16: large L3/L4 herniated disc with moderate to severe stenosis  - no acute neurosurgical intervention, patient with clinical findings of subacute to chronic symptoms   -if medically stabilized, will plan for laminectomy/discectomy   - no HOB restrictions   - MAPs >65, requiring pressors  - hold Xarelto, PT and INR elevated on admission // will need Xarelto held for 3-5 days prior to OR and/or coagulapathy corrected  - GI consulted for GI bleed, EGD negative for bleed but showed esophageal varices. Concern for infiltrative liver process on top of alcoholic cirrhosis  - nephro consulted; Cr continues to rise, pt receiving SLED today and stepped back up to MICU  - ok for diet at this time per primary  - MICU/ for all primary medical issues  - nsgy will continue to follow    Dispo: ongoing, OR pending medical stabilization         Harriett Lion MD  Neurosurgery  [facility] - [facility]

## 2023-01-23 NOTE — PROGRESS NOTES
Pharmacokinetic Initial Assessment: IV Vancomycin    Assessment/Plan:    Initiate intravenous vancomycin with loading dose of 500 mg once with subsequent doses when random concentrations are less than 20 mcg/mL  Desired empiric serum trough concentration is 10 to 20 mcg/mL  Draw vancomycin random level on 1/24 at 0300.  Pharmacy will continue to follow and monitor vancomycin.      Please contact pharmacy at extension [phone] with any questions regarding this assessment.     Thank you for the consult,   Earline Hernandez       Patient brief summary:  Jonny Isabel is a 62 y.o. male initiated on antimicrobial therapy with IV Vancomycin for treatment of suspected skin & soft tissue infection    Drug Allergies:   Review of patient's allergies indicates:  No Known Allergies    Actual Body Weight:   110 kg    Renal Function:   Estimated Creatinine Clearance: 32.2 mL/min (A) (based on SCr of 2.9 mg/dL (H)).,     Dialysis Method (if applicable):  intermittent HD    CBC (last 72 hours):  Recent Labs   Lab Result Units 01/20/23  1948 01/21/23  0314 01/21/23  1211 01/22/23  0937 01/23/23  0444   WBC K/uL 14.55* 13.74* 13.65* 15.25* 13.71*   Hemoglobin g/dL 9.5* 8.8* 9.1* 9.2* 9.4*   Hematocrit % 29.5* 27.5* 27.9* 28.9* 29.1*   Platelets K/uL 178 174 184 190 164   Gran % % 76.8* 73.2* 73.6* 71.2 72.1   Lymph % % 7.0* 8.5* 7.6* 7.9* 6.6*   Mono % % 13.3 15.1* 14.9 16.0* 17.4*   Eosinophil % % 1.0 1.0 0.8 0.7 0.6   Basophil % % 0.7 0.7 0.9 1.0 1.1   Differential Method  Automated Automated Automated Automated Automated       Metabolic Panel (last 72 hours):  Recent Labs   Lab Result Units 01/21/23  0314 01/21/23  1511 01/22/23  0936 01/23/23  0444   Sodium mmol/L 130* 133* 130* 137  137   Potassium mmol/L 4.1 4.2 4.3 4.4  4.4   Chloride mmol/L 99 100 99 103  104   CO2 mmol/L 15* 15* 12* 19*  20*   Glucose mg/dL 121* 124* 138* 113*  113*   BUN mg/dL 39* 39* 45* 14  14   Creatinine mg/dL 7.3* 7.5* 7.6* 3.0*  2.9*   Albumin g/dL 2.4*  2.4* 2.4* 2.5*  2.5*   Total Bilirubin mg/dL 2.8* 3.1* 3.2* 4.0*   Alkaline Phosphatase U/L 170* 184* 192* 222*   AST U/L 65* 70* 72* 86*   ALT U/L 36 41 43 46*   Magnesium mg/dL 1.6  --  1.7 1.7  1.7   Phosphorus mg/dL 5.3*  --  5.3* 2.3*  2.3*       Drug levels (last 3 results):  Recent Labs   Lab Result Units 01/23/23  1152   Vancomycin, Random ug/mL 10.8       Microbiologic Results:  Microbiology Results (last 7 days)       Procedure Component Value Units Date/Time    Blood culture [[id number]] Collected: 01/22/23 1052    Order Status: Completed Specimen: Blood from Peripheral, Right Wrist Updated: 01/23/23 1212     Blood Culture, Routine No Growth to date      No Growth to date    Culture, Anaerobic [[id number]] Collected: 01/17/23 0003    Order Status: Completed Specimen: Ascites Updated: 01/23/23 1046     Anaerobic Culture Culture in progress    Aerobic culture [[id number]] Collected: 01/17/23 0003    Order Status: Completed Specimen: Ascites Updated: 01/20/23 1239     Aerobic Bacterial Culture No growth    Gram stain [[id number]] Collected: 01/17/23 0003    Order Status: Completed Specimen: Ascites Updated: 01/17/23 0207     Gram Stain Result Rare WBC's      No organisms seen

## 2023-01-23 NOTE — PROGRESS NOTES
[facility]  Neurosurgery  Progress Note    Subjective:     History of Present Illness: 61 yo M with PMH of alcoholic hepatitis (drinks a 6 pack of beer per day and bottle of marie), Afib on Xarelto, central obesity, HTN, GI bleed, unknown CKD vs MARTITA on admission, anemia, chronic hyponatremia who presents due to inability to get out of bed. He reports that he has been having fecal incontinence for 2 months now and difficulty walking for the last 4 days (with single person assistance and rolling walker) and inability to get out of bed today. He denies saddle anesthesia or urinary incontinence or issues voiding his bladder. He has also  been having dark and tarry stools.     CT L spine and MRI L spine at OSH showed large L3/L4 herniated disc with moderate to severe stenosis. Patient [place] [facility] [place] possible [place] intervention.       Post-Op Info:  Procedure(s) (LRB):  EGD (ESOPHAGOGASTRODUODENOSCOPY) (N/A)   6 Days Post-Op     Interval History 1/23: Patient stepped intervally stepped back up to unit over the weekend w/ increasing AMS & MARTITA for CRRT. NSGY will defer all surgical intervention until patient is stable and optimized for surgery with favorable risk profile.      Medications:  Continuous Infusions:   sodium chloride 0.9% Stopped (01/23/23 5905)       Scheduled Meds:   ceFEPime (MAXIPIME) IVPB  2 g Intravenous Q24H    folic acid  1 mg Oral Daily    heparin (porcine)  5,000 Units Subcutaneous Q8H    lactulose  200 g Rectal BID    midodrine  10 mg Oral Q8H    multivitamin  1 tablet Oral Daily    pantoprozole (PROTONIX) IV  40 mg Intravenous Q12H    thiamine  100 mg Oral Q8H     PRN Meds:sodium chloride, dextrose 10%, dextrose 10%, glucagon (human recombinant), glucose, glucose, insulin aspart U-100, LIDOcaine-prilocaine, lorazepam, magnesium sulfate IVPB, sodium phosphate IVPB, sodium phosphate IVPB, sodium phosphate IVPB     Review of Systems  Objective:     Weight: 110 kg  "(242 lb 8.1 oz)  Body mass index is 36.87 kg/m².  Vital Signs (Most Recent):  Temp: 99.5 °F (37.5 °C) (01/23/23 0701)  Pulse: (!) 116 (01/23/23 0800)  Resp: (!) 24 (01/23/23 0800)  BP: 136/82 (01/23/23 0800)  SpO2: 96 % (01/23/23 0800)   Vital Signs (24h Range):  Temp:  [97.9 °F (36.6 °C)-99.5 °F (37.5 °C)] 99.5 °F (37.5 °C)  Pulse:  [] 116  Resp:  [11-27] 24  SpO2:  [92 %-100 %] 96 %  BP: (117-145)/() 136/82     Date 01/23/23 0700 - 01/24/23 0659   Shift 9035-4586 3916-3189 7453-1409 24 Hour Total   INTAKE   I.V.(mL/kg) 2151.5(19.6)   2151.5(19.6)   IV Piggyback 98.9   98.9   Shift Total(mL/kg) 2250.5(20.5)   2250.5(20.5)   OUTPUT   Shift Total(mL/kg)       Weight (kg) 110 110 110 110                Oxygen Concentration (%):  [36] 36         Urethral Catheter 01/16/23 1347 Latex 14 Fr. (Active)   Site Assessment Clean;Intact;Dry 01/17/23 0301   Collection Container Urimeter 01/17/23 0301   Securement Method secured to top of thigh w/ adhesive device 01/17/23 0301   Catheter Care Performed yes 01/17/23 0301   Reason for Continuing Urinary Catheterization Critically ill in ICU and requiring hourly monitoring of intake/output 01/17/23 0301   CAUTI Prevention Bundle Securement Device in place with 1" slack;Intact seal between catheter & drainage tubing;Drainage bag/urimeter off the floor;Sheeting clip in use;No dependent loops or kinks;Drainage bag/urimeter not overfilled (<2/3 full);Drainage bag/urimeter below bladder 01/17/23 0301   Output (mL) 0 mL 01/16/23 2001       Physical Exam  Neurosurgery Physical Exam  Constitutional:       Appearance: He is well-developed and well-nourished.      Comments: obese   Eyes:      Extraocular Movements: EOM normal.      Conjunctiva/sclera: Conjunctivae normal.      Pupils: Pupils are equal, round, and reactive to light.   Cardiovascular:      Pulses: Normal pulses.   Abdominal:      Palpations: Abdomen is soft.   Neurological:      Mental Status: He is alert and " oriented to person, place, and time.       AAOx1, confused, hard to awaken and keep awake  PERRL  CN grossly intact  BUE 4-/5  BLE 1 hip flexion, 2 ke, 3 df/pf/ehl  SILT     Psychiatric:         Mood and Affect: AMS    Significant Labs:  Recent Labs   Lab 01/21/23  1511 01/22/23  0936 01/23/23  0444   * 138* 113*  113*   * 130* 137  137   K 4.2 4.3 4.4  4.4    99 103  104   CO2 15* 12* 19*  20*   BUN 39* 45* 14  14   CREATININE 7.5* 7.6* 3.0*  2.9*   CALCIUM 8.0* 8.3* 8.0*  7.9*   MG  --  1.7 1.7  1.7       Recent Labs   Lab 01/21/23  1211 01/22/23  0937 01/23/23  0444   WBC 13.65* 15.25* 13.71*   HGB 9.1* 9.2* 9.4*   HCT 27.9* 28.9* 29.1*    190 164       Recent Labs   Lab 01/22/23  0937 01/23/23  0444   INR 1.6* 1.5*       Microbiology Results (last 7 days)       Procedure Component Value Units Date/Time    Blood culture [[id number]] Collected: 01/22/23 1052    Order Status: Completed Specimen: Blood from Peripheral, Right Wrist Updated: 01/22/23 1915     Blood Culture, Routine No Growth to date    Aerobic culture [[id number]] Collected: 01/17/23 0003    Order Status: Completed Specimen: Ascites Updated: 01/20/23 1239     Aerobic Bacterial Culture No growth    Culture, Anaerobic [[id number]] Collected: 01/17/23 0003    Order Status: Completed Specimen: Ascites Updated: 01/19/23 0849     Anaerobic Culture Culture in progress    Gram stain [[id number]] Collected: 01/17/23 0003    Order Status: Completed Specimen: Ascites Updated: 01/17/23 0207     Gram Stain Result Rare WBC's      No organisms seen    Blood culture [[id number]]     Order Status: Canceled Specimen: Blood     Blood culture [[id number]]     Order Status: Canceled Specimen: Blood           ABGs:   Recent Labs   Lab 01/21/23  1510 01/22/23  1034   PH 7.241* 7.244*   PCO2 40.5 37.5   PO2 38* 34*   HCO3 17.4* 16.2*   POCSATURATED 63* 57*   BE -10 -11       Cardiac markers: No results for input(s): CKMB, CPKMB, TROPONINT,  TROPONINI, MYOGLOBIN in the last 48 hours.    CMP:   Recent Labs   Lab 01/21/23  1511 01/22/23  0936 01/23/23  0444   * 138* 113*  113*   CALCIUM 8.0* 8.3* 8.0*  7.9*   ALBUMIN 2.4* 2.4* 2.5*  2.5*   PROT 6.4 6.7 6.6   * 130* 137  137   K 4.2 4.3 4.4  4.4   CO2 15* 12* 19*  20*    99 103  104   BUN 39* 45* 14  14   CREATININE 7.5* 7.6* 3.0*  2.9*   ALKPHOS 184* 192* 222*   ALT 41 43 46*   AST 70* 72* 86*   BILITOT 3.1* 3.2* 4.0*       CRP:   No results for input(s): CRP in the last 48 hours.    ESR: No results for input(s): POCESR, ERYTHROCYTES in the last 48 hours.  LFTs:   Recent Labs   Lab 01/21/23  1511 01/22/23  0936 01/23/23  0444   ALT 41 43 46*   AST 70* 72* 86*   ALKPHOS 184* 192* 222*   BILITOT 3.1* 3.2* 4.0*   PROT 6.4 6.7 6.6   ALBUMIN 2.4* 2.4* 2.5*  2.5*       Procalcitonin: No results for input(s): PROCAL in the last 48 hours.    Significant Diagnostics:  I have reviewed all pertinent imaging results/findings within the past 24 hours.    Assessment/Plan:     Lumbar herniated disc  63 yo M with PMH of alcoholic hepatitis (drinks a 6 pack of beer per day and bottle of marie), Afib on Xarelto, central obesity, HTN, GI bleed, unknown CKD vs MARTITA on admission, anemia, chronic hyponatremia who presents due to inability to get out of bed. He reports that he has been having fecal incontinence for 2 months now and difficulty walking for the last 4 days (with single person assistance and rolling walker) and inability to get out of bed today. He denies saddle anesthesia or urinary incontinence or issues voiding his bladder. He has also  been having dark and tarry stools.     --Admitted to MICU   -q4 neuro checks   -Stable for TTF to   --All labs and diagnostics reviewed   -CT L spine and MRI L spine at OSH showed large L3/L4 herniated disc with moderate to severe stenosis. Some air in disc space.   -MRI L spine wo 1/16: large L3/L4 herniated disc with moderate to severe  stenosis  --no acute neurosurgical intervention, patient with clinical findings of subacute to chronic symptoms   -if medically stabilized, will plan for laminectomy/discectomy   --no HOB restrictions   --MAPs >65, requiring pressors  --hold Xarelto, PT and INR elevated on admission // will need Xarelto held for 3-5 days prior to OR and/or coagulapathy corrected  --GI consulted for GI bleed, EGD negative for bleed but showed esophageal varices. Concern for infiltrative liver process on top of alcoholic cirrhosis  --nephro consulted; Cr continues to rise, pt receiving SLED today and stepped back up to MICU  --ok for diet at this time per primary  --MICU/HM for all primary medical issues  --NSGY will sign-off and defer all surgical intervention until patient is stable and optimized for surgery with favorable risk profile; please reach out when optimized    Dispo: ongoing per primary team, OR pending medical stabilization         Tim Renae MD  Neurosurgery  [facility]

## 2023-01-23 NOTE — ASSESSMENT & PLAN NOTE
Unsure of etiology at this time  Size of liver suggest other etiology than cirrhosis, however also has esophageal varices, possible HRS. CT and US renal with no signs of obstruction.     Urine microscopy: (1/17) no casts identified, amorphous material (though only 5 cc obtained). Repeat with few granular casts (1/18). Repeat on 1/19 with granular casts. 1/20: moderated (2-3 per field) granular casts with calcium oxylate crystals embedded in casts.     Urine protein 4.59g and repeat with 2.5  C3/4 - 111/35  LILO with ratio of 1.98  Hep B and C negative  Uric acid 13    HIV negative  Iron 31, TIBC 151, Tsat 21, Transferrin 102, ferritin 135  KEATON negative  ANCA negative  -Cryo pending    1/19: repeat Urine sodium 10, urine protein creatinine ratio 0.89. IgA 505. Urine microscopy: moderated (2-3 per field) granular casts with calcium oxylate crystals embedded in casts, overall findings consistent with ATN    Overall impression: appears to be improving with UOP increasing. He did require RRT 1/22 for metabolic encephalopathy and work of breathing    Recommendations:  -will order iHD 4 hours treatment with goal net 3 liter removal  - No need for RRT or lasix for volume removal  - No need for IVF  -Keep MAP > 65  -Keep hemoglobin > 7  -Strict ins and outs  -Avoid nephrotoxic agents if possible  -Avoid drastic hemodynamic changes if possible

## 2023-01-23 NOTE — PROGRESS NOTES
Pt unable to hold lactulose enema per flexiseal. Remains restless and only oriented to person. Unable to take PO meds at this time.

## 2023-01-23 NOTE — PROGRESS NOTES
"[facility] - [facility]  Adult Nutrition  Progress Note    SUMMARY       Recommendations    Diet per MD; Rec'd Regular diet.  TF recommendations: Novasource @ 40 mL/hr to provide 1920 kcals, 87 g of protein, 688 mL fluid.  TPN recommendations: 300g D, 90g AA + IV Lipids to provide 1880 kcals & 90 g of protein (GIR: 1.89).  RD to monitor & follow-up.    Goals: Meet % EEN, EPN by RD f/u date  Nutrition Goal Status: new  Communication of RD Recs: reviewed with RN    Assessment and Plan    Nutrition Problem:  Inadequate energy intake    Related to (etiology):   Inability to consume sufficient energy     Signs and Symptoms (as evidenced by):   NPO/CLD x 5 days     Interventions(treatment strategy):  Collabartion of nutrition care w/ other providers  ADAT vs TF vs TPN    Nutrition Diagnosis Status:   New    Reason for Assessment    Reason For Assessment: NPO/clear liquids x 5 days  Diagnosis: other (see comments) (Spinal stenosis)  Relevant Medical History: Etoh abuse, HTN  Interdisciplinary Rounds: attended    General Information Comments: NPO/Clear liquid diet x 5 days - pt w/ poor energy intake, per RN documentation. Unsure of energy intake PTA/UBW (no wt hx in chart). Appears nourished, at risk for acute malnutrition. Will monitor. Pt pending spinal surgery - 6 days s/p EGD. SLED initiated over the weekend.  Nutrition Discharge Planning: Pending clinical course    Nutrition/Diet History    Spiritual, Cultural Beliefs, Protestant Practices, Values that Affect Care: no  Factors Affecting Nutritional Intake: clear liquid diet    Anthropometrics    Temp: 99.3 °F (37.4 °C)  Height: 5' 8" (172.7 cm)  Height (inches): 68 in  Weight Method: Bed Scale  Weight: 110 kg (242 lb 8.1 oz)  Weight (lb): 242.51 lb  Ideal Body Weight (IBW), Male: 154 lb  % Ideal Body Weight, Male (lb): 157.47 %  BMI (Calculated): 36.9  BMI Grade: 35 - 39.9 - obesity - grade II    Lab/Procedures/Meds    Pertinent Labs Reviewed: " reviewed  Pertinent Labs Comments: Creat 2.9, GFR 23.9  Pertinent Medications Reviewed: reviewed  Pertinent Medications Comments: Folic acid, Thiamine, Lactulose    Estimated/Assessed Needs    Weight Used For Calorie Calculations: 110 kg (242 lb 8.1 oz)    Energy Calorie Requirements (kcal): 1880 kcal/d  Energy Need Method: [place]-St Jeor (1.0 PAL)    Protein Requirements: 88-99 g/d (.8-.9 g/kg)  Weight Used For Protein Calculations: 110 kg (242 lb 8.1 oz)    Estimated Fluid Requirement Method: other (see comments) (Per MD or 1 mL/kcal)  RDA Method (mL): 1880    Nutrition Prescription Ordered    Current Diet Order: Clear liquids    Evaluation of Received Nutrient/Fluid Intake    I/O: +3L since admit    Comments: LBM: 1/22    Nutrition Risk    Level of Risk/Frequency of Follow-up:  (1x/week)     Monitor and Evaluation    Food and Nutrient Intake: food and beverage intake, energy intake, enteral nutrition intake, parenteral nutrition intake  Food and Nutrient Adminstration: diet order, enteral and parenteral nutrition administration  Physical Activity and Function: nutrition-related ADLs and IADLs  Anthropometric Measurements: weight, weight change  Biochemical Data, Medical Tests and Procedures: inflammatory profile, lipid profile, glucose/endocrine profile, gastrointestinal profile, electrolyte and renal panel  Nutrition-Focused Physical Findings: overall appearance     Nutrition Follow-Up    RD Follow-up?: Yes

## 2023-01-23 NOTE — ASSESSMENT & PLAN NOTE
63 yo M with PMH of alcoholic hepatitis (drinks a 6 pack of beer per day and bottle of marie), Afib on Xarelto, central obesity, HTN, GI bleed, unknown CKD vs MARTITA on admission, anemia, chronic hyponatremia who presents due to inability to get out of bed. He reports that he has been having fecal incontinence for 2 months now and difficulty walking for the last 4 days (with single person assistance and rolling walker) and inability to get out of bed today. He denies saddle anesthesia or urinary incontinence or issues voiding his bladder. He has also  been having dark and tarry stools.     1/22 pt still with ongoing medical issues, stepped back up to MICU, getting CRRT. will delay surgery in light of high risk stratification    --Admitted to MICU   -q1 neuro checks  --All labs and diagnostics reviewed   -CT L spine and MRI L spine at OSH showed large L3/L4 herniated disc with moderate to severe stenosis. Some air in disc space.   MRI L spine wo 1/16: large L3/L4 herniated disc with moderate to severe stenosis  - no acute neurosurgical intervention, patient with clinical findings of subacute to chronic symptoms   -if medically stabilized, will plan for laminectomy/discectomy   - no HOB restrictions   - MAPs >65, requiring pressors  - hold Xarelto, PT and INR elevated on admission // will need Xarelto held for 3-5 days prior to OR and/or coagulapathy corrected  - GI consulted for GI bleed, EGD negative for bleed but showed esophageal varices. Concern for infiltrative liver process on top of alcoholic cirrhosis  - nephro consulted; Cr continues to rise, pt receiving SLED today and stepped back up to MICU  - ok for diet at this time per primary  - MICU/ for all primary medical issues  - nsgy will continue to follow    Dispo: ongoing, OR pending medical stabilization

## 2023-01-24 LAB
ALBUMIN SERPL BCP-MCNC: 2.4 G/DL (ref 3.5–5.2)
ALP SERPL-CCNC: 223 U/L (ref 55–135)
ALT SERPL W/O P-5'-P-CCNC: 45 U/L (ref 10–44)
ANION GAP SERPL CALC-SCNC: 15 MMOL/L (ref 8–16)
ANISOCYTOSIS BLD QL SMEAR: SLIGHT
AST SERPL-CCNC: 95 U/L (ref 10–40)
BACTERIA SPEC ANAEROBE CULT: NORMAL
BASOPHILS # BLD AUTO: 0.12 K/UL (ref 0–0.2)
BASOPHILS NFR BLD: 0.8 % (ref 0–1.9)
BILIRUB SERPL-MCNC: 4.1 MG/DL (ref 0.1–1)
BUN SERPL-MCNC: 13 MG/DL (ref 8–23)
CALCIUM SERPL-MCNC: 8.1 MG/DL (ref 8.7–10.5)
CHLORIDE SERPL-SCNC: 102 MMOL/L (ref 95–110)
CO2 SERPL-SCNC: 20 MMOL/L (ref 23–29)
CREAT SERPL-MCNC: 2.6 MG/DL (ref 0.5–1.4)
CREAT UR-MCNC: 242 MG/DL (ref 23–375)
DIFFERENTIAL METHOD: ABNORMAL
EOSINOPHIL # BLD AUTO: 0.1 K/UL (ref 0–0.5)
EOSINOPHIL NFR BLD: 0.4 % (ref 0–8)
ERYTHROCYTE [DISTWIDTH] IN BLOOD BY AUTOMATED COUNT: 22.7 % (ref 11.5–14.5)
EST. GFR  (NO RACE VARIABLE): 27.2 ML/MIN/1.73 M^2
GLUCOSE SERPL-MCNC: 125 MG/DL (ref 70–110)
HCT VFR BLD AUTO: 28 % (ref 40–54)
HGB BLD-MCNC: 9.1 G/DL (ref 14–18)
HYPOCHROMIA BLD QL SMEAR: ABNORMAL
IMM GRANULOCYTES # BLD AUTO: 0.35 K/UL (ref 0–0.04)
IMM GRANULOCYTES NFR BLD AUTO: 2.4 % (ref 0–0.5)
INR PPP: 1.4 (ref 0.8–1.2)
LYMPHOCYTES # BLD AUTO: 0.9 K/UL (ref 1–4.8)
LYMPHOCYTES NFR BLD: 6.3 % (ref 18–48)
MAGNESIUM SERPL-MCNC: 1.9 MG/DL (ref 1.6–2.6)
MCH RBC QN AUTO: 31.4 PG (ref 27–31)
MCHC RBC AUTO-ENTMCNC: 32.5 G/DL (ref 32–36)
MCV RBC AUTO: 97 FL (ref 82–98)
MONOCYTES # BLD AUTO: 2.9 K/UL (ref 0.3–1)
MONOCYTES NFR BLD: 20 % (ref 4–15)
NEUTROPHILS # BLD AUTO: 10.2 K/UL (ref 1.8–7.7)
NEUTROPHILS NFR BLD: 70.1 % (ref 38–73)
NRBC BLD-RTO: 2 /100 WBC
OVALOCYTES BLD QL SMEAR: ABNORMAL
PHOSPHATE SERPL-MCNC: 3.6 MG/DL (ref 2.7–4.5)
PLATELET # BLD AUTO: 153 K/UL (ref 150–450)
PMV BLD AUTO: 11.3 FL (ref 9.2–12.9)
POCT GLUCOSE: 137 MG/DL (ref 70–110)
POCT GLUCOSE: 154 MG/DL (ref 70–110)
POIKILOCYTOSIS BLD QL SMEAR: SLIGHT
POLYCHROMASIA BLD QL SMEAR: ABNORMAL
POTASSIUM SERPL-SCNC: 3.9 MMOL/L (ref 3.5–5.1)
PROT SERPL-MCNC: 6.5 G/DL (ref 6–8.4)
PROT UR-MCNC: 340 MG/DL (ref 0–15)
PROT/CREAT UR: 1.4 MG/G{CREAT} (ref 0–0.2)
PROTHROMBIN TIME: 14.1 SEC (ref 9–12.5)
RBC # BLD AUTO: 2.9 M/UL (ref 4.6–6.2)
SODIUM SERPL-SCNC: 137 MMOL/L (ref 136–145)
SODIUM UR-SCNC: 16 MMOL/L (ref 20–250)
SPHEROCYTES BLD QL SMEAR: ABNORMAL
TARGETS BLD QL SMEAR: ABNORMAL
VANCOMYCIN SERPL-MCNC: 11.1 UG/ML
WBC # BLD AUTO: 14.56 K/UL (ref 3.9–12.7)

## 2023-01-24 PROCEDURE — 99233 PR SUBSEQUENT HOSPITAL CARE,LEVL III: ICD-10-PCS | Mod: ,,, | Performed by: INTERNAL MEDICINE

## 2023-01-24 PROCEDURE — 63600175 PHARM REV CODE 636 W HCPCS: Performed by: STUDENT IN AN ORGANIZED HEALTH CARE EDUCATION/TRAINING PROGRAM

## 2023-01-24 PROCEDURE — 20000000 HC ICU ROOM

## 2023-01-24 PROCEDURE — 83735 ASSAY OF MAGNESIUM: CPT | Performed by: STUDENT IN AN ORGANIZED HEALTH CARE EDUCATION/TRAINING PROGRAM

## 2023-01-24 PROCEDURE — 84300 ASSAY OF URINE SODIUM: CPT | Performed by: STUDENT IN AN ORGANIZED HEALTH CARE EDUCATION/TRAINING PROGRAM

## 2023-01-24 PROCEDURE — 84100 ASSAY OF PHOSPHORUS: CPT | Performed by: STUDENT IN AN ORGANIZED HEALTH CARE EDUCATION/TRAINING PROGRAM

## 2023-01-24 PROCEDURE — 80053 COMPREHEN METABOLIC PANEL: CPT | Performed by: STUDENT IN AN ORGANIZED HEALTH CARE EDUCATION/TRAINING PROGRAM

## 2023-01-24 PROCEDURE — 25000003 PHARM REV CODE 250: Performed by: STUDENT IN AN ORGANIZED HEALTH CARE EDUCATION/TRAINING PROGRAM

## 2023-01-24 PROCEDURE — 63600175 PHARM REV CODE 636 W HCPCS

## 2023-01-24 PROCEDURE — 80202 ASSAY OF VANCOMYCIN: CPT | Performed by: INTERNAL MEDICINE

## 2023-01-24 PROCEDURE — 99900035 HC TECH TIME PER 15 MIN (STAT)

## 2023-01-24 PROCEDURE — 25000003 PHARM REV CODE 250: Performed by: NURSE PRACTITIONER

## 2023-01-24 PROCEDURE — C9113 INJ PANTOPRAZOLE SODIUM, VIA: HCPCS

## 2023-01-24 PROCEDURE — 85610 PROTHROMBIN TIME: CPT | Performed by: STUDENT IN AN ORGANIZED HEALTH CARE EDUCATION/TRAINING PROGRAM

## 2023-01-24 PROCEDURE — 63600175 PHARM REV CODE 636 W HCPCS: Performed by: INTERNAL MEDICINE

## 2023-01-24 PROCEDURE — 94761 N-INVAS EAR/PLS OXIMETRY MLT: CPT

## 2023-01-24 PROCEDURE — 27000221 HC OXYGEN, UP TO 24 HOURS

## 2023-01-24 PROCEDURE — 25000003 PHARM REV CODE 250

## 2023-01-24 PROCEDURE — 99233 SBSQ HOSP IP/OBS HIGH 50: CPT | Mod: ,,, | Performed by: INTERNAL MEDICINE

## 2023-01-24 PROCEDURE — 85025 COMPLETE CBC W/AUTO DIFF WBC: CPT | Performed by: STUDENT IN AN ORGANIZED HEALTH CARE EDUCATION/TRAINING PROGRAM

## 2023-01-24 PROCEDURE — 92610 EVALUATE SWALLOWING FUNCTION: CPT

## 2023-01-24 PROCEDURE — 82570 ASSAY OF URINE CREATININE: CPT | Performed by: STUDENT IN AN ORGANIZED HEALTH CARE EDUCATION/TRAINING PROGRAM

## 2023-01-24 PROCEDURE — 25000003 PHARM REV CODE 250: Performed by: INTERNAL MEDICINE

## 2023-01-24 RX ORDER — MAGNESIUM SULFATE HEPTAHYDRATE 40 MG/ML
INJECTION, SOLUTION INTRAVENOUS
Status: COMPLETED
Start: 2023-01-24 | End: 2023-01-24

## 2023-01-24 RX ORDER — HYDROMORPHONE HYDROCHLORIDE 1 MG/ML
0.2 INJECTION, SOLUTION INTRAMUSCULAR; INTRAVENOUS; SUBCUTANEOUS EVERY 6 HOURS PRN
Status: DISCONTINUED | OUTPATIENT
Start: 2023-01-24 | End: 2023-02-03

## 2023-01-24 RX ORDER — METOPROLOL TARTRATE 1 MG/ML
INJECTION, SOLUTION INTRAVENOUS
Status: COMPLETED
Start: 2023-01-24 | End: 2023-01-24

## 2023-01-24 RX ORDER — LACTULOSE 10 G/15ML
10 SOLUTION ORAL 3 TIMES DAILY
Status: DISCONTINUED | OUTPATIENT
Start: 2023-01-24 | End: 2023-01-31

## 2023-01-24 RX ORDER — POTASSIUM CHLORIDE 7.45 MG/ML
10 INJECTION INTRAVENOUS ONCE
Status: COMPLETED | OUTPATIENT
Start: 2023-01-24 | End: 2023-01-24

## 2023-01-24 RX ORDER — MAGNESIUM SULFATE HEPTAHYDRATE 40 MG/ML
2 INJECTION, SOLUTION INTRAVENOUS ONCE
Status: COMPLETED | OUTPATIENT
Start: 2023-01-24 | End: 2023-01-24

## 2023-01-24 RX ORDER — METOPROLOL TARTRATE 1 MG/ML
5 INJECTION, SOLUTION INTRAVENOUS EVERY 5 MIN PRN
Status: COMPLETED | OUTPATIENT
Start: 2023-01-24 | End: 2023-01-27

## 2023-01-24 RX ADMIN — FUROSEMIDE 160 MG: 10 INJECTION, SOLUTION INTRAMUSCULAR; INTRAVENOUS at 11:01

## 2023-01-24 RX ADMIN — METOROPROLOL TARTRATE 5 MG: 5 INJECTION, SOLUTION INTRAVENOUS at 03:01

## 2023-01-24 RX ADMIN — HYDROMORPHONE HYDROCHLORIDE 0.2 MG: 1 INJECTION, SOLUTION INTRAMUSCULAR; INTRAVENOUS; SUBCUTANEOUS at 12:01

## 2023-01-24 RX ADMIN — HEPARIN SODIUM 5000 UNITS: 5000 INJECTION INTRAVENOUS; SUBCUTANEOUS at 06:01

## 2023-01-24 RX ADMIN — FOLIC ACID 1 MG: 5 INJECTION, SOLUTION INTRAMUSCULAR; INTRAVENOUS; SUBCUTANEOUS at 08:01

## 2023-01-24 RX ADMIN — LACTULOSE 10 G: 20 SOLUTION ORAL at 08:01

## 2023-01-24 RX ADMIN — MAGNESIUM SULFATE HEPTAHYDRATE 2 G: 40 INJECTION, SOLUTION INTRAVENOUS at 03:01

## 2023-01-24 RX ADMIN — AMIODARONE HYDROCHLORIDE 1 MG/MIN: 1.8 INJECTION, SOLUTION INTRAVENOUS at 04:01

## 2023-01-24 RX ADMIN — HEPARIN SODIUM 5000 UNITS: 5000 INJECTION INTRAVENOUS; SUBCUTANEOUS at 10:01

## 2023-01-24 RX ADMIN — THIAMINE HYDROCHLORIDE 100 MG: 100 INJECTION, SOLUTION INTRAMUSCULAR; INTRAVENOUS at 09:01

## 2023-01-24 RX ADMIN — METOROPROLOL TARTRATE 5 MG: 5 INJECTION, SOLUTION INTRAVENOUS at 04:01

## 2023-01-24 RX ADMIN — PANTOPRAZOLE SODIUM 40 MG: 40 INJECTION, POWDER, FOR SOLUTION INTRAVENOUS at 08:01

## 2023-01-24 RX ADMIN — POTASSIUM CHLORIDE 10 MEQ: 7.46 INJECTION, SOLUTION INTRAVENOUS at 06:01

## 2023-01-24 RX ADMIN — HEPARIN SODIUM 5000 UNITS: 5000 INJECTION INTRAVENOUS; SUBCUTANEOUS at 02:01

## 2023-01-24 RX ADMIN — CEFEPIME 2 G: 2 INJECTION, POWDER, FOR SOLUTION INTRAVENOUS at 08:01

## 2023-01-24 RX ADMIN — VANCOMYCIN HYDROCHLORIDE 500 MG: 500 INJECTION, POWDER, LYOPHILIZED, FOR SOLUTION INTRAVENOUS at 10:01

## 2023-01-24 RX ADMIN — LACTULOSE 20 G: 10 SOLUTION ORAL at 08:01

## 2023-01-24 RX ADMIN — AMIODARONE HYDROCHLORIDE 150 MG: 1.5 INJECTION, SOLUTION INTRAVENOUS at 04:01

## 2023-01-24 RX ADMIN — LACTULOSE 10 G: 20 SOLUTION ORAL at 02:01

## 2023-01-24 RX ADMIN — MAGNESIUM SULFATE 2 G: 2 INJECTION INTRAVENOUS at 03:01

## 2023-01-24 RX ADMIN — LORAZEPAM 1 MG: 2 INJECTION INTRAMUSCULAR; INTRAVENOUS at 12:01

## 2023-01-24 NOTE — PT/OT/SLP EVAL
Speech Language Pathology Evaluation  Bedside Swallow    Patient Name:  Jonny Isabel   MRN:  [id number]  Admitting Diagnosis: Spinal stenosis    Recommendations:                 General Recommendations:  Dysphagia therapy  Diet recommendations:  NPO, NPO   Aspiration Precautions: Meds crushed in puree and Strict aspiration precautions   General Precautions: Standard, aspiration, fall  Communication strategies:  none    History:     Past Medical History:   Diagnosis Date    A-fib        Past Surgical History:   Procedure Laterality Date    ESOPHAGOGASTRODUODENOSCOPY N/A 1/17/2023    Procedure: EGD (ESOPHAGOGASTRODUODENOSCOPY);  Surgeon: Dewayne Navas MD;  Location: [address];  Service: Endoscopy;  Laterality: N/A;       Social History: Patient lives with [facility].      Prior diet: david.      Subjective     No family present  Patient goals: david     Pain/Comfort:  Pain Rating 1: 0/10  Pain Rating Post-Intervention 1: 0/10    Respiratory Status: nasal cannula    Objective:     Oral Musculature Evaluation  Oral Musculature: general weakness  Dentition: present and adequate  Secretion Management: adequate  Mucosal Quality: dry  Oral Labial Strength and Mobility: WFL  Lingual Strength and Mobility: impaired strength  Velar Elevation: WFL  Buccal Strength and Mobility: WFL  Volitional Cough: not elicited  Volitional Swallow: not elicit  Voice Prior to PO Intake: wfl    Bedside Swallow Eval:   Consistencies Assessed:  Thin liquids 2 teaspoons of water then pt. Took 6 sips of water via cup with a straw  Puree 4 teaspoons pudding      Oral Phase:   WFL    Pharyngeal Phase:   no overt clinical signs/symptoms of aspiration    Compensatory Strategies  None    Treatment: Pt. Presents with decreased level of alertness requiring frequent cues to maintain level of alertness.  He also required encouragement/cueing to take po trials.  No overt s/s of aspiration noted on this date however recommending npo except meds crushed at  this time to to lethargy.       Assessment:     Jonny Isabel is a 62 y.o. male with an SLP diagnosis of Dysphagia and Cognitive-Linguistic Impairment..    Goals:   Multidisciplinary Problems       SLP Goals          Problem: SLP    Goal Priority Disciplines Outcome   SLP Goal     SLP Ongoing, Progressing   Description: Goals due 1/31  1.  Pt. Will participate in ongoing assessment of swallow at bedside                       Plan:     Patient to be seen:  4 x/week   Plan of Care expires:  02/15/23  Plan of Care reviewed with:  patient   SLP Follow-Up:  Yes       Discharge recommendations:  other (see comments)   Barriers to Discharge:  Level of Skilled Assistance Needed 24 hour    Time Tracking:     SLP Treatment Date:   01/24/23  Speech Start Time:  1005  Speech Stop Time:  1015     Speech Total Time (min):  10 min    Billable Minutes: Eval Swallow and Oral Function 10    01/24/2023

## 2023-01-24 NOTE — ASSESSMENT & PLAN NOTE
-Last A1c reviewed-   Lab Results   Component Value Date    HGBA1C 5.3 11/10/2022       Home Antihyperglycemic Regiment:  - Metformin  1000 mg BID     Inpatient Antihyperglycemic Regiment:  Antihyperglycemics (From admission, onward)    Start     Stop Route Frequency Ordered    01/16/23 1507  insulin aspart U-100 pen 0-5 Units         -- SubQ Before meals & nightly PRN 01/16/23 1407        - LDSSI  - Clear liquid diet for now     Blood Sugars (AccuCheck):    Recent Labs     01/22/23  1130 01/22/23  2115 01/23/23  0919 01/23/23  1154 01/23/23  1615 01/23/23  2235   POCTGLUCOSE 133* 125* 135* 174* 151* 154*

## 2023-01-24 NOTE — PROGRESS NOTES
Pharmacokinetic Assessment Follow Up: IV Vancomycin    Vancomycin serum concentration assessment(s):    The random level was drawn correctly and can be used to guide therapy at this time. The measurement is within the desired definitive target range of 10 to 20 mcg/mL.    Patient with MARTITA, now requiring RRT. Received HD overnight.    Vancomycin Regimen Plan:    Will give 500 mg now    Re-dose when the random level is less than 20 mcg/mL, next level to be drawn at 0300 on 1/25    Drug levels (last 3 results):  Recent Labs   Lab Result Units 01/23/23  1152 01/24/23  0350   Vancomycin, Random ug/mL 10.8 11.1       Pharmacy will continue to follow and monitor vancomycin.    Please contact pharmacy at extension [phone] for questions regarding this assessment.    Thank you for the consult,   Earline Hernandez       Patient brief summary:  Jonny Isabel is a 62 y.o. male initiated on antimicrobial therapy with IV Vancomycin for treatment of skin & soft tissue infection    The patient's current regimen is pulse dose based on level    Drug Allergies:   Review of patient's allergies indicates:  No Known Allergies    Actual Body Weight:   110 kg    Renal Function:   Estimated Creatinine Clearance: 35.9 mL/min (A) (based on SCr of 2.6 mg/dL (H)).,     Dialysis Method (if applicable):  intermittent HD    CBC (last 72 hours):  Recent Labs   Lab Result Units 01/21/23  1211 01/22/23  0937 01/23/23  0444 01/24/23  0350   WBC K/uL 13.65* 15.25* 13.71* 14.56*   Hemoglobin g/dL 9.1* 9.2* 9.4* 9.1*   Hematocrit % 27.9* 28.9* 29.1* 28.0*   Platelets K/uL 184 190 164 153   Gran % % 73.6* 71.2 72.1 70.1   Lymph % % 7.6* 7.9* 6.6* 6.3*   Mono % % 14.9 16.0* 17.4* 20.0*   Eosinophil % % 0.8 0.7 0.6 0.4   Basophil % % 0.9 1.0 1.1 0.8   Differential Method  Automated Automated Automated Automated       Metabolic Panel (last 72 hours):  Recent Labs   Lab Result Units 01/21/23  1511 01/22/23  0936 01/23/23  0444 01/24/23  0350   Sodium mmol/L 133* 130*  137  137 137   Potassium mmol/L 4.2 4.3 4.4  4.4 3.9   Chloride mmol/L 100 99 103  104 102   CO2 mmol/L 15* 12* 19*  20* 20*   Glucose mg/dL 124* 138* 113*  113* 125*   BUN mg/dL 39* 45* 14  14 13   Creatinine mg/dL 7.5* 7.6* 3.0*  2.9* 2.6*   Albumin g/dL 2.4* 2.4* 2.5*  2.5* 2.4*   Total Bilirubin mg/dL 3.1* 3.2* 4.0* 4.1*   Alkaline Phosphatase U/L 184* 192* 222* 223*   AST U/L 70* 72* 86* 95*   ALT U/L 41 43 46* 45*   Magnesium mg/dL  --  1.7 1.7  1.7 1.9   Phosphorus mg/dL  --  5.3* 2.3*  2.3* 3.6       Vancomycin Administrations:  vancomycin given in the last 96 hours                     vancomycin (VANCOCIN) 500 mg in dextrose 5 % in water (D5W) 5 % 100 mL IVPB (MB+) (mg) 500 mg New Bag 01/23/23 1617                    Microbiologic Results:  Microbiology Results (last 7 days)       Procedure Component Value Units Date/Time    Culture, Anaerobic [[id number]] Collected: 01/17/23 0003    Order Status: Completed Specimen: Ascites Updated: 01/24/23 0703     Anaerobic Culture No anaerobes isolated    Blood culture [[id number]] Collected: 01/22/23 1052    Order Status: Completed Specimen: Blood from Peripheral, Right Wrist Updated: 01/23/23 1212     Blood Culture, Routine No Growth to date      No Growth to date    Aerobic culture [[id number]] Collected: 01/17/23 0003    Order Status: Completed Specimen: Ascites Updated: 01/20/23 1239     Aerobic Bacterial Culture No growth

## 2023-01-24 NOTE — PROGRESS NOTES
RN called CT to coordinate timing for STAT head scan for AMS that was placed at 1815. CT stated there were multiple scans in front of pt and we would likely have to wait. Night shift RN to call back to coordinate better timing after shift change. [facility] resident notified.

## 2023-01-24 NOTE — PLAN OF CARE
Recommend that pt. Be npo except for meds crushed in puree bolus.  ST will reasess on 1/25  Problem: SLP  Goal: SLP Goal  Description: Goals due 1/31  1.  Pt. Will participate in ongoing assessment of swallow at bedside  Outcome: Ongoing, Progressing

## 2023-01-24 NOTE — ASSESSMENT & PLAN NOTE
Unsure of etiology at this time  Size of liver suggest other etiology than cirrhosis, however also has esophageal varices, possible HRS. CT and US renal with no signs of obstruction.     Urine microscopy: (1/17) no casts identified, amorphous material (though only 5 cc obtained). Repeat with few granular casts (1/18). Repeat on 1/19 with granular casts. 1/20: moderated (2-3 per field) granular casts with calcium oxylate crystals embedded in casts.     Urine protein 4.59g and repeat with 2.5  C3/4 - 111/35  LILO with ratio of 1.98  Hep B and C negative  Uric acid 13    HIV negative  Iron 31, TIBC 151, Tsat 21, Transferrin 102, ferritin 135  KEATON negative  ANCA negative  -Cryo pending    1/19: repeat Urine sodium 10, urine protein creatinine ratio 0.89. IgA 505. Urine microscopy: moderated (2-3 per field) granular casts with calcium oxylate crystals embedded in casts, overall findings consistent with ATN    Overall impression: appears to be improving with UOP increasing. He did require RRT 1/22 for metabolic encephalopathy and work of breathing    Recommendations:  -trial lasix 120 and monitor response (within 3 hours). If adequate response can schedule lasix 120 q8  -will assess daily for RRT needs  -Keep MAP > 65  -Keep hemoglobin > 7  -Strict ins and outs  -Avoid nephrotoxic agents if possible  -Avoid drastic hemodynamic changes if possible

## 2023-01-24 NOTE — PROGRESS NOTES
01/24/23 0226   Assessments (Pre/Post)   Blood Liters Processed (BLP) 49.3   Pre-Hemodialysis Assessment   Treatment Status Completed   Trialysis (Dialysis) Catheter 01/22/23 1530 right internal jugular   Placement Date/Time: 01/22/23 1530   Hand Hygiene: Performed  Barrier Precautions: Performed  Skin Antisepsis: ChloraPrep  Location: right internal jugular  Insertion attempts (enter comment if more than 2 attempts): 1  Guidewire Removed?: Yes  Guidew...   Line Necessity Review CRRT/HD   Verification by X-ray Yes   Site Assessment No drainage;No redness;No swelling;No warmth   Line Securement Device Secured with sutures   Dressing Type Biopatch in place;Central line dressing   Dressing Status Clean;Dry;Intact   Dressing Intervention Integrity maintained   Date on Dressing 01/23/23   Dressing Due to be Changed 01/30/23   Venous Patency/Care normal saline locked   Arterial Patency/Care normal saline locked   Flows Reversed   Waveform Not being transduced   During Hemodialysis Assessment   Blood Volume Processed (Liters) 49.3 L   UF Removed (mL) 3300 mL   Intra-Hemodialysis Comments Tx completed   Post-Hemodialysis Assessment   Rinseback Volume (mL) 250 mL   Blood Volume Processed (Liters) 49.3 L   Dialyzer Clearance Moderately streaked   Duration of Treatment 180 minutes   Additional Fluid Intake (mL) 800 mL   Total UF (mL) 3300 mL   Net Fluid Removal 2500   Patient Response to Treatment tolerated   Post-Hemodialysis Comments 2.5 L removed

## 2023-01-24 NOTE — NURSING
CMICU DAILY GOALS     Shift Events   Pt went into afib with RVR overnight. MD called to bedside, lopressor x2 given, then amio bolus and gtt started. HD overnight, K and mag replaced.     VS and assessment per flow sheet, patient progressing towards goals as tolerated, plan of care reviewed with  Jonny, all concerns addressed.          A: Awake    RASS: Goal - RASS Goal: 0-->alert and calm  Actual - RASS (Braxton Agitation-Sedation Scale): 1-->restless   Restraint necessity:    B: Breathe   SBT: Not intubated   C: Coordinate A & B, analgesics/sedatives   Pain: managed    SAT: Not intubated  D: Delirium   CAM-ICU: Overall CAM-ICU: Positive  E: Early(intubated/ Progressive (non-intubated) Mobility   MOVE Screen: Pass   Activity: Activity Management: Heel slide - L1, Arm raise - L1  FAS: Feeding/Nutrition   Diet order: Diet/Nutrition Received: NPO, Specialty Diet/Nutrition Received: dysphagia mechanically altered  T: Thrombus   DVT prophylaxis: VTE Required Core Measure: Pharmacological prophylaxis initiated/maintained  H: HOB Elevation   Head of Bed (HOB) Positioning: HOB at 30 degrees  U: Ulcer Prophylaxis   GI: yes  G: Glucose control   managed Glycemic Management: blood glucose monitored  S: Skin   Bathing/Skin Care: electrode patches/site rotation, back care, bath, complete, dressed/undressed  Device Skin Pressure Protection: absorbent pad utilized/changed, adhesive use limited, pressure points protected, positioning supports utilized, skin-to-device areas padded, skin-to-skin areas padded  Pressure Reduction Devices: foam padding utilized, positioning supports utilized, pressure-redistributing mattress utilized  Pressure Reduction Techniques: frequent weight shift encouraged, positioned off wounds, pressure points protected, weight shift assistance provided  Skin Protection: adhesive use limited, incontinence pads utilized, protective footwear used, skin-to-device areas padded, skin-to-skin areas padded,  transparent dressing maintained, tubing/devices free from skin contact  B: Bowel Function   no issues   I: Indwelling Catheters   Bose necessity:      Urethral Catheter 01/16/23 1347 Latex 14 Fr.-Reason for Continuing Urinary Catheterization: Critically ill in ICU and requiring hourly monitoring of intake/output   CVC necessity: Yes  D: De-escalation Antibiotics   No    Family/Goals of care/Code Status   Code Status: Full Code    24H Vital Sign Range  Temp:  [99.3 °F (37.4 °C)-100.8 °F (38.2 °C)]   Pulse:  []   Resp:  [14-28]   BP: (110-174)/(62-99)   SpO2:  [89 %-97 %]

## 2023-01-24 NOTE — SUBJECTIVE & OBJECTIVE
Interval History: much improved mentation after 2 sessions of iHD. Will trial lasix today    Review of patient's allergies indicates:  No Known Allergies  Current Facility-Administered Medications   Medication Frequency    0.9%  NaCl infusion (for blood administration) Q24H PRN    0.9%  NaCl infusion Once    amiodarone 360 mg/200 mL (1.8 mg/mL) infusion Continuous    ceFEPIme (MAXIPIME) 2 g in dextrose 5 % in water (D5W) 5 % 50 mL IVPB (MB+) Q24H    dextrose 10% bolus 125 mL 125 mL PRN    dextrose 10% bolus 250 mL 250 mL PRN    folic acid 1 mg in dextrose 5 % in water (D5W) 5 % 100 mL IVPB Daily    glucagon (human recombinant) injection 1 mg PRN    glucose chewable tablet 16 g PRN    glucose chewable tablet 24 g PRN    heparin (porcine) injection 5,000 Units Q8H    HYDROmorphone injection 0.2 mg Q6H PRN    insulin aspart U-100 pen 0-5 Units QID (AC + HS) PRN    lactulose 20 gram/30 mL solution Soln 10 g TID    LIDOcaine-prilocaine cream PRN    metoprolol injection 5 mg Q5 Min PRN    multivitamin tablet Daily    pantoprazole injection 40 mg BID    thiamine (B-1) 100 mg in dextrose 5 % (D5W) 100 mL IVPB Daily    vancomycin - pharmacy to dose pharmacy to manage frequency       Objective:     Vital Signs (Most Recent):  Temp: 99 °F (37.2 °C) (01/24/23 1145)  Pulse: 88 (01/24/23 1300)  Resp: (!) 27 (01/24/23 1300)  BP: 126/66 (01/24/23 1300)  SpO2: (!) 94 % (01/24/23 1300)   Vital Signs (24h Range):  Temp:  [98.4 °F (36.9 °C)-100.8 °F (38.2 °C)] 99 °F (37.2 °C)  Pulse:  [] 88  Resp:  [14-44] 27  SpO2:  [89 %-96 %] 94 %  BP: (107-174)/(59-99) 126/66     Weight: 110 kg (242 lb 8.1 oz) (01/23/23 1408)  Body mass index is 36.87 kg/m².  Body surface area is 2.3 meters squared.    I/O last 3 completed shifts:  In: 4565.8 [I.V.:2617.7; Other:800; NG/GT:380; IV Piggyback:768.1]  Out: 8357 [Urine:635; Drains:280; Other:6492; Stool:950]    Physical Exam  Constitutional:       General: He is not in acute distress.      Appearance: He is obese. He is not ill-appearing or toxic-appearing.   HENT:      Head: Normocephalic and atraumatic.      Nose: Nose normal.      Mouth/Throat:      Mouth: Mucous membranes are moist.   Eyes:      General:         Right eye: No discharge.         Left eye: No discharge.      Pupils: Pupils are equal, round, and reactive to light.   Cardiovascular:      Heart sounds: Murmur heard.   Pulmonary:      Comments: tachypneic  Abdominal:      General: There is distension.      Tenderness: There is no abdominal tenderness.   Musculoskeletal:         General: Normal range of motion.      Cervical back: Normal range of motion.      Right lower leg: Edema present.      Left lower leg: Edema present.   Skin:     General: Skin is warm.      Coloration: Skin is pale.      Findings: No lesion.   Neurological:      Mental Status: He is alert. He is disoriented.      Comments: Improved from 1/22       Significant Labs:  All labs within the past 24 hours have been reviewed.     Significant Imaging:  Labs: Reviewed

## 2023-01-24 NOTE — PROGRESS NOTES
[facility] - Card[facility] Medical ICU  Critical Care Medicine  Progress Note    Patient Name: Jonny Isabel  MRN: [id number]  Admission Date: 1/16/2023  Hospital Length of Stay: 8 days  Code Status: Full Code  Attending Provider: Lance Martin*  Primary Care Provider: Yuli Pérez Mai, MD   Principal Problem: Spinal stenosis    Subjective:     HPI:  Mr. Fuller is a 62-year-old male with a PMHx of A-fibb (on Eliquis), hypertension, DM 2 (not insulin dependent) presents as transfer from OSH for chronic lower back that has been worsening for the past week. Pt states that the pain is radiating from his right buttock down to his legs. Pt was unable to get out of bed this AM due to the pain. HE endorses having fecal incontinence for the past week with black tarry stools. He denies any fevers, chills, abdominal pain, urinary incontinence, or saddle anesthesia.     At OSH ED CT lumbar spine ordered revealing Prominent, cystic, multiloculated predominantly gas attenuation epidural  structure within the spinal canal at the level of L3-L4 resulting in severe narrowing of the spinal canal with mass effect upon the thecal sac and  vacuum disc phenomena concerning for diskitis or an epidural abscess. MRI shows large disc extrusion at L3-4 causing severe spinal canal stenosis, with moderate spinal canal stenosis at L2-3 and L4-5 and moderate neural foraminal narrowing L4-5 and L5-S1. Patient had no tenderness overlying spinous process but still endorsed sever pain, received percocet and robaxin. Of note, KENNETH showed blood in stool. The patient has a white count of 12.9, hemoglobin of 7.5, creatinine 4.6, Lactate of 3.9. Pt given Rocephin, Zosyn and Vanc.    Pt transferred to [facility] [place] [facility] for further intervention with neurosurgery.       Hospital/ICU Course:  Admitted to MICU for NSGY and GI evaluation . Hgb stable. EGD shows small varices with gastropathy, no active bleeds. Worsening MARTITA with minimal UOP, HRS protocol started with levo,  midodrine, octreotide and albumin trailed, has since been stopped due to low suspicion. Nephrology following. NSGY initially planned surgical intervention for 1/23 but determine patient not medically optimized and currently suggesting multimodal pain control.      Interval History/Significant Events: Pt recived HD without any complications. Overnight went into A-fibb RVR and nonsustained vtach, was given lopressor, loaded with amnio and started on amnio gtt. Mentation seems slightly improved. CT head negative for acute intracranial pathology       Review of Systems   Constitutional:  Positive for activity change. Negative for chills and fever.   HENT:  Negative for congestion and trouble swallowing.    Eyes:  Negative for visual disturbance.   Respiratory:  Negative for cough, shortness of breath, wheezing and stridor.    Cardiovascular:  Negative for chest pain and leg swelling.   Gastrointestinal:  Positive for abdominal distention and blood in stool. Negative for abdominal pain, diarrhea, nausea and vomiting.        +fecal incontinence   Endocrine: Negative for polyuria.   Genitourinary:  Negative for difficulty urinating and dysuria.   Musculoskeletal:  Positive for back pain. Negative for arthralgias and myalgias.   Skin:  Negative for color change and rash.   Neurological:  Negative for dizziness, weakness, numbness and headaches.   Psychiatric/Behavioral:  Positive for confusion. Negative for agitation.    Objective:     Vital Signs (Most Recent):  Temp: 98.4 °F (36.9 °C) (01/24/23 0715)  Pulse: 63 (01/24/23 0751)  Resp: 15 (01/24/23 0751)  BP: 123/74 (01/24/23 0751)  SpO2: 96 % (01/24/23 0751)   Vital Signs (24h Range):  Temp:  [98.4 °F (36.9 °C)-100.8 °F (38.2 °C)] 98.4 °F (36.9 °C)  Pulse:  [] 63  Resp:  [14-28] 15  SpO2:  [89 %-97 %] 96 %  BP: (110-174)/(62-99) 123/74   Weight: 110 kg (242 lb 8.1 oz)  Body mass index is 36.87 kg/m².      Intake/Output Summary (Last 24 hours) at 1/24/2023 0832  Last  data filed at 1/24/2023 0600  Gross per 24 hour   Intake 2065.37 ml   Output 4605 ml   Net -2539.63 ml         Physical Exam  Vitals and nursing note reviewed.   Constitutional:       General: He is not in acute distress.     Appearance: He is obese. He is not ill-appearing.   HENT:      Head: Normocephalic and atraumatic.      Right Ear: External ear normal.      Left Ear: External ear normal.      Nose: Nose normal.      Mouth/Throat:      Mouth: Mucous membranes are moist.      Pharynx: Oropharynx is clear.   Eyes:      General:         Right eye: No discharge.         Left eye: No discharge.      Extraocular Movements: Extraocular movements intact.      Conjunctiva/sclera: Conjunctivae normal.      Pupils: Pupils are equal, round, and reactive to light.   Cardiovascular:      Rate and Rhythm: Normal rate and regular rhythm.      Pulses: Normal pulses.      Heart sounds: Normal heart sounds. No murmur heard.  Pulmonary:      Effort: Pulmonary effort is normal. No respiratory distress.      Breath sounds: No wheezing or rales.   Abdominal:      General: There is distension.      Tenderness: There is no abdominal tenderness. There is no guarding.   Musculoskeletal:         General: No swelling. Normal range of motion.      Cervical back: Normal range of motion.      Right lower leg: No edema.      Left lower leg: No edema.   Skin:     General: Skin is warm and dry.      Coloration: Skin is not jaundiced.   Neurological:      General: No focal deficit present.      Mental Status: He is alert. He is disoriented.      Cranial Nerves: Cranial nerves 2-12 are intact.      Comments: Patient altered  Responding to questions appropriately but confused about timing of events  Poor recall  + fecal incontinence   Psychiatric:         Mood and Affect: Mood normal.         Behavior: Behavior normal.       Vents:  Oxygen Concentration (%): 36 (01/22/23 2040)  Lines/Drains/Airways       Central Venous Catheter Line  Duration              Trialysis (Dialysis) Catheter 01/22/23 1530 right internal jugular 1 day              Drain  Duration                  Open Drain 01/17/23 0800 Left;Anterior LLQ 7 days         Urethral Catheter 01/16/23 1347 Latex 14 Fr. 7 days         Rectal Tube 01/22/23 1723 rectal tube w/ balloon (indicate number of mLs) 1 day              Peripheral Intravenous Line  Duration                  Peripheral IV - Single Lumen 01/19/23 1515 18 G;1 3/4 in Right Upper Arm 4 days                  Significant Labs:    CBC/Anemia Profile:  Recent Labs   Lab 01/22/23  0937 01/23/23  0444 01/24/23  0350   WBC 15.25* 13.71* 14.56*   HGB 9.2* 9.4* 9.1*   HCT 28.9* 29.1* 28.0*    164 153   MCV 98 97 97   RDW 21.9* 22.3* 22.7*          Chemistries:  Recent Labs   Lab 01/22/23  0936 01/23/23  0444 01/24/23  0350   * 137  137 137   K 4.3 4.4  4.4 3.9   CL 99 103  104 102   CO2 12* 19*  20* 20*   BUN 45* 14  14 13   CREATININE 7.6* 3.0*  2.9* 2.6*   CALCIUM 8.3* 8.0*  7.9* 8.1*   ALBUMIN 2.4* 2.5*  2.5* 2.4*   PROT 6.7 6.6 6.5   BILITOT 3.2* 4.0* 4.1*   ALKPHOS 192* 222* 223*   ALT 43 46* 45*   AST 72* 86* 95*   MG 1.7 1.7  1.7 1.9   PHOS 5.3* 2.3*  2.3* 3.6         All pertinent labs within the past 24 hours have been reviewed.    Significant Imaging:  I have reviewed all pertinent imaging results/findings within the past 24 hours.      ABG  Recent Labs   Lab 01/23/23  1618   PH 7.344*   PO2 35*   PCO2 36.1   HCO3 19.7*   BE -6     Assessment/Plan:     Neuro  * Spinal stenosis  CT Lumbar Spine Without Contrast Result Date: 1/16/2023  1.  Prominent, cystic, multiloculated predominantly gas attenuation structure within the spinal canal at the level of L3-L4 with dimensions as above.  This is favored to be epidural in location and results in severe narrowing of the spinal canal with mass effect upon the thecal sac.  MRI Lumbar Spine Without Contrast Result Date: 1/16/2023  Congenital narrowing of lumbar spinal canal  with prominent epidural fat. Large disc extrusion at L3-4, contributing to severe spinal canal stenosis, as above. Additional lumbar spondylosis, contributing to moderate spinal canal stenosis at L2-3 and L4-5 and moderate neural foraminal narrowing L4-5 and L5-S1    - neuro checks Q4H  - NSGY consulted, appreciate recommendations  - per Neurosurgery, patient is not currently medically optimized for intervention - suggesting multimodal pain control at this time    Psychiatric  Alcohol abuse  - Vitals q4h while awake  - CIWA monitoring  - Ativan 2mg q4 PRN if 2 criteria are met: Systolic BP>160, Diastolic BP >110 or Pulse >110  - Vitamin supplementation- Thiamine, Folic acid, Vit. B12, and Multivitamin   - S/P valium taper ?      Cardiac/Vascular  Atrial fibrillation  - Maintain K > 4, Mag > 2 and Ca/iCal WNL to decrease arrhythmogenic potential  - Holding lopressor 100 mg QD,  given recent GI bleed and current AMS  - Holding anticoagulation in the setting of recent GI bleed and current AMS  - Amnio gtt started         Renal/  MARTITA (acute kidney injury)  Creatinine 4.7 on admit, baseline around 0.8. Pre-renal vs ATN. Less likely obstruction as pt has gomez.  Renal ultrasound with medical renal disease and no evidence of hydronephrosis.    - Nephrology consulted, appreciate recommendations  - iHD per nephrology  - Strict I&Os and daily weights   - Avoid nephrotoxic agents such as NSAIDs, gadolinium and IV radiocontrast.  - Renally dose meds to current GFR.  - Maintain MAP > 65. Off of levophed  - Started on HRS protocol with midodrine 10mg TID, Octreotide, and Albumin 25g BID. Stopped due to low suspicion and hypertension.      ID  Severe sepsis  This patient does have evidence of infective focus  My overall impression is septic shock.  Source: Unknown  Antibiotics given-   Antibiotics (From admission, onward)    Start     Stop Route Frequency Ordered    01/24/23 0983  vancomycin (VANCOCIN) 500 mg in dextrose 5 % in  water (D5W) 5 % 100 mL IVPB (MB+)         -- IV Once 01/24/23 0808    01/23/23 1630  vancomycin - pharmacy to dose  (vancomycin IVPB)        See [facility] for full Linked Orders Report.    -- IV pharmacy to manage frequency 01/23/23 1531    01/16/23 2100  ceFEPIme (MAXIPIME) 2 g in dextrose 5 % in water (D5W) 5 % 50 mL IVPB (MB+)         -- IV Every 24 hours (non-standard times) 01/16/23 1354        Latest lactate reviewed-  Recent Labs   Lab 01/21/23  1511 01/22/23  0936   LACTATE 1.0 1.0     Organ dysfunction indicated by Acute kidney injury    - On Vancomycin, Cefepime, and Flagyl, Vanc d/c 1/20 - vancomycin restarted given concern for right arm cellulitis  - BCx negative  - SBP work up negative  - Will de-escalate once appropriate         Endocrine  Diabetes  -Last A1c reviewed-   Lab Results   Component Value Date    HGBA1C 5.3 11/10/2022       Home Antihyperglycemic Regiment:  - Metformin  1000 mg BID     Inpatient Antihyperglycemic Regiment:  Antihyperglycemics (From admission, onward)    Start     Stop Route Frequency Ordered    01/16/23 1507  insulin aspart U-100 pen 0-5 Units         -- SubQ Before meals & nightly PRN 01/16/23 1407        - LDSSI  - Clear liquid diet for now     Blood Sugars (AccuCheck):    Recent Labs     01/22/23  1130 01/22/23  2115 01/23/23  0919 01/23/23  1154 01/23/23  1615 01/23/23  2235   POCTGLUCOSE 133* 125* 135* 174* 151* 154*           GI  Alcoholic cirrhosis of liver with ascites  S/p diagnostic paracentesis on 1/17 with , 50% seg. Cytology pending     - lactulose enema    Elevated liver enzymes  Likely 2/2 to underlying alcohol abuse and hepatic steatosis.     - Daily CMP, PT/INR   - Complete workup up with tylenol level, acute hep panel, a1-antitrypsin, anti smith antibody, HIV, anti mitochondrial antibody, anca, anti-liver, aFP, PETH pending   - Liver US with doppler shows epatomegaly with hepatic steatosis, cholelithiasis versus gallbladder sludge, small volume  ascites, and left pleural effusion.    GI bleed  Concerning for variceal bleed given hx of alcohol abuse.     - GI consulted, recommend clear liquid for now but SLP evaluation pending given AMS  -Transfuse pRBC for Hb < 7 g/dL (Consider a higher Hb target if there is clinical evidence of intravascular volume depletion or comorbidities, such as CAD or if high suspicion of vigorous active ongoing bleeding or an uncorrected coagulopathy exists.).  - s/p vitamin K given Pt/INR > 2   - PPI 80 mg IV bolus once, then IV PPI 40 BID - currently on IV PPI given AMS  -Avoid nonsteroidal agents, antiplatelet agents and anticoagulants if possible in patients without absolute contraindications. (consult managing provider if required for cardiovascular protection).  - EGD showed  Small (< 5 mm) esophageal varices with portal hypertensive gastropathy          Other  Encounter for social work intervention  Patient does not appear to have relatives.  He will need possible decision maker/power of  if his overall mental status does not improve.    - social work consulted for family assessment       Critical Care Daily Checklist:    A: Awake: RASS Goal/Actual Goal: RASS Goal: 0-->alert and calm  Actual: Braxton Agitation Sedation Scale (RASS): Alert and calm   B: Spontaneous Breathing Trial Performed?     C: SAT & SBT Coordinated?  N/A                      D: Delirium: CAM-ICU Overall CAM-ICU: Positive   E: Early Mobility Performed? No   F: Feeding Goal: Goals: Meet % EEN, EPN by RD f/u date  Status: Nutrition Goal Status: new   Current Diet Order   Procedures    Diet clear liquid      AS: Analgesia/Sedation No   T: Thromboembolic Prophylaxis Yes   H: HOB > 300 Yes   U: Stress Ulcer Prophylaxis (if needed) Yes   G: Glucose Control Yes   B: Bowel Function Stool Occurrence: 0   I: Indwelling Catheter (Lines & Bose) Necessity Yes   D: De-escalation of Antimicrobials/Pharmacotherapies Yes    Plan for the day/ETD Continue to  monitor    Code Status:  Family/Goals of Care: Full Code         Critical secondary to Patient has a condition that poses threat to life and bodily function: Acute Renal Failure      Critical care was time spent personally by me on the following activities: development of treatment plan with patient or surrogate and bedside caregivers, discussions with consultants, evaluation of patient's response to treatment, examination of patient, ordering and performing treatments and interventions, ordering and review of laboratory studies, ordering and review of radiographic studies, pulse oximetry, re-evaluation of patient's condition. This critical care time did not overlap with that of any other provider or involve time for any procedures.     Erika Ocasio,   Critical Care Medicine  [facility] - Cardiac Medical ICU

## 2023-01-24 NOTE — SUBJECTIVE & OBJECTIVE
Interval History/Significant Events: Pt recived HD without any complications. Overnight went into A-fibb RVR and nonsustained vtach, was given lopressor, loaded with amnio and started on amnio gtt. Mentation seems slightly improved. CT head negative for acute intracranial pathology       Review of Systems   Constitutional:  Positive for activity change. Negative for chills and fever.   HENT:  Negative for congestion and trouble swallowing.    Eyes:  Negative for visual disturbance.   Respiratory:  Negative for cough, shortness of breath, wheezing and stridor.    Cardiovascular:  Negative for chest pain and leg swelling.   Gastrointestinal:  Positive for abdominal distention and blood in stool. Negative for abdominal pain, diarrhea, nausea and vomiting.        +fecal incontinence   Endocrine: Negative for polyuria.   Genitourinary:  Negative for difficulty urinating and dysuria.   Musculoskeletal:  Positive for back pain. Negative for arthralgias and myalgias.   Skin:  Negative for color change and rash.   Neurological:  Negative for dizziness, weakness, numbness and headaches.   Psychiatric/Behavioral:  Positive for confusion. Negative for agitation.    Objective:     Vital Signs (Most Recent):  Temp: 98.4 °F (36.9 °C) (01/24/23 0715)  Pulse: 63 (01/24/23 0751)  Resp: 15 (01/24/23 0751)  BP: 123/74 (01/24/23 0751)  SpO2: 96 % (01/24/23 0751)   Vital Signs (24h Range):  Temp:  [98.4 °F (36.9 °C)-100.8 °F (38.2 °C)] 98.4 °F (36.9 °C)  Pulse:  [] 63  Resp:  [14-28] 15  SpO2:  [89 %-97 %] 96 %  BP: (110-174)/(62-99) 123/74   Weight: 110 kg (242 lb 8.1 oz)  Body mass index is 36.87 kg/m².      Intake/Output Summary (Last 24 hours) at 1/24/2023 0832  Last data filed at 1/24/2023 0600  Gross per 24 hour   Intake 2065.37 ml   Output 4605 ml   Net -2539.63 ml         Physical Exam  Vitals and nursing note reviewed.   Constitutional:       General: He is not in acute distress.     Appearance: He is obese. He is not  ill-appearing.   HENT:      Head: Normocephalic and atraumatic.      Right Ear: External ear normal.      Left Ear: External ear normal.      Nose: Nose normal.      Mouth/Throat:      Mouth: Mucous membranes are moist.      Pharynx: Oropharynx is clear.   Eyes:      General:         Right eye: No discharge.         Left eye: No discharge.      Extraocular Movements: Extraocular movements intact.      Conjunctiva/sclera: Conjunctivae normal.      Pupils: Pupils are equal, round, and reactive to light.   Cardiovascular:      Rate and Rhythm: Normal rate and regular rhythm.      Pulses: Normal pulses.      Heart sounds: Normal heart sounds. No murmur heard.  Pulmonary:      Effort: Pulmonary effort is normal. No respiratory distress.      Breath sounds: No wheezing or rales.   Abdominal:      General: There is distension.      Tenderness: There is no abdominal tenderness. There is no guarding.   Musculoskeletal:         General: No swelling. Normal range of motion.      Cervical back: Normal range of motion.      Right lower leg: No edema.      Left lower leg: No edema.   Skin:     General: Skin is warm and dry.      Coloration: Skin is not jaundiced.   Neurological:      General: No focal deficit present.      Mental Status: He is alert. He is disoriented.      Cranial Nerves: Cranial nerves 2-12 are intact.      Comments: Patient altered  Responding to questions appropriately but confused about timing of events  Poor recall  + fecal incontinence   Psychiatric:         Mood and Affect: Mood normal.         Behavior: Behavior normal.       Vents:  Oxygen Concentration (%): 36 (01/22/23 2040)  Lines/Drains/Airways       Central Venous Catheter Line  Duration             Trialysis (Dialysis) Catheter 01/22/23 1530 right internal jugular 1 day              Drain  Duration                  Open Drain 01/17/23 0800 Left;Anterior LLQ 7 days         Urethral Catheter 01/16/23 1347 Latex 14 Fr. 7 days         Rectal Tube  01/22/23 1723 rectal tube w/ balloon (indicate number of mLs) 1 day              Peripheral Intravenous Line  Duration                  Peripheral IV - Single Lumen 01/19/23 1515 18 G;1 3/4 in Right Upper Arm 4 days                  Significant Labs:    CBC/Anemia Profile:  Recent Labs   Lab 01/22/23  0937 01/23/23  0444 01/24/23  0350   WBC 15.25* 13.71* 14.56*   HGB 9.2* 9.4* 9.1*   HCT 28.9* 29.1* 28.0*    164 153   MCV 98 97 97   RDW 21.9* 22.3* 22.7*          Chemistries:  Recent Labs   Lab 01/22/23  0936 01/23/23  0444 01/24/23  0350   * 137  137 137   K 4.3 4.4  4.4 3.9   CL 99 103  104 102   CO2 12* 19*  20* 20*   BUN 45* 14  14 13   CREATININE 7.6* 3.0*  2.9* 2.6*   CALCIUM 8.3* 8.0*  7.9* 8.1*   ALBUMIN 2.4* 2.5*  2.5* 2.4*   PROT 6.7 6.6 6.5   BILITOT 3.2* 4.0* 4.1*   ALKPHOS 192* 222* 223*   ALT 43 46* 45*   AST 72* 86* 95*   MG 1.7 1.7  1.7 1.9   PHOS 5.3* 2.3*  2.3* 3.6         All pertinent labs within the past 24 hours have been reviewed.    Significant Imaging:  I have reviewed all pertinent imaging results/findings within the past 24 hours.

## 2023-01-24 NOTE — PROGRESS NOTES
[facility]  Nephrology  Progress Note    Patient Name: Jonny Isabel  MRN: [id number]  Admission Date: 1/16/2023  Hospital Length of Stay: 8 days  Attending Provider: Lance Martin*   Primary Care Physician: Yuli Pérez Mai, MD  Principal Problem:Spinal stenosis    Subjective:     HPI: 62 year old male with a history of alcohol abuse, afib/flutter on xarelto, HTN presents with concern for severe spinal canal stenosis/vacuum disc phenomena, concern for a GI bleed and MARTITA. He presents at behest of his girlfriend after being unable to stand, he has a history of sciatica but reports that this is worse. He has had fecal incontinence for the last week.     He has no report of kidney issues in the past and reports no family history of such either. At time of consultation he is pending an EGD for GI bleed. CT abdomen with large liver and concern for SBO per report, no hydronephrosis. UA with 2+ protein, 1+ occult blood, urine sodium 25 and urine osm 302. On admission serum creatinine 5 (baseline 0.8).      Interval History: much improved mentation after 2 sessions of iHD. Will trial lasix today    Review of patient's allergies indicates:  No Known Allergies  Current Facility-Administered Medications   Medication Frequency    0.9%  NaCl infusion (for blood administration) Q24H PRN    0.9%  NaCl infusion Once    amiodarone 360 mg/200 mL (1.8 mg/mL) infusion Continuous    ceFEPIme (MAXIPIME) 2 g in dextrose 5 % in water (D5W) 5 % 50 mL IVPB (MB+) Q24H    dextrose 10% bolus 125 mL 125 mL PRN    dextrose 10% bolus 250 mL 250 mL PRN    folic acid 1 mg in dextrose 5 % in water (D5W) 5 % 100 mL IVPB Daily    glucagon (human recombinant) injection 1 mg PRN    glucose chewable tablet 16 g PRN    glucose chewable tablet 24 g PRN    heparin (porcine) injection 5,000 Units Q8H    HYDROmorphone injection 0.2 mg Q6H PRN    insulin aspart U-100 pen 0-5 Units QID (AC + HS) PRN    lactulose 20 gram/30 mL solution  Soln 10 g TID    LIDOcaine-prilocaine cream PRN    metoprolol injection 5 mg Q5 Min PRN    multivitamin tablet Daily    pantoprazole injection 40 mg BID    thiamine (B-1) 100 mg in dextrose 5 % (D5W) 100 mL IVPB Daily    vancomycin - pharmacy to dose pharmacy to manage frequency       Objective:     Vital Signs (Most Recent):  Temp: 99 °F (37.2 °C) (01/24/23 1145)  Pulse: 88 (01/24/23 1300)  Resp: (!) 27 (01/24/23 1300)  BP: 126/66 (01/24/23 1300)  SpO2: (!) 94 % (01/24/23 1300)   Vital Signs (24h Range):  Temp:  [98.4 °F (36.9 °C)-100.8 °F (38.2 °C)] 99 °F (37.2 °C)  Pulse:  [] 88  Resp:  [14-44] 27  SpO2:  [89 %-96 %] 94 %  BP: (107-174)/(59-99) 126/66     Weight: 110 kg (242 lb 8.1 oz) (01/23/23 1408)  Body mass index is 36.87 kg/m².  Body surface area is 2.3 meters squared.    I/O last 3 completed shifts:  In: 4565.8 [I.V.:2617.7; Other:800; NG/GT:380; IV Piggyback:768.1]  Out: 8357 [Urine:635; Drains:280; Other:6492; Stool:950]    Physical Exam  Constitutional:       General: He is not in acute distress.     Appearance: He is obese. He is not ill-appearing or toxic-appearing.   HENT:      Head: Normocephalic and atraumatic.      Nose: Nose normal.      Mouth/Throat:      Mouth: Mucous membranes are moist.   Eyes:      General:         Right eye: No discharge.         Left eye: No discharge.      Pupils: Pupils are equal, round, and reactive to light.   Cardiovascular:      Heart sounds: Murmur heard.   Pulmonary:      Comments: tachypneic  Abdominal:      General: There is distension.      Tenderness: There is no abdominal tenderness.   Musculoskeletal:         General: Normal range of motion.      Cervical back: Normal range of motion.      Right lower leg: Edema present.      Left lower leg: Edema present.   Skin:     General: Skin is warm.      Coloration: Skin is pale.      Findings: No lesion.   Neurological:      Mental Status: He is alert. He is disoriented.      Comments: Improved from 1/22        Significant Labs:  All labs within the past 24 hours have been reviewed.     Significant Imaging:  Labs: Reviewed    Assessment/Plan:     * Spinal stenosis  NSGY deferring surgery for later date    MARTITA (acute kidney injury)  Unsure of etiology at this time  Size of liver suggest other etiology than cirrhosis, however also has esophageal varices, possible HRS. CT and US renal with no signs of obstruction.     Urine microscopy: (1/17) no casts identified, amorphous material (though only 5 cc obtained). Repeat with few granular casts (1/18). Repeat on 1/19 with granular casts. 1/20: moderated (2-3 per field) granular casts with calcium oxylate crystals embedded in casts.     Urine protein 4.59g and repeat with 2.5  C3/4 - 111/35  LILO with ratio of 1.98  Hep B and C negative  Uric acid 13    HIV negative  Iron 31, TIBC 151, Tsat 21, Transferrin 102, ferritin 135  KEATON negative  ANCA negative  -Cryo pending    1/19: repeat Urine sodium 10, urine protein creatinine ratio 0.89. IgA 505. Urine microscopy: moderated (2-3 per field) granular casts with calcium oxylate crystals embedded in casts, overall findings consistent with ATN    Overall impression: appears to be improving with UOP increasing. He did require RRT 1/22 for metabolic encephalopathy and work of breathing    Recommendations:  -trial lasix 120 and monitor response (within 3 hours). If adequate response can schedule lasix 120 q8  -will assess daily for RRT needs  -Keep MAP > 65  -Keep hemoglobin > 7  -Strict ins and outs  -Avoid nephrotoxic agents if possible  -Avoid drastic hemodynamic changes if possible               Thank you for your consult. I will follow-up with patient. Please contact us if you have any additional questions.    Otoniel Cat MD  Nephrology  [facility]    ATTENDING PHYSICIAN ATTESTATION  I have personally verified the history and examined the patient. I thoroughly reviewed the demographic, clinical,  laboratorial and imaging information available in medical records. I agree with the assessment and recommendations provided by the subspecialty resident who was under my supervision.

## 2023-01-24 NOTE — ASSESSMENT & PLAN NOTE
This patient does have evidence of infective focus  My overall impression is septic shock.  Source: Unknown  Antibiotics given-   Antibiotics (From admission, onward)    Start     Stop Route Frequency Ordered    01/24/23 0915  vancomycin (VANCOCIN) 500 mg in dextrose 5 % in water (D5W) 5 % 100 mL IVPB (MB+)         -- IV Once 01/24/23 0808    01/23/23 1630  vancomycin - pharmacy to dose  (vancomycin IVPB)        [facility].    -- IV pharmacy to manage frequency 01/23/23 1531    01/16/23 2100  ceFEPIme (MAXIPIME) 2 g in dextrose 5 % in water (D5W) 5 % 50 mL IVPB (MB+)         -- IV Every 24 hours (non-standard times) 01/16/23 1354        Latest lactate reviewed-  Recent Labs   Lab 01/21/23  1511 01/22/23  0936   LACTATE 1.0 1.0     Organ dysfunction indicated by Acute kidney injury    - On Vancomycin, Cefepime, and Flagyl, Vanc d/c 1/20 - vancomycin restarted given concern for right arm cellulitis  - BCx negative  - SBP work up negative  - Will de-escalate once appropriate

## 2023-01-24 NOTE — ASSESSMENT & PLAN NOTE
- Maintain K > 4, Mag > 2 and Ca/iCal WNL to decrease arrhythmogenic potential  - Holding lopressor 100 mg QD,  given recent GI bleed and current AMS  - Holding anticoagulation in the setting of recent GI bleed and current AMS  - Amnio gtt started

## 2023-01-24 NOTE — PROGRESS NOTES
01/23/23 2323   Vital Signs   Pulse 105   Resp 17   SpO2 (!) 92 %   BP (!) 169/84   Pre-Hemodialysis Assessment   Treatment Status Started   Gross Bleach Negative Yes   Machine Number [id number]   Alarms Verified Yes   pH 7   Machine Temperature 98.6 °F (37 °C)   Dialyzer F-160   Machine Conductivity 14.3   Dialysate Na (mEq/L) 140   Dialysate K (mEq/L) 3   Dialysate CA (mEq/L) 2.5   Dialysate HCO3 (mEq/L) 35   Prime Ordered (mL) 250 mL   Net UF Goal 2500   Total UF Goal 3400   UF Rate 850   RO # / DI #  48   RO Rejection Within Limits? Yes   DI Lights Green   Trialysis (Dialysis) Catheter 01/22/23 1530 right internal jugular   Placement Date/Time: 01/22/23 1530   Hand Hygiene: Performed  Barrier Precautions: Performed  Skin Antisepsis: ChloraPrep  Location: right internal jugular  Insertion attempts (enter comment if more than 2 attempts): 1  Guidewire Removed?: Yes  Guidew...   Line Necessity Review CRRT/HD   Site Assessment No drainage;No redness;No warmth;No swelling   Line Securement Device Secured with sutures   Dressing Type Biopatch in place   Dressing Status Clean;Dry;Intact   Dressing Intervention Integrity maintained   Venous Patency/Care flushed w/o difficulty;blood return present   Arterial Patency/Care flushed w/o difficulty;blood return present   Flows Good   During Hemodialysis Assessment   Blood Flow Rate (mL/min) 350 mL/min   Dialysate Flow Rate (mL/min) 800 ml/min   Ultrafiltration Rate (mL/Hr) 850 mL/Hr   Arteriovenous Lines Secure Yes   Arterial Pressure (mmHg) -120 mmHg   Venous Pressure (mmHg) 100   Blood Volume Processed (Liters) 0 L   UF Removed (mL) 0 mL   TMP 40   Intra-Hemodialysis Comments NS running at 100 mL/hr     HD at the bedside started via R IJ CVC. VSS. Primary nurse at the bedside.

## 2023-01-25 PROBLEM — L03.90 CELLULITIS: Status: ACTIVE | Noted: 2023-01-25

## 2023-01-25 LAB
ALBUMIN SERPL BCP-MCNC: 2.1 G/DL (ref 3.5–5.2)
ALP SERPL-CCNC: 211 U/L (ref 55–135)
ALT SERPL W/O P-5'-P-CCNC: 42 U/L (ref 10–44)
ANION GAP SERPL CALC-SCNC: 13 MMOL/L (ref 8–16)
AST SERPL-CCNC: 105 U/L (ref 10–40)
BASOPHILS # BLD AUTO: 0.15 K/UL (ref 0–0.2)
BASOPHILS NFR BLD: 1.1 % (ref 0–1.9)
BILIRUB SERPL-MCNC: 3.7 MG/DL (ref 0.1–1)
BUN SERPL-MCNC: 20 MG/DL (ref 8–23)
CALCIUM SERPL-MCNC: 8.3 MG/DL (ref 8.7–10.5)
CHLORIDE SERPL-SCNC: 102 MMOL/L (ref 95–110)
CO2 SERPL-SCNC: 21 MMOL/L (ref 23–29)
CREAT SERPL-MCNC: 3 MG/DL (ref 0.5–1.4)
DIFFERENTIAL METHOD: ABNORMAL
EOSINOPHIL # BLD AUTO: 0.1 K/UL (ref 0–0.5)
EOSINOPHIL NFR BLD: 0.6 % (ref 0–8)
ERYTHROCYTE [DISTWIDTH] IN BLOOD BY AUTOMATED COUNT: 22.5 % (ref 11.5–14.5)
EST. GFR  (NO RACE VARIABLE): 22.9 ML/MIN/1.73 M^2
GLUCOSE SERPL-MCNC: 133 MG/DL (ref 70–110)
HCT VFR BLD AUTO: 28.6 % (ref 40–54)
HGB BLD-MCNC: 9.2 G/DL (ref 14–18)
IMM GRANULOCYTES # BLD AUTO: 0.3 K/UL (ref 0–0.04)
IMM GRANULOCYTES NFR BLD AUTO: 2.1 % (ref 0–0.5)
INR PPP: 1.2 (ref 0.8–1.2)
LYMPHOCYTES # BLD AUTO: 1.3 K/UL (ref 1–4.8)
LYMPHOCYTES NFR BLD: 9.1 % (ref 18–48)
MAGNESIUM SERPL-MCNC: 2.1 MG/DL (ref 1.6–2.6)
MCH RBC QN AUTO: 31.1 PG (ref 27–31)
MCHC RBC AUTO-ENTMCNC: 32.2 G/DL (ref 32–36)
MCV RBC AUTO: 97 FL (ref 82–98)
MONOCYTES # BLD AUTO: 1.8 K/UL (ref 0.3–1)
MONOCYTES NFR BLD: 13 % (ref 4–15)
NEUTROPHILS # BLD AUTO: 10.4 K/UL (ref 1.8–7.7)
NEUTROPHILS NFR BLD: 74.1 % (ref 38–73)
NRBC BLD-RTO: 0 /100 WBC
PHOSPHATE SERPL-MCNC: 4.2 MG/DL (ref 2.7–4.5)
PLATELET # BLD AUTO: 144 K/UL (ref 150–450)
PMV BLD AUTO: 11.6 FL (ref 9.2–12.9)
POCT GLUCOSE: 129 MG/DL (ref 70–110)
POCT GLUCOSE: 143 MG/DL (ref 70–110)
POCT GLUCOSE: 160 MG/DL (ref 70–110)
POTASSIUM SERPL-SCNC: 3.7 MMOL/L (ref 3.5–5.1)
PROT SERPL-MCNC: 6.7 G/DL (ref 6–8.4)
PROTHROMBIN TIME: 12.5 SEC (ref 9–12.5)
RBC # BLD AUTO: 2.96 M/UL (ref 4.6–6.2)
SODIUM SERPL-SCNC: 136 MMOL/L (ref 136–145)
VANCOMYCIN SERPL-MCNC: 14 UG/ML
WBC # BLD AUTO: 14.01 K/UL (ref 3.9–12.7)

## 2023-01-25 PROCEDURE — 25000003 PHARM REV CODE 250: Performed by: STUDENT IN AN ORGANIZED HEALTH CARE EDUCATION/TRAINING PROGRAM

## 2023-01-25 PROCEDURE — 25000003 PHARM REV CODE 250: Performed by: INTERNAL MEDICINE

## 2023-01-25 PROCEDURE — 63600175 PHARM REV CODE 636 W HCPCS

## 2023-01-25 PROCEDURE — 25500020 PHARM REV CODE 255: Performed by: STUDENT IN AN ORGANIZED HEALTH CARE EDUCATION/TRAINING PROGRAM

## 2023-01-25 PROCEDURE — 99233 SBSQ HOSP IP/OBS HIGH 50: CPT | Mod: ,,, | Performed by: INTERNAL MEDICINE

## 2023-01-25 PROCEDURE — 11000001 HC ACUTE MED/SURG PRIVATE ROOM

## 2023-01-25 PROCEDURE — 99233 PR SUBSEQUENT HOSPITAL CARE,LEVL III: ICD-10-PCS | Mod: ,,, | Performed by: INTERNAL MEDICINE

## 2023-01-25 PROCEDURE — 85610 PROTHROMBIN TIME: CPT | Performed by: STUDENT IN AN ORGANIZED HEALTH CARE EDUCATION/TRAINING PROGRAM

## 2023-01-25 PROCEDURE — 94640 AIRWAY INHALATION TREATMENT: CPT

## 2023-01-25 PROCEDURE — 63600175 PHARM REV CODE 636 W HCPCS: Performed by: STUDENT IN AN ORGANIZED HEALTH CARE EDUCATION/TRAINING PROGRAM

## 2023-01-25 PROCEDURE — 27000221 HC OXYGEN, UP TO 24 HOURS

## 2023-01-25 PROCEDURE — C9113 INJ PANTOPRAZOLE SODIUM, VIA: HCPCS

## 2023-01-25 PROCEDURE — 93010 EKG 12-LEAD: ICD-10-PCS | Mod: ,,, | Performed by: INTERNAL MEDICINE

## 2023-01-25 PROCEDURE — 25000242 PHARM REV CODE 250 ALT 637 W/ HCPCS

## 2023-01-25 PROCEDURE — 80202 ASSAY OF VANCOMYCIN: CPT | Performed by: INTERNAL MEDICINE

## 2023-01-25 PROCEDURE — 92526 ORAL FUNCTION THERAPY: CPT

## 2023-01-25 PROCEDURE — 80053 COMPREHEN METABOLIC PANEL: CPT | Performed by: STUDENT IN AN ORGANIZED HEALTH CARE EDUCATION/TRAINING PROGRAM

## 2023-01-25 PROCEDURE — 94761 N-INVAS EAR/PLS OXIMETRY MLT: CPT

## 2023-01-25 PROCEDURE — 93005 ELECTROCARDIOGRAM TRACING: CPT

## 2023-01-25 PROCEDURE — 99900035 HC TECH TIME PER 15 MIN (STAT)

## 2023-01-25 PROCEDURE — 25000003 PHARM REV CODE 250

## 2023-01-25 PROCEDURE — 85025 COMPLETE CBC W/AUTO DIFF WBC: CPT | Performed by: STUDENT IN AN ORGANIZED HEALTH CARE EDUCATION/TRAINING PROGRAM

## 2023-01-25 PROCEDURE — 83735 ASSAY OF MAGNESIUM: CPT | Performed by: STUDENT IN AN ORGANIZED HEALTH CARE EDUCATION/TRAINING PROGRAM

## 2023-01-25 PROCEDURE — 84100 ASSAY OF PHOSPHORUS: CPT | Performed by: STUDENT IN AN ORGANIZED HEALTH CARE EDUCATION/TRAINING PROGRAM

## 2023-01-25 PROCEDURE — 93010 ELECTROCARDIOGRAM REPORT: CPT | Mod: ,,, | Performed by: INTERNAL MEDICINE

## 2023-01-25 RX ORDER — FOLIC ACID 1 MG/1
1 TABLET ORAL DAILY
Status: DISCONTINUED | OUTPATIENT
Start: 2023-01-26 | End: 2023-02-07 | Stop reason: HOSPADM

## 2023-01-25 RX ORDER — THIAMINE HCL 100 MG
100 TABLET ORAL DAILY
Status: DISCONTINUED | OUTPATIENT
Start: 2023-01-26 | End: 2023-02-07 | Stop reason: HOSPADM

## 2023-01-25 RX ORDER — SODIUM CHLORIDE 9 MG/ML
INJECTION, SOLUTION INTRAVENOUS ONCE
Status: DISCONTINUED | OUTPATIENT
Start: 2023-01-25 | End: 2023-01-25

## 2023-01-25 RX ORDER — PANTOPRAZOLE SODIUM 40 MG/1
40 TABLET, DELAYED RELEASE ORAL
Status: DISCONTINUED | OUTPATIENT
Start: 2023-01-25 | End: 2023-02-07 | Stop reason: HOSPADM

## 2023-01-25 RX ORDER — METOPROLOL TARTRATE 25 MG/1
25 TABLET, FILM COATED ORAL 2 TIMES DAILY
Status: DISCONTINUED | OUTPATIENT
Start: 2023-01-25 | End: 2023-01-27

## 2023-01-25 RX ORDER — METOPROLOL TARTRATE 50 MG/1
50 TABLET ORAL DAILY
Status: DISCONTINUED | OUTPATIENT
Start: 2023-01-25 | End: 2023-01-25

## 2023-01-25 RX ORDER — IPRATROPIUM BROMIDE AND ALBUTEROL SULFATE 2.5; .5 MG/3ML; MG/3ML
3 SOLUTION RESPIRATORY (INHALATION) EVERY 6 HOURS PRN
Status: DISCONTINUED | OUTPATIENT
Start: 2023-01-25 | End: 2023-02-07 | Stop reason: HOSPADM

## 2023-01-25 RX ADMIN — FUROSEMIDE 240 MG: 10 INJECTION, SOLUTION INTRAMUSCULAR; INTRAVENOUS at 04:01

## 2023-01-25 RX ADMIN — THIAMINE HYDROCHLORIDE 100 MG: 100 INJECTION, SOLUTION INTRAMUSCULAR; INTRAVENOUS at 11:01

## 2023-01-25 RX ADMIN — CEFEPIME 2 G: 2 INJECTION, POWDER, FOR SOLUTION INTRAVENOUS at 09:01

## 2023-01-25 RX ADMIN — FOLIC ACID 1 MG: 5 INJECTION, SOLUTION INTRAMUSCULAR; INTRAVENOUS; SUBCUTANEOUS at 09:01

## 2023-01-25 RX ADMIN — LACTULOSE 10 G: 20 SOLUTION ORAL at 03:01

## 2023-01-25 RX ADMIN — IPRATROPIUM BROMIDE AND ALBUTEROL SULFATE 3 ML: 2.5; .5 SOLUTION RESPIRATORY (INHALATION) at 10:01

## 2023-01-25 RX ADMIN — LACTULOSE 10 G: 20 SOLUTION ORAL at 09:01

## 2023-01-25 RX ADMIN — METOPROLOL TARTRATE 50 MG: 50 TABLET, FILM COATED ORAL at 09:01

## 2023-01-25 RX ADMIN — IOHEXOL 15 ML: 350 INJECTION, SOLUTION INTRAVENOUS at 09:01

## 2023-01-25 RX ADMIN — HEPARIN SODIUM 5000 UNITS: 5000 INJECTION INTRAVENOUS; SUBCUTANEOUS at 09:01

## 2023-01-25 RX ADMIN — PANTOPRAZOLE SODIUM 40 MG: 40 INJECTION, POWDER, FOR SOLUTION INTRAVENOUS at 09:01

## 2023-01-25 RX ADMIN — HEPARIN SODIUM 5000 UNITS: 5000 INJECTION INTRAVENOUS; SUBCUTANEOUS at 03:01

## 2023-01-25 RX ADMIN — IOHEXOL 15 ML: 350 INJECTION, SOLUTION INTRAVENOUS at 11:01

## 2023-01-25 RX ADMIN — HEPARIN SODIUM 5000 UNITS: 5000 INJECTION INTRAVENOUS; SUBCUTANEOUS at 06:01

## 2023-01-25 RX ADMIN — THERA TABS 1 TABLET: TAB at 09:01

## 2023-01-25 NOTE — RESIDENT HANDOFF
Critical Care Handoff     Primary Team: Networked reference to record [facility]  Room Number: 6081/6081 A     Patient Name: Jonny Isabel MRN: [id number]     Date of Birth: [id number] Allergies: Patient has no known allergies.     Age: 62 y.o. Admit Date: 1/16/2023     Sex: male  BMI: Body mass index is 36.87 kg/m².     Code Status: Full Code        llness Level (current clinical status): Watcher - No    Reason for Admission: Spinal stenosis    Brief HPI (pertinent PMH and diagnosis or differential diagnosis): Mr. Fuller is a 62-year-old male with a PMHx of A-fibb (on Eliquis), hypertension, DM 2 (not insulin dependent) presents as transfer from OSH for chronic lower back that has been worsening for the past week. Pt states that the pain is radiating from his right buttock down to his legs. Pt was unable to get out of bed this AM due to the pain. HE endorses having fecal incontinence for the past week with black tarry stools. He denies any fevers, chills, abdominal pain, urinary incontinence, or saddle anesthesia.     At OSH ED CT lumbar spine ordered revealing Prominent, cystic, multiloculated predominantly gas attenuation epidural  structure within the spinal canal at the level of L3-L4 resulting in severe narrowing of the spinal canal with mass effect upon the thecal sac and  vacuum disc phenomena concerning for diskitis or an epidural abscess. MRI shows large disc extrusion at L3-4 causing severe spinal canal stenosis, with moderate spinal canal stenosis at L2-3 and L4-5 and moderate neural foraminal narrowing L4-5 and L5-S1. Patient had no tenderness overlying spinous process but still endorsed sever pain, received percocet and robaxin. Of note, KENNETH showed blood in stool. The patient has a white count of 12.9, hemoglobin of 7.5, creatinine 4.6, Lactate of 3.9. Pt given Rocephin, Zosyn and Vanc.    Pt transferred to [facility] for further intervention with neurosurgery.          Hospital Course (updated, brief assessment by system  or problem, significant events): Admitted to MICU for NSGY and GI evaluation . Hgb stable. EGD shows small varices with gastropathy, no active bleeds. Worsening MARTITA with minimal UOP, HRS protocol started with levo, midodrine, octreotide and albumin trailed, has since been stopped due to low suspicion. Nephrology following recommend HD in setting of Acute Renal Failure. NSGY initially planned surgical intervention for 1/23 but determine patient not medically optimized and currently suggesting multimodal pain control. A-fibb RVR 1/23 overnight, started on Amiodarone gtt, transitioned to home lopressor. Ongoing HD needs.      To do:   - Was noted to have dilated bowel; ordered CT abdomen with oral contrast. Keeping him NPO except meds. He is still passing gas and needs lactulose to prevent hepatic encephalopathy.  Please follow up with CT abdomen with oral contrast.     Contingency Plan (special circumstances anticipated and plan):   - Mentation improving   - On going HD needs  - R hand cellulitis improving   - Touch base with [facility] regarding surgery date

## 2023-01-25 NOTE — ASSESSMENT & PLAN NOTE
R hand seems to be cellulitic in appearance. Extending up to forearm.     Plan:  - Vanc and Cefepime

## 2023-01-25 NOTE — PROGRESS NOTES
[facility]  Critical Care Medicine  Progress Note    Patient Name: Jonny Isabel  MRN: [id number]  Admission Date: 1/16/2023  Hospital Length of Stay: 9 days  Code Status: Full Code  Attending Provider: Lance Martin*  Primary Care Provider: Yuli Pérez Mai, MD   Principal Problem: Spinal stenosis    Subjective:     HPI:  Mr. Fuller is a 62-year-old male with a PMHx of A-fibb (on Eliquis), hypertension, DM 2 (not insulin dependent) presents as transfer from OSH for chronic lower back that has been worsening for the past week. Pt states that the pain is radiating from his right buttock down to his legs. Pt was unable to get out of bed this AM due to the pain. HE endorses having fecal incontinence for the past week with black tarry stools. He denies any fevers, chills, abdominal pain, urinary incontinence, or saddle anesthesia.     At OSH ED CT lumbar spine ordered revealing Prominent, cystic, multiloculated predominantly gas attenuation epidural  structure within the spinal canal at the level of L3-L4 resulting in severe narrowing of the spinal canal with mass effect upon the thecal sac and  vacuum disc phenomena concerning for diskitis or an epidural abscess. MRI shows large disc extrusion at L3-4 causing severe spinal canal stenosis, with moderate spinal canal stenosis at L2-3 and L4-5 and moderate neural foraminal narrowing L4-5 and L5-S1. Patient had no tenderness overlying spinous process but still endorsed sever pain, received percocet and robaxin. Of note, KENNETH showed blood in stool. The patient has a white count of 12.9, hemoglobin of 7.5, creatinine 4.6, Lactate of 3.9. Pt given Rocephin, Zosyn and Vanc.    [place] [facility] [place]       Hospital/ICU Course:  Admitted to MICU for NSGY and GI evaluation . Hgb stable. EGD shows small varices with gastropathy, no active bleeds. Worsening MARTITA with minimal UOP, HRS protocol started with levo,  midodrine, octreotide and albumin trailed, has since been stopped due to low suspicion. Nephrology following recommend HD in setting of Acute Renal Failure. NSGY initially planned surgical intervention for 1/23 but determine patient not medically optimized and currently suggesting multimodal pain control. A-fibb RVR 1/23 overnight, started on Amiodarone gtt, transitioned to home lopressor. Ongoing HD needs.       Interval History/Significant Events: NAEON. Lasix trial with minimal UOP. Plans for HD today. Mentation slightly improving. Home Lopressor added at half dose for HR control. Pt stable for stepdown      Review of Systems   Constitutional:  Positive for activity change. Negative for chills and fever.   HENT:  Negative for congestion and trouble swallowing.    Eyes:  Negative for visual disturbance.   Respiratory:  Negative for cough, shortness of breath, wheezing and stridor.    Cardiovascular:  Negative for chest pain and leg swelling.   Gastrointestinal:  Positive for abdominal distention and blood in stool. Negative for abdominal pain, diarrhea, nausea and vomiting.        +fecal incontinence   Endocrine: Negative for polyuria.   Genitourinary:  Negative for difficulty urinating and dysuria.   Musculoskeletal:  Positive for back pain. Negative for arthralgias and myalgias.   Skin:  Negative for color change and rash.   Neurological:  Negative for dizziness, weakness, numbness and headaches.   Psychiatric/Behavioral:  Positive for confusion. Negative for agitation.    Objective:     Vital Signs (Most Recent):  Temp: 96.9 °F (36.1 °C) (01/25/23 0301)  Pulse: 104 (01/25/23 0615)  Resp: (!) 24 (01/25/23 0615)  BP: (!) 160/74 (01/25/23 0615)  SpO2: 98 % (01/25/23 0500)   Vital Signs (24h Range):  Temp:  [96.9 °F (36.1 °C)-99 °F (37.2 °C)] 96.9 °F (36.1 °C)  Pulse:  [] 104  Resp:  [12-44] 24  SpO2:  [92 %-99 %] 98 %  BP: ()/(51-93) 160/74   Weight: 110 kg (242 lb 8.1 oz)  Body mass index is 36.87  kg/m².      Intake/Output Summary (Last 24 hours) at 1/25/2023 0758  Last data filed at 1/25/2023 0600  Gross per 24 hour   Intake 1031.71 ml   Output 790 ml   Net 241.71 ml         Physical Exam  Vitals and nursing note reviewed.   Constitutional:       General: He is not in acute distress.     Appearance: He is obese. He is not ill-appearing.   HENT:      Head: Normocephalic and atraumatic.      Right Ear: External ear normal.      Left Ear: External ear normal.      Nose: Nose normal.      Mouth/Throat:      Mouth: Mucous membranes are moist.      Pharynx: Oropharynx is clear.   Eyes:      General:         Right eye: No discharge.         Left eye: No discharge.      Extraocular Movements: Extraocular movements intact.      Conjunctiva/sclera: Conjunctivae normal.      Pupils: Pupils are equal, round, and reactive to light.   Cardiovascular:      Rate and Rhythm: Normal rate and regular rhythm.      Pulses: Normal pulses.      Heart sounds: Normal heart sounds. No murmur heard.  Pulmonary:      Effort: Pulmonary effort is normal. No respiratory distress.      Breath sounds: No wheezing or rales.   Abdominal:      General: There is distension.      Tenderness: There is no abdominal tenderness. There is no guarding.   Musculoskeletal:         General: No swelling. Normal range of motion.      Cervical back: Normal range of motion.      Right lower leg: No edema.      Left lower leg: No edema.   Skin:     General: Skin is warm and dry.      Coloration: Skin is not jaundiced.   Neurological:      General: No focal deficit present.      Mental Status: He is alert. He is disoriented.      Cranial Nerves: Cranial nerves 2-12 are intact.      Comments: Patient altered  Responding to questions appropriately but confused about timing of events  Poor recall  + fecal incontinence   Psychiatric:         Mood and Affect: Mood normal.         Behavior: Behavior normal.       Vents:  Oxygen Concentration (%): 36 (01/22/23  2040)  Lines/Drains/Airways       Central Venous Catheter Line  Duration             Trialysis (Dialysis) Catheter 01/22/23 1530 right internal jugular 2 days              Drain  Duration                  Urethral Catheter 01/16/23 1347 Latex 14 Fr. 8 days         Open Drain 01/17/23 0800 Left;Anterior LLQ 7 days         Rectal Tube 01/22/23 1723 rectal tube w/ balloon (indicate number of mLs) 2 days              Peripheral Intravenous Line  Duration                  Peripheral IV - Single Lumen 01/19/23 1515 18 G;1 3/4 in Right Upper Arm 5 days                  Significant Labs:    CBC/Anemia Profile:  Recent Labs   Lab 01/24/23  0350 01/25/23  0329   WBC 14.56* 14.01*   HGB 9.1* 9.2*   HCT 28.0* 28.6*    144*   MCV 97 97   RDW 22.7* 22.5*          Chemistries:  Recent Labs   Lab 01/24/23  0350 01/25/23  0329    136   K 3.9 3.7    102   CO2 20* 21*   BUN 13 20   CREATININE 2.6* 3.0*   CALCIUM 8.1* 8.3*   ALBUMIN 2.4* 2.1*   PROT 6.5 6.7   BILITOT 4.1* 3.7*   ALKPHOS 223* 211*   ALT 45* 42   AST 95* 105*   MG 1.9 2.1   PHOS 3.6 4.2         All pertinent labs within the past 24 hours have been reviewed.    Significant Imaging:  I have reviewed all pertinent imaging results/findings within the past 24 hours.      ABG  Recent Labs   Lab 01/23/23  1618   PH 7.344*   PO2 35*   PCO2 36.1   HCO3 19.7*   BE -6     Assessment/Plan:     Neuro  * Spinal stenosis  CT Lumbar Spine Without Contrast Result Date: 1/16/2023  1.  Prominent, cystic, multiloculated predominantly gas attenuation structure within the spinal canal at the level of L3-L4 with dimensions as above.  This is favored to be epidural in location and results in severe narrowing of the spinal canal with mass effect upon the thecal sac.  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 Large disc extrusion at L3-4, contributing to severe spinal canal stenosis, as  above. Additional lumbar spondylosis, contributing to moderate spinal canal stenosis at L2-3 and L4-5 and moderate neural foraminal narrowing L4-5 and L5-S1    - neuro checks Q4H  - NSGY consulted, appreciate recommendations  - per Neurosurgery, patient is not currently medically optimized for intervention - suggesting multimodal pain control at this time    Psychiatric  Alcohol abuse  - Vitals q4h while awake  - CIWA monitoring  - Ativan 2mg q4 PRN if 2 criteria are met: Systolic BP>160, Diastolic BP >110 or Pulse >110  - Vitamin supplementation- Thiamine, Folic acid, Vit. B12, and Multivitamin   - S/P valium taper ?      Cardiac/Vascular  Atrial fibrillation  - Maintain K > 4, Mag > 2 and Ca/iCal WNL to decrease arrhythmogenic potential  - Holding lopressor 100 mg QD,  given recent GI bleed and current AMS  - Holding anticoagulation in the setting of recent GI bleed and current AMS  - Amnio gtt started, transition to home lopressor at 1/2 dose 50 q day        Renal/  MARTITA (acute kidney injury)  Creatinine 4.7 on admit, baseline around 0.8. Pre-renal vs ATN. Less likely obstruction as pt has gomez.  Renal ultrasound with medical renal disease and no evidence of hydronephrosis.    - Nephrology consulted, appreciate recommendations  - iHD per nephrology  - Strict I&Os and daily weights   - Avoid nephrotoxic agents such as NSAIDs, gadolinium and IV radiocontrast.  - Renally dose meds to current GFR.  - Maintain MAP > 65. Off of levophed  - Started on HRS protocol with midodrine 10mg TID, Octreotide, and Albumin 25g BID. Stopped due to low suspicion and hypertension.      ID  Cellulitis  R hand seems to be cellulitic in appearance. Extending up to forearm.     Plan:  - Vanc and Cefepime    Severe sepsis  This patient does have evidence of infective focus  My overall impression is septic shock.  Source: Unknown  Antibiotics given-   Antibiotics (From admission, onward)    Start     Stop Route Frequency Ordered     01/24/23 0915  vancomycin (VANCOCIN) 500 mg in dextrose 5 % in water (D5W) 5 % 100 mL IVPB (MB+)         -- IV Once 01/24/23 0808    01/23/23 1630  vancomycin - pharmacy to dose  (vancomycin IVPB)        See [facility] for full Linked Orders Report.    -- IV pharmacy to manage frequency 01/23/23 1531    01/16/23 2100  ceFEPIme (MAXIPIME) 2 g in dextrose 5 % in water (D5W) 5 % 50 mL IVPB (MB+)         -- IV Every 24 hours (non-standard times) 01/16/23 1354        Latest lactate reviewed-  Recent Labs   Lab 01/21/23  1511 01/22/23  0936   LACTATE 1.0 1.0     Organ dysfunction indicated by Acute kidney injury    - On Vancomycin, Cefepime, and Flagyl, Vanc d/c 1/20 - vancomycin restarted given concern for right arm cellulitis  - BCx negative  - SBP work up negative  - Will de-escalate once appropriate         Endocrine  Diabetes  -Last A1c reviewed-   Lab Results   Component Value Date    HGBA1C 5.3 11/10/2022       Home Antihyperglycemic Regiment:  - Metformin  1000 mg BID     Inpatient Antihyperglycemic Regiment:  Antihyperglycemics (From admission, onward)    Start     Stop Route Frequency Ordered    01/16/23 1507  insulin aspart U-100 pen 0-5 Units         -- SubQ Before meals & nightly PRN 01/16/23 1407        - LDSSI  - Clear liquid diet for now     Blood Sugars (AccuCheck):    Recent Labs     01/22/23  1130 01/22/23  2115 01/23/23  0919 01/23/23  1154 01/23/23  1615 01/23/23  2235   POCTGLUCOSE 133* 125* 135* 174* 151* 154*           GI  Alcoholic cirrhosis of liver with ascites  S/p diagnostic paracentesis on 1/17 with , 50% seg. Cytology pending     - lactulose enema    Elevated liver enzymes  Likely 2/2 to underlying alcohol abuse and hepatic steatosis.     - Daily CMP, PT/INR   - Complete workup up with tylenol level, acute hep panel, a1-antitrypsin, anti smith antibody, HIV, anti mitochondrial antibody, anca, anti-liver, aFP, PETH pending   - Liver US with doppler shows epatomegaly with hepatic  steatosis, cholelithiasis versus gallbladder sludge, small volume ascites, and left pleural effusion.    GI bleed  Concerning for variceal bleed given hx of alcohol abuse.     - GI consulted, recommend clear liquid for now but SLP evaluation pending given AMS  -Transfuse pRBC for Hb < 7 g/dL (Consider a higher Hb target if there is clinical evidence of intravascular volume depletion or comorbidities, such as CAD or if high suspicion of vigorous active ongoing bleeding or an uncorrected coagulopathy exists.).  - s/p vitamin K given Pt/INR > 2   - PPI 80 mg IV bolus once, then IV PPI 40 BID - currently on IV PPI given AMS  -Avoid nonsteroidal agents, antiplatelet agents and anticoagulants if possible in patients without absolute contraindications. (consult managing provider if required for cardiovascular protection).  - EGD showed  Small (< 5 mm) esophageal varices with portal hypertensive gastropathy          Other  Encounter for social work intervention  Patient does not appear to have relatives.  He will need possible decision maker/power of  if his overall mental status does not improve.    - social work consulted for family assessment       Critical Care Daily Checklist:    A: Awake: RASS Goal/Actual Goal: RASS Goal: 0-->alert and calm  Actual: Braxton Agitation Sedation Scale (RASS): Alert and calm   B: Spontaneous Breathing Trial Performed?     C: SAT & SBT Coordinated?  N/A                      D: Delirium: CAM-ICU Overall CAM-ICU: Negative   E: Early Mobility Performed? No   F: Feeding Goal: Goals: Meet % EEN, EPN by RD f/u date  Status: Nutrition Goal Status: new   Current Diet Order   Procedures    Diet clear liquid      AS: Analgesia/Sedation No   T: Thromboembolic Prophylaxis Yes   H: HOB > 300 Yes   U: Stress Ulcer Prophylaxis (if needed) Yes   G: Glucose Control Yes   B: Bowel Function Stool Occurrence: 0   I: Indwelling Catheter (Lines & Bose) Necessity Yes   D: De-escalation of  Antimicrobials/Pharmacotherapies Yes    Plan for the day/ETD Continue to monitor     Code Status:  Family/Goals of Care: Full Code         Critical secondary to Patient has a condition that poses threat to life and bodily function: Acute Renal Failure      Critical care was time spent personally by me on the following activities: development of treatment plan with patient or surrogate and bedside caregivers, discussions with consultants, evaluation of patient's response to treatment, examination of patient, ordering and performing treatments and interventions, ordering and review of laboratory studies, ordering and review of radiographic studies, pulse oximetry, re-evaluation of patient's condition. This critical care time did not overlap with that of any other provider or involve time for any procedures.     Erika Ocasio,   Critical Care Medicine  [facility] - Cardiac Medical ICU

## 2023-01-25 NOTE — ASSESSMENT & PLAN NOTE
Unsure of etiology at this time  Size of liver suggest other etiology than cirrhosis, however also has esophageal varices, possible HRS. CT and US renal with no signs of obstruction.     Urine microscopy: (1/17) no casts identified, amorphous material (though only 5 cc obtained). Repeat with few granular casts (1/18). Repeat on 1/19 with granular casts. 1/20: moderated (2-3 per field) granular casts with calcium oxylate crystals embedded in casts.     Urine protein 4.59g and repeat with 2.5  C3/4 - 111/35  LILO with ratio of 1.98  Hep B and C negative  Uric acid 13    HIV negative  Iron 31, TIBC 151, Tsat 21, Transferrin 102, ferritin 135  KEATON negative  ANCA negative  -Cryo pending    1/19: repeat Urine sodium 10, urine protein creatinine ratio 0.89. IgA 505. Urine microscopy: moderated (2-3 per field) granular casts with calcium oxylate crystals embedded in casts, overall findings consistent with ATN    Overall impression: appears to be improving with UOP increasing. He did require RRT 1/22 for metabolic encephalopathy and work of breathing    Recommendations:  -trial lasix 240 and assess response  If adequate schedule 240 q8  -Keep MAP > 65  -Keep hemoglobin > 7  -Strict ins and outs  -Avoid nephrotoxic agents if possible  -Avoid drastic hemodynamic changes if possible

## 2023-01-25 NOTE — PROGRESS NOTES
Pharmacokinetic Sign-off: IV Vancomycin    Therapy with vancomycin complete and/or consult discontinued by provider.  Pharmacy will sign off, please re-consult as needed.    Earline Hernandez, PharmD, BCPS  EXT [id number]

## 2023-01-25 NOTE — PROGRESS NOTES
[facility]  Nephrology  Progress Note    Patient Name: Jonny Isabel  MRN: [id number]  Admission Date: 1/16/2023  Hospital Length of Stay: 9 days  Attending Provider: Lance Martin*   Primary Care Physician: Yuli Pérez Mai, MD  Principal Problem:Spinal stenosis    Subjective:     HPI: 62 year old male with a history of alcohol abuse, afib/flutter on xarelto, HTN presents with concern for severe spinal canal stenosis/vacuum disc phenomena, concern for a GI bleed and MARTITA. He presents at behest of his girlfriend after being unable to stand, he has a history of sciatica but reports that this is worse. He has had fecal incontinence for the last week.     He has no report of kidney issues in the past and reports no family history of such either. At time of consultation he is pending an EGD for GI bleed. CT abdomen with large liver and concern for SBO per report, no hydronephrosis. UA with 2+ protein, 1+ occult blood, urine sodium 25 and urine osm 302. On admission serum creatinine 5 (baseline 0.8).      Interval History: much improved mentation this AM    Review of patient's allergies indicates:  No Known Allergies  Current Facility-Administered Medications   Medication Frequency    0.9%  NaCl infusion (for blood administration) Q24H PRN    albuterol-ipratropium 2.5 mg-0.5 mg/3 mL nebulizer solution 3 mL Q6H PRN    ceFEPIme (MAXIPIME) 2 g in dextrose 5 % in water (D5W) 5 % 50 mL IVPB (MB+) Q24H    dextrose 10% bolus 125 mL 125 mL PRN    dextrose 10% bolus 250 mL 250 mL PRN    [START ON 1/26/2023] folic acid tablet 1 mg Daily    glucagon (human recombinant) injection 1 mg PRN    glucose chewable tablet 16 g PRN    glucose chewable tablet 24 g PRN    heparin (porcine) injection 5,000 Units Q8H    HYDROmorphone injection 0.2 mg Q6H PRN    insulin aspart U-100 pen 0-5 Units QID (AC + HS) PRN    lactulose 20 gram/30 mL solution Soln 10 g TID    LIDOcaine-prilocaine cream PRN    metoprolol injection  5 mg Q5 Min PRN    metoprolol tartrate (LOPRESSOR) tablet 25 mg BID    multivitamin tablet Daily    pantoprazole EC tablet 40 mg BID AC    [START ON 1/26/2023] thiamine tablet 100 mg Daily       Objective:     Vital Signs (Most Recent):  Temp: 98.4 °F (36.9 °C) (01/25/23 1120)  Pulse: 83 (01/25/23 1200)  Resp: 18 (01/25/23 1200)  BP: 131/81 (01/25/23 1200)  SpO2: 97 % (01/25/23 1200) Vital Signs (24h Range):  Temp:  [96.9 °F (36.1 °C)-98.9 °F (37.2 °C)] 98.4 °F (36.9 °C)  Pulse:  [] 83  Resp:  [12-37] 18  SpO2:  [92 %-99 %] 97 %  BP: ()/(51-93) 131/81     Weight: 110 kg (242 lb 8.1 oz) (01/23/23 1408)  Body mass index is 36.87 kg/m².  Body surface area is 2.3 meters squared.    I/O last 3 completed shifts:  In: 2380 [P.O.:480; I.V.:199.8; Other:800; NG/GT:130; IV Piggyback:770.3]  Out: 4650 [Urine:840; Drains:110; Other:3300; Stool:400]    Physical Exam  Constitutional:       General: He is not in acute distress.     Appearance: He is obese. He is not ill-appearing or toxic-appearing.   HENT:      Head: Normocephalic and atraumatic.      Nose: Nose normal.      Mouth/Throat:      Mouth: Mucous membranes are moist.   Eyes:      General:         Right eye: No discharge.         Left eye: No discharge.      Pupils: Pupils are equal, round, and reactive to light.   Cardiovascular:      Heart sounds: Murmur heard.   Abdominal:      General: There is distension.      Tenderness: There is no abdominal tenderness.   Musculoskeletal:         General: Normal range of motion.      Cervical back: Normal range of motion.      Right lower leg: Edema present.      Left lower leg: Edema present.   Skin:     General: Skin is warm.      Coloration: Skin is pale.      Findings: No lesion.   Neurological:      Mental Status: He is alert and oriented to person, place, and time.       Significant Labs:  All labs within the past 24 hours have been reviewed.     Significant Imaging:  Labs: Reviewed    Assessment/Plan:     *  Spinal stenosis  NSGY deferring surgery for later date    MARTITA (acute kidney injury)  Unsure of etiology at this time  Size of liver suggest other etiology than cirrhosis, however also has esophageal varices, possible HRS. CT and US renal with no signs of obstruction.     Urine microscopy: (1/17) no casts identified, amorphous material (though only 5 cc obtained). Repeat with few granular casts (1/18). Repeat on 1/19 with granular casts. 1/20: moderated (2-3 per field) granular casts with calcium oxylate crystals embedded in casts.     Urine protein 4.59g and repeat with 2.5  C3/4 - 111/35  LILO with ratio of 1.98  Hep B and C negative  Uric acid 13    HIV negative  Iron 31, TIBC 151, Tsat 21, Transferrin 102, ferritin 135  KEATON negative  ANCA negative  -Cryo pending    1/19: repeat Urine sodium 10, urine protein creatinine ratio 0.89. IgA 505. Urine microscopy: moderated (2-3 per field) granular casts with calcium oxylate crystals embedded in casts, overall findings consistent with ATN    Overall impression: appears to be improving with UOP increasing. He did require RRT 1/22 for metabolic encephalopathy and work of breathing    Recommendations:  -trial lasix 240 and assess response  If adequate schedule 240 q8  -Keep MAP > 65  -Keep hemoglobin > 7  -Strict ins and outs  -Avoid nephrotoxic agents if possible  -Avoid drastic hemodynamic changes if possible               Thank you for your consult. I will follow-up with patient. Please contact us if you have any additional questions.    Otoniel Cat MD  Nephrology  [facility] - Cardiac Medical ICU    ATTENDING PHYSICIAN ATTESTATION  I have personally verified the history and examined the patient. I thoroughly reviewed the demographic, clinical, laboratorial and imaging information available in medical records. I agree with the assessment and recommendations provided by the subspecialty resident who was under my supervision.

## 2023-01-25 NOTE — SUBJECTIVE & OBJECTIVE
Interval History: much improved mentation this AM    Review of patient's allergies indicates:  No Known Allergies  Current Facility-Administered Medications   Medication Frequency    0.9%  NaCl infusion (for blood administration) Q24H PRN    albuterol-ipratropium 2.5 mg-0.5 mg/3 mL nebulizer solution 3 mL Q6H PRN    ceFEPIme (MAXIPIME) 2 g in dextrose 5 % in water (D5W) 5 % 50 mL IVPB (MB+) Q24H    dextrose 10% bolus 125 mL 125 mL PRN    dextrose 10% bolus 250 mL 250 mL PRN    [START ON 1/26/2023] folic acid tablet 1 mg Daily    glucagon (human recombinant) injection 1 mg PRN    glucose chewable tablet 16 g PRN    glucose chewable tablet 24 g PRN    heparin (porcine) injection 5,000 Units Q8H    HYDROmorphone injection 0.2 mg Q6H PRN    insulin aspart U-100 pen 0-5 Units QID (AC + HS) PRN    lactulose 20 gram/30 mL solution Soln 10 g TID    LIDOcaine-prilocaine cream PRN    metoprolol injection 5 mg Q5 Min PRN    metoprolol tartrate (LOPRESSOR) tablet 25 mg BID    multivitamin tablet Daily    pantoprazole EC tablet 40 mg BID AC    [START ON 1/26/2023] thiamine tablet 100 mg Daily       Objective:     Vital Signs (Most Recent):  Temp: 98.4 °F (36.9 °C) (01/25/23 1120)  Pulse: 83 (01/25/23 1200)  Resp: 18 (01/25/23 1200)  BP: 131/81 (01/25/23 1200)  SpO2: 97 % (01/25/23 1200) Vital Signs (24h Range):  Temp:  [96.9 °F (36.1 °C)-98.9 °F (37.2 °C)] 98.4 °F (36.9 °C)  Pulse:  [] 83  Resp:  [12-37] 18  SpO2:  [92 %-99 %] 97 %  BP: ()/(51-93) 131/81     Weight: 110 kg (242 lb 8.1 oz) (01/23/23 1408)  Body mass index is 36.87 kg/m².  Body surface area is 2.3 meters squared.    I/O last 3 completed shifts:  In: 2380 [P.O.:480; I.V.:199.8; Other:800; NG/GT:130; IV Piggyback:770.3]  Out: 4650 [Urine:840; Drains:110; Other:3300; Stool:400]    Physical Exam  Constitutional:       General: He is not in acute distress.     Appearance: He is obese. He is not ill-appearing or toxic-appearing.   HENT:      Head:  Normocephalic and atraumatic.      Nose: Nose normal.      Mouth/Throat:      Mouth: Mucous membranes are moist.   Eyes:      General:         Right eye: No discharge.         Left eye: No discharge.      Pupils: Pupils are equal, round, and reactive to light.   Cardiovascular:      Heart sounds: Murmur heard.   Abdominal:      General: There is distension.      Tenderness: There is no abdominal tenderness.   Musculoskeletal:         General: Normal range of motion.      Cervical back: Normal range of motion.      Right lower leg: Edema present.      Left lower leg: Edema present.   Skin:     General: Skin is warm.      Coloration: Skin is pale.      Findings: No lesion.   Neurological:      Mental Status: He is alert and oriented to person, place, and time.       Significant Labs:  All labs within the past 24 hours have been reviewed.     Significant Imaging:  Labs: Reviewed

## 2023-01-25 NOTE — PLAN OF CARE
SW has completed PMR eval for ip rehab placement.    SW has send ip rehab referrals via [facility] [employer] system.    Lynn Smith LMSW  [facility]  [phone]

## 2023-01-25 NOTE — PLAN OF CARE
CMICU DAILY GOALS       A: Awake    RASS: Goal - RASS Goal: 0-->alert and calm  Actual - RASS (Braxton Agitation-Sedation Scale): 1-->restless   Restraint necessity:    B: Breathe   SBT: NA   C: Coordinate A & B, analgesics/sedatives   Pain: managed    SAT: NA  D: Delirium   CAM-ICU: Overall CAM-ICU: Positive  E: Early(intubated/ Progressive (non-intubated) Mobility   MOVE Screen: Fail   Activity: Activity Management: Patient unable to perform activities  FAS: Feeding/Nutrition   Diet order: Diet/Nutrition Received: clear liquid, Specialty Diet/Nutrition Received: dysphagia mechanically altered  T: Thrombus   DVT prophylaxis: VTE Required Core Measure: Pharmacological prophylaxis initiated/maintained  H: HOB Elevation   Head of Bed (HOB) Positioning: HOB at 45 degrees  U: Ulcer Prophylaxis   GI: no  G: Glucose control   managed Glycemic Management: blood glucose monitored  S: Skin   Bathing/Skin Care: electrode patches/site rotation, back care, bath, complete, dressed/undressed  Device Skin Pressure Protection: pressure points protected  Pressure Reduction Devices: pressure-redistributing mattress utilized  Pressure Reduction Techniques: frequent weight shift encouraged, positioned off wounds, pressure points protected, weight shift assistance provided  Skin Protection: adhesive use limited, incontinence pads utilized, protective footwear used, skin-to-device areas padded, skin-to-skin areas padded, transparent dressing maintained, tubing/devices free from skin contact  B: Bowel Function   diarrhea   I: Indwelling Catheters   Bose necessity:      Urethral Catheter 01/16/23 1347 Latex 14 Fr.-Reason for Continuing Urinary Catheterization: Critically ill in ICU and requiring hourly monitoring of intake/output   CVC necessity: Yes  D: De-escalation Antibiotics   No    Family/Goals of care/Code Status   Code Status: Full Code    24H Vital Sign Range  Temp:  [98.4 °F (36.9 °C)-100.8 °F (38.2 °C)]   Pulse:  []    Resp:  [12-44]   BP: ()/(55-99)   SpO2:  [89 %-96 %]      Shift Events   No acute events throughout shift    VS and assessment per flow sheet, patient progressing towards goals as tolerated, plan of care reviewed with  Adelita , all concerns addressed, will continue to monitor.    Giovana Purvis

## 2023-01-25 NOTE — ASSESSMENT & PLAN NOTE
- Maintain K > 4, Mag > 2 and Ca/iCal WNL to decrease arrhythmogenic potential  - Holding lopressor 100 mg QD,  given recent GI bleed and current AMS  - Holding anticoagulation in the setting of recent GI bleed and current AMS  - Amnio gtt started, transition to home lopressor at 1/2 dose 50 q day

## 2023-01-25 NOTE — PT/OT/SLP PROGRESS
Speech Language Pathology Treatment  Discharge Summary    Patient Name:  Jonny Isabel   MRN:  [id number]  Admitting Diagnosis: Spinal stenosis    Recommendations:                 General Recommendations:  Follow-up not indicated  Diet recommendations:  Regular, Liquid Diet Level: Thin   Aspiration Precautions: Standard aspiration precautions   General Precautions: Standard, aspiration, fall  Communication strategies:  none    Subjective     Patient awake and alert. Cleared with RN prior to session.     Pain/Comfort:       Respiratory Status: Nasal cannula, flow . L/min    Objective:     Has the patient been evaluated by SLP for swallowing?   Yes  Keep patient NPO? No   Current Respiratory Status:        Patient tolerated thin liquids via straw sips along with regular solids x3 with no overt signs of airway compromise.   Patient with difficulty feeding self and dropping cup/utensils. Requires assistance with meals. Skilled education was provided to patient and family members re: diet recs, standard aspiration precautions of which to follow, and discharge ST plan of care.      Assessment:     Jonny Isabel is a 62 y.o. male with an SLP diagnosis of  no oropharyngeal dysphagia .  He presents with no further acute speech therapy needs at this time.    Goals:   Multidisciplinary Problems       SLP Goals          Problem: SLP    Goal Priority Disciplines Outcome   SLP Goal     SLP Ongoing, Progressing   Description: Goals due 1/31  1.  Pt. Will participate in ongoing assessment of swallow at bedside                       Plan:     Patient to be seen:  4 x/week   Plan of Care expires:  02/15/23  Plan of Care reviewed with:  patient   SLP Follow-Up:  No       Discharge recommendations:  other (see comments)   Barriers to Discharge:  None    Time Tracking:     SLP Treatment Date:   01/25/23  Speech Start Time:  0911  Speech Stop Time:  0920     Speech Total Time (min):  9 min    Billable Minutes: Treatment Swallowing  Dysfunction 9    01/25/2023

## 2023-01-25 NOTE — SUBJECTIVE & OBJECTIVE
Interval History/Significant Events: NAEON. Lasix trial with minimal UOP. Plans for HD today. Mentation slightly improving. Home Lopressor added at half dose for HR control. Pt stable for stepdown      Review of Systems   Constitutional:  Positive for activity change. Negative for chills and fever.   HENT:  Negative for congestion and trouble swallowing.    Eyes:  Negative for visual disturbance.   Respiratory:  Negative for cough, shortness of breath, wheezing and stridor.    Cardiovascular:  Negative for chest pain and leg swelling.   Gastrointestinal:  Positive for abdominal distention and blood in stool. Negative for abdominal pain, diarrhea, nausea and vomiting.        +fecal incontinence   Endocrine: Negative for polyuria.   Genitourinary:  Negative for difficulty urinating and dysuria.   Musculoskeletal:  Positive for back pain. Negative for arthralgias and myalgias.   Skin:  Negative for color change and rash.   Neurological:  Negative for dizziness, weakness, numbness and headaches.   Psychiatric/Behavioral:  Positive for confusion. Negative for agitation.    Objective:     Vital Signs (Most Recent):  Temp: 96.9 °F (36.1 °C) (01/25/23 0301)  Pulse: 104 (01/25/23 0615)  Resp: (!) 24 (01/25/23 0615)  BP: (!) 160/74 (01/25/23 0615)  SpO2: 98 % (01/25/23 0500)   Vital Signs (24h Range):  Temp:  [96.9 °F (36.1 °C)-99 °F (37.2 °C)] 96.9 °F (36.1 °C)  Pulse:  [] 104  Resp:  [12-44] 24  SpO2:  [92 %-99 %] 98 %  BP: ()/(51-93) 160/74   Weight: 110 kg (242 lb 8.1 oz)  Body mass index is 36.87 kg/m².      Intake/Output Summary (Last 24 hours) at 1/25/2023 0758  Last data filed at 1/25/2023 0600  Gross per 24 hour   Intake 1031.71 ml   Output 790 ml   Net 241.71 ml         Physical Exam  Vitals and nursing note reviewed.   Constitutional:       General: He is not in acute distress.     Appearance: He is obese. He is not ill-appearing.   HENT:      Head: Normocephalic and atraumatic.      Right Ear: External  ear normal.      Left Ear: External ear normal.      Nose: Nose normal.      Mouth/Throat:      Mouth: Mucous membranes are moist.      Pharynx: Oropharynx is clear.   Eyes:      General:         Right eye: No discharge.         Left eye: No discharge.      Extraocular Movements: Extraocular movements intact.      Conjunctiva/sclera: Conjunctivae normal.      Pupils: Pupils are equal, round, and reactive to light.   Cardiovascular:      Rate and Rhythm: Normal rate and regular rhythm.      Pulses: Normal pulses.      Heart sounds: Normal heart sounds. No murmur heard.  Pulmonary:      Effort: Pulmonary effort is normal. No respiratory distress.      Breath sounds: No wheezing or rales.   Abdominal:      General: There is distension.      Tenderness: There is no abdominal tenderness. There is no guarding.   Musculoskeletal:         General: No swelling. Normal range of motion.      Cervical back: Normal range of motion.      Right lower leg: No edema.      Left lower leg: No edema.   Skin:     General: Skin is warm and dry.      Coloration: Skin is not jaundiced.   Neurological:      General: No focal deficit present.      Mental Status: He is alert. He is disoriented.      Cranial Nerves: Cranial nerves 2-12 are intact.      Comments: Patient altered  Responding to questions appropriately but confused about timing of events  Poor recall  + fecal incontinence   Psychiatric:         Mood and Affect: Mood normal.         Behavior: Behavior normal.       Vents:  Oxygen Concentration (%): 36 (01/22/23 2040)  Lines/Drains/Airways       Central Venous Catheter Line  Duration             Trialysis (Dialysis) Catheter 01/22/23 1530 right internal jugular 2 days              Drain  Duration                  Urethral Catheter 01/16/23 1347 Latex 14 Fr. 8 days         Open Drain 01/17/23 0800 Left;Anterior LLQ 7 days         Rectal Tube 01/22/23 1723 rectal tube w/ balloon (indicate number of mLs) 2 days              Peripheral  Intravenous Line  Duration                  Peripheral IV - Single Lumen 01/19/23 1515 18 G;1 3/4 in Right Upper Arm 5 days                  Significant Labs:    CBC/Anemia Profile:  Recent Labs   Lab 01/24/23  0350 01/25/23  0329   WBC 14.56* 14.01*   HGB 9.1* 9.2*   HCT 28.0* 28.6*    144*   MCV 97 97   RDW 22.7* 22.5*          Chemistries:  Recent Labs   Lab 01/24/23  0350 01/25/23  0329    136   K 3.9 3.7    102   CO2 20* 21*   BUN 13 20   CREATININE 2.6* 3.0*   CALCIUM 8.1* 8.3*   ALBUMIN 2.4* 2.1*   PROT 6.5 6.7   BILITOT 4.1* 3.7*   ALKPHOS 223* 211*   ALT 45* 42   AST 95* 105*   MG 1.9 2.1   PHOS 3.6 4.2         All pertinent labs within the past 24 hours have been reviewed.    Significant Imaging:  I have reviewed all pertinent imaging results/findings within the past 24 hours.

## 2023-01-26 DIAGNOSIS — J44.9 CHRONIC OBSTRUCTIVE PULMONARY DISEASE, UNSPECIFIED COPD TYPE: Primary | ICD-10-CM

## 2023-01-26 DIAGNOSIS — K70.31 ALCOHOLIC CIRRHOSIS OF LIVER WITH ASCITES: ICD-10-CM

## 2023-01-26 LAB
ALBUMIN SERPL BCP-MCNC: 2 G/DL (ref 3.5–5.2)
ALP SERPL-CCNC: 182 U/L (ref 55–135)
ALT SERPL W/O P-5'-P-CCNC: 35 U/L (ref 10–44)
ANION GAP SERPL CALC-SCNC: 11 MMOL/L (ref 8–16)
AST SERPL-CCNC: 97 U/L (ref 10–40)
BASOPHILS # BLD AUTO: 0.12 K/UL (ref 0–0.2)
BASOPHILS NFR BLD: 0.9 % (ref 0–1.9)
BILIRUB SERPL-MCNC: 3.6 MG/DL (ref 0.1–1)
BUN SERPL-MCNC: 25 MG/DL (ref 8–23)
CALCIUM SERPL-MCNC: 8.2 MG/DL (ref 8.7–10.5)
CHLORIDE SERPL-SCNC: 100 MMOL/L (ref 95–110)
CO2 SERPL-SCNC: 23 MMOL/L (ref 23–29)
CREAT SERPL-MCNC: 3.1 MG/DL (ref 0.5–1.4)
DIFFERENTIAL METHOD: ABNORMAL
EOSINOPHIL # BLD AUTO: 0 K/UL (ref 0–0.5)
EOSINOPHIL NFR BLD: 0.3 % (ref 0–8)
ERYTHROCYTE [DISTWIDTH] IN BLOOD BY AUTOMATED COUNT: 22.2 % (ref 11.5–14.5)
EST. GFR  (NO RACE VARIABLE): 22 ML/MIN/1.73 M^2
GLUCOSE SERPL-MCNC: 117 MG/DL (ref 70–110)
HCT VFR BLD AUTO: 27 % (ref 40–54)
HGB BLD-MCNC: 8.9 G/DL (ref 14–18)
IMM GRANULOCYTES # BLD AUTO: 0.22 K/UL (ref 0–0.04)
IMM GRANULOCYTES NFR BLD AUTO: 1.6 % (ref 0–0.5)
INR PPP: 1.3 (ref 0.8–1.2)
LYMPHOCYTES # BLD AUTO: 1.2 K/UL (ref 1–4.8)
LYMPHOCYTES NFR BLD: 8.7 % (ref 18–48)
MAGNESIUM SERPL-MCNC: 1.9 MG/DL (ref 1.6–2.6)
MCH RBC QN AUTO: 31.3 PG (ref 27–31)
MCHC RBC AUTO-ENTMCNC: 33 G/DL (ref 32–36)
MCV RBC AUTO: 95 FL (ref 82–98)
MONOCYTES # BLD AUTO: 1.4 K/UL (ref 0.3–1)
MONOCYTES NFR BLD: 10.4 % (ref 4–15)
NEUTROPHILS # BLD AUTO: 10.6 K/UL (ref 1.8–7.7)
NEUTROPHILS NFR BLD: 78.1 % (ref 38–73)
NRBC BLD-RTO: 0 /100 WBC
PHOSPHATE SERPL-MCNC: 4.2 MG/DL (ref 2.7–4.5)
PLATELET # BLD AUTO: 135 K/UL (ref 150–450)
PMV BLD AUTO: 11.5 FL (ref 9.2–12.9)
POCT GLUCOSE: 109 MG/DL (ref 70–110)
POTASSIUM SERPL-SCNC: 3.6 MMOL/L (ref 3.5–5.1)
PROT SERPL-MCNC: 6.4 G/DL (ref 6–8.4)
PROTHROMBIN TIME: 13 SEC (ref 9–12.5)
RBC # BLD AUTO: 2.84 M/UL (ref 4.6–6.2)
SODIUM SERPL-SCNC: 134 MMOL/L (ref 136–145)
WBC # BLD AUTO: 13.6 K/UL (ref 3.9–12.7)

## 2023-01-26 PROCEDURE — 97535 SELF CARE MNGMENT TRAINING: CPT

## 2023-01-26 PROCEDURE — 97164 PT RE-EVAL EST PLAN CARE: CPT

## 2023-01-26 PROCEDURE — 99232 PR SUBSEQUENT HOSPITAL CARE,LEVL II: ICD-10-PCS | Mod: ,,, | Performed by: INTERNAL MEDICINE

## 2023-01-26 PROCEDURE — 99222 PR INITIAL HOSPITAL CARE,LEVL II: ICD-10-PCS | Mod: ,,, | Performed by: NURSE PRACTITIONER

## 2023-01-26 PROCEDURE — 97168 OT RE-EVAL EST PLAN CARE: CPT

## 2023-01-26 PROCEDURE — 97116 GAIT TRAINING THERAPY: CPT

## 2023-01-26 PROCEDURE — 84100 ASSAY OF PHOSPHORUS: CPT | Performed by: STUDENT IN AN ORGANIZED HEALTH CARE EDUCATION/TRAINING PROGRAM

## 2023-01-26 PROCEDURE — 25500020 PHARM REV CODE 255: Performed by: HOSPITALIST

## 2023-01-26 PROCEDURE — 25000003 PHARM REV CODE 250: Performed by: INTERNAL MEDICINE

## 2023-01-26 PROCEDURE — 25000003 PHARM REV CODE 250

## 2023-01-26 PROCEDURE — 63600175 PHARM REV CODE 636 W HCPCS: Performed by: INTERNAL MEDICINE

## 2023-01-26 PROCEDURE — 85025 COMPLETE CBC W/AUTO DIFF WBC: CPT | Performed by: STUDENT IN AN ORGANIZED HEALTH CARE EDUCATION/TRAINING PROGRAM

## 2023-01-26 PROCEDURE — 11000001 HC ACUTE MED/SURG PRIVATE ROOM

## 2023-01-26 PROCEDURE — 97530 THERAPEUTIC ACTIVITIES: CPT

## 2023-01-26 PROCEDURE — 80053 COMPREHEN METABOLIC PANEL: CPT | Performed by: STUDENT IN AN ORGANIZED HEALTH CARE EDUCATION/TRAINING PROGRAM

## 2023-01-26 PROCEDURE — 85610 PROTHROMBIN TIME: CPT | Performed by: STUDENT IN AN ORGANIZED HEALTH CARE EDUCATION/TRAINING PROGRAM

## 2023-01-26 PROCEDURE — 25000003 PHARM REV CODE 250: Performed by: STUDENT IN AN ORGANIZED HEALTH CARE EDUCATION/TRAINING PROGRAM

## 2023-01-26 PROCEDURE — 99232 SBSQ HOSP IP/OBS MODERATE 35: CPT | Mod: ,,, | Performed by: INTERNAL MEDICINE

## 2023-01-26 PROCEDURE — 63600175 PHARM REV CODE 636 W HCPCS: Performed by: STUDENT IN AN ORGANIZED HEALTH CARE EDUCATION/TRAINING PROGRAM

## 2023-01-26 PROCEDURE — 99233 PR SUBSEQUENT HOSPITAL CARE,LEVL III: ICD-10-PCS | Mod: ,,, | Performed by: INTERNAL MEDICINE

## 2023-01-26 PROCEDURE — 83735 ASSAY OF MAGNESIUM: CPT | Performed by: STUDENT IN AN ORGANIZED HEALTH CARE EDUCATION/TRAINING PROGRAM

## 2023-01-26 PROCEDURE — 99222 1ST HOSP IP/OBS MODERATE 55: CPT | Mod: ,,, | Performed by: NURSE PRACTITIONER

## 2023-01-26 PROCEDURE — 99233 SBSQ HOSP IP/OBS HIGH 50: CPT | Mod: ,,, | Performed by: INTERNAL MEDICINE

## 2023-01-26 PROCEDURE — 97112 NEUROMUSCULAR REEDUCATION: CPT

## 2023-01-26 RX ORDER — POTASSIUM CHLORIDE 7.45 MG/ML
10 INJECTION INTRAVENOUS
Status: COMPLETED | OUTPATIENT
Start: 2023-01-26 | End: 2023-01-26

## 2023-01-26 RX ORDER — BENZONATATE 100 MG/1
200 CAPSULE ORAL ONCE
Status: DISCONTINUED | OUTPATIENT
Start: 2023-01-26 | End: 2023-01-27

## 2023-01-26 RX ORDER — MAGNESIUM SULFATE HEPTAHYDRATE 40 MG/ML
2 INJECTION, SOLUTION INTRAVENOUS ONCE
Status: COMPLETED | OUTPATIENT
Start: 2023-01-26 | End: 2023-01-26

## 2023-01-26 RX ORDER — PROMETHAZINE HYDROCHLORIDE AND CODEINE PHOSPHATE 6.25; 1 MG/5ML; MG/5ML
5 SOLUTION ORAL EVERY 8 HOURS PRN
Status: DISCONTINUED | OUTPATIENT
Start: 2023-01-26 | End: 2023-01-30

## 2023-01-26 RX ORDER — BENZONATATE 100 MG/1
200 CAPSULE ORAL 3 TIMES DAILY
Status: DISCONTINUED | OUTPATIENT
Start: 2023-01-26 | End: 2023-01-26

## 2023-01-26 RX ORDER — BENZONATATE 100 MG/1
200 CAPSULE ORAL 3 TIMES DAILY PRN
Status: DISCONTINUED | OUTPATIENT
Start: 2023-01-26 | End: 2023-02-07 | Stop reason: HOSPADM

## 2023-01-26 RX ADMIN — THERA TABS 1 TABLET: TAB at 09:01

## 2023-01-26 RX ADMIN — METOPROLOL TARTRATE 25 MG: 25 TABLET, FILM COATED ORAL at 01:01

## 2023-01-26 RX ADMIN — LACTULOSE 10 G: 20 SOLUTION ORAL at 09:01

## 2023-01-26 RX ADMIN — LACTULOSE 10 G: 20 SOLUTION ORAL at 03:01

## 2023-01-26 RX ADMIN — POTASSIUM CHLORIDE 10 MEQ: 7.46 INJECTION, SOLUTION INTRAVENOUS at 09:01

## 2023-01-26 RX ADMIN — HEPARIN SODIUM 5000 UNITS: 5000 INJECTION INTRAVENOUS; SUBCUTANEOUS at 03:01

## 2023-01-26 RX ADMIN — MAGNESIUM SULFATE 2 G: 2 INJECTION INTRAVENOUS at 06:01

## 2023-01-26 RX ADMIN — HEPARIN SODIUM 5000 UNITS: 5000 INJECTION INTRAVENOUS; SUBCUTANEOUS at 05:01

## 2023-01-26 RX ADMIN — PANTOPRAZOLE SODIUM 40 MG: 40 TABLET, DELAYED RELEASE ORAL at 03:01

## 2023-01-26 RX ADMIN — HEPARIN SODIUM 5000 UNITS: 5000 INJECTION INTRAVENOUS; SUBCUTANEOUS at 09:01

## 2023-01-26 RX ADMIN — FOLIC ACID 1 MG: 1 TABLET ORAL at 09:01

## 2023-01-26 RX ADMIN — METOPROLOL TARTRATE 25 MG: 25 TABLET, FILM COATED ORAL at 09:01

## 2023-01-26 RX ADMIN — POTASSIUM CHLORIDE 10 MEQ: 7.46 INJECTION, SOLUTION INTRAVENOUS at 08:01

## 2023-01-26 RX ADMIN — POTASSIUM CHLORIDE 10 MEQ: 7.46 INJECTION, SOLUTION INTRAVENOUS at 07:01

## 2023-01-26 RX ADMIN — TORSEMIDE 50 MG: 20 TABLET ORAL at 05:01

## 2023-01-26 RX ADMIN — GUAIFENESIN AND DEXTROMETHORPHAN HYDROBROMIDE 2 TABLET: 600; 30 TABLET, EXTENDED RELEASE ORAL at 09:01

## 2023-01-26 RX ADMIN — IOHEXOL 100 ML: 350 INJECTION, SOLUTION INTRAVENOUS at 12:01

## 2023-01-26 RX ADMIN — THIAMINE HCL TAB 100 MG 100 MG: 100 TAB at 09:01

## 2023-01-26 NOTE — ASSESSMENT & PLAN NOTE
CT Lumbar Spine Without Contrast Result Date: 1/16/2023  1.  Prominent, cystic, multiloculated predominantly gas attenuation structure within the spinal canal at the level of L3-L4 with dimensions as above.  This is favored to be epidural in location and results in severe narrowing of the spinal canal with mass effect upon the thecal sac.  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 Large disc extrusion at L3-4, contributing to severe spinal canal stenosis, as above. Additional lumbar spondylosis, contributing to moderate spinal canal stenosis at L2-3 and L4-5 and moderate neural foraminal narrowing L4-5 and L5-S1     - neuro checks Q4H  - NSGY consulted, appreciate recommendations  - per Neurosurgery, patient is not currently medically optimized for intervention - suggesting multimodal pain control at this time       Needs Risk Stratification. So far:  High risk due to recent GI bleed, coagulapathy, sepsis, SPB with negative cultures, acute renal failure and may start HD soon, MELD 31.   Likely has chronic liver and kidney disease. Suspicion for infiltrative process in kidneys.   - may need to push back surgery to a later date  - follow pt through weekend  - xarelto on hold, on heparin    ECHO  /117/23:    The left ventricle is small with normal systolic function.   The estimated ejection fraction is 65%.   Normal left ventricular diastolic function.   Normal right ventricular size with normal right ventricular systolic function.   Normal central venous pressure (3 mmHg).   The estimated PA systolic pressure is 30 mmHg.   Mild tricuspid regurgitation.

## 2023-01-26 NOTE — ASSESSMENT & PLAN NOTE
Patient with Persistent (7 days or more) atrial fibrillation which is controlled currently with Beta Blocker. Patient is currently in atrial fibrillation.[id number] Score: 1  Hx of GI bleeding-  varicies on EGD. Anticoagulation not indicated due to holding NOAC in anticipation of surgery..    - Maintain K > 4, Mag > 2 and Ca/iCal WNL to decrease arrhythmogenic potential  - On lopressor 100 mg QD, holding as pt likely has GI bleed  - Starting Heparin gtt ppx in the setting of no acute GI bleed  - Holding lopressor 100 mg QD,  given recent GI bleed and current AMS  - Holding anticoagulation in the setting of recent GI bleed and current AMS  - Amnio gtt started, transition to home lopressor at 1/2 dose 50 q day

## 2023-01-26 NOTE — SUBJECTIVE & OBJECTIVE
Interval History: Patient lying in bed, no acute distress. No acute events overnight. Patient continues to note abdominal distension. He also reports continuing to passing gas and having bowel movement. Right hand cellulitis improving. Per General surgery recommendations, will followup repeat CT A/P and keep patient NPO except meds. Also continuing on lactulose to prevent hepatic encephalopathy. Mentation improving. Nephrology following. Tolerating HD well. NSGY planned for surgery next week.     Review of Systems   Constitutional:  Positive for activity change. Negative for chills, fatigue and fever.   HENT:  Negative for congestion and trouble swallowing.    Eyes:  Negative for visual disturbance.   Respiratory:  Negative for cough and shortness of breath.    Cardiovascular:  Negative for chest pain and leg swelling.   Gastrointestinal:  Positive for abdominal distention. Negative for abdominal pain, nausea and vomiting.   Endocrine: Negative for cold intolerance, heat intolerance, polydipsia and polyuria.   Genitourinary:  Negative for difficulty urinating and dysuria.   Musculoskeletal:  Negative for back pain and myalgias.   Skin:  Negative for rash and wound.   Neurological:  Positive for weakness. Negative for dizziness and light-headedness.   Hematological:  Negative for adenopathy. Does not bruise/bleed easily.   Psychiatric/Behavioral:  Negative for confusion and sleep disturbance.      Objective:     Vital Signs (Most Recent):  Temp: 98 °F (36.7 °C) (01/26/23 0813)  Pulse: 84 (01/26/23 0813)  Resp: 18 (01/26/23 0813)  BP: 113/63 (01/26/23 0813)  SpO2: (!) 94 % (01/26/23 0813)   Vital Signs (24h Range):  Temp:  [97.4 °F (36.3 °C)-98.9 °F (37.2 °C)] 98 °F (36.7 °C)  Pulse:  [] 84  Resp:  [17-40] 18  SpO2:  [92 %-97 %] 94 %  BP: (101-135)/(53-88) 113/63     Weight: 110 kg (242 lb 8.1 oz)  Body mass index is 36.87 kg/m².    Intake/Output Summary (Last 24 hours) at 1/26/2023 0908  Last data filed at  1/26/2023 0409  Gross per 24 hour   Intake 1379.43 ml   Output 985 ml   Net 394.43 ml        Physical Exam  Constitutional:       Appearance: He is well-developed.   HENT:      Head: Normocephalic and atraumatic.   Eyes:      General: No scleral icterus.     Pupils: Pupils are equal, round, and reactive to light.   Neck:      Vascular: No JVD.   Cardiovascular:      Rate and Rhythm: Normal rate and regular rhythm.      Heart sounds: No murmur heard.    No friction rub. No gallop.   Pulmonary:      Effort: Pulmonary effort is normal. No respiratory distress.      Breath sounds: Normal breath sounds. No wheezing or rales.   Abdominal:      General: Bowel sounds are normal. There is distension.      Palpations: Abdomen is soft.      Tenderness: There is no abdominal tenderness. There is no guarding or rebound.   Musculoskeletal:         General: No deformity. Normal range of motion.      Cervical back: Neck supple.   Lymphadenopathy:      Cervical: No cervical adenopathy.   Skin:     General: Skin is warm and dry.      Capillary Refill: Capillary refill takes less than 2 seconds.      Findings: No erythema or rash.   Neurological:      Mental Status: He is alert and oriented to person, place, and time.      Cranial Nerves: No cranial nerve deficit.      Sensory: No sensory deficit.   Psychiatric:         Judgment: Judgment normal.       Significant Labs: All pertinent labs within the past 24 hours have been reviewed.  CBC:   Recent Labs   Lab 01/25/23 0329 01/26/23  0346   WBC 14.01* 13.60*   HGB 9.2* 8.9*   HCT 28.6* 27.0*   * 135*       CMP:   Recent Labs   Lab 01/25/23 0329 01/26/23  0346    134*   K 3.7 3.6    100   CO2 21* 23   * 117*   BUN 20 25*   CREATININE 3.0* 3.1*   CALCIUM 8.3* 8.2*   PROT 6.7 6.4   ALBUMIN 2.1* 2.0*   BILITOT 3.7* 3.6*   ALKPHOS 211* 182*   * 97*   ALT 42 35   ANIONGAP 13 11         Significant Imaging: I have reviewed all pertinent imaging  results/findings within the past 24 hours.

## 2023-01-26 NOTE — ASSESSMENT & PLAN NOTE
Unsure of etiology at this time  Size of liver suggest other etiology than cirrhosis, however also has esophageal varices, possible HRS. CT and US renal with no signs of obstruction.     Urine microscopy: (1/17) no casts identified, amorphous material (though only 5 cc obtained). Repeat with few granular casts (1/18). Repeat on 1/19 with granular casts. 1/20: moderated (2-3 per field) granular casts with calcium oxylate crystals embedded in casts.     Urine protein 4.59g and repeat with 2.5  C3/4 - 111/35  LILO with ratio of 1.98  Hep B and C negative  Uric acid 13    HIV negative  Iron 31, TIBC 151, Tsat 21, Transferrin 102, ferritin 135  KEATON negative  ANCA negative  -Cryo pending    1/19: repeat Urine sodium 10, urine protein creatinine ratio 0.89. IgA 505. Urine microscopy: moderated (2-3 per field) granular casts with calcium oxylate crystals embedded in casts, overall findings consistent with ATN    Overall impression: UOP responsive to 240 IV lasix 1/25. He did require RRT 1/22 for metabolic encephalopathy and work of breathing    Recommendations:  - ORAL torsemide 50 BID, assess uop response  -please keep gomez for 1 more day to assess response to PO regiment. If adequate okay to remove  -Keep MAP > 65  -Keep hemoglobin > 7  -Strict ins and outs  -Avoid nephrotoxic agents if possible  -Avoid drastic hemodynamic changes if possible

## 2023-01-26 NOTE — CONSULTS
Inpatient consult to Physical Medicine Rehab  Consult performed by: Shelby Jack NP  Consult ordered by: Lance Martin MD    Consult received.  Reviewed patient history and current admission.  PM&R following.    Shelby Jack NP  Physical Medicine & Rehabilitation   01/26/2023

## 2023-01-26 NOTE — SUBJECTIVE & OBJECTIVE
No current facility-administered medications on file prior to encounter.     Current Outpatient Medications on File Prior to Encounter   Medication Sig    aspirin (ECOTRIN) 81 MG EC tablet Take 81 mg by mouth once daily.    cyclobenzaprine (FLEXERIL) 10 MG tablet Take 10 mg by mouth 2 (two) times daily as needed.    lisinopriL-hydrochlorothiazide (PRINZIDE,ZESTORETIC) 10-12.5 mg per tablet Take 1 tablet by mouth once daily.    metFORMIN (GLUCOPHAGE) 500 MG tablet Take 1,000 mg by mouth 2 (two) times daily.    metoprolol tartrate (LOPRESSOR) 100 MG tablet Take 100 mg by mouth once daily.    nicotine (NICODERM CQ) 14 mg/24 hr 1 patch every morning.    nicotine, polacrilex, (NICORETTE) 2 mg Gum SMARTSI Each By Mouth Every 2 Hours PRN    omega-3 fatty acids 1,000 mg Cap Take 2 g by mouth once daily.    simvastatin (ZOCOR) 40 MG tablet Take 40 mg by mouth every evening.    traZODone (DESYREL) 50 MG tablet Take 50 mg by mouth nightly as needed.    VITAMIN B COMPLEX ORAL Take 1 tablet by mouth once daily.    XARELTO 20 mg Tab Take 20 mg by mouth once daily.       Review of patient's allergies indicates:  No Known Allergies    Past Medical History:   Diagnosis Date    A-fib      Past Surgical History:   Procedure Laterality Date    ESOPHAGOGASTRODUODENOSCOPY N/A 2023    Procedure: EGD (ESOPHAGOGASTRODUODENOSCOPY);  Surgeon: Dewayne Navas MD;  Location: [address]);  Service: Endoscopy;  Laterality: N/A;     Family History    None       Tobacco Use    Smoking status: Not on file    Smokeless tobacco: Not on file   Substance and Sexual Activity    Alcohol use: Yes     Alcohol/week: 6.0 standard drinks     Types: 6 Cans of beer per week    Drug use: Not on file    Sexual activity: Not on file     Review of Systems   Constitutional:  Positive for activity change. Negative for chills and fever.   HENT:  Negative for congestion and trouble swallowing.    Eyes:  Negative for pain and visual disturbance.    Respiratory:  Negative for cough and shortness of breath.    Cardiovascular:  Negative for chest pain and leg swelling.   Gastrointestinal:  Positive for abdominal distention and blood in stool. Negative for abdominal pain, diarrhea, nausea and vomiting.        +fecal incontinence   Genitourinary:  Negative for difficulty urinating and dysuria.   Musculoskeletal:  Positive for back pain. Negative for myalgias.   Skin:  Negative for color change and rash.   Neurological:  Negative for weakness and headaches.   Psychiatric/Behavioral:  Positive for confusion. Negative for agitation.    Objective:     Vital Signs (Most Recent):  Temp: 98.3 °F (36.8 °C) (01/26/23 0445)  Pulse: 83 (01/26/23 0445)  Resp: 18 (01/26/23 0445)  BP: 120/69 (01/26/23 0445)  SpO2: (!) 94 % (01/26/23 0445)   Vital Signs (24h Range):  Temp:  [97.4 °F (36.3 °C)-98.9 °F (37.2 °C)] 98.3 °F (36.8 °C)  Pulse:  [] 83  Resp:  [17-40] 18  SpO2:  [92 %-97 %] 94 %  BP: (101-160)/(53-88) 120/69     Weight: 110 kg (242 lb 8.1 oz)  Body mass index is 36.87 kg/m².    Physical Exam  Vitals and nursing note reviewed.   Constitutional:       General: He is not in acute distress.     Appearance: He is not ill-appearing, toxic-appearing or diaphoretic.   HENT:      Mouth/Throat:      Pharynx: Oropharynx is clear. No oropharyngeal exudate.   Eyes:      General: No scleral icterus.     Extraocular Movements: Extraocular movements intact.   Cardiovascular:      Rate and Rhythm: Normal rate and regular rhythm.   Pulmonary:      Effort: Pulmonary effort is normal. No respiratory distress.   Abdominal:      Comments: Rounded abdomen with distension. Soft and non-tender.  No hernias   Genitourinary:     Comments: Bose present  Musculoskeletal:         General: Normal range of motion.      Comments: 2+ pitting edema BLE   Neurological:      General: No focal deficit present.      Cranial Nerves: No cranial nerve deficit.   Psychiatric:         Mood and Affect: Mood  normal.         Behavior: Behavior normal.       Significant Labs:  I have reviewed all pertinent lab results within the past 24 hours.  CBC:   Recent Labs   Lab 01/26/23  0346   WBC 13.60*   RBC 2.84*   HGB 8.9*   HCT 27.0*   *   MCV 95   MCH 31.3*   MCHC 33.0     CMP:   Recent Labs   Lab 01/26/23  0346   *   CALCIUM 8.2*   ALBUMIN 2.0*   PROT 6.4   *   K 3.6   CO2 23      BUN 25*   CREATININE 3.1*   ALKPHOS 182*   ALT 35   AST 97*   BILITOT 3.6*       Significant Diagnostics:  I have reviewed all pertinent imaging results/findings within the past 24 hours.

## 2023-01-26 NOTE — ASSESSMENT & PLAN NOTE
This patient does have evidence of infective focus  My overall impression is septic shock.  Source: Unknown  Antibiotics given-   Antibiotics (From admission, onward)        Start     Stop Route Frequency Ordered     01/20/23 1400   metroNIDAZOLE tablet 500 mg         -- Oral Every 8 hours 01/20/23 1109     01/18/23 0900   mupirocin 2 % ointment         01/23 0859 Nasl 2 times daily 01/18/23 0354     01/16/23 2100   ceFEPIme (MAXIPIME) 2 g in dextrose 5 % in water (D5W) 5 % 50 mL IVPB (MB+)         -- IV Every 24 hours (non-standard times) 01/16/23 1354             Latest lactate reviewed-  No results for input(s): LACTATE in the last 72 hours.  Organ dysfunction indicated by Acute kidney injury     - On Vancomycin, Cefepime, and Flagyl, Vanc d/c 1/20  - BCx negative - checked 1/22  - SBP work up negative  - Will de-escalate once appropriate   -covering for SBP      This patient does have evidence of infective focus  My overall impression is septic shock.  Source: Unknown  Antibiotics given-             Antibiotics (From admission, onward)     Start     Stop Route Frequency Ordered     01/24/23 0915   vancomycin (VANCOCIN) 500 mg in dextrose 5 % in water (D5W) 5 % 100 mL IVPB (MB+)         -- IV Once 01/24/23 0808     01/23/23 1630   vancomycin - pharmacy to dose  (vancomycin IVPB)        See [facility] for full Linked Orders Report.    -- IV pharmacy to manage frequency 01/23/23 1531     01/16/23 2100   ceFEPIme (MAXIPIME) 2 g in dextrose 5 % in water (D5W) 5 % 50 mL IVPB (MB+)         -- IV Every 24 hours (non-standard times) 01/16/23 1354          Latest lactate reviewed-       Recent Labs   Lab 01/21/23  1511 01/22/23  0936   LACTATE 1.0 1.0      Organ dysfunction indicated by Acute kidney injury     - On Vancomycin, Cefepime, and Flagyl, Vanc d/c 1/20 - vancomycin restarted given concern for right arm cellulitis  - BCx negative  - SBP work up negative  - Will de-escalate once appropriate

## 2023-01-26 NOTE — ASSESSMENT & PLAN NOTE
CT Lumbar Spine Without Contrast Result Date: 1/16/2023  1.  Prominent, cystic, multiloculated predominantly gas attenuation structure within the spinal canal at the level of L3-L4 with dimensions as above.  This is favored to be epidural in location and results in severe narrowing of the spinal canal with mass effect upon the thecal sac.  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 Large disc extrusion at L3-4, contributing to severe spinal canal stenosis, as above. Additional lumbar spondylosis, contributing to moderate spinal canal stenosis at L2-3 and L4-5 and moderate neural foraminal narrowing L4-5 and L5-S1     - neuro checks Q4H  - NSGY consulted, appreciate recommendations  - per Neurosurgery, patient is not currently medically optimized for intervention - suggesting multimodal pain control at this time       Needs Risk Stratification. So far:  High risk due to recent GI bleed, coagulapathy, sepsis, SPB with negative cultures, acute renal failure and may start HD soon, MELD 31.   Likely has chronic liver and kidney disease. Suspicion for infiltrative process in kidneys.   - pending surgery next week  - follow pt through weekend  - xarelto on hold, on heparin

## 2023-01-26 NOTE — ASSESSMENT & PLAN NOTE
Vitals q4h while awake  - CIWA monitoring  - Ativan 2mg q4 PRN if 2 criteria are met: Systolic BP>160, Diastolic BP >110 or Pulse >110  - Start Vitamin supplementation- Thiamine, Folic acid, Vit. B12, and Multivitamin   - S/P valium taper

## 2023-01-26 NOTE — PT/OT/SLP RE-EVAL
Physical Therapy [id number] and Co-Treatment    Patient Name:  Jonny Isabel   MRN:  [id number]    [id number] and co-treatment performed for this visit due to suspected patient need for two skilled therapists to ensure patient and staff safety and to accommodate for patient activity tolerance/pain management   Recommendations:     Discharge Recommendations: rehabilitation facility   Discharge Equipment Recommendations:  (TBD)   Barriers to discharge: Decreased caregiver support    Assessment:     Jonny Isabel is a 62 y.o. male admitted with a medical diagnosis of Spinal stenosis. He presents with the following impairments/functional limitations: weakness, impaired endurance, impaired self care skills, impaired functional mobility, gait instability, impaired balance, decreased upper extremity function, decreased lower extremity function. Patient presents for re-evaluation s/p transfer to ICU. Patient tolerated session well with improved functional mobility noted compared to last therapy session however remains below baseline functional status. Patient presents with good participation in therapy and good motivation to improve functional mobility to return to prior level of function. Patient is currently most appropriate for inpatient rehab, as patient demonstrates appropriate motivation to participate in 3 hours per day, 5x/week of combined therapy..    Rehab Prognosis: Good; patient would benefit from acute skilled PT services 4 x/week to address these deficits and reach maximum level of function. Patient is most appropriate to go to rehabilitation facility.  Recent Surgery: Procedure(s) (LRB):  EGD (ESOPHAGOGASTRODUODENOSCOPY) (N/A) 9 Days Post-Op    Plan:     During this hospitalization, patient to be seen 4 x/week to address the identified rehab impairments via gait training, therapeutic activities, therapeutic exercises, neuromuscular re-education and progress toward the following goals:    Plan of  "Care Expires:  02/26/23    Subjective     Chief Complaint: Dizziness once initially sitting edge of bed  Patient/Family Comments/Goals: "It's been almost three weeks"  Pain/Comfort:  Pain Rating 1: 0/10  Pain Rating Post-Intervention 1: 0/10    Patients cultural, spiritual, Restorationist conflicts given the current situation: no    Objective:     Communicated with nursing prior to session.  Patient found HOB elevated with telemetry, oxygen, gomez catheter, Trialysis (abdominal drain) upon PT entry to room.    General Precautions: Standard, aspiration, fall   Orthopedic Precautions:N/A   Braces: N/A     Exams:  Cognitive Exam:  Patient is oriented to Person (cuing for , Place, Time, Situation  RLE ROM: WFL  RLE Strength: hip flexion 1/5, knee extension 2/5, knee flexion 3+/5, and ankle dorsiflexion 4-/5  LLE ROM: WFL  LLE Strength: hip flexion 2/5, knee extension 4/5, knee flexion 4/5, and ankle dorsiflexion 4/5  Sensation: Intact light touch to BLEs  Coordination: noted discoordination with alternating PF/DF    Functional Mobility:  Bed Mobility:     Rolling Left:  minimum assistance  Rolling Right: moderate assistance  Supine to Sit: moderate assistance  Sit to Supine: minimum assistance  Transfers:     Sit to Stand: moderate assistance of 2 persons with hand-held assist  Gait: Patient ambulated 4 steps to the right with hand-held assist and moderate assistance of 2 persons. Patient demonstrates unsteady gait, decreased step length, wide base of support, decreased weight shift, decreased foot clearance, and decreased luis. Cuing for increased step size and clearance. All lines remained intact throughout ambulation trial.  Balance:   Static Sitting: Fair, able to maintain for 10 minute(s) with stand by -  minimum assistance, provided cuing for upright posture  Dynamic Sitting: Fair: Patient accepts minimal challenge, minimum assistance  Static Standing: Poor, able to maintain for 30 seconds with moderate assistance " of 2 persons, cuing for upright posture and gluteal activation  Dynamic Standing: Poor: Patient unable to accept challenge or move without loss of balance, moderate assistance of 2 persons    Therapeutic Activities and Exercises:  Patient educated on role of acute care PT and PT POC and call light usage  Patient is clear to stand pivot transfer with RN/PCT, assist x2    AM-PAC 6 CLICK MOBILITY  Total Score:11     Patient left HOB elevated with all lines intact and call button in reach.    GOALS:   Multidisciplinary Problems       Physical Therapy Goals          Problem: Physical Therapy    Goal Priority Disciplines Outcome Goal Variances Interventions   Physical Therapy Goal     PT, PT/OT Ongoing, Progressing     Description: Goals to be met by: 23     Patient will increase functional independence with mobility by performin. Supine to sit with Contact Guard Assistance-not met  2. Rolling to Right with Contact Guard Assistance. -not met  3. Sit to stand transfer with  Contact Guard Assistance-not met  4. Bed to chair transfer with Minimal Assistance using AD if needed -not met   5. Gait  x 30 feet with Minimal Assistance using AD if needed. -not met  6. Sitting at edge of bed x8 minutes with Supervision -not met                         History:     Past Medical History:   Diagnosis Date    A-fib        Past Surgical History:   Procedure Laterality Date    ESOPHAGOGASTRODUODENOSCOPY N/A 2023    Procedure: EGD (ESOPHAGOGASTRODUODENOSCOPY);  Surgeon: Dewayne Navas MD;  Location: [address]);  Service: Endoscopy;  Laterality: N/A;       Time Tracking:     PT Received On: 23  PT Start Time: 0930     PT Stop Time: 1002  PT Total Time (min): 32 min     Billable Minutes: Re-Evaluation 9 min Gait Training 8 min and Neuromuscular Re-education 15 min    2023

## 2023-01-26 NOTE — PROGRESS NOTES
Carlos Leung - Neurosurgery ([facility]  Hospital Medicine  Progress Note    Patient Name: Jonny Isabel  MRN: [id number]  Patient Class: IP- Inpatient   Admission Date: 1/16/2023  Length of Stay: 10 days  Attending Physician: Oscar Hubbard MD  Primary Care Provider: Yuli Pérez Mai, MD        Subjective:     Principal Problem:Spinal stenosis        HPI:  62-year-old male with a PMHx of A-fibb (on Eliquis), hypertension, DM 2 (not insulin dependent) presents as transfer from OSH for chronic lower back pain  that has been worsening for the past week. Pt states that the pain is radiating from his right buttock down to his legs. Pt was unable to get out of bed this AM due to the pain. HE endorses having fecal incontinence for the past week with black tarry stools. He denies any fevers, chills, abdominal pain, urinary incontinence, or saddle anesthesia.      At OSH ED CT lumbar spine ordered revealing Prominent, cystic, multiloculated predominantly gas attenuation epidural  structure within the spinal canal at the level of L3-L4 resulting in severe narrowing of the spinal canal with mass effect upon the thecal sac and  vacuum disc phenomena concerning for diskitis or an epidural abscess. MRI shows large disc extrusion at L3-4 causing severe spinal canal stenosis, with moderate spinal canal stenosis at L2-3 and L4-5 and moderate neural foraminal narrowing L4-5 and L5-S1. Patient had no tenderness overlying spinous process but still endorsed sever pain, received percocet and robaxin. Of note, KENNETH showed blood in stool. The patient has a white count of 12.9, hemoglobin of 7.5, creatinine 4.6, Lactate of 3.9. Pt given Rocephin, Zosyn and Vanc.     [place] [facility] [place].          Hospital Course (updated, brief assessment by system or problem, significant events): Admitted to MICU for NSGY and GI evaluation . Hgb stable. EGD shows small varices with gastropathy, no active bleeds.  Worsening MARITTA with minimal UOP, HRS protocol started with levo, midodrine, octreotide and albumin trailed, has since been stopped due to low suspicion. Nephrology following. NSGY planned for surgery on Monday. U/O seems to be slightly improving. HDS.      Tasks (specific, using if-then statements): If Hbg <7, transfuse with pRBC, stop ppx heparin, repeat EGD     Contingency Plan (special circumstances anticipated and plan):   Nephrology following regarding worsening kidney function  Planned for NSGY on Monday for L3/L4 compression          Overview/Hospital Course:  1/22 -  cc. Lab pending. Transferred to step down unit. He may need a line a dialysis today. Nephrology following. /67   Pulse 101   Temp 98.7 °F (37.1 °C) (Axillary)   Resp 21 SpO2 98%  on 2 liters NC. Cultures still neg.   -  getting more and more confused and currently scoring a 14 on his CIWA. He states he is seeing bugs, tremors, confusion, anxious. give 1mg PRN ativan for CIWA.   - going back to ICU for line and HD, lactulose, xray abd, cxr done and pending. Pt is encephalpathic due to high ammonia, Uremia, alcohol withdrawal, and alcohol encephalopathy, and metabolic encephopathy due to sepsis.     ICU Hospital Course (updated, brief assessment by system or problem, significant events): Admitted to MICU for NSGY and GI evaluation . Hgb stable. EGD shows small varices with gastropathy, no active bleeds. Worsening MARTITA with minimal UOP, HRS protocol started with levo, midodrine, octreotide and albumin trailed, has since been stopped due to low suspicion. Nephrology following recommend HD in setting of Acute Renal Failure. NSGY initially planned surgical intervention for 1/23 but determine patient not medically optimized and currently suggesting multimodal pain control. A-fibb RVR 1/23 overnight, started on Amiodarone gtt, transitioned to home lopressor. Ongoing HD needs.       Contingency Plan (special circumstances anticipated and  plan):   - Mentation improving   - On going HD needs  - R hand cellulitis improving   - Touch base with NGSY regarding surgery date       Interval History: Patient lying in bed, no acute distress. No acute events overnight. Patient continues to note abdominal distension. He also reports continuing to passing gas and having bowel movement. Right hand cellulitis improving. Per General surgery recommendations, will followup repeat CT A/P and keep patient NPO except meds. Also continuing on lactulose to prevent hepatic encephalopathy. Mentation improving. Nephrology following. Tolerating HD well. NSGY planned for surgery next week.     Review of Systems   Constitutional:  Positive for activity change. Negative for chills, fatigue and fever.   HENT:  Negative for congestion and trouble swallowing.    Eyes:  Negative for visual disturbance.   Respiratory:  Negative for cough and shortness of breath.    Cardiovascular:  Negative for chest pain and leg swelling.   Gastrointestinal:  Positive for abdominal distention. Negative for abdominal pain, nausea and vomiting.   Endocrine: Negative for cold intolerance, heat intolerance, polydipsia and polyuria.   Genitourinary:  Negative for difficulty urinating and dysuria.   Musculoskeletal:  Negative for back pain and myalgias.   Skin:  Negative for rash and wound.   Neurological:  Positive for weakness. Negative for dizziness and light-headedness.   Hematological:  Negative for adenopathy. Does not bruise/bleed easily.   Psychiatric/Behavioral:  Negative for confusion and sleep disturbance.      Objective:     Vital Signs (Most Recent):  Temp: 98 °F (36.7 °C) (01/26/23 0813)  Pulse: 84 (01/26/23 0813)  Resp: 18 (01/26/23 0813)  BP: 113/63 (01/26/23 0813)  SpO2: (!) 94 % (01/26/23 0813)   Vital Signs (24h Range):  Temp:  [97.4 °F (36.3 °C)-98.9 °F (37.2 °C)] 98 °F (36.7 °C)  Pulse:  [] 84  Resp:  [17-40] 18  SpO2:  [92 %-97 %] 94 %  BP: (101-135)/(53-88) 113/63     Weight:  110 kg (242 lb 8.1 oz)  Body mass index is 36.87 kg/m².    Intake/Output Summary (Last 24 hours) at 1/26/2023 0908  Last data filed at 1/26/2023 0409  Gross per 24 hour   Intake 1379.43 ml   Output 985 ml   Net 394.43 ml        Physical Exam  Constitutional:       Appearance: He is well-developed.   HENT:      Head: Normocephalic and atraumatic.   Eyes:      General: No scleral icterus.     Pupils: Pupils are equal, round, and reactive to light.   Neck:      Vascular: No JVD.   Cardiovascular:      Rate and Rhythm: Normal rate and regular rhythm.      Heart sounds: No murmur heard.    No friction rub. No gallop.   Pulmonary:      Effort: Pulmonary effort is normal. No respiratory distress.      Breath sounds: Normal breath sounds. No wheezing or rales.   Abdominal:      General: Bowel sounds are normal. There is distension.      Palpations: Abdomen is soft.      Tenderness: There is no abdominal tenderness. There is no guarding or rebound.   Musculoskeletal:         General: No deformity. Normal range of motion.      Cervical back: Neck supple.   Lymphadenopathy:      Cervical: No cervical adenopathy.   Skin:     General: Skin is warm and dry.      Capillary Refill: Capillary refill takes less than 2 seconds.      Findings: No erythema or rash.   Neurological:      Mental Status: He is alert and oriented to person, place, and time.      Cranial Nerves: No cranial nerve deficit.      Sensory: No sensory deficit.   Psychiatric:         Judgment: Judgment normal.       Significant Labs: All pertinent labs within the past 24 hours have been reviewed.  CBC:   Recent Labs   Lab 01/25/23  0329 01/26/23  0346   WBC 14.01* 13.60*   HGB 9.2* 8.9*   HCT 28.6* 27.0*   * 135*       CMP:   Recent Labs   Lab 01/25/23  0329 01/26/23  0346    134*   K 3.7 3.6    100   CO2 21* 23   * 117*   BUN 20 25*   CREATININE 3.0* 3.1*   CALCIUM 8.3* 8.2*   PROT 6.7 6.4   ALBUMIN 2.1* 2.0*   BILITOT 3.7* 3.6*   ALKPHOS  211* 182*   * 97*   ALT 42 35   ANIONGAP 13 11         Significant Imaging: I have reviewed all pertinent imaging results/findings within the past 24 hours.      Assessment/Plan:      * Spinal stenosis  CT Lumbar Spine Without Contrast Result Date: 1/16/2023  1.  Prominent, cystic, multiloculated predominantly gas attenuation structure within the spinal canal at the level of L3-L4 with dimensions as above.  This is favored to be epidural in location and results in severe narrowing of the spinal canal with mass effect upon the thecal sac.  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 Large disc extrusion at L3-4, contributing to severe spinal canal stenosis, as above. Additional lumbar spondylosis, contributing to moderate spinal canal stenosis at L2-3 and L4-5 and moderate neural foraminal narrowing L4-5 and L5-S1     - neuro checks Q4H  - NSGY consulted, appreciate recommendations  - per Neurosurgery, patient is not currently medically optimized for intervention - suggesting multimodal pain control at this time       Needs Risk Stratification. So far:  High risk due to recent GI bleed, coagulapathy, sepsis, SPB with negative cultures, acute renal failure and may start HD soon, MELD 31.   Likely has chronic liver and kidney disease. Suspicion for infiltrative process in kidneys.   - pending surgery next week  - follow pt through weekend  - xarelto on hold, on heparin    Cellulitis  R hand seems to be cellulitic in appearance. Extending up to forearm.      Plan:  - Vanc and Cefepime    Hypervolemia        Goals of care, counseling/discussion  Advance Care Planning   Per ICU upon admission:  - Pt has three sons that he is estranged from & has not seen in 20 years. Pt identified his girlfriend of 15 years, Adelita Bingham, as his surrogate decision maker in the event that he becomes incapacitated & cannot make decisions for  himself          Patient does not appear to have relatives.  He will need possible decision maker/power of  if his overall mental status does not improve.     - social work consulted for family assessment                                ATN (acute tubular necrosis)  Patient with acute kidney injury likely due to acute tubular necrosis vs ARS.  MARTITA is currently worsening. Labs reviewed- Renal function/electrolytes with Estimated Creatinine Clearance: 31.1 mL/min (A) (based on SCr of 3 mg/dL (H)). according to latest data. Monitor urine output and serial BMP and adjust therapy as needed. Avoid nephrotoxins and renally dose meds for GFR listed above.     Creatinine 4.7 on admit, baseline around 0.8. Pre-renal vs ATN. Less likely obstruction as pt has gomez      - Check urine lytes and renal ultrasound  - Check urine protein creatinine ratio.  - Strict I&Os and daily weights   - Avoid nephrotoxic agents such as NSAIDs, gadolinium and IV radiocontrast.  - Renally dose meds to current GFR.  - Maintain MAP > 65. Off of levophed  - Started on HRS protocol with midodrine 10mg TID, Octreotide, and Albumin 25g BID. Stopped due to low suspicion    1/21-  Cr 7.5 , Nephrology following:  Urine sodium 10, urine protein creatinine ratio 0.89. IgA 505. Urine microscopy: moderated (2-3 per field) granular casts with calcium oxylate crystals embedded in casts.  Uo improving.   1/22- discuss w Nephrology, Hos Med and cc . May need line and HD    1/25- cr improved. Nephrology following         Alcohol withdrawal syndrome without complication  On thiamine,   /72  Pulse 90   1/22- received ativan IV for CIWA protocol, hallucinations, agitation,  tremor, tachycarda.    Coagulopathy  INR 1.7  Consider vit K    Alcoholic cirrhosis of liver with ascites  S/p diagnostic paracentesis on 1/17 with , 50% seg. Cytology pending , Consistent w SBP ( Subacute Bacterial peritonitis)   EGD revealed esophageal varicies  - On  Lactulose, midodrine  - cefepime and flagyl started 1/16 for presumed SBP, cultures for ascites 1/17 are no growth so far  1/22- lactuose enema, ammonia 86  - lactulose enema    Lumbar herniated disc  With Spinal stenosis, concern for epidural abscess  CT Lumbar Spine Without Contrast Result Date: 1/16/2023  1.  Prominent, cystic, multiloculated predominantly gas attenuation structure within the spinal canal at the level of L3-L4 with dimensions as above.  This is favored to be epidural in location and results in severe narrowing of the spinal canal with mass effect upon the thecal sac.  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 Large disc extrusion at L3-4, contributing to severe spinal canal stenosis, as above. Additional lumbar spondylosis, contributing to moderate spinal canal stenosis at L2-3 and L4-5 and moderate neural foraminal narrowing L4-5 and L5-S1      Per Neurosurgery    -Will tentatively plan for OR Monday for laminectomy/discectomy pending medical stablization/optimization &  documented risk stratification  - appreciated risk stratification from primary team, patient with multiple ongoing comorbidities  - no HOB restrictions   - MAPs >65, requiring pressors  - hold Xarelto, PT and INR elevated on admission // will need Xarelto held for 3-5 days prior to OR and/or coagulapathy corrected      Small bowel obstruction  - General surgery following. apprec recs  -- NPO except for meds  -- KUB showed findings concerning for bowel obstruction   -- followup repeat CT A/P      Atrial fibrillation  Patient with Persistent (7 days or more) atrial fibrillation which is controlled currently with Beta Blocker. Patient is currently in atrial fibrillation.[id number] Score: 1  Hx of GI bleeding-  varicies on EGD. Anticoagulation not indicated due to holding NOAC in anticipation of surgery..    - Maintain K > 4, Mag > 2 and Ca/iCal  WNL to decrease arrhythmogenic potential  - On lopressor 100 mg QD, holding as pt likely has GI bleed  - Starting Heparin gtt ppx in the setting of no acute GI bleed  - Holding lopressor 100 mg QD,  given recent GI bleed and current AMS  - Holding anticoagulation in the setting of recent GI bleed and current AMS  - Amnio gtt started, transition to home lopressor at 1/2 dose 50 q day    Severe sepsis  This patient does have evidence of infective focus  My overall impression is septic shock.  Source: Unknown  Antibiotics given-   Antibiotics (From admission, onward)        Start     Stop Route Frequency Ordered     01/20/23 1400   metroNIDAZOLE tablet 500 mg         -- Oral Every 8 hours 01/20/23 1109     01/18/23 0900   mupirocin 2 % ointment         01/23 0859 Nasl 2 times daily 01/18/23 0354     01/16/23 2100   ceFEPIme (MAXIPIME) 2 g in dextrose 5 % in water (D5W) 5 % 50 mL IVPB (MB+)         -- IV Every 24 hours (non-standard times) 01/16/23 1354             Latest lactate reviewed-  No results for input(s): LACTATE in the last 72 hours.  Organ dysfunction indicated by Acute kidney injury     - On Vancomycin, Cefepime, and Flagyl, Vanc d/c 1/20  - BCx negative - checked 1/22  - SBP work up negative  - Will de-escalate once appropriate   -covering for SBP      This patient does have evidence of infective focus  My overall impression is septic shock.  Source: Unknown  Antibiotics given-             Antibiotics (From admission, onward)     Start     Stop Route Frequency Ordered     01/24/23 0915   vancomycin (VANCOCIN) 500 mg in dextrose 5 % in water (D5W) 5 % 100 mL IVPB (MB+)         -- IV Once 01/24/23 0808     01/23/23 1630   vancomycin - pharmacy to dose  (vancomycin IVPB)        See Hyperspace for full Linked Orders Report.    -- IV pharmacy to manage frequency 01/23/23 1531     01/16/23 2100   ceFEPIme (MAXIPIME) 2 g in dextrose 5 % in water (D5W) 5 % 50 mL IVPB (MB+)         -- IV Every 24 hours  (non-standard times) 01/16/23 1354          Latest lactate reviewed-       Recent Labs   Lab 01/21/23  1511 01/22/23  0936   LACTATE 1.0 1.0      Organ dysfunction indicated by Acute kidney injury     - On Vancomycin, Cefepime, and Flagyl, Vanc d/c 1/20 - vancomycin restarted given concern for right arm cellulitis  - BCx negative  - SBP work up negative  - Will de-escalate once appropriate     Alcohol abuse   Vitals q4h while awake  - CIWA monitoring  - Ativan 2mg q4 PRN if 2 criteria are met: Systolic BP>160, Diastolic BP >110 or Pulse >110  - Start Vitamin supplementation- Thiamine, Folic acid, Vit. B12, and Multivitamin   - S/P valium taper    Elevated liver enzymes  Likely 2/2 to underlying alcohol abuse and hepatic steatosis.      - Daily CMP, PT/INR   - Complete workup up with tylenol level, acute hep panel, a1-antitrypsin, anti smith antibody, HIV, anti mitochondrial antibody, anca, anti-liver, aFP, PETH pending   - Liver US with doppler shows hepatomegaly with hepatic steatosis, cholelithiasis versus gallbladder sludge, small volume ascites, and left pleural effusion.  - MELD 31-> 25     MELD-Na score: 25 at 1/25/2023  3:29 AM  MELD score: 24 at 1/25/2023  3:29 AM  Calculated from:  Serum Creatinine: 3.0 mg/dL at 1/25/2023  3:29 AM  Serum Sodium: 136 mmol/L at 1/25/2023  3:29 AM  Total Bilirubin: 3.7 mg/dL at 1/25/2023  3:29 AM  INR(ratio): 1.2 at 1/25/2023  3:29 AM  Age: 62 years   Likely 2/2 to underlying alcohol abuse and hepatic steatosis.      - Daily CMP, PT/INR   - Complete workup up with tylenol level, acute hep panel, a1-antitrypsin, anti smith antibody, HIV, anti mitochondrial antibody, anca, anti-liver, aFP, PETH pending   - Liver US with doppler shows epatomegaly with hepatic steatosis, cholelithiasis versus gallbladder sludge, small volume ascites, and left pleural effusion.         Diabetes    -Last A1c reviewed-         Lab Results   Component Value Date     HGBA1C 5.3 11/10/2022         Home  Antihyperglycemic Regiment:  - Metformin  1000 mg BID      Inpatient Antihyperglycemic Regiment:  Antihyperglycemics (From admission, onward)        Start     Stop Route Frequency Ordered     01/16/23 1507   insulin aspart U-100 pen 0-5 Units         -- SubQ Before meals & nightly PRN 01/16/23 1407             - LDSSI  - Clear liquid diet for now      Blood Sugars (AccuCheck):     Recent Labs     01/23/23  1154 01/23/23  1615 01/23/23  2235 01/24/23  1235 01/24/23  2236 01/25/23  1119   POCTGLUCOSE 174* 151* 154* 137* 160* 143*        - LDSSI  - Clear liquid diet for now           MARTITA (acute kidney injury)  Present upon admission: Creatinine 4.7 on admit, baseline around 0.8. Pre-renal vs ATN. Less likely obstruction as pt has gomez.  Renal ultrasound with medical renal disease and no evidence of hydronephrosis.  MARTITA on CKD  US - suggestive of medical renal disease  See ATN  - to ICU for line and HD  - Nephrology consulted, appreciate recommendations  - iHD per nephrology  - Strict I&Os and daily weights   - Avoid nephrotoxic agents such as NSAIDs, gadolinium and IV radiocontrast.  - Renally dose meds to current GFR.  - Maintain MAP > 65. Off of levophed  - Started on HRS protocol with midodrine 10mg TID, Octreotide, and Albumin 25g BID. Stopped due to low suspicion and hypertension.    1/25- on HD, failed lasix trial. CT scan w contrast ordered to eval for abd distension and renal obstruction.             GI bleed  Concerning for variceal bleed given hx of alcohol abuse.      - GI consulted, recommend clear liquid for now   -Transfuse pRBC for Hb < 7 g/dL (Consider a higher Hb target if there is clinical evidence of intravascular volume depletion or comorbidities, such as CAD or if high suspicion of vigorous active ongoing bleeding or an uncorrected coagulopathy exists.).  - s/p vitamin K given Pt/INR > 2   - PPI 80 mg IV bolus once, then IV PPI 40 BID  -Avoid nonsteroidal agents, antiplatelet agents and  anticoagulants if possible in patients without absolute contraindications. (consult managing provider if required for cardiovascular protection).  - EGD showed  Small (< 5 mm) esophageal varices with portal hypertensive gastropathy      VTE Risk Mitigation (From admission, onward)         Ordered     heparin (porcine) injection 5,000 Units  Every 8 hours         01/20/23 1108     Place sequential compression device  Until discontinued         01/18/23 0912                Discharge Planning   DALILA: 1/26/2023     Code Status: Full Code   Is the patient medically ready for discharge?: No    Reason for patient still in hospital (select all that apply): Patient unstable, Patient trending condition, Laboratory test, Treatment, Imaging, Consult recommendations and Pending disposition  Discharge Plan A: Rehab   Discharge Delays: None known at this time        Time spent in care of patient: > 35 minutes         Oscar Hubbard MD  Department of Hospital Medicine   [facility] - Neurosurgery ([facility])

## 2023-01-26 NOTE — ASSESSMENT & PLAN NOTE
Advance Care Planning   Per ICU upon admission:  - Pt has three sons that he is estranged from & has not seen in 20 years. Pt identified his girlfriend of 15 years, Adelita Bingham, as his surrogate decision maker in the event that he becomes incapacitated & cannot make decisions for himself           Patient does not appear to have relatives.  He will need possible decision maker/power of  if his overall mental status does not improve.     - social work consulted for family assessment

## 2023-01-26 NOTE — SUBJECTIVE & OBJECTIVE
Interval History: see above    Review of Systems  Objective:     Vital Signs (Most Recent):  Temp: 98.4 °F (36.9 °C) (01/25/23 1120)  Pulse: 92 (01/25/23 1500)  Resp: (!) 23 (01/25/23 1500)  BP: 135/84 (01/25/23 1500)  SpO2: 96 % (01/25/23 1500)   Vital Signs (24h Range):  Temp:  [96.9 °F (36.1 °C)-98.4 °F (36.9 °C)] 98.4 °F (36.9 °C)  Pulse:  [] 92  Resp:  [12-40] 23  SpO2:  [92 %-99 %] 96 %  BP: ()/(51-93) 135/84     Weight: 110 kg (242 lb 8.1 oz)  Body mass index is 36.87 kg/m².    Intake/Output Summary (Last 24 hours) at 1/25/2023 1819  Last data filed at 1/25/2023 1030  Gross per 24 hour   Intake 379.43 ml   Output 805 ml   Net -425.57 ml      Physical Exam    Significant Labs: All pertinent labs within the past 24 hours have been reviewed.  CBC:   Recent Labs   Lab 01/24/23  0350 01/25/23  0329   WBC 14.56* 14.01*   HGB 9.1* 9.2*   HCT 28.0* 28.6*    144*     CMP:   Recent Labs   Lab 01/24/23  0350 01/25/23  0329    136   K 3.9 3.7    102   CO2 20* 21*   * 133*   BUN 13 20   CREATININE 2.6* 3.0*   CALCIUM 8.1* 8.3*   PROT 6.5 6.7   ALBUMIN 2.4* 2.1*   BILITOT 4.1* 3.7*   ALKPHOS 223* 211*   AST 95* 105*   ALT 45* 42   ANIONGAP 15 13       Significant Imaging: I have reviewed all pertinent imaging results/findings within the past 24 hours.

## 2023-01-26 NOTE — CARE UPDATE
Was noted to have dilated bowel in KUB. Ordered CT abdomen with oral contrast. He had similar finding on CT scan on 1/16 and gen surg was consulted. General surgery noted that because he is passing gas, abdomen not distended, and having bowel movement, surgical intervention is not needed. However, this time his abdomen is more distended. Keeping him NPO except meds. He is still passing gas and needs lactulose to prevent hepatic encephalopathy.  Updated resident handoff for the hospitalist to follow up in the morning.

## 2023-01-26 NOTE — ASSESSMENT & PLAN NOTE
- General surgery following. apprec recs  -- NPO except for meds  -- KUB showed findings concerning for bowel obstruction   -- followup repeat CT A/P

## 2023-01-26 NOTE — PLAN OF CARE
Pt had CT abdomen done overnight. Attending consulted general surgery. Patient's family requesting updates from providers.   Problem: Adult Inpatient Plan of Care  Goal: Plan of Care Review  Outcome: Ongoing, Progressing     Problem: Diabetes Comorbidity  Goal: Blood Glucose Level Within Targeted Range  Outcome: Ongoing, Progressing     Problem: Bleeding (Sepsis/Septic Shock)  Goal: Absence of Bleeding  Outcome: Ongoing, Progressing     Problem: Infection Progression (Sepsis/Septic Shock)  Goal: Absence of Infection Signs and Symptoms  Outcome: Ongoing, Progressing     Problem: Oral Intake Inadequate (Acute Kidney Injury/Impairment)  Goal: Optimal Nutrition Intake  Outcome: Ongoing, Progressing     Problem: Renal Function Impairment (Acute Kidney Injury/Impairment)  Goal: Effective Renal Function  Outcome: Ongoing, Progressing     Problem: Infection (Hemodialysis)  Goal: Absence of Infection Signs and Symptoms  Outcome: Ongoing, Progressing

## 2023-01-26 NOTE — PROGRESS NOTES
[facility] - Neurosurgery ([facility])  Nephrology  Progress Note    Patient Name: Jonny Isabel  MRN: [id number]  Admission Date: 1/16/2023  Hospital Length of Stay: 10 days  Attending Provider: Oscar Hubbard MD   Primary Care Physician: Yuli Pérez Mai, MD  Principal Problem:Spinal stenosis    Subjective:     HPI: 62 year old male with a history of alcohol abuse, afib/flutter on xarelto, HTN presents with concern for severe spinal canal stenosis/vacuum disc phenomena, concern for a GI bleed and MARTITA. He presents at behest of his girlfriend after being unable to stand, he has a history of sciatica but reports that this is worse. He has had fecal incontinence for the last week.     He has no report of kidney issues in the past and reports no family history of such either. At time of consultation he is pending an EGD for GI bleed. CT abdomen with large liver and concern for SBO per report, no hydronephrosis. UA with 2+ protein, 1+ occult blood, urine sodium 25 and urine osm 302. On admission serum creatinine 5 (baseline 0.8).      Interval History: stepped down to hospital medicine. Mentation continues to be stable. Received 240 mg of lasix yesterday and had 700 cc UOP    Review of patient's allergies indicates:  No Known Allergies  Current Facility-Administered Medications   Medication Frequency    0.9%  NaCl infusion (for blood administration) Q24H PRN    albuterol-ipratropium 2.5 mg-0.5 mg/3 mL nebulizer solution 3 mL Q6H PRN    dextrose 10% bolus 125 mL 125 mL PRN    dextrose 10% bolus 250 mL 250 mL PRN    folic acid tablet 1 mg Daily    glucagon (human recombinant) injection 1 mg PRN    glucose chewable tablet 16 g PRN    glucose chewable tablet 24 g PRN    heparin (porcine) injection 5,000 Units Q8H    HYDROmorphone injection 0.2 mg Q6H PRN    insulin aspart U-100 pen 0-5 Units QID (AC + HS) PRN    lactulose 20 gram/30 mL solution Soln 10 g TID    LIDOcaine-prilocaine cream PRN    metoprolol injection 5 mg Q5 Min  PRN    metoprolol tartrate (LOPRESSOR) tablet 25 mg BID    multivitamin tablet Daily    pantoprazole EC tablet 40 mg BID AC    thiamine tablet 100 mg Daily       Objective:     Vital Signs (Most Recent):  Temp: 98 °F (36.7 °C) (01/26/23 0813)  Pulse: 84 (01/26/23 0813)  Resp: 18 (01/26/23 0813)  BP: 113/63 (01/26/23 0813)  SpO2: (!) 94 % (01/26/23 0813)   Vital Signs (24h Range):  Temp:  [97.4 °F (36.3 °C)-98.9 °F (37.2 °C)] 98 °F (36.7 °C)  Pulse:  [] 84  Resp:  [17-40] 18  SpO2:  [92 %-97 %] 94 %  BP: (101-135)/(59-88) 113/63     Weight: 110 kg (242 lb 8.1 oz) (01/23/23 1408)  Body mass index is 36.87 kg/m².  Body surface area is 2.3 meters squared.    I/O last 3 completed shifts:  In: 1379.4 [P.O.:1190; IV Piggyback:189.4]  Out: 1505 [Urine:1155; Stool:350]    Physical Exam  Constitutional:       General: He is not in acute distress.     Appearance: He is obese. He is not ill-appearing or toxic-appearing.   HENT:      Head: Normocephalic and atraumatic.      Nose: Nose normal.      Mouth/Throat:      Mouth: Mucous membranes are moist.   Eyes:      General:         Right eye: No discharge.         Left eye: No discharge.      Pupils: Pupils are equal, round, and reactive to light.   Cardiovascular:      Heart sounds: Murmur heard.   Abdominal:      General: There is distension.      Tenderness: There is no abdominal tenderness.   Musculoskeletal:         General: Normal range of motion.      Cervical back: Normal range of motion.      Right lower leg: Edema present.      Left lower leg: Edema present.   Skin:     General: Skin is warm.      Coloration: Skin is pale.      Findings: No lesion.   Neurological:      Mental Status: He is alert and oriented to person, place, and time.       Significant Labs:  All labs within the past 24 hours have been reviewed.     Significant Imaging:  Labs: Reviewed    Assessment/Plan:     * Spinal stenosis  NSGY deferring surgery for later date  From a nephrology stand point,  okay to have surgery    MARTITA (acute kidney injury)  Unsure of etiology at this time  Size of liver suggest other etiology than cirrhosis, however also has esophageal varices, possible HRS. CT and US renal with no signs of obstruction.     Urine microscopy: (1/17) no casts identified, amorphous material (though only 5 cc obtained). Repeat with few granular casts (1/18). Repeat on 1/19 with granular casts. 1/20: moderated (2-3 per field) granular casts with calcium oxylate crystals embedded in casts.     Urine protein 4.59g and repeat with 2.5  C3/4 - 111/35  LILO with ratio of 1.98  Hep B and C negative  Uric acid 13    HIV negative  Iron 31, TIBC 151, Tsat 21, Transferrin 102, ferritin 135  KEATON negative  ANCA negative  -Cryo pending    1/19: repeat Urine sodium 10, urine protein creatinine ratio 0.89. IgA 505. Urine microscopy: moderated (2-3 per field) granular casts with calcium oxylate crystals embedded in casts, overall findings consistent with ATN    Overall impression: UOP responsive to 240 IV lasix 1/25. He did require RRT 1/22 for metabolic encephalopathy and work of breathing    Recommendations:  - ORAL torsemide 50 BID, assess uop response  -please keep gomez for 1 more day to assess response to PO regiment. If adequate okay to remove  -Keep MAP > 65  -Keep hemoglobin > 7  -Strict ins and outs  -Avoid nephrotoxic agents if possible  -Avoid drastic hemodynamic changes if possible               Thank you for your consult. I will follow-up with patient. Please contact us if you have any additional questions.    Otoniel Cat MD  Nephrology  [facility])    ATTENDING PHYSICIAN ATTESTATION  I have personally verified the history and examined the patient. I thoroughly reviewed the demographic, clinical, laboratorial and imaging information available in medical records. I agree with the assessment and recommendations provided by the subspecialty resident who was under my supervision.

## 2023-01-26 NOTE — ASSESSMENT & PLAN NOTE
Likely 2/2 to underlying alcohol abuse and hepatic steatosis.      - Daily CMP, PT/INR   - Complete workup up with tylenol level, acute hep panel, a1-antitrypsin, anti smith antibody, HIV, anti mitochondrial antibody, anca, anti-liver, aFP, PETH pending   - Liver US with doppler shows hepatomegaly with hepatic steatosis, cholelithiasis versus gallbladder sludge, small volume ascites, and left pleural effusion.  - MELD 31-> 25     MELD-Na score: 25 at 1/25/2023  3:29 AM  MELD score: 24 at 1/25/2023  3:29 AM  Calculated from:  Serum Creatinine: 3.0 mg/dL at 1/25/2023  3:29 AM  Serum Sodium: 136 mmol/L at 1/25/2023  3:29 AM  Total Bilirubin: 3.7 mg/dL at 1/25/2023  3:29 AM  INR(ratio): 1.2 at 1/25/2023  3:29 AM  Age: 62 years   Likely 2/2 to underlying alcohol abuse and hepatic steatosis.      - Daily CMP, PT/INR   - Complete workup up with tylenol level, acute hep panel, a1-antitrypsin, anti smith antibody, HIV, anti mitochondrial antibody, anca, anti-liver, aFP, PETH pending   - Liver US with doppler shows epatomegaly with hepatic steatosis, cholelithiasis versus gallbladder sludge, small volume ascites, and left pleural effusion.

## 2023-01-26 NOTE — ASSESSMENT & PLAN NOTE
S/p diagnostic paracentesis on 1/17 with , 50% seg. Cytology pending , Consistent w SBP ( Subacute Bacterial peritonitis)   EGD revealed esophageal varicies  - On Lactulose, midodrine  - cefepime and flagyl started 1/16 for presumed SBP, cultures for ascites 1/17 are no growth so far  1/22- lactuose enema, ammonia 86  - lactulose enema

## 2023-01-26 NOTE — PROGRESS NOTES
Carlos Leung - Neurosurgery ([facility])  [facility] Transfer Acceptance Medicine  Progress Note    Patient Name: Jonny Isabel  MRN: [id number]  Patient Class: IP- Inpatient   Admission Date: 1/16/2023  Length of Stay: 9 days  Attending Physician: Pushpa Rasmussen MD  Primary Care Provider: Yuli Pérez Mai, MD        Subjective:     Principal Problem:Spinal stenosis        HPI:  62-year-old male with a PMHx of A-fibb (on Eliquis), hypertension, DM 2 (not insulin dependent) presents as transfer from OSH for chronic lower back pain  that has been worsening for the past week. Pt states that the pain is radiating from his right buttock down to his legs. Pt was unable to get out of bed this AM due to the pain. HE endorses having fecal incontinence for the past week with black tarry stools. He denies any fevers, chills, abdominal pain, urinary incontinence, or saddle anesthesia.      At OSH ED CT lumbar spine ordered revealing Prominent, cystic, multiloculated predominantly gas attenuation epidural  structure within the spinal canal at the level of L3-L4 resulting in severe narrowing of the spinal canal with mass effect upon the thecal sac and  vacuum disc phenomena concerning for diskitis or an epidural abscess. MRI shows large disc extrusion at L3-4 causing severe spinal canal stenosis, with moderate spinal canal stenosis at L2-3 and L4-5 and moderate neural foraminal narrowing L4-5 and L5-S1. Patient had no tenderness overlying spinous process but still endorsed sever pain, received percocet and robaxin. Of note, KENNETH showed blood in stool. The patient has a white count of 12.9, hemoglobin of 7.5, creatinine 4.6, Lactate of 3.9. Pt given Rocephin, Zosyn and Vanc.     Pt transferred to [facility] for further intervention with neurosurgery.          Hospital Course (updated, brief assessment by system or problem, significant events): Admitted to MICU for NSGY and GI evaluation . Hgb stable. EGD shows small varices with gastropathy, no active  bleeds. Worsening MARTITA with minimal UOP, HRS protocol started with levo, midodrine, octreotide and albumin trailed, has since been stopped due to low suspicion. Nephrology following. NSGY planned for surgery on Monday. U/O seems to be slightly improving. HDS.      Tasks (specific, using if-then statements): If Hbg <7, transfuse with pRBC, stop ppx heparin, repeat EGD     Contingency Plan (special circumstances anticipated and plan):   Nephrology following regarding worsening kidney function  Planned for NSGY on Monday for L3/L4 compression          Overview/Hospital Course:  1/22 -  cc. Lab pending. Transferred to step down unit. He may need a line a dialysis today. Nephrology following. /67   Pulse 101   Temp 98.7 °F (37.1 °C) (Axillary)   Resp 21 SpO2 98%  on 2 liters NC. Cultures still neg.   -  getting more and more confused and currently scoring a 14 on his CIWA. He states he is seeing bugs, tremors, confusion, anxious. give 1mg PRN ativan for CIWA.   - going back to ICU for line and HD, lactulose, xray abd, cxr done and pending. Pt is encephalpathic due to high ammonia, Uremia, alcohol withdrawal, and alcohol encephalopathy, and metabolic encephopathy due to sepsis.     ICU Hospital Course (updated, brief assessment by system or problem, significant events): Admitted to MICU for NSGY and GI evaluation . Hgb stable. EGD shows small varices with gastropathy, no active bleeds. Worsening MARTITA with minimal UOP, HRS protocol started with levo, midodrine, octreotide and albumin trailed, has since been stopped due to low suspicion. Nephrology following recommend HD in setting of Acute Renal Failure. NSGY initially planned surgical intervention for 1/23 but determine patient not medically optimized and currently suggesting multimodal pain control. A-fibb RVR 1/23 overnight, started on Amiodarone gtt, transitioned to home lopressor. Ongoing HD needs.       Contingency Plan (special circumstances anticipated  and plan):   - Mentation improving   - On going HD needs  - R hand cellulitis improving   - Touch base with [facility]GSY regarding surgery date       Interval History: see above    Review of Systems  Objective:     Vital Signs (Most Recent):  Temp: 98.4 °F (36.9 °C) (01/25/23 1120)  Pulse: 92 (01/25/23 1500)  Resp: (!) 23 (01/25/23 1500)  BP: 135/84 (01/25/23 1500)  SpO2: 96 % (01/25/23 1500)   Vital Signs (24h Range):  Temp:  [96.9 °F (36.1 °C)-98.4 °F (36.9 °C)] 98.4 °F (36.9 °C)  Pulse:  [] 92  Resp:  [12-40] 23  SpO2:  [92 %-99 %] 96 %  BP: ()/(51-93) 135/84     Weight: 110 kg (242 lb 8.1 oz)  Body mass index is 36.87 kg/m².    Intake/Output Summary (Last 24 hours) at 1/25/2023 1819  Last data filed at 1/25/2023 1030  Gross per 24 hour   Intake 379.43 ml   Output 805 ml   Net -425.57 ml      Physical Exam    Significant Labs: All pertinent labs within the past 24 hours have been reviewed.  CBC:   Recent Labs   Lab 01/24/23  0350 01/25/23  0329   WBC 14.56* 14.01*   HGB 9.1* 9.2*   HCT 28.0* 28.6*    144*     CMP:   Recent Labs   Lab 01/24/23  0350 01/25/23  0329    136   K 3.9 3.7    102   CO2 20* 21*   * 133*   BUN 13 20   CREATININE 2.6* 3.0*   CALCIUM 8.1* 8.3*   PROT 6.5 6.7   ALBUMIN 2.4* 2.1*   BILITOT 4.1* 3.7*   ALKPHOS 223* 211*   AST 95* 105*   ALT 45* 42   ANIONGAP 15 13       Significant Imaging: I have reviewed all pertinent imaging results/findings within the past 24 hours.      Assessment/Plan:      * Spinal stenosis  CT Lumbar Spine Without Contrast Result Date: 1/16/2023  1.  Prominent, cystic, multiloculated predominantly gas attenuation structure within the spinal canal at the level of L3-L4 with dimensions as above.  This is favored to be epidural in location and results in severe narrowing of the spinal canal with mass effect upon the thecal sac.  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 Large disc extrusion at L3-4, contributing to severe spinal canal stenosis, as above. Additional lumbar spondylosis, contributing to moderate spinal canal stenosis at L2-3 and L4-5 and moderate neural foraminal narrowing L4-5 and L5-S1     - neuro checks Q4H  - NSGY consulted, appreciate recommendations  - per Neurosurgery, patient is not currently medically optimized for intervention - suggesting multimodal pain control at this time       Needs Risk Stratification. So far:  High risk due to recent GI bleed, coagulapathy, sepsis, SPB with negative cultures, acute renal failure and may start HD soon, MELD 31.   Likely has chronic liver and kidney disease. Suspicion for infiltrative process in kidneys.   - may need to push back surgery to a later date  - follow pt through weekend  - xarelto on hold, on heparin    ECHO  /117/23:   The left ventricle is small with normal systolic function.  The estimated ejection fraction is 65%.  Normal left ventricular diastolic function.  Normal right ventricular size with normal right ventricular systolic function.  Normal central venous pressure (3 mmHg).  The estimated PA systolic pressure is 30 mmHg.  Mild tricuspid regurgitation.    Lumbar herniated disc  With Spinal stenosis, concern for epidural abscess  CT Lumbar Spine Without Contrast Result Date: 1/16/2023  1.  Prominent, cystic, multiloculated predominantly gas attenuation structure within the spinal canal at the level of L3-L4 with dimensions as above.  This is favored to be epidural in location and results in severe narrowing of the spinal canal with mass effect upon the thecal sac.  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 Large disc extrusion at L3-4, contributing to severe spinal canal stenosis, as above. Additional lumbar spondylosis, contributing to moderate spinal canal stenosis at L2-3 and L4-5 and moderate  neural foraminal narrowing L4-5 and L5-S1      Per Neurosurgery    -Will tentatively plan for OR Monday for laminectomy/discectomy pending medical stablization/optimization &  documented risk stratification  - appreciated risk stratification from primary team, patient with multiple ongoing comorbidities  - no HOB restrictions   - MAPs >65, requiring pressors  - hold Xarelto, PT and INR elevated on admission // will need Xarelto held for 3-5 days prior to OR and/or coagulapathy corrected      Diabetes    -Last A1c reviewed-         Lab Results   Component Value Date     HGBA1C 5.3 11/10/2022         Home Antihyperglycemic Regiment:  - Metformin  1000 mg BID      Inpatient Antihyperglycemic Regiment:  Antihyperglycemics (From admission, onward)        Start     Stop Route Frequency Ordered     01/16/23 1507   insulin aspart U-100 pen 0-5 Units         -- SubQ Before meals & nightly PRN 01/16/23 1407             - LDSSI  - Clear liquid diet for now      Blood Sugars (AccuCheck):     Recent Labs     01/23/23  1154 01/23/23  1615 01/23/23  2235 01/24/23  1235 01/24/23  2236 01/25/23  1119   POCTGLUCOSE 174* 151* 154* 137* 160* 143*        - LDSSI  - Clear liquid diet for now           ATN (acute tubular necrosis)  Patient with acute kidney injury likely due to acute tubular necrosis vs ARS.  MARTITA is currently worsening. Labs reviewed- Renal function/electrolytes with Estimated Creatinine Clearance: 31.1 mL/min (A) (based on SCr of 3 mg/dL (H)). according to latest data. Monitor urine output and serial BMP and adjust therapy as needed. Avoid nephrotoxins and renally dose meds for GFR listed above.     Creatinine 4.7 on admit, baseline around 0.8. Pre-renal vs ATN. Less likely obstruction as pt has gomez      - Check urine lytes and renal ultrasound  - Check urine protein creatinine ratio.  - Strict I&Os and daily weights   - Avoid nephrotoxic agents such as NSAIDs, gadolinium and IV radiocontrast.  - Renally dose meds to  current GFR.  - Maintain MAP > 65. Off of levophed  - Started on HRS protocol with midodrine 10mg TID, Octreotide, and Albumin 25g BID. Stopped due to low suspicion    1/21-  Cr 7.5 , Nephrology following:  Urine sodium 10, urine protein creatinine ratio 0.89. IgA 505. Urine microscopy: moderated (2-3 per field) granular casts with calcium oxylate crystals embedded in casts.  Uo improving.   1/22- discuss w Nephrology, Hos Med and cc . May need line and HD    1/25- cr improved. Nephrology following         MARTITA (acute kidney injury)  Present upon admission: Creatinine 4.7 on admit, baseline around 0.8. Pre-renal vs ATN. Less likely obstruction as pt has gomez.  Renal ultrasound with medical renal disease and no evidence of hydronephrosis.  MARTITA on CKD  US - suggestive of medical renal disease  See ATN  - to ICU for line and HD  - Nephrology consulted, appreciate recommendations  - iHD per nephrology  - Strict I&Os and daily weights   - Avoid nephrotoxic agents such as NSAIDs, gadolinium and IV radiocontrast.  - Renally dose meds to current GFR.  - Maintain MAP > 65. Off of levophed  - Started on HRS protocol with midodrine 10mg TID, Octreotide, and Albumin 25g BID. Stopped due to low suspicion and hypertension.    1/25- on HD, failed lasix trial. CT scan w contrast ordered to eval for abd distension and renal obstruction.             Atrial fibrillation  Patient with Persistent (7 days or more) atrial fibrillation which is controlled currently with Beta Blocker. Patient is currently in atrial fibrillation.[id number] Score: 1  Hx of GI bleeding-  varicies on EGD. Anticoagulation not indicated due to holding NOAC in anticipation of surgery..    - Maintain K > 4, Mag > 2 and Ca/iCal WNL to decrease arrhythmogenic potential  - On lopressor 100 mg QD, holding as pt likely has GI bleed  - Starting Heparin gtt ppx in the setting of no acute GI bleed  - Holding lopressor 100 mg QD,  given recent GI bleed and current AMS  -  Holding anticoagulation in the setting of recent GI bleed and current AMS  - Amnio gtt started, transition to home lopressor at 1/2 dose 50 q day    Alcohol withdrawal syndrome without complication  On thiamine,   /72  Pulse 90   1/22- received ativan IV for CIWA protocol, hallucinations, agitation,  tremor, tachycarda.    Alcohol abuse   Vitals q4h while awake  - CIWA monitoring  - Ativan 2mg q4 PRN if 2 criteria are met: Systolic BP>160, Diastolic BP >110 or Pulse >110  - Start Vitamin supplementation- Thiamine, Folic acid, Vit. B12, and Multivitamin   - S/P valium taper    Alcoholic cirrhosis of liver with ascites  S/p diagnostic paracentesis on 1/17 with , 50% seg. Cytology pending , Consistent w SBP ( Subacute Bacterial peritonitis)   EGD revealed esophageal varicies  - On Lactulose, midodrine  - cefepime and flagyl started 1/16 for presumed SBP, cultures for ascites 1/17 are no growth so far  1/22- lactuose enema, ammonia 86  - lactulose enema    Elevated liver enzymes  Likely 2/2 to underlying alcohol abuse and hepatic steatosis.      - Daily CMP, PT/INR   - Complete workup up with tylenol level, acute hep panel, a1-antitrypsin, anti smith antibody, HIV, anti mitochondrial antibody, anca, anti-liver, aFP, PETH pending   - Liver US with doppler shows hepatomegaly with hepatic steatosis, cholelithiasis versus gallbladder sludge, small volume ascites, and left pleural effusion.  - MELD 31-> 25     MELD-Na score: 25 at 1/25/2023  3:29 AM  MELD score: 24 at 1/25/2023  3:29 AM  Calculated from:  Serum Creatinine: 3.0 mg/dL at 1/25/2023  3:29 AM  Serum Sodium: 136 mmol/L at 1/25/2023  3:29 AM  Total Bilirubin: 3.7 mg/dL at 1/25/2023  3:29 AM  INR(ratio): 1.2 at 1/25/2023  3:29 AM  Age: 62 years   Likely 2/2 to underlying alcohol abuse and hepatic steatosis.      - Daily CMP, PT/INR   - Complete workup up with tylenol level, acute hep panel, a1-antitrypsin, anti smith antibody, HIV, anti mitochondrial  antibody, anca, anti-liver, aFP, PETH pending   - Liver US with doppler shows epatomegaly with hepatic steatosis, cholelithiasis versus gallbladder sludge, small volume ascites, and left pleural effusion.         Coagulopathy  INR 1.7  Consider vit K    Severe sepsis  This patient does have evidence of infective focus  My overall impression is septic shock.  Source: Unknown  Antibiotics given-   Antibiotics (From admission, onward)        Start     Stop Route Frequency Ordered     01/20/23 1400   metroNIDAZOLE tablet 500 mg         -- Oral Every 8 hours 01/20/23 1109     01/18/23 0900   mupirocin 2 % ointment         01/23 0859 Nasl 2 times daily 01/18/23 0354     01/16/23 2100   ceFEPIme (MAXIPIME) 2 g in dextrose 5 % in water (D5W) 5 % 50 mL IVPB (MB+)         -- IV Every 24 hours (non-standard times) 01/16/23 1354             Latest lactate reviewed-  No results for input(s): LACTATE in the last 72 hours.  Organ dysfunction indicated by Acute kidney injury     - On Vancomycin, Cefepime, and Flagyl, Vanc d/c 1/20  - BCx negative - checked 1/22  - SBP work up negative  - Will de-escalate once appropriate   -covering for SBP      This patient does have evidence of infective focus  My overall impression is septic shock.  Source: Unknown  Antibiotics given-             Antibiotics (From admission, onward)     Start     Stop Route Frequency Ordered     01/24/23 0915   vancomycin (VANCOCIN) 500 mg in dextrose 5 % in water (D5W) 5 % 100 mL IVPB (MB+)         -- IV Once 01/24/23 0808     01/23/23 1630   vancomycin - pharmacy to dose  (vancomycin IVPB)        See Hyperspace for full Linked Orders Report.    -- IV pharmacy to manage frequency 01/23/23 1531     01/16/23 2100   ceFEPIme (MAXIPIME) 2 g in dextrose 5 % in water (D5W) 5 % 50 mL IVPB (MB+)         -- IV Every 24 hours (non-standard times) 01/16/23 1354          Latest lactate reviewed-       Recent Labs   Lab 01/21/23  1511 01/22/23  0936   LACTATE 1.0 1.0       Organ dysfunction indicated by Acute kidney injury     - On Vancomycin, Cefepime, and Flagyl, Vanc d/c 1/20 - vancomycin restarted given concern for right arm cellulitis  - BCx negative  - SBP work up negative  - Will de-escalate once appropriate     GI bleed  Concerning for variceal bleed given hx of alcohol abuse.      - GI consulted, recommend clear liquid for now   -Transfuse pRBC for Hb < 7 g/dL (Consider a higher Hb target if there is clinical evidence of intravascular volume depletion or comorbidities, such as CAD or if high suspicion of vigorous active ongoing bleeding or an uncorrected coagulopathy exists.).  - s/p vitamin K given Pt/INR > 2   - PPI 80 mg IV bolus once, then IV PPI 40 BID  -Avoid nonsteroidal agents, antiplatelet agents and anticoagulants if possible in patients without absolute contraindications. (consult managing provider if required for cardiovascular protection).  - EGD showed  Small (< 5 mm) esophageal varices with portal hypertensive gastropathy    Cellulitis  R hand seems to be cellulitic in appearance. Extending up to forearm.      Plan:  - Vanc and Cefepime    Hypervolemia        Goals of care, counseling/discussion  Advance Care Planning   Per ICU upon admission:  - Pt has three sons that he is estranged from & has not seen in 20 years. Pt identified his girlfriend of 15 years, Adelita Bingham, as his surrogate decision maker in the event that he becomes incapacitated & cannot make decisions for himself          Patient does not appear to have relatives.  He will need possible decision maker/power of  if his overall mental status does not improve.     - social work consulted for family assessment                                  VTE Risk Mitigation (From admission, onward)           Ordered     heparin (porcine) injection 5,000 Units  Every 8 hours         01/20/23 1108     Place sequential compression device  Until discontinued         01/18/23 0912                     Discharge Planning   DALILA: 1/26/2023     Code Status: Full Code   Is the patient medically ready for discharge?: No    Reason for patient still in hospital (select all that apply): Patient trending condition  Discharge Plan A: Rehab   Discharge Delays: None known at this time          Pushpa Rasmussen MD  Senior Hospitalist  Department of Hospital Medicine  [facility]  [id number], [phone]     [facility] - Neurosurgery ([facility])

## 2023-01-26 NOTE — PT/OT/SLP RE-EVAL
"Occupational Therapy   [facility] and Treatment     Name: Jonny Isabel  MRN: [id number]  Admitting Diagnosis:  Spinal stenosis  Recent Surgery: Procedure(s) (LRB):  EGD (ESOPHAGOGASTRODUODENOSCOPY) (N/A) 9 Days Post-Op    Recommendations:     Discharge Recommendations: rehabilitation facility  Discharge Equipment Recommendations: other (see comments) (TBD)  Barriers to discharge:  Other (Comment) (increased (A) required)    Assessment:     Jonny Isabel is a 62 y.o. male with a medical diagnosis of Spinal stenosis.  He presents with the following performance deficits affecting function are weakness, impaired endurance, impaired self care skills, impaired functional mobility, gait instability, impaired balance, decreased coordination, decreased upper extremity function, decreased lower extremity function, impaired fine motor.  Pt presents with good participation for therapy and demonstrated good motivation. Pt currently requires increased (A) for all ADLs and functional mobility due to aforementioned deficits, generalized weakness, and is a high fall risk at this time. Pt would benefit from continued skilled acute OT services in order to maximize (I) and with ADLs and functional mobility to ensure safe return to PLOF in the least restrictive environment. OT recommending IPR once pt is medically appropriate for d/c.     Rehab Prognosis:  Good; patient would benefit from acute skilled OT services to address these deficits and reach maximum level of function.       Plan:     Patient to be seen 4 x/week to address the above listed problems via self-care/home management, therapeutic activities, therapeutic exercises, neuromuscular re-education  Plan of Care Expires: 02/25/23  Plan of Care Reviewed with: patient    Subjective   "It feels good to sit up"  Chief Complaint: Dizziness when sitting EOB intially   Patient/Family stated goals: Return to PLOF  Pain/Comfort:  Pain Rating 1: 0/10    Objective:     Communicated " with: RN prior to session.  Patient found HOB elevated with: telemetry, gomez catheter, Trialysis, bed alarm upon OT entry to room.    General Precautions: Standard, aspiration, fall  Orthopedic Precautions: N/A  Braces: N/A    Occupational Performance:    Bed Mobility:    Patient completed Rolling/Turning to Left with  minimum assistance  Patient completed Rolling/Turning to Right with moderate assistance  Patient completed Supine to Sit with moderate assistance  Patient completed Sit to Supine with minimum assistance    Functional Mobility/Transfers:  Patient completed Sit <> Stand Transfer with moderate assistance and of 2 persons  with  no assistive device   Functional Mobility: Pt demonstrated ability to take 4 R lateral steps with Mod A x2 and verbal cues for sequencing and safety.     Activities of Daily Living:  Grooming: denied oral hygeine at this time pt left with supplies on bedside table   Upper Body Dressing: moderate assistance to doff/don hospital gown while sitting EOB   Toileting: pt presents with gomez     Cognitive/Visual Perceptual:  Cognitive/Psychosocial Skills:     -       Oriented to: Person, ( required verbal cues for Place, Time, and Situation)   -       Follows Commands/attention: One step commands   -       Communication: Clear  Visual/Perceptual: Intact, pt presents with glasses    Physical Exam:  Balance:    -       Fair sitting balance and Poor standing balance   Postural examination/scapula alignment:    -       Rounded shoulders  Edema:  None noted  Sensation:    -       Intact  Upper Extremity Range of Motion:     -       Right Upper Extremity: WFL  -       Left Upper Extremity: WFL  Upper Extremity Strength:    -       Right Upper Extremity: Fair  -       Left Upper Extremity: Fair   Strength:    -       Right Upper Extremity: WFL  -       Left Upper Extremity: WFL   Fine Motor: Intact R/L thumb opposition, Impaired dysdiadochokinesia in B hands. Pt endorses having difficulty  with fine motor tasks.     [facility] 6 Click:  [facility] Total Score: 15    Treatment & Education:  Pt sat EOB with SBA-Min A depending on BUE support and fatigue. Pt required verbal and tactile cues for facilitating trunk extension for upright posture.   Pt  educated on:   Role of OT, POC, and d/c planning.   Importance of OOB activities to increase endurance and tolerance for increased participation in daily ADLs.   Utilizing the call bell to request for assistance with all functional mobility to ensure safety during hospital stay.      Pt verbalized understanding and all questions were addressed within the scope of OT.       Patient left HOB elevated with all lines intact, call button in reach, bed alarm on, and RN notified    GOALS:   Multidisciplinary Problems       Occupational Therapy Goals          Problem: Occupational Therapy    Goal Priority Disciplines Outcome Interventions   Occupational Therapy Goal     OT, PT/OT Ongoing, Progressing    Description: Goals to be met by: 14 days 2/9/23    Patient will increase functional independence with ADLs by performing:    Pt to completed self feeding independently.   Pt to complete standing g/h skills with CGA.   Pt to complete UE dressing with set-up  Pt to complete LE dressing with MIN with AE as needed.   Pt to complete toileting with MIN A   Pt to complete t/f to BSC with MIN A   Pt to complete sit to stand for improved (I) with occupations of choice with MIN A                       History:     Past Medical History:   Diagnosis Date    A-fib          Past Surgical History:   Procedure Laterality Date    ESOPHAGOGASTRODUODENOSCOPY N/A 1/17/2023    Procedure: EGD (ESOPHAGOGASTRODUODENOSCOPY);  Surgeon: Dewayne Navas MD;  Location: [address]);  Service: Endoscopy;  Laterality: N/A;       Time Tracking:     OT Date of Treatment: 01/26/23  OT Start Time: 0930  OT Stop Time: 1002  OT Total Time (min): 32 min    Billable Minutes:Re-Evaluation 9  Therapeutic  Activity 23    1/26/2023    Co-evaluation/treatment performed due to patient's multiple deficits requiring two skilled therapists to appropriately and safely assess patient's strength, endurance, functional mobility, and ADL performance while facilitating functional tasks in addition to accommodating for patient's activity tolerance and medical acuity.

## 2023-01-26 NOTE — PLAN OF CARE
OT re-eval completed. OT goals and POC updated.     Problem: Occupational Therapy  Goal: Occupational Therapy Goal  Description: Goals to be met by: 14 days 2/9/23    Patient will increase functional independence with ADLs by performing:    Pt to completed self feeding independently.   Pt to complete standing g/h skills with CGA.   Pt to complete UE dressing with set-up  Pt to complete LE dressing with MIN with AE as needed.   Pt to complete toileting with MIN A   Pt to complete t/f to BSC with MIN A   Pt to complete sit to stand for improved (I) with occupations of choice with MIN A  1/26/2023 1515 by Rambo Parnell, OT  Outcome: Ongoing, Progressing  1/26/2023 1128 by Rambo Parnell, OT  Outcome: Ongoing, Progressing

## 2023-01-26 NOTE — HPI
Jonny Isabel is a 62-year-old male with PMHx of HTN, DM, alcohol abuse, afib/flutter (Xarelto). Patient presented to [facility] on 1/16/23 for chronic lower back pain. On arrival, MRI Lumbar Spine revealed congenital narrowing of lumbar spinal canal with prominent epidural fat. Large disc extrusion at L3-4, contributing to severe spinal canal stenosis, as above. Additional lumbar spondylosis, contributing to moderate spinal canal stenosis at L2-3 and L4-5 and moderate neural foraminal narrowing L4-5 and L5-S1. Per Neurosurgery, patient is not currently medically optimized for intervention - suggesting multimodal pain control at this time. Hospital course complicated by alcoholic cirrhosis with ascites (S/p diagnostic paracentesis on 1/17, consistent w SBP (subacute Bacterial peritonitis). EGD revealed esophageal varices. Continues cn Lactulose, midodrine, cefepime and flagyl started 1/16 for presumed SBP, cultures for ascites 1/17 are no growth so far, abx stopped), R hand cellulitis (was on Vanc and Cefepime, no further abx) and MARTITA (Nephrology consulted and UOP responsive to 240 IV lasix 1/25. He did require RRT 1/22 for metabolic encephalopathy and work of breathing).      Functional History: Patient lives with girlfriend in first floor apartment. Prior to admission, Anam. DME: .

## 2023-01-26 NOTE — SUBJECTIVE & OBJECTIVE
Interval History: stepped down to hospital medicine. Mentation continues to be stable. Received 240 mg of lasix yesterday and had 700 cc UOP    Review of patient's allergies indicates:  No Known Allergies  Current Facility-Administered Medications   Medication Frequency    0.9%  NaCl infusion (for blood administration) Q24H PRN    albuterol-ipratropium 2.5 mg-0.5 mg/3 mL nebulizer solution 3 mL Q6H PRN    dextrose 10% bolus 125 mL 125 mL PRN    dextrose 10% bolus 250 mL 250 mL PRN    folic acid tablet 1 mg Daily    glucagon (human recombinant) injection 1 mg PRN    glucose chewable tablet 16 g PRN    glucose chewable tablet 24 g PRN    heparin (porcine) injection 5,000 Units Q8H    HYDROmorphone injection 0.2 mg Q6H PRN    insulin aspart U-100 pen 0-5 Units QID (AC + HS) PRN    lactulose 20 gram/30 mL solution Soln 10 g TID    LIDOcaine-prilocaine cream PRN    metoprolol injection 5 mg Q5 Min PRN    metoprolol tartrate (LOPRESSOR) tablet 25 mg BID    multivitamin tablet Daily    pantoprazole EC tablet 40 mg BID AC    thiamine tablet 100 mg Daily       Objective:     Vital Signs (Most Recent):  Temp: 98 °F (36.7 °C) (01/26/23 0813)  Pulse: 84 (01/26/23 0813)  Resp: 18 (01/26/23 0813)  BP: 113/63 (01/26/23 0813)  SpO2: (!) 94 % (01/26/23 0813)   Vital Signs (24h Range):  Temp:  [97.4 °F (36.3 °C)-98.9 °F (37.2 °C)] 98 °F (36.7 °C)  Pulse:  [] 84  Resp:  [17-40] 18  SpO2:  [92 %-97 %] 94 %  BP: (101-135)/(59-88) 113/63     Weight: 110 kg (242 lb 8.1 oz) (01/23/23 1408)  Body mass index is 36.87 kg/m².  Body surface area is 2.3 meters squared.    I/O last 3 completed shifts:  In: 1379.4 [P.O.:1190; IV Piggyback:189.4]  Out: 1505 [Urine:1155; Stool:350]    Physical Exam  Constitutional:       General: He is not in acute distress.     Appearance: He is obese. He is not ill-appearing or toxic-appearing.   HENT:      Head: Normocephalic and atraumatic.      Nose: Nose normal.      Mouth/Throat:      Mouth: Mucous  membranes are moist.   Eyes:      General:         Right eye: No discharge.         Left eye: No discharge.      Pupils: Pupils are equal, round, and reactive to light.   Cardiovascular:      Heart sounds: Murmur heard.   Abdominal:      General: There is distension.      Tenderness: There is no abdominal tenderness.   Musculoskeletal:         General: Normal range of motion.      Cervical back: Normal range of motion.      Right lower leg: Edema present.      Left lower leg: Edema present.   Skin:     General: Skin is warm.      Coloration: Skin is pale.      Findings: No lesion.   Neurological:      Mental Status: He is alert and oriented to person, place, and time.       Significant Labs:  All labs within the past 24 hours have been reviewed.     Significant Imaging:  Labs: Reviewed

## 2023-01-26 NOTE — ASSESSMENT & PLAN NOTE
Present upon admission: Creatinine 4.7 on admit, baseline around 0.8. Pre-renal vs ATN. Less likely obstruction as pt has gomez.  Renal ultrasound with medical renal disease and no evidence of hydronephrosis.  MARTITA on CKD  US - suggestive of medical renal disease  See ATN  - to ICU for line and HD  - Nephrology consulted, appreciate recommendations  - iHD per nephrology  - Strict I&Os and daily weights   - Avoid nephrotoxic agents such as NSAIDs, gadolinium and IV radiocontrast.  - Renally dose meds to current GFR.  - Maintain MAP > 65. Off of levophed  - Started on HRS protocol with midodrine 10mg TID, Octreotide, and Albumin 25g BID. Stopped due to low suspicion and hypertension.    1/25- on HD, failed lasix trial. CT scan w contrast ordered to eval for abd distension and renal obstruction.

## 2023-01-26 NOTE — ASSESSMENT & PLAN NOTE
-Last A1c reviewed-         Lab Results   Component Value Date     HGBA1C 5.3 11/10/2022         Home Antihyperglycemic Regiment:  - Metformin  1000 mg BID      Inpatient Antihyperglycemic Regiment:  Antihyperglycemics (From admission, onward)        Start     Stop Route Frequency Ordered     01/16/23 1507   insulin aspart U-100 pen 0-5 Units         -- SubQ Before meals & nightly PRN 01/16/23 1407             - LDSSI  - Clear liquid diet for now      Blood Sugars (AccuCheck):     Recent Labs     01/23/23  1154 01/23/23  1615 01/23/23  2235 01/24/23  1235 01/24/23  2236 01/25/23  1119   POCTGLUCOSE 174* 151* 154* 137* 160* 143*        - LDSSI  - Clear liquid diet for now

## 2023-01-26 NOTE — PLAN OF CARE
PT re-evaluation completed, goals reviewed and revised as appropriate    Problem: Physical Therapy  Goal: Physical Therapy Goal  Description: Goals to be met by: 23     Patient will increase functional independence with mobility by performin. Supine to sit with Contact Guard Assistance-not met  2. Rolling to Right with Contact Guard Assistance. -not met  3. Sit to stand transfer with  Contact Guard Assistance-not met  4. Bed to chair transfer with Minimal Assistance using AD if needed -not met   5. Gait  x 30 feet with Minimal Assistance using AD if needed. -not met  6. Sitting at edge of bed x8 minutes with Supervision -not met    Outcome: Ongoing, Progressing

## 2023-01-26 NOTE — ASSESSMENT & PLAN NOTE
Jonny Isabel is a 62 y.o. male with alcoholic liver disease, alcohol use disorder, Afib (s/p ablation and DCCV x2, on Xarelto), and HTN who presented with an epidural abscess and spinal stenosis. Prolonged course since admission. Has a recent CT concerning for SBO based off small bowel dilatation.     - Patient seen and examined. Labs and imaging reviewed  - Small bowel is more dilated compared to previous CT scan, however clinically he is not obstructed. He is passing flatus and having bowel movements and has no nausea or vomiting. Has more ascites on the scan and suspect this is contributing to his increasing distension  - No role for surgical intervention at this time  - KUB this AM to see if contrast has continued to progress through abdomen  - Should he develop nausea or vomiting then can consider NGT placement. Would hold off for now however given he is having bowel function and does not have this.  - Remainder of care per primary. Please call with questions.

## 2023-01-26 NOTE — CONSULTS
[facility] - Neurosurgery ([facility])  General Surgery  Consult Note    Patient Name: Jonny Isabel  MRN: [id number]  Code Status: Full Code  Admission Date: 1/16/2023  Hospital Length of Stay: 10 days  Attending Physician: Pushpa Rasmussen MD  Primary Care Provider: Yuli Pérez Mai, MD    Patient information was obtained from patient, past medical records and ER records.     Inpatient consult to General Surgery  Consult performed by: Raine Mock MD  Consult ordered by: Olu Oquendo MD        Subjective:     Principal Problem: Spinal stenosis    History of Present Illness: Jonny Isabel is a 62M with history of alcoholic liver disease, alcohol use disorder, Afib (s/p ablation and DCCV x2, on Xarelto), HTN, GIB, and chronic hyponatremia who presented as a transfer for epidural abscess and L3/L4 disc herniation. He was admitted to the MICU and NSGY was consulted for evaluation. Ultimately surgical intervention has been deferred until he is medically optimized/stable. GI was also consulted for ongoing melena. An EGD was performed which showed esophageal varices and portal gastropathy but no active bleeds. He has had a prolonged course while here with renal failure now on HD, a fib with RVR, SBP, and cellulitis. General surgery has been previously consulted due to concern for SBO based on a radiology report. At the time he was passing flatus and having melena and so it was felt he was not obstructed and surgery signed off. Yesterday he was noted to be more distended and so an KUB was obtained which showed dilated loops of bowel. This was followed by a CT which showed diffuse small bowel dilatation up to 4.9 cm without a TP. There was no PO contrast in the cecum yet. Also noted to have amuch more ascites in the abdomen compared to his prior scan. He has continued to have bowel function however and is on lactulose to help prevent HE. He denies abdominal pain, nausea, or vomiting. General surgery reconsulted due to  concern for SBO. He denies former abdominal surgeries or hernias.       No current facility-administered medications on file prior to encounter.     Current Outpatient Medications on File Prior to Encounter   Medication Sig    aspirin (ECOTRIN) 81 MG EC tablet Take 81 mg by mouth once daily.    cyclobenzaprine (FLEXERIL) 10 MG tablet Take 10 mg by mouth 2 (two) times daily as needed.    lisinopriL-hydrochlorothiazide (PRINZIDE,ZESTORETIC) 10-12.5 mg per tablet Take 1 tablet by mouth once daily.    metFORMIN (GLUCOPHAGE) 500 MG tablet Take 1,000 mg by mouth 2 (two) times daily.    metoprolol tartrate (LOPRESSOR) 100 MG tablet Take 100 mg by mouth once daily.    nicotine (NICODERM CQ) 14 mg/24 hr 1 patch every morning.    nicotine, polacrilex, (NICORETTE) 2 mg Gum SMARTSI Each By Mouth Every 2 Hours PRN    omega-3 fatty acids 1,000 mg Cap Take 2 g by mouth once daily.    simvastatin (ZOCOR) 40 MG tablet Take 40 mg by mouth every evening.    traZODone (DESYREL) 50 MG tablet Take 50 mg by mouth nightly as needed.    VITAMIN B COMPLEX ORAL Take 1 tablet by mouth once daily.    XARELTO 20 mg Tab Take 20 mg by mouth once daily.       Review of patient's allergies indicates:  No Known Allergies    Past Medical History:   Diagnosis Date    A-fib      Past Surgical History:   Procedure Laterality Date    ESOPHAGOGASTRODUODENOSCOPY N/A 2023    Procedure: EGD (ESOPHAGOGASTRODUODENOSCOPY);  Surgeon: Dewayne Navas MD;  Location: [address];  Service: Endoscopy;  Laterality: N/A;     Family History    None       Tobacco Use    Smoking status: Not on file    Smokeless tobacco: Not on file   Substance and Sexual Activity    Alcohol use: Yes     Alcohol/week: 6.0 standard drinks     Types: 6 Cans of beer per week    Drug use: Not on file    Sexual activity: Not on file     Review of Systems   Constitutional:  Positive for activity change. Negative for chills and fever.   HENT:  Negative for  congestion and trouble swallowing.    Eyes:  Negative for pain and visual disturbance.   Respiratory:  Negative for cough and shortness of breath.    Cardiovascular:  Negative for chest pain and leg swelling.   Gastrointestinal:  Positive for abdominal distention and blood in stool. Negative for abdominal pain, diarrhea, nausea and vomiting.        +fecal incontinence   Genitourinary:  Negative for difficulty urinating and dysuria.   Musculoskeletal:  Positive for back pain. Negative for myalgias.   Skin:  Negative for color change and rash.   Neurological:  Negative for weakness and headaches.   Psychiatric/Behavioral:  Positive for confusion. Negative for agitation.    Objective:     Vital Signs (Most Recent):  Temp: 98.3 °F (36.8 °C) (01/26/23 0445)  Pulse: 83 (01/26/23 0445)  Resp: 18 (01/26/23 0445)  BP: 120/69 (01/26/23 0445)  SpO2: (!) 94 % (01/26/23 0445)   Vital Signs (24h Range):  Temp:  [97.4 °F (36.3 °C)-98.9 °F (37.2 °C)] 98.3 °F (36.8 °C)  Pulse:  [] 83  Resp:  [17-40] 18  SpO2:  [92 %-97 %] 94 %  BP: (101-160)/(53-88) 120/69     Weight: 110 kg (242 lb 8.1 oz)  Body mass index is 36.87 kg/m².    Physical Exam  Vitals and nursing note reviewed.   Constitutional:       General: He is not in acute distress.     Appearance: He is not ill-appearing, toxic-appearing or diaphoretic.   HENT:      Mouth/Throat:      Pharynx: Oropharynx is clear. No oropharyngeal exudate.   Eyes:      General: No scleral icterus.     Extraocular Movements: Extraocular movements intact.   Cardiovascular:      Rate and Rhythm: Normal rate and regular rhythm.   Pulmonary:      Effort: Pulmonary effort is normal. No respiratory distress.   Abdominal:      Comments: Rounded abdomen with distension. Soft and non-tender.  No hernias   Genitourinary:     Comments: Bose present  Musculoskeletal:         General: Normal range of motion.      Comments: 2+ pitting edema BLE   Neurological:      General: No focal deficit present.       Cranial Nerves: No cranial nerve deficit.   Psychiatric:         Mood and Affect: Mood normal.         Behavior: Behavior normal.       Significant Labs:  I have reviewed all pertinent lab results within the past 24 hours.  CBC:   Recent Labs   Lab 01/26/23  0346   WBC 13.60*   RBC 2.84*   HGB 8.9*   HCT 27.0*   *   MCV 95   MCH 31.3*   MCHC 33.0     CMP:   Recent Labs   Lab 01/26/23  0346   *   CALCIUM 8.2*   ALBUMIN 2.0*   PROT 6.4   *   K 3.6   CO2 23      BUN 25*   CREATININE 3.1*   ALKPHOS 182*   ALT 35   AST 97*   BILITOT 3.6*       Significant Diagnostics:  I have reviewed all pertinent imaging results/findings within the past 24 hours.        Assessment/Plan:     * Spinal stenosis  Jonny Isabel is a 62 y.o. male with alcoholic liver disease, alcohol use disorder, Afib (s/p ablation and DCCV x2, on Xarelto), and HTN who presented with an epidural abscess and spinal stenosis. Prolonged course since admission. Has a recent CT concerning for SBO based off small bowel dilatation.     - Patient seen and examined. Labs and imaging reviewed  - Small bowel is more dilated compared to previous CT scan, however clinically he is not obstructed. He is passing flatus and having bowel movements and has no nausea or vomiting. Has more ascites on the scan and suspect this is contributing to his increasing distension  - No role for surgical intervention at this time  - KUB this AM to see if contrast has continued to progress through abdomen  - Should he develop nausea or vomiting then can consider NGT placement. Would hold off for now however given he is having bowel function and does not have this.  - Remainder of care per primary. Please call with questions.       VTE Risk Mitigation (From admission, onward)         Ordered     heparin (porcine) injection 5,000 Units  Every 8 hours         01/20/23 1108     Place sequential compression device  Until discontinued         01/18/23 0912                 Thank you for your consult. I will follow-up with patient. Please contact us if you have any additional questions.    Raine Mock MD  General Surgery  [facility] ([facility])   No

## 2023-01-26 NOTE — ASSESSMENT & PLAN NOTE
Patient with acute kidney injury likely due to acute tubular necrosis vs ARS.  MARTITA is currently worsening. Labs reviewed- Renal function/electrolytes with Estimated Creatinine Clearance: 31.1 mL/min (A) (based on SCr of 3 mg/dL (H)). according to latest data. Monitor urine output and serial BMP and adjust therapy as needed. Avoid nephrotoxins and renally dose meds for GFR listed above.     Creatinine 4.7 on admit, baseline around 0.8. Pre-renal vs ATN. Less likely obstruction as pt has gomez      - Check urine lytes and renal ultrasound  - Check urine protein creatinine ratio.  - Strict I&Os and daily weights   - Avoid nephrotoxic agents such as NSAIDs, gadolinium and IV radiocontrast.  - Renally dose meds to current GFR.  - Maintain MAP > 65. Off of levophed  - Started on HRS protocol with midodrine 10mg TID, Octreotide, and Albumin 25g BID. Stopped due to low suspicion    1/21-  Cr 7.5 , Nephrology following:  Urine sodium 10, urine protein creatinine ratio 0.89. IgA 505. Urine microscopy: moderated (2-3 per field) granular casts with calcium oxylate crystals embedded in casts.  Uo improving.   1/22- discuss w Nephrology, Hos Med and cc . May need line and HD    1/25- cr improved. Nephrology following

## 2023-01-27 PROBLEM — R53.81 DEBILITY: Status: ACTIVE | Noted: 2023-01-27

## 2023-01-27 PROBLEM — Z51.5 PALLIATIVE CARE ENCOUNTER: Status: ACTIVE | Noted: 2023-01-27

## 2023-01-27 PROBLEM — Z71.89 ADVANCED CARE PLANNING/COUNSELING DISCUSSION: Status: ACTIVE | Noted: 2023-01-27

## 2023-01-27 PROBLEM — A41.9 SEVERE SEPSIS: Status: RESOLVED | Noted: 2023-01-16 | Resolved: 2023-01-27

## 2023-01-27 PROBLEM — R65.20 SEVERE SEPSIS: Status: RESOLVED | Noted: 2023-01-16 | Resolved: 2023-01-27

## 2023-01-27 LAB
ALBUMIN SERPL BCP-MCNC: 2 G/DL (ref 3.5–5.2)
ALLENS TEST: ABNORMAL
ALLENS TEST: ABNORMAL
ALP SERPL-CCNC: 193 U/L (ref 55–135)
ALT SERPL W/O P-5'-P-CCNC: 38 U/L (ref 10–44)
ANION GAP SERPL CALC-SCNC: 13 MMOL/L (ref 8–16)
ANION GAP SERPL CALC-SCNC: 14 MMOL/L (ref 8–16)
AST SERPL-CCNC: 101 U/L (ref 10–40)
BACTERIA BLD CULT: NORMAL
BASOPHILS # BLD AUTO: 0.13 K/UL (ref 0–0.2)
BASOPHILS # BLD AUTO: 0.16 K/UL (ref 0–0.2)
BASOPHILS NFR BLD: 0.8 % (ref 0–1.9)
BASOPHILS NFR BLD: 1 % (ref 0–1.9)
BILIRUB SERPL-MCNC: 3.7 MG/DL (ref 0.1–1)
BNP SERPL-MCNC: 512 PG/ML (ref 0–99)
BUN SERPL-MCNC: 28 MG/DL (ref 8–23)
BUN SERPL-MCNC: 28 MG/DL (ref 8–23)
CALCIUM SERPL-MCNC: 8.4 MG/DL (ref 8.7–10.5)
CALCIUM SERPL-MCNC: 8.6 MG/DL (ref 8.7–10.5)
CHLORIDE SERPL-SCNC: 101 MMOL/L (ref 95–110)
CHLORIDE SERPL-SCNC: 102 MMOL/L (ref 95–110)
CO2 SERPL-SCNC: 18 MMOL/L (ref 23–29)
CO2 SERPL-SCNC: 20 MMOL/L (ref 23–29)
CREAT SERPL-MCNC: 2.6 MG/DL (ref 0.5–1.4)
CREAT SERPL-MCNC: 3 MG/DL (ref 0.5–1.4)
CRYOGLOB SER QL: NORMAL
DELSYS: ABNORMAL
DELSYS: ABNORMAL
DIFFERENTIAL METHOD: ABNORMAL
DIFFERENTIAL METHOD: ABNORMAL
EOSINOPHIL # BLD AUTO: 0.1 K/UL (ref 0–0.5)
EOSINOPHIL # BLD AUTO: 0.1 K/UL (ref 0–0.5)
EOSINOPHIL NFR BLD: 0.4 % (ref 0–8)
EOSINOPHIL NFR BLD: 0.5 % (ref 0–8)
ERYTHROCYTE [DISTWIDTH] IN BLOOD BY AUTOMATED COUNT: 22.1 % (ref 11.5–14.5)
ERYTHROCYTE [DISTWIDTH] IN BLOOD BY AUTOMATED COUNT: 22.3 % (ref 11.5–14.5)
EST. GFR  (NO RACE VARIABLE): 22.9 ML/MIN/1.73 M^2
EST. GFR  (NO RACE VARIABLE): 27.2 ML/MIN/1.73 M^2
FLOW: 2
FLOW: 5
GLUCOSE SERPL-MCNC: 108 MG/DL (ref 70–110)
GLUCOSE SERPL-MCNC: 121 MG/DL (ref 70–110)
HCO3 UR-SCNC: 22.1 MMOL/L (ref 24–28)
HCO3 UR-SCNC: 24.3 MMOL/L (ref 24–28)
HCT VFR BLD AUTO: 30.1 % (ref 40–54)
HCT VFR BLD AUTO: 32.8 % (ref 40–54)
HCT VFR BLD CALC: 33 %PCV (ref 36–54)
HCT VFR BLD CALC: 34 %PCV (ref 36–54)
HGB BLD-MCNC: 10.6 G/DL (ref 14–18)
HGB BLD-MCNC: 9.5 G/DL (ref 14–18)
IMM GRANULOCYTES # BLD AUTO: 0.14 K/UL (ref 0–0.04)
IMM GRANULOCYTES # BLD AUTO: 0.14 K/UL (ref 0–0.04)
IMM GRANULOCYTES NFR BLD AUTO: 0.9 % (ref 0–0.5)
IMM GRANULOCYTES NFR BLD AUTO: 0.9 % (ref 0–0.5)
INR PPP: 1.3 (ref 0.8–1.2)
LACTATE SERPL-SCNC: 0.9 MMOL/L (ref 0.5–2.2)
LYMPHOCYTES # BLD AUTO: 1.3 K/UL (ref 1–4.8)
LYMPHOCYTES # BLD AUTO: 1.3 K/UL (ref 1–4.8)
LYMPHOCYTES NFR BLD: 8.2 % (ref 18–48)
LYMPHOCYTES NFR BLD: 8.6 % (ref 18–48)
MAGNESIUM SERPL-MCNC: 2.1 MG/DL (ref 1.6–2.6)
MAGNESIUM SERPL-MCNC: 2.2 MG/DL (ref 1.6–2.6)
MCH RBC QN AUTO: 30.6 PG (ref 27–31)
MCH RBC QN AUTO: 30.9 PG (ref 27–31)
MCHC RBC AUTO-ENTMCNC: 31.6 G/DL (ref 32–36)
MCHC RBC AUTO-ENTMCNC: 32.3 G/DL (ref 32–36)
MCV RBC AUTO: 96 FL (ref 82–98)
MCV RBC AUTO: 97 FL (ref 82–98)
MODE: ABNORMAL
MODE: ABNORMAL
MONOCYTES # BLD AUTO: 1.2 K/UL (ref 0.3–1)
MONOCYTES # BLD AUTO: 1.4 K/UL (ref 0.3–1)
MONOCYTES NFR BLD: 7.7 % (ref 4–15)
MONOCYTES NFR BLD: 8.4 % (ref 4–15)
NEUTROPHILS # BLD AUTO: 12.6 K/UL (ref 1.8–7.7)
NEUTROPHILS # BLD AUTO: 13.3 K/UL (ref 1.8–7.7)
NEUTROPHILS NFR BLD: 81.3 % (ref 38–73)
NEUTROPHILS NFR BLD: 81.3 % (ref 38–73)
NRBC BLD-RTO: 0 /100 WBC
NRBC BLD-RTO: 0 /100 WBC
PCO2 BLDA: 36.9 MMHG (ref 35–45)
PCO2 BLDA: 43.4 MMHG (ref 35–45)
PH SMN: 7.36 [PH] (ref 7.35–7.45)
PH SMN: 7.38 [PH] (ref 7.35–7.45)
PHOSPHATE SERPL-MCNC: 4 MG/DL (ref 2.7–4.5)
PLATELET # BLD AUTO: 141 K/UL (ref 150–450)
PLATELET # BLD AUTO: 143 K/UL (ref 150–450)
PMV BLD AUTO: 11.8 FL (ref 9.2–12.9)
PMV BLD AUTO: 11.9 FL (ref 9.2–12.9)
PO2 BLDA: 31 MMHG (ref 40–60)
PO2 BLDA: 71 MMHG (ref 80–100)
POC BE: -1 MMOL/L
POC BE: -3 MMOL/L
POC IONIZED CALCIUM: 1.14 MMOL/L (ref 1.06–1.42)
POC IONIZED CALCIUM: 1.15 MMOL/L (ref 1.06–1.42)
POC SATURATED O2: 56 % (ref 95–100)
POC SATURATED O2: 94 % (ref 95–100)
POC TCO2: 23 MMOL/L (ref 23–27)
POC TCO2: 26 MMOL/L (ref 24–29)
POCT GLUCOSE: 113 MG/DL (ref 70–110)
POTASSIUM BLD-SCNC: 3.6 MMOL/L (ref 3.5–5.1)
POTASSIUM BLD-SCNC: 3.6 MMOL/L (ref 3.5–5.1)
POTASSIUM SERPL-SCNC: 3.4 MMOL/L (ref 3.5–5.1)
POTASSIUM SERPL-SCNC: 3.7 MMOL/L (ref 3.5–5.1)
PROT SERPL-MCNC: 6.8 G/DL (ref 6–8.4)
PROTHROMBIN TIME: 12.8 SEC (ref 9–12.5)
RBC # BLD AUTO: 3.1 M/UL (ref 4.6–6.2)
RBC # BLD AUTO: 3.43 M/UL (ref 4.6–6.2)
SAMPLE: ABNORMAL
SAMPLE: ABNORMAL
SITE: ABNORMAL
SITE: ABNORMAL
SODIUM BLD-SCNC: 136 MMOL/L (ref 136–145)
SODIUM BLD-SCNC: 138 MMOL/L (ref 136–145)
SODIUM SERPL-SCNC: 134 MMOL/L (ref 136–145)
SODIUM SERPL-SCNC: 134 MMOL/L (ref 136–145)
SP02: 98
TROPONIN I SERPL DL<=0.01 NG/ML-MCNC: 0.01 NG/ML (ref 0–0.03)
WBC # BLD AUTO: 15.49 K/UL (ref 3.9–12.7)
WBC # BLD AUTO: 16.32 K/UL (ref 3.9–12.7)

## 2023-01-27 PROCEDURE — 99900035 HC TECH TIME PER 15 MIN (STAT)

## 2023-01-27 PROCEDURE — 83735 ASSAY OF MAGNESIUM: CPT | Performed by: STUDENT IN AN ORGANIZED HEALTH CARE EDUCATION/TRAINING PROGRAM

## 2023-01-27 PROCEDURE — 82330 ASSAY OF CALCIUM: CPT

## 2023-01-27 PROCEDURE — 63600175 PHARM REV CODE 636 W HCPCS: Performed by: STUDENT IN AN ORGANIZED HEALTH CARE EDUCATION/TRAINING PROGRAM

## 2023-01-27 PROCEDURE — 99233 PR SUBSEQUENT HOSPITAL CARE,LEVL III: ICD-10-PCS | Mod: ,,, | Performed by: PHYSICIAN ASSISTANT

## 2023-01-27 PROCEDURE — 99233 SBSQ HOSP IP/OBS HIGH 50: CPT | Mod: ,,, | Performed by: PHYSICIAN ASSISTANT

## 2023-01-27 PROCEDURE — 93010 EKG 12-LEAD: ICD-10-PCS | Mod: ,,, | Performed by: INTERNAL MEDICINE

## 2023-01-27 PROCEDURE — 63600175 PHARM REV CODE 636 W HCPCS: Performed by: INTERNAL MEDICINE

## 2023-01-27 PROCEDURE — 36415 COLL VENOUS BLD VENIPUNCTURE: CPT | Performed by: STUDENT IN AN ORGANIZED HEALTH CARE EDUCATION/TRAINING PROGRAM

## 2023-01-27 PROCEDURE — 36415 COLL VENOUS BLD VENIPUNCTURE: CPT | Performed by: INTERNAL MEDICINE

## 2023-01-27 PROCEDURE — 20000000 HC ICU ROOM

## 2023-01-27 PROCEDURE — 83605 ASSAY OF LACTIC ACID: CPT | Performed by: INTERNAL MEDICINE

## 2023-01-27 PROCEDURE — 93010 ELECTROCARDIOGRAM REPORT: CPT | Mod: ,,, | Performed by: INTERNAL MEDICINE

## 2023-01-27 PROCEDURE — 25000003 PHARM REV CODE 250

## 2023-01-27 PROCEDURE — 63600175 PHARM REV CODE 636 W HCPCS: Mod: TB,JG | Performed by: NURSE PRACTITIONER

## 2023-01-27 PROCEDURE — 84484 ASSAY OF TROPONIN QUANT: CPT | Performed by: INTERNAL MEDICINE

## 2023-01-27 PROCEDURE — 84132 ASSAY OF SERUM POTASSIUM: CPT

## 2023-01-27 PROCEDURE — 36600 WITHDRAWAL OF ARTERIAL BLOOD: CPT

## 2023-01-27 PROCEDURE — 80053 COMPREHEN METABOLIC PANEL: CPT | Performed by: STUDENT IN AN ORGANIZED HEALTH CARE EDUCATION/TRAINING PROGRAM

## 2023-01-27 PROCEDURE — 25000003 PHARM REV CODE 250: Performed by: STUDENT IN AN ORGANIZED HEALTH CARE EDUCATION/TRAINING PROGRAM

## 2023-01-27 PROCEDURE — 85025 COMPLETE CBC W/AUTO DIFF WBC: CPT | Performed by: INTERNAL MEDICINE

## 2023-01-27 PROCEDURE — 85025 COMPLETE CBC W/AUTO DIFF WBC: CPT | Mod: 91 | Performed by: STUDENT IN AN ORGANIZED HEALTH CARE EDUCATION/TRAINING PROGRAM

## 2023-01-27 PROCEDURE — 82800 BLOOD PH: CPT

## 2023-01-27 PROCEDURE — 83735 ASSAY OF MAGNESIUM: CPT | Mod: 91 | Performed by: INTERNAL MEDICINE

## 2023-01-27 PROCEDURE — 25000003 PHARM REV CODE 250: Performed by: INTERNAL MEDICINE

## 2023-01-27 PROCEDURE — 83605 ASSAY OF LACTIC ACID: CPT

## 2023-01-27 PROCEDURE — 83880 ASSAY OF NATRIURETIC PEPTIDE: CPT | Performed by: INTERNAL MEDICINE

## 2023-01-27 PROCEDURE — 99233 PR SUBSEQUENT HOSPITAL CARE,LEVL III: ICD-10-PCS | Mod: ,,, | Performed by: INTERNAL MEDICINE

## 2023-01-27 PROCEDURE — 25000242 PHARM REV CODE 250 ALT 637 W/ HCPCS

## 2023-01-27 PROCEDURE — 85014 HEMATOCRIT: CPT

## 2023-01-27 PROCEDURE — 93005 ELECTROCARDIOGRAM TRACING: CPT

## 2023-01-27 PROCEDURE — 84100 ASSAY OF PHOSPHORUS: CPT | Performed by: STUDENT IN AN ORGANIZED HEALTH CARE EDUCATION/TRAINING PROGRAM

## 2023-01-27 PROCEDURE — 82803 BLOOD GASES ANY COMBINATION: CPT

## 2023-01-27 PROCEDURE — 27000221 HC OXYGEN, UP TO 24 HOURS

## 2023-01-27 PROCEDURE — 99223 PR INITIAL HOSPITAL CARE,LEVL III: ICD-10-PCS | Mod: ,,, | Performed by: CLINICAL NURSE SPECIALIST

## 2023-01-27 PROCEDURE — 94761 N-INVAS EAR/PLS OXIMETRY MLT: CPT

## 2023-01-27 PROCEDURE — 94640 AIRWAY INHALATION TREATMENT: CPT

## 2023-01-27 PROCEDURE — 80048 BASIC METABOLIC PNL TOTAL CA: CPT | Mod: XB | Performed by: INTERNAL MEDICINE

## 2023-01-27 PROCEDURE — 99233 SBSQ HOSP IP/OBS HIGH 50: CPT | Mod: ,,, | Performed by: INTERNAL MEDICINE

## 2023-01-27 PROCEDURE — 99497 PR ADVNCD CARE PLAN 30 MIN: ICD-10-PCS | Mod: 25,,, | Performed by: CLINICAL NURSE SPECIALIST

## 2023-01-27 PROCEDURE — 85610 PROTHROMBIN TIME: CPT | Performed by: STUDENT IN AN ORGANIZED HEALTH CARE EDUCATION/TRAINING PROGRAM

## 2023-01-27 PROCEDURE — 99497 ADVNCD CARE PLAN 30 MIN: CPT | Mod: 25,,, | Performed by: CLINICAL NURSE SPECIALIST

## 2023-01-27 PROCEDURE — 84295 ASSAY OF SERUM SODIUM: CPT

## 2023-01-27 PROCEDURE — 99223 1ST HOSP IP/OBS HIGH 75: CPT | Mod: ,,, | Performed by: CLINICAL NURSE SPECIALIST

## 2023-01-27 RX ORDER — POTASSIUM CHLORIDE 7.45 MG/ML
10 INJECTION INTRAVENOUS
Status: DISPENSED | OUTPATIENT
Start: 2023-01-27 | End: 2023-01-27

## 2023-01-27 RX ORDER — POTASSIUM CHLORIDE 14.9 MG/ML
20 INJECTION INTRAVENOUS ONCE
Status: DISCONTINUED | OUTPATIENT
Start: 2023-01-27 | End: 2023-01-27

## 2023-01-27 RX ORDER — MIDAZOLAM HYDROCHLORIDE 1 MG/ML
2 INJECTION INTRAMUSCULAR; INTRAVENOUS ONCE
Status: DISCONTINUED | OUTPATIENT
Start: 2023-01-27 | End: 2023-01-27

## 2023-01-27 RX ORDER — MIDODRINE HYDROCHLORIDE 5 MG/1
10 TABLET ORAL EVERY 8 HOURS
Status: DISCONTINUED | OUTPATIENT
Start: 2023-01-27 | End: 2023-01-31

## 2023-01-27 RX ADMIN — FOLIC ACID 1 MG: 1 TABLET ORAL at 09:01

## 2023-01-27 RX ADMIN — HEPARIN SODIUM 5000 UNITS: 5000 INJECTION INTRAVENOUS; SUBCUTANEOUS at 01:01

## 2023-01-27 RX ADMIN — SODIUM CHLORIDE 250 ML: 0.9 INJECTION, SOLUTION INTRAVENOUS at 09:01

## 2023-01-27 RX ADMIN — FUROSEMIDE 240 MG: 10 INJECTION, SOLUTION INTRAMUSCULAR; INTRAVENOUS at 10:01

## 2023-01-27 RX ADMIN — HEPARIN SODIUM 5000 UNITS: 5000 INJECTION INTRAVENOUS; SUBCUTANEOUS at 05:01

## 2023-01-27 RX ADMIN — METOROPROLOL TARTRATE 5 MG: 5 INJECTION, SOLUTION INTRAVENOUS at 08:01

## 2023-01-27 RX ADMIN — AMIODARONE HYDROCHLORIDE 1 MG/MIN: 1.8 INJECTION, SOLUTION INTRAVENOUS at 08:01

## 2023-01-27 RX ADMIN — POTASSIUM CHLORIDE 10 MEQ: 7.46 INJECTION, SOLUTION INTRAVENOUS at 09:01

## 2023-01-27 RX ADMIN — TORSEMIDE 50 MG: 20 TABLET ORAL at 04:01

## 2023-01-27 RX ADMIN — THERA TABS 1 TABLET: TAB at 09:01

## 2023-01-27 RX ADMIN — IPRATROPIUM BROMIDE AND ALBUTEROL SULFATE 3 ML: 2.5; .5 SOLUTION RESPIRATORY (INHALATION) at 08:01

## 2023-01-27 RX ADMIN — AMIODARONE HYDROCHLORIDE 1 MG/MIN: 1.8 INJECTION, SOLUTION INTRAVENOUS at 09:01

## 2023-01-27 RX ADMIN — POTASSIUM CHLORIDE 10 MEQ: 7.46 INJECTION, SOLUTION INTRAVENOUS at 10:01

## 2023-01-27 RX ADMIN — AMIODARONE HYDROCHLORIDE 150 MG: 1.5 INJECTION, SOLUTION INTRAVENOUS at 09:01

## 2023-01-27 RX ADMIN — GUAIFENESIN AND DEXTROMETHORPHAN HYDROBROMIDE 2 TABLET: 600; 30 TABLET, EXTENDED RELEASE ORAL at 09:01

## 2023-01-27 RX ADMIN — PANTOPRAZOLE SODIUM 40 MG: 40 TABLET, DELAYED RELEASE ORAL at 04:01

## 2023-01-27 RX ADMIN — HEPARIN SODIUM 5000 UNITS: 5000 INJECTION INTRAVENOUS; SUBCUTANEOUS at 09:01

## 2023-01-27 RX ADMIN — MIDODRINE HYDROCHLORIDE 10 MG: 5 TABLET ORAL at 11:01

## 2023-01-27 RX ADMIN — MIDODRINE HYDROCHLORIDE 10 MG: 5 TABLET ORAL at 09:01

## 2023-01-27 RX ADMIN — LACTULOSE 10 G: 20 SOLUTION ORAL at 09:01

## 2023-01-27 RX ADMIN — PANTOPRAZOLE SODIUM 40 MG: 40 TABLET, DELAYED RELEASE ORAL at 05:01

## 2023-01-27 RX ADMIN — THIAMINE HCL TAB 100 MG 100 MG: 100 TAB at 09:01

## 2023-01-27 RX ADMIN — LACTULOSE 10 G: 20 SOLUTION ORAL at 04:01

## 2023-01-27 RX ADMIN — AMIODARONE HYDROCHLORIDE 0.5 MG/MIN: 1.8 INJECTION, SOLUTION INTRAVENOUS at 01:01

## 2023-01-27 NOTE — PLAN OF CARE
CMICU DAILY GOALS       A: Awake    RASS: Goal - RASS Goal: 0-->alert and calm  Actual - RASS (Braxton Agitation-Sedation Scale): 0-->alert and calm   Restraint necessity:    B: Breathe   SBT: Not intubated   C: Coordinate A & B, analgesics/sedatives   Pain: managed    SAT: Not intubated  D: Delirium   CAM-ICU: Overall CAM-ICU: Negative  E: Early(intubated/ Progressive (non-intubated) Mobility   MOVE Screen: Pass   Activity: Activity Management: Arm raise - L1, Rolling - L1  FAS: Feeding/Nutrition   Diet order: Diet/Nutrition Received: NPO, Specialty Diet/Nutrition Received: dysphagia mechanically altered  T: Thrombus   DVT prophylaxis: VTE Required Core Measure: (SCDs) Sequential compression device initiated/maintained  H: HOB Elevation   Head of Bed (HOB) Positioning: HOB at 45 degrees  U: Ulcer Prophylaxis   GI: yes  G: Glucose control   managed Glycemic Management: blood glucose monitored  S: Skin   Bathing/Skin Care: dressed/undressed, incontinence care, linen changed, bath, complete  Device Skin Pressure Protection: absorbent pad utilized/changed, adhesive use limited  Pressure Reduction Devices: positioning supports utilized  Pressure Reduction Techniques: positioned off wounds, pressure points protected  Skin Protection: adhesive use limited  B: Bowel Function   diarrhea   I: Indwelling Catheters   Bose necessity:      Urethral Catheter 01/16/23 1347 Latex 14 Fr.-Reason for Continuing Urinary Catheterization: Critically ill in ICU and requiring hourly monitoring of intake/output   CVC necessity: Yes  D: De-escalation Antibiotics   No    Family/Goals of care/Code Status   Code Status: Full Code    24H Vital Sign Range  Temp:  [97.4 °F (36.3 °C)-100.9 °F (38.3 °C)]   Pulse:  []   Resp:  [13-28]   BP: ()/(46-74)   SpO2:  [88 %-98 %]      Shift Events   RRT to MICU for tachy arrhythmia and hypotension. Amiodarone infusion started. Midodrine PO administered. HR reduced to 70-90s NSR. BP remained low  but maintaining MAP>60. VSS. Will continue to monitor.      VS and assessment per flow sheet, patient progressing towards goals as tolerated, plan of care reviewed with family, all concerns addressed, will continue to monitor.    Elizabeth Leon

## 2023-01-27 NOTE — NURSING
Completed morning rounds at 0730 and found patient resting in bed AAOx4, able to follow commands with VSS. He did not appear or report to be in any distress and had no requests at that time. At 0850 pt HR increased to 160s on telemetry monitor with 8 beat run of Vtach. RN and charge RN to bedside to assess patient. Pt w/ manual BP 80/64, , O2 88% on room air, RR 24. 3L O2 applied to patient and notified primary team / rapid response team. Given metoprolol 5mg IV x2, Amio 150mg bolus, 10mEq Kcl, 500ml NS bolus. Collected BMP, Mag, CBC,Troponin, CXR, EKG, continuous cardiac monitoring, continuous pulse ox monitoring. Critical care consulted and arrived to bedside. Pt transferred to higher level of care. Report called to critical care RN and pt transferred.

## 2023-01-27 NOTE — ASSESSMENT & PLAN NOTE
This patient does have evidence of infective focus  My overall impression is septic shock.  Source: Unknown  Antibiotics given-   Antibiotics (From admission, onward)    None        Latest lactate reviewed-  Recent Labs   Lab 01/27/23  0828   LACTATE 0.9     Organ dysfunction indicated by Acute kidney injury    - On Vancomycin, Cefepime, and Flagyl, Vanc d/c 1/20 - vancomycin restarted given concern for right arm cellulitis  - BCx negative  - SBP work up negative  - Will de-escalate once appropriate

## 2023-01-27 NOTE — H&P
[facility] - Cardiac Medical ICU  Critical Care Medicine  History & Physical    Patient Name: Jonny Isabel  MRN: [id number]  Admission Date: 1/16/2023  Hospital Length of Stay: 11 days  Code Status: Full Code  Attending Physician: Lance Martin*   Primary Care Provider: Yuli Pérez Mai, MD   Principal Problem: Atrial fibrillation with rapid ventricular response    Subjective:     HPI:  Mr. Fuller is a 62-year-old male with a PMHx of A-fibb (on Eliquis), hypertension, DM 2 (not insulin dependent) presents as transfer from OSH for chronic lower back that has been worsening for the past week. Pt states that the pain is radiating from his right buttock down to his legs. Pt was unable to get out of bed this AM due to the pain. HE endorses having fecal incontinence for the past week with black tarry stools. He denies any fevers, chills, abdominal pain, urinary incontinence, or saddle anesthesia.     At OSH ED CT lumbar spine ordered revealing Prominent, cystic, multiloculated predominantly gas attenuation epidural  structure within the spinal canal at the level of L3-L4 resulting in severe narrowing of the spinal canal with mass effect upon the thecal sac and  vacuum disc phenomena concerning for diskitis or an epidural abscess. MRI shows large disc extrusion at L3-4 causing severe spinal canal stenosis, with moderate spinal canal stenosis at L2-3 and L4-5 and moderate neural foraminal narrowing L4-5 and L5-S1. Pt transferred to C for further intervention with neurosurgery. Admitted to MICU for NSGY and GI evaluation . Hgb stable. EGD shows small varices with gastropathy, no active bleeds. Worsening MARTITA with minimal UOP, HRS protocol started with levo, midodrine, octreotide and albumin trailed, has since been stopped due to low suspicion. Nephrology following recommend HD in setting of Acute Renal Failure. NSGY initially planned surgical intervention for 1/23 but determine patient not medically optimized and  currently suggesting multimodal pain control. A-fibb RVR 1/23 overnight, started on Amiodarone gtt, transitioned to home lopressor. Ongoing HD needs.     Stepped down to hospital medicine on 1/25. On 1/27, pt developed afib with rvr, refractory to IV Lopresor x 2, developed hypotension subsequently and was stepped back up to MICU.          Hospital/ICU Course:  Admitted to MICU for NSGY and GI evaluation . Hgb stable. EGD shows small varices with gastropathy, no active bleeds. Worsening MARTITA with minimal UOP, HRS protocol started with levo, midodrine, octreotide and albumin trailed, has since been stopped due to low suspicion. Nephrology following recommend HD in setting of Acute Renal Failure. NSGY initially planned surgical intervention for 1/23 but determine patient not medically optimized and currently suggesting multimodal pain control. A-fibb RVR 1/23 overnight, started on Amiodarone gtt, transitioned to home lopressor. Ongoing HD needs.        Past Medical History:   Diagnosis Date    A-fib        Past Surgical History:   Procedure Laterality Date    ESOPHAGOGASTRODUODENOSCOPY N/A 1/17/2023    Procedure: EGD (ESOPHAGOGASTRODUODENOSCOPY);  Surgeon: Dewayne Navas MD;  Location: [facility] ([address] [place] [address]);  Service: Endoscopy;  Laterality: N/A;       Review of patient's allergies indicates:  No Known Allergies    Family History    None       Tobacco Use    Smoking status: Not on file    Smokeless tobacco: Not on file   Substance and Sexual Activity    Alcohol use: Yes     Alcohol/week: 6.0 standard drinks     Types: 6 Cans of beer per week    Drug use: Not on file    Sexual activity: Not on file      Review of Systems   Constitutional:  Positive for activity change. Negative for chills, fatigue and fever.   HENT:  Negative for congestion and trouble swallowing.    Eyes:  Negative for visual disturbance.   Respiratory:  Negative for cough and shortness of breath.    Cardiovascular:  Negative for chest pain  and leg swelling.   Gastrointestinal:  Positive for abdominal distention. Negative for abdominal pain, nausea and vomiting.   Endocrine: Negative for cold intolerance, heat intolerance, polydipsia and polyuria.   Genitourinary:  Negative for difficulty urinating and dysuria.   Musculoskeletal:  Negative for back pain and myalgias.   Skin:  Negative for rash and wound.   Neurological:  Positive for weakness. Negative for dizziness and light-headedness.   Hematological:  Negative for adenopathy. Does not bruise/bleed easily.   Psychiatric/Behavioral:  Negative for confusion and sleep disturbance.    Objective:     Vital Signs (Most Recent):  Temp: 97.9 °F (36.6 °C) (01/27/23 0914)  Pulse: (!) 128 (01/27/23 0900)  Resp: (!) 28 (01/27/23 0900)  BP: 97/68 (01/27/23 0900)  SpO2: (!) 91 % (01/27/23 0900)   Vital Signs (24h Range):  Temp:  [97.4 °F (36.3 °C)-98.7 °F (37.1 °C)] 97.9 °F (36.6 °C)  Pulse:  [] 128  Resp:  [16-28] 28  SpO2:  [91 %-98 %] 91 %  BP: ()/(57-75) 97/68   Weight: 110 kg (242 lb 8.1 oz)  Body mass index is 36.87 kg/m².      Intake/Output Summary (Last 24 hours) at 1/27/2023 0918  Last data filed at 1/27/2023 0917  Gross per 24 hour   Intake 240 ml   Output 1275 ml   Net -1035 ml       Physical Exam  Constitutional:       Appearance: He is well-developed.   HENT:      Head: Normocephalic and atraumatic.   Eyes:      General: No scleral icterus.     Pupils: Pupils are equal, round, and reactive to light.   Neck:      Vascular: No JVD.   Cardiovascular:      Rate and Rhythm: Normal rate and regular rhythm.      Heart sounds: No murmur heard.    No friction rub. No gallop.   Pulmonary:      Effort: Pulmonary effort is normal. No respiratory distress.      Breath sounds: Normal breath sounds. No wheezing or rales.   Abdominal:      General: Bowel sounds are normal. There is distension.      Palpations: Abdomen is soft.      Tenderness: There is no abdominal tenderness. There is no guarding or  rebound.   Musculoskeletal:         General: No deformity. Normal range of motion.      Cervical back: Neck supple.   Lymphadenopathy:      Cervical: No cervical adenopathy.   Skin:     General: Skin is warm and dry.      Capillary Refill: Capillary refill takes less than 2 seconds.      Findings: No erythema or rash.   Neurological:      Mental Status: He is alert and oriented to person, place, and time.      Cranial Nerves: No cranial nerve deficit.      Sensory: No sensory deficit.   Psychiatric:         Judgment: Judgment normal.       Vents:  Oxygen Concentration (%): 100 (OFF WALL) (01/27/23 0900)  Lines/Drains/Airways       Central Venous Catheter Line  Duration             Trialysis (Dialysis) Catheter 01/22/23 1530 right internal jugular 4 days              Drain  Duration                  Open Drain 01/17/23 0800 Left;Anterior LLQ 10 days         Urethral Catheter 01/16/23 1347 Latex 14 Fr. 10 days              Peripheral Intravenous Line  Duration                  Peripheral IV - Single Lumen 01/25/23 1530 22 G Left;Posterior Hand 1 day                  Significant Labs:    CBC/Anemia Profile:  Recent Labs   Lab 01/26/23  0346 01/27/23  0537 01/27/23  0826 01/27/23  0828   WBC 13.60* 15.49*  --  16.32*   HGB 8.9* 10.6*  --  9.5*   HCT 27.0* 32.8* 34* 30.1*   * 143*  --  141*   MCV 95 96  --  97   RDW 22.2* 22.3*  --  22.1*        Chemistries:  Recent Labs   Lab 01/26/23  0346 01/27/23  0537   * 134*   K 3.6 3.4*    101   CO2 23 20*   BUN 25* 28*   CREATININE 3.1* 3.0*   CALCIUM 8.2* 8.6*   ALBUMIN 2.0* 2.0*   PROT 6.4 6.8   BILITOT 3.6* 3.7*   ALKPHOS 182* 193*   ALT 35 38   AST 97* 101*   MG 1.9 2.2   PHOS 4.2 4.0       All pertinent labs within the past 24 hours have been reviewed.    Significant Imaging: I have reviewed all pertinent imaging results/findings within the past 24 hours.    Assessment/Plan:     Neuro  Spinal stenosis  CT Lumbar Spine Without Contrast Result Date:  1/16/2023  1.  Prominent, cystic, multiloculated predominantly gas attenuation structure within the spinal canal at the level of L3-L4 with dimensions as above.  This is favored to be epidural in location and results in severe narrowing of the spinal canal with mass effect upon the thecal sac.  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 Large disc extrusion at L3-4, contributing to severe spinal canal stenosis, as above. Additional lumbar spondylosis, contributing to moderate spinal canal stenosis at L2-3 and L4-5 and moderate neural foraminal narrowing L4-5 and L5-S1    - neuro checks Q4H  - NSGY consulted, appreciate recommendations, to undergo a laminectomy on 1/30    Psychiatric  Alcohol abuse  - Vitals q4h while awake  - CIWA monitoring        Cardiac/Vascular  * Atrial fibrillation with rapid ventricular response  - Maintain K > 4, Mag > 2 and Ca/iCal WNL to decrease arrhythmogenic potential  - Holding anticoagulation in the setting of recent GI bleed and current AMS  - Lopressor was started while on hospital medicine at 25 mg BID (home does lopressor 100 mg QD). Pt went into afib with rvr on 1/27, IV lopressor given x 2 with subsequent drop of BP, re-started on amiodarone gtt       Renal/  MARTITA (acute kidney injury)  Creatinine 4.7 on admit, baseline around 0.8. Pre-renal vs ATN. Less likely obstruction as pt has gomez.  Renal ultrasound with medical renal disease and no evidence of hydronephrosis.    - Nephrology consulted, appreciate recommendations  - iHD per nephrology  - Strict I&Os and daily weights   - Avoid nephrotoxic agents such as NSAIDs, gadolinium and IV radiocontrast.  - Renally dose meds to current GFR.  - Maintain MAP > 65.   - On IV Lasix 240 mg QD, and torsemide 50 mg BID         ID  Cellulitis  R hand seems to be cellulitic in appearance. Extending up to forearm.     Plan:  - Completed 10 days of  Cefepime on 1/25      Endocrine  Diabetes  -Last A1c reviewed-   Lab Results   Component Value Date    HGBA1C 5.3 11/10/2022       Home Antihyperglycemic Regiment:  - Metformin  1000 mg BID     Inpatient Antihyperglycemic Regiment:  Antihyperglycemics (From admission, onward)    Start     Stop Route Frequency Ordered    01/16/23 1507  insulin aspart U-100 pen 0-5 Units         -- SubQ Before meals & nightly PRN 01/16/23 1407        - LDSSI  - Clear liquid diet for now     Blood Sugars (AccuCheck):    Recent Labs     01/24/23  1235 01/24/23  2236 01/25/23  1119 01/25/23  2115 01/26/23  0342   POCTGLUCOSE 137* 160* 143* 129* 109           GI  Alcoholic cirrhosis of liver with ascites  S/p diagnostic paracentesis on 1/17 with , 50% seg. Cytology negative for malignant cell     - lactulose enema  - Starting midodrine 10 mg TID   - Palliative care consult     Small bowel obstruction  CT Abdomen Pelvis  Without Contras . Findings concerning for developing small bowel obstruction with suspected transition point seen within the midline mid to lower abdomen at the level of L4  Pt without symptoms of nausea or vomiting. Reportedly had a BM at the outside facility on 1/16/2023. Abdomen is distended.     - Holding of NGT for now given no significant symptoms   - Monitor BM and GI symptoms, if worsen will notify GS       Elevated liver enzymes  Likely 2/2 to underlying alcohol abuse and hepatic steatosis.     - Daily CMP, PT/INR   - Complete workup up with tylenol level, acute hep panel, a1-antitrypsin, anti smith antibody, HIV, anti mitochondrial antibody, anca, anti-liver, aFP, PETH pending   - Liver US with doppler shows epatomegaly with hepatic steatosis, cholelithiasis versus gallbladder sludge, small volume ascites, and left pleural effusion.  - Starting midodrine 10 mg TID     GI bleed  Concerning for variceal bleed given hx of alcohol abuse.     - GI consulted, recommend clear liquid for now but SLP evaluation  pending given AMS  -Transfuse pRBC for Hb < 7 g/dL (Consider a higher Hb target if there is clinical evidence of intravascular volume depletion or comorbidities, such as CAD or if high suspicion of vigorous active ongoing bleeding or an uncorrected coagulopathy exists.).  - s/p vitamin K given Pt/INR > 2   -Avoid nonsteroidal agents, antiplatelet agents and anticoagulants if possible in patients without absolute contraindications. (consult managing provider if required for cardiovascular protection).  - EGD showed  Small (< 5 mm) esophageal varices with portal hypertensive gastropathy  - Continue PO PPI 40 mg           Other  Encounter for social work intervention  Patient does not appear to have relatives.  He will need possible decision maker/power of  if his overall mental status does not improve.    - social work consulted for family assessment        Critical Care Daily Checklist:    A: Awake: RASS Goal/Actual Goal: RASS Goal: 0-->alert and calm  Actual: Braxton Agitation Sedation Scale (RASS): Alert and calm   B: Spontaneous Breathing Trial Performed?     C: SAT & SBT Coordinated?  N/A                      D: Delirium: CAM-ICU Overall CAM-ICU: Negative   E: Early Mobility Performed? Yes   F: Feeding Goal: Goals: Meet % EEN, EPN by RD f/u date  Status: Nutrition Goal Status: new   Current Diet Order   Procedures    Diet Adult Regular (IDDSI Level 7)      AS: Analgesia/Sedation N/A   T: Thromboembolic Prophylaxis Heparin   H: HOB > 300 Yes   U: Stress Ulcer Prophylaxis (if needed) PPI   G: Glucose Control Yes   B: Bowel Function Stool Occurrence: 1   I: Indwelling Catheter (Lines & Bose) Necessity PIV, Bose, Trialysis   D: De-escalation of Antimicrobials/Pharmacotherapies N/A    Plan for the day/ETD Rate control    Code Status:  Family/Goals of Care: Full Code         Critical secondary to Patient has a condition that poses threat to life and bodily function: Atrial Fibrillation with rapid  ventricular response     Critical care was time spent personally by me on the following activities: development of treatment plan with patient or surrogate and bedside caregivers, discussions with consultants, evaluation of patient's response to treatment, examination of patient, ordering and performing treatments and interventions, ordering and review of laboratory studies, ordering and review of radiographic studies, pulse oximetry, re-evaluation of patient's condition. This critical care time did not overlap with that of any other provider or involve time for any procedures.     Maribel Mariano, DO  Critical Care Medicine  [facility] - Cardiac Medical ICU

## 2023-01-27 NOTE — SUBJECTIVE & OBJECTIVE
Interval History:  Surgery on Monday for spinal stenosis.     Past Medical History:   Diagnosis Date    A-fib        Past Surgical History:   Procedure Laterality Date    ESOPHAGOGASTRODUODENOSCOPY N/A 1/17/2023    Procedure: EGD (ESOPHAGOGASTRODUODENOSCOPY);  Surgeon: Dewayne Navas MD;  Location: [address]);  Service: Endoscopy;  Laterality: N/A;       Review of patient's allergies indicates:  No Known Allergies    Medications:  Continuous Infusions:   amiodarone in dextrose 5% 1 mg/min (01/27/23 0907)    amiodarone in dextrose 5% 0.5 mg/min (01/27/23 1323)     Scheduled Meds:   dextromethorphan-guaiFENesin  mg  2 tablet Oral BID    folic acid  1 mg Oral Daily    heparin (porcine)  5,000 Units Subcutaneous Q8H    lactulose  10 g Oral TID    midazolam  2 mg Intravenous Once    midodrine  10 mg Oral Q8H    multivitamin  1 tablet Oral Daily    pantoprazole  40 mg Oral BID AC    thiamine  100 mg Oral Daily    torsemide  50 mg Oral BID loop     PRN Meds:sodium chloride, albuterol-ipratropium, benzonatate, dextrose 10%, dextrose 10%, glucagon (human recombinant), glucose, glucose, HYDROmorphone, insulin aspart U-100, LIDOcaine-prilocaine, promethazine-codeine 6.25-10 mg/5 ml    Family History    None       Tobacco Use    Smoking status: Not on file    Smokeless tobacco: Not on file   Substance and Sexual Activity    Alcohol use: Yes     Alcohol/week: 6.0 standard drinks     Types: 6 Cans of beer per week    Drug use: Not on file    Sexual activity: Not on file       Review of Systems   Constitutional:  Positive for activity change and fatigue.   HENT:  Negative for trouble swallowing.    Respiratory:  Positive for shortness of breath.    Cardiovascular:  Positive for leg swelling.   Gastrointestinal:  Positive for abdominal distention and diarrhea.   Psychiatric/Behavioral:  Positive for decreased concentration.    Objective:     Vital Signs (Most Recent):  Temp: 99.7 °F (37.6 °C) (01/27/23  1300)  Pulse: 68 (01/27/23 1300)  Resp: 14 (01/27/23 1300)  BP: (!) 83/51 (01/27/23 1300)  SpO2: (!) 92 % (01/27/23 1300)   Vital Signs (24h Range):  Temp:  [97.4 °F (36.3 °C)-100.9 °F (38.3 °C)] 99.7 °F (37.6 °C)  Pulse:  [] 68  Resp:  [13-28] 14  SpO2:  [88 %-98 %] 92 %  BP: ()/(46-74) 83/51     Weight: 110 kg (242 lb 8.1 oz)  Body mass index is 36.87 kg/m².    Physical Exam  Constitutional:       General: He is not in acute distress.     Appearance: He is obese.   HENT:      Head: Normocephalic and atraumatic.   Eyes:      General: Lids are normal.      Conjunctiva/sclera: Conjunctivae normal.   Cardiovascular:      Rate and Rhythm: Normal rate and regular rhythm.   Abdominal:      Tenderness: There is no abdominal tenderness.      Comments: Distension noted   Musculoskeletal:      Cervical back: No edema or erythema. Decreased range of motion.   Skin:     General: Skin is warm and dry.   Neurological:      Mental Status: He is alert and oriented to person, place, and time.   Psychiatric:         Attention and Perception: Attention normal.         Speech: Speech normal.       Review of Symptoms      Symptom Assessment (ESAS 0-10 Scale)  Pain:  0  Dyspnea:  0  Anxiety:  0  Nausea:  0  Depression:  0  Anorexia:  0  Fatigue:  5  Insomnia:  0  Restlessness:  0  Agitation:  0         Performance Status:  40    Living Arrangements:  Lives with spouse    Psychosocial/Cultural:   See Palliative Psychosocial Note: No  Pt lives wit his SO of 15 years, Adelita. Pt is estranged from his adult children.   **Primary  to Follow**  Palliative Care  Consult: Yes      Advance Care Planning   Advance Directives:   Do Not Resuscitate Status: No    Medical Power of : Yes    Agent's Name:  Adelita Glaser   Agent's Contact Number:  Pt's significant other.    Decision Making:  Patient answered questions and Family answered questions  Goals of Care: The patient and healthcare power of    endorses that what is most important right now is continuing medical management            Significant Labs: All pertinent labs within the past 24 hours have been reviewed.  CBC:   Recent Labs   Lab 01/27/23  0828 01/27/23  0954   WBC 16.32*  --    HGB 9.5*  --    HCT 30.1* 33*   MCV 97  --    *  --      BMP:  Recent Labs   Lab 01/27/23  0828   *   *   K 3.7      CO2 18*   BUN 28*   CREATININE 2.6*   CALCIUM 8.4*   MG 2.1     LFT:  Lab Results   Component Value Date     (H) 01/27/2023     (H) 01/16/2023    ALKPHOS 193 (H) 01/27/2023    BILITOT 3.7 (H) 01/27/2023     Albumin:   Albumin   Date Value Ref Range Status   01/27/2023 2.0 (L) 3.5 - 5.2 g/dL Final     Protein:   Total Protein   Date Value Ref Range Status   01/27/2023 6.8 6.0 - 8.4 g/dL Final     Lactic acid:   Lab Results   Component Value Date    LACTATE 0.9 01/27/2023    LACTATE 1.0 01/22/2023       Significant Imaging: I have reviewed all pertinent imaging results/findings within the past 24 hours.

## 2023-01-27 NOTE — HPI
Pt is a 62-year-old male with a PMHx of A-fib (on Eliquis), hypertension, DM 2 (not insulin dependent)  who presented as a transfer from OSH for chronic lower back pain that has been worsening for the past week. Pt stated that the pain is radiating from his right buttock down to his legs. Pt was unable to get out of bed this AM due to the pain. He endorsed having fecal incontinence for the past week with black tarry stools. He denies any fevers, chills, abdominal pain, urinary incontinence, or saddle anesthesia.      Per chart review:   At OSH ED CT lumbar spine ordered revealing Prominent, cystic, multiloculated predominantly gas attenuation epidural  structure within the spinal canal at the level of L3-L4 resulting in severe narrowing of the spinal canal with mass effect upon the thecal sac and  vacuum disc phenomena concerning for diskitis or an epidural abscess. MRI shows large disc extrusion at L3-4 causing severe spinal canal stenosis, with moderate spinal canal stenosis at L2-3 and L4-5 and moderate neural foraminal narrowing L4-5 and L5-S1. Pt transferred to [facility] for further intervention with neurosurgery. Admitted to MICU for NSGY and GI evaluation . Hgb stable. EGD shows small varices with gastropathy, no active bleeds. Worsening MARTITA with minimal UOP, HRS protocol started with levo, midodrine, octreotide and albumin trailed, has since been stopped due to low suspicion. Nephrology following recommend HD in setting of Acute Renal Failure. NSGY initially planned surgical intervention for 1/23 but determine patient not medically optimized and currently suggesting multimodal pain control. A-fibb RVR 1/23 overnight, started on Amiodarone gtt, transitioned to home lopressor. Ongoing HD needs.      Stepped down to hospital medicine on 1/25. On 1/27, pt developed afib with rvr, refractory to IV Lopresor x 2, developed hypotension subsequently and was stepped back up to MICU.            Hospital/ICU Course:  Admitted  to MICU for NSGY and GI evaluation . Hgb stable. EGD shows small varices with gastropathy, no active bleeds. Worsening MARTITA with minimal UOP, HRS protocol started with levo, midodrine, octreotide and albumin trailed, has since been stopped due to low suspicion. Nephrology following recommend HD in setting of Acute Renal Failure. NSGY initially planned surgical intervention for 1/23 but determine patient not medically optimized and currently suggesting multimodal pain control. A-fibb RVR 1/23 overnight, started on Amiodarone gtt, transitioned to home lopressor. Ongoing HD needs.

## 2023-01-27 NOTE — RESPIRATORY THERAPY
RAPID RESPONSE RESPIRATORY THERAPY NOTE             Code Status: Full Code   : 1960  Bed: 6095/6095 A:   MRN: [id number]  Time page Received: 0749  Time Rapid Response RT at Bedside: 075  Time Rapid Response RT left Bedside: 0855    SITUATION    Evaluated patient for: Hypotension/arhythmia and SOB    BACKGROUND    Why is the patient in the hospital?: Atrial fibrillation with rapid ventricular response    Patient has a past medical history of A-fib.    24 Hours Vitals Range:  Temp:  [97.4 °F (36.3 °C)-100.9 °F (38.3 °C)]   Pulse:  []   Resp:  [16-28]   BP: ()/(57-75)   SpO2:  [88 %-98 %]     Labs:    Recent Labs     23  0329 23  0346 23  0537 23  0828    134* 134* 134*   K 3.7 3.6 3.4* 3.7    100 101 102   CO2 21* 23 20* 18*   CREATININE 3.0* 3.1* 3.0* 2.6*   * 117* 108 121*   PHOS 4.2 4.2 4.0  --    MG 2.1 1.9 2.2 2.1        Recent Labs     23  0826 23  0954   PH 7.355 7.384   PCO2 43.4 36.9   PO2 31* 71*   HCO3 24.3 22.1*   POCSATURATED 56* 94*   BE -1 -3       ASSESSMENT/INTERVENTIONS  Pt in bed HOB down RN monitoring BP and HR.  Pt assessed for WOB Wheezing and c/o SOB    Last Vitals: Temp: 100.9 °F (38.3 °C) ( 1000)  Pulse: 136 ( 1000)  Resp: 25 ( 1000)  BP: 100/62 ( 1000)  SpO2: 88 % ( 1000)  Level of Consciousness: Level of Consciousness (AVPU): alert  Respiratory Effort: Respiratory Effort: Mild  Expansion/Accessory Muscle Usage: Expansion/Accessory Muscles/Retractions: expansion symmetric, abdominal muscle use  All Lung Field Breath Sounds: All Lung Fields Breath Sounds: Anterior:, Lateral:, wheezes, inspiratory, wheezes, expiratory  MARY Breath Sounds: clear  LLL Breath Sounds: crackles  RUL Breath Sounds: clear  RML Breath Sounds: clear  RLL Breath Sounds: crackles  O2 Device/Concentration: 3LPM N.C.  NIPPV: No Surgical airway: No Vent: No  ETCO2 monitored:    Ambu at bedside: Ambu bag with the patient?: Yes,  Adult Ambu    Active Orders   Respiratory Care    Inhalation Treatment Q6H PRN     Frequency: Q6H PRN     Number of Occurrences: Until Specified    Oxygen Continuous     Frequency: Continuous     Number of Occurrences: Until Specified     Order Questions:      Device type: Low flow      Device: Nasal Cannula (1- 5 Liters)      LPM: 1-5      Titrate O2 per Oxygen Titration Protocol: Yes      To maintain SpO2 goal of: >= 90%      Notify MD of: Inability to achieve desired SpO2; Sudden change in patient status and requires 20% increase in FiO2; Patient requires >60% FiO2    POCT ARTERIAL BLOOD GAS Blood Gas     Frequency: Once     Number of Occurrences: 1 Occurrences     Order Comments: Notify Physician if: see parameters below.       Order Questions:      Component: Blood Gas    Pulse Oximetry Continuous     Frequency: Continuous     Number of Occurrences: Until Specified       RECOMMENDATIONS  ?  We recommend: RRT Recs: pt given PRN duoneb.  Nursing and critical care bedside addressing tachycardia    ESCALATION        FOLLOW-UP    Disposition: Tx in ICU bed 6095.    Please call back the Rapid Response RTFrancois RRT at x [phone] for any questions or concerns.

## 2023-01-27 NOTE — ASSESSMENT & PLAN NOTE
R hand seems to be cellulitic in appearance. Extending up to forearm.     Plan:  - Completed 10 days of Cefepime on 1/25

## 2023-01-27 NOTE — ASSESSMENT & PLAN NOTE
CT Lumbar Spine Without Contrast Result Date: 1/16/2023  1.  Prominent, cystic, multiloculated predominantly gas attenuation structure within the spinal canal at the level of L3-L4 with dimensions as above.  This is favored to be epidural in location and results in severe narrowing of the spinal canal with mass effect upon the thecal sac.  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 Large disc extrusion at L3-4, contributing to severe spinal canal stenosis, as above. Additional lumbar spondylosis, contributing to moderate spinal canal stenosis at L2-3 and L4-5 and moderate neural foraminal narrowing L4-5 and L5-S1    - neuro checks Q4H  - NSGY consulted, appreciate recommendations, to undergo a laminectomy on 1/30

## 2023-01-27 NOTE — SUBJECTIVE & OBJECTIVE
Interval History: Pt stepped down to  on 1/25, was medically doing better and rescheduled tentatively for lumbar lami on Monday 1/30. However, stepped back up to MICU this am due to Afib with RVR and hypotension. Upon exam today, pt reports some low back pain but denies current radicular leg pain, denies new/worsening weakness or sensory changes. Reports intact sensation of groin area.    Medications:  Continuous Infusions:   amiodarone in dextrose 5% 1 mg/min (01/27/23 0907)    amiodarone in dextrose 5%       Scheduled Meds:   dextromethorphan-guaiFENesin  mg  2 tablet Oral BID    folic acid  1 mg Oral Daily    heparin (porcine)  5,000 Units Subcutaneous Q8H    lactulose  10 g Oral TID    midazolam  2 mg Intravenous Once    midodrine  10 mg Oral Q8H    multivitamin  1 tablet Oral Daily    pantoprazole  40 mg Oral BID AC    thiamine  100 mg Oral Daily    torsemide  50 mg Oral BID loop     PRN Meds:sodium chloride, albuterol-ipratropium, benzonatate, dextrose 10%, dextrose 10%, glucagon (human recombinant), glucose, glucose, HYDROmorphone, insulin aspart U-100, LIDOcaine-prilocaine, promethazine-codeine 6.25-10 mg/5 ml     Review of Systems   Constitutional:  Negative for chills and fever.   Respiratory:  Positive for shortness of breath.    Gastrointestinal:  Negative for nausea and vomiting.   Musculoskeletal:  Positive for back pain and gait problem.   Skin:  Negative for rash and wound.   Neurological:  Positive for weakness. Negative for numbness.   Objective:     Weight: 110 kg (242 lb 8.1 oz)  Body mass index is 36.87 kg/m².  Vital Signs (Most Recent):  Temp: 100.2 °F (37.9 °C) (01/27/23 1100)  Pulse: 73 (01/27/23 1100)  Resp: 13 (01/27/23 1100)  BP: (!) 76/46 (01/27/23 1100)  SpO2: (!) 94 % (01/27/23 1100)   Vital Signs (24h Range):  Temp:  [97.4 °F (36.3 °C)-100.9 °F (38.3 °C)] 100.2 °F (37.9 °C)  Pulse:  [] 73  Resp:  [13-28] 13  SpO2:  [88 %-98 %] 94 %  BP: ()/(46-75) 76/46     Date  "01/27/23 0700 - 01/28/23 0659   Shift 8308-8378 8491-0236 7039-8136 24 Hour Total   INTAKE   Shift Total(mL/kg)       OUTPUT   Urine(mL/kg/hr) 650   650   Shift Total(mL/kg) 650(5.9)   650(5.9)   Weight (kg) 110 110 110 110              Oxygen Concentration (%):  [100] 100         Open Drain 01/17/23 0800 Left;Anterior LLQ (Active)   Site Description Healing 01/27/23 1009   Dressing Status Clean;Dry;Intact 01/27/23 1009   Drainage Yellow 01/27/23 1009   Status Unclamped 01/27/23 1009   Output (mL) 110 mL 01/24/23 0600            Urethral Catheter 01/16/23 1347 Latex 14 Fr. (Active)   Site Assessment Clean;Intact 01/27/23 1009   Collection Container Standard drainage bag 01/27/23 1009   Securement Method secured to top of thigh w/ adhesive device 01/27/23 1009   Catheter Care Performed yes 01/27/23 1009   Reason for Continuing Urinary Catheterization Critically ill in ICU and requiring hourly monitoring of intake/output 01/27/23 1009   CAUTI Prevention Bundle Securement Device in place with 1" slack;Intact seal between catheter & drainage tubing;Drainage bag/urimeter off the floor;Sheeting clip in use;No dependent loops or kinks;Drainage bag/urimeter not overfilled (<2/3 full);Drainage bag/urimeter below bladder 01/27/23 1009   Output (mL) 275 mL 01/27/23 1126       Trialysis (Dialysis) Catheter 01/22/23 1530 right internal jugular (Active)   Line Necessity Review CRRT/HD 01/27/23 1009   Verification by X-ray Yes 01/27/23 1009   Site Assessment No swelling;No redness;No drainage 01/27/23 1009   Line Securement Device Secured with sutures 01/27/23 1009   Dressing Type Biopatch in place;Central line dressing 01/27/23 1009   Dressing Status Clean;Dry;Intact 01/27/23 1009   Dressing Intervention Integrity maintained 01/27/23 1009   Date on Dressing 01/22/23 01/27/23 1009   Dressing Due to be Changed 01/29/23 01/27/23 1009   Venous Patency/Care infusing 01/27/23 1009   Arterial Patency/Care flushed w/o difficulty 01/27/23 " 1009   Distal Patency/Care infusing 01/27/23 1009   Flows Good 01/25/23 1510   Waveform Not being transduced 01/27/23 1009       Physical Exam    Neurosurgery Physical Exam  Constitutional:       Appearance: He is well-developed and well-nourished.      Comments: obese   Eyes:      Extraocular Movements: EOM normal.      Conjunctiva/sclera: Conjunctivae normal.      Pupils: Pupils are equal, round, and reactive to light.   Abdominal:      Palpations: Abdomen is soft, non-tender  Neurological:      Mental Status: He is alert and oriented to person, place, and time.         Awake, alert, & oriented  PERRL  CN grossly intact  BUE/BLE grossly 4/5 throughout  SILT, no saddle anesthesia      Psychiatric:         Mood and Affect: normal, mildly decreased concentration    Significant Labs:  Recent Labs   Lab 01/26/23 0346 01/27/23  0537 01/27/23  0828   * 108 121*   * 134* 134*   K 3.6 3.4* 3.7    101 102   CO2 23 20* 18*   BUN 25* 28* 28*   CREATININE 3.1* 3.0* 2.6*   CALCIUM 8.2* 8.6* 8.4*   MG 1.9 2.2 2.1     Recent Labs   Lab 01/26/23  0346 01/27/23  0537 01/27/23  0826 01/27/23  0828 01/27/23  0954   WBC 13.60* 15.49*  --  16.32*  --    HGB 8.9* 10.6*  --  9.5*  --    HCT 27.0* 32.8* 34* 30.1* 33*   * 143*  --  141*  --      Recent Labs   Lab 01/26/23 0346 01/27/23  0537   INR 1.3* 1.3*     Microbiology Results (last 7 days)       Procedure Component Value Units Date/Time    Blood culture [[id number]] Collected: 01/22/23 1052    Order Status: Completed Specimen: Blood from Peripheral, Right Wrist Updated: 01/26/23 1212     Blood Culture, Routine No Growth to date      No Growth to date      No Growth to date      No Growth to date      No Growth to date    Culture, Anaerobic [[id number]] Collected: 01/17/23 0003    Order Status: Completed Specimen: Ascites Updated: 01/24/23 0703     Anaerobic Culture No anaerobes isolated    Aerobic culture [[id number]] Collected: 01/17/23 0003    Order Status:  Completed Specimen: Ascites Updated: 01/20/23 1239     Aerobic Bacterial Culture No growth          All pertinent labs from the last 24 hours have been reviewed.    Significant Diagnostics:  I have reviewed and interpreted all pertinent imaging results/findings within the past 24 hours.

## 2023-01-27 NOTE — CONSULTS
Inpatient consult to Physical Medicine Rehab  Consult performed by: Shelby Jack NP  Consult ordered by: Oscar Hubbard MD    Consult received.  Reviewed patient history and current admission.  PM&R following.    Shelby Jack NP  Physical Medicine & Rehabilitation   01/27/2023

## 2023-01-27 NOTE — ASSESSMENT & PLAN NOTE
S/p diagnostic paracentesis on 1/17 with , 50% seg. Cytology negative for malignant cell     - lactulose enema  - Starting midodrine 10 mg TID   - Palliative care consult

## 2023-01-27 NOTE — ASSESSMENT & PLAN NOTE
Concerning for variceal bleed given hx of alcohol abuse.     - GI consulted, recommend clear liquid for now but SLP evaluation pending given AMS  -Transfuse pRBC for Hb < 7 g/dL (Consider a higher Hb target if there is clinical evidence of intravascular volume depletion or comorbidities, such as CAD or if high suspicion of vigorous active ongoing bleeding or an uncorrected coagulopathy exists.).  - s/p vitamin K given Pt/INR > 2   -Avoid nonsteroidal agents, antiplatelet agents and anticoagulants if possible in patients without absolute contraindications. (consult managing provider if required for cardiovascular protection).  - EGD showed  Small (< 5 mm) esophageal varices with portal hypertensive gastropathy  - Continue PO PPI 40 mg

## 2023-01-27 NOTE — ASSESSMENT & PLAN NOTE
- Maintain K > 4, Mag > 2 and Ca/iCal WNL to decrease arrhythmogenic potential  - Holding anticoagulation in the setting of recent GI bleed and current AMS  - Lopressor was started while on hospital medicine at 25 mg BID (home does lopressor 100 mg QD). Pt went into afib with rvr on 1/27, IV lopressor given x 2 with subsequent drop of BP, re-started on amiodarone gtt

## 2023-01-27 NOTE — CONSULTS
[facility]  Palliative Medicine  Consult Note    Patient Name: Jonny Isabel  MRN: [id number]  Admission Date: 1/16/2023  Hospital Length of Stay: 11 days  Code Status: Full Code   Attending Provider: Lance Martin*  Consulting Provider: PHAM Sierra  Primary Care Physician: Yuli Pérez Mai, MD  Principal Problem:Atrial fibrillation with rapid ventricular response    Patient information was obtained from patient, spouse/SO and primary team.      Inpatient consult to Palliative Care  Consult performed by: PHAM Zavala  Consult ordered by: Maribel Mariano DO      Assessment/Plan:     Palliative care encounter  Impression: Pt is a 63 y/o malee with a PMHx of A-fib (on Eliquis), hypertension, DM 2 (not insulin dependent) who has spinal stenosis, alcoholic cirrhosis, MARTITA, sepsis. Pt to have laminectomy on Monday per chart review. Pt is alert, oriented to person, place, situation. Pt is a Full code.     Reason for consult: ACP. Communicated with Dr. Mariano. Surgery planned for Monday for spinal stenosis. Pt has alcoholic cirrhosis, MARTITA.     Goals of care/ACP:   Met with pt's significant other, Adelita Glaser. Introduced role of palliative care in pt with multiple chronic illnesses. Pt and SO verbalized understanding. Per pt goal is to continue medical treatment at this time. Pt is aware of his spinal issues and his liver issues. Pt is an active drinker prior tot admit. Pt stepped up to ICU for A-fib, hypotension.   Per pt, if he is unable to make his own medical decision, he would want Adelita Glaser to be MPOA. MPOA paperwork education done with pt. Forms completed. Per pt he is estranged from his adult children.     Pt's SO left hospital tot go to work and will be back on Monday. She can be reach by phone if needed.     MPOA: Adelita Glaser.    Symptom management:     Debility r/t to spinal stenosis  PT/OT to work with pt when medically stable.     Pain r/t to spinal stenosis. Pt  has pain to legs on admit. Pt denies pain at this time.     Current meds:  Pt on Dilaudid .2 mg q 4 hrs prn pain.   No doses used today.     Plan:   Pt to have surgery Monday.   Will f/u with pt and pt's MPOA for continued GOC. Adelita will be back on Monday.             Thank you for your consult. I will follow-up with patient. Please contact us if you have any additional questions.    Subjective:     HPI:   Pt is a 62-year-old male with a PMHx of A-fib (on Eliquis), hypertension, DM 2 (not insulin dependent)  who presented as a transfer from OSH for chronic lower back pain that has been worsening for the past week. Pt stated that the pain is radiating from his right buttock down to his legs. Pt was unable to get out of bed this AM due to the pain. He endorsed having fecal incontinence for the past week with black tarry stools. He denies any fevers, chills, abdominal pain, urinary incontinence, or saddle anesthesia.      Per chart review:   At OSH ED CT lumbar spine ordered revealing Prominent, cystic, multiloculated predominantly gas attenuation epidural  structure within the spinal canal at the level of L3-L4 resulting in severe narrowing of the spinal canal with mass effect upon the thecal sac and  vacuum disc phenomena concerning for diskitis or an epidural abscess. MRI shows large disc extrusion at L3-4 causing severe spinal canal stenosis, with moderate spinal canal stenosis at L2-3 and L4-5 and moderate neural foraminal narrowing L4-5 and L5-S1. Pt transferred to [facility] for further intervention with neurosurgery. Admitted to MICU for NSGY and GI evaluation . Hgb stable. EGD shows small varices with gastropathy, no active bleeds. Worsening MARTITA with minimal UOP, HRS protocol started with levo, midodrine, octreotide and albumin trailed, has since been stopped due to low suspicion. Nephrology following recommend HD in setting of Acute Renal Failure. NSGY initially planned surgical intervention for 1/23 but determine  patient not medically optimized and currently suggesting multimodal pain control. A-fibb RVR 1/23 overnight, started on Amiodarone gtt, transitioned to home lopressor. Ongoing HD needs.      Stepped down to hospital medicine on 1/25. On 1/27, pt developed afib with rvr, refractory to IV Lopresor x 2, developed hypotension subsequently and was stepped back up to MICU.            Hospital/ICU Course:  Admitted to MICU for NSGY and GI evaluation . Hgb stable. EGD shows small varices with gastropathy, no active bleeds. Worsening MARTITA with minimal UOP, HRS protocol started with levo, midodrine, octreotide and albumin trailed, has since been stopped due to low suspicion. Nephrology following recommend HD in setting of Acute Renal Failure. NSGY initially planned surgical intervention for 1/23 but determine patient not medically optimized and currently suggesting multimodal pain control. A-fibb RVR 1/23 overnight, started on Amiodarone gtt, transitioned to home lopressor. Ongoing HD needs.             Hospital Course:  No notes on file    Interval History:  Surgery on Monday for spinal stenosis.     Past Medical History:   Diagnosis Date    A-fib        Past Surgical History:   Procedure Laterality Date    ESOPHAGOGASTRODUODENOSCOPY N/A 1/17/2023    Procedure: EGD (ESOPHAGOGASTRODUODENOSCOPY);  Surgeon: Dewayne Navas MD;  Location: [facility] ([address] [place] [address]);  Service: Endoscopy;  Laterality: N/A;       Review of patient's allergies indicates:  No Known Allergies    Medications:  Continuous Infusions:   amiodarone in dextrose 5% 1 mg/min (01/27/23 0907)    amiodarone in dextrose 5% 0.5 mg/min (01/27/23 1323)     Scheduled Meds:   dextromethorphan-guaiFENesin  mg  2 tablet Oral BID    folic acid  1 mg Oral Daily    heparin (porcine)  5,000 Units Subcutaneous Q8H    lactulose  10 g Oral TID    midazolam  2 mg Intravenous Once    midodrine  10 mg Oral Q8H    multivitamin  1 tablet Oral Daily    pantoprazole  40 mg Oral BID  AC    thiamine  100 mg Oral Daily    torsemide  50 mg Oral BID loop     PRN Meds:sodium chloride, albuterol-ipratropium, benzonatate, dextrose 10%, dextrose 10%, glucagon (human recombinant), glucose, glucose, HYDROmorphone, insulin aspart U-100, LIDOcaine-prilocaine, promethazine-codeine 6.25-10 mg/5 ml    Family History    None       Tobacco Use    Smoking status: Not on file    Smokeless tobacco: Not on file   Substance and Sexual Activity    Alcohol use: Yes     Alcohol/week: 6.0 standard drinks     Types: 6 Cans of beer per week    Drug use: Not on file    Sexual activity: Not on file       Review of Systems   Constitutional:  Positive for activity change and fatigue.   HENT:  Negative for trouble swallowing.    Respiratory:  Positive for shortness of breath.    Cardiovascular:  Positive for leg swelling.   Gastrointestinal:  Positive for abdominal distention and diarrhea.   Psychiatric/Behavioral:  Positive for decreased concentration.    Objective:     Vital Signs (Most Recent):  Temp: 99.7 °F (37.6 °C) (01/27/23 1300)  Pulse: 68 (01/27/23 1300)  Resp: 14 (01/27/23 1300)  BP: (!) 83/51 (01/27/23 1300)  SpO2: (!) 92 % (01/27/23 1300)   Vital Signs (24h Range):  Temp:  [97.4 °F (36.3 °C)-100.9 °F (38.3 °C)] 99.7 °F (37.6 °C)  Pulse:  [] 68  Resp:  [13-28] 14  SpO2:  [88 %-98 %] 92 %  BP: ()/(46-74) 83/51     Weight: 110 kg (242 lb 8.1 oz)  Body mass index is 36.87 kg/m².    Physical Exam  Constitutional:       General: He is not in acute distress.     Appearance: He is obese.   HENT:      Head: Normocephalic and atraumatic.   Eyes:      General: Lids are normal.      Conjunctiva/sclera: Conjunctivae normal.   Cardiovascular:      Rate and Rhythm: Normal rate and regular rhythm.   Abdominal:      Tenderness: There is no abdominal tenderness.      Comments: Distension noted   Musculoskeletal:      Cervical back: No edema or erythema. Decreased range of motion.   Skin:     General: Skin is warm and  dry.   Neurological:      Mental Status: He is alert and oriented to person, place, and time.   Psychiatric:         Attention and Perception: Attention normal.         Speech: Speech normal.       Review of Symptoms      Symptom Assessment (ESAS 0-10 Scale)  Pain:  0  Dyspnea:  0  Anxiety:  0  Nausea:  0  Depression:  0  Anorexia:  0  Fatigue:  5  Insomnia:  0  Restlessness:  0  Agitation:  0         Performance Status:  40    Living Arrangements:  Lives with spouse    Psychosocial/Cultural:   See Palliative Psychosocial Note: No  Pt lives wit his SO of 15 years, Adelita. Pt is estranged from his adult children.   **Primary  to Follow**  Palliative Care  Consult: Yes      Advance Care Planning   Advance Directives:   Do Not Resuscitate Status: No    Medical Power of : Yes    Agent's Name:  Adelita Glaser   Agent's Contact Number:  Pt's significant other.    Decision Making:  Patient answered questions and Family answered questions  Goals of Care: The patient and healthcare power of   endorses that what is most important right now is continuing medical management            Significant Labs: All pertinent labs within the past 24 hours have been reviewed.  CBC:   Recent Labs   Lab 01/27/23  0828 01/27/23  0954   WBC 16.32*  --    HGB 9.5*  --    HCT 30.1* 33*   MCV 97  --    *  --      BMP:  Recent Labs   Lab 01/27/23  0828   *   *   K 3.7      CO2 18*   BUN 28*   CREATININE 2.6*   CALCIUM 8.4*   MG 2.1     LFT:  Lab Results   Component Value Date     (H) 01/27/2023     (H) 01/16/2023    ALKPHOS 193 (H) 01/27/2023    BILITOT 3.7 (H) 01/27/2023     Albumin:   Albumin   Date Value Ref Range Status   01/27/2023 2.0 (L) 3.5 - 5.2 g/dL Final     Protein:   Total Protein   Date Value Ref Range Status   01/27/2023 6.8 6.0 - 8.4 g/dL Final     Lactic acid:   Lab Results   Component Value Date    LACTATE 0.9 01/27/2023    LACTATE 1.0 01/22/2023        Significant Imaging: I have reviewed all pertinent imaging results/findings within the past 24 hours.    20 minutes of ACP completed.       Soni Mesa, CNS  Palliative Medicine  [facility]

## 2023-01-27 NOTE — ASSESSMENT & PLAN NOTE
Likely 2/2 to underlying alcohol abuse and hepatic steatosis.     - Daily CMP, PT/INR   - Complete workup up with tylenol level, acute hep panel, a1-antitrypsin, anti smith antibody, HIV, anti mitochondrial antibody, anca, anti-liver, aFP, PETH pending   - Liver US with doppler shows epatomegaly with hepatic steatosis, cholelithiasis versus gallbladder sludge, small volume ascites, and left pleural effusion.  - Starting midodrine 10 mg TID

## 2023-01-27 NOTE — PROGRESS NOTES
[facility]  Neurosurgery  Progress Note    Subjective:     History of Present Illness: 61 yo M with PMH of alcoholic hepatitis (drinks a 6 pack of beer per day and bottle of marie), Afib on Xarelto, central obesity, HTN, GI bleed, unknown CKD vs MARTITA on admission, anemia, chronic hyponatremia who presents due to inability to get out of bed. He reports that he has been having fecal incontinence for 2 months now and difficulty walking for the last 4 days (with single person assistance and rolling walker) and inability to get out of bed today. He denies saddle anesthesia or urinary incontinence or issues voiding his bladder. He has also  been having dark and tarry stools.     CT L spine and MRI L spine at OSH showed large L3/L4 herniated disc with moderate to severe stenosis. Patient transported to [facility] for possible neurosurgical intervention.       Post-Op Info:  Procedure(s) (LRB):  EGD (ESOPHAGOGASTRODUODENOSCOPY) (N/A)   10 Days Post-Op     Interval History: Pt stepped down to  on 1/25, was medically doing better and rescheduled tentatively for lumbar lami on Monday 1/30. However, stepped back up to MICU this am due to Afib with RVR and hypotension. Upon exam today, pt reports some low back pain but denies current radicular leg pain, denies new/worsening weakness or sensory changes. Reports intact sensation of groin area.    Medications:  Continuous Infusions:   amiodarone in dextrose 5% 1 mg/min (01/27/23 0907)    amiodarone in dextrose 5%       Scheduled Meds:   dextromethorphan-guaiFENesin  mg  2 tablet Oral BID    folic acid  1 mg Oral Daily    heparin (porcine)  5,000 Units Subcutaneous Q8H    lactulose  10 g Oral TID    midazolam  2 mg Intravenous Once    midodrine  10 mg Oral Q8H    multivitamin  1 tablet Oral Daily    pantoprazole  40 mg Oral BID AC    thiamine  100 mg Oral Daily    torsemide  50 mg Oral BID loop     PRN Meds:sodium chloride, albuterol-ipratropium,  benzonatate, dextrose 10%, dextrose 10%, glucagon (human recombinant), glucose, glucose, HYDROmorphone, insulin aspart U-100, LIDOcaine-prilocaine, promethazine-codeine 6.25-10 mg/5 ml     Review of Systems   Constitutional:  Negative for chills and fever.   Respiratory:  Positive for shortness of breath.    Gastrointestinal:  Negative for nausea and vomiting.   Musculoskeletal:  Positive for back pain and gait problem.   Skin:  Negative for rash and wound.   Neurological:  Positive for weakness. Negative for numbness.   Objective:     Weight: 110 kg (242 lb 8.1 oz)  Body mass index is 36.87 kg/m².  Vital Signs (Most Recent):  Temp: 100.2 °F (37.9 °C) (01/27/23 1100)  Pulse: 73 (01/27/23 1100)  Resp: 13 (01/27/23 1100)  BP: (!) 76/46 (01/27/23 1100)  SpO2: (!) 94 % (01/27/23 1100)   Vital Signs (24h Range):  Temp:  [97.4 °F (36.3 °C)-100.9 °F (38.3 °C)] 100.2 °F (37.9 °C)  Pulse:  [] 73  Resp:  [13-28] 13  SpO2:  [88 %-98 %] 94 %  BP: ()/(46-75) 76/46     Date 01/27/23 0700 - 01/28/23 0659   Shift 1483-6448 9052-6184 1412-0843 24 Hour Total   INTAKE   Shift Total(mL/kg)       OUTPUT   Urine(mL/kg/hr) 650   650   Shift Total(mL/kg) 650(5.9)   650(5.9)   Weight (kg) 110 110 110 110              Oxygen Concentration (%):  [100] 100         Open Drain 01/17/23 0800 Left;Anterior LLQ (Active)   Site Description Healing 01/27/23 1009   Dressing Status Clean;Dry;Intact 01/27/23 1009   Drainage Yellow 01/27/23 1009   Status Unclamped 01/27/23 1009   Output (mL) 110 mL 01/24/23 0600            Urethral Catheter 01/16/23 1347 Latex 14 Fr. (Active)   Site Assessment Clean;Intact 01/27/23 1009   Collection Container Standard drainage bag 01/27/23 1009   Securement Method secured to top of thigh w/ adhesive device 01/27/23 1009   Catheter Care Performed yes 01/27/23 1009   Reason for Continuing Urinary Catheterization Critically ill in ICU and requiring hourly monitoring of intake/output 01/27/23 1009   CAUTI  "Prevention Bundle Securement Device in place with 1" slack;Intact seal between catheter & drainage tubing;Drainage bag/urimeter off the floor;Sheeting clip in use;No dependent loops or kinks;Drainage bag/urimeter not overfilled (<2/3 full);Drainage bag/urimeter below bladder 01/27/23 1009   Output (mL) 275 mL 01/27/23 1126       Trialysis (Dialysis) Catheter 01/22/23 1530 right internal jugular (Active)   Line Necessity Review CRRT/HD 01/27/23 1009   Verification by X-ray Yes 01/27/23 1009   Site Assessment No swelling;No redness;No drainage 01/27/23 1009   Line Securement Device Secured with sutures 01/27/23 1009   Dressing Type Biopatch in place;Central line dressing 01/27/23 1009   Dressing Status Clean;Dry;Intact 01/27/23 1009   Dressing Intervention Integrity maintained 01/27/23 1009   Date on Dressing 01/22/23 01/27/23 1009   Dressing Due to be Changed 01/29/23 01/27/23 1009   Venous Patency/Care infusing 01/27/23 1009   Arterial Patency/Care flushed w/o difficulty 01/27/23 1009   Distal Patency/Care infusing 01/27/23 1009   Flows Good 01/25/23 1510   Waveform Not being transduced 01/27/23 1009       Physical Exam    Neurosurgery Physical Exam  Constitutional:       Appearance: He is well-developed and well-nourished.      Comments: obese   Eyes:      Extraocular Movements: EOM normal.      Conjunctiva/sclera: Conjunctivae normal.      Pupils: Pupils are equal, round, and reactive to light.   Abdominal:      Palpations: Abdomen is soft, non-tender  Neurological:      Mental Status: He is alert and oriented to person, place, and time.         Awake, alert, & oriented  PERRL  CN grossly intact  BUE/BLE grossly 4/5 throughout  SILT, no saddle anesthesia      Psychiatric:         Mood and Affect: normal, mildly decreased concentration    Significant Labs:  Recent Labs   Lab 01/26/23  0346 01/27/23  0537 01/27/23  0828   * 108 121*   * 134* 134*   K 3.6 3.4* 3.7    101 102   CO2 23 20* 18*   BUN " 25* 28* 28*   CREATININE 3.1* 3.0* 2.6*   CALCIUM 8.2* 8.6* 8.4*   MG 1.9 2.2 2.1     Recent Labs   Lab 01/26/23  0346 01/27/23  0537 01/27/23  0826 01/27/23  0828 01/27/23  0954   WBC 13.60* 15.49*  --  16.32*  --    HGB 8.9* 10.6*  --  9.5*  --    HCT 27.0* 32.8* 34* 30.1* 33*   * 143*  --  141*  --      Recent Labs   Lab 01/26/23  0346 01/27/23  0537   INR 1.3* 1.3*     Microbiology Results (last 7 days)       Procedure Component Value Units Date/Time    Blood culture [[id number]] Collected: 01/22/23 1052    Order Status: Completed Specimen: Blood from Peripheral, Right Wrist Updated: 01/26/23 1212     Blood Culture, Routine No Growth to date      No Growth to date      No Growth to date      No Growth to date      No Growth to date    Culture, Anaerobic [[id number]] Collected: 01/17/23 0003    Order Status: Completed Specimen: Ascites Updated: 01/24/23 0703     Anaerobic Culture No anaerobes isolated    Aerobic culture [[id number]] Collected: 01/17/23 0003    Order Status: Completed Specimen: Ascites Updated: 01/20/23 1239     Aerobic Bacterial Culture No growth          All pertinent labs from the last 24 hours have been reviewed.    Significant Diagnostics:  I have reviewed and interpreted all pertinent imaging results/findings within the past 24 hours.    Assessment/Plan:     Lumbar herniated disc  61 yo M with PMH of alcoholic hepatitis (drinks a 6 pack of beer per day and bottle of marie), Afib on Xarelto, central obesity, HTN, GI bleed, unknown CKD vs MARTITA on admission, anemia, chronic hyponatremia who presents due to inability to get out of bed. He reports that he has been having fecal incontinence for 2 months now and difficulty walking for the last 4 days (with single person assistance and rolling walker) and inability to get out of bed today. He denies saddle anesthesia or urinary incontinence or issues voiding his bladder. He has also  been having dark and tarry stools.     --Admitted to  MICU   -q4 neuro checks  --All labs and diagnostics reviewed   -CT L spine and MRI L spine at OSH showed large L3/L4 herniated disc with moderate to severe stenosis. Some air in disc space.   -MRI L spine wo 1/16: large L3/L4 herniated disc with moderate to severe stenosis  --no emergent neurosurgical intervention, patient with clinical findings of subacute to chronic symptoms   -if medically stabilized, will plan for laminectomy/discectomy    -OR tentatively scheduled for Monday 1/30, pending medical stability and ongoing risk vs benefit discussion. Pt will be high risk for surgery due to his medical co-morbidities, in particular cirrhosis with baseline elevated INR.  --Continue to hold systemic AC prior to possible surgery. Okay for SQH, hold Sunday evening. PT/INR elevated but improved since admission, continue to trend and optimize.   --no HOB restrictions from NSGY standpoint  --GIB: GI consulted for GI bleed, EGD negative for bleed but showed esophageal varices. Concern for infiltrative liver process on top of alcoholic cirrhosis  --MARTITA: Nephrology consulted, Cr improving, iHD per Nephrology   --ok for diet at this time per primary  --MICU/ for all primary medical issues  --NSGY will continue to follow peripherally, will plan to see pt on Sunday to reassess whether surgery would be beneficial and of greater benefit over risk at this time. Please contact us with any questions/concerns.    Dispo: ongoing per primary team, OR pending medical stabilization         Magalys Dunlap PA-C  Neurosurgery  [facility]

## 2023-01-27 NOTE — ASSESSMENT & PLAN NOTE
Impression: Pt is a 63 y/o malee with a PMHx of A-fib (on Eliquis), hypertension, DM 2 (not insulin dependent) who has spinal stenosis, alcoholic cirrhosis, MARTITA, sepsis. Pt to have laminectomy on Monday per chart review. Pt is alert, oriented to person, place, situation. Pt is a Full code.     Reason for consult: ACP. Communicated with Dr. Mariano. Surgery planned for Monday for spinal stenosis. Pt has alcoholic cirrhosis, MARTITA.     Goals of care/ACP:   Met with pt's significant other, Adelita Glaser. Introduced role of palliative care in pt with multiple chronic illnesses. Pt and SO verbalized understanding. Per pt goal is to continue medical treatment at this time. Pt is aware of his spinal issues and his liver issues. Pt is an active drinker prior tot admit. Pt stepped up to ICU for A-fib, hypotension.   Per pt, if he is unable to make his own medical decision, he would want Adelita Glaser to be MPOA. MPOA paperwork education done with pt. Forms completed. Per pt he is estranged from his adult children.     Pt's SO left hospital tot go to work and will be back on Monday. She can be reach by phone if needed.     MPOA: Adelita Glaser.    Symptom management:     Debility r/t to spinal stenosis  PT/OT to work with pt when medically stable.     Pain r/t to spinal stenosis. Pt has pain to legs on admit. Pt denies pain at this time.     Current meds:  Pt on Dilaudid .2 mg q 4 hrs prn pain.   No doses used today.     Plan:   Pt to have surgery Monday.   Will f/u with pt and pt's MPOA for continued GOC. Adelita will be back on Monday.

## 2023-01-27 NOTE — ASSESSMENT & PLAN NOTE
- Per Neurosurgery, patient is not currently medically optimized for intervention - suggesting multimodal pain control at this time.     - Related to prolonged/acute hospital course.     Recommendations  -  Encourage mobility, OOB in chair at least 3 hours per day, and early ambulation as appropriate  -  PT/OT evaluate and treat  -  Pain management  -  Monitor for and prevent skin breakdown and pressure ulcers  · Early mobility, repositioning/weight shifting every 20-30 minutes when sitting, turn patient every 2 hours, proper mattress/overlay and chair cushioning, pressure relief/heel protector boots  -  DVT prophylaxis    -  Reviewed discharge options (IP rehab, SNF, HH therapy, and OP therapy)

## 2023-01-27 NOTE — PLAN OF CARE
[facility]  Discharge Reassessment    Primary Care Provider: Yuli Pérze Mai, MD    Expected Discharge Date: 1/31/2023    Patient not medically ready to discharge per MD.    Reassessment (most recent)       Discharge Reassessment - 01/27/23 1620          Discharge Reassessment    Assessment Type Discharge Planning Reassessment     Did the patient's condition or plan change since previous assessment? No     Discharge Plan A Rehab     Discharge Plan B Home     DME Needed Upon Discharge  other (see comments)   TBD    Discharge Barriers Identified Underinsured     Why the patient remains in the hospital Requires continued medical care        Post-Acute Status    Post-Acute Authorization Placement     Post-Acute Placement Status Referrals Sent     Discharge Delays None known at this time                   Rachel Whelan RN     [phone]

## 2023-01-27 NOTE — ASSESSMENT & PLAN NOTE
-Last A1c reviewed-   Lab Results   Component Value Date    HGBA1C 5.3 11/10/2022       Home Antihyperglycemic Regiment:  - Metformin  1000 mg BID     Inpatient Antihyperglycemic Regiment:  Antihyperglycemics (From admission, onward)    Start     Stop Route Frequency Ordered    01/16/23 1507  insulin aspart U-100 pen 0-5 Units         -- SubQ Before meals & nightly PRN 01/16/23 1407        - LDSSI  - Clear liquid diet for now     Blood Sugars (AccuCheck):    Recent Labs     01/24/23  1235 01/24/23  2236 01/25/23  1119 01/25/23  2115 01/26/23  0342   POCTGLUCOSE 137* 160* 143* 129* 109

## 2023-01-27 NOTE — CODE/ RAPID DOCUMENTATION
RAPID RESPONSE NURSE NOTE        Admit Date: 2023  LOS: 11  Code Status: Full Code   Date of Consult: 2023  : 1960  Age: 62 y.o.  Weight:   Wt Readings from Last 1 Encounters:   23 110 kg (242 lb 8.1 oz)     Sex: male  Race: Other   Bed: [address] [place] [address] A:   MRN: [id number]  Time Rapid Response Team page Received: 0754  Time Rapid Response Team at Bedside: 0755  Time Rapid Response Team left Bedside: 97.9  Was the patient discharged from an ICU this admission? Yes   Was the patient discharged from a PACU within last 24 hours? No   Did the patient receive conscious sedation/general anesthesia in last 24 hours? No  Was the patient in the ED within the past 24 hours? No  Was the patient on NIPPV within the past 24 hours? No   Did this progress into an ARC or CPA: no  Attending Physician: Lance Martin*  Primary Service: [facility] [place] [facility]IC[facility] TEAM 1       SITUATION    Notified by pager.  Reason for alert: afib RVR; hypotension  Called to evaluate the patient for Circulatory    BACKGROUND     Why is the patient in the hospital?: Atrial fibrillation with rapid ventricular response    Patient has a past medical history of A-fib.    Last Vitals:  Temp: 97.9 °F (36.6 °C) (914)  Pulse: 128 (900)  Resp: 28 (900)  BP: 97/68 (900)  SpO2: 91 % (900)    24 Hours Vitals Range:  Temp:  [97.4 °F (36.3 °C)-98.7 °F (37.1 °C)]   Pulse:  []   Resp:  [16-28]   BP: ()/(57-75)   SpO2:  [91 %-98 %]     Labs:  Recent Labs     23  0346 23  0537 23  0826 23  0828   WBC 13.60* 15.49*  --  16.32*   HGB 8.9* 10.6*  --  9.5*   HCT 27.0* 32.8* 34* 30.1*   * 143*  --  141*       Recent Labs     23  0329 23  0346 23  0537 23  0828    134* 134* 134*   K 3.7 3.6 3.4* 3.7    100 101 102   CO2 21* 23 20* 18*   CREATININE 3.0* 3.1* 3.0* 2.6*   * 117* 108 121*   PHOS 4.2 4.2 4.0  --    MG 2.1 1.9 2.2 2.1         Recent Labs     23  0826   PH 7.355   PCO2 43.4   PO2 31*   HCO3 24.3   POCSATURATED 56*   BE -1        ASSESSMENT    Physical Exam  Vitals reviewed.   Constitutional:       Appearance: He is ill-appearing.   HENT:      Mouth/Throat:      Mouth: Mucous membranes are dry.   Cardiovascular:      Rate and Rhythm: Tachycardia present. Rhythm irregular.   Pulmonary:      Effort: Tachypnea present.      Breath sounds: Wheezing present.   Abdominal:      General: There is distension.      Tenderness: There is no abdominal tenderness.   Musculoskeletal:         General: Swelling present.      Right lower le+ Pitting Edema present.      Left lower le+ Pitting Edema present.   Skin:     General: Skin is warm and dry.      Coloration: Skin is pale.      Findings: Bruising present.   Neurological:      Mental Status: He is alert and oriented to person, place, and time. Mental status is at baseline.       INTERVENTIONS    The patient was seen for Cardiac problem. Staff concerns included tachycardia and hypotension. The following interventions were performed: BMP, CBC, Magnesium, Troponin, portable chest x-ray, EKG, continuous cardiac monitoring continued, continuous pulse ox monitoring continued, critical care consult, and Metoprolol 5mg IV x2; Amio 150mg bolus.    RECOMMENDATIONS    We recommend: - Transfer to critical care    PROVIDER ESCALATION    Orders received and case discussed with Dr. Hubbard, Dr. Peng, Dr. Cat .    Primary team arrival time: 0900    Disposition: Tx in ICU bed 6095.    FOLLOW UP    Charge Naida HENNING  updated on plan of care. Instructed to call the Rapid Response Nurse, Fanny Suresh RN at [phone] for additional questions or concerns.

## 2023-01-27 NOTE — ASSESSMENT & PLAN NOTE
Creatinine 4.7 on admit, baseline around 0.8. Pre-renal vs ATN. Less likely obstruction as pt has gomez.  Renal ultrasound with medical renal disease and no evidence of hydronephrosis.    - Nephrology consulted, appreciate recommendations  - iHD per nephrology  - Strict I&Os and daily weights   - Avoid nephrotoxic agents such as NSAIDs, gadolinium and IV radiocontrast.  - Renally dose meds to current GFR.  - Maintain MAP > 65.   - On IV Lasix 240 mg QD, and torsemide 50 mg BID

## 2023-01-27 NOTE — PT/OT/SLP PROGRESS
Physical Therapy      Patient Name:  Jonny Isabel   MRN:  [id number]    Patient not seen today secondary to Transfer to ICU, await clearance. PT will follow-up for re-evaluation once new orders received.

## 2023-01-27 NOTE — SUBJECTIVE & OBJECTIVE
Past Medical History:   Diagnosis Date    A-fib        Past Surgical History:   Procedure Laterality Date    ESOPHAGOGASTRODUODENOSCOPY N/A 1/17/2023    Procedure: EGD (ESOPHAGOGASTRODUODENOSCOPY);  Surgeon: Dewayne Navas MD;  Location: [address]);  Service: Endoscopy;  Laterality: N/A;       Review of patient's allergies indicates:  No Known Allergies    Family History    None       Tobacco Use    Smoking status: Not on file    Smokeless tobacco: Not on file   Substance and Sexual Activity    Alcohol use: Yes     Alcohol/week: 6.0 standard drinks     Types: 6 Cans of beer per week    Drug use: Not on file    Sexual activity: Not on file      Review of Systems   Constitutional:  Positive for activity change. Negative for chills, fatigue and fever.   HENT:  Negative for congestion and trouble swallowing.    Eyes:  Negative for visual disturbance.   Respiratory:  Negative for cough and shortness of breath.    Cardiovascular:  Negative for chest pain and leg swelling.   Gastrointestinal:  Positive for abdominal distention. Negative for abdominal pain, nausea and vomiting.   Endocrine: Negative for cold intolerance, heat intolerance, polydipsia and polyuria.   Genitourinary:  Negative for difficulty urinating and dysuria.   Musculoskeletal:  Negative for back pain and myalgias.   Skin:  Negative for rash and wound.   Neurological:  Positive for weakness. Negative for dizziness and light-headedness.   Hematological:  Negative for adenopathy. Does not bruise/bleed easily.   Psychiatric/Behavioral:  Negative for confusion and sleep disturbance.    Objective:     Vital Signs (Most Recent):  Temp: 97.9 °F (36.6 °C) (01/27/23 0914)  Pulse: (!) 128 (01/27/23 0900)  Resp: (!) 28 (01/27/23 0900)  BP: 97/68 (01/27/23 0900)  SpO2: (!) 91 % (01/27/23 0900)   Vital Signs (24h Range):  Temp:  [97.4 °F (36.3 °C)-98.7 °F (37.1 °C)] 97.9 °F (36.6 °C)  Pulse:  [] 128  Resp:  [16-28] 28  SpO2:  [91 %-98 %] 91 %  BP:  ()/(57-75) 97/68   Weight: 110 kg (242 lb 8.1 oz)  Body mass index is 36.87 kg/m².      Intake/Output Summary (Last 24 hours) at 1/27/2023 0918  Last data filed at 1/27/2023 0917  Gross per 24 hour   Intake 240 ml   Output 1275 ml   Net -1035 ml       Physical Exam  Constitutional:       Appearance: He is well-developed.   HENT:      Head: Normocephalic and atraumatic.   Eyes:      General: No scleral icterus.     Pupils: Pupils are equal, round, and reactive to light.   Neck:      Vascular: No JVD.   Cardiovascular:      Rate and Rhythm: Normal rate and regular rhythm.      Heart sounds: No murmur heard.    No friction rub. No gallop.   Pulmonary:      Effort: Pulmonary effort is normal. No respiratory distress.      Breath sounds: Normal breath sounds. No wheezing or rales.   Abdominal:      General: Bowel sounds are normal. There is distension.      Palpations: Abdomen is soft.      Tenderness: There is no abdominal tenderness. There is no guarding or rebound.   Musculoskeletal:         General: No deformity. Normal range of motion.      Cervical back: Neck supple.   Lymphadenopathy:      Cervical: No cervical adenopathy.   Skin:     General: Skin is warm and dry.      Capillary Refill: Capillary refill takes less than 2 seconds.      Findings: No erythema or rash.   Neurological:      Mental Status: He is alert and oriented to person, place, and time.      Cranial Nerves: No cranial nerve deficit.      Sensory: No sensory deficit.   Psychiatric:         Judgment: Judgment normal.       Vents:  Oxygen Concentration (%): 100 (OFF WALL) (01/27/23 0900)  Lines/Drains/Airways       Central Venous Catheter Line  Duration             Trialysis (Dialysis) Catheter 01/22/23 1530 right internal jugular 4 days              Drain  Duration                  Open Drain 01/17/23 0800 Left;Anterior LLQ 10 days         Urethral Catheter 01/16/23 1347 Latex 14 Fr. 10 days              Peripheral Intravenous Line  Duration                   Peripheral IV - Single Lumen 01/25/23 1530 22 G Left;Posterior Hand 1 day                  Significant Labs:    CBC/Anemia Profile:  Recent Labs   Lab 01/26/23  0346 01/27/23  0537 01/27/23  0826 01/27/23  0828   WBC 13.60* 15.49*  --  16.32*   HGB 8.9* 10.6*  --  9.5*   HCT 27.0* 32.8* 34* 30.1*   * 143*  --  141*   MCV 95 96  --  97   RDW 22.2* 22.3*  --  22.1*        Chemistries:  Recent Labs   Lab 01/26/23 0346 01/27/23  0537   * 134*   K 3.6 3.4*    101   CO2 23 20*   BUN 25* 28*   CREATININE 3.1* 3.0*   CALCIUM 8.2* 8.6*   ALBUMIN 2.0* 2.0*   PROT 6.4 6.8   BILITOT 3.6* 3.7*   ALKPHOS 182* 193*   ALT 35 38   AST 97* 101*   MG 1.9 2.2   PHOS 4.2 4.0       All pertinent labs within the past 24 hours have been reviewed.    Significant Imaging: I have reviewed all pertinent imaging results/findings within the past 24 hours.

## 2023-01-27 NOTE — CONSULTS
[facility] ([facility])  Physical Medicine & Rehab  Consult Note    Patient Name: Jonny Isabel  MRN: [id number]  Admission Date: 1/16/2023  Hospital Length of Stay: 11 days  Attending Physician: Oscar Hubbard MD     Inpatient consult to Physical Medicine & Rehabilitation  Consult performed by: Delmi Cruz NP  Consult requested by:  Oscar Hubbard MD    Collaborating Physician: Kathryn Rodas MD  Reason for Consult:  Assess rehabilitation needs     Consults  Subjective:     Principal Problem: Spinal stenosis    HPI: Jonny Isabel is a 62-year-old male with PMHx of HTN, DM, alcohol abuse, afib/flutter (Xarelto). Patient presented to [facility] on 1/16/23 for chronic lower back pain. On arrival, MRI Lumbar Spine revealed congenital narrowing of lumbar spinal canal with prominent epidural fat. Large disc extrusion at L3-4, contributing to severe spinal canal stenosis, as above. Additional lumbar spondylosis, contributing to moderate spinal canal stenosis at L2-3 and L4-5 and moderate neural foraminal narrowing L4-5 and L5-S1. Per Neurosurgery, patient is not currently medically optimized for intervention - suggesting multimodal pain control at this time. Hospital course complicated by alcoholic cirrhosis with ascites (S/p diagnostic paracentesis on 1/17, consistent w SBP (subacute Bacterial peritonitis). EGD revealed esophageal varices. Continues cn Lactulose, midodrine, cefepime and flagyl started 1/16 for presumed SBP, cultures for ascites 1/17 are no growth so far, abx stopped), R hand cellulitis (was on Vanc and Cefepime, no further abx) and MARTITA (Nephrology consulted and UOP responsive to 240 IV lasix 1/25. He did require RRT 1/22 for metabolic encephalopathy and work of breathing).      Functional History: Patient lives with girlfriend in first floor apartment. Prior to admission, Anam. DME: [facility].       Hospital Course: 1/26/23: Participated w/ OT. Bed mob min- modA. Sit to stand modA x 2 ppl. UBD  Mckenzie.       Past Medical History:   Diagnosis Date    A-fib      Past Surgical History:   Procedure Laterality Date    ESOPHAGOGASTRODUODENOSCOPY N/A 1/17/2023    Procedure: EGD (ESOPHAGOGASTRODUODENOSCOPY);  Surgeon: Dewayne Navas MD;  Location: [address]);  Service: Endoscopy;  Laterality: N/A;     Review of patient's allergies indicates:  No Known Allergies    Scheduled Medications:    benzonatate  200 mg Oral Once    dextromethorphan-guaiFENesin  mg  2 tablet Oral BID    folic acid  1 mg Oral Daily    heparin (porcine)  5,000 Units Subcutaneous Q8H    lactulose  10 g Oral TID    metoprolol tartrate  25 mg Oral BID    multivitamin  1 tablet Oral Daily    pantoprazole  40 mg Oral BID AC    thiamine  100 mg Oral Daily    torsemide  50 mg Oral BID loop       PRN Medications: sodium chloride, albuterol-ipratropium, benzonatate, dextrose 10%, dextrose 10%, glucagon (human recombinant), glucose, glucose, HYDROmorphone, insulin aspart U-100, LIDOcaine-prilocaine, metoprolol, promethazine-codeine 6.25-10 mg/5 ml    Family History    None       Tobacco Use    Smoking status: Not on file    Smokeless tobacco: Not on file   Substance and Sexual Activity    Alcohol use: Yes     Alcohol/week: 6.0 standard drinks     Types: 6 Cans of beer per week    Drug use: Not on file    Sexual activity: Not on file     Review of Systems   Constitutional:  Positive for activity change. Negative for fatigue and fever.   HENT:  Negative for trouble swallowing and voice change.    Respiratory:  Negative for cough and shortness of breath.    Cardiovascular:  Negative for chest pain and leg swelling.   Gastrointestinal:  Positive for abdominal distention. Negative for abdominal pain.   Genitourinary:  Negative for difficulty urinating and flank pain.   Musculoskeletal:  Positive for gait problem. Negative for back pain.   Skin:  Negative for color change and rash.   Neurological:  Positive for weakness.  Negative for speech difficulty and numbness.   Psychiatric/Behavioral:  Negative for agitation and confusion.      Objective:     Vital Signs (Most Recent):  Temp: 98.4 °F (36.9 °C) (01/27/23 0728)  Pulse: (!) 155 (01/27/23 0747)  Resp: 18 (01/27/23 0728)  BP: (!) 129/57 (01/27/23 0728)  SpO2: 98 % (01/27/23 0728)      Vital Signs (24h Range):  Temp:  [97.4 °F (36.3 °C)-98.7 °F (37.1 °C)] 98.4 °F (36.9 °C)  Pulse:  [] 155  Resp:  [16-18] 18  SpO2:  [91 %-98 %] 98 %  BP: (110-133)/(57-75) 129/57     Body mass index is 36.87 kg/m².    Physical Exam  Vitals and nursing note reviewed.   Constitutional:       Appearance: He is well-developed.   HENT:      Head: Normocephalic and atraumatic.   Eyes:      General:         Right eye: No discharge.         Left eye: No discharge.      Pupils: Pupils are equal, round, and reactive to light.   Pulmonary:      Effort: Pulmonary effort is normal. No respiratory distress.   Abdominal:      General: There is distension.      Tenderness: There is no abdominal tenderness.   Musculoskeletal:         General: No deformity.      Cervical back: Neck supple.      Comments: Deconditioned and generalized weakness   Skin:     General: Skin is warm and dry.   Neurological:      Sensory: No sensory deficit.      Motor: Weakness present. No abnormal muscle tone.      Gait: Gait abnormal.   Psychiatric:         Mood and Affect: Mood normal.         Behavior: Behavior normal.     Diagnostic Results:   Labs: Reviewed  ECG: Reviewed  CT: Reviewed    Assessment/Plan:     * Spinal stenosis  - Per Neurosurgery, patient is not currently medically optimized for intervention - suggesting multimodal pain control at this time.     - Related to prolonged/acute hospital course.     Recommendations  -  Encourage mobility, OOB in chair at least 3 hours per day, and early ambulation as appropriate  -  PT/OT evaluate and treat  -  Pain management  -  Monitor for and prevent skin breakdown and pressure  ulcers  · Early mobility, repositioning/weight shifting every 20-30 minutes when sitting, turn patient every 2 hours, proper mattress/overlay and chair cushioning, pressure relief/heel protector boots  -  DVT prophylaxis    -  Reviewed discharge options (IP rehab, SNF, HH therapy, and OP therapy)    Cellulitis  - was on Vanc and Cefepime, no further abx    Alcoholic cirrhosis of liver with ascites  - S/p diagnostic paracentesis on 1/17, consistent w SBP      MARTITA (acute kidney injury)  - Nephrology managing and urine out put improving        PM&R Recommendation:     At this time, the PM&R team has reviewed this patient's ongoing medical case including inpatient diagnosis, medical history, clinical examination, labs, vitals, current social and functional history.     We will continue to follow for final plan for spinal stenosis.     Thank you for your consult.     Delmi Cruz NP  Department of Physical Medicine & Rehab  [facility] ([facility])

## 2023-01-27 NOTE — SUBJECTIVE & OBJECTIVE
Past Medical History:   Diagnosis Date    A-fib      Past Surgical History:   Procedure Laterality Date    ESOPHAGOGASTRODUODENOSCOPY N/A 1/17/2023    Procedure: EGD (ESOPHAGOGASTRODUODENOSCOPY);  Surgeon: Dewayne Navas MD;  Location: [facility] ([address] [place] [address]);  Service: Endoscopy;  Laterality: N/A;     Review of patient's allergies indicates:  No Known Allergies    Scheduled Medications:    benzonatate  200 mg Oral Once    dextromethorphan-guaiFENesin  mg  2 tablet Oral BID    folic acid  1 mg Oral Daily    heparin (porcine)  5,000 Units Subcutaneous Q8H    lactulose  10 g Oral TID    metoprolol tartrate  25 mg Oral BID    multivitamin  1 tablet Oral Daily    pantoprazole  40 mg Oral BID AC    thiamine  100 mg Oral Daily    torsemide  50 mg Oral BID loop       PRN Medications: sodium chloride, albuterol-ipratropium, benzonatate, dextrose 10%, dextrose 10%, glucagon (human recombinant), glucose, glucose, HYDROmorphone, insulin aspart U-100, LIDOcaine-prilocaine, metoprolol, promethazine-codeine 6.25-10 mg/5 ml    Family History    None       Tobacco Use    Smoking status: Not on file    Smokeless tobacco: Not on file   Substance and Sexual Activity    Alcohol use: Yes     Alcohol/week: 6.0 standard drinks     Types: 6 Cans of beer per week    Drug use: Not on file    Sexual activity: Not on file     Review of Systems   Constitutional:  Positive for activity change. Negative for fatigue and fever.   HENT:  Negative for trouble swallowing and voice change.    Respiratory:  Negative for cough and shortness of breath.    Cardiovascular:  Negative for chest pain and leg swelling.   Gastrointestinal:  Positive for abdominal distention. Negative for abdominal pain.   Genitourinary:  Negative for difficulty urinating and flank pain.   Musculoskeletal:  Positive for gait problem. Negative for back pain.   Skin:  Negative for color change and rash.   Neurological:  Positive for weakness. Negative for speech difficulty  and numbness.   Psychiatric/Behavioral:  Negative for agitation and confusion.    Objective:     Vital Signs (Most Recent):  Temp: 98.4 °F (36.9 °C) (01/27/23 0728)  Pulse: (!) 155 (01/27/23 0747)  Resp: 18 (01/27/23 0728)  BP: (!) 129/57 (01/27/23 0728)  SpO2: 98 % (01/27/23 0728)      Vital Signs (24h Range):  Temp:  [97.4 °F (36.3 °C)-98.7 °F (37.1 °C)] 98.4 °F (36.9 °C)  Pulse:  [] 155  Resp:  [16-18] 18  SpO2:  [91 %-98 %] 98 %  BP: (110-133)/(57-75) 129/57     Body mass index is 36.87 kg/m².    Physical Exam  Vitals and nursing note reviewed.   Constitutional:       Appearance: He is well-developed.   HENT:      Head: Normocephalic and atraumatic.   Eyes:      General:         Right eye: No discharge.         Left eye: No discharge.      Pupils: Pupils are equal, round, and reactive to light.   Pulmonary:      Effort: Pulmonary effort is normal. No respiratory distress.   Abdominal:      General: There is distension.      Tenderness: There is no abdominal tenderness.   Musculoskeletal:         General: No deformity.      Cervical back: Neck supple.      Comments: Deconditioned and generalized weakness   Skin:     General: Skin is warm and dry.   Neurological:      Sensory: No sensory deficit.      Motor: Weakness present. No abnormal muscle tone.      Gait: Gait abnormal.   Psychiatric:         Mood and Affect: Mood normal.         Behavior: Behavior normal.     NEUROLOGICAL EXAMINATION:     CRANIAL NERVES     CN III, IV, VI   Pupils are equal, round, and reactive to light.    Diagnostic Results: Labs: Reviewed  ECG: Reviewed  CT: Reviewed

## 2023-01-28 PROBLEM — R00.0 TACHYCARDIA: Status: ACTIVE | Noted: 2023-01-28

## 2023-01-28 PROBLEM — R57.8 HEMORRHAGIC SHOCK: Status: ACTIVE | Noted: 2023-01-28

## 2023-01-28 LAB
ALBUMIN SERPL BCP-MCNC: 1.9 G/DL (ref 3.5–5.2)
ALBUMIN SERPL BCP-MCNC: 2 G/DL (ref 3.5–5.2)
ALP SERPL-CCNC: 169 U/L (ref 55–135)
ALP SERPL-CCNC: 177 U/L (ref 55–135)
ALT SERPL W/O P-5'-P-CCNC: 36 U/L (ref 10–44)
ALT SERPL W/O P-5'-P-CCNC: 41 U/L (ref 10–44)
ANION GAP SERPL CALC-SCNC: 11 MMOL/L (ref 8–16)
ANION GAP SERPL CALC-SCNC: 11 MMOL/L (ref 8–16)
AST SERPL-CCNC: 104 U/L (ref 10–40)
AST SERPL-CCNC: 99 U/L (ref 10–40)
BASOPHILS # BLD AUTO: 0.12 K/UL (ref 0–0.2)
BASOPHILS NFR BLD: 0.8 % (ref 0–1.9)
BILIRUB SERPL-MCNC: 3.2 MG/DL (ref 0.1–1)
BILIRUB SERPL-MCNC: 3.2 MG/DL (ref 0.1–1)
BUN SERPL-MCNC: 29 MG/DL (ref 8–23)
BUN SERPL-MCNC: 29 MG/DL (ref 8–23)
CALCIUM SERPL-MCNC: 8.1 MG/DL (ref 8.7–10.5)
CALCIUM SERPL-MCNC: 8.2 MG/DL (ref 8.7–10.5)
CHLORIDE SERPL-SCNC: 102 MMOL/L (ref 95–110)
CHLORIDE SERPL-SCNC: 99 MMOL/L (ref 95–110)
CO2 SERPL-SCNC: 22 MMOL/L (ref 23–29)
CO2 SERPL-SCNC: 25 MMOL/L (ref 23–29)
CREAT SERPL-MCNC: 2.3 MG/DL (ref 0.5–1.4)
CREAT SERPL-MCNC: 2.3 MG/DL (ref 0.5–1.4)
DIFFERENTIAL METHOD: ABNORMAL
EOSINOPHIL # BLD AUTO: 0.1 K/UL (ref 0–0.5)
EOSINOPHIL NFR BLD: 0.6 % (ref 0–8)
ERYTHROCYTE [DISTWIDTH] IN BLOOD BY AUTOMATED COUNT: 21.6 % (ref 11.5–14.5)
EST. GFR  (NO RACE VARIABLE): 31.5 ML/MIN/1.73 M^2
EST. GFR  (NO RACE VARIABLE): 31.5 ML/MIN/1.73 M^2
GLUCOSE SERPL-MCNC: 119 MG/DL (ref 70–110)
GLUCOSE SERPL-MCNC: 145 MG/DL (ref 70–110)
HCT VFR BLD AUTO: 26.8 % (ref 40–54)
HGB BLD-MCNC: 8.7 G/DL (ref 14–18)
IMM GRANULOCYTES # BLD AUTO: 0.18 K/UL (ref 0–0.04)
IMM GRANULOCYTES NFR BLD AUTO: 1.1 % (ref 0–0.5)
INR PPP: 1.2 (ref 0.8–1.2)
LYMPHOCYTES # BLD AUTO: 1.2 K/UL (ref 1–4.8)
LYMPHOCYTES NFR BLD: 7.6 % (ref 18–48)
MAGNESIUM SERPL-MCNC: 1.8 MG/DL (ref 1.6–2.6)
MCH RBC QN AUTO: 31.1 PG (ref 27–31)
MCHC RBC AUTO-ENTMCNC: 32.5 G/DL (ref 32–36)
MCV RBC AUTO: 96 FL (ref 82–98)
MONOCYTES # BLD AUTO: 1.1 K/UL (ref 0.3–1)
MONOCYTES NFR BLD: 6.8 % (ref 4–15)
NEUTROPHILS # BLD AUTO: 13.1 K/UL (ref 1.8–7.7)
NEUTROPHILS NFR BLD: 83.1 % (ref 38–73)
NRBC BLD-RTO: 0 /100 WBC
PHOSPHATE SERPL-MCNC: 4.6 MG/DL (ref 2.7–4.5)
PLATELET # BLD AUTO: 152 K/UL (ref 150–450)
PMV BLD AUTO: 12.2 FL (ref 9.2–12.9)
POCT GLUCOSE: 120 MG/DL (ref 70–110)
POCT GLUCOSE: 129 MG/DL (ref 70–110)
POCT GLUCOSE: 145 MG/DL (ref 70–110)
POCT GLUCOSE: 146 MG/DL (ref 70–110)
POTASSIUM SERPL-SCNC: 3.3 MMOL/L (ref 3.5–5.1)
POTASSIUM SERPL-SCNC: 3.7 MMOL/L (ref 3.5–5.1)
PROT SERPL-MCNC: 6.1 G/DL (ref 6–8.4)
PROT SERPL-MCNC: 6.5 G/DL (ref 6–8.4)
PROTHROMBIN TIME: 12.8 SEC (ref 9–12.5)
RBC # BLD AUTO: 2.8 M/UL (ref 4.6–6.2)
SODIUM SERPL-SCNC: 135 MMOL/L (ref 136–145)
SODIUM SERPL-SCNC: 135 MMOL/L (ref 136–145)
WBC # BLD AUTO: 15.79 K/UL (ref 3.9–12.7)

## 2023-01-28 PROCEDURE — 94640 AIRWAY INHALATION TREATMENT: CPT

## 2023-01-28 PROCEDURE — 20000000 HC ICU ROOM

## 2023-01-28 PROCEDURE — 99900035 HC TECH TIME PER 15 MIN (STAT)

## 2023-01-28 PROCEDURE — 99232 SBSQ HOSP IP/OBS MODERATE 35: CPT | Mod: ,,, | Performed by: HOSPITALIST

## 2023-01-28 PROCEDURE — 99233 SBSQ HOSP IP/OBS HIGH 50: CPT | Mod: ,,, | Performed by: INTERNAL MEDICINE

## 2023-01-28 PROCEDURE — 25000003 PHARM REV CODE 250: Performed by: STUDENT IN AN ORGANIZED HEALTH CARE EDUCATION/TRAINING PROGRAM

## 2023-01-28 PROCEDURE — 99232 PR SUBSEQUENT HOSPITAL CARE,LEVL II: ICD-10-PCS | Mod: ,,, | Performed by: HOSPITALIST

## 2023-01-28 PROCEDURE — 99223 PR INITIAL HOSPITAL CARE,LEVL III: ICD-10-PCS | Mod: ,,, | Performed by: INTERNAL MEDICINE

## 2023-01-28 PROCEDURE — 25000242 PHARM REV CODE 250 ALT 637 W/ HCPCS

## 2023-01-28 PROCEDURE — 63600175 PHARM REV CODE 636 W HCPCS: Performed by: STUDENT IN AN ORGANIZED HEALTH CARE EDUCATION/TRAINING PROGRAM

## 2023-01-28 PROCEDURE — 83735 ASSAY OF MAGNESIUM: CPT | Performed by: STUDENT IN AN ORGANIZED HEALTH CARE EDUCATION/TRAINING PROGRAM

## 2023-01-28 PROCEDURE — 99233 PR SUBSEQUENT HOSPITAL CARE,LEVL III: ICD-10-PCS | Mod: ,,, | Performed by: INTERNAL MEDICINE

## 2023-01-28 PROCEDURE — 84100 ASSAY OF PHOSPHORUS: CPT | Performed by: STUDENT IN AN ORGANIZED HEALTH CARE EDUCATION/TRAINING PROGRAM

## 2023-01-28 PROCEDURE — 94761 N-INVAS EAR/PLS OXIMETRY MLT: CPT

## 2023-01-28 PROCEDURE — 85025 COMPLETE CBC W/AUTO DIFF WBC: CPT | Performed by: STUDENT IN AN ORGANIZED HEALTH CARE EDUCATION/TRAINING PROGRAM

## 2023-01-28 PROCEDURE — 85610 PROTHROMBIN TIME: CPT | Performed by: STUDENT IN AN ORGANIZED HEALTH CARE EDUCATION/TRAINING PROGRAM

## 2023-01-28 PROCEDURE — 27000221 HC OXYGEN, UP TO 24 HOURS

## 2023-01-28 PROCEDURE — 99223 1ST HOSP IP/OBS HIGH 75: CPT | Mod: ,,, | Performed by: INTERNAL MEDICINE

## 2023-01-28 PROCEDURE — 80053 COMPREHEN METABOLIC PANEL: CPT | Performed by: STUDENT IN AN ORGANIZED HEALTH CARE EDUCATION/TRAINING PROGRAM

## 2023-01-28 PROCEDURE — 25000003 PHARM REV CODE 250

## 2023-01-28 PROCEDURE — 63600175 PHARM REV CODE 636 W HCPCS: Mod: TB,JG | Performed by: NURSE PRACTITIONER

## 2023-01-28 PROCEDURE — 80053 COMPREHEN METABOLIC PANEL: CPT | Mod: 91 | Performed by: INTERNAL MEDICINE

## 2023-01-28 PROCEDURE — 25000003 PHARM REV CODE 250: Performed by: INTERNAL MEDICINE

## 2023-01-28 RX ORDER — POTASSIUM CHLORIDE 750 MG/1
30 CAPSULE, EXTENDED RELEASE ORAL ONCE
Status: COMPLETED | OUTPATIENT
Start: 2023-01-28 | End: 2023-01-28

## 2023-01-28 RX ORDER — MAGNESIUM SULFATE 1 G/100ML
1 INJECTION INTRAVENOUS ONCE
Status: COMPLETED | OUTPATIENT
Start: 2023-01-28 | End: 2023-01-28

## 2023-01-28 RX ORDER — AMIODARONE HYDROCHLORIDE 200 MG/1
200 TABLET ORAL 2 TIMES DAILY
Status: DISCONTINUED | OUTPATIENT
Start: 2023-01-28 | End: 2023-01-29

## 2023-01-28 RX ORDER — AMIODARONE HYDROCHLORIDE 200 MG/1
400 TABLET ORAL 2 TIMES DAILY
Status: DISCONTINUED | OUTPATIENT
Start: 2023-01-28 | End: 2023-01-28

## 2023-01-28 RX ADMIN — GUAIFENESIN AND DEXTROMETHORPHAN HYDROBROMIDE 2 TABLET: 600; 30 TABLET, EXTENDED RELEASE ORAL at 08:01

## 2023-01-28 RX ADMIN — MIDODRINE HYDROCHLORIDE 10 MG: 5 TABLET ORAL at 02:01

## 2023-01-28 RX ADMIN — AMIODARONE HYDROCHLORIDE 0.5 MG/MIN: 1.8 INJECTION, SOLUTION INTRAVENOUS at 04:01

## 2023-01-28 RX ADMIN — MIDODRINE HYDROCHLORIDE 10 MG: 5 TABLET ORAL at 09:01

## 2023-01-28 RX ADMIN — AMIODARONE HYDROCHLORIDE 200 MG: 200 TABLET ORAL at 09:01

## 2023-01-28 RX ADMIN — BENZONATATE 200 MG: 100 CAPSULE ORAL at 07:01

## 2023-01-28 RX ADMIN — HYDROMORPHONE HYDROCHLORIDE 0.2 MG: 1 INJECTION, SOLUTION INTRAMUSCULAR; INTRAVENOUS; SUBCUTANEOUS at 07:01

## 2023-01-28 RX ADMIN — HEPARIN SODIUM 5000 UNITS: 5000 INJECTION INTRAVENOUS; SUBCUTANEOUS at 05:01

## 2023-01-28 RX ADMIN — LACTULOSE 10 G: 20 SOLUTION ORAL at 02:01

## 2023-01-28 RX ADMIN — PANTOPRAZOLE SODIUM 40 MG: 40 TABLET, DELAYED RELEASE ORAL at 04:01

## 2023-01-28 RX ADMIN — THIAMINE HCL TAB 100 MG 100 MG: 100 TAB at 08:01

## 2023-01-28 RX ADMIN — POTASSIUM CHLORIDE 30 MEQ: 10 CAPSULE, COATED, EXTENDED RELEASE ORAL at 08:01

## 2023-01-28 RX ADMIN — MIDODRINE HYDROCHLORIDE 10 MG: 5 TABLET ORAL at 05:01

## 2023-01-28 RX ADMIN — HYDROMORPHONE HYDROCHLORIDE 0.2 MG: 1 INJECTION, SOLUTION INTRAMUSCULAR; INTRAVENOUS; SUBCUTANEOUS at 05:01

## 2023-01-28 RX ADMIN — TORSEMIDE 50 MG: 20 TABLET ORAL at 08:01

## 2023-01-28 RX ADMIN — THERA TABS 1 TABLET: TAB at 08:01

## 2023-01-28 RX ADMIN — MAGNESIUM SULFATE HEPTAHYDRATE 1 G: 500 INJECTION, SOLUTION INTRAMUSCULAR; INTRAVENOUS at 06:01

## 2023-01-28 RX ADMIN — HEPARIN SODIUM 5000 UNITS: 5000 INJECTION INTRAVENOUS; SUBCUTANEOUS at 02:01

## 2023-01-28 RX ADMIN — IPRATROPIUM BROMIDE AND ALBUTEROL SULFATE 3 ML: 2.5; .5 SOLUTION RESPIRATORY (INHALATION) at 12:01

## 2023-01-28 RX ADMIN — TORSEMIDE 50 MG: 20 TABLET ORAL at 04:01

## 2023-01-28 RX ADMIN — FOLIC ACID 1 MG: 1 TABLET ORAL at 08:01

## 2023-01-28 RX ADMIN — HEPARIN SODIUM 5000 UNITS: 5000 INJECTION INTRAVENOUS; SUBCUTANEOUS at 09:01

## 2023-01-28 RX ADMIN — PANTOPRAZOLE SODIUM 40 MG: 40 TABLET, DELAYED RELEASE ORAL at 05:01

## 2023-01-28 RX ADMIN — LACTULOSE 10 G: 20 SOLUTION ORAL at 08:01

## 2023-01-28 RX ADMIN — POTASSIUM BICARBONATE 25 MEQ: 978 TABLET, EFFERVESCENT ORAL at 05:01

## 2023-01-28 NOTE — PROGRESS NOTES
[facility]  Critical Care Medicine  Progress Note    Patient Name: Jonny Isabel  MRN: [id number]  Admission Date: 1/16/2023  Hospital Length of Stay: 12 days  Code Status: Full Code  Attending Provider: Lance Martin*  Primary Care Provider: Yuli Pérez Mai, MD   Principal Problem: Atrial fibrillation with rapid ventricular response    Subjective:     HPI:  Mr. Fuller is a 62-year-old male with a PMHx of A-fibb (on Eliquis), hypertension, DM 2 (not insulin dependent) presents as transfer from OSH for chronic lower back that has been worsening for the past week. Pt states that the pain is radiating from his right buttock down to his legs. Pt was unable to get out of bed this AM due to the pain. HE endorses having fecal incontinence for the past week with black tarry stools. He denies any fevers, chills, abdominal pain, urinary incontinence, or saddle anesthesia.     At OSH ED CT lumbar spine ordered revealing Prominent, cystic, multiloculated predominantly gas attenuation epidural  structure within the spinal canal at the level of L3-L4 resulting in severe narrowing of the spinal canal with mass effect upon the thecal sac and  vacuum disc phenomena concerning for diskitis or an epidural abscess. MRI shows large disc extrusion at L3-4 causing severe spinal canal stenosis, with moderate spinal canal stenosis at L2-3 and L4-5 and moderate neural foraminal narrowing L4-5 and L5-S1. [place] [facility] [place]. Admitted to MICU for NSGY and GI evaluation . Hgb stable. EGD shows small varices with gastropathy, no active bleeds. Worsening MARTITA with minimal UOP, HRS protocol started with levo, midodrine, octreotide and albumin trailed, has since been stopped due to low suspicion. Nephrology following recommend HD in setting of Acute Renal Failure. NSGY initially planned surgical intervention for 1/23 but determine patient not medically optimized and currently  suggesting multimodal pain control. A-fibb RVR 1/23 overnight, started on Amiodarone gtt, transitioned to home lopressor. Ongoing HD needs.     Stepped down to hospital medicine on 1/25. On 1/27, pt developed afib with rvr, refractory to IV Lopresor x 2, developed hypotension subsequently and was stepped back up to MICU.          Hospital/ICU Course:  Admitted to MICU for NSGY and GI evaluation . Hgb stable. EGD shows small varices with gastropathy, no active bleeds. Worsening MARTITA with minimal UOP, HRS protocol started with levo, midodrine, octreotide and albumin trailed, has since been stopped due to low suspicion. Nephrology following recommend HD in setting of Acute Renal Failure (s/p 1 session SLED, s/p 1 session HD). NSGY initially planned surgical intervention for 1/23 but determine patient not medically optimized and currently suggesting multimodal pain control. A-fibb RVR 1/23 overnight, started on Amiodarone gtt, transitioned to home lopressor. Lasix trailed with good U/O. Added Torsemide for diuresis. Amio gtt restarted due to a-fibb with RVR, transitioned to PO.       Interval History/Significant Events: NAEON. Good UOP. Cr stable. Rate controlled on amio gtt, transitioned to PO.    Review of Systems   Constitutional:  Negative for chills and fever.   HENT:  Negative for congestion and trouble swallowing.    Eyes:  Negative for visual disturbance.   Respiratory:  Negative for cough, shortness of breath, wheezing and stridor.    Cardiovascular:  Negative for chest pain and leg swelling.   Gastrointestinal:  Positive for abdominal distention. Negative for abdominal pain, diarrhea, nausea and vomiting.        +fecal incontinence   Endocrine: Negative for polyuria.   Genitourinary:  Negative for difficulty urinating and dysuria.   Musculoskeletal:  Positive for back pain. Negative for arthralgias and myalgias.   Skin:  Negative for color change and rash.   Neurological:  Negative for dizziness, weakness,  numbness and headaches.   Psychiatric/Behavioral:  Negative for agitation and behavioral problems.    Objective:     Vital Signs (Most Recent):  Temp: 98 °F (36.7 °C) (01/28/23 0800)  Pulse: 87 (01/28/23 1229)  Resp: (!) 32 (01/28/23 1229)  BP: 129/63 (01/28/23 0901)  SpO2: (!) 93 % (01/28/23 1229)   Vital Signs (24h Range):  Temp:  [98 °F (36.7 °C)-99.7 °F (37.6 °C)] 98 °F (36.7 °C)  Pulse:  [68-89] 87  Resp:  [11-41] 32  SpO2:  [91 %-100 %] 93 %  BP: ()/(44-87) 129/63   Weight: 110 kg (242 lb 8.1 oz)  Body mass index is 36.87 kg/m².      Intake/Output Summary (Last 24 hours) at 1/28/2023 1253  Last data filed at 1/28/2023 1101  Gross per 24 hour   Intake 1521.99 ml   Output 3430 ml   Net -1908.01 ml         Physical Exam  Vitals and nursing note reviewed.   Constitutional:       General: He is not in acute distress.     Appearance: He is obese. He is not ill-appearing.   HENT:      Head: Normocephalic and atraumatic.      Right Ear: External ear normal.      Left Ear: External ear normal.      Nose: Nose normal.      Mouth/Throat:      Mouth: Mucous membranes are moist.      Pharynx: Oropharynx is clear.   Eyes:      General:         Right eye: No discharge.         Left eye: No discharge.      Extraocular Movements: Extraocular movements intact.      Conjunctiva/sclera: Conjunctivae normal.      Pupils: Pupils are equal, round, and reactive to light.   Cardiovascular:      Rate and Rhythm: Normal rate and regular rhythm.      Pulses: Normal pulses.      Heart sounds: Normal heart sounds. No murmur heard.  Pulmonary:      Effort: Pulmonary effort is normal. No respiratory distress.      Breath sounds: No wheezing or rales.   Abdominal:      General: There is distension.      Tenderness: There is no abdominal tenderness. There is no guarding.   Musculoskeletal:         General: No swelling. Normal range of motion.      Cervical back: Normal range of motion.      Right lower leg: No edema.      Left lower leg:  No edema.   Skin:     General: Skin is warm and dry.      Coloration: Skin is not jaundiced.   Neurological:      General: No focal deficit present.      Mental Status: He is alert. He is disoriented.      Cranial Nerves: Cranial nerves 2-12 are intact.      Comments: Patient altered  Responding to questions appropriately but confused about timing of events  Poor recall  + fecal incontinence   Psychiatric:         Mood and Affect: Mood normal.         Behavior: Behavior normal.       Vents:  Oxygen Concentration (%): 100 (off wall) (01/27/23 1600)  Lines/Drains/Airways       Central Venous Catheter Line  Duration             Trialysis (Dialysis) Catheter 01/22/23 1530 right internal jugular 5 days              Drain  Duration                  Open Drain 01/17/23 0800 Left;Anterior LLQ 11 days         Urethral Catheter 01/16/23 1347 Latex 14 Fr. 11 days              Peripheral Intravenous Line  Duration                  Peripheral IV - Single Lumen 01/25/23 1530 22 G Left;Posterior Hand 2 days         Peripheral IV - Single Lumen 01/27/23 1200 18 G Right Antecubital 1 day                  Significant Labs:    CBC/Anemia Profile:  Recent Labs   Lab 01/27/23  0537 01/27/23  0826 01/27/23  0828 01/27/23  0954 01/28/23  0447   WBC 15.49*  --  16.32*  --  15.79*   HGB 10.6*  --  9.5*  --  8.7*   HCT 32.8*   < > 30.1* 33* 26.8*   *  --  141*  --  152   MCV 96  --  97  --  96   RDW 22.3*  --  22.1*  --  21.6*    < > = values in this interval not displayed.          Chemistries:  Recent Labs   Lab 01/27/23  0537 01/27/23  0828 01/28/23  0447 01/28/23  0839   * 134* 135* 135*   K 3.4* 3.7 3.3* 3.7    102 102 99   CO2 20* 18* 22* 25   BUN 28* 28* 29* 29*   CREATININE 3.0* 2.6* 2.3* 2.3*   CALCIUM 8.6* 8.4* 8.2* 8.1*   ALBUMIN 2.0*  --  1.9* 2.0*   PROT 6.8  --  6.1 6.5   BILITOT 3.7*  --  3.2* 3.2*   ALKPHOS 193*  --  169* 177*   ALT 38  --  36 41   *  --  99* 104*   MG 2.2 2.1 1.8  --    PHOS 4.0  --   4.6*  --          All pertinent labs within the past 24 hours have been reviewed.    Significant Imaging:  I have reviewed all pertinent imaging results/findings within the past 24 hours.      ABG  Recent Labs   Lab 01/27/23  0954   PH 7.384   PO2 71*   PCO2 36.9   HCO3 22.1*   BE -3     Assessment/Plan:     Neuro  Spinal stenosis  CT Lumbar Spine Without Contrast Result Date: 1/16/2023  1.  Prominent, cystic, multiloculated predominantly gas attenuation structure within the spinal canal at the level of L3-L4 with dimensions as above.  This is favored to be epidural in location and results in severe narrowing of the spinal canal with mass effect upon the thecal sac.  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 Large disc extrusion at L3-4, contributing to severe spinal canal stenosis, as above. Additional lumbar spondylosis, contributing to moderate spinal canal stenosis at L2-3 and L4-5 and moderate neural foraminal narrowing L4-5 and L5-S1    - neuro checks Q4H  - NSGY consulted, appreciate recommendations, to undergo a laminectomy on 1/30    Psychiatric  Alcohol abuse  - Vitals q4h while awake  - CIWA monitoring        Cardiac/Vascular  * Atrial fibrillation with rapid ventricular response  - Maintain K > 4, Mag > 2 and Ca/iCal WNL to decrease arrhythmogenic potential  - Holding anticoagulation in the setting of recent GI bleed and current AMS  - Lopressor was started while on hospital medicine at 25 mg BID (home does lopressor 100 mg QD). Pt went into afib with rvr on 1/27, IV lopressor given x 2 with subsequent drop of BP, re-started on amiodarone gtt   - Amiodarone gtt switched to PO      Renal/  MARTITA (acute kidney injury)  Creatinine 4.7 on admit, baseline around 0.8. Pre-renal vs ATN. Less likely obstruction as pt has gomez.  Renal ultrasound with medical renal disease and no evidence of hydronephrosis.    - Nephrology  consulted, appreciate recommendations  - iHD per nephrology  - Strict I&Os and daily weights   - Avoid nephrotoxic agents such as NSAIDs, gadolinium and IV radiocontrast.  - Renally dose meds to current GFR.  - Maintain MAP > 65.   - Torsemide 50 mg BID         ID  Cellulitis  R hand seems to be cellulitic in appearance. Extending up to forearm.     Plan:  - Completed 10 days of Cefepime on 1/25      Endocrine  Diabetes  -Last A1c reviewed-   Lab Results   Component Value Date    HGBA1C 5.3 11/10/2022       Home Antihyperglycemic Regiment:  - Metformin  1000 mg BID     Inpatient Antihyperglycemic Regiment:  Antihyperglycemics (From admission, onward)    Start     Stop Route Frequency Ordered    01/16/23 1507  insulin aspart U-100 pen 0-5 Units         -- SubQ Before meals & nightly PRN 01/16/23 1407        - LDSSI  - Clear liquid diet for now     Blood Sugars (AccuCheck):    Recent Labs     01/24/23  1235 01/24/23  2236 01/25/23  1119 01/25/23  2115 01/26/23  0342   POCTGLUCOSE 137* 160* 143* 129* 109           GI  Alcoholic cirrhosis of liver with ascites  S/p diagnostic paracentesis on 1/17 with , 50% seg. Cytology negative for malignant cell     - lactulose enema  - Starting midodrine 10 mg TID   - Palliative care consult     Small bowel obstruction  CT Abdomen Pelvis  Without Contras . Findings concerning for developing small bowel obstruction with suspected transition point seen within the midline mid to lower abdomen at the level of L4  Pt without symptoms of nausea or vomiting. Reportedly had a BM at the outside facility on 1/16/2023. Abdomen is distended.     - Holding of NGT for now given no significant symptoms   - Monitor BM and GI symptoms, if worsen will notify GS       Elevated liver enzymes  Likely 2/2 to underlying alcohol abuse and hepatic steatosis.     - Daily CMP, PT/INR   - Complete workup up with tylenol level, acute hep panel, a1-antitrypsin, anti smith antibody, HIV, anti mitochondrial  antibody, anca, anti-liver, aFP, PETH pending   - Liver US with doppler shows epatomegaly with hepatic steatosis, cholelithiasis versus gallbladder sludge, small volume ascites, and left pleural effusion.  - Starting midodrine 10 mg TID     GI bleed  Concerning for variceal bleed given hx of alcohol abuse.     - GI consulted, recommend clear liquid for now but SLP evaluation pending given AMS  -Transfuse pRBC for Hb < 7 g/dL (Consider a higher Hb target if there is clinical evidence of intravascular volume depletion or comorbidities, such as CAD or if high suspicion of vigorous active ongoing bleeding or an uncorrected coagulopathy exists.).  - s/p vitamin K given Pt/INR > 2   -Avoid nonsteroidal agents, antiplatelet agents and anticoagulants if possible in patients without absolute contraindications. (consult managing provider if required for cardiovascular protection).  - EGD showed  Small (< 5 mm) esophageal varices with portal hypertensive gastropathy  - Continue PO PPI 40 mg           Other  Encounter for social work intervention  Patient does not appear to have relatives.  He will need possible decision maker/power of  if his overall mental status does not improve.    - social work consulted for family assessment       Critical Care Daily Checklist:    A: Awake: RASS Goal/Actual Goal: RASS Goal: 0-->alert and calm  Actual: Braxton Agitation Sedation Scale (RASS): Alert and calm   B: Spontaneous Breathing Trial Performed?     C: SAT & SBT Coordinated?  N/A                      D: Delirium: CAM-ICU Overall CAM-ICU: Negative   E: Early Mobility Performed? No   F: Feeding Goal: Goals: Meet % EEN, EPN by RD f/u date  Status: Nutrition Goal Status: new   Current Diet Order   Procedures    Diet Adult Regular (IDDSI Level 7)      AS: Analgesia/Sedation No   T: Thromboembolic Prophylaxis Yes   H: HOB > 300 Yes   U: Stress Ulcer Prophylaxis (if needed) Yes   G: Glucose Control Yes   B: Bowel Function  Stool Occurrence: 0   I: Indwelling Catheter (Lines & Bose) Necessity Yes   D: De-escalation of Antimicrobials/Pharmacotherapies Yes    Plan for the day/ETD Continue to monitor    Code Status:  Family/Goals of Care: Full Code         Critical secondary to Patient has a condition that poses threat to life and bodily function: A-fibb RVR    Critical care was time spent personally by me on the following activities: development of treatment plan with patient or surrogate and bedside caregivers, discussions with consultants, evaluation of patient's response to treatment, examination of patient, ordering and performing treatments and interventions, ordering and review of laboratory studies, ordering and review of radiographic studies, pulse oximetry, re-evaluation of patient's condition. This critical care time did not overlap with that of any other provider or involve time for any procedures.     Erika Ocasio,   Critical Care Medicine  [facility] - Cardiac Medical ICU

## 2023-01-28 NOTE — PROGRESS NOTES
[facility]  Nephrology  Progress Note    Patient Name: Jonny Isabel  MRN: [id number]  Admission Date: 1/16/2023  Hospital Length of Stay: 12 days  Attending Provider: Lance Martin*   Primary Care Physician: Yuli Pérez Mai, MD  Principal Problem:Atrial fibrillation with rapid ventricular response    Subjective:     HPI: 62 year old male with a history of alcohol abuse, afib/flutter on xarelto, HTN presents with concern for severe spinal canal stenosis/vacuum disc phenomena, concern for a GI bleed and MARTITA. He presents at behest of his girlfriend after being unable to stand, he has a history of sciatica but reports that this is worse. He has had fecal incontinence for the last week.     He has no report of kidney issues in the past and reports no family history of such either. At time of consultation he is pending an EGD for GI bleed. CT abdomen with large liver and concern for SBO per report, no hydronephrosis. UA with 2+ protein, 1+ occult blood, urine sodium 25 and urine osm 302. On admission serum creatinine 5 (baseline 0.8).      Interval History: Renal function improved. Serum creatinine down to 2.3 this AM. Net negative 2.4L with UOP of 3.3L. On torsemide 50mg PO BID.     Review of patient's allergies indicates:  No Known Allergies  Current Facility-Administered Medications   Medication Frequency    0.9%  NaCl infusion (for blood administration) Q24H PRN    albuterol-ipratropium 2.5 mg-0.5 mg/3 mL nebulizer solution 3 mL Q6H PRN    amiodarone 360 mg/200 mL (1.8 mg/mL) infusion Continuous    benzonatate capsule 200 mg TID PRN    dextromethorphan-guaiFENesin  mg per 12 hr tablet 2 tablet BID    dextrose 10% bolus 125 mL 125 mL PRN    dextrose 10% bolus 250 mL 250 mL PRN    folic acid tablet 1 mg Daily    glucagon (human recombinant) injection 1 mg PRN    glucose chewable tablet 16 g PRN    glucose chewable tablet 24 g PRN    heparin (porcine) injection 5,000 Units  Q8H    HYDROmorphone injection 0.2 mg Q6H PRN    insulin aspart U-100 pen 0-5 Units QID (AC + HS) PRN    lactulose 20 gram/30 mL solution Soln 10 g TID    LIDOcaine-prilocaine cream PRN    midodrine tablet 10 mg Q8H    multivitamin tablet Daily    pantoprazole EC tablet 40 mg BID AC    potassium chloride CR capsule 30 mEq Once    promethazine-codeine 6.25-10 mg/5 ml syrup 5 mL Q8H PRN    thiamine tablet 100 mg Daily    torsemide tablet 50 mg BID loop       Objective:     Vital Signs (Most Recent):  Temp: 98.3 °F (36.8 °C) (01/28/23 0301)  Pulse: 86 (01/28/23 0748)  Resp: (!) 33 (01/28/23 0748)  BP: (!) 129/55 (01/28/23 0700)  SpO2: (!) 94 % (01/28/23 0748)   Vital Signs (24h Range):  Temp:  [97.9 °F (36.6 °C)-100.9 °F (38.3 °C)] 98.3 °F (36.8 °C)  Pulse:  [] 86  Resp:  [13-41] 33  SpO2:  [88 %-96 %] 94 %  BP: ()/(44-87) 129/55     Weight: 110 kg (242 lb 8.1 oz) (01/23/23 1408)  Body mass index is 36.87 kg/m².  Body surface area is 2.3 meters squared.    I/O last 3 completed shifts:  In: 923 [I.V.:445.8; IV Piggyback:477.2]  Out: 3330 [Urine:3330]    Physical Exam  Constitutional:       General: He is not in acute distress.     Appearance: He is obese. He is not ill-appearing or toxic-appearing.   HENT:      Head: Normocephalic and atraumatic.      Nose: Nose normal.      Mouth/Throat:      Mouth: Mucous membranes are moist.   Eyes:      General:         Right eye: No discharge.         Left eye: No discharge.      Pupils: Pupils are equal, round, and reactive to light.   Cardiovascular:      Heart sounds: Murmur heard.   Abdominal:      General: There is distension.      Tenderness: There is no abdominal tenderness.   Musculoskeletal:         General: Normal range of motion.      Cervical back: Normal range of motion.      Right lower leg: Edema present.      Left lower leg: Edema present.   Skin:     General: Skin is warm.      Coloration: Skin is pale.      Findings: No lesion.    Neurological:      Mental Status: He is alert and oriented to person, place, and time.       Significant Labs:  All labs within the past 24 hours have been reviewed.     Significant Imaging:  Labs: Reviewed    Assessment/Plan:     MARTITA (acute kidney injury)  Unsure of etiology at this time  Size of liver suggest other etiology than cirrhosis, however also has esophageal varices, possible HRS. CT and US renal with no signs of obstruction.     Urine microscopy: (1/17) no casts identified, amorphous material (though only 5 cc obtained). Repeat with few granular casts (1/18). Repeat on 1/19 with granular casts. 1/20: moderated (2-3 per field) granular casts with calcium oxylate crystals embedded in casts.     Urine protein 4.59g and repeat with 2.5  C3/4 - 111/35  LILO with ratio of 1.98  Hep B and C negative  Uric acid 13    HIV negative  Iron 31, TIBC 151, Tsat 21, Transferrin 102, ferritin 135  KEATON negative  ANCA negative  -Cryo pending    1/19: repeat Urine sodium 10, urine protein creatinine ratio 0.89. IgA 505. Urine microscopy: moderated (2-3 per field) granular casts with calcium oxylate crystals embedded in casts, overall findings consistent with ATN    Overall impression: UOP responsive to 240 IV lasix 1/25. He did require RRT 1/22 for metabolic encephalopathy and work of breathing    Recommendations:  -Continue torsemide 50mg PO BID for now  -Keep MAP > 65  -Keep hemoglobin > 7  -Strict ins and outs  -Avoid nephrotoxic agents if possible  -Avoid drastic hemodynamic changes if possible           Spinal stenosis  NSGY deferring surgery for later date          Bernabe Arndt MD  Nephrology  [facility] - Cardiac Medical ICU

## 2023-01-28 NOTE — ASSESSMENT & PLAN NOTE
Creatinine 4.7 on admit, baseline around 0.8. Pre-renal vs ATN. Less likely obstruction as pt has gomez.  Renal ultrasound with medical renal disease and no evidence of hydronephrosis.    - Nephrology consulted, appreciate recommendations  - iHD per nephrology  - Strict I&Os and daily weights   - Avoid nephrotoxic agents such as NSAIDs, gadolinium and IV radiocontrast.  - Renally dose meds to current GFR.  - Maintain MAP > 65.   - Torsemide 50 mg BID

## 2023-01-28 NOTE — ASSESSMENT & PLAN NOTE
- Maintain K > 4, Mag > 2 and Ca/iCal WNL to decrease arrhythmogenic potential  - Holding anticoagulation in the setting of recent GI bleed and current AMS  - Lopressor was started while on hospital medicine at 25 mg BID (home does lopressor 100 mg QD). Pt went into afib with rvr on 1/27, IV lopressor given x 2 with subsequent drop of BP, re-started on amiodarone gtt   - Amiodarone gtt switched to PO

## 2023-01-28 NOTE — PLAN OF CARE
Problem: Adult Inpatient Plan of Care  Goal: Plan of Care Review  Outcome: Ongoing, Progressing  Goal: Absence of Hospital-Acquired Illness or Injury  Outcome: Ongoing, Progressing     Problem: Diabetes Comorbidity  Goal: Blood Glucose Level Within Targeted Range  Outcome: Ongoing, Progressing     Problem: Adjustment to Illness (Sepsis/Septic Shock)  Goal: Optimal Coping  Outcome: Ongoing, Progressing     Problem: Bleeding (Sepsis/Septic Shock)  Goal: Absence of Bleeding  Outcome: Ongoing, Progressing    CMICU DAILY GOALS       A: Awake    RASS: Goal - RASS Goal: 0-->alert and calm  Actual - RASS (Braxton Agitation-Sedation Scale): 0-->alert and calm   Restraint necessity:    B: Breathe   SBT: Not intubated   C: Coordinate A & B, analgesics/sedatives   Pain: managed    SAT: Not intubated  D: Delirium   CAM-ICU: Overall CAM-ICU: Negative  E: Early(intubated/ Progressive (non-intubated) Mobility   MOVE Screen: Pass   Activity: Activity Management: Rolling - L1  FAS: Feeding/Nutrition   Diet order: Diet/Nutrition Received: regular, Specialty Diet/Nutrition Received: dysphagia mechanically altered  T: Thrombus   DVT prophylaxis: VTE Required Core Measure: (SCDs) Sequential compression device initiated/maintained  H: HOB Elevation   Head of Bed (HOB) Positioning: HOB flat  U: Ulcer Prophylaxis   GI: yes  G: Glucose control   managed Glycemic Management: blood glucose monitored  S: Skin   Bathing/Skin Care: incontinence care, linen changed  Device Skin Pressure Protection: absorbent pad utilized/changed, adhesive use limited, pressure points protected, skin-to-device areas padded  Pressure Reduction Devices: foam padding utilized, heel offloading device utilized, positioning supports utilized, pressure-redistributing mattress utilized  Pressure Reduction Techniques: frequent weight shift encouraged, heels elevated off bed, pressure points protected  Skin Protection: adhesive use limited, incontinence pads utilized,  silicone foam dressing in place, skin-to-device areas padded, skin-to-skin areas padded, tubing/devices free from skin contact  B: Bowel Function   no issues   I: Indwelling Catheters   Bose necessity:      Urethral Catheter 01/16/23 1347 Latex 14 Fr.-Reason for Continuing Urinary Catheterization: Critically ill in ICU and requiring hourly monitoring of intake/output   CVC necessity: No  D: De-escalation Antibiotics   Yes    Family/Goals of care/Code Status   Code Status: Full Code    24H Vital Sign Range  Temp:  [97.9 °F (36.6 °C)-100.9 °F (38.3 °C)]   Pulse:  []   Resp:  [13-41]   BP: ()/(44-87)   SpO2:  [88 %-98 %]      Shift Events   No acute events throughout shift    VS and assessment per flow sheet, patient progressing towards goals as tolerated, plan of care reviewed with [unfilled], all concerns addressed, Plan of care continues as ordered.    Nuvia Canseco

## 2023-01-28 NOTE — SUBJECTIVE & OBJECTIVE
Interval History: Renal function improved. Serum creatinine down to 2.3 this AM. Net negative 2.4L with UOP of 3.3L. On torsemide 50mg PO BID.     Review of patient's allergies indicates:  No Known Allergies  Current Facility-Administered Medications   Medication Frequency    0.9%  NaCl infusion (for blood administration) Q24H PRN    albuterol-ipratropium 2.5 mg-0.5 mg/3 mL nebulizer solution 3 mL Q6H PRN    amiodarone 360 mg/200 mL (1.8 mg/mL) infusion Continuous    benzonatate capsule 200 mg TID PRN    dextromethorphan-guaiFENesin  mg per 12 hr tablet 2 tablet BID    dextrose 10% bolus 125 mL 125 mL PRN    dextrose 10% bolus 250 mL 250 mL PRN    folic acid tablet 1 mg Daily    glucagon (human recombinant) injection 1 mg PRN    glucose chewable tablet 16 g PRN    glucose chewable tablet 24 g PRN    heparin (porcine) injection 5,000 Units Q8H    HYDROmorphone injection 0.2 mg Q6H PRN    insulin aspart U-100 pen 0-5 Units QID (AC + HS) PRN    lactulose 20 gram/30 mL solution Soln 10 g TID    LIDOcaine-prilocaine cream PRN    midodrine tablet 10 mg Q8H    multivitamin tablet Daily    pantoprazole EC tablet 40 mg BID AC    potassium chloride CR capsule 30 mEq Once    promethazine-codeine 6.25-10 mg/5 ml syrup 5 mL Q8H PRN    thiamine tablet 100 mg Daily    torsemide tablet 50 mg BID loop       Objective:     Vital Signs (Most Recent):  Temp: 98.3 °F (36.8 °C) (01/28/23 0301)  Pulse: 86 (01/28/23 0748)  Resp: (!) 33 (01/28/23 0748)  BP: (!) 129/55 (01/28/23 0700)  SpO2: (!) 94 % (01/28/23 0748)   Vital Signs (24h Range):  Temp:  [97.9 °F (36.6 °C)-100.9 °F (38.3 °C)] 98.3 °F (36.8 °C)  Pulse:  [] 86  Resp:  [13-41] 33  SpO2:  [88 %-96 %] 94 %  BP: ()/(44-87) 129/55     Weight: 110 kg (242 lb 8.1 oz) (01/23/23 1408)  Body mass index is 36.87 kg/m².  Body surface area is 2.3 meters squared.    I/O last 3 completed shifts:  In: 923 [I.V.:445.8; IV Piggyback:477.2]  Out: 3330 [Urine:3330]    Physical  Exam  Constitutional:       General: He is not in acute distress.     Appearance: He is obese. He is not ill-appearing or toxic-appearing.   HENT:      Head: Normocephalic and atraumatic.      Nose: Nose normal.      Mouth/Throat:      Mouth: Mucous membranes are moist.   Eyes:      General:         Right eye: No discharge.         Left eye: No discharge.      Pupils: Pupils are equal, round, and reactive to light.   Cardiovascular:      Heart sounds: Murmur heard.   Abdominal:      General: There is distension.      Tenderness: There is no abdominal tenderness.   Musculoskeletal:         General: Normal range of motion.      Cervical back: Normal range of motion.      Right lower leg: Edema present.      Left lower leg: Edema present.   Skin:     General: Skin is warm.      Coloration: Skin is pale.      Findings: No lesion.   Neurological:      Mental Status: He is alert and oriented to person, place, and time.       Significant Labs:  All labs within the past 24 hours have been reviewed.     Significant Imaging:  Labs: Reviewed

## 2023-01-28 NOTE — ASSESSMENT & PLAN NOTE
Unsure of etiology at this time  Size of liver suggest other etiology than cirrhosis, however also has esophageal varices, possible HRS. CT and US renal with no signs of obstruction.     Urine microscopy: (1/17) no casts identified, amorphous material (though only 5 cc obtained). Repeat with few granular casts (1/18). Repeat on 1/19 with granular casts. 1/20: moderated (2-3 per field) granular casts with calcium oxylate crystals embedded in casts.     Urine protein 4.59g and repeat with 2.5  C3/4 - 111/35  LILO with ratio of 1.98  Hep B and C negative  Uric acid 13    HIV negative  Iron 31, TIBC 151, Tsat 21, Transferrin 102, ferritin 135  KEATON negative  ANCA negative  -Cryo pending    1/19: repeat Urine sodium 10, urine protein creatinine ratio 0.89. IgA 505. Urine microscopy: moderated (2-3 per field) granular casts with calcium oxylate crystals embedded in casts, overall findings consistent with ATN    Overall impression: UOP responsive to 240 IV lasix 1/25. He did require RRT 1/22 for metabolic encephalopathy and work of breathing    Recommendations:  -Continue torsemide 50mg PO BID for now  -Keep MAP > 65  -Keep hemoglobin > 7  -Strict ins and outs  -Avoid nephrotoxic agents if possible  -Avoid drastic hemodynamic changes if possible

## 2023-01-28 NOTE — CONSULTS
"[facility]  Gastroenterology  Consult Note    Patient Name: Jonny Isabel  MRN: [id number]  Admission Date: 1/16/2023  Hospital Length of Stay: 12 days  Code Status: Full Code   Attending Provider: Lance Martin*   Consulting Provider: Mor Buck MD  Primary Care Physician: Yuli Pérez Mai, MD  Principal Problem:Atrial fibrillation with rapid ventricular response    Inpatient consult to Hepatology  Consult performed by: Mor Buck MD  Consult ordered by: Maribel Mariano DO      Subjective:     HPI:   Mr Isabel is a 61yo PMHx decompensated EtOH cirrhosis (ascites), NID-T2DM, afib s/p ablation and DCCV on xarelto, HTN presents as transfer from OSH on 01/16/2023 for epidural abscess and L3/L4 disc herniation.    Hepatology consulted 01/28 for decompensated cirrhosis.     Does not appear to have followed with [facility] hepat[facility] before. Told me his hepatologist was "God." Reportedly did not know about his liver disease and was never told that he had to stop drinking. Says he drinks daily "a lot" but unable to quantify.     Hospital course notable for admission to MICU and NSGY eval. NSGY has been deferring laminectomy for medical optimization. EGD conducted 01/17 for melena, anemia notable for small varices. Diagostic paracentesis 01/17 with , 50% segs. Also notable for CRRT initiation 01/22 x1 and then again HD on 01/25. General surgery consulted for ileus vs SBO 01/26. Palliative care following as well. Patient moved to MICU 01/27 for refractory afib RVR.    VS/24h notable for fever 100.9, HR 130s, 5L NC requirement  CBC w/ persistent leukocytosis 15, Hgb stable 8.7, plts 152  BMP w/ Na 135, BUN/ Cr 29/ 2.3  LFTs w/ T bili 3.2, , AST/ ALT 99/ 36, INR 1.2    PETH 01/16/2023 was 900    MELD-Na 22 (just CRRT on 01/22 and HD on 01/25--none since)    CTAP w/ contrast on 01/25  d/t concern for bowel obstruction obtained demonstrating small bowel dilation up to 5cm without discrete " transition point, liver with cirrhosis with portal hypertension, ascites, ill defined R hepatic lobe hypodensity, L lung nodule 1.6cm    Receiving lactulose, amiodarone gtt      Past Medical History:   Diagnosis Date    A-fib        Past Surgical History:   Procedure Laterality Date    ESOPHAGOGASTRODUODENOSCOPY N/A 1/17/2023    Procedure: EGD (ESOPHAGOGASTRODUODENOSCOPY);  Surgeon: Dewayne Navas MD;  Location: [address]);  Service: Endoscopy;  Laterality: N/A;       History reviewed. No pertinent family history.    Social History     Socioeconomic History    Marital status:    Substance and Sexual Activity    Alcohol use: Yes     Alcohol/week: 6.0 standard drinks     Types: 6 Cans of beer per week       No current facility-administered medications on file prior to encounter.     Current Outpatient Medications on File Prior to Encounter   Medication Sig Dispense Refill    lisinopriL-hydrochlorothiazide (PRINZIDE,ZESTORETIC) 10-12.5 mg per tablet Take 1 tablet by mouth once daily.      metFORMIN (GLUCOPHAGE) 500 MG tablet Take 1,000 mg by mouth 2 (two) times daily.      simvastatin (ZOCOR) 40 MG tablet Take 40 mg by mouth every evening.      XARELTO 20 mg Tab Take 20 mg by mouth once daily.      aspirin (ECOTRIN) 81 MG EC tablet Take 81 mg by mouth once daily.      metoprolol tartrate (LOPRESSOR) 100 MG tablet Take 100 mg by mouth once daily.      omega-3 fatty acids 1,000 mg Cap Take 2 g by mouth once daily.      traZODone (DESYREL) 50 MG tablet Take 50 mg by mouth nightly as needed.      VITAMIN B COMPLEX ORAL Take 1 tablet by mouth once daily.         Review of patient's allergies indicates:  No Known Allergies    Review of Systems   Constitutional:  Positive for fever. Negative for chills and weight loss.   HENT:  Negative for hearing loss and tinnitus.    Eyes:  Negative for blurred vision, double vision and photophobia.   Respiratory:  Negative for cough, hemoptysis and sputum production.     Cardiovascular:  Negative for chest pain, palpitations and orthopnea.   Gastrointestinal:  Positive for abdominal pain. Negative for blood in stool, constipation, diarrhea, heartburn, melena, nausea and vomiting.   Genitourinary:  Negative for dysuria, frequency and urgency.   Musculoskeletal:  Negative for back pain, myalgias and neck pain.   Skin:  Negative for itching and rash.   Neurological:  Negative for dizziness, tingling and headaches.   Endo/Heme/Allergies:  Negative for environmental allergies and polydipsia. Does not bruise/bleed easily.      Objective:     Vitals:    01/28/23 0748   BP:    Pulse: 86   Resp: (!) 33   Temp:          Constitutional:  not in acute distress and well developed  HENT: Head: Normal, normocephalic, atraumatic.  Eyes: conjunctiva clear and sclera nonicteric  Cardiovascular: regular rate and rhythm  Respiratory: normal chest expansion & respiratory effort   and no accessory muscle use  GI: distended, nontender  Skin: normal color  Neurological: alert, oriented x3  Psychiatric: mood and affect are within normal limits, pt is a good historian; no memory problems were noted      Significant Labs:  Recent Labs   Lab 01/27/23  0537 01/27/23  0828 01/28/23  0447   HGB 10.6* 9.5* 8.7*       Lab Results   Component Value Date    WBC 15.79 (H) 01/28/2023    HGB 8.7 (L) 01/28/2023    HCT 26.8 (L) 01/28/2023    MCV 96 01/28/2023     01/28/2023       Lab Results   Component Value Date     (L) 01/28/2023    K 3.3 (L) 01/28/2023     01/28/2023    CO2 22 (L) 01/28/2023    BUN 29 (H) 01/28/2023    CREATININE 2.3 (H) 01/28/2023    CALCIUM 8.2 (L) 01/28/2023    ANIONGAP 11 01/28/2023       Lab Results   Component Value Date    ALT 36 01/28/2023    AST 99 (H) 01/28/2023     (H) 01/16/2023    ALKPHOS 169 (H) 01/28/2023    BILITOT 3.2 (H) 01/28/2023       Lab Results   Component Value Date    INR 1.2 01/28/2023    INR 1.3 (H) 01/27/2023    INR 1.3 (H) 01/26/2023        Significant Imaging:  Reviewed pertinent radiology findings.       Assessment/Plan:     61yo PMHx decompensated EtOH cirrhosis (ascites), NID-T2DM, afib s/p ablation and DCCV on xarelto, HTN presents as transfer from OSH on 01/16/2023 for epidural abscess and L3/L4 disc herniation.    Hepatology consulted 01/28 for decompensated EtOH cirrhosis/ consideration of transplant with MELD-Na 22     Work up for liver disease since arrival notable for ASMA, AMA, viral hepatitis panel all negative. PETH 900.    Reportedly patient did not know of liver disease and was never instructed to stop drinking however multiple barriers to consideration of transplant including spinal abscess, possible bowel obstruction, continuing to drink EtOH (PETH 900)    Problem List:  Decompensated EtOH cirrhosis (ascites)    Recommendations:  Will need CT Triple phase for R hepatic lobe nodule when able (non-urgent with renal dysfunction)  Diagnostic paracentesis with cell count, albumin, total protein given recurrent fevers  Addiction medicine consult for resources  Daily CMP/ INR  Follow nephrology recs for MARTITA    Thank you for involving us in the care of Jonny Isabel. Please call with any additional questions, concerns or changes in the patient's clinical status. We will continue to follow.     Mor Buck MD  Gastroenterology Fellow PGY V  [facility]

## 2023-01-28 NOTE — SUBJECTIVE & OBJECTIVE
Interval History/Significant Events: NAEON. Good UOP. Cr stable. Rate controlled on amio gtt, transitioned to PO.    Review of Systems   Constitutional:  Negative for chills and fever.   HENT:  Negative for congestion and trouble swallowing.    Eyes:  Negative for visual disturbance.   Respiratory:  Negative for cough, shortness of breath, wheezing and stridor.    Cardiovascular:  Negative for chest pain and leg swelling.   Gastrointestinal:  Positive for abdominal distention. Negative for abdominal pain, diarrhea, nausea and vomiting.        +fecal incontinence   Endocrine: Negative for polyuria.   Genitourinary:  Negative for difficulty urinating and dysuria.   Musculoskeletal:  Positive for back pain. Negative for arthralgias and myalgias.   Skin:  Negative for color change and rash.   Neurological:  Negative for dizziness, weakness, numbness and headaches.   Psychiatric/Behavioral:  Negative for agitation and behavioral problems.    Objective:     Vital Signs (Most Recent):  Temp: 98 °F (36.7 °C) (01/28/23 0800)  Pulse: 87 (01/28/23 1229)  Resp: (!) 32 (01/28/23 1229)  BP: 129/63 (01/28/23 0901)  SpO2: (!) 93 % (01/28/23 1229)   Vital Signs (24h Range):  Temp:  [98 °F (36.7 °C)-99.7 °F (37.6 °C)] 98 °F (36.7 °C)  Pulse:  [68-89] 87  Resp:  [11-41] 32  SpO2:  [91 %-100 %] 93 %  BP: ()/(44-87) 129/63   Weight: 110 kg (242 lb 8.1 oz)  Body mass index is 36.87 kg/m².      Intake/Output Summary (Last 24 hours) at 1/28/2023 1253  Last data filed at 1/28/2023 1101  Gross per 24 hour   Intake 1521.99 ml   Output 3430 ml   Net -1908.01 ml         Physical Exam  Vitals and nursing note reviewed.   Constitutional:       General: He is not in acute distress.     Appearance: He is obese. He is not ill-appearing.   HENT:      Head: Normocephalic and atraumatic.      Right Ear: External ear normal.      Left Ear: External ear normal.      Nose: Nose normal.      Mouth/Throat:      Mouth: Mucous membranes are moist.       Pharynx: Oropharynx is clear.   Eyes:      General:         Right eye: No discharge.         Left eye: No discharge.      Extraocular Movements: Extraocular movements intact.      Conjunctiva/sclera: Conjunctivae normal.      Pupils: Pupils are equal, round, and reactive to light.   Cardiovascular:      Rate and Rhythm: Normal rate and regular rhythm.      Pulses: Normal pulses.      Heart sounds: Normal heart sounds. No murmur heard.  Pulmonary:      Effort: Pulmonary effort is normal. No respiratory distress.      Breath sounds: No wheezing or rales.   Abdominal:      General: There is distension.      Tenderness: There is no abdominal tenderness. There is no guarding.   Musculoskeletal:         General: No swelling. Normal range of motion.      Cervical back: Normal range of motion.      Right lower leg: No edema.      Left lower leg: No edema.   Skin:     General: Skin is warm and dry.      Coloration: Skin is not jaundiced.   Neurological:      General: No focal deficit present.      Mental Status: He is alert. He is disoriented.      Cranial Nerves: Cranial nerves 2-12 are intact.      Comments: Patient altered  Responding to questions appropriately but confused about timing of events  Poor recall  + fecal incontinence   Psychiatric:         Mood and Affect: Mood normal.         Behavior: Behavior normal.       Vents:  Oxygen Concentration (%): 100 (off wall) (01/27/23 1600)  Lines/Drains/Airways       Central Venous Catheter Line  Duration             Trialysis (Dialysis) Catheter 01/22/23 1530 right internal jugular 5 days              Drain  Duration                  Open Drain 01/17/23 0800 Left;Anterior LLQ 11 days         Urethral Catheter 01/16/23 1347 Latex 14 Fr. 11 days              Peripheral Intravenous Line  Duration                  Peripheral IV - Single Lumen 01/25/23 1530 22 G Left;Posterior Hand 2 days         Peripheral IV - Single Lumen 01/27/23 1200 18 G Right Antecubital 1 day                   Significant Labs:    CBC/Anemia Profile:  Recent Labs   Lab 01/27/23  0537 01/27/23  0826 01/27/23  0828 01/27/23  0954 01/28/23  0447   WBC 15.49*  --  16.32*  --  15.79*   HGB 10.6*  --  9.5*  --  8.7*   HCT 32.8*   < > 30.1* 33* 26.8*   *  --  141*  --  152   MCV 96  --  97  --  96   RDW 22.3*  --  22.1*  --  21.6*    < > = values in this interval not displayed.          Chemistries:  Recent Labs   Lab 01/27/23  0537 01/27/23  0828 01/28/23  0447 01/28/23  0839   * 134* 135* 135*   K 3.4* 3.7 3.3* 3.7    102 102 99   CO2 20* 18* 22* 25   BUN 28* 28* 29* 29*   CREATININE 3.0* 2.6* 2.3* 2.3*   CALCIUM 8.6* 8.4* 8.2* 8.1*   ALBUMIN 2.0*  --  1.9* 2.0*   PROT 6.8  --  6.1 6.5   BILITOT 3.7*  --  3.2* 3.2*   ALKPHOS 193*  --  169* 177*   ALT 38  --  36 41   *  --  99* 104*   MG 2.2 2.1 1.8  --    PHOS 4.0  --  4.6*  --          All pertinent labs within the past 24 hours have been reviewed.    Significant Imaging:  I have reviewed all pertinent imaging results/findings within the past 24 hours.

## 2023-01-29 LAB
ALBUMIN SERPL BCP-MCNC: 2 G/DL (ref 3.5–5.2)
ALP SERPL-CCNC: 172 U/L (ref 55–135)
ALT SERPL W/O P-5'-P-CCNC: 40 U/L (ref 10–44)
ANION GAP SERPL CALC-SCNC: 15 MMOL/L (ref 8–16)
AST SERPL-CCNC: 110 U/L (ref 10–40)
BASOPHILS # BLD AUTO: 0.17 K/UL (ref 0–0.2)
BASOPHILS NFR BLD: 1 % (ref 0–1.9)
BILIRUB SERPL-MCNC: 3 MG/DL (ref 0.1–1)
BUN SERPL-MCNC: 30 MG/DL (ref 8–23)
CALCIUM SERPL-MCNC: 8.4 MG/DL (ref 8.7–10.5)
CHLORIDE SERPL-SCNC: 99 MMOL/L (ref 95–110)
CO2 SERPL-SCNC: 23 MMOL/L (ref 23–29)
CREAT SERPL-MCNC: 2 MG/DL (ref 0.5–1.4)
DIFFERENTIAL METHOD: ABNORMAL
EOSINOPHIL # BLD AUTO: 0.1 K/UL (ref 0–0.5)
EOSINOPHIL NFR BLD: 0.7 % (ref 0–8)
ERYTHROCYTE [DISTWIDTH] IN BLOOD BY AUTOMATED COUNT: 21.5 % (ref 11.5–14.5)
EST. GFR  (NO RACE VARIABLE): 37.3 ML/MIN/1.73 M^2
GLUCOSE SERPL-MCNC: 109 MG/DL (ref 70–110)
HCT VFR BLD AUTO: 28 % (ref 40–54)
HGB BLD-MCNC: 9 G/DL (ref 14–18)
IMM GRANULOCYTES # BLD AUTO: 0.15 K/UL (ref 0–0.04)
IMM GRANULOCYTES NFR BLD AUTO: 0.9 % (ref 0–0.5)
INR PPP: 1.2 (ref 0.8–1.2)
LYMPHOCYTES # BLD AUTO: 1.1 K/UL (ref 1–4.8)
LYMPHOCYTES NFR BLD: 6.6 % (ref 18–48)
MAGNESIUM SERPL-MCNC: 1.5 MG/DL (ref 1.6–2.6)
MCH RBC QN AUTO: 30.5 PG (ref 27–31)
MCHC RBC AUTO-ENTMCNC: 32.1 G/DL (ref 32–36)
MCV RBC AUTO: 95 FL (ref 82–98)
MONOCYTES # BLD AUTO: 1.2 K/UL (ref 0.3–1)
MONOCYTES NFR BLD: 7 % (ref 4–15)
NEUTROPHILS # BLD AUTO: 14.1 K/UL (ref 1.8–7.7)
NEUTROPHILS NFR BLD: 83.8 % (ref 38–73)
NRBC BLD-RTO: 0 /100 WBC
PHOSPHATE SERPL-MCNC: 3.7 MG/DL (ref 2.7–4.5)
PLATELET # BLD AUTO: 174 K/UL (ref 150–450)
PMV BLD AUTO: 11.8 FL (ref 9.2–12.9)
POCT GLUCOSE: 116 MG/DL (ref 70–110)
POCT GLUCOSE: 120 MG/DL (ref 70–110)
POCT GLUCOSE: 120 MG/DL (ref 70–110)
POCT GLUCOSE: 121 MG/DL (ref 70–110)
POCT GLUCOSE: 136 MG/DL (ref 70–110)
POTASSIUM SERPL-SCNC: 3.4 MMOL/L (ref 3.5–5.1)
PROT SERPL-MCNC: 6.5 G/DL (ref 6–8.4)
PROTHROMBIN TIME: 12.7 SEC (ref 9–12.5)
RBC # BLD AUTO: 2.95 M/UL (ref 4.6–6.2)
SODIUM SERPL-SCNC: 137 MMOL/L (ref 136–145)
WBC # BLD AUTO: 16.85 K/UL (ref 3.9–12.7)

## 2023-01-29 PROCEDURE — 97164 PT RE-EVAL EST PLAN CARE: CPT

## 2023-01-29 PROCEDURE — 25000003 PHARM REV CODE 250: Performed by: INTERNAL MEDICINE

## 2023-01-29 PROCEDURE — 97530 THERAPEUTIC ACTIVITIES: CPT

## 2023-01-29 PROCEDURE — 63600175 PHARM REV CODE 636 W HCPCS: Performed by: PHYSICIAN ASSISTANT

## 2023-01-29 PROCEDURE — 99232 PR SUBSEQUENT HOSPITAL CARE,LEVL II: ICD-10-PCS | Mod: ,,, | Performed by: INTERNAL MEDICINE

## 2023-01-29 PROCEDURE — 97168 OT RE-EVAL EST PLAN CARE: CPT

## 2023-01-29 PROCEDURE — 63600175 PHARM REV CODE 636 W HCPCS: Performed by: STUDENT IN AN ORGANIZED HEALTH CARE EDUCATION/TRAINING PROGRAM

## 2023-01-29 PROCEDURE — 20000000 HC ICU ROOM

## 2023-01-29 PROCEDURE — 99232 SBSQ HOSP IP/OBS MODERATE 35: CPT | Mod: ,,, | Performed by: INTERNAL MEDICINE

## 2023-01-29 PROCEDURE — 83735 ASSAY OF MAGNESIUM: CPT | Performed by: STUDENT IN AN ORGANIZED HEALTH CARE EDUCATION/TRAINING PROGRAM

## 2023-01-29 PROCEDURE — 25000003 PHARM REV CODE 250: Performed by: STUDENT IN AN ORGANIZED HEALTH CARE EDUCATION/TRAINING PROGRAM

## 2023-01-29 PROCEDURE — 25000003 PHARM REV CODE 250: Performed by: PHYSICIAN ASSISTANT

## 2023-01-29 PROCEDURE — 97110 THERAPEUTIC EXERCISES: CPT

## 2023-01-29 PROCEDURE — 85610 PROTHROMBIN TIME: CPT | Performed by: STUDENT IN AN ORGANIZED HEALTH CARE EDUCATION/TRAINING PROGRAM

## 2023-01-29 PROCEDURE — 85025 COMPLETE CBC W/AUTO DIFF WBC: CPT | Performed by: INTERNAL MEDICINE

## 2023-01-29 PROCEDURE — 80053 COMPREHEN METABOLIC PANEL: CPT | Performed by: STUDENT IN AN ORGANIZED HEALTH CARE EDUCATION/TRAINING PROGRAM

## 2023-01-29 PROCEDURE — 84100 ASSAY OF PHOSPHORUS: CPT | Performed by: STUDENT IN AN ORGANIZED HEALTH CARE EDUCATION/TRAINING PROGRAM

## 2023-01-29 PROCEDURE — 25000003 PHARM REV CODE 250

## 2023-01-29 RX ORDER — MAGNESIUM SULFATE HEPTAHYDRATE 40 MG/ML
2 INJECTION, SOLUTION INTRAVENOUS ONCE
Status: COMPLETED | OUTPATIENT
Start: 2023-01-29 | End: 2023-01-29

## 2023-01-29 RX ORDER — POTASSIUM CHLORIDE 20 MEQ/1
20 TABLET, EXTENDED RELEASE ORAL ONCE
Status: COMPLETED | OUTPATIENT
Start: 2023-01-29 | End: 2023-01-29

## 2023-01-29 RX ORDER — ONDANSETRON 2 MG/ML
INJECTION INTRAMUSCULAR; INTRAVENOUS
Status: COMPLETED
Start: 2023-01-29 | End: 2023-01-29

## 2023-01-29 RX ORDER — ONDANSETRON 2 MG/ML
4 INJECTION INTRAMUSCULAR; INTRAVENOUS EVERY 6 HOURS PRN
Status: DISCONTINUED | OUTPATIENT
Start: 2023-01-29 | End: 2023-01-30

## 2023-01-29 RX ORDER — AMIODARONE HYDROCHLORIDE 200 MG/1
400 TABLET ORAL 2 TIMES DAILY
Status: DISCONTINUED | OUTPATIENT
Start: 2023-01-29 | End: 2023-01-31

## 2023-01-29 RX ORDER — PROCHLORPERAZINE EDISYLATE 5 MG/ML
5 INJECTION INTRAMUSCULAR; INTRAVENOUS EVERY 4 HOURS PRN
Status: DISCONTINUED | OUTPATIENT
Start: 2023-01-29 | End: 2023-01-30

## 2023-01-29 RX ADMIN — BENZONATATE 200 MG: 100 CAPSULE ORAL at 07:01

## 2023-01-29 RX ADMIN — GUAIFENESIN AND DEXTROMETHORPHAN HYDROBROMIDE 2 TABLET: 600; 30 TABLET, EXTENDED RELEASE ORAL at 10:01

## 2023-01-29 RX ADMIN — THERA TABS 1 TABLET: TAB at 10:01

## 2023-01-29 RX ADMIN — LACTULOSE 10 G: 20 SOLUTION ORAL at 08:01

## 2023-01-29 RX ADMIN — ONDANSETRON 4 MG: 2 INJECTION INTRAMUSCULAR; INTRAVENOUS at 07:01

## 2023-01-29 RX ADMIN — PROCHLORPERAZINE EDISYLATE 5 MG: 5 INJECTION INTRAMUSCULAR; INTRAVENOUS at 09:01

## 2023-01-29 RX ADMIN — HEPARIN SODIUM 5000 UNITS: 5000 INJECTION INTRAVENOUS; SUBCUTANEOUS at 05:01

## 2023-01-29 RX ADMIN — HEPARIN SODIUM 5000 UNITS: 5000 INJECTION INTRAVENOUS; SUBCUTANEOUS at 02:01

## 2023-01-29 RX ADMIN — TORSEMIDE 50 MG: 20 TABLET ORAL at 10:01

## 2023-01-29 RX ADMIN — BENZONATATE 200 MG: 100 CAPSULE ORAL at 03:01

## 2023-01-29 RX ADMIN — TORSEMIDE 50 MG: 20 TABLET ORAL at 05:01

## 2023-01-29 RX ADMIN — HYDROMORPHONE HYDROCHLORIDE 0.2 MG: 1 INJECTION, SOLUTION INTRAMUSCULAR; INTRAVENOUS; SUBCUTANEOUS at 09:01

## 2023-01-29 RX ADMIN — HEPARIN SODIUM 5000 UNITS: 5000 INJECTION INTRAVENOUS; SUBCUTANEOUS at 09:01

## 2023-01-29 RX ADMIN — MIDODRINE HYDROCHLORIDE 10 MG: 5 TABLET ORAL at 05:01

## 2023-01-29 RX ADMIN — THIAMINE HCL TAB 100 MG 100 MG: 100 TAB at 10:01

## 2023-01-29 RX ADMIN — GUAIFENESIN AND DEXTROMETHORPHAN HYDROBROMIDE 2 TABLET: 600; 30 TABLET, EXTENDED RELEASE ORAL at 08:01

## 2023-01-29 RX ADMIN — MAGNESIUM SULFATE 2 G: 2 INJECTION INTRAVENOUS at 06:01

## 2023-01-29 RX ADMIN — FOLIC ACID 1 MG: 1 TABLET ORAL at 10:01

## 2023-01-29 RX ADMIN — MIDODRINE HYDROCHLORIDE 10 MG: 5 TABLET ORAL at 09:01

## 2023-01-29 RX ADMIN — PANTOPRAZOLE SODIUM 40 MG: 40 TABLET, DELAYED RELEASE ORAL at 06:01

## 2023-01-29 RX ADMIN — MIDODRINE HYDROCHLORIDE 10 MG: 5 TABLET ORAL at 02:01

## 2023-01-29 RX ADMIN — AMIODARONE HYDROCHLORIDE 400 MG: 200 TABLET ORAL at 08:01

## 2023-01-29 RX ADMIN — POTASSIUM CHLORIDE 20 MEQ: 1500 TABLET, EXTENDED RELEASE ORAL at 06:01

## 2023-01-29 RX ADMIN — AMIODARONE HYDROCHLORIDE 400 MG: 200 TABLET ORAL at 10:01

## 2023-01-29 RX ADMIN — PANTOPRAZOLE SODIUM 40 MG: 40 TABLET, DELAYED RELEASE ORAL at 05:01

## 2023-01-29 NOTE — PROGRESS NOTES
[facility]  Neurosurgery  Progress Note    Subjective:     History of Present Illness: 63 yo M with PMH of alcoholic hepatitis (drinks a 6 pack of beer per day and bottle of marie), Afib on Xarelto, central obesity, HTN, GI bleed, unknown CKD vs MARTITA on admission, anemia, chronic hyponatremia who presents due to inability to get out of bed. He reports that he has been having fecal incontinence for 2 months now and difficulty walking for the last 4 days (with single person assistance and rolling walker) and inability to get out of bed today. He denies saddle anesthesia or urinary incontinence or issues voiding his bladder. He has also  been having dark and tarry stools.     CT L spine and MRI L spine at OSH showed large L3/L4 herniated disc with moderate to severe stenosis. Patient [place] to [facility] [place] possible neurosurgical intervention.       Post-Op Info:  Procedure(s) (LRB):  EGD (ESOPHAGOGASTRODUODENOSCOPY) (N/A)   12 Days Post-Op     Interval History: still having RVR, increasing amio today. NSGY will postpone surgery again given ongoing medical commodities. Neuro exam is stable    Medications:  Continuous Infusions:      Scheduled Meds:   amiodarone  400 mg Oral BID    dextromethorphan-guaiFENesin  mg  2 tablet Oral BID    folic acid  1 mg Oral Daily    heparin (porcine)  5,000 Units Subcutaneous Q8H    lactulose  10 g Oral TID    midodrine  10 mg Oral Q8H    multivitamin  1 tablet Oral Daily    pantoprazole  40 mg Oral BID AC    thiamine  100 mg Oral Daily    torsemide  50 mg Oral BID loop     PRN Meds:sodium chloride, albuterol-ipratropium, benzonatate, dextrose 10%, dextrose 10%, glucagon (human recombinant), glucose, glucose, HYDROmorphone, insulin aspart U-100, LIDOcaine-prilocaine, ondansetron, promethazine-codeine 6.25-10 mg/5 ml     Review of Systems   Constitutional:  Negative for chills and fever.   Respiratory:  Positive for shortness of breath.   "  Gastrointestinal:  Negative for nausea and vomiting.   Musculoskeletal:  Positive for back pain and gait problem.   Skin:  Negative for rash and wound.   Neurological:  Positive for weakness. Negative for numbness.   Objective:     Weight: 110 kg (242 lb 8.1 oz)  Body mass index is 36.87 kg/m².  Vital Signs (Most Recent):  Temp: 98.2 °F (36.8 °C) (01/29/23 0701)  Pulse: 107 (01/29/23 0800)  Resp: (!) 22 (01/29/23 0800)  BP: 135/80 (01/29/23 0800)  SpO2: (!) 92 % (01/29/23 0800)   Vital Signs (24h Range):  Temp:  [98 °F (36.7 °C)-98.9 °F (37.2 °C)] 98.2 °F (36.8 °C)  Pulse:  [] 107  Resp:  [11-54] 22  SpO2:  [86 %-100 %] 92 %  BP: (111-147)/(57-92) 135/80     Date 01/29/23 0700 - 01/30/23 0659   Shift 5478-9855 4789-3264 9631-8562 24 Hour Total   INTAKE   I.V.(mL/kg) 44.4(0.4)   44.4(0.4)   Shift Total(mL/kg) 44.4(0.4)   44.4(0.4)   OUTPUT   Urine(mL/kg/hr) 200   200   Shift Total(mL/kg) 200(1.8)   200(1.8)   Weight (kg) 110 110 110 110                Oxygen Concentration (%):  [100] 100         Open Drain 01/17/23 0800 Left;Anterior LLQ (Active)   Site Description Healing 01/27/23 1009   Dressing Status Clean;Dry;Intact 01/27/23 1009   Drainage Yellow 01/27/23 1009   Status Unclamped 01/27/23 1009   Output (mL) 110 mL 01/24/23 0600            Urethral Catheter 01/16/23 1347 Latex 14 Fr. (Active)   Site Assessment Clean;Intact 01/27/23 1009   Collection Container Standard drainage bag 01/27/23 1009   Securement Method secured to top of thigh w/ adhesive device 01/27/23 1009   Catheter Care Performed yes 01/27/23 1009   Reason for Continuing Urinary Catheterization Critically ill in ICU and requiring hourly monitoring of intake/output 01/27/23 1009   CAUTI Prevention Bundle Securement Device in place with 1" slack;Intact seal between catheter & drainage tubing;Drainage bag/urimeter off the floor;Sheeting clip in use;No dependent loops or kinks;Drainage bag/urimeter not overfilled (<2/3 full);Drainage " bag/urimeter below bladder 01/27/23 1009   Output (mL) 275 mL 01/27/23 1126       Trialysis (Dialysis) Catheter 01/22/23 1530 right internal jugular (Active)   Line Necessity Review CRRT/HD 01/27/23 1009   Verification by X-ray Yes 01/27/23 1009   Site Assessment No swelling;No redness;No drainage 01/27/23 1009   Line Securement Device Secured with sutures 01/27/23 1009   Dressing Type Biopatch in place;Central line dressing 01/27/23 1009   Dressing Status Clean;Dry;Intact 01/27/23 1009   Dressing Intervention Integrity maintained 01/27/23 1009   Date on Dressing 01/22/23 01/27/23 1009   Dressing Due to be Changed 01/29/23 01/27/23 1009   Venous Patency/Care infusing 01/27/23 1009   Arterial Patency/Care flushed w/o difficulty 01/27/23 1009   Distal Patency/Care infusing 01/27/23 1009   Flows Good 01/25/23 1510   Waveform Not being transduced 01/27/23 1009       Physical Exam    Neurosurgery Physical Exam  Constitutional:       Appearance: He is well-developed and well-nourished.      Comments: obese   Eyes:      Extraocular Movements: EOM normal.      Conjunctiva/sclera: Conjunctivae normal.      Pupils: Pupils are equal, round, and reactive to light.   Abdominal:      Palpations: Abdomen is soft, non-tender  Neurological:      Mental Status: He is alert and oriented to person, place, and time.         Awake, alert, & oriented  PERRL  CN grossly intact  BUE 5/5  BLE 4- hf, 4 ke, 4+ df/pf  SILT, no saddle anesthesia   Poor rectal tone     Psychiatric:         Mood and Affect: normal, mildly decreased concentration    Significant Labs:  Recent Labs   Lab 01/28/23  0447 01/28/23  0839 01/29/23  0328   * 145* 109   * 135* 137   K 3.3* 3.7 3.4*    99 99   CO2 22* 25 23   BUN 29* 29* 30*   CREATININE 2.3* 2.3* 2.0*   CALCIUM 8.2* 8.1* 8.4*   MG 1.8  --  1.5*       Recent Labs   Lab 01/27/23  0954 01/28/23  0447   WBC  --  15.79*   HGB  --  8.7*   HCT 33* 26.8*   PLT  --  152       Recent Labs   Lab  01/28/23  0447 01/29/23  0328   INR 1.2 1.2       Microbiology Results (last 7 days)       Procedure Component Value Units Date/Time    Blood culture [[id number]] Collected: 01/22/23 1052    Order Status: Completed Specimen: Blood from Peripheral, Right Wrist Updated: 01/27/23 1212     Blood Culture, Routine No growth after 5 days.    Culture, Anaerobic [[id number]] Collected: 01/17/23 0003    Order Status: Completed Specimen: Ascites Updated: 01/24/23 0703     Anaerobic Culture No anaerobes isolated          All pertinent labs from the last 24 hours have been reviewed.    Significant Diagnostics:  I have reviewed and interpreted all pertinent imaging results/findings within the past 24 hours.    Assessment/Plan:     Lumbar herniated disc  63 yo M with PMH of alcoholic hepatitis (drinks a 6 pack of beer per day and bottle of marie), Afib on Xarelto, central obesity, HTN, GI bleed, unknown CKD vs MARTITA on admission, anemia, chronic hyponatremia who presents due to inability to get out of bed. He reports that he has been having fecal incontinence for 2 months now and difficulty walking for the last 4 days (with single person assistance and rolling walker) and inability to get out of bed today. He denies saddle anesthesia or urinary incontinence or issues voiding his bladder. He has also  been having dark and tarry stools.     --Admitted to MICU   -q4 neuro checks  --All labs and diagnostics reviewed   -CT L spine and MRI L spine at OSH showed large L3/L4 herniated disc with moderate to severe stenosis. Some air in disc space.   -MRI L spine wo 1/16: large L3/L4 herniated disc with moderate to severe stenosis  --no emergent neurosurgical intervention, patient with clinical findings of subacute to chronic symptoms   -will post pone lumbar decompression given ongoing medical comorbidities, in particular cirrhosis with baseline elevated INR.   -will plan to see patient as outpatient to schedule surgery; if he improves  medically during this admission, please reach out to neurosurgery and we will reevaluate for operative intervention while inpatient  --ok for SQH  --primary team holding xarelto given coagulopathy and GI bleed; if patient were to go to OR with nsgy this admission, will have to have anticoagulation held prior to OR and reasonable correction of his coagulopathy  --no HOB restrictions from NSGY standpoint  --GIB: GI consulted for GI bleed, EGD negative for bleed but showed esophageal varices. Concern for infiltrative liver process on top of alcoholic cirrhosis  --MARTITA: Nephrology consulted, Cr improving, iHD per Nephrology   --ok for diet at this time per primary  --nsgy will sign off at this time, please call with any changes or worsening of neuro exam  --MICU/HM for all primary medical issues    Dispo: ongoing per primary team        Krista Finch MD  Neurosurgery  [facility]

## 2023-01-29 NOTE — PLAN OF CARE
Discharge Recommendation: Rehab.    Re-evaluation completed today. PT goals appropriate.    Patient is safe to perform sitting EOB for meals with nursing staff.    Please continue Progressive Mobility Protocol as appropriate.    Maame Willis, PT, DPT  2023  Pager: [phone]      Problem: Physical Therapy  Goal: Physical Therapy Goal  Description: Goals to be met by: 23     Patient will increase functional independence with mobility by performin. Supine to sit with Minimal Assistance-not met  2. Sit to stand transfer with  Moderate Assistance-not met  4. Bed to chair transfer with Moderate Assistance using AD if needed via stand pivot or squat pivot - not met  5. Gait  x 30 feet with Minimal Assistance using AD if needed. -not met  6. Sitting at edge of bed x8 minutes with Supervision -not met  7. Standing x 1 minutes with minimal assistance using LRAD - not met    Outcome: Ongoing, Progressing

## 2023-01-29 NOTE — PT/OT/SLP RE-EVAL
Physical Therapy Co-Reevaluation and Co-Treatment  Patient Name:  Jonny Isabel   MRN:  [id number]  Admit Date: 1/16/2023  Admitting Diagnosis:  Atrial fibrillation with rapid ventricular response   Length of Stay: 13 days  Recent Surgery: Procedure(s) (LRB):  EGD (ESOPHAGOGASTRODUODENOSCOPY) (N/A) 12 Days Post-Op    Hospital Course:  1/16/2023: Admit date  1/18/2023: PT initial evaluation    Pt presents for re-evaluation after transfer to ICU for hypotension + afib with RVR.  Please refer to PT initial evaluation for PLOF and social history.  Recommendations:     Discharge Recommendations:  rehabilitation facility   Discharge Equipment Recommendations: other (see comments) (tbd pending progress)   Barriers to discharge:  increased assist needed    Plan:     During this hospitalization, patient to be seen 3 x/week to address the identified rehab impairments via therapeutic activities, therapeutic exercises, neuromuscular re-education and progress towards the established goals.  Plan of Care Expires:  02/28/23  Plan of Care Reviewed with: patient    Assessment:     Jonny Isabel is a 62 y.o. male admitted with a medical diagnosis of Atrial fibrillation with rapid ventricular response. Pt tolerated re-evaluation fairly on this date. Re-evaluation limited on this date due top pt declining further mobility after sitting EOB for 6 minutes. PT encouraged pt to sit up as participating with PT improved strength and endurance, pt stated he knew this and still wanted to lay down thus further mobility not attempted. Pt with diffuse BLE weakness and impaired cardiopulmonary endurance and is not safe to go home at this time. Patient presents with fair participation in therapy and fair motivation to improve functional mobility to return to prior level of function. Patient is currently most appropriate for inpatient rehab, as patient demonstrates appropriate endurance to participate in 3 hours per day, 5x/week of combined therapy.  "Pt's motivation and participation are expected to improve with improved pain control as well as increased rest.    Problem List: weakness, impaired endurance, impaired self care skills, impaired functional mobility, gait instability, decreased upper extremity function, decreased lower extremity function, decreased safety awareness, pain, impaired skin.  Rehab Prognosis: Fair; patient would benefit from acute skilled PT services to address these deficits and reach maximum level of function.      Goals:   Multidisciplinary Problems       Physical Therapy Goals          Problem: Physical Therapy    Goal Priority Disciplines Outcome Goal Variances Interventions   Physical Therapy Goal     PT, PT/OT Ongoing, Progressing     Description: Goals to be met by: 23     Patient will increase functional independence with mobility by performin. Supine to sit with Minimal Assistance-not met  2. Sit to stand transfer with  Moderate Assistance-not met  4. Bed to chair transfer with Moderate Assistance using AD if needed via stand pivot or squat pivot - not met  5. Gait  x 30 feet with Minimal Assistance using AD if needed. -not met  6. Sitting at edge of bed x8 minutes with Supervision -not met  7. Standing x 1 minutes with minimal assistance using LRAD - not met                         Subjective     RN notified prior to session. No family/friends present upon PT entrance into room. Patient agreeable to PT re-evaluation    Chief Complaint: back pain, tired  Patient/Family Comments/goals: "I don't care. I want to lay down."  Pain/Comfort:  Pain Rating 1: 9/10  Location - Orientation 1: lower  Location 1: back  Pain Addressed 1: Reposition, Distraction  Pain Rating Post-Intervention 1: other (see comments) (did not report any increase or decrease)    Objective:     Additional staff present: OT; OT for co-evaluation due to suspected patient need for two skilled therapists to appropriately assess patient's functional " "deficits as well as ensure patient safety, accommodate for limited activity tolerance, and provide appropriate, skilled assistance to maximize functional potential during evaluation.    Patient found HOB elevated with: telemetry, blood pressure cuff, pulse ox (continuous), gomez catheter, peripheral IV     General Precautions: Standard, Cardiac fall   Orthopedic Precautions:N/A   Braces: N/A   Respiratory Status: Nasal Cannula 4L  Vitals: /73 (BP Location: Left arm, Patient Position: Lying)   Pulse 91   Temp 98.3 °F (36.8 °C) (Oral)   Resp 15   Ht 5' 8" (1.727 m)   Wt 110 kg (242 lb 8.1 oz)   SpO2 96%   BMI 36.87 kg/m²      Exam:  Cognition:   Alert  AxOx4  Command following: Follows multistep verbal commands  Fluency: clear/fluent  Hearing: Intact  Vision:  Intact  Skin Integrity: Visible skin intact  Posture: slouched posture and rounded shoulders  Physical Exam:    Left UE Left LE Right UE Right LE   Edema absent absent absent absent   ROM AROM WFL AAROM WFL AROM WFL AAROM WFL   Sensation intact to light touch intact to light touch intact to light touch intact to light touch   Coordination normal not tested due to strength or pain deficits normal not tested due to strength or pain deficits   Strength Exam:    Lower Extremity Strength:   Right LE  Left LE    Knee extension: 2/5 Knee extension: 4/5   Knee flexion: 3+/5 Knee flexion: 4/5   Hip flexion: trace Hip flexion: 2-/5   Ankle dorsiflexion:   4-/5 Ankle dorsiflexion:   4-/5   Ankle plantarflexion: 4-/5 Ankle plantarflexion: 4-/5           Outcome Measures:  AM-PAC 6 CLICK MOBILITY  Turning over in bed (including adjusting bedclothes, sheets and blankets)?: 2  Sitting down on and standing up from a chair with arms (e.g., wheelchair, bedside commode, etc.): 1  Moving from lying on back to sitting on the side of the bed?: 2  Moving to and from a bed to a chair (including a wheelchair)?: 1  Need to walk in hospital room?: 1  Climbing 3-5 steps with a " "railing?: 1  Basic Mobility Total Score: 8     Functional Mobility:    Bed Mobility:   Rolling/Turning to Left: maximal assistance and with side rail  Scooting to HOB via supine bridge: total assistance and 2 persons  Supine to Sit: maximal assistance and 2 persons; from Lt side of bed  Scooting anteriorly to EOB to have both feet planted on floor: total assistance  Sit to Supine: maximal assistance and 2 persons; to Lt side of bed    Sitting Balance at Edge of Bed:  Static Sitting Balance: Good- : able to accept minimal resistance  Dynamic Sitting Balance: Fair : minimal weight shifting ipsilaterally or anteriorly. Difficulty crossing midline  Assistance Level Required: Stand-by Assistance  Time: 6 minutes  Postural deviations noted: slouched posture, rounded shoulders, and forward head  Comments: Time EOB focused primarily on tolerance to upright positioning, cardiopulmonary response and endurance for activities, and strength of postural musculature to perform dynamic sitting to prepare for tasks in the home. Pt with c/o dizziness. BP assessed and found to be 142/82. Pt reported dizziness improved. After ~6 minutes pt stated "I want to lay down." Not receptive to PT encouragement and education and continued to request to lay down. Pt returned to supine. Pt reported after returning to supine that he wanted to lay down because he was tired.    Transfers: Deferred due to pt requesting to return to supine and declining further mobility    Education:  Time provided for education, counseling and discussion of health disposition in regards to patient's current status  All questions answered within PT scope of practice and to patient's satisfaction  PT role in POC to address current functional deficits  Pt educated on proper body mechanics, safety techniques, and energy conservation with PT facilitation and cueing throughout session  Whiteboard updated with therapist name and pt's current mobility status documented " above    Patient left HOB elevated with all lines intact, call button in reach, and RN notified.    Time Tracking:     PT Received On: 01/29/23  PT Start Time: 0953     PT Stop Time: 1018  PT Total Time (min): 25 min     Billable Minutes: Re-eval 8 minutes and Therapeutic Exercise 17 minutes    Maame Willis, PT, DPT  1/29/2023  Pager: [phone]

## 2023-01-29 NOTE — PLAN OF CARE
CM[facility] DAILY GOALS       A: Awake    RASS: Goal - RASS Goal: 0-->alert and calm  Actual - RASS (Braxton Agitation-Sedation Scale): 0-->alert and calm   Restraint necessity:    B: Breathe   SBT: NA   C: Coordinate A & B, analgesics/sedatives   Pain: managed    SAT: NA  D: Delirium   CAM-ICU: Overall CAM-ICU: Negative  E: Early(intubated/ Progressive (non-intubated) Mobility   MOVE Screen: pass   Activity: Activity Management: Heel slide - L1  FAS: Feeding/Nutrition   Diet order: Diet/Nutrition Received: regular, Specialty Diet/Nutrition Received: dysphagia mechanically altered  T: Thrombus   DVT prophylaxis: VTE Required Core Measure: (SCDs) Sequential compression device initiated/maintained  H: HOB Elevation   Head of Bed (HOB) Positioning: HOB at 20-30 degrees  U: Ulcer Prophylaxis   GI: yes  G: Glucose control   managed Glycemic Management: blood glucose monitored  S: Skin   Bathing/Skin Care: bath, complete, dressed/undressed, electrode patches/site rotation, incontinence care  Device Skin Pressure Protection: absorbent pad utilized/changed, adhesive use limited, positioning supports utilized, pressure points protected  Pressure Reduction Devices: foam padding utilized, heel offloading device utilized, pressure-redistributing mattress utilized, positioning supports utilized  Pressure Reduction Techniques: pressure points protected, weight shift assistance provided  Skin Protection: adhesive use limited, incontinence pads utilized, silicone foam dressing in place, transparent dressing maintained  B: Bowel Function   diarrhea   I: Indwelling Catheters   Bose necessity:      Urethral Catheter 01/16/23 1347 Latex 14 Fr.-Reason for Continuing Urinary Catheterization: Critically ill in ICU and requiring hourly monitoring of intake/output   CVC necessity: No  D: De-escalation Antibiotics   No    Family/Goals of care/Code Status   Code Status: Full Code    24H Vital Sign Range  Temp:  [98 °F (36.7 °C)-98.9 °F (37.2 °C)]    Pulse:  []   Resp:  [13-39]   BP: (110-159)/()   SpO2:  [91 %-97 %]      Shift Events   No acute events throughout shift. Oral amio increased, has remained in SR and ST throughout shift. Patient had episodes of nausea this morning that was relieved with compazine. PRN given for back pain as well.   Patient has stepdown orders.     VS and assessment per flow sheet, patient progressing towards goals as tolerated, plan of care reviewed with family, all concerns addressed, will continue to monitor.    Krystyna Vera

## 2023-01-29 NOTE — PROGRESS NOTES
[facility]  Critical Care Medicine  Progress Note    Patient Name: Jonny Isabel  MRN: [id number]  Admission Date: 1/16/2023  Hospital Length of Stay: 13 days  Code Status: Full Code  Attending Provider: Trevon Isabel MD  Primary Care Provider: Yuli Pérez Mai, MD   Principal Problem: Atrial fibrillation with rapid ventricular response    Subjective:     HPI:  Mr. Fuller is a 62-year-old male with a PMHx of A-fibb (on Eliquis), hypertension, DM 2 (not insulin dependent) presents as transfer from OSH for chronic lower back that has been worsening for the past week. Pt states that the pain is radiating from his right buttock down to his legs. Pt was unable to get out of bed this AM due to the pain. HE endorses having fecal incontinence for the past week with black tarry stools. He denies any fevers, chills, abdominal pain, urinary incontinence, or saddle anesthesia.     At OSH ED CT lumbar spine ordered revealing Prominent, cystic, multiloculated predominantly gas attenuation epidural  structure within the spinal canal at the level of L3-L4 resulting in severe narrowing of the spinal canal with mass effect upon the thecal sac and  vacuum disc phenomena concerning for diskitis or an epidural abscess. MRI shows large disc extrusion at L3-4 causing severe spinal canal stenosis, with moderate spinal canal stenosis at L2-3 and L4-5 and moderate neural foraminal narrowing L4-5 and L5-S1. Pt transferred to [facility] for further intervention with neurosurgery. Admitted to MICU for NSGY and GI evaluation . Hgb stable. EGD shows small varices with gastropathy, no active bleeds. Worsening MARTITA with minimal UOP, HRS protocol started with levo, midodrine, octreotide and albumin trailed, has since been stopped due to low suspicion. Nephrology following recommend HD in setting of Acute Renal Failure. NSGY initially planned surgical intervention for 1/23 but determine patient not medically optimized and currently  suggesting multimodal pain control. A-fibb RVR 1/23 overnight, started on Amiodarone gtt, transitioned to home lopressor. Ongoing HD needs.     Stepped down to hospital medicine on 1/25. On 1/27, pt developed afib with rvr, refractory to IV Lopresor x 2, developed hypotension subsequently and was stepped back up to MICU.          Hospital/ICU Course:  Admitted to MICU for NSGY and GI evaluation . Hgb stable. EGD shows small varices with gastropathy, no active bleeds. Worsening MARTITA with minimal UOP, HRS protocol started with levo, midodrine, octreotide and albumin trailed, has since been stopped due to low suspicion. Nephrology following recommend HD in setting of Acute Renal Failure (s/p 1 session SLED, s/p 1 session HD). NSGY initially planned surgical intervention for 1/23 but determine patient not medically optimized and currently suggesting multimodal pain control. A-fibb RVR 1/23 overnight, started on Amiodarone gtt, transitioned to home lopressor. Lasix trailed with good U/O. Added Torsemide for diuresis. Amio gtt restarted due to a-fibb with RVR, transitioned to PO.       Interval History/Significant Events: Nauseous this morning, getting compazine. Good UOP. Cr down trending. Rate controlled on PO amiodarone.    Review of Systems   Constitutional:  Negative for chills and fever.   HENT:  Negative for congestion and trouble swallowing.    Eyes:  Negative for visual disturbance.   Respiratory:  Negative for cough, shortness of breath, wheezing and stridor.    Cardiovascular:  Negative for chest pain and leg swelling.   Gastrointestinal:  Positive for abdominal distention. Negative for abdominal pain, diarrhea, nausea and vomiting.        +fecal incontinence   Endocrine: Negative for polyuria.   Genitourinary:  Negative for difficulty urinating and dysuria.   Musculoskeletal:  Positive for back pain. Negative for arthralgias and myalgias.   Skin:  Negative for color change and rash.   Neurological:  Negative  for dizziness, weakness, numbness and headaches.   Psychiatric/Behavioral:  Negative for agitation and behavioral problems.    Objective:     Vital Signs (Most Recent):  Temp: 98.3 °F (36.8 °C) (01/29/23 1100)  Pulse: 95 (01/29/23 1100)  Resp: 15 (01/29/23 1100)  BP: 136/78 (01/29/23 1100)  SpO2: 96 % (01/29/23 1100)   Vital Signs (24h Range):  Temp:  [98 °F (36.7 °C)-98.9 °F (37.2 °C)] 98.3 °F (36.8 °C)  Pulse:  [] 95  Resp:  [13-54] 15  SpO2:  [86 %-97 %] 96 %  BP: (111-147)/() 136/78   Weight: 110 kg (242 lb 8.1 oz)  Body mass index is 36.87 kg/m².      Intake/Output Summary (Last 24 hours) at 1/29/2023 1218  Last data filed at 1/29/2023 1100  Gross per 24 hour   Intake 207.25 ml   Output 4277 ml   Net -4069.75 ml         Physical Exam  Vitals and nursing note reviewed.   Constitutional:       General: He is not in acute distress.     Appearance: He is obese. He is not ill-appearing.   HENT:      Head: Normocephalic and atraumatic.      Right Ear: External ear normal.      Left Ear: External ear normal.      Nose: Nose normal.      Mouth/Throat:      Mouth: Mucous membranes are moist.      Pharynx: Oropharynx is clear.   Eyes:      General:         Right eye: No discharge.         Left eye: No discharge.      Extraocular Movements: Extraocular movements intact.      Conjunctiva/sclera: Conjunctivae normal.      Pupils: Pupils are equal, round, and reactive to light.   Cardiovascular:      Rate and Rhythm: Normal rate and regular rhythm.      Pulses: Normal pulses.      Heart sounds: Normal heart sounds. No murmur heard.  Pulmonary:      Effort: Pulmonary effort is normal. No respiratory distress.      Breath sounds: No wheezing or rales.   Abdominal:      General: There is distension.      Tenderness: There is no abdominal tenderness. There is no guarding.   Musculoskeletal:         General: No swelling. Normal range of motion.      Cervical back: Normal range of motion.      Right lower leg: No  edema.      Left lower leg: No edema.   Skin:     General: Skin is warm and dry.      Coloration: Skin is not jaundiced.   Neurological:      General: No focal deficit present.      Mental Status: He is alert. He is disoriented.      Cranial Nerves: Cranial nerves 2-12 are intact.      Comments: Patient altered  Responding to questions appropriately but confused about timing of events  Poor recall  + fecal incontinence   Psychiatric:         Mood and Affect: Mood normal.         Behavior: Behavior normal.       Vents:  Oxygen Concentration (%): 100 (01/29/23 0600)  Lines/Drains/Airways       Central Venous Catheter Line  Duration             Trialysis (Dialysis) Catheter 01/22/23 1530 right internal jugular 6 days              Drain  Duration                  Open Drain 01/17/23 0800 Left;Anterior LLQ 12 days         Urethral Catheter 01/16/23 1347 Latex 14 Fr. 12 days              Peripheral Intravenous Line  Duration                  Peripheral IV - Single Lumen 01/27/23 1200 18 G Right Antecubital 2 days                  Significant Labs:    CBC/Anemia Profile:  Recent Labs   Lab 01/28/23  0447 01/29/23  0914   WBC 15.79* 16.85*   HGB 8.7* 9.0*   HCT 26.8* 28.0*    174   MCV 96 95   RDW 21.6* 21.5*          Chemistries:  Recent Labs   Lab 01/28/23  0447 01/28/23  0839 01/29/23  0328   * 135* 137   K 3.3* 3.7 3.4*    99 99   CO2 22* 25 23   BUN 29* 29* 30*   CREATININE 2.3* 2.3* 2.0*   CALCIUM 8.2* 8.1* 8.4*   ALBUMIN 1.9* 2.0* 2.0*   PROT 6.1 6.5 6.5   BILITOT 3.2* 3.2* 3.0*   ALKPHOS 169* 177* 172*   ALT 36 41 40   AST 99* 104* 110*   MG 1.8  --  1.5*   PHOS 4.6*  --  3.7         All pertinent labs within the past 24 hours have been reviewed.    Significant Imaging:  I have reviewed all pertinent imaging results/findings within the past 24 hours.    ABG  Recent Labs   Lab 01/27/23  0954   PH 7.384   PO2 71*   PCO2 36.9   HCO3 22.1*   BE -3     Assessment/Plan:     Neuro  Spinal stenosis  CT  Lumbar Spine Without Contrast Result Date: 1/16/2023  1.  Prominent, cystic, multiloculated predominantly gas attenuation structure within the spinal canal at the level of L3-L4 with dimensions as above.  This is favored to be epidural in location and results in severe narrowing of the spinal canal with mass effect upon the thecal sac.  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 Large disc extrusion at L3-4, contributing to severe spinal canal stenosis, as above. Additional lumbar spondylosis, contributing to moderate spinal canal stenosis at L2-3 and L4-5 and moderate neural foraminal narrowing L4-5 and L5-S1    - neuro checks Q4H  - NSGY consulted, appreciate recommendations, to undergo a laminectomy on 1/30    Psychiatric  Alcohol abuse  - Vitals q4h while awake  - CIWA monitoring        Cardiac/Vascular  * Atrial fibrillation with rapid ventricular response  - Maintain K > 4, Mag > 2 and Ca/iCal WNL to decrease arrhythmogenic potential  - Holding anticoagulation in the setting of recent GI bleed and current AMS  - Lopressor was started while on hospital medicine at 25 mg BID (home does lopressor 100 mg QD). Pt went into afib with rvr on 1/27, IV lopressor given x 2 with subsequent drop of BP, re-started on amiodarone gtt   - Amiodarone gtt switched to PO      Renal/  MARTITA (acute kidney injury)  Creatinine 4.7 on admit, baseline around 0.8. Pre-renal vs ATN. Less likely obstruction as pt has gomez.  Renal ultrasound with medical renal disease and no evidence of hydronephrosis.    - Nephrology consulted, appreciate recommendations  - iHD per nephrology  - Strict I&Os and daily weights   - Avoid nephrotoxic agents such as NSAIDs, gadolinium and IV radiocontrast.  - Renally dose meds to current GFR.  - Maintain MAP > 65.   - Torsemide 50 mg BID         ID  Cellulitis  R hand seems to be cellulitic in appearance. Extending up to  forearm.     Plan:  - Completed 10 days of Cefepime on 1/25      Endocrine  Diabetes  -Last A1c reviewed-   Lab Results   Component Value Date    HGBA1C 5.3 11/10/2022       Home Antihyperglycemic Regiment:  - Metformin  1000 mg BID     Inpatient Antihyperglycemic Regiment:  Antihyperglycemics (From admission, onward)      Start     Stop Route Frequency Ordered    01/16/23 1507  insulin aspart U-100 pen 0-5 Units         -- SubQ Before meals & nightly PRN 01/16/23 1407          - LDSSI  - Clear liquid diet for now     Blood Sugars (AccuCheck):    Recent Labs     01/24/23  1235 01/24/23  2236 01/25/23  1119 01/25/23  2115 01/26/23  0342   POCTGLUCOSE 137* 160* 143* 129* 109           GI  Alcoholic cirrhosis of liver with ascites  S/p diagnostic paracentesis on 1/17 with , 50% seg. Cytology negative for malignant cell     - lactulose enema  - Starting midodrine 10 mg TID   - Palliative care consult     Small bowel obstruction  CT Abdomen Pelvis  Without Contras . Findings concerning for developing small bowel obstruction with suspected transition point seen within the midline mid to lower abdomen at the level of L4  Pt without symptoms of nausea or vomiting. Reportedly had a BM at the outside facility on 1/16/2023. Abdomen is distended.     - Holding of NGT for now given no significant symptoms   - Monitor BM and GI symptoms, if worsen will notify GS       Elevated liver enzymes  Likely 2/2 to underlying alcohol abuse and hepatic steatosis.     - Daily CMP, PT/INR   - Complete workup up with tylenol level, acute hep panel, a1-antitrypsin, anti smith antibody, HIV, anti mitochondrial antibody, anca, anti-liver, aFP, PETH pending   - Liver US with doppler shows epatomegaly with hepatic steatosis, cholelithiasis versus gallbladder sludge, small volume ascites, and left pleural effusion.  - Starting midodrine 10 mg TID     GI bleed  Concerning for variceal bleed given hx of alcohol abuse.     - GI consulted,  recommend clear liquid for now but SLP evaluation pending given AMS  -Transfuse pRBC for Hb < 7 g/dL (Consider a higher Hb target if there is clinical evidence of intravascular volume depletion or comorbidities, such as CAD or if high suspicion of vigorous active ongoing bleeding or an uncorrected coagulopathy exists.).  - s/p vitamin K given Pt/INR > 2   -Avoid nonsteroidal agents, antiplatelet agents and anticoagulants if possible in patients without absolute contraindications. (consult managing provider if required for cardiovascular protection).  - EGD showed  Small (< 5 mm) esophageal varices with portal hypertensive gastropathy  - Continue PO PPI 40 mg           Other  Encounter for social work intervention  Patient does not appear to have relatives.  He will need possible decision maker/power of  if his overall mental status does not improve.    - social work consulted for family assessment       Critical Care Daily Checklist:    A: Awake: RASS Goal/Actual Goal: RASS Goal: 0-->alert and calm  Actual: Braxton Agitation Sedation Scale (RASS): Alert and calm   B: Spontaneous Breathing Trial Performed?     C: SAT & SBT Coordinated?  N/A                      D: Delirium: CAM-ICU Overall CAM-ICU: Negative   E: Early Mobility Performed? No   F: Feeding Goal: Goals: Meet % EEN, EPN by RD f/u date  Status: Nutrition Goal Status: new   Current Diet Order   Procedures    Diet Adult Regular (IDDSI Level 7)      AS: Analgesia/Sedation No   T: Thromboembolic Prophylaxis Yes   H: HOB > 300 Yes   U: Stress Ulcer Prophylaxis (if needed) Yes   G: Glucose Control Yes   B: Bowel Function Stool Occurrence: 1   I: Indwelling Catheter (Lines & Bose) Necessity Yes   D: De-escalation of Antimicrobials/Pharmacotherapies Yes    Plan for the day/ETD Continue to monitor    Code Status:  Family/Goals of Care: Full Code         Critical secondary to Patient has a condition that poses threat to life and bodily function:  Acute Renal Failure      Critical care was time spent personally by me on the following activities: development of treatment plan with patient or surrogate and bedside caregivers, discussions with consultants, evaluation of patient's response to treatment, examination of patient, ordering and performing treatments and interventions, ordering and review of laboratory studies, ordering and review of radiographic studies, pulse oximetry, re-evaluation of patient's condition. This critical care time did not overlap with that of any other provider or involve time for any procedures.     Erika Ocasio,   Critical Care Medicine  [facility] - Cardiac Medical ICU

## 2023-01-29 NOTE — RESIDENT HANDOFF
Critical Care Handoff     Primary Team: [facility] CRITICAL CARE MEDICINE TEAM 1 Room Number: [id number]     Patient Name: Jonny Isabel MRN: [id number]     Date of Birth: [id number] Allergies: Patient has no known allergies.     Age: 62 y.o. Admit Date: 1/16/2023     Sex: male  BMI: Body mass index is 36.87 kg/m².     Code Status: Full Code        llness Level (current clinical status): Watcher - No    Reason for Admission: Atrial fibrillation with rapid ventricular response    Brief HPI (pertinent PMH and diagnosis or differential diagnosis): Mr. Fuller is a 62-year-old male with a PMHx of A-fibb (on Eliquis), hypertension, DM 2 (not insulin dependent) presents as transfer from OSH for chronic lower back that has been worsening for the past week. Pt states that the pain is radiating from his right buttock down to his legs. Pt was unable to get out of bed this AM due to the pain. HE endorses having fecal incontinence for the past week with black tarry stools. He denies any fevers, chills, abdominal pain, urinary incontinence, or saddle anesthesia.     At OSH ED CT lumbar spine ordered revealing Prominent, cystic, multiloculated predominantly gas attenuation epidural  structure within the spinal canal at the level of L3-L4 resulting in severe narrowing of the spinal canal with mass effect upon the thecal sac and  vacuum disc phenomena concerning for diskitis or an epidural abscess. MRI shows large disc extrusion at L3-4 causing severe spinal canal stenosis, with moderate spinal canal stenosis at L2-3 and L4-5 and moderate neural foraminal narrowing L4-5 and L5-S1. Pt transferred to [facility] for further intervention with neurosurgery. Admitted to MICU for NSGY and GI evaluation . Hgb stable. EGD shows small varices with gastropathy, no active bleeds. Worsening MARTITA with minimal UOP, HRS protocol started with levo, midodrine, octreotide and albumin trailed, has since been stopped due to low suspicion. Nephrology following recommend HD in  setting of Acute Renal Failure. NSGY initially planned surgical intervention for 1/23 but determine patient not medically optimized and currently suggesting multimodal pain control. A-fibb RVR 1/23 overnight, started on Amiodarone gtt, transitioned to home lopressor. Ongoing HD needs.     Stepped down to hospital medicine on 1/25. On 1/27, pt developed afib with rvr, refractory to IV Lopresor x 2, developed hypotension subsequently and was stepped back up to MICU.         Hospital Course (updated, brief assessment by system or problem, significant events): Admitted to MICU for NSGY and GI evaluation . Hgb stable. EGD shows small varices with gastropathy, no active bleeds. Worsening MARTITA with minimal UOP, HRS protocol started with levo, midodrine, octreotide and albumin trailed, has since been stopped due to low suspicion. Nephrology following recommend HD in setting of Acute Renal Failure (s/p 1 session SLED, s/p 1 session HD). NSGY initially planned surgical intervention for 1/23 but determine patient not medically optimized and currently suggesting multimodal pain control. A-fibb RVR 1/23 overnight, started on Amiodarone gtt, transitioned to home lopressor. Lasix trailed with good U/O. Added Torsemide for diuresis. Amio gtt restarted due to a-fibb with RVR, transitioned to PO.

## 2023-01-29 NOTE — SUBJECTIVE & OBJECTIVE
Interval History/Significant Events: Nauseous this morning, getting phenergan. Good UOP. Cr down trending. Rate controlled on PO amiodarone.    Review of Systems   Constitutional:  Negative for chills and fever.   HENT:  Negative for congestion and trouble swallowing.    Eyes:  Negative for visual disturbance.   Respiratory:  Negative for cough, shortness of breath, wheezing and stridor.    Cardiovascular:  Negative for chest pain and leg swelling.   Gastrointestinal:  Positive for abdominal distention. Negative for abdominal pain, diarrhea, nausea and vomiting.        +fecal incontinence   Endocrine: Negative for polyuria.   Genitourinary:  Negative for difficulty urinating and dysuria.   Musculoskeletal:  Positive for back pain. Negative for arthralgias and myalgias.   Skin:  Negative for color change and rash.   Neurological:  Negative for dizziness, weakness, numbness and headaches.   Psychiatric/Behavioral:  Negative for agitation and behavioral problems.    Objective:     Vital Signs (Most Recent):  Temp: 98.2 °F (36.8 °C) (01/29/23 0701)  Pulse: 107 (01/29/23 0800)  Resp: (!) 22 (01/29/23 0800)  BP: 135/80 (01/29/23 0800)  SpO2: (!) 92 % (01/29/23 0800)   Vital Signs (24h Range):  Temp:  [98 °F (36.7 °C)-98.9 °F (37.2 °C)] 98.2 °F (36.8 °C)  Pulse:  [] 107  Resp:  [11-54] 22  SpO2:  [86 %-100 %] 92 %  BP: (111-147)/(57-92) 135/80   Weight: 110 kg (242 lb 8.1 oz)  Body mass index is 36.87 kg/m².      Intake/Output Summary (Last 24 hours) at 1/29/2023 0821  Last data filed at 1/29/2023 0800  Gross per 24 hour   Intake 573.64 ml   Output 4777 ml   Net -4203.36 ml         Physical Exam  Vitals and nursing note reviewed.   Constitutional:       General: He is not in acute distress.     Appearance: He is obese. He is not ill-appearing.   HENT:      Head: Normocephalic and atraumatic.      Right Ear: External ear normal.      Left Ear: External ear normal.      Nose: Nose normal.      Mouth/Throat:      Mouth:  Mucous membranes are moist.      Pharynx: Oropharynx is clear.   Eyes:      General:         Right eye: No discharge.         Left eye: No discharge.      Extraocular Movements: Extraocular movements intact.      Conjunctiva/sclera: Conjunctivae normal.      Pupils: Pupils are equal, round, and reactive to light.   Cardiovascular:      Rate and Rhythm: Normal rate and regular rhythm.      Pulses: Normal pulses.      Heart sounds: Normal heart sounds. No murmur heard.  Pulmonary:      Effort: Pulmonary effort is normal. No respiratory distress.      Breath sounds: No wheezing or rales.   Abdominal:      General: There is distension.      Tenderness: There is no abdominal tenderness. There is no guarding.   Musculoskeletal:         General: No swelling. Normal range of motion.      Cervical back: Normal range of motion.      Right lower leg: No edema.      Left lower leg: No edema.   Skin:     General: Skin is warm and dry.      Coloration: Skin is not jaundiced.   Neurological:      General: No focal deficit present.      Mental Status: He is alert. He is disoriented.      Cranial Nerves: Cranial nerves 2-12 are intact.      Comments: Patient altered  Responding to questions appropriately but confused about timing of events  Poor recall  + fecal incontinence   Psychiatric:         Mood and Affect: Mood normal.         Behavior: Behavior normal.       Vents:  Oxygen Concentration (%): 100 (01/29/23 0600)  Lines/Drains/Airways       Central Venous Catheter Line  Duration             Trialysis (Dialysis) Catheter 01/22/23 1530 right internal jugular 6 days              Drain  Duration                  Open Drain 01/17/23 0800 Left;Anterior LLQ 12 days         Urethral Catheter 01/16/23 1347 Latex 14 Fr. 12 days              Peripheral Intravenous Line  Duration                  Peripheral IV - Single Lumen 01/25/23 1530 22 G Left;Posterior Hand 3 days         Peripheral IV - Single Lumen 01/27/23 1200 18 G Right  Antecubital 1 day                  Significant Labs:    CBC/Anemia Profile:  Recent Labs   Lab 01/27/23  0828 01/27/23  0954 01/28/23  0447   WBC 16.32*  --  15.79*   HGB 9.5*  --  8.7*   HCT 30.1* 33* 26.8*   *  --  152   MCV 97  --  96   RDW 22.1*  --  21.6*          Chemistries:  Recent Labs   Lab 01/27/23  0828 01/28/23  0447 01/28/23  0839 01/29/23  0328   * 135* 135* 137   K 3.7 3.3* 3.7 3.4*    102 99 99   CO2 18* 22* 25 23   BUN 28* 29* 29* 30*   CREATININE 2.6* 2.3* 2.3* 2.0*   CALCIUM 8.4* 8.2* 8.1* 8.4*   ALBUMIN  --  1.9* 2.0* 2.0*   PROT  --  6.1 6.5 6.5   BILITOT  --  3.2* 3.2* 3.0*   ALKPHOS  --  169* 177* 172*   ALT  --  36 41 40   AST  --  99* 104* 110*   MG 2.1 1.8  --  1.5*   PHOS  --  4.6*  --  3.7         All pertinent labs within the past 24 hours have been reviewed.    Significant Imaging:  I have reviewed all pertinent imaging results/findings within the past 24 hours.

## 2023-01-29 NOTE — NURSING
CMICU DAILY GOALS       A: Awake    RASS: Goal - RASS Goal: 0-->alert and calm  Actual - RASS (Braxton Agitation-Sedation Scale): 0-->alert and calm   Restraint necessity:    B: Breathe   SBT: Not intubated   C: Coordinate A & B, analgesics/sedatives   Pain: managed    SAT: Not intubated  D: Delirium   CAM-ICU: Overall CAM-ICU: Negative  E: Early(intubated/ Progressive (non-intubated) Mobility   MOVE Screen: Pass   Activity: Activity Management: Rolling - L1  FAS: Feeding/Nutrition   Diet order: Diet/Nutrition Received: regular, Specialty Diet/Nutrition Received: dysphagia mechanically altered  T: Thrombus   DVT prophylaxis: VTE Required Core Measure: (SCDs) Sequential compression device initiated/maintained  H: HOB Elevation   Head of Bed (HOB) Positioning: HOB at 20-30 degrees  U: Ulcer Prophylaxis   GI: yes  G: Glucose control   managed Glycemic Management: blood glucose monitored  S: Skin   Bathing/Skin Care: bath, complete, dressed/undressed, incontinence care, linen changed  Device Skin Pressure Protection: absorbent pad utilized/changed  Pressure Reduction Devices: pressure-redistributing mattress utilized  Pressure Reduction Techniques: weight shift assistance provided  Skin Protection: adhesive use limited  B: Bowel Function   diarrhea   I: Indwelling Catheters   Bose necessity:      Urethral Catheter 01/16/23 1347 Latex 14 Fr.-Reason for Continuing Urinary Catheterization: Critically ill in ICU and requiring hourly monitoring of intake/output   CVC necessity: Yes  D: De-escalation Antibiotics   No    Family/Goals of care/Code Status   Code Status: Full Code    24H Vital Sign Range  Temp:  [98 °F (36.7 °C)-98.9 °F (37.2 °C)]   Pulse:  []   Resp:  [11-54]   BP: ()/(44-87)   SpO2:  [86 %-100 %]      Shift Events   No acute events throughout shift, pt has NO appetite and denies wanting any food. Pt had 3 green, liquidity bowel movements. Amio gtt stopped and P.O. amio is scheduled for tonight.        Nickie Wu

## 2023-01-29 NOTE — PLAN OF CARE
Problem: Occupational Therapy  Goal: Occupational Therapy Goal  Description: Goals to be met by: 30 days 2/28/23    Patient will increase functional independence with ADLs by performing:    Pt to completed self feeding independently.   Pt to complete standing g/h skills with CGA.   Pt to complete UE dressing with set-up  Pt to complete LE dressing with MIN with AE as needed.   Pt to complete toileting with MIN A   Pt to complete t/f to BSC with MIN A   Pt to complete sit to stand for improved (I) with occupations of choice with MIN A  Outcome: Ongoing, Progressing

## 2023-01-29 NOTE — PT/OT/SLP RE-EVAL
"Occupational Therapy  [id number] and Treatment    Name: Jonny Isabel  MRN: [id number]  Admitting Diagnosis:  Atrial fibrillation with rapid ventricular response  Recent Surgery: Procedure(s) (LRB):  EGD (ESOPHAGOGASTRODUODENOSCOPY) (N/A) 12 Days Post-Op  Pt re-eval due to t/f to ICU 1/27/23 due to pt A-fib RVR and hypertension.  Pt neuro surgery postponed due to medical status    Recommendations:     Discharge Recommendations: rehabilitation facility    Assessment:     Jonny Isabel is a 62 y.o. male with a medical diagnosis of Atrial fibrillation with rapid ventricular response.  Performance deficits affecting function are weakness, impaired endurance, impaired self care skills, impaired functional mobility, decreased upper extremity function, decreased lower extremity function, pain.  Pt tolerated therapy fair and VSS throughout session. Pt declined to continue therapy for the day after sitting EOB for ~6 mins. Pt anticipated to benefit from IP rehab pending participation when medically appropriate for D/C. Co-re-eval completed to optimize functional performance and safety given pt impaired tolerance for activity in the setting of the ICU.    Rehab Prognosis:  Fair; patient would benefit from acute skilled OT services to address these deficits and reach maximum level of function.       Plan:     Patient to be seen 3 x/week to address the above listed problems via self-care/home management, therapeutic activities, therapeutic exercises  Plan of Care Expires: 02/28/23  Plan of Care Reviewed with: patient    Subjective     Pt said "I am tired"   Pt stated that he wanted to lay back down.  Pt stated that he was dizzy after sitting up at EOB.     Communicated with: marni  prior to session.    Pain/Comfort:  Pain Rating 1: 9/10  Location 1: back  Pain Addressed 1: Reposition, Distraction  Pt was given medication for pain before therapists arrived.     Objective:     Communicated with: nsg  prior to session.  Patient " found supine with: blood pressure cuff, gomez catheter, peripheral IV, pulse ox (continuous), telemetry upon OT entry to room.      General Precautions: Standard, fall  Respiratory Status: Nasal cannula, flow 4 L/min    Occupational Performance:    Bed Mobility:    Patient completed Rolling/Turning to Left with  maximal assistance  Patient completed Supine to Sit with maximal assistance x 2  Patient completed Sit to Supine with maximal assistance    Functional Mobility/Transfers:  Functional Mobility: Pt sat EOB with SBA for 6 mins.   Pt requested to lay down at first due to dizziness and then tiredness.     Activities of Daily Living:  Pt declined completing ADLSs because he was tired.   Pt drank water through straw with set-up and SBA in bed with HOB elevated.  TOT A for incontinent bowel episode, hygiene performed via rolling to left side    Cognitive/Visual Perceptual:  Pt AxO x 4.  Pt vision intact.    Physical Exam:  Pt UE 3+/5 strength.  Pt good  strength.  Pt UE ROM WFL  Pt appeared to have less shaky movements in UE compared to previous sessions.      [facility] 6 Click:  [facility] Total Score: 13    Treatment & Education:  Ed pt with OT POC and safety with functional mobility and ADL skills.  Pt reminded that physical movement will benefit him and pt verbalized understanding.       Patient left supine with all lines intact, call button in reach, and nsg notified    GOALS:   Multidisciplinary Problems       Occupational Therapy Goals          Problem: Occupational Therapy    Goal Priority Disciplines Outcome Interventions   Occupational Therapy Goal     OT, PT/OT Ongoing, Progressing    Description: Goals to be met by: 30 days 2/28/23    Patient will increase functional independence with ADLs by performing:    Pt to completed self feeding independently.   Pt to complete standing g/h skills with CGA.   Pt to complete UE dressing with set-up  Pt to complete LE dressing with MIN with AE as needed.   Pt to complete  toileting with MIN A   Pt to complete t/f to BSC with MIN A   Pt to complete sit to stand for improved (I) with occupations of choice with MIN A                       History:     Past Medical History:   Diagnosis Date    A-fib          Past Surgical History:   Procedure Laterality Date    ESOPHAGOGASTRODUODENOSCOPY N/A 1/17/2023    Procedure: EGD (ESOPHAGOGASTRODUODENOSCOPY);  Surgeon: Dewayne Navas MD;  Location: [address]);  Service: Endoscopy;  Laterality: N/A;       Time Tracking:     OT Date of Treatment: 01/29/23  OT Start Time: 0953  OT Stop Time: 1017  OT Total Time (min): 24 min    Billable Minutes:Re-eval 10  Therapeutic Activity 14    1/29/2023

## 2023-01-29 NOTE — PROVIDER TRANSFER
Hospital Medicine ICU Acceptance Note    Date of Admit: 1/16/2023  Date of Transfer / Stepdown: 1/29/2023  Enoch, IMLADIS/J, L, Onc (IV chemo w/in 1 month), Gyn/Onc, or other special case?: no   ICU team stepping patient down: MICU  ICU team member giving verbal handoff: Erika Ocasio DO  Accepting  team: Hosp Med R    Brief History of Present Illness:      Jonny Isabel is a 62-year-old male with a PMHx of A-fibb (on Eliquis), hypertension, DM 2 (not insulin dependent) presents as transfer from OSH for chronic lower back that has been worsening for the past week. Pt states that the pain is radiating from his right buttock down to his legs. Pt was unable to get out of bed this AM due to the pain. HE endorses having fecal incontinence for the past week with black tarry stools. He denies any fevers, chills, abdominal pain, urinary incontinence, or saddle anesthesia.      At OSH ED CT lumbar spine ordered revealing Prominent, cystic, multiloculated predominantly gas attenuation epidural  structure within the spinal canal at the level of L3-L4 resulting in severe narrowing of the spinal canal with mass effect upon the thecal sac and  vacuum disc phenomena concerning for diskitis or an epidural abscess. MRI shows large disc extrusion at L3-4 causing severe spinal canal stenosis, with moderate spinal canal stenosis at L2-3 and L4-5 and moderate neural foraminal narrowing L4-5 and L5-S1. Pt transferred to [facility] for further intervention with neurosurgery. Admitted to MICU for NSGY and GI evaluation . Hgb stable. EGD shows small varices with gastropathy, no active bleeds. Worsening MARTITA with minimal UOP, HRS protocol started with levo, midodrine, octreotide and albumin trailed, has since been stopped due to low suspicion. Nephrology following recommend HD in setting of Acute Renal Failure. NSGY initially planned surgical intervention for 1/23 but determine patient not medically optimized and currently suggesting multimodal  pain control. A-fibb RVR 1/23 overnight, started on Amiodarone gtt, transitioned to home lopressor. Ongoing HD needs.      Stepped down to hospital medicine on 1/25. On 1/27, pt developed afib with rvr, refractory to IV Lopresor x 2, developed hypotension subsequently and was stepped back up to MICU.     Hospital/ICU Course:      Admitted to MICU for NSGY and GI evaluation . Hgb stable. EGD shows small varices with gastropathy, no active bleeds. Worsening MARTITA with minimal UOP, HRS protocol started with levo, midodrine, octreotide and albumin trailed, has since been stopped due to low suspicion. Nephrology following recommend HD in setting of Acute Renal Failure (s/p 1 session SLED, s/p 1 session HD). NSGY initially planned surgical intervention for 1/23 but determine patient not medically optimized and currently suggesting multimodal pain control. A-fibb RVR 1/23 overnight, started on Amiodarone gtt, transitioned to home lopressor. Lasix trailed with good U/O. Added Torsemide for diuresis. Amio gtt restarted due to a-fibb with RVR, transitioned to PO.           Consultants and Procedures:     Consultants:  GI  Palliative Care  PMR  General Surgery  Nephrology  NSGY    Procedures:    Central line on 1/22    Transfer Information:     Diet:  Regular diet     Physical Activity:  PT/OT    To Do / Pending Studies / Follow ups:  - PT/OT. Followup PMR recs for inpatient rehab  - continue amiodarone   - on tele  - followup gen surgery, nephrology and NSGY recs    Patient has been accepted by Hospital Medicine Team [facility], who will assume care of the patient upon arrival to the floor from the ICU. Please contact ICU team with any concerns prior to arrival. Please contact Hospital Medicine at [phone] or [phone] (please do NOT leave a voicemail) when patient arrives to the floor.    Oscar Hubbard MD  Hospital Medicine Staff  Pager: [phone]; Spectra: [phone]

## 2023-01-29 NOTE — ASSESSMENT & PLAN NOTE
61 yo M with PMH of alcoholic hepatitis (drinks a 6 pack of beer per day and bottle of marie), Afib on Xarelto, central obesity, HTN, GI bleed, unknown CKD vs MARTITA on admission, anemia, chronic hyponatremia who presents due to inability to get out of bed. He reports that he has been having fecal incontinence for 2 months now and difficulty walking for the last 4 days (with single person assistance and rolling walker) and inability to get out of bed today. He denies saddle anesthesia or urinary incontinence or issues voiding his bladder. He has also  been having dark and tarry stools.     --Admitted to MICU   -q4 neuro checks  --All labs and diagnostics reviewed   -CT L spine and MRI L spine at OSH showed large L3/L4 herniated disc with moderate to severe stenosis. Some air in disc space.   -MRI L spine wo 1/16: large L3/L4 herniated disc with moderate to severe stenosis  --no emergent neurosurgical intervention, patient with clinical findings of subacute to chronic symptoms   -will post pone lumbar decompression given ongoing medical comorbidities, in particular cirrhosis with baseline elevated INR.   -will plan to see patient as outpatient to schedule surgery; if he improves medically during this admission, please reach out to neurosurgery and we will reevaluate for operative intervention while inpatient  --ok for [facility]  --primary team holding xarelto given coagulopathy and GI bleed; if patient were to go to OR with nsgy this admission, will have to have anticoagulation held prior to OR and reasonable correction of his coagulopathy  --no HOB restrictions from NSGY standpoint  --GIB: GI consulted for GI bleed, EGD negative for bleed but showed esophageal varices. Concern for infiltrative liver process on top of alcoholic cirrhosis  --MARTITA: Nephrology consulted, Cr improving, iHD per Nephrology   --ok for diet at this time per primary  --nsgy will sign off at this time, please call with any changes or worsening of neuro  exam  --MICU/HM for all primary medical issues    Dispo: ongoing per primary team

## 2023-01-30 ENCOUNTER — TELEPHONE (OUTPATIENT)
Dept: NEUROSURGERY | Facility: CLINIC | Age: 63
End: 2023-01-30
Payer: MEDICAID

## 2023-01-30 PROBLEM — D68.9 COAGULOPATHY: Status: RESOLVED | Noted: 2023-01-16 | Resolved: 2023-01-30

## 2023-01-30 LAB
ALBUMIN SERPL BCP-MCNC: 2.1 G/DL (ref 3.5–5.2)
ALP SERPL-CCNC: 170 U/L (ref 55–135)
ALT SERPL W/O P-5'-P-CCNC: 44 U/L (ref 10–44)
ANION GAP SERPL CALC-SCNC: 11 MMOL/L (ref 8–16)
ANION GAP SERPL CALC-SCNC: 13 MMOL/L (ref 8–16)
AST SERPL-CCNC: 121 U/L (ref 10–40)
BASOPHILS # BLD AUTO: 0.15 K/UL (ref 0–0.2)
BASOPHILS NFR BLD: 1 % (ref 0–1.9)
BILIRUB SERPL-MCNC: 3.5 MG/DL (ref 0.1–1)
BUN SERPL-MCNC: 30 MG/DL (ref 8–23)
BUN SERPL-MCNC: 31 MG/DL (ref 8–23)
CALCIUM SERPL-MCNC: 8.1 MG/DL (ref 8.7–10.5)
CALCIUM SERPL-MCNC: 8.2 MG/DL (ref 8.7–10.5)
CHLORIDE SERPL-SCNC: 96 MMOL/L (ref 95–110)
CHLORIDE SERPL-SCNC: 96 MMOL/L (ref 95–110)
CO2 SERPL-SCNC: 27 MMOL/L (ref 23–29)
CO2 SERPL-SCNC: 29 MMOL/L (ref 23–29)
CREAT SERPL-MCNC: 1.8 MG/DL (ref 0.5–1.4)
CREAT SERPL-MCNC: 1.8 MG/DL (ref 0.5–1.4)
DIFFERENTIAL METHOD: ABNORMAL
EOSINOPHIL # BLD AUTO: 0.1 K/UL (ref 0–0.5)
EOSINOPHIL NFR BLD: 0.5 % (ref 0–8)
ERYTHROCYTE [DISTWIDTH] IN BLOOD BY AUTOMATED COUNT: 21.8 % (ref 11.5–14.5)
EST. GFR  (NO RACE VARIABLE): 42.3 ML/MIN/1.73 M^2
EST. GFR  (NO RACE VARIABLE): 42.3 ML/MIN/1.73 M^2
GLUCOSE SERPL-MCNC: 115 MG/DL (ref 70–110)
GLUCOSE SERPL-MCNC: 116 MG/DL (ref 70–110)
HCT VFR BLD AUTO: 28.9 % (ref 40–54)
HGB BLD-MCNC: 9.3 G/DL (ref 14–18)
IMM GRANULOCYTES # BLD AUTO: 0.09 K/UL (ref 0–0.04)
IMM GRANULOCYTES NFR BLD AUTO: 0.6 % (ref 0–0.5)
INR PPP: 1.2 (ref 0.8–1.2)
LYMPHOCYTES # BLD AUTO: 1.3 K/UL (ref 1–4.8)
LYMPHOCYTES NFR BLD: 8.6 % (ref 18–48)
MAGNESIUM SERPL-MCNC: 1.6 MG/DL (ref 1.6–2.6)
MAGNESIUM SERPL-MCNC: 2 MG/DL (ref 1.6–2.6)
MCH RBC QN AUTO: 31.2 PG (ref 27–31)
MCHC RBC AUTO-ENTMCNC: 32.2 G/DL (ref 32–36)
MCV RBC AUTO: 97 FL (ref 82–98)
MONOCYTES # BLD AUTO: 1 K/UL (ref 0.3–1)
MONOCYTES NFR BLD: 6.9 % (ref 4–15)
NEUTROPHILS # BLD AUTO: 12 K/UL (ref 1.8–7.7)
NEUTROPHILS NFR BLD: 82.4 % (ref 38–73)
NRBC BLD-RTO: 0 /100 WBC
PHOSPHATE SERPL-MCNC: 3.8 MG/DL (ref 2.7–4.5)
PLATELET # BLD AUTO: 207 K/UL (ref 150–450)
PMV BLD AUTO: 12 FL (ref 9.2–12.9)
POCT GLUCOSE: 118 MG/DL (ref 70–110)
POCT GLUCOSE: 128 MG/DL (ref 70–110)
POCT GLUCOSE: 128 MG/DL (ref 70–110)
POTASSIUM SERPL-SCNC: 3.1 MMOL/L (ref 3.5–5.1)
POTASSIUM SERPL-SCNC: 3.4 MMOL/L (ref 3.5–5.1)
PROT SERPL-MCNC: 7 G/DL (ref 6–8.4)
PROTHROMBIN TIME: 12.4 SEC (ref 9–12.5)
RBC # BLD AUTO: 2.98 M/UL (ref 4.6–6.2)
SODIUM SERPL-SCNC: 136 MMOL/L (ref 136–145)
SODIUM SERPL-SCNC: 136 MMOL/L (ref 136–145)
WBC # BLD AUTO: 14.6 K/UL (ref 3.9–12.7)

## 2023-01-30 PROCEDURE — 85025 COMPLETE CBC W/AUTO DIFF WBC: CPT | Performed by: STUDENT IN AN ORGANIZED HEALTH CARE EDUCATION/TRAINING PROGRAM

## 2023-01-30 PROCEDURE — 83735 ASSAY OF MAGNESIUM: CPT | Performed by: STUDENT IN AN ORGANIZED HEALTH CARE EDUCATION/TRAINING PROGRAM

## 2023-01-30 PROCEDURE — 63600175 PHARM REV CODE 636 W HCPCS: Performed by: STUDENT IN AN ORGANIZED HEALTH CARE EDUCATION/TRAINING PROGRAM

## 2023-01-30 PROCEDURE — 25000003 PHARM REV CODE 250

## 2023-01-30 PROCEDURE — 80053 COMPREHEN METABOLIC PANEL: CPT | Performed by: STUDENT IN AN ORGANIZED HEALTH CARE EDUCATION/TRAINING PROGRAM

## 2023-01-30 PROCEDURE — 99232 PR SUBSEQUENT HOSPITAL CARE,LEVL II: ICD-10-PCS | Mod: ,,, | Performed by: INTERNAL MEDICINE

## 2023-01-30 PROCEDURE — 80048 BASIC METABOLIC PNL TOTAL CA: CPT | Mod: XB | Performed by: STUDENT IN AN ORGANIZED HEALTH CARE EDUCATION/TRAINING PROGRAM

## 2023-01-30 PROCEDURE — 84100 ASSAY OF PHOSPHORUS: CPT | Performed by: STUDENT IN AN ORGANIZED HEALTH CARE EDUCATION/TRAINING PROGRAM

## 2023-01-30 PROCEDURE — 25000003 PHARM REV CODE 250: Performed by: STUDENT IN AN ORGANIZED HEALTH CARE EDUCATION/TRAINING PROGRAM

## 2023-01-30 PROCEDURE — 83735 ASSAY OF MAGNESIUM: CPT | Mod: 91 | Performed by: STUDENT IN AN ORGANIZED HEALTH CARE EDUCATION/TRAINING PROGRAM

## 2023-01-30 PROCEDURE — 99291 CRITICAL CARE FIRST HOUR: CPT | Mod: ,,, | Performed by: INTERNAL MEDICINE

## 2023-01-30 PROCEDURE — 94761 N-INVAS EAR/PLS OXIMETRY MLT: CPT

## 2023-01-30 PROCEDURE — 25000003 PHARM REV CODE 250: Performed by: INTERNAL MEDICINE

## 2023-01-30 PROCEDURE — 99222 1ST HOSP IP/OBS MODERATE 55: CPT | Mod: ,,, | Performed by: STUDENT IN AN ORGANIZED HEALTH CARE EDUCATION/TRAINING PROGRAM

## 2023-01-30 PROCEDURE — 99232 SBSQ HOSP IP/OBS MODERATE 35: CPT | Mod: ,,, | Performed by: INTERNAL MEDICINE

## 2023-01-30 PROCEDURE — 99222 PR INITIAL HOSPITAL CARE,LEVL II: ICD-10-PCS | Mod: ,,, | Performed by: STUDENT IN AN ORGANIZED HEALTH CARE EDUCATION/TRAINING PROGRAM

## 2023-01-30 PROCEDURE — 20000000 HC ICU ROOM

## 2023-01-30 PROCEDURE — 99291 PR CRITICAL CARE, E/M 30-74 MINUTES: ICD-10-PCS | Mod: ,,, | Performed by: INTERNAL MEDICINE

## 2023-01-30 PROCEDURE — 85610 PROTHROMBIN TIME: CPT | Performed by: STUDENT IN AN ORGANIZED HEALTH CARE EDUCATION/TRAINING PROGRAM

## 2023-01-30 RX ORDER — METOPROLOL TARTRATE 1 MG/ML
5 INJECTION, SOLUTION INTRAVENOUS EVERY 5 MIN PRN
Status: DISCONTINUED | OUTPATIENT
Start: 2023-01-30 | End: 2023-02-07 | Stop reason: HOSPADM

## 2023-01-30 RX ORDER — POTASSIUM CHLORIDE 7.45 MG/ML
10 INJECTION INTRAVENOUS
Status: COMPLETED | OUTPATIENT
Start: 2023-01-30 | End: 2023-01-30

## 2023-01-30 RX ORDER — METOPROLOL SUCCINATE 25 MG/1
25 TABLET, EXTENDED RELEASE ORAL DAILY
Status: DISCONTINUED | OUTPATIENT
Start: 2023-01-30 | End: 2023-02-01

## 2023-01-30 RX ORDER — POTASSIUM CHLORIDE 20 MEQ/1
20 TABLET, EXTENDED RELEASE ORAL ONCE
Status: COMPLETED | OUTPATIENT
Start: 2023-01-30 | End: 2023-01-30

## 2023-01-30 RX ORDER — POTASSIUM CHLORIDE 750 MG/1
30 CAPSULE, EXTENDED RELEASE ORAL
Status: COMPLETED | OUTPATIENT
Start: 2023-01-30 | End: 2023-01-30

## 2023-01-30 RX ORDER — MAGNESIUM SULFATE HEPTAHYDRATE 40 MG/ML
2 INJECTION, SOLUTION INTRAVENOUS ONCE
Status: COMPLETED | OUTPATIENT
Start: 2023-01-30 | End: 2023-01-30

## 2023-01-30 RX ADMIN — HEPARIN SODIUM 5000 UNITS: 5000 INJECTION INTRAVENOUS; SUBCUTANEOUS at 05:01

## 2023-01-30 RX ADMIN — POTASSIUM CHLORIDE 30 MEQ: 10 CAPSULE, COATED, EXTENDED RELEASE ORAL at 09:01

## 2023-01-30 RX ADMIN — GUAIFENESIN AND DEXTROMETHORPHAN HYDROBROMIDE 2 TABLET: 600; 30 TABLET, EXTENDED RELEASE ORAL at 08:01

## 2023-01-30 RX ADMIN — FOLIC ACID 1 MG: 1 TABLET ORAL at 08:01

## 2023-01-30 RX ADMIN — PANTOPRAZOLE SODIUM 40 MG: 40 TABLET, DELAYED RELEASE ORAL at 03:01

## 2023-01-30 RX ADMIN — POTASSIUM CHLORIDE 10 MEQ: 7.46 INJECTION, SOLUTION INTRAVENOUS at 09:01

## 2023-01-30 RX ADMIN — AMIODARONE HYDROCHLORIDE 400 MG: 200 TABLET ORAL at 08:01

## 2023-01-30 RX ADMIN — HYDROMORPHONE HYDROCHLORIDE 0.2 MG: 1 INJECTION, SOLUTION INTRAMUSCULAR; INTRAVENOUS; SUBCUTANEOUS at 07:01

## 2023-01-30 RX ADMIN — MIDODRINE HYDROCHLORIDE 10 MG: 5 TABLET ORAL at 03:01

## 2023-01-30 RX ADMIN — LACTULOSE 10 G: 20 SOLUTION ORAL at 08:01

## 2023-01-30 RX ADMIN — METOROPROLOL TARTRATE 5 MG: 5 INJECTION, SOLUTION INTRAVENOUS at 04:01

## 2023-01-30 RX ADMIN — MIDODRINE HYDROCHLORIDE 10 MG: 5 TABLET ORAL at 09:01

## 2023-01-30 RX ADMIN — MAGNESIUM SULFATE 2 G: 2 INJECTION INTRAVENOUS at 10:01

## 2023-01-30 RX ADMIN — BENZONATATE 200 MG: 100 CAPSULE ORAL at 04:01

## 2023-01-30 RX ADMIN — POTASSIUM CHLORIDE 20 MEQ: 1500 TABLET, EXTENDED RELEASE ORAL at 05:01

## 2023-01-30 RX ADMIN — THERA TABS 1 TABLET: TAB at 08:01

## 2023-01-30 RX ADMIN — HEPARIN SODIUM 5000 UNITS: 5000 INJECTION INTRAVENOUS; SUBCUTANEOUS at 09:01

## 2023-01-30 RX ADMIN — POTASSIUM CHLORIDE 10 MEQ: 7.46 INJECTION, SOLUTION INTRAVENOUS at 06:01

## 2023-01-30 RX ADMIN — LACTULOSE 10 G: 20 SOLUTION ORAL at 03:01

## 2023-01-30 RX ADMIN — MIDODRINE HYDROCHLORIDE 10 MG: 5 TABLET ORAL at 05:01

## 2023-01-30 RX ADMIN — HYDROMORPHONE HYDROCHLORIDE 0.2 MG: 1 INJECTION, SOLUTION INTRAMUSCULAR; INTRAVENOUS; SUBCUTANEOUS at 04:01

## 2023-01-30 RX ADMIN — POTASSIUM CHLORIDE 30 MEQ: 10 CAPSULE, COATED, EXTENDED RELEASE ORAL at 03:01

## 2023-01-30 RX ADMIN — POTASSIUM CHLORIDE 10 MEQ: 7.46 INJECTION, SOLUTION INTRAVENOUS at 07:01

## 2023-01-30 RX ADMIN — THIAMINE HCL TAB 100 MG 100 MG: 100 TAB at 08:01

## 2023-01-30 RX ADMIN — HEPARIN SODIUM 5000 UNITS: 5000 INJECTION INTRAVENOUS; SUBCUTANEOUS at 03:01

## 2023-01-30 RX ADMIN — POTASSIUM CHLORIDE 10 MEQ: 7.46 INJECTION, SOLUTION INTRAVENOUS at 05:01

## 2023-01-30 RX ADMIN — PANTOPRAZOLE SODIUM 40 MG: 40 TABLET, DELAYED RELEASE ORAL at 05:01

## 2023-01-30 RX ADMIN — METOROPROLOL TARTRATE 5 MG: 5 INJECTION, SOLUTION INTRAVENOUS at 10:01

## 2023-01-30 RX ADMIN — POTASSIUM CHLORIDE 30 MEQ: 10 CAPSULE, COATED, EXTENDED RELEASE ORAL at 11:01

## 2023-01-30 RX ADMIN — TORSEMIDE 50 MG: 20 TABLET ORAL at 08:01

## 2023-01-30 RX ADMIN — METOPROLOL SUCCINATE 25 MG: 25 TABLET, EXTENDED RELEASE ORAL at 06:01

## 2023-01-30 NOTE — HPI
Mr. Fuller is a 62yoM with PMHx of Afib on Xarelto, THN, DM who was initially [place] [facility] [place] after presenting to OSH with chronic lower back pain worsening over the past week associated with fecal incontinence with black tarry stools.  Patient was admitted to MICU with NSGY and GI consultation.  Hgb stable and EGD with no active bleeding.  NSGY with initial planned surgery however canceled 2/2 medical optimization.  Hospital course c/b worsening MARTITA with minimal UOP with nephrology consulted and patient on intermittent HD with acute renal failure.  Patient on night of 11/23 with afib w/ RVR and started on amio gtt with improvement.  Transitioned to to home lopressor however transitioned back to amio  BID with uncontrolled rate.  Cardiology consulted for NSVT on amio with prolonged QT noted on EKG.

## 2023-01-30 NOTE — ASSESSMENT & PLAN NOTE
Creatinine 4.7 on admit, baseline around 0.8. Pre-renal vs ATN. Less likely obstruction as pt has gomez.  Renal ultrasound with medical renal disease and no evidence of hydronephrosis.    - Nephrology consulted, appreciate recommendations  - iHD per nephrology  - Strict I&Os and daily weights   - Avoid nephrotoxic agents such as NSAIDs, gadolinium and IV radiocontrast.  - Renally dose meds to current GFR.  - Maintain MAP > 65.   - Torsemide 50 mg daily  - Remove IJ CVC

## 2023-01-30 NOTE — SUBJECTIVE & OBJECTIVE
Past Medical History:   Diagnosis Date    A-fib        Past Surgical History:   Procedure Laterality Date    ESOPHAGOGASTRODUODENOSCOPY N/A 1/17/2023    Procedure: EGD (ESOPHAGOGASTRODUODENOSCOPY);  Surgeon: Dewayne Navas MD;  Location: [facility] ([address] [place] [address]);  Service: Endoscopy;  Laterality: N/A;       Review of patient's allergies indicates:  No Known Allergies    No current facility-administered medications on file prior to encounter.     Current Outpatient Medications on File Prior to Encounter   Medication Sig    lisinopriL-hydrochlorothiazide (PRINZIDE,ZESTORETIC) 10-12.5 mg per tablet Take 1 tablet by mouth once daily.    metFORMIN (GLUCOPHAGE) 500 MG tablet Take 1,000 mg by mouth 2 (two) times daily.    simvastatin (ZOCOR) 40 MG tablet Take 40 mg by mouth every evening.    XARELTO 20 mg Tab Take 20 mg by mouth once daily.    aspirin (ECOTRIN) 81 MG EC tablet Take 81 mg by mouth once daily.    metoprolol tartrate (LOPRESSOR) 100 MG tablet Take 100 mg by mouth once daily.    omega-3 fatty acids 1,000 mg Cap Take 2 g by mouth once daily.    traZODone (DESYREL) 50 MG tablet Take 50 mg by mouth nightly as needed.    VITAMIN B COMPLEX ORAL Take 1 tablet by mouth once daily.     Family History    None       Tobacco Use    Smoking status: Not on file    Smokeless tobacco: Not on file   Substance and Sexual Activity    Alcohol use: Yes     Alcohol/week: 6.0 standard drinks     Types: 6 Cans of beer per week    Drug use: Not on file    Sexual activity: Not on file     Review of Systems   Constitutional: Negative for chills and fever.   Cardiovascular:  Negative for chest pain, dyspnea on exertion, orthopnea and palpitations.   Respiratory:  Negative for cough and shortness of breath.    Musculoskeletal:  Positive for back pain. Negative for falls.   Gastrointestinal:  Positive for nausea. Negative for abdominal pain and vomiting.   Neurological:  Negative for dizziness and light-headedness.   Objective:     Vital  Signs (Most Recent):  Temp: 97.6 °F (36.4 °C) (01/30/23 0700)  Pulse: 108 (01/30/23 1200)  Resp: 15 (01/30/23 1200)  BP: (!) 96/54 (01/30/23 1200)  SpO2: 96 % (01/30/23 1200)   Vital Signs (24h Range):  Temp:  [97.6 °F (36.4 °C)-98.9 °F (37.2 °C)] 97.6 °F (36.4 °C)  Pulse:  [] 108  Resp:  [14-37] 15  SpO2:  [91 %-98 %] 96 %  BP: ()/(54-89) 96/54     Weight: 110 kg (242 lb 8.1 oz)  Body mass index is 36.87 kg/m².    SpO2: 96 %         Intake/Output Summary (Last 24 hours) at 1/30/2023 1331  Last data filed at 1/30/2023 1200  Gross per 24 hour   Intake 33.14 ml   Output 3278 ml   Net -3244.86 ml       Lines/Drains/Airways       Central Venous Catheter Line  Duration             Trialysis (Dialysis) Catheter 01/22/23 1530 right internal jugular 7 days              Drain  Duration                  Open Drain 01/17/23 0800 Left;Anterior LLQ 13 days         Urethral Catheter 01/16/23 1347 Latex 14 Fr. 13 days              Peripheral Intravenous Line  Duration                  Peripheral IV - Single Lumen 01/27/23 1200 18 G Right Antecubital 3 days                    Physical Exam  Vitals and nursing note reviewed.   Constitutional:       General: He is not in acute distress.     Appearance: He is obese.   HENT:      Head: Normocephalic and atraumatic.      Mouth/Throat:      Mouth: Mucous membranes are moist.      Pharynx: Oropharynx is clear.   Eyes:      General: No scleral icterus.     Extraocular Movements: Extraocular movements intact.      Conjunctiva/sclera: Conjunctivae normal.   Cardiovascular:      Rate and Rhythm: Tachycardia present. Rhythm irregular.      Heart sounds: No murmur heard.  Pulmonary:      Effort: Pulmonary effort is normal. No respiratory distress.      Breath sounds: No wheezing or rales.   Abdominal:      General: There is distension.      Palpations: Abdomen is soft.      Tenderness: There is no abdominal tenderness. There is no guarding.   Musculoskeletal:         General: No  "tenderness.      Cervical back: Normal range of motion and neck supple.      Right lower leg: No edema.      Left lower leg: No edema.   Skin:     General: Skin is warm and dry.   Neurological:      Mental Status: He is alert and oriented to person, place, and time.       Significant Labs: CMP   Recent Labs   Lab 01/29/23  0328 01/30/23  0420    136   K 3.4* 3.1*   CL 99 96   CO2 23 27    115*   BUN 30* 31*   CREATININE 2.0* 1.8*   CALCIUM 8.4* 8.1*   PROT 6.5 7.0   ALBUMIN 2.0* 2.1*   BILITOT 3.0* 3.5*   ALKPHOS 172* 170*   * 121*   ALT 40 44   ANIONGAP 15 13   , CBC   Recent Labs   Lab 01/29/23  0914 01/30/23  1049   WBC 16.85* 14.60*   HGB 9.0* 9.3*   HCT 28.0* 28.9*    207   , and All pertinent lab results from the last 24 hours have been reviewed.    Significant Imaging: Echocardiogram: 2D echo with color flow doppler: No results found for this or any previous visit. and Transthoracic echo (TTE) complete (Cupid Only):   Results for orders placed or performed during the hospital encounter of 01/16/23   Echo   Result Value Ref Range    BSA 2.3 m2    TDI SEPTAL 0.09 m/s    LV LATERAL E/E' RATIO 12.00 m/s    LV SEPTAL E/E' RATIO 12.00 m/s    LA WIDTH 3.10 cm    TDI LATERAL 0.09 m/s    LVIDd 4.82 3.5 - 6.0 cm    IVS 0.93 0.6 - 1.1 cm    Posterior Wall 0.96 0.6 - 1.1 cm    LVIDs 2.95 2.1 - 4.0 cm    FS 39 28 - 44 %    LA volume 73.80 cm3    Sinus 3.50 cm    STJ 2.57 cm    Ascending aorta 3.59 cm    LV mass 158.79 g    LA size 4.59 cm    RVDD 3.37 cm    TAPSE 2.08 cm    Left Ventricle Relative Wall Thickness 0.40 cm    AV mean gradient 7 mmHg    AV valve area 3.67 cm2    AV Velocity Ratio 0.77     AV index (prosthetic) 0.85     MV valve area p 1/2 method 4.66 cm2    E/A ratio 1.32     Mean e' 0.09 m/s    E wave deceleration time 162.82 msec    MV "A" wave duration 14.84 msec    Pulm vein S/D ratio 1.25     LVOT diameter 2.34 cm    LVOT area 4.3 cm2    LVOT peak marcial 1.58 m/s    LVOT peak VTI " 30.52 cm    Ao peak jamal 2.05 m/s    Ao VTI 35.77 cm    LVOT stroke volume 131.19 cm3    AV peak gradient 17 mmHg    E/E' ratio 12.00 m/s    MV Peak E Jamal 1.08 m/s    TR Max Jamal 2.62 m/s    MV stenosis pressure 1/2 time 47.22 ms    MV Peak A Jamal 0.82 m/s    PV Peak S Jamal 0.66 m/s    PV Peak D Jamal 0.53 m/s    LV Systolic Volume 33.52 mL    LV Systolic Volume Index 15.1 mL/m2    LV Diastolic Volume 108.36 mL    LV Diastolic Volume Index 48.81 mL/m2    LA Volume Index 33.2 mL/m2    LV Mass Index 72 g/m2    RA Major Axis 5.64 cm    Left Atrium Minor Axis 6.24 cm    Left Atrium Major Axis 5.97 cm    Triscuspid Valve Regurgitation Peak Gradient 27 mmHg    LA Volume Index (Mod) 18.5 mL/m2    LA volume (mod) 41.09 cm3    RA Width 3.46 cm    Right Atrial Pressure (from IVC) 3 mmHg    EF 65 %    TV rest pulmonary artery pressure 30 mmHg    Narrative    · The left ventricle is small with normal systolic function.  · The estimated ejection fraction is 65%.  · Normal left ventricular diastolic function.  · Normal right ventricular size with normal right ventricular systolic   function.  · Normal central venous pressure (3 mmHg).  · The estimated PA systolic pressure is 30 mmHg.  · Mild tricuspid regurgitation.       and EKG: most recent EKG with NSR and HR of 98 on morning of 1/30.  Qtc of 714

## 2023-01-30 NOTE — ASSESSMENT & PLAN NOTE
-Last A1c reviewed-   Lab Results   Component Value Date    HGBA1C 5.3 11/10/2022       Home Antihyperglycemic Regiment:  - Metformin  1000 mg BID     Inpatient Antihyperglycemic Regiment:  Antihyperglycemics (From admission, onward)    Start     Stop Route Frequency Ordered    01/16/23 1507  insulin aspart U-100 pen 0-5 Units         -- SubQ Before meals & nightly PRN 01/16/23 1407        - LDSSI  - Clear liquid diet for now     Blood Sugars (AccuCheck):    Recent Labs     01/29/23  0811 01/29/23  1215 01/29/23  1711 01/29/23  2018 01/30/23  0837 01/30/23  1048   POCTGLUCOSE 116* 120* 120* 136* 118* 128*

## 2023-01-30 NOTE — ASSESSMENT & PLAN NOTE
CT Abdomen Pelvis  Without Contrast. Findings concerning for developing small bowel obstruction with suspected transition point seen within the midline mid to lower abdomen at the level of L4  Pt without symptoms of nausea or vomiting. Reportedly had a BM at the outside facility on 1/16/2023. Abdomen is distended.     - Holding of NGT for now given no significant symptoms   - Monitor BM and GI symptoms, if worsen will notify GS

## 2023-01-30 NOTE — TELEPHONE ENCOUNTER
Pt scheduled with Dr. Gonzalez 3/1/23. Letter mailed.     ----- Message from Krista Finch MD sent at 1/29/2023  9:07 AM CST -----  Hi there    Could you please arrange for this patient to see Dr Gonzalez in clinic in 1 month to discuss lumbar lami/fusion and surgical optimization?     Thank you!  Krista

## 2023-01-30 NOTE — SUBJECTIVE & OBJECTIVE
Interval History: continues to improve. Robust response to torsemide 50 bid, 3500 cc out in last 24 hours    Review of patient's allergies indicates:  No Known Allergies  Current Facility-Administered Medications   Medication Frequency    0.9%  NaCl infusion (for blood administration) Q24H PRN    albuterol-ipratropium 2.5 mg-0.5 mg/3 mL nebulizer solution 3 mL Q6H PRN    amiodarone tablet 400 mg BID    benzonatate capsule 200 mg TID PRN    dextromethorphan-guaiFENesin  mg per 12 hr tablet 2 tablet BID    dextrose 10% bolus 125 mL 125 mL PRN    dextrose 10% bolus 250 mL 250 mL PRN    folic acid tablet 1 mg Daily    glucagon (human recombinant) injection 1 mg PRN    glucose chewable tablet 16 g PRN    glucose chewable tablet 24 g PRN    heparin (porcine) injection 5,000 Units Q8H    HYDROmorphone injection 0.2 mg Q6H PRN    insulin aspart U-100 pen 0-5 Units QID (AC + HS) PRN    lactulose 20 gram/30 mL solution Soln 10 g TID    LIDOcaine-prilocaine cream PRN    midodrine tablet 10 mg Q8H    multivitamin tablet Daily    ondansetron injection 4 mg Q6H PRN    pantoprazole EC tablet 40 mg BID AC    potassium chloride CR capsule 30 mEq Q3H    prochlorperazine injection Soln 5 mg Q4H PRN    promethazine-codeine 6.25-10 mg/5 ml syrup 5 mL Q8H PRN    thiamine tablet 100 mg Daily    torsemide tablet 50 mg BID loop       Objective:     Vital Signs (Most Recent):  Temp: 97.6 °F (36.4 °C) (01/30/23 0700)  Pulse: (!) 129 (01/30/23 0900)  Resp: 15 (01/30/23 0900)  BP: 124/60 (01/30/23 0800)  SpO2: (!) 92 % (01/30/23 0900)   Vital Signs (24h Range):  Temp:  [97.6 °F (36.4 °C)-98.9 °F (37.2 °C)] 97.6 °F (36.4 °C)  Pulse:  [] 129  Resp:  [14-37] 15  SpO2:  [92 %-98 %] 92 %  BP: (103-159)/(55-89) 124/60     Weight: 110 kg (242 lb 8.1 oz) (01/23/23 1408)  Body mass index is 36.87 kg/m².  Body surface area is 2.3 meters squared.    I/O last 3 completed shifts:  In: 144.4 [P.O.:100; I.V.:44.4]  Out: 6103 [Urine:6100;  Stool:3]    Physical Exam  Constitutional:       General: He is not in acute distress.     Appearance: He is obese. He is not ill-appearing or toxic-appearing.   HENT:      Head: Normocephalic and atraumatic.      Nose: Nose normal.      Mouth/Throat:      Mouth: Mucous membranes are moist.   Eyes:      General:         Right eye: No discharge.         Left eye: No discharge.      Pupils: Pupils are equal, round, and reactive to light.   Cardiovascular:      Heart sounds: Murmur heard.   Abdominal:      General: There is distension.      Tenderness: There is no abdominal tenderness.   Musculoskeletal:         General: Normal range of motion.      Cervical back: Normal range of motion.      Right lower leg: Edema present.      Left lower leg: Edema present.   Skin:     General: Skin is warm.      Coloration: Skin is pale.      Findings: No lesion.   Neurological:      Mental Status: He is alert and oriented to person, place, and time.       Significant Labs:  All labs within the past 24 hours have been reviewed.     Significant Imaging:  Labs: Reviewed

## 2023-01-30 NOTE — ASSESSMENT & PLAN NOTE
Likely 2/2 to underlying alcohol abuse and hepatic steatosis.     - Daily CMP, PT/INR   - Complete workup up with tylenol level, acute hep panel, a1-antitrypsin, anti smith antibody, HIV, anti mitochondrial antibody, anca, anti-liver, aFP,   - Liver US with doppler shows epatomegaly with hepatic steatosis, cholelithiasis versus gallbladder sludge, small volume ascites, and left pleural effusion.  - Starting midodrine 10 mg TID   - Lactulose 1g TID

## 2023-01-30 NOTE — ASSESSMENT & PLAN NOTE
- Maintain K > 4, Mag > 2 and Ca/iCal WNL to decrease arrhythmogenic potential  - Recommend continuing PO amio 400mg BID for total of 2 weeks for loading dose with plan to transition to 200mg daily afterwards.  - Would start low dose beta blocker, Toprol 25mg daily, for rate control   --> [id number] score 2   --> HASBLED score of 1  - Recommend restarting anticoagulation when able to, holding per primary team and surgery planning however patient with high risk for developing thrombus with afib  - Would repeat EKG with unclear of QTc accuracy on most recent and continue tele, noted to have 5 beat nonsustained of v tach, would correct and trend electrolytes as above

## 2023-01-30 NOTE — ASSESSMENT & PLAN NOTE
S/p diagnostic paracentesis on 1/17 with , 50% seg. Cytology negative for malignant cell     - lactulose 1g TID PO  - Starting midodrine 10 mg TID   - Palliative care consult

## 2023-01-30 NOTE — ASSESSMENT & PLAN NOTE
Lopressor was started while on hospital medicine at 25 mg BID (home does lopressor 100 mg QD). Pt went into afib with rvr on 1/27, IV lopressor given x 2 with subsequent drop of BP, re-started on amiodarone gtt, Amiodarone gtt switched to PO. Runs of Vtach on 1/30 with highest at 11 beats. Sustained tachycardia in 150s.    -Consulting cardiology for runs of Vtach this morning. Will likely need EP evaluation  -Lopressor IV with possible restart of IV amio  -Maintain K > 4, Mag > 2 and Ca/iCal WNL to decrease arrhythmogenic potential

## 2023-01-30 NOTE — PROGRESS NOTES
[facility]  Nephrology  Progress Note    Patient Name: Jonny Isabel  MRN: [id number]  Admission Date: 1/16/2023  Hospital Length of Stay: 14 days  Attending Provider: Benita Little MD   Primary Care Physician: Yuli Pérez Mai, MD  Principal Problem:Atrial fibrillation with rapid ventricular response    Subjective:     HPI: 62 year old male with a history of alcohol abuse, afib/flutter on xarelto, HTN presents with concern for severe spinal canal stenosis/vacuum disc phenomena, concern for a GI bleed and MARTITA. He presents at behest of his girlfriend after being unable to stand, he has a history of sciatica but reports that this is worse. He has had fecal incontinence for the last week.     He has no report of kidney issues in the past and reports no family history of such either. At time of consultation he is pending an EGD for GI bleed. CT abdomen with large liver and concern for SBO per report, no hydronephrosis. UA with 2+ protein, 1+ occult blood, urine sodium 25 and urine osm 302. On admission serum creatinine 5 (baseline 0.8).      Interval History: continues to improve. Robust response to torsemide 50 bid, 3500 cc out in last 24 hours    Review of patient's allergies indicates:  No Known Allergies  Current Facility-Administered Medications   Medication Frequency    0.9%  NaCl infusion (for blood administration) Q24H PRN    albuterol-ipratropium 2.5 mg-0.5 mg/3 mL nebulizer solution 3 mL Q6H PRN    amiodarone tablet 400 mg BID    benzonatate capsule 200 mg TID PRN    dextromethorphan-guaiFENesin  mg per 12 hr tablet 2 tablet BID    dextrose 10% bolus 125 mL 125 mL PRN    dextrose 10% bolus 250 mL 250 mL PRN    folic acid tablet 1 mg Daily    glucagon (human recombinant) injection 1 mg PRN    glucose chewable tablet 16 g PRN    glucose chewable tablet 24 g PRN    heparin (porcine) injection 5,000 Units Q8H    HYDROmorphone injection 0.2 mg Q6H PRN    insulin aspart U-100 pen 0-5 Units QID  (AC + HS) PRN    lactulose 20 gram/30 mL solution Soln 10 g TID    LIDOcaine-prilocaine cream PRN    midodrine tablet 10 mg Q8H    multivitamin tablet Daily    ondansetron injection 4 mg Q6H PRN    pantoprazole EC tablet 40 mg BID AC    potassium chloride CR capsule 30 mEq Q3H    prochlorperazine injection Soln 5 mg Q4H PRN    promethazine-codeine 6.25-10 mg/5 ml syrup 5 mL Q8H PRN    thiamine tablet 100 mg Daily    torsemide tablet 50 mg BID loop       Objective:     Vital Signs (Most Recent):  Temp: 97.6 °F (36.4 °C) (01/30/23 0700)  Pulse: (!) 129 (01/30/23 0900)  Resp: 15 (01/30/23 0900)  BP: 124/60 (01/30/23 0800)  SpO2: (!) 92 % (01/30/23 0900)   Vital Signs (24h Range):  Temp:  [97.6 °F (36.4 °C)-98.9 °F (37.2 °C)] 97.6 °F (36.4 °C)  Pulse:  [] 129  Resp:  [14-37] 15  SpO2:  [92 %-98 %] 92 %  BP: (103-159)/(55-89) 124/60     Weight: 110 kg (242 lb 8.1 oz) (01/23/23 1408)  Body mass index is 36.87 kg/m².  Body surface area is 2.3 meters squared.    I/O last 3 completed shifts:  In: 144.4 [P.O.:100; I.V.:44.4]  Out: 6103 [Urine:6100; Stool:3]    Physical Exam  Constitutional:       General: He is not in acute distress.     Appearance: He is obese. He is not ill-appearing or toxic-appearing.   HENT:      Head: Normocephalic and atraumatic.      Nose: Nose normal.      Mouth/Throat:      Mouth: Mucous membranes are moist.   Eyes:      General:         Right eye: No discharge.         Left eye: No discharge.      Pupils: Pupils are equal, round, and reactive to light.   Cardiovascular:      Heart sounds: Murmur heard.   Abdominal:      General: There is distension.      Tenderness: There is no abdominal tenderness.   Musculoskeletal:         General: Normal range of motion.      Cervical back: Normal range of motion.      Right lower leg: Edema present.      Left lower leg: Edema present.   Skin:     General: Skin is warm.      Coloration: Skin is pale.      Findings: No lesion.   Neurological:      Mental  Status: He is alert and oriented to person, place, and time.       Significant Labs:  All labs within the past 24 hours have been reviewed.     Significant Imaging:  Labs: Reviewed    Assessment/Plan:     MARTITA (acute kidney injury)  Size of liver suggest other etiology than cirrhosis, however also has esophageal varices, possible HRS. CT and US renal with no signs of obstruction.     Urine microscopy: (1/17) no casts identified, amorphous material (though only 5 cc obtained). Repeat with few granular casts (1/18). Repeat on 1/19 with granular casts. 1/20: moderated (2-3 per field) granular casts with calcium oxylate crystals embedded in casts.     Urine protein 4.59g and repeat with 2.5  C3/4 - 111/35  LILO with ratio of 1.98  Hep B and C negative  Uric acid 13    HIV negative  Iron 31, TIBC 151, Tsat 21, Transferrin 102, ferritin 135  KEATON negative  ANCA negative  Cryo negative    1/19: repeat Urine sodium 10, urine protein creatinine ratio 0.89. IgA 505. Urine microscopy: moderated (2-3 per field) granular casts with calcium oxylate crystals embedded in casts, overall findings consistent with ATN    Overall impression: UOP responsive to 240 IV lasix 1/25. He did require RRT 1/22 for metabolic encephalopathy and work of breathing    Recommendations:  -Decrease torsemide 50 from BID to once daily  -Can remove IJ CVC  -Keep MAP > 65  -Keep hemoglobin > 7  -Strict ins and outs  -Avoid nephrotoxic agents if possible  -Avoid drastic hemodynamic changes if possible           Spinal stenosis  NSGY deferring surgery for later date          Thank you for your consult. I will follow-up with patient. Please contact us if you have any additional questions.    Otoniel Cat MD  Nephrology  [facility]    ATTENDING PHYSICIAN ATTESTATION  I have personally verified the history and examined the patient. I thoroughly reviewed the demographic, clinical, laboratorial and imaging information available in medical  records. I agree with the assessment and recommendations provided by the subspecialty resident who was under my supervision.

## 2023-01-30 NOTE — PLAN OF CARE
[facility] DAILY GOALS       A: Awake    RASS: Goal - RASS Goal: 0-->alert and calm  Actual - RASS (Braxton Agitation-Sedation Scale): 0-->alert and calm   Restraint necessity:    B: Breathe   SBT: Not intubated   C: Coordinate A & B, analgesics/sedatives   Pain: managed    SAT: Not intubated  D: Delirium   CAM-ICU: Overall CAM-ICU: Negative  E: Early(intubated/ Progressive (non-intubated) Mobility   MOVE Screen: Pass   Activity: Activity Management: Rolling - L1  FAS: Feeding/Nutrition   Diet order: Diet/Nutrition Received: regular, Specialty Diet/Nutrition Received: dysphagia mechanically altered  T: Thrombus   DVT prophylaxis: VTE Required Core Measure: Pharmacological prophylaxis initiated/maintained  H: HOB Elevation   Head of Bed (HOB) Positioning: HOB at 30-45 degrees  U: Ulcer Prophylaxis   GI: yes  G: Glucose control   managed Glycemic Management: blood glucose monitored  S: Skin   Bathing/Skin Care: bath, partial, incontinence care, linen changed  Device Skin Pressure Protection: absorbent pad utilized/changed, adhesive use limited, skin-to-device areas padded, skin-to-skin areas padded  Pressure Reduction Devices: pressure-redistributing mattress utilized, specialty bed utilized  Pressure Reduction Techniques: frequent weight shift encouraged, pressure points protected, positioned off wounds  Skin Protection: adhesive use limited, incontinence pads utilized, skin-to-device areas padded, skin-to-skin areas padded  B: Bowel Function   no issues   I: Indwelling Catheters   Bose necessity:      Urethral Catheter 01/16/23 1347 Latex 14 Fr.-Reason for Continuing Urinary Catheterization: Critically ill in ICU and requiring hourly monitoring of intake/output   CVC necessity: Yes  D: De-escalation Antibiotics   No    Family/Goals of care/Code Status   Code Status: Full Code    24H Vital Sign Range  Temp:  [97.6 °F (36.4 °C)-98.6 °F (37 °C)]   Pulse:  []   Resp:  [14-37]   BP: ()/(54-89)   SpO2:  [91 %-98  %]      Shift Events   No acute events throughout shift. Pt had multiple runs of vtach throughout shift, team notified. Metoprolol ordered and cardiology consulted. Awaiting orders from cardiology for better control of a fib with rvr and vtach episodes. Continuing to monitor pt closely for associated s/sx.     VS and assessment per flow sheet, patient progressing towards goals as tolerated, plan of care reviewed with  patient and significant other , all concerns addressed, will continue to monitor.    Erlin Villavicencio

## 2023-01-30 NOTE — ASSESSMENT & PLAN NOTE
CT Lumbar Spine Without Contrast Result Date: 1/16/2023  1.  Prominent, cystic, multiloculated predominantly gas attenuation structure within the spinal canal at the level of L3-L4 with dimensions as above.  This is favored to be epidural in location and results in severe narrowing of the spinal canal with mass effect upon the thecal sac.  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 Large disc extrusion at L3-4, contributing to severe spinal canal stenosis, as above. Additional lumbar spondylosis, contributing to moderate spinal canal stenosis at L2-3 and L4-5 and moderate neural foraminal narrowing L4-5 and L5-S1    - neuro checks Q4H

## 2023-01-30 NOTE — ASSESSMENT & PLAN NOTE
Size of liver suggest other etiology than cirrhosis, however also has esophageal varices, possible HRS. CT and US renal with no signs of obstruction.     Urine microscopy: (1/17) no casts identified, amorphous material (though only 5 cc obtained). Repeat with few granular casts (1/18). Repeat on 1/19 with granular casts. 1/20: moderated (2-3 per field) granular casts with calcium oxylate crystals embedded in casts.     Urine protein 4.59g and repeat with 2.5  C3/4 - 111/35  LILO with ratio of 1.98  Hep B and C negative  Uric acid 13    HIV negative  Iron 31, TIBC 151, Tsat 21, Transferrin 102, ferritin 135  KEATON negative  ANCA negative  Cryo negative    1/19: repeat Urine sodium 10, urine protein creatinine ratio 0.89. IgA 505. Urine microscopy: moderated (2-3 per field) granular casts with calcium oxylate crystals embedded in casts, overall findings consistent with ATN    Overall impression: UOP responsive to 240 IV lasix 1/25. He did require RRT 1/22 for metabolic encephalopathy and work of breathing    Recommendations:  -Decrease torsemide 50 from BID to once daily  -Keep MAP > 65  -Keep hemoglobin > 7  -Strict ins and outs  -Avoid nephrotoxic agents if possible  -Avoid drastic hemodynamic changes if possible

## 2023-01-30 NOTE — NURSING
Patient assessed; vitals stable; weaning down supplemental O2; 1x moderate bowel movement noted at shift change, patient cleaned and repositioned; sterile dressing change performed on R IJ trialysis, no issues or complaints; denies any further issues or discomfort; transfer orders in place, will continue to monitor.

## 2023-01-30 NOTE — ASSESSMENT & PLAN NOTE
Concerning for variceal bleed given hx of alcohol abuse.     - Regular diet  -Transfuse pRBC for Hb < 7 g/dL (Consider a higher Hb target if there is clinical evidence of intravascular volume depletion or comorbidities, such as CAD or if high suspicion of vigorous active ongoing bleeding or an uncorrected coagulopathy exists.).  - s/p vitamin K given Pt/INR > 2   -Avoid nonsteroidal agents, antiplatelet agents and anticoagulants if possible in patients without absolute contraindications. (consult managing provider if required for cardiovascular protection).  - EGD showed  Small (< 5 mm) esophageal varices with portal hypertensive gastropathy  - Continue PO PPI 40 mg

## 2023-01-30 NOTE — SUBJECTIVE & OBJECTIVE
Interval History/Significant Events: Pt with 4 episodes of Vtach this morning on telemetry with highest run at 11 beats. PT asymptomatic during runs of Vtach. Pt with sustained HR in the 150s.     Review of Systems   Constitutional:  Negative for chills and fever.   HENT:  Negative for congestion and trouble swallowing.    Eyes:  Negative for visual disturbance.   Respiratory:  Negative for cough, shortness of breath, wheezing and stridor.    Cardiovascular:  Negative for chest pain and leg swelling.   Gastrointestinal:  Positive for abdominal distention. Negative for abdominal pain, diarrhea, nausea and vomiting.        +fecal incontinence   Endocrine: Negative for polyuria.   Genitourinary:  Negative for difficulty urinating and dysuria.   Musculoskeletal:  Positive for back pain. Negative for arthralgias and myalgias.   Skin:  Negative for color change and rash.   Neurological:  Negative for dizziness, weakness, numbness and headaches.   Psychiatric/Behavioral:  Negative for agitation and behavioral problems.    Objective:     Vital Signs (Most Recent):  Temp: 97.6 °F (36.4 °C) (01/30/23 0700)  Pulse: 108 (01/30/23 1200)  Resp: 15 (01/30/23 1200)  BP: (!) 96/54 (01/30/23 1200)  SpO2: 96 % (01/30/23 1200)   Vital Signs (24h Range):  Temp:  [97.6 °F (36.4 °C)-98.9 °F (37.2 °C)] 97.6 °F (36.4 °C)  Pulse:  [] 108  Resp:  [14-37] 15  SpO2:  [91 %-98 %] 96 %  BP: ()/(54-89) 96/54   Weight: 110 kg (242 lb 8.1 oz)  Body mass index is 36.87 kg/m².      Intake/Output Summary (Last 24 hours) at 1/30/2023 1332  Last data filed at 1/30/2023 1200  Gross per 24 hour   Intake 33.14 ml   Output 3278 ml   Net -3244.86 ml       Physical Exam  Vitals and nursing note reviewed.   Constitutional:       General: He is not in acute distress.     Appearance: Normal appearance. He is not ill-appearing or toxic-appearing.   HENT:      Head: Normocephalic and atraumatic.      Nose: Nose normal.      Mouth/Throat:      Mouth:  Mucous membranes are moist.      Pharynx: Oropharynx is clear.   Eyes:      General: No scleral icterus.     Extraocular Movements: Extraocular movements intact.      Conjunctiva/sclera: Conjunctivae normal.      Pupils: Pupils are equal, round, and reactive to light.   Cardiovascular:      Rate and Rhythm: Regular rhythm. Tachycardia present.      Heart sounds: Normal heart sounds. No murmur heard.     Comments: Irregularly irregular pulses  Pulmonary:      Effort: Pulmonary effort is normal. No respiratory distress.      Breath sounds: Normal breath sounds. No stridor. No wheezing.   Abdominal:      General: Abdomen is flat. Bowel sounds are normal. There is no distension.      Palpations: Abdomen is soft.      Tenderness: There is no abdominal tenderness. There is no guarding or rebound.   Musculoskeletal:      Cervical back: Normal range of motion and neck supple. No rigidity.   Skin:     General: Skin is warm and dry.      Capillary Refill: Capillary refill takes less than 2 seconds.      Coloration: Skin is not jaundiced or pale.      Findings: No bruising or rash.   Neurological:      General: No focal deficit present.      Mental Status: He is alert and oriented to person, place, and time. Mental status is at baseline.      Sensory: No sensory deficit.      Motor: No weakness.   Psychiatric:         Mood and Affect: Mood normal.         Behavior: Behavior normal.       Vents:  Oxygen Concentration (%): 100 (01/30/23 0300)  Lines/Drains/Airways       Central Venous Catheter Line  Duration             Trialysis (Dialysis) Catheter 01/22/23 1530 right internal jugular 7 days              Drain  Duration                  Open Drain 01/17/23 0800 Left;Anterior LLQ 13 days         Urethral Catheter 01/16/23 1347 Latex 14 Fr. 13 days              Peripheral Intravenous Line  Duration                  Peripheral IV - Single Lumen 01/27/23 1200 18 G Right Antecubital 3 days                  Significant  Labs:    CBC/Anemia Profile:  Recent Labs   Lab 01/29/23  0914 01/30/23  1049   WBC 16.85* 14.60*   HGB 9.0* 9.3*   HCT 28.0* 28.9*    207   MCV 95 97   RDW 21.5* 21.8*        Chemistries:  Recent Labs   Lab 01/29/23  0328 01/30/23  0420    136   K 3.4* 3.1*   CL 99 96   CO2 23 27   BUN 30* 31*   CREATININE 2.0* 1.8*   CALCIUM 8.4* 8.1*   ALBUMIN 2.0* 2.1*   PROT 6.5 7.0   BILITOT 3.0* 3.5*   ALKPHOS 172* 170*   ALT 40 44   * 121*   MG 1.5* 1.6   PHOS 3.7 3.8       All pertinent labs within the past 24 hours have been reviewed.    Significant Imaging:  I have reviewed all pertinent imaging results/findings within the past 24 hours.

## 2023-01-30 NOTE — PROGRESS NOTES
[facility]  Critical Care Medicine  Progress Note    Patient Name: Jonny Isabel  MRN: [id number]  Admission Date: 1/16/2023  Hospital Length of Stay: 14 days  Code Status: Full Code  Attending Provider: Benita Little MD  Primary Care Provider: Yuli Pérez Mai, MD   Principal Problem: Atrial fibrillation with rapid ventricular response    Subjective:     HPI:  Mr. Fuller is a 62-year-old male with a PMHx of A-fibb (on Eliquis), hypertension, DM 2 (not insulin dependent) presents as transfer from OSH for chronic lower back that has been worsening for the past week. Pt states that the pain is radiating from his right buttock down to his legs. Pt was unable to get out of bed this AM due to the pain. HE endorses having fecal incontinence for the past week with black tarry stools. He denies any fevers, chills, abdominal pain, urinary incontinence, or saddle anesthesia.     At OSH ED CT lumbar spine ordered revealing Prominent, cystic, multiloculated predominantly gas attenuation epidural  structure within the spinal canal at the level of L3-L4 resulting in severe narrowing of the spinal canal with mass effect upon the thecal sac and  vacuum disc phenomena concerning for diskitis or an epidural abscess. MRI shows large disc extrusion at L3-4 causing severe spinal canal stenosis, with moderate spinal canal stenosis at L2-3 and L4-5 and moderate neural foraminal narrowing L4-5 and L5-S1. [place] [facility] [place]. Admitted to MICU for NSGY and GI evaluation . Hgb stable. EGD shows small varices with gastropathy, no active bleeds. Worsening MARTITA with minimal UOP, HRS protocol started with levo, midodrine, octreotide and albumin trailed, has since been stopped due to low suspicion. Nephrology following recommend HD in setting of Acute Renal Failure. NSGY initially planned surgical intervention for 1/23 but determine patient not medically optimized and currently suggesting  multimodal pain control. A-fibb RVR 1/23 overnight, started on Amiodarone gtt, transitioned to home lopressor. Ongoing HD needs.     Stepped down to hospital medicine on 1/25. On 1/27, pt developed afib with rvr, refractory to IV Lopresor x 2, developed hypotension subsequently and was stepped back up to MICU.          Hospital/ICU Course:  Admitted to MICU for NSGY and GI evaluation . Hgb stable. EGD shows small varices with gastropathy, no active bleeds. Worsening MARTITA with minimal UOP, HRS protocol started with levo, midodrine, octreotide and albumin trailed, has since been stopped due to low suspicion. Nephrology following recommend HD in setting of Acute Renal Failure (s/p 1 session SLED, s/p 1 session HD). NSGY initially planned surgical intervention for 1/23 but determine patient not medically optimized and currently suggesting multimodal pain control. A-fibb RVR 1/23 overnight, started on Amiodarone gtt, transitioned to home lopressor. Lasix trailed with good U/O. Added Torsemide for diuresis. Amio gtt restarted due to a-fibb with RVR, transitioned to PO.       Interval History/Significant Events: Pt with 4 episodes of Vtach this morning on telemetry with highest run at 11 beats. PT asymptomatic during runs of Vtach. Pt with sustained HR in the 150s.     Review of Systems   Constitutional:  Negative for chills and fever.   HENT:  Negative for congestion and trouble swallowing.    Eyes:  Negative for visual disturbance.   Respiratory:  Negative for cough, shortness of breath, wheezing and stridor.    Cardiovascular:  Negative for chest pain and leg swelling.   Gastrointestinal:  Positive for abdominal distention. Negative for abdominal pain, diarrhea, nausea and vomiting.        +fecal incontinence   Endocrine: Negative for polyuria.   Genitourinary:  Negative for difficulty urinating and dysuria.   Musculoskeletal:  Positive for back pain. Negative for arthralgias and myalgias.   Skin:  Negative for color  change and rash.   Neurological:  Negative for dizziness, weakness, numbness and headaches.   Psychiatric/Behavioral:  Negative for agitation and behavioral problems.    Objective:     Vital Signs (Most Recent):  Temp: 97.6 °F (36.4 °C) (01/30/23 0700)  Pulse: 108 (01/30/23 1200)  Resp: 15 (01/30/23 1200)  BP: (!) 96/54 (01/30/23 1200)  SpO2: 96 % (01/30/23 1200)   Vital Signs (24h Range):  Temp:  [97.6 °F (36.4 °C)-98.9 °F (37.2 °C)] 97.6 °F (36.4 °C)  Pulse:  [] 108  Resp:  [14-37] 15  SpO2:  [91 %-98 %] 96 %  BP: ()/(54-89) 96/54   Weight: 110 kg (242 lb 8.1 oz)  Body mass index is 36.87 kg/m².      Intake/Output Summary (Last 24 hours) at 1/30/2023 1332  Last data filed at 1/30/2023 1200  Gross per 24 hour   Intake 33.14 ml   Output 3278 ml   Net -3244.86 ml       Physical Exam  Vitals and nursing note reviewed.   Constitutional:       General: He is not in acute distress.     Appearance: Normal appearance. He is not ill-appearing or toxic-appearing.   HENT:      Head: Normocephalic and atraumatic.      Nose: Nose normal.      Mouth/Throat:      Mouth: Mucous membranes are moist.      Pharynx: Oropharynx is clear.   Eyes:      General: No scleral icterus.     Extraocular Movements: Extraocular movements intact.      Conjunctiva/sclera: Conjunctivae normal.      Pupils: Pupils are equal, round, and reactive to light.   Cardiovascular:      Rate and Rhythm: Regular rhythm. Tachycardia present.      Heart sounds: Normal heart sounds. No murmur heard.     Comments: Irregularly irregular pulses  Pulmonary:      Effort: Pulmonary effort is normal. No respiratory distress.      Breath sounds: Normal breath sounds. No stridor. No wheezing.   Abdominal:      General: Abdomen is flat. Bowel sounds are normal. There is no distension.      Palpations: Abdomen is soft.      Tenderness: There is no abdominal tenderness. There is no guarding or rebound.   Musculoskeletal:      Cervical back: Normal range of motion  and neck supple. No rigidity.   Skin:     General: Skin is warm and dry.      Capillary Refill: Capillary refill takes less than 2 seconds.      Coloration: Skin is not jaundiced or pale.      Findings: No bruising or rash.   Neurological:      General: No focal deficit present.      Mental Status: He is alert and oriented to person, place, and time. Mental status is at baseline.      Sensory: No sensory deficit.      Motor: No weakness.   Psychiatric:         Mood and Affect: Mood normal.         Behavior: Behavior normal.       Vents:  Oxygen Concentration (%): 100 (01/30/23 0300)  Lines/Drains/Airways       Central Venous Catheter Line  Duration             Trialysis (Dialysis) Catheter 01/22/23 1530 right internal jugular 7 days              Drain  Duration                  Open Drain 01/17/23 0800 Left;Anterior LLQ 13 days         Urethral Catheter 01/16/23 1347 Latex 14 Fr. 13 days              Peripheral Intravenous Line  Duration                  Peripheral IV - Single Lumen 01/27/23 1200 18 G Right Antecubital 3 days                  Significant Labs:    CBC/Anemia Profile:  Recent Labs   Lab 01/29/23  0914 01/30/23  1049   WBC 16.85* 14.60*   HGB 9.0* 9.3*   HCT 28.0* 28.9*    207   MCV 95 97   RDW 21.5* 21.8*        Chemistries:  Recent Labs   Lab 01/29/23  0328 01/30/23  0420    136   K 3.4* 3.1*   CL 99 96   CO2 23 27   BUN 30* 31*   CREATININE 2.0* 1.8*   CALCIUM 8.4* 8.1*   ALBUMIN 2.0* 2.1*   PROT 6.5 7.0   BILITOT 3.0* 3.5*   ALKPHOS 172* 170*   ALT 40 44   * 121*   MG 1.5* 1.6   PHOS 3.7 3.8       All pertinent labs within the past 24 hours have been reviewed.    Significant Imaging:  I have reviewed all pertinent imaging results/findings within the past 24 hours.      ABG  Recent Labs   Lab 01/27/23  0954   PH 7.384   PO2 71*   PCO2 36.9   HCO3 22.1*   BE -3     Assessment/Plan:     Neuro  Lumbar herniated disc  NSGY holding surgery due to unstable status.    Spinal  stenosis  CT Lumbar Spine Without Contrast Result Date: 1/16/2023  1.  Prominent, cystic, multiloculated predominantly gas attenuation structure within the spinal canal at the level of L3-L4 with dimensions as above.  This is favored to be epidural in location and results in severe narrowing of the spinal canal with mass effect upon the thecal sac.  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 Large disc extrusion at L3-4, contributing to severe spinal canal stenosis, as above. Additional lumbar spondylosis, contributing to moderate spinal canal stenosis at L2-3 and L4-5 and moderate neural foraminal narrowing L4-5 and L5-S1    - neuro checks Q4H      Psychiatric  Alcohol withdrawal syndrome without complication  CIWA daily    Alcohol abuse  - Vitals q4h while awake  - CIWA monitoring        Cardiac/Vascular  * Atrial fibrillation with rapid ventricular response  Lopressor was started while on hospital medicine at 25 mg BID (home does lopressor 100 mg QD). Pt went into afib with rvr on 1/27, IV lopressor given x 2 with subsequent drop of BP, re-started on amiodarone gtt, Amiodarone gtt switched to PO. Runs of Vtach on 1/30 with highest at 11 beats. Sustained tachycardia in 150s.    -Consulting cardiology for runs of Vtach this morning. Will likely need EP evaluation  -Lopressor IV with possible restart of IV amio  -Maintain K > 4, Mag > 2 and Ca/iCal WNL to decrease arrhythmogenic potential      Hemorrhagic shock  Resolved     Tachycardia  See primary problem    Renal/  ATN (acute tubular necrosis)  Likely secondary to acute hypotension    -IVF rehydration    MARTITA (acute kidney injury)  Creatinine 4.7 on admit, baseline around 0.8. Pre-renal vs ATN. Less likely obstruction as pt has gomez.  Renal ultrasound with medical renal disease and no evidence of hydronephrosis.    - Nephrology consulted, appreciate recommendations  - iHD  per nephrology  - Strict I&Os and daily weights   - Avoid nephrotoxic agents such as NSAIDs, gadolinium and IV radiocontrast.  - Renally dose meds to current GFR.  - Maintain MAP > 65.   - Torsemide 50 mg daily  - Remove IJ CVC         ID  Cellulitis  R hand seems to be cellulitic in appearance. Extending up to forearm.     Plan:  - Completed 10 days of Cefepime on 1/25      Endocrine  Diabetes  -Last A1c reviewed-   Lab Results   Component Value Date    HGBA1C 5.3 11/10/2022       Home Antihyperglycemic Regiment:  - Metformin  1000 mg BID     Inpatient Antihyperglycemic Regiment:  Antihyperglycemics (From admission, onward)    Start     Stop Route Frequency Ordered    01/16/23 1507  insulin aspart U-100 pen 0-5 Units         -- SubQ Before meals & nightly PRN 01/16/23 1407        - LDSSI  - Clear liquid diet for now     Blood Sugars (AccuCheck):    Recent Labs     01/29/23  0811 01/29/23  1215 01/29/23  1711 01/29/23  2018 01/30/23  0837 01/30/23  1048   POCTGLUCOSE 116* 120* 120* 136* 118* 128*           GI  Alcoholic cirrhosis of liver with ascites  S/p diagnostic paracentesis on 1/17 with , 50% seg. Cytology negative for malignant cell     - lactulose 1g TID PO  - Starting midodrine 10 mg TID   - Palliative care consult     Small bowel obstruction  CT Abdomen Pelvis  Without Contrast. Findings concerning for developing small bowel obstruction with suspected transition point seen within the midline mid to lower abdomen at the level of L4  Pt without symptoms of nausea or vomiting. Reportedly had a BM at the outside facility on 1/16/2023. Abdomen is distended.     - Holding of NGT for now given no significant symptoms   - Monitor BM and GI symptoms, if worsen will notify GS       Elevated liver enzymes  Likely 2/2 to underlying alcohol abuse and hepatic steatosis.     - Daily CMP, PT/INR   - Complete workup up with tylenol level, acute hep panel, a1-antitrypsin, anti smith antibody, HIV, anti mitochondrial  antibody, anca, anti-liver, aFP,   - Liver US with doppler shows epatomegaly with hepatic steatosis, cholelithiasis versus gallbladder sludge, small volume ascites, and left pleural effusion.  - Starting midodrine 10 mg TID   - Lactulose 1g TID    GI bleed  Concerning for variceal bleed given hx of alcohol abuse.     - Regular diet  -Transfuse pRBC for Hb < 7 g/dL (Consider a higher Hb target if there is clinical evidence of intravascular volume depletion or comorbidities, such as CAD or if high suspicion of vigorous active ongoing bleeding or an uncorrected coagulopathy exists.).  - s/p vitamin K given Pt/INR > 2   -Avoid nonsteroidal agents, antiplatelet agents and anticoagulants if possible in patients without absolute contraindications. (consult managing provider if required for cardiovascular protection).  - EGD showed  Small (< 5 mm) esophageal varices with portal hypertensive gastropathy  - Continue PO PPI 40 mg           Palliative Care  Advanced care planning/counseling discussion  Daily discussions with patient and family    Palliative care encounter  Palliative following    Goals of care, counseling/discussion  Palliative following  Full code    Other  Debility  PT/OT  Early mobility if possible    Encounter for social work intervention  Patient does not appear to have relatives.  He will need possible decision maker/power of  if his overall mental status does not improve.    - social work consulted for family assessment     Critical Care Daily Checklist:    A: Awake: RASS Goal/Actual Goal: RASS Goal: 0-->alert and calm  Actual: Braxton Agitation Sedation Scale (RASS): Alert and calm   B: Spontaneous Breathing Trial Performed?     C: SAT & SBT Coordinated?  N/a                      D: Delirium: CAM-ICU Overall CAM-ICU: Negative   E: Early Mobility Performed? Yes   F: Feeding Goal: Goals: Meet % EEN, EPN by RD f/u date  Status: Nutrition Goal Status: new   Current Diet Order   Procedures     Diet Adult Regular (IDDSI Level 7)      AS: Analgesia/Sedation N/a   T: Thromboembolic Prophylaxis Heparin   H: HOB > 300 No   U: Stress Ulcer Prophylaxis (if needed) No   G: Glucose Control Yes   B: Bowel Function Stool Occurrence: 1   I: Indwelling Catheter (Lines & Bose) Necessity Bose d/t debility   D: De-escalation of Antimicrobials/Pharmacotherapies Finished abx course    Plan for the day/ETD Cardiology consult; Vtach control    Code Status:  Family/Goals of Care: Full Code         Critical secondary to Patient has a condition that poses threat to life and bodily function: Vtach/Afib w/ RVR      Critical care was time spent personally by me on the following activities: development of treatment plan with patient or surrogate and bedside caregivers, discussions with consultants, evaluation of patient's response to treatment, examination of patient, ordering and performing treatments and interventions, ordering and review of laboratory studies, ordering and review of radiographic studies, pulse oximetry, re-evaluation of patient's condition. This critical care time did not overlap with that of any other provider or involve time for any procedures.     Douglas Shelley MD  Critical Care Medicine  [facility] - Cardiac Medical ICU

## 2023-01-31 LAB
ALBUMIN SERPL BCP-MCNC: 2 G/DL (ref 3.5–5.2)
ALP SERPL-CCNC: 152 U/L (ref 55–135)
ALT SERPL W/O P-5'-P-CCNC: 41 U/L (ref 10–44)
ANION GAP SERPL CALC-SCNC: 10 MMOL/L (ref 8–16)
AST SERPL-CCNC: 117 U/L (ref 10–40)
BASOPHILS # BLD AUTO: 0.17 K/UL (ref 0–0.2)
BASOPHILS NFR BLD: 1.2 % (ref 0–1.9)
BILIRUB SERPL-MCNC: 3.3 MG/DL (ref 0.1–1)
BUN SERPL-MCNC: 34 MG/DL (ref 8–23)
CALCIUM SERPL-MCNC: 8.2 MG/DL (ref 8.7–10.5)
CHLORIDE SERPL-SCNC: 96 MMOL/L (ref 95–110)
CO2 SERPL-SCNC: 27 MMOL/L (ref 23–29)
CREAT SERPL-MCNC: 2 MG/DL (ref 0.5–1.4)
DIFFERENTIAL METHOD: ABNORMAL
EOSINOPHIL # BLD AUTO: 0.1 K/UL (ref 0–0.5)
EOSINOPHIL NFR BLD: 1 % (ref 0–8)
ERYTHROCYTE [DISTWIDTH] IN BLOOD BY AUTOMATED COUNT: 21.6 % (ref 11.5–14.5)
EST. GFR  (NO RACE VARIABLE): 37.3 ML/MIN/1.73 M^2
GLUCOSE SERPL-MCNC: 105 MG/DL (ref 70–110)
HCT VFR BLD AUTO: 28 % (ref 40–54)
HGB BLD-MCNC: 8.6 G/DL (ref 14–18)
IMM GRANULOCYTES # BLD AUTO: 0.13 K/UL (ref 0–0.04)
IMM GRANULOCYTES NFR BLD AUTO: 0.9 % (ref 0–0.5)
INR PPP: 1.2 (ref 0.8–1.2)
LYMPHOCYTES # BLD AUTO: 1.4 K/UL (ref 1–4.8)
LYMPHOCYTES NFR BLD: 9.7 % (ref 18–48)
MAGNESIUM SERPL-MCNC: 1.9 MG/DL (ref 1.6–2.6)
MCH RBC QN AUTO: 30.5 PG (ref 27–31)
MCHC RBC AUTO-ENTMCNC: 30.7 G/DL (ref 32–36)
MCV RBC AUTO: 99 FL (ref 82–98)
MONOCYTES # BLD AUTO: 1.3 K/UL (ref 0.3–1)
MONOCYTES NFR BLD: 8.9 % (ref 4–15)
NEUTROPHILS # BLD AUTO: 11.5 K/UL (ref 1.8–7.7)
NEUTROPHILS NFR BLD: 78.3 % (ref 38–73)
NRBC BLD-RTO: 0 /100 WBC
PHOSPHATE SERPL-MCNC: 3.8 MG/DL (ref 2.7–4.5)
PLATELET # BLD AUTO: 217 K/UL (ref 150–450)
PMV BLD AUTO: 11.9 FL (ref 9.2–12.9)
POCT GLUCOSE: 106 MG/DL (ref 70–110)
POCT GLUCOSE: 120 MG/DL (ref 70–110)
POTASSIUM SERPL-SCNC: 4.2 MMOL/L (ref 3.5–5.1)
PROT SERPL-MCNC: 7 G/DL (ref 6–8.4)
PROTHROMBIN TIME: 12.2 SEC (ref 9–12.5)
RBC # BLD AUTO: 2.82 M/UL (ref 4.6–6.2)
SODIUM SERPL-SCNC: 133 MMOL/L (ref 136–145)
WBC # BLD AUTO: 14.67 K/UL (ref 3.9–12.7)

## 2023-01-31 PROCEDURE — 27000221 HC OXYGEN, UP TO 24 HOURS

## 2023-01-31 PROCEDURE — 25000003 PHARM REV CODE 250: Performed by: INTERNAL MEDICINE

## 2023-01-31 PROCEDURE — 94761 N-INVAS EAR/PLS OXIMETRY MLT: CPT

## 2023-01-31 PROCEDURE — 84100 ASSAY OF PHOSPHORUS: CPT | Performed by: STUDENT IN AN ORGANIZED HEALTH CARE EDUCATION/TRAINING PROGRAM

## 2023-01-31 PROCEDURE — 93010 EKG 12-LEAD: ICD-10-PCS | Mod: ,,, | Performed by: INTERNAL MEDICINE

## 2023-01-31 PROCEDURE — 20000000 HC ICU ROOM

## 2023-01-31 PROCEDURE — 99900035 HC TECH TIME PER 15 MIN (STAT)

## 2023-01-31 PROCEDURE — 63600175 PHARM REV CODE 636 W HCPCS: Performed by: STUDENT IN AN ORGANIZED HEALTH CARE EDUCATION/TRAINING PROGRAM

## 2023-01-31 PROCEDURE — 25000003 PHARM REV CODE 250: Performed by: STUDENT IN AN ORGANIZED HEALTH CARE EDUCATION/TRAINING PROGRAM

## 2023-01-31 PROCEDURE — 99232 SBSQ HOSP IP/OBS MODERATE 35: CPT | Mod: ,,, | Performed by: INTERNAL MEDICINE

## 2023-01-31 PROCEDURE — 93005 ELECTROCARDIOGRAM TRACING: CPT

## 2023-01-31 PROCEDURE — 99233 SBSQ HOSP IP/OBS HIGH 50: CPT | Mod: ,,, | Performed by: INTERNAL MEDICINE

## 2023-01-31 PROCEDURE — 85610 PROTHROMBIN TIME: CPT | Performed by: STUDENT IN AN ORGANIZED HEALTH CARE EDUCATION/TRAINING PROGRAM

## 2023-01-31 PROCEDURE — 97535 SELF CARE MNGMENT TRAINING: CPT

## 2023-01-31 PROCEDURE — 25000003 PHARM REV CODE 250

## 2023-01-31 PROCEDURE — 85025 COMPLETE CBC W/AUTO DIFF WBC: CPT | Performed by: STUDENT IN AN ORGANIZED HEALTH CARE EDUCATION/TRAINING PROGRAM

## 2023-01-31 PROCEDURE — 83735 ASSAY OF MAGNESIUM: CPT | Performed by: STUDENT IN AN ORGANIZED HEALTH CARE EDUCATION/TRAINING PROGRAM

## 2023-01-31 PROCEDURE — 99233 PR SUBSEQUENT HOSPITAL CARE,LEVL III: ICD-10-PCS | Mod: ,,, | Performed by: INTERNAL MEDICINE

## 2023-01-31 PROCEDURE — 99232 PR SUBSEQUENT HOSPITAL CARE,LEVL II: ICD-10-PCS | Mod: ,,, | Performed by: INTERNAL MEDICINE

## 2023-01-31 PROCEDURE — 97530 THERAPEUTIC ACTIVITIES: CPT

## 2023-01-31 PROCEDURE — 80053 COMPREHEN METABOLIC PANEL: CPT | Performed by: STUDENT IN AN ORGANIZED HEALTH CARE EDUCATION/TRAINING PROGRAM

## 2023-01-31 PROCEDURE — 93010 ELECTROCARDIOGRAM REPORT: CPT | Mod: ,,, | Performed by: INTERNAL MEDICINE

## 2023-01-31 RX ORDER — MAGNESIUM SULFATE HEPTAHYDRATE 40 MG/ML
2 INJECTION, SOLUTION INTRAVENOUS ONCE
Status: COMPLETED | OUTPATIENT
Start: 2023-01-31 | End: 2023-01-31

## 2023-01-31 RX ORDER — LACTULOSE 10 G/15ML
10 SOLUTION ORAL 2 TIMES DAILY
Status: DISCONTINUED | OUTPATIENT
Start: 2023-01-31 | End: 2023-02-02

## 2023-01-31 RX ORDER — MIDODRINE HYDROCHLORIDE 5 MG/1
5 TABLET ORAL EVERY 8 HOURS
Status: DISCONTINUED | OUTPATIENT
Start: 2023-01-31 | End: 2023-01-31

## 2023-01-31 RX ORDER — AMIODARONE HYDROCHLORIDE 200 MG/1
200 TABLET ORAL DAILY
Status: DISCONTINUED | OUTPATIENT
Start: 2023-02-09 | End: 2023-02-07 | Stop reason: HOSPADM

## 2023-01-31 RX ORDER — AMIODARONE HYDROCHLORIDE 200 MG/1
400 TABLET ORAL 2 TIMES DAILY
Status: DISCONTINUED | OUTPATIENT
Start: 2023-01-31 | End: 2023-02-07 | Stop reason: HOSPADM

## 2023-01-31 RX ADMIN — LACTULOSE 10 G: 20 SOLUTION ORAL at 08:01

## 2023-01-31 RX ADMIN — METOPROLOL SUCCINATE 25 MG: 25 TABLET, EXTENDED RELEASE ORAL at 08:01

## 2023-01-31 RX ADMIN — MIDODRINE HYDROCHLORIDE 10 MG: 5 TABLET ORAL at 05:01

## 2023-01-31 RX ADMIN — RIVAROXABAN 15 MG: 15 TABLET, FILM COATED ORAL at 04:01

## 2023-01-31 RX ADMIN — AMIODARONE HYDROCHLORIDE 400 MG: 200 TABLET ORAL at 08:01

## 2023-01-31 RX ADMIN — THERA TABS 1 TABLET: TAB at 08:01

## 2023-01-31 RX ADMIN — HYDROMORPHONE HYDROCHLORIDE 0.2 MG: 1 INJECTION, SOLUTION INTRAMUSCULAR; INTRAVENOUS; SUBCUTANEOUS at 08:01

## 2023-01-31 RX ADMIN — HYDROMORPHONE HYDROCHLORIDE 0.2 MG: 1 INJECTION, SOLUTION INTRAMUSCULAR; INTRAVENOUS; SUBCUTANEOUS at 01:01

## 2023-01-31 RX ADMIN — PANTOPRAZOLE SODIUM 40 MG: 40 TABLET, DELAYED RELEASE ORAL at 04:01

## 2023-01-31 RX ADMIN — LIDOCAINE AND PRILOCAINE: 25; 25 CREAM TOPICAL at 08:01

## 2023-01-31 RX ADMIN — THIAMINE HCL TAB 100 MG 100 MG: 100 TAB at 08:01

## 2023-01-31 RX ADMIN — MAGNESIUM SULFATE 2 G: 2 INJECTION INTRAVENOUS at 08:01

## 2023-01-31 RX ADMIN — GUAIFENESIN AND DEXTROMETHORPHAN HYDROBROMIDE 2 TABLET: 600; 30 TABLET, EXTENDED RELEASE ORAL at 08:01

## 2023-01-31 RX ADMIN — PANTOPRAZOLE SODIUM 40 MG: 40 TABLET, DELAYED RELEASE ORAL at 05:01

## 2023-01-31 RX ADMIN — TORSEMIDE 50 MG: 20 TABLET ORAL at 08:01

## 2023-01-31 RX ADMIN — HEPARIN SODIUM 5000 UNITS: 5000 INJECTION INTRAVENOUS; SUBCUTANEOUS at 05:01

## 2023-01-31 RX ADMIN — FOLIC ACID 1 MG: 1 TABLET ORAL at 08:01

## 2023-01-31 NOTE — SUBJECTIVE & OBJECTIVE
Interval History: 2 liters out with 50 torsemide qd    Review of patient's allergies indicates:  No Known Allergies  Current Facility-Administered Medications   Medication Frequency    0.9%  NaCl infusion (for blood administration) Q24H PRN    albuterol-ipratropium 2.5 mg-0.5 mg/3 mL nebulizer solution 3 mL Q6H PRN    amiodarone tablet 400 mg BID    Followed by    [START ON 2/9/2023] amiodarone tablet 200 mg Daily    benzonatate capsule 200 mg TID PRN    dextromethorphan-guaiFENesin  mg per 12 hr tablet 2 tablet BID    dextrose 10% bolus 125 mL 125 mL PRN    dextrose 10% bolus 250 mL 250 mL PRN    folic acid tablet 1 mg Daily    glucagon (human recombinant) injection 1 mg PRN    glucose chewable tablet 16 g PRN    glucose chewable tablet 24 g PRN    heparin (porcine) injection 5,000 Units Q8H    HYDROmorphone injection 0.2 mg Q6H PRN    insulin aspart U-100 pen 0-5 Units QID (AC + HS) PRN    lactulose 20 gram/30 mL solution Soln 10 g BID    LIDOcaine-prilocaine cream PRN    metoprolol injection 5 mg Q5 Min PRN    metoprolol succinate (TOPROL-XL) 24 hr tablet 25 mg Daily    multivitamin tablet Daily    pantoprazole EC tablet 40 mg BID AC    thiamine tablet 100 mg Daily    torsemide tablet 50 mg Daily       Objective:     Vital Signs (Most Recent):  Temp: 98.9 °F (37.2 °C) (01/31/23 1100)  Pulse: 78 (01/31/23 1100)  Resp: (!) 26 (01/31/23 1100)  BP: 129/68 (01/31/23 1100)  SpO2: (!) 94 % (01/31/23 1100)   Vital Signs (24h Range):  Temp:  [97.9 °F (36.6 °C)-98.9 °F (37.2 °C)] 98.9 °F (37.2 °C)  Pulse:  [] 78  Resp:  [10-54] 26  SpO2:  [92 %-98 %] 94 %  BP: ()/(50-75) 129/68     Weight: 110 kg (242 lb 8.1 oz) (01/31/23 1031)  Body mass index is 36.87 kg/m².  Body surface area is 2.3 meters squared.    I/O last 3 completed shifts:  In: 455.1 [I.V.:55.2; IV Piggyback:399.9]  Out: 4128 [Urine:4125; Stool:3]    Physical Exam  Constitutional:       General: He is not in acute distress.     Appearance: He is  obese. He is not ill-appearing or toxic-appearing.   HENT:      Head: Normocephalic and atraumatic.      Nose: Nose normal.      Mouth/Throat:      Mouth: Mucous membranes are moist.   Eyes:      General:         Right eye: No discharge.         Left eye: No discharge.      Pupils: Pupils are equal, round, and reactive to light.   Cardiovascular:      Heart sounds: Murmur heard.   Abdominal:      General: There is distension.      Tenderness: There is no abdominal tenderness.   Musculoskeletal:         General: Normal range of motion.      Cervical back: Normal range of motion.      Right lower leg: No edema.      Left lower leg: No edema.   Skin:     General: Skin is warm.      Coloration: Skin is pale.      Findings: No lesion.   Neurological:      Mental Status: He is alert and oriented to person, place, and time.       Significant Labs:  All labs within the past 24 hours have been reviewed.     Significant Imaging:  Labs: Reviewed

## 2023-01-31 NOTE — ASSESSMENT & PLAN NOTE
Size of liver suggest other etiology than cirrhosis, however also has esophageal varices, possible HRS. CT and US renal with no signs of obstruction.     Urine microscopy: (1/17) no casts identified, amorphous material (though only 5 cc obtained). Repeat with few granular casts (1/18). Repeat on 1/19 with granular casts. 1/20: moderated (2-3 per field) granular casts with calcium oxylate crystals embedded in casts.     Urine protein 4.59g and repeat with 2.5  C3/4 - 111/35  LILO with ratio of 1.98  Hep B and C negative  Uric acid 13    HIV negative  Iron 31, TIBC 151, Tsat 21, Transferrin 102, ferritin 135  KEATON negative  ANCA negative  Cryo negative    1/19: repeat Urine sodium 10, urine protein creatinine ratio 0.89. IgA 505. Urine microscopy: moderated (2-3 per field) granular casts with calcium oxylate crystals embedded in casts, overall findings consistent with ATN    Overall impression: UOP responsive to 240 IV lasix 1/25. He did require RRT 1/22 for metabolic encephalopathy and work of breathing    Recommendations:  -continue with torsemide 50 qd  -Keep MAP > 65  -Keep hemoglobin > 7  -Strict ins and outs  -Avoid nephrotoxic agents if possible  -Avoid drastic hemodynamic changes if possible

## 2023-01-31 NOTE — PROGRESS NOTES
"[facility]  Adult Nutrition  Progress Note    SUMMARY       Recommendations    1. Continue current Regular diet. Encourage PO intake as tolerated.   2. Boost Breeze ONS added.   3. RD to monitor & follow-up.    Goals: Meet % EEN, EPN by RD f/u date  Nutrition Goal Status: progressing towards goal  Communication of RD Recs: reviewed with RN    Assessment and Plan    Nutrition Problem:  Inadequate energy intake     Related to (etiology):   Inability to consume sufficient energy      Signs and Symptoms (as evidenced by):   NPO/CLD x 5 days      Interventions(treatment strategy):  Collabartion of nutrition care w/ other providers  ONS     Nutrition Diagnosis Status:   Resolved    Reason for Assessment    Reason For Assessment: RD follow-up  Diagnosis: other (see comments) (Spinal stenosis)  Relevant Medical History: Etoh abuse, HTN  Interdisciplinary Rounds: attended    General Information Comments: Pt reports poor appetite, willing to try ONS - given menu to hopefully promote PO intake. Reports decreased appetite PTA x 1 week & UBW of 225# (2-3 edema noted). No physical signs of malnutrition found on physical exam; at risk for acute malnutrition if PO intake remains poor. Will monitor.  Nutrition Discharge Planning: Pending clinical course    Nutrition/Diet History    Spiritual, Cultural Beliefs, Gnosticist Practices, Values that Affect Care: no  Factors Affecting Nutritional Intake: decreased appetite    Anthropometrics    Temp: 98.2 °F (36.8 °C)  Height: 5' 8" (172.7 cm)  Height (inches): 68 in  Weight Method: Bed Scale  Weight: 110 kg (242 lb 8.1 oz)  Weight (lb): 242.51 lb  Ideal Body Weight (IBW), Male: 154 lb  % Ideal Body Weight, Male (lb): 157.47 %  BMI (Calculated): 36.9  BMI Grade: 35 - 39.9 - obesity - grade II    Lab/Procedures/Meds    Pertinent Labs Reviewed: reviewed  Pertinent Labs Comments: Na 133, BUN 34, Creat 2, GFR 37.3  Pertinent Medications Reviewed: reviewed  Pertinent " Medications Comments: Folic acid, Thiamine, Lactulose    Estimated/Assessed Needs    Weight Used For Calorie Calculations: 110 kg (242 lb 8.1 oz)    Energy Calorie Requirements (kcal): 1880 kcal/d  Energy Need Method: [place]-St Jeor (1.0 PAL)    Protein Requirements: 99 g/d (.9 g/kg)  Weight Used For Protein Calculations: 110 kg (242 lb 8.1 oz)    Estimated Fluid Requirement Method: other (see comments) (Per MD or 1 mL/kcal)  RDA Method (mL): 1880    Nutrition Prescription Ordered    Current Diet Order: Regular    Evaluation of Received Nutrient/Fluid Intake    I/O: -11.7L since 1/17    Comments: LBM: 1/30    Tolerance: tolerating    Nutrition Risk    Level of Risk/Frequency of Follow-up:  (1x/week)     Monitor and Evaluation    Food and Nutrient Intake: food and beverage intake, energy intake, enteral nutrition intake, parenteral nutrition intake  Food and Nutrient Adminstration: diet order, enteral and parenteral nutrition administration  Physical Activity and Function: nutrition-related ADLs and IADLs  Anthropometric Measurements: weight, weight change  Biochemical Data, Medical Tests and Procedures: inflammatory profile, lipid profile, glucose/endocrine profile, gastrointestinal profile, electrolyte and renal panel  Nutrition-Focused Physical Findings: overall appearance     Nutrition Follow-Up    RD Follow-up?: Yes

## 2023-01-31 NOTE — PLAN OF CARE
Problem: Physical Therapy  Goal: Physical Therapy Goal  Description: Goals to be met by: 23     Patient will increase functional independence with mobility by performin. Supine to sit with Minimal Assistance-not met  2. Sit to stand transfer with  Moderate Assistance- met   4. Bed to chair transfer with Moderate Assistance using AD if needed via stand pivot or squat pivot - not met  5. Gait  x 30 feet with Minimal Assistance using AD if needed. -not met  6. Sitting at edge of bed x8 minutes with Supervision -not met  7. Standing x 1 minutes with minimal assistance using LRAD - not met  8. Pt sit to stand with AD and CGA.    Outcome: Ongoing, Progressing   Goals updated for content and are appropriate. 2023

## 2023-01-31 NOTE — PLAN OF CARE
Recommendations     1. Continue current Regular diet. Encourage PO intake as tolerated.   2. Boost Breeze ONS added.   3. RD to monitor & follow-up.     Goals: Meet % EEN, EPN by RD f/u date  Nutrition Goal Status: progressing towards goal  Communication of RD Recs: reviewed with RN

## 2023-01-31 NOTE — SUBJECTIVE & OBJECTIVE
Interval History: Patient feeling well.  Denies chest pain, dyspnea, palpitations, lightheadedness, or dizziness.  HR improved s/p BB.  No NSVT noted on tele overnight with electrolyte correction.    Review of Systems   Constitutional: Negative for chills and fever.   Cardiovascular:  Negative for chest pain, dyspnea on exertion, orthopnea and palpitations.   Respiratory:  Negative for cough and shortness of breath.    Musculoskeletal:  Positive for back pain. Negative for falls.   Gastrointestinal:  Negative for abdominal pain, nausea and vomiting.   Neurological:  Negative for dizziness and light-headedness.   Objective:     Vital Signs (Most Recent):  Temp: 98.2 °F (36.8 °C) (01/31/23 0700)  Pulse: 77 (01/31/23 0730)  Resp: (!) 21 (01/31/23 0730)  BP: 136/67 (01/31/23 0700)  SpO2: (!) 94 % (01/31/23 0730)   Vital Signs (24h Range):  Temp:  [97.9 °F (36.6 °C)-98.2 °F (36.8 °C)] 98.2 °F (36.8 °C)  Pulse:  [] 77  Resp:  [10-54] 21  SpO2:  [91 %-98 %] 94 %  BP: ()/(50-75) 136/67     Weight: 110 kg (242 lb 8.1 oz)  Body mass index is 36.87 kg/m².     SpO2: (!) 94 %         Intake/Output Summary (Last 24 hours) at 1/31/2023 0824  Last data filed at 1/31/2023 0600  Gross per 24 hour   Intake 455.09 ml   Output 1800 ml   Net -1344.91 ml       Lines/Drains/Airways       Central Venous Catheter Line  Duration             Trialysis (Dialysis) Catheter 01/22/23 1530 right internal jugular 8 days              Drain  Duration                  Open Drain 01/17/23 0800 Left;Anterior LLQ 14 days         Urethral Catheter 01/16/23 1347 Latex 14 Fr. 14 days              Peripheral Intravenous Line  Duration                  Peripheral IV - Single Lumen 01/27/23 1200 18 G Right Antecubital 3 days                    Physical Exam  Vitals and nursing note reviewed.   Constitutional:       General: He is not in acute distress.     Appearance: He is obese.   HENT:      Head: Normocephalic and atraumatic.      Mouth/Throat:       Mouth: Mucous membranes are moist.      Pharynx: Oropharynx is clear.   Eyes:      General: No scleral icterus.     Extraocular Movements: Extraocular movements intact.      Conjunctiva/sclera: Conjunctivae normal.   Cardiovascular:      Rate and Rhythm: Normal rate and regular rhythm.      Heart sounds: No murmur heard.  Pulmonary:      Effort: Pulmonary effort is normal. No respiratory distress.      Breath sounds: No wheezing or rales.   Abdominal:      Palpations: Abdomen is soft.      Tenderness: There is no abdominal tenderness. There is no guarding.   Musculoskeletal:         General: No tenderness.      Cervical back: Normal range of motion and neck supple.      Right lower leg: No edema.      Left lower leg: No edema.   Skin:     General: Skin is warm and dry.   Neurological:      Mental Status: He is alert and oriented to person, place, and time.       Significant Labs: CMP   Recent Labs   Lab 23  0420 23  1531 23  0544    136 133*   K 3.1* 3.4* 4.2   CL 96 96 96   CO2 27 29 27   * 116* 105   BUN 31* 30* 34*   CREATININE 1.8* 1.8* 2.0*   CALCIUM 8.1* 8.2* 8.2*   PROT 7.0  --  7.0   ALBUMIN 2.1*  --  2.0*   BILITOT 3.5*  --  3.3*   ALKPHOS 170*  --  152*   *  --  117*   ALT 44  --  41   ANIONGAP 13 11 10   , CBC   Recent Labs   Lab 23  0914 23  1049 23  0544   WBC 16.85* 14.60* 14.67*   HGB 9.0* 9.3* 8.6*   HCT 28.0* 28.9* 28.0*    207 217   , and All pertinent lab results from the last 24 hours have been reviewed.    Significant Imaging: {Imagin}

## 2023-01-31 NOTE — PT/OT/SLP PROGRESS
Physical Therapy  Co-Treatment with OT    Patient Name:  Jonny Isabel   MRN:  [id number]    Recommendations:     Discharge Recommendations: rehabilitation facility  Discharge Equipment Recommendations:  (will determine DME Needs closer to discharge)  Barriers to discharge: Decreased caregiver support family will not be able to assist at current functional level.     Assessment:     Jonny Isabel is a 62 y.o. male admitted with a medical diagnosis of Atrial fibrillation with rapid ventricular response.  He presents with the following impairments/functional limitations: weakness, impaired endurance, impaired functional mobility, gait instability, impaired balance, decreased safety awareness, decreased lower extremity function pt tolerated treatment better needing less assistance for mobility and being able to begin standing and gait training. Pt is at significantly decreased functional level, increased burden of care and increased fall risk at current level. Pt will benefit from cont skilled PT 3x/wk and will need inpt rehab when medically stable.     Rehab Prognosis: Good; patient would benefit from acute skilled PT services to address these deficits and reach maximum level of function.    Recent Surgery: Procedure(s) (LRB):  EGD (ESOPHAGOGASTRODUODENOSCOPY) (N/A) 14 Days Post-Op    Plan:     During this hospitalization, patient to be seen 3 x/week to address the identified rehab impairments via therapeutic activities, gait training, therapeutic exercises, neuromuscular re-education and progress toward the following goals:    Plan of Care Expires:  02/28/23    Subjective     Chief Complaint: pt c/o being tired during treatment.   Patient/Family Comments/goals:  to get better and go home.   Pain/Comfort:  Pain Rating 1: 0/10  Pain Rating Post-Intervention 1: 0/10      Objective:     Communicated with nurse prior to session.  Patient found supine with telemetry, blood pressure cuff, pulse ox (continuous), oxygen,  peripheral IV, gomez catheter upon PT entry to room.     General Precautions: Standard, fall  Orthopedic Precautions: N/A  Braces: N/A  Respiratory Status: Nasal cannula, flow 3 L/min     Functional Mobility:  Bed Mobility:   pt needed verbal cues for hand placement and sequencing for log rolling with bed mobility.   Rolling Left:  stand by assistance  Supine to Sit: moderate assistance  Sit to Supine: moderate assistance    Transfers:     Sit to Stand:  minimum assistance and of 2 persons with hand-held assist.  Pt stood x 2 reps. Pt was CGA standing balance. Pt leaned posteriorly on bed with BLE.     Gait: pt received gait training ~ 2 side steps to head of bed with mod assist x 2.    Balance: pt sat on EOB with SBA.     Due to pt complex medical condition, the skill of 2 licensed therapists is needed to maximize treatment session and progression towards goals    Pt white board updated with current therapists name and level of mobility assistance needed.           AM-PAC 6 CLICK MOBILITY  Turning over in bed (including adjusting bedclothes, sheets and blankets)?: 3  Sitting down on and standing up from a chair with arms (e.g., wheelchair, bedside commode, etc.): 2  Moving from lying on back to sitting on the side of the bed?: 2  Moving to and from a bed to a chair (including a wheelchair)?: 2  Need to walk in hospital room?: 1  Climbing 3-5 steps with a railing?: 1  Basic Mobility Total Score: 11       Treatment & Education:  Pt and significant other received verbal instructions in PT POC and both verbally expressed understanding of such.     Patient left with bed in chair position with all lines intact, call button in reach, and RN and significant other notified..    GOALS:   Multidisciplinary Problems       Physical Therapy Goals          Problem: Physical Therapy    Goal Priority Disciplines Outcome Goal Variances Interventions   Physical Therapy Goal     PT, PT/OT Ongoing, Progressing     Description: Goals to  be met by: 23     Patient will increase functional independence with mobility by performin. Supine to sit with Minimal Assistance-not met  2. Sit to stand transfer with  Moderate Assistance- met   4. Bed to chair transfer with Moderate Assistance using AD if needed via stand pivot or squat pivot - not met  5. Gait  x 30 feet with Minimal Assistance using AD if needed. -not met  6. Sitting at edge of bed x8 minutes with Supervision -not met  7. Standing x 1 minutes with minimal assistance using LRAD - not met  8. Pt sit to stand with AD and CGA.                         Time Tracking:     PT Received On: 23  PT Start Time: 907     PT Stop Time: 923  PT Total Time (min): 16 min     Billable Minutes: Therapeutic Activity 16 min        PT/PTA: PT     PTA Visit Number: 0     2023

## 2023-01-31 NOTE — PROGRESS NOTES
[facility]  Nephrology  Progress Note    Patient Name: Jonny Isabel  MRN: [id number]  Admission Date: 1/16/2023  Hospital Length of Stay: 15 days  Attending Provider: Benita Little MD   Primary Care Physician: Yuli Pérez Mai, MD  Principal Problem:Atrial fibrillation with rapid ventricular response    Subjective:     HPI: 62 year old male with a history of alcohol abuse, afib/flutter on xarelto, HTN presents with concern for severe spinal canal stenosis/vacuum disc phenomena, concern for a GI bleed and MARTITA. He presents at behest of his girlfriend after being unable to stand, he has a history of sciatica but reports that this is worse. He has had fecal incontinence for the last week.     He has no report of kidney issues in the past and reports no family history of such either. At time of consultation he is pending an EGD for GI bleed. CT abdomen with large liver and concern for SBO per report, no hydronephrosis. UA with 2+ protein, 1+ occult blood, urine sodium 25 and urine osm 302. On admission serum creatinine 5 (baseline 0.8).      Interval History: 2 liters out with 50 torsemide qd    Review of patient's allergies indicates:  No Known Allergies  Current Facility-Administered Medications   Medication Frequency    0.9%  NaCl infusion (for blood administration) Q24H PRN    albuterol-ipratropium 2.5 mg-0.5 mg/3 mL nebulizer solution 3 mL Q6H PRN    amiodarone tablet 400 mg BID    Followed by    [START ON 2/9/2023] amiodarone tablet 200 mg Daily    benzonatate capsule 200 mg TID PRN    dextromethorphan-guaiFENesin  mg per 12 hr tablet 2 tablet BID    dextrose 10% bolus 125 mL 125 mL PRN    dextrose 10% bolus 250 mL 250 mL PRN    folic acid tablet 1 mg Daily    glucagon (human recombinant) injection 1 mg PRN    glucose chewable tablet 16 g PRN    glucose chewable tablet 24 g PRN    heparin (porcine) injection 5,000 Units Q8H    HYDROmorphone injection 0.2 mg Q6H PRN    insulin aspart U-100 pen  0-5 Units QID (AC + HS) PRN    lactulose 20 gram/30 mL solution Soln 10 g BID    LIDOcaine-prilocaine cream PRN    metoprolol injection 5 mg Q5 Min PRN    metoprolol succinate (TOPROL-XL) 24 hr tablet 25 mg Daily    multivitamin tablet Daily    pantoprazole EC tablet 40 mg BID AC    thiamine tablet 100 mg Daily    torsemide tablet 50 mg Daily       Objective:     Vital Signs (Most Recent):  Temp: 98.9 °F (37.2 °C) (01/31/23 1100)  Pulse: 78 (01/31/23 1100)  Resp: (!) 26 (01/31/23 1100)  BP: 129/68 (01/31/23 1100)  SpO2: (!) 94 % (01/31/23 1100)   Vital Signs (24h Range):  Temp:  [97.9 °F (36.6 °C)-98.9 °F (37.2 °C)] 98.9 °F (37.2 °C)  Pulse:  [] 78  Resp:  [10-54] 26  SpO2:  [92 %-98 %] 94 %  BP: ()/(50-75) 129/68     Weight: 110 kg (242 lb 8.1 oz) (01/31/23 1031)  Body mass index is 36.87 kg/m².  Body surface area is 2.3 meters squared.    I/O last 3 completed shifts:  In: 455.1 [I.V.:55.2; IV Piggyback:399.9]  Out: 4128 [Urine:4125; Stool:3]    Physical Exam  Constitutional:       General: He is not in acute distress.     Appearance: He is obese. He is not ill-appearing or toxic-appearing.   HENT:      Head: Normocephalic and atraumatic.      Nose: Nose normal.      Mouth/Throat:      Mouth: Mucous membranes are moist.   Eyes:      General:         Right eye: No discharge.         Left eye: No discharge.      Pupils: Pupils are equal, round, and reactive to light.   Cardiovascular:      Heart sounds: Murmur heard.   Abdominal:      General: There is distension.      Tenderness: There is no abdominal tenderness.   Musculoskeletal:         General: Normal range of motion.      Cervical back: Normal range of motion.      Right lower leg: No edema.      Left lower leg: No edema.   Skin:     General: Skin is warm.      Coloration: Skin is pale.      Findings: No lesion.   Neurological:      Mental Status: He is alert and oriented to person, place, and time.       Significant Labs:  All labs within the past  24 hours have been reviewed.     Significant Imaging:  Labs: Reviewed    Assessment/Plan:     MARTITA (acute kidney injury)  Size of liver suggest other etiology than cirrhosis, however also has esophageal varices, possible HRS. CT and US renal with no signs of obstruction.     Urine microscopy: (1/17) no casts identified, amorphous material (though only 5 cc obtained). Repeat with few granular casts (1/18). Repeat on 1/19 with granular casts. 1/20: moderated (2-3 per field) granular casts with calcium oxylate crystals embedded in casts.     Urine protein 4.59g and repeat with 2.5  C3/4 - 111/35  LILO with ratio of 1.98  Hep B and C negative  Uric acid 13    HIV negative  Iron 31, TIBC 151, Tsat 21, Transferrin 102, ferritin 135  KEATON negative  ANCA negative  Cryo negative    1/19: repeat Urine sodium 10, urine protein creatinine ratio 0.89. IgA 505. Urine microscopy: moderated (2-3 per field) granular casts with calcium oxylate crystals embedded in casts, overall findings consistent with ATN    Overall impression: UOP responsive to 240 IV lasix 1/25. He did require RRT 1/22 for metabolic encephalopathy and work of breathing    Recommendations:  -continue with torsemide 50 qd  -Keep MAP > 65  -Keep hemoglobin > 7  -Strict ins and outs  -Avoid nephrotoxic agents if possible  -Avoid drastic hemodynamic changes if possible           Spinal stenosis  NSGY deferring surgery for later date          Thank you for your consult. I will follow-up with patient. Please contact us if you have any additional questions.    Otoniel Cat MD  Nephrology  [facility] - Cardiac Medical ICU    ATTENDING PHYSICIAN ATTESTATION  I have personally verified the history and examined the patient. I thoroughly reviewed the demographic, clinical, laboratorial and imaging information available in medical records. I agree with the assessment and recommendations provided by the subspecialty resident who was under my supervision.

## 2023-01-31 NOTE — PT/OT/SLP PROGRESS
Occupational Therapy   Co-Treatment    Name: Jonny Isabel  MRN: [id number]  Admitting Diagnosis:  Atrial fibrillation with rapid ventricular response  14 Days Post-Op    Recommendations:     Discharge Recommendations: rehabilitation facility  Discharge Equipment Recommendations:   (TBD closer to d/c)  Barriers to discharge:  None    Assessment:     Jonny Isabel is a 62 y.o. male with a medical diagnosis of Atrial fibrillation with rapid ventricular response.  He presents with progress towards goals as evidenced by decreased assistance needed with bed mobility and ability to tolerate standing while engaged in grooming tasks. Performance deficits affecting function are weakness, impaired endurance, impaired self care skills, impaired functional mobility, gait instability, impaired balance, impaired cardiopulmonary response to activity, decreased safety awareness, decreased lower extremity function. Pt benefits from co-treatment with PT to accommodate pt's activity tolerance and progression with therapy.      Rehab Prognosis:  Good; patient would benefit from acute skilled OT services to address these deficits and reach maximum level of function.       Plan:     Patient to be seen 3 x/week to address the above listed problems via self-care/home management, therapeutic activities, therapeutic exercises  Plan of Care Expires: 02/28/23  Plan of Care Reviewed with: patient, significant other    Subjective     Pain/Comfort:  Pain Rating 1: 0/10  Pain Rating Post-Intervention 1: 0/10    Objective:     Communicated with: RN prior to session.  Patient found supine with blood pressure cuff, pulse ox (continuous), telemetry, gomez catheter, Trialysis, oxygen upon OT entry to room.    General Precautions: Standard, fall    Orthopedic Precautions:N/A  Braces: N/A  Respiratory Status: 3L O2 Nasal cannula     Occupational Performance:     Bed Mobility:    Patient completed Rolling/Turning to Left with  stand by assistance and with  side rail  Patient completed Scooting/Bridging with contact guard assistance  Patient completed Supine to Sit with moderate assistance  Patient completed Sit to Supine with moderate assistance     Functional Mobility/Transfers:  Patient completed Sit <> Stand Transfer with minimum assistance and of 2 persons  with  hand-held assist   Functional Mobility: Mod A x 2 persons with B HHA for two sidesteps along EOB    Activities of Daily Living:  Grooming: contact guard assistance for tasks with CGA for static standing at b/s table (support from bed)  Upper Body Dressing: minimum assistance    Lower Body Dressing: maximal assistance      AMPAC 6 Click ADL: 15    Treatment & Education:  Pt ed on OT POC  Pt ed on log rolling for back pain  Pt ed on upright positioning and use of IS (RN informed that pt did not have one at b/s) 2* wheezing    Patient left with bed in chair position with all lines intact, call button in reach, and RN notified and SO entering room at end of session    GOALS:   Multidisciplinary Problems       Occupational Therapy Goals          Problem: Occupational Therapy    Goal Priority Disciplines Outcome Interventions   Occupational Therapy Goal     OT, PT/OT Ongoing, Progressing    Description: Goals to be met by: 30 days 2/28/23    Patient will increase functional independence with ADLs by performing:    Pt to completed self feeding independently.   Pt to complete standing g/h skills with CGA.   Pt to complete UE dressing with set-up  Pt to complete LE dressing with MIN with AE as needed.   Pt to complete toileting with MIN A   Pt to complete t/f to BSC with MIN A   Pt to complete sit to stand for improved (I) with occupations of choice with MIN A                       Time Tracking:     OT Date of Treatment: 01/31/23  OT Start Time: 0905  OT Stop Time: 0924  OT Total Time (min): 19 min    Billable Minutes:Self Care/Home Management 18               1/31/2023

## 2023-01-31 NOTE — PLAN OF CARE
HPI:   Mr. Fuller is a 62yoM with PMHx of Afib on Xarelto, THN, DM who was initially transferred to [facility] for NSGY eval after presenting to OSH with chronic lower back pain worsening over the past week associated with fecal incontinence with black tarry stools.  Patient was admitted to MICU with NSGY and GI consultation.  Hgb stable and EGD with no active bleeding.  NSGY with initial planned surgery however canceled 2/2 medical optimization.  Hospital course c/b worsening MARTITA with minimal UOP with nephrology consulted and patient on intermittent HD with acute renal failure.  Patient on night of 11/23 with afib w/ RVR and started on amio gtt with improvement.  Transitioned to to home lopressor however transitioned back to amio  BID with uncontrolled rate.  Cardiology consulted for NSVT on amio with prolonged QT noted on EKG.    A/P:  Patient with improvement in rate as well as no longer having NSVT on tele this am with correction of electrolytes and starting Toprol 25mg qd.    Repeat EKG with improved Qtc.  Recommend continuing loading dose of PO amio for total of 14 days with eventual transition to 200mg qd prior to discharge.  Maintain K > 4, Mag > 2 and Ca/iCal WNL to decrease arrhythmogenic potential  Will need to resume AC once cleared from surgical standpoint, can consider heparin in meantime prior to restarting home Xarelto, will defer to primary and surgical team.      Thank you for the consult.  Will sign off.  Please constact us if you have any additional questions.      Tyler Mckeon MD  [facility]

## 2023-01-31 NOTE — PLAN OF CARE
CMICU DAILY GOALS       A: Awake    RASS: Goal - RASS Goal: 0-->alert and calm  Actual - RASS (Braxton Agitation-Sedation Scale): 0-->alert and calm   Restraint necessity: Clinical Justification: Removing medical devices, Climbing out of bed  B: Breathe   SBT: Not intubated   C: Coordinate A & B, analgesics/sedatives   Pain: managed    SAT: Not intubated  D: Delirium   CAM-ICU: Overall CAM-ICU: Negative  E: Early(intubated/ Progressive (non-intubated) Mobility   MOVE Screen: Pass   Activity: Activity Management: Arm raise - L1, Rolling - L1  FAS: Feeding/Nutrition   Diet order: Diet/Nutrition Received: regular, Specialty Diet/Nutrition Received: dysphagia mechanically altered  T: Thrombus   DVT prophylaxis: VTE Required Core Measure: Pharmacological prophylaxis initiated/maintained  H: HOB Elevation   Head of Bed (HOB) Positioning: HOB elevated  U: Ulcer Prophylaxis   GI: yes  G: Glucose control   managed Glycemic Management: blood glucose monitored  S: Skin   Bathing/Skin Care: incontinence care  Device Skin Pressure Protection: adhesive use limited, absorbent pad utilized/changed  Pressure Reduction Devices: pressure-redistributing mattress utilized  Pressure Reduction Techniques: frequent weight shift encouraged, positioned off wounds, pressure points protected  Skin Protection: adhesive use limited  B: Bowel Function   no issues   I: Indwelling Catheters   Bose necessity:      Urethral Catheter 01/16/23 1347 Latex 14 Fr.-Reason for Continuing Urinary Catheterization: Critically ill in ICU and requiring hourly monitoring of intake/output   CVC necessity: No  D: De-escalation Antibiotics   Yes    Family/Goals of care/Code Status   Code Status: Full Code    24H Vital Sign Range  Temp:  [97.9 °F (36.6 °C)-98.9 °F (37.2 °C)]   Pulse:  []   Resp:  [10-54]   BP: ()/(50-75)   SpO2:  [92 %-99 %]      Shift Events   No acute events throughout shift    VS and assessment per flow sheet, patient progressing  towards goals as tolerated, plan of care reviewed with family, all concerns addressed, will continue to monitor.    Elizabeth Leon

## 2023-02-01 DIAGNOSIS — N17.9 AKI (ACUTE KIDNEY INJURY): Primary | ICD-10-CM

## 2023-02-01 LAB
ALBUMIN SERPL BCP-MCNC: 2.2 G/DL (ref 3.5–5.2)
ALP SERPL-CCNC: 148 U/L (ref 55–135)
ALT SERPL W/O P-5'-P-CCNC: 46 U/L (ref 10–44)
ANION GAP SERPL CALC-SCNC: 12 MMOL/L (ref 8–16)
ANISOCYTOSIS BLD QL SMEAR: SLIGHT
AST SERPL-CCNC: 125 U/L (ref 10–40)
BASOPHILS # BLD AUTO: 0.19 K/UL (ref 0–0.2)
BASOPHILS NFR BLD: 1.4 % (ref 0–1.9)
BILIRUB SERPL-MCNC: 3.6 MG/DL (ref 0.1–1)
BUN SERPL-MCNC: 35 MG/DL (ref 8–23)
CALCIUM SERPL-MCNC: 8.5 MG/DL (ref 8.7–10.5)
CHLORIDE SERPL-SCNC: 98 MMOL/L (ref 95–110)
CO2 SERPL-SCNC: 26 MMOL/L (ref 23–29)
CREAT SERPL-MCNC: 2.1 MG/DL (ref 0.5–1.4)
CREAT UR-MCNC: 38 MG/DL (ref 23–375)
DIFFERENTIAL METHOD: ABNORMAL
EOSINOPHIL # BLD AUTO: 0.2 K/UL (ref 0–0.5)
EOSINOPHIL NFR BLD: 1.3 % (ref 0–8)
ERYTHROCYTE [DISTWIDTH] IN BLOOD BY AUTOMATED COUNT: 21.7 % (ref 11.5–14.5)
EST. GFR  (NO RACE VARIABLE): 35.2 ML/MIN/1.73 M^2
GLUCOSE SERPL-MCNC: 107 MG/DL (ref 70–110)
HCT VFR BLD AUTO: 29.9 % (ref 40–54)
HGB BLD-MCNC: 9.4 G/DL (ref 14–18)
HYPOCHROMIA BLD QL SMEAR: ABNORMAL
IMM GRANULOCYTES # BLD AUTO: 0.15 K/UL (ref 0–0.04)
IMM GRANULOCYTES NFR BLD AUTO: 1.1 % (ref 0–0.5)
INR PPP: 1.3 (ref 0.8–1.2)
LYMPHOCYTES # BLD AUTO: 1.5 K/UL (ref 1–4.8)
LYMPHOCYTES NFR BLD: 10.9 % (ref 18–48)
MAGNESIUM SERPL-MCNC: 1.9 MG/DL (ref 1.6–2.6)
MCH RBC QN AUTO: 30.9 PG (ref 27–31)
MCHC RBC AUTO-ENTMCNC: 31.4 G/DL (ref 32–36)
MCV RBC AUTO: 98 FL (ref 82–98)
MONOCYTES # BLD AUTO: 1.3 K/UL (ref 0.3–1)
MONOCYTES NFR BLD: 9.6 % (ref 4–15)
NEUTROPHILS # BLD AUTO: 10.2 K/UL (ref 1.8–7.7)
NEUTROPHILS NFR BLD: 75.7 % (ref 38–73)
NRBC BLD-RTO: 0 /100 WBC
OVALOCYTES BLD QL SMEAR: ABNORMAL
PHOSPHATE SERPL-MCNC: 2.8 MG/DL (ref 2.7–4.5)
PLATELET # BLD AUTO: 233 K/UL (ref 150–450)
PMV BLD AUTO: 11.6 FL (ref 9.2–12.9)
POCT GLUCOSE: 117 MG/DL (ref 70–110)
POIKILOCYTOSIS BLD QL SMEAR: SLIGHT
POLYCHROMASIA BLD QL SMEAR: ABNORMAL
POTASSIUM SERPL-SCNC: 3.6 MMOL/L (ref 3.5–5.1)
PROT SERPL-MCNC: 7.2 G/DL (ref 6–8.4)
PROT UR-MCNC: 40 MG/DL (ref 0–15)
PROT/CREAT UR: 1.05 MG/G{CREAT} (ref 0–0.2)
PROTHROMBIN TIME: 13 SEC (ref 9–12.5)
RBC # BLD AUTO: 3.04 M/UL (ref 4.6–6.2)
SODIUM SERPL-SCNC: 136 MMOL/L (ref 136–145)
SPHEROCYTES BLD QL SMEAR: ABNORMAL
TARGETS BLD QL SMEAR: ABNORMAL
WBC # BLD AUTO: 13.48 K/UL (ref 3.9–12.7)

## 2023-02-01 PROCEDURE — 27000221 HC OXYGEN, UP TO 24 HOURS

## 2023-02-01 PROCEDURE — 25000003 PHARM REV CODE 250: Performed by: INTERNAL MEDICINE

## 2023-02-01 PROCEDURE — 99232 PR SUBSEQUENT HOSPITAL CARE,LEVL II: ICD-10-PCS | Mod: ,,, | Performed by: INTERNAL MEDICINE

## 2023-02-01 PROCEDURE — 85025 COMPLETE CBC W/AUTO DIFF WBC: CPT | Performed by: STUDENT IN AN ORGANIZED HEALTH CARE EDUCATION/TRAINING PROGRAM

## 2023-02-01 PROCEDURE — 20000000 HC ICU ROOM

## 2023-02-01 PROCEDURE — 25000003 PHARM REV CODE 250: Performed by: STUDENT IN AN ORGANIZED HEALTH CARE EDUCATION/TRAINING PROGRAM

## 2023-02-01 PROCEDURE — 99232 SBSQ HOSP IP/OBS MODERATE 35: CPT | Mod: ,,, | Performed by: INTERNAL MEDICINE

## 2023-02-01 PROCEDURE — 94761 N-INVAS EAR/PLS OXIMETRY MLT: CPT

## 2023-02-01 PROCEDURE — 99900035 HC TECH TIME PER 15 MIN (STAT)

## 2023-02-01 PROCEDURE — 63600175 PHARM REV CODE 636 W HCPCS: Performed by: STUDENT IN AN ORGANIZED HEALTH CARE EDUCATION/TRAINING PROGRAM

## 2023-02-01 PROCEDURE — 99233 SBSQ HOSP IP/OBS HIGH 50: CPT | Mod: ,,, | Performed by: INTERNAL MEDICINE

## 2023-02-01 PROCEDURE — 83735 ASSAY OF MAGNESIUM: CPT | Performed by: STUDENT IN AN ORGANIZED HEALTH CARE EDUCATION/TRAINING PROGRAM

## 2023-02-01 PROCEDURE — 85610 PROTHROMBIN TIME: CPT | Performed by: STUDENT IN AN ORGANIZED HEALTH CARE EDUCATION/TRAINING PROGRAM

## 2023-02-01 PROCEDURE — 99233 PR SUBSEQUENT HOSPITAL CARE,LEVL III: ICD-10-PCS | Mod: ,,, | Performed by: INTERNAL MEDICINE

## 2023-02-01 PROCEDURE — 25000003 PHARM REV CODE 250

## 2023-02-01 PROCEDURE — 82570 ASSAY OF URINE CREATININE: CPT | Performed by: STUDENT IN AN ORGANIZED HEALTH CARE EDUCATION/TRAINING PROGRAM

## 2023-02-01 PROCEDURE — 84100 ASSAY OF PHOSPHORUS: CPT | Performed by: STUDENT IN AN ORGANIZED HEALTH CARE EDUCATION/TRAINING PROGRAM

## 2023-02-01 PROCEDURE — 80053 COMPREHEN METABOLIC PANEL: CPT | Performed by: STUDENT IN AN ORGANIZED HEALTH CARE EDUCATION/TRAINING PROGRAM

## 2023-02-01 RX ORDER — MAGNESIUM SULFATE HEPTAHYDRATE 40 MG/ML
2 INJECTION, SOLUTION INTRAVENOUS ONCE
Status: COMPLETED | OUTPATIENT
Start: 2023-02-01 | End: 2023-02-01

## 2023-02-01 RX ORDER — METOPROLOL SUCCINATE 50 MG/1
50 TABLET, EXTENDED RELEASE ORAL DAILY
Status: DISCONTINUED | OUTPATIENT
Start: 2023-02-02 | End: 2023-02-03

## 2023-02-01 RX ORDER — METOPROLOL SUCCINATE 25 MG/1
25 TABLET, EXTENDED RELEASE ORAL ONCE
Status: COMPLETED | OUTPATIENT
Start: 2023-02-01 | End: 2023-02-01

## 2023-02-01 RX ORDER — TORSEMIDE 10 MG/1
10 TABLET ORAL DAILY
Status: DISCONTINUED | OUTPATIENT
Start: 2023-02-02 | End: 2023-02-06

## 2023-02-01 RX ADMIN — AMIODARONE HYDROCHLORIDE 400 MG: 200 TABLET ORAL at 08:02

## 2023-02-01 RX ADMIN — PANTOPRAZOLE SODIUM 40 MG: 40 TABLET, DELAYED RELEASE ORAL at 04:02

## 2023-02-01 RX ADMIN — LACTULOSE 10 G: 20 SOLUTION ORAL at 08:02

## 2023-02-01 RX ADMIN — PANTOPRAZOLE SODIUM 40 MG: 40 TABLET, DELAYED RELEASE ORAL at 06:02

## 2023-02-01 RX ADMIN — RIVAROXABAN 15 MG: 15 TABLET, FILM COATED ORAL at 04:02

## 2023-02-01 RX ADMIN — MAGNESIUM SULFATE 2 G: 2 INJECTION INTRAVENOUS at 11:02

## 2023-02-01 RX ADMIN — THERA TABS 1 TABLET: TAB at 08:02

## 2023-02-01 RX ADMIN — FOLIC ACID 1 MG: 1 TABLET ORAL at 08:02

## 2023-02-01 RX ADMIN — METOPROLOL SUCCINATE 25 MG: 25 TABLET, EXTENDED RELEASE ORAL at 08:02

## 2023-02-01 RX ADMIN — METOPROLOL SUCCINATE 25 MG: 25 TABLET, EXTENDED RELEASE ORAL at 11:02

## 2023-02-01 RX ADMIN — TORSEMIDE 50 MG: 20 TABLET ORAL at 08:02

## 2023-02-01 RX ADMIN — THIAMINE HCL TAB 100 MG 100 MG: 100 TAB at 08:02

## 2023-02-01 NOTE — PLAN OF CARE
Pt monitored this shift. 4 loose Bms post lactulose PO. Patient bathed and linens changed. Remained on 3L/M NC. Bose UOP 1000. AM labs drawn, no replacements needed.       Problem: Adult Inpatient Plan of Care  Goal: Plan of Care Review  Outcome: Ongoing, Progressing  Goal: Patient-Specific Goal (Individualized)  Outcome: Ongoing, Progressing  Goal: Absence of Hospital-Acquired Illness or Injury  Outcome: Ongoing, Progressing  Goal: Optimal Comfort and Wellbeing  Outcome: Ongoing, Progressing  Goal: Readiness for Transition of Care  Outcome: Ongoing, Progressing     Problem: Diabetes Comorbidity  Goal: Blood Glucose Level Within Targeted Range  Outcome: Ongoing, Progressing     Problem: Adjustment to Illness (Sepsis/Septic Shock)  Goal: Optimal Coping  Outcome: Ongoing, Progressing     Problem: Bleeding (Sepsis/Septic Shock)  Goal: Absence of Bleeding  Outcome: Ongoing, Progressing     Problem: Glycemic Control Impaired (Sepsis/Septic Shock)  Goal: Blood Glucose Level Within Desired Range  Outcome: Ongoing, Progressing     Problem: Infection Progression (Sepsis/Septic Shock)  Goal: Absence of Infection Signs and Symptoms  Outcome: Ongoing, Progressing     Problem: Nutrition Impaired (Sepsis/Septic Shock)  Goal: Optimal Nutrition Intake  Outcome: Ongoing, Progressing     Problem: Fluid and Electrolyte Imbalance (Acute Kidney Injury/Impairment)  Goal: Fluid and Electrolyte Balance  Outcome: Ongoing, Progressing     Problem: Oral Intake Inadequate (Acute Kidney Injury/Impairment)  Goal: Optimal Nutrition Intake  Outcome: Ongoing, Progressing     Problem: Renal Function Impairment (Acute Kidney Injury/Impairment)  Goal: Effective Renal Function  Outcome: Ongoing, Progressing     Problem: Infection  Goal: Absence of Infection Signs and Symptoms  Outcome: Ongoing, Progressing     Problem: Skin Injury Risk Increased  Goal: Skin Health and Integrity  Outcome: Ongoing, Progressing     Problem: Fall Injury Risk  Goal:  Absence of Fall and Fall-Related Injury  Outcome: Ongoing, Progressing     Problem: Device-Related Complication Risk (Hemodialysis)  Goal: Safe, Effective Therapy Delivery  Outcome: Ongoing, Progressing     Problem: Hemodynamic Instability (Hemodialysis)  Goal: Effective Tissue Perfusion  Outcome: Ongoing, Progressing     Problem: Infection (Hemodialysis)  Goal: Absence of Infection Signs and Symptoms  Outcome: Ongoing, Progressing     Problem: Coping Ineffective  Goal: Effective Coping  Outcome: Ongoing, Progressing     Problem: Restraint, Nonbehavioral (Nonviolent)  Goal: Absence of Harm or Injury  Outcome: Ongoing, Progressing

## 2023-02-01 NOTE — PLAN OF CARE
[facility]  Discharge Reassessment    Primary Care Provider: Yuli Pérez Mai, MD    Expected Discharge Date: 2/3/2023    Patient medically ready to discharge per MD. Pending acceptance and authorization for [facility].    Reassessment (most recent)       Discharge Reassessment - 02/01/23 1532          Discharge Reassessment    Assessment Type Discharge Planning Reassessment     Did the patient's condition or plan change since previous assessment? No     Discharge Plan discussed with: Spouse/sig other     Name(s) and Number(s) Adelita Griffin, significant other [phone]     Communicated DALILA with patient/caregiver Date not available/Unable to determine     Discharge Plan A Rehab     Discharge Plan B Home Health     DME Needed Upon Discharge  other (see comments)   TBD    Discharge Barriers Identified Underinsured     Why the patient remains in the hospital Requires continued medical care        Post-Acute Status    Post-Acute Authorization Placement     Post-Acute Placement Status Pending payor review/awaiting authorization (if required)     Discharge Delays None known at this time                   Rachel Whelan RN     [phone]

## 2023-02-01 NOTE — PROGRESS NOTES
[facility] - Card[facility]  Critical Care Medicine  Progress Note    Patient Name: Jonny Isabel  MRN: [id number]  Admission Date: 1/16/2023  Hospital Length of Stay: 16 days  Code Status: Full Code  Attending Provider: Benita Little MD  Primary Care Provider: Yuli Pérez Mai, MD   Principal Problem: Atrial fibrillation with rapid ventricular response    Subjective:     HPI:  Mr. Fuller is a 62-year-old male with a PMHx of A-fibb (on Eliquis), hypertension, DM 2 (not insulin dependent) presents as transfer from OSH for chronic lower back that has been worsening for the past week. Pt states that the pain is radiating from his right buttock down to his legs. Pt was unable to get out of bed this AM due to the pain. HE endorses having fecal incontinence for the past week with black tarry stools. He denies any fevers, chills, abdominal pain, urinary incontinence, or saddle anesthesia.     At OSH ED CT lumbar spine ordered revealing Prominent, cystic, multiloculated predominantly gas attenuation epidural  structure within the spinal canal at the level of L3-L4 resulting in severe narrowing of the spinal canal with mass effect upon the thecal sac and  vacuum disc phenomena concerning for diskitis or an epidural abscess. MRI shows large disc extrusion at L3-4 causing severe spinal canal stenosis, with moderate spinal canal stenosis at L2-3 and L4-5 and moderate neural foraminal narrowing L4-5 and L5-S1. Pt transferred to [facility] for further intervention with neurosurgery. Admitted to MICU for NSGY and GI evaluation . Hgb stable. EGD shows small varices with gastropathy, no active bleeds. Worsening MARTITA with minimal UOP, HRS protocol started with levo, midodrine, octreotide and albumin trailed, has since been stopped due to low suspicion. Nephrology following recommend HD in setting of Acute Renal Failure. NSGY initially planned surgical intervention for 1/23 but determine patient not medically optimized and currently suggesting  multimodal pain control. A-fibb RVR 1/23 overnight, started on Amiodarone gtt, transitioned to home lopressor. Ongoing HD needs.     Stepped down to hospital medicine on 1/25. On 1/27, pt developed afib with rvr, refractory to IV Lopresor x 2, developed hypotension subsequently and was stepped back up to MICU.          Hospital/ICU Course:  Admitted to MICU for NSGY and GI evaluation . Hgb stable. EGD shows small varices with gastropathy, no active bleeds. Worsening MARTITA with minimal UOP, HRS protocol started with levo, midodrine, octreotide and albumin trailed, has since been stopped due to low suspicion. Nephrology following recommend HD in setting of Acute Renal Failure (s/p 1 session SLED, s/p 1 session HD). NSGY initially planned surgical intervention for 1/23 but determine patient not medically optimized and currently suggesting multimodal pain control. A-fibb RVR 1/23 overnight, started on Amiodarone gtt, transitioned to home lopressor. Lasix trailed with good U/O. Added Torsemide for diuresis. Amio gtt restarted due to a-fibb with RVR, transitioned to PO. Continues to have rate control on amiodarone and metoprolol succinate. Stable for discharge with close follow up 02/01.       Interval History/Significant Events: No events    Review of Systems   Constitutional:  Negative for chills and fever.   HENT:  Negative for congestion and sore throat.    Eyes:  Negative for discharge and redness.   Respiratory:  Negative for shortness of breath, wheezing and stridor.    Cardiovascular:  Negative for chest pain, palpitations and leg swelling.   Gastrointestinal:  Negative for diarrhea, nausea, rectal pain and vomiting.   Genitourinary:  Negative for dysuria and urgency.   Musculoskeletal:  Positive for back pain.   Neurological:  Negative for dizziness.   Psychiatric/Behavioral:  Negative for agitation.    Objective:     Vital Signs (Most Recent):  Temp: 99.1 °F (37.3 °C) (02/01/23 1101)  Pulse: 88 (02/01/23  1200)  Resp: (!) 21 (02/01/23 1200)  BP: 130/67 (02/01/23 1200)  SpO2: 95 % (02/01/23 1200)   Vital Signs (24h Range):  Temp:  [98.3 °F (36.8 °C)-99.1 °F (37.3 °C)] 99.1 °F (37.3 °C)  Pulse:  [75-99] 88  Resp:  [13-72] 21  SpO2:  [89 %-99 %] 95 %  BP: (100-141)/(51-75) 130/67   Weight: 110 kg (242 lb 8.1 oz)  Body mass index is 36.87 kg/m².      Intake/Output Summary (Last 24 hours) at 2/1/2023 1247  Last data filed at 2/1/2023 1132  Gross per 24 hour   Intake 360 ml   Output 2400 ml   Net -2040 ml       Physical Exam  Vitals and nursing note reviewed.   Constitutional:       General: He is not in acute distress.     Appearance: Normal appearance. He is not ill-appearing or toxic-appearing.   HENT:      Head: Normocephalic and atraumatic.      Nose: Nose normal.      Mouth/Throat:      Mouth: Mucous membranes are moist.      Pharynx: Oropharynx is clear.   Eyes:      General: No scleral icterus.     Extraocular Movements: Extraocular movements intact.      Conjunctiva/sclera: Conjunctivae normal.      Pupils: Pupils are equal, round, and reactive to light.   Cardiovascular:      Rate and Rhythm: Normal rate. Rhythm irregular.      Heart sounds: Normal heart sounds. No murmur heard.     Comments: Irregularly irregular pulses  Pulmonary:      Effort: Pulmonary effort is normal. No respiratory distress.      Breath sounds: Normal breath sounds. No stridor. No wheezing.   Abdominal:      General: Abdomen is flat. Bowel sounds are normal. There is distension.      Palpations: Abdomen is soft.      Tenderness: There is no abdominal tenderness. There is no guarding or rebound.   Musculoskeletal:      Cervical back: Normal range of motion and neck supple. No rigidity.   Skin:     General: Skin is warm and dry.      Capillary Refill: Capillary refill takes less than 2 seconds.      Coloration: Skin is not jaundiced or pale.      Findings: No bruising or rash.   Neurological:      General: No focal deficit present.       Mental Status: He is alert and oriented to person, place, and time. Mental status is at baseline.      Sensory: No sensory deficit.      Motor: No weakness.   Psychiatric:         Mood and Affect: Mood normal.         Behavior: Behavior normal.       Vents:  Oxygen Concentration (%): 100 (01/31/23 0600)  Lines/Drains/Airways       Drain  Duration                  Open Drain 01/17/23 0800 Left;Anterior LLQ 15 days         Urethral Catheter 01/16/23 1347 Latex 14 Fr. 15 days              Peripheral Intravenous Line  Duration                  Peripheral IV - Single Lumen 01/27/23 1200 18 G Right Antecubital 5 days                  Significant Labs:    CBC/Anemia Profile:  Recent Labs   Lab 01/31/23  0544 02/01/23  0403   WBC 14.67* 13.48*   HGB 8.6* 9.4*   HCT 28.0* 29.9*    233   MCV 99* 98   RDW 21.6* 21.7*        Chemistries:  Recent Labs   Lab 01/30/23  1531 01/31/23  0544 02/01/23  0403    133* 136   K 3.4* 4.2 3.6   CL 96 96 98   CO2 29 27 26   BUN 30* 34* 35*   CREATININE 1.8* 2.0* 2.1*   CALCIUM 8.2* 8.2* 8.5*   ALBUMIN  --  2.0* 2.2*   PROT  --  7.0 7.2   BILITOT  --  3.3* 3.6*   ALKPHOS  --  152* 148*   ALT  --  41 46*   AST  --  117* 125*   MG 2.0 1.9 1.9   PHOS  --  3.8 2.8       All pertinent labs within the past 24 hours have been reviewed.    Significant Imaging:  I have reviewed all pertinent imaging results/findings within the past 24 hours.      ABG  Recent Labs   Lab 01/27/23  0954   PH 7.384   PO2 71*   PCO2 36.9   HCO3 22.1*   BE -3     Assessment/Plan:     Neuro  Lumbar herniated disc  NSGY holding surgery due to unstable status.    Spinal stenosis  CT Lumbar Spine Without Contrast Result Date: 1/16/2023  1.  Prominent, cystic, multiloculated predominantly gas attenuation structure within the spinal canal at the level of L3-L4 with dimensions as above.  This is favored to be epidural in location and results in severe narrowing of the spinal canal with mass effect upon the thecal  sac.  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 Large disc extrusion at L3-4, contributing to severe spinal canal stenosis, as above. Additional lumbar spondylosis, contributing to moderate spinal canal stenosis at L2-3 and L4-5 and moderate neural foraminal narrowing L4-5 and L5-S1    - Discharge with appointment on 03/01 in NSGY clinic  - Skilled PT/OT to maximize mobility      Psychiatric  Alcohol withdrawal syndrome without complication  Out of acute withdrawal period     Alcohol abuse  Out of acute withdrawal period       Cardiac/Vascular  * Atrial fibrillation with rapid ventricular response  Lopressor was started while on hospital medicine at 25 mg BID (home does lopressor 100 mg QD). Pt went into afib with rvr on 1/27, IV lopressor given x 2 with subsequent drop of BP, re-started on amiodarone gtt, Amiodarone gtt switched to PO. Runs of Vtach on 1/30 with highest at 11 beats. Sustained tachycardia in 150s.    -Cardiology recommended lopressor   -Will discharge on  Amiodarone 400 bid loading dose x2 weeks the transition to 200mg bid  - Re-initiated xarelto 1/31  -Maintain K > 4, Mag > 2 and Ca/iCal WNL to decrease arrhythmogenic potential  - Will discharge on metoprolol succinate at 50mg daily       Hemorrhagic shock  Resolved     Tachycardia  See primary problem    Renal/  ATN (acute tubular necrosis)  Likely secondary to acute hypotension. Cr steadily improving    -IVF rehydration      MARTITA (acute kidney injury)  Creatinine 4.7 on admit, baseline around 0.8. Pre-renal vs ATN. Less likely obstruction as pt has gomez.  Renal ultrasound with medical renal disease and no evidence of hydronephrosis.    - Avoid nephrotoxic agents such as NSAIDs, gadolinium and IV radiocontrast.  - Renally dose meds to current GFR.  - Torsemide 50 mg daily, continue OP  - Follow up with nephrology in 2 weeks          ID  Cellulitis  R hand  seems to be cellulitic in appearance. Extending up to forearm.     Plan:  - Completed 10 days of Cefepime on 1/25  RESOLVED      Endocrine  Diabetes  -Last A1c reviewed-   Lab Results   Component Value Date    HGBA1C 5.3 11/10/2022       Home Antihyperglycemic Regiment:  - Metformin  1000 mg BID     Inpatient Antihyperglycemic Regiment:  Antihyperglycemics (From admission, onward)    Start     Stop Route Frequency Ordered    01/16/23 1507  insulin aspart U-100 pen 0-5 Units         -- SubQ Before meals & nightly PRN 01/16/23 1407        - LDSSI  - diabetic diet     Blood Sugars (AccuCheck):    Recent Labs     01/29/23 2018 01/30/23  0837 01/30/23  1048 01/30/23  1528 01/30/23 2014 01/31/23  2045   POCTGLUCOSE 136* 118* 128* 128* 106 120*           GI  Alcoholic cirrhosis of liver with ascites  S/p diagnostic paracentesis on 1/17 with , 50% seg. Cytology negative for malignant cell     - Palliative care consult     Small bowel obstruction  CT Abdomen Pelvis  Without Contrast. Findings concerning for developing small bowel obstruction with suspected transition point seen within the midline mid to lower abdomen at the level of L4  Pt without symptoms of nausea or vomiting. Reportedly had a BM at the outside facility on 1/16/2023. Abdomen is distended.     - Resolved will have patient monitor at home      Elevated liver enzymes  Likely 2/2 to underlying alcohol abuse and hepatic steatosis. Complete workup done with tylenol level, acute hep panel, a1-antitrypsin, anti smith antibody, HIV, anti mitochondrial antibody, anca, anti-liver, aFP. Liver US with doppler shows epatomegaly with hepatic steatosis, cholelithiasis versus gallbladder sludge, small volume ascites, and left pleural effusion.    - Daily CMP, PT/INR   - Lactulose 10g bid at home    GI bleed  Concerning for variceal bleed given hx of alcohol abuse.     - Regular diet  -Transfuse pRBC for Hb < 7 g/dL (Consider a higher Hb target if there is clinical  evidence of intravascular volume depletion or comorbidities, such as CAD or if high suspicion of vigorous active ongoing bleeding or an uncorrected coagulopathy exists.).  - s/p vitamin K given Pt/INR > 2   - EGD showed  Small (< 5 mm) esophageal varices with portal hypertensive gastropathy  - Continue PO PPI 40 mg   - Stable after re-initiation of apixiban           Palliative Care  Advanced care planning/counseling discussion  Daily discussions with patient and family    Palliative care encounter  Palliative following    Goals of care, counseling/discussion  Palliative following  Full code    Other  Debility  PT/OT  Early mobility if possible    Encounter for social work intervention  Patient does not appear to have relatives.  He will need possible decision maker/power of  if his overall mental status does not improve.    - social work consulted for family assessment       Critical Care Daily Checklist:    A: Awake: RASS Goal/Actual Goal: RASS Goal: 0-->alert and calm  Actual: Braxton Agitation Sedation Scale (RASS): Alert and calm   B: Spontaneous Breathing Trial Performed?     C: SAT & SBT Coordinated?  n/a                      D: Delirium: CAM-ICU Overall CAM-ICU: Negative   E: Early Mobility Performed? Yes   F: Feeding Goal: Goals: Meet % EEN, EPN by RD f/u date  Status: Nutrition Goal Status: progressing towards goal   Current Diet Order   Procedures    Diet Adult Regular (IDDSI Level 7)      AS: Analgesia/Sedation hydromorphone   T: Thromboembolic Prophylaxis therapeutic   H: HOB > 300 Yes   U: Stress Ulcer Prophylaxis (if needed) pepcid    G: Glucose Control none   B: Bowel Function Stool Occurrence: 1   I: Indwelling Catheter (Lines & Bose) Necessity none   D: De-escalation of Antimicrobials/Pharmacotherapies none    Plan for the day/ETD Discharge to [facility]     Code Status:  Family/Goals of Care: Full Code         No longer requiring ICU level care      Critical care was time spent  personally by me on the following activities: development of treatment plan with patient or surrogate and bedside caregivers, discussions with consultants, evaluation of patient's response to treatment, examination of patient, ordering and performing treatments and interventions, ordering and review of laboratory studies, ordering and review of radiographic studies, pulse oximetry, re-evaluation of patient's condition. This critical care time did not overlap with that of any other provider or involve time for any procedures.     Harriett Bird MD  Critical Care Medicine  [facility] - Cardiac Medical [facility]

## 2023-02-01 NOTE — ASSESSMENT & PLAN NOTE
-Last A1c reviewed-   Lab Results   Component Value Date    HGBA1C 5.3 11/10/2022       Home Antihyperglycemic Regiment:  - Metformin  1000 mg BID     Inpatient Antihyperglycemic Regiment:  Antihyperglycemics (From admission, onward)    Start     Stop Route Frequency Ordered    01/16/23 1507  insulin aspart U-100 pen 0-5 Units         -- SubQ Before meals & nightly PRN 01/16/23 1407        - LDSSI  - Clear liquid diet for now     Blood Sugars (AccuCheck):    Recent Labs     01/29/23  1711 01/29/23  2018 01/30/23  0837 01/30/23  1048 01/30/23  1528 01/30/23 2014   POCTGLUCOSE 120* 136* 118* 128* 128* 106

## 2023-02-01 NOTE — PLAN OF CARE
CMICU DAILY GOALS       A: Awake    RASS: Goal - RASS Goal: 0-->alert and calm  Actual - RASS (Braxton Agitation-Sedation Scale): 0-->alert and calm   Restraint necessity: Clinical Justification: Removing medical devices, Climbing out of bed  B: Breathe   SBT: Not intubated   C: Coordinate A & B, analgesics/sedatives   Pain: managed    SAT: Not intubated  D: Delirium   CAM-ICU: Overall CAM-ICU: Negative  E: Early(intubated/ Progressive (non-intubated) Mobility   MOVE Screen: Pass   Activity: Activity Management: Arm raise - L1, Rolling - L1  FAS: Feeding/Nutrition   Diet order: Diet/Nutrition Received: regular, Specialty Diet/Nutrition Received: dysphagia mechanically altered  T: Thrombus   DVT prophylaxis: VTE Required Core Measure: Pharmacological prophylaxis initiated/maintained  H: HOB Elevation   Head of Bed (HOB) Positioning: HOB elevated  U: Ulcer Prophylaxis   GI: no  G: Glucose control   managed Glycemic Management: blood glucose monitored  S: Skin   Bathing/Skin Care: incontinence care  Device Skin Pressure Protection: absorbent pad utilized/changed, adhesive use limited  Pressure Reduction Devices: specialty bed utilized  Pressure Reduction Techniques: frequent weight shift encouraged, positioned off wounds, pressure points protected  Skin Protection: adhesive use limited  B: Bowel Function   diarrhea   I: Indwelling Catheters   Bose necessity:      Urethral Catheter 01/16/23 1347 Latex 14 Fr.-Reason for Continuing Urinary Catheterization: Critically ill in ICU and requiring hourly monitoring of intake/output   CVC necessity: Yes  D: De-escalation Antibiotics   Yes    Family/Goals of care/Code Status   Code Status: Full Code    24H Vital Sign Range  Temp:  [98.3 °F (36.8 °C)-99.1 °F (37.3 °C)]   Pulse:  [75-99]   Resp:  [13-72]   BP: (100-158)/(51-75)   SpO2:  [89 %-99 %]      Shift Events   No acute events throughout shift    VS and assessment per flow sheet, patient progressing towards goals as  tolerated, plan of care reviewed with family, all concerns addressed, will continue to monitor.    Elizabeth Leon

## 2023-02-01 NOTE — PROGRESS NOTES
[facility]  Critical Care Medicine  Progress Note    Patient Name: Jonny Isabel  MRN: [id number]  Admission Date: 1/16/2023  Hospital Length of Stay: 15 days  Code Status: Full Code  Attending Provider: Benita Little MD  Primary Care Provider: Yuli Pérez Mai, MD   Principal Problem: Atrial fibrillation with rapid ventricular response    Subjective:     HPI:  Mr. Fuller is a 62-year-old male with a PMHx of A-fibb (on Eliquis), hypertension, DM 2 (not insulin dependent) presents as transfer from OSH for chronic lower back that has been worsening for the past week. Pt states that the pain is radiating from his right buttock down to his legs. Pt was unable to get out of bed this AM due to the pain. HE endorses having fecal incontinence for the past week with black tarry stools. He denies any fevers, chills, abdominal pain, urinary incontinence, or saddle anesthesia.     At OSH ED CT lumbar spine ordered revealing Prominent, cystic, multiloculated predominantly gas attenuation epidural  structure within the spinal canal at the level of L3-L4 resulting in severe narrowing of the spinal canal with mass effect upon the thecal sac and  vacuum disc phenomena concerning for diskitis or an epidural abscess. MRI shows large disc extrusion at L3-4 causing severe spinal canal stenosis, with moderate spinal canal stenosis at L2-3 and L4-5 and moderate neural foraminal narrowing L4-5 and L5-S1. Pt transferred to [facility] for further intervention with neurosurgery. Admitted to MICU for NSGY and GI evaluation . Hgb stable. EGD shows small varices with gastropathy, no active bleeds. Worsening MARTITA with minimal UOP, HRS protocol started with levo, midodrine, octreotide and albumin trailed, has since been stopped due to low suspicion. Nephrology following recommend HD in setting of Acute Renal Failure. NSGY initially planned surgical intervention for 1/23 but determine patient not medically optimized and currently suggesting  multimodal pain control. A-fibb RVR 1/23 overnight, started on Amiodarone gtt, transitioned to home lopressor. Ongoing HD needs.     Stepped down to hospital medicine on 1/25. On 1/27, pt developed afib with rvr, refractory to IV Lopresor x 2, developed hypotension subsequently and was stepped back up to MICU.          Hospital/ICU Course:  Admitted to MICU for NSGY and GI evaluation . Hgb stable. EGD shows small varices with gastropathy, no active bleeds. Worsening MARTITA with minimal UOP, HRS protocol started with levo, midodrine, octreotide and albumin trailed, has since been stopped due to low suspicion. Nephrology following recommend HD in setting of Acute Renal Failure (s/p 1 session SLED, s/p 1 session HD). NSGY initially planned surgical intervention for 1/23 but determine patient not medically optimized and currently suggesting multimodal pain control. A-fibb RVR 1/23 overnight, started on Amiodarone gtt, transitioned to home lopressor. Lasix trailed with good U/O. Added Torsemide for diuresis. Amio gtt restarted due to a-fibb with RVR, transitioned to PO.       Interval History/Significant Events: no overnight events     Review of Systems   Unable to perform ROS: Mental status change   Objective:     Vital Signs (Most Recent):  Temp: 98.7 °F (37.1 °C) (01/31/23 1500)  Pulse: 82 (01/31/23 1800)  Resp: 20 (01/31/23 1800)  BP: (!) 127/59 (01/31/23 1800)  SpO2: 95 % (01/31/23 1800)   Vital Signs (24h Range):  Temp:  [97.9 °F (36.6 °C)-98.9 °F (37.2 °C)] 98.7 °F (37.1 °C)  Pulse:  [65-89] 82  Resp:  [10-54] 20  SpO2:  [92 %-99 %] 95 %  BP: ()/(50-75) 127/59   Weight: 110 kg (242 lb 8.1 oz)  Body mass index is 36.87 kg/m².      Intake/Output Summary (Last 24 hours) at 1/31/2023 1841  Last data filed at 1/31/2023 1704  Gross per 24 hour   Intake 399.9 ml   Output 1350 ml   Net -950.1 ml       Physical Exam  Vitals and nursing note reviewed.   Constitutional:       General: He is not in acute distress.      Appearance: Normal appearance. He is not ill-appearing or toxic-appearing.   HENT:      Head: Normocephalic and atraumatic.      Nose: Nose normal.      Mouth/Throat:      Mouth: Mucous membranes are moist.      Pharynx: Oropharynx is clear.   Eyes:      General: No scleral icterus.     Extraocular Movements: Extraocular movements intact.      Conjunctiva/sclera: Conjunctivae normal.      Pupils: Pupils are equal, round, and reactive to light.   Cardiovascular:      Rate and Rhythm: Tachycardia present. Rhythm irregular.      Heart sounds: Normal heart sounds. No murmur heard.     Comments: Irregularly irregular pulses  Pulmonary:      Effort: Pulmonary effort is normal. No respiratory distress.      Breath sounds: Normal breath sounds. No stridor. No wheezing.   Abdominal:      General: Abdomen is flat. Bowel sounds are normal. There is distension.      Palpations: Abdomen is soft.      Tenderness: There is no abdominal tenderness. There is no guarding or rebound.   Musculoskeletal:      Cervical back: Normal range of motion and neck supple. No rigidity.   Skin:     General: Skin is warm and dry.      Capillary Refill: Capillary refill takes less than 2 seconds.      Coloration: Skin is not jaundiced or pale.      Findings: No bruising or rash.   Neurological:      General: No focal deficit present.      Mental Status: He is alert and oriented to person, place, and time. Mental status is at baseline.      Sensory: No sensory deficit.      Motor: No weakness.   Psychiatric:         Mood and Affect: Mood normal.         Behavior: Behavior normal.       Vents:  Oxygen Concentration (%): 100 (01/31/23 0600)  Lines/Drains/Airways       Drain  Duration                  Urethral Catheter 01/16/23 1347 Latex 14 Fr. 15 days         Open Drain 01/17/23 0800 Left;Anterior LLQ 14 days              Peripheral Intravenous Line  Duration                  Peripheral IV - Single Lumen 01/27/23 1200 18 G Right Antecubital 4 days                   Significant Labs:    CBC/Anemia Profile:  Recent Labs   Lab 01/30/23  1049 01/31/23  0544   WBC 14.60* 14.67*   HGB 9.3* 8.6*   HCT 28.9* 28.0*    217   MCV 97 99*   RDW 21.8* 21.6*        Chemistries:  Recent Labs   Lab 01/30/23  0420 01/30/23  1531 01/31/23  0544    136 133*   K 3.1* 3.4* 4.2   CL 96 96 96   CO2 27 29 27   BUN 31* 30* 34*   CREATININE 1.8* 1.8* 2.0*   CALCIUM 8.1* 8.2* 8.2*   ALBUMIN 2.1*  --  2.0*   PROT 7.0  --  7.0   BILITOT 3.5*  --  3.3*   ALKPHOS 170*  --  152*   ALT 44  --  41   *  --  117*   MG 1.6 2.0 1.9   PHOS 3.8  --  3.8       All pertinent labs within the past 24 hours have been reviewed.    Significant Imaging:  I have reviewed all pertinent imaging results/findings within the past 24 hours.      ABG  Recent Labs   Lab 01/27/23  0954   PH 7.384   PO2 71*   PCO2 36.9   HCO3 22.1*   BE -3     Assessment/Plan:     Neuro  Lumbar herniated disc  NSGY holding surgery due to unstable status.    Spinal stenosis  CT Lumbar Spine Without Contrast Result Date: 1/16/2023  1.  Prominent, cystic, multiloculated predominantly gas attenuation structure within the spinal canal at the level of L3-L4 with dimensions as above.  This is favored to be epidural in location and results in severe narrowing of the spinal canal with mass effect upon the thecal sac.  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 Large disc extrusion at L3-4, contributing to severe spinal canal stenosis, as above. Additional lumbar spondylosis, contributing to moderate spinal canal stenosis at L2-3 and L4-5 and moderate neural foraminal narrowing L4-5 and L5-S1    - neuro checks Q4H      Psychiatric  Alcohol withdrawal syndrome without complication  Out of acute withdrawal period     Alcohol abuse  Out of acute withdrawal period       Cardiac/Vascular  * Atrial fibrillation with rapid ventricular  response  Lopressor was started while on hospital medicine at 25 mg BID (home does lopressor 100 mg QD). Pt went into afib with rvr on 1/27, IV lopressor given x 2 with subsequent drop of BP, re-started on amiodarone gtt, Amiodarone gtt switched to PO. Runs of Vtach on 1/30 with highest at 11 beats. Sustained tachycardia in 150s.    -Cardiology recommended lopressor   -Continue Amiodarone 400 bid loading dose x2 weeks the transition to 200mg bid  - Re-initiated xarelto 1/31  -Maintain K > 4, Mag > 2 and Ca/iCal WNL to decrease arrhythmogenic potential      Hemorrhagic shock  Resolved     Tachycardia  See primary problem    Renal/  ATN (acute tubular necrosis)  Likely secondary to acute hypotension. Cr steadily improving    -IVF rehydration      MARTITA (acute kidney injury)  Creatinine 4.7 on admit, baseline around 0.8. Pre-renal vs ATN. Less likely obstruction as pt has gomez.  Renal ultrasound with medical renal disease and no evidence of hydronephrosis.    - Nephrology consulted, appreciate recommendations  - Strict I&Os and daily weights   - Avoid nephrotoxic agents such as NSAIDs, gadolinium and IV radiocontrast.  - Renally dose meds to current GFR.  - Torsemide 50 mg daily  - Remove IJ CVC         ID  Cellulitis  R hand seems to be cellulitic in appearance. Extending up to forearm.     Plan:  - Completed 10 days of Cefepime on 1/25      Endocrine  Diabetes  -Last A1c reviewed-   Lab Results   Component Value Date    HGBA1C 5.3 11/10/2022       Home Antihyperglycemic Regiment:  - Metformin  1000 mg BID     Inpatient Antihyperglycemic Regiment:  Antihyperglycemics (From admission, onward)    Start     Stop Route Frequency Ordered    01/16/23 1507  insulin aspart U-100 pen 0-5 Units         -- SubQ Before meals & nightly PRN 01/16/23 1407        - LDSSI  - Clear liquid diet for now     Blood Sugars (AccuCheck):    Recent Labs     01/29/23  1711 01/29/23  2018 01/30/23  0837 01/30/23  1048 01/30/23  1528  01/30/23 2014   POCTGLUCOSE 120* 136* 118* 128* 128* 106           GI  Alcoholic cirrhosis of liver with ascites  S/p diagnostic paracentesis on 1/17 with , 50% seg. Cytology negative for malignant cell     - Palliative care consult     Small bowel obstruction  CT Abdomen Pelvis  Without Contrast. Findings concerning for developing small bowel obstruction with suspected transition point seen within the midline mid to lower abdomen at the level of L4  Pt without symptoms of nausea or vomiting. Reportedly had a BM at the outside facility on 1/16/2023. Abdomen is distended.     - Holding of NGT for now given no significant symptoms   - Monitor BM and GI symptoms, if worsen will notify GS       Elevated liver enzymes  Likely 2/2 to underlying alcohol abuse and hepatic steatosis. Complete workup done with tylenol level, acute hep panel, a1-antitrypsin, anti smith antibody, HIV, anti mitochondrial antibody, anca, anti-liver, aFP. Liver US with doppler shows epatomegaly with hepatic steatosis, cholelithiasis versus gallbladder sludge, small volume ascites, and left pleural effusion.    - Daily CMP, PT/INR   - Discontinue midodrine   - Lactulose 10g bid given increasing stool burden     GI bleed  Concerning for variceal bleed given hx of alcohol abuse.     - Regular diet  -Transfuse pRBC for Hb < 7 g/dL (Consider a higher Hb target if there is clinical evidence of intravascular volume depletion or comorbidities, such as CAD or if high suspicion of vigorous active ongoing bleeding or an uncorrected coagulopathy exists.).  - s/p vitamin K given Pt/INR > 2   -Avoid nonsteroidal agents, antiplatelet agents and anticoagulants if possible in patients without absolute contraindications. (consult managing provider if required for cardiovascular protection).  - EGD showed  Small (< 5 mm) esophageal varices with portal hypertensive gastropathy  - Continue PO PPI 40 mg           Palliative Care  Advanced care  planning/counseling discussion  Daily discussions with patient and family    Palliative care encounter  Palliative following    Goals of care, counseling/discussion  Palliative following  Full code    Other  Debility  PT/OT  Early mobility if possible    Encounter for social work intervention  Patient does not appear to have relatives.  He will need possible decision maker/power of  if his overall mental status does not improve.    - social work consulted for family assessment     Critical Care Daily Checklist:    A: Awake: RASS Goal/Actual Goal: RASS Goal: 0-->alert and calm  Actual: Braxton Agitation Sedation Scale (RASS): Alert and calm   B: Spontaneous Breathing Trial Performed?     C: SAT & SBT Coordinated?  n/a                      D: Delirium: CAM-ICU Overall CAM-ICU: Negative   E: Early Mobility Performed? Yes   F: Feeding Goal: Goals: Meet % EEN, EPN by RD f/u date  Status: Nutrition Goal Status: progressing towards goal   Current Diet Order   Procedures    Diet Adult Regular (IDDSI Level 7)      AS: Analgesia/Sedation none   T: Thromboembolic Prophylaxis On xarelto    H: HOB > 300 Yes   U: Stress Ulcer Prophylaxis (if needed) protonix bid   G: Glucose Control none   B: Bowel Function Stool Occurrence: 1   I: Indwelling Catheter (Lines & Bose) Necessity Bose    D: De-escalation of Antimicrobials/Pharmacotherapies none    Plan for the day/ETD Step down     Code Status:  Family/Goals of Care: Full Code  Step down       Critical secondary to Patient has a condition that poses threat to life and bodily function: No longer requiring ICU level care       Critical care was time spent personally by me on the following activities: development of treatment plan with patient or surrogate and bedside caregivers, discussions with consultants, evaluation of patient's response to treatment, examination of patient, ordering and performing treatments and interventions, ordering and review of laboratory  studies, ordering and review of radiographic studies, pulse oximetry, re-evaluation of patient's condition. This critical care time did not overlap with that of any other provider or involve time for any procedures.     Harriett Bird MD  Critical Care Medicine  [facility] - Cardiac Medical ICU

## 2023-02-01 NOTE — PROGRESS NOTES
[facility]  Nephrology  Progress Note    Patient Name: Jonny Isabel  MRN: [id number]  Admission Date: 1/16/2023  Hospital Length of Stay: 16 days  Attending Provider: Benita Little MD   Primary Care Physician: Yuli Pérez Mai, MD  Principal Problem:Atrial fibrillation with rapid ventricular response    Subjective:     HPI: 62 year old male with a history of alcohol abuse, afib/flutter on xarelto, HTN presents with concern for severe spinal canal stenosis/vacuum disc phenomena, concern for a GI bleed and MARTITA. He presents at behest of his girlfriend after being unable to stand, he has a history of sciatica but reports that this is worse. He has had fecal incontinence for the last week.     He has no report of kidney issues in the past and reports no family history of such either. At time of consultation he is pending an EGD for GI bleed. CT abdomen with large liver and concern for SBO per report, no hydronephrosis. UA with 2+ protein, 1+ occult blood, urine sodium 25 and urine osm 302. On admission serum creatinine 5 (baseline 0.8).      Interval History: serum creatinine stable, fluctuating around 2    Review of patient's allergies indicates:  No Known Allergies  Current Facility-Administered Medications   Medication Frequency    0.9%  NaCl infusion (for blood administration) Q24H PRN    albuterol-ipratropium 2.5 mg-0.5 mg/3 mL nebulizer solution 3 mL Q6H PRN    amiodarone tablet 400 mg BID    Followed by    [START ON 2/9/2023] amiodarone tablet 200 mg Daily    benzonatate capsule 200 mg TID PRN    dextrose 10% bolus 125 mL 125 mL PRN    dextrose 10% bolus 250 mL 250 mL PRN    folic acid tablet 1 mg Daily    glucagon (human recombinant) injection 1 mg PRN    glucose chewable tablet 16 g PRN    glucose chewable tablet 24 g PRN    HYDROmorphone injection 0.2 mg Q6H PRN    insulin aspart U-100 pen 0-5 Units QID (AC + HS) PRN    lactulose 20 gram/30 mL solution Soln 10 g BID    LIDOcaine-prilocaine cream  PRN    metoprolol injection 5 mg Q5 Min PRN    metoprolol succinate (TOPROL-XL) 24 hr tablet 25 mg Daily    multivitamin tablet Daily    pantoprazole EC tablet 40 mg BID AC    rivaroxaban tablet 15 mg Daily with dinner    thiamine tablet 100 mg Daily    torsemide tablet 50 mg Daily       Objective:     Vital Signs (Most Recent):  Temp: 98.7 °F (37.1 °C) (02/01/23 0700)  Pulse: 99 (02/01/23 0900)  Resp: 20 (02/01/23 0900)  BP: 134/75 (02/01/23 0900)  SpO2: 99 % (02/01/23 0900) Vital Signs (24h Range):  Temp:  [98.3 °F (36.8 °C)-98.9 °F (37.2 °C)] 98.7 °F (37.1 °C)  Pulse:  [75-99] 99  Resp:  [13-72] 20  SpO2:  [89 %-99 %] 99 %  BP: (100-136)/(51-75) 134/75     Weight: 110 kg (242 lb 8.1 oz) (01/31/23 1031)  Body mass index is 36.87 kg/m².  Body surface area is 2.3 meters squared.    I/O last 3 completed shifts:  In: 759.9 [P.O.:360; IV Piggyback:399.9]  Out: 2350 [Urine:2350]    Physical Exam  Constitutional:       General: He is not in acute distress.     Appearance: He is obese. He is not ill-appearing or toxic-appearing.   HENT:      Head: Normocephalic and atraumatic.      Nose: Nose normal.      Mouth/Throat:      Mouth: Mucous membranes are moist.   Eyes:      General:         Right eye: No discharge.         Left eye: No discharge.      Pupils: Pupils are equal, round, and reactive to light.   Cardiovascular:      Heart sounds: Murmur heard.   Abdominal:      General: There is distension.      Tenderness: There is no abdominal tenderness.   Musculoskeletal:         General: Normal range of motion.      Cervical back: Normal range of motion.      Right lower leg: No edema.      Left lower leg: No edema.   Skin:     General: Skin is warm.      Coloration: Skin is pale.      Findings: No lesion.   Neurological:      Mental Status: He is alert and oriented to person, place, and time.       Significant Labs:  All labs within the past 24 hours have been reviewed.     Significant Imaging:  Labs:  Reviewed    Assessment/Plan:     MARTITA (acute kidney injury)  Size of liver suggest other etiology than cirrhosis, however also has esophageal varices, possible HRS. CT and US renal with no signs of obstruction.     Urine microscopy: (1/17) no casts identified, amorphous material (though only 5 cc obtained). Repeat with few granular casts (1/18). Repeat on 1/19 with granular casts. 1/20: moderated (2-3 per field) granular casts with calcium oxylate crystals embedded in casts.     Urine protein 4.59g and repeat with 2.5  C3/4 - 111/35  LILO with ratio of 1.98  Hep B and C negative  Uric acid 13    HIV negative  Iron 31, TIBC 151, Tsat 21, Transferrin 102, ferritin 135  KEATON negative  ANCA negative  Cryo negative    1/19: repeat Urine sodium 10, urine protein creatinine ratio 0.89. IgA 505. Urine microscopy: moderated (2-3 per field) granular casts with calcium oxylate crystals embedded in casts, overall findings consistent with ATN    Overall impression: UOP responsive to 240 IV lasix 1/25. He did require RRT 1/22 for metabolic encephalopathy and work of breathing. Serum creatinine has been stable around 2.0, baseline 0.8 (11/2022 Care everywhere)    Recommendations:  -Decrease to torsemide 10 qd  -Keep MAP > 65  -Keep hemoglobin > 7  -Strict ins and outs  -Avoid nephrotoxic agents if possible  -Avoid drastic hemodynamic changes if possible           Spinal stenosis  NSGY deferring surgery for later date          Thank you for your consult. I will follow-up with patient. Please contact us if you have any additional questions.    Otoniel Cat MD  Nephrology  [facility] - Cardiac Medical ICU

## 2023-02-01 NOTE — SUBJECTIVE & OBJECTIVE
Interval History/Significant Events: no overnight events     Review of Systems   Unable to perform ROS: Mental status change   Objective:     Vital Signs (Most Recent):  Temp: 98.7 °F (37.1 °C) (01/31/23 1500)  Pulse: 82 (01/31/23 1800)  Resp: 20 (01/31/23 1800)  BP: (!) 127/59 (01/31/23 1800)  SpO2: 95 % (01/31/23 1800)   Vital Signs (24h Range):  Temp:  [97.9 °F (36.6 °C)-98.9 °F (37.2 °C)] 98.7 °F (37.1 °C)  Pulse:  [65-89] 82  Resp:  [10-54] 20  SpO2:  [92 %-99 %] 95 %  BP: ()/(50-75) 127/59   Weight: 110 kg (242 lb 8.1 oz)  Body mass index is 36.87 kg/m².      Intake/Output Summary (Last 24 hours) at 1/31/2023 1841  Last data filed at 1/31/2023 1704  Gross per 24 hour   Intake 399.9 ml   Output 1350 ml   Net -950.1 ml       Physical Exam  Vitals and nursing note reviewed.   Constitutional:       General: He is not in acute distress.     Appearance: Normal appearance. He is not ill-appearing or toxic-appearing.   HENT:      Head: Normocephalic and atraumatic.      Nose: Nose normal.      Mouth/Throat:      Mouth: Mucous membranes are moist.      Pharynx: Oropharynx is clear.   Eyes:      General: No scleral icterus.     Extraocular Movements: Extraocular movements intact.      Conjunctiva/sclera: Conjunctivae normal.      Pupils: Pupils are equal, round, and reactive to light.   Cardiovascular:      Rate and Rhythm: Tachycardia present. Rhythm irregular.      Heart sounds: Normal heart sounds. No murmur heard.     Comments: Irregularly irregular pulses  Pulmonary:      Effort: Pulmonary effort is normal. No respiratory distress.      Breath sounds: Normal breath sounds. No stridor. No wheezing.   Abdominal:      General: Abdomen is flat. Bowel sounds are normal. There is distension.      Palpations: Abdomen is soft.      Tenderness: There is no abdominal tenderness. There is no guarding or rebound.   Musculoskeletal:      Cervical back: Normal range of motion and neck supple. No rigidity.   Skin:      General: Skin is warm and dry.      Capillary Refill: Capillary refill takes less than 2 seconds.      Coloration: Skin is not jaundiced or pale.      Findings: No bruising or rash.   Neurological:      General: No focal deficit present.      Mental Status: He is alert and oriented to person, place, and time. Mental status is at baseline.      Sensory: No sensory deficit.      Motor: No weakness.   Psychiatric:         Mood and Affect: Mood normal.         Behavior: Behavior normal.       Vents:  Oxygen Concentration (%): 100 (01/31/23 0600)  Lines/Drains/Airways       Drain  Duration                  Urethral Catheter 01/16/23 1347 Latex 14 Fr. 15 days         Open Drain 01/17/23 0800 Left;Anterior LLQ 14 days              Peripheral Intravenous Line  Duration                  Peripheral IV - Single Lumen 01/27/23 1200 18 G Right Antecubital 4 days                  Significant Labs:    CBC/Anemia Profile:  Recent Labs   Lab 01/30/23  1049 01/31/23  0544   WBC 14.60* 14.67*   HGB 9.3* 8.6*   HCT 28.9* 28.0*    217   MCV 97 99*   RDW 21.8* 21.6*        Chemistries:  Recent Labs   Lab 01/30/23  0420 01/30/23  1531 01/31/23  0544    136 133*   K 3.1* 3.4* 4.2   CL 96 96 96   CO2 27 29 27   BUN 31* 30* 34*   CREATININE 1.8* 1.8* 2.0*   CALCIUM 8.1* 8.2* 8.2*   ALBUMIN 2.1*  --  2.0*   PROT 7.0  --  7.0   BILITOT 3.5*  --  3.3*   ALKPHOS 170*  --  152*   ALT 44  --  41   *  --  117*   MG 1.6 2.0 1.9   PHOS 3.8  --  3.8       All pertinent labs within the past 24 hours have been reviewed.    Significant Imaging:  I have reviewed all pertinent imaging results/findings within the past 24 hours.

## 2023-02-01 NOTE — ASSESSMENT & PLAN NOTE
Lopressor was started while on hospital medicine at 25 mg BID (home does lopressor 100 mg QD). Pt went into afib with rvr on 1/27, IV lopressor given x 2 with subsequent drop of BP, re-started on amiodarone gtt, Amiodarone gtt switched to PO. Runs of Vtach on 1/30 with highest at 11 beats. Sustained tachycardia in 150s.    -Cardiology recommended lopressor   -Will discharge on  Amiodarone 400 bid loading dose x2 weeks the transition to 200mg bid  - Re-initiated xarelto 1/31  -Maintain K > 4, Mag > 2 and Ca/iCal WNL to decrease arrhythmogenic potential  - Will discharge on metoprolol succinate at 50mg daily

## 2023-02-01 NOTE — ASSESSMENT & PLAN NOTE
Size of liver suggest other etiology than cirrhosis, however also has esophageal varices, possible HRS. CT and US renal with no signs of obstruction.     Urine microscopy: (1/17) no casts identified, amorphous material (though only 5 cc obtained). Repeat with few granular casts (1/18). Repeat on 1/19 with granular casts. 1/20: moderated (2-3 per field) granular casts with calcium oxylate crystals embedded in casts.     Urine protein 4.59g and repeat with 2.5  C3/4 - 111/35  LILO with ratio of 1.98  Hep B and C negative  Uric acid 13    HIV negative  Iron 31, TIBC 151, Tsat 21, Transferrin 102, ferritin 135  KEATON negative  ANCA negative  Cryo negative    1/19: repeat Urine sodium 10, urine protein creatinine ratio 0.89. IgA 505. Urine microscopy: moderated (2-3 per field) granular casts with calcium oxylate crystals embedded in casts, overall findings consistent with ATN    Overall impression: UOP responsive to 240 IV lasix 1/25. He did require RRT 1/22 for metabolic encephalopathy and work of breathing. Serum creatinine has been stable around 2.0, baseline 0.8 (11/2022 Care everywhere)    Recommendations:  -continue with torsemide 50 qd  -Keep MAP > 65  -Keep hemoglobin > 7  -Strict ins and outs  -Avoid nephrotoxic agents if possible  -Avoid drastic hemodynamic changes if possible

## 2023-02-01 NOTE — ASSESSMENT & PLAN NOTE
S/p diagnostic paracentesis on 1/17 with , 50% seg. Cytology negative for malignant cell     - Palliative care consult

## 2023-02-01 NOTE — ASSESSMENT & PLAN NOTE
Creatinine 4.7 on admit, baseline around 0.8. Pre-renal vs ATN. Less likely obstruction as pt has gomez.  Renal ultrasound with medical renal disease and no evidence of hydronephrosis.    - Avoid nephrotoxic agents such as NSAIDs, gadolinium and IV radiocontrast.  - Renally dose meds to current GFR.  - Torsemide 50 mg daily, continue OP  - Follow up with nephrology in 2 weeks

## 2023-02-01 NOTE — ASSESSMENT & PLAN NOTE
Concerning for variceal bleed given hx of alcohol abuse.     - Regular diet  -Transfuse pRBC for Hb < 7 g/dL (Consider a higher Hb target if there is clinical evidence of intravascular volume depletion or comorbidities, such as CAD or if high suspicion of vigorous active ongoing bleeding or an uncorrected coagulopathy exists.).  - s/p vitamin K given Pt/INR > 2   - EGD showed  Small (< 5 mm) esophageal varices with portal hypertensive gastropathy  - Continue PO PPI 40 mg   - Stable after re-initiation of apixiban

## 2023-02-01 NOTE — ASSESSMENT & PLAN NOTE
Lopressor was started while on hospital medicine at 25 mg BID (home does lopressor 100 mg QD). Pt went into afib with rvr on 1/27, IV lopressor given x 2 with subsequent drop of BP, re-started on amiodarone gtt, Amiodarone gtt switched to PO. Runs of Vtach on 1/30 with highest at 11 beats. Sustained tachycardia in 150s.    -Cardiology recommended lopressor   -Continue Amiodarone 400 bid loading dose x2 weeks the transition to 200mg bid  - Re-initiated xarelto 1/31  -Maintain K > 4, Mag > 2 and Ca/iCal WNL to decrease arrhythmogenic potential

## 2023-02-01 NOTE — ASSESSMENT & PLAN NOTE
-Last A1c reviewed-   Lab Results   Component Value Date    HGBA1C 5.3 11/10/2022       Home Antihyperglycemic Regiment:  - Metformin  1000 mg BID     Inpatient Antihyperglycemic Regiment:  Antihyperglycemics (From admission, onward)    Start     Stop Route Frequency Ordered    01/16/23 1507  insulin aspart U-100 pen 0-5 Units         -- SubQ Before meals & nightly PRN 01/16/23 1407        - LDSSI  - diabetic diet     Blood Sugars (AccuCheck):    Recent Labs     01/29/23 2018 01/30/23  0837 01/30/23  1048 01/30/23  1528 01/30/23 2014 01/31/23  2045   POCTGLUCOSE 136* 118* 128* 128* 106 120*

## 2023-02-01 NOTE — TREATMENT PLAN
Hepatology Treatment Plan    Jonny Isabel is a 62 y.o. male admitted to hospital 1/16/2023 (Hospital Day: 17) due to Atrial fibrillation with rapid ventricular response.     Interval History  Afib RVR improved  NSGY planning outpatient intervention  LFTs stable    Objective  Temp:  [98.3 °F (36.8 °C)-99.1 °F (37.3 °C)] 99.1 °F (37.3 °C) (02/01 1101)  Pulse:  [75-99] 87 (02/01 1101)  BP: (100-141)/(51-75) 141/63 (02/01 1101)  Resp:  [13-72] 24 (02/01 1101)  SpO2:  [89 %-99 %] 95 % (02/01 1101)        Laboratory    Recent Labs   Lab 01/30/23  1049 01/31/23  0544 02/01/23  0403   HGB 9.3* 8.6* 9.4*         Assessment and Plan    63yo PMHx decompensated EtOH cirrhosis (ascites), NID-T2DM, afib s/p ablation and DCCV on xarelto, HTN presents as transfer from OSH on 01/16/2023 for epidural abscess and L3/L4 disc herniation.     Hepatology consulted 01/28 for decompensated EtOH cirrhosis/ consideration of transplant with MELD-Na 22      Work up for liver disease since arrival notable for ASMA, AMA, viral hepatitis panel all negative. PETH 900.     Reportedly patient did not know of liver disease and was never instructed to stop drinking however multiple barriers to consideration of transplant including spinal abscess, possible bowel obstruction, continuing to drink EtOH (PETH 900)       Problem List:  Decompensated EtOH cirrhosis (ascites)     Recommendations:  Will need CT Triple phase for R hepatic lobe nodule when able (non-urgent with renal dysfunction)  Nephrology recs for MARTITA  Please notify when discharging, will arrange for hepatology follow-up      Thank you for involving us in the care of Jonny Isabel. We will sign off at this time. Please call with any additional questions, concerns or changes in the patient's clinical status.    Keven Ya MD  Gastroenterology Fellow, PGY IV

## 2023-02-01 NOTE — ASSESSMENT & PLAN NOTE
Likely 2/2 to underlying alcohol abuse and hepatic steatosis. Complete workup done with tylenol level, acute hep panel, a1-antitrypsin, anti smith antibody, HIV, anti mitochondrial antibody, anca, anti-liver, aFP. Liver US with doppler shows epatomegaly with hepatic steatosis, cholelithiasis versus gallbladder sludge, small volume ascites, and left pleural effusion.    - Daily CMP, PT/INR   - Discontinue midodrine   - Lactulose 10g bid given increasing stool burden

## 2023-02-01 NOTE — SUBJECTIVE & OBJECTIVE
Interval History/Significant Events: No events    Review of Systems   Constitutional:  Negative for chills and fever.   HENT:  Negative for congestion and sore throat.    Eyes:  Negative for discharge and redness.   Respiratory:  Negative for shortness of breath, wheezing and stridor.    Cardiovascular:  Negative for chest pain, palpitations and leg swelling.   Gastrointestinal:  Negative for diarrhea, nausea, rectal pain and vomiting.   Genitourinary:  Negative for dysuria and urgency.   Musculoskeletal:  Positive for back pain.   Neurological:  Negative for dizziness.   Psychiatric/Behavioral:  Negative for agitation.    Objective:     Vital Signs (Most Recent):  Temp: 99.1 °F (37.3 °C) (02/01/23 1101)  Pulse: 88 (02/01/23 1200)  Resp: (!) 21 (02/01/23 1200)  BP: 130/67 (02/01/23 1200)  SpO2: 95 % (02/01/23 1200)   Vital Signs (24h Range):  Temp:  [98.3 °F (36.8 °C)-99.1 °F (37.3 °C)] 99.1 °F (37.3 °C)  Pulse:  [75-99] 88  Resp:  [13-72] 21  SpO2:  [89 %-99 %] 95 %  BP: (100-141)/(51-75) 130/67   Weight: 110 kg (242 lb 8.1 oz)  Body mass index is 36.87 kg/m².      Intake/Output Summary (Last 24 hours) at 2/1/2023 1247  Last data filed at 2/1/2023 1132  Gross per 24 hour   Intake 360 ml   Output 2400 ml   Net -2040 ml       Physical Exam  Vitals and nursing note reviewed.   Constitutional:       General: He is not in acute distress.     Appearance: Normal appearance. He is not ill-appearing or toxic-appearing.   HENT:      Head: Normocephalic and atraumatic.      Nose: Nose normal.      Mouth/Throat:      Mouth: Mucous membranes are moist.      Pharynx: Oropharynx is clear.   Eyes:      General: No scleral icterus.     Extraocular Movements: Extraocular movements intact.      Conjunctiva/sclera: Conjunctivae normal.      Pupils: Pupils are equal, round, and reactive to light.   Cardiovascular:      Rate and Rhythm: Normal rate. Rhythm irregular.      Heart sounds: Normal heart sounds. No murmur heard.     Comments:  Irregularly irregular pulses  Pulmonary:      Effort: Pulmonary effort is normal. No respiratory distress.      Breath sounds: Normal breath sounds. No stridor. No wheezing.   Abdominal:      General: Abdomen is flat. Bowel sounds are normal. There is distension.      Palpations: Abdomen is soft.      Tenderness: There is no abdominal tenderness. There is no guarding or rebound.   Musculoskeletal:      Cervical back: Normal range of motion and neck supple. No rigidity.   Skin:     General: Skin is warm and dry.      Capillary Refill: Capillary refill takes less than 2 seconds.      Coloration: Skin is not jaundiced or pale.      Findings: No bruising or rash.   Neurological:      General: No focal deficit present.      Mental Status: He is alert and oriented to person, place, and time. Mental status is at baseline.      Sensory: No sensory deficit.      Motor: No weakness.   Psychiatric:         Mood and Affect: Mood normal.         Behavior: Behavior normal.       Vents:  Oxygen Concentration (%): 100 (01/31/23 0600)  Lines/Drains/Airways       Drain  Duration                  Open Drain 01/17/23 0800 Left;Anterior LLQ 15 days         Urethral Catheter 01/16/23 1347 Latex 14 Fr. 15 days              Peripheral Intravenous Line  Duration                  Peripheral IV - Single Lumen 01/27/23 1200 18 G Right Antecubital 5 days                  Significant Labs:    CBC/Anemia Profile:  Recent Labs   Lab 01/31/23  0544 02/01/23  0403   WBC 14.67* 13.48*   HGB 8.6* 9.4*   HCT 28.0* 29.9*    233   MCV 99* 98   RDW 21.6* 21.7*        Chemistries:  Recent Labs   Lab 01/30/23  1531 01/31/23  0544 02/01/23  0403    133* 136   K 3.4* 4.2 3.6   CL 96 96 98   CO2 29 27 26   BUN 30* 34* 35*   CREATININE 1.8* 2.0* 2.1*   CALCIUM 8.2* 8.2* 8.5*   ALBUMIN  --  2.0* 2.2*   PROT  --  7.0 7.2   BILITOT  --  3.3* 3.6*   ALKPHOS  --  152* 148*   ALT  --  41 46*   AST  --  117* 125*   MG 2.0 1.9 1.9   PHOS  --  3.8 2.8        All pertinent labs within the past 24 hours have been reviewed.    Significant Imaging:  I have reviewed all pertinent imaging results/findings within the past 24 hours.

## 2023-02-01 NOTE — ASSESSMENT & PLAN NOTE
Likely 2/2 to underlying alcohol abuse and hepatic steatosis. Complete workup done with tylenol level, acute hep panel, a1-antitrypsin, anti smith antibody, HIV, anti mitochondrial antibody, anca, anti-liver, aFP. Liver US with doppler shows epatomegaly with hepatic steatosis, cholelithiasis versus gallbladder sludge, small volume ascites, and left pleural effusion.    - Daily CMP, PT/INR   - Lactulose 10g bid at home

## 2023-02-01 NOTE — SUBJECTIVE & OBJECTIVE
Interval History: serum creatinine stable, fluctuating around 2    Review of patient's allergies indicates:  No Known Allergies  Current Facility-Administered Medications   Medication Frequency    0.9%  NaCl infusion (for blood administration) Q24H PRN    albuterol-ipratropium 2.5 mg-0.5 mg/3 mL nebulizer solution 3 mL Q6H PRN    amiodarone tablet 400 mg BID    Followed by    [START ON 2/9/2023] amiodarone tablet 200 mg Daily    benzonatate capsule 200 mg TID PRN    dextrose 10% bolus 125 mL 125 mL PRN    dextrose 10% bolus 250 mL 250 mL PRN    folic acid tablet 1 mg Daily    glucagon (human recombinant) injection 1 mg PRN    glucose chewable tablet 16 g PRN    glucose chewable tablet 24 g PRN    HYDROmorphone injection 0.2 mg Q6H PRN    insulin aspart U-100 pen 0-5 Units QID (AC + HS) PRN    lactulose 20 gram/30 mL solution Soln 10 g BID    LIDOcaine-prilocaine cream PRN    metoprolol injection 5 mg Q5 Min PRN    metoprolol succinate (TOPROL-XL) 24 hr tablet 25 mg Daily    multivitamin tablet Daily    pantoprazole EC tablet 40 mg BID AC    rivaroxaban tablet 15 mg Daily with dinner    thiamine tablet 100 mg Daily    torsemide tablet 50 mg Daily       Objective:     Vital Signs (Most Recent):  Temp: 98.7 °F (37.1 °C) (02/01/23 0700)  Pulse: 99 (02/01/23 0900)  Resp: 20 (02/01/23 0900)  BP: 134/75 (02/01/23 0900)  SpO2: 99 % (02/01/23 0900) Vital Signs (24h Range):  Temp:  [98.3 °F (36.8 °C)-98.9 °F (37.2 °C)] 98.7 °F (37.1 °C)  Pulse:  [75-99] 99  Resp:  [13-72] 20  SpO2:  [89 %-99 %] 99 %  BP: (100-136)/(51-75) 134/75     Weight: 110 kg (242 lb 8.1 oz) (01/31/23 1031)  Body mass index is 36.87 kg/m².  Body surface area is 2.3 meters squared.    I/O last 3 completed shifts:  In: 759.9 [P.O.:360; IV Piggyback:399.9]  Out: 2350 [Urine:2350]    Physical Exam  Constitutional:       General: He is not in acute distress.     Appearance: He is obese. He is not ill-appearing or toxic-appearing.   HENT:      Head:  Normocephalic and atraumatic.      Nose: Nose normal.      Mouth/Throat:      Mouth: Mucous membranes are moist.   Eyes:      General:         Right eye: No discharge.         Left eye: No discharge.      Pupils: Pupils are equal, round, and reactive to light.   Cardiovascular:      Heart sounds: Murmur heard.   Abdominal:      General: There is distension.      Tenderness: There is no abdominal tenderness.   Musculoskeletal:         General: Normal range of motion.      Cervical back: Normal range of motion.      Right lower leg: No edema.      Left lower leg: No edema.   Skin:     General: Skin is warm.      Coloration: Skin is pale.      Findings: No lesion.   Neurological:      Mental Status: He is alert and oriented to person, place, and time.       Significant Labs:  All labs within the past 24 hours have been reviewed.     Significant Imaging:  Labs: Reviewed

## 2023-02-01 NOTE — ASSESSMENT & PLAN NOTE
Creatinine 4.7 on admit, baseline around 0.8. Pre-renal vs ATN. Less likely obstruction as pt has gomez.  Renal ultrasound with medical renal disease and no evidence of hydronephrosis.    - Nephrology consulted, appreciate recommendations  - Strict I&Os and daily weights   - Avoid nephrotoxic agents such as NSAIDs, gadolinium and IV radiocontrast.  - Renally dose meds to current GFR.  - Torsemide 50 mg daily  - Remove IJ CVC

## 2023-02-01 NOTE — ASSESSMENT & PLAN NOTE
R hand seems to be cellulitic in appearance. Extending up to forearm.     Plan:  - Completed 10 days of Cefepime on 1/25  RESOLVED

## 2023-02-01 NOTE — ASSESSMENT & PLAN NOTE
PT/OT  Early mobility if possible   How Severe Is Your Acne?: moderate Is This A New Presentation, Or A Follow-Up?: Acne

## 2023-02-01 NOTE — ASSESSMENT & PLAN NOTE
CT Lumbar Spine Without Contrast Result Date: 1/16/2023  1.  Prominent, cystic, multiloculated predominantly gas attenuation structure within the spinal canal at the level of L3-L4 with dimensions as above.  This is favored to be epidural in location and results in severe narrowing of the spinal canal with mass effect upon the thecal sac.  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 Large disc extrusion at L3-4, contributing to severe spinal canal stenosis, as above. Additional lumbar spondylosis, contributing to moderate spinal canal stenosis at L2-3 and L4-5 and moderate neural foraminal narrowing L4-5 and L5-S1    - Discharge with appointment on 03/01 in NSGY clinic  - Skilled PT/OT to maximize mobility

## 2023-02-01 NOTE — PLAN OF CARE
CM resent rehab referral to [facility] via [facility]    New PMR placed also.      Rachel Whelan RN     [phone]

## 2023-02-01 NOTE — ASSESSMENT & PLAN NOTE
CT Abdomen Pelvis  Without Contrast. Findings concerning for developing small bowel obstruction with suspected transition point seen within the midline mid to lower abdomen at the level of L4  Pt without symptoms of nausea or vomiting. Reportedly had a BM at the outside facility on 1/16/2023. Abdomen is distended.     - Resolved will have patient monitor at home

## 2023-02-02 LAB
ALBUMIN SERPL BCP-MCNC: 2.2 G/DL (ref 3.5–5.2)
ALP SERPL-CCNC: 142 U/L (ref 55–135)
ALT SERPL W/O P-5'-P-CCNC: 50 U/L (ref 10–44)
ANION GAP SERPL CALC-SCNC: 14 MMOL/L (ref 8–16)
AST SERPL-CCNC: 127 U/L (ref 10–40)
BASOPHILS # BLD AUTO: 0.14 K/UL (ref 0–0.2)
BASOPHILS NFR BLD: 1.1 % (ref 0–1.9)
BILIRUB SERPL-MCNC: 4.2 MG/DL (ref 0.1–1)
BUN SERPL-MCNC: 32 MG/DL (ref 8–23)
CALCIUM SERPL-MCNC: 8.4 MG/DL (ref 8.7–10.5)
CHLORIDE SERPL-SCNC: 95 MMOL/L (ref 95–110)
CO2 SERPL-SCNC: 29 MMOL/L (ref 23–29)
CREAT SERPL-MCNC: 1.9 MG/DL (ref 0.5–1.4)
DIFFERENTIAL METHOD: ABNORMAL
EOSINOPHIL # BLD AUTO: 0.1 K/UL (ref 0–0.5)
EOSINOPHIL NFR BLD: 0.7 % (ref 0–8)
ERYTHROCYTE [DISTWIDTH] IN BLOOD BY AUTOMATED COUNT: 21.3 % (ref 11.5–14.5)
EST. GFR  (NO RACE VARIABLE): 39.6 ML/MIN/1.73 M^2
GLUCOSE SERPL-MCNC: 120 MG/DL (ref 70–110)
HCT VFR BLD AUTO: 27.8 % (ref 40–54)
HGB BLD-MCNC: 9 G/DL (ref 14–18)
IMM GRANULOCYTES # BLD AUTO: 0.13 K/UL (ref 0–0.04)
IMM GRANULOCYTES NFR BLD AUTO: 1.1 % (ref 0–0.5)
INR PPP: 1.3 (ref 0.8–1.2)
LYMPHOCYTES # BLD AUTO: 1.4 K/UL (ref 1–4.8)
LYMPHOCYTES NFR BLD: 11.3 % (ref 18–48)
MAGNESIUM SERPL-MCNC: 1.4 MG/DL (ref 1.6–2.6)
MCH RBC QN AUTO: 30.9 PG (ref 27–31)
MCHC RBC AUTO-ENTMCNC: 32.4 G/DL (ref 32–36)
MCV RBC AUTO: 96 FL (ref 82–98)
MONOCYTES # BLD AUTO: 1 K/UL (ref 0.3–1)
MONOCYTES NFR BLD: 8.4 % (ref 4–15)
NEUTROPHILS # BLD AUTO: 9.4 K/UL (ref 1.8–7.7)
NEUTROPHILS NFR BLD: 77.4 % (ref 38–73)
NRBC BLD-RTO: 0 /100 WBC
PHOSPHATE SERPL-MCNC: 3 MG/DL (ref 2.7–4.5)
PLATELET # BLD AUTO: 270 K/UL (ref 150–450)
PMV BLD AUTO: 11.5 FL (ref 9.2–12.9)
POCT GLUCOSE: 103 MG/DL (ref 70–110)
POCT GLUCOSE: 108 MG/DL (ref 70–110)
POCT GLUCOSE: 132 MG/DL (ref 70–110)
POCT GLUCOSE: 159 MG/DL (ref 70–110)
POTASSIUM SERPL-SCNC: 3.3 MMOL/L (ref 3.5–5.1)
PROT SERPL-MCNC: 7.5 G/DL (ref 6–8.4)
PROTHROMBIN TIME: 13.7 SEC (ref 9–12.5)
RBC # BLD AUTO: 2.91 M/UL (ref 4.6–6.2)
SODIUM SERPL-SCNC: 138 MMOL/L (ref 136–145)
WBC # BLD AUTO: 12.18 K/UL (ref 3.9–12.7)

## 2023-02-02 PROCEDURE — 25000003 PHARM REV CODE 250: Performed by: STUDENT IN AN ORGANIZED HEALTH CARE EDUCATION/TRAINING PROGRAM

## 2023-02-02 PROCEDURE — 85610 PROTHROMBIN TIME: CPT | Performed by: STUDENT IN AN ORGANIZED HEALTH CARE EDUCATION/TRAINING PROGRAM

## 2023-02-02 PROCEDURE — 20000000 HC ICU ROOM

## 2023-02-02 PROCEDURE — 97535 SELF CARE MNGMENT TRAINING: CPT

## 2023-02-02 PROCEDURE — 97530 THERAPEUTIC ACTIVITIES: CPT

## 2023-02-02 PROCEDURE — 80053 COMPREHEN METABOLIC PANEL: CPT | Performed by: STUDENT IN AN ORGANIZED HEALTH CARE EDUCATION/TRAINING PROGRAM

## 2023-02-02 PROCEDURE — 25000003 PHARM REV CODE 250: Performed by: INTERNAL MEDICINE

## 2023-02-02 PROCEDURE — 99233 PR SUBSEQUENT HOSPITAL CARE,LEVL III: ICD-10-PCS | Mod: ,,, | Performed by: INTERNAL MEDICINE

## 2023-02-02 PROCEDURE — 63600175 PHARM REV CODE 636 W HCPCS: Performed by: STUDENT IN AN ORGANIZED HEALTH CARE EDUCATION/TRAINING PROGRAM

## 2023-02-02 PROCEDURE — 94640 AIRWAY INHALATION TREATMENT: CPT

## 2023-02-02 PROCEDURE — 25000003 PHARM REV CODE 250

## 2023-02-02 PROCEDURE — 99233 SBSQ HOSP IP/OBS HIGH 50: CPT | Mod: ,,, | Performed by: INTERNAL MEDICINE

## 2023-02-02 PROCEDURE — 27000221 HC OXYGEN, UP TO 24 HOURS

## 2023-02-02 PROCEDURE — 99232 PR SUBSEQUENT HOSPITAL CARE,LEVL II: ICD-10-PCS | Mod: ,,, | Performed by: NURSE PRACTITIONER

## 2023-02-02 PROCEDURE — 84100 ASSAY OF PHOSPHORUS: CPT | Performed by: STUDENT IN AN ORGANIZED HEALTH CARE EDUCATION/TRAINING PROGRAM

## 2023-02-02 PROCEDURE — 99232 SBSQ HOSP IP/OBS MODERATE 35: CPT | Mod: ,,, | Performed by: NURSE PRACTITIONER

## 2023-02-02 PROCEDURE — 25000242 PHARM REV CODE 250 ALT 637 W/ HCPCS

## 2023-02-02 PROCEDURE — 97116 GAIT TRAINING THERAPY: CPT

## 2023-02-02 PROCEDURE — 99900035 HC TECH TIME PER 15 MIN (STAT)

## 2023-02-02 PROCEDURE — 94761 N-INVAS EAR/PLS OXIMETRY MLT: CPT

## 2023-02-02 PROCEDURE — 83735 ASSAY OF MAGNESIUM: CPT | Performed by: STUDENT IN AN ORGANIZED HEALTH CARE EDUCATION/TRAINING PROGRAM

## 2023-02-02 PROCEDURE — 97112 NEUROMUSCULAR REEDUCATION: CPT

## 2023-02-02 PROCEDURE — 85025 COMPLETE CBC W/AUTO DIFF WBC: CPT | Performed by: STUDENT IN AN ORGANIZED HEALTH CARE EDUCATION/TRAINING PROGRAM

## 2023-02-02 RX ORDER — POTASSIUM CHLORIDE 750 MG/1
30 CAPSULE, EXTENDED RELEASE ORAL 3 TIMES DAILY
Status: COMPLETED | OUTPATIENT
Start: 2023-02-02 | End: 2023-02-02

## 2023-02-02 RX ORDER — TALC
9 POWDER (GRAM) TOPICAL NIGHTLY PRN
Status: DISCONTINUED | OUTPATIENT
Start: 2023-02-02 | End: 2023-02-07 | Stop reason: HOSPADM

## 2023-02-02 RX ORDER — MAGNESIUM SULFATE HEPTAHYDRATE 40 MG/ML
2 INJECTION, SOLUTION INTRAVENOUS
Status: COMPLETED | OUTPATIENT
Start: 2023-02-02 | End: 2023-02-02

## 2023-02-02 RX ORDER — LACTULOSE 10 G/15ML
10 SOLUTION ORAL DAILY
Status: DISCONTINUED | OUTPATIENT
Start: 2023-02-03 | End: 2023-02-07 | Stop reason: HOSPADM

## 2023-02-02 RX ADMIN — AMIODARONE HYDROCHLORIDE 400 MG: 200 TABLET ORAL at 08:02

## 2023-02-02 RX ADMIN — THIAMINE HCL TAB 100 MG 100 MG: 100 TAB at 08:02

## 2023-02-02 RX ADMIN — THERA TABS 1 TABLET: TAB at 08:02

## 2023-02-02 RX ADMIN — POTASSIUM CHLORIDE 30 MEQ: 10 CAPSULE, COATED, EXTENDED RELEASE ORAL at 03:02

## 2023-02-02 RX ADMIN — METOPROLOL SUCCINATE 50 MG: 50 TABLET, EXTENDED RELEASE ORAL at 08:02

## 2023-02-02 RX ADMIN — POTASSIUM CHLORIDE 30 MEQ: 10 CAPSULE, COATED, EXTENDED RELEASE ORAL at 04:02

## 2023-02-02 RX ADMIN — MAGNESIUM SULFATE 2 G: 2 INJECTION INTRAVENOUS at 04:02

## 2023-02-02 RX ADMIN — PANTOPRAZOLE SODIUM 40 MG: 40 TABLET, DELAYED RELEASE ORAL at 05:02

## 2023-02-02 RX ADMIN — HYDROMORPHONE HYDROCHLORIDE 0.2 MG: 1 INJECTION, SOLUTION INTRAMUSCULAR; INTRAVENOUS; SUBCUTANEOUS at 08:02

## 2023-02-02 RX ADMIN — RIVAROXABAN 15 MG: 15 TABLET, FILM COATED ORAL at 05:02

## 2023-02-02 RX ADMIN — PANTOPRAZOLE SODIUM 40 MG: 40 TABLET, DELAYED RELEASE ORAL at 03:02

## 2023-02-02 RX ADMIN — FOLIC ACID 1 MG: 1 TABLET ORAL at 08:02

## 2023-02-02 RX ADMIN — HYDROMORPHONE HYDROCHLORIDE 0.2 MG: 1 INJECTION, SOLUTION INTRAMUSCULAR; INTRAVENOUS; SUBCUTANEOUS at 12:02

## 2023-02-02 RX ADMIN — MAGNESIUM SULFATE 2 G: 2 INJECTION INTRAVENOUS at 07:02

## 2023-02-02 RX ADMIN — IPRATROPIUM BROMIDE AND ALBUTEROL SULFATE 3 ML: 2.5; .5 SOLUTION RESPIRATORY (INHALATION) at 07:02

## 2023-02-02 RX ADMIN — Medication 9 MG: at 05:02

## 2023-02-02 RX ADMIN — POTASSIUM CHLORIDE 30 MEQ: 10 CAPSULE, COATED, EXTENDED RELEASE ORAL at 08:02

## 2023-02-02 RX ADMIN — TORSEMIDE 10 MG: 10 TABLET ORAL at 08:02

## 2023-02-02 NOTE — PROGRESS NOTES
[facility]  Nephrology  Progress Note    Patient Name: Jonny Isabel  MRN: [id number]  Admission Date: 1/16/2023  Hospital Length of Stay: 17 days  Attending Provider: Benita Little MD   Primary Care Physician: Yuli Pérez Mai, MD  Principal Problem:Atrial fibrillation with rapid ventricular response    Subjective:     HPI: 62 year old male with a history of alcohol abuse, afib/flutter on xarelto, HTN presents with concern for severe spinal canal stenosis/vacuum disc phenomena, concern for a GI bleed and MARTITA. He presents at behest of his girlfriend after being unable to stand, he has a history of sciatica but reports that this is worse. He has had fecal incontinence for the last week.     He has no report of kidney issues in the past and reports no family history of such either. At time of consultation he is pending an EGD for GI bleed. CT abdomen with large liver and concern for SBO per report, no hydronephrosis. UA with 2+ protein, 1+ occult blood, urine sodium 25 and urine osm 302. On admission serum creatinine 5 (baseline 0.8).      Interval History: torsemide changed from 50 to 10, will monitor uop. Repeat serum protein creatinine 1.05     Review of patient's allergies indicates:  No Known Allergies  Current Facility-Administered Medications   Medication Frequency    albuterol-ipratropium 2.5 mg-0.5 mg/3 mL nebulizer solution 3 mL Q6H PRN    amiodarone tablet 400 mg BID    Followed by    [START ON 2/9/2023] amiodarone tablet 200 mg Daily    benzonatate capsule 200 mg TID PRN    dextrose 10% bolus 125 mL 125 mL PRN    dextrose 10% bolus 250 mL 250 mL PRN    folic acid tablet 1 mg Daily    glucagon (human recombinant) injection 1 mg PRN    glucose chewable tablet 16 g PRN    glucose chewable tablet 24 g PRN    HYDROmorphone injection 0.2 mg Q6H PRN    insulin aspart U-100 pen 0-5 Units QID (AC + HS) PRN    [START ON 2/3/2023] lactulose 20 gram/30 mL solution Soln 10 g Daily     LIDOcaine-prilocaine cream PRN    metoprolol injection 5 mg Q5 Min PRN    metoprolol succinate (TOPROL-XL) 24 hr tablet 50 mg Daily    multivitamin tablet Daily    pantoprazole EC tablet 40 mg BID AC    potassium chloride CR capsule 30 mEq TID    rivaroxaban tablet 15 mg Daily with dinner    thiamine tablet 100 mg Daily    torsemide tablet 10 mg Daily       Objective:     Vital Signs (Most Recent):  Temp: 98.1 °F (36.7 °C) (02/02/23 0300)  Pulse: 86 (02/02/23 0733)  Resp: 17 (02/02/23 0733)  BP: (!) 143/96 (02/02/23 0600)  SpO2: 96 % (02/02/23 0600)   Vital Signs (24h Range):  Temp:  [98 °F (36.7 °C)-99.4 °F (37.4 °C)] 98.1 °F (36.7 °C)  Pulse:  [77-91] 86  Resp:  [14-42] 17  SpO2:  [92 %-100 %] 96 %  BP: (103-158)/(56-96) 143/96     Weight: 110 kg (242 lb 8.1 oz) (01/31/23 1031)  Body mass index is 36.87 kg/m².  Body surface area is 2.3 meters squared.    I/O last 3 completed shifts:  In: 360 [P.O.:360]  Out: 3300 [Urine:3300]    Physical Exam  Constitutional:       General: He is not in acute distress.     Appearance: He is obese. He is not ill-appearing or toxic-appearing.   HENT:      Head: Normocephalic and atraumatic.      Nose: Nose normal.      Mouth/Throat:      Mouth: Mucous membranes are moist.   Eyes:      General:         Right eye: No discharge.         Left eye: No discharge.      Pupils: Pupils are equal, round, and reactive to light.   Cardiovascular:      Heart sounds: Murmur heard.   Abdominal:      General: There is distension.      Tenderness: There is no abdominal tenderness.   Musculoskeletal:         General: Normal range of motion.      Cervical back: Normal range of motion.      Right lower leg: No edema.      Left lower leg: No edema.   Skin:     General: Skin is warm.      Coloration: Skin is pale.      Findings: No lesion.   Neurological:      Mental Status: He is alert and oriented to person, place, and time.       Significant Labs:  All labs within the past 24 hours have been  reviewed.     Significant Imaging:  Labs: Reviewed    Assessment/Plan:     MARTITA (acute kidney injury)  Size of liver suggest other etiology than cirrhosis, however also has esophageal varices, possible HRS. CT and US renal with no signs of obstruction.     Urine microscopy: (1/17) no casts identified, amorphous material (though only 5 cc obtained). Repeat with few granular casts (1/18). Repeat on 1/19 with granular casts. 1/20: moderated (2-3 per field) granular casts with calcium oxylate crystals embedded in casts.     Urine protein 4.59g and repeat with 2.5  C3/4 - 111/35  LILO with ratio of 1.98  Hep B and C negative  Uric acid 13    HIV negative  Iron 31, TIBC 151, Tsat 21, Transferrin 102, ferritin 135  KEATON negative  ANCA negative  Cryo negative    1/19: repeat Urine sodium 10, urine protein creatinine ratio 0.89. IgA 505. Urine microscopy: moderated (2-3 per field) granular casts with calcium oxylate crystals embedded in casts, overall findings consistent with ATN    Overall impression: UOP responsive to 240 IV lasix 1/25. He did require RRT 1/22 for metabolic encephalopathy and work of breathing. Serum creatinine has been stable around 2.0, baseline 0.8 (11/2022 Care everywhere)    Recommendations:  -monitor UOP with torsemide 10  -voiding trial  -Keep MAP > 65  -Keep hemoglobin > 7  -Strict ins and outs  -Avoid nephrotoxic agents if possible  -Avoid drastic hemodynamic changes if possible           Spinal stenosis  NSGY deferring surgery for later date          Thank you for your consult. I will follow-up with patient. Please contact us if you have any additional questions.    Otoniel Cat MD  Nephrology  [facility]

## 2023-02-02 NOTE — PROGRESS NOTES
[facility]  Physical Medicine & Rehab  Progress Note    Patient Name: Jonny Isabel  MRN: [id number]  Admission Date: 1/16/2023  Length of Stay: 17 days  Attending Physician: Benita Little MD    Subjective:     Principal Problem:Atrial fibrillation with rapid ventricular response    Hospital Course:   1/26/23: Participated w/ OT. Bed mob min- modA. Sit to stand modA x 2 ppl. UBD modA.       Interval History 2/2/2023:  Patient is seen for follow-up PM&R evaluation and recommendations: participating with therapy. Has goals for rehab.     HPI, Past Medical, Family, and Social History remains the same as documented in the initial encounter.    Scheduled Medications:    amiodarone  400 mg Oral BID    Followed by    [START ON 2/9/2023] amiodarone  200 mg Oral Daily    folic acid  1 mg Oral Daily    [START ON 2/3/2023] lactulose  10 g Oral Daily    metoprolol succinate  50 mg Oral Daily    multivitamin  1 tablet Oral Daily    pantoprazole  40 mg Oral BID AC    potassium chloride  30 mEq Oral TID    rivaroxaban  15 mg Oral Daily with dinner    thiamine  100 mg Oral Daily    torsemide  10 mg Oral Daily       Diagnostic Results: Labs: Reviewed  ECG: Reviewed  CT: Reviewed    PRN Medications: albuterol-ipratropium, benzonatate, dextrose 10%, dextrose 10%, glucagon (human recombinant), glucose, glucose, HYDROmorphone, insulin aspart U-100, LIDOcaine-prilocaine, metoprolol    Review of Systems   Constitutional:  Positive for activity change. Negative for fatigue and fever.   HENT:  Negative for trouble swallowing and voice change.    Respiratory:  Negative for cough and shortness of breath.    Cardiovascular:  Negative for chest pain and leg swelling.   Gastrointestinal:  Positive for abdominal distention. Negative for abdominal pain.   Genitourinary:  Negative for difficulty urinating and flank pain.   Musculoskeletal:  Positive for gait problem. Negative for back pain.   Skin:  Negative for color  change and rash.   Neurological:  Positive for weakness. Negative for speech difficulty and numbness.   Psychiatric/Behavioral:  Negative for agitation and confusion.      Objective:     Vital Signs (Most Recent):  Temp: 99.2 °F (37.3 °C) (02/02/23 1100)  Pulse: 91 (02/02/23 1400)  Resp: (!) 26 (02/02/23 1400)  BP: (!) 149/80 (02/02/23 1400)  SpO2: 96 % (02/02/23 1400)      Vital Signs (24h Range):  Temp:  [98 °F (36.7 °C)-99.4 °F (37.4 °C)] 99.2 °F (37.3 °C)  Pulse:  [] 91  Resp:  [14-42] 26  SpO2:  [87 %-100 %] 96 %  BP: (103-149)/(56-96) 149/80     Physical Exam  Vitals and nursing note reviewed.   Constitutional:       Appearance: He is well-developed.   HENT:      Head: Normocephalic and atraumatic.   Eyes:      General:         Right eye: No discharge.         Left eye: No discharge.      Pupils: Pupils are equal, round, and reactive to light.   Pulmonary:      Effort: Pulmonary effort is normal. No respiratory distress.   Abdominal:      General: There is distension.      Tenderness: There is no abdominal tenderness.   Musculoskeletal:         General: No deformity.      Cervical back: Neck supple.      Comments: Deconditioned and generalized weakness   Skin:     General: Skin is warm and dry.   Neurological:      Sensory: No sensory deficit.      Motor: Weakness present. No abnormal muscle tone.      Gait: Gait abnormal.   Psychiatric:         Mood and Affect: Mood normal.         Behavior: Behavior normal.     Assessment/Plan:      * Atrial fibrillation with rapid ventricular response  - Cards recommend Lopressor  - back on Xarelto and now on Amio PO    Cellulitis  - was on Vanc and Cefepime, no further abx    Alcoholic cirrhosis of liver with ascites  - S/p diagnostic paracentesis on 1/17, consistent w SBP      MARTITA (acute kidney injury)  - Nephrology managing and urine output improving      GI bleed  - stable with no current bleeding   - EGD done and revealed small esophageal varices with portal  hypertensive gastropathy    Spinal stenosis  - Per Neurosurgery, patient is not currently medically optimized for intervention - suggesting multimodal pain control at this time.     - Related to prolonged/acute hospital course.     Recommendations  -  Encourage mobility, OOB in chair at least 3 hours per day, and early ambulation as appropriate  -  PT/OT evaluate and treat  -  Pain management  -  Monitor for and prevent skin breakdown and pressure ulcers  · Early mobility, repositioning/weight shifting every 20-30 minutes when sitting, turn patient every 2 hours, proper mattress/overlay and chair cushioning, pressure relief/heel protector boots  -  DVT prophylaxis    -  Reviewed discharge options (IP rehab, SNF, HH therapy, and OP therapy)    PM&R Recommendation:     At this time, the PM&R team has reviewed this patient's ongoing medical case including inpatient diagnosis, medical history, clinical examination, labs, vitals, current social and functional history.    We will continue to follow as a rehab candidate. Patient now agreeable to rehab.     Delmi Cruz NP  Department of Physical Medicine & Rehab   [facility]

## 2023-02-02 NOTE — SUBJECTIVE & OBJECTIVE
Interval History 2/2/2023:  Patient is seen for follow-up PM&R evaluation and recommendations: participating with therapy. Has goals for rehab.     HPI, Past Medical, Family, and Social History remains the same as documented in the initial encounter.    Scheduled Medications:    amiodarone  400 mg Oral BID    Followed by    [START ON 2/9/2023] amiodarone  200 mg Oral Daily    folic acid  1 mg Oral Daily    [START ON 2/3/2023] lactulose  10 g Oral Daily    metoprolol succinate  50 mg Oral Daily    multivitamin  1 tablet Oral Daily    pantoprazole  40 mg Oral BID AC    potassium chloride  30 mEq Oral TID    rivaroxaban  15 mg Oral Daily with dinner    thiamine  100 mg Oral Daily    torsemide  10 mg Oral Daily       Diagnostic Results: Labs: Reviewed  ECG: Reviewed  CT: Reviewed    PRN Medications: albuterol-ipratropium, benzonatate, dextrose 10%, dextrose 10%, glucagon (human recombinant), glucose, glucose, HYDROmorphone, insulin aspart U-100, LIDOcaine-prilocaine, metoprolol    Review of Systems   Constitutional:  Positive for activity change. Negative for fatigue and fever.   HENT:  Negative for trouble swallowing and voice change.    Respiratory:  Negative for cough and shortness of breath.    Cardiovascular:  Negative for chest pain and leg swelling.   Gastrointestinal:  Positive for abdominal distention. Negative for abdominal pain.   Genitourinary:  Negative for difficulty urinating and flank pain.   Musculoskeletal:  Positive for gait problem. Negative for back pain.   Skin:  Negative for color change and rash.   Neurological:  Positive for weakness. Negative for speech difficulty and numbness.   Psychiatric/Behavioral:  Negative for agitation and confusion.    Objective:     Vital Signs (Most Recent):  Temp: 99.2 °F (37.3 °C) (02/02/23 1100)  Pulse: 91 (02/02/23 1400)  Resp: (!) 26 (02/02/23 1400)  BP: (!) 149/80 (02/02/23 1400)  SpO2: 96 % (02/02/23 1400)      Vital Signs (24h Range):  Temp:  [98 °F (36.7  °C)-99.4 °F (37.4 °C)] 99.2 °F (37.3 °C)  Pulse:  [] 91  Resp:  [14-42] 26  SpO2:  [87 %-100 %] 96 %  BP: (103-149)/(56-96) 149/80     Physical Exam  Vitals and nursing note reviewed.   Constitutional:       Appearance: He is well-developed.   HENT:      Head: Normocephalic and atraumatic.   Eyes:      General:         Right eye: No discharge.         Left eye: No discharge.      Pupils: Pupils are equal, round, and reactive to light.   Pulmonary:      Effort: Pulmonary effort is normal. No respiratory distress.   Abdominal:      General: There is distension.      Tenderness: There is no abdominal tenderness.   Musculoskeletal:         General: No deformity.      Cervical back: Neck supple.      Comments: Deconditioned and generalized weakness   Skin:     General: Skin is warm and dry.   Neurological:      Sensory: No sensory deficit.      Motor: Weakness present. No abnormal muscle tone.      Gait: Gait abnormal.   Psychiatric:         Mood and Affect: Mood normal.         Behavior: Behavior normal.     NEUROLOGICAL EXAMINATION:     CRANIAL NERVES     CN III, IV, VI   Pupils are equal, round, and reactive to light.

## 2023-02-02 NOTE — SUBJECTIVE & OBJECTIVE
Interval History: torsemide changed from 50 to 10, will monitor uop. Repeat serum protein creatinine 1.05     Review of patient's allergies indicates:  No Known Allergies  Current Facility-Administered Medications   Medication Frequency    albuterol-ipratropium 2.5 mg-0.5 mg/3 mL nebulizer solution 3 mL Q6H PRN    amiodarone tablet 400 mg BID    Followed by    [START ON 2/9/2023] amiodarone tablet 200 mg Daily    benzonatate capsule 200 mg TID PRN    dextrose 10% bolus 125 mL 125 mL PRN    dextrose 10% bolus 250 mL 250 mL PRN    folic acid tablet 1 mg Daily    glucagon (human recombinant) injection 1 mg PRN    glucose chewable tablet 16 g PRN    glucose chewable tablet 24 g PRN    HYDROmorphone injection 0.2 mg Q6H PRN    insulin aspart U-100 pen 0-5 Units QID (AC + HS) PRN    [START ON 2/3/2023] lactulose 20 gram/30 mL solution Soln 10 g Daily    LIDOcaine-prilocaine cream PRN    metoprolol injection 5 mg Q5 Min PRN    metoprolol succinate (TOPROL-XL) 24 hr tablet 50 mg Daily    multivitamin tablet Daily    pantoprazole EC tablet 40 mg BID AC    potassium chloride CR capsule 30 mEq TID    rivaroxaban tablet 15 mg Daily with dinner    thiamine tablet 100 mg Daily    torsemide tablet 10 mg Daily       Objective:     Vital Signs (Most Recent):  Temp: 98.1 °F (36.7 °C) (02/02/23 0300)  Pulse: 86 (02/02/23 0733)  Resp: 17 (02/02/23 0733)  BP: (!) 143/96 (02/02/23 0600)  SpO2: 96 % (02/02/23 0600)   Vital Signs (24h Range):  Temp:  [98 °F (36.7 °C)-99.4 °F (37.4 °C)] 98.1 °F (36.7 °C)  Pulse:  [77-91] 86  Resp:  [14-42] 17  SpO2:  [92 %-100 %] 96 %  BP: (103-158)/(56-96) 143/96     Weight: 110 kg (242 lb 8.1 oz) (01/31/23 1031)  Body mass index is 36.87 kg/m².  Body surface area is 2.3 meters squared.    I/O last 3 completed shifts:  In: 360 [P.O.:360]  Out: 3300 [Urine:3300]    Physical Exam  Constitutional:       General: He is not in acute distress.     Appearance: He is obese. He is not ill-appearing or  toxic-appearing.   HENT:      Head: Normocephalic and atraumatic.      Nose: Nose normal.      Mouth/Throat:      Mouth: Mucous membranes are moist.   Eyes:      General:         Right eye: No discharge.         Left eye: No discharge.      Pupils: Pupils are equal, round, and reactive to light.   Cardiovascular:      Heart sounds: Murmur heard.   Abdominal:      General: There is distension.      Tenderness: There is no abdominal tenderness.   Musculoskeletal:         General: Normal range of motion.      Cervical back: Normal range of motion.      Right lower leg: No edema.      Left lower leg: No edema.   Skin:     General: Skin is warm.      Coloration: Skin is pale.      Findings: No lesion.   Neurological:      Mental Status: He is alert and oriented to person, place, and time.       Significant Labs:  All labs within the past 24 hours have been reviewed.     Significant Imaging:  Labs: Reviewed

## 2023-02-02 NOTE — PLAN OF CARE
Problem: Physical Therapy  Goal: Physical Therapy Goal  Description: Goals to be met by: 23     Patient will increase functional independence with mobility by performin. Supine to sit with Minimal Assistance-not met  2. Sit to stand transfer with  Moderate Assistance- met   4. Bed to chair transfer with Moderate Assistance using AD if needed via stand pivot or squat pivot - not met  5. Gait  x 30 feet with Minimal Assistance using AD if needed. -not met  6. Sitting at edge of bed x8 minutes with Supervision -not met  7. Standing x 1 minutes with minimal assistance using LRAD - met   8. Pt sit to stand with AD and CGA.     Outcome: Ongoing, Progressing    Goals updated for progress and remain appropriate 2023

## 2023-02-02 NOTE — ASSESSMENT & PLAN NOTE
Lopressor was started while on hospital medicine at 25 mg BID (home does lopressor 100 mg QD). Pt went into afib with rvr on 1/27, IV lopressor given x 2 with subsequent drop of BP, re-started on amiodarone gtt, Amiodarone gtt switched to PO. Runs of Vtach on 1/30 with highest at 11 beats. Sustained tachycardia in 150s.    -Cardiology recommended lopressor   -Will discharge on Amiodarone 400 bid loading dose x2 weeks the transition to 200mg bid  -Re-initiated Eliquis 1/31  -Maintain K > 4, Mag > 2 and Ca/iCal WNL to decrease arrhythmogenic potential  - Will discharge on metoprolol succinate at 100mg daily

## 2023-02-02 NOTE — CONSULTS
Inpatient consult to Physical Medicine Rehab  Consult performed by: Shelby Jack NP  Consult ordered by: Benita Little MD    Consult received.  Reviewed patient history and current admission.  PM&R following.    Shelby Jack NP  Physical Medicine & Rehabilitation   02/02/2023

## 2023-02-02 NOTE — SUBJECTIVE & OBJECTIVE
Interval History/Significant Events: No issues. Having numerous Bms.    Review of Systems   Constitutional:  Negative for chills and fever.   HENT:  Negative for congestion and sore throat.    Eyes:  Negative for discharge and redness.   Respiratory:  Negative for shortness of breath, wheezing and stridor.    Cardiovascular:  Negative for chest pain, palpitations and leg swelling.   Gastrointestinal:  Negative for diarrhea, nausea, rectal pain and vomiting.   Genitourinary:  Negative for dysuria and urgency.   Musculoskeletal:  Positive for back pain.   Neurological:  Negative for dizziness.   Psychiatric/Behavioral:  Negative for agitation.    Objective:     Vital Signs (Most Recent):  Temp: 98.1 °F (36.7 °C) (02/02/23 0300)  Pulse: 86 (02/02/23 0733)  Resp: 17 (02/02/23 0733)  BP: (!) 143/96 (02/02/23 0600)  SpO2: 96 % (02/02/23 0600)   Vital Signs (24h Range):  Temp:  [98 °F (36.7 °C)-99.4 °F (37.4 °C)] 98.1 °F (36.7 °C)  Pulse:  [77-99] 86  Resp:  [14-42] 17  SpO2:  [92 %-100 %] 96 %  BP: (103-158)/(56-96) 143/96   Weight: 110 kg (242 lb 8.1 oz)  Body mass index is 36.87 kg/m².      Intake/Output Summary (Last 24 hours) at 2/2/2023 0852  Last data filed at 2/2/2023 0500  Gross per 24 hour   Intake --   Output 2300 ml   Net -2300 ml       Physical Exam  Vitals and nursing note reviewed.   Constitutional:       General: He is not in acute distress.     Appearance: Normal appearance. He is not ill-appearing or toxic-appearing.   HENT:      Head: Normocephalic and atraumatic.      Nose: Nose normal.      Mouth/Throat:      Mouth: Mucous membranes are moist.      Pharynx: Oropharynx is clear.   Eyes:      General: No scleral icterus.     Extraocular Movements: Extraocular movements intact.      Conjunctiva/sclera: Conjunctivae normal.      Pupils: Pupils are equal, round, and reactive to light.   Cardiovascular:      Rate and Rhythm: Normal rate. Rhythm irregular.      Heart sounds: Normal heart sounds. No murmur  heard.     Comments: Irregularly irregular pulses  Pulmonary:      Effort: Pulmonary effort is normal. No respiratory distress.      Breath sounds: Normal breath sounds. No stridor. No wheezing.   Abdominal:      General: Abdomen is flat. Bowel sounds are normal. There is distension.      Palpations: Abdomen is soft.      Tenderness: There is no abdominal tenderness. There is no guarding or rebound.   Musculoskeletal:      Cervical back: Normal range of motion and neck supple. No rigidity.   Skin:     General: Skin is warm and dry.      Capillary Refill: Capillary refill takes less than 2 seconds.      Coloration: Skin is not jaundiced or pale.      Findings: No bruising or rash.   Neurological:      General: No focal deficit present.      Mental Status: He is alert and oriented to person, place, and time. Mental status is at baseline.      Sensory: No sensory deficit.      Motor: No weakness.   Psychiatric:         Mood and Affect: Mood normal.         Behavior: Behavior normal.       Vents:  Oxygen Concentration (%): 100 (off wall) (02/02/23 0729)  Lines/Drains/Airways       Drain  Duration                  Open Drain 01/17/23 0800 Left;Anterior LLQ 16 days         Urethral Catheter 01/16/23 1347 Latex 14 Fr. 16 days              Peripheral Intravenous Line  Duration                  Peripheral IV - Single Lumen 01/27/23 1200 18 G Right Antecubital 5 days                  Significant Labs:    CBC/Anemia Profile:  Recent Labs   Lab 02/01/23  0403 02/02/23  0247   WBC 13.48* 12.18   HGB 9.4* 9.0*   HCT 29.9* 27.8*    270   MCV 98 96   RDW 21.7* 21.3*        Chemistries:  Recent Labs   Lab 02/01/23  0403 02/02/23  0247    138   K 3.6 3.3*   CL 98 95   CO2 26 29   BUN 35* 32*   CREATININE 2.1* 1.9*   CALCIUM 8.5* 8.4*   ALBUMIN 2.2* 2.2*   PROT 7.2 7.5   BILITOT 3.6* 4.2*   ALKPHOS 148* 142*   ALT 46* 50*   * 127*   MG 1.9 1.4*   PHOS 2.8 3.0       All pertinent labs within the past 24 hours have  been reviewed.    Significant Imaging:  I have reviewed all pertinent imaging results/findings within the past 24 hours.

## 2023-02-02 NOTE — ASSESSMENT & PLAN NOTE
-Last A1c reviewed-   Lab Results   Component Value Date    HGBA1C 5.3 11/10/2022       Home Antihyperglycemic Regiment:  - Metformin  1000 mg BID     Inpatient Antihyperglycemic Regiment:  Antihyperglycemics (From admission, onward)    Start     Stop Route Frequency Ordered    01/16/23 1507  insulin aspart U-100 pen 0-5 Units         -- SubQ Before meals & nightly PRN 01/16/23 1407        - LDSSI  - diabetic diet     Blood Sugars (AccuCheck):    Recent Labs     01/30/23  1048 01/30/23  1528 01/30/23 2014 01/31/23  2045 02/01/23  2056 02/02/23  0735   POCTGLUCOSE 128* 128* 106 120* 117* 108

## 2023-02-02 NOTE — ASSESSMENT & PLAN NOTE
CT Lumbar Spine Without Contrast Result Date: 1/16/2023  1.  Prominent, cystic, multiloculated predominantly gas attenuation structure within the spinal canal at the level of L3-L4 with dimensions as above.  This is favored to be epidural in location and results in severe narrowing of the spinal canal with mass effect upon the thecal sac.  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 Large disc extrusion at L3-4, contributing to severe spinal canal stenosis, as above. Additional lumbar spondylosis, contributing to moderate spinal canal stenosis at L2-3 and L4-5 and moderate neural foraminal narrowing L4-5 and L5-S1    - Discharge with appointment on 03/01 in NSGY clinic  - Skilled PT/OT to maximize mobility to [facility]

## 2023-02-02 NOTE — ASSESSMENT & PLAN NOTE
Likely 2/2 to underlying alcohol abuse and hepatic steatosis. Complete workup done with tylenol level, acute hep panel, a1-antitrypsin, anti smith antibody, HIV, anti mitochondrial antibody, anca, anti-liver, aFP. Liver US with doppler shows epatomegaly with hepatic steatosis, cholelithiasis versus gallbladder sludge, small volume ascites, and left pleural effusion.    - Daily CMP, PT/INR   - Lactulose 10g bid at home  - May titrate down here given number of BMs

## 2023-02-02 NOTE — ASSESSMENT & PLAN NOTE
Size of liver suggest other etiology than cirrhosis, however also has esophageal varices, possible HRS. CT and US renal with no signs of obstruction.     Urine microscopy: (1/17) no casts identified, amorphous material (though only 5 cc obtained). Repeat with few granular casts (1/18). Repeat on 1/19 with granular casts. 1/20: moderated (2-3 per field) granular casts with calcium oxylate crystals embedded in casts.     Urine protein 4.59g and repeat with 2.5  C3/4 - 111/35  LILO with ratio of 1.98  Hep B and C negative  Uric acid 13    HIV negative  Iron 31, TIBC 151, Tsat 21, Transferrin 102, ferritin 135  KEATON negative  ANCA negative  Cryo negative    1/19: repeat Urine sodium 10, urine protein creatinine ratio 0.89. IgA 505. Urine microscopy: moderated (2-3 per field) granular casts with calcium oxylate crystals embedded in casts, overall findings consistent with ATN    Overall impression: UOP responsive to 240 IV lasix 1/25. He did require RRT 1/22 for metabolic encephalopathy and work of breathing. Serum creatinine has been stable around 2.0, baseline 0.8 (11/2022 Care everywhere)    Recommendations:  -monitor UOP with torsemide 10  -voiding trial  -Keep MAP > 65  -Keep hemoglobin > 7  -Strict ins and outs  -Avoid nephrotoxic agents if possible  -Avoid drastic hemodynamic changes if possible

## 2023-02-02 NOTE — ASSESSMENT & PLAN NOTE
Creatinine 4.7 on admit, baseline around 0.8. Pre-renal vs ATN. Less likely obstruction as pt has gomez. Renal ultrasound with medical renal disease and no evidence of hydronephrosis.     - Avoid nephrotoxic agents such as NSAIDs, gadolinium and IV radiocontrast.  - Renally dose meds to current GFR.  - Torsemide 10 mg daily, continue OP  - Follow up with nephrology in 2 weeks-- referral in

## 2023-02-02 NOTE — ASSESSMENT & PLAN NOTE
- stable with no current bleeding   - EGD done and revealed small esophageal varices with portal hypertensive gastropathy

## 2023-02-02 NOTE — ASSESSMENT & PLAN NOTE
-Last A1c reviewed-   Lab Results   Component Value Date    HGBA1C 5.3 11/10/2022       Home Antihyperglycemic Regiment:  - Metformin  1000 mg BID     Inpatient Antihyperglycemic Regiment:  Antihyperglycemics (From admission, onward)    Start     Stop Route Frequency Ordered    01/16/23 1507  insulin aspart U-100 pen 0-5 Units         -- SubQ Before meals & nightly PRN 01/16/23 1407        - LDSSI  - diabetic diet     Blood Sugars (AccuCheck):    Recent Labs     01/30/23  1528 01/30/23 2014 01/31/23  2045 02/01/23  2056 02/02/23  0735 02/02/23  1154   POCTGLUCOSE 128* 106 120* 117* 108 132*

## 2023-02-02 NOTE — PT/OT/SLP PROGRESS
"Occupational Therapy   Co-Treatment    Name: Jonny Isabel  MRN: [id number]  Admitting Diagnosis:  Atrial fibrillation with rapid ventricular response  16 Days Post-Op    Recommendations:     Discharge Recommendations: rehabilitation facility    Assessment:     Jonny Isabel is a 62 y.o. male with a medical diagnosis of Atrial fibrillation with rapid ventricular response. Performance deficits affecting function are weakness, impaired endurance, impaired self care skills, impaired functional mobility, decreased lower extremity function, decreased upper extremity function. Pt tolerated therapy well and VSS throughout the session. Pt anticipated to progress well and would benefit from IP rehab. Co-tx completed to optimize functional performance and safety given pt impaired tolerance for activity in the setting of the ICU.    Rehab Prognosis:  Good; patient would benefit from acute skilled OT services to address these deficits and reach maximum level of function.       Plan:     Patient to be seen 3 x/week to address the above listed problems via self-care/home management, therapeutic activities, therapeutic exercises  Plan of Care Expires: 02/28/23  Plan of Care Reviewed with: patient    Subjective     Pt stated he just wanted to go home so what we were doing "doesn't matter".     Pain/Comfort:  Pain Rating 1: 0/10    Objective:     Communicated with: ns prior to session.  Patient found supine with blood pressure cuff, gomez catheter, pulse ox (continuous), telemetry upon OT entry to room.    General Precautions: Standard, fall    Respiratory Status: Nasal cannula, flow 3 L/min     Occupational Performance:     Bed Mobility:    Patient completed Rolling/Turning to Left with  stand by assistance  Patient completed Supine to Sit with moderate assistance     Functional Mobility/Transfers:  Patient completed 2 Sit <> Stand Transfer with minimum assistance  with  rolling walker   Functional Mobility: Pt sat at EOB for 2 mins " with SBA.  Pt completed static standing with SBA and postural cueing but occasionally leaned on bed for support.  Pt mobilized around room with RW.     Activities of Daily Living:  Feeding:  Pt drank water from straw with supervision in bed   Grooming: stand by assistance for oral care and brushing hair while standing by bed      AMPAC 6 Click ADL: 17    Treatment & Education:  Ed pt with OT POC and safety with functional mobility and ADL skills.  Pt ed on safety and recommendation for IP Rehab after D/C to promote function.       Patient left up in chair with all lines intact, call button in reach, and nsg notified    GOALS:   Multidisciplinary Problems       Occupational Therapy Goals          Problem: Occupational Therapy    Goal Priority Disciplines Outcome Interventions   Occupational Therapy Goal     OT, PT/OT Ongoing, Progressing    Description: Goals to be met by: 30 days 2/28/23    Patient will increase functional independence with ADLs by performing:    Pt to completed self feeding independently.   Pt to complete standing g/h skills with CGA.   Pt to complete UE dressing with set-up  Pt to complete LE dressing with MIN with AE as needed.   Pt to complete toileting with MIN A   Pt to complete t/f to BSC with MIN A   Pt to complete sit to stand for improved (I) with occupations of choice with MIN A                       Time Tracking:     OT Date of Treatment: 02/02/23  OT Start Time: 0806  OT Stop Time: 0833  OT Total Time (min): 27 min    Billable Minutes:Self Care/Home Management 15  Therapeutic Activity 12    OT/KARLEE: OT          2/2/2023

## 2023-02-02 NOTE — PT/OT/SLP PROGRESS
Physical Therapy Co-Treatment with OT    Patient Name:  Jonny Isabel   MRN:  [id number]    Recommendations:     Discharge Recommendations: rehabilitation facility  Discharge Equipment Recommendations:  (will determine closer to d/c)  Barriers to discharge: Decreased caregiver support    Assessment:     Jonny Isabel is a 62 y.o. male admitted with a medical diagnosis of Atrial fibrillation with rapid ventricular response.  He presents with the following impairments/functional limitations: weakness, impaired endurance, impaired functional mobility, gait instability, impaired balance, decreased lower extremity function, decreased safety awareness, pain. Pt tolerated treatment well and demonstrates improved tolerance to gait training and requires less assistance with transfers and bed mobility. Cont to have an increased risk of falls, increased burden of care, and currently functions at a level below his baseline. Once medically stable, pt would benefit from continued therapy at IP rehab.     Rehab Prognosis: Good; patient would benefit from acute skilled PT services to address these deficits and reach maximum level of function.    Recent Surgery: Procedure(s) (LRB):  EGD (ESOPHAGOGASTRODUODENOSCOPY) (N/A) 16 Days Post-Op    Plan:     During this hospitalization, patient to be seen 3 x/week to address the identified rehab impairments via gait training, therapeutic activities, neuromuscular re-education and progress toward the following goals:    Plan of Care Expires:  02/23/23    Subjective     Chief Complaint: none  Patient/Family Comments/goals: to return home  Pain/Comfort:  Pain Rating 1: 0/10      Objective:     Communicated with RN prior to session.  Patient found supine with blood pressure cuff, oxygen, telemetry, pulse ox (continuous), peripheral IV, gomez catheter upon PT entry to room.     General Precautions: Standard, fall  Orthopedic Precautions: N/A  Braces: N/A  Respiratory Status: Nasal cannula, flow 3  L/min     Functional Mobility:  Bed Mobility:   PT provided verbal cues for sequencing and UE placement.  Rolling Left:  stand by assistance  Supine to Sit: moderate assistance    Transfers:     Sit to Stand:  minimum assistance with rolling walker x2 reps from EOB. ModA from chair with RW x1 rep. PT provided verbal cues for UE placement and RW position for sit to/from stand using AD.     Gait: Pt gait trained ~8' from bed to chair and 12' in room using RW with CGA for both trials. PT required verbal cues for upright posture and hip extension.     Balance: Pt sat EOB ~5 min with SBA. Pt stood EOB ~3 min to perform grooming activities with OT, SBA with slight lean posteriorly on bed, no AD. PT provided verbal and tactile cues for upright posture during sitting and standing balance. Pt able to correct following cues.     Due to pt complex medical condition, the skill of 2 licensed therapists is needed to maximize treatment session and progression towards goals   Pt white board updated with current therapists name and level of mobility assistance needed.        AM-PAC 6 CLICK MOBILITY  Turning over in bed (including adjusting bedclothes, sheets and blankets)?: 3  Sitting down on and standing up from a chair with arms (e.g., wheelchair, bedside commode, etc.): 3  Moving from lying on back to sitting on the side of the bed?: 3  Moving to and from a bed to a chair (including a wheelchair)?: 3  Need to walk in hospital room?: 3  Climbing 3-5 steps with a railing?: 1  Basic Mobility Total Score: 16       Treatment & Education:  Pt verbally instructed in PT POC. Pt also verbally educated on risk of falls at his current functional level and benefits of rehab. Pt verbally expressed understanding of all.    Patient left up in chair with all lines intact, call button in reach, and RN notified..    GOALS:   Multidisciplinary Problems       Physical Therapy Goals          Problem: Physical Therapy    Goal Priority Disciplines  Outcome Goal Variances Interventions   Physical Therapy Goal     PT, PT/OT Ongoing, Progressing     Description: Goals to be met by: 23     Patient will increase functional independence with mobility by performin. Supine to sit with Minimal Assistance-not met  2. Sit to stand transfer with  Moderate Assistance- met   4. Bed to chair transfer with Moderate Assistance using AD if needed via stand pivot or squat pivot - not met  5. Gait  x 30 feet with Minimal Assistance using AD if needed. -not met  6. Sitting at edge of bed x8 minutes with Supervision -not met  7. Standing x 1 minutes with minimal assistance using LRAD - met   8. Pt sit to stand with AD and CGA.                          Time Tracking:     PT Received On: 23  PT Start Time: 806     PT Stop Time: 833  PT Total Time (min): 27 min     Billable Minutes: Gait Training 12 min and Neuromuscular Re-education 15 min    Treatment Type: Treatment  PT/PTA: PT     PTA Visit Number: 0     2023

## 2023-02-02 NOTE — ASSESSMENT & PLAN NOTE
Concerning for variceal bleed given hx of alcohol abuse.     - Regular diet  -Transfuse pRBC for Hb < 7 g/dL (Consider a higher Hb target if there is clinical evidence of intravascular volume depletion or comorbidities, such as CAD or if high suspicion of vigorous active ongoing bleeding or an uncorrected coagulopathy exists.)  - s/p vitamin K given Pt/INR > 2   - EGD showed  Small (< 5 mm) esophageal varices with portal hypertensive gastropathy  - Continue PO PPI 40 mg   - Stable after re-initiation of apixiban

## 2023-02-02 NOTE — PROGRESS NOTES
[facility]  Critical Care Medicine  Progress Note    Patient Name: Jonny Isabel  MRN: [id number]  Admission Date: 1/16/2023  Hospital Length of Stay: 17 days  Code Status: Full Code  Attending Provider: Benita Little MD  Primary Care Provider: Yuli Pérez Mai, MD   Principal Problem: Atrial fibrillation with rapid ventricular response    Subjective:     HPI:  Mr. Fuller is a 62-year-old male with a PMHx of A-fibb (on Eliquis), hypertension, DM 2 (not insulin dependent) presents as transfer from OSH for chronic lower back that has been worsening for the past week. Pt states that the pain is radiating from his right buttock down to his legs. Pt was unable to get out of bed this AM due to the pain. HE endorses having fecal incontinence for the past week with black tarry stools. He denies any fevers, chills, abdominal pain, urinary incontinence, or saddle anesthesia.     At OSH ED CT lumbar spine ordered revealing Prominent, cystic, multiloculated predominantly gas attenuation epidural  structure within the spinal canal at the level of L3-L4 resulting in severe narrowing of the spinal canal with mass effect upon the thecal sac and  vacuum disc phenomena concerning for diskitis or an epidural abscess. MRI shows large disc extrusion at L3-4 causing severe spinal canal stenosis, with moderate spinal canal stenosis at L2-3 and L4-5 and moderate neural foraminal narrowing L4-5 and L5-S1. [place] [facility] [place]. Admitted to MICU for NSGY and GI evaluation . Hgb stable. EGD shows small varices with gastropathy, no active bleeds. Worsening MARITTA with minimal UOP, HRS protocol started with levo, midodrine, octreotide and albumin trailed, has since been stopped due to low suspicion. Nephrology following recommend HD in setting of Acute Renal Failure. NSGY initially planned surgical intervention for 1/23 but determine patient not medically optimized and currently suggesting  multimodal pain control. A-fibb RVR 1/23 overnight, started on Amiodarone gtt, transitioned to home lopressor. Ongoing HD needs.     Stepped down to hospital medicine on 1/25. On 1/27, pt developed afib with rvr, refractory to IV Lopresor x 2, developed hypotension subsequently and was stepped back up to MICU.          Hospital/ICU Course:  Admitted to MICU for NSGY and GI evaluation . Hgb stable. EGD shows small varices with gastropathy, no active bleeds. Worsening MARTITA with minimal UOP, HRS protocol started with levo, midodrine, octreotide and albumin trailed, has since been stopped due to low suspicion. Nephrology following recommend HD in setting of Acute Renal Failure (s/p 1 session SLED, s/p 1 session HD). NSGY initially planned surgical intervention for 1/23 but determine patient not medically optimized and currently suggesting multimodal pain control. A-fibb RVR 1/23 overnight, started on Amiodarone gtt, transitioned to home lopressor. Lasix trailed with good U/O. Added Torsemide for diuresis. Amio gtt restarted due to a-fibb with RVR, transitioned to PO. Continues to have rate control on amiodarone and metoprolol succinate. Stable for discharge with close follow up 02/01.       Interval History/Significant Events: No issues. Having numerous Bms.    Review of Systems   Constitutional:  Negative for chills and fever.   HENT:  Negative for congestion and sore throat.    Eyes:  Negative for discharge and redness.   Respiratory:  Negative for shortness of breath, wheezing and stridor.    Cardiovascular:  Negative for chest pain, palpitations and leg swelling.   Gastrointestinal:  Negative for diarrhea, nausea, rectal pain and vomiting.   Genitourinary:  Negative for dysuria and urgency.   Musculoskeletal:  Positive for back pain.   Neurological:  Negative for dizziness.   Psychiatric/Behavioral:  Negative for agitation.    Objective:     Vital Signs (Most Recent):  Temp: 98.1 °F (36.7 °C) (02/02/23 0300)  Pulse:  86 (02/02/23 0733)  Resp: 17 (02/02/23 0733)  BP: (!) 143/96 (02/02/23 0600)  SpO2: 96 % (02/02/23 0600)   Vital Signs (24h Range):  Temp:  [98 °F (36.7 °C)-99.4 °F (37.4 °C)] 98.1 °F (36.7 °C)  Pulse:  [77-99] 86  Resp:  [14-42] 17  SpO2:  [92 %-100 %] 96 %  BP: (103-158)/(56-96) 143/96   Weight: 110 kg (242 lb 8.1 oz)  Body mass index is 36.87 kg/m².      Intake/Output Summary (Last 24 hours) at 2/2/2023 0852  Last data filed at 2/2/2023 0500  Gross per 24 hour   Intake --   Output 2300 ml   Net -2300 ml       Physical Exam  Vitals and nursing note reviewed.   Constitutional:       General: He is not in acute distress.     Appearance: Normal appearance. He is not ill-appearing or toxic-appearing.   HENT:      Head: Normocephalic and atraumatic.      Nose: Nose normal.      Mouth/Throat:      Mouth: Mucous membranes are moist.      Pharynx: Oropharynx is clear.   Eyes:      General: No scleral icterus.     Extraocular Movements: Extraocular movements intact.      Conjunctiva/sclera: Conjunctivae normal.      Pupils: Pupils are equal, round, and reactive to light.   Cardiovascular:      Rate and Rhythm: Normal rate. Rhythm irregular.      Heart sounds: Normal heart sounds. No murmur heard.     Comments: Irregularly irregular pulses  Pulmonary:      Effort: Pulmonary effort is normal. No respiratory distress.      Breath sounds: Normal breath sounds. No stridor. No wheezing.   Abdominal:      General: Abdomen is flat. Bowel sounds are normal. There is distension.      Palpations: Abdomen is soft.      Tenderness: There is no abdominal tenderness. There is no guarding or rebound.   Musculoskeletal:      Cervical back: Normal range of motion and neck supple. No rigidity.   Skin:     General: Skin is warm and dry.      Capillary Refill: Capillary refill takes less than 2 seconds.      Coloration: Skin is not jaundiced or pale.      Findings: No bruising or rash.   Neurological:      General: No focal deficit present.       Mental Status: He is alert and oriented to person, place, and time. Mental status is at baseline.      Sensory: No sensory deficit.      Motor: No weakness.   Psychiatric:         Mood and Affect: Mood normal.         Behavior: Behavior normal.       Vents:  Oxygen Concentration (%): 100 (off wall) (02/02/23 0729)  Lines/Drains/Airways       Drain  Duration                  Open Drain 01/17/23 0800 Left;Anterior LLQ 16 days         Urethral Catheter 01/16/23 1347 Latex 14 Fr. 16 days              Peripheral Intravenous Line  Duration                  Peripheral IV - Single Lumen 01/27/23 1200 18 G Right Antecubital 5 days                  Significant Labs:    CBC/Anemia Profile:  Recent Labs   Lab 02/01/23  0403 02/02/23  0247   WBC 13.48* 12.18   HGB 9.4* 9.0*   HCT 29.9* 27.8*    270   MCV 98 96   RDW 21.7* 21.3*        Chemistries:  Recent Labs   Lab 02/01/23  0403 02/02/23  0247    138   K 3.6 3.3*   CL 98 95   CO2 26 29   BUN 35* 32*   CREATININE 2.1* 1.9*   CALCIUM 8.5* 8.4*   ALBUMIN 2.2* 2.2*   PROT 7.2 7.5   BILITOT 3.6* 4.2*   ALKPHOS 148* 142*   ALT 46* 50*   * 127*   MG 1.9 1.4*   PHOS 2.8 3.0       All pertinent labs within the past 24 hours have been reviewed.    Significant Imaging:  I have reviewed all pertinent imaging results/findings within the past 24 hours.      ABG  Recent Labs   Lab 01/27/23  0954   PH 7.384   PO2 71*   PCO2 36.9   HCO3 22.1*   BE -3     Assessment/Plan:     Neuro  Lumbar herniated disc  NSGY holding surgery due to unstable status.    Spinal stenosis  CT Lumbar Spine Without Contrast Result Date: 1/16/2023  1.  Prominent, cystic, multiloculated predominantly gas attenuation structure within the spinal canal at the level of L3-L4 with dimensions as above.  This is favored to be epidural in location and results in severe narrowing of the spinal canal with mass effect upon the thecal sac.  MRI Lumbar Spine Without Contrast Result Date: 1/16/2023  Congenital  narrowing of lumbar spinal canal with prominent epidural fat. Large disc extrusion at L3-4, contributing to severe spinal canal stenosis, as above. Additional lumbar spondylosis, contributing to moderate spinal canal stenosis at L2-3 and L4-5 and moderate neural foraminal narrowing L4-5 and L5-S1    - Discharge with appointment on 03/01 in NSGY clinic  - Skilled PT/OT to maximize mobility      Psychiatric  Alcohol withdrawal syndrome without complication  Out of acute withdrawal period     Alcohol abuse  Out of acute withdrawal period       Cardiac/Vascular  * Atrial fibrillation with rapid ventricular response  Lopressor was started while on hospital medicine at 25 mg BID (home does lopressor 100 mg QD). Pt went into afib with rvr on 1/27, IV lopressor given x 2 with subsequent drop of BP, re-started on amiodarone gtt, Amiodarone gtt switched to PO. Runs of Vtach on 1/30 with highest at 11 beats. Sustained tachycardia in 150s.    -Cardiology recommended lopressor   -Will discharge on  Amiodarone 400 bid loading dose x2 weeks the transition to 200mg bid  - Re-initiated xarelto 1/31  -Maintain K > 4, Mag > 2 and Ca/iCal WNL to decrease arrhythmogenic potential  - Will discharge on metoprolol succinate at 50mg daily       Hemorrhagic shock  Resolved     Tachycardia  See primary problem    Renal/  ATN (acute tubular necrosis)  Likely secondary to acute hypotension. Cr steadily improving    -IVF rehydration      MARTITA (acute kidney injury)  Creatinine 4.7 on admit, baseline around 0.8. Pre-renal vs ATN. Less likely obstruction as pt has gomez.  Renal ultrasound with medical renal disease and no evidence of hydronephrosis.    - Avoid nephrotoxic agents such as NSAIDs, gadolinium and IV radiocontrast.  - Renally dose meds to current GFR.  - Torsemide 50 mg daily, continue OP  - Follow up with nephrology in 2 weeks          ID  Cellulitis  R hand seems to be cellulitic in appearance. Extending up to forearm.     Plan:  -  Completed 10 days of Cefepime on 1/25  RESOLVED      Endocrine  Diabetes  -Last A1c reviewed-   Lab Results   Component Value Date    HGBA1C 5.3 11/10/2022       Home Antihyperglycemic Regiment:  - Metformin  1000 mg BID     Inpatient Antihyperglycemic Regiment:  Antihyperglycemics (From admission, onward)    Start     Stop Route Frequency Ordered    01/16/23 1507  insulin aspart U-100 pen 0-5 Units         -- SubQ Before meals & nightly PRN 01/16/23 1407        - LDSSI  - diabetic diet     Blood Sugars (AccuCheck):    Recent Labs     01/30/23  1048 01/30/23  1528 01/30/23 2014 01/31/23  2045 02/01/23  2056 02/02/23  0735   POCTGLUCOSE 128* 128* 106 120* 117* 108           GI  Alcoholic cirrhosis of liver with ascites  S/p diagnostic paracentesis on 1/17 with , 50% seg. Cytology negative for malignant cell     - Palliative care consult     Small bowel obstruction  CT Abdomen Pelvis  Without Contrast. Findings concerning for developing small bowel obstruction with suspected transition point seen within the midline mid to lower abdomen at the level of L4  Pt without symptoms of nausea or vomiting. Reportedly had a BM at the outside facility on 1/16/2023. Abdomen is distended.     - Resolved will have patient monitor at home      Elevated liver enzymes  Likely 2/2 to underlying alcohol abuse and hepatic steatosis. Complete workup done with tylenol level, acute hep panel, a1-antitrypsin, anti smith antibody, HIV, anti mitochondrial antibody, anca, anti-liver, aFP. Liver US with doppler shows epatomegaly with hepatic steatosis, cholelithiasis versus gallbladder sludge, small volume ascites, and left pleural effusion.    - Daily CMP, PT/INR   - Lactulose 10g bid at home  - May titrate down here given number of BMs    GI bleed  Concerning for variceal bleed given hx of alcohol abuse.     - Regular diet  -Transfuse pRBC for Hb < 7 g/dL (Consider a higher Hb target if there is clinical evidence of intravascular  volume depletion or comorbidities, such as CAD or if high suspicion of vigorous active ongoing bleeding or an uncorrected coagulopathy exists.).  - s/p vitamin K given Pt/INR > 2   - EGD showed  Small (< 5 mm) esophageal varices with portal hypertensive gastropathy  - Continue PO PPI 40 mg   - Stable after re-initiation of apixiban           Palliative Care  Advanced care planning/counseling discussion  Daily discussions with patient and family    Palliative care encounter  Palliative following    Goals of care, counseling/discussion  Palliative following  Full code    Other  Debility  PT/OT  Early mobility if possible    Encounter for social work intervention  Patient does not appear to have relatives.  He will need possible decision maker/power of  if his overall mental status does not improve.    - social work consulted for family assessment       Critical Care Daily Checklist:    A: Awake: RASS Goal/Actual Goal: RASS Goal: 0-->alert and calm  Actual: Braxton Agitation Sedation Scale (RASS): Alert and calm   B: Spontaneous Breathing Trial Performed?     C: SAT & SBT Coordinated?  na                      D: Delirium: CAM-ICU Overall CAM-ICU: Negative   E: Early Mobility Performed? Yes   F: Feeding Goal: Goals: Meet % EEN, EPN by RD f/u date  Status: Nutrition Goal Status: progressing towards goal   Current Diet Order   Procedures    Diet Adult Regular (IDDSI Level 7)      AS: Analgesia/Sedation none   T: Thromboembolic Prophylaxis rivaroxaban   H: HOB > 300 Yes   U: Stress Ulcer Prophylaxis (if needed) PPI   G: Glucose Control SSI   B: Bowel Function Stool Occurrence: 1   I: Indwelling Catheter (Lines & Bose) Necessity no   D: De-escalation of Antimicrobials/Pharmacotherapies none    Plan for the day/ETD Discharge    Code Status:  Family/Goals of Care: Full Code         Not requiring ICU level care     Critical care was time spent personally by me on the following activities: development of  treatment plan with patient or surrogate and bedside caregivers, discussions with consultants, evaluation of patient's response to treatment, examination of patient, ordering and performing treatments and interventions, ordering and review of laboratory studies, ordering and review of radiographic studies, pulse oximetry, re-evaluation of patient's condition. This critical care time did not overlap with that of any other provider or involve time for any procedures.     Harriett Bird MD  Critical Care Medicine  [facility] - Cardiac Medical [facility]

## 2023-02-03 LAB
ALBUMIN SERPL BCP-MCNC: 2.2 G/DL (ref 3.5–5.2)
ALP SERPL-CCNC: 138 U/L (ref 55–135)
ALT SERPL W/O P-5'-P-CCNC: 51 U/L (ref 10–44)
ANION GAP SERPL CALC-SCNC: 14 MMOL/L (ref 8–16)
AST SERPL-CCNC: 117 U/L (ref 10–40)
BASOPHILS # BLD AUTO: 0.14 K/UL (ref 0–0.2)
BASOPHILS NFR BLD: 1.2 % (ref 0–1.9)
BILIRUB SERPL-MCNC: 4.4 MG/DL (ref 0.1–1)
BUN SERPL-MCNC: 32 MG/DL (ref 8–23)
CALCIUM SERPL-MCNC: 8.2 MG/DL (ref 8.7–10.5)
CHLORIDE SERPL-SCNC: 95 MMOL/L (ref 95–110)
CO2 SERPL-SCNC: 28 MMOL/L (ref 23–29)
CREAT SERPL-MCNC: 1.6 MG/DL (ref 0.5–1.4)
DIFFERENTIAL METHOD: ABNORMAL
EOSINOPHIL # BLD AUTO: 0.1 K/UL (ref 0–0.5)
EOSINOPHIL NFR BLD: 1.2 % (ref 0–8)
ERYTHROCYTE [DISTWIDTH] IN BLOOD BY AUTOMATED COUNT: 21.3 % (ref 11.5–14.5)
EST. GFR  (NO RACE VARIABLE): 48.7 ML/MIN/1.73 M^2
GLUCOSE SERPL-MCNC: 120 MG/DL (ref 70–110)
HCT VFR BLD AUTO: 26.4 % (ref 40–54)
HGB BLD-MCNC: 8.5 G/DL (ref 14–18)
IMM GRANULOCYTES # BLD AUTO: 0.1 K/UL (ref 0–0.04)
IMM GRANULOCYTES NFR BLD AUTO: 0.9 % (ref 0–0.5)
INR PPP: 1.5 (ref 0.8–1.2)
LYMPHOCYTES # BLD AUTO: 1.5 K/UL (ref 1–4.8)
LYMPHOCYTES NFR BLD: 12.8 % (ref 18–48)
MAGNESIUM SERPL-MCNC: 1.7 MG/DL (ref 1.6–2.6)
MCH RBC QN AUTO: 30.8 PG (ref 27–31)
MCHC RBC AUTO-ENTMCNC: 32.2 G/DL (ref 32–36)
MCV RBC AUTO: 96 FL (ref 82–98)
MONOCYTES # BLD AUTO: 1 K/UL (ref 0.3–1)
MONOCYTES NFR BLD: 8.9 % (ref 4–15)
NEUTROPHILS # BLD AUTO: 8.6 K/UL (ref 1.8–7.7)
NEUTROPHILS NFR BLD: 75 % (ref 38–73)
NRBC BLD-RTO: 0 /100 WBC
PHOSPHATE SERPL-MCNC: 2.5 MG/DL (ref 2.7–4.5)
PLATELET # BLD AUTO: 299 K/UL (ref 150–450)
PMV BLD AUTO: 11.3 FL (ref 9.2–12.9)
POCT GLUCOSE: 113 MG/DL (ref 70–110)
POCT GLUCOSE: 116 MG/DL (ref 70–110)
POCT GLUCOSE: 129 MG/DL (ref 70–110)
POCT GLUCOSE: 141 MG/DL (ref 70–110)
POTASSIUM SERPL-SCNC: 3.7 MMOL/L (ref 3.5–5.1)
PROT SERPL-MCNC: 7.4 G/DL (ref 6–8.4)
PROTHROMBIN TIME: 15.4 SEC (ref 9–12.5)
RBC # BLD AUTO: 2.76 M/UL (ref 4.6–6.2)
SODIUM SERPL-SCNC: 137 MMOL/L (ref 136–145)
WBC # BLD AUTO: 11.47 K/UL (ref 3.9–12.7)

## 2023-02-03 PROCEDURE — 99232 SBSQ HOSP IP/OBS MODERATE 35: CPT | Mod: ,,, | Performed by: NURSE PRACTITIONER

## 2023-02-03 PROCEDURE — 25000003 PHARM REV CODE 250: Performed by: STUDENT IN AN ORGANIZED HEALTH CARE EDUCATION/TRAINING PROGRAM

## 2023-02-03 PROCEDURE — 84100 ASSAY OF PHOSPHORUS: CPT | Performed by: STUDENT IN AN ORGANIZED HEALTH CARE EDUCATION/TRAINING PROGRAM

## 2023-02-03 PROCEDURE — 99233 SBSQ HOSP IP/OBS HIGH 50: CPT | Mod: ,,, | Performed by: INTERNAL MEDICINE

## 2023-02-03 PROCEDURE — 63600175 PHARM REV CODE 636 W HCPCS: Performed by: STUDENT IN AN ORGANIZED HEALTH CARE EDUCATION/TRAINING PROGRAM

## 2023-02-03 PROCEDURE — 99232 PR SUBSEQUENT HOSPITAL CARE,LEVL II: ICD-10-PCS | Mod: ,,, | Performed by: NURSE PRACTITIONER

## 2023-02-03 PROCEDURE — 94761 N-INVAS EAR/PLS OXIMETRY MLT: CPT

## 2023-02-03 PROCEDURE — 20600001 HC STEP DOWN PRIVATE ROOM

## 2023-02-03 PROCEDURE — 25000003 PHARM REV CODE 250

## 2023-02-03 PROCEDURE — 85025 COMPLETE CBC W/AUTO DIFF WBC: CPT | Performed by: STUDENT IN AN ORGANIZED HEALTH CARE EDUCATION/TRAINING PROGRAM

## 2023-02-03 PROCEDURE — 80053 COMPREHEN METABOLIC PANEL: CPT | Performed by: STUDENT IN AN ORGANIZED HEALTH CARE EDUCATION/TRAINING PROGRAM

## 2023-02-03 PROCEDURE — 99900035 HC TECH TIME PER 15 MIN (STAT)

## 2023-02-03 PROCEDURE — 83735 ASSAY OF MAGNESIUM: CPT | Performed by: STUDENT IN AN ORGANIZED HEALTH CARE EDUCATION/TRAINING PROGRAM

## 2023-02-03 PROCEDURE — 27000221 HC OXYGEN, UP TO 24 HOURS

## 2023-02-03 PROCEDURE — 85610 PROTHROMBIN TIME: CPT | Performed by: STUDENT IN AN ORGANIZED HEALTH CARE EDUCATION/TRAINING PROGRAM

## 2023-02-03 PROCEDURE — 25000003 PHARM REV CODE 250: Performed by: INTERNAL MEDICINE

## 2023-02-03 PROCEDURE — 99233 PR SUBSEQUENT HOSPITAL CARE,LEVL III: ICD-10-PCS | Mod: ,,, | Performed by: INTERNAL MEDICINE

## 2023-02-03 RX ORDER — METOPROLOL SUCCINATE 100 MG/1
100 TABLET, EXTENDED RELEASE ORAL DAILY
Status: DISCONTINUED | OUTPATIENT
Start: 2023-02-04 | End: 2023-02-07 | Stop reason: HOSPADM

## 2023-02-03 RX ORDER — OXYCODONE HYDROCHLORIDE 5 MG/1
5 TABLET ORAL EVERY 6 HOURS PRN
Status: DISCONTINUED | OUTPATIENT
Start: 2023-02-03 | End: 2023-02-07 | Stop reason: HOSPADM

## 2023-02-03 RX ORDER — SODIUM,POTASSIUM PHOSPHATES 280-250MG
1 POWDER IN PACKET (EA) ORAL
Status: COMPLETED | OUTPATIENT
Start: 2023-02-03 | End: 2023-02-03

## 2023-02-03 RX ORDER — METOPROLOL SUCCINATE 50 MG/1
50 TABLET, EXTENDED RELEASE ORAL DAILY
Status: DISCONTINUED | OUTPATIENT
Start: 2023-02-04 | End: 2023-02-03

## 2023-02-03 RX ORDER — MAGNESIUM SULFATE HEPTAHYDRATE 40 MG/ML
2 INJECTION, SOLUTION INTRAVENOUS ONCE
Status: COMPLETED | OUTPATIENT
Start: 2023-02-03 | End: 2023-02-03

## 2023-02-03 RX ORDER — METOPROLOL SUCCINATE 50 MG/1
50 TABLET, EXTENDED RELEASE ORAL ONCE
Status: DISCONTINUED | OUTPATIENT
Start: 2023-02-03 | End: 2023-02-04

## 2023-02-03 RX ORDER — HYDROXYZINE HYDROCHLORIDE 25 MG/1
25 TABLET, FILM COATED ORAL 3 TIMES DAILY PRN
Status: DISCONTINUED | OUTPATIENT
Start: 2023-02-03 | End: 2023-02-07 | Stop reason: HOSPADM

## 2023-02-03 RX ADMIN — PANTOPRAZOLE SODIUM 40 MG: 40 TABLET, DELAYED RELEASE ORAL at 05:02

## 2023-02-03 RX ADMIN — Medication 9 MG: at 10:02

## 2023-02-03 RX ADMIN — RIVAROXABAN 20 MG: 20 TABLET, FILM COATED ORAL at 04:02

## 2023-02-03 RX ADMIN — HYDROMORPHONE HYDROCHLORIDE 0.2 MG: 1 INJECTION, SOLUTION INTRAMUSCULAR; INTRAVENOUS; SUBCUTANEOUS at 07:02

## 2023-02-03 RX ADMIN — METOPROLOL SUCCINATE 50 MG: 50 TABLET, EXTENDED RELEASE ORAL at 08:02

## 2023-02-03 RX ADMIN — TORSEMIDE 10 MG: 10 TABLET ORAL at 08:02

## 2023-02-03 RX ADMIN — THERA TABS 1 TABLET: TAB at 08:02

## 2023-02-03 RX ADMIN — OXYCODONE 5 MG: 5 TABLET ORAL at 11:02

## 2023-02-03 RX ADMIN — MAGNESIUM SULFATE 2 G: 2 INJECTION INTRAVENOUS at 04:02

## 2023-02-03 RX ADMIN — AMIODARONE HYDROCHLORIDE 400 MG: 200 TABLET ORAL at 10:02

## 2023-02-03 RX ADMIN — POTASSIUM & SODIUM PHOSPHATES POWDER PACK 280-160-250 MG 1 PACKET: 280-160-250 PACK at 04:02

## 2023-02-03 RX ADMIN — POTASSIUM & SODIUM PHOSPHATES POWDER PACK 280-160-250 MG 1 PACKET: 280-160-250 PACK at 11:02

## 2023-02-03 RX ADMIN — OXYCODONE 5 MG: 5 TABLET ORAL at 10:02

## 2023-02-03 RX ADMIN — THIAMINE HCL TAB 100 MG 100 MG: 100 TAB at 08:02

## 2023-02-03 RX ADMIN — FOLIC ACID 1 MG: 1 TABLET ORAL at 08:02

## 2023-02-03 RX ADMIN — PANTOPRAZOLE SODIUM 40 MG: 40 TABLET, DELAYED RELEASE ORAL at 04:02

## 2023-02-03 RX ADMIN — POTASSIUM & SODIUM PHOSPHATES POWDER PACK 280-160-250 MG 1 PACKET: 280-160-250 PACK at 05:02

## 2023-02-03 RX ADMIN — AMIODARONE HYDROCHLORIDE 400 MG: 200 TABLET ORAL at 08:02

## 2023-02-03 RX ADMIN — BENZONATATE 200 MG: 100 CAPSULE ORAL at 10:02

## 2023-02-03 NOTE — SUBJECTIVE & OBJECTIVE
Interval History/Significant Events: Occasional increases in HR, will increase metoprolol dose.     Review of Systems   Constitutional:  Negative for chills and fever.   HENT:  Negative for congestion and sore throat.    Eyes:  Negative for discharge and redness.   Respiratory:  Negative for shortness of breath, wheezing and stridor.    Cardiovascular:  Negative for chest pain, palpitations and leg swelling.   Gastrointestinal:  Negative for diarrhea, nausea, rectal pain and vomiting.   Genitourinary:  Negative for dysuria and urgency.   Musculoskeletal:  Positive for back pain.   Neurological:  Negative for dizziness.   Psychiatric/Behavioral:  Negative for agitation.    Objective:     Vital Signs (Most Recent):  Temp: 98 °F (36.7 °C) (02/03/23 0700)  Pulse: 79 (02/03/23 1104)  Resp: (!) 28 (02/03/23 1123)  BP: 130/74 (02/03/23 1104)  SpO2: 95 % (02/03/23 1104)   Vital Signs (24h Range):  Temp:  [97.6 °F (36.4 °C)-98.3 °F (36.8 °C)] 98 °F (36.7 °C)  Pulse:  [74-85] 79  Resp:  [12-36] 28  SpO2:  [92 %-98 %] 95 %  BP: ()/(50-74) 130/74   Weight: 110 kg (242 lb 8.1 oz)  Body mass index is 36.87 kg/m².      Intake/Output Summary (Last 24 hours) at 2/3/2023 1408  Last data filed at 2/3/2023 0400  Gross per 24 hour   Intake --   Output 500 ml   Net -500 ml       Physical Exam  Vitals and nursing note reviewed.   Constitutional:       General: He is not in acute distress.     Appearance: Normal appearance. He is not ill-appearing or toxic-appearing.   HENT:      Head: Normocephalic and atraumatic.      Nose: Nose normal.      Mouth/Throat:      Mouth: Mucous membranes are moist.      Pharynx: Oropharynx is clear.   Eyes:      General: No scleral icterus.     Extraocular Movements: Extraocular movements intact.      Conjunctiva/sclera: Conjunctivae normal.      Pupils: Pupils are equal, round, and reactive to light.   Cardiovascular:      Rate and Rhythm: Normal rate and regular rhythm.      Heart sounds: Normal  heart sounds. No murmur heard.  Pulmonary:      Effort: Pulmonary effort is normal. No respiratory distress.      Breath sounds: Normal breath sounds. No stridor. No wheezing.   Abdominal:      General: Abdomen is flat. Bowel sounds are normal. There is distension.      Palpations: Abdomen is soft.      Tenderness: There is no abdominal tenderness. There is no guarding or rebound.   Musculoskeletal:      Cervical back: Normal range of motion and neck supple. No rigidity.   Skin:     General: Skin is warm and dry.      Capillary Refill: Capillary refill takes less than 2 seconds.      Coloration: Skin is not jaundiced or pale.      Findings: No bruising or rash.   Neurological:      General: No focal deficit present.      Mental Status: He is alert and oriented to person, place, and time. Mental status is at baseline.      Sensory: No sensory deficit.      Motor: No weakness.   Psychiatric:         Mood and Affect: Mood normal.         Behavior: Behavior normal.       Vents:  Oxygen Concentration (%): 100 (0ff wall) (02/02/23 1329)  Lines/Drains/Airways       Drain  Duration                  Open Drain 01/17/23 0800 Left;Anterior LLQ 17 days              Peripheral Intravenous Line  Duration                  Peripheral IV - Single Lumen 01/27/23 1200 18 G Right Antecubital 7 days                  Significant Labs:    CBC/Anemia Profile:  Recent Labs   Lab 02/02/23  0247 02/03/23  0216   WBC 12.18 11.47   HGB 9.0* 8.5*   HCT 27.8* 26.4*    299   MCV 96 96   RDW 21.3* 21.3*        Chemistries:  Recent Labs   Lab 02/02/23  0247 02/03/23  0216    137   K 3.3* 3.7   CL 95 95   CO2 29 28   BUN 32* 32*   CREATININE 1.9* 1.6*   CALCIUM 8.4* 8.2*   ALBUMIN 2.2* 2.2*   PROT 7.5 7.4   BILITOT 4.2* 4.4*   ALKPHOS 142* 138*   ALT 50* 51*   * 117*   MG 1.4* 1.7   PHOS 3.0 2.5*       All pertinent labs within the past 24 hours have been reviewed.    Significant Imaging:  I have reviewed all pertinent imaging  results/findings within the past 24 hours.

## 2023-02-03 NOTE — PROGRESS NOTES
[facility]  Nephrology  Progress Note    Patient Name: Jonny Isabel  MRN: [id number]  Admission Date: 1/16/2023  Hospital Length of Stay: 18 days  Attending Provider: Benita Little MD   Primary Care Physician: Yuli Pérez Mai, MD  Principal Problem:Atrial fibrillation with rapid ventricular response    Subjective:     HPI: 62 year old male with a history of alcohol abuse, afib/flutter on xarelto, HTN presents with concern for severe spinal canal stenosis/vacuum disc phenomena, concern for a GI bleed and MARTITA. He presents at behest of his girlfriend after being unable to stand, he has a history of sciatica but reports that this is worse. He has had fecal incontinence for the last week.     He has no report of kidney issues in the past and reports no family history of such either. At time of consultation he is pending an EGD for GI bleed. CT abdomen with large liver and concern for SBO per report, no hydronephrosis. UA with 2+ protein, 1+ occult blood, urine sodium 25 and urine osm 302. On admission serum creatinine 5 (baseline 0.8).      Interval History: 800 cc + 1 unrecorded UOP on torsemide 10. Continues to improve    Review of patient's allergies indicates:  No Known Allergies  Current Facility-Administered Medications   Medication Frequency    albuterol-ipratropium 2.5 mg-0.5 mg/3 mL nebulizer solution 3 mL Q6H PRN    amiodarone tablet 400 mg BID    Followed by    [START ON 2/9/2023] amiodarone tablet 200 mg Daily    benzonatate capsule 200 mg TID PRN    dextrose 10% bolus 125 mL 125 mL PRN    dextrose 10% bolus 250 mL 250 mL PRN    folic acid tablet 1 mg Daily    glucagon (human recombinant) injection 1 mg PRN    glucose chewable tablet 16 g PRN    glucose chewable tablet 24 g PRN    HYDROmorphone injection 0.2 mg Q6H PRN    insulin aspart U-100 pen 0-5 Units QID (AC + HS) PRN    lactulose 20 gram/30 mL solution Soln 10 g Daily    LIDOcaine-prilocaine cream PRN    melatonin tablet 9 mg Nightly  PRN    metoprolol injection 5 mg Q5 Min PRN    [START ON 2/4/2023] metoprolol succinate (TOPROL-XL) 24 hr tablet 100 mg Daily    [START ON 2/4/2023] metoprolol succinate (TOPROL-XL) 24 hr tablet 50 mg Daily    multivitamin tablet Daily    oxyCODONE immediate release tablet 5 mg Q6H PRN    pantoprazole EC tablet 40 mg BID AC    potassium, sodium phosphates 280-160-250 mg packet 1 packet QID (AC & HS)    rivaroxaban tablet 20 mg Daily with dinner    thiamine tablet 100 mg Daily    torsemide tablet 10 mg Daily       Objective:     Vital Signs (Most Recent):  Temp: 98 °F (36.7 °C) (02/03/23 0700)  Pulse: 79 (02/03/23 1104)  Resp: (!) 22 (02/03/23 1104)  BP: 130/74 (02/03/23 1104)  SpO2: 95 % (02/03/23 1104)   Vital Signs (24h Range):  Temp:  [97.6 °F (36.4 °C)-98.3 °F (36.8 °C)] 98 °F (36.7 °C)  Pulse:  [74-95] 79  Resp:  [12-36] 22  SpO2:  [92 %-98 %] 95 %  BP: ()/(50-80) 130/74     Weight: 110 kg (242 lb 8.1 oz) (01/31/23 1031)  Body mass index is 36.87 kg/m².  Body surface area is 2.3 meters squared.    I/O last 3 completed shifts:  In: -   Out: 1800 [Urine:1800]    Physical Exam  Constitutional:       General: He is not in acute distress.     Appearance: He is obese. He is not ill-appearing or toxic-appearing.   HENT:      Head: Normocephalic and atraumatic.      Nose: Nose normal.      Mouth/Throat:      Mouth: Mucous membranes are moist.   Eyes:      General:         Right eye: No discharge.         Left eye: No discharge.      Pupils: Pupils are equal, round, and reactive to light.   Cardiovascular:      Heart sounds: Murmur heard.   Abdominal:      General: There is distension.      Tenderness: There is no abdominal tenderness.   Musculoskeletal:         General: Normal range of motion.      Cervical back: Normal range of motion.      Right lower leg: No edema.      Left lower leg: No edema.   Skin:     General: Skin is warm.      Coloration: Skin is pale.      Findings: No lesion.   Neurological:       Mental Status: He is alert and oriented to person, place, and time.       Significant Labs:  All labs within the past 24 hours have been reviewed.     Significant Imaging:  Labs: Reviewed    Assessment/Plan:     MARTITA (acute kidney injury)  Size of liver suggest other etiology than cirrhosis, however also has esophageal varices, possible HRS. CT and US renal with no signs of obstruction.     Urine microscopy: (1/17) no casts identified, amorphous material (though only 5 cc obtained). Repeat with few granular casts (1/18). Repeat on 1/19 with granular casts. 1/20: moderated (2-3 per field) granular casts with calcium oxylate crystals embedded in casts.     Urine protein 4.59g and repeat with 2.5  C3/4 - 111/35  LILO with ratio of 1.98  Hep B and C negative  Uric acid 13    HIV negative  Iron 31, TIBC 151, Tsat 21, Transferrin 102, ferritin 135  KEATON negative  ANCA negative  Cryo negative    1/19: repeat Urine sodium 10, urine protein creatinine ratio 0.89. IgA 505. Urine microscopy: moderated (2-3 per field) granular casts with calcium oxylate crystals embedded in casts, overall findings consistent with ATN    Overall impression: UOP responsive to 240 IV lasix 1/25. He did require RRT 1/22 for metabolic encephalopathy and work of breathing. Serum creatinine has been stable around 2.0, baseline 0.8 (11/2022 Care everywhere)    Recommendations:  -Continue torsemide 10  -Follow up outpatient when discharged   -Keep MAP > 65  -Keep hemoglobin > 7  -Strict ins and outs  -Avoid nephrotoxic agents if possible  -Avoid drastic hemodynamic changes if possible           Spinal stenosis  NSGY deferring surgery for later date          Thank you for your consult. I will sign off. Please contact us if you have any additional questions.    Otoniel Cat MD  Nephrology  [facility]

## 2023-02-03 NOTE — SUBJECTIVE & OBJECTIVE
Interval History 2/3/2023:  Patient is seen for follow-up PM&R evaluation and recommendations: participating with therapy. Continues to have goals for rehab.     HPI, Past Medical, Family, and Social History remains the same as documented in the initial encounter.    Scheduled Medications:    amiodarone  400 mg Oral BID    Followed by    [START ON 2/9/2023] amiodarone  200 mg Oral Daily    folic acid  1 mg Oral Daily    lactulose  10 g Oral Daily    [START ON 2/4/2023] metoprolol succinate  100 mg Oral Daily    metoprolol succinate  50 mg Oral Once    multivitamin  1 tablet Oral Daily    pantoprazole  40 mg Oral BID AC    potassium, sodium phosphates  1 packet Oral QID (AC & HS)    rivaroxaban  20 mg Oral Daily with dinner    thiamine  100 mg Oral Daily    torsemide  10 mg Oral Daily       Diagnostic Results: Labs: Reviewed  ECG: Reviewed  CT: Reviewed    PRN Medications: albuterol-ipratropium, benzonatate, dextrose 10%, dextrose 10%, glucagon (human recombinant), glucose, glucose, insulin aspart U-100, LIDOcaine-prilocaine, melatonin, metoprolol, oxyCODONE    Review of Systems   Constitutional:  Positive for activity change. Negative for fatigue and fever.   HENT:  Negative for trouble swallowing and voice change.    Respiratory:  Negative for cough and shortness of breath.    Cardiovascular:  Negative for chest pain and leg swelling.   Gastrointestinal:  Positive for abdominal distention. Negative for abdominal pain.   Genitourinary:  Negative for difficulty urinating and flank pain.   Musculoskeletal:  Positive for gait problem. Negative for back pain.   Skin:  Negative for color change and rash.   Neurological:  Positive for weakness. Negative for speech difficulty and numbness.   Psychiatric/Behavioral:  Negative for agitation and confusion.    Objective:     Vital Signs (Most Recent):  Temp: 98 °F (36.7 °C) (02/03/23 0700)  Pulse: 79 (02/03/23 1104)  Resp: (!) 28 (02/03/23 1123)  BP: 130/74 (02/03/23  1104)  SpO2: 95 % (02/03/23 1104)      Vital Signs (24h Range):  Temp:  [97.6 °F (36.4 °C)-98.3 °F (36.8 °C)] 98 °F (36.7 °C)  Pulse:  [74-95] 79  Resp:  [12-36] 28  SpO2:  [92 %-98 %] 95 %  BP: ()/(50-80) 130/74     Physical Exam  Vitals and nursing note reviewed.   Constitutional:       Appearance: He is well-developed.   HENT:      Head: Normocephalic and atraumatic.   Eyes:      General:         Right eye: No discharge.         Left eye: No discharge.      Pupils: Pupils are equal, round, and reactive to light.   Pulmonary:      Effort: Pulmonary effort is normal. No respiratory distress.   Abdominal:      General: There is distension.      Tenderness: There is no abdominal tenderness.   Musculoskeletal:         General: No deformity.      Cervical back: Neck supple.      Comments: Deconditioned and generalized weakness   Skin:     General: Skin is warm and dry.   Neurological:      Sensory: No sensory deficit.      Motor: Weakness present. No abnormal muscle tone.      Gait: Gait abnormal.   Psychiatric:         Mood and Affect: Mood normal.         Behavior: Behavior normal.     NEUROLOGICAL EXAMINATION:     CRANIAL NERVES     CN III, IV, VI   Pupils are equal, round, and reactive to light.

## 2023-02-03 NOTE — ASSESSMENT & PLAN NOTE
Size of liver suggest other etiology than cirrhosis, however also has esophageal varices, possible HRS. CT and US renal with no signs of obstruction.     Urine microscopy: (1/17) no casts identified, amorphous material (though only 5 cc obtained). Repeat with few granular casts (1/18). Repeat on 1/19 with granular casts. 1/20: moderated (2-3 per field) granular casts with calcium oxylate crystals embedded in casts.     Urine protein 4.59g and repeat with 2.5  C3/4 - 111/35  LILO with ratio of 1.98  Hep B and C negative  Uric acid 13    HIV negative  Iron 31, TIBC 151, Tsat 21, Transferrin 102, ferritin 135  KEATON negative  ANCA negative  Cryo negative    1/19: repeat Urine sodium 10, urine protein creatinine ratio 0.89. IgA 505. Urine microscopy: moderated (2-3 per field) granular casts with calcium oxylate crystals embedded in casts, overall findings consistent with ATN    Overall impression: UOP responsive to 240 IV lasix 1/25. He did require RRT 1/22 for metabolic encephalopathy and work of breathing. Serum creatinine has been stable around 2.0, baseline 0.8 (11/2022 Care everywhere)    Recommendations:  -Continue torsemide 10  -Follow up outpatient when discharged   -Keep MAP > 65  -Keep hemoglobin > 7  -Strict ins and outs  -Avoid nephrotoxic agents if possible  -Avoid drastic hemodynamic changes if possible

## 2023-02-03 NOTE — SUBJECTIVE & OBJECTIVE
Interval History: 800 cc + 1 unrecorded UOP on torsemide 10. Continues to improve    Review of patient's allergies indicates:  No Known Allergies  Current Facility-Administered Medications   Medication Frequency    albuterol-ipratropium 2.5 mg-0.5 mg/3 mL nebulizer solution 3 mL Q6H PRN    amiodarone tablet 400 mg BID    Followed by    [START ON 2/9/2023] amiodarone tablet 200 mg Daily    benzonatate capsule 200 mg TID PRN    dextrose 10% bolus 125 mL 125 mL PRN    dextrose 10% bolus 250 mL 250 mL PRN    folic acid tablet 1 mg Daily    glucagon (human recombinant) injection 1 mg PRN    glucose chewable tablet 16 g PRN    glucose chewable tablet 24 g PRN    HYDROmorphone injection 0.2 mg Q6H PRN    insulin aspart U-100 pen 0-5 Units QID (AC + HS) PRN    lactulose 20 gram/30 mL solution Soln 10 g Daily    LIDOcaine-prilocaine cream PRN    melatonin tablet 9 mg Nightly PRN    metoprolol injection 5 mg Q5 Min PRN    [START ON 2/4/2023] metoprolol succinate (TOPROL-XL) 24 hr tablet 100 mg Daily    [START ON 2/4/2023] metoprolol succinate (TOPROL-XL) 24 hr tablet 50 mg Daily    multivitamin tablet Daily    oxyCODONE immediate release tablet 5 mg Q6H PRN    pantoprazole EC tablet 40 mg BID AC    potassium, sodium phosphates 280-160-250 mg packet 1 packet QID (AC & HS)    rivaroxaban tablet 20 mg Daily with dinner    thiamine tablet 100 mg Daily    torsemide tablet 10 mg Daily       Objective:     Vital Signs (Most Recent):  Temp: 98 °F (36.7 °C) (02/03/23 0700)  Pulse: 79 (02/03/23 1104)  Resp: (!) 22 (02/03/23 1104)  BP: 130/74 (02/03/23 1104)  SpO2: 95 % (02/03/23 1104)   Vital Signs (24h Range):  Temp:  [97.6 °F (36.4 °C)-98.3 °F (36.8 °C)] 98 °F (36.7 °C)  Pulse:  [74-95] 79  Resp:  [12-36] 22  SpO2:  [92 %-98 %] 95 %  BP: ()/(50-80) 130/74     Weight: 110 kg (242 lb 8.1 oz) (01/31/23 1031)  Body mass index is 36.87 kg/m².  Body surface area is 2.3 meters squared.    I/O last 3 completed shifts:  In: -   Out:  1800 [Urine:1800]    Physical Exam  Constitutional:       General: He is not in acute distress.     Appearance: He is obese. He is not ill-appearing or toxic-appearing.   HENT:      Head: Normocephalic and atraumatic.      Nose: Nose normal.      Mouth/Throat:      Mouth: Mucous membranes are moist.   Eyes:      General:         Right eye: No discharge.         Left eye: No discharge.      Pupils: Pupils are equal, round, and reactive to light.   Cardiovascular:      Heart sounds: Murmur heard.   Abdominal:      General: There is distension.      Tenderness: There is no abdominal tenderness.   Musculoskeletal:         General: Normal range of motion.      Cervical back: Normal range of motion.      Right lower leg: No edema.      Left lower leg: No edema.   Skin:     General: Skin is warm.      Coloration: Skin is pale.      Findings: No lesion.   Neurological:      Mental Status: He is alert and oriented to person, place, and time.       Significant Labs:  All labs within the past 24 hours have been reviewed.     Significant Imaging:  Labs: Reviewed

## 2023-02-03 NOTE — PLAN OF CARE
[facility] has accepted patient. Authorization was submitted this morning. JAMIE escalated insurance authorization to [employer].      Rachel Whelan RN     [phone]

## 2023-02-03 NOTE — PROGRESS NOTES
[facility]  Critical Care Medicine  Progress Note    Patient Name: Jonny Isabel  MRN: [id number]  Admission Date: 1/16/2023  Hospital Length of Stay: 18 days  Code Status: Full Code  Attending Provider: Benita Little MD  Primary Care Provider: Yuli Pérez Mai, MD   Principal Problem: Atrial fibrillation with rapid ventricular response    Subjective:     HPI:  Mr. Fuller is a 62-year-old male with a PMHx of A-fibb (on Eliquis), hypertension, DM 2 (not insulin dependent) presents as transfer from OSH for chronic lower back that has been worsening for the past week. Pt states that the pain is radiating from his right buttock down to his legs. Pt was unable to get out of bed this AM due to the pain. HE endorses having fecal incontinence for the past week with black tarry stools. He denies any fevers, chills, abdominal pain, urinary incontinence, or saddle anesthesia.     At OSH ED CT lumbar spine ordered revealing Prominent, cystic, multiloculated predominantly gas attenuation epidural  structure within the spinal canal at the level of L3-L4 resulting in severe narrowing of the spinal canal with mass effect upon the thecal sac and  vacuum disc phenomena concerning for diskitis or an epidural abscess. MRI shows large disc extrusion at L3-4 causing severe spinal canal stenosis, with moderate spinal canal stenosis at L2-3 and L4-5 and moderate neural foraminal narrowing L4-5 and L5-S1. Pt transferred to [facility] [place] for further intervention with neurosurgery. Admitted to MICU for NSGY and GI evaluation . Hgb stable. EGD shows small varices with gastropathy, no active bleeds. Worsening MARTITA with minimal UOP, HRS protocol started with levo, midodrine, octreotide and albumin trailed, has since been stopped due to low suspicion. Nephrology following recommend HD in setting of Acute Renal Failure. NSGY initially planned surgical intervention for 1/23 but determine patient not medically optimized and currently suggesting  multimodal pain control. A-fibb RVR 1/23 overnight, started on Amiodarone gtt, transitioned to home lopressor. Ongoing HD needs.     Stepped down to hospital medicine on 1/25. On 1/27, pt developed afib with rvr, refractory to IV Lopresor x 2, developed hypotension subsequently and was stepped back up to MICU.          Hospital/ICU Course:  Admitted to MICU for NSGY and GI evaluation . Hgb stable. EGD shows small varices with gastropathy, no active bleeds. Worsening MARTITA with minimal UOP, HRS protocol started with levo, midodrine, octreotide and albumin trailed, has since been stopped due to low suspicion. Nephrology following recommend HD in setting of Acute Renal Failure (s/p 1 session SLED, s/p 1 session HD). NSGY initially planned surgical intervention for 1/23 but determine patient not medically optimized and currently suggesting multimodal pain control. A-fibb RVR 1/23 overnight, started on Amiodarone gtt, transitioned to home lopressor. Lasix trailed with good U/O. Added Torsemide for diuresis. Amio gtt restarted due to a-fibb with RVR, transitioned to PO. Continues to have rate control on amiodarone and metoprolol succinate. Stable for discharge to [facility] once approved by his insurance.       Interval History/Significant Events: Occasional increases in HR, will increase metoprolol dose.     Review of Systems   Constitutional:  Negative for chills and fever.   HENT:  Negative for congestion and sore throat.    Eyes:  Negative for discharge and redness.   Respiratory:  Negative for shortness of breath, wheezing and stridor.    Cardiovascular:  Negative for chest pain, palpitations and leg swelling.   Gastrointestinal:  Negative for diarrhea, nausea, rectal pain and vomiting.   Genitourinary:  Negative for dysuria and urgency.   Musculoskeletal:  Positive for back pain.   Neurological:  Negative for dizziness.   Psychiatric/Behavioral:  Negative for agitation.    Objective:     Vital Signs (Most  Recent):  Temp: 98 °F (36.7 °C) (02/03/23 0700)  Pulse: 79 (02/03/23 1104)  Resp: (!) 28 (02/03/23 1123)  BP: 130/74 (02/03/23 1104)  SpO2: 95 % (02/03/23 1104)   Vital Signs (24h Range):  Temp:  [97.6 °F (36.4 °C)-98.3 °F (36.8 °C)] 98 °F (36.7 °C)  Pulse:  [74-85] 79  Resp:  [12-36] 28  SpO2:  [92 %-98 %] 95 %  BP: ()/(50-74) 130/74   Weight: 110 kg (242 lb 8.1 oz)  Body mass index is 36.87 kg/m².      Intake/Output Summary (Last 24 hours) at 2/3/2023 1408  Last data filed at 2/3/2023 0400  Gross per 24 hour   Intake --   Output 500 ml   Net -500 ml       Physical Exam  Vitals and nursing note reviewed.   Constitutional:       General: He is not in acute distress.     Appearance: Normal appearance. He is not ill-appearing or toxic-appearing.   HENT:      Head: Normocephalic and atraumatic.      Nose: Nose normal.      Mouth/Throat:      Mouth: Mucous membranes are moist.      Pharynx: Oropharynx is clear.   Eyes:      General: No scleral icterus.     Extraocular Movements: Extraocular movements intact.      Conjunctiva/sclera: Conjunctivae normal.      Pupils: Pupils are equal, round, and reactive to light.   Cardiovascular:      Rate and Rhythm: Normal rate and regular rhythm.      Heart sounds: Normal heart sounds. No murmur heard.  Pulmonary:      Effort: Pulmonary effort is normal. No respiratory distress.      Breath sounds: Normal breath sounds. No stridor. No wheezing.   Abdominal:      General: Abdomen is flat. Bowel sounds are normal. There is distension.      Palpations: Abdomen is soft.      Tenderness: There is no abdominal tenderness. There is no guarding or rebound.   Musculoskeletal:      Cervical back: Normal range of motion and neck supple. No rigidity.   Skin:     General: Skin is warm and dry.      Capillary Refill: Capillary refill takes less than 2 seconds.      Coloration: Skin is not jaundiced or pale.      Findings: No bruising or rash.   Neurological:      General: No focal deficit  present.      Mental Status: He is alert and oriented to person, place, and time. Mental status is at baseline.      Sensory: No sensory deficit.      Motor: No weakness.   Psychiatric:         Mood and Affect: Mood normal.         Behavior: Behavior normal.       Vents:  Oxygen Concentration (%): 100 (0ff wall) (02/02/23 1329)  Lines/Drains/Airways       Drain  Duration                  Open Drain 01/17/23 0800 Left;Anterior LLQ 17 days              Peripheral Intravenous Line  Duration                  Peripheral IV - Single Lumen 01/27/23 1200 18 G Right Antecubital 7 days                  Significant Labs:    CBC/Anemia Profile:  Recent Labs   Lab 02/02/23  0247 02/03/23  0216   WBC 12.18 11.47   HGB 9.0* 8.5*   HCT 27.8* 26.4*    299   MCV 96 96   RDW 21.3* 21.3*        Chemistries:  Recent Labs   Lab 02/02/23 0247 02/03/23  0216    137   K 3.3* 3.7   CL 95 95   CO2 29 28   BUN 32* 32*   CREATININE 1.9* 1.6*   CALCIUM 8.4* 8.2*   ALBUMIN 2.2* 2.2*   PROT 7.5 7.4   BILITOT 4.2* 4.4*   ALKPHOS 142* 138*   ALT 50* 51*   * 117*   MG 1.4* 1.7   PHOS 3.0 2.5*       All pertinent labs within the past 24 hours have been reviewed.    Significant Imaging:  I have reviewed all pertinent imaging results/findings within the past 24 hours.      ABG  No results for input(s): PH, PO2, PCO2, HCO3, BE in the last 168 hours.  Assessment/Plan:     Neuro  Lumbar herniated disc  NSGY holding surgery due to unstable status.    Spinal stenosis  CT Lumbar Spine Without Contrast Result Date: 1/16/2023  1.  Prominent, cystic, multiloculated predominantly gas attenuation structure within the spinal canal at the level of L3-L4 with dimensions as above.  This is favored to be epidural in location and results in severe narrowing of the spinal canal with mass effect upon the thecal sac.  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 Large  disc extrusion at L3-4, contributing to severe spinal canal stenosis, as above. Additional lumbar spondylosis, contributing to moderate spinal canal stenosis at L2-3 and L4-5 and moderate neural foraminal narrowing L4-5 and L5-S1    - Discharge with appointment on 03/01 in NSGY clinic  - Skilled PT/OT to maximize mobility to [facility]       Psychiatric  Alcohol withdrawal syndrome without complication  Out of acute withdrawal period     Alcohol abuse  Out of acute withdrawal period       Cardiac/Vascular  * Atrial fibrillation with rapid ventricular response  Lopressor was started while on hospital medicine at 25 mg BID (home does lopressor 100 mg QD). Pt went into afib with rvr on 1/27, IV lopressor given x 2 with subsequent drop of BP, re-started on amiodarone gtt, Amiodarone gtt switched to PO. Runs of Vtach on 1/30 with highest at 11 beats. Sustained tachycardia in 150s.    -Cardiology recommended lopressor   -Will discharge on Amiodarone 400 bid loading dose x2 weeks the transition to 200mg bid  -Re-initiated Eliquis 1/31  -Maintain K > 4, Mag > 2 and Ca/iCal WNL to decrease arrhythmogenic potential  - Will discharge on metoprolol succinate at 100mg daily       Hemorrhagic shock  Resolved     Tachycardia  See primary problem    Renal/  ATN (acute tubular necrosis)  Likely secondary to acute hypotension. Cr steadily improving    See above       MARTITA (acute kidney injury)  Creatinine 4.7 on admit, baseline around 0.8. Pre-renal vs ATN. Less likely obstruction as pt has gomez. Renal ultrasound with medical renal disease and no evidence of hydronephrosis.     - Avoid nephrotoxic agents such as NSAIDs, gadolinium and IV radiocontrast.  - Renally dose meds to current GFR.  - Torsemide 10 mg daily, continue OP  - Follow up with nephrology in 2 weeks-- referral in           ID  Cellulitis  R hand seems to be cellulitic in appearance. Extending up to forearm.     Plan:  - Completed 10 days of Cefepime on  1/25  RESOLVED      Endocrine  Diabetes  -Last A1c reviewed-   Lab Results   Component Value Date    HGBA1C 5.3 11/10/2022       Home Antihyperglycemic Regiment:  - Metformin  1000 mg BID     Inpatient Antihyperglycemic Regiment:  Antihyperglycemics (From admission, onward)    Start     Stop Route Frequency Ordered    01/16/23 1507  insulin aspart U-100 pen 0-5 Units         -- SubQ Before meals & nightly PRN 01/16/23 1407        - LDSSI  - diabetic diet     Blood Sugars (AccuCheck):    Recent Labs     01/30/23  1528 01/30/23 2014 01/31/23  2045 02/01/23  2056 02/02/23  0735 02/02/23  1154   POCTGLUCOSE 128* 106 120* 117* 108 132*           GI  Alcoholic cirrhosis of liver with ascites  S/p diagnostic paracentesis on 1/17 with , 50% seg. Cytology negative for malignant cell     - Palliative care consult     Small bowel obstruction  CT Abdomen Pelvis  Without Contrast. Findings concerning for developing small bowel obstruction with suspected transition point seen within the midline mid to lower abdomen at the level of L4  Pt without symptoms of nausea or vomiting. Reportedly had a BM at the outside facility on 1/16/2023. Abdomen is distended.     - Resolved will have patient monitor at home      Elevated liver enzymes  Likely 2/2 to underlying alcohol abuse and hepatic steatosis. Complete workup done with tylenol level, acute hep panel, a1-antitrypsin, anti smith antibody, HIV, anti mitochondrial antibody, anca, anti-liver, aFP. Liver US with doppler shows epatomegaly with hepatic steatosis, cholelithiasis versus gallbladder sludge, small volume ascites, and left pleural effusion.    - Daily CMP, PT/INR   - Lactulose 10g bid at home  - May titrate down here given number of BMs    GI bleed  Concerning for variceal bleed given hx of alcohol abuse.     - Regular diet  -Transfuse pRBC for Hb < 7 g/dL (Consider a higher Hb target if there is clinical evidence of intravascular volume depletion or comorbidities,  such as CAD or if high suspicion of vigorous active ongoing bleeding or an uncorrected coagulopathy exists.)  - s/p vitamin K given Pt/INR > 2   - EGD showed  Small (< 5 mm) esophageal varices with portal hypertensive gastropathy  - Continue PO PPI 40 mg   - Stable after re-initiation of apixiban           Palliative Care  Advanced care planning/counseling discussion  Daily discussions with patient and family    Palliative care encounter  Palliative following    Goals of care, counseling/discussion  Palliative following  Full code    Other  Debility  PT/OT  Early mobility if possible    Encounter for social work intervention  Patient does not appear to have relatives.  He will need possible decision maker/power of  if his overall mental status does not improve.    - social work consulted for family assessment       Critical Care Daily Checklist:    A: Awake: RASS Goal/Actual Goal: RASS Goal: 0-->alert and calm  Actual: Braxton Agitation Sedation Scale (RASS): Alert and calm   B: Spontaneous Breathing Trial Performed?     C: SAT & SBT Coordinated?  NA                      D: Delirium: CAM-ICU Overall CAM-ICU: Negative   E: Early Mobility Performed? Yes   F: Feeding Goal: Goals: Meet % EEN, EPN by RD f/u date  Status: Nutrition Goal Status: progressing towards goal   Current Diet Order   Procedures    Diet Adult Regular (IDDSI Level 7)      AS: Analgesia/Sedation N   T: Thromboembolic Prophylaxis Y   H: HOB > 300 Yes   U: Stress Ulcer Prophylaxis (if needed) N   G: Glucose Control Y   B: Bowel Function Stool Occurrence: 1   I: Indwelling Catheter (Lines & Bose) Necessity Y   D: De-escalation of Antimicrobials/Pharmacotherapies Y    Plan for the day/ETD Awaiting insurance approval of rehab     Code Status:  Family/Goals of Care: Full Code         Critical secondary to Patient has a condition that poses threat to life and bodily function: a-fib with RVR       Critical care was time spent personally by  me on the following activities: development of treatment plan with patient or surrogate and bedside caregivers, discussions with consultants, evaluation of patient's response to treatment, examination of patient, ordering and performing treatments and interventions, ordering and review of laboratory studies, ordering and review of radiographic studies, pulse oximetry, re-evaluation of patient's condition. This critical care time did not overlap with that of any other provider or involve time for any procedures.     Elmer Garcia MD  Critical Care Medicine  [facility] - Cardiac Medical ICU

## 2023-02-03 NOTE — PROGRESS NOTES
[facility]  Physical Medicine & Rehab  Progress Note    Patient Name: Jonny Isabel  MRN: [id number]  Admission Date: 1/16/2023  Length of Stay: 18 days  Attending Physician: Benita Little MD    Subjective:     Principal Problem:Atrial fibrillation with rapid ventricular response    Hospital Course:   1/26/23: Participated w/ OT. Bed mob min- modA. Sit to stand modA x 2 ppl. UBD modA.       Interval History 2/3/2023:  Patient is seen for follow-up PM&R evaluation and recommendations: participating with therapy. Continues to have goals for rehab.     HPI, Past Medical, Family, and Social History remains the same as documented in the initial encounter.    Scheduled Medications:    amiodarone  400 mg Oral BID    Followed by    [START ON 2/9/2023] amiodarone  200 mg Oral Daily    folic acid  1 mg Oral Daily    lactulose  10 g Oral Daily    [START ON 2/4/2023] metoprolol succinate  100 mg Oral Daily    metoprolol succinate  50 mg Oral Once    multivitamin  1 tablet Oral Daily    pantoprazole  40 mg Oral BID AC    potassium, sodium phosphates  1 packet Oral QID (AC & HS)    rivaroxaban  20 mg Oral Daily with dinner    thiamine  100 mg Oral Daily    torsemide  10 mg Oral Daily       Diagnostic Results:   Labs: Reviewed  ECG: Reviewed  CT: Reviewed    PRN Medications: albuterol-ipratropium, benzonatate, dextrose 10%, dextrose 10%, glucagon (human recombinant), glucose, glucose, insulin aspart U-100, LIDOcaine-prilocaine, melatonin, metoprolol, oxyCODONE    Review of Systems   Constitutional:  Positive for activity change. Negative for fatigue and fever.   HENT:  Negative for trouble swallowing and voice change.    Respiratory:  Negative for cough and shortness of breath.    Cardiovascular:  Negative for chest pain and leg swelling.   Gastrointestinal:  Positive for abdominal distention. Negative for abdominal pain.   Genitourinary:  Negative for difficulty urinating and flank pain.    Musculoskeletal:  Positive for gait problem. Negative for back pain.   Skin:  Negative for color change and rash.   Neurological:  Positive for weakness. Negative for speech difficulty and numbness.   Psychiatric/Behavioral:  Negative for agitation and confusion.      Objective:     Vital Signs (Most Recent):  Temp: 98 °F (36.7 °C) (02/03/23 0700)  Pulse: 79 (02/03/23 1104)  Resp: (!) 28 (02/03/23 1123)  BP: 130/74 (02/03/23 1104)  SpO2: 95 % (02/03/23 1104)      Vital Signs (24h Range):  Temp:  [97.6 °F (36.4 °C)-98.3 °F (36.8 °C)] 98 °F (36.7 °C)  Pulse:  [74-95] 79  Resp:  [12-36] 28  SpO2:  [92 %-98 %] 95 %  BP: ()/(50-80) 130/74     Physical Exam  Vitals and nursing note reviewed.   Constitutional:       Appearance: He is well-developed.   HENT:      Head: Normocephalic and atraumatic.   Eyes:      General:         Right eye: No discharge.         Left eye: No discharge.      Pupils: Pupils are equal, round, and reactive to light.   Pulmonary:      Effort: Pulmonary effort is normal. No respiratory distress.   Abdominal:      General: There is distension.      Tenderness: There is no abdominal tenderness.   Musculoskeletal:         General: No deformity.      Cervical back: Neck supple.      Comments: Deconditioned and generalized weakness   Skin:     General: Skin is warm and dry.   Neurological:      Sensory: No sensory deficit.      Motor: Weakness present. No abnormal muscle tone.      Gait: Gait abnormal.   Psychiatric:         Mood and Affect: Mood normal.         Behavior: Behavior normal.       Assessment/Plan:      * Atrial fibrillation with rapid ventricular response  - Cards recommend Lopressor  - back on Xarelto and now on Amio PO    Cellulitis  - was on Vanc and Cefepime, no further abx    Alcoholic cirrhosis of liver with ascites  - S/p diagnostic paracentesis on 1/17, consistent w SBP      MARTITA (acute kidney injury)  - Nephrology managing and urine out put improving        GI bleed  -  stable with no current bleeding   - EGD done and revealed small esophageal varices with portal hypertensive gastropathy    Spinal stenosis  - Per Neurosurgery, patient is not currently medically optimized for intervention - suggesting multimodal pain control at this time.     - Related to prolonged/acute hospital course.     Recommendations  -  Encourage mobility, OOB in chair at least 3 hours per day, and early ambulation as appropriate  -  PT/OT evaluate and treat  -  Pain management  -  Monitor for and prevent skin breakdown and pressure ulcers  · Early mobility, repositioning/weight shifting every 20-30 minutes when sitting, turn patient every 2 hours, proper mattress/overlay and chair cushioning, pressure relief/heel protector boots  -  DVT prophylaxis    -  Reviewed discharge options (IP rehab, SNF, HH therapy, and OP therapy)    PM&R Recommendation:     At this time, the PM&R team has reviewed this patient's ongoing medical case including inpatient diagnosis, medical history, clinical examination, labs, vitals, current social and functional history to provide the post-acute recommendation as follows:     RECOMMENDATIONS: inpatient rehabilitation due to good motivation/participation with therapies, has been determined to tolerate 3 hours of therapy and good potential for recovery.     The patient will be admitted for comprehensive interdisciplinary inpatient rehabilitation to address the impairments due to medical diagnosis of Debility. The patient will benefit from an inpatient rehabilitation program to promote functional recovery, implement compensatory strategies and will undergo assessment for needs for durable medical equipment for safe discharge to the community. This patient will benefit from a coordinated interdisciplinary rehabilitation program services that require close monitoring and treatment with 24-hour rehabilitative nursing and physical/occupational therapies for 3 hours/day for 5  days/week.This interdisciplinary program will be performed under the direction of a physiatrist.    MEDICAL STABILITY:     At this time, no barriers for post-acute rehab admission.    We will continue to follow.    Delmi Cruz NP  Department of Physical Medicine & Rehab   [facility] - Cardiac Medical ICU

## 2023-02-04 LAB
ALBUMIN SERPL BCP-MCNC: 2 G/DL (ref 3.5–5.2)
ALP SERPL-CCNC: 117 U/L (ref 55–135)
ALT SERPL W/O P-5'-P-CCNC: 51 U/L (ref 10–44)
ANION GAP SERPL CALC-SCNC: 15 MMOL/L (ref 8–16)
AST SERPL-CCNC: 125 U/L (ref 10–40)
BASOPHILS # BLD AUTO: 0.13 K/UL (ref 0–0.2)
BASOPHILS NFR BLD: 1.2 % (ref 0–1.9)
BILIRUB SERPL-MCNC: 4.5 MG/DL (ref 0.1–1)
BUN SERPL-MCNC: 27 MG/DL (ref 8–23)
CALCIUM SERPL-MCNC: 8.2 MG/DL (ref 8.7–10.5)
CHLORIDE SERPL-SCNC: 96 MMOL/L (ref 95–110)
CO2 SERPL-SCNC: 26 MMOL/L (ref 23–29)
CREAT SERPL-MCNC: 1.4 MG/DL (ref 0.5–1.4)
DIFFERENTIAL METHOD: ABNORMAL
EOSINOPHIL # BLD AUTO: 0.1 K/UL (ref 0–0.5)
EOSINOPHIL NFR BLD: 1 % (ref 0–8)
ERYTHROCYTE [DISTWIDTH] IN BLOOD BY AUTOMATED COUNT: 21.3 % (ref 11.5–14.5)
EST. GFR  (NO RACE VARIABLE): 57.2 ML/MIN/1.73 M^2
GLUCOSE SERPL-MCNC: 122 MG/DL (ref 70–110)
HCT VFR BLD AUTO: 26.7 % (ref 40–54)
HGB BLD-MCNC: 8.3 G/DL (ref 14–18)
IMM GRANULOCYTES # BLD AUTO: 0.09 K/UL (ref 0–0.04)
IMM GRANULOCYTES NFR BLD AUTO: 0.8 % (ref 0–0.5)
INR PPP: 1.6 (ref 0.8–1.2)
LYMPHOCYTES # BLD AUTO: 1.1 K/UL (ref 1–4.8)
LYMPHOCYTES NFR BLD: 10.5 % (ref 18–48)
MAGNESIUM SERPL-MCNC: 1.4 MG/DL (ref 1.6–2.6)
MCH RBC QN AUTO: 30.3 PG (ref 27–31)
MCHC RBC AUTO-ENTMCNC: 31.1 G/DL (ref 32–36)
MCV RBC AUTO: 97 FL (ref 82–98)
MONOCYTES # BLD AUTO: 0.9 K/UL (ref 0.3–1)
MONOCYTES NFR BLD: 8.8 % (ref 4–15)
NEUTROPHILS # BLD AUTO: 8.2 K/UL (ref 1.8–7.7)
NEUTROPHILS NFR BLD: 77.7 % (ref 38–73)
NRBC BLD-RTO: 0 /100 WBC
PHOSPHATE SERPL-MCNC: 3.3 MG/DL (ref 2.7–4.5)
PLATELET # BLD AUTO: 270 K/UL (ref 150–450)
PMV BLD AUTO: 11.2 FL (ref 9.2–12.9)
POCT GLUCOSE: 101 MG/DL (ref 70–110)
POCT GLUCOSE: 120 MG/DL (ref 70–110)
POCT GLUCOSE: 122 MG/DL (ref 70–110)
POCT GLUCOSE: 131 MG/DL (ref 70–110)
POTASSIUM SERPL-SCNC: 3.5 MMOL/L (ref 3.5–5.1)
PROT SERPL-MCNC: 7 G/DL (ref 6–8.4)
PROTHROMBIN TIME: 15.9 SEC (ref 9–12.5)
RBC # BLD AUTO: 2.74 M/UL (ref 4.6–6.2)
SODIUM SERPL-SCNC: 137 MMOL/L (ref 136–145)
WBC # BLD AUTO: 10.63 K/UL (ref 3.9–12.7)

## 2023-02-04 PROCEDURE — 25000003 PHARM REV CODE 250: Performed by: STUDENT IN AN ORGANIZED HEALTH CARE EDUCATION/TRAINING PROGRAM

## 2023-02-04 PROCEDURE — 25000003 PHARM REV CODE 250: Performed by: HOSPITALIST

## 2023-02-04 PROCEDURE — 25000003 PHARM REV CODE 250: Performed by: INTERNAL MEDICINE

## 2023-02-04 PROCEDURE — 25000003 PHARM REV CODE 250

## 2023-02-04 PROCEDURE — 83735 ASSAY OF MAGNESIUM: CPT | Performed by: STUDENT IN AN ORGANIZED HEALTH CARE EDUCATION/TRAINING PROGRAM

## 2023-02-04 PROCEDURE — 36415 COLL VENOUS BLD VENIPUNCTURE: CPT | Performed by: STUDENT IN AN ORGANIZED HEALTH CARE EDUCATION/TRAINING PROGRAM

## 2023-02-04 PROCEDURE — 85610 PROTHROMBIN TIME: CPT | Performed by: STUDENT IN AN ORGANIZED HEALTH CARE EDUCATION/TRAINING PROGRAM

## 2023-02-04 PROCEDURE — 20600001 HC STEP DOWN PRIVATE ROOM

## 2023-02-04 PROCEDURE — 85025 COMPLETE CBC W/AUTO DIFF WBC: CPT | Performed by: STUDENT IN AN ORGANIZED HEALTH CARE EDUCATION/TRAINING PROGRAM

## 2023-02-04 PROCEDURE — 99233 SBSQ HOSP IP/OBS HIGH 50: CPT | Mod: ,,, | Performed by: STUDENT IN AN ORGANIZED HEALTH CARE EDUCATION/TRAINING PROGRAM

## 2023-02-04 PROCEDURE — 80053 COMPREHEN METABOLIC PANEL: CPT | Performed by: STUDENT IN AN ORGANIZED HEALTH CARE EDUCATION/TRAINING PROGRAM

## 2023-02-04 PROCEDURE — 27000221 HC OXYGEN, UP TO 24 HOURS

## 2023-02-04 PROCEDURE — 99233 PR SUBSEQUENT HOSPITAL CARE,LEVL III: ICD-10-PCS | Mod: ,,, | Performed by: STUDENT IN AN ORGANIZED HEALTH CARE EDUCATION/TRAINING PROGRAM

## 2023-02-04 PROCEDURE — 94761 N-INVAS EAR/PLS OXIMETRY MLT: CPT

## 2023-02-04 PROCEDURE — 84100 ASSAY OF PHOSPHORUS: CPT | Performed by: STUDENT IN AN ORGANIZED HEALTH CARE EDUCATION/TRAINING PROGRAM

## 2023-02-04 PROCEDURE — 99900035 HC TECH TIME PER 15 MIN (STAT)

## 2023-02-04 RX ORDER — POLYETHYLENE GLYCOL 3350 17 G/17G
17 POWDER, FOR SOLUTION ORAL 2 TIMES DAILY PRN
Status: DISCONTINUED | OUTPATIENT
Start: 2023-02-04 | End: 2023-02-07 | Stop reason: HOSPADM

## 2023-02-04 RX ORDER — POTASSIUM CHLORIDE 20 MEQ/1
40 TABLET, EXTENDED RELEASE ORAL ONCE
Status: COMPLETED | OUTPATIENT
Start: 2023-02-04 | End: 2023-02-04

## 2023-02-04 RX ORDER — LANOLIN ALCOHOL/MO/W.PET/CERES
400 CREAM (GRAM) TOPICAL ONCE
Status: COMPLETED | OUTPATIENT
Start: 2023-02-04 | End: 2023-02-04

## 2023-02-04 RX ADMIN — METOPROLOL SUCCINATE 100 MG: 100 TABLET, EXTENDED RELEASE ORAL at 08:02

## 2023-02-04 RX ADMIN — OXYCODONE 5 MG: 5 TABLET ORAL at 04:02

## 2023-02-04 RX ADMIN — THERA TABS 1 TABLET: TAB at 08:02

## 2023-02-04 RX ADMIN — THIAMINE HCL TAB 100 MG 100 MG: 100 TAB at 08:02

## 2023-02-04 RX ADMIN — Medication 400 MG: at 08:02

## 2023-02-04 RX ADMIN — LACTULOSE 10 G: 20 SOLUTION ORAL at 08:02

## 2023-02-04 RX ADMIN — RIVAROXABAN 20 MG: 20 TABLET, FILM COATED ORAL at 04:02

## 2023-02-04 RX ADMIN — HYDROXYZINE HYDROCHLORIDE 25 MG: 25 TABLET, FILM COATED ORAL at 04:02

## 2023-02-04 RX ADMIN — TORSEMIDE 10 MG: 10 TABLET ORAL at 08:02

## 2023-02-04 RX ADMIN — PANTOPRAZOLE SODIUM 40 MG: 40 TABLET, DELAYED RELEASE ORAL at 04:02

## 2023-02-04 RX ADMIN — BENZONATATE 200 MG: 100 CAPSULE ORAL at 04:02

## 2023-02-04 RX ADMIN — FOLIC ACID 1 MG: 1 TABLET ORAL at 08:02

## 2023-02-04 RX ADMIN — POTASSIUM CHLORIDE 40 MEQ: 1500 TABLET, EXTENDED RELEASE ORAL at 08:02

## 2023-02-04 RX ADMIN — AMIODARONE HYDROCHLORIDE 400 MG: 200 TABLET ORAL at 10:02

## 2023-02-04 RX ADMIN — HYDROXYZINE HYDROCHLORIDE 25 MG: 25 TABLET, FILM COATED ORAL at 01:02

## 2023-02-04 RX ADMIN — AMIODARONE HYDROCHLORIDE 400 MG: 200 TABLET ORAL at 08:02

## 2023-02-04 NOTE — ASSESSMENT & PLAN NOTE
Lopressor was started while on hospital medicine at 25 mg BID (home does lopressor 100 mg QD). Pt went into afib with rvr on 1/27, IV lopressor given x 2 with subsequent drop of BP, re-started on amiodarone gtt, Amiodarone gtt switched to PO. Runs of Vtach on 1/30 with highest at 11 beats. Sustained tachycardia in 150s.     - Cardiology recommended lopressor   - Will discharge on Amiodarone 400 bid loading dose x2 weeks the transition to 200mg bid  - Re-initiated Eliquis 1/31  - Continue metoprolol succinate at 100mg daily   - Maintain K > 4, Mag > 2 and Ca WNL to decrease arrhythmogenic potential

## 2023-02-04 NOTE — ASSESSMENT & PLAN NOTE
CT Lumbar Spine Without Contrast Result Date: 1/16/2023  1.  Prominent, cystic, multiloculated predominantly gas attenuation structure within the spinal canal at the level of L3-L4 with dimensions as above.  This is favored to be epidural in location and results in severe narrowing of the spinal canal with mass effect upon the thecal sac.    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 Large disc extrusion at L3-4, contributing to severe spinal canal stenosis, as above. Additional lumbar spondylosis, contributing to moderate spinal canal stenosis at L2-3 and L4-5 and moderate neural foraminal narrowing L4-5 and L5-S1     - Has outpatient appointment on 03/01 in NSGY clinic  - Skilled PT/OT to maximize mobility to [facility] prior to potential surgical intervention

## 2023-02-04 NOTE — ASSESSMENT & PLAN NOTE
Creatinine 4.7 on admit, baseline around 0.8. Pre-renal vs ATN. Less likely obstruction as pt has gomez. Renal ultrasound with medical renal disease and no evidence of hydronephrosis.      - Nephrology consulted  -- Previously on iHD  - Avoid nephrotoxic agents such as NSAIDs, gadolinium and IV radiocontrast.  - Renally dose meds to current GFR.  - Torsemide 10 mg daily, continue OP  - Follow up with nephrology in 2 weeks after dc -- referral in

## 2023-02-04 NOTE — ASSESSMENT & PLAN NOTE
Concerning for variceal bleed given hx of alcohol abuse.      - Transfuse pRBC for Hb < 7 g/dL (Consider a higher Hb target if there is clinical evidence of intravascular volume depletion or comorbidities, such as CAD or if high suspicion of vigorous active ongoing bleeding or an uncorrected coagulopathy exists.)  - s/p vitamin K given Pt/INR > 2   - EGD showed  Small (< 5 mm) esophageal varices with portal hypertensive gastropathy  - Continue PO PPI 40 mg   - Stable after re-initiation of apixiban

## 2023-02-04 NOTE — ASSESSMENT & PLAN NOTE
Last A1c reviewed.        Lab Results   Component Value Date     HGBA1C 5.3 11/10/2022         Home Antihyperglycemic Regiment:  - Metformin  1000 mg BID      Inpatient Antihyperglycemic Regiment:  Antihyperglycemics (From admission, onward)        Start     Stop Route Frequency Ordered     01/16/23 1507   insulin aspart U-100 pen 0-5 Units         -- SubQ Before meals & nightly PRN 01/16/23 1407                Blood Sugars (AccuCheck):     Recent Labs     02/02/23 2023 02/03/23  0605 02/03/23  0718 02/03/23  1150 02/03/23  2221 02/04/23  0745   POCTGLUCOSE 159* 116* 113* 141* 129* 120*        - LD SSI

## 2023-02-04 NOTE — ASSESSMENT & PLAN NOTE
R hand seems to be cellulitic in appearance. Extending up to forearm.      Plan:  - Completed 10 days of Cefepime on 1/25  - Resolved

## 2023-02-04 NOTE — ASSESSMENT & PLAN NOTE
- Previously received ativan IV for CIWA protocol, hallucinations, agitation,  tremor, tachycarda.  - Out of acute withdrawal period   - Resolved

## 2023-02-04 NOTE — PLAN OF CARE
Problem: Adult Inpatient Plan of Care  Goal: Absence of Hospital-Acquired Illness or Injury  Outcome: Ongoing, Progressing     Problem: Adult Inpatient Plan of Care  Goal: Readiness for Transition of Care  Outcome: Ongoing, Progressing     Problem: Adult Inpatient Plan of Care  Goal: Optimal Comfort and Wellbeing  Outcome: Ongoing, Progressing     Problem: Diabetes Comorbidity  Goal: Blood Glucose Level Within Targeted Range  Outcome: Ongoing, Progressing     Problem: Bleeding (Sepsis/Septic Shock)  Goal: Absence of Bleeding  Outcome: Ongoing, Progressing     Problem: Glycemic Control Impaired (Sepsis/Septic Shock)  Goal: Blood Glucose Level Within Desired Range  Outcome: Ongoing, Progressing     Problem: Infection Progression (Sepsis/Septic Shock)  Goal: Absence of Infection Signs and Symptoms  Outcome: Ongoing, Progressing

## 2023-02-04 NOTE — ASSESSMENT & PLAN NOTE
- S/p diagnostic paracentesis on 1/17 with , 50% seg. Cytology negative for malignant cell   - EGD revealed esophageal varicies  - Continue Lactulose, midodrine  - Outpatient hepatology follow up

## 2023-02-04 NOTE — NURSING
Patient assisted up to BSC but gets dyspneic upon getting back into bed. When questioned, pt states that this has been ongoing in hospital and is not new for him. Pt only with s/s of mild distress with exertion although lungs with scattered rhonchi throughout and pt does endorse np cough that he has also had. Pt's pox decreased to 84% on room air after commode but is now 94% on 4L NC. Pt encouraged to cough and deep breath, repositioned for comfort, and instructed to call with any distress. Left resting comfortably in bed on his side.

## 2023-02-04 NOTE — PROGRESS NOTES
[facility] - Telemetry [facility] Medicine  Progress Note    Patient Name: Jonny Isabel  MRN: [id number]  Patient Class: IP- Inpatient   Admission Date: 1/16/2023  Length of Stay: 19 days  Attending Physician: Seymour Leon MD  Primary Care Provider: Yuli Pérez Mai, MD        Subjective:     Principal Problem:Atrial fibrillation with rapid ventricular response        HPI:  62-year-old male with a PMHx of A-fibb (on Eliquis), hypertension, DM 2 (not insulin dependent) presents as transfer from OSH for chronic lower back pain  that has been worsening for the past week. Pt states that the pain is radiating from his right buttock down to his legs. Pt was unable to get out of bed this AM due to the pain. HE endorses having fecal incontinence for the past week with black tarry stools. He denies any fevers, chills, abdominal pain, urinary incontinence, or saddle anesthesia.      At OSH ED CT lumbar spine ordered revealing Prominent, cystic, multiloculated predominantly gas attenuation epidural  structure within the spinal canal at the level of L3-L4 resulting in severe narrowing of the spinal canal with mass effect upon the thecal sac and  vacuum disc phenomena concerning for diskitis or an epidural abscess. MRI shows large disc extrusion at L3-4 causing severe spinal canal stenosis, with moderate spinal canal stenosis at L2-3 and L4-5 and moderate neural foraminal narrowing L4-5 and L5-S1. Patient had no tenderness overlying spinous process but still endorsed sever pain, received percocet and robaxin. Of note, KENNETH showed blood in stool. The patient has a white count of 12.9, hemoglobin of 7.5, creatinine 4.6, Lactate of 3.9. Pt given Rocephin, Zosyn and Vanc.     Pt transferred to [facility] for further intervention with neurosurgery.          Hospital Course (updated, brief assessment by system or problem, significant events): Admitted to MICU for NSGY and GI evaluation . Hgb stable. EGD shows small varices with  gastropathy, no active bleeds. Worsening MARTITA with minimal UOP, HRS protocol started with levo, midodrine, octreotide and albumin trailed, has since been stopped due to low suspicion. Nephrology following. NSGY planned for surgery on Monday. U/O seems to be slightly improving. HDS.      Tasks (specific, using if-then statements): If Hbg <7, transfuse with pRBC, stop ppx heparin, repeat EGD     Contingency Plan (special circumstances anticipated and plan):   Nephrology following regarding worsening kidney function  Planned for NSGY on Monday for L3/L4 compression          Overview/Hospital Course:  Admitted to MICU for NSGY and GI evaluation . Hgb stable. EGD shows small varices with gastropathy, no active bleeds. Worsening MARTITA with minimal UOP, HRS protocol started with levo, midodrine, octreotide and albumin trailed, has since been stopped due to low suspicion. Nephrology following recommend HD in setting of Acute Renal Failure (s/p 1 session SLED, s/p 1 session HD). NSGY initially planned surgical intervention for 1/23 but determine patient not medically optimized and currently suggesting multimodal pain control. A-fibb RVR 1/23 overnight, started on Amiodarone gtt, transitioned to home lopressor. Lasix trailed with good U/O. Added Torsemide for diuresis. Amio gtt restarted due to a-fibb with RVR, transitioned to PO. Continues to have rate control on amiodarone and metoprolol succinate. Stable for discharge to [facility] once approved by his insurance.       Interval History:   Stepped down from ICU. No events overnight. Patient with no complaints this morning. Denies dyspnea, palpitations, chest pain, dizziness, and fevers. Hemoglobin stable. Creatinine improving.      Review of Systems   Constitutional:  Positive for activity change and fatigue. Negative for appetite change, chills, diaphoresis and fever.   HENT:  Negative for rhinorrhea and sore throat.    Respiratory:  Negative for cough, chest tightness and  shortness of breath.    Cardiovascular:  Negative for chest pain and palpitations.   Gastrointestinal:  Negative for abdominal pain, constipation, diarrhea and nausea.   Endocrine: Negative for cold intolerance.   Genitourinary:  Negative for decreased urine volume and dysuria.   Musculoskeletal:  Negative for arthralgias and myalgias.   Skin:  Negative for rash and wound.   Neurological:  Negative for dizziness, weakness, numbness and headaches.   Psychiatric/Behavioral:  Negative for agitation, behavioral problems and confusion.    Objective:     Vital Signs (Most Recent):  Temp: 98.5 °F (36.9 °C) (02/04/23 0730)  Pulse: 85 (02/04/23 0750)  Resp: 18 (02/04/23 0730)  BP: 124/76 (02/04/23 0730)  SpO2: 96 % (02/04/23 0730) Vital Signs (24h Range):  Temp:  [97.5 °F (36.4 °C)-98.5 °F (36.9 °C)] 98.5 °F (36.9 °C)  Pulse:  [76-99] 85  Resp:  [16-34] 18  SpO2:  [84 %-97 %] 96 %  BP: ()/(51-85) 124/76     Weight: 110 kg (242 lb 8.1 oz)  Body mass index is 36.87 kg/m².    Intake/Output Summary (Last 24 hours) at 2/4/2023 1050  Last data filed at 2/4/2023 0442  Gross per 24 hour   Intake --   Output 301 ml   Net -301 ml      Physical Exam  Vitals and nursing note reviewed.   Constitutional:       General: He is not in acute distress.     Appearance: Normal appearance. He is not ill-appearing or toxic-appearing.   HENT:      Head: Normocephalic and atraumatic.      Nose: Nose normal.      Mouth/Throat:      Mouth: Mucous membranes are moist.      Pharynx: Oropharynx is clear.   Eyes:      General: No scleral icterus.     Extraocular Movements: Extraocular movements intact.      Conjunctiva/sclera: Conjunctivae normal.   Cardiovascular:      Rate and Rhythm: Normal rate and regular rhythm.      Heart sounds: Normal heart sounds. No murmur heard.  Pulmonary:      Effort: Pulmonary effort is normal. No respiratory distress.      Breath sounds: Normal breath sounds. No stridor. No wheezing.   Abdominal:      General:  Abdomen is flat. Bowel sounds are normal. There is distension.      Palpations: Abdomen is soft.      Tenderness: There is no abdominal tenderness. There is no guarding or rebound.   Musculoskeletal:      Cervical back: Normal range of motion and neck supple. No rigidity.      Right lower leg: No edema.      Left lower leg: No edema.   Skin:     General: Skin is warm and dry.      Coloration: Skin is not jaundiced or pale.      Findings: No bruising or rash.   Neurological:      General: No focal deficit present.      Mental Status: He is alert and oriented to person, place, and time. Mental status is at baseline.      Sensory: No sensory deficit.      Motor: No weakness.   Psychiatric:         Mood and Affect: Mood normal.         Behavior: Behavior normal.       Significant Labs: All pertinent labs within the past 24 hours have been reviewed.  CBC:   Recent Labs   Lab 02/03/23 0216 02/04/23  0525   WBC 11.47 10.63   HGB 8.5* 8.3*   HCT 26.4* 26.7*    270     CMP:   Recent Labs   Lab 02/03/23 0216 02/04/23  0525    137   K 3.7 3.5   CL 95 96   CO2 28 26   * 122*   BUN 32* 27*   CREATININE 1.6* 1.4   CALCIUM 8.2* 8.2*   PROT 7.4 7.0   ALBUMIN 2.2* 2.0*   BILITOT 4.4* 4.5*   ALKPHOS 138* 117   * 125*   ALT 51* 51*   ANIONGAP 14 15       Significant Imaging: I have reviewed all pertinent imaging results/findings within the past 24 hours.      Assessment/Plan:      * Atrial fibrillation with rapid ventricular response  Lopressor was started while on hospital medicine at 25 mg BID (home does lopressor 100 mg QD). Pt went into afib with rvr on 1/27, IV lopressor given x 2 with subsequent drop of BP, re-started on amiodarone gtt, Amiodarone gtt switched to PO. Runs of Vtach on 1/30 with highest at 11 beats. Sustained tachycardia in 150s.     - Cardiology recommended lopressor   - Will discharge on Amiodarone 400 bid loading dose x2 weeks the transition to 200mg bid  - Re-initiated Eliquis  1/31  - Continue metoprolol succinate at 100mg daily   - Maintain K > 4, Mag > 2 and Ca WNL to decrease arrhythmogenic potential    GI bleed  Concerning for variceal bleed given hx of alcohol abuse.      - Transfuse pRBC for Hb < 7 g/dL (Consider a higher Hb target if there is clinical evidence of intravascular volume depletion or comorbidities, such as CAD or if high suspicion of vigorous active ongoing bleeding or an uncorrected coagulopathy exists.)  - s/p vitamin K given Pt/INR > 2   - EGD showed  Small (< 5 mm) esophageal varices with portal hypertensive gastropathy  - Continue PO PPI 40 mg   - Stable after re-initiation of apixiban     MARTITA (acute kidney injury)  Creatinine 4.7 on admit, baseline around 0.8. Pre-renal vs ATN. Less likely obstruction as pt has gomez. Renal ultrasound with medical renal disease and no evidence of hydronephrosis.      - Nephrology consulted  -- Previously on iHD  - Avoid nephrotoxic agents such as NSAIDs, gadolinium and IV radiocontrast.  - Renally dose meds to current GFR.  - Torsemide 10 mg daily, continue OP  - Follow up with nephrology in 2 weeks after dc -- referral in    Elevated liver enzymes  Likely 2/2 to underlying alcohol abuse and hepatic steatosis. Complete workup done with tylenol level, acute hep panel, a1-antitrypsin, anti smith antibody, HIV, anti mitochondrial antibody, anca, anti-liver, aFP. Liver US with doppler shows epatomegaly with hepatic steatosis, cholelithiasis versus gallbladder sludge, small volume ascites, and left pleural effusion.     - Daily CMP, PT/INR   - Lactulose 10g bid at home  - May titrate down here given number of BMs      Spinal stenosis  CT Lumbar Spine Without Contrast Result Date: 1/16/2023  1.  Prominent, cystic, multiloculated predominantly gas attenuation structure within the spinal canal at the level of L3-L4 with dimensions as above.  This is favored to be epidural in location and results in severe narrowing of the spinal canal  with mass effect upon the thecal sac.    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 Large disc extrusion at L3-4, contributing to severe spinal canal stenosis, as above. Additional lumbar spondylosis, contributing to moderate spinal canal stenosis at L2-3 and L4-5 and moderate neural foraminal narrowing L4-5 and L5-S1     - Has outpatient appointment on 03/01 in NSGY clinic  - Skilled PT/OT to maximize mobility to [facility] prior to potential surgical intervention     Small bowel obstruction  CT Abdomen Pelvis  Without Contrast. Findings concerning for developing small bowel obstruction with suspected transition point seen within the midline mid to lower abdomen at the level of L4  Pt without symptoms of nausea or vomiting. Reportedly had a BM at the outside facility on 1/16/2023. Abdomen is distended.      - Resolved will have patient monitor at home    Alcoholic cirrhosis of liver with ascites  - S/p diagnostic paracentesis on 1/17 with , 50% seg. Cytology negative for malignant cell   - EGD revealed esophageal varicies  - Continue Lactulose, midodrine  - Outpatient hepatology follow up      Goals of care, counseling/discussion  Advance Care Planning   Per ICU upon admission:  - Pt has three sons that he is estranged from & has not seen in 20 years. Pt identified his girlfriend of 15 years, Adelita Bingham, as his surrogate decision maker in the event that he becomes incapacitated & cannot make decisions for himself          Patient does not appear to have relatives.  He will need possible decision maker/power of  if his overall mental status does not improve.     - Social work consulted for family assessment    Hemorrhagic shock  Resolved     Tachycardia  See above      Advanced care planning/counseling discussion  Continue discussions with patient and family      Debility  - PT/OT      Palliative care  encounter  Palliative care consulted       Cellulitis  R hand seems to be cellulitic in appearance. Extending up to forearm.      Plan:  - Completed 10 days of Cefepime on 1/25  - Resolved    Hypervolemia        ATN (acute tubular necrosis)  - Likely secondary to acute hypotension.   - Cr steadily improving  - See above       Alcohol withdrawal syndrome without complication  - Previously received ativan IV for CIWA protocol, hallucinations, agitation,  tremor, tachycarda.  - Out of acute withdrawal period   - Resolved    Lumbar herniated disc  NSGY holding surgery due to unstable status.    Alcohol abuse  Out of acute withdrawal period     Diabetes  Last A1c reviewed.        Lab Results   Component Value Date     HGBA1C 5.3 11/10/2022         Home Antihyperglycemic Regiment:  - Metformin  1000 mg BID      Inpatient Antihyperglycemic Regiment:  Antihyperglycemics (From admission, onward)        Start     Stop Route Frequency Ordered     01/16/23 1507   insulin aspart U-100 pen 0-5 Units         -- SubQ Before meals & nightly PRN 01/16/23 1407                Blood Sugars (AccuCheck):     Recent Labs     02/02/23 2023 02/03/23  0605 02/03/23  0718 02/03/23  1150 02/03/23  2221 02/04/23  0745   POCTGLUCOSE 159* 116* 113* 141* 129* 120*        - LD SSI      VTE Risk Mitigation (From admission, onward)         Ordered     rivaroxaban tablet 20 mg  With dinner         02/03/23 1117     Place sequential compression device  Until discontinued         01/18/23 0912                Discharge Planning   DALILA: 2/4/2023     Code Status: Full Code   Is the patient medically ready for discharge?: No    Reason for patient still in hospital (select all that apply): Patient trending condition, Laboratory test, Treatment, PT / OT recommendations and Pending disposition  Discharge Plan A: Rehab   Discharge Delays: None known at this time              Seymour Leon MD  Department of Hospital Medicine   [facility] - Telemetry  Stepdown

## 2023-02-04 NOTE — ASSESSMENT & PLAN NOTE
See above     75 yo female with finding of appendiceal fecolith.        Plan:  - can give pt regular diet today  - will follow    All above d/w DR Recinos - he agrees 77 yo female with finding of appendiceal fecolith.        Plan:  - can give pt regular diet today  - add Flagyl IV  - will follow    All above d/w DR Recinos - he agrees

## 2023-02-04 NOTE — PLAN OF CARE
Problem: Adult Inpatient Plan of Care  Goal: Plan of Care Review  Outcome: Ongoing, Progressing  Goal: Patient-Specific Goal (Individualized)  Outcome: Ongoing, Progressing  Goal: Absence of Hospital-Acquired Illness or Injury  Outcome: Ongoing, Progressing  Goal: Optimal Comfort and Wellbeing  Outcome: Ongoing, Progressing  Goal: Readiness for Transition of Care  Outcome: Ongoing, Progressing     Problem: Diabetes Comorbidity  Goal: Blood Glucose Level Within Targeted Range  Outcome: Ongoing, Progressing     Problem: Adjustment to Illness (Sepsis/Septic Shock)  Goal: Optimal Coping  Outcome: Ongoing, Progressing     Problem: Bleeding (Sepsis/Septic Shock)  Goal: Absence of Bleeding  Outcome: Ongoing, Progressing     Problem: Glycemic Control Impaired (Sepsis/Septic Shock)  Goal: Blood Glucose Level Within Desired Range  Outcome: Ongoing, Progressing     Problem: Infection Progression (Sepsis/Septic Shock)  Goal: Absence of Infection Signs and Symptoms  Outcome: Ongoing, Progressing     Problem: Nutrition Impaired (Sepsis/Septic Shock)  Goal: Optimal Nutrition Intake  Outcome: Ongoing, Progressing     Problem: Fluid and Electrolyte Imbalance (Acute Kidney Injury/Impairment)  Goal: Fluid and Electrolyte Balance  Outcome: Ongoing, Progressing     Problem: Oral Intake Inadequate (Acute Kidney Injury/Impairment)  Goal: Optimal Nutrition Intake  Outcome: Ongoing, Progressing     Problem: Renal Function Impairment (Acute Kidney Injury/Impairment)  Goal: Effective Renal Function  Outcome: Ongoing, Progressing     Problem: Infection  Goal: Absence of Infection Signs and Symptoms  Outcome: Ongoing, Progressing     Problem: Skin Injury Risk Increased  Goal: Skin Health and Integrity  Outcome: Ongoing, Progressing     Problem: Fall Injury Risk  Goal: Absence of Fall and Fall-Related Injury  Outcome: Ongoing, Progressing     Problem: Device-Related Complication Risk (Hemodialysis)  Goal: Safe, Effective Therapy  Delivery  Outcome: Ongoing, Progressing     Problem: Hemodynamic Instability (Hemodialysis)  Goal: Effective Tissue Perfusion  Outcome: Ongoing, Progressing     Problem: Infection (Hemodialysis)  Goal: Absence of Infection Signs and Symptoms  Outcome: Ongoing, Progressing     Problem: Coping Ineffective  Goal: Effective Coping  Outcome: Ongoing, Progressing     Problem: Restraint, Nonbehavioral (Nonviolent)  Goal: Absence of Harm or Injury  Outcome: Ongoing, Progressing   He is received from icu.  He is very pleasant.  Aaox4. No sign of distress noted at this time.  No skin issues noted at the pressure areas.  Safety precautions remain in place.  Basic unit orientation completed.

## 2023-02-04 NOTE — SUBJECTIVE & OBJECTIVE
Interval History:   Stepped down from ICU. No events overnight. Patient with no complaints this morning. Denies dyspnea, palpitations, chest pain, dizziness, and fevers. Hemoglobin stable. Creatinine improving.      Review of Systems   Constitutional:  Positive for activity change and fatigue. Negative for appetite change, chills, diaphoresis and fever.   HENT:  Negative for rhinorrhea and sore throat.    Respiratory:  Negative for cough, chest tightness and shortness of breath.    Cardiovascular:  Negative for chest pain and palpitations.   Gastrointestinal:  Negative for abdominal pain, constipation, diarrhea and nausea.   Endocrine: Negative for cold intolerance.   Genitourinary:  Negative for decreased urine volume and dysuria.   Musculoskeletal:  Negative for arthralgias and myalgias.   Skin:  Negative for rash and wound.   Neurological:  Negative for dizziness, weakness, numbness and headaches.   Psychiatric/Behavioral:  Negative for agitation, behavioral problems and confusion.    Objective:     Vital Signs (Most Recent):  Temp: 98.5 °F (36.9 °C) (02/04/23 0730)  Pulse: 85 (02/04/23 0750)  Resp: 18 (02/04/23 0730)  BP: 124/76 (02/04/23 0730)  SpO2: 96 % (02/04/23 0730) Vital Signs (24h Range):  Temp:  [97.5 °F (36.4 °C)-98.5 °F (36.9 °C)] 98.5 °F (36.9 °C)  Pulse:  [76-99] 85  Resp:  [16-34] 18  SpO2:  [84 %-97 %] 96 %  BP: ()/(51-85) 124/76     Weight: 110 kg (242 lb 8.1 oz)  Body mass index is 36.87 kg/m².    Intake/Output Summary (Last 24 hours) at 2/4/2023 1050  Last data filed at 2/4/2023 0442  Gross per 24 hour   Intake --   Output 301 ml   Net -301 ml      Physical Exam  Vitals and nursing note reviewed.   Constitutional:       General: He is not in acute distress.     Appearance: Normal appearance. He is not ill-appearing or toxic-appearing.   HENT:      Head: Normocephalic and atraumatic.      Nose: Nose normal.      Mouth/Throat:      Mouth: Mucous membranes are moist.      Pharynx: Oropharynx  is clear.   Eyes:      General: No scleral icterus.     Extraocular Movements: Extraocular movements intact.      Conjunctiva/sclera: Conjunctivae normal.   Cardiovascular:      Rate and Rhythm: Normal rate and regular rhythm.      Heart sounds: Normal heart sounds. No murmur heard.  Pulmonary:      Effort: Pulmonary effort is normal. No respiratory distress.      Breath sounds: Normal breath sounds. No stridor. No wheezing.   Abdominal:      General: Abdomen is flat. Bowel sounds are normal. There is distension.      Palpations: Abdomen is soft.      Tenderness: There is no abdominal tenderness. There is no guarding or rebound.   Musculoskeletal:      Cervical back: Normal range of motion and neck supple. No rigidity.      Right lower leg: No edema.      Left lower leg: No edema.   Skin:     General: Skin is warm and dry.      Coloration: Skin is not jaundiced or pale.      Findings: No bruising or rash.   Neurological:      General: No focal deficit present.      Mental Status: He is alert and oriented to person, place, and time. Mental status is at baseline.      Sensory: No sensory deficit.      Motor: No weakness.   Psychiatric:         Mood and Affect: Mood normal.         Behavior: Behavior normal.       Significant Labs: All pertinent labs within the past 24 hours have been reviewed.  CBC:   Recent Labs   Lab 02/03/23  0216 02/04/23  0525   WBC 11.47 10.63   HGB 8.5* 8.3*   HCT 26.4* 26.7*    270     CMP:   Recent Labs   Lab 02/03/23  0216 02/04/23  0525    137   K 3.7 3.5   CL 95 96   CO2 28 26   * 122*   BUN 32* 27*   CREATININE 1.6* 1.4   CALCIUM 8.2* 8.2*   PROT 7.4 7.0   ALBUMIN 2.2* 2.0*   BILITOT 4.4* 4.5*   ALKPHOS 138* 117   * 125*   ALT 51* 51*   ANIONGAP 14 15       Significant Imaging: I have reviewed all pertinent imaging results/findings within the past 24 hours.

## 2023-02-05 LAB
ALBUMIN SERPL BCP-MCNC: 2.1 G/DL (ref 3.5–5.2)
ALP SERPL-CCNC: 119 U/L (ref 55–135)
ALT SERPL W/O P-5'-P-CCNC: 54 U/L (ref 10–44)
ANION GAP SERPL CALC-SCNC: 12 MMOL/L (ref 8–16)
AST SERPL-CCNC: 137 U/L (ref 10–40)
BASOPHILS # BLD AUTO: 0.11 K/UL (ref 0–0.2)
BASOPHILS NFR BLD: 1 % (ref 0–1.9)
BILIRUB SERPL-MCNC: 4.9 MG/DL (ref 0.1–1)
BUN SERPL-MCNC: 26 MG/DL (ref 8–23)
CALCIUM SERPL-MCNC: 8.2 MG/DL (ref 8.7–10.5)
CHLORIDE SERPL-SCNC: 97 MMOL/L (ref 95–110)
CO2 SERPL-SCNC: 26 MMOL/L (ref 23–29)
CREAT SERPL-MCNC: 1.4 MG/DL (ref 0.5–1.4)
DIFFERENTIAL METHOD: ABNORMAL
EOSINOPHIL # BLD AUTO: 0.1 K/UL (ref 0–0.5)
EOSINOPHIL NFR BLD: 1.2 % (ref 0–8)
ERYTHROCYTE [DISTWIDTH] IN BLOOD BY AUTOMATED COUNT: 21.2 % (ref 11.5–14.5)
EST. GFR  (NO RACE VARIABLE): 57.2 ML/MIN/1.73 M^2
GLUCOSE SERPL-MCNC: 117 MG/DL (ref 70–110)
HCT VFR BLD AUTO: 28.1 % (ref 40–54)
HGB BLD-MCNC: 8.5 G/DL (ref 14–18)
IMM GRANULOCYTES # BLD AUTO: 0.12 K/UL (ref 0–0.04)
IMM GRANULOCYTES NFR BLD AUTO: 1.1 % (ref 0–0.5)
INR PPP: 1.6 (ref 0.8–1.2)
LYMPHOCYTES # BLD AUTO: 1.5 K/UL (ref 1–4.8)
LYMPHOCYTES NFR BLD: 13.5 % (ref 18–48)
MAGNESIUM SERPL-MCNC: 1.3 MG/DL (ref 1.6–2.6)
MCH RBC QN AUTO: 30 PG (ref 27–31)
MCHC RBC AUTO-ENTMCNC: 30.2 G/DL (ref 32–36)
MCV RBC AUTO: 99 FL (ref 82–98)
MONOCYTES # BLD AUTO: 1 K/UL (ref 0.3–1)
MONOCYTES NFR BLD: 9.2 % (ref 4–15)
NEUTROPHILS # BLD AUTO: 8.3 K/UL (ref 1.8–7.7)
NEUTROPHILS NFR BLD: 74 % (ref 38–73)
NRBC BLD-RTO: 0 /100 WBC
PHOSPHATE SERPL-MCNC: 2.7 MG/DL (ref 2.7–4.5)
PLATELET # BLD AUTO: 290 K/UL (ref 150–450)
PMV BLD AUTO: 12 FL (ref 9.2–12.9)
POCT GLUCOSE: 118 MG/DL (ref 70–110)
POCT GLUCOSE: 134 MG/DL (ref 70–110)
POCT GLUCOSE: 155 MG/DL (ref 70–110)
POTASSIUM SERPL-SCNC: 3.9 MMOL/L (ref 3.5–5.1)
PROT SERPL-MCNC: 7 G/DL (ref 6–8.4)
PROTHROMBIN TIME: 16.1 SEC (ref 9–12.5)
RBC # BLD AUTO: 2.83 M/UL (ref 4.6–6.2)
SODIUM SERPL-SCNC: 135 MMOL/L (ref 136–145)
WBC # BLD AUTO: 11.25 K/UL (ref 3.9–12.7)

## 2023-02-05 PROCEDURE — 99232 PR SUBSEQUENT HOSPITAL CARE,LEVL II: ICD-10-PCS | Mod: ,,, | Performed by: STUDENT IN AN ORGANIZED HEALTH CARE EDUCATION/TRAINING PROGRAM

## 2023-02-05 PROCEDURE — 27000221 HC OXYGEN, UP TO 24 HOURS

## 2023-02-05 PROCEDURE — 83735 ASSAY OF MAGNESIUM: CPT | Performed by: STUDENT IN AN ORGANIZED HEALTH CARE EDUCATION/TRAINING PROGRAM

## 2023-02-05 PROCEDURE — 25000003 PHARM REV CODE 250: Performed by: HOSPITALIST

## 2023-02-05 PROCEDURE — 25000003 PHARM REV CODE 250: Performed by: STUDENT IN AN ORGANIZED HEALTH CARE EDUCATION/TRAINING PROGRAM

## 2023-02-05 PROCEDURE — 85610 PROTHROMBIN TIME: CPT | Performed by: STUDENT IN AN ORGANIZED HEALTH CARE EDUCATION/TRAINING PROGRAM

## 2023-02-05 PROCEDURE — 99232 SBSQ HOSP IP/OBS MODERATE 35: CPT | Mod: ,,, | Performed by: STUDENT IN AN ORGANIZED HEALTH CARE EDUCATION/TRAINING PROGRAM

## 2023-02-05 PROCEDURE — 25000003 PHARM REV CODE 250: Performed by: INTERNAL MEDICINE

## 2023-02-05 PROCEDURE — 20600001 HC STEP DOWN PRIVATE ROOM

## 2023-02-05 PROCEDURE — 85025 COMPLETE CBC W/AUTO DIFF WBC: CPT | Performed by: STUDENT IN AN ORGANIZED HEALTH CARE EDUCATION/TRAINING PROGRAM

## 2023-02-05 PROCEDURE — 80053 COMPREHEN METABOLIC PANEL: CPT | Performed by: STUDENT IN AN ORGANIZED HEALTH CARE EDUCATION/TRAINING PROGRAM

## 2023-02-05 PROCEDURE — 25000003 PHARM REV CODE 250

## 2023-02-05 PROCEDURE — 94761 N-INVAS EAR/PLS OXIMETRY MLT: CPT

## 2023-02-05 PROCEDURE — 84100 ASSAY OF PHOSPHORUS: CPT | Performed by: STUDENT IN AN ORGANIZED HEALTH CARE EDUCATION/TRAINING PROGRAM

## 2023-02-05 PROCEDURE — 36415 COLL VENOUS BLD VENIPUNCTURE: CPT | Performed by: STUDENT IN AN ORGANIZED HEALTH CARE EDUCATION/TRAINING PROGRAM

## 2023-02-05 RX ADMIN — FOLIC ACID 1 MG: 1 TABLET ORAL at 09:02

## 2023-02-05 RX ADMIN — LACTULOSE 10 G: 20 SOLUTION ORAL at 09:02

## 2023-02-05 RX ADMIN — OXYCODONE 5 MG: 5 TABLET ORAL at 06:02

## 2023-02-05 RX ADMIN — THERA TABS 1 TABLET: TAB at 09:02

## 2023-02-05 RX ADMIN — AMIODARONE HYDROCHLORIDE 400 MG: 200 TABLET ORAL at 09:02

## 2023-02-05 RX ADMIN — PANTOPRAZOLE SODIUM 40 MG: 40 TABLET, DELAYED RELEASE ORAL at 04:02

## 2023-02-05 RX ADMIN — PANTOPRAZOLE SODIUM 40 MG: 40 TABLET, DELAYED RELEASE ORAL at 06:02

## 2023-02-05 RX ADMIN — HYDROXYZINE HYDROCHLORIDE 25 MG: 25 TABLET, FILM COATED ORAL at 01:02

## 2023-02-05 RX ADMIN — THIAMINE HCL TAB 100 MG 100 MG: 100 TAB at 09:02

## 2023-02-05 RX ADMIN — TORSEMIDE 10 MG: 10 TABLET ORAL at 09:02

## 2023-02-05 RX ADMIN — RIVAROXABAN 20 MG: 20 TABLET, FILM COATED ORAL at 04:02

## 2023-02-05 RX ADMIN — METOPROLOL SUCCINATE 100 MG: 100 TABLET, EXTENDED RELEASE ORAL at 09:02

## 2023-02-05 NOTE — PLAN OF CARE
Problem: Adult Inpatient Plan of Care  Goal: Plan of Care Review  Outcome: Ongoing, Progressing  Goal: Patient-Specific Goal (Individualized)  Outcome: Ongoing, Progressing  Goal: Absence of Hospital-Acquired Illness or Injury  Outcome: Ongoing, Progressing  Goal: Optimal Comfort and Wellbeing  Outcome: Ongoing, Progressing  Goal: Readiness for Transition of Care  Outcome: Ongoing, Progressing     Problem: Diabetes Comorbidity  Goal: Blood Glucose Level Within Targeted Range  Outcome: Ongoing, Progressing     Problem: Adjustment to Illness (Sepsis/Septic Shock)  Goal: Optimal Coping  Outcome: Ongoing, Progressing     Problem: Bleeding (Sepsis/Septic Shock)  Goal: Absence of Bleeding  Outcome: Ongoing, Progressing     Problem: Glycemic Control Impaired (Sepsis/Septic Shock)  Goal: Blood Glucose Level Within Desired Range  Outcome: Ongoing, Progressing     Problem: Infection Progression (Sepsis/Septic Shock)  Goal: Absence of Infection Signs and Symptoms  Outcome: Ongoing, Progressing     Problem: Nutrition Impaired (Sepsis/Septic Shock)  Goal: Optimal Nutrition Intake  Outcome: Ongoing, Progressing     Problem: Fluid and Electrolyte Imbalance (Acute Kidney Injury/Impairment)  Goal: Fluid and Electrolyte Balance  Outcome: Ongoing, Progressing     Problem: Oral Intake Inadequate (Acute Kidney Injury/Impairment)  Goal: Optimal Nutrition Intake  Outcome: Ongoing, Progressing     Problem: Renal Function Impairment (Acute Kidney Injury/Impairment)  Goal: Effective Renal Function  Outcome: Ongoing, Progressing     Problem: Infection  Goal: Absence of Infection Signs and Symptoms  Outcome: Ongoing, Progressing     Problem: Skin Injury Risk Increased  Goal: Skin Health and Integrity  Outcome: Ongoing, Progressing     Problem: Fall Injury Risk  Goal: Absence of Fall and Fall-Related Injury  Outcome: Ongoing, Progressing     Problem: Device-Related Complication Risk (Hemodialysis)  Goal: Safe, Effective Therapy  Delivery  Outcome: Ongoing, Progressing     Problem: Hemodynamic Instability (Hemodialysis)  Goal: Effective Tissue Perfusion  Outcome: Ongoing, Progressing     Problem: Infection (Hemodialysis)  Goal: Absence of Infection Signs and Symptoms  Outcome: Ongoing, Progressing     Problem: Coping Ineffective  Goal: Effective Coping  Outcome: Ongoing, Progressing     Problem: Restraint, Nonbehavioral (Nonviolent)  Goal: Absence of Harm or Injury  Outcome: Ongoing, Progressing

## 2023-02-05 NOTE — SUBJECTIVE & OBJECTIVE
Interval History:   No events overnight. Patient with continued poor oral intake. No other complaints. Denies palpitations and dizziness.       Review of Systems   Constitutional:  Positive for activity change, appetite change and fatigue. Negative for chills, diaphoresis and fever.   HENT:  Negative for rhinorrhea and sore throat.    Respiratory:  Negative for cough, chest tightness and shortness of breath.    Cardiovascular:  Negative for chest pain and palpitations.   Gastrointestinal:  Negative for abdominal pain, constipation, diarrhea and nausea.   Endocrine: Negative for cold intolerance.   Genitourinary:  Negative for decreased urine volume and dysuria.   Musculoskeletal:  Negative for arthralgias and myalgias.   Skin:  Negative for rash and wound.   Neurological:  Negative for dizziness, weakness, numbness and headaches.   Psychiatric/Behavioral:  Negative for agitation, behavioral problems and confusion.    Objective:     Vital Signs (Most Recent):  Temp: 98.1 °F (36.7 °C) (02/05/23 1141)  Pulse: 81 (02/05/23 1145)  Resp: 19 (02/05/23 1141)  BP: 103/64 (02/05/23 1141)  SpO2: (!) 94 % (02/05/23 1141)   Vital Signs (24h Range):  Temp:  [96 °F (35.6 °C)-98.2 °F (36.8 °C)] 98.1 °F (36.7 °C)  Pulse:  [] 81  Resp:  [18-19] 19  SpO2:  [92 %-98 %] 94 %  BP: ()/(55-67) 103/64     Weight: 110 kg (242 lb 8.1 oz)  Body mass index is 36.87 kg/m².    Intake/Output Summary (Last 24 hours) at 2/5/2023 1151  Last data filed at 2/5/2023 0645  Gross per 24 hour   Intake 780 ml   Output 1000 ml   Net -220 ml      Physical Exam  Vitals and nursing note reviewed.   Constitutional:       General: He is not in acute distress.     Appearance: Normal appearance. He is not ill-appearing or toxic-appearing.   HENT:      Head: Normocephalic and atraumatic.      Nose: Nose normal.      Mouth/Throat:      Mouth: Mucous membranes are moist.      Pharynx: Oropharynx is clear.   Eyes:      General: No scleral icterus.      Extraocular Movements: Extraocular movements intact.      Conjunctiva/sclera: Conjunctivae normal.   Cardiovascular:      Rate and Rhythm: Normal rate and regular rhythm.      Heart sounds: Normal heart sounds. No murmur heard.  Pulmonary:      Effort: Pulmonary effort is normal. No respiratory distress.      Breath sounds: Normal breath sounds. No stridor. No wheezing.   Abdominal:      General: Abdomen is flat. Bowel sounds are normal. There is distension.      Palpations: Abdomen is soft.      Tenderness: There is no abdominal tenderness. There is no guarding or rebound.   Musculoskeletal:      Cervical back: Normal range of motion and neck supple. No rigidity.      Right lower leg: No edema.      Left lower leg: No edema.   Skin:     General: Skin is warm and dry.      Coloration: Skin is not jaundiced or pale.      Findings: No bruising or rash.   Neurological:      General: No focal deficit present.      Mental Status: He is alert and oriented to person, place, and time. Mental status is at baseline.      Sensory: No sensory deficit.      Motor: No weakness.   Psychiatric:         Mood and Affect: Mood normal.         Behavior: Behavior normal.       Significant Labs: All pertinent labs within the past 24 hours have been reviewed.  CBC:   Recent Labs   Lab 02/04/23  0525 02/05/23  0553   WBC 10.63 11.25   HGB 8.3* 8.5*   HCT 26.7* 28.1*    290     CMP:   Recent Labs   Lab 02/04/23  0525 02/05/23  0553    135*   K 3.5 3.9   CL 96 97   CO2 26 26   * 117*   BUN 27* 26*   CREATININE 1.4 1.4   CALCIUM 8.2* 8.2*   PROT 7.0 7.0   ALBUMIN 2.0* 2.1*   BILITOT 4.5* 4.9*   ALKPHOS 117 119   * 137*   ALT 51* 54*   ANIONGAP 15 12       Significant Imaging: I have reviewed all pertinent imaging results/findings within the past 24 hours.

## 2023-02-05 NOTE — PLAN OF CARE
Problem: Adult Inpatient Plan of Care  Goal: Plan of Care Review  Outcome: Ongoing, Progressing  Goal: Patient-Specific Goal (Individualized)  Outcome: Ongoing, Progressing  Goal: Absence of Hospital-Acquired Illness or Injury  Outcome: Ongoing, Progressing  Goal: Optimal Comfort and Wellbeing  Outcome: Ongoing, Progressing  Goal: Readiness for Transition of Care  Outcome: Ongoing, Progressing     Problem: Diabetes Comorbidity  Goal: Blood Glucose Level Within Targeted Range  Outcome: Ongoing, Progressing     Problem: Adjustment to Illness (Sepsis/Septic Shock)  Goal: Optimal Coping  Outcome: Ongoing, Progressing     Problem: Bleeding (Sepsis/Septic Shock)  Goal: Absence of Bleeding  Outcome: Ongoing, Progressing     Problem: Glycemic Control Impaired (Sepsis/Septic Shock)  Goal: Blood Glucose Level Within Desired Range  Outcome: Ongoing, Progressing     Problem: Infection Progression (Sepsis/Septic Shock)  Goal: Absence of Infection Signs and Symptoms  Outcome: Ongoing, Progressing     Problem: Nutrition Impaired (Sepsis/Septic Shock)  Goal: Optimal Nutrition Intake  Outcome: Ongoing, Progressing     Problem: Fluid and Electrolyte Imbalance (Acute Kidney Injury/Impairment)  Goal: Fluid and Electrolyte Balance  Outcome: Ongoing, Progressing     Problem: Oral Intake Inadequate (Acute Kidney Injury/Impairment)  Goal: Optimal Nutrition Intake  Outcome: Ongoing, Progressing     Problem: Renal Function Impairment (Acute Kidney Injury/Impairment)  Goal: Effective Renal Function  Outcome: Ongoing, Progressing     Problem: Infection  Goal: Absence of Infection Signs and Symptoms  Outcome: Ongoing, Progressing     Problem: Skin Injury Risk Increased  Goal: Skin Health and Integrity  Outcome: Ongoing, Progressing     Problem: Fall Injury Risk  Goal: Absence of Fall and Fall-Related Injury  Outcome: Ongoing, Progressing     Problem: Device-Related Complication Risk (Hemodialysis)  Goal: Safe, Effective Therapy  Delivery  Outcome: Ongoing, Progressing     Problem: Hemodynamic Instability (Hemodialysis)  Goal: Effective Tissue Perfusion  Outcome: Ongoing, Progressing     Problem: Infection (Hemodialysis)  Goal: Absence of Infection Signs and Symptoms  Outcome: Ongoing, Progressing     Problem: Coping Ineffective  Goal: Effective Coping  Outcome: Ongoing, Progressing     Problem: Restraint, Nonbehavioral (Nonviolent)  Goal: Absence of Harm or Injury  Outcome: Ongoing, Progressing   Overall he has had a good day.  He remains very weak and requires x1 assistant to the bedside commode.  He reports his appetite is poor at this time.  I did medicate him once for pain that he located in the right hip.  His wife visits for a short time today.  No sign of distress noted at this time. Safety precautions remain in place.

## 2023-02-05 NOTE — PLAN OF CARE
Problem: Adult Inpatient Plan of Care  Goal: Plan of Care Review  Outcome: Ongoing, Progressing  Goal: Patient-Specific Goal (Individualized)  Outcome: Ongoing, Progressing  Goal: Absence of Hospital-Acquired Illness or Injury  Outcome: Ongoing, Progressing  Goal: Optimal Comfort and Wellbeing  Outcome: Ongoing, Progressing  Goal: Readiness for Transition of Care  Outcome: Ongoing, Progressing     Problem: Diabetes Comorbidity  Goal: Blood Glucose Level Within Targeted Range  Outcome: Ongoing, Progressing     Problem: Adjustment to Illness (Sepsis/Septic Shock)  Goal: Optimal Coping  Outcome: Ongoing, Progressing     Problem: Bleeding (Sepsis/Septic Shock)  Goal: Absence of Bleeding  Outcome: Ongoing, Progressing

## 2023-02-05 NOTE — ASSESSMENT & PLAN NOTE
Creatinine 4.7 on admit, baseline around 0.8. Pre-renal vs ATN. Less likely obstruction as pt has gomez. Renal ultrasound with medical renal disease and no evidence of hydronephrosis.      - Nephrology consulted  -- Previously on iHD  - Avoid nephrotoxic agents such as NSAIDs, gadolinium and IV radiocontrast.  - Renally dose meds to current GFR.  - Torsemide 10 mg daily, continue outpatient  - Follow up with nephrology in 2 weeks after dc -- referral in

## 2023-02-05 NOTE — PROGRESS NOTES
[facility]  Progress Note    Patient Name: Jonny Isabel  MRN: [id number]  Patient Class: IP- Inpatient   Admission Date: 1/16/2023  Length of Stay: 20 days  Attending Physician: Seymour Leon MD  Primary Care Provider: Yuli Pérez Mai, MD        Subjective:     Principal Problem:Atrial fibrillation with rapid ventricular response        HPI:  62-year-old male with a PMHx of A-fibb (on Eliquis), hypertension, DM 2 (not insulin dependent) presents as transfer from OSH for chronic lower back pain  that has been worsening for the past week. Pt states that the pain is radiating from his right buttock down to his legs. Pt was unable to get out of bed this AM due to the pain. HE endorses having fecal incontinence for the past week with black tarry stools. He denies any fevers, chills, abdominal pain, urinary incontinence, or saddle anesthesia.      At OSH ED CT lumbar spine ordered revealing Prominent, cystic, multiloculated predominantly gas attenuation epidural  structure within the spinal canal at the level of L3-L4 resulting in severe narrowing of the spinal canal with mass effect upon the thecal sac and  vacuum disc phenomena concerning for diskitis or an epidural abscess. MRI shows large disc extrusion at L3-4 causing severe spinal canal stenosis, with moderate spinal canal stenosis at L2-3 and L4-5 and moderate neural foraminal narrowing L4-5 and L5-S1. Patient had no tenderness overlying spinous process but still endorsed sever pain, received percocet and robaxin. Of note, KENNETH showed blood in stool. The patient has a white count of 12.9, hemoglobin of 7.5, creatinine 4.6, Lactate of 3.9. Pt given Rocephin, Zosyn and Vanc.     Pt transferred to [facility] for further intervention with neurosurgery.          Hospital Course (updated, brief assessment by system or problem, significant events): Admitted to MICU for NSGY and GI evaluation . Hgb stable. EGD shows small varices with  gastropathy, no active bleeds. Worsening MARTITA with minimal UOP, HRS protocol started with levo, midodrine, octreotide and albumin trailed, has since been stopped due to low suspicion. Nephrology following. NSGY planned for surgery on Monday. U/O seems to be slightly improving. HDS.      Tasks (specific, using if-then statements): If Hbg <7, transfuse with pRBC, stop ppx heparin, repeat EGD     Contingency Plan (special circumstances anticipated and plan):   Nephrology following regarding worsening kidney function  Planned for NSGY on Monday for L3/L4 compression          Overview/Hospital Course:  Admitted to MICU for NSGY and GI evaluation . Hgb stable. EGD shows small varices with gastropathy, no active bleeds. Worsening MARTITA with minimal UOP, HRS protocol started with levo, midodrine, octreotide and albumin trailed, has since been stopped due to low suspicion. Nephrology following recommend HD in setting of Acute Renal Failure (s/p 1 session SLED, s/p 1 session HD). NSGY initially planned surgical intervention for 1/23 but determine patient not medically optimized and currently suggesting multimodal pain control. A-fibb RVR 1/23 overnight, started on Amiodarone gtt, transitioned to home lopressor. Lasix trailed with good U/O. Added Torsemide for diuresis. Amio gtt restarted due to a-fibb with RVR, transitioned to PO. Continues to have rate control on amiodarone and metoprolol succinate. Stable for discharge to [facility] once approved by his insurance.       Interval History:   No events overnight. Patient with continued poor oral intake. No other complaints. Denies palpitations and dizziness.       Review of Systems   Constitutional:  Positive for activity change, appetite change and fatigue. Negative for chills, diaphoresis and fever.   HENT:  Negative for rhinorrhea and sore throat.    Respiratory:  Negative for cough, chest tightness and shortness of breath.    Cardiovascular:  Negative for chest pain and  palpitations.   Gastrointestinal:  Negative for abdominal pain, constipation, diarrhea and nausea.   Endocrine: Negative for cold intolerance.   Genitourinary:  Negative for decreased urine volume and dysuria.   Musculoskeletal:  Negative for arthralgias and myalgias.   Skin:  Negative for rash and wound.   Neurological:  Negative for dizziness, weakness, numbness and headaches.   Psychiatric/Behavioral:  Negative for agitation, behavioral problems and confusion.    Objective:     Vital Signs (Most Recent):  Temp: 98.1 °F (36.7 °C) (02/05/23 1141)  Pulse: 81 (02/05/23 1145)  Resp: 19 (02/05/23 1141)  BP: 103/64 (02/05/23 1141)  SpO2: (!) 94 % (02/05/23 1141)   Vital Signs (24h Range):  Temp:  [96 °F (35.6 °C)-98.2 °F (36.8 °C)] 98.1 °F (36.7 °C)  Pulse:  [] 81  Resp:  [18-19] 19  SpO2:  [92 %-98 %] 94 %  BP: ()/(55-67) 103/64     Weight: 110 kg (242 lb 8.1 oz)  Body mass index is 36.87 kg/m².    Intake/Output Summary (Last 24 hours) at 2/5/2023 1151  Last data filed at 2/5/2023 0645  Gross per 24 hour   Intake 780 ml   Output 1000 ml   Net -220 ml      Physical Exam  Vitals and nursing note reviewed.   Constitutional:       General: He is not in acute distress.     Appearance: Normal appearance. He is not ill-appearing or toxic-appearing.   HENT:      Head: Normocephalic and atraumatic.      Nose: Nose normal.      Mouth/Throat:      Mouth: Mucous membranes are moist.      Pharynx: Oropharynx is clear.   Eyes:      General: No scleral icterus.     Extraocular Movements: Extraocular movements intact.      Conjunctiva/sclera: Conjunctivae normal.   Cardiovascular:      Rate and Rhythm: Normal rate and regular rhythm.      Heart sounds: Normal heart sounds. No murmur heard.  Pulmonary:      Effort: Pulmonary effort is normal. No respiratory distress.      Breath sounds: Normal breath sounds. No stridor. No wheezing.   Abdominal:      General: Abdomen is flat. Bowel sounds are normal. There is distension.       Palpations: Abdomen is soft.      Tenderness: There is no abdominal tenderness. There is no guarding or rebound.   Musculoskeletal:      Cervical back: Normal range of motion and neck supple. No rigidity.      Right lower leg: No edema.      Left lower leg: No edema.   Skin:     General: Skin is warm and dry.      Coloration: Skin is not jaundiced or pale.      Findings: No bruising or rash.   Neurological:      General: No focal deficit present.      Mental Status: He is alert and oriented to person, place, and time. Mental status is at baseline.      Sensory: No sensory deficit.      Motor: No weakness.   Psychiatric:         Mood and Affect: Mood normal.         Behavior: Behavior normal.       Significant Labs: All pertinent labs within the past 24 hours have been reviewed.  CBC:   Recent Labs   Lab 02/04/23 0525 02/05/23  0553   WBC 10.63 11.25   HGB 8.3* 8.5*   HCT 26.7* 28.1*    290     CMP:   Recent Labs   Lab 02/04/23 0525 02/05/23  0553    135*   K 3.5 3.9   CL 96 97   CO2 26 26   * 117*   BUN 27* 26*   CREATININE 1.4 1.4   CALCIUM 8.2* 8.2*   PROT 7.0 7.0   ALBUMIN 2.0* 2.1*   BILITOT 4.5* 4.9*   ALKPHOS 117 119   * 137*   ALT 51* 54*   ANIONGAP 15 12       Significant Imaging: I have reviewed all pertinent imaging results/findings within the past 24 hours.      Assessment/Plan:      * Atrial fibrillation with rapid ventricular response  Lopressor was started while on hospital medicine at 25 mg BID (home does lopressor 100 mg QD). Pt went into afib with rvr on 1/27, IV lopressor given x 2 with subsequent drop of BP, re-started on amiodarone gtt, Amiodarone gtt switched to PO. Runs of Vtach on 1/30 with highest at 11 beats. Sustained tachycardia in 150s.     - Cardiology recommended lopressor   - Will discharge on Amiodarone 400 bid loading dose x2 weeks the transition to 200mg bid  - Re-initiated Eliquis 1/31  - Continue metoprolol succinate at 100mg daily   - Maintain K >  4, Mag > 2 and Ca WNL to decrease arrhythmogenic potential    GI bleed  Concerning for variceal bleed given hx of alcohol abuse.      - Transfuse pRBC for Hb < 7 g/dL (Consider a higher Hb target if there is clinical evidence of intravascular volume depletion or comorbidities, such as CAD or if high suspicion of vigorous active ongoing bleeding or an uncorrected coagulopathy exists.)  - s/p vitamin K given Pt/INR > 2   - EGD showed  Small (< 5 mm) esophageal varices with portal hypertensive gastropathy  - Continue PO PPI 40 mg   - Stable after re-initiation of apixiban     MARTITA (acute kidney injury)  Creatinine 4.7 on admit, baseline around 0.8. Pre-renal vs ATN. Less likely obstruction as pt has gomez. Renal ultrasound with medical renal disease and no evidence of hydronephrosis.      - Nephrology consulted  -- Previously on iHD  - Avoid nephrotoxic agents such as NSAIDs, gadolinium and IV radiocontrast.  - Renally dose meds to current GFR.  - Torsemide 10 mg daily, continue outpatient  - Follow up with nephrology in 2 weeks after dc -- referral in    Elevated liver enzymes  Likely 2/2 to underlying alcohol abuse and hepatic steatosis. Complete workup done with tylenol level, acute hep panel, a1-antitrypsin, anti smith antibody, HIV, anti mitochondrial antibody, anca, anti-liver, aFP. Liver US with doppler shows epatomegaly with hepatic steatosis, cholelithiasis versus gallbladder sludge, small volume ascites, and left pleural effusion.     - Daily CMP, PT/INR   - Lactulose 10g bid at home  - May titrate down here given number of BMs      Spinal stenosis  CT Lumbar Spine Without Contrast Result Date: 1/16/2023  1.  Prominent, cystic, multiloculated predominantly gas attenuation structure within the spinal canal at the level of L3-L4 with dimensions as above.  This is favored to be epidural in location and results in severe narrowing of the spinal canal with mass effect upon the thecal sac.    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 Large disc extrusion at L3-4, contributing to severe spinal canal stenosis, as above. Additional lumbar spondylosis, contributing to moderate spinal canal stenosis at L2-3 and L4-5 and moderate neural foraminal narrowing L4-5 and L5-S1     - Has outpatient appointment on 03/01 in NSGY clinic  - Skilled PT/OT to maximize mobility to [facility] prior to potential surgical intervention     Small bowel obstruction  CT Abdomen Pelvis  Without Contrast. Findings concerning for developing small bowel obstruction with suspected transition point seen within the midline mid to lower abdomen at the level of L4  Pt without symptoms of nausea or vomiting. Reportedly had a BM at the outside facility on 1/16/2023. Abdomen is distended.      - Resolved will have patient monitor at home    Alcoholic cirrhosis of liver with ascites  - S/p diagnostic paracentesis on 1/17 with , 50% seg. Cytology negative for malignant cell   - EGD revealed esophageal varicies  - Continue Lactulose, midodrine  - Outpatient hepatology follow up      Goals of care, counseling/discussion  Advance Care Planning   Per ICU upon admission:  - Pt has three sons that he is estranged from & has not seen in 20 years. Pt identified his girlfriend of 15 years, Adelita Bingham, as his surrogate decision maker in the event that he becomes incapacitated & cannot make decisions for himself          Patient does not appear to have relatives.  He will need possible decision maker/power of  if his overall mental status does not improve.     - Social work consulted for family assessment    Hemorrhagic shock  Resolved     Tachycardia  See above      Advanced care planning/counseling discussion  Continue discussions with patient and family      Debility  - PT/OT      Palliative care encounter  Palliative care consulted       Cellulitis  R hand  seems to be cellulitic in appearance. Extending up to forearm.      Plan:  - Completed 10 days of Cefepime on 1/25  - Resolved    Hypervolemia        ATN (acute tubular necrosis)  - Likely secondary to acute hypotension.   - Cr steadily improving  - See above       Alcohol withdrawal syndrome without complication  - Previously received ativan IV for CIWA protocol, hallucinations, agitation,  tremor, tachycarda.  - Out of acute withdrawal period   - Resolved    Lumbar herniated disc  NSGY holding surgery due to unstable status.    Alcohol abuse  Out of acute withdrawal period     Diabetes  Last A1c reviewed.        Lab Results   Component Value Date     HGBA1C 5.3 11/10/2022         Home Antihyperglycemic Regiment:  - Metformin  1000 mg BID      Inpatient Antihyperglycemic Regiment:  Antihyperglycemics (From admission, onward)        Start     Stop Route Frequency Ordered     01/16/23 1507   insulin aspart U-100 pen 0-5 Units         -- SubQ Before meals & nightly PRN 01/16/23 1407                Blood Sugars (AccuCheck):     Recent Labs     02/02/23 2023 02/03/23  0605 02/03/23  0718 02/03/23  1150 02/03/23  2221 02/04/23  0745   POCTGLUCOSE 159* 116* 113* 141* 129* 120*        - LD SSI      VTE Risk Mitigation (From admission, onward)         Ordered     rivaroxaban tablet 20 mg  With dinner         02/03/23 1117     Place sequential compression device  Until discontinued         01/18/23 0912                Discharge Planning   DALILA: 2/6/2023     Code Status: Full Code   Is the patient medically ready for discharge?: No    Reason for patient still in hospital (select all that apply): Patient trending condition, Treatment and Pending disposition  Discharge Plan A: Rehab   Discharge Delays: None known at this time              Seymour Leon MD  Department of Hospital Medicine   [facility] - Telemetry Stepdown

## 2023-02-06 LAB
ALBUMIN SERPL BCP-MCNC: 2.2 G/DL (ref 3.5–5.2)
ALP SERPL-CCNC: 133 U/L (ref 55–135)
ALT SERPL W/O P-5'-P-CCNC: 60 U/L (ref 10–44)
ANION GAP SERPL CALC-SCNC: 13 MMOL/L (ref 8–16)
AST SERPL-CCNC: 140 U/L (ref 10–40)
BASOPHILS # BLD AUTO: 0.15 K/UL (ref 0–0.2)
BASOPHILS NFR BLD: 1.2 % (ref 0–1.9)
BILIRUB SERPL-MCNC: 5 MG/DL (ref 0.1–1)
BUN SERPL-MCNC: 28 MG/DL (ref 8–23)
CALCIUM SERPL-MCNC: 8.1 MG/DL (ref 8.7–10.5)
CHLORIDE SERPL-SCNC: 97 MMOL/L (ref 95–110)
CO2 SERPL-SCNC: 27 MMOL/L (ref 23–29)
CREAT SERPL-MCNC: 1.7 MG/DL (ref 0.5–1.4)
DIFFERENTIAL METHOD: ABNORMAL
EOSINOPHIL # BLD AUTO: 0.1 K/UL (ref 0–0.5)
EOSINOPHIL NFR BLD: 0.9 % (ref 0–8)
ERYTHROCYTE [DISTWIDTH] IN BLOOD BY AUTOMATED COUNT: 21 % (ref 11.5–14.5)
EST. GFR  (NO RACE VARIABLE): 45.3 ML/MIN/1.73 M^2
GLUCOSE SERPL-MCNC: 138 MG/DL (ref 70–110)
HCT VFR BLD AUTO: 29.3 % (ref 40–54)
HGB BLD-MCNC: 9 G/DL (ref 14–18)
IMM GRANULOCYTES # BLD AUTO: 0.17 K/UL (ref 0–0.04)
IMM GRANULOCYTES NFR BLD AUTO: 1.3 % (ref 0–0.5)
INR PPP: 2.1 (ref 0.8–1.2)
LYMPHOCYTES # BLD AUTO: 1.9 K/UL (ref 1–4.8)
LYMPHOCYTES NFR BLD: 14.7 % (ref 18–48)
MAGNESIUM SERPL-MCNC: 1.3 MG/DL (ref 1.6–2.6)
MCH RBC QN AUTO: 30.4 PG (ref 27–31)
MCHC RBC AUTO-ENTMCNC: 30.7 G/DL (ref 32–36)
MCV RBC AUTO: 99 FL (ref 82–98)
MONOCYTES # BLD AUTO: 1 K/UL (ref 0.3–1)
MONOCYTES NFR BLD: 8.2 % (ref 4–15)
NEUTROPHILS # BLD AUTO: 9.3 K/UL (ref 1.8–7.7)
NEUTROPHILS NFR BLD: 73.7 % (ref 38–73)
NRBC BLD-RTO: 0 /100 WBC
PHOSPHATE SERPL-MCNC: 2.8 MG/DL (ref 2.7–4.5)
PLATELET # BLD AUTO: 321 K/UL (ref 150–450)
PMV BLD AUTO: 11 FL (ref 9.2–12.9)
POCT GLUCOSE: 124 MG/DL (ref 70–110)
POCT GLUCOSE: 130 MG/DL (ref 70–110)
POCT GLUCOSE: 166 MG/DL (ref 70–110)
POTASSIUM SERPL-SCNC: 3.9 MMOL/L (ref 3.5–5.1)
PROT SERPL-MCNC: 7.5 G/DL (ref 6–8.4)
PROTHROMBIN TIME: 20.5 SEC (ref 9–12.5)
RBC # BLD AUTO: 2.96 M/UL (ref 4.6–6.2)
SODIUM SERPL-SCNC: 137 MMOL/L (ref 136–145)
WBC # BLD AUTO: 12.68 K/UL (ref 3.9–12.7)

## 2023-02-06 PROCEDURE — 80053 COMPREHEN METABOLIC PANEL: CPT | Performed by: STUDENT IN AN ORGANIZED HEALTH CARE EDUCATION/TRAINING PROGRAM

## 2023-02-06 PROCEDURE — 84100 ASSAY OF PHOSPHORUS: CPT | Performed by: STUDENT IN AN ORGANIZED HEALTH CARE EDUCATION/TRAINING PROGRAM

## 2023-02-06 PROCEDURE — 27000221 HC OXYGEN, UP TO 24 HOURS

## 2023-02-06 PROCEDURE — 25000003 PHARM REV CODE 250: Performed by: HOSPITALIST

## 2023-02-06 PROCEDURE — 25000003 PHARM REV CODE 250: Performed by: STUDENT IN AN ORGANIZED HEALTH CARE EDUCATION/TRAINING PROGRAM

## 2023-02-06 PROCEDURE — 99900035 HC TECH TIME PER 15 MIN (STAT)

## 2023-02-06 PROCEDURE — 25000003 PHARM REV CODE 250: Performed by: INTERNAL MEDICINE

## 2023-02-06 PROCEDURE — 85610 PROTHROMBIN TIME: CPT | Performed by: STUDENT IN AN ORGANIZED HEALTH CARE EDUCATION/TRAINING PROGRAM

## 2023-02-06 PROCEDURE — 99233 SBSQ HOSP IP/OBS HIGH 50: CPT | Mod: ,,, | Performed by: STUDENT IN AN ORGANIZED HEALTH CARE EDUCATION/TRAINING PROGRAM

## 2023-02-06 PROCEDURE — 25000003 PHARM REV CODE 250

## 2023-02-06 PROCEDURE — 36415 COLL VENOUS BLD VENIPUNCTURE: CPT | Performed by: STUDENT IN AN ORGANIZED HEALTH CARE EDUCATION/TRAINING PROGRAM

## 2023-02-06 PROCEDURE — 63600175 PHARM REV CODE 636 W HCPCS: Performed by: STUDENT IN AN ORGANIZED HEALTH CARE EDUCATION/TRAINING PROGRAM

## 2023-02-06 PROCEDURE — 85025 COMPLETE CBC W/AUTO DIFF WBC: CPT | Performed by: STUDENT IN AN ORGANIZED HEALTH CARE EDUCATION/TRAINING PROGRAM

## 2023-02-06 PROCEDURE — 94761 N-INVAS EAR/PLS OXIMETRY MLT: CPT

## 2023-02-06 PROCEDURE — 97116 GAIT TRAINING THERAPY: CPT

## 2023-02-06 PROCEDURE — 20600001 HC STEP DOWN PRIVATE ROOM

## 2023-02-06 PROCEDURE — 99233 PR SUBSEQUENT HOSPITAL CARE,LEVL III: ICD-10-PCS | Mod: ,,, | Performed by: STUDENT IN AN ORGANIZED HEALTH CARE EDUCATION/TRAINING PROGRAM

## 2023-02-06 PROCEDURE — 97530 THERAPEUTIC ACTIVITIES: CPT

## 2023-02-06 PROCEDURE — 83735 ASSAY OF MAGNESIUM: CPT | Performed by: STUDENT IN AN ORGANIZED HEALTH CARE EDUCATION/TRAINING PROGRAM

## 2023-02-06 RX ORDER — LANOLIN ALCOHOL/MO/W.PET/CERES
400 CREAM (GRAM) TOPICAL ONCE
Status: COMPLETED | OUTPATIENT
Start: 2023-02-06 | End: 2023-02-06

## 2023-02-06 RX ADMIN — THIAMINE HCL TAB 100 MG 100 MG: 100 TAB at 09:02

## 2023-02-06 RX ADMIN — PANTOPRAZOLE SODIUM 40 MG: 40 TABLET, DELAYED RELEASE ORAL at 04:02

## 2023-02-06 RX ADMIN — THERA TABS 1 TABLET: TAB at 09:02

## 2023-02-06 RX ADMIN — HYDROXYZINE HYDROCHLORIDE 25 MG: 25 TABLET, FILM COATED ORAL at 09:02

## 2023-02-06 RX ADMIN — LACTULOSE 10 G: 20 SOLUTION ORAL at 09:02

## 2023-02-06 RX ADMIN — RIVAROXABAN 20 MG: 20 TABLET, FILM COATED ORAL at 04:02

## 2023-02-06 RX ADMIN — METOPROLOL SUCCINATE 100 MG: 100 TABLET, EXTENDED RELEASE ORAL at 09:02

## 2023-02-06 RX ADMIN — Medication 400 MG: at 09:02

## 2023-02-06 RX ADMIN — AMIODARONE HYDROCHLORIDE 400 MG: 200 TABLET ORAL at 09:02

## 2023-02-06 RX ADMIN — Medication 9 MG: at 10:02

## 2023-02-06 RX ADMIN — PANTOPRAZOLE SODIUM 40 MG: 40 TABLET, DELAYED RELEASE ORAL at 06:02

## 2023-02-06 RX ADMIN — HYDROXYZINE HYDROCHLORIDE 25 MG: 25 TABLET, FILM COATED ORAL at 10:02

## 2023-02-06 RX ADMIN — AMIODARONE HYDROCHLORIDE 400 MG: 200 TABLET ORAL at 10:02

## 2023-02-06 RX ADMIN — FOLIC ACID 1 MG: 1 TABLET ORAL at 09:02

## 2023-02-06 RX ADMIN — TORSEMIDE 10 MG: 10 TABLET ORAL at 09:02

## 2023-02-06 RX ADMIN — PHYTONADIONE 5 MG: 10 INJECTION, EMULSION INTRAMUSCULAR; INTRAVENOUS; SUBCUTANEOUS at 10:02

## 2023-02-06 NOTE — PLAN OF CARE
"[facility] - Telemetry Stepdown  Discharge Reassessment    Primary Care Provider: Yuli Pérez Mai, MD    Expected Discharge Date: 2/6/2023    Reassessment (most recent)       Discharge Reassessment - 02/06/23 1202          Discharge Reassessment    Assessment Type Discharge Planning Reassessment     Did the patient's condition or plan change since previous assessment? No     Discharge Plan A Rehab     Discharge Plan B Skilled Nursing Facility     DME Needed Upon Discharge  none     Discharge Barriers Identified Underinsured     Why the patient remains in the hospital Requires continued medical care;Insurance issues        Post-Acute Status    Post-Acute Authorization Placement     Post-Acute Placement Status Pending payor review/awaiting authorization (if required)     Discharge Delays Payor Issues                 Per Adriel at [facility], [employer] Medicaid is offering a Peer to Peer. Dr. Leon is willing to complete a PtP. Scheduled PtP for tomorrow 2/7/23 with time frame 1:00 PM - 4:00 PM with Dr. Radha Morocho.    2:37 PM  Met with patient at bedside and updated on PtP. Discussed that if insurance denies IP Rehab, they might recommend SNF. Offered and provided patient with SNF list (SNFs acquired through [facility] Medicaid's website). Patient reports he would rather discharge home than attend IP Rehab or SNF. SW explained in detail the PT/OT recommendations and the risks of returning home without IP Rehab or SNF placement first. Patient states his s/o is often home and can help him with all ADLs. Patient also states his roommate, Irwin, is "almost always home" and can help with ADLs as well. SW encouraged patient to call his s/o now to discuss possibly returning home with the amount of physical help he is requiring at this time. Patient states he will call s/o now.    4:19 PM  Met with patient and patient's S/O, Adelita Bingham [phone], at bedside and discussed the above information. Patient and Adelita would like to plan " for discharge home tomorrow 2/7/23 as it appears insurance is unlikely to approve IP Rehab. Patient and Adelita do not want SNF placement. Adelita would like recommendations on home therapy exercises patient can do at home. SW sent message to PT to inform of request. Adelita reports she is able to provide transportation to/from outpatient rehab appts.    Patient will discharge to address on file.    Patient will need the following for discharge:  Ambulatory referral to outpatient rehab  6 minute walk test to determine home O2 needs and order  Order for a wheelchair    Updated MD and team on above.    Arabella Borden, [facility] HospitalBRENDA  [facility]  Ext. [phone]

## 2023-02-06 NOTE — PT/OT/SLP PROGRESS
Physical Therapy Treatment    Patient Name:  Jonny Isabel   MRN:  [id number]    Recommendations:     Discharge Recommendations: rehabilitation facility  Discharge Equipment Recommendations:  (will determine closer to d/c)  Barriers to discharge:  requires assistance at this time    Assessment:     Jonny Isabel is a 62 y.o. male admitted with a medical diagnosis of Atrial fibrillation with rapid ventricular response.  He presents with the following impairments/functional limitations: weakness, impaired endurance, impaired functional mobility, gait instability, impaired cardiopulmonary response to activity . Patient participated well. Transfers w/ CGA w/o AD from bed, to bedside commode. Ambulates in room w/ RW and CGA 36 feet, 20 feet. .    Rehab Prognosis: Good; patient would benefit from acute skilled PT services to address these deficits and reach maximum level of function.    Recent Surgery: Procedure(s) (LRB):  EGD (ESOPHAGOGASTRODUODENOSCOPY) (N/A) 20 Days Post-Op    Plan:     During this hospitalization, patient to be seen 3 x/week to address the identified rehab impairments via gait training, therapeutic activities, therapeutic exercises and progress toward the following goals:    Plan of Care Expires:  02/23/23    Subjective     Chief Complaint: needs to have BM  Patient/Family Comments/goals: reports expects to d/c to rehab  Pain/Comfort:  Pain Rating 1: 0/10  Pain Rating Post-Intervention 1: 0/10      Objective:     Communicated with nurse prior to session.  Patient found left sidelying with telemetry, oxygen upon PT entry to room.     General Precautions: Standard, fall  Orthopedic Precautions: N/A  Braces: N/A  Respiratory Status: Nasal cannula, flow 2 L/min     Functional Mobility:  Bed Mobility:     Rolling Right: supervision  Supine to Sit: supervision  Transfers:     Sit to Stand:  contact guard assistance with no AD and from EOB,   Stand from chair w/ RW and CGA x 2 trials, cues for technique.; from  BWcommode w/ RW and CGA  Bed to Chair: contact guard assistance with  no AD  using  Step Transfer and from bedside commode.   Stands from bedside commode w/ RW and CGA and steps to chair, SPT  Gait: ambulates in room w/ RW and CGA 36 feet; after seated rest break, ambulates w/ RW and CGA 20 feet. Slow pace, no LOB.      AM-PAC 6 CLICK MOBILITY  Turning over in bed (including adjusting bedclothes, sheets and blankets)?: 4  Sitting down on and standing up from a chair with arms (e.g., wheelchair, bedside commode, etc.): 3  Moving from lying on back to sitting on the side of the bed?: 3  Moving to and from a bed to a chair (including a wheelchair)?: 3  Need to walk in hospital room?: 3  Climbing 3-5 steps with a railing?: 1  Basic Mobility Total Score: 17       Treatment & Education:  Patient educated in PT POC, gait and trf technique, call for assistance  Patient assisted w/ trf to bedside commode for BM but patient unable to have BM.  Patient encouraged to sit up in BSChair to improve strength and endurance - called nurse to return to bed.    Patient left up in chair with all lines intact, call button in reach, and nurse notified..    GOALS:   Multidisciplinary Problems       Physical Therapy Goals          Problem: Physical Therapy    Goal Priority Disciplines Outcome Goal Variances Interventions   Physical Therapy Goal     PT, PT/OT Ongoing, Progressing     Description: Goals to be met by: 23     Patient will increase functional independence with mobility by performin. Supine to sit with Minimal Assistance-not met  2. Sit to stand transfer with  Moderate Assistance- met   4. Bed to chair transfer with Moderate Assistance using AD if needed via stand pivot or squat pivot - not met  5. Gait  x 30 feet with Minimal Assistance using AD if needed. -not met  6. Sitting at edge of bed x8 minutes with Supervision -not met  7. Standing x 1 minutes with minimal assistance using LRAD - met   8. Pt sit to  stand with AD and CGA.                          Time Tracking:     PT Received On: 02/06/23  PT Start Time: 0931     PT Stop Time: 0957  PT Total Time (min): 26 min     Billable Minutes: Gait Training 13 and Therapeutic Activity 13    Treatment Type: Treatment  PT/PTA: PT     PTA Visit Number: 0     02/06/2023

## 2023-02-06 NOTE — NURSING
Jonny Isabel is a 62 y.o.   male patient, who presents for a 6 minute walk test ordered by  .  The diagnosis is  .  The patient's BMI is 36.9kg/m2. Predicted distance (lower limit of normal) is 356.65 meters.    Test Results:    The test was  .  The patient stopped  3 times for a total of 60 seconds.  The total time walked was 180 seconds.  During walking, the patient reported:   SOB. The patient used RW  during testing.     02/06/2023---------Distance:   ( 25)     O2 Sat % Supplemental Oxygen Heart Rate Blood Pressure Melany Scale   Pre-exercise  (Resting)  91 0  75        During Exercise  85 0  78        Post-exercise    95   2 75          Recovery Time:  1 minute    Oxygen Qualification:     O2 Sat % Supplemental Oxygen Heart Rate Blood Pressure Melany Scale   Pre-exercise  (Resting)  91 0     75         During Exercise   85 0   78           Post-exercise     95 2     75           Recovery Time:  1 minute    Performing nurse/tech:      PREVIOUS STUDY:   The patient has not had a previous study.      CLINICAL INTERPRETATION:  Six minute walk distance is   (25 ft ) with moderate dyspnea.  During exercise, there was significant desaturation while breathing room air.   Oxygen saturation did improve while breathing supplemental oxygen.

## 2023-02-07 VITALS
SYSTOLIC BLOOD PRESSURE: 92 MMHG | HEIGHT: 68 IN | WEIGHT: 242.5 LBS | BODY MASS INDEX: 36.75 KG/M2 | DIASTOLIC BLOOD PRESSURE: 54 MMHG | TEMPERATURE: 98 F | HEART RATE: 85 BPM | OXYGEN SATURATION: 94 % | RESPIRATION RATE: 18 BRPM

## 2023-02-07 PROBLEM — I27.20 PULMONARY HYPERTENSION: Status: ACTIVE | Noted: 2023-02-07

## 2023-02-07 LAB
ALBUMIN SERPL BCP-MCNC: 1.9 G/DL (ref 3.5–5.2)
ALP SERPL-CCNC: 118 U/L (ref 55–135)
ALT SERPL W/O P-5'-P-CCNC: 48 U/L (ref 10–44)
ANION GAP SERPL CALC-SCNC: 14 MMOL/L (ref 8–16)
AST SERPL-CCNC: 117 U/L (ref 10–40)
BASOPHILS # BLD AUTO: 0.15 K/UL (ref 0–0.2)
BASOPHILS NFR BLD: 1.4 % (ref 0–1.9)
BILIRUB SERPL-MCNC: 4.7 MG/DL (ref 0.1–1)
BUN SERPL-MCNC: 25 MG/DL (ref 8–23)
CALCIUM SERPL-MCNC: 7.8 MG/DL (ref 8.7–10.5)
CHLORIDE SERPL-SCNC: 99 MMOL/L (ref 95–110)
CO2 SERPL-SCNC: 26 MMOL/L (ref 23–29)
CREAT SERPL-MCNC: 1.7 MG/DL (ref 0.5–1.4)
DIFFERENTIAL METHOD: ABNORMAL
EOSINOPHIL # BLD AUTO: 0.2 K/UL (ref 0–0.5)
EOSINOPHIL NFR BLD: 1.4 % (ref 0–8)
ERYTHROCYTE [DISTWIDTH] IN BLOOD BY AUTOMATED COUNT: 20.6 % (ref 11.5–14.5)
EST. GFR  (NO RACE VARIABLE): 45.3 ML/MIN/1.73 M^2
GLUCOSE SERPL-MCNC: 125 MG/DL (ref 70–110)
HCT VFR BLD AUTO: 26 % (ref 40–54)
HGB BLD-MCNC: 8.3 G/DL (ref 14–18)
IMM GRANULOCYTES # BLD AUTO: 0.09 K/UL (ref 0–0.04)
IMM GRANULOCYTES NFR BLD AUTO: 0.8 % (ref 0–0.5)
INR PPP: 1.6 (ref 0.8–1.2)
LYMPHOCYTES # BLD AUTO: 1.7 K/UL (ref 1–4.8)
LYMPHOCYTES NFR BLD: 15.5 % (ref 18–48)
MAGNESIUM SERPL-MCNC: 1.2 MG/DL (ref 1.6–2.6)
MCH RBC QN AUTO: 31 PG (ref 27–31)
MCHC RBC AUTO-ENTMCNC: 31.9 G/DL (ref 32–36)
MCV RBC AUTO: 97 FL (ref 82–98)
MONOCYTES # BLD AUTO: 1.1 K/UL (ref 0.3–1)
MONOCYTES NFR BLD: 10.1 % (ref 4–15)
NEUTROPHILS # BLD AUTO: 7.8 K/UL (ref 1.8–7.7)
NEUTROPHILS NFR BLD: 70.8 % (ref 38–73)
NRBC BLD-RTO: 0 /100 WBC
PHOSPHATE SERPL-MCNC: 2.8 MG/DL (ref 2.7–4.5)
PLATELET # BLD AUTO: 300 K/UL (ref 150–450)
PMV BLD AUTO: 11.2 FL (ref 9.2–12.9)
POCT GLUCOSE: 157 MG/DL (ref 70–110)
POCT GLUCOSE: 185 MG/DL (ref 70–110)
POTASSIUM SERPL-SCNC: 3.4 MMOL/L (ref 3.5–5.1)
PROT SERPL-MCNC: 6.7 G/DL (ref 6–8.4)
PROTHROMBIN TIME: 16.1 SEC (ref 9–12.5)
RBC # BLD AUTO: 2.68 M/UL (ref 4.6–6.2)
SODIUM SERPL-SCNC: 139 MMOL/L (ref 136–145)
WBC # BLD AUTO: 11.04 K/UL (ref 3.9–12.7)

## 2023-02-07 PROCEDURE — 85610 PROTHROMBIN TIME: CPT | Performed by: STUDENT IN AN ORGANIZED HEALTH CARE EDUCATION/TRAINING PROGRAM

## 2023-02-07 PROCEDURE — 25000003 PHARM REV CODE 250: Performed by: STUDENT IN AN ORGANIZED HEALTH CARE EDUCATION/TRAINING PROGRAM

## 2023-02-07 PROCEDURE — 99239 HOSP IP/OBS DSCHRG MGMT >30: CPT | Mod: ,,, | Performed by: STUDENT IN AN ORGANIZED HEALTH CARE EDUCATION/TRAINING PROGRAM

## 2023-02-07 PROCEDURE — 85025 COMPLETE CBC W/AUTO DIFF WBC: CPT | Performed by: STUDENT IN AN ORGANIZED HEALTH CARE EDUCATION/TRAINING PROGRAM

## 2023-02-07 PROCEDURE — 99239 PR HOSPITAL DISCHARGE DAY,>30 MIN: ICD-10-PCS | Mod: ,,, | Performed by: STUDENT IN AN ORGANIZED HEALTH CARE EDUCATION/TRAINING PROGRAM

## 2023-02-07 PROCEDURE — 80053 COMPREHEN METABOLIC PANEL: CPT | Performed by: STUDENT IN AN ORGANIZED HEALTH CARE EDUCATION/TRAINING PROGRAM

## 2023-02-07 PROCEDURE — 83735 ASSAY OF MAGNESIUM: CPT | Performed by: STUDENT IN AN ORGANIZED HEALTH CARE EDUCATION/TRAINING PROGRAM

## 2023-02-07 PROCEDURE — 94761 N-INVAS EAR/PLS OXIMETRY MLT: CPT

## 2023-02-07 PROCEDURE — 36415 COLL VENOUS BLD VENIPUNCTURE: CPT | Performed by: STUDENT IN AN ORGANIZED HEALTH CARE EDUCATION/TRAINING PROGRAM

## 2023-02-07 PROCEDURE — 25000003 PHARM REV CODE 250

## 2023-02-07 PROCEDURE — 84100 ASSAY OF PHOSPHORUS: CPT | Performed by: STUDENT IN AN ORGANIZED HEALTH CARE EDUCATION/TRAINING PROGRAM

## 2023-02-07 RX ORDER — PANTOPRAZOLE SODIUM 40 MG/1
40 TABLET, DELAYED RELEASE ORAL 2 TIMES DAILY
Qty: 60 TABLET | Refills: 11 | Status: SHIPPED | OUTPATIENT
Start: 2023-02-07 | End: 2023-12-12

## 2023-02-07 RX ORDER — LANOLIN ALCOHOL/MO/W.PET/CERES
100 CREAM (GRAM) TOPICAL DAILY
Qty: 30 TABLET | Refills: 11 | Status: SHIPPED | OUTPATIENT
Start: 2023-02-07 | End: 2024-02-07

## 2023-02-07 RX ORDER — LANOLIN ALCOHOL/MO/W.PET/CERES
400 CREAM (GRAM) TOPICAL ONCE
Status: COMPLETED | OUTPATIENT
Start: 2023-02-07 | End: 2023-02-07

## 2023-02-07 RX ORDER — LACTULOSE 10 G/15ML
10 SOLUTION ORAL; RECTAL DAILY
Qty: 450 ML | Refills: 0 | Status: SHIPPED | OUTPATIENT
Start: 2023-02-07 | End: 2023-03-09

## 2023-02-07 RX ORDER — AMIODARONE HYDROCHLORIDE 400 MG/1
TABLET ORAL
Qty: 32 TABLET | Refills: 11 | Status: SHIPPED | OUTPATIENT
Start: 2023-02-07 | End: 2023-06-05

## 2023-02-07 RX ORDER — FOLIC ACID 1 MG/1
1 TABLET ORAL DAILY
Qty: 30 TABLET | Refills: 11 | Status: SHIPPED | OUTPATIENT
Start: 2023-02-07 | End: 2024-02-14

## 2023-02-07 RX ADMIN — FOLIC ACID 1 MG: 1 TABLET ORAL at 09:02

## 2023-02-07 RX ADMIN — LACTULOSE 10 G: 20 SOLUTION ORAL at 09:02

## 2023-02-07 RX ADMIN — THIAMINE HCL TAB 100 MG 100 MG: 100 TAB at 09:02

## 2023-02-07 RX ADMIN — THERA TABS 1 TABLET: TAB at 09:02

## 2023-02-07 RX ADMIN — Medication 400 MG: at 09:02

## 2023-02-07 RX ADMIN — METOPROLOL SUCCINATE 100 MG: 100 TABLET, EXTENDED RELEASE ORAL at 09:02

## 2023-02-07 RX ADMIN — PANTOPRAZOLE SODIUM 40 MG: 40 TABLET, DELAYED RELEASE ORAL at 06:02

## 2023-02-07 RX ADMIN — AMIODARONE HYDROCHLORIDE 400 MG: 200 TABLET ORAL at 09:02

## 2023-02-07 NOTE — ASSESSMENT & PLAN NOTE
Concerning for variceal bleed given hx of alcohol abuse.      - Transfuse pRBC for Hb < 7 g/dL (Consider a higher Hb target if there is clinical evidence of intravascular volume depletion or comorbidities, such as CAD or if high suspicion of vigorous active ongoing bleeding or an uncorrected coagulopathy exists.)  - s/p vitamin K x2 given Pt/INR > 2   - EGD showed  Small (< 5 mm) esophageal varices with portal hypertensive gastropathy  - Continue PO PPI 40 mg   - Stable after re-initiation of apixiban

## 2023-02-07 NOTE — PROGRESS NOTES
[facility] - Telemetry [facility] Medicine  Progress Note    Patient Name: Jonny Isabel  MRN: [id number]  Patient Class: IP- Inpatient   Admission Date: 1/16/2023  Length of Stay: 21 days  Attending Physician: Seymour Leon MD  Primary Care Provider: Yuli Pérez Mai, MD        Subjective:     Principal Problem:Atrial fibrillation with rapid ventricular response        HPI:  62-year-old male with a PMHx of A-fibb (on Eliquis), hypertension, DM 2 (not insulin dependent) presents as transfer from OSH for chronic lower back pain  that has been worsening for the past week. Pt states that the pain is radiating from his right buttock down to his legs. Pt was unable to get out of bed this AM due to the pain. HE endorses having fecal incontinence for the past week with black tarry stools. He denies any fevers, chills, abdominal pain, urinary incontinence, or saddle anesthesia.      At OSH ED CT lumbar spine ordered revealing Prominent, cystic, multiloculated predominantly gas attenuation epidural  structure within the spinal canal at the level of L3-L4 resulting in severe narrowing of the spinal canal with mass effect upon the thecal sac and  vacuum disc phenomena concerning for diskitis or an epidural abscess. MRI shows large disc extrusion at L3-4 causing severe spinal canal stenosis, with moderate spinal canal stenosis at L2-3 and L4-5 and moderate neural foraminal narrowing L4-5 and L5-S1. Patient had no tenderness overlying spinous process but still endorsed sever pain, received percocet and robaxin. Of note, KENNETH showed blood in stool. The patient has a white count of 12.9, hemoglobin of 7.5, creatinine 4.6, Lactate of 3.9. Pt given Rocephin, Zosyn and Vanc.     Pt transferred to [facility] for further intervention with neurosurgery.          Hospital Course (updated, brief assessment by system or problem, significant events): Admitted to MICU for NSGY and GI evaluation . Hgb stable. EGD shows small varices with  gastropathy, no active bleeds. Worsening MARTITA with minimal UOP, HRS protocol started with levo, midodrine, octreotide and albumin trailed, has since been stopped due to low suspicion. Nephrology following. NSGY planned for surgery on Monday. U/O seems to be slightly improving. HDS.      Tasks (specific, using if-then statements): If Hbg <7, transfuse with pRBC, stop ppx heparin, repeat EGD     Contingency Plan (special circumstances anticipated and plan):   Nephrology following regarding worsening kidney function  Planned for NSGY on Monday for L3/L4 compression          Overview/Hospital Course:  Admitted to MICU for NSGY and GI evaluation . Hgb stable. EGD shows small varices with gastropathy, no active bleeds. Worsening MARTITA with minimal UOP, HRS protocol started with levo, midodrine, octreotide and albumin trailed, has since been stopped due to low suspicion. Nephrology following recommend HD in setting of Acute Renal Failure (s/p 1 session SLED, s/p 1 session HD). NSGY initially planned surgical intervention for 1/23 but determine patient not medically optimized and currently suggesting multimodal pain control. A-fibb RVR 1/23 overnight, started on Amiodarone gtt, transitioned to home lopressor. Lasix trailed with good U/O. Added Torsemide for diuresis. Amio gtt restarted due to a-fibb with RVR, transitioned to PO. Continues to have rate control on amiodarone and metoprolol succinate. Stable for discharge to [facility] once approved by his insurance.       Interval History:   No events overnight.  Patient with no complaints this morning.  INR 2.1, given 1 time dose of vitamin K by mouth.  Wife at bedside and updated.      Review of Systems   Constitutional:  Positive for activity change, appetite change and fatigue. Negative for chills, diaphoresis and fever.   HENT:  Negative for rhinorrhea and sore throat.    Respiratory:  Negative for cough, chest tightness and shortness of breath.    Cardiovascular:   Negative for chest pain and palpitations.   Gastrointestinal:  Negative for abdominal pain, blood in stool, constipation, diarrhea and nausea.   Endocrine: Negative for cold intolerance.   Genitourinary:  Negative for decreased urine volume and dysuria.   Musculoskeletal:  Negative for arthralgias and myalgias.   Skin:  Negative for rash and wound.   Neurological:  Negative for dizziness, weakness, numbness and headaches.   Psychiatric/Behavioral:  Negative for agitation, behavioral problems and confusion.    Objective:     Vital Signs (Most Recent):  Temp: 98.7 °F (37.1 °C) (02/06/23 1623)  Pulse: 77 (02/06/23 1623)  Resp: 18 (02/06/23 1623)  BP: (!) 122/58 (02/06/23 1623)  SpO2: (!) 94 % (02/06/23 1623)   Vital Signs (24h Range):  Temp:  [97.6 °F (36.4 °C)-98.7 °F (37.1 °C)] 98.7 °F (37.1 °C)  Pulse:  [65-90] 77  Resp:  [16-20] 18  SpO2:  [92 %-98 %] 94 %  BP: ()/(57-63) 122/58     Weight: 110 kg (242 lb 8.1 oz)  Body mass index is 36.87 kg/m².    Intake/Output Summary (Last 24 hours) at 2/6/2023 1808  Last data filed at 2/6/2023 1012  Gross per 24 hour   Intake 720 ml   Output --   Net 720 ml      Physical Exam  Vitals and nursing note reviewed.   Constitutional:       General: He is not in acute distress.     Appearance: Normal appearance. He is not ill-appearing or toxic-appearing.   HENT:      Head: Normocephalic and atraumatic.      Nose: Nose normal.      Mouth/Throat:      Mouth: Mucous membranes are moist.      Pharynx: Oropharynx is clear.   Eyes:      General: No scleral icterus.     Extraocular Movements: Extraocular movements intact.      Conjunctiva/sclera: Conjunctivae normal.   Cardiovascular:      Rate and Rhythm: Normal rate and regular rhythm.      Heart sounds: Normal heart sounds. No murmur heard.  Pulmonary:      Effort: Pulmonary effort is normal. No respiratory distress.      Breath sounds: Normal breath sounds. No stridor. No wheezing.   Abdominal:      General: Abdomen is flat. Bowel  sounds are normal. There is distension.      Palpations: Abdomen is soft.      Tenderness: There is no abdominal tenderness. There is no guarding or rebound.   Musculoskeletal:      Cervical back: Normal range of motion and neck supple. No rigidity.      Right lower leg: No edema.      Left lower leg: No edema.   Skin:     General: Skin is warm and dry.      Coloration: Skin is not jaundiced or pale.      Findings: No bruising or rash.   Neurological:      General: No focal deficit present.      Mental Status: He is alert and oriented to person, place, and time. Mental status is at baseline.      Sensory: No sensory deficit.      Motor: No weakness.   Psychiatric:         Mood and Affect: Mood normal.         Behavior: Behavior normal.       Significant Labs: All pertinent labs within the past 24 hours have been reviewed.  CBC:   Recent Labs   Lab 02/05/23 0553 02/06/23 0237   WBC 11.25 12.68   HGB 8.5* 9.0*   HCT 28.1* 29.3*    321     CMP:   Recent Labs   Lab 02/05/23 0553 02/06/23 0237   * 137   K 3.9 3.9   CL 97 97   CO2 26 27   * 138*   BUN 26* 28*   CREATININE 1.4 1.7*   CALCIUM 8.2* 8.1*   PROT 7.0 7.5   ALBUMIN 2.1* 2.2*   BILITOT 4.9* 5.0*   ALKPHOS 119 133   * 140*   ALT 54* 60*   ANIONGAP 12 13     Coagulation:   Recent Labs   Lab 02/06/23 0237   INR 2.1*       Significant Imaging: I have reviewed all pertinent imaging results/findings within the past 24 hours.      Assessment/Plan:      * Atrial fibrillation with rapid ventricular response  Lopressor was started while on hospital medicine at 25 mg BID (home does lopressor 100 mg QD). Pt went into afib with rvr on 1/27, IV lopressor given x 2 with subsequent drop of BP, re-started on amiodarone gtt, Amiodarone gtt switched to PO. Runs of Vtach on 1/30 with highest at 11 beats. Sustained tachycardia in 150s.     - Cardiology recommended lopressor   - Will discharge on Amiodarone 400 bid loading dose x2 weeks the transition  to 200mg bid  - Re-initiated Eliquis 1/31  - Continue metoprolol succinate at 100mg daily   - Maintain K > 4, Mag > 2 and Ca WNL to decrease arrhythmogenic potential    GI bleed  Concerning for variceal bleed given hx of alcohol abuse.      - Transfuse pRBC for Hb < 7 g/dL (Consider a higher Hb target if there is clinical evidence of intravascular volume depletion or comorbidities, such as CAD or if high suspicion of vigorous active ongoing bleeding or an uncorrected coagulopathy exists.)  - s/p vitamin K x2 given Pt/INR > 2   - EGD showed  Small (< 5 mm) esophageal varices with portal hypertensive gastropathy  - Continue PO PPI 40 mg   - Stable after re-initiation of apixiban     MARTITA (acute kidney injury)  Creatinine 4.7 on admit, baseline around 0.8. Pre-renal vs ATN. Less likely obstruction as pt has gomez. Renal ultrasound with medical renal disease and no evidence of hydronephrosis.      - Nephrology consulted  -- Previously on iHD  - Avoid nephrotoxic agents such as NSAIDs, gadolinium and IV radiocontrast.  - Renally dose meds to current GFR.  - Torsemide 10 mg daily, continue outpatient  - Follow up with nephrology in 2 weeks after dc -- referral in    Elevated liver enzymes  Likely 2/2 to underlying alcohol abuse and hepatic steatosis. Complete workup done with tylenol level, acute hep panel, a1-antitrypsin, anti smith antibody, HIV, anti mitochondrial antibody, anca, anti-liver, aFP. Liver US with doppler shows epatomegaly with hepatic steatosis, cholelithiasis versus gallbladder sludge, small volume ascites, and left pleural effusion.     - Daily CMP, PT/INR   - Lactulose 10g bid at home  - May titrate down here given number of BMs      Spinal stenosis  CT Lumbar Spine Without Contrast Result Date: 1/16/2023  1.  Prominent, cystic, multiloculated predominantly gas attenuation structure within the spinal canal at the level of L3-L4 with dimensions as above.  This is favored to be epidural in location and  results in severe narrowing of the spinal canal with mass effect upon the thecal sac.    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 Large disc extrusion at L3-4, contributing to severe spinal canal stenosis, as above. Additional lumbar spondylosis, contributing to moderate spinal canal stenosis at L2-3 and L4-5 and moderate neural foraminal narrowing L4-5 and L5-S1     - Has outpatient appointment on 03/01 in NSGY clinic  - Skilled PT/OT to maximize mobility to [facility] prior to potential surgical intervention     Small bowel obstruction  CT Abdomen Pelvis  Without Contrast. Findings concerning for developing small bowel obstruction with suspected transition point seen within the midline mid to lower abdomen at the level of L4  Pt without symptoms of nausea or vomiting. Reportedly had a BM at the outside facility on 1/16/2023. Abdomen is distended.      - Resolved will have patient monitor at home    Alcoholic cirrhosis of liver with ascites  - S/p diagnostic paracentesis on 1/17 with , 50% seg. Cytology negative for malignant cell   - EGD revealed esophageal varicies  - Continue Lactulose, midodrine  - Outpatient hepatology follow up      Goals of care, counseling/discussion  Advance Care Planning   Per ICU upon admission:  - Pt has three sons that he is estranged from & has not seen in 20 years. Pt identified his girlfriend of 15 years, Adelita Bingham, as his surrogate decision maker in the event that he becomes incapacitated & cannot make decisions for himself          Patient does not appear to have relatives.  He will need possible decision maker/power of  if his overall mental status does not improve.     - Social work consulted for family assessment    Hemorrhagic shock  Resolved     Tachycardia  See above      Advanced care planning/counseling discussion  Continue discussions with patient and  family      Debility  - PT/OT      Palliative care encounter  Palliative care consulted       Cellulitis  R hand seems to be cellulitic in appearance. Extending up to forearm.      Plan:  - Completed 10 days of Cefepime on 1/25  - Resolved    Hypervolemia        ATN (acute tubular necrosis)  - Likely secondary to acute hypotension.   - Cr steadily improving  - See above       Alcohol withdrawal syndrome without complication  - Previously received ativan IV for CIWA protocol, hallucinations, agitation,  tremor, tachycarda.  - Out of acute withdrawal period   - Resolved    Lumbar herniated disc  NSGY holding surgery due to unstable status.    Alcohol abuse  Out of acute withdrawal period     Diabetes  Last A1c reviewed.        Lab Results   Component Value Date     HGBA1C 5.3 11/10/2022         Home Antihyperglycemic Regiment:  - Metformin  1000 mg BID      Inpatient Antihyperglycemic Regiment:  Antihyperglycemics (From admission, onward)        Start     Stop Route Frequency Ordered     01/16/23 1507   insulin aspart U-100 pen 0-5 Units         -- SubQ Before meals & nightly PRN 01/16/23 1407                Blood Sugars (AccuCheck):     Recent Labs     02/02/23 2023 02/03/23  0605 02/03/23  0718 02/03/23  1150 02/03/23  2221 02/04/23  0745   POCTGLUCOSE 159* 116* 113* 141* 129* 120*        - LD SSI      VTE Risk Mitigation (From admission, onward)         Ordered     rivaroxaban tablet 20 mg  With dinner         02/03/23 1117     Place sequential compression device  Until discontinued         01/18/23 0912                Discharge Planning   DALILA: 2/7/2023     Code Status: Full Code   Is the patient medically ready for discharge?: Yes    Reason for patient still in hospital (select all that apply): Patient trending condition, Laboratory test, Treatment, PT / OT recommendations and Pending disposition  Discharge Plan A: Rehab   Discharge Delays: (!) Payor Issues              Seymour Leon MD  Department of  [facility] Medicine   [facility] - Telemetry Stepdown

## 2023-02-07 NOTE — NURSING
Pt discharged per MD orders.  Tele discontinued and returned to station.  IV discontinued; catheter tip intact x.  Medication list and prescriptions reviewed; prescriptions filled by bedside.  Pt verbalizes understanding of all written and verbal discharge instructions.  Pt awaiting family/escort arrival.  Will continue to monitor.

## 2023-02-07 NOTE — PLAN OF CARE
02/07/23 1250   Post-Acute Status   Post-Acute Authorization HME;Other   Post-Acute Placement Status Discharge Plan Changed  (Patient now prefers to discharge home, does not want placement at IP Rehab or SNF. Insurance offered PtP regarding IP Rehab.)   HME Status (!) Pending Delivery  (W/C and Home O2 have been delivered to the bedside. S/O and patient agreeable for BSC to be delivered to home address on file tomorrow. [facility] has been updated.)   Other Status Awaiting f/u Appts  (PCP office and Hepatology will make contact with pt to schedule his hospital f/u visit per [facility]. Referrals orders for [facility], [facility], and [facility].)   Hospital Resources/Appts/Education Provided Appointments scheduled by Navigator/Coordinator   Discharge Delays None known at this time   Discharge Plan   Discharge Plan A Home with family  (& Ambulatory referral to outpatient rehab)   Discharge Plan B Home with family     Arabella Borden LCSW  [facility]  Ext. [phone]

## 2023-02-07 NOTE — PLAN OF CARE
[facility] - Telemetry Stepdown      HOME HEALTH ORDERS  FACE TO FACE ENCOUNTER    Patient Name: Jonny Isabel  YOB: 1960    PCP: Carmelo Pérez Mai, MD   PCP Address: [address] [facility] [place] [address]  PCP Phone Number: [phone]  PCP Fax: [phone]    Encounter Date: 1/16/23    Admit to Home Health    Diagnoses:  Active Hospital Problems    Diagnosis  POA    *Atrial fibrillation with rapid ventricular response [I48.91]  Yes     Priority: 1 - High    GI bleed [K92.2]  Yes     Priority: 2     MARTITA (acute kidney injury) [N17.9]  Yes     Priority: 3     Elevated liver enzymes [R74.8]  Yes     Priority: 4     Spinal stenosis [M48.00]  Yes     Priority: 5      ECHO  /117/23:   The left ventricle is small with normal systolic function.  The estimated ejection fraction is 65%.  Normal left ventricular diastolic function.  Normal right ventricular size with normal right ventricular systolic function.  Normal central venous pressure (3 mmHg).  The estimated PA systolic pressure is 30 mmHg.  Mild tricuspid regurgitation.      Small bowel obstruction [K56.609]  Yes     Priority: 6     Alcoholic cirrhosis of liver with ascites [K70.31]  Yes     Priority: 8     Goals of care, counseling/discussion [Z71.89]  Not Applicable     Priority: 10     Pulmonary hypertension [I27.20]  Yes    Tachycardia [R00.0]  Yes    Hemorrhagic shock [R57.8]  Yes    Palliative care encounter [Z51.5]  Not Applicable    Debility [R53.81]  Unknown    Advanced care planning/counseling discussion [Z71.89]  Not Applicable    Cellulitis [L03.90]  Yes    ATN (acute tubular necrosis) [N17.0]  Yes    Diabetes [E11.9]  Yes    Alcohol abuse [F10.10]  Yes    Lumbar herniated disc [M51.26]  Yes    Alcohol withdrawal syndrome without complication [F10.930]  Yes      Resolved Hospital Problems    Diagnosis Date Resolved POA    Severe sepsis [A41.9, R65.20] 01/27/2023 Yes    Coagulopathy [D68.9] 01/30/2023 Yes    Abnormal CT scan,  gastrointestinal tract [R93.3] 01/21/2023 Yes       Follow Up Appointments:  Future Appointments   Date Time Provider Department Center   3/1/2023  3:30 PM Adam Gonzalez DO [facility] NEUROS8 [facility]       Allergies:Review of patient's allergies indicates:  No Known Allergies    Medications: Review discharge medications with patient and family and provide education.    Current Facility-Administered Medications   Medication Dose Route Frequency Provider Last Rate Last Admin    albuterol-ipratropium 2.5 mg-0.5 mg/3 mL nebulizer solution 3 mL  3 mL Nebulization Q6H PRN Erika Patodia, DO   3 mL at 02/02/23 0733    amiodarone tablet 400 mg  400 mg Oral BID Harriett Bird MD   400 mg at 02/06/23 2222    Followed by    [START ON 2/9/2023] amiodarone tablet 200 mg  200 mg Oral Daily Harriett Bird MD        benzonatate capsule 200 mg  200 mg Oral TID PRN Oscar Hubbard MD   200 mg at 02/04/23 0425    dextrose 10% bolus 125 mL 125 mL  12.5 g Intravenous PRN Maribel Mariano, DO        dextrose 10% bolus 250 mL 250 mL  25 g Intravenous PRN Maribel Mariano, DO        folic acid tablet 1 mg  1 mg Oral Daily Erika Patodia, DO   1 mg at 02/06/23 0937    glucagon (human recombinant) injection 1 mg  1 mg Intramuscular PRN Maribel Mariano, DO        glucose chewable tablet 16 g  16 g Oral PRN Maribel Mariano, DO        glucose chewable tablet 24 g  24 g Oral PRN Maribel Mariano, DO        hydrOXYzine HCL tablet 25 mg  25 mg Oral TID PRN Zeb Persaud MD   25 mg at 02/06/23 2223    insulin aspart U-100 pen 0-5 Units  0-5 Units Subcutaneous QID (AC + HS) PRN Maribel Mariano, DO        lactulose 20 gram/30 mL solution Soln 10 g  10 g Oral Daily Harriett Bird MD   10 g at 02/06/23 0937    LIDOcaine-prilocaine cream   Topical (Top) PRN Kavitha Evans MD   Given at 01/31/23 0839    magnesium oxide tablet 400 mg  400 mg Oral Once Seymour Leon MD        melatonin tablet 9 mg  9 mg Oral Nightly PRN Douglas Shelley MD   9 mg at 02/06/23 2223    metoprolol injection 5  mg  5 mg Intravenous Q5 Min PRN Zee Gayfold, DO   5 mg at 01/30/23 1642    metoprolol succinate (TOPROL-XL) 24 hr tablet 100 mg  100 mg Oral Daily Elmer Garcia MD   100 mg at 02/06/23 0937    multivitamin tablet  1 tablet Oral Daily Maribel Mariano, DO   1 tablet at 02/06/23 0937    oxyCODONE immediate release tablet 5 mg  5 mg Oral Q6H PRN Elmer Garcia MD   5 mg at 02/05/23 0609    pantoprazole EC tablet 40 mg  40 mg Oral BID AC Erika Patodia, DO   40 mg at 02/07/23 0610    polyethylene glycol packet 17 g  17 g Oral BID PRN Seymour Leon MD        rivaroxaban tablet 20 mg  20 mg Oral Daily with dinner Benita Little MD   20 mg at 02/06/23 1635    thiamine tablet 100 mg  100 mg Oral Daily Erika Ashfordodia, DO   100 mg at 02/06/23 0937     Current Discharge Medication List        START taking these medications    Details   amiodarone (PACERONE) 400 MG tablet Take 1 tablet (400 mg total) by mouth 2 (two) times daily for 2 days, THEN 0.5 tablets (200 mg total) 2 (two) times daily.  Qty: 32 tablet, Refills: 11      folic acid (FOLVITE) 1 MG tablet Take 1 tablet (1 mg total) by mouth once daily.  Qty: 30 tablet, Refills: 11      lactulose (CHRONULAC) 20 gram/30 mL Soln Take 15 mLs (10 g total) by mouth once daily.  Qty: 450 mL, Refills: 0      pantoprazole (PROTONIX) 40 MG tablet Take 1 tablet (40 mg total) by mouth 2 (two) times daily.  Qty: 60 tablet, Refills: 11      thiamine 100 MG tablet Take 1 tablet (100 mg total) by mouth once daily.  Qty: 30 tablet, Refills: 11           CONTINUE these medications which have NOT CHANGED    Details   metFORMIN (GLUCOPHAGE) 500 MG tablet Take 1,000 mg by mouth 2 (two) times daily.      simvastatin (ZOCOR) 40 MG tablet Take 40 mg by mouth every evening.      XARELTO 20 mg Tab Take 20 mg by mouth once daily.      metoprolol tartrate (LOPRESSOR) 100 MG tablet Take 100 mg by mouth once daily.      omega-3 fatty acids 1,000 mg Cap Take 2 g by mouth once daily.      traZODone  (DESYREL) 50 MG tablet Take 50 mg by mouth nightly as needed.      VITAMIN B COMPLEX ORAL Take 1 tablet by mouth once daily.           STOP taking these medications       lisinopriL-hydrochlorothiazide (PRINZIDE,ZESTORETIC) 10-12.5 mg per tablet Comments:   Reason for Stopping:         aspirin (ECOTRIN) 81 MG EC tablet Comments:   Reason for Stopping:                 I have seen and examined this patient within the last 30 days. My clinical findings that support the need for the home health skilled services and home bound status are the following:no   Weakness/numbness causing balance and gait disturbance due to Weakness/Debility making it taxing to leave home.     Diet:   2 gram sodium diet    Labs:  None    Referrals/ Consults  Physical Therapy to evaluate and treat. Evaluate for home safety and equipment needs; Establish/upgrade home exercise program. Perform / instruct on therapeutic exercises, gait training, transfer training, and Range of Motion.  Occupational Therapy to evaluate and treat. Evaluate home environment for safety and equipment needs. Perform/Instruct on transfers, ADL training, ROM, and therapeutic exercises.    Activities:   activity as tolerated    Nursing:   Agency to admit patient within 24 hours of hospital discharge unless specified on physician order or at patient request    SN to complete comprehensive assessment including routine vital signs. Instruct on disease process and s/s of complications to report to MD. Review/verify medication list sent home with the patient at time of discharge  and instruct patient/caregiver as needed. Frequency may be adjusted depending on start of care date.     Skilled nurse to perform up to 3 visits PRN for symptoms related to diagnosis    Notify MD if SBP > 160 or < 90; DBP > 90 or < 50; HR > 120 or < 50; Temp > 101; O2 < 88%; Other:   n/a    Ok to schedule additional visits based on staff availability and patient request on consecutive days within the  home health episode.    When multiple disciplines ordered:    Start of Care occurs on Sunday - Wednesday schedule remaining discipline evaluations as ordered on separate consecutive days following the start of care.    Thursday SOC -schedule subsequent evaluations Friday and Monday the following week.     Friday - Saturday SOC - schedule subsequent discipline evaluations on consecutive days starting Monday of the following week.    For all post-discharge communication and subsequent orders please contact patient's primary care physician. If unable to reach primary care physician or do not receive response within 30 minutes, please contact [facility] for clinical staff order clarification    Miscellaneous   Home Oxygen:  Oxygen at 2 L/min nasal canula to be used:  With acitivity.    Home Health Aide:  Physical Therapy Three times weekly and Occupational Therapy Three times weekly    Wound Care Orders  no    I certify that this patient is confined to his home and needs physical therapy and occupational therapy.

## 2023-02-07 NOTE — PLAN OF CARE
[facility] - Telemetry Stepdown  Discharge Final Note    Primary Care Provider: Yuli Pérez Mai, MD    Expected Discharge Date: 2/7/2023    Final Discharge Note (most recent)       Final Note - 02/07/23 1316          Final Note    Assessment Type Final Discharge Note     Anticipated Discharge Disposition Home or Self Care   & Ambulatory referral to outpatient rehab    Hospital Resources/Appts/Education Provided Appointments scheduled by Navigator/Coordinator   PCP office and Hepatology will make contact with pt to schedule his hospital f/u visit per W.       Post-Acute Status    Post-Acute Authorization Placement;HME;Other     Post-Acute Placement Status Patient declined/refused   Patient now prefers to discharge home, does not want placement at IP Rehab or SNF. Insurance offered PtP regarding IP Rehab.    HME Status Pending Delivery   W/C and Home O2 have been delivered to the bedside. S/O and patient agreeable for BSC to be delivered to home address on file tomorrow. [facility] has been updated.    Other Status See Comments   Referrals ordered for [facility], [facility], and [facility]    Discharge Delays None known at this time                   Contact Info       Yuli Pérez Mai, MD   Specialty: Family Medicine   Relationship: PCP - General    [address] [facility]  [place] [address]   Phone: [phone]       Next Steps: Follow up    Instructions: Pt's primary care office will make contact with pt to schedule his hospital f/u visit.          Future Appointments   Date Time Provider Department Center   3/1/2023  3:30 PM Adam Gonzalez, DO [facility] NEUROS8 Carlos Borden, LCSW [facility]  Ext. [phone]

## 2023-02-07 NOTE — SUBJECTIVE & OBJECTIVE
Interval History:   No events overnight.  Patient with no complaints this morning.  INR 2.1, given 1 time dose of vitamin K by mouth.  Wife at bedside and updated.      Review of Systems   Constitutional:  Positive for activity change, appetite change and fatigue. Negative for chills, diaphoresis and fever.   HENT:  Negative for rhinorrhea and sore throat.    Respiratory:  Negative for cough, chest tightness and shortness of breath.    Cardiovascular:  Negative for chest pain and palpitations.   Gastrointestinal:  Negative for abdominal pain, blood in stool, constipation, diarrhea and nausea.   Endocrine: Negative for cold intolerance.   Genitourinary:  Negative for decreased urine volume and dysuria.   Musculoskeletal:  Negative for arthralgias and myalgias.   Skin:  Negative for rash and wound.   Neurological:  Negative for dizziness, weakness, numbness and headaches.   Psychiatric/Behavioral:  Negative for agitation, behavioral problems and confusion.    Objective:     Vital Signs (Most Recent):  Temp: 98.7 °F (37.1 °C) (02/06/23 1623)  Pulse: 77 (02/06/23 1623)  Resp: 18 (02/06/23 1623)  BP: (!) 122/58 (02/06/23 1623)  SpO2: (!) 94 % (02/06/23 1623)   Vital Signs (24h Range):  Temp:  [97.6 °F (36.4 °C)-98.7 °F (37.1 °C)] 98.7 °F (37.1 °C)  Pulse:  [65-90] 77  Resp:  [16-20] 18  SpO2:  [92 %-98 %] 94 %  BP: ()/(57-63) 122/58     Weight: 110 kg (242 lb 8.1 oz)  Body mass index is 36.87 kg/m².    Intake/Output Summary (Last 24 hours) at 2/6/2023 1808  Last data filed at 2/6/2023 1012  Gross per 24 hour   Intake 720 ml   Output --   Net 720 ml      Physical Exam  Vitals and nursing note reviewed.   Constitutional:       General: He is not in acute distress.     Appearance: Normal appearance. He is not ill-appearing or toxic-appearing.   HENT:      Head: Normocephalic and atraumatic.      Nose: Nose normal.      Mouth/Throat:      Mouth: Mucous membranes are moist.      Pharynx: Oropharynx is clear.   Eyes:       General: No scleral icterus.     Extraocular Movements: Extraocular movements intact.      Conjunctiva/sclera: Conjunctivae normal.   Cardiovascular:      Rate and Rhythm: Normal rate and regular rhythm.      Heart sounds: Normal heart sounds. No murmur heard.  Pulmonary:      Effort: Pulmonary effort is normal. No respiratory distress.      Breath sounds: Normal breath sounds. No stridor. No wheezing.   Abdominal:      General: Abdomen is flat. Bowel sounds are normal. There is distension.      Palpations: Abdomen is soft.      Tenderness: There is no abdominal tenderness. There is no guarding or rebound.   Musculoskeletal:      Cervical back: Normal range of motion and neck supple. No rigidity.      Right lower leg: No edema.      Left lower leg: No edema.   Skin:     General: Skin is warm and dry.      Coloration: Skin is not jaundiced or pale.      Findings: No bruising or rash.   Neurological:      General: No focal deficit present.      Mental Status: He is alert and oriented to person, place, and time. Mental status is at baseline.      Sensory: No sensory deficit.      Motor: No weakness.   Psychiatric:         Mood and Affect: Mood normal.         Behavior: Behavior normal.       Significant Labs: All pertinent labs within the past 24 hours have been reviewed.  CBC:   Recent Labs   Lab 02/05/23  0553 02/06/23 0237   WBC 11.25 12.68   HGB 8.5* 9.0*   HCT 28.1* 29.3*    321     CMP:   Recent Labs   Lab 02/05/23 0553 02/06/23 0237   * 137   K 3.9 3.9   CL 97 97   CO2 26 27   * 138*   BUN 26* 28*   CREATININE 1.4 1.7*   CALCIUM 8.2* 8.1*   PROT 7.0 7.5   ALBUMIN 2.1* 2.2*   BILITOT 4.9* 5.0*   ALKPHOS 119 133   * 140*   ALT 54* 60*   ANIONGAP 12 13     Coagulation:   Recent Labs   Lab 02/06/23 0237   INR 2.1*       Significant Imaging: I have reviewed all pertinent imaging results/findings within the past 24 hours.

## 2023-02-08 ENCOUNTER — PATIENT OUTREACH (OUTPATIENT)
Dept: ADMINISTRATIVE | Facility: OTHER | Age: 63
End: 2023-02-08
Payer: MEDICAID

## 2023-02-08 NOTE — DISCHARGE SUMMARY
Carlos Leung - Telemetry [facility] Medicine  Discharge Summary      Patient Name: Jonny Isabel  MRN: [id number]  CAROLYN: [id number]  Patient Class: IP- Inpatient  Admission Date: 1/16/2023  Hospital Length of Stay: 22 days  Discharge Date and Time: 2/7/2023  2:26 PM  Attending Physician: No att. providers found   Discharging Provider: Seymour Leon MD  Primary Care Provider: Yuli Pérez Mai, MD  Hospital Medicine Team: [facility] Seymour Leon MD  Primary Care Team: [facility]    HPI:   62-year-old male with a PMHx of A-fibb (on Eliquis), hypertension, DM 2 (not insulin dependent) presents as transfer from OSH for chronic lower back pain  that has been worsening for the past week. Pt states that the pain is radiating from his right buttock down to his legs. Pt was unable to get out of bed this AM due to the pain. HE endorses having fecal incontinence for the past week with black tarry stools. He denies any fevers, chills, abdominal pain, urinary incontinence, or saddle anesthesia.      At OSH ED CT lumbar spine ordered revealing Prominent, cystic, multiloculated predominantly gas attenuation epidural  structure within the spinal canal at the level of L3-L4 resulting in severe narrowing of the spinal canal with mass effect upon the thecal sac and  vacuum disc phenomena concerning for diskitis or an epidural abscess. MRI shows large disc extrusion at L3-4 causing severe spinal canal stenosis, with moderate spinal canal stenosis at L2-3 and L4-5 and moderate neural foraminal narrowing L4-5 and L5-S1. Patient had no tenderness overlying spinous process but still endorsed sever pain, received percocet and robaxin. Of note, KENNETH showed blood in stool. The patient has a white count of 12.9, hemoglobin of 7.5, creatinine 4.6, Lactate of 3.9. Pt given Rocephin, Zosyn and Vanc.     Pt transferred to [facility] [place] further intervention with neurosurgery.          Hospital Course (updated, brief assessment by system  or problem, significant events): Admitted to MICU for NSGY and GI evaluation . Hgb stable. EGD shows small varices with gastropathy, no active bleeds. Worsening MARTITA with minimal UOP, HRS protocol started with levo, midodrine, octreotide and albumin trailed, has since been stopped due to low suspicion. Nephrology following. NSGY planned for surgery on Monday. U/O seems to be slightly improving. HDS.      Tasks (specific, using if-then statements): If Hbg <7, transfuse with pRBC, stop ppx heparin, repeat EGD     Contingency Plan (special circumstances anticipated and plan):   Nephrology following regarding worsening kidney function  Planned for NSGY on Monday for L3/L4 compression          Procedure(s) (LRB):  EGD (ESOPHAGOGASTRODUODENOSCOPY) (N/A)      Hospital Course:   Admitted to MICU for NSGY and GI evaluation . Hgb stable. EGD shows small varices with gastropathy, no active bleeds. Worsening MARTITA with minimal UOP, HRS protocol started with levo, midodrine, octreotide and albumin trailed, has since been stopped due to low suspicion. Nephrology following recommend HD in setting of Acute Renal Failure (s/p 1 session SLED, s/p 1 session HD). NSGY initially planned surgical intervention for 1/23 but determine patient not medically optimized and currently suggesting multimodal pain control. A-fib RVR 1/23 overnight, started on Amiodarone gtt, transitioned to home lopressor. Lasix trailed with good U/O. Added Torsemide for diuresis. Amio gtt restarted due to a-fib with RVR, transitioned to PO. Continues to have rate control on amiodarone and metoprolol succinate.  Hemoglobin stable with anticoagulation.  Creatinine improved to baseline with good urine output while inpatient.  Per nephrology recommendations, patient discharged without diuretics.  Follow-up requested with primary care and hepatology.    Patient declined placement to rehab and was discharged home with with outpatient therapy due to insurance not approving  home health PT/OT.       Goals of Care Treatment Preferences:  Code Status: Full Code    Health care agent: Adelita Glaser  [facility] care agent number: pt's significant other.                   Consults:   Consults (From admission, onward)        Status Ordering Provider     Inpatient consult to Physical Medicine Rehab  Once        Provider:  (Not yet assigned)    Completed SREE STEINER     Inpatient consult to Cardiology  Once        Provider:  (Not yet assigned)    Completed NAHED NAPIER     Inpatient consult to Hepatology  Once        Provider:  (Not yet assigned)    Completed AMIE JOHNSON     Inpatient consult to Palliative Care  Once        Provider:  (Not yet assigned)    Completed AMIE JOHNSON     Inpatient consult to Physical Medicine Rehab  Once        Provider:  (Not yet assigned)    Completed ENOC DUNCAN     Inpatient consult to General Surgery  Once        Provider:  (Not yet assigned)    Completed VALDEMAR COLE     Inpatient consult to Physical Medicine Rehab  Once        Provider:  (Not yet assigned)    Completed ANNA SCHUSTER     Inpatient consult to Hospital Medicine-General  Once        Provider:  (Not yet assigned)    Completed TIFFANI BAR     Inpatient consult to Midline team  Once        Provider:  (Not yet assigned)    Completed KASSANDRA CONTRERAS     Inpatient consult to General Surgery  Once        Provider:  (Not yet assigned)    Completed BHUMI DE LA FUENTE     Inpatient consult to Nephrology  Once        Provider:  (Not yet assigned)    Completed SAMI HUI     Inpatient consult to Neurosurgery  Once        Provider:  (Not yet assigned)    Completed AMIE JOHNSON     Inpatient consult to Gastroenterology  Once        Provider:  (Not yet assigned)    Completed AMIE JOHNSON          No new Assessment & Plan notes have been filed under this hospital service since the last note was generated.  Service: Hospital Medicine    Final Active Diagnoses:    Diagnosis Date Noted  POA    PRINCIPAL PROBLEM:  Atrial fibrillation with rapid ventricular response [I48.91] 01/16/2023 Yes    GI bleed [K92.2] 01/16/2023 Yes    MARTITA (acute kidney injury) [N17.9] 01/16/2023 Yes    Elevated liver enzymes [R74.8] 01/16/2023 Yes    Spinal stenosis [M48.00] 01/16/2023 Yes    Small bowel obstruction [K56.609] 01/16/2023 Yes    Alcoholic cirrhosis of liver with ascites [K70.31] 01/16/2023 Yes    Goals of care, counseling/discussion [Z71.89] 01/21/2023 Not Applicable    Pulmonary hypertension [I27.20] 02/07/2023 Yes    Tachycardia [R00.0] 01/28/2023 Yes    Hemorrhagic shock [R57.8] 01/28/2023 Yes    Palliative care encounter [Z51.5] 01/27/2023 Not Applicable    Debility [R53.81] 01/27/2023 Unknown    Advanced care planning/counseling discussion [Z71.89] 01/27/2023 Not Applicable    Cellulitis [L03.90] 01/25/2023 Yes    ATN (acute tubular necrosis) [N17.0] 01/20/2023 Yes    Diabetes [E11.9] 01/16/2023 Yes    Alcohol abuse [F10.10] 01/16/2023 Yes    Lumbar herniated disc [M51.26] 01/16/2023 Yes    Alcohol withdrawal syndrome without complication [F10.930] 01/16/2023 Yes      Problems Resolved During this Admission:    Diagnosis Date Noted Date Resolved POA    Severe sepsis [A41.9, R65.20] 01/16/2023 01/27/2023 Yes    Coagulopathy [D68.9] 01/16/2023 01/30/2023 Yes    Abnormal CT scan, gastrointestinal tract [R93.3] 01/16/2023 01/21/2023 Yes       Discharged Condition: good    Disposition: Home or Self Care    Follow Up:   Follow-up Information     Yuli Pérez Mai, MD Follow up.    Specialty: Family Medicine  Why: Pt's primary care office will make contact with pt to schedule his hospital f/u visit.  Contact information:  [address] [facility]  [place] [address] [phone]                       Patient Instructions:      OXYGEN FOR HOME USE     Order Specific Question Answer Comments   Liter Flow 2    Duration With activity    Qualifying Test Performed at: Activity    Oxygen  "saturation at rest 91    Oxygen saturation with activity 85    Oxygen saturation with activity on oxygen 95    Portable mode: continuous    Route nasal cannula    Device: home concentrator with portable tanks    Length of need (in months): 12 mos    Patient condition with qualifying saturation Other - List qualifying diagnosis and code    Select a diagnosis & list the code in the comments Pulmonary hypertension [391417]    Height: 5' 8" (1.727 m)    Weight: 110 kg (242 lb 8.1 oz)    Alternative treatment measures have been tried or considered and deemed clinically ineffective. Yes      WHEELCHAIR FOR HOME USE     Order Specific Question Answer Comments   Hours in W/C per day: 8    Type of Wheelchair: Standard    Size(Width): 20"    Leg Support: STD footrests    Lap Belt: Velcro    Accessories: None    Cushion: Basic    Height: 5' 8" (1.727 m)    Weight: 110 kg (242 lb 8.1 oz)    Does patient have medical equipment at home? shower chair    Does patient have medical equipment at home? walker, standard    Does patient have medical equipment at home? cane, straight    Length of need (1-99 months): 12    Please check all that apply: Caregiver is capable and willing to operate wheelchair safely.      COMMODE FOR HOME USE     Order Specific Question Answer Comments   Type: Standard    Height: 5' 8" (1.727 m)    Weight: 110 kg (242 lb 8.1 oz)    Does patient have medical equipment at home? shower chair    Does patient have medical equipment at home? walker, standard    Does patient have medical equipment at home? cane, straight    Length of need (1-99 months): 12      Ambulatory referral/consult to Hepatology   Standing Status: Future   Referral Priority: Routine Referral Type: Consultation   Referral Reason: Specialty Services Required   Requested Specialty: Hepatology   Number of Visits Requested: 1     Ambulatory referral/consult to Internal Medicine   Standing Status: Future   Referral Priority: Routine Referral Type: " Consultation   Referral Reason: Specialty Services Required   Requested Specialty: Internal Medicine   Number of Visits Requested: 1     Ambulatory referral/consult to Physical/Occupational Therapy   Standing Status: Future   Referral Priority: Routine Referral Type: Physical Medicine   Referral Reason: Specialty Services Required   Number of Visits Requested: 1     Ambulatory referral/consult to [facility]   Standing Status: Future   Referral Priority: Routine Referral Type: Consultation   Referral Reason: Specialty Services Required   Number of Visits Requested: 1     Ambulatory referral/consult to Outpatient Case Management   Referral Priority: Routine Referral Type: Consultation   Referral Reason: Specialty Services Required   Number of Visits Requested: 1     Diet Cardiac     Notify your health care provider if you experience any of the following:  increased confusion or weakness     Notify your health care provider if you experience any of the following:  persistent dizziness, light-headedness, or visual disturbances     Notify your health care provider if you experience any of the following:  worsening rash     Notify your health care provider if you experience any of the following:  severe persistent headache     Notify your health care provider if you experience any of the following:  difficulty breathing or increased cough     Notify your health care provider if you experience any of the following:  redness, tenderness, or signs of infection (pain, swelling, redness, odor or green/yellow discharge around incision site)     Notify your health care provider if you experience any of the following:  severe uncontrolled pain     Notify your health care provider if you experience any of the following:  persistent nausea and vomiting or diarrhea     Notify your health care provider if you experience any of the following:  temperature >100.4     Activity as tolerated     Ambulatory  Request for Ready Responder Services   Standing Status: Future Standing Exp. Date: 02/07/24     Order Specific Question Answer Comments   Program referred to: COVID-19 Vaccine        Significant Diagnostic Studies: Labs: All labs within the past 24 hours have been reviewed    Pending Diagnostic Studies:     Procedure Component Value Units Date/Time    CBC auto differential [[id number]] Collected: 01/22/23 0936    Order Status: Sent Lab Status: In process Updated: 01/22/23 0937    Specimen: Blood     Comprehensive Metabolic Panel [[id number]] Collected: 01/22/23 0936    Order Status: Sent Lab Status: In process Updated: 01/22/23 0937    Specimen: Blood     EKG 12-lead [[id number]]     Order Status: Sent Lab Status: No result     EKG 12-lead [[id number]]     Order Status: Sent Lab Status: No result     Magnesium [[id number]] Collected: 01/22/23 0936    Order Status: Sent Lab Status: In process Updated: 01/22/23 0937    Specimen: Blood     Phosphorus [[id number]] Collected: 01/22/23 0936    Order Status: Sent Lab Status: In process Updated: 01/22/23 0937    Specimen: Blood     Protime-INR [[id number]] Collected: 01/22/23 0936    Order Status: Sent Lab Status: In process Updated: 01/22/23 0937    Specimen: Blood          Medications:  Reconciled Home Medications:      Medication List      START taking these medications    amiodarone 400 MG tablet  Commonly known as: PACERONE  Take 1 tablet (400 mg total) by mouth 2 (two) times daily for 2 days, THEN 0.5 tablets (200 mg total) 2 (two) times daily.  Start taking on: February 7, 2023     CONSTULOSE 10 gram/15 mL solution  Generic drug: lactulose  Take 15 mLs (10 g total) by mouth once daily.     folic acid 1 MG tablet  Commonly known as: FOLVITE  Take 1 tablet (1 mg total) by mouth once daily.     pantoprazole 40 MG tablet  Commonly known as: PROTONIX  Take 1 tablet (40 mg total) by mouth 2 (two) times daily.     thiamine 100 MG tablet  Take 1 tablet (100 mg total) by mouth once  daily.        CONTINUE taking these medications    metFORMIN 500 MG tablet  Commonly known as: GLUCOPHAGE  Take 1,000 mg by mouth 2 (two) times daily.     metoprolol tartrate 100 MG tablet  Commonly known as: LOPRESSOR  Take 100 mg by mouth once daily.     omega-3 fatty acids 1,000 mg Cap  Take 2 g by mouth once daily.     traZODone 50 MG tablet  Commonly known as: DESYREL  Take 50 mg by mouth nightly as needed.     VITAMIN B COMPLEX ORAL  Take 1 tablet by mouth once daily.     XARELTO 20 mg Tab  Generic drug: rivaroxaban  Take 20 mg by mouth once daily.        STOP taking these medications    aspirin 81 MG EC tablet  Commonly known as: ECOTRIN     lisinopriL-hydrochlorothiazide 10-12.5 mg per tablet  Commonly known as: PRINZIDE,ZESTORETIC     simvastatin 40 MG tablet  Commonly known as: ZOCOR            Indwelling Lines/Drains at time of discharge:   Lines/Drains/Airways     Drain  Duration                Open Drain 01/17/23 0800 Left;Anterior LLQ 21 days                Time spent on the discharge of patient: 35 minutes         Seymour Leon MD  Department of Hospital Medicine  [facility] - Telemetry Stepdown

## 2023-02-09 ENCOUNTER — PATIENT OUTREACH (OUTPATIENT)
Dept: ADMINISTRATIVE | Facility: CLINIC | Age: 63
End: 2023-02-09
Payer: MEDICAID

## 2023-02-09 NOTE — PROGRESS NOTES
C3 nurse spoke with Jonny Isabel(SIGNIFICANT OTHER) for a TCC post hospital discharge follow up call. The patient has a scheduled HOSFU appointment with YANELY BRICE NP on 2/13/23 @ [facility].

## 2023-02-13 ENCOUNTER — TELEPHONE (OUTPATIENT)
Dept: HOME HEALTH SERVICES | Facility: CLINIC | Age: 63
End: 2023-02-13

## 2023-02-13 ENCOUNTER — OFFICE VISIT (OUTPATIENT)
Dept: HOME HEALTH SERVICES | Facility: CLINIC | Age: 63
End: 2023-02-13
Payer: MEDICAID

## 2023-02-13 VITALS
DIASTOLIC BLOOD PRESSURE: 62 MMHG | HEART RATE: 78 BPM | RESPIRATION RATE: 18 BRPM | SYSTOLIC BLOOD PRESSURE: 108 MMHG | OXYGEN SATURATION: 96 %

## 2023-02-13 DIAGNOSIS — I48.91 ATRIAL FIBRILLATION WITH RAPID VENTRICULAR RESPONSE: Primary | ICD-10-CM

## 2023-02-13 DIAGNOSIS — J96.11 CHRONIC RESPIRATORY FAILURE WITH HYPOXIA: ICD-10-CM

## 2023-02-13 DIAGNOSIS — F10.930 ALCOHOL WITHDRAWAL SYNDROME WITHOUT COMPLICATION: ICD-10-CM

## 2023-02-13 DIAGNOSIS — J41.0 SIMPLE CHRONIC BRONCHITIS: ICD-10-CM

## 2023-02-13 DIAGNOSIS — K70.31 ALCOHOLIC CIRRHOSIS OF LIVER WITH ASCITES: ICD-10-CM

## 2023-02-13 DIAGNOSIS — Z72.0 TOBACCO USE: ICD-10-CM

## 2023-02-13 PROCEDURE — 4010F ACE/ARB THERAPY RXD/TAKEN: CPT | Mod: CPTII,S$GLB,, | Performed by: NURSE PRACTITIONER

## 2023-02-13 PROCEDURE — 3078F DIAST BP <80 MM HG: CPT | Mod: CPTII,S$GLB,, | Performed by: NURSE PRACTITIONER

## 2023-02-13 PROCEDURE — 3066F PR DOCUMENTATION OF TREATMENT FOR NEPHROPATHY: ICD-10-PCS | Mod: CPTII,S$GLB,, | Performed by: NURSE PRACTITIONER

## 2023-02-13 PROCEDURE — 4010F PR ACE/ARB THEARPY RXD/TAKEN: ICD-10-PCS | Mod: CPTII,S$GLB,, | Performed by: NURSE PRACTITIONER

## 2023-02-13 PROCEDURE — 3074F SYST BP LT 130 MM HG: CPT | Mod: CPTII,S$GLB,, | Performed by: NURSE PRACTITIONER

## 2023-02-13 PROCEDURE — 3074F PR MOST RECENT SYSTOLIC BLOOD PRESSURE < 130 MM HG: ICD-10-PCS | Mod: CPTII,S$GLB,, | Performed by: NURSE PRACTITIONER

## 2023-02-13 PROCEDURE — 99406 BEHAV CHNG SMOKING 3-10 MIN: CPT | Mod: S$GLB,,, | Performed by: NURSE PRACTITIONER

## 2023-02-13 PROCEDURE — 3078F PR MOST RECENT DIASTOLIC BLOOD PRESSURE < 80 MM HG: ICD-10-PCS | Mod: CPTII,S$GLB,, | Performed by: NURSE PRACTITIONER

## 2023-02-13 PROCEDURE — 3066F NEPHROPATHY DOC TX: CPT | Mod: CPTII,S$GLB,, | Performed by: NURSE PRACTITIONER

## 2023-02-13 PROCEDURE — 1160F RVW MEDS BY RX/DR IN RCRD: CPT | Mod: CPTII,S$GLB,, | Performed by: NURSE PRACTITIONER

## 2023-02-13 PROCEDURE — 1159F MED LIST DOCD IN RCRD: CPT | Mod: CPTII,S$GLB,, | Performed by: NURSE PRACTITIONER

## 2023-02-13 PROCEDURE — 99406 PR TOBACCO USE CESSATION INTERMEDIATE 3-10 MINUTES: ICD-10-PCS | Mod: S$GLB,,, | Performed by: NURSE PRACTITIONER

## 2023-02-13 PROCEDURE — 99344 PR HOME VISIT,NEW PATIENT,LEVEL IV: ICD-10-PCS | Mod: 25,S$GLB,, | Performed by: NURSE PRACTITIONER

## 2023-02-13 PROCEDURE — 99344 HOME/RES VST NEW MOD MDM 60: CPT | Mod: 25,S$GLB,, | Performed by: NURSE PRACTITIONER

## 2023-02-13 PROCEDURE — 1159F PR MEDICATION LIST DOCUMENTED IN MEDICAL RECORD: ICD-10-PCS | Mod: CPTII,S$GLB,, | Performed by: NURSE PRACTITIONER

## 2023-02-13 PROCEDURE — 1160F PR REVIEW ALL MEDS BY PRESCRIBER/CLIN PHARMACIST DOCUMENTED: ICD-10-PCS | Mod: CPTII,S$GLB,, | Performed by: NURSE PRACTITIONER

## 2023-02-13 RX ORDER — FLUTICASONE PROPIONATE AND SALMETEROL 50; 250 UG/1; UG/1
1 POWDER RESPIRATORY (INHALATION) 2 TIMES DAILY
Qty: 56 EACH | Refills: 2 | Status: SHIPPED | OUTPATIENT
Start: 2023-02-13 | End: 2023-05-25

## 2023-02-13 NOTE — TELEPHONE ENCOUNTER
Orders faxed to [facility] for HH services and orders sent to [facility] for humidifier. Fax confirmation received.

## 2023-02-13 NOTE — ASSESSMENT & PLAN NOTE
Using oxygen via NC at 2L  C/o nasal dryness, orders for humidifer for oxygen to DME  Instructed to use NACL nasal spray  See COPD

## 2023-02-13 NOTE — PROGRESS NOTES
[facility]  Transition of Care Home Visit    Visit Date: 2/14/2023  Encounter Provider: Joanne Ruiz   PCP:  Yuli Pérez Mai, MD    PRESENTING HISTORY      Patient ID: Jonny Isabel is a 62 y.o. male.    Consult Requested By:  No ref. provider found  Reason for Consult:  Hospital Follow Up.    Jonny is being seen at home due to being seen at home due to physical debility that presents a taxing effort to leave the home, to mitigate high risk of hospital readmission and/or due to the limited availability of reliable or safe options for transportation to the point of access to the provider. Prior to treatment on this visit the chart was reviewed and patient verbal consent was obtained.      Chief Complaint: Transitional Care, Shortness of Breath, and Diabetes        History of Present Illness: Mr. Jonny Isabel is a 62 y.o. male who was recently admitted to the hospital.    Admitted: 1/16/23  Discharge: 2/7/23  Hospital Course:   Admitted to MICU for NSGY and GI evaluation . Hgb stable. EGD shows small varices with gastropathy, no active bleeds. Worsening MARTITA with minimal UOP, HRS protocol started with levo, midodrine, octreotide and albumin trailed, has since been stopped due to low suspicion. Nephrology following recommend HD in setting of Acute Renal Failure (s/p 1 session SLED, s/p 1 session HD). NSGY initially planned surgical intervention for 1/23 but determine patient not medically optimized and currently suggesting multimodal pain control. A-fib RVR 1/23 overnight, started on Amiodarone gtt, transitioned to home lopressor. Lasix trailed with good U/O. Added Torsemide for diuresis. Amio gtt restarted due to a-fib with RVR, transitioned to PO. Continues to have rate control on amiodarone and metoprolol succinate.  Hemoglobin stable with anticoagulation.  Creatinine improved to baseline with good urine output while inpatient.  Per nephrology recommendations, patient discharged without diuretics.   Follow-up requested with primary care and hepatology.  ___________________________________________________________________    Today:    HPI:  Pt is being seen today for hospital follow up. See hospital course.    Pt is found in his home with his wife present. He is lying in his bed watching TV with oxygen in use. He is AAOx3, reports he is not having any pain at this time. He does become SOB with activity. He is using oxygen at 2L. He is a lifelong smoker, he continues to smoke at this time. He reports he cannot stop smoking. He reports no inhalers in use.    He was not able to get approval for rehab following his admit. He also does not have coverage for PT thru . He does not believe he can get back and forth from out pt PT. He has followup with [facility] in 2 weeks to discuss surgery for his spinal condition. Reports his fecal incontinence is improving and weakness is improving to lower legs. He is doing exercises given to him strength back. He is taking all medications as prescribed.         Review of Systems   Constitutional:  Positive for activity change and fatigue. Negative for fever.   HENT: Negative.     Eyes: Negative.    Respiratory:  Positive for shortness of breath. Negative for chest tightness.    Cardiovascular: Negative.  Negative for leg swelling.   Gastrointestinal:  Positive for abdominal distention (ascites).   Endocrine: Negative.    Genitourinary: Negative.    Musculoskeletal:  Positive for gait problem.   Skin: Negative.    Allergic/Immunologic: Negative.    Neurological:  Positive for weakness.   Hematological: Negative.    Psychiatric/Behavioral: Negative.  Negative for agitation.    All other systems reviewed and are negative.    Assessments:  Environmental: 2 story apartment, pt staying in living room, cannot get up stairs to bedroom, lives with wife  Functional Status: requires some assistance  Safety: fall risk  Nutritional: adequate available  Home Health/DME/Supplies: none/oxygen, BSC, WC,  hospital bed    PAST HISTORY:     Past Medical History:   Diagnosis Date    A-fib        Past Surgical History:   Procedure Laterality Date    ESOPHAGOGASTRODUODENOSCOPY N/A 1/17/2023    Procedure: EGD (ESOPHAGOGASTRODUODENOSCOPY);  Surgeon: Dewayne Navas MD;  Location: [address];  Service: Endoscopy;  Laterality: N/A;       History reviewed. No pertinent family history.    Social History     Socioeconomic History    Marital status:    Tobacco Use    Smoking status: Every Day     Packs/day: 1.00     Years: 40.00     Pack years: 40.00     Types: Cigarettes    Smokeless tobacco: Never   Substance and Sexual Activity    Alcohol use: Yes     Alcohol/week: 6.0 standard drinks     Types: 6 Cans of beer per week       MEDICATIONS & ALLERGIES:     Current Outpatient Medications on File Prior to Visit   Medication Sig Dispense Refill    amiodarone (PACERONE) 400 MG tablet Take 1 tablet (400 mg total) by mouth 2 (two) times daily for 2 days, THEN 0.5 tablets (200 mg total) 2 (two) times daily. 32 tablet 11    folic acid (FOLVITE) 1 MG tablet Take 1 tablet (1 mg total) by mouth once daily. 30 tablet 11    lactulose (CHRONULAC) 10 gram/15 mL solution Take 15 mLs (10 g total) by mouth once daily. 450 mL 0    metFORMIN (GLUCOPHAGE) 500 MG tablet Take 1,000 mg by mouth 2 (two) times daily.      metoprolol tartrate (LOPRESSOR) 100 MG tablet Take 100 mg by mouth once daily.      omega-3 fatty acids 1,000 mg Cap Take 2 g by mouth once daily.      pantoprazole (PROTONIX) 40 MG tablet Take 1 tablet (40 mg total) by mouth 2 (two) times daily. 60 tablet 11    thiamine 100 MG tablet Take 1 tablet (100 mg total) by mouth once daily. 30 tablet 11    traZODone (DESYREL) 50 MG tablet Take 50 mg by mouth nightly as needed.      VITAMIN B COMPLEX ORAL Take 1 tablet by mouth once daily.      XARELTO 20 mg Tab Take 20 mg by mouth once daily.       No current facility-administered medications on file prior to visit.         Review of patient's allergies indicates:  No Known Allergies    OBJECTIVE:     Vital Signs:  Vitals:    02/13/23 1123   BP: 108/62   Pulse: 78   Resp: 18     There is no height or weight on file to calculate BMI.     Physical Exam:  Physical Exam  Vitals reviewed.   Constitutional:       General: He is not in acute distress.     Appearance: He is well-developed.   HENT:      Head: Normocephalic and atraumatic.      Nose: Nose normal.      Mouth/Throat:      Mouth: Mucous membranes are dry.   Eyes:      Pupils: Pupils are equal, round, and reactive to light.   Cardiovascular:      Rate and Rhythm: Normal rate and regular rhythm.      Heart sounds: Normal heart sounds.   Pulmonary:      Effort: Pulmonary effort is normal.      Comments: Diminished to bases, occ wheeze  Abdominal:      General: Bowel sounds are normal. There is distension (ascites).      Palpations: Abdomen is soft.   Musculoskeletal:         General: Normal range of motion.      Cervical back: Normal range of motion and neck supple.   Skin:     General: Skin is warm and dry.   Neurological:      Mental Status: He is alert and oriented to person, place, and time.      Cranial Nerves: No cranial nerve deficit.   Psychiatric:         Behavior: Behavior normal.         Thought Content: Thought content normal.         Judgment: Judgment normal.       Laboratory  Lab Results   Component Value Date    WBC 11.04 02/07/2023    HGB 8.3 (L) 02/07/2023    HCT 26.0 (L) 02/07/2023    MCV 97 02/07/2023     02/07/2023     Lab Results   Component Value Date    INR 1.6 (H) 02/07/2023    INR 2.1 (H) 02/06/2023    INR 1.6 (H) 02/05/2023     Lab Results   Component Value Date    HGBA1C 5.3 11/10/2022     No results for input(s): POCTGLUCOSE in the last 72 hours.    Diagnostic Results:      TRANSITION OF CARE:     [facility] Contact Note: 2/9/23    Family and/or Caretaker present at visit?  Yes.  Diagnostic tests reviewed/disposition: No diagnosic tests  pending after this hospitalization.  Disease/illness education:   Home health/community services discussion/referrals: Patient does not have home health established from hospital visit.  They do need home health.  If needed, we will set up home health for the patient.   Establishment or re-establishment of referral orders for community resources: No other necessary community resources.   Discussion with other health care providers: No discussion with other health care providers necessary.     Transition of Care Visit:  I have reviewed and updated the history and problem list.  I have reconciled the medication list.  I have discussed the hospitalization and current medical issues, prognosis and plans with the patient/family.  I  spent more than 50% of time discussing the care with the patient/family.  Total Face-to-Face Encounter: 60 minutes.    Medications Reconciliation:   I have reconciled the patient's home medications and discharge medications with the patient/family. I have updated all changes.  Refer to After-Visit Medication List.    ASSESSMENT & PLAN:     HIGH RISK CONDITION(S):      Problem List Items Addressed This Visit          Psychiatric    Alcohol withdrawal syndrome without complication    Current Assessment & Plan     No recent alcohol use  Lactulose in place  Keep upcoming hept appt              Pulmonary    Simple chronic bronchitis    Overview     40+ pack year smoker, currently smoking daily         Relevant Medications    fluticasone-salmeterol 250-50 mcg/dose (ADVAIR DISKUS) 250-50 mcg/dose diskus inhaler    albuterol (VENTOLIN HFA) 90 mcg/actuation inhaler    Chronic respiratory failure with hypoxia    Overview     Related to COPD and tobacco use         Current Assessment & Plan     Using oxygen via NC at 2L  C/o nasal dryness, orders for humidifer for oxygen to DME  Instructed to use NACL nasal spray  See COPD         Relevant Medications    fluticasone-salmeterol 250-50 mcg/dose (ADVAIR  DISKUS) 250-50 mcg/dose diskus inhaler    Other Relevant Orders    HME - OTHER       Cardiac/Vascular    Atrial fibrillation with rapid ventricular response - Primary    Current Assessment & Plan     Rate controlled  Amiodarone and Lopressor in place  Continue current meds  Follow up with cards           Relevant Orders    Ambulatory referral/consult to Home Health    Ambulatory referral/consult to Cardiology       GI    Alcoholic cirrhosis of liver with ascites    Relevant Orders    Ambulatory referral/consult to Home Health        Were controlled substances prescribed?  No    Instructions for the patient:  - Continue all medications, treatments and therapies as ordered.   - Follow all instructions, recommendations as discussed.  - Maintain Safety Precautions at all times.  - Attend all medical appointments as scheduled.  - For worsening symptoms: call Primary Care Physician or Nurse Practitioner.  - For emergencies, call 911 or immediately report to the nearest emergency room.  - Limit Risks of environmental exposure to coronavirus/COVID-19 as discussed including: social distancing, hand hygiene, and limiting departures from the home for necessities only.     Questions elicited and answered.  Contact information provided for any changes in condition or concerns    Scheduled Follow-up :  Future Appointments   Date Time Provider Department Center   2/15/2023  9:00 AM Krystal Villela PT [facility]   3/1/2023  3:30 PM Adam Gonzalez DO [facility] [place] [address]       After Visit Medication List :     Medication List            Accurate as of February 13, 2023 11:59 PM. If you have any questions, ask your nurse or doctor.                START taking these medications      ADVAIR DISKUS 250-50 mcg/dose diskus inhaler  Generic drug: fluticasone-salmeterol 250-50 mcg/dose  Inhale 1 puff into the lungs 2 (two) times a day. Controller  Started by: Joanne Ruiz NP     albuterol 90 mcg/actuation inhaler  Commonly  known as: VENTOLIN HFA  Inhale 1 puff into the lungs every 4 (four) hours as needed for Wheezing or Shortness of Breath. Rescue  Started by: Joanne Ruiz NP            CONTINUE taking these medications      amiodarone 400 MG tablet  Commonly known as: PACERONE  Take 1 tablet (400 mg total) by mouth 2 (two) times daily for 2 days, THEN 0.5 tablets (200 mg total) 2 (two) times daily.  Start taking on: February 7, 2023     CONSTULOSE 10 gram/15 mL solution  Generic drug: lactulose  Take 15 mLs (10 g total) by mouth once daily.     folic acid 1 MG tablet  Commonly known as: FOLVITE  Take 1 tablet (1 mg total) by mouth once daily.     metFORMIN 500 MG tablet  Commonly known as: GLUCOPHAGE     metoprolol tartrate 100 MG tablet  Commonly known as: LOPRESSOR     omega-3 fatty acids 1,000 mg Cap     pantoprazole 40 MG tablet  Commonly known as: PROTONIX  Take 1 tablet (40 mg total) by mouth 2 (two) times daily.     thiamine 100 MG tablet  Take 1 tablet (100 mg total) by mouth once daily.     traZODone 50 MG tablet  Commonly known as: DESYREL     VITAMIN B COMPLEX ORAL     XARELTO 20 mg Tab  Generic drug: rivaroxaban               Where to Get Your Medications        These medications were sent to [facility] [place] [id number] [facility] [place] [address]      Phone: [phone]   ADVAIR DISKUS 250-50 mcg/dose diskus inhaler  albuterol 90 mcg/actuation inhaler         Signature: Joanne Ruiz NP

## 2023-02-14 ENCOUNTER — TELEPHONE (OUTPATIENT)
Dept: HEPATOLOGY | Facility: CLINIC | Age: 63
End: 2023-02-14
Payer: MEDICAID

## 2023-02-14 ENCOUNTER — TELEPHONE (OUTPATIENT)
Dept: ADMINISTRATIVE | Facility: CLINIC | Age: 63
End: 2023-02-14
Payer: MEDICAID

## 2023-02-14 PROBLEM — Z72.0 TOBACCO USE: Status: ACTIVE | Noted: 2023-02-14

## 2023-02-14 RX ORDER — ALBUTEROL SULFATE 90 UG/1
1 AEROSOL, METERED RESPIRATORY (INHALATION) EVERY 4 HOURS PRN
Qty: 18 G | Refills: 0 | Status: SHIPPED | OUTPATIENT
Start: 2023-02-14 | End: 2024-02-14

## 2023-02-14 NOTE — TELEPHONE ENCOUNTER
Called patient to offer him a Hepatology on March 6 at 8:30 am and patient stated that he doesn't get up before 10 am, offered him other appointments and he said that he was not able to come in on those days either, suggested him to do virtual visits and he declined. He requested a call back to speak with significant other to coordinate appointment.

## 2023-02-14 NOTE — ASSESSMENT & PLAN NOTE
Special Patient Instructions: The following was discussed with the patient/family. Instructions were given to patient/family.    Smoking Cessation  I discussed with the patient regarding the hazardous effects of smoking on increasing risk of heart attack and stroke, worsening lung functions, and increasing cancer risk.   Patient was urged to stop smoking now.  I also offered nicotine taper (such as nicotine patch and gum) to help ease the craving to smoke.  Discussed importance of safety with smoking with oxygen in use  10 min spent in discussion

## 2023-02-14 NOTE — TELEPHONE ENCOUNTER
Referral to hepatology for cirrhosis, ascites, indeterminate liver lesion    Scheduling attempt # 1    He needs a soon appt also. Can you see if there are any cancellations or open transplant slots?     Thanks !

## 2023-02-14 NOTE — TELEPHONE ENCOUNTER
Faxed orders and demographics to [facility] per NP request.  Fax confirmation received.

## 2023-02-15 ENCOUNTER — TELEPHONE (OUTPATIENT)
Dept: HEPATOLOGY | Facility: CLINIC | Age: 63
End: 2023-02-15
Payer: MEDICAID

## 2023-02-15 ENCOUNTER — TELEPHONE (OUTPATIENT)
Dept: ADMINISTRATIVE | Facility: CLINIC | Age: 63
End: 2023-02-15
Payer: MEDICAID

## 2023-02-15 NOTE — TELEPHONE ENCOUNTER
Referral to hepatology for nodular contour, fatty liver, indeterminate liver lesion    Called pt to schedule appt. Pt reports he cannot schedule any appts and we need to speak to his significant other, Adelita Bingham [phone]     Called pt to schedule appt with any MD. She reports he cannot come to appts until after March 1st and has a hard time getting transportation, traveling to appts. Discussed concern for decomp cirrhosis and liver lesion (concern for liver cancer). They understand importance of appt and will come to appt scheduled in early March     Scheduling attempt # 2

## 2023-02-15 NOTE — TELEPHONE ENCOUNTER
Orders received by [facility] in [place].  Patient will be admitted on their service tomorrow.

## 2023-02-23 ENCOUNTER — TELEPHONE (OUTPATIENT)
Dept: HOME HEALTH SERVICES | Facility: CLINIC | Age: 63
End: 2023-02-23
Payer: MEDICAID

## 2023-02-23 NOTE — PROGRESS NOTES
CHW - Follow Up    This Community Health Worker completed a follow up visit with caregiver via telephone today with patient's permission.  Caregiver reported: just got food stamps and transportation, no other needs  Community Health Worker provided: Michael phone number for their convenience and we agreed to a follow up in a month.     Initial Outreach - Due: 3/30/2023

## 2023-02-23 NOTE — TELEPHONE ENCOUNTER
Faxed orders and demographics to [facility] for order of hospital bed per NP request.  Fax confirmation received.

## 2023-02-24 ENCOUNTER — TELEPHONE (OUTPATIENT)
Dept: NEUROSURGERY | Facility: CLINIC | Age: 63
End: 2023-02-24
Payer: MEDICAID

## 2023-02-24 NOTE — TELEPHONE ENCOUNTER
CB and AB Ureña, pt's appt moved to 3/15/23 at 11:00 am. Pt's partner happy with date and time, she did not have any further questions at this time. Directions given, letter mailed.     ----- Message from Bella Ryan sent at 2/24/2023 10:33 AM CST -----  Regarding: appt  Contact: Adelita [phone]  Caller, Adelita requesting to reschedule Pt 03/01/23 appt. Please call to discuss further.

## 2023-02-27 ENCOUNTER — TELEPHONE (OUTPATIENT)
Dept: HOME HEALTH SERVICES | Facility: CLINIC | Age: 63
End: 2023-02-27
Payer: MEDICAID

## 2023-02-27 NOTE — TELEPHONE ENCOUNTER
Spoke with [facility] to request 1/2 bed rails for patient's hospital bed.  Spoke with rep.  States that we dont need a new order for 1/2 rails and she will process it with his hospital bed order.  NP notified.

## 2023-03-12 ENCOUNTER — EXTERNAL HOME HEALTH (OUTPATIENT)
Dept: HOME HEALTH SERVICES | Facility: HOSPITAL | Age: 63
End: 2023-03-12
Payer: MEDICAID

## 2023-03-13 ENCOUNTER — TELEPHONE (OUTPATIENT)
Dept: NEUROSURGERY | Facility: CLINIC | Age: 63
End: 2023-03-13
Payer: MEDICAID

## 2023-03-13 NOTE — TELEPHONE ENCOUNTER
Called pt. S/W wife. Cannot make appt Wed 2/2 transportation.    Please R/S w/ at least 2 wk lead time to arrange transport.

## 2023-03-13 NOTE — TELEPHONE ENCOUNTER
----- Message from Gary Penn sent at 3/13/2023  3:43 PM CDT -----  Regarding: PT'S REQUESTING A CALL BACK FROM STAFF REGARDING APPT ON 3/15  Contact: pt  Confirmed contact info below:  Contact Name: Jonny Isabel  Phone Number: [phone]

## 2023-03-16 ENCOUNTER — TELEPHONE (OUTPATIENT)
Dept: NEUROSURGERY | Facility: CLINIC | Age: 63
End: 2023-03-16
Payer: MEDICAID

## 2023-03-16 NOTE — TELEPHONE ENCOUNTER
CB and SW pt's wife Adelita, rescheduled pt appt to 3/22/23 at 2:00 pm. Pt's wife happy with date and time, location  directions given, letter mailed.     ----- Message -----  From: Lianna Mast  Sent: 3/15/2023   3:12 PM CDT  To: [facility] Staff  Subject: Appt                                             Patient wife called to reschedule appt states patient was sick would like to be seen as soon as possible Please call to discuss

## 2023-03-20 ENCOUNTER — DOCUMENT SCAN (OUTPATIENT)
Dept: HOME HEALTH SERVICES | Facility: HOSPITAL | Age: 63
End: 2023-03-20
Payer: MEDICAID

## 2023-03-22 DIAGNOSIS — E11.9 TYPE 2 DIABETES MELLITUS WITHOUT COMPLICATION, WITHOUT LONG-TERM CURRENT USE OF INSULIN: Primary | ICD-10-CM

## 2023-03-22 RX ORDER — METFORMIN HYDROCHLORIDE 500 MG/1
1000 TABLET ORAL
Qty: 180 TABLET | Refills: 1
Start: 2023-03-22 | End: 2023-03-30

## 2023-03-22 RX ORDER — BLOOD SUGAR DIAGNOSTIC
60 STRIP MISCELLANEOUS 2 TIMES DAILY
Qty: 100 EACH | Refills: 5 | Status: SHIPPED | OUTPATIENT
Start: 2023-03-22 | End: 2023-09-18

## 2023-03-22 NOTE — PROGRESS NOTES
Home health nurse reports diarrhea every day.  Pt relates to Metformin. Reports previously taking 1000mg daily. Dosing increased after discharge to BID.  Lab Results   Component Value Date    HGBA1C 5.3 11/10/2022     With last a1c of 5.3, reduce to 1000mg daily  Home health will collect A1c to assess

## 2023-03-29 ENCOUNTER — LAB VISIT (OUTPATIENT)
Dept: LAB | Facility: HOSPITAL | Age: 63
End: 2023-03-29
Attending: NURSE PRACTITIONER
Payer: MEDICAID

## 2023-03-29 ENCOUNTER — PATIENT OUTREACH (OUTPATIENT)
Dept: ADMINISTRATIVE | Facility: OTHER | Age: 63
End: 2023-03-29
Payer: MEDICAID

## 2023-03-29 DIAGNOSIS — E11.9 DIABETES MELLITUS WITHOUT COMPLICATION: Primary | ICD-10-CM

## 2023-03-29 LAB
ESTIMATED AVG GLUCOSE: 94 MG/DL (ref 68–131)
HBA1C MFR BLD: 4.9 % (ref 4–5.6)

## 2023-03-29 PROCEDURE — 83036 HEMOGLOBIN GLYCOSYLATED A1C: CPT | Performed by: NURSE PRACTITIONER

## 2023-03-29 NOTE — PROGRESS NOTES
CHW - Follow Up and Case Closure    This Community Health Worker completed a follow up visit with patient via telephone today.  Pt reported: has everything he needs  Pt denied any additional needs at this time and agrees with episode closure at this time.  Provided patient with Community Health Worker's contact information and encouraged him to contact this Community Health Worker if additional needs arise.

## 2023-03-30 ENCOUNTER — TELEPHONE (OUTPATIENT)
Dept: HOME HEALTH SERVICES | Facility: CLINIC | Age: 63
End: 2023-03-30
Payer: MEDICAID

## 2023-03-30 DIAGNOSIS — E11.9 TYPE 2 DIABETES MELLITUS WITHOUT COMPLICATION, WITHOUT LONG-TERM CURRENT USE OF INSULIN: ICD-10-CM

## 2023-03-30 RX ORDER — METFORMIN HYDROCHLORIDE 500 MG/1
500 TABLET ORAL
Qty: 90 TABLET | Refills: 1
Start: 2023-03-30 | End: 2023-06-05

## 2023-03-30 NOTE — TELEPHONE ENCOUNTER
Patient called and notified of result of A1C per NP request.  Notified patient that she wants to decrease his metformin from 1000mg daily to 500mg daily.  Patient states that he has been taking the 500mg already x1 week but verbalized he will continue that dose unless we tell him something differently.  NP notified of conversation with patient via secure chat.

## 2023-04-04 ENCOUNTER — TELEPHONE (OUTPATIENT)
Dept: HOME HEALTH SERVICES | Facility: CLINIC | Age: 63
End: 2023-04-04
Payer: MEDICAID

## 2023-04-04 NOTE — TELEPHONE ENCOUNTER
Spoke with patient's wife.  Notified her that the correct blood glucose strips were called in to patient's preferred pharmacy.  Also, notified spouse per NP request that patient's A1C was 4.9 so she wants to decrease the metformin from 500mg to 250mg.  Spouse verbalized understanding.  Patient requesting something for nausea.  Message sent to NP.

## 2023-04-05 ENCOUNTER — TELEPHONE (OUTPATIENT)
Dept: CARDIOLOGY | Facility: CLINIC | Age: 63
End: 2023-04-05
Payer: MEDICAID

## 2023-04-05 ENCOUNTER — TELEPHONE (OUTPATIENT)
Dept: HOME HEALTH SERVICES | Facility: CLINIC | Age: 63
End: 2023-04-05
Payer: MEDICAID

## 2023-04-05 NOTE — TELEPHONE ENCOUNTER
KAREN from Joanne Ruiz NP for Zofran 4mg po Q 7 hours PRN for nausea.  Medication called in to [facility].  Spouse notified that medication was called in and verbalized understanding.

## 2023-04-05 NOTE — TELEPHONE ENCOUNTER
----- Message from Joanne Sykes sent at 4/5/2023  2:32 PM CDT -----  Regarding: Call back  Contact: [phone]  Type:  Patient Returning Call    Who Called: PT   Who Left Message for Patient: Nurse   Does the patient know what this is regarding?: Yes   Would the patient rather a call back or a response via [facility]? Call back   Best Call Back Number: [phone]  Additional Information:

## 2023-04-14 ENCOUNTER — CARE AT HOME (OUTPATIENT)
Dept: HOME HEALTH SERVICES | Facility: CLINIC | Age: 63
End: 2023-04-14
Payer: MEDICAID

## 2023-04-14 DIAGNOSIS — R11.0 NAUSEA: Primary | ICD-10-CM

## 2023-04-14 DIAGNOSIS — E11.42 TYPE 2 DIABETES MELLITUS WITH DIABETIC POLYNEUROPATHY, WITHOUT LONG-TERM CURRENT USE OF INSULIN: ICD-10-CM

## 2023-04-14 PROCEDURE — 99213 OFFICE O/P EST LOW 20 MIN: CPT | Mod: 95,,, | Performed by: NURSE PRACTITIONER

## 2023-04-14 PROCEDURE — 99213 PR OFFICE/OUTPT VISIT, EST, LEVL III, 20-29 MIN: ICD-10-PCS | Mod: 95,,, | Performed by: NURSE PRACTITIONER

## 2023-04-18 PROBLEM — R11.0 NAUSEA: Status: ACTIVE | Noted: 2023-04-18

## 2023-04-18 RX ORDER — ONDANSETRON HYDROCHLORIDE 8 MG/1
8 TABLET, FILM COATED ORAL EVERY 8 HOURS PRN
Qty: 30 TABLET | Refills: 1 | Status: SHIPPED | OUTPATIENT
Start: 2023-04-18 | End: 2023-05-18

## 2023-04-18 NOTE — ASSESSMENT & PLAN NOTE
Lab Results   Component Value Date    HGBA1C 4.9 03/29/2023     A1c at 4.9 down from 5.2 in November. Pt currently on Metformin 500mg daily, having GI side effects.  Stop metformin.  ADA diet  Monitor glucose BID and notify for elevating readings.  Recheck a1c in 3 months

## 2023-04-18 NOTE — PROGRESS NOTES
Established Patient - Audio Only Telehealth Visit     The patient location is: [place]  The chief complaint leading to consultation is: Diabetes, Nausea  Visit type: Virtual visit with audio only (telephone)  Total time spent with patient: 15 min       The reason for the audio only service rather than synchronous audio and video virtual visit was related to technical difficulties or patient preference/necessity.     Each patient to whom I provide medical services by telemedicine is:  (1) informed of the relationship between the physician and patient and the respective role of any other health care provider with respect to management of the patient; and (2) notified that they may decline to receive medical services by telemedicine and may withdraw from such care at any time. Patient verbally consented to receive this service via voice-only telephone call.       HPI:   Pt is on this call today with his wife.  He reports he is doing well with his therapy. He has his DME and that is helping him as well.  He is concerned over his continuing GI nausea and diarrhea. He reports these are going on for a few weeks since his metformin was added at discharge. He reports some relief of Zofran. He is taking 4mg reports it improves but does not go away totally. His HH RN instructed him to ask if dose could be increased. Metformin was decreased to 50mg and symptoms did improve but not resolve. He would like to change DM meds.  See problem list.  Current Outpatient Medications on File Prior to Visit   Medication Sig Dispense Refill    albuterol (VENTOLIN HFA) 90 mcg/actuation inhaler Inhale 1 puff into the lungs every 4 (four) hours as needed for Wheezing or Shortness of Breath. Rescue 18 g 0    amiodarone (PACERONE) 400 MG tablet Take 1 tablet (400 mg total) by mouth 2 (two) times daily for 2 days, THEN 0.5 tablets (200 mg total) 2 (two) times daily. 32 tablet 11    blood sugar diagnostic (ONETOUCH ULTRA TEST) Strp 60 strips by  Misc.(Non-Drug; Combo Route) route 2 (two) times daily. 100 each 5    fluticasone-salmeterol 250-50 mcg/dose (ADVAIR DISKUS) 250-50 mcg/dose diskus inhaler Inhale 1 puff into the lungs 2 (two) times a day. Controller 56 each 2    folic acid (FOLVITE) 1 MG tablet Take 1 tablet (1 mg total) by mouth once daily. 30 tablet 11    metFORMIN (GLUCOPHAGE) 500 MG tablet Take 1 tablet (500 mg total) by mouth daily with breakfast. 90 tablet 1    metoprolol tartrate (LOPRESSOR) 100 MG tablet Take 100 mg by mouth once daily.      omega-3 fatty acids 1,000 mg Cap Take 2 g by mouth once daily.      pantoprazole (PROTONIX) 40 MG tablet Take 1 tablet (40 mg total) by mouth 2 (two) times daily. 60 tablet 11    thiamine 100 MG tablet Take 1 tablet (100 mg total) by mouth once daily. 30 tablet 11    traZODone (DESYREL) 50 MG tablet Take 50 mg by mouth nightly as needed.      VITAMIN B COMPLEX ORAL Take 1 tablet by mouth once daily.      XARELTO 20 mg Tab Take 20 mg by mouth once daily.       No current facility-administered medications on file prior to visit.     Past Medical History:   Diagnosis Date    A-fib           Assessment and plan:    Problem List Items Addressed This Visit          Endocrine    Diabetes    Current Assessment & Plan     Lab Results   Component Value Date    HGBA1C 4.9 03/29/2023   A1c at 4.9 down from 5.2 in November. Pt currently on Metformin 500mg daily, having GI side effects.  Stop metformin.  ADA diet  Monitor glucose BID and notify for elevating readings.  Recheck a1c in 3 months                GI    Nausea - Primary    Current Assessment & Plan     Nausea intermittently since discharge. Some relief with Zofran 4mg  No abd pain.  Some diarrhea due to metformin.  Stop metformin  Increase Zofran 4mg to 8mg every 8 hours prn  [place] ADA diet.              - Continue all medications, treatments and therapies as ordered.   - Follow all instructions, recommendations as discussed.  - Maintain Safety Precautions  at all times.  - Attend all medical appointments as scheduled.  - For worsening symptoms: call Primary Care Physician or Nurse Practitioner.  - For emergencies, call 911 or immediately report to the nearest emergency room.  - Limit Risks of environmental exposure to coronavirus/COVID-19 as discussed including: social distancing, hand hygiene, and limiting departures from the home for necessities only.     Questions elicited and answered.  Contact information provided for any changes in condition or concerns                          This service was not originating from a related E/M service provided within the previous 7 days nor will  to an E/M service or procedure within the next 24 hours or my soonest available appointment.  Prevailing standard of care was able to be met in this audio-only visit.

## 2023-04-18 NOTE — ASSESSMENT & PLAN NOTE
Nausea intermittently since discharge. Some relief with Zofran 4mg  No abd pain.  Some diarrhea due to metformin.  Stop metformin  Increase Zofran 4mg to 8mg every 8 hours prn  [place] ADA diet.

## 2023-04-21 DIAGNOSIS — K56.609 SMALL BOWEL OBSTRUCTION: ICD-10-CM

## 2023-04-21 DIAGNOSIS — R11.2 NAUSEA AND VOMITING, UNSPECIFIED VOMITING TYPE: Primary | ICD-10-CM

## 2023-04-21 RX ORDER — LANCETS
1 EACH MISCELLANEOUS 2 TIMES DAILY
Qty: 100 EACH | Refills: 11 | Status: SHIPPED | OUTPATIENT
Start: 2023-04-21 | End: 2024-04-20

## 2023-04-24 PROBLEM — N17.0 ATN (ACUTE TUBULAR NECROSIS): Status: RESOLVED | Noted: 2023-01-20 | Resolved: 2023-04-24

## 2023-05-08 ENCOUNTER — OFFICE VISIT (OUTPATIENT)
Dept: HEPATOLOGY | Facility: CLINIC | Age: 63
End: 2023-05-08
Payer: MEDICAID

## 2023-05-08 ENCOUNTER — LAB VISIT (OUTPATIENT)
Dept: LAB | Facility: HOSPITAL | Age: 63
End: 2023-05-08
Attending: INTERNAL MEDICINE
Payer: MEDICAID

## 2023-05-08 VITALS
TEMPERATURE: 98 F | OXYGEN SATURATION: 98 % | BODY MASS INDEX: 30.45 KG/M2 | HEART RATE: 65 BPM | SYSTOLIC BLOOD PRESSURE: 140 MMHG | RESPIRATION RATE: 18 BRPM | WEIGHT: 200.94 LBS | DIASTOLIC BLOOD PRESSURE: 76 MMHG | HEIGHT: 68 IN

## 2023-05-08 DIAGNOSIS — K70.31 ALCOHOLIC CIRRHOSIS OF LIVER WITH ASCITES: ICD-10-CM

## 2023-05-08 DIAGNOSIS — I85.10 SECONDARY ESOPHAGEAL VARICES WITHOUT BLEEDING: ICD-10-CM

## 2023-05-08 DIAGNOSIS — N17.9 AKI (ACUTE KIDNEY INJURY): ICD-10-CM

## 2023-05-08 DIAGNOSIS — R74.8 ELEVATED LIVER ENZYMES: ICD-10-CM

## 2023-05-08 DIAGNOSIS — F10.10 ALCOHOL ABUSE: Primary | ICD-10-CM

## 2023-05-08 DIAGNOSIS — R18.8 OTHER ASCITES: ICD-10-CM

## 2023-05-08 PROBLEM — I85.00 ESOPHAGEAL VARICES: Status: ACTIVE | Noted: 2023-05-08

## 2023-05-08 PROBLEM — K92.2 GI BLEED: Status: RESOLVED | Noted: 2023-01-16 | Resolved: 2023-05-08

## 2023-05-08 LAB
AFP SERPL-MCNC: 3.6 NG/ML (ref 0–8.4)
ALBUMIN SERPL BCP-MCNC: 2.1 G/DL (ref 3.5–5.2)
ALP SERPL-CCNC: 112 U/L (ref 55–135)
ALT SERPL W/O P-5'-P-CCNC: 11 U/L (ref 10–44)
ANION GAP SERPL CALC-SCNC: 11 MMOL/L (ref 8–16)
AST SERPL-CCNC: 32 U/L (ref 10–40)
BASOPHILS # BLD AUTO: 0.12 K/UL (ref 0–0.2)
BASOPHILS NFR BLD: 1.4 % (ref 0–1.9)
BILIRUB DIRECT SERPL-MCNC: 0.9 MG/DL (ref 0.1–0.3)
BILIRUB SERPL-MCNC: 1.4 MG/DL (ref 0.1–1)
BUN SERPL-MCNC: 10 MG/DL (ref 8–23)
CALCIUM SERPL-MCNC: 8.2 MG/DL (ref 8.7–10.5)
CHLORIDE SERPL-SCNC: 102 MMOL/L (ref 95–110)
CO2 SERPL-SCNC: 25 MMOL/L (ref 23–29)
CREAT SERPL-MCNC: 1.5 MG/DL (ref 0.5–1.4)
DIFFERENTIAL METHOD: ABNORMAL
EOSINOPHIL # BLD AUTO: 0.1 K/UL (ref 0–0.5)
EOSINOPHIL NFR BLD: 1.1 % (ref 0–8)
ERYTHROCYTE [DISTWIDTH] IN BLOOD BY AUTOMATED COUNT: 18.6 % (ref 11.5–14.5)
EST. GFR  (NO RACE VARIABLE): 52.6 ML/MIN/1.73 M^2
GLUCOSE SERPL-MCNC: 112 MG/DL (ref 70–110)
HCT VFR BLD AUTO: 30.6 % (ref 40–54)
HGB BLD-MCNC: 10.1 G/DL (ref 14–18)
IMM GRANULOCYTES # BLD AUTO: 0.03 K/UL (ref 0–0.04)
IMM GRANULOCYTES NFR BLD AUTO: 0.3 % (ref 0–0.5)
INR PPP: 1.9 (ref 0.8–1.2)
LYMPHOCYTES # BLD AUTO: 1.3 K/UL (ref 1–4.8)
LYMPHOCYTES NFR BLD: 15.4 % (ref 18–48)
MCH RBC QN AUTO: 29.9 PG (ref 27–31)
MCHC RBC AUTO-ENTMCNC: 33 G/DL (ref 32–36)
MCV RBC AUTO: 91 FL (ref 82–98)
MONOCYTES # BLD AUTO: 0.7 K/UL (ref 0.3–1)
MONOCYTES NFR BLD: 8.3 % (ref 4–15)
NEUTROPHILS # BLD AUTO: 6.4 K/UL (ref 1.8–7.7)
NEUTROPHILS NFR BLD: 73.5 % (ref 38–73)
NRBC BLD-RTO: 0 /100 WBC
PLATELET # BLD AUTO: 317 K/UL (ref 150–450)
PMV BLD AUTO: 11.1 FL (ref 9.2–12.9)
POTASSIUM SERPL-SCNC: 3.7 MMOL/L (ref 3.5–5.1)
PROT SERPL-MCNC: 8 G/DL (ref 6–8.4)
PROTHROMBIN TIME: 19.4 SEC (ref 9–12.5)
RBC # BLD AUTO: 3.38 M/UL (ref 4.6–6.2)
SODIUM SERPL-SCNC: 138 MMOL/L (ref 136–145)
WBC # BLD AUTO: 8.71 K/UL (ref 3.9–12.7)

## 2023-05-08 PROCEDURE — 1160F PR REVIEW ALL MEDS BY PRESCRIBER/CLIN PHARMACIST DOCUMENTED: ICD-10-PCS | Mod: CPTII,,, | Performed by: INTERNAL MEDICINE

## 2023-05-08 PROCEDURE — 4010F ACE/ARB THERAPY RXD/TAKEN: CPT | Mod: CPTII,,, | Performed by: INTERNAL MEDICINE

## 2023-05-08 PROCEDURE — 1159F MED LIST DOCD IN RCRD: CPT | Mod: CPTII,,, | Performed by: INTERNAL MEDICINE

## 2023-05-08 PROCEDURE — 99215 OFFICE O/P EST HI 40 MIN: CPT | Mod: S$PBB,,, | Performed by: INTERNAL MEDICINE

## 2023-05-08 PROCEDURE — 1160F RVW MEDS BY RX/DR IN RCRD: CPT | Mod: CPTII,,, | Performed by: INTERNAL MEDICINE

## 2023-05-08 PROCEDURE — 36415 COLL VENOUS BLD VENIPUNCTURE: CPT | Mod: PN | Performed by: INTERNAL MEDICINE

## 2023-05-08 PROCEDURE — 99215 OFFICE O/P EST HI 40 MIN: CPT | Mod: PBBFAC,PN | Performed by: INTERNAL MEDICINE

## 2023-05-08 PROCEDURE — 99999 PR PBB SHADOW E&M-EST. PATIENT-LVL V: ICD-10-PCS | Mod: PBBFAC,,, | Performed by: INTERNAL MEDICINE

## 2023-05-08 PROCEDURE — 99999 PR PBB SHADOW E&M-EST. PATIENT-LVL V: CPT | Mod: PBBFAC,,, | Performed by: INTERNAL MEDICINE

## 2023-05-08 PROCEDURE — 85025 COMPLETE CBC W/AUTO DIFF WBC: CPT | Performed by: INTERNAL MEDICINE

## 2023-05-08 PROCEDURE — 3044F HG A1C LEVEL LT 7.0%: CPT | Mod: CPTII,,, | Performed by: INTERNAL MEDICINE

## 2023-05-08 PROCEDURE — 3077F PR MOST RECENT SYSTOLIC BLOOD PRESSURE >= 140 MM HG: ICD-10-PCS | Mod: CPTII,,, | Performed by: INTERNAL MEDICINE

## 2023-05-08 PROCEDURE — 3008F PR BODY MASS INDEX (BMI) DOCUMENTED: ICD-10-PCS | Mod: CPTII,,, | Performed by: INTERNAL MEDICINE

## 2023-05-08 PROCEDURE — 3078F DIAST BP <80 MM HG: CPT | Mod: CPTII,,, | Performed by: INTERNAL MEDICINE

## 2023-05-08 PROCEDURE — 82105 ALPHA-FETOPROTEIN SERUM: CPT | Performed by: INTERNAL MEDICINE

## 2023-05-08 PROCEDURE — 3044F PR MOST RECENT HEMOGLOBIN A1C LEVEL <7.0%: ICD-10-PCS | Mod: CPTII,,, | Performed by: INTERNAL MEDICINE

## 2023-05-08 PROCEDURE — 1159F PR MEDICATION LIST DOCUMENTED IN MEDICAL RECORD: ICD-10-PCS | Mod: CPTII,,, | Performed by: INTERNAL MEDICINE

## 2023-05-08 PROCEDURE — 3078F PR MOST RECENT DIASTOLIC BLOOD PRESSURE < 80 MM HG: ICD-10-PCS | Mod: CPTII,,, | Performed by: INTERNAL MEDICINE

## 2023-05-08 PROCEDURE — 82248 BILIRUBIN DIRECT: CPT | Performed by: INTERNAL MEDICINE

## 2023-05-08 PROCEDURE — 99215 PR OFFICE/OUTPT VISIT, EST, LEVL V, 40-54 MIN: ICD-10-PCS | Mod: S$PBB,,, | Performed by: INTERNAL MEDICINE

## 2023-05-08 PROCEDURE — 85610 PROTHROMBIN TIME: CPT | Performed by: INTERNAL MEDICINE

## 2023-05-08 PROCEDURE — 80053 COMPREHEN METABOLIC PANEL: CPT | Performed by: INTERNAL MEDICINE

## 2023-05-08 PROCEDURE — 4010F PR ACE/ARB THEARPY RXD/TAKEN: ICD-10-PCS | Mod: CPTII,,, | Performed by: INTERNAL MEDICINE

## 2023-05-08 PROCEDURE — 3077F SYST BP >= 140 MM HG: CPT | Mod: CPTII,,, | Performed by: INTERNAL MEDICINE

## 2023-05-08 PROCEDURE — 80321 ALCOHOLS BIOMARKERS 1OR 2: CPT | Performed by: INTERNAL MEDICINE

## 2023-05-08 PROCEDURE — 3008F BODY MASS INDEX DOCD: CPT | Mod: CPTII,,, | Performed by: INTERNAL MEDICINE

## 2023-05-08 RX ORDER — SIMVASTATIN 40 MG/1
TABLET, FILM COATED ORAL
COMMUNITY
End: 2023-06-05

## 2023-05-08 RX ORDER — LISINOPRIL AND HYDROCHLOROTHIAZIDE 10; 12.5 MG/1; MG/1
TABLET ORAL
COMMUNITY
End: 2023-06-05

## 2023-05-08 RX ORDER — CLOBETASOL PROPIONATE 0.5 MG/G
OINTMENT TOPICAL
COMMUNITY
End: 2023-06-05

## 2023-05-08 RX ORDER — FUROSEMIDE 20 MG/1
20 TABLET ORAL DAILY
COMMUNITY
Start: 2023-03-06 | End: 2023-06-16 | Stop reason: SDUPTHER

## 2023-05-08 NOTE — PATIENT INSTRUCTIONS
Labs today and monthly  Abdo US and AFP 07/23  EGD 01/23  Limited us now to r/o ascites  Return 3 months

## 2023-05-08 NOTE — PROGRESS NOTES
HEPATOLOGY FOLLOW UP    Referring Physician: Harriett Bird MD  Current Corresponding Physician: Harriett Bird MD, Yuli Pérez Mai, MD     Jonny Isabel is here for follow up of decompensated alcohol-induced Cirrhosis    HPI  Seen by inpt hepatology team:  Admission 1/16/23-2/7/23: 63yo PMHx decompensated EtOH cirrhosis (ascites), NID-T2DM, afib s/p ablation and DCCV on xarelto, HTN. Was transferred from OSH on 01/16/2023 for epidural abscess and L3/L4 disc herniation. Surgery was deferred since the pt was too ill. MELD-Na 22. Was anemic, was transfused and underwent EGD- small varices noted.     Work up for liver disease since arrival notable for ASMA, AMA, viral hepatitis panel all negative. PETH 900.     Reportedly patient did not know of liver disease and was never instructed to stop drinking however multiple barriers to consideration of transplant including spinal abscess, possible bowel obstruction, continuing to drink EtOH (PETH 900)      Interval History  Since Jonny NINO Maame's inpt stay has not had any labs and has not had any f/u appts including neurosx. Pt has stopped drinking. Denies sypmtoms that would suggest HE. Has an increased abdo girth but no edema. Remains on lasix 20 mg daily.    CT abdo pelvis w contrast 1/26/23: Liver: Mildly enlarged.  Nodular contour, suggesting cirrhosis.  Diffusely decreased parenchymal attenuation.  Indeterminate 1.3 cm hepatic segment 7 hypodensity (axial 2:68).  Portal vein is patent.    EGD 1/17/23: sm EV; PHG    MELD-Na: 23 at 2/7/2023  5:10 AM  MELD: 23 at 2/7/2023  5:10 AM  Calculated from:  Serum Creatinine: 1.7 mg/dL at 2/7/2023  5:10 AM  Serum Sodium: 139 mmol/L (Using max of 137 mmol/L) at 2/7/2023  5:10 AM  Total Bilirubin: 4.7 mg/dL at 2/7/2023  5:10 AM  INR(ratio): 1.6 at 2/7/2023  5:10 AM     Outpatient Encounter Medications as of 5/8/2023   Medication Sig Dispense Refill    albuterol (VENTOLIN HFA) 90 mcg/actuation inhaler Inhale 1 puff into the lungs  every 4 (four) hours as needed for Wheezing or Shortness of Breath. Rescue 18 g 0    amiodarone (PACERONE) 400 MG tablet Take 1 tablet (400 mg total) by mouth 2 (two) times daily for 2 days, THEN 0.5 tablets (200 mg total) 2 (two) times daily. 32 tablet 11    blood sugar diagnostic (ONETOUCH ULTRA TEST) Strp 60 strips by Misc.(Non-Drug; Combo Route) route 2 (two) times daily. 100 each 5    clobetasol 0.05% (TEMOVATE) 0.05 % Oint clobetasol 0.05 % topical ointment   APPLY A THIN LAYER TO THE AFFECTED AREA(S) BY TOPICAL ROUTE 2 TIMES PER DAY      fluticasone-salmeterol 250-50 mcg/dose (ADVAIR DISKUS) 250-50 mcg/dose diskus inhaler Inhale 1 puff into the lungs 2 (two) times a day. Controller 56 each 2    folic acid (FOLVITE) 1 MG tablet Take 1 tablet (1 mg total) by mouth once daily. 30 tablet 11    furosemide (LASIX) 20 MG tablet Take 20 mg by mouth.      lancets (RELION THIN LANCETS) 26 gauge Misc 1 lancet by Misc.(Non-Drug; Combo Route) route 2 (two) times daily. 100 each 11    lisinopriL-hydrochlorothiazide (PRINZIDE,ZESTORETIC) 10-12.5 mg per tablet lisinopril 10 mg-hydrochlorothiazide 12.5 mg tablet      metoprolol tartrate (LOPRESSOR) 100 MG tablet Take 100 mg by mouth once daily.      omega-3 fatty acids 1,000 mg Cap Take 2 g by mouth once daily.      ondansetron (ZOFRAN) 8 MG tablet Take 1 tablet (8 mg total) by mouth every 8 (eight) hours as needed for Nausea. 30 tablet 1    pantoprazole (PROTONIX) 40 MG tablet Take 1 tablet (40 mg total) by mouth 2 (two) times daily. 60 tablet 11    simvastatin (ZOCOR) 40 MG tablet simvastatin 40 mg tablet      thiamine 100 MG tablet Take 1 tablet (100 mg total) by mouth once daily. 30 tablet 11    traZODone (DESYREL) 50 MG tablet Take 50 mg by mouth nightly as needed.      VITAMIN B COMPLEX ORAL Take 1 tablet by mouth once daily.      XARELTO 20 mg Tab Take 20 mg by mouth once daily.      metFORMIN (GLUCOPHAGE) 500 MG tablet Take 1 tablet (500 mg total) by mouth daily with  breakfast. (Patient not taking: Reported on 5/8/2023) 90 tablet 1     No facility-administered encounter medications on file as of 5/8/2023.     Review of patient's allergies indicates:  No Known Allergies  Past Medical History:   Diagnosis Date    A-fib        Review of Systems   Constitutional: Negative.    HENT: Negative.     Eyes: Negative.    Respiratory: Negative.     Cardiovascular: Negative.    Gastrointestinal: Negative.    Genitourinary: Negative.    Musculoskeletal: Negative.    Skin: Negative.    Neurological: Negative.    Psychiatric/Behavioral: Negative.     Vitals:    05/08/23 1054   BP: (!) 140/76   Pulse: 65   Resp: 18   Temp: 98 °F (36.7 °C)       Physical Exam  Vitals reviewed.   Constitutional:       Appearance: He is well-developed.   HENT:      Head: Normocephalic and atraumatic.   Eyes:      General: No scleral icterus.     Conjunctiva/sclera: Conjunctivae normal.      Pupils: Pupils are equal, round, and reactive to light.   Neck:      Thyroid: No thyromegaly.   Cardiovascular:      Rate and Rhythm: Normal rate and regular rhythm.      Heart sounds: Normal heart sounds.   Pulmonary:      Effort: Pulmonary effort is normal.      Breath sounds: Normal breath sounds. No rales.   Abdominal:      General: Bowel sounds are normal. There is distension.      Palpations: Abdomen is soft. There is no mass.      Tenderness: There is no abdominal tenderness.   Musculoskeletal:         General: Normal range of motion.      Cervical back: Normal range of motion and neck supple.   Skin:     General: Skin is warm and dry.      Findings: No rash.   Neurological:      Mental Status: He is alert and oriented to person, place, and time.       MELD-Na: 23 at 2/7/2023  5:10 AM  MELD: 23 at 2/7/2023  5:10 AM  Calculated from:  Serum Creatinine: 1.7 mg/dL at 2/7/2023  5:10 AM  Serum Sodium: 139 mmol/L (Using max of 137 mmol/L) at 2/7/2023  5:10 AM  Total Bilirubin: 4.7 mg/dL at 2/7/2023  5:10 AM  INR(ratio): 1.6 at  2/7/2023  5:10 AM    Lab Results   Component Value Date     (H) 02/07/2023    BUN 25 (H) 02/07/2023    CREATININE 1.7 (H) 02/07/2023    CALCIUM 7.8 (L) 02/07/2023     02/07/2023    K 3.4 (L) 02/07/2023    CL 99 02/07/2023    PROT 6.7 02/07/2023    CO2 26 02/07/2023    ANIONGAP 14 02/07/2023    WBC 11.04 02/07/2023    RBC 2.68 (L) 02/07/2023    HGB 8.3 (L) 02/07/2023    HCT 26.0 (L) 02/07/2023    HCT 33 (L) 01/27/2023    MCV 97 02/07/2023    MCH 31.0 02/07/2023    MCHC 31.9 (L) 02/07/2023     Lab Results   Component Value Date    RDW 20.6 (H) 02/07/2023     02/07/2023    MPV 11.2 02/07/2023    GRAN 7.8 (H) 02/07/2023    GRAN 70.8 02/07/2023    LYMPH 1.7 02/07/2023    LYMPH 15.5 (L) 02/07/2023    MONO 1.1 (H) 02/07/2023    MONO 10.1 02/07/2023    EOSINOPHIL 1.4 02/07/2023    BASOPHIL 1.4 02/07/2023    EOS 0.2 02/07/2023    BASO 0.15 02/07/2023    GROUPTRH A POS 01/16/2023     (H) 01/27/2023    ALBUMIN 1.9 (L) 02/07/2023     (H) 02/07/2023    ALT 48 (H) 02/07/2023    ALKPHOS 118 02/07/2023    MG 1.2 (L) 02/07/2023    LABPROT 16.1 (H) 02/07/2023    INR 1.6 (H) 02/07/2023       Assessment and Plan:  Jonny Isabel is a 62 y.o. male with Cirrhosis  I am recommending repeat labs now and monthly to see if liver has improved since he stopped alcohol. Recommend continued abstinence. Will do a limited US to r/o large ascites given abdominal distension. Continue lasix for the time being. Abdo US with AFP 07/23  recommend a repeat EGD 01/23 to f/u on esophageal varices. Also recommend pt f/u with neurosx.    Return 3 months

## 2023-05-10 DIAGNOSIS — J96.11 CHRONIC RESPIRATORY FAILURE WITH HYPOXIA: Primary | ICD-10-CM

## 2023-05-10 NOTE — PROGRESS NOTES
Pt has not used oxygen in over a month. Sats stable per HH nurse at 98.  Pt would like oxygen concentrator picked up from his house.  Orders to DME

## 2023-05-11 LAB
CLINICAL BIOCHEMIST REVIEW: NORMAL
PLPETH BLD-MCNC: NORMAL NG/ML
POPETH BLD-MCNC: <10 NG/ML

## 2023-05-12 ENCOUNTER — HOSPITAL ENCOUNTER (OUTPATIENT)
Dept: RADIOLOGY | Facility: HOSPITAL | Age: 63
Discharge: HOME | End: 2023-05-12
Attending: INTERNAL MEDICINE
Payer: MEDICAID

## 2023-05-12 DIAGNOSIS — K70.31 ALCOHOLIC CIRRHOSIS OF LIVER WITH ASCITES: ICD-10-CM

## 2023-05-12 DIAGNOSIS — R18.8 OTHER ASCITES: Primary | ICD-10-CM

## 2023-05-12 PROCEDURE — 76705 US ABDOMEN LIMITED: ICD-10-PCS | Mod: 26,,, | Performed by: INTERNAL MEDICINE

## 2023-05-12 PROCEDURE — 76705 ECHO EXAM OF ABDOMEN: CPT | Mod: TC

## 2023-05-12 PROCEDURE — 76705 ECHO EXAM OF ABDOMEN: CPT | Mod: 26,,, | Performed by: INTERNAL MEDICINE

## 2023-05-15 ENCOUNTER — TELEPHONE (OUTPATIENT)
Dept: HEPATOLOGY | Facility: CLINIC | Age: 63
End: 2023-05-15
Payer: MEDICAID

## 2023-05-15 PROBLEM — J96.11 CHRONIC RESPIRATORY FAILURE WITH HYPOXIA: Status: RESOLVED | Noted: 2023-02-13 | Resolved: 2023-05-15

## 2023-05-15 NOTE — TELEPHONE ENCOUNTER
5/18 @ 8am arrival  At [facility] the 1 st floor     Called pt to inform he is scheduled for the para  Left voice message for significant other

## 2023-05-17 ENCOUNTER — TELEPHONE (OUTPATIENT)
Dept: INTERVENTIONAL RADIOLOGY/VASCULAR | Facility: HOSPITAL | Age: 63
End: 2023-05-17
Payer: MEDICAID

## 2023-05-17 ENCOUNTER — EXTERNAL HOME HEALTH (OUTPATIENT)
Dept: HOME HEALTH SERVICES | Facility: HOSPITAL | Age: 63
End: 2023-05-17
Payer: MEDICAID

## 2023-05-17 NOTE — NURSING
Significant other is advised to arrive at 8:30, where to check in, no need to fast, and patient may drive self. Take meds as usual and bring current list of home medications. No allergies confirmed, currently taking Xarelto.

## 2023-05-18 ENCOUNTER — HOSPITAL ENCOUNTER (OUTPATIENT)
Dept: INTERVENTIONAL RADIOLOGY/VASCULAR | Facility: HOSPITAL | Age: 63
Discharge: HOME | End: 2023-05-18
Attending: INTERNAL MEDICINE
Payer: MEDICAID

## 2023-05-18 ENCOUNTER — TELEPHONE (OUTPATIENT)
Dept: HEPATOLOGY | Facility: CLINIC | Age: 63
End: 2023-05-18
Payer: MEDICAID

## 2023-05-18 VITALS
SYSTOLIC BLOOD PRESSURE: 146 MMHG | OXYGEN SATURATION: 98 % | TEMPERATURE: 98 F | HEIGHT: 69 IN | RESPIRATION RATE: 20 BRPM | BODY MASS INDEX: 31.25 KG/M2 | HEART RATE: 63 BPM | WEIGHT: 211 LBS | DIASTOLIC BLOOD PRESSURE: 75 MMHG

## 2023-05-18 DIAGNOSIS — R18.8 ASCITES: ICD-10-CM

## 2023-05-18 DIAGNOSIS — R18.8 OTHER ASCITES: ICD-10-CM

## 2023-05-18 LAB
ALBUMIN FLD-MCNC: 0.7 G/DL
APPEARANCE FLD: CLEAR
BODY FLD TYPE: NORMAL
COLOR FLD: COLORLESS
LYMPHOCYTES NFR FLD MANUAL: 77 %
MONOS+MACROS NFR FLD MANUAL: 17 %
NEUTROPHILS NFR FLD MANUAL: 6 %
PROT FLD-MCNC: 1.8 G/DL
SPECIMEN SOURCE: NORMAL
SPECIMEN SOURCE: NORMAL
WBC # FLD: 108 /CU MM

## 2023-05-18 PROCEDURE — 82042 OTHER SOURCE ALBUMIN QUAN EA: CPT | Performed by: INTERNAL MEDICINE

## 2023-05-18 PROCEDURE — A7048 VACUUM DRAIN BOTTLE/TUBE KIT: HCPCS

## 2023-05-18 PROCEDURE — 84157 ASSAY OF PROTEIN OTHER: CPT | Performed by: INTERNAL MEDICINE

## 2023-05-18 PROCEDURE — A4215 STERILE NEEDLE: HCPCS

## 2023-05-18 PROCEDURE — 49083 IR PARACENTESIS WITH IMAGING: ICD-10-PCS | Mod: ,,, | Performed by: RADIOLOGY

## 2023-05-18 PROCEDURE — 89051 BODY FLUID CELL COUNT: CPT | Performed by: INTERNAL MEDICINE

## 2023-05-18 PROCEDURE — 25000003 PHARM REV CODE 250: Performed by: RADIOLOGY

## 2023-05-18 PROCEDURE — 49083 ABD PARACENTESIS W/IMAGING: CPT | Performed by: RADIOLOGY

## 2023-05-18 RX ORDER — LIDOCAINE HYDROCHLORIDE 10 MG/ML
INJECTION INFILTRATION; PERINEURAL
Status: COMPLETED | OUTPATIENT
Start: 2023-05-18 | End: 2023-05-18

## 2023-05-18 RX ADMIN — LIDOCAINE HYDROCHLORIDE 5 ML: 10 INJECTION, SOLUTION INFILTRATION; PERINEURAL at 09:05

## 2023-05-18 NOTE — H&P
Interventional Radiology Pre-Procedure History & Physical      Chief Complaint/Reason for Referral:  Ascites    History of Present Illness:  Jonny Isabel is a 62 y.o. male who presents with ascites. Referred for paracentesis.    Past Medical History:   Diagnosis Date    A-fib     Esophageal varices 5/8/2023    Other ascites 5/8/2023     Past Surgical History:   Procedure Laterality Date    ESOPHAGOGASTRODUODENOSCOPY N/A 1/17/2023    Procedure: EGD (ESOPHAGOGASTRODUODENOSCOPY);  Surgeon: Dewayne Navas MD;  Location: [address]);  Service: Endoscopy;  Laterality: N/A;       Allergies:   Review of patient's allergies indicates:  No Known Allergies     Home Meds:   Prior to Admission medications    Medication Sig Start Date End Date Taking? Authorizing Provider   amiodarone (PACERONE) 400 MG tablet Take 1 tablet (400 mg total) by mouth 2 (two) times daily for 2 days, THEN 0.5 tablets (200 mg total) 2 (two) times daily. 2/7/23 5/18/23 Yes Seymour Leon MD   blood sugar diagnostic (ONETOUCH ULTRA TEST) Strp 60 strips by Misc.(Non-Drug; Combo Route) route 2 (two) times daily. 3/22/23 9/18/23 Yes Joanne Ruiz NP   fluticasone-salmeterol 250-50 mcg/dose (ADVAIR DISKUS) 250-50 mcg/dose diskus inhaler Inhale 1 puff into the lungs 2 (two) times a day. Controller 2/13/23 5/18/23 Yes Joanne Ruiz NP   folic acid (FOLVITE) 1 MG tablet Take 1 tablet (1 mg total) by mouth once daily. 2/7/23 2/7/24 Yes Seymour Leon MD   furosemide (LASIX) 20 MG tablet Take 20 mg by mouth once daily. 3/6/23  Yes Historical Provider   lancets (RELION THIN LANCETS) 26 gauge Misc 1 lancet by Misc.(Non-Drug; Combo Route) route 2 (two) times daily. 4/21/23 4/20/24 Yes Joanne Ruiz NP   lisinopriL-hydrochlorothiazide (PRINZIDE,ZESTORETIC) 10-12.5 mg per tablet lisinopril 10 mg-hydrochlorothiazide 12.5 mg tablet   Yes Historical Provider   metoprolol tartrate (LOPRESSOR) 100 MG tablet Take 100 mg by mouth once  daily. 1/3/23  Yes Historical Provider   omega-3 fatty acids 1,000 mg Cap Take 2 g by mouth once daily.   Yes Historical Provider   ondansetron (ZOFRAN) 8 MG tablet Take 1 tablet (8 mg total) by mouth every 8 (eight) hours as needed for Nausea. 4/18/23 5/18/23 Yes Joanne Ruiz NP   pantoprazole (PROTONIX) 40 MG tablet Take 1 tablet (40 mg total) by mouth 2 (two) times daily. 2/7/23 2/7/24 Yes Seymour Leon MD   simvastatin (ZOCOR) 40 MG tablet simvastatin 40 mg tablet   Yes Historical Provider   thiamine 100 MG tablet Take 1 tablet (100 mg total) by mouth once daily. 2/7/23 2/7/24 Yes Seymour Leon MD   traZODone (DESYREL) 50 MG tablet Take 50 mg by mouth nightly as needed. 11/2/22  Yes Historical Provider   VITAMIN B COMPLEX ORAL Take 1 tablet by mouth once daily.   Yes Historical Provider   XARELTO 20 mg Tab Take 20 mg by mouth once daily. 1/9/23  Yes Historical Provider   albuterol (VENTOLIN HFA) 90 mcg/actuation inhaler Inhale 1 puff into the lungs every 4 (four) hours as needed for Wheezing or Shortness of Breath. Rescue 2/14/23 2/14/24  Joanne Ruiz NP   clobetasol 0.05% (TEMOVATE) 0.05 % Oint clobetasol 0.05 % topical ointment   APPLY A THIN LAYER TO THE AFFECTED AREA(S) BY TOPICAL ROUTE 2 TIMES PER DAY    Historical Provider   metFORMIN (GLUCOPHAGE) 500 MG tablet Take 1 tablet (500 mg total) by mouth daily with breakfast.  Patient not taking: Reported on 5/8/2023 3/30/23 9/26/23  Joanne Ruiz NP       Anticoagulation/Antiplatelet Meds: no anticoagulation    Review of Systems:   Hematological: no known coagulopathies  Respiratory: no shortness of breath  Cardiovascular: no chest pain  Gastrointestinal: no abdominal pain  Genitourinary: no dysuria  Musculoskeletal: negative  Neurological: no TIA or stroke symptoms     Physical Exam:  Temp: 97.8 °F (36.6 °C) (05/18/23 0845)  Pulse: 82 (05/18/23 0925)  Resp: 20 (05/18/23 0925)  BP: (!) 165/88 (05/18/23 0925)  SpO2: 97 % (05/18/23  7066)    General: NAD  HEENT: Normocephalic, sclera anicteric, oropharynx clear  Heart: RRR  Lungs: Symmetric excursions, breathing unlabored  Abd: Severely distended, soft, NT  Extremities: GOMEZ  Neuro: Gross nonfocal    Laboratory:  Lab Results   Component Value Date    INR 1.9 (H) 05/08/2023       Lab Results   Component Value Date    WBC 8.71 05/08/2023    HGB 10.1 (L) 05/08/2023    HCT 30.6 (L) 05/08/2023    MCV 91 05/08/2023     05/08/2023      Lab Results   Component Value Date     (H) 05/08/2023     05/08/2023    K 3.7 05/08/2023     05/08/2023    CO2 25 05/08/2023    BUN 10 05/08/2023    CREATININE 1.5 (H) 05/08/2023    CALCIUM 8.2 (L) 05/08/2023    MG 1.2 (L) 02/07/2023    ALT 11 05/08/2023    AST 32 05/08/2023    ALBUMIN 2.1 (L) 05/08/2023    BILITOT 1.4 (H) 05/08/2023    BILIDIR 0.9 (H) 05/08/2023       Assessment/Plan:  62 y.o. male with ascites. Will undergo paracentesis today.    Sedation: None    Risks (including, but not limited to, pain, bleeding, infection, damage to nearby structures, treatment failure/recurrence, and the need for additional procedures), potential benefits, and alternatives were discussed with the patient. All questions were answered to the best of my abilities. The patient wishes to proceed. Written informed consent was obtained.      Andrew Marsala MD [facility]  Pager [phone]

## 2023-05-18 NOTE — DISCHARGE SUMMARY
Interventional Radiology Short Stay Discharge Summary      Admit Date: 5/18/2023  Discharge Date: 05/18/2023     Hospital Course: Uneventful    Discharge Diagnosis: Ascites s/p paracentesis today    Discharge Condition: Stable    Discharge Disposition: Home    Diet: Resume prior diet    Activity: Activity as tolerated    Follow-up: With referring provider      Tyler Avila MD  [facility]  Pager [phone]

## 2023-05-18 NOTE — PLAN OF CARE
Patient discharged home in stable condition. AVS provided and reviewed. Recommended patient reach out to provider to request standing orders for paracentesis. Provided  number for family. 5000 mLs of light yellow peritoneal fluid was drained today. We were unable to fully drain the patient due to it being the first para for him. Lab specimens collected and brought to lab for processing as ordered. Brought patient to lobby via wheelchair and reviewed discharge instructions with patient's girlfriend per his request. IR care complete.

## 2023-05-18 NOTE — TELEPHONE ENCOUNTER
----- Message from Tyrese Aguilar sent at 5/18/2023 10:35 AM CDT -----  Regarding: call back  Pt call to speak with someone in regards to needing standing order requesting call back    Call

## 2023-05-18 NOTE — PROCEDURES
Interventional Radiology Immediate Post-Procedure Note    Pre-Op Diagnosis: Ascites  Post-Op Diagnosis: Same    Procedure: US-guided paracentesis    Procedure performed by: Tyler Avila MD  Assistants: None    Estimated Blood Loss: Minimal  Specimen Removed: Yes    Findings/description of procedure:  Large ascites. 5000 mL Almost clear yellow fluid removed from the RIGHT lower quadrant under real-time US guidance.    No immediate complications. Patient tolerated procedure well. Please see full dictated procedure report for additional details and recommendations.      Tyler Avila MD  [facility]  Pager [phone]

## 2023-05-23 ENCOUNTER — TELEPHONE (OUTPATIENT)
Dept: NEUROSURGERY | Facility: CLINIC | Age: 63
End: 2023-05-23
Payer: MEDICAID

## 2023-05-23 ENCOUNTER — TELEPHONE (OUTPATIENT)
Dept: HEPATOLOGY | Facility: CLINIC | Age: 63
End: 2023-05-23
Payer: MEDICAID

## 2023-05-23 DIAGNOSIS — J96.11 CHRONIC RESPIRATORY FAILURE WITH HYPOXIA: ICD-10-CM

## 2023-05-23 DIAGNOSIS — J41.0 SIMPLE CHRONIC BRONCHITIS: ICD-10-CM

## 2023-05-23 NOTE — TELEPHONE ENCOUNTER
----- Message from Lima Gonzalez sent at 5/23/2023  1:20 PM CDT -----  Regarding: Sanding Orders Status  Contact: Pt spouse  Pt spouse is requesting a callback in regards to standing order for Para. Pt reached out a few days ago but haven't heard back. Please adv pt           Confirmed contact below:   Contact Name:Jonny Isabel  Phone Number: [phone]

## 2023-05-23 NOTE — TELEPHONE ENCOUNTER
----- Message from Isaías Haider sent at 5/23/2023  1:28 PM CDT -----  Regarding: Appt  Contact: @[phone]  Patient is calling to get a f/u scheduled as soon as possible. Patient states he cancelled his appt once before and has been waiting to be rescheduled quite a minute and would like for someone to reach out as soon as possible to get him scheduled. Please call and advise @[phone]

## 2023-05-24 ENCOUNTER — TELEPHONE (OUTPATIENT)
Dept: HEPATOLOGY | Facility: CLINIC | Age: 63
End: 2023-05-24
Payer: MEDICAID

## 2023-05-24 DIAGNOSIS — R18.8 OTHER ASCITES: Primary | ICD-10-CM

## 2023-05-24 DIAGNOSIS — R18.8 ASCITES OF LIVER: Primary | ICD-10-CM

## 2023-05-24 NOTE — TELEPHONE ENCOUNTER
Spoke with pts wife and assured that I would send to the para scheduling people. And ask that they please message me once he is scheduled for para at [facility] for the Para. I will schedule the labs to be done at [place]-the day prior to the para---- Message from Oz Huggins MA sent at 5/24/2023  1:54 PM CDT -----  Regarding: FW: Schedule paracentesis    ----- Message -----  From: Joann Inman  Sent: 5/24/2023   1:46 PM CDT  To: [facility] Hepatology Scheduling  Subject: Schedule paracentesis                            Pt is calling to schedule a paracentesis before seeing [facility] on 08/29/2023, also need standing orders for labs every 2 weeks . Please advise. Requesting a call back.           [phone]

## 2023-05-24 NOTE — TELEPHONE ENCOUNTER
Call to wife, informed labs will have to be done a day or 2 prior to the tap  She understood and asked that they be done at the [place] location    Lovely Escalante MD sent to Татьяна Gutierrez MA  Caller: Unspecified (6 days ago, 11:26 AM)  Will put in an order for a tap every 2 weeks. But needs labs the day before every 2 weeks           Previous Messages       ----- Message -----   From: Татьяна Gutierrez MA   Sent: 5/18/2023  11:27 AM CDT   To: Lovely Escalante MD     ----- Message from Татьяна Gutierrez MA sent at 5/18/2023 11:27 AM CDT -----   Looking for a standing order for a para

## 2023-05-25 RX ORDER — FLUTICASONE PROPIONATE AND SALMETEROL 50; 250 UG/1; UG/1
POWDER RESPIRATORY (INHALATION)
Qty: 60 EACH | Refills: 0 | Status: SHIPPED | OUTPATIENT
Start: 2023-05-25 | End: 2023-12-05 | Stop reason: SDUPTHER

## 2023-05-31 ENCOUNTER — LAB VISIT (OUTPATIENT)
Dept: LAB | Facility: HOSPITAL | Age: 63
End: 2023-05-31
Attending: INTERNAL MEDICINE
Payer: MEDICAID

## 2023-05-31 ENCOUNTER — TELEPHONE (OUTPATIENT)
Dept: INTERVENTIONAL RADIOLOGY/VASCULAR | Facility: HOSPITAL | Age: 63
End: 2023-05-31
Payer: MEDICAID

## 2023-05-31 DIAGNOSIS — R18.8 ASCITES OF LIVER: ICD-10-CM

## 2023-05-31 LAB
ALBUMIN SERPL BCP-MCNC: 2.2 G/DL (ref 3.5–5.2)
ALP SERPL-CCNC: 119 U/L (ref 55–135)
ALT SERPL W/O P-5'-P-CCNC: 12 U/L (ref 10–44)
ANION GAP SERPL CALC-SCNC: 13 MMOL/L (ref 8–16)
AST SERPL-CCNC: 32 U/L (ref 10–40)
BASOPHILS # BLD AUTO: 0.09 K/UL (ref 0–0.2)
BASOPHILS NFR BLD: 1.1 % (ref 0–1.9)
BILIRUB SERPL-MCNC: 1 MG/DL (ref 0.1–1)
BUN SERPL-MCNC: 12 MG/DL (ref 8–23)
CALCIUM SERPL-MCNC: 8.9 MG/DL (ref 8.7–10.5)
CHLORIDE SERPL-SCNC: 104 MMOL/L (ref 95–110)
CO2 SERPL-SCNC: 21 MMOL/L (ref 23–29)
CREAT SERPL-MCNC: 1.3 MG/DL (ref 0.5–1.4)
DIFFERENTIAL METHOD: ABNORMAL
EOSINOPHIL # BLD AUTO: 0.1 K/UL (ref 0–0.5)
EOSINOPHIL NFR BLD: 1.3 % (ref 0–8)
ERYTHROCYTE [DISTWIDTH] IN BLOOD BY AUTOMATED COUNT: 16.9 % (ref 11.5–14.5)
EST. GFR  (NO RACE VARIABLE): >60 ML/MIN/1.73 M^2
GLUCOSE SERPL-MCNC: 96 MG/DL (ref 70–110)
HCT VFR BLD AUTO: 29.4 % (ref 40–54)
HGB BLD-MCNC: 10.1 G/DL (ref 14–18)
IMM GRANULOCYTES # BLD AUTO: 0.02 K/UL (ref 0–0.04)
IMM GRANULOCYTES NFR BLD AUTO: 0.2 % (ref 0–0.5)
LYMPHOCYTES # BLD AUTO: 1.3 K/UL (ref 1–4.8)
LYMPHOCYTES NFR BLD: 15.8 % (ref 18–48)
MCH RBC QN AUTO: 32.2 PG (ref 27–31)
MCHC RBC AUTO-ENTMCNC: 34.4 G/DL (ref 32–36)
MCV RBC AUTO: 94 FL (ref 82–98)
MONOCYTES # BLD AUTO: 0.6 K/UL (ref 0.3–1)
MONOCYTES NFR BLD: 7 % (ref 4–15)
NEUTROPHILS # BLD AUTO: 6.2 K/UL (ref 1.8–7.7)
NEUTROPHILS NFR BLD: 74.6 % (ref 38–73)
NRBC BLD-RTO: 0 /100 WBC
PLATELET # BLD AUTO: 335 K/UL (ref 150–450)
PMV BLD AUTO: 10.6 FL (ref 9.2–12.9)
POTASSIUM SERPL-SCNC: 3.6 MMOL/L (ref 3.5–5.1)
PROT SERPL-MCNC: 7.8 G/DL (ref 6–8.4)
RBC # BLD AUTO: 3.14 M/UL (ref 4.6–6.2)
SODIUM SERPL-SCNC: 138 MMOL/L (ref 136–145)
WBC # BLD AUTO: 8.3 K/UL (ref 3.9–12.7)

## 2023-05-31 PROCEDURE — 80053 COMPREHEN METABOLIC PANEL: CPT | Performed by: INTERNAL MEDICINE

## 2023-05-31 PROCEDURE — 36415 COLL VENOUS BLD VENIPUNCTURE: CPT | Mod: PO | Performed by: INTERNAL MEDICINE

## 2023-05-31 PROCEDURE — 85025 COMPLETE CBC W/AUTO DIFF WBC: CPT | Performed by: INTERNAL MEDICINE

## 2023-06-01 ENCOUNTER — TELEPHONE (OUTPATIENT)
Dept: INTERVENTIONAL RADIOLOGY/VASCULAR | Facility: HOSPITAL | Age: 63
End: 2023-06-01

## 2023-06-01 NOTE — NURSING
Spoke to Adelita, advised to arrive at 1:00. Patient is a regularly scheduled patient who is familiar with procedure.

## 2023-06-01 NOTE — SUBJECTIVE & OBJECTIVE
Interval History: still having RVR, increasing amio today. NSGY will postpone surgery again given ongoing medical commodities. Neuro exam is stable    Medications:  Continuous Infusions:      Scheduled Meds:   amiodarone  400 mg Oral BID    dextromethorphan-guaiFENesin  mg  2 tablet Oral BID    folic acid  1 mg Oral Daily    heparin (porcine)  5,000 Units Subcutaneous Q8H    lactulose  10 g Oral TID    midodrine  10 mg Oral Q8H    multivitamin  1 tablet Oral Daily    pantoprazole  40 mg Oral BID AC    thiamine  100 mg Oral Daily    torsemide  50 mg Oral BID loop     PRN Meds:sodium chloride, albuterol-ipratropium, benzonatate, dextrose 10%, dextrose 10%, glucagon (human recombinant), glucose, glucose, HYDROmorphone, insulin aspart U-100, LIDOcaine-prilocaine, ondansetron, promethazine-codeine 6.25-10 mg/5 ml     Review of Systems   Constitutional:  Negative for chills and fever.   Respiratory:  Positive for shortness of breath.    Gastrointestinal:  Negative for nausea and vomiting.   Musculoskeletal:  Positive for back pain and gait problem.   Skin:  Negative for rash and wound.   Neurological:  Positive for weakness. Negative for numbness.   Objective:     Weight: 110 kg (242 lb 8.1 oz)  Body mass index is 36.87 kg/m².  Vital Signs (Most Recent):  Temp: 98.2 °F (36.8 °C) (01/29/23 0701)  Pulse: 107 (01/29/23 0800)  Resp: (!) 22 (01/29/23 0800)  BP: 135/80 (01/29/23 0800)  SpO2: (!) 92 % (01/29/23 0800)   Vital Signs (24h Range):  Temp:  [98 °F (36.7 °C)-98.9 °F (37.2 °C)] 98.2 °F (36.8 °C)  Pulse:  [] 107  Resp:  [11-54] 22  SpO2:  [86 %-100 %] 92 %  BP: (111-147)/(57-92) 135/80     Date 01/29/23 0700 - 01/30/23 0659   Shift 7602-3275 5645-4753 0499-3188 24 Hour Total   INTAKE   I.V.(mL/kg) 44.4(0.4)   44.4(0.4)   Shift Total(mL/kg) 44.4(0.4)   44.4(0.4)   OUTPUT   Urine(mL/kg/hr) 200   200   Shift Total(mL/kg) 200(1.8)   200(1.8)   Weight (kg) 110 110 110 110                Oxygen Concentration (%):   [FreeTextEntry1] : 70 y/o F w L knee pain\par =L knee pain\par =no stiffness, swelling\par =worse w walking\par =labs 3/23 w mod RF elevation\par \par Explained that high RF can be seen in other conditions besides RA. It can be seen in Hep B, C, infections. In malignancy. It can also be seen in other AI such as PBC, sarcoid. Sometimes pt's can have high RF w no underlying condition found. \par \par Although pt has positive RF, have low suspicion for RA. I think pt likely has OA.\par \par Plan\par -Labs w serologies, inflammatory markers\par -obtain Xray of L [facility] \par RTO depending on above results  "[100] 100         Open Drain 01/17/23 0800 Left;Anterior LLQ (Active)   Site Description Healing 01/27/23 1009   Dressing Status Clean;Dry;Intact 01/27/23 1009   Drainage Yellow 01/27/23 1009   Status Unclamped 01/27/23 1009   Output (mL) 110 mL 01/24/23 0600            Urethral Catheter 01/16/23 1347 Latex 14 Fr. (Active)   Site Assessment Clean;Intact 01/27/23 1009   Collection Container Standard drainage bag 01/27/23 1009   Securement Method secured to top of thigh w/ adhesive device 01/27/23 1009   Catheter Care Performed yes 01/27/23 1009   Reason for Continuing Urinary Catheterization Critically ill in ICU and requiring hourly monitoring of intake/output 01/27/23 1009   CAUTI Prevention Bundle Securement Device in place with 1" slack;Intact seal between catheter & drainage tubing;Drainage bag/urimeter off the floor;Sheeting clip in use;No dependent loops or kinks;Drainage bag/urimeter not overfilled (<2/3 full);Drainage bag/urimeter below bladder 01/27/23 1009   Output (mL) 275 mL 01/27/23 1126       Trialysis (Dialysis) Catheter 01/22/23 1530 right internal jugular (Active)   Line Necessity Review CRRT/HD 01/27/23 1009   Verification by X-ray Yes 01/27/23 1009   Site Assessment No swelling;No redness;No drainage 01/27/23 1009   Line Securement Device Secured with sutures 01/27/23 1009   Dressing Type Biopatch in place;Central line dressing 01/27/23 1009   Dressing Status Clean;Dry;Intact 01/27/23 1009   Dressing Intervention Integrity maintained 01/27/23 1009   Date on Dressing 01/22/23 01/27/23 1009   Dressing Due to be Changed 01/29/23 01/27/23 1009   Venous Patency/Care infusing 01/27/23 1009   Arterial Patency/Care flushed w/o difficulty 01/27/23 1009   Distal Patency/Care infusing 01/27/23 1009   Flows Good 01/25/23 1510   Waveform Not being transduced 01/27/23 1009       Physical Exam    Neurosurgery Physical Exam  Constitutional:       Appearance: He is well-developed and well-nourished.      Comments: " obese   Eyes:      Extraocular Movements: EOM normal.      Conjunctiva/sclera: Conjunctivae normal.      Pupils: Pupils are equal, round, and reactive to light.   Abdominal:      Palpations: Abdomen is soft, non-tender  Neurological:      Mental Status: He is alert and oriented to person, place, and time.         Awake, alert, & oriented  PERRL  CN grossly intact  BUE 5/5  BLE 4- hf, 4 ke, 4+ df/pf  SILT, no saddle anesthesia   Poor rectal tone     Psychiatric:         Mood and Affect: normal, mildly decreased concentration    Significant Labs:  Recent Labs   Lab 01/28/23  0447 01/28/23  0839 01/29/23  0328   * 145* 109   * 135* 137   K 3.3* 3.7 3.4*    99 99   CO2 22* 25 23   BUN 29* 29* 30*   CREATININE 2.3* 2.3* 2.0*   CALCIUM 8.2* 8.1* 8.4*   MG 1.8  --  1.5*       Recent Labs   Lab 01/27/23  0954 01/28/23 0447   WBC  --  15.79*   HGB  --  8.7*   HCT 33* 26.8*   PLT  --  152       Recent Labs   Lab 01/28/23  0447 01/29/23  0328   INR 1.2 1.2       Microbiology Results (last 7 days)       Procedure Component Value Units Date/Time    Blood culture [[id number]] Collected: 01/22/23 1052    Order Status: Completed Specimen: Blood from Peripheral, Right Wrist Updated: 01/27/23 1212     Blood Culture, Routine No growth after 5 days.    Culture, Anaerobic [[id number]] Collected: 01/17/23 0003    Order Status: Completed Specimen: Ascites Updated: 01/24/23 0703     Anaerobic Culture No anaerobes isolated          All pertinent labs from the last 24 hours have been reviewed.    Significant Diagnostics:  I have reviewed and interpreted all pertinent imaging results/findings within the past 24 hours.

## 2023-06-02 ENCOUNTER — HOSPITAL ENCOUNTER (OUTPATIENT)
Dept: INTERVENTIONAL RADIOLOGY/VASCULAR | Facility: HOSPITAL | Age: 63
Discharge: HOME | End: 2023-06-02
Attending: INTERNAL MEDICINE
Payer: MEDICAID

## 2023-06-02 VITALS
BODY MASS INDEX: 31.25 KG/M2 | SYSTOLIC BLOOD PRESSURE: 145 MMHG | TEMPERATURE: 98 F | OXYGEN SATURATION: 99 % | HEART RATE: 64 BPM | HEIGHT: 69 IN | RESPIRATION RATE: 18 BRPM | DIASTOLIC BLOOD PRESSURE: 79 MMHG | WEIGHT: 211 LBS

## 2023-06-02 DIAGNOSIS — R18.8 ASCITES: ICD-10-CM

## 2023-06-02 DIAGNOSIS — R18.8 OTHER ASCITES: ICD-10-CM

## 2023-06-02 LAB
APPEARANCE FLD: CLEAR
BODY FLD TYPE: NORMAL
COLOR FLD: YELLOW
LYMPHOCYTES NFR FLD MANUAL: 58 %
MESOTHL CELL NFR FLD MANUAL: 3 %
MONOS+MACROS NFR FLD MANUAL: 35 %
NEUTROPHILS NFR FLD MANUAL: 4 %
WBC # FLD: 121 /CU MM

## 2023-06-02 PROCEDURE — 25000003 PHARM REV CODE 250: Performed by: STUDENT IN AN ORGANIZED HEALTH CARE EDUCATION/TRAINING PROGRAM

## 2023-06-02 PROCEDURE — A4215 STERILE NEEDLE: HCPCS

## 2023-06-02 PROCEDURE — 27200940 IR PARACENTESIS WITH IMAGING

## 2023-06-02 PROCEDURE — A7048 VACUUM DRAIN BOTTLE/TUBE KIT: HCPCS

## 2023-06-02 PROCEDURE — 49083 ABD PARACENTESIS W/IMAGING: CPT | Performed by: STUDENT IN AN ORGANIZED HEALTH CARE EDUCATION/TRAINING PROGRAM

## 2023-06-02 PROCEDURE — 89051 BODY FLUID CELL COUNT: CPT | Performed by: INTERNAL MEDICINE

## 2023-06-02 PROCEDURE — 49083 IR PARACENTESIS WITH IMAGING: ICD-10-PCS | Mod: ,,, | Performed by: STUDENT IN AN ORGANIZED HEALTH CARE EDUCATION/TRAINING PROGRAM

## 2023-06-02 RX ORDER — LIDOCAINE HYDROCHLORIDE 10 MG/ML
INJECTION INFILTRATION; PERINEURAL
Status: COMPLETED | OUTPATIENT
Start: 2023-06-02 | End: 2023-06-02

## 2023-06-02 RX ADMIN — LIDOCAINE HYDROCHLORIDE 5 ML: 10 INJECTION, SOLUTION INFILTRATION; PERINEURAL at 01:06

## 2023-06-02 NOTE — DISCHARGE SUMMARY
"Radiology Discharge Summary      Hospital Course: No complications    Admit Date: 6/2/2023  Discharge Date: 6/2/2023     Instructions Given to Patient: Yes  Diet: Resume prior diet  Activity: activity as tolerated and no driving for today    Description of Condition on Discharge: Stable  Vital Signs (Most Recent): Temp: 97.6 °F (36.4 °C) (06/02/23 1316)  Pulse: 64 (06/02/23 1420)  Resp: 18 (06/02/23 1420)  BP: (!) 145/79 (06/02/23 1420)  SpO2: 99 % (06/02/23 1420)    Discharge Disposition: Home    Discharge Diagnosis: ascites     Follow-up: as scheduled    Jt Huerta MD ([place])  Interventional Radiology  [phone]      "

## 2023-06-02 NOTE — H&P
Radiology History & Physical      SUBJECTIVE:     Chief Complaint: ascites    History of Present Illness:  Jonny Isabel is a 62 y.o. male who presents for paracentesis  Past Medical History:   Diagnosis Date    A-fib     Esophageal varices 5/8/2023    Other ascites 5/8/2023     Past Surgical History:   Procedure Laterality Date    ESOPHAGOGASTRODUODENOSCOPY N/A 1/17/2023    Procedure: EGD (ESOPHAGOGASTRODUODENOSCOPY);  Surgeon: Dewayne Navas MD;  Location: [address]);  Service: Endoscopy;  Laterality: N/A;       Home Meds:   Prior to Admission medications    Medication Sig Start Date End Date Taking? Authorizing Provider   ADVAIR DISKUS 250-50 mcg/dose diskus inhaler INHALE 1 PUFF BY MOUTH TWICE DAILY ,  CONTROLLER 5/25/23  Yes Joanne Ruiz NP   albuterol (VENTOLIN HFA) 90 mcg/actuation inhaler Inhale 1 puff into the lungs every 4 (four) hours as needed for Wheezing or Shortness of Breath. Rescue 2/14/23 2/14/24 Yes Joanne Ruiz NP   folic acid (FOLVITE) 1 MG tablet Take 1 tablet (1 mg total) by mouth once daily. 2/7/23 2/7/24 Yes Seymour Leon MD   furosemide (LASIX) 20 MG tablet Take 20 mg by mouth once daily. 3/6/23  Yes Historical Provider   traZODone (DESYREL) 50 MG tablet Take 50 mg by mouth nightly as needed. 11/2/22  Yes Historical Provider   amiodarone (PACERONE) 400 MG tablet Take 1 tablet (400 mg total) by mouth 2 (two) times daily for 2 days, THEN 0.5 tablets (200 mg total) 2 (two) times daily. 2/7/23 5/18/23  Seymour Leon MD   blood sugar diagnostic (ONETOUCH ULTRA TEST) Strp 60 strips by Misc.(Non-Drug; Combo Route) route 2 (two) times daily. 3/22/23 9/18/23  Joanne Ruiz NP   clobetasol 0.05% (TEMOVATE) 0.05 % Oint clobetasol 0.05 % topical ointment   APPLY A THIN LAYER TO THE AFFECTED AREA(S) BY TOPICAL ROUTE 2 TIMES PER DAY    Historical Provider   lancets (RELION THIN LANCETS) 26 gauge Misc 1 lancet by Misc.(Non-Drug; Combo Route) route 2 (two) times  daily. 4/21/23 4/20/24  Joanne Ruiz NP   lisinopriL-hydrochlorothiazide (PRINZIDE,ZESTORETIC) 10-12.5 mg per tablet lisinopril 10 mg-hydrochlorothiazide 12.5 mg tablet    Historical Provider   metFORMIN (GLUCOPHAGE) 500 MG tablet Take 1 tablet (500 mg total) by mouth daily with breakfast.  Patient not taking: Reported on 5/8/2023 3/30/23 9/26/23  Joanne Ruiz NP   metoprolol tartrate (LOPRESSOR) 100 MG tablet Take 100 mg by mouth once daily. 1/3/23   Historical Provider   omega-3 fatty acids 1,000 mg Cap Take 2 g by mouth once daily.    Historical Provider   pantoprazole (PROTONIX) 40 MG tablet Take 1 tablet (40 mg total) by mouth 2 (two) times daily. 2/7/23 2/7/24  Seymour Leon MD   simvastatin (ZOCOR) 40 MG tablet simvastatin 40 mg tablet    Historical Provider   thiamine 100 MG tablet Take 1 tablet (100 mg total) by mouth once daily. 2/7/23 2/7/24  Seymour Leon MD   VITAMIN B COMPLEX ORAL Take 1 tablet by mouth once daily.    Historical Provider   XARELTO 20 mg Tab Take 20 mg by mouth once daily. 1/9/23   Historical Provider     Anticoagulants/Antiplatelets: no anticoagulation    Allergies: Review of patient's allergies indicates:  No Known Allergies  Sedation History:  no adverse reactions    Review of Systems:   Hematological: no known coagulopathies  Respiratory: no shortness of breath  Cardiovascular: no chest pain  Gastrointestinal: no abdominal pain  Genito-Urinary: no dysuria  Musculoskeletal: negative  Neurological: no TIA or stroke symptoms         OBJECTIVE:     Vital Signs (Most Recent)  Temp: 97.6 °F (36.4 °C) (06/02/23 1316)  Pulse: 64 (06/02/23 1420)  Resp: 18 (06/02/23 1420)  BP: (!) 145/79 (06/02/23 1420)  SpO2: 99 % (06/02/23 1420)    Physical Exam:  ASA: 2  Mallampati: 2    General: no acute distress  Mental Status: alert and oriented to person, place and time  HEENT: normocephalic, atraumatic  Chest: unlabored breathing  Heart: regular heart rate  Abdomen:  "nondistended  Extremity: moves all extremities    Laboratory  Lab Results   Component Value Date    INR 1.9 (H) 05/08/2023       Lab Results   Component Value Date    WBC 8.30 05/31/2023    HGB 10.1 (L) 05/31/2023    HCT 29.4 (L) 05/31/2023    MCV 94 05/31/2023     05/31/2023      Lab Results   Component Value Date    GLU 96 05/31/2023     05/31/2023    K 3.6 05/31/2023     05/31/2023    CO2 21 (L) 05/31/2023    BUN 12 05/31/2023    CREATININE 1.3 05/31/2023    CALCIUM 8.9 05/31/2023    MG 1.2 (L) 02/07/2023    ALT 12 05/31/2023    AST 32 05/31/2023    ALBUMIN 2.2 (L) 05/31/2023    BILITOT 1.0 05/31/2023    BILIDIR 0.9 (H) 05/08/2023       ASSESSMENT/PLAN:     Sedation Plan: local  Patient will undergo paracentesis.    Jt Huerta MD ([place])  Interventional Radiology          "

## 2023-06-02 NOTE — NURSING
Patient is discharged from IR. AVS is printed and reviewed. Upcoming appointments and the [facility] number is highlighted for convenience. The opportunity to ask questions is provided. Patient is wheeled to family vehicle on the entrance ramp.

## 2023-06-02 NOTE — PROCEDURES
"  Pre Op Diagnosis: Ascites  Post Op Diagnosis: Same    Procedure: paracentesis    Procedure performed by: Bradley    Written Informed Consent Obtained: Yes  Specimen Removed: YES 5L  Estimated Blood Loss: Minimal    Findings:   Successful paracentesis with 5L removed. 20cc sent for lab.     Patient tolerated procedure well.    Jt Huerta MD ([facility])  Interventional Radiology  [phone]      "

## 2023-06-05 ENCOUNTER — OFFICE VISIT (OUTPATIENT)
Dept: CARDIOLOGY | Facility: CLINIC | Age: 63
End: 2023-06-05
Payer: MEDICAID

## 2023-06-05 VITALS
BODY MASS INDEX: 31.25 KG/M2 | SYSTOLIC BLOOD PRESSURE: 124 MMHG | OXYGEN SATURATION: 94 % | DIASTOLIC BLOOD PRESSURE: 81 MMHG | WEIGHT: 211 LBS | HEIGHT: 69 IN | HEART RATE: 54 BPM

## 2023-06-05 DIAGNOSIS — E78.5 HYPERLIPIDEMIA ASSOCIATED WITH TYPE 2 DIABETES MELLITUS: ICD-10-CM

## 2023-06-05 DIAGNOSIS — E11.69 HYPERLIPIDEMIA ASSOCIATED WITH TYPE 2 DIABETES MELLITUS: ICD-10-CM

## 2023-06-05 DIAGNOSIS — J41.0 SIMPLE CHRONIC BRONCHITIS: ICD-10-CM

## 2023-06-05 DIAGNOSIS — E11.9 TYPE 2 DIABETES MELLITUS WITHOUT COMPLICATION, WITHOUT LONG-TERM CURRENT USE OF INSULIN: ICD-10-CM

## 2023-06-05 DIAGNOSIS — Z72.0 TOBACCO USE: ICD-10-CM

## 2023-06-05 DIAGNOSIS — E11.59 HYPERTENSION ASSOCIATED WITH DIABETES: ICD-10-CM

## 2023-06-05 DIAGNOSIS — K70.31 ALCOHOLIC CIRRHOSIS OF LIVER WITH ASCITES: ICD-10-CM

## 2023-06-05 DIAGNOSIS — I15.2 HYPERTENSION ASSOCIATED WITH DIABETES: ICD-10-CM

## 2023-06-05 DIAGNOSIS — E66.09 CLASS 1 OBESITY DUE TO EXCESS CALORIES WITH SERIOUS COMORBIDITY AND BODY MASS INDEX (BMI) OF 31.0 TO 31.9 IN ADULT: ICD-10-CM

## 2023-06-05 DIAGNOSIS — I48.0 PAF (PAROXYSMAL ATRIAL FIBRILLATION): ICD-10-CM

## 2023-06-05 PROBLEM — R00.0 TACHYCARDIA: Status: RESOLVED | Noted: 2023-01-28 | Resolved: 2023-06-05

## 2023-06-05 PROBLEM — R57.8 HEMORRHAGIC SHOCK: Status: RESOLVED | Noted: 2023-01-28 | Resolved: 2023-06-05

## 2023-06-05 PROBLEM — E66.811 CLASS 1 OBESITY DUE TO EXCESS CALORIES WITH SERIOUS COMORBIDITY AND BODY MASS INDEX (BMI) OF 31.0 TO 31.9 IN ADULT: Status: ACTIVE | Noted: 2023-06-05

## 2023-06-05 PROCEDURE — 99999 PR PBB SHADOW E&M-EST. PATIENT-LVL III: ICD-10-PCS | Mod: PBBFAC,,, | Performed by: INTERNAL MEDICINE

## 2023-06-05 PROCEDURE — 1160F RVW MEDS BY RX/DR IN RCRD: CPT | Mod: CPTII,,, | Performed by: INTERNAL MEDICINE

## 2023-06-05 PROCEDURE — 3079F PR MOST RECENT DIASTOLIC BLOOD PRESSURE 80-89 MM HG: ICD-10-PCS | Mod: CPTII,,, | Performed by: INTERNAL MEDICINE

## 2023-06-05 PROCEDURE — 99213 OFFICE O/P EST LOW 20 MIN: CPT | Mod: PBBFAC,PN | Performed by: INTERNAL MEDICINE

## 2023-06-05 PROCEDURE — 3044F PR MOST RECENT HEMOGLOBIN A1C LEVEL <7.0%: ICD-10-PCS | Mod: CPTII,,, | Performed by: INTERNAL MEDICINE

## 2023-06-05 PROCEDURE — 1160F PR REVIEW ALL MEDS BY PRESCRIBER/CLIN PHARMACIST DOCUMENTED: ICD-10-PCS | Mod: CPTII,,, | Performed by: INTERNAL MEDICINE

## 2023-06-05 PROCEDURE — 99214 OFFICE O/P EST MOD 30 MIN: CPT | Mod: S$PBB,25,, | Performed by: INTERNAL MEDICINE

## 2023-06-05 PROCEDURE — 3008F BODY MASS INDEX DOCD: CPT | Mod: CPTII,,, | Performed by: INTERNAL MEDICINE

## 2023-06-05 PROCEDURE — 3079F DIAST BP 80-89 MM HG: CPT | Mod: CPTII,,, | Performed by: INTERNAL MEDICINE

## 2023-06-05 PROCEDURE — 3044F HG A1C LEVEL LT 7.0%: CPT | Mod: CPTII,,, | Performed by: INTERNAL MEDICINE

## 2023-06-05 PROCEDURE — 1159F MED LIST DOCD IN RCRD: CPT | Mod: CPTII,,, | Performed by: INTERNAL MEDICINE

## 2023-06-05 PROCEDURE — 99999 PR PBB SHADOW E&M-EST. PATIENT-LVL III: CPT | Mod: PBBFAC,,, | Performed by: INTERNAL MEDICINE

## 2023-06-05 PROCEDURE — 93010 ELECTROCARDIOGRAM REPORT: CPT | Mod: S$PBB,,, | Performed by: INTERNAL MEDICINE

## 2023-06-05 PROCEDURE — 3008F PR BODY MASS INDEX (BMI) DOCUMENTED: ICD-10-PCS | Mod: CPTII,,, | Performed by: INTERNAL MEDICINE

## 2023-06-05 PROCEDURE — 1159F PR MEDICATION LIST DOCUMENTED IN MEDICAL RECORD: ICD-10-PCS | Mod: CPTII,,, | Performed by: INTERNAL MEDICINE

## 2023-06-05 PROCEDURE — 3074F SYST BP LT 130 MM HG: CPT | Mod: CPTII,,, | Performed by: INTERNAL MEDICINE

## 2023-06-05 PROCEDURE — 99214 PR OFFICE/OUTPT VISIT, EST, LEVL IV, 30-39 MIN: ICD-10-PCS | Mod: S$PBB,25,, | Performed by: INTERNAL MEDICINE

## 2023-06-05 PROCEDURE — 93005 ELECTROCARDIOGRAM TRACING: CPT | Mod: PBBFAC,PN | Performed by: INTERNAL MEDICINE

## 2023-06-05 PROCEDURE — 4010F PR ACE/ARB THEARPY RXD/TAKEN: ICD-10-PCS | Mod: CPTII,,, | Performed by: INTERNAL MEDICINE

## 2023-06-05 PROCEDURE — 3074F PR MOST RECENT SYSTOLIC BLOOD PRESSURE < 130 MM HG: ICD-10-PCS | Mod: CPTII,,, | Performed by: INTERNAL MEDICINE

## 2023-06-05 PROCEDURE — 4010F ACE/ARB THERAPY RXD/TAKEN: CPT | Mod: CPTII,,, | Performed by: INTERNAL MEDICINE

## 2023-06-05 PROCEDURE — 93010 EKG 12-LEAD: ICD-10-PCS | Mod: S$PBB,,, | Performed by: INTERNAL MEDICINE

## 2023-06-05 RX ORDER — AMIODARONE HYDROCHLORIDE 200 MG/1
200 TABLET ORAL DAILY
Qty: 30 TABLET | Refills: 11 | Status: SHIPPED | OUTPATIENT
Start: 2023-06-05 | End: 2023-12-12 | Stop reason: SDUPTHER

## 2023-06-05 RX ORDER — METOPROLOL SUCCINATE 50 MG/1
50 TABLET, EXTENDED RELEASE ORAL DAILY
Qty: 30 TABLET | Refills: 11 | Status: SHIPPED | OUTPATIENT
Start: 2023-06-05 | End: 2023-12-12 | Stop reason: SDUPTHER

## 2023-06-05 NOTE — ASSESSMENT & PLAN NOTE
Goal LDL < 100.  Not on statin therapy.    - f/u FLP from PCP  - risk factor and lifestyle modifications

## 2023-06-05 NOTE — ASSESSMENT & PLAN NOTE
Pt aware of health complications a/w smoking and not ready to quit.    - cessation counseling provided   - cessation course declined per pt

## 2023-06-05 NOTE — ASSESSMENT & PLAN NOTE
Goal BP < 130/80.  Compliant w/ meds.    - continue medical therapy   - risk factor and lifestyle modifications

## 2023-06-05 NOTE — PROGRESS NOTES
Subjective:    Patient ID:  Jonny Isabel is a 62 y.o. male who presents for evaluation of No chief complaint on file.      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  He was hospitalized 1/16/2023-2/7/2023 for concern for cauda equina syndrome and decompensated cirrhosis.  He was also noted to have PAF.  He denies cp, sob, edema, orthopnea, PND, presyncope, LOC, palpitations, and claudication.  He notes compliance w/ meds and denies side effects.  He does not monitor his BP at home.  He does not exercise and notes weakness due to muscle atrophy.          Past Medical History:   Diagnosis Date    A-fib     Esophageal varices 5/8/2023    Other ascites 5/8/2023     Past Surgical History:   Procedure Laterality Date    ESOPHAGOGASTRODUODENOSCOPY N/A 1/17/2023    Procedure: EGD (ESOPHAGOGASTRODUODENOSCOPY);  Surgeon: Dewayne Navas MD;  Location: [facility] ([address] [place] [address]);  Service: Endoscopy;  Laterality: N/A;     Social History     Socioeconomic History    Marital status:    Tobacco Use    Smoking status: Every Day     Packs/day: 1.00     Years: 40.00     Pack years: 40.00     Types: Cigarettes    Smokeless tobacco: Never   Substance and Sexual Activity    Alcohol use: Yes     Alcohol/week: 6.0 standard drinks     Types: 6 Cans of beer per week    Drug use: Not Currently    Sexual activity: Yes     Partners: Female     Social Determinants of Health     Financial Resource Strain: Low Risk     Difficulty of Paying Living Expenses: Not hard at all   Food Insecurity: No Food Insecurity    Worried About Running Out of Food in the Last Year: Never true    Ran Out of Food in the Last Year: Never true   Transportation Needs: No Transportation Needs    Lack of Transportation (Medical): No    Lack of Transportation (Non-Medical): No    Physical Activity: Sufficiently Active    Days of Exercise per Week: 7 days    Minutes of Exercise per Session: 90 min   Stress: Stress Concern Present    Feeling of Stress : Rather much   Social Connections: Socially Isolated    Frequency of Communication with Friends and Family: Never    Frequency of Social Gatherings with Friends and Family: Never    Attends Yarsanism Services: Never    Active Member of Clubs or Organizations: No    Attends Club or Organization Meetings: Never    Marital Status: Living with partner   Housing Stability: Low Risk     Unable to Pay for Housing in the Last Year: No    Number of Places Lived in the Last Year: 1    Unstable Housing in the Last Year: No     History reviewed. No pertinent family history.    Review of patient's allergies indicates:  No Known Allergies    Medication List with Changes/Refills   New Medications    AMIODARONE (PACERONE) 200 MG TAB    Take 1 tablet (200 mg total) by mouth once daily.    METOPROLOL SUCCINATE (TOPROL-XL) 50 MG 24 HR TABLET    Take 1 tablet (50 mg total) by mouth once daily.   Current Medications    ADVAIR DISKUS 250-50 MCG/DOSE DISKUS INHALER    INHALE 1 PUFF BY MOUTH TWICE DAILY ,  CONTROLLER    ALBUTEROL (VENTOLIN HFA) 90 MCG/ACTUATION INHALER    Inhale 1 puff into the lungs every 4 (four) hours as needed for Wheezing or Shortness of Breath. Rescue    BLOOD SUGAR DIAGNOSTIC (ONETOUCH ULTRA TEST) STRP    60 strips by Misc.(Non-Drug; Combo Route) route 2 (two) times daily.    FOLIC ACID (FOLVITE) 1 MG TABLET    Take 1 tablet (1 mg total) by mouth once daily.    FUROSEMIDE (LASIX) 20 MG TABLET    Take 20 mg by mouth once daily.    LANCETS (RELION THIN LANCETS) 26 GAUGE MISC    1 lancet by Misc.(Non-Drug; Combo Route) route 2 (two) times daily.    OMEGA-3 FATTY ACIDS 1,000 MG CAP    Take 2 g by mouth once daily.    PANTOPRAZOLE (PROTONIX) 40 MG TABLET    Take 1 tablet (40 mg total) by mouth 2 (two) times daily.    THIAMINE 100 MG TABLET    Take  "1 tablet (100 mg total) by mouth once daily.    TRAZODONE (DESYREL) 50 MG TABLET    Take 50 mg by mouth nightly as needed.    VITAMIN B COMPLEX ORAL    Take 1 tablet by mouth once daily.    XARELTO 20 MG TAB    Take 20 mg by mouth once daily.   Discontinued Medications    AMIODARONE (PACERONE) 400 MG TABLET    Take 1 tablet (400 mg total) by mouth 2 (two) times daily for 2 days, THEN 0.5 tablets (200 mg total) 2 (two) times daily.    CLOBETASOL 0.05% (TEMOVATE) 0.05 % OINT    clobetasol 0.05 % topical ointment   APPLY A THIN LAYER TO THE AFFECTED AREA(S) BY TOPICAL ROUTE 2 TIMES PER DAY    LISINOPRIL-HYDROCHLOROTHIAZIDE (PRINZIDE,ZESTORETIC) 10-12.5 MG PER TABLET    lisinopril 10 mg-hydrochlorothiazide 12.5 mg tablet    METFORMIN (GLUCOPHAGE) 500 MG TABLET    Take 1 tablet (500 mg total) by mouth daily with breakfast.    METOPROLOL TARTRATE (LOPRESSOR) 100 MG TABLET    Take 100 mg by mouth once daily.    SIMVASTATIN (ZOCOR) 40 MG TABLET    simvastatin 40 mg tablet       Review of Systems   Constitutional: Negative for diaphoresis and fever.   HENT:  Negative for congestion and hearing loss.    Eyes:  Negative for blurred vision and pain.   Cardiovascular:  Negative for chest pain, claudication, dyspnea on exertion, leg swelling, near-syncope, palpitations and syncope.   Respiratory:  Negative for shortness of breath and sleep disturbances due to breathing.    Hematologic/Lymphatic: Negative for bleeding problem. Bruises/bleeds easily.   Skin:  Negative for color change and poor wound healing.   Gastrointestinal:  Negative for abdominal pain and nausea.   Genitourinary:  Negative for bladder incontinence and flank pain.   Neurological:  Positive for weakness. Negative for focal weakness and light-headedness.      Objective:   /81 (BP Location: Left arm)   Ht 5' 9" (1.753 m)   Wt 95.7 kg (211 lb)   BMI 31.16 kg/m²    Physical Exam  Constitutional:       Appearance: He is well-developed. He is obese. He is not " diaphoretic.   HENT:      Head: Normocephalic and atraumatic.   Eyes:      General: No scleral icterus.     Pupils: Pupils are equal, round, and reactive to light.   Neck:      Vascular: No JVD.   Cardiovascular:      Rate and Rhythm: Normal rate and regular rhythm.      Pulses: Intact distal pulses.      Heart sounds: S1 normal and S2 normal. No murmur heard.    No friction rub. No gallop.   Pulmonary:      Effort: Pulmonary effort is normal. No respiratory distress.      Breath sounds: Normal breath sounds. No wheezing or rales.   Chest:      Chest wall: No tenderness.   Abdominal:      General: Bowel sounds are normal. There is distension.      Palpations: Abdomen is soft. There is no mass.      Tenderness: There is no abdominal tenderness. There is no rebound.   Musculoskeletal:         General: No tenderness. Normal range of motion.      Cervical back: Normal range of motion and neck supple.      Right lower leg: No edema.      Left lower leg: No edema.   Skin:     General: Skin is warm and dry.      Coloration: Skin is not pale.   Neurological:      Mental Status: He is alert and oriented to person, place, and time.      Coordination: Coordination normal.      Deep Tendon Reflexes: Reflexes normal.   Psychiatric:         Behavior: Behavior normal.         Judgment: Judgment normal.         EC2023- reviewed.  NSR, Low voltage QRS, Low anterior forces.      Echo: 2023- reviewed.    Summary    The left ventricle is small with normal systolic function.  The estimated ejection fraction is 65%.  Normal left ventricular diastolic function.  Normal right ventricular size with normal right ventricular systolic function.  Normal central venous pressure (3 mmHg).  The estimated PA systolic pressure is 30 mmHg.  Mild tricuspid regurgitation.    Assessment:       1. Simple chronic bronchitis    2. PAF (paroxysmal atrial fibrillation)    3. Hypertension associated with diabetes    4. Type 2 diabetes mellitus  without complication, without long-term current use of insulin    5. Alcoholic cirrhosis of liver with ascites    6. Tobacco use    7. Hyperlipidemia associated with type 2 diabetes mellitus    8. Class 1 obesity due to excess calories with serious comorbidity and body mass index (BMI) of 31.0 to 31.9 in adult         Plan:         Simple chronic bronchitis  Followed by PCP.  Continue current therapy.     PAF (paroxysmal atrial fibrillation)  CHADSVASc= 2 on AC w/ rivaroxaban.    - continue medical therapy   - refer to EP for A. Fib management   - risk factor and lifestyle modifications     Hypertension associated with diabetes  Goal BP < 130/80.  Compliant w/ meds.    - continue medical therapy   - risk factor and lifestyle modifications     Type 2 diabetes mellitus without complication, without long-term current use of insulin  Management per PCP.  Continue current therapy.      Alcoholic cirrhosis of liver with ascites  Followed by hepatology.  Continue current therapy.      Tobacco use  Pt aware of health complications a/w smoking and not ready to quit.    - cessation counseling provided   - cessation course declined per pt    Hyperlipidemia associated with type 2 diabetes mellitus  Goal LDL < 100.  Not on statin therapy.    - f/u FLP from PCP  - risk factor and lifestyle modifications     Class 1 obesity due to excess calories with serious comorbidity and body mass index (BMI) of 31.0 to 31.9 in adult  Encouraged lifestyle modifications (diet, exercise, and weight loss).        Total duration of face to face visit time 30 minutes.  Total time spent counseling greater than fifty percent of total visit time.  Counseling included discussion regarding imaging findings, diagnosis, possibilities, treatment options, risks and benefits.  The patient had many questions regarding the options and long-term effects      Christopher Burt M.D.  Interventional Cardiology

## 2023-06-05 NOTE — ASSESSMENT & PLAN NOTE
CHADSVASc= 2 on AC w/ rivaroxaban.    - continue medical therapy   - refer to EP for A. Fib management   - risk factor and lifestyle modifications

## 2023-06-13 ENCOUNTER — HOSPITAL ENCOUNTER (OUTPATIENT)
Dept: RADIOLOGY | Facility: HOSPITAL | Age: 63
Discharge: HOME | End: 2023-06-13
Attending: INTERNAL MEDICINE
Payer: MEDICAID

## 2023-06-13 DIAGNOSIS — K70.31 ALCOHOLIC CIRRHOSIS OF LIVER WITH ASCITES: ICD-10-CM

## 2023-06-13 PROCEDURE — 76705 ECHO EXAM OF ABDOMEN: CPT | Mod: 26,,, | Performed by: INTERNAL MEDICINE

## 2023-06-13 PROCEDURE — 76705 US ABDOMEN LIMITED: ICD-10-PCS | Mod: 26,,, | Performed by: INTERNAL MEDICINE

## 2023-06-13 PROCEDURE — 76705 ECHO EXAM OF ABDOMEN: CPT | Mod: TC

## 2023-06-15 ENCOUNTER — PATIENT MESSAGE (OUTPATIENT)
Dept: HEPATOLOGY | Facility: CLINIC | Age: 63
End: 2023-06-15
Payer: MEDICAID

## 2023-06-16 ENCOUNTER — TELEPHONE (OUTPATIENT)
Dept: HEPATOLOGY | Facility: CLINIC | Age: 63
End: 2023-06-16
Payer: MEDICAID

## 2023-06-16 DIAGNOSIS — R18.8 OTHER ASCITES: Primary | ICD-10-CM

## 2023-06-16 RX ORDER — SPIRONOLACTONE 50 MG/1
50 TABLET, FILM COATED ORAL DAILY
Qty: 30 TABLET | Refills: 11 | Status: SHIPPED | OUTPATIENT
Start: 2023-06-16 | End: 2024-06-15

## 2023-06-16 RX ORDER — FUROSEMIDE 20 MG/1
40 TABLET ORAL DAILY
Qty: 60 TABLET | Refills: 11 | Status: SHIPPED | OUTPATIENT
Start: 2023-06-16 | End: 2023-12-12 | Stop reason: SDUPTHER

## 2023-06-16 NOTE — TELEPHONE ENCOUNTER
Returned call, gave para scheduling number to her [phone]----- Message from Oz Huggins MA sent at 6/16/2023 11:45 AM CDT -----  Regarding: FW: Schedule paracentesis    ----- Message -----  From: Joann Inman  Sent: 6/16/2023  11:42 AM CDT  To: [facility] Scheduling  Subject: Schedule paracentesis                            Pt wife Adelita is calling to schedule an paracentesis after labs  on 07/11/2023, Please advise. Requesting a call back.      [phone] (Mobile

## 2023-06-16 NOTE — PROGRESS NOTES
Increased lasix to 40 mg and will add aldactone 100 mg daily  Check cmp in 1 month and meld labs monthly

## 2023-06-26 ENCOUNTER — TELEPHONE (OUTPATIENT)
Dept: INTERVENTIONAL RADIOLOGY/VASCULAR | Facility: HOSPITAL | Age: 63
End: 2023-06-26
Payer: MEDICAID

## 2023-06-26 NOTE — NURSING
Called and spoke to Adelita DENSON to review preop instructions for 6/27 paracentesis, reviewed arrival time of 1300, bring list of home meds

## 2023-06-28 ENCOUNTER — LAB VISIT (OUTPATIENT)
Dept: LAB | Facility: HOSPITAL | Age: 63
End: 2023-06-28
Attending: INTERNAL MEDICINE
Payer: MEDICAID

## 2023-06-28 ENCOUNTER — OFFICE VISIT (OUTPATIENT)
Dept: NEUROSURGERY | Facility: CLINIC | Age: 63
End: 2023-06-28
Payer: MEDICAID

## 2023-06-28 VITALS — SYSTOLIC BLOOD PRESSURE: 128 MMHG | HEART RATE: 89 BPM | DIASTOLIC BLOOD PRESSURE: 90 MMHG | TEMPERATURE: 98 F

## 2023-06-28 DIAGNOSIS — R29.898 LEG WEAKNESS, BILATERAL: ICD-10-CM

## 2023-06-28 DIAGNOSIS — R18.8 OTHER ASCITES: ICD-10-CM

## 2023-06-28 DIAGNOSIS — M51.26 LUMBAR HERNIATED DISC: ICD-10-CM

## 2023-06-28 DIAGNOSIS — M54.50 CHRONIC MIDLINE LOW BACK PAIN WITHOUT SCIATICA: Primary | ICD-10-CM

## 2023-06-28 DIAGNOSIS — G89.29 CHRONIC MIDLINE LOW BACK PAIN WITHOUT SCIATICA: Primary | ICD-10-CM

## 2023-06-28 LAB
ALBUMIN SERPL BCP-MCNC: 2.3 G/DL (ref 3.5–5.2)
ALP SERPL-CCNC: 111 U/L (ref 55–135)
ALT SERPL W/O P-5'-P-CCNC: 10 U/L (ref 10–44)
ANION GAP SERPL CALC-SCNC: 9 MMOL/L (ref 8–16)
AST SERPL-CCNC: 22 U/L (ref 10–40)
BILIRUB SERPL-MCNC: 0.8 MG/DL (ref 0.1–1)
BUN SERPL-MCNC: 10 MG/DL (ref 8–23)
CALCIUM SERPL-MCNC: 8.7 MG/DL (ref 8.7–10.5)
CHLORIDE SERPL-SCNC: 103 MMOL/L (ref 95–110)
CO2 SERPL-SCNC: 26 MMOL/L (ref 23–29)
CREAT SERPL-MCNC: 1.5 MG/DL (ref 0.5–1.4)
EST. GFR  (NO RACE VARIABLE): 52.6 ML/MIN/1.73 M^2
GLUCOSE SERPL-MCNC: 108 MG/DL (ref 70–110)
POTASSIUM SERPL-SCNC: 4.2 MMOL/L (ref 3.5–5.1)
PROT SERPL-MCNC: 7.8 G/DL (ref 6–8.4)
SODIUM SERPL-SCNC: 138 MMOL/L (ref 136–145)

## 2023-06-28 PROCEDURE — 1160F RVW MEDS BY RX/DR IN RCRD: CPT | Mod: CPTII,,, | Performed by: NEUROLOGICAL SURGERY

## 2023-06-28 PROCEDURE — 99999 PR PBB SHADOW E&M-EST. PATIENT-LVL III: CPT | Mod: PBBFAC,,, | Performed by: NEUROLOGICAL SURGERY

## 2023-06-28 PROCEDURE — 1159F MED LIST DOCD IN RCRD: CPT | Mod: CPTII,,, | Performed by: NEUROLOGICAL SURGERY

## 2023-06-28 PROCEDURE — 1159F PR MEDICATION LIST DOCUMENTED IN MEDICAL RECORD: ICD-10-PCS | Mod: CPTII,,, | Performed by: NEUROLOGICAL SURGERY

## 2023-06-28 PROCEDURE — 80053 COMPREHEN METABOLIC PANEL: CPT | Performed by: INTERNAL MEDICINE

## 2023-06-28 PROCEDURE — 99215 PR OFFICE/OUTPT VISIT, EST, LEVL V, 40-54 MIN: ICD-10-PCS | Mod: S$PBB,,, | Performed by: NEUROLOGICAL SURGERY

## 2023-06-28 PROCEDURE — 3080F DIAST BP >= 90 MM HG: CPT | Mod: CPTII,,, | Performed by: NEUROLOGICAL SURGERY

## 2023-06-28 PROCEDURE — 1160F PR REVIEW ALL MEDS BY PRESCRIBER/CLIN PHARMACIST DOCUMENTED: ICD-10-PCS | Mod: CPTII,,, | Performed by: NEUROLOGICAL SURGERY

## 2023-06-28 PROCEDURE — 4010F PR ACE/ARB THEARPY RXD/TAKEN: ICD-10-PCS | Mod: CPTII,,, | Performed by: NEUROLOGICAL SURGERY

## 2023-06-28 PROCEDURE — 3044F HG A1C LEVEL LT 7.0%: CPT | Mod: CPTII,,, | Performed by: NEUROLOGICAL SURGERY

## 2023-06-28 PROCEDURE — 36415 COLL VENOUS BLD VENIPUNCTURE: CPT | Performed by: INTERNAL MEDICINE

## 2023-06-28 PROCEDURE — 3074F PR MOST RECENT SYSTOLIC BLOOD PRESSURE < 130 MM HG: ICD-10-PCS | Mod: CPTII,,, | Performed by: NEUROLOGICAL SURGERY

## 2023-06-28 PROCEDURE — 99215 OFFICE O/P EST HI 40 MIN: CPT | Mod: S$PBB,,, | Performed by: NEUROLOGICAL SURGERY

## 2023-06-28 PROCEDURE — 4010F ACE/ARB THERAPY RXD/TAKEN: CPT | Mod: CPTII,,, | Performed by: NEUROLOGICAL SURGERY

## 2023-06-28 PROCEDURE — 99999 PR PBB SHADOW E&M-EST. PATIENT-LVL III: ICD-10-PCS | Mod: PBBFAC,,, | Performed by: NEUROLOGICAL SURGERY

## 2023-06-28 PROCEDURE — 99213 OFFICE O/P EST LOW 20 MIN: CPT | Mod: PBBFAC | Performed by: NEUROLOGICAL SURGERY

## 2023-06-28 PROCEDURE — 3044F PR MOST RECENT HEMOGLOBIN A1C LEVEL <7.0%: ICD-10-PCS | Mod: CPTII,,, | Performed by: NEUROLOGICAL SURGERY

## 2023-06-28 PROCEDURE — 3074F SYST BP LT 130 MM HG: CPT | Mod: CPTII,,, | Performed by: NEUROLOGICAL SURGERY

## 2023-06-28 PROCEDURE — 3080F PR MOST RECENT DIASTOLIC BLOOD PRESSURE >= 90 MM HG: ICD-10-PCS | Mod: CPTII,,, | Performed by: NEUROLOGICAL SURGERY

## 2023-06-28 RX ORDER — GLUCOSAM/CHONDRO/HERB 149/HYAL 750-100 MG
TABLET ORAL
COMMUNITY
Start: 2022-07-13

## 2023-06-28 RX ORDER — FOLIC ACID 1 MG/1
1 TABLET ORAL DAILY
COMMUNITY
Start: 2022-09-14 | End: 2023-12-12 | Stop reason: SDUPTHER

## 2023-06-28 RX ORDER — PANTOPRAZOLE SODIUM 40 MG/1
1 TABLET, DELAYED RELEASE ORAL 2 TIMES DAILY
COMMUNITY
Start: 2022-08-17

## 2023-06-28 RX ORDER — AMIODARONE HYDROCHLORIDE 400 MG/1
TABLET ORAL
COMMUNITY
Start: 2023-02-07 | End: 2023-12-12 | Stop reason: SDUPTHER

## 2023-06-28 RX ORDER — ONDANSETRON HYDROCHLORIDE 8 MG/1
8 TABLET, FILM COATED ORAL EVERY 8 HOURS PRN
COMMUNITY
Start: 2023-06-15 | End: 2023-12-05 | Stop reason: SDUPTHER

## 2023-06-28 RX ORDER — LANOLIN ALCOHOL/MO/W.PET/CERES
1 CREAM (GRAM) TOPICAL DAILY
COMMUNITY
Start: 2022-07-13

## 2023-06-28 RX ORDER — LEVOTHYROXINE SODIUM 50 UG/1
TABLET ORAL
COMMUNITY
Start: 2023-06-23 | End: 2023-12-12 | Stop reason: SDUPTHER

## 2023-07-04 NOTE — PROGRESS NOTES
CHIEF COMPLAINT:  Low back pain      HPI:  Jonny Isabel is a 62 y.o.  male with below listed PMH, who presents as follow-up to admission in which he was complaining of severe low back and leg pain.  He had a prolonged hospital stay due to liver failure and internal bleeding.  He has recovered but is no longer able to walk and relies heavily on a wheelchair.  He has significant low back pain that does not radiate into his legs.  The pain is located along the waist in the middle.  He is able to walk to the bathroom but not for longer distances.  He reports weakness in his legs and significant loss of muscle mass.  He has numbness in his right lateral thigh.  He denies any bowel or bladder dysfunction.  He also notes weakness in his arms but does not notice any particular difficulty with buttons or belts.  While an inpatient he was tentatively scheduled for a laminectomy for decompression which never occurred due to his unstable medical conditions.     Inpatient HPI:  61 yo M with PMH of alcoholic hepatitis (drinks a 6 pack of beer per day and bottle of marie), Afib on Xarelto, central obesity, HTN, GI bleed, unknown CKD vs MARTITA on admission, anemia, chronic hyponatremia who presents due to inability to get out of bed. He reports that he has been having fecal incontinence for 2 months now and difficulty walking for the last 4 days (with single person assistance and rolling walker) and inability to get out of bed today. He denies saddle anesthesia or urinary incontinence or issues voiding his bladder. He has also  been having dark and tarry stools.   Review of patient's allergies indicates:  No Known Allergies    Past Medical History:   Diagnosis Date    A-fib     Esophageal varices 5/8/2023    Other ascites 5/8/2023     Past Surgical History:   Procedure Laterality Date    ESOPHAGOGASTRODUODENOSCOPY N/A 1/17/2023    Procedure: EGD (ESOPHAGOGASTRODUODENOSCOPY);  Surgeon: Dewayne Navas MD;  Location: [facility]  (2ND FLR);  Service: Endoscopy;  Laterality: N/A;     History reviewed. No pertinent family history.  Social History     Tobacco Use    Smoking status: Every Day     Packs/day: 1.00     Years: 40.00     Pack years: 40.00     Types: Cigarettes    Smokeless tobacco: Never   Substance Use Topics    Alcohol use: Yes     Alcohol/week: 6.0 standard drinks     Types: 6 Cans of beer per week    Drug use: Not Currently        Review of Systems   Constitutional: Negative.    Respiratory:  Negative for cough and shortness of breath.    Cardiovascular:  Negative for chest pain, palpitations, claudication and leg swelling.   Gastrointestinal:  Negative for abdominal pain, constipation and diarrhea.   Genitourinary:  Negative for flank pain, frequency and urgency.   Musculoskeletal:  Positive for back pain and joint pain. Negative for falls and neck pain.   Skin: Negative.    Neurological:  Positive for weakness. Negative for dizziness, tingling, tremors, sensory change, speech change, focal weakness, seizures, loss of consciousness and headaches.   Psychiatric/Behavioral: Negative.       OBJECTIVE:   Vital Signs:  Temp: 98.2 °F (36.8 °C) (06/28/23 1508)  Pulse: 89 (06/28/23 1508)  BP: (!) 128/90 (06/28/23 1508)    Physical Exam:  Constitutional: Patient sitting comfortably in chair. Appears well developed and well nourished.  Skin: Exposed areas are intact without abnormal markings, rashes or other lesions.  HEENT: Normocephalic. Normal conjunctivae.  Cardiovascular: Normal rate and regular rhythm.  Respiratory: Chest wall rises and falls symmetrically, without signs of respiratory distress.  Abdomen: Soft and non-tender.  Extremities: Warm and without edema. Calves supple, non-tender.  Psych/Behavior: Normal affect.    Neurological:    Mental status: Alert and oriented. Conversational and appropriate.       Cranial Nerves: VFF to confrontation. PERRL. EOMI without nystagmus. Facial STLT normal and symmetric. Strong, symmetric  muscles of mastication. Facial strength full and symmetric. Hearing equal bilaterally to finger rub. Palate and uvula rise and fall normally in midline. Shoulder shrug 5/5 strength. Tongue midline.     Motor:  Significant muscle wasting BLE    Upper:  Deltoids Triceps Biceps WE WF  FA    R 5/5 5/5 5/5 5/5 5/5 5/5 5/5    L 5/5 5/5 5/5 5/5 5/5 5/5 5/5      Lower:  HF KE KF DF PF EHL    R 5/5 5/5 5/5 5/5 5/5 5/5    L 5/5 5/5 5/5 5/5 5/5 5/5     Sensory: Intact sensation to light touch and pinprick in all extremities.     Reflexes:      DTR: 2+ knees and biceps symmetrically.     Bond's: Negative.     Babinski's: Negative.     Clonus: Negative.    Gait:  Wheel chair, not tested    Spine:    Posture: Head well aligned over pelvis and shoulders in front and side views.  No focal or global spinal deformity visible on inspection.     Cervical:      ROM: Full with flexion, extension, lateral rotation and ear-to-shoulder bend.      Midline TTP: Negative.     Spurling's test: Negative.     Lhermitte's: Negative.    Thoracic:     Midline TTP: Negative    Lumbar:     Midline TTP: Negative     Straight Leg Test: Negative     Crossed Straight Leg Test: Negative    Other:     SI joint TTP: Negative.     Tenderness with external/internal hip rotation: Negative.    Diagnostic Results:  All imaging was independently reviewed by me.    MRI L spine, dated 1/16/23:  1. L3 herniated disc causing moderate stenosis  2. Epidrual lipomatosis causing clumping of nerve root from L2-3 to L5  3. DDD L2-L5    CT abdo spine, dated 1/26/23:  1. DDD L2-5  2. Loss of lordosis      ASSESSMENT/PLAN:     Problem List Items Addressed This Visit          Neuro    Lumbar herniated disc    Relevant Orders    MRI Lumbar Spine Without Contrast    Ambulatory referral/consult to Physical/Occupational Therapy     Other Visit Diagnoses       Chronic midline low back pain without sciatica    -  Primary    Leg weakness, bilateral        Relevant Orders     Ambulatory referral/consult to Physical/Occupational Therapy            VISIT SUMMARY:  Patient's main complaint is isolated low back pain without radiation into the legs.  He has significant muscle wasting in his legs most likely due to a prolonged hospital admission for liver failure and internal bleeding.  MRI obtained during his admission revealed a mass at L3 suspicious for a herniated disc.  Since the majority of his pain is axial back pain I do not think that this disc is the source.  I will repeat the lumbar MRI for better evaluation.  He should continue physical therapy and conservative measures given his compromised state.    PATIENT EDUCATION:  More than half the clinic visit was spent showing with patient the pertinent findings on imaging and educating the patient about natural history of the pathology.   We discussed options for treatment as well as the risks and benefits of each option.  All questions were answered.     The patient understands and agrees with the following plan of care.    - Ordered lumbar MRI  - Referral to PT  - RTC TBD on above    Time spent on this encounter: 40 minutes. This includes face-to-face time and non-face to face time preparing to see the patient (eg, review of tests), obtaining and/or reviewing separately obtained history, documenting clinical information in the electronic or other health record, independently interpreting results and communicating results to the patient/family/caregiver, or care coordinator.            .

## 2023-07-11 ENCOUNTER — LAB VISIT (OUTPATIENT)
Dept: LAB | Facility: HOSPITAL | Age: 63
End: 2023-07-11
Attending: INTERNAL MEDICINE
Payer: MEDICAID

## 2023-07-11 DIAGNOSIS — K70.31 ALCOHOLIC CIRRHOSIS OF LIVER WITH ASCITES: ICD-10-CM

## 2023-07-11 LAB
ALBUMIN SERPL BCP-MCNC: 2.4 G/DL (ref 3.5–5.2)
ALP SERPL-CCNC: 98 U/L (ref 55–135)
ALT SERPL W/O P-5'-P-CCNC: 9 U/L (ref 10–44)
ANION GAP SERPL CALC-SCNC: 11 MMOL/L (ref 8–16)
AST SERPL-CCNC: 30 U/L (ref 10–40)
BASOPHILS # BLD AUTO: 0.08 K/UL (ref 0–0.2)
BASOPHILS NFR BLD: 1 % (ref 0–1.9)
BILIRUB DIRECT SERPL-MCNC: 0.5 MG/DL (ref 0.1–0.3)
BILIRUB SERPL-MCNC: 0.7 MG/DL (ref 0.1–1)
BUN SERPL-MCNC: 10 MG/DL (ref 8–23)
CALCIUM SERPL-MCNC: 8.8 MG/DL (ref 8.7–10.5)
CHLORIDE SERPL-SCNC: 103 MMOL/L (ref 95–110)
CO2 SERPL-SCNC: 23 MMOL/L (ref 23–29)
CREAT SERPL-MCNC: 1.7 MG/DL (ref 0.5–1.4)
DIFFERENTIAL METHOD: ABNORMAL
EOSINOPHIL # BLD AUTO: 0.1 K/UL (ref 0–0.5)
EOSINOPHIL NFR BLD: 0.9 % (ref 0–8)
ERYTHROCYTE [DISTWIDTH] IN BLOOD BY AUTOMATED COUNT: 15 % (ref 11.5–14.5)
EST. GFR  (NO RACE VARIABLE): 45.3 ML/MIN/1.73 M^2
GLUCOSE SERPL-MCNC: 108 MG/DL (ref 70–110)
HCT VFR BLD AUTO: 34.7 % (ref 40–54)
HGB BLD-MCNC: 11.1 G/DL (ref 14–18)
IMM GRANULOCYTES # BLD AUTO: 0.02 K/UL (ref 0–0.04)
IMM GRANULOCYTES NFR BLD AUTO: 0.3 % (ref 0–0.5)
INR PPP: 2 (ref 0.8–1.2)
LYMPHOCYTES # BLD AUTO: 1.3 K/UL (ref 1–4.8)
LYMPHOCYTES NFR BLD: 17.4 % (ref 18–48)
MCH RBC QN AUTO: 29.8 PG (ref 27–31)
MCHC RBC AUTO-ENTMCNC: 32 G/DL (ref 32–36)
MCV RBC AUTO: 93 FL (ref 82–98)
MONOCYTES # BLD AUTO: 0.6 K/UL (ref 0.3–1)
MONOCYTES NFR BLD: 7.7 % (ref 4–15)
NEUTROPHILS # BLD AUTO: 5.6 K/UL (ref 1.8–7.7)
NEUTROPHILS NFR BLD: 72.7 % (ref 38–73)
NRBC BLD-RTO: 0 /100 WBC
PLATELET # BLD AUTO: 351 K/UL (ref 150–450)
PMV BLD AUTO: 10.3 FL (ref 9.2–12.9)
POTASSIUM SERPL-SCNC: 4.5 MMOL/L (ref 3.5–5.1)
PROT SERPL-MCNC: 8.1 G/DL (ref 6–8.4)
PROTHROMBIN TIME: 20.3 SEC (ref 9–12.5)
RBC # BLD AUTO: 3.72 M/UL (ref 4.6–6.2)
SODIUM SERPL-SCNC: 137 MMOL/L (ref 136–145)
WBC # BLD AUTO: 7.71 K/UL (ref 3.9–12.7)

## 2023-07-11 PROCEDURE — 82248 BILIRUBIN DIRECT: CPT | Performed by: INTERNAL MEDICINE

## 2023-07-11 PROCEDURE — 85610 PROTHROMBIN TIME: CPT | Performed by: INTERNAL MEDICINE

## 2023-07-11 PROCEDURE — 36415 COLL VENOUS BLD VENIPUNCTURE: CPT | Mod: PO | Performed by: INTERNAL MEDICINE

## 2023-07-11 PROCEDURE — 85025 COMPLETE CBC W/AUTO DIFF WBC: CPT | Performed by: INTERNAL MEDICINE

## 2023-07-11 PROCEDURE — 80053 COMPREHEN METABOLIC PANEL: CPT | Performed by: INTERNAL MEDICINE

## 2023-07-11 PROCEDURE — 80321 ALCOHOLS BIOMARKERS 1OR 2: CPT | Performed by: INTERNAL MEDICINE

## 2023-07-12 ENCOUNTER — TELEPHONE (OUTPATIENT)
Dept: HEPATOLOGY | Facility: CLINIC | Age: 63
End: 2023-07-12
Payer: MEDICAID

## 2023-07-12 ENCOUNTER — HOSPITAL ENCOUNTER (OUTPATIENT)
Dept: RADIOLOGY | Facility: HOSPITAL | Age: 63
Discharge: HOME | End: 2023-07-12
Attending: NEUROLOGICAL SURGERY
Payer: MEDICAID

## 2023-07-12 DIAGNOSIS — M51.26 LUMBAR HERNIATED DISC: ICD-10-CM

## 2023-07-12 PROCEDURE — 72148 MRI LUMBAR SPINE W/O DYE: CPT | Mod: 26,,, | Performed by: RADIOLOGY

## 2023-07-12 PROCEDURE — 72148 MRI LUMBAR SPINE WITHOUT CONTRAST: ICD-10-PCS | Mod: 26,,, | Performed by: RADIOLOGY

## 2023-07-12 PROCEDURE — 72148 MRI LUMBAR SPINE W/O DYE: CPT | Mod: TC

## 2023-07-12 NOTE — TELEPHONE ENCOUNTER
Returned call left message that I would try back ----- Message from Gildardo Cabrera sent at 7/12/2023  8:40 AM CDT -----  Regarding: Returning Call  Contact: Adelita, patient's wife  Returning Call    Caller: Adelita patient's wife      Returning call to: WILFRID Vaz       Caller can be reached @: [phone]    Nature of the call: Patient's wife returning call to WILFRID Vaz and also inquiring about the patient being on spironolactone.

## 2023-07-12 NOTE — TELEPHONE ENCOUNTER
----- Message from Lovely Escalante MD sent at 7/11/2023  8:21 PM CDT -----  Creat up- hold lasix (pt not on spironolactone)  Repeat labs monday

## 2023-07-12 NOTE — TELEPHONE ENCOUNTER
Returned call again, informed her per Dr. Gomez spironolactone is ok, and to do labs Monday----- Message from Gildardo Cabrera sent at 7/12/2023  9:23 AM CDT -----  Regarding: Returning a Missed Call  Contact: Adelita pt's wife  Returning a Missed Call    Caller: Adelita pt's wife      Returning call to: Татьяна       Caller can be reached @: [phone]    Nature of the call: Patient's wife returning call to WILFRID Vaz. Requesting a call back regarding medication.

## 2023-07-13 ENCOUNTER — CLINICAL SUPPORT (OUTPATIENT)
Dept: REHABILITATION | Facility: HOSPITAL | Age: 63
End: 2023-07-13
Attending: NEUROLOGICAL SURGERY
Payer: MEDICAID

## 2023-07-13 DIAGNOSIS — R29.898 LEG WEAKNESS, BILATERAL: ICD-10-CM

## 2023-07-13 DIAGNOSIS — R29.898 DECREASED STRENGTH OF TRUNK AND BACK: ICD-10-CM

## 2023-07-13 DIAGNOSIS — M51.26 LUMBAR HERNIATED DISC: ICD-10-CM

## 2023-07-13 DIAGNOSIS — Z74.09 IMPAIRED FUNCTIONAL MOBILITY, BALANCE, GAIT, AND ENDURANCE: ICD-10-CM

## 2023-07-13 DIAGNOSIS — R29.898 DECREASED STRENGTH OF LOWER EXTREMITY: ICD-10-CM

## 2023-07-13 LAB
CLINICAL BIOCHEMIST REVIEW: NORMAL
PLPETH BLD-MCNC: <10 NG/ML
POPETH BLD-MCNC: <10 NG/ML

## 2023-07-13 PROCEDURE — 97162 PT EVAL MOD COMPLEX 30 MIN: CPT | Mod: PN

## 2023-07-13 PROCEDURE — 97110 THERAPEUTIC EXERCISES: CPT | Mod: PN

## 2023-07-14 PROBLEM — R29.898 DECREASED STRENGTH OF LOWER EXTREMITY: Status: ACTIVE | Noted: 2023-07-14

## 2023-07-14 PROBLEM — R29.898 DECREASED STRENGTH OF TRUNK AND BACK: Status: ACTIVE | Noted: 2023-07-14

## 2023-07-14 PROBLEM — Z74.09 IMPAIRED FUNCTIONAL MOBILITY, BALANCE, GAIT, AND ENDURANCE: Status: ACTIVE | Noted: 2023-07-14

## 2023-07-14 NOTE — PLAN OF CARE
[facility]  Physical Therapy Initial Evaluation    Name: Jonny Isabel  Clinic Number: [id number]    Therapy Diagnosis:   Encounter Diagnoses   Name Primary?    Lumbar herniated disc     Leg weakness, bilateral     Impaired functional mobility, balance, gait, and endurance     Decreased strength of lower extremity     Decreased strength of trunk and back      Physician: Adam Gonzalez DO    Physician Orders: PT Eval and Treat   Medical Diagnosis:   M51.26 (ICD-10-CM) - Lumbar herniated disc   R29.898 (ICD-10-CM) - Leg weakness, bilateral     Evaluation Date: 7/13/2023  Authorization Period Expiration: 06/27/2024  Plan of Care Certification Period: 7/13/2023 to 09/23/2023  Visit # / Visits authorized: 1/TBD  FOTO: 1/5  PTA visits: 0/5    Time In: 1100  Time Out: 1150  Total Billable Time: 45 minutes (1 LCE, 1 TE)  Precautions: Liver disease, Fall risk.     Subjective   Jonny Isabel reports to OPPT today in manual WC.  Reports increased weakness, deconditioning, and inability to negotiate his stairs at home following a long hospitalization in late Feb/early March.  HHPT/OT for several weeks following hospital DC.  Has now been released by HHPT.  Has a a hospital bed at home, rolling walker, and tub/shower chair. He is only ambulating short distances (ie, bed-to/from-bathroom) but furniture walking. Has stairs at home but can't do stairs per patient. Also having low back pain.  Patient voices neurosurgereon recommended surgery but patient declined.  Denies saddle anesthesia, BB incontinence. Non-ambulatory. Additional pertinent information: Paracentesis for ascites.        Past Medical History:   Diagnosis Date    A-fib     Esophageal varices 5/8/2023    Other ascites 5/8/2023     Jonny Isabel  has a past surgical history that includes Esophagogastroduodenoscopy (N/A, 1/17/2023).    Jonny has a current medication list which includes the following prescription(s): advair diskus, albuterol,  "amiodarone, amiodarone, onetouch ultra test, folic acid, folic acid, furosemide, lancets, levothyroxine, metoprolol succinate, omega 3-dha-epa-fish oil, omega-3 fatty acids, ondansetron, pantoprazole, pantoprazole, spironolactone, thiamine, thiamine, trazodone, vitamin b complex, and xarelto.    Review of patient's allergies indicates:  No Known Allergies     Imaging:   Lumbar MRI: Findings: "Lumbar spine vertebral body heights are maintained.  No infiltrative T1 marrow process.  Scattered mild type 2 Modic endplate changes.  Prominence of lower posterior epidural fat, the degree of which has decreased from comparison MRI.  Spinal cord terminus lies near L1-L2.  Remaining visualized prevertebral and paraspinal soft tissues demonstrate no acute abnormality.  Perihepatic ascites, incompletely visualized.  Partial atrophic change of posterior paraspinal musculature.     T12-L1: No significant posterior disc herniation, spinal canal stenosis, or neural foraminal impingement.  L1-L2: No significant posterior disc herniation, spinal canal stenosis, or neural foraminal impingement.   L2-L3: Mild circumferential bulge and facet hypertrophy.  No significant spinal canal stenosis.  Mild neural foraminal narrowing, slightly greater on the left.  L3-L4: There is circumferential bulge with superimposed central/left paracentral extrusion with superior migration.  Facet DJD.  Overall findings contribute to severe spinal canal stenosis with mild right and moderate left neural foraminal narrowing.  L4-L5: Circumferential bulge with facet hypertrophy.  This in combination with some prominence of posterior epidural fat contributing to mild spinal canal stenosis.  Moderate right and mild left neural foraminal narrowing.  L5-S1: Circumferential bulge which appears to focally contact the proximal transiting right S1 nerve root.  Facet hypertrophy.  No significant spinal canal stenosis.  Bilateral neural foraminal narrowing with " "encroachment."     Prior Therapy:  HHPT/OT; released from their care.   Social History:  Lives at home with roommate.    Occupation:   Disability  Prior Level of Function:  Voices able to walk community distance and negotiate stairs without assist prior to hospitalization  Current Level of Function:  See above    Pain:  Current 4/10, worst 8/10, best 4/10   Location: low back pain, generalized   Description: Aching, Dull, and Grabbing    Pts goals:  1. To be rid of his back pain    2. To regain strength in his legs to walk and negotiate stairs at home.     Objective     Posture:  Noted abdominal ascites     Gross Movement Analysis:  - Gait:    Able to negotiate up to 160' with rolling walker.  Moderate dyspnea.  - Squats:  deferred  - Sit-to-Stand: required use of upper extremity (minimally)    Range of Motion:   LE Right Left   Hip flexion WNL WNL   Hip abduction WNL WNL   Hip ER WNL WNL   Hip IR  WNL WNL   Hip extension WNL WNL   Knee WNL WNL   Ankle DF WNL WNL     Lower Extremity Strength                 LE           Right           Left   Hip flexion: 4/5 5/5   Hip abduction 4/5 4/5   Hip extension 3+/5 3+/5   Hip ER 3/5 3/5   Knee flexion 4/5 4/5   Knee extension 4/5 4/5   Ankle dorsiflexion: 4/5 4/5   Ankle plantarflexion: 5/5 NWB 5/5 NWB     Special Tests:  (+) Slump test bilateral for neural tension    Joint Mobility: 1+ DTRs patellar & Achilles RLE, 2+ LLE  Sensation: intact light touch sensation to BLE throughout L2-S2 dermatomal pattern      CMS Impairment/Limitation/Restriction for FOTO Survey    Therapist reviewed FOTO scores for Jonny Isabel on 7/13/2023.   FOTO documents entered into [facility] [employer] - see Media section.    Limitation Score: 71% --> 56%  BRIANA: 64%         TREATMENT   Treatment Time In: 1135  Treatment Time Out: 1150  Total Treatment time separate from Evaluation time:15'    Jonny Isabel received therapeutic exercises to develop strength, endurance, ROM, flexibility, posture, and core " stabilization for 15 minutes including:  Walking lap rolling walker 150'x1  STS elevated mat 3x5    Home Exercises and Patient Education Provided  Education provided re:   - PT diagnosis and recommended treatment course.  - home exercise program: stand up 1x/hour for 3-5 minutes, walk 3-5 minutes continuously 3x/day at home.     Written Home Exercises Provided: yes.  Exercises were reviewed and Jonny Isabel was able to demonstrate them prior to the end of the session.   Pt received a written copy of exercises to perform at home. Jonny Isabel demonstrated fair  understanding of the education provided.     See EMR under patient instructions for exercises given @ initial evaluation     Assessment   Jonny is a 62 y.o. male referred to outpatient Physical Therapy with a medical diagnosis of 1) M51.26 (ICD-10-CM) - Lumbar herniated disc, 2( R29.898 (ICD-10-CM) - Leg weakness, bilateral. Pt presents with primary complaint of leg weakness limiting his walking and stair negotiation ability at home.  Loss of strength and power in his legs following long hospitalization.  Also with acute-on-chronic LBP.  Lumbar MRI reveals multi-level central and lateral stenosis; most pronounced at L3/4.  Also MRI reveals atrophied paraspinal muscles.  Co-morbidities include end-stage liver disease with ascites.  Clinical examination reveals actually good lower extremity strength but decreased power.  Patient voices limited mobility at home with need for WC but demonstrates ability to ambulate safely and effectively with rolling walker, safe level surface transfers. Noted exertional dyspnea and fatigue with activity.  Patient needs to reclaim the locus of control of his rehab process.  Encourage standing and walking bouts at home.  Needs to be rid of the WC except for long distance walks.  Goal of stair negotiation at home.  Will begin OPPT process.     Pt prognosis is Good.   Pt will benefit from skilled outpatient Physical Therapy to  address the deficits stated above and in the chart below, provide pt/family education, and to maximize pt's level of independence.     Plan of care discussed with patient: Yes  Pt's spiritual, cultural and educational needs considered and patient is agreeable to the plan of care and goals as stated below:     Anticipated Barriers for therapy: co-morbidities, coping style    Medical Necessity is demonstrated by the following  History  Co-morbidities and personal factors that may impact the plan of care Co-morbidities:   Arthritis, Back pain, BMI over 30, High Blood Pressure, Kidney, Bladder, Prostate or  Urination Problems, Visual Impairment    Personal Factors:   age  coping style  lifestyle  attitudes     high   Examination  Body Structures and Functions, activity limitations and participation restrictions that may impact the plan of care Body Regions:   back  lower extremities  trunk    Body Systems:    ROM  strength  balance  gait  motor control  motor learning  edema  power    Participation Restrictions:   See above    Activity limitations:   Learning and applying knowledge  no deficits    General Tasks and Commands  undertaking multiple tasks    Communication  no deficits    Mobility  lifting and carrying objects  walking  driving (bike, car, motorcycle)  Stair negotiation    Self care  no deficits    Domestic Life  doing house work (cleaning house, washing dishes, laundry)    Interactions/Relationships  no deficits    Life Areas  no deficits    Community and Social Life  community life  recreation and leisure         moderate   Clinical Presentation evolving clinical presentation with changing clinical characteristics moderate   Decision Making/ Complexity Score: moderate     Goals:  Short Term Goals: 3 weeks   1.  Patient will demonstrate understanding of and compliance with home exercise program.   2.  Patient will demonstrate ability to tolerate standing > 15 minutes without need for sitting rest break.    3.  Patient will demonstrate ability to complete 6MWT with appropriate assistive device.     Long Term Goals: 8 -10 weeks   1.  Patient will demonstrate ability to negotiate 2 flights of stairs ascending and descending with single rail support.   2.  Patient will report <50% limitation on FOTO survey.   3.  Patient will reportt >10% improvement in BRIANA.     Plan   Certification Period/Plan of care expiration: 7/13/2023 to 09/23/2023.    Outpatient Physical Therapy 16 times over the course of 10 weeks to include the following interventions: Gait Training, Manual Therapy, Moist Heat/ Ice, Neuromuscular Re-ed, Orthotic Management and Training, Patient Education, Self Care, Therapeutic Activities, Therapeutic Exercise, and Aerobic Conditioning.  .     Keven Little, PT, DPT, OCS

## 2023-07-15 ENCOUNTER — PATIENT MESSAGE (OUTPATIENT)
Dept: NEUROSURGERY | Facility: HOSPITAL | Age: 63
End: 2023-07-15
Payer: MEDICAID

## 2023-07-17 ENCOUNTER — TELEPHONE (OUTPATIENT)
Dept: NEUROSURGERY | Facility: CLINIC | Age: 63
End: 2023-07-17
Payer: MEDICAID

## 2023-07-17 ENCOUNTER — LAB VISIT (OUTPATIENT)
Dept: LAB | Facility: HOSPITAL | Age: 63
End: 2023-07-17
Attending: INTERNAL MEDICINE
Payer: MEDICAID

## 2023-07-17 DIAGNOSIS — R18.8 OTHER ASCITES: ICD-10-CM

## 2023-07-17 LAB
ALBUMIN SERPL BCP-MCNC: 2.3 G/DL (ref 3.5–5.2)
ALP SERPL-CCNC: 92 U/L (ref 55–135)
ALT SERPL W/O P-5'-P-CCNC: 11 U/L (ref 10–44)
ANION GAP SERPL CALC-SCNC: 11 MMOL/L (ref 8–16)
AST SERPL-CCNC: 29 U/L (ref 10–40)
BASOPHILS # BLD AUTO: 0.08 K/UL (ref 0–0.2)
BASOPHILS NFR BLD: 1 % (ref 0–1.9)
BILIRUB DIRECT SERPL-MCNC: 0.4 MG/DL (ref 0.1–0.3)
BILIRUB SERPL-MCNC: 0.7 MG/DL (ref 0.1–1)
BUN SERPL-MCNC: 12 MG/DL (ref 8–23)
CALCIUM SERPL-MCNC: 8.9 MG/DL (ref 8.7–10.5)
CHLORIDE SERPL-SCNC: 106 MMOL/L (ref 95–110)
CO2 SERPL-SCNC: 20 MMOL/L (ref 23–29)
CREAT SERPL-MCNC: 1.5 MG/DL (ref 0.5–1.4)
DIFFERENTIAL METHOD: ABNORMAL
EOSINOPHIL # BLD AUTO: 0.1 K/UL (ref 0–0.5)
EOSINOPHIL NFR BLD: 1.4 % (ref 0–8)
ERYTHROCYTE [DISTWIDTH] IN BLOOD BY AUTOMATED COUNT: 15.4 % (ref 11.5–14.5)
EST. GFR  (NO RACE VARIABLE): 52.6 ML/MIN/1.73 M^2
GLUCOSE SERPL-MCNC: 103 MG/DL (ref 70–110)
HCT VFR BLD AUTO: 34 % (ref 40–54)
HGB BLD-MCNC: 10.7 G/DL (ref 14–18)
IMM GRANULOCYTES # BLD AUTO: 0.02 K/UL (ref 0–0.04)
IMM GRANULOCYTES NFR BLD AUTO: 0.3 % (ref 0–0.5)
INR PPP: 1.8 (ref 0.8–1.2)
LYMPHOCYTES # BLD AUTO: 1.5 K/UL (ref 1–4.8)
LYMPHOCYTES NFR BLD: 18.5 % (ref 18–48)
MCH RBC QN AUTO: 29.5 PG (ref 27–31)
MCHC RBC AUTO-ENTMCNC: 31.5 G/DL (ref 32–36)
MCV RBC AUTO: 94 FL (ref 82–98)
MONOCYTES # BLD AUTO: 0.5 K/UL (ref 0.3–1)
MONOCYTES NFR BLD: 6.5 % (ref 4–15)
NEUTROPHILS # BLD AUTO: 5.7 K/UL (ref 1.8–7.7)
NEUTROPHILS NFR BLD: 72.3 % (ref 38–73)
NRBC BLD-RTO: 0 /100 WBC
PLATELET # BLD AUTO: 356 K/UL (ref 150–450)
PMV BLD AUTO: 10.1 FL (ref 9.2–12.9)
POTASSIUM SERPL-SCNC: 4.5 MMOL/L (ref 3.5–5.1)
PROT SERPL-MCNC: 7.8 G/DL (ref 6–8.4)
PROTHROMBIN TIME: 18.2 SEC (ref 9–12.5)
RBC # BLD AUTO: 3.63 M/UL (ref 4.6–6.2)
SODIUM SERPL-SCNC: 137 MMOL/L (ref 136–145)
WBC # BLD AUTO: 7.82 K/UL (ref 3.9–12.7)

## 2023-07-17 PROCEDURE — 85025 COMPLETE CBC W/AUTO DIFF WBC: CPT | Performed by: INTERNAL MEDICINE

## 2023-07-17 PROCEDURE — 80053 COMPREHEN METABOLIC PANEL: CPT | Performed by: INTERNAL MEDICINE

## 2023-07-17 PROCEDURE — 82248 BILIRUBIN DIRECT: CPT | Performed by: INTERNAL MEDICINE

## 2023-07-17 PROCEDURE — 85610 PROTHROMBIN TIME: CPT | Performed by: INTERNAL MEDICINE

## 2023-07-17 PROCEDURE — 36415 COLL VENOUS BLD VENIPUNCTURE: CPT | Mod: PO | Performed by: INTERNAL MEDICINE

## 2023-07-17 NOTE — TELEPHONE ENCOUNTER
Called patient and got him scheduled for 10:00 am on 8/23/23 with Dr. Gonzalez. Letter mailed.    ----- Message from Adam Gonzalez, DO sent at 7/15/2023  9:10 AM CDT -----  Can we get him back in spine clinic in 6 wks?    Thanks  Fabian

## 2023-10-05 ENCOUNTER — PATIENT MESSAGE (OUTPATIENT)
Dept: HEPATOLOGY | Facility: CLINIC | Age: 63
End: 2023-10-05
Payer: MEDICAID

## 2023-10-05 ENCOUNTER — HOSPITAL ENCOUNTER (OUTPATIENT)
Dept: RADIOLOGY | Facility: HOSPITAL | Age: 63
Discharge: HOME | End: 2023-10-05
Attending: INTERNAL MEDICINE
Payer: MEDICAID

## 2023-10-05 DIAGNOSIS — K70.31 ALCOHOLIC CIRRHOSIS OF LIVER WITH ASCITES: ICD-10-CM

## 2023-10-05 PROCEDURE — 76705 ECHO EXAM OF ABDOMEN: CPT | Mod: 26,,, | Performed by: RADIOLOGY

## 2023-10-05 PROCEDURE — 76705 ECHO EXAM OF ABDOMEN: CPT | Mod: TC

## 2023-10-05 PROCEDURE — 76705 US ABDOMEN LIMITED: ICD-10-PCS | Mod: 26,,, | Performed by: RADIOLOGY

## 2023-10-06 NOTE — ASSESSMENT & PLAN NOTE
CT Lumbar Spine Without Contrast Result Date: 1/16/2023  1.  Prominent, cystic, multiloculated predominantly gas attenuation structure within the spinal canal at the level of L3-L4 with dimensions as above.  This is favored to be epidural in location and results in severe narrowing of the spinal canal with mass effect upon the thecal sac.  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 Large disc extrusion at L3-4, contributing to severe spinal canal stenosis, as above. Additional lumbar spondylosis, contributing to moderate spinal canal stenosis at L2-3 and L4-5 and moderate neural foraminal narrowing L4-5 and L5-S1    - neuro checks Q4H  - NSGY consulted, appreciate recommendations, to undergo a laminectomy on 1/30   Pt ambulated to the restroom with minimal assistance, no dizziness or SOB     Scarlet Wei, PATRICK  10/05/23 [id number]

## 2023-10-10 ENCOUNTER — TELEPHONE (OUTPATIENT)
Dept: HEPATOLOGY | Facility: CLINIC | Age: 63
End: 2023-10-10
Payer: MEDICAID

## 2023-10-10 NOTE — TELEPHONE ENCOUNTER
----- Message from Angelique Casas MA sent at 10/10/2023  5:10 PM CDT -----  Regarding: RE: Appt  Contact: spring robb (Spouse)  Pt states he very SOB, stomach is distended and not feeling well. Pt is in need of a para but was told by Dr. Escalante he needs an appt before she can send para orders.     Angelique    ----- Message -----  From: Lima Gonzalez  Sent: 10/10/2023   2:03 PM CDT  To: [facility] Staff  Subject: Appt                                             Pt spouse is requesting a callback  from Татьяна regarding pt appt. No available appts until January. She stated the pt need paracentesis orders and cannot wait that long. Please adv pt spouse        Confirmed contact below:   Contact Name:spring robb (Spouse)   Phone Number: [phone]

## 2023-10-10 NOTE — TELEPHONE ENCOUNTER
Call returned to Adelita at [phone].  Adelita stating he was SOB, stomach is distended and not feeling well.    Adelita was encouraged to bring Jonny to an ED to be examined.    We can schedule him for a Follow Up.  But from what you are saying he needs to be seen in the ED now.    Follow Up scheduled in [place] as requested.  She told Jonny you will have to go to the ED.  Adelita voiced understanding.

## 2023-12-05 DIAGNOSIS — J41.0 SIMPLE CHRONIC BRONCHITIS: ICD-10-CM

## 2023-12-05 DIAGNOSIS — J96.11 CHRONIC RESPIRATORY FAILURE WITH HYPOXIA: ICD-10-CM

## 2023-12-05 RX ORDER — FLUTICASONE PROPIONATE AND SALMETEROL 50; 250 UG/1; UG/1
1 POWDER RESPIRATORY (INHALATION) 2 TIMES DAILY
Qty: 60 EACH | Refills: 0 | Status: SHIPPED | OUTPATIENT
Start: 2023-12-05 | End: 2023-12-12 | Stop reason: SDUPTHER

## 2023-12-05 RX ORDER — ONDANSETRON HYDROCHLORIDE 8 MG/1
8 TABLET, FILM COATED ORAL EVERY 8 HOURS PRN
Qty: 30 TABLET | Refills: 1 | Status: SHIPPED | OUTPATIENT
Start: 2023-12-05 | End: 2024-03-21 | Stop reason: ALTCHOICE

## 2023-12-12 ENCOUNTER — CARE AT HOME (OUTPATIENT)
Dept: HOME HEALTH SERVICES | Facility: CLINIC | Age: 63
End: 2023-12-12
Payer: MEDICAID

## 2023-12-12 VITALS
RESPIRATION RATE: 16 BRPM | HEART RATE: 49 BPM | OXYGEN SATURATION: 99 % | TEMPERATURE: 98 F | DIASTOLIC BLOOD PRESSURE: 78 MMHG | SYSTOLIC BLOOD PRESSURE: 138 MMHG

## 2023-12-12 DIAGNOSIS — J44.9 CHRONIC OBSTRUCTIVE PULMONARY DISEASE, UNSPECIFIED COPD TYPE: ICD-10-CM

## 2023-12-12 DIAGNOSIS — I48.0 PAF (PAROXYSMAL ATRIAL FIBRILLATION): ICD-10-CM

## 2023-12-12 DIAGNOSIS — E03.9 HYPOTHYROIDISM, UNSPECIFIED TYPE: ICD-10-CM

## 2023-12-12 DIAGNOSIS — K70.31 ALCOHOLIC CIRRHOSIS OF LIVER WITH ASCITES: Primary | ICD-10-CM

## 2023-12-12 DIAGNOSIS — E78.5 HYPERLIPIDEMIA ASSOCIATED WITH TYPE 2 DIABETES MELLITUS: ICD-10-CM

## 2023-12-12 DIAGNOSIS — M51.26 LUMBAR HERNIATED DISC: ICD-10-CM

## 2023-12-12 DIAGNOSIS — E11.69 HYPERLIPIDEMIA ASSOCIATED WITH TYPE 2 DIABETES MELLITUS: ICD-10-CM

## 2023-12-12 DIAGNOSIS — R11.0 NAUSEA: ICD-10-CM

## 2023-12-12 DIAGNOSIS — G47.00 INSOMNIA, UNSPECIFIED TYPE: ICD-10-CM

## 2023-12-12 PROCEDURE — 3078F PR MOST RECENT DIASTOLIC BLOOD PRESSURE < 80 MM HG: ICD-10-PCS | Mod: CPTII,S$GLB,,

## 2023-12-12 PROCEDURE — 99350 PR HOME VISIT,ESTAB PATIENT,LEVEL IV: ICD-10-PCS | Mod: S$GLB,,,

## 2023-12-12 PROCEDURE — 99350 HOME/RES VST EST HIGH MDM 60: CPT | Mod: S$GLB,,,

## 2023-12-12 PROCEDURE — 4010F ACE/ARB THERAPY RXD/TAKEN: CPT | Mod: CPTII,S$GLB,,

## 2023-12-12 PROCEDURE — 3075F SYST BP GE 130 - 139MM HG: CPT | Mod: CPTII,S$GLB,,

## 2023-12-12 PROCEDURE — 4010F PR ACE/ARB THEARPY RXD/TAKEN: ICD-10-PCS | Mod: CPTII,S$GLB,,

## 2023-12-12 PROCEDURE — 3044F PR MOST RECENT HEMOGLOBIN A1C LEVEL <7.0%: ICD-10-PCS | Mod: CPTII,S$GLB,,

## 2023-12-12 PROCEDURE — 3044F HG A1C LEVEL LT 7.0%: CPT | Mod: CPTII,S$GLB,,

## 2023-12-12 PROCEDURE — 3078F DIAST BP <80 MM HG: CPT | Mod: CPTII,S$GLB,,

## 2023-12-12 PROCEDURE — 3075F PR MOST RECENT SYSTOLIC BLOOD PRESS GE 130-139MM HG: ICD-10-PCS | Mod: CPTII,S$GLB,,

## 2023-12-12 RX ORDER — FUROSEMIDE 20 MG/1
20 TABLET ORAL DAILY
Qty: 60 TABLET | Refills: 11
Start: 2023-12-12

## 2023-12-12 RX ORDER — PROMETHAZINE HYDROCHLORIDE 12.5 MG/1
12.5 TABLET ORAL NIGHTLY PRN
Qty: 30 TABLET | Refills: 2 | Status: SHIPPED | OUTPATIENT
Start: 2023-12-12 | End: 2024-03-11

## 2023-12-12 RX ORDER — METOPROLOL SUCCINATE 50 MG/1
50 TABLET, EXTENDED RELEASE ORAL DAILY
Qty: 90 TABLET | Refills: 3 | Status: SHIPPED | OUTPATIENT
Start: 2023-12-12 | End: 2024-12-11

## 2023-12-12 RX ORDER — AMIODARONE HYDROCHLORIDE 200 MG/1
200 TABLET ORAL DAILY
Qty: 30 TABLET | Refills: 11 | Status: SHIPPED | OUTPATIENT
Start: 2023-12-12 | End: 2024-12-11

## 2023-12-12 RX ORDER — TRAZODONE HYDROCHLORIDE 100 MG/1
100 TABLET ORAL NIGHTLY
COMMUNITY
End: 2023-12-12 | Stop reason: SDUPTHER

## 2023-12-12 RX ORDER — TRAZODONE HYDROCHLORIDE 100 MG/1
100 TABLET ORAL NIGHTLY
Qty: 30 TABLET | Refills: 2
Start: 2023-12-12 | End: 2024-03-11

## 2023-12-12 RX ORDER — LEVOTHYROXINE SODIUM 50 UG/1
TABLET ORAL
Qty: 90 TABLET | Refills: 3 | Status: SHIPPED | OUTPATIENT
Start: 2023-12-12

## 2023-12-12 RX ORDER — FLUTICASONE PROPIONATE AND SALMETEROL 50; 250 UG/1; UG/1
1 POWDER RESPIRATORY (INHALATION) 2 TIMES DAILY
Qty: 180 EACH | Refills: 3 | Status: SHIPPED | OUTPATIENT
Start: 2023-12-12 | End: 2024-03-19 | Stop reason: SDUPTHER

## 2023-12-12 NOTE — ASSESSMENT & PLAN NOTE
-Would like to potentially establish with a new hepatologist for ongoing care.   -Referral to hepatology to schedule with new provider.

## 2023-12-12 NOTE — ASSESSMENT & PLAN NOTE
-Bradycardic today, appears to be regular.   -Continue amiodarone, metoprolol, xarelto    -HR 48 today. He is currently taking Metoprolol 100mg daily. Last cardiology visit in June 2023 it was decreased to 50mg. Wife made aware of dose correction. Prescription re-sent to pharmacy.

## 2023-12-12 NOTE — PROGRESS NOTES
"[facility]  Medical Chronic Care Home Visit    Encounter Provider: LEN PICKETT,   PCP: Yuli Costa MD  Consult Requested By: No ref. provider found    HISTORY OF PRESENT ILLNESS      Patient ID: Jonny Isabel is a 63 y.o. male is being seen in the home due to physical debility that presents a taxing effort to leave the home, to mitigate high risk of hospital readmission and/or due to the limited availability of reliable or safe options for transportation to the point of access to the provider. Prior to treatment on this visit the chart was reviewed and patient verbal consent was obtained.      Chronic medical conditions synopsis:    Mr. Isabel is a 63 y.o. male who has a past medical history significant for      Reason for consult and visit  Routine follow up for chronic care medical management.       Recent developments  Mr. Fuller is in his usual state of health today. Hx cirrhosis. States abdomen is distended but about baseline for him. Last paracentesis "months ago". Reports he was previously getting routine paracentesis but MD stopped writing orders to be drained and was told he needs to follow up in person first- next appointment in January. Wife would like to see about establishing with another Hepatologist- possibly closer to home.     He reports zofran does not work all the time. Would like to have an alternative if zofran not working. His appetite is fair-eats small meals to avoid worsening nausea. Denies elimination concerns at this time. He reports he has a hard time sleeping at night despite Trazodone.       DECISION MAKING TODAY       Assessment & Plan:  1. Alcoholic cirrhosis of liver with ascites  Assessment & Plan:  -Would like to potentially establish with a new hepatologist for ongoing care.   -Referral to hepatology to schedule with new provider.     Orders:  -     furosemide (LASIX) 20 MG tablet; Take 1 tablet (20 mg total) by mouth once daily.  Dispense: 60 tablet; Refill: " 11  -     Ambulatory referral/consult to Hepatology; Future; Expected date: 12/19/2023    2. Lumbar herniated disc  Assessment & Plan:  -Tramadol PRN for pain  -Medication managed by PCP.        3. PAF (paroxysmal atrial fibrillation)  Assessment & Plan:  -Bradycardic today, appears to be regular.   -Continue amiodarone, metoprolol, xarelto    -HR 48 today. He is currently taking Metoprolol 100mg daily. Last cardiology visit in June 2023 it was decreased to 50mg. Wife made aware of dose correction. Prescription re-sent to pharmacy.      Orders:  -     metoprolol succinate (TOPROL-XL) 50 MG 24 hr tablet; Take 1 tablet (50 mg total) by mouth once daily.  Dispense: 90 tablet; Refill: 3  -     amiodarone (PACERONE) 200 MG Tab; Take 1 tablet (200 mg total) by mouth once daily.  Dispense: 30 tablet; Refill: 11    4. Hypothyroidism, unspecified type  -     levothyroxine (SYNTHROID) 50 MCG tablet; TAKE 1 TABLET BY MOUTH IN THE MORNING WITH A FULL GLASS OF WATER 30-60 MINUTES BEFORE OTHER MEDICATIONS AND FOOD  Dispense: 90 tablet; Refill: 3  -     TSH; Future; Expected date: 12/12/2023    5. Nausea  -     promethazine (PHENERGAN) 12.5 MG Tab; Take 1 tablet (12.5 mg total) by mouth nightly as needed (for nausea unrelieved by zofran).  Dispense: 30 tablet; Refill: 2    6. Insomnia, unspecified type  Assessment & Plan:  -Reports hard time with sleep maintenance.   -Discussed okay to take two trazodone to equal 100mg.      Orders:  -     traZODone (DESYREL) 100 MG tablet; Take 1 tablet (100 mg total) by mouth every evening.  Dispense: 30 tablet; Refill: 2    7. Hyperlipidemia associated with type 2 diabetes mellitus  Assessment & Plan:  -statin contraindication due to hepatic impairment.   -Lipids managed by PCP.          8. Chronic obstructive pulmonary disease, unspecified COPD type  Assessment & Plan:  -Lifelong smoker  -No longer needs O2 at home  -Maintenance with Advair daily    Orders:  -     ADVAIR DISKUS 250-50 mcg/dose  diskus inhaler; Inhale 1 puff into the lungs 2 (two) times a day. Controller  Dispense: 180 each; Refill: 3           ENVIRONMENT OF CARE      Family and/or Caregiver present at visit?  Yes  Name of Caregiver: Adelita Irvin  History provided by: patient and caregiver    Advance Care Planning   Advanced Care Planning Status:  Patient has had an ACP conversation  Living Will: No  Power of : Yes  [place]: No    Does Caregiver have HCPoA: Yes  Changes today: n.a       Impression upon entering the home:  Physical Dwelling: single family home   Appearance of home environment: cleaniness: clean, walking pathways: clear, lighting: adequate, and home structure: sound structure  Functional Status: moderate assistance  Mobility: ambulatory with device  Nutritional access: available food but inadequate intake  Home Health: No, and does not need it at this time   DME/Supplies: hospital bed, wheelchair, and bedside commode       Disease/illness education: Cirrhosis  Establishment or re-establishment of referral orders for community resources: No other necessary community resources.   Discussion with other health care providers: No discussion with other health care providers necessary.   Does patient have a PCP at [facility]? No   Repatriation plan with PCP? Care at Home reason: mobility   Does patient have an ostomy (ileostomy, colostomy, suprapubic catheter, nephrostomy tube, tracheostomy, PEG tube, pleurex catheter, cholecystostomy, etc)? No  Were BPAs reviewed? Yes    Social History     Socioeconomic History    Marital status:    Tobacco Use    Smoking status: Every Day     Current packs/day: 1.00     Average packs/day: 1 pack/day for 40.0 years (40.0 ttl pk-yrs)     Types: Cigarettes    Smokeless tobacco: Never   Substance and Sexual Activity    Alcohol use: Yes     Alcohol/week: 6.0 standard drinks of alcohol     Types: 6 Cans of beer per week    Drug use: Not Currently    Sexual activity: Yes     Partners: Female      Social Determinants of Health     Financial Resource Strain: Low Risk  (2/23/2023)    Overall Financial Resource Strain (CARDIA)     Difficulty of Paying Living Expenses: Not hard at all   Food Insecurity: No Food Insecurity (2/23/2023)    Hunger Vital Sign     Worried About Running Out of Food in the Last Year: Never true     Ran Out of Food in the Last Year: Never true   Transportation Needs: No Transportation Needs (2/23/2023)    PRAPARE - Transportation     Lack of Transportation (Medical): No     Lack of Transportation (Non-Medical): No   Physical Activity: Sufficiently Active (2/23/2023)    Exercise Vital Sign     Days of Exercise per Week: 7 days     Minutes of Exercise per Session: 90 min   Stress: Stress Concern Present (2/23/2023)    [employer] [place] [employer] - Occupational Stress Questionnaire     Feeling of Stress : Rather much   Social Connections: Socially Isolated (2/23/2023)    Social Connection and Isolation Panel [NHANES]     Frequency of Communication with Friends and Family: Never     Frequency of Social Gatherings with Friends and Family: Never     Attends [employer]ism Services: Never     Active Member of Clubs or Organizations: No     Attends Club or Organization Meetings: Never     Marital Status: Living with partner   Housing Stability: Low Risk  (2/23/2023)    Housing Stability Vital Sign     Unable to Pay for Housing in the Last Year: No     Number of Places Lived in the Last Year: 1     Unstable Housing in the Last Year: No         OBJECTIVE:     Vital Signs:  Vitals:    12/12/23 1130   BP: 138/78   Pulse: (!) 49   Resp: 16   Temp: 97.5 °F (36.4 °C)       Review of Systems   Constitutional:  Positive for activity change and fatigue. Negative for fever.   HENT: Negative.     Eyes: Negative.    Respiratory:  Positive for shortness of breath. Negative for chest tightness.    Cardiovascular: Negative.  Negative for leg swelling.   Gastrointestinal:  Positive for abdominal  distention (ascites) and nausea.   Endocrine: Negative.    Genitourinary: Negative.    Musculoskeletal:  Positive for gait problem.   Skin: Negative.    Allergic/Immunologic: Negative.    Neurological:  Positive for weakness.   Hematological: Negative.    Psychiatric/Behavioral: Negative.  Negative for agitation.    All other systems reviewed and are negative.      Physical Exam:  Physical Exam  Vitals reviewed.   Constitutional:       General: He is not in acute distress.     Appearance: He is well-developed.   HENT:      Head: Normocephalic and atraumatic.      Nose: Nose normal.      Mouth/Throat:      Mouth: Mucous membranes are dry.   Eyes:      Pupils: Pupils are equal, round, and reactive to light.   Cardiovascular:      Rate and Rhythm: Regular rhythm. Bradycardia present.      Heart sounds: Normal heart sounds.   Pulmonary:      Effort: Pulmonary effort is normal.      Comments: Diminished to bases, occ wheeze  Abdominal:      General: Bowel sounds are normal. There is distension (ascites).      Palpations: Abdomen is soft.   Musculoskeletal:      Cervical back: Normal range of motion and neck supple.      Comments: Generalized weakness. Lower ext > upper extremities   Skin:     General: Skin is warm and dry.   Neurological:      Mental Status: He is alert and oriented to person, place, and time.      Cranial Nerves: No cranial nerve deficit.   Psychiatric:         Behavior: Behavior normal.         Thought Content: Thought content normal.         Judgment: Judgment normal.         INSTRUCTIONS FOR PATIENT:     Scheduled Follow-up, Appts Reviewed with Modifications if Needed: Yes  Future Appointments   Date Time Provider Department Center   1/9/2024  1:30 PM Lovely Escalante MD [facility] [place]         Current Outpatient Medications:     ADVAIR DISKUS 250-50 mcg/dose diskus inhaler, Inhale 1 puff into the lungs 2 (two) times a day. Controller, Disp: 180 each, Rfl: 3    albuterol (VENTOLIN HFA) 90  mcg/actuation inhaler, Inhale 1 puff into the lungs every 4 (four) hours as needed for Wheezing or Shortness of Breath. Rescue, Disp: 18 g, Rfl: 0    amiodarone (PACERONE) 200 MG Tab, Take 1 tablet (200 mg total) by mouth once daily., Disp: 30 tablet, Rfl: 11    folic acid (FOLVITE) 1 MG tablet, Take 1 tablet (1 mg total) by mouth once daily., Disp: 30 tablet, Rfl: 11    furosemide (LASIX) 20 MG tablet, Take 1 tablet (20 mg total) by mouth once daily., Disp: 60 tablet, Rfl: 11    lancets (RELION THIN LANCETS) 26 gauge Misc, 1 lancet by Misc.(Non-Drug; Combo Route) route 2 (two) times daily., Disp: 100 each, Rfl: 11    levothyroxine (SYNTHROID) 50 MCG tablet, TAKE 1 TABLET BY MOUTH IN THE MORNING WITH A FULL GLASS OF WATER 30-60 MINUTES BEFORE OTHER MEDICATIONS AND FOOD, Disp: 90 tablet, Rfl: 3    metoprolol succinate (TOPROL-XL) 50 MG 24 hr tablet, Take 1 tablet (50 mg total) by mouth once daily., Disp: 90 tablet, Rfl: 3    omega 3-dha-epa-fish oil 1,000 mg (120 mg-180 mg) Cap, 2 capsule DAILY (route: oral), Disp: , Rfl:     ondansetron (ZOFRAN) 8 MG tablet, Take 1 tablet (8 mg total) by mouth every 8 (eight) hours as needed for Nausea., Disp: 30 tablet, Rfl: 1    pantoprazole (PROTONIX) 40 MG tablet, Take 1 tablet by mouth 2 (two) times daily., Disp: , Rfl:     promethazine (PHENERGAN) 12.5 MG Tab, Take 1 tablet (12.5 mg total) by mouth nightly as needed (for nausea unrelieved by zofran)., Disp: 30 tablet, Rfl: 2    spironolactone (ALDACTONE) 50 MG tablet, Take 1 tablet (50 mg total) by mouth once daily., Disp: 30 tablet, Rfl: 11    thiamine 100 MG tablet, Take 1 tablet (100 mg total) by mouth once daily., Disp: 30 tablet, Rfl: 11    thiamine 100 MG tablet, Take 1 tablet by mouth once daily., Disp: , Rfl:     traZODone (DESYREL) 100 MG tablet, Take 1 tablet (100 mg total) by mouth every evening., Disp: 30 tablet, Rfl: 2    VITAMIN B COMPLEX ORAL, Take 1 tablet by mouth once daily., Disp: , Rfl:     XARELTO 20 mg  Tab, Take 20 mg by mouth once daily., Disp: , Rfl:     Medication Reconciliation:  Were medications changed during this appointment? Yes, added promethazine  Were medications in the home? Yes  Is the patient taking the medications as directed? Yes  Does the patient/caregiver understand the medications and changes? Yes  Does updated med list accurately reflects meds patient is currently taking? Yes    Signature:      ORTA NP      Time: Total face-to-face time was 60 minutes, >50% of this was spent on counseling and coordination of care. The following issues were discussed: primary and secondary diagnoses, co-morbidities, prescribed medications, treatment modalities, importance of compliance with medical advice and directives for follow-up care.

## 2023-12-13 NOTE — ASSESSMENT & PLAN NOTE
-Reports hard time with sleep maintenance.   -Discussed okay to take two trazodone to equal 100mg.

## 2024-01-09 ENCOUNTER — TELEPHONE (OUTPATIENT)
Dept: HEPATOLOGY | Facility: CLINIC | Age: 64
End: 2024-01-09
Payer: MEDICAID

## 2024-01-09 ENCOUNTER — OFFICE VISIT (OUTPATIENT)
Dept: HEPATOLOGY | Facility: CLINIC | Age: 64
End: 2024-01-09
Payer: MEDICAID

## 2024-01-09 VITALS
WEIGHT: 211 LBS | RESPIRATION RATE: 18 BRPM | HEIGHT: 69 IN | SYSTOLIC BLOOD PRESSURE: 152 MMHG | TEMPERATURE: 98 F | HEART RATE: 50 BPM | DIASTOLIC BLOOD PRESSURE: 82 MMHG | BODY MASS INDEX: 31.25 KG/M2 | OXYGEN SATURATION: 96 %

## 2024-01-09 DIAGNOSIS — R18.8 OTHER ASCITES: ICD-10-CM

## 2024-01-09 DIAGNOSIS — K70.31 ALCOHOLIC CIRRHOSIS OF LIVER WITH ASCITES: ICD-10-CM

## 2024-01-09 DIAGNOSIS — I48.0 PAF (PAROXYSMAL ATRIAL FIBRILLATION): Primary | ICD-10-CM

## 2024-01-09 DIAGNOSIS — K74.60 CIRRHOSIS OF LIVER WITH ASCITES, UNSPECIFIED HEPATIC CIRRHOSIS TYPE: ICD-10-CM

## 2024-01-09 DIAGNOSIS — R18.8 CIRRHOSIS OF LIVER WITH ASCITES, UNSPECIFIED HEPATIC CIRRHOSIS TYPE: ICD-10-CM

## 2024-01-09 DIAGNOSIS — R74.8 ELEVATED LIVER ENZYMES: Primary | ICD-10-CM

## 2024-01-09 PROCEDURE — 1159F MED LIST DOCD IN RCRD: CPT | Mod: CPTII,,, | Performed by: INTERNAL MEDICINE

## 2024-01-09 PROCEDURE — 3008F BODY MASS INDEX DOCD: CPT | Mod: CPTII,,, | Performed by: INTERNAL MEDICINE

## 2024-01-09 PROCEDURE — 99215 OFFICE O/P EST HI 40 MIN: CPT | Mod: PBBFAC,PO | Performed by: INTERNAL MEDICINE

## 2024-01-09 PROCEDURE — 99999 PR PBB SHADOW E&M-EST. PATIENT-LVL V: CPT | Mod: PBBFAC,,, | Performed by: INTERNAL MEDICINE

## 2024-01-09 PROCEDURE — 3077F SYST BP >= 140 MM HG: CPT | Mod: CPTII,,, | Performed by: INTERNAL MEDICINE

## 2024-01-09 PROCEDURE — 1160F RVW MEDS BY RX/DR IN RCRD: CPT | Mod: CPTII,,, | Performed by: INTERNAL MEDICINE

## 2024-01-09 PROCEDURE — 99214 OFFICE O/P EST MOD 30 MIN: CPT | Mod: S$PBB,,, | Performed by: INTERNAL MEDICINE

## 2024-01-09 PROCEDURE — 3079F DIAST BP 80-89 MM HG: CPT | Mod: CPTII,,, | Performed by: INTERNAL MEDICINE

## 2024-01-09 PROCEDURE — 4010F ACE/ARB THERAPY RXD/TAKEN: CPT | Mod: CPTII,,, | Performed by: INTERNAL MEDICINE

## 2024-01-09 RX ORDER — SIMVASTATIN 40 MG/1
TABLET, FILM COATED ORAL
COMMUNITY
End: 2024-06-06

## 2024-01-09 RX ORDER — METOPROLOL TARTRATE 100 MG/1
100 TABLET ORAL
COMMUNITY
Start: 2023-12-27

## 2024-01-09 RX ORDER — FERROUS SULFATE 324(65)MG
TABLET, DELAYED RELEASE (ENTERIC COATED) ORAL
COMMUNITY
Start: 2024-01-04

## 2024-01-09 RX ORDER — LISINOPRIL AND HYDROCHLOROTHIAZIDE 10; 12.5 MG/1; MG/1
1 TABLET ORAL DAILY
COMMUNITY

## 2024-01-09 NOTE — PATIENT INSTRUCTIONS
Labs today and monthly  Paracentesis if labs ok; will determine if can have a monthly tap after get labs and do the first one  Amiodarone - need to change to alternate drug-need to see Dr Burt of cardiology   EGD at [place]

## 2024-01-09 NOTE — TELEPHONE ENCOUNTER
Received message from WILFRID HALL from Dr Escalante's Office at [facility] in [place].  The Patient will need an appt for a Paracentesis.      Call placed to the patient at [phone]. Patient did not answer, his wife Adelita did.  Asked Adelita where would Jonny like to get the Para done?  Adelita stated at the [place] location.      Message sent to [facility].  Need an appt for a Para at [place].      Received message from [place] with tentative date for Para at [place] for Wed 1/17/24 at 10 am.    Call returned to Adelita.   They will accept appt on Wed 1/17/24.  Appt confirmed.

## 2024-01-09 NOTE — PROGRESS NOTES
HEPATOLOGY FOLLOW UP    Referring Physician: Harriett Bird MD  Current Corresponding Physician: Harriett Bird MD, Julissa, Yuli Pérez MD, Christopher Burt III, MD    Jonny Isabel is here for follow up of decompensated alcohol-induced Cirrhosis    HPI  Seen by inpt hepatology team:  Admission 1/16/23-2/7/23: 63yo PMHx decompensated EtOH cirrhosis (ascites), NID-T2DM, afib s/p ablation and DCCV on xarelto, HTN. Was transferred from OSH on 01/16/2023 for epidural abscess and L3/L4 disc herniation. Surgery was deferred since the pt was too ill. MELD-Na 22. Was anemic, was transfused and underwent EGD- small varices noted.     Work up for liver disease since arrival notable for ASMA, AMA, viral hepatitis panel all negative. PETH 900.     Reportedly patient did not know of liver disease and was never instructed to stop drinking however multiple barriers to consideration of transplant including spinal abscess, possible bowel obstruction, continuing to drink EtOH (PETH 900)      Interval History  After Jonny Isabel's inpt stay I saw him 5/23. I have not seen him since then. He had stopped drinking.    Denies sypmtoms that would suggest HE. Has an increased abdo girth but no edema. Remains on lasix 20 mg daily.    Labs 7/17/23: ALT 11. AST 29, ALKP 92, Tbil 0.7, plts 356    Abdo US 10/5/23: cirrhosis with increased ascites; no HCC  CT abdo pelvis w contrast 1/26/23: Liver: Mildly enlarged.  Nodular contour, suggesting cirrhosis.  Diffusely decreased parenchymal attenuation.  Indeterminate 1.3 cm hepatic segment 7 hypodensity (axial 2:68).  Portal vein is patent.    EGD 1/17/23: sm EV; PHG    MELD 3.0: 17 at 7/17/2023 11:34 AM  MELD-Na: 17 at 7/17/2023 11:34 AM  Calculated from:  Serum Creatinine: 1.5 mg/dL at 7/17/2023 11:34 AM  Serum Sodium: 137 mmol/L at 7/17/2023 11:34 AM  Total Bilirubin: 0.7 mg/dL (Using min of 1 mg/dL) at 7/17/2023 11:34 AM  Serum Albumin: 2.3 g/dL at 7/17/2023 11:34 AM  INR(ratio): 1.8 at  7/17/2023 11:34 AM  Age at listing (hypothetical): 62 years  Sex: Male at 7/17/2023 11:34 AM       Outpatient Encounter Medications as of 1/9/2024   Medication Sig Dispense Refill    ADVAIR DISKUS 250-50 mcg/dose diskus inhaler Inhale 1 puff into the lungs 2 (two) times a day. Controller 180 each 3    albuterol (VENTOLIN HFA) 90 mcg/actuation inhaler Inhale 1 puff into the lungs every 4 (four) hours as needed for Wheezing or Shortness of Breath. Rescue 18 g 0    amiodarone (PACERONE) 200 MG Tab Take 1 tablet (200 mg total) by mouth once daily. 30 tablet 11    ferrous sulfate 324 mg (65 mg iron) TbEC Take by mouth.      folic acid (FOLVITE) 1 MG tablet Take 1 tablet (1 mg total) by mouth once daily. 30 tablet 11    furosemide (LASIX) 20 MG tablet Take 1 tablet (20 mg total) by mouth once daily. 60 tablet 11    lancets (RELION THIN LANCETS) 26 gauge Misc 1 lancet by Misc.(Non-Drug; Combo Route) route 2 (two) times daily. 100 each 11    levothyroxine (SYNTHROID) 50 MCG tablet TAKE 1 TABLET BY MOUTH IN THE MORNING WITH A FULL GLASS OF WATER 30-60 MINUTES BEFORE OTHER MEDICATIONS AND FOOD 90 tablet 3    metoprolol succinate (TOPROL-XL) 50 MG 24 hr tablet Take 1 tablet (50 mg total) by mouth once daily. 90 tablet 3    metoprolol tartrate (LOPRESSOR) 100 MG tablet Take 100 mg by mouth.      omega 3-dha-epa-fish oil 1,000 mg (120 mg-180 mg) Cap 2 capsule DAILY (route: oral)      ondansetron (ZOFRAN) 8 MG tablet Take 1 tablet (8 mg total) by mouth every 8 (eight) hours as needed for Nausea. 30 tablet 1    pantoprazole (PROTONIX) 40 MG tablet Take 1 tablet by mouth 2 (two) times daily.      promethazine (PHENERGAN) 12.5 MG Tab Take 1 tablet (12.5 mg total) by mouth nightly as needed (for nausea unrelieved by zofran). 30 tablet 2    simvastatin (ZOCOR) 40 MG tablet Take 1 tablet every day by oral route at bedtime.      spironolactone (ALDACTONE) 50 MG tablet Take 1 tablet (50 mg total) by mouth once daily. 30 tablet 11     thiamine 100 MG tablet Take 1 tablet (100 mg total) by mouth once daily. 30 tablet 11    thiamine 100 MG tablet Take 1 tablet by mouth once daily.      traZODone (DESYREL) 100 MG tablet Take 1 tablet (100 mg total) by mouth every evening. 30 tablet 2    XARELTO 20 mg Tab Take 20 mg by mouth once daily.      lisinopriL-hydrochlorothiazide (PRINZIDE,ZESTORETIC) 10-12.5 mg per tablet Take 1 tablet by mouth once daily.      VITAMIN B COMPLEX ORAL Take 1 tablet by mouth once daily.       No facility-administered encounter medications on file as of 1/9/2024.     Review of patient's allergies indicates:  No Known Allergies  Past Medical History:   Diagnosis Date    A-fib     Esophageal varices 5/8/2023    Other ascites 5/8/2023       Review of Systems   Constitutional: Negative.    HENT: Negative.     Eyes: Negative.    Respiratory: Negative.     Cardiovascular: Negative.    Gastrointestinal: Negative.    Genitourinary: Negative.    Musculoskeletal: Negative.    Skin: Negative.    Neurological: Negative.    Psychiatric/Behavioral: Negative.       Vitals:    01/09/24 1313   BP: (!) 152/82   Pulse: (!) 50   Resp: 18   Temp: 97.9 °F (36.6 °C)       Physical Exam  Vitals reviewed.   Constitutional:       Appearance: He is well-developed.   HENT:      Head: Normocephalic and atraumatic.   Eyes:      General: No scleral icterus.     Conjunctiva/sclera: Conjunctivae normal.      Pupils: Pupils are equal, round, and reactive to light.   Neck:      Thyroid: No thyromegaly.   Cardiovascular:      Rate and Rhythm: Normal rate and regular rhythm.      Heart sounds: Normal heart sounds.   Pulmonary:      Effort: Pulmonary effort is normal.      Breath sounds: Normal breath sounds. No rales.   Abdominal:      General: Bowel sounds are normal. There is distension.      Palpations: Abdomen is soft. There is no mass.      Tenderness: There is no abdominal tenderness.   Musculoskeletal:         General: Normal range of motion.      Cervical  back: Normal range of motion and neck supple.   Skin:     General: Skin is warm and dry.      Findings: No rash.   Neurological:      Mental Status: He is alert and oriented to person, place, and time.         MELD 3.0: 17 at 7/17/2023 11:34 AM  MELD-Na: 17 at 7/17/2023 11:34 AM  Calculated from:  Serum Creatinine: 1.5 mg/dL at 7/17/2023 11:34 AM  Serum Sodium: 137 mmol/L at 7/17/2023 11:34 AM  Total Bilirubin: 0.7 mg/dL (Using min of 1 mg/dL) at 7/17/2023 11:34 AM  Serum Albumin: 2.3 g/dL at 7/17/2023 11:34 AM  INR(ratio): 1.8 at 7/17/2023 11:34 AM  Age at listing (hypothetical): 62 years  Sex: Male at 7/17/2023 11:34 AM      Lab Results   Component Value Date     07/17/2023    BUN 12 07/17/2023    CREATININE 1.5 (H) 07/17/2023    CALCIUM 8.9 07/17/2023     07/17/2023    K 4.5 07/17/2023     07/17/2023    PROT 7.8 07/17/2023    CO2 20 (L) 07/17/2023    ANIONGAP 11 07/17/2023    WBC 7.82 07/17/2023    RBC 3.63 (L) 07/17/2023    HGB 10.7 (L) 07/17/2023    HCT 34.0 (L) 07/17/2023    HCT 33 (L) 01/27/2023    MCV 94 07/17/2023    MCH 29.5 07/17/2023    MCHC 31.5 (L) 07/17/2023     Lab Results   Component Value Date    RDW 15.4 (H) 07/17/2023     07/17/2023    MPV 10.1 07/17/2023    GRAN 5.7 07/17/2023    GRAN 72.3 07/17/2023    LYMPH 1.5 07/17/2023    LYMPH 18.5 07/17/2023    MONO 0.5 07/17/2023    MONO 6.5 07/17/2023    EOSINOPHIL 1.4 07/17/2023    BASOPHIL 1.0 07/17/2023    EOS 0.1 07/17/2023    BASO 0.08 07/17/2023    APTT 49.1 (H) 11/09/2022    GROUPTRH A POS 01/16/2023     (H) 01/27/2023    ALBUMIN 2.3 (L) 07/17/2023    BILIDIR 0.4 (H) 07/17/2023    AST 29 07/17/2023    ALT 11 07/17/2023    ALKPHOS 92 07/17/2023    MG 1.2 (L) 02/07/2023    LABPROT 18.2 (H) 07/17/2023    INR 1.8 (H) 07/17/2023       Assessment and Plan:  Jonny Isabel is a 63 y.o. male with Cirrhosis  Labs today and monthly  Paracentesis if labs ok; will determine if can have a monthly tap after get labs and do the  first one  Amiodarone - need to change to alternate drug-need to see Dr Burt of cardiology   EGD at [place]    Return 3 months  A total of 35 minutes was spent reviewing the patient's chart, examining the patient, reviewing labs and imaging and coordinating care with the patient's care team.

## 2024-01-11 ENCOUNTER — TELEPHONE (OUTPATIENT)
Dept: HEPATOLOGY | Facility: CLINIC | Age: 64
End: 2024-01-11
Payer: MEDICAID

## 2024-01-11 DIAGNOSIS — N28.9 RENAL INSUFFICIENCY: Primary | ICD-10-CM

## 2024-01-11 NOTE — TELEPHONE ENCOUNTER
------ Message from Dr SANCHEZ Escalante ------  Liver appears improved but your kidney function is worse.   Hold your water pills.  Repeat labs on Monday.   My Nurse and MA will call you.      Call placed to the home at [phone].  Placed on speaker phone with Patient and CECILY Ureña.  Message relayed from Dr Escalante.    Adelita stated we read it in [facility].  Adelita asked what are the names of the fluid pills that you are Holding?    Adelita stated spironolactone and furosemide.  Great, that is what Dr Escalante wants him to Hold.    Labs are closed on Monday, due to a Holiday.  Adelita stated we can do them Tues 1/16/24.    Where would you like to go [place] or [place]?  [place] is closer.  Lab ([facility]) scheduled for Tues 1/17/24 at 10 am as requested at [place].    Adelita and Jonny reminded of his Para Appt on Wed 1/17/24 at 10 am [facility].  Both verbalized understanding.

## 2024-01-12 NOTE — SUBJECTIVE & OBJECTIVE
Interval History: IgA 505, urine protein 0.89, urine sodium 10    Review of patient's allergies indicates:  No Known Allergies  Current Facility-Administered Medications   Medication Frequency    0.9%  NaCl infusion (for blood administration) Q24H PRN    ceFEPIme (MAXIPIME) 2 g in dextrose 5 % in water (D5W) 5 % 50 mL IVPB (MB+) Q24H    dextrose 10% bolus 125 mL 125 mL PRN    dextrose 10% bolus 250 mL 250 mL PRN    folic acid tablet 1 mg Daily    glucagon (human recombinant) injection 1 mg PRN    glucose chewable tablet 16 g PRN    glucose chewable tablet 24 g PRN    heparin (porcine) injection 5,000 Units Q8H    insulin aspart U-100 pen 0-5 Units QID (AC + HS) PRN    lactulose 20 gram/30 mL solution Soln 30 g QID    LIDOcaine-prilocaine cream PRN    LORazepam injection 1 mg Q4H PRN    metroNIDAZOLE tablet 500 mg Q8H    midodrine tablet 10 mg Q8H    multivitamin tablet Daily    mupirocin 2 % ointment BID    NORepinephrine bitartrate-D5W 4 mg/250 mL (16 mcg/mL) PERIPHERAL access infusion Continuous    octreotide injection 100 mcg Q8H    pantoprazole EC tablet 40 mg BID AC    scopolamine 1.3-1.5 mg (1 mg over 3 days) 1 patch Q3 Days    sodium bicarbonate tablet 1,300 mg TID    thiamine tablet 100 mg Q8H       Objective:     Vital Signs (Most Recent):  Temp: 97.8 °F (36.6 °C) (01/20/23 1200)  Pulse: 75 (01/20/23 1215)  Resp: (!) 23 (01/20/23 1215)  BP: 115/65 (01/20/23 1200)  SpO2: 95 % (01/20/23 1215)   Vital Signs (24h Range):  Temp:  [97.6 °F (36.4 °C)-97.9 °F (36.6 °C)] 97.8 °F (36.6 °C)  Pulse:  [67-98] 75  Resp:  [10-59] 23  SpO2:  [88 %-97 %] 95 %  BP: ()/(51-82) 115/65     Weight: 110.7 kg (244 lb) (01/17/23 1030)  Body mass index is 37.1 kg/m².  Body surface area is 2.3 meters squared.    I/O last 3 completed shifts:  In: 1075.3 [I.V.:590.9; IV Piggyback:484.3]  Out: 150 [Urine:150]    Physical Exam  Constitutional:       General: He is not in acute distress.     Appearance: He is obese. He is not  ill-appearing or toxic-appearing.   HENT:      Nose: Nose normal. No congestion or rhinorrhea.      Mouth/Throat:      Mouth: Mucous membranes are moist.   Eyes:      General: No scleral icterus.        Right eye: No discharge.         Left eye: No discharge.      Pupils: Pupils are equal, round, and reactive to light.   Cardiovascular:      Rate and Rhythm: Normal rate.   Pulmonary:      Effort: Pulmonary effort is normal. No respiratory distress.   Abdominal:      General: Abdomen is flat. There is distension.      Tenderness: There is no abdominal tenderness.   Musculoskeletal:      Cervical back: Normal range of motion. No rigidity.      Right lower leg: Edema present.      Left lower leg: Edema present.   Lymphadenopathy:      Cervical: No cervical adenopathy.   Skin:     Coloration: Skin is not jaundiced.      Findings: No lesion.   Neurological:      General: No focal deficit present.      Mental Status: He is alert.       Significant Labs:  All labs within the past 24 hours have been reviewed.     Significant Imaging:  Labs: Reviewed   No

## 2024-01-16 ENCOUNTER — LAB VISIT (OUTPATIENT)
Dept: LAB | Facility: HOSPITAL | Age: 64
End: 2024-01-16
Attending: INTERNAL MEDICINE
Payer: MEDICAID

## 2024-01-16 ENCOUNTER — TELEPHONE (OUTPATIENT)
Dept: INTERVENTIONAL RADIOLOGY/VASCULAR | Facility: HOSPITAL | Age: 64
End: 2024-01-16
Payer: MEDICAID

## 2024-01-16 DIAGNOSIS — N28.9 RENAL INSUFFICIENCY: ICD-10-CM

## 2024-01-16 LAB
ALBUMIN SERPL BCP-MCNC: 3 G/DL (ref 3.5–5.2)
ALP SERPL-CCNC: 78 U/L (ref 55–135)
ALT SERPL W/O P-5'-P-CCNC: 9 U/L (ref 10–44)
ANION GAP SERPL CALC-SCNC: 9 MMOL/L (ref 8–16)
AST SERPL-CCNC: 21 U/L (ref 10–40)
BILIRUB SERPL-MCNC: 0.5 MG/DL (ref 0.1–1)
BUN SERPL-MCNC: 13 MG/DL (ref 8–23)
CALCIUM SERPL-MCNC: 9 MG/DL (ref 8.7–10.5)
CHLORIDE SERPL-SCNC: 106 MMOL/L (ref 95–110)
CO2 SERPL-SCNC: 21 MMOL/L (ref 23–29)
CREAT SERPL-MCNC: 1.6 MG/DL (ref 0.5–1.4)
EST. GFR  (NO RACE VARIABLE): 48.4 ML/MIN/1.73 M^2
GLUCOSE SERPL-MCNC: 107 MG/DL (ref 70–110)
POTASSIUM SERPL-SCNC: 4.9 MMOL/L (ref 3.5–5.1)
PROT SERPL-MCNC: 8 G/DL (ref 6–8.4)
SODIUM SERPL-SCNC: 136 MMOL/L (ref 136–145)

## 2024-01-16 PROCEDURE — 80053 COMPREHEN METABOLIC PANEL: CPT | Performed by: INTERNAL MEDICINE

## 2024-01-16 PROCEDURE — 36415 COLL VENOUS BLD VENIPUNCTURE: CPT | Mod: PO | Performed by: INTERNAL MEDICINE

## 2024-01-16 NOTE — NURSING
Called and spoke to SO, Adelita, reviewed preop instructions for 1/17, reviewed arrival time of 1000, bring list of home meds

## 2024-01-17 ENCOUNTER — HOSPITAL ENCOUNTER (OUTPATIENT)
Dept: INTERVENTIONAL RADIOLOGY/VASCULAR | Facility: HOSPITAL | Age: 64
Discharge: HOME | End: 2024-01-17
Attending: INTERNAL MEDICINE
Payer: MEDICAID

## 2024-01-17 VITALS
SYSTOLIC BLOOD PRESSURE: 163 MMHG | HEART RATE: 57 BPM | RESPIRATION RATE: 15 BRPM | OXYGEN SATURATION: 100 % | DIASTOLIC BLOOD PRESSURE: 75 MMHG | HEIGHT: 69 IN | BODY MASS INDEX: 31.1 KG/M2 | TEMPERATURE: 97 F | WEIGHT: 210 LBS

## 2024-01-17 DIAGNOSIS — K70.31 ALCOHOLIC CIRRHOSIS OF LIVER WITH ASCITES: ICD-10-CM

## 2024-01-17 DIAGNOSIS — R18.8 OTHER ASCITES: ICD-10-CM

## 2024-01-17 DIAGNOSIS — R18.8 ASCITES: ICD-10-CM

## 2024-01-17 DIAGNOSIS — R74.8 ELEVATED LIVER ENZYMES: ICD-10-CM

## 2024-01-17 LAB
ALBUMIN FLD-MCNC: 1.7 G/DL
APPEARANCE FLD: CLEAR
BODY FLD TYPE: NORMAL
COLOR FLD: YELLOW
LYMPHOCYTES NFR FLD MANUAL: 80 %
MONOS+MACROS NFR FLD MANUAL: 19 %
NEUTROPHILS NFR FLD MANUAL: 1 %
PROT FLD-MCNC: 3.5 G/DL
SPECIMEN SOURCE: NORMAL
SPECIMEN SOURCE: NORMAL
WBC # FLD: 99 /CU MM

## 2024-01-17 PROCEDURE — 25000003 PHARM REV CODE 250: Performed by: PHYSICIAN ASSISTANT

## 2024-01-17 PROCEDURE — P9047 ALBUMIN (HUMAN), 25%, 50ML: HCPCS | Mod: JZ,JG | Performed by: PHYSICIAN ASSISTANT

## 2024-01-17 PROCEDURE — 84157 ASSAY OF PROTEIN OTHER: CPT | Performed by: INTERNAL MEDICINE

## 2024-01-17 PROCEDURE — 49083 ABD PARACENTESIS W/IMAGING: CPT | Mod: ,,, | Performed by: PHYSICIAN ASSISTANT

## 2024-01-17 PROCEDURE — 63600175 PHARM REV CODE 636 W HCPCS: Mod: JZ,JG | Performed by: PHYSICIAN ASSISTANT

## 2024-01-17 PROCEDURE — 49083 ABD PARACENTESIS W/IMAGING: CPT | Performed by: RADIOLOGY

## 2024-01-17 PROCEDURE — 89051 BODY FLUID CELL COUNT: CPT | Performed by: INTERNAL MEDICINE

## 2024-01-17 PROCEDURE — 82042 OTHER SOURCE ALBUMIN QUAN EA: CPT | Performed by: INTERNAL MEDICINE

## 2024-01-17 RX ORDER — LIDOCAINE HYDROCHLORIDE 10 MG/ML
INJECTION INFILTRATION; PERINEURAL
Status: COMPLETED | OUTPATIENT
Start: 2024-01-17 | End: 2024-01-17

## 2024-01-17 RX ORDER — ALBUMIN HUMAN 250 G/1000ML
SOLUTION INTRAVENOUS
Status: COMPLETED | OUTPATIENT
Start: 2024-01-17 | End: 2024-01-17

## 2024-01-17 RX ADMIN — LIDOCAINE HYDROCHLORIDE 5 ML: 10 INJECTION, SOLUTION INFILTRATION; PERINEURAL at 12:01

## 2024-01-17 RX ADMIN — ALBUMIN (HUMAN) 50 G: 25 SOLUTION INTRAVENOUS at 12:01

## 2024-01-17 NOTE — H&P
Radiology History & Physical      SUBJECTIVE:     Chief Complaint: abdominal distention    History of Present Illness:  Jonny Isabel is a 63 y.o. male who presents for ultrasound guided paracentesis  Past Medical History:   Diagnosis Date    A-fib     Esophageal varices 5/8/2023    Other ascites 5/8/2023     Past Surgical History:   Procedure Laterality Date    ESOPHAGOGASTRODUODENOSCOPY N/A 1/17/2023    Procedure: EGD (ESOPHAGOGASTRODUODENOSCOPY);  Surgeon: Dewayne Navas MD;  Location: [address];  Service: Endoscopy;  Laterality: N/A;       Home Meds:   Prior to Admission medications    Medication Sig Start Date End Date Taking? Authorizing Provider   ADVAIR DISKUS 250-50 mcg/dose diskus inhaler Inhale 1 puff into the lungs 2 (two) times a day. Controller 12/12/23 12/11/24 Yes Ellen Escalera NP   albuterol (VENTOLIN HFA) 90 mcg/actuation inhaler Inhale 1 puff into the lungs every 4 (four) hours as needed for Wheezing or Shortness of Breath. Rescue 2/14/23 2/14/24 Yes Joanne Ruiz NP   amiodarone (PACERONE) 200 MG Tab Take 1 tablet (200 mg total) by mouth once daily. 12/12/23 12/11/24 Yes Ellen Escalera NP   ferrous sulfate 324 mg (65 mg iron) TbEC Take by mouth. 1/4/24  Yes Provider, Historical   folic acid (FOLVITE) 1 MG tablet Take 1 tablet (1 mg total) by mouth once daily. 2/7/23 2/7/24 Yes Seymour Leon MD   furosemide (LASIX) 20 MG tablet Take 1 tablet (20 mg total) by mouth once daily. 12/12/23  Yes Ellen Escalera NP   lancets (RELION THIN LANCETS) 26 gauge Misc 1 lancet by Misc.(Non-Drug; Combo Route) route 2 (two) times daily. 4/21/23 4/20/24 Yes Joanne Ruiz NP   levothyroxine (SYNTHROID) 50 MCG tablet TAKE 1 TABLET BY MOUTH IN THE MORNING WITH A FULL GLASS OF WATER 30-60 MINUTES BEFORE OTHER MEDICATIONS AND FOOD 12/12/23  Yes Ellen Escalera, NP   lisinopriL-hydrochlorothiazide (PRINZIDE,ZESTORETIC) 10-12.5 mg per tablet Take 1 tablet by mouth once  daily.   Yes Provider, Historical   metoprolol succinate (TOPROL-XL) 50 MG 24 hr tablet Take 1 tablet (50 mg total) by mouth once daily. 12/12/23 12/11/24 Yes Ellen Escalera NP   metoprolol tartrate (LOPRESSOR) 100 MG tablet Take 100 mg by mouth. 12/27/23  Yes Provider, Historical   omega 3-dha-epa-fish oil 1,000 mg (120 mg-180 mg) Cap 2 capsule DAILY (route: oral) 7/13/22  Yes Provider, Historical   ondansetron (ZOFRAN) 8 MG tablet Take 1 tablet (8 mg total) by mouth every 8 (eight) hours as needed for Nausea. 12/5/23  Yes Joanne Ruiz NP   pantoprazole (PROTONIX) 40 MG tablet Take 1 tablet by mouth 2 (two) times daily. 8/17/22  Yes Provider, Historical   traZODone (DESYREL) 100 MG tablet Take 1 tablet (100 mg total) by mouth every evening. 12/12/23 3/11/24 Yes Ellen Escalera NP   XARELTO 20 mg Tab Take 20 mg by mouth once daily. 1/9/23  Yes Provider, Historical   promethazine (PHENERGAN) 12.5 MG Tab Take 1 tablet (12.5 mg total) by mouth nightly as needed (for nausea unrelieved by zofran). 12/12/23 3/11/24  Ellen Escalera NP   simvastatin (ZOCOR) 40 MG tablet Take 1 tablet every day by oral route at bedtime.    Provider, Historical   spironolactone (ALDACTONE) 50 MG tablet Take 1 tablet (50 mg total) by mouth once daily. 6/16/23 6/15/24  Lovely Escalante MD   thiamine 100 MG tablet Take 1 tablet (100 mg total) by mouth once daily. 2/7/23 2/7/24  Seymour Leon MD   thiamine 100 MG tablet Take 1 tablet by mouth once daily. 7/13/22   Provider, Historical   VITAMIN B COMPLEX ORAL Take 1 tablet by mouth once daily.    Provider, Historical     Anticoagulants/Antiplatelets:  xarelto    Allergies: Review of patient's allergies indicates:  No Known Allergies  Sedation History:  no adverse reactions    Review of Systems:   Hematological: no known coagulopathies  Respiratory: no shortness of breath  Cardiovascular: no chest pain  Gastrointestinal: no abdominal pain  Genito-Urinary: no  dysuria  Musculoskeletal: negative  Neurological: no TIA or stroke symptoms         OBJECTIVE:     Vital Signs (Most Recent)  Temp: 97.1 °F (36.2 °C) (01/17/24 1022)  Pulse: (!) 47 (01/17/24 1022)  Resp: 20 (01/17/24 1022)  BP: (!) 175/72 (01/17/24 1022)  SpO2: 100 % (01/17/24 1022)    Physical Exam:  ASA: 2  Mallampati: n/a    General: no acute distress  Mental Status: alert and oriented to person, place and time  HEENT: normocephalic, atraumatic  Chest: unlabored breathing  Heart: regular heart rate  Abdomen: distended  Extremity: moves all extremities    ASSESSMENT/PLAN:     Sedation Plan: local  Patient will undergo ultrasound guided paracentesis.    Ashia Gallegos PA-C

## 2024-01-17 NOTE — DISCHARGE SUMMARY
Interventional Radiology Short Stay Discharge Summary      Admit Date: 1/17/2024  Discharge Date: 01/17/2024     Hospital Course: Uneventful    Discharge Diagnosis: ascites    Discharge Condition: Stable    Discharge Disposition: Home    Diet: Resume prior diet    Activity: activity as tolerated    Follow-up: With referring provider    Cindy Chava, PA-C [facility]

## 2024-01-17 NOTE — SEDATION DOCUMENTATION
IR paracentesis completed removed 14L clear yellow from RLQ abd, applied gauze with tegaderm to site, replaced with 100g albumin, reviewed discharge instructions with pt, IR care complete

## 2024-01-17 NOTE — PROCEDURES
Radiology Post-Procedure Note    Pre Op Diagnosis: Ascites  Post Op Diagnosis: Same    Procedure: Ultrasound Guided Paracentesis    Procedure performed by: Ashia Gallegos PA-C    Written Informed Consent Obtained: Yes  Specimen Removed: YES   Estimated Blood Loss: Minimal    Findings:   Successful paracentesis.  14 L removed.  Albumin was administered PRN per protocol.    Patient tolerated procedure well.    Ashia Gallegos PA-C

## 2024-01-18 ENCOUNTER — TELEPHONE (OUTPATIENT)
Dept: ENDOSCOPY | Facility: HOSPITAL | Age: 64
End: 2024-01-18
Payer: MEDICAID

## 2024-01-18 VITALS — BODY MASS INDEX: 31.1 KG/M2 | HEIGHT: 69 IN | WEIGHT: 210 LBS

## 2024-01-18 DIAGNOSIS — K74.60 CIRRHOSIS OF LIVER WITH ASCITES, UNSPECIFIED HEPATIC CIRRHOSIS TYPE: Primary | ICD-10-CM

## 2024-01-18 DIAGNOSIS — R18.8 CIRRHOSIS OF LIVER WITH ASCITES, UNSPECIFIED HEPATIC CIRRHOSIS TYPE: Primary | ICD-10-CM

## 2024-01-18 NOTE — TELEPHONE ENCOUNTER
Referral received. Spoke to Pt's significant other Adelita to schedule procedure(s) Upper Endoscopy (EGD)       Physician to perform procedure(s) Dr. LUDMILA Goins  Date of Procedure (s) 2/7/24  Arrival Time 9:00 AM arrival for labs then 9:30AM arrival for procedure check in  Time of Procedure(s) 10:30 AM   Location of Procedure(s) [place] [address] Floor  Type of Rx Prep sent to patient: N/A  Instructions provided to patient via [facility]    Patient was informed on the following information and verbalized understanding. Screening questionnaire reviewed with patient and complete. If procedure requires anesthesia, a responsible adult needs to be present to accompany the patient home, patient cannot drive after receiving anesthesia. Appointment details are tentative, especially check-in time. Patient will receive a prep-op call 7 days prior to confirm check-in time for procedure. If applicable the patient should contact their pharmacy to verify Rx for procedure prep is ready for pick-up. Patient was advised to call the scheduling department at [phone] if pharmacy states no Rx is available. Patient was advised to call the endoscopy scheduling department if any questions or concerns arise.       Endoscopy Scheduling Department      Message sent to Endoscopy clearance nurse per protocol to submit Xarelto hold:     The patient is currently under an external PCP - Dr. Yuli Costa's care and requires a blood thinner Xarelto (rivaroxaban) hold for their upcoming scheduled Upper Endoscopy (EGD) on 2/7/24.       External provider information:    Physician name: Dr. Yuli Costa  Facility/Location: [facility]  Phone number: [phone]  Notes: Per Pt,  Pt is taking Xarelto for A-fib.     Pt informed if clearance is not obtained from physician(s), the Endoscopy clearance nurse will reach out to Pt prior to procedure with further instructions per Endoscopy protocol. Pt verbalized understanding.

## 2024-01-19 ENCOUNTER — TELEPHONE (OUTPATIENT)
Dept: ENDOSCOPY | Facility: HOSPITAL | Age: 64
End: 2024-01-19
Payer: MEDICAID

## 2024-01-19 NOTE — TELEPHONE ENCOUNTER
Department of Endoscopy      Dear Dr. Costa,    Your patient, Jonny Isabel 1960 is being scheduled for Endoscopy and our records indicate they are taking Xarelto (rivaroxaban).    Please indicate if and when the patient can safely stop their medication prior to the procedure by completing one of the following:    Do not discontinue medication: _____    Medication can be discontinued _____ days prior to the procedure.    Please take into consideration that therapeutic maneuvers may be performed during the procedure that requires delay in restarting anticoagulation therapy.      Signature: ______________________________________________ Date:__________________    Please sign and date this letter and return fax to : [phone] If you have any questions or concerns, please call [phone] Monday-Friday, 8:00 am-3:00 pm.     Thank you for your prompt response,    [facility] Scheduling [facility]       Medication                                                                Hold Time                                                                                                                                                        ANTIPLATELETS   Persantine (dipyridamole) 2 days   Aggrenox (ASA/dipyridamole) 2 days     Pletal (cilostazol) 2 days     Plavix (clopidogrel) 5 days     Effient (prasugrel) 5 days     Ticlid (ticlidopine) 10 days     Brilinta (ticagrelor) 3 days     Zontivity (vorapaxar) 5 days   ANTICOAGULANTS   Coumadin (warfarin) 5 days   Lovenox (enoxaparin)  -last dose administered to be 50% of total daily dose 24 hours   Fragmin (dalteparin)   -last dose administered to be 50% of total daily dose 24 hours   Arixtra (fondaparinux) 2 days     Xarelto (rivaroxaban)   2 days     Eliquis (apixaban)   2 days     Savaysa (edoxaban)   2 days     Pradaxa (dabigatran)   2 days     Iprivask (desirudin)   10 hrs

## 2024-01-19 NOTE — TELEPHONE ENCOUNTER
----- Message from Ewa Wilson RN sent at 2024 12:14 PM CST -----  Regardin24 BT  The patient is currently under an external PCP - Dr. Yuli Costa's care and requires a blood thinner Xarelto (rivaroxaban) hold for their upcoming scheduled Upper Endoscopy (EGD) on 24.       External provider information:    Physician name: Dr. Yuli Costa  Facility/Location: [facility]  Phone number: [phone]  Notes: Per Pt,  Pt is taking Xarelto for A-fib.

## 2024-01-23 ENCOUNTER — TELEPHONE (OUTPATIENT)
Dept: INTERVENTIONAL RADIOLOGY/VASCULAR | Facility: HOSPITAL | Age: 64
End: 2024-01-23
Payer: MEDICAID

## 2024-01-23 NOTE — NURSING
Called and spoke to Adelita DENSON, to review preop instructions for 1/24, reviewed arrival time of 1330, bring list of home meds

## 2024-01-24 ENCOUNTER — HOSPITAL ENCOUNTER (OUTPATIENT)
Dept: INTERVENTIONAL RADIOLOGY/VASCULAR | Facility: HOSPITAL | Age: 64
Discharge: HOME | End: 2024-01-24
Attending: INTERNAL MEDICINE
Payer: MEDICAID

## 2024-01-24 ENCOUNTER — TELEPHONE (OUTPATIENT)
Dept: CARDIOLOGY | Facility: CLINIC | Age: 64
End: 2024-01-24
Payer: MEDICAID

## 2024-01-24 VITALS
HEIGHT: 69 IN | OXYGEN SATURATION: 99 % | RESPIRATION RATE: 16 BRPM | DIASTOLIC BLOOD PRESSURE: 65 MMHG | SYSTOLIC BLOOD PRESSURE: 141 MMHG | BODY MASS INDEX: 31.1 KG/M2 | WEIGHT: 210 LBS | HEART RATE: 53 BPM | TEMPERATURE: 98 F

## 2024-01-24 DIAGNOSIS — R18.8 ASCITES: ICD-10-CM

## 2024-01-24 DIAGNOSIS — R18.8 OTHER ASCITES: ICD-10-CM

## 2024-01-24 LAB
APPEARANCE FLD: NORMAL
BODY FLD TYPE: NORMAL
COLOR FLD: YELLOW
LYMPHOCYTES NFR FLD MANUAL: 77 %
MESOTHL CELL NFR FLD MANUAL: 3 %
MONOS+MACROS NFR FLD MANUAL: 17 %
NEUTROPHILS NFR FLD MANUAL: 3 %
WBC # FLD: 198 /CU MM

## 2024-01-24 PROCEDURE — 25000003 PHARM REV CODE 250: Performed by: PHYSICIAN ASSISTANT

## 2024-01-24 PROCEDURE — 89051 BODY FLUID CELL COUNT: CPT | Performed by: INTERNAL MEDICINE

## 2024-01-24 PROCEDURE — 49083 ABD PARACENTESIS W/IMAGING: CPT | Performed by: RADIOLOGY

## 2024-01-24 PROCEDURE — 49083 ABD PARACENTESIS W/IMAGING: CPT | Mod: ,,, | Performed by: PHYSICIAN ASSISTANT

## 2024-01-24 RX ORDER — LIDOCAINE HYDROCHLORIDE 10 MG/ML
INJECTION INFILTRATION; PERINEURAL
Status: COMPLETED | OUTPATIENT
Start: 2024-01-24 | End: 2024-01-24

## 2024-01-24 RX ORDER — POTASSIUM CHLORIDE 750 MG/1
10 TABLET, EXTENDED RELEASE ORAL ONCE
COMMUNITY

## 2024-01-24 RX ADMIN — LIDOCAINE HYDROCHLORIDE 5 ML: 10 INJECTION, SOLUTION INFILTRATION; PERINEURAL at 02:01

## 2024-01-24 NOTE — TELEPHONE ENCOUNTER
Spoke with patient's wife Mrs Ureña about getting a sooner appointment with BARBARA Díaz for Cardiac Medical Clearance (Endoscopy) to be perform on 02/07/24.

## 2024-01-24 NOTE — SEDATION DOCUMENTATION
Procedure complete. Patient alert and oriented x4 unlabored on Room Air. Patient denies pain and is resting comfortably. Surgical site dressed with gauze and tagederm. Dressing is clean, dry, and intact. Patient transported to recovery. 5L of Anh fluid removed.

## 2024-01-24 NOTE — H&P
Radiology History & Physical      SUBJECTIVE:     Chief Complaint: abdominal distention    History of Present Illness:  Jonny Isabel is a 63 y.o. male who presents for ultrasound guided paracentesis  Past Medical History:   Diagnosis Date    A-fib     Esophageal varices 5/8/2023    Other ascites 5/8/2023     Past Surgical History:   Procedure Laterality Date    ESOPHAGOGASTRODUODENOSCOPY N/A 1/17/2023    Procedure: EGD (ESOPHAGOGASTRODUODENOSCOPY);  Surgeon: Dewayne Navas MD;  Location: [address];  Service: Endoscopy;  Laterality: N/A;       Home Meds:   Prior to Admission medications    Medication Sig Start Date End Date Taking? Authorizing Provider   ADVAIR DISKUS 250-50 mcg/dose diskus inhaler Inhale 1 puff into the lungs 2 (two) times a day. Controller 12/12/23 12/11/24  Ellen Escalera NP   albuterol (VENTOLIN HFA) 90 mcg/actuation inhaler Inhale 1 puff into the lungs every 4 (four) hours as needed for Wheezing or Shortness of Breath. Rescue 2/14/23 2/14/24  Joanne Ruiz NP   amiodarone (PACERONE) 200 MG Tab Take 1 tablet (200 mg total) by mouth once daily. 12/12/23 12/11/24  Ellen Escalera NP   ferrous sulfate 324 mg (65 mg iron) TbEC Take by mouth. 1/4/24   Provider, Historical   folic acid (FOLVITE) 1 MG tablet Take 1 tablet (1 mg total) by mouth once daily. 2/7/23 2/7/24  Seymour Leon MD   furosemide (LASIX) 20 MG tablet Take 1 tablet (20 mg total) by mouth once daily. 12/12/23   Ellen Escalera NP   lancets (RELION THIN LANCETS) 26 gauge Misc 1 lancet by Misc.(Non-Drug; Combo Route) route 2 (two) times daily. 4/21/23 4/20/24  Joanne Ruiz NP   levothyroxine (SYNTHROID) 50 MCG tablet TAKE 1 TABLET BY MOUTH IN THE MORNING WITH A FULL GLASS OF WATER 30-60 MINUTES BEFORE OTHER MEDICATIONS AND FOOD 12/12/23   Ellen Escalera, NP   lisinopriL-hydrochlorothiazide (PRINZIDE,ZESTORETIC) 10-12.5 mg per tablet Take 1 tablet by mouth once daily.    Provider,  Historical   metoprolol succinate (TOPROL-XL) 50 MG 24 hr tablet Take 1 tablet (50 mg total) by mouth once daily. 12/12/23 12/11/24  Ellen Escalera NP   metoprolol tartrate (LOPRESSOR) 100 MG tablet Take 100 mg by mouth. 12/27/23   Provider, Historical   omega 3-dha-epa-fish oil 1,000 mg (120 mg-180 mg) Cap 2 capsule DAILY (route: oral) 7/13/22   Provider, Historical   ondansetron (ZOFRAN) 8 MG tablet Take 1 tablet (8 mg total) by mouth every 8 (eight) hours as needed for Nausea. 12/5/23   Joanne Ruiz NP   pantoprazole (PROTONIX) 40 MG tablet Take 1 tablet by mouth 2 (two) times daily. 8/17/22   Provider, Historical   promethazine (PHENERGAN) 12.5 MG Tab Take 1 tablet (12.5 mg total) by mouth nightly as needed (for nausea unrelieved by zofran). 12/12/23 3/11/24  Ellen Escalera NP   simvastatin (ZOCOR) 40 MG tablet Take 1 tablet every day by oral route at bedtime.    Provider, Historical   spironolactone (ALDACTONE) 50 MG tablet Take 1 tablet (50 mg total) by mouth once daily. 6/16/23 6/15/24  Lovely Escalante MD   thiamine 100 MG tablet Take 1 tablet (100 mg total) by mouth once daily. 2/7/23 2/7/24  Seymour Leon MD   thiamine 100 MG tablet Take 1 tablet by mouth once daily. 7/13/22   Provider, Historical   traZODone (DESYREL) 100 MG tablet Take 1 tablet (100 mg total) by mouth every evening. 12/12/23 3/11/24  Ellen Escalera NP   VITAMIN B COMPLEX ORAL Take 1 tablet by mouth once daily.    Provider, Historical   XARELTO 20 mg Tab Take 20 mg by mouth once daily. 1/9/23   Provider, Historical     Anticoagulants/Antiplatelets:  xarelto    Allergies: Review of patient's allergies indicates:  No Known Allergies  Sedation History:  no adverse reactions    Review of Systems:   Hematological: no known coagulopathies  Respiratory: no shortness of breath  Cardiovascular: no chest pain  Gastrointestinal: no abdominal pain  Genito-Urinary: no dysuria  Musculoskeletal: negative  Neurological: no  TIA or stroke symptoms         OBJECTIVE:     Vital Signs (Most Recent)       Physical Exam:  ASA: 2  Mallampati: n/a    General: no acute distress  Mental Status: alert and oriented to person, place and time  HEENT: normocephalic, atraumatic  Chest: unlabored breathing  Heart: regular heart rate  Abdomen: distended  Extremity: moves all extremities    ASSESSMENT/PLAN:     Sedation Plan: local  Patient will undergo ultrasound guided paracentesis.    Ashia Gallegos PA-C

## 2024-01-24 NOTE — PROCEDURES
Radiology Post-Procedure Note    Pre Op Diagnosis: Ascites  Post Op Diagnosis: Same    Procedure: Ultrasound Guided Paracentesis    Procedure performed by: Ashia Gallegos PA-C    Written Informed Consent Obtained: Yes  Specimen Removed: YES   Estimated Blood Loss: Minimal    Findings:   Successful paracentesis.  5L karma fluid removed.  Albumin was not administered    Patient tolerated procedure well.    Ashia Gallegos PA-C

## 2024-01-24 NOTE — DISCHARGE SUMMARY
Interventional Radiology Short Stay Discharge Summary      Admit Date: 1/24/2024  Discharge Date: 01/24/2024     Hospital Course: Uneventful    Discharge Diagnosis: ascites    Discharge Condition: Stable    Discharge Disposition: Home    Diet: Resume prior diet    Activity: activity as tolerated    Follow-up: With referring provider    Cindy Chava, PA-C [facility]

## 2024-01-24 NOTE — TELEPHONE ENCOUNTER
----- Message from Jigar Johns LPN sent at 1/24/2024 10:28 AM CST -----  Regarding: Medical Clearance needed  Good Morning,   Patient has previously seen Dr. Burt.  He needs a cardiology medical clearance for endoscope scheduled on 2/7/24.  Is there any way he can be seen for medical clearance for his procedure?  FYI, he has an appointment for a paracentesis today at the [place] location at 2pm.  Contact is his spouse Adelita [phone].  Thanks so much.    [facility].

## 2024-01-30 ENCOUNTER — TELEPHONE (OUTPATIENT)
Dept: INTERVENTIONAL RADIOLOGY/VASCULAR | Facility: HOSPITAL | Age: 64
End: 2024-01-30
Payer: MEDICAID

## 2024-01-31 ENCOUNTER — HOSPITAL ENCOUNTER (OUTPATIENT)
Dept: INTERVENTIONAL RADIOLOGY/VASCULAR | Facility: HOSPITAL | Age: 64
Discharge: HOME | End: 2024-01-31
Attending: INTERNAL MEDICINE
Payer: MEDICAID

## 2024-01-31 ENCOUNTER — TELEPHONE (OUTPATIENT)
Dept: ENDOSCOPY | Facility: HOSPITAL | Age: 64
End: 2024-01-31
Payer: MEDICAID

## 2024-01-31 VITALS
DIASTOLIC BLOOD PRESSURE: 81 MMHG | RESPIRATION RATE: 20 BRPM | HEART RATE: 59 BPM | OXYGEN SATURATION: 98 % | SYSTOLIC BLOOD PRESSURE: 154 MMHG

## 2024-01-31 DIAGNOSIS — R18.8 ASCITES: ICD-10-CM

## 2024-01-31 DIAGNOSIS — R18.8 OTHER ASCITES: ICD-10-CM

## 2024-01-31 LAB
APPEARANCE FLD: CLEAR
BODY FLD TYPE: NORMAL
COLOR FLD: YELLOW
LYMPHOCYTES NFR FLD MANUAL: 66 %
MESOTHL CELL NFR FLD MANUAL: 6 %
MONOS+MACROS NFR FLD MANUAL: 24 %
NEUTROPHILS NFR FLD MANUAL: 4 %
WBC # FLD: 113 /CU MM

## 2024-01-31 PROCEDURE — 49083 ABD PARACENTESIS W/IMAGING: CPT | Performed by: STUDENT IN AN ORGANIZED HEALTH CARE EDUCATION/TRAINING PROGRAM

## 2024-01-31 PROCEDURE — P9047 ALBUMIN (HUMAN), 25%, 50ML: HCPCS | Mod: JG | Performed by: PHYSICIAN ASSISTANT

## 2024-01-31 PROCEDURE — 89051 BODY FLUID CELL COUNT: CPT | Performed by: INTERNAL MEDICINE

## 2024-01-31 PROCEDURE — 25000003 PHARM REV CODE 250: Performed by: PHYSICIAN ASSISTANT

## 2024-01-31 PROCEDURE — 63600175 PHARM REV CODE 636 W HCPCS: Mod: JG | Performed by: PHYSICIAN ASSISTANT

## 2024-01-31 PROCEDURE — 49083 ABD PARACENTESIS W/IMAGING: CPT | Mod: ,,, | Performed by: PHYSICIAN ASSISTANT

## 2024-01-31 RX ORDER — LIDOCAINE HYDROCHLORIDE 10 MG/ML
INJECTION INFILTRATION; PERINEURAL
Status: COMPLETED | OUTPATIENT
Start: 2024-01-31 | End: 2024-01-31

## 2024-01-31 RX ORDER — ALBUMIN HUMAN 250 G/1000ML
SOLUTION INTRAVENOUS
Status: COMPLETED | OUTPATIENT
Start: 2024-01-31 | End: 2024-01-31

## 2024-01-31 RX ADMIN — ALBUMIN (HUMAN) 62.5 G: 12.5 SOLUTION INTRAVENOUS at 02:01

## 2024-01-31 RX ADMIN — LIDOCAINE HYDROCHLORIDE 10 ML: 10 INJECTION, SOLUTION INFILTRATION; PERINEURAL at 01:01

## 2024-01-31 NOTE — SEDATION DOCUMENTATION
Patient presents for regular scheduled paracentesis. Per patient request if able to get off more than 5L patient ok with IV for albumin admin.     Total drained was 9.9L clear, karma fluid.  62.5 G of albumin administered VS stable throughout. Band-Aid applied to site

## 2024-01-31 NOTE — DISCHARGE SUMMARY
Interventional Radiology Short Stay Discharge Summary      Admit Date: 1/31/2024  Discharge Date: 01/31/2024     Hospital Course: Uneventful    Discharge Diagnosis: ascites    Discharge Condition: Stable    Discharge Disposition: Home    Diet: Resume prior diet    Activity: activity as tolerated    Follow-up: With referring provider    Cindy Chava, PA-C [facility]

## 2024-01-31 NOTE — PROCEDURES
Radiology Post-Procedure Note    Pre Op Diagnosis: Ascites  Post Op Diagnosis: Same    Procedure: Ultrasound Guided Paracentesis    Procedure performed by: Ashia Gallegos PA-C    Written Informed Consent Obtained: Yes  Specimen Removed: YES   Estimated Blood Loss: Minimal    Findings:   Successful paracentesis.  9.9L dark yellow fluid removed.  Albumin was administered PRN per protocol.    Patient tolerated procedure well.    Ashia Gallegos PA-C

## 2024-01-31 NOTE — H&P
Radiology History & Physical      SUBJECTIVE:     Chief Complaint: abdominal distention    History of Present Illness:  Jonny Isabel is a 63 y.o. male who presents for ultrasound guided paracentesis  Past Medical History:   Diagnosis Date    A-fib     Esophageal varices 5/8/2023    Other ascites 5/8/2023     Past Surgical History:   Procedure Laterality Date    ESOPHAGOGASTRODUODENOSCOPY N/A 1/17/2023    Procedure: EGD (ESOPHAGOGASTRODUODENOSCOPY);  Surgeon: Dewayne Navas MD;  Location: [address];  Service: Endoscopy;  Laterality: N/A;       Home Meds:   Prior to Admission medications    Medication Sig Start Date End Date Taking? Authorizing Provider   ADVAIR DISKUS 250-50 mcg/dose diskus inhaler Inhale 1 puff into the lungs 2 (two) times a day. Controller 12/12/23 12/11/24  Ellen Escalera NP   albuterol (VENTOLIN HFA) 90 mcg/actuation inhaler Inhale 1 puff into the lungs every 4 (four) hours as needed for Wheezing or Shortness of Breath. Rescue 2/14/23 2/14/24  Joanne Ruiz NP   amiodarone (PACERONE) 200 MG Tab Take 1 tablet (200 mg total) by mouth once daily. 12/12/23 12/11/24  Ellen Escalera NP   ferrous sulfate 324 mg (65 mg iron) TbEC Take by mouth. 1/4/24   Provider, Historical   folic acid (FOLVITE) 1 MG tablet Take 1 tablet (1 mg total) by mouth once daily. 2/7/23 2/7/24  Seymour Leon MD   furosemide (LASIX) 20 MG tablet Take 1 tablet (20 mg total) by mouth once daily. 12/12/23   Ellen Escalera NP   lancets (RELION THIN LANCETS) 26 gauge Misc 1 lancet by Misc.(Non-Drug; Combo Route) route 2 (two) times daily. 4/21/23 4/20/24  Joanne Ruiz NP   levothyroxine (SYNTHROID) 50 MCG tablet TAKE 1 TABLET BY MOUTH IN THE MORNING WITH A FULL GLASS OF WATER 30-60 MINUTES BEFORE OTHER MEDICATIONS AND FOOD 12/12/23   Ellen Escalera, NP   lisinopriL-hydrochlorothiazide (PRINZIDE,ZESTORETIC) 10-12.5 mg per tablet Take 1 tablet by mouth once daily.    Provider,  Historical   metoprolol succinate (TOPROL-XL) 50 MG 24 hr tablet Take 1 tablet (50 mg total) by mouth once daily. 12/12/23 12/11/24  Ellen Escalera NP   metoprolol tartrate (LOPRESSOR) 100 MG tablet Take 100 mg by mouth. 12/27/23   Provider, Historical   omega 3-dha-epa-fish oil 1,000 mg (120 mg-180 mg) Cap 2 capsule DAILY (route: oral) 7/13/22   Provider, Historical   ondansetron (ZOFRAN) 8 MG tablet Take 1 tablet (8 mg total) by mouth every 8 (eight) hours as needed for Nausea. 12/5/23   Joanne Ruiz NP   pantoprazole (PROTONIX) 40 MG tablet Take 1 tablet by mouth 2 (two) times daily. 8/17/22   Provider, Historical   potassium chloride (KLOR-CON) 10 MEQ TbSR Take 10 mEq by mouth once.    Provider, Historical   promethazine (PHENERGAN) 12.5 MG Tab Take 1 tablet (12.5 mg total) by mouth nightly as needed (for nausea unrelieved by zofran). 12/12/23 3/11/24  Ellen Escalera NP   simvastatin (ZOCOR) 40 MG tablet Take 1 tablet every day by oral route at bedtime.    Provider, Historical   spironolactone (ALDACTONE) 50 MG tablet Take 1 tablet (50 mg total) by mouth once daily. 6/16/23 6/15/24  Lovely Escalante MD   thiamine 100 MG tablet Take 1 tablet (100 mg total) by mouth once daily. 2/7/23 2/7/24  Seymour Leon MD   thiamine 100 MG tablet Take 1 tablet by mouth once daily. 7/13/22   Provider, Historical   traZODone (DESYREL) 100 MG tablet Take 1 tablet (100 mg total) by mouth every evening. 12/12/23 3/11/24  Ellen Escalera NP   VITAMIN B COMPLEX ORAL Take 1 tablet by mouth once daily.    Provider, Historical   XARELTO 20 mg Tab Take 20 mg by mouth once daily. 1/9/23   Provider, Historical     Anticoagulants/Antiplatelets:  xarelto    Allergies: Review of patient's allergies indicates:  No Known Allergies  Sedation History:  no adverse reactions    Review of Systems:   Hematological: no known coagulopathies  Respiratory: no shortness of breath  Cardiovascular: no chest  pain  Gastrointestinal: no abdominal pain  Genito-Urinary: no dysuria  Musculoskeletal: negative  Neurological: no TIA or stroke symptoms         OBJECTIVE:     Vital Signs (Most Recent)  Pulse: (!) 50 (01/31/24 1318)  Resp: 20 (01/31/24 1318)  BP: (!) 186/83 (01/31/24 1320)  SpO2: 100 % (01/31/24 1318)    Physical Exam:  ASA: 2  Mallampati: n/a    General: no acute distress  Mental Status: alert and oriented to person, place and time  HEENT: normocephalic, atraumatic  Chest: unlabored breathing  Heart: regular heart rate  Abdomen: distended  Extremity: moves all extremities    ASSESSMENT/PLAN:     Sedation Plan: local  Patient will undergo ultrasound guided paracentesis.    Ashia Gallegos PA-C

## 2024-02-01 ENCOUNTER — OFFICE VISIT (OUTPATIENT)
Dept: CARDIOLOGY | Facility: CLINIC | Age: 64
End: 2024-02-01
Payer: MEDICAID

## 2024-02-01 VITALS
WEIGHT: 185.19 LBS | HEIGHT: 69 IN | HEART RATE: 56 BPM | SYSTOLIC BLOOD PRESSURE: 120 MMHG | OXYGEN SATURATION: 95 % | DIASTOLIC BLOOD PRESSURE: 70 MMHG | BODY MASS INDEX: 27.43 KG/M2

## 2024-02-01 DIAGNOSIS — E66.09 CLASS 1 OBESITY DUE TO EXCESS CALORIES WITH SERIOUS COMORBIDITY AND BODY MASS INDEX (BMI) OF 31.0 TO 31.9 IN ADULT: ICD-10-CM

## 2024-02-01 DIAGNOSIS — Z01.810 PRE-OPERATIVE CARDIOVASCULAR EXAMINATION: ICD-10-CM

## 2024-02-01 DIAGNOSIS — E78.5 HYPERLIPIDEMIA ASSOCIATED WITH TYPE 2 DIABETES MELLITUS: ICD-10-CM

## 2024-02-01 DIAGNOSIS — E11.9 TYPE 2 DIABETES MELLITUS WITHOUT COMPLICATION, WITHOUT LONG-TERM CURRENT USE OF INSULIN: ICD-10-CM

## 2024-02-01 DIAGNOSIS — J44.9 CHRONIC OBSTRUCTIVE PULMONARY DISEASE, UNSPECIFIED COPD TYPE: ICD-10-CM

## 2024-02-01 DIAGNOSIS — R53.81 DEBILITY: ICD-10-CM

## 2024-02-01 DIAGNOSIS — Z72.0 TOBACCO USE: ICD-10-CM

## 2024-02-01 DIAGNOSIS — I48.0 PAF (PAROXYSMAL ATRIAL FIBRILLATION): ICD-10-CM

## 2024-02-01 DIAGNOSIS — F10.930 ALCOHOL WITHDRAWAL SYNDROME WITHOUT COMPLICATION: ICD-10-CM

## 2024-02-01 DIAGNOSIS — E11.69 HYPERLIPIDEMIA ASSOCIATED WITH TYPE 2 DIABETES MELLITUS: ICD-10-CM

## 2024-02-01 DIAGNOSIS — I15.2 HYPERTENSION ASSOCIATED WITH DIABETES: Primary | ICD-10-CM

## 2024-02-01 DIAGNOSIS — K70.31 ALCOHOLIC CIRRHOSIS OF LIVER WITH ASCITES: ICD-10-CM

## 2024-02-01 DIAGNOSIS — E11.59 HYPERTENSION ASSOCIATED WITH DIABETES: Primary | ICD-10-CM

## 2024-02-01 PROCEDURE — 1160F RVW MEDS BY RX/DR IN RCRD: CPT | Mod: CPTII,,,

## 2024-02-01 PROCEDURE — 99214 OFFICE O/P EST MOD 30 MIN: CPT | Mod: S$PBB,,,

## 2024-02-01 PROCEDURE — 3008F BODY MASS INDEX DOCD: CPT | Mod: CPTII,,,

## 2024-02-01 PROCEDURE — 99214 OFFICE O/P EST MOD 30 MIN: CPT | Mod: PBBFAC,PN

## 2024-02-01 PROCEDURE — 3074F SYST BP LT 130 MM HG: CPT | Mod: CPTII,,,

## 2024-02-01 PROCEDURE — 99999 PR PBB SHADOW E&M-EST. PATIENT-LVL IV: CPT | Mod: PBBFAC,,,

## 2024-02-01 PROCEDURE — 4010F ACE/ARB THERAPY RXD/TAKEN: CPT | Mod: CPTII,,,

## 2024-02-01 PROCEDURE — 1159F MED LIST DOCD IN RCRD: CPT | Mod: CPTII,,,

## 2024-02-01 PROCEDURE — 3078F DIAST BP <80 MM HG: CPT | Mod: CPTII,,,

## 2024-02-01 NOTE — PROGRESS NOTES
Subjective:    Patient ID:  Jonny Isabel is a 63 y.o. male who presents for follow-up of No chief complaint on file.      PCP: Yuli Costa MD     Referring Provider:     HPI: Pt is a 62 yo M w/ PMH of DM2, AFL s/p RFA 11/2015, HLD, PAF on AC w/ rivaroxaban, HTN, EtOH cirrhosis w/ esophageal varices, and tobacco abuse (1 PPD, 50 k/yrs) who presents today for pre-op evaluation. He is scheduled for EGD on 2/7/24 by Dr. Goins.  He was last seen by Dr. Burt on 6/25/23 to establish care post  hospitalization 1/16/2023-2/7/2023 for concern for cauda equina syndrome and decompensated cirrhosis and  was also noted to have PAF. He was referred to EP for further evaluation but has not followed up He has paracentesis weekly, last on 1/31/24.     He denies cp, sob, edema, orthopnea, PND, presyncope, LOC, palpitations, and claudication.  He notes compliance w/ meds and denies side effects. He does not monitor his BP at home. He does not exercise and notes weakness due to muscle atrophy.          Past Medical History:   Diagnosis Date    A-fib     Esophageal varices 5/8/2023    Other ascites 5/8/2023     Past Surgical History:   Procedure Laterality Date    ESOPHAGOGASTRODUODENOSCOPY N/A 1/17/2023    Procedure: EGD (ESOPHAGOGASTRODUODENOSCOPY);  Surgeon: Dewayne Navas MD;  Location: [address]);  Service: Endoscopy;  Laterality: N/A;     Social History     Socioeconomic History    Marital status:    Tobacco Use    Smoking status: Every Day     Current packs/day: 1.00     Average packs/day: 1 pack/day for 40.0 years (40.0 ttl pk-yrs)     Types: Cigarettes    Smokeless tobacco: Never   Substance and Sexual Activity    Alcohol use: Yes     Alcohol/week: 6.0 standard drinks of alcohol     Types: 6 Cans of beer per week    Drug use: Not Currently    Sexual activity: Yes     Partners: Female     Social Determinants of Health     Financial Resource Strain: Low Risk  (2/23/2023)    Overall Financial Resource  Strain (CARDIA)     Difficulty of Paying Living Expenses: Not hard at all   Food Insecurity: No Food Insecurity (2/23/2023)    Hunger Vital Sign     Worried About Running Out of Food in the Last Year: Never true     Ran Out of Food in the Last Year: Never true   Transportation Needs: No Transportation Needs (2/23/2023)    PRAPARE - Transportation     Lack of Transportation (Medical): No     Lack of Transportation (Non-Medical): No   Physical Activity: Sufficiently Active (2/23/2023)    Exercise Vital Sign     Days of Exercise per Week: 7 days     Minutes of Exercise per Session: 90 min   Stress: Stress Concern Present (2/23/2023)    [employer] [employer] - Occupational Stress Questionnaire     Feeling of Stress : Rather much   Social Connections: Socially Isolated (2/23/2023)    Social Connection and Isolation Panel [NHANES]     Frequency of Communication with Friends and Family: Never     Frequency of Social Gatherings with Friends and Family: Never     Attends Congregational Services: Never     Active Member of Clubs or Organizations: No     Attends Club or Organization Meetings: Never     Marital Status: Living with partner   Housing Stability: Low Risk  (2/23/2023)    Housing Stability Vital Sign     Unable to Pay for Housing in the Last Year: No     Number of Places Lived in the Last Year: 1     Unstable Housing in the Last Year: No     No family history on file.    Review of patient's allergies indicates:  No Known Allergies    Medication List with Changes/Refills   Current Medications    ADVAIR DISKUS 250-50 MCG/DOSE DISKUS INHALER    Inhale 1 puff into the lungs 2 (two) times a day. Controller    ALBUTEROL (VENTOLIN HFA) 90 MCG/ACTUATION INHALER    Inhale 1 puff into the lungs every 4 (four) hours as needed for Wheezing or Shortness of Breath. Rescue    AMIODARONE (PACERONE) 200 MG TAB    Take 1 tablet (200 mg total) by mouth once daily.    FERROUS SULFATE 324 MG (65 MG IRON) TBEC    Take by  mouth.    FOLIC ACID (FOLVITE) 1 MG TABLET    Take 1 tablet (1 mg total) by mouth once daily.    FUROSEMIDE (LASIX) 20 MG TABLET    Take 1 tablet (20 mg total) by mouth once daily.    LANCETS (RELION THIN LANCETS) 26 GAUGE MISC    1 lancet by Misc.(Non-Drug; Combo Route) route 2 (two) times daily.    LEVOTHYROXINE (SYNTHROID) 50 MCG TABLET    TAKE 1 TABLET BY MOUTH IN THE MORNING WITH A FULL GLASS OF WATER 30-60 MINUTES BEFORE OTHER MEDICATIONS AND FOOD    LISINOPRIL-HYDROCHLOROTHIAZIDE (PRINZIDE,ZESTORETIC) 10-12.5 MG PER TABLET    Take 1 tablet by mouth once daily.    METOPROLOL SUCCINATE (TOPROL-XL) 50 MG 24 HR TABLET    Take 1 tablet (50 mg total) by mouth once daily.    METOPROLOL TARTRATE (LOPRESSOR) 100 MG TABLET    Take 100 mg by mouth.    OMEGA 3-DHA-EPA-FISH OIL 1,000 MG (120 MG-180 MG) CAP    2 capsule DAILY (route: oral)    ONDANSETRON (ZOFRAN) 8 MG TABLET    Take 1 tablet (8 mg total) by mouth every 8 (eight) hours as needed for Nausea.    PANTOPRAZOLE (PROTONIX) 40 MG TABLET    Take 1 tablet by mouth 2 (two) times daily.    POTASSIUM CHLORIDE (KLOR-CON) 10 MEQ TBSR    Take 10 mEq by mouth once.    PROMETHAZINE (PHENERGAN) 12.5 MG TAB    Take 1 tablet (12.5 mg total) by mouth nightly as needed (for nausea unrelieved by zofran).    SIMVASTATIN (ZOCOR) 40 MG TABLET    Take 1 tablet every day by oral route at bedtime.    SPIRONOLACTONE (ALDACTONE) 50 MG TABLET    Take 1 tablet (50 mg total) by mouth once daily.    THIAMINE 100 MG TABLET    Take 1 tablet (100 mg total) by mouth once daily.    THIAMINE 100 MG TABLET    Take 1 tablet by mouth once daily.    TRAZODONE (DESYREL) 100 MG TABLET    Take 1 tablet (100 mg total) by mouth every evening.    VITAMIN B COMPLEX ORAL    Take 1 tablet by mouth once daily.    XARELTO 20 MG TAB    Take 20 mg by mouth once daily.       Review of Systems   Constitutional: Negative for diaphoresis and fever.   HENT:  Negative for congestion and hearing loss.    Eyes:  Negative  "for blurred vision and pain.   Cardiovascular:  Negative for chest pain, claudication, dyspnea on exertion, leg swelling, near-syncope, palpitations and syncope.   Respiratory:  Negative for shortness of breath and sleep disturbances due to breathing.    Hematologic/Lymphatic: Negative for bleeding problem. Does not bruise/bleed easily.   Skin:  Negative for color change and poor wound healing.   Gastrointestinal:  Negative for abdominal pain and nausea.   Genitourinary:  Negative for bladder incontinence and flank pain.   Neurological:  Positive for weakness. Negative for focal weakness and light-headedness.        Objective:   /70 (BP Location: Left arm, Patient Position: Sitting, BP Method: Medium (Manual))   Pulse (!) 56   Ht 5' 9" (1.753 m)   Wt 84 kg (185 lb 3 oz)   SpO2 95%   BMI 27.35 kg/m²    Physical Exam  Constitutional:       Appearance: He is well-developed. He is obese. He is not diaphoretic.   HENT:      Head: Normocephalic and atraumatic.   Eyes:      General: No scleral icterus.     Pupils: Pupils are equal, round, and reactive to light.   Neck:      Vascular: No JVD.   Cardiovascular:      Rate and Rhythm: Normal rate and regular rhythm.      Pulses: Intact distal pulses.      Heart sounds: S1 normal and S2 normal. No murmur heard.     No friction rub. No gallop.   Pulmonary:      Effort: Pulmonary effort is normal. No respiratory distress.      Breath sounds: Normal breath sounds. No wheezing or rales.   Chest:      Chest wall: No tenderness.   Abdominal:      General: Bowel sounds are normal. There is distension.      Palpations: Abdomen is soft. There is no mass.      Tenderness: There is no abdominal tenderness. There is no rebound.   Musculoskeletal:         General: No tenderness. Normal range of motion.      Cervical back: Normal range of motion and neck supple.   Skin:     General: Skin is warm and dry.      Coloration: Skin is not pale.   Neurological:      Mental Status: He is " alert and oriented to person, place, and time.      Coordination: Coordination normal.      Deep Tendon Reflexes: Reflexes normal.   Psychiatric:         Behavior: Behavior normal.         Judgment: Judgment normal.               EC2023- reviewed.  NSR, Low voltage QRS, Low anterior forces.       Echo: 2023- reviewed.    Summary     The left ventricle is small with normal systolic function.  The estimated ejection fraction is 65%.  Normal left ventricular diastolic function.  Normal right ventricular size with normal right ventricular systolic function.  Normal central venous pressure (3 mmHg).  The estimated PA systolic pressure is 30 mmHg.  Mild tricuspid regurgitation.     Assessment:       1. Hypertension associated with diabetes    2. PAF (paroxysmal atrial fibrillation)    3. Hyperlipidemia associated with type 2 diabetes mellitus    4. Chronic obstructive pulmonary disease, unspecified COPD type    5. Type 2 diabetes mellitus without complication, without long-term current use of insulin    6. Class 1 obesity due to excess calories with serious comorbidity and body mass index (BMI) of 31.0 to 31.9 in adult    7. Alcoholic cirrhosis of liver with ascites    8. Debility    9. Pre-operative cardiovascular examination    10. Alcohol withdrawal syndrome without complication    11. Tobacco use         Plan:         Hypertension associated with diabetes  Goal BP < 130/80.  Compliant w/ meds.    - continue medical therapy: lisinopril-HCTZ 10-12.5 mg, furosemide 20 mg   - risk factor and lifestyle modifications     PAF (paroxysmal atrial fibrillation)  CHADSVASc= 2 on AC w/ rivaroxaban 20 mg .    - continue medical therapy- metoprolol succinate 50 mg, amiodarone  -Currently in Sinus bradycardia HR 55    - referred to EP for A. Fib management, last visit but has not followed up    - risk factor and lifestyle modifications     Hyperlipidemia associated with type 2 diabetes mellitus  Goal LDL < 100.  Not on  statin therapy.    - f/u FLP from PCP  - risk factor and lifestyle modifications     Chronic obstructive pulmonary disease  -Followed by PCP  -continue medical therapy     Type 2 diabetes mellitus without complication, without long-term current use of insulin  Management per PCP.    Continue current therapy.      Class 1 obesity due to excess calories with serious comorbidity and body mass index (BMI) of 31.0 to 31.9 in adult  Encouraged lifestyle modifications (diet, exercise, and weight loss).    Body mass index is 27.35 kg/m².  obesity complicates all aspects of disease management from diagnostic modalities to treatment. Weight loss encouraged and health benefits explained to patient.       Alcoholic cirrhosis of liver with ascites  -Followed by Hepatology    Debility  In home  PT/OT     Pre-operative cardiovascular examination  Cardiac Risk Estimation: per the Revised Cardiac Risk Index (Circ. 100:1043, 1999), the patient's risk factors for cardiac complications include no active cardiac conditions, putting him in: RCI RISK CLASS I (0 risk factors, risk of major cardiac compl. appr. 0.5%)  -Patient can perform 4 METS of activity without limitations  -EKG in office- Sinus bradycardia- HR 55   -Cardiac echo 1/17/23- LVEF 65%   -okay to Hold (Xarelto) for 2 days prior to procedure and restart post procedure   -no additional testing needed prior to procedure     Alcohol withdrawal syndrome without complication  No recent alcohol use  Followed by Hepatology       Tobacco use  Pt aware of health complications a/w smoking and not ready to quit.    - cessation counseling provided   - cessation course declined per pt      Total duration of face to face visit time 30 minutes.  Total time spent counseling greater than fifty percent of total visit time.  Counseling included discussion regarding imaging findings, diagnosis, possibilities, treatment options, risks and benefits.  The patient had many questions regarding the  options and long-term effects      Jigar Díaz,NP  Cardiology

## 2024-02-01 NOTE — ASSESSMENT & PLAN NOTE
Goal BP < 130/80.  Compliant w/ meds.    - continue medical therapy: lisinopril-HCTZ 10-12.5 mg, furosemide 20 mg   - risk factor and lifestyle modifications

## 2024-02-01 NOTE — ASSESSMENT & PLAN NOTE
CHADSVASc= 2 on AC w/ rivaroxaban 20 mg .    - continue medical therapy- metoprolol succinate 50 mg, amiodarone  -Currently in Sinus bradycardia HR 55    - referred to EP for A. Fib management, last visit but has not followed up    - risk factor and lifestyle modifications

## 2024-02-01 NOTE — ASSESSMENT & PLAN NOTE
Encouraged lifestyle modifications (diet, exercise, and weight loss).    Body mass index is 27.35 kg/m².  obesity complicates all aspects of disease management from diagnostic modalities to treatment. Weight loss encouraged and health benefits explained to patient.

## 2024-02-07 ENCOUNTER — ANESTHESIA EVENT (OUTPATIENT)
Dept: ENDOSCOPY | Facility: HOSPITAL | Age: 64
End: 2024-02-07
Payer: MEDICAID

## 2024-02-07 ENCOUNTER — ANESTHESIA (OUTPATIENT)
Dept: ENDOSCOPY | Facility: HOSPITAL | Age: 64
End: 2024-02-07
Payer: MEDICAID

## 2024-02-07 ENCOUNTER — HOSPITAL ENCOUNTER (OUTPATIENT)
Facility: HOSPITAL | Age: 64
Discharge: HOME | End: 2024-02-07
Attending: INTERNAL MEDICINE | Admitting: INTERNAL MEDICINE
Payer: MEDICAID

## 2024-02-07 VITALS
WEIGHT: 185 LBS | BODY MASS INDEX: 27.4 KG/M2 | HEART RATE: 48 BPM | TEMPERATURE: 98 F | DIASTOLIC BLOOD PRESSURE: 63 MMHG | HEIGHT: 69 IN | SYSTOLIC BLOOD PRESSURE: 135 MMHG | OXYGEN SATURATION: 99 % | RESPIRATION RATE: 20 BRPM

## 2024-02-07 DIAGNOSIS — K74.60 CIRRHOSIS: ICD-10-CM

## 2024-02-07 DIAGNOSIS — K70.31 ALCOHOLIC CIRRHOSIS OF LIVER WITH ASCITES: Primary | ICD-10-CM

## 2024-02-07 LAB
OHS QRS DURATION: 106 MS
OHS QTC CALCULATION: 495 MS

## 2024-02-07 PROCEDURE — 43239 EGD BIOPSY SINGLE/MULTIPLE: CPT | Mod: ,,, | Performed by: INTERNAL MEDICINE

## 2024-02-07 PROCEDURE — 37000008 HC ANESTHESIA 1ST 15 MINUTES: Performed by: INTERNAL MEDICINE

## 2024-02-07 PROCEDURE — 63600175 PHARM REV CODE 636 W HCPCS: Performed by: NURSE ANESTHETIST, CERTIFIED REGISTERED

## 2024-02-07 PROCEDURE — 27201012 HC FORCEPS, HOT/COLD, DISP: Performed by: INTERNAL MEDICINE

## 2024-02-07 PROCEDURE — 88305 TISSUE EXAM BY PATHOLOGIST: CPT | Performed by: STUDENT IN AN ORGANIZED HEALTH CARE EDUCATION/TRAINING PROGRAM

## 2024-02-07 PROCEDURE — 43239 EGD BIOPSY SINGLE/MULTIPLE: CPT | Performed by: INTERNAL MEDICINE

## 2024-02-07 PROCEDURE — 25000003 PHARM REV CODE 250: Performed by: NURSE ANESTHETIST, CERTIFIED REGISTERED

## 2024-02-07 PROCEDURE — 37000009 HC ANESTHESIA EA ADD 15 MINS: Performed by: INTERNAL MEDICINE

## 2024-02-07 PROCEDURE — 88305 TISSUE EXAM BY PATHOLOGIST: CPT | Mod: 26,,, | Performed by: STUDENT IN AN ORGANIZED HEALTH CARE EDUCATION/TRAINING PROGRAM

## 2024-02-07 PROCEDURE — D9220A PRA ANESTHESIA: Mod: ANES,,, | Performed by: ANESTHESIOLOGY

## 2024-02-07 PROCEDURE — D9220A PRA ANESTHESIA: Mod: CRNA,,, | Performed by: NURSE ANESTHETIST, CERTIFIED REGISTERED

## 2024-02-07 RX ORDER — LORAZEPAM 2 MG/ML
0.25 INJECTION INTRAMUSCULAR ONCE AS NEEDED
Status: DISCONTINUED | OUTPATIENT
Start: 2024-02-07 | End: 2024-02-07 | Stop reason: HOSPADM

## 2024-02-07 RX ORDER — LIDOCAINE HYDROCHLORIDE 20 MG/ML
INJECTION INTRAVENOUS
Status: DISCONTINUED | OUTPATIENT
Start: 2024-02-07 | End: 2024-02-07

## 2024-02-07 RX ORDER — PROPOFOL 10 MG/ML
VIAL (ML) INTRAVENOUS
Status: DISCONTINUED | OUTPATIENT
Start: 2024-02-07 | End: 2024-02-07

## 2024-02-07 RX ORDER — HYDROMORPHONE HYDROCHLORIDE 1 MG/ML
0.2 INJECTION, SOLUTION INTRAMUSCULAR; INTRAVENOUS; SUBCUTANEOUS EVERY 5 MIN PRN
Status: DISCONTINUED | OUTPATIENT
Start: 2024-02-07 | End: 2024-02-07 | Stop reason: HOSPADM

## 2024-02-07 RX ORDER — ONDANSETRON HYDROCHLORIDE 2 MG/ML
4 INJECTION, SOLUTION INTRAVENOUS DAILY PRN
Status: DISCONTINUED | OUTPATIENT
Start: 2024-02-07 | End: 2024-02-07 | Stop reason: HOSPADM

## 2024-02-07 RX ORDER — SODIUM CHLORIDE 9 MG/ML
INJECTION, SOLUTION INTRAVENOUS CONTINUOUS
Status: DISCONTINUED | OUTPATIENT
Start: 2024-02-07 | End: 2024-02-07 | Stop reason: HOSPADM

## 2024-02-07 RX ADMIN — LIDOCAINE HYDROCHLORIDE 100 MG: 20 INJECTION INTRAVENOUS at 11:02

## 2024-02-07 RX ADMIN — PROPOFOL 100 MCG/KG/MIN: 10 INJECTION, EMULSION INTRAVENOUS at 11:02

## 2024-02-07 RX ADMIN — GLYCOPYRROLATE 0.2 MG: 0.2 INJECTION, SOLUTION INTRAMUSCULAR; INTRAVENOUS at 11:02

## 2024-02-07 RX ADMIN — SODIUM CHLORIDE: 9 INJECTION, SOLUTION INTRAVENOUS at 11:02

## 2024-02-07 RX ADMIN — PROPOFOL 100 MG: 10 INJECTION, EMULSION INTRAVENOUS at 11:02

## 2024-02-07 NOTE — TRANSFER OF CARE
"Anesthesia Transfer of Care Note    Patient: Jonny Isabel    Procedure(s) Performed: Procedure(s) (LRB):  EGD (ESOPHAGOGASTRODUODENOSCOPY) (N/A)    Patient location: [facility]    Anesthesia Type: general    Transport from OR: Transported from OR on 6-10 L/min O2 by face mask with adequate spontaneous ventilation    Post pain: adequate analgesia    Post assessment: no apparent anesthetic complications and tolerated procedure well    Post vital signs: stable    Level of consciousness: awake, alert and oriented    Nausea/Vomiting: no nausea/vomiting    Complications: none    Transfer of care protocol was followed      Last vitals: Visit Vitals  BP (!) 156/76 (BP Location: Left arm, Patient Position: Lying)   Pulse (!) 51   Temp 36.6 °C (97.8 °F) (Temporal)   Resp 17   Ht 5' 9" (1.753 m)   Wt 83.9 kg (185 lb)   SpO2 100%   BMI 27.32 kg/m²     "

## 2024-02-07 NOTE — ANESTHESIA PREPROCEDURE EVALUATION
02/07/2024  Jonny Isabel is a 63 y.o., male.      Pre-op Assessment    I have reviewed the Patient Summary Reports.     I have reviewed the Nursing Notes.       Review of Systems  Anesthesia Hx:  No problems with previous Anesthesia                Hematology/Oncology:  Hematology Normal   Oncology Normal                                   EENT/Dental:  EENT/Dental Normal           Cardiovascular:     Hypertension                                        Pulmonary:   COPD                     Renal/:  Renal/ Normal                 Hepatic/GI:      Liver Disease,            Musculoskeletal:  Musculoskeletal Normal                Neurological:  Neurology Normal                                      Endocrine:  Diabetes Hypothyroidism          Dermatological:  Skin Normal    Psych:  Psychiatric Normal                    Physical Exam  General: Well nourished    Airway:  Mallampati: II   Mouth Opening: Normal  TM Distance: Normal  Tongue: Normal  Neck ROM: Normal ROM    Dental:  Intact        Anesthesia Plan  Type of Anesthesia, risks & benefits discussed:    Anesthesia Type: Gen Natural Airway  Intra-op Monitoring Plan: Standard ASA Monitors  Post Op Pain Control Plan: multimodal analgesia  Induction:  IV  Airway Plan: Direct  Informed Consent: Informed consent signed with the Patient and all parties understand the risks and agree with anesthesia plan.  All questions answered. Patient consented to blood products? No  ASA Score: 3  Day of Surgery Review of History & Physical: H&P Update referred to the surgeon/provider.    Ready For Surgery From Anesthesia Perspective.     .

## 2024-02-07 NOTE — H&P
Short Stay Endoscopy History and Physical    PCP - Yuli Costa MD     Procedure - EGD  ASA - per anesthesia  Mallampati - per anesthesia  History of Anesthesia problems - no  Family history Anesthesia problems -  no   Plan of anesthesia - General, MAC    HPI:  This is a 63 y.o. male here for evaluation of :       Variceal surveillance    ROS:  Constitutional: No fevers, chills, No weight loss  CV: No chest pain  Pulm: No cough, No shortness of breath  Ophtho: No vision changes  GI: see HPI  Derm: No rash    Medical History:  has a past medical history of A-fib, Esophageal varices (5/8/2023), and Other ascites (5/8/2023).    Surgical History:  has a past surgical history that includes Esophagogastroduodenoscopy (N/A, 1/17/2023).    Family History: family history is not on file.. Otherwise no colon cancer, inflammatory bowel disease, or GI malignancies.    Social History:  reports that he has been smoking cigarettes. He has a 40.0 pack-year smoking history. He has never used smokeless tobacco. He reports current alcohol use of about 6.0 standard drinks of alcohol per week. He reports that he does not currently use drugs.    Review of patient's allergies indicates:  No Known Allergies    Medications:   Medications Prior to Admission   Medication Sig Dispense Refill Last Dose    ADVAIR DISKUS 250-50 mcg/dose diskus inhaler Inhale 1 puff into the lungs 2 (two) times a day. Controller 180 each 3     albuterol (VENTOLIN HFA) 90 mcg/actuation inhaler Inhale 1 puff into the lungs every 4 (four) hours as needed for Wheezing or Shortness of Breath. Rescue 18 g 0     amiodarone (PACERONE) 200 MG Tab Take 1 tablet (200 mg total) by mouth once daily. 30 tablet 11     ferrous sulfate 324 mg (65 mg iron) TbEC Take by mouth.       folic acid (FOLVITE) 1 MG tablet Take 1 tablet (1 mg total) by mouth once daily. 30 tablet 11     furosemide (LASIX) 20 MG tablet Take 1 tablet (20 mg total) by mouth once daily. 60 tablet 11      lancets (RELION THIN LANCETS) 26 gauge Misc 1 lancet by Misc.(Non-Drug; Combo Route) route 2 (two) times daily. 100 each 11     levothyroxine (SYNTHROID) 50 MCG tablet TAKE 1 TABLET BY MOUTH IN THE MORNING WITH A FULL GLASS OF WATER 30-60 MINUTES BEFORE OTHER MEDICATIONS AND FOOD 90 tablet 3     lisinopriL-hydrochlorothiazide (PRINZIDE,ZESTORETIC) 10-12.5 mg per tablet Take 1 tablet by mouth once daily.       metoprolol succinate (TOPROL-XL) 50 MG 24 hr tablet Take 1 tablet (50 mg total) by mouth once daily. 90 tablet 3     metoprolol tartrate (LOPRESSOR) 100 MG tablet Take 100 mg by mouth.       omega 3-dha-epa-fish oil 1,000 mg (120 mg-180 mg) Cap 2 capsule DAILY (route: oral)       ondansetron (ZOFRAN) 8 MG tablet Take 1 tablet (8 mg total) by mouth every 8 (eight) hours as needed for Nausea. 30 tablet 1     pantoprazole (PROTONIX) 40 MG tablet Take 1 tablet by mouth 2 (two) times daily.       potassium chloride (KLOR-CON) 10 MEQ TbSR Take 10 mEq by mouth once.       promethazine (PHENERGAN) 12.5 MG Tab Take 1 tablet (12.5 mg total) by mouth nightly as needed (for nausea unrelieved by zofran). 30 tablet 2     simvastatin (ZOCOR) 40 MG tablet Take 1 tablet every day by oral route at bedtime.       spironolactone (ALDACTONE) 50 MG tablet Take 1 tablet (50 mg total) by mouth once daily. 30 tablet 11     thiamine 100 MG tablet Take 1 tablet (100 mg total) by mouth once daily. 30 tablet 11     thiamine 100 MG tablet Take 1 tablet by mouth once daily.       traZODone (DESYREL) 100 MG tablet Take 1 tablet (100 mg total) by mouth every evening. 30 tablet 2     VITAMIN B COMPLEX ORAL Take 1 tablet by mouth once daily.       XARELTO 20 mg Tab Take 20 mg by mouth once daily.          Physical Exam:    Vital Signs: There were no vitals filed for this visit.    Gen: NAD, lying comfortably  HENT: NCAT, oropharynx clear  Eyes: anicteric sclerae, EOMI grossly  Neck: supple, no visible masses/goiter  Cardiac: RRR  Lungs:  non-labored breathing  Abd: soft, NT/ND, normoactive BS  Ext: no LE edema, warm, well perfused  Skin: skin intact on exposed body parts, no visible rashes, lesions  Neuro: A&Ox4, neuro exam grossly intact, moves all extremities  Psych: appropriate mood, affect      Labs:  Lab Results   Component Value Date    WBC 6.94 01/09/2024    HGB 10.0 (L) 01/09/2024    HCT 31.2 (L) 01/09/2024     01/09/2024    ALT 9 (L) 01/16/2024    AST 21 01/16/2024     01/16/2024    K 4.9 01/16/2024     01/16/2024    CREATININE 1.6 (H) 01/16/2024    BUN 13 01/16/2024    CO2 21 (L) 01/16/2024    INR 1.4 (H) 01/09/2024    HGBA1C 4.9 03/29/2023       Plan:  EGD     I have explained the risks and benefits of endoscopy procedures to the patient including but not limited to bleeding, perforation, infection, and death.  The patient was asked if they understand and allowed to ask any further questions to their satisfaction.      Nathan Goins MD

## 2024-02-07 NOTE — PROVATION PATIENT INSTRUCTIONS
Discharge Summary/Instructions after an Endoscopic Procedure  Patient Name: Jonny Isabel  Patient MRN: [id number]  Patient YOB: 1960  Wednesday, February 7, 2024  Nathan Goins MD  Dear patient,  As a result of recent federal legislation (The Federal Cures Act), you may   receive lab or pathology results from your procedure in your [facility]   account before your physician is able to contact you. Your physician or   their representative will relay the results to you with their   recommendations at their soonest availability.  Thank you,  RESTRICTIONS:  During your procedure today, you received medications for sedation.  These   medications may affect your judgment, balance and coordination.  Therefore,   for 24 hours, you have the following restrictions:   - DO NOT drive a car, operate machinery, make legal/financial decisions,   sign important papers or drink alcohol.    ACTIVITY:  Today: no heavy lifting, straining or running due to procedural   sedation/anesthesia.  The following day: return to full activity including work.  DIET:  Eat and drink normally unless instructed otherwise.     TREATMENT FOR COMMON SIDE EFFECTS:  - Mild abdominal pain, nausea, belching, bloating or excessive gas:  rest,   eat lightly and use a heating pad.  - Sore Throat: treat with throat lozenges and/or gargle with warm salt   water.  - Because air was used during the procedure, expelling large amounts of air   from your rectum or belching is normal.  - If a bowel prep was taken, you may not have a bowel movement for 1-3 days.    This is normal.  SYMPTOMS TO WATCH FOR AND REPORT TO YOUR PHYSICIAN:  1. Abdominal pain or bloating, other than gas cramps.  2. Chest pain.  3. Back pain.  4. Signs of infection such as: chills or fever occurring within 24 hours   after the procedure.  5. Rectal bleeding, which would show as bright red, maroon, or black stools.   (A tablespoon of blood from the rectum is not serious, especially if    hemorrhoids are present.)  6. Vomiting.  7. Weakness or dizziness.  GO DIRECTLY TO THE NEAREST EMERGENCY ROOM IF YOU HAVE ANY OF THE FOLLOWING:      Difficulty breathing              Chills and/or fever over 101 F   Persistent vomiting and/or vomiting blood   Severe abdominal pain   Severe chest pain   Black, tarry stools   Bleeding- more than one tablespoon   Any other symptom or condition that you feel may need urgent attention  Your doctor recommends these additional instructions:  If any biopsies were taken, your doctors clinic will contact you in 1 to 2   weeks with any results.  - Discharge patient to home.   - Resume previous diet.   - Continue present medications.   - Await pathology results.   - Repeat upper endoscopy in 1 year for surveillance.  - Resume anticoagulation in 24 hours.  For questions, problems or results please call your physician - Nathan Goins MD at Work:  [phone].  [facility] [place], EMERGENCY ROOM PHONE NUMBER: [phone]  IF A COMPLICATION OR EMERGENCY SITUATION ARISES AND YOU ARE UNABLE TO REACH   YOUR PHYSICIAN - GO DIRECTLY TO THE EMERGENCY ROOM.  Nathan Goins MD  2/7/2024 11:43:15 AM  This report has been verified and signed electronically.  Dear patient,  As a result of recent federal legislation (The Federal Cures Act), you may   receive lab or pathology results from your procedure in your [facility]   account before your physician is able to contact you. Your physician or   their representative will relay the results to you with their   recommendations at their soonest availability.  Thank you,  PROVATION

## 2024-02-08 NOTE — ANESTHESIA POSTPROCEDURE EVALUATION
Anesthesia Post Evaluation    Patient: Jonny Isabel    Procedure(s) Performed: Procedure(s) (LRB):  EGD (ESOPHAGOGASTRODUODENOSCOPY) (N/A)    Final Anesthesia Type: general      Patient location during evaluation: PACU  Patient participation: Yes- Able to Participate  Level of consciousness: awake and alert  Post-procedure vital signs: reviewed and stable  Pain management: adequate  Airway patency: patent    PONV status at discharge: No PONV  Anesthetic complications: no      Cardiovascular status: blood pressure returned to baseline  Respiratory status: spontaneous ventilation and room air  Hydration status: euvolemic  Follow-up not needed.              Vitals Value Taken Time   /63 02/07/24 1233   Temp 36.6 °C (97.9 °F) 02/07/24 1230   Pulse 49 02/07/24 1234   Resp 24 02/07/24 1234   SpO2 99 % 02/07/24 1234   Vitals shown include unvalidated device data.      No case tracking events are documented in the log.      Pain/Terrence Score: Terrence Score: 10 (2/7/2024 12:30 PM)

## 2024-02-12 ENCOUNTER — LAB VISIT (OUTPATIENT)
Dept: LAB | Facility: HOSPITAL | Age: 64
End: 2024-02-12
Attending: INTERNAL MEDICINE
Payer: MEDICAID

## 2024-02-12 DIAGNOSIS — K70.31 ALCOHOLIC CIRRHOSIS OF LIVER WITH ASCITES: ICD-10-CM

## 2024-02-12 DIAGNOSIS — R74.8 ELEVATED LIVER ENZYMES: ICD-10-CM

## 2024-02-12 DIAGNOSIS — R18.8 OTHER ASCITES: ICD-10-CM

## 2024-02-12 LAB
AFP SERPL-MCNC: <2 NG/ML (ref 0–8.4)
ALBUMIN SERPL BCP-MCNC: 2.7 G/DL (ref 3.5–5.2)
ALP SERPL-CCNC: 78 U/L (ref 55–135)
ALT SERPL W/O P-5'-P-CCNC: 11 U/L (ref 10–44)
ANION GAP SERPL CALC-SCNC: 9 MMOL/L (ref 8–16)
AST SERPL-CCNC: 22 U/L (ref 10–40)
BASOPHILS # BLD AUTO: 0.11 K/UL (ref 0–0.2)
BASOPHILS NFR BLD: 1.6 % (ref 0–1.9)
BILIRUB DIRECT SERPL-MCNC: 0.2 MG/DL (ref 0.1–0.3)
BILIRUB SERPL-MCNC: 0.3 MG/DL (ref 0.1–1)
BUN SERPL-MCNC: 18 MG/DL (ref 8–23)
CALCIUM SERPL-MCNC: 8.5 MG/DL (ref 8.7–10.5)
CHLORIDE SERPL-SCNC: 109 MMOL/L (ref 95–110)
CO2 SERPL-SCNC: 22 MMOL/L (ref 23–29)
CREAT SERPL-MCNC: 1.7 MG/DL (ref 0.5–1.4)
DIFFERENTIAL METHOD BLD: ABNORMAL
EOSINOPHIL # BLD AUTO: 0.2 K/UL (ref 0–0.5)
EOSINOPHIL NFR BLD: 2.5 % (ref 0–8)
ERYTHROCYTE [DISTWIDTH] IN BLOOD BY AUTOMATED COUNT: 17.1 % (ref 11.5–14.5)
EST. GFR  (NO RACE VARIABLE): 45 ML/MIN/1.73 M^2
GLUCOSE SERPL-MCNC: 105 MG/DL (ref 70–110)
HCT VFR BLD AUTO: 31.8 % (ref 40–54)
HGB BLD-MCNC: 10.1 G/DL (ref 14–18)
IMM GRANULOCYTES # BLD AUTO: 0.02 K/UL (ref 0–0.04)
IMM GRANULOCYTES NFR BLD AUTO: 0.3 % (ref 0–0.5)
INR PPP: 1.4 (ref 0.8–1.2)
LYMPHOCYTES # BLD AUTO: 1.2 K/UL (ref 1–4.8)
LYMPHOCYTES NFR BLD: 17.5 % (ref 18–48)
MCH RBC QN AUTO: 27.2 PG (ref 27–31)
MCHC RBC AUTO-ENTMCNC: 31.8 G/DL (ref 32–36)
MCV RBC AUTO: 86 FL (ref 82–98)
MONOCYTES # BLD AUTO: 0.6 K/UL (ref 0.3–1)
MONOCYTES NFR BLD: 8.7 % (ref 4–15)
NEUTROPHILS # BLD AUTO: 4.7 K/UL (ref 1.8–7.7)
NEUTROPHILS NFR BLD: 69.4 % (ref 38–73)
NRBC BLD-RTO: 0 /100 WBC
PLATELET # BLD AUTO: 235 K/UL (ref 150–450)
PMV BLD AUTO: 10.8 FL (ref 9.2–12.9)
POTASSIUM SERPL-SCNC: 4.8 MMOL/L (ref 3.5–5.1)
PROT SERPL-MCNC: 6.9 G/DL (ref 6–8.4)
PROTHROMBIN TIME: 14.6 SEC (ref 9–12.5)
RBC # BLD AUTO: 3.71 M/UL (ref 4.6–6.2)
SODIUM SERPL-SCNC: 140 MMOL/L (ref 136–145)
WBC # BLD AUTO: 6.8 K/UL (ref 3.9–12.7)

## 2024-02-12 PROCEDURE — 36415 COLL VENOUS BLD VENIPUNCTURE: CPT | Performed by: INTERNAL MEDICINE

## 2024-02-12 PROCEDURE — 80053 COMPREHEN METABOLIC PANEL: CPT | Performed by: INTERNAL MEDICINE

## 2024-02-12 PROCEDURE — 82248 BILIRUBIN DIRECT: CPT | Performed by: INTERNAL MEDICINE

## 2024-02-12 PROCEDURE — 82105 ALPHA-FETOPROTEIN SERUM: CPT | Performed by: INTERNAL MEDICINE

## 2024-02-12 PROCEDURE — 80321 ALCOHOLS BIOMARKERS 1OR 2: CPT | Performed by: INTERNAL MEDICINE

## 2024-02-12 PROCEDURE — 85025 COMPLETE CBC W/AUTO DIFF WBC: CPT | Performed by: INTERNAL MEDICINE

## 2024-02-12 PROCEDURE — 85610 PROTHROMBIN TIME: CPT | Performed by: INTERNAL MEDICINE

## 2024-02-14 ENCOUNTER — PATIENT MESSAGE (OUTPATIENT)
Dept: HEPATOLOGY | Facility: CLINIC | Age: 64
End: 2024-02-14
Payer: MEDICAID

## 2024-02-14 ENCOUNTER — HOSPITAL ENCOUNTER (OUTPATIENT)
Dept: INTERVENTIONAL RADIOLOGY/VASCULAR | Facility: HOSPITAL | Age: 64
Discharge: HOME | End: 2024-02-14
Attending: INTERNAL MEDICINE
Payer: MEDICAID

## 2024-02-14 VITALS
BODY MASS INDEX: 27.4 KG/M2 | OXYGEN SATURATION: 98 % | DIASTOLIC BLOOD PRESSURE: 71 MMHG | WEIGHT: 185 LBS | RESPIRATION RATE: 17 BRPM | SYSTOLIC BLOOD PRESSURE: 153 MMHG | HEART RATE: 54 BPM | TEMPERATURE: 98 F | HEIGHT: 69 IN

## 2024-02-14 DIAGNOSIS — R18.8 OTHER ASCITES: ICD-10-CM

## 2024-02-14 DIAGNOSIS — R18.8 ASCITES: ICD-10-CM

## 2024-02-14 LAB
APPEARANCE FLD: CLEAR
BODY FLD TYPE: NORMAL
CLINICAL BIOCHEMIST REVIEW: NORMAL
COLOR FLD: YELLOW
LYMPHOCYTES NFR FLD MANUAL: 47 %
MONOS+MACROS NFR FLD MANUAL: 47 %
NEUTROPHILS NFR FLD MANUAL: 6 %
PLPETH BLD-MCNC: <10 NG/ML
POPETH BLD-MCNC: <10 NG/ML
WBC # FLD: 71 /CU MM

## 2024-02-14 PROCEDURE — 49083 ABD PARACENTESIS W/IMAGING: CPT | Performed by: STUDENT IN AN ORGANIZED HEALTH CARE EDUCATION/TRAINING PROGRAM

## 2024-02-14 PROCEDURE — P9047 ALBUMIN (HUMAN), 25%, 50ML: HCPCS | Mod: JZ,JG | Performed by: PHYSICIAN ASSISTANT

## 2024-02-14 PROCEDURE — 89051 BODY FLUID CELL COUNT: CPT | Performed by: INTERNAL MEDICINE

## 2024-02-14 PROCEDURE — 25000003 PHARM REV CODE 250: Performed by: PHYSICIAN ASSISTANT

## 2024-02-14 PROCEDURE — A7048 VACUUM DRAIN BOTTLE/TUBE KIT: HCPCS

## 2024-02-14 PROCEDURE — 63600175 PHARM REV CODE 636 W HCPCS: Mod: JZ,JG | Performed by: PHYSICIAN ASSISTANT

## 2024-02-14 RX ORDER — ALBUMIN HUMAN 250 G/1000ML
SOLUTION INTRAVENOUS
Status: COMPLETED | OUTPATIENT
Start: 2024-02-14 | End: 2024-02-14

## 2024-02-14 RX ORDER — LIDOCAINE HYDROCHLORIDE 10 MG/ML
INJECTION INFILTRATION; PERINEURAL
Status: COMPLETED | OUTPATIENT
Start: 2024-02-14 | End: 2024-02-14

## 2024-02-14 RX ADMIN — LIDOCAINE HYDROCHLORIDE 5 ML: 10 INJECTION, SOLUTION INFILTRATION; PERINEURAL at 01:02

## 2024-02-14 RX ADMIN — ALBUMIN (HUMAN) 62.5 G: 12.5 SOLUTION INTRAVENOUS at 02:02

## 2024-02-14 NOTE — NURSING
Procedure complete. Vitals stable during procedure. Patient alert and oriented x4 unlabored on Room Air. Patient denies pain and is resting comfortably. Surgical site dressed with gauze and tagederm. Dressing is clean, dry, and intact. 8.7L of fluid removed. Patient discharged to family Aox4 unlabored.

## 2024-02-14 NOTE — DISCHARGE SUMMARY
Interventional Radiology Short Stay Discharge Summary      Admit Date: 2/14/2024  Discharge Date: 02/14/2024     Hospital Course: Uneventful    Discharge Diagnosis: ascites    Discharge Condition: Stable    Discharge Disposition: Home    Diet: Resume prior diet    Activity: activity as tolerated    Follow-up: With referring provider    Cindy Chava, PA-C [facility]

## 2024-02-14 NOTE — NURSING
Patient is discharged from IR. IV is removed. Site is dressed, and patient is advised to leave dressing in place for at least 20 to 30 minutes. Patient declined printed AVS. The opportunity to ask questions is provided. Patient is wheeled to family vehicle on the entrance ramp.

## 2024-02-14 NOTE — PROCEDURES
Radiology Post-Procedure Note    Pre Op Diagnosis: Ascites  Post Op Diagnosis: Same    Procedure: Ultrasound Guided Paracentesis    Procedure performed by: Ashia Gallegos PA-C    Written Informed Consent Obtained: Yes  Specimen Removed: YES   Estimated Blood Loss: Minimal    Findings:   Successful paracentesis.  8.7 L of clear yellow fluid.  Albumin was administered PRN per protocol.    Patient tolerated procedure well.    Ashia Gallegos PA-C

## 2024-02-14 NOTE — H&P
Radiology History & Physical      SUBJECTIVE:     Chief Complaint: abdominal distention    History of Present Illness:  Jonny Isabel is a 63 y.o. male who presents for ultrasound guided paracentesis  Past Medical History:   Diagnosis Date    A-fib     Esophageal varices 5/8/2023    Other ascites 5/8/2023     Past Surgical History:   Procedure Laterality Date    ESOPHAGOGASTRODUODENOSCOPY N/A 1/17/2023    Procedure: EGD (ESOPHAGOGASTRODUODENOSCOPY);  Surgeon: Dewayne Navas MD;  Location: [facility] (2ND FLR);  Service: Endoscopy;  Laterality: N/A;    ESOPHAGOGASTRODUODENOSCOPY N/A 2/7/2024    Procedure: EGD (ESOPHAGOGASTRODUODENOSCOPY);  Surgeon: Nathan Goins MD;  Location: [facility] (2ND FLR);  Service: Endoscopy;  Laterality: N/A;  referral: Dr. Escalante /cirrhosis - labs- possible banding / prep ins on portal / 2nd floor - Pt c/o SOB at times./ Xarelto - message sent to clearance nurse per protocol - ERW  1/31/24- LVM for precall - ERW  2/1-precall complet-Kpvt  ok to hold Xarelto2 da       Home Meds:   Prior to Admission medications    Medication Sig Start Date End Date Taking? Authorizing Provider   ADVAIR DISKUS 250-50 mcg/dose diskus inhaler Inhale 1 puff into the lungs 2 (two) times a day. Controller 12/12/23 12/11/24 Yes Ellen Escalera NP   albuterol (VENTOLIN HFA) 90 mcg/actuation inhaler Inhale 1 puff into the lungs every 4 (four) hours as needed for Wheezing or Shortness of Breath. Rescue 2/14/23 2/14/24 Yes Joanne Ruiz NP   amiodarone (PACERONE) 200 MG Tab Take 1 tablet (200 mg total) by mouth once daily. 12/12/23 12/11/24 Yes Ellen Escalera NP   ferrous sulfate 324 mg (65 mg iron) TbEC Take by mouth. 1/4/24  Yes Provider, Historical   folic acid (FOLVITE) 1 MG tablet Take 1 tablet (1 mg total) by mouth once daily. 2/7/23 2/14/24 Yes Seymour Leon MD   levothyroxine (SYNTHROID) 50 MCG tablet TAKE 1 TABLET BY MOUTH IN THE MORNING WITH A FULL GLASS OF WATER 30-60 MINUTES  BEFORE OTHER MEDICATIONS AND FOOD 12/12/23  Yes Ellen Escalera NP   omega 4-rls-jmg-fish oil 1,000 mg (120 mg-180 mg) Cap 2 capsule DAILY (route: oral) 7/13/22  Yes Provider, Historical   pantoprazole (PROTONIX) 40 MG tablet Take 1 tablet by mouth 2 (two) times daily. 8/17/22  Yes Provider, Historical   potassium chloride (KLOR-CON) 10 MEQ TbSR Take 10 mEq by mouth once.   Yes Provider, Historical   promethazine (PHENERGAN) 12.5 MG Tab Take 1 tablet (12.5 mg total) by mouth nightly as needed (for nausea unrelieved by zofran). 12/12/23 3/11/24 Yes Ellen Escalera NP   thiamine 100 MG tablet Take 1 tablet by mouth once daily. 7/13/22  Yes Provider, Historical   traZODone (DESYREL) 100 MG tablet Take 1 tablet (100 mg total) by mouth every evening. 12/12/23 3/11/24 Yes Ellen Escalera NP   furosemide (LASIX) 20 MG tablet Take 1 tablet (20 mg total) by mouth once daily. 12/12/23   Ellen Escalera NP   lancets (RELION THIN LANCETS) 26 gauge Misc 1 lancet by Misc.(Non-Drug; Combo Route) route 2 (two) times daily. 4/21/23 4/20/24  Joanne Ruiz NP   lisinopriL-hydrochlorothiazide (PRINZIDE,ZESTORETIC) 10-12.5 mg per tablet Take 1 tablet by mouth once daily.    Provider, Historical   metoprolol succinate (TOPROL-XL) 50 MG 24 hr tablet Take 1 tablet (50 mg total) by mouth once daily. 12/12/23 12/11/24  Ellen Escalera NP   metoprolol tartrate (LOPRESSOR) 100 MG tablet Take 100 mg by mouth. 12/27/23   Provider, Historical   ondansetron (ZOFRAN) 8 MG tablet Take 1 tablet (8 mg total) by mouth every 8 (eight) hours as needed for Nausea. 12/5/23   Joanne Ruiz NP   simvastatin (ZOCOR) 40 MG tablet Take 1 tablet every day by oral route at bedtime.    Provider, Historical   spironolactone (ALDACTONE) 50 MG tablet Take 1 tablet (50 mg total) by mouth once daily. 6/16/23 6/15/24  Lovely Escalante MD   VITAMIN B COMPLEX ORAL Take 1 tablet by mouth once daily.    Provider, Historical   XARELTO 20 mg  Tab Take 20 mg by mouth once daily. 1/9/23   Provider, Historical     Anticoagulants/Antiplatelets: no anticoagulation    Allergies: Review of patient's allergies indicates:  No Known Allergies  Sedation History:  no adverse reactions    Review of Systems:   Hematological: no known coagulopathies  Respiratory: no shortness of breath  Cardiovascular: no chest pain  Gastrointestinal: no abdominal pain  Genito-Urinary: no dysuria  Musculoskeletal: negative  Neurological: no TIA or stroke symptoms         OBJECTIVE:     Vital Signs (Most Recent)       Physical Exam:  ASA: 2  Mallampati: n/a    General: no acute distress  Mental Status: alert and oriented to person, place and time  HEENT: normocephalic, atraumatic  Chest: unlabored breathing  Heart: regular heart rate  Abdomen: distended  Extremity: moves all extremities    ASSESSMENT/PLAN:     Sedation Plan: local  Patient will undergo ultrasound guided paracentesis.    Ashia Gallegos PA-C

## 2024-02-16 LAB
FINAL PATHOLOGIC DIAGNOSIS: NORMAL
GROSS: NORMAL
Lab: NORMAL

## 2024-02-21 DIAGNOSIS — R18.8 OTHER ASCITES: Primary | ICD-10-CM

## 2024-03-19 DIAGNOSIS — J44.9 CHRONIC OBSTRUCTIVE PULMONARY DISEASE, UNSPECIFIED COPD TYPE: ICD-10-CM

## 2024-03-19 RX ORDER — FLUTICASONE PROPIONATE AND SALMETEROL 50; 250 UG/1; UG/1
1 POWDER RESPIRATORY (INHALATION) 2 TIMES DAILY
Qty: 180 EACH | Refills: 3 | Status: SHIPPED | OUTPATIENT
Start: 2024-03-19 | End: 2024-03-19 | Stop reason: SDUPTHER

## 2024-03-20 RX ORDER — FLUTICASONE PROPIONATE AND SALMETEROL 50; 250 UG/1; UG/1
1 POWDER RESPIRATORY (INHALATION) 2 TIMES DAILY
Qty: 180 EACH | Refills: 3 | Status: SHIPPED | OUTPATIENT
Start: 2024-03-20 | End: 2025-03-20

## 2024-03-21 ENCOUNTER — HOSPITAL ENCOUNTER (EMERGENCY)
Facility: HOSPITAL | Age: 64
Discharge: HOME | End: 2024-03-21
Attending: EMERGENCY MEDICINE
Payer: MEDICAID

## 2024-03-21 VITALS
TEMPERATURE: 98 F | SYSTOLIC BLOOD PRESSURE: 134 MMHG | RESPIRATION RATE: 17 BRPM | WEIGHT: 185 LBS | BODY MASS INDEX: 27.32 KG/M2 | DIASTOLIC BLOOD PRESSURE: 83 MMHG | OXYGEN SATURATION: 98 % | HEART RATE: 49 BPM

## 2024-03-21 DIAGNOSIS — R18.8 OTHER ASCITES: Primary | ICD-10-CM

## 2024-03-21 DIAGNOSIS — K74.60 HEPATIC CIRRHOSIS, UNSPECIFIED HEPATIC CIRRHOSIS TYPE, UNSPECIFIED WHETHER ASCITES PRESENT: ICD-10-CM

## 2024-03-21 DIAGNOSIS — R11.0 NAUSEA: ICD-10-CM

## 2024-03-21 LAB
ALBUMIN SERPL BCP-MCNC: 3.2 G/DL (ref 3.5–5.2)
ALP SERPL-CCNC: 86 U/L (ref 55–135)
ALT SERPL W/O P-5'-P-CCNC: 12 U/L (ref 10–44)
ANION GAP SERPL CALC-SCNC: 11 MMOL/L (ref 8–16)
ANION GAP SERPL CALC-SCNC: 8 MMOL/L (ref 8–16)
AST SERPL-CCNC: 21 U/L (ref 10–40)
BASOPHILS # BLD AUTO: 0.08 K/UL (ref 0–0.2)
BASOPHILS NFR BLD: 1.1 % (ref 0–1.9)
BILIRUB SERPL-MCNC: 0.5 MG/DL (ref 0.1–1)
BUN SERPL-MCNC: 19 MG/DL (ref 8–23)
BUN SERPL-MCNC: 20 MG/DL (ref 8–23)
CALCIUM SERPL-MCNC: 8.7 MG/DL (ref 8.7–10.5)
CALCIUM SERPL-MCNC: 9.2 MG/DL (ref 8.7–10.5)
CHLORIDE SERPL-SCNC: 106 MMOL/L (ref 95–110)
CHLORIDE SERPL-SCNC: 107 MMOL/L (ref 95–110)
CO2 SERPL-SCNC: 21 MMOL/L (ref 23–29)
CO2 SERPL-SCNC: 21 MMOL/L (ref 23–29)
CREAT SERPL-MCNC: 1.8 MG/DL (ref 0.5–1.4)
CREAT SERPL-MCNC: 1.8 MG/DL (ref 0.5–1.4)
DIFFERENTIAL METHOD BLD: ABNORMAL
EOSINOPHIL # BLD AUTO: 0.1 K/UL (ref 0–0.5)
EOSINOPHIL NFR BLD: 1.1 % (ref 0–8)
ERYTHROCYTE [DISTWIDTH] IN BLOOD BY AUTOMATED COUNT: 16.2 % (ref 11.5–14.5)
EST. GFR  (NO RACE VARIABLE): 42 ML/MIN/1.73 M^2
EST. GFR  (NO RACE VARIABLE): 42 ML/MIN/1.73 M^2
GLUCOSE SERPL-MCNC: 93 MG/DL (ref 70–110)
GLUCOSE SERPL-MCNC: 99 MG/DL (ref 70–110)
HCT VFR BLD AUTO: 36.8 % (ref 40–54)
HGB BLD-MCNC: 12 G/DL (ref 14–18)
IMM GRANULOCYTES # BLD AUTO: 0.03 K/UL (ref 0–0.04)
IMM GRANULOCYTES NFR BLD AUTO: 0.4 % (ref 0–0.5)
INR PPP: 1.5 (ref 0.8–1.2)
LYMPHOCYTES # BLD AUTO: 1.1 K/UL (ref 1–4.8)
LYMPHOCYTES NFR BLD: 15.9 % (ref 18–48)
MCH RBC QN AUTO: 27.5 PG (ref 27–31)
MCHC RBC AUTO-ENTMCNC: 32.6 G/DL (ref 32–36)
MCV RBC AUTO: 84 FL (ref 82–98)
MONOCYTES # BLD AUTO: 0.5 K/UL (ref 0.3–1)
MONOCYTES NFR BLD: 6.5 % (ref 4–15)
NEUTROPHILS # BLD AUTO: 5.2 K/UL (ref 1.8–7.7)
NEUTROPHILS NFR BLD: 75 % (ref 38–73)
NRBC BLD-RTO: 0 /100 WBC
PLATELET # BLD AUTO: 272 K/UL (ref 150–450)
PMV BLD AUTO: 10.3 FL (ref 9.2–12.9)
POTASSIUM SERPL-SCNC: 5.1 MMOL/L (ref 3.5–5.1)
POTASSIUM SERPL-SCNC: 6 MMOL/L (ref 3.5–5.1)
PROT SERPL-MCNC: 8.4 G/DL (ref 6–8.4)
PROTHROMBIN TIME: 15.4 SEC (ref 9–12.5)
RBC # BLD AUTO: 4.36 M/UL (ref 4.6–6.2)
SODIUM SERPL-SCNC: 135 MMOL/L (ref 136–145)
SODIUM SERPL-SCNC: 139 MMOL/L (ref 136–145)
WBC # BLD AUTO: 6.96 K/UL (ref 3.9–12.7)

## 2024-03-21 PROCEDURE — 63600175 PHARM REV CODE 636 W HCPCS: Performed by: EMERGENCY MEDICINE

## 2024-03-21 PROCEDURE — 96374 THER/PROPH/DIAG INJ IV PUSH: CPT

## 2024-03-21 PROCEDURE — 80053 COMPREHEN METABOLIC PANEL: CPT | Performed by: NURSE PRACTITIONER

## 2024-03-21 PROCEDURE — 99284 EMERGENCY DEPT VISIT MOD MDM: CPT | Mod: 25

## 2024-03-21 PROCEDURE — 85025 COMPLETE CBC W/AUTO DIFF WBC: CPT | Performed by: NURSE PRACTITIONER

## 2024-03-21 PROCEDURE — 80048 BASIC METABOLIC PNL TOTAL CA: CPT | Mod: XB | Performed by: EMERGENCY MEDICINE

## 2024-03-21 PROCEDURE — 93010 ELECTROCARDIOGRAM REPORT: CPT | Mod: ,,, | Performed by: INTERNAL MEDICINE

## 2024-03-21 PROCEDURE — 85610 PROTHROMBIN TIME: CPT | Performed by: NURSE PRACTITIONER

## 2024-03-21 PROCEDURE — 93005 ELECTROCARDIOGRAM TRACING: CPT

## 2024-03-21 RX ORDER — ONDANSETRON HYDROCHLORIDE 2 MG/ML
4 INJECTION, SOLUTION INTRAVENOUS
Status: COMPLETED | OUTPATIENT
Start: 2024-03-21 | End: 2024-03-21

## 2024-03-21 RX ORDER — ONDANSETRON 4 MG/1
4 TABLET, ORALLY DISINTEGRATING ORAL EVERY 8 HOURS PRN
Qty: 9 TABLET | Refills: 0 | Status: SHIPPED | OUTPATIENT
Start: 2024-03-21

## 2024-03-21 RX ADMIN — ONDANSETRON 4 MG: 2 INJECTION INTRAMUSCULAR; INTRAVENOUS at 04:03

## 2024-03-21 NOTE — ED PROVIDER NOTES
Encounter Date: 3/21/2024       History     Chief Complaint   Patient presents with    Abdominal Pain     Pt has hx of cirrhosis, pt complains of abd bloating and swelling,  request paracentesis, + nausea       Pt has hx of cirrhosis and c/o abdominal distension along with SOB and nausea.      The history is provided by the patient.     Review of patient's allergies indicates:  No Known Allergies  Past Medical History:   Diagnosis Date    A-fib     Esophageal varices 5/8/2023    Other ascites 5/8/2023     Past Surgical History:   Procedure Laterality Date    ESOPHAGOGASTRODUODENOSCOPY N/A 1/17/2023    Procedure: EGD (ESOPHAGOGASTRODUODENOSCOPY);  Surgeon: Dewayne Navas MD;  Location: [facility] (2ND FLR);  Service: Endoscopy;  Laterality: N/A;    ESOPHAGOGASTRODUODENOSCOPY N/A 2/7/2024    Procedure: EGD (ESOPHAGOGASTRODUODENOSCOPY);  Surgeon: Nathan Goins MD;  [facility]  Service: Endoscopy;  Laterality: N/A;  referral: Dr. Escalante /cirrhosis - labs- possible banding / prep ins on portal / 2nd floor - Pt c/o SOB at times./ Xarelto - message sent to clearance nurse per protocol - ERW  1/31/24- LVM for precall - ERW  2/1-precall complet-Kpvt  ok to hold Xarelto2 da     No family history on file.  Social History     Tobacco Use    Smoking status: Every Day     Current packs/day: 1.00     Average packs/day: 1 pack/day for 40.0 years (40.0 ttl pk-yrs)     Types: Cigarettes    Smokeless tobacco: Never   Substance Use Topics    Alcohol use: Not Currently     Alcohol/week: 6.0 standard drinks of alcohol     Types: 6 Cans of beer per week    Drug use: Not Currently     Review of Systems   Gastrointestinal:  Positive for abdominal distention.       Physical Exam     Initial Vitals [03/21/24 1259]   BP Pulse Resp Temp SpO2   (!) 170/92 (!) 57 20 97.9 °F (36.6 °C) 99 %      MAP       --         Physical Exam    Vitals reviewed.  Cardiovascular:  Regular rhythm.           No murmur  heard.  Pulmonary/Chest: Breath sounds normal. He has no wheezes.   Abdominal:   Abdomen significantly distended   Musculoskeletal:         General: Normal range of motion.     Neurological: He is alert and oriented to person, place, and time.   Skin: Skin is warm and dry.               ED Course   Procedures  Labs Reviewed   CBC W/ AUTO DIFFERENTIAL - Abnormal; Notable for the following components:       Result Value    RBC 4.36 (*)     Hemoglobin 12.0 (*)     Hematocrit 36.8 (*)     RDW 16.2 (*)     Gran % 75.0 (*)     Lymph % 15.9 (*)     All other components within normal limits   COMPREHENSIVE METABOLIC PANEL - Abnormal; Notable for the following components:    Sodium 135 (*)     Potassium 6.0 (*)     CO2 21 (*)     Creatinine 1.8 (*)     Albumin 3.2 (*)     eGFR 42 (*)     All other components within normal limits   PROTIME-INR - Abnormal; Notable for the following components:    Prothrombin Time 15.4 (*)     INR 1.5 (*)     All other components within normal limits   BASIC METABOLIC PANEL - Abnormal; Notable for the following components:    CO2 21 (*)     Creatinine 1.8 (*)     eGFR 42 (*)     All other components within normal limits    Narrative:     New blood please     EKG Readings: (Independently Interpreted)   Sinus rhythm rate of 48, left axis, QRS of 98, nonspecific T-wave changes     ECG Results              EKG 12-lead (Final result)        Collection Time Result Time QRS Duration [facility] QTC Calculation    03/21/24 16:26:39 03/22/24 12:13:06 98 475                     Final result by Interface, Lab In [facility] (03/22/24 12:13:16)                   Narrative:    Test Reason : E87.5,    Vent. Rate : 048 BPM     Atrial Rate : 048 BPM     P-R Int : 210 ms          QRS Dur : 098 ms      QT Int : 532 ms       P-R-T Axes : 078 -55 061 degrees     QTc Int : 475 ms    Sinus bradycardia with 1st degree A-V block  Left axis deviation  Low voltage QRS  Cannot rule out Anterior infarct (cited on or before  01-FEB-2024)  Abnormal ECG  When compared with ECG of 01-FEB-2024 15:03,  No significant change was found  Confirmed by Pepe Augustin MD ([id number]) on 3/22/2024 12:12:58 PM    Referred By: JESSE   SELF           Confirmed By:Pepe Augustin MD                                  Imaging Results    None          Medications   ondansetron injection 4 mg (4 mg Intravenous Given 3/21/24 2137)     Medical Decision Making  Patient placed on the schedule for outpatient IR paracentesis to be performed at 7:30 a.m. tomorrow.  He states that he has had this done over here before and knows where to go on the 1st floor.  I did inform the patient that he has not to be late and that if he is late they may not be able to take him tomorrow.  Both him and wife verbalized understanding.  His vital signs are stable and he is able to tolerate fluids at the time of discharge.  Instructed patient to return immediately for any new or worsening symptoms and he verbalized understanding.    Amount and/or Complexity of Data Reviewed  Labs:  Decision-making details documented in ED Course.    Risk  Prescription drug management.  Risk Details: Differential diagnosis includes but is not limited to:  Liver failure exacerbation, ascites, SBP, electrolyte abnormality               ED Course as of 03/22/24 1428   Thu Mar 21, 2024   1449 CBC auto differential(!) [NW]   1659 CBC auto differential(!)  Chronic anemia noted [CD]   1659 Comprehensive metabolic panel(!)  Hyperkalemia with chronic kidney disease other nonspecific findings [CD]   1659 Protime-INR(!)  INR is 1.5 [CD]   1701 Basic metabolic panel(!)  Within normal limits [CD]   1723 Spoke with IR Doctor Jackson, patient is scheduled for outpatient paracentesis at the [facility] at 7:30 a.m. [CD]      ED Course User Index  [CD] Bill Buck DO  [NW] Lovely Harper PA-C                           Clinical Impression:  Final diagnoses:  [R18.8] Other ascites (Primary)  [K74.60] Hepatic  cirrhosis, unspecified hepatic cirrhosis type, unspecified whether ascites present  [R11.0] Nausea          ED Disposition Condition    Discharge Stable          ED Prescriptions       Medication Sig Dispense Start Date End Date Auth. Provider    ondansetron (ZOFRAN-ODT) 4 MG TbDL Take 1 tablet (4 mg total) by mouth every 8 (eight) hours as needed (nausea/vomiting). 9 tablet 3/21/2024 -- Bill Buck, DO          Follow-up Information       Follow up With Specialties Details Why Contact Info    Carmelo Costa MD Family Medicine Schedule an appointment as soon as possible for a visit   [address] [facility]  [place] [facility] [address]  [phone]               Bill Buck,   03/22/24 1428       Bill Buck,   03/22/24 1434

## 2024-03-21 NOTE — DISCHARGE INSTRUCTIONS
1.)  You need to report back to this hospital at 7:30 a.m. tomorrow to have your abdomen drained.  Please do not be late.

## 2024-03-22 ENCOUNTER — HOSPITAL ENCOUNTER (OUTPATIENT)
Dept: INTERVENTIONAL RADIOLOGY/VASCULAR | Facility: HOSPITAL | Age: 64
Discharge: HOME | End: 2024-03-22
Attending: RADIOLOGY
Payer: MEDICAID

## 2024-03-22 VITALS
SYSTOLIC BLOOD PRESSURE: 144 MMHG | TEMPERATURE: 98 F | BODY MASS INDEX: 27.4 KG/M2 | HEIGHT: 69 IN | WEIGHT: 185 LBS | DIASTOLIC BLOOD PRESSURE: 70 MMHG | RESPIRATION RATE: 15 BRPM | HEART RATE: 52 BPM | OXYGEN SATURATION: 100 %

## 2024-03-22 DIAGNOSIS — R18.8 OTHER ASCITES: ICD-10-CM

## 2024-03-22 LAB
APPEARANCE FLD: CLEAR
BODY FLD TYPE: NORMAL
COLOR FLD: COLORLESS
LYMPHOCYTES NFR FLD MANUAL: 16 %
MESOTHL CELL NFR FLD MANUAL: 35 %
MONOS+MACROS NFR FLD MANUAL: 47 %
NEUTROPHILS NFR FLD MANUAL: 2 %
OHS QRS DURATION: 98 MS
OHS QTC CALCULATION: 475 MS
WBC # FLD: 97 /CU MM

## 2024-03-22 PROCEDURE — 49083 ABD PARACENTESIS W/IMAGING: CPT | Performed by: RADIOLOGY

## 2024-03-22 PROCEDURE — P9047 ALBUMIN (HUMAN), 25%, 50ML: HCPCS | Mod: JG | Performed by: RADIOLOGY

## 2024-03-22 PROCEDURE — 25000003 PHARM REV CODE 250: Performed by: RADIOLOGY

## 2024-03-22 PROCEDURE — 89051 BODY FLUID CELL COUNT: CPT | Performed by: INTERNAL MEDICINE

## 2024-03-22 PROCEDURE — 63600175 PHARM REV CODE 636 W HCPCS: Mod: JG | Performed by: RADIOLOGY

## 2024-03-22 RX ORDER — ONDANSETRON HYDROCHLORIDE 2 MG/ML
4 INJECTION, SOLUTION INTRAVENOUS EVERY 6 HOURS PRN
Status: DISCONTINUED | OUTPATIENT
Start: 2024-03-22 | End: 2024-03-23 | Stop reason: HOSPADM

## 2024-03-22 RX ORDER — LIDOCAINE HYDROCHLORIDE 10 MG/ML
INJECTION INFILTRATION; PERINEURAL
Status: COMPLETED | OUTPATIENT
Start: 2024-03-22 | End: 2024-03-22

## 2024-03-22 RX ORDER — ALBUMIN HUMAN 250 G/1000ML
SOLUTION INTRAVENOUS
Status: COMPLETED | OUTPATIENT
Start: 2024-03-22 | End: 2024-03-22

## 2024-03-22 RX ADMIN — LIDOCAINE HYDROCHLORIDE 8 ML: 10 INJECTION, SOLUTION INFILTRATION; PERINEURAL at 08:03

## 2024-03-22 RX ADMIN — ALBUMIN (HUMAN) 62.5 G: 12.5 SOLUTION INTRAVENOUS at 09:03

## 2024-03-22 NOTE — SEDATION DOCUMENTATION
IR paracentesis completed, removed 10L, clear yellow fluid from RLQ abd, applied bandaid to puncture site, VS stable, room air, denies pain, replaced with albumin as per protocol, reviewed discharge instructions with pt, provided  number to pt and instructed to come back next week, escorted pt to front of hospital, IR care complete

## 2024-03-22 NOTE — H&P
Radiology History & Physical      SUBJECTIVE:     Chief Complaint: Ascites    History of Present Illness:  Jonny Isabel is a 63 y.o. male who presents for paracentesis.  Past Medical History:   Diagnosis Date    A-fib     Esophageal varices 5/8/2023    Other ascites 5/8/2023     Past Surgical History:   Procedure Laterality Date    ESOPHAGOGASTRODUODENOSCOPY N/A 1/17/2023    Procedure: EGD (ESOPHAGOGASTRODUODENOSCOPY);  Surgeon: Dewayne Navas MD;  Location: [facility] (2ND FLR);  Service: Endoscopy;  Laterality: N/A;    ESOPHAGOGASTRODUODENOSCOPY N/A 2/7/2024    Procedure: EGD (ESOPHAGOGASTRODUODENOSCOPY);  Surgeon: Nathan Goins MD;  [facility]  Service: Endoscopy;  Laterality: N/A;  referral: Dr. Escalante /cirrhosis - labs- possible banding / prep ins on portal / 2nd floor - Pt c/o SOB at times./ Xarelto - message sent to clearance nurse per protocol - ERW  1/31/24- LVM for precall - ERW  2/1-precall complet-Kpvt  ok to hold Xarelto2 da       Home Meds:   Prior to Admission medications    Medication Sig Start Date End Date Taking? Authorizing Provider   ADVAIR DISKUS 250-50 mcg/dose diskus inhaler Inhale 1 puff into the lungs 2 (two) times a day. Controller 3/20/24 3/20/25  Joanne Ruiz NP   albuterol (VENTOLIN HFA) 90 mcg/actuation inhaler Inhale 1 puff into the lungs every 4 (four) hours as needed for Wheezing or Shortness of Breath. Rescue 2/14/23 2/14/24  Joanne Ruiz NP   amiodarone (PACERONE) 200 MG Tab Take 1 tablet (200 mg total) by mouth once daily. 12/12/23 12/11/24  Ellen Escalera, NP   ferrous sulfate 324 mg (65 mg iron) TbEC Take by mouth. 1/4/24   Provider, Historical   folic acid (FOLVITE) 1 MG tablet Take 1 tablet (1 mg total) by mouth once daily. 2/7/23 2/14/24  Seymour Leon MD   furosemide (LASIX) 20 MG tablet Take 1 tablet (20 mg total) by mouth once daily. 12/12/23   Ellen Escalera NP   lancets (RELION THIN LANCETS) 26 gauge Misc 1 lancet  by Misc.(Non-Drug; Combo Route) route 2 (two) times daily. 4/21/23 4/20/24  Joanne Ruiz NP   levothyroxine (SYNTHROID) 50 MCG tablet TAKE 1 TABLET BY MOUTH IN THE MORNING WITH A FULL GLASS OF WATER 30-60 MINUTES BEFORE OTHER MEDICATIONS AND FOOD 12/12/23   Ellen Escalera NP   lisinopriL-hydrochlorothiazide (PRINZIDE,ZESTORETIC) 10-12.5 mg per tablet Take 1 tablet by mouth once daily.    Provider, Historical   metoprolol succinate (TOPROL-XL) 50 MG 24 hr tablet Take 1 tablet (50 mg total) by mouth once daily. 12/12/23 12/11/24  Ellen Escalera NP   metoprolol tartrate (LOPRESSOR) 100 MG tablet Take 100 mg by mouth. 12/27/23   Provider, Historical   omega 3-dha-epa-fish oil 1,000 mg (120 mg-180 mg) Cap 2 capsule DAILY (route: oral) 7/13/22   Provider, Historical   ondansetron (ZOFRAN-ODT) 4 MG TbDL Take 1 tablet (4 mg total) by mouth every 8 (eight) hours as needed (nausea/vomiting). 3/21/24   Bill Buck DO   pantoprazole (PROTONIX) 40 MG tablet Take 1 tablet by mouth 2 (two) times daily. 8/17/22   Provider, Historical   potassium chloride (KLOR-CON) 10 MEQ TbSR Take 10 mEq by mouth once.    Provider, Historical   simvastatin (ZOCOR) 40 MG tablet Take 1 tablet every day by oral route at bedtime.    Provider, Historical   spironolactone (ALDACTONE) 50 MG tablet Take 1 tablet (50 mg total) by mouth once daily. 6/16/23 6/15/24  Lovely Escalante MD   thiamine 100 MG tablet Take 1 tablet by mouth once daily. 7/13/22   Provider, Historical   traZODone (DESYREL) 100 MG tablet Take 1 tablet (100 mg total) by mouth every evening. 12/12/23 3/11/24  Ellen Escalera NP   VITAMIN B COMPLEX ORAL Take 1 tablet by mouth once daily.    Provider, Historical   XARELTO 20 mg Tab Take 20 mg by mouth once daily. 1/9/23   Provider, Historical     Anticoagulants/Antiplatelets: no anticoagulation    Allergies: Review of patient's allergies indicates:  No Known Allergies  Sedation History:  no adverse  reactions    Review of Systems:   Hematological: no known coagulopathies  Respiratory: no shortness of breath  Cardiovascular: no chest pain  Gastrointestinal: no abdominal pain  Genito-Urinary: no dysuria  Musculoskeletal: negative  Neurological: no TIA or stroke symptoms         OBJECTIVE:     Vital Signs (Most Recent)       Physical Exam:  ASA: 3  Mallampati: 2    General: no acute distress  Mental Status: alert and oriented to person, place and time  HEENT: normocephalic, atraumatic  Chest: unlabored breathing  Heart: regular heart rate  Abdomen: distended  Extremity: moves all extremities    Laboratory  Lab Results   Component Value Date    INR 1.5 (H) 03/21/2024       Lab Results   Component Value Date    WBC 6.96 03/21/2024    HGB 12.0 (L) 03/21/2024    HCT 36.8 (L) 03/21/2024    MCV 84 03/21/2024     03/21/2024      Lab Results   Component Value Date    GLU 93 03/21/2024     03/21/2024    K 5.1 03/21/2024     03/21/2024    CO2 21 (L) 03/21/2024    BUN 19 03/21/2024    CREATININE 1.8 (H) 03/21/2024    CALCIUM 8.7 03/21/2024    MG 1.2 (L) 02/07/2023    ALT 12 03/21/2024    AST 21 03/21/2024    ALBUMIN 3.2 (L) 03/21/2024    BILITOT 0.5 03/21/2024    BILIDIR 0.2 02/12/2024       ASSESSMENT/PLAN:     Sedation Plan: Local only  Patient will undergo paracentesis.    Bob Appiah M.D.  Interventional Radiology  Department of Radiology

## 2024-03-22 NOTE — PROCEDURES
Radiology Post-Procedure Note    Pre Op Diagnosis: Ascites    Post Op Diagnosis: Same    Procedure: US guided paracentesis.    Procedure performed by: Bob Appiah MD    Written Informed Consent Obtained: Yes  Specimen Removed: YES serous fluid  Estimated Blood Loss: Minimal    Findings:   Successful RLQ paracentesis.  Albumin administered per protocol. No complications.    Patient tolerated procedure well.    Bob Appiah M.D.  Diagnostic and Interventional Radiologist  Department of Radiology  Pager: [phone]

## 2024-03-28 ENCOUNTER — HOSPITAL ENCOUNTER (OUTPATIENT)
Dept: INTERVENTIONAL RADIOLOGY/VASCULAR | Facility: HOSPITAL | Age: 64
Discharge: HOME | End: 2024-03-28
Attending: INTERNAL MEDICINE
Payer: MEDICAID

## 2024-03-28 VITALS
RESPIRATION RATE: 20 BRPM | SYSTOLIC BLOOD PRESSURE: 188 MMHG | WEIGHT: 215 LBS | HEART RATE: 42 BPM | OXYGEN SATURATION: 96 % | HEIGHT: 69 IN | BODY MASS INDEX: 31.84 KG/M2 | DIASTOLIC BLOOD PRESSURE: 86 MMHG | TEMPERATURE: 98 F

## 2024-03-28 DIAGNOSIS — R18.8 OTHER ASCITES: ICD-10-CM

## 2024-03-28 DIAGNOSIS — R18.8 OTHER ASCITES: Primary | ICD-10-CM

## 2024-03-28 LAB
APPEARANCE FLD: CLEAR
BODY FLD TYPE: NORMAL
COLOR FLD: YELLOW
LYMPHOCYTES NFR FLD MANUAL: 71 %
MESOTHL CELL NFR FLD MANUAL: 3 %
MONOS+MACROS NFR FLD MANUAL: 21 %
NEUTROPHILS NFR FLD MANUAL: 5 %
WBC # FLD: 106 /CU MM

## 2024-03-28 PROCEDURE — 49083 ABD PARACENTESIS W/IMAGING: CPT | Performed by: STUDENT IN AN ORGANIZED HEALTH CARE EDUCATION/TRAINING PROGRAM

## 2024-03-28 PROCEDURE — 89051 BODY FLUID CELL COUNT: CPT | Performed by: INTERNAL MEDICINE

## 2024-03-28 PROCEDURE — 25000003 PHARM REV CODE 250: Performed by: STUDENT IN AN ORGANIZED HEALTH CARE EDUCATION/TRAINING PROGRAM

## 2024-03-28 PROCEDURE — A7048 VACUUM DRAIN BOTTLE/TUBE KIT: HCPCS

## 2024-03-28 RX ORDER — LIDOCAINE HYDROCHLORIDE 10 MG/ML
INJECTION INFILTRATION; PERINEURAL
Status: COMPLETED | OUTPATIENT
Start: 2024-03-28 | End: 2024-03-28

## 2024-03-28 RX ADMIN — LIDOCAINE HYDROCHLORIDE 5 ML: 10 INJECTION, SOLUTION INFILTRATION; PERINEURAL at 01:03

## 2024-03-28 NOTE — PROGRESS NOTES
MELD 3.0: 16 at 3/21/2024  4:22 PM  MELD-Na: 17 at 3/21/2024  4:22 PM  Calculated from:  Serum Creatinine: 1.8 mg/dL at 3/21/2024  4:22 PM  Serum Sodium: 139 mmol/L (Using max of 137 mmol/L) at 3/21/2024  4:22 PM  Total Bilirubin: 0.5 mg/dL (Using min of 1 mg/dL) at 3/21/2024  2:32 PM  Serum Albumin: 3.2 g/dL at 3/21/2024  2:32 PM  INR(ratio): 1.5 at 3/21/2024  2:32 PM  Age at listing (hypothetical): 63 years  Sex: Male at 3/21/2024  4:22 PM     Requiring weekly taps  Likely needs liver tx evaluation  Will move up appt to April 10th at 1:30

## 2024-03-28 NOTE — H&P
Radiology History & Physical      SUBJECTIVE:     Chief Complaint: ascites    History of Present Illness:  Jonny Isabel is a 63 y.o. male who presents for paracentesis.    Past Medical History:   Diagnosis Date    A-fib     Esophageal varices 5/8/2023    Other ascites 5/8/2023     Past Surgical History:   Procedure Laterality Date    ESOPHAGOGASTRODUODENOSCOPY N/A 1/17/2023    Procedure: EGD (ESOPHAGOGASTRODUODENOSCOPY);  Surgeon: Dewayne Navas MD;  Location: [facility] (2ND FLR);  Service: Endoscopy;  Laterality: N/A;    ESOPHAGOGASTRODUODENOSCOPY N/A 2/7/2024    Procedure: EGD (ESOPHAGOGASTRODUODENOSCOPY);  Surgeon: Nathan Goins MD;  [facility]  Service: Endoscopy;  Laterality: N/A;  referral: Dr. Escalante /cirrhosis - labs- possible banding / prep ins on portal / 2nd floor - Pt c/o SOB at times./ Xarelto - message sent to clearance nurse per protocol - ERW  1/31/24- LVM for precall - ERW  2/1-precall complet-Kpvt  ok to hold Xarelto2 da       Home Meds:   Prior to Admission medications    Medication Sig Start Date End Date Taking? Authorizing Provider   ADVAIR DISKUS 250-50 mcg/dose diskus inhaler Inhale 1 puff into the lungs 2 (two) times a day. Controller 3/20/24 3/20/25  Joanne Ruiz NP   albuterol (VENTOLIN HFA) 90 mcg/actuation inhaler Inhale 1 puff into the lungs every 4 (four) hours as needed for Wheezing or Shortness of Breath. Rescue 2/14/23 2/14/24  Joanne Ruiz NP   amiodarone (PACERONE) 200 MG Tab Take 1 tablet (200 mg total) by mouth once daily. 12/12/23 12/11/24  Ellen Escalera, NP   ferrous sulfate 324 mg (65 mg iron) TbEC Take by mouth. 1/4/24   Provider, Historical   folic acid (FOLVITE) 1 MG tablet Take 1 tablet (1 mg total) by mouth once daily. 2/7/23 2/14/24  Seymour Leon MD   furosemide (LASIX) 20 MG tablet Take 1 tablet (20 mg total) by mouth once daily. 12/12/23   Ellen Escalear NP   lancets (RELION THIN LANCETS) 26 gauge Misc 1 lancet  by Misc.(Non-Drug; Combo Route) route 2 (two) times daily. 4/21/23 4/20/24  Joanne Ruiz NP   levothyroxine (SYNTHROID) 50 MCG tablet TAKE 1 TABLET BY MOUTH IN THE MORNING WITH A FULL GLASS OF WATER 30-60 MINUTES BEFORE OTHER MEDICATIONS AND FOOD 12/12/23   Ellen Escalera NP   lisinopriL-hydrochlorothiazide (PRINZIDE,ZESTORETIC) 10-12.5 mg per tablet Take 1 tablet by mouth once daily.    Provider, Historical   metoprolol succinate (TOPROL-XL) 50 MG 24 hr tablet Take 1 tablet (50 mg total) by mouth once daily. 12/12/23 12/11/24  Ellen Escalera NP   metoprolol tartrate (LOPRESSOR) 100 MG tablet Take 100 mg by mouth. 12/27/23   Provider, Historical   omega 3-dha-epa-fish oil 1,000 mg (120 mg-180 mg) Cap 2 capsule DAILY (route: oral) 7/13/22   Provider, Historical   ondansetron (ZOFRAN-ODT) 4 MG TbDL Take 1 tablet (4 mg total) by mouth every 8 (eight) hours as needed (nausea/vomiting). 3/21/24   Bill Buck DO   pantoprazole (PROTONIX) 40 MG tablet Take 1 tablet by mouth 2 (two) times daily. 8/17/22   Provider, Historical   potassium chloride (KLOR-CON) 10 MEQ TbSR Take 10 mEq by mouth once.    Provider, Historical   simvastatin (ZOCOR) 40 MG tablet Take 1 tablet every day by oral route at bedtime.    Provider, Historical   spironolactone (ALDACTONE) 50 MG tablet Take 1 tablet (50 mg total) by mouth once daily. 6/16/23 6/15/24  Lovely Escalante MD   thiamine 100 MG tablet Take 1 tablet by mouth once daily. 7/13/22   Provider, Historical   traZODone (DESYREL) 100 MG tablet Take 1 tablet (100 mg total) by mouth every evening. 12/12/23 3/11/24  Ellen Escalera NP   VITAMIN B COMPLEX ORAL Take 1 tablet by mouth once daily.    Provider, Historical   XARELTO 20 mg Tab Take 20 mg by mouth once daily. 1/9/23   Provider, Historical     Anticoagulants/Antiplatelets: no anticoagulation    Allergies: Review of patient's allergies indicates:  No Known Allergies  Sedation History:  no adverse  "reactions    Review of Systems:   Hematological: no known coagulopathies  Respiratory: no shortness of breath  Cardiovascular: no chest pain  Gastrointestinal: no abdominal pain  Genito-Urinary: no dysuria  Musculoskeletal: negative  Neurological: no TIA or stroke symptoms         OBJECTIVE:     Vital Signs (Most Recent)       Physical Exam:  ASA: 2  Mallampati: 2    General: no acute distress  Mental Status: alert and oriented to person, place and time  HEENT: normocephalic, atraumatic  Chest: unlabored breathing  Heart: regular heart rate  Abdomen: distended  Extremity: moves all extremities    Laboratory  Lab Results   Component Value Date    INR 1.5 (H) 03/21/2024       Lab Results   Component Value Date    WBC 6.96 03/21/2024    HGB 12.0 (L) 03/21/2024    HCT 36.8 (L) 03/21/2024    MCV 84 03/21/2024     03/21/2024      Lab Results   Component Value Date    GLU 93 03/21/2024     03/21/2024    K 5.1 03/21/2024     03/21/2024    CO2 21 (L) 03/21/2024    BUN 19 03/21/2024    CREATININE 1.8 (H) 03/21/2024    CALCIUM 8.7 03/21/2024    MG 1.2 (L) 02/07/2023    ALT 12 03/21/2024    AST 21 03/21/2024    ALBUMIN 3.2 (L) 03/21/2024    BILITOT 0.5 03/21/2024    BILIDIR 0.2 02/12/2024       ASSESSMENT/PLAN:     Sedation Plan: local  Patient will undergo paracentesis.    Jt Huerta MD (Buck)  Interventional Radiology          "

## 2024-03-28 NOTE — SEDATION DOCUMENTATION
Patient presents for paracentesis. Patient is a regular. Tolerated well, patient requested only 5L be removed so he could avoid IV albumin at this time. Ok per md. 5L cloudy yellow ascites removed. Patient d/c home at baseline

## 2024-03-28 NOTE — PROCEDURES
"  Pre Op Diagnosis: ascites  Post Op Diagnosis: Same    Procedure: paracentesis    Procedure performed by: Bradley    Written Informed Consent Obtained: Yes  Specimen Removed: YES see imaging for total  Estimated Blood Loss: Minimal    Findings:   Successful paracentesis.    Patient tolerated procedure well.    Jt Huerta MD (Buck)  Interventional Radiology  [phone]      "

## 2024-04-04 ENCOUNTER — HOSPITAL ENCOUNTER (OUTPATIENT)
Dept: INTERVENTIONAL RADIOLOGY/VASCULAR | Facility: HOSPITAL | Age: 64
Discharge: HOME | End: 2024-04-04
Attending: INTERNAL MEDICINE
Payer: MEDICAID

## 2024-04-04 VITALS
DIASTOLIC BLOOD PRESSURE: 85 MMHG | HEIGHT: 69 IN | OXYGEN SATURATION: 99 % | RESPIRATION RATE: 16 BRPM | WEIGHT: 215 LBS | BODY MASS INDEX: 31.84 KG/M2 | SYSTOLIC BLOOD PRESSURE: 167 MMHG | HEART RATE: 40 BPM | TEMPERATURE: 98 F

## 2024-04-04 DIAGNOSIS — I48.0 PAF (PAROXYSMAL ATRIAL FIBRILLATION): ICD-10-CM

## 2024-04-04 DIAGNOSIS — R18.8 ASCITES: ICD-10-CM

## 2024-04-04 DIAGNOSIS — R18.8 OTHER ASCITES: ICD-10-CM

## 2024-04-04 LAB
APPEARANCE FLD: CLEAR
BODY FLD TYPE: NORMAL
COLOR FLD: COLORLESS
LYMPHOCYTES NFR FLD MANUAL: 16 %
MESOTHL CELL NFR FLD MANUAL: 4 %
MONOS+MACROS NFR FLD MANUAL: 75 %
NEUTROPHILS NFR FLD MANUAL: 5 %
WBC # FLD: 105 /CU MM

## 2024-04-04 PROCEDURE — 49083 ABD PARACENTESIS W/IMAGING: CPT | Mod: ,,, | Performed by: RADIOLOGY

## 2024-04-04 PROCEDURE — 89051 BODY FLUID CELL COUNT: CPT | Performed by: INTERNAL MEDICINE

## 2024-04-04 PROCEDURE — 27200940 IR PARACENTESIS WITH IMAGING

## 2024-04-04 PROCEDURE — 25000003 PHARM REV CODE 250: Performed by: RADIOLOGY

## 2024-04-04 PROCEDURE — 49083 ABD PARACENTESIS W/IMAGING: CPT | Performed by: RADIOLOGY

## 2024-04-04 RX ORDER — LIDOCAINE HYDROCHLORIDE 10 MG/ML
INJECTION INFILTRATION; PERINEURAL
Status: COMPLETED | OUTPATIENT
Start: 2024-04-04 | End: 2024-04-04

## 2024-04-04 RX ADMIN — LIDOCAINE HYDROCHLORIDE 5 ML: 10 INJECTION, SOLUTION INFILTRATION; PERINEURAL at 02:04

## 2024-04-04 NOTE — H&P
Radiology History & Physical      SUBJECTIVE:     Chief Complaint: ascites    History of Present Illness:  Jonny Isabel is a 63 y.o. male who presents for paracentesis  Past Medical History:   Diagnosis Date    A-fib     Esophageal varices 5/8/2023    Other ascites 5/8/2023     Past Surgical History:   Procedure Laterality Date    ESOPHAGOGASTRODUODENOSCOPY N/A 1/17/2023    Procedure: EGD (ESOPHAGOGASTRODUODENOSCOPY);  Surgeon: Dewayne Navas MD;  Location: [facility] (2ND FLR);  Service: Endoscopy;  Laterality: N/A;    ESOPHAGOGASTRODUODENOSCOPY N/A 2/7/2024    Procedure: EGD (ESOPHAGOGASTRODUODENOSCOPY);  Surgeon: Nathan Goins MD;  Location: [facility] (2ND FLR);  Service: Endoscopy;  Laterality: N/A;  referral: Dr. Escalante /cirrhosis - labs- possible banding / prep ins on portal / 2nd floor - Pt c/o SOB at times./ Xarelto - message sent to clearance nurse per protocol - ERW  1/31/24- LVM for precall - ERW  2/1-precall complet-Kpvt  ok to hold Xarelto2 da       Home Meds:   Prior to Admission medications    Medication Sig Start Date End Date Taking? Authorizing Provider   amiodarone (PACERONE) 200 MG Tab Take 1 tablet (200 mg total) by mouth once daily. 12/12/23 12/11/24 Yes Ellen Escalera NP   ferrous sulfate 324 mg (65 mg iron) TbEC Take by mouth. 1/4/24  Yes Provider, Historical   levothyroxine (SYNTHROID) 50 MCG tablet TAKE 1 TABLET BY MOUTH IN THE MORNING WITH A FULL GLASS OF WATER 30-60 MINUTES BEFORE OTHER MEDICATIONS AND FOOD 12/12/23  Yes Ellen Escalera NP   lisinopriL-hydrochlorothiazide (PRINZIDE,ZESTORETIC) 10-12.5 mg per tablet Take 1 tablet by mouth once daily.   Yes Provider, Historical   metoprolol succinate (TOPROL-XL) 50 MG 24 hr tablet Take 1 tablet (50 mg total) by mouth once daily. 12/12/23 12/11/24 Yes Ellen Escalera NP   metoprolol tartrate (LOPRESSOR) 100 MG tablet Take 100 mg by mouth. 12/27/23  Yes Provider, Historical   omega 3-dha-epa-fish oil 1,000 mg (120 mg-180  mg) Cap 2 capsule DAILY (route: oral) 7/13/22  Yes Provider, Historical   ondansetron (ZOFRAN-ODT) 4 MG TbDL Take 1 tablet (4 mg total) by mouth every 8 (eight) hours as needed (nausea/vomiting). 3/21/24  Yes Bill Buck DO   pantoprazole (PROTONIX) 40 MG tablet Take 1 tablet by mouth 2 (two) times daily. 8/17/22  Yes Provider, Historical   potassium chloride (KLOR-CON) 10 MEQ TbSR Take 10 mEq by mouth once.   Yes Provider, Historical   simvastatin (ZOCOR) 40 MG tablet Take 1 tablet every day by oral route at bedtime.   Yes Provider, Historical   spironolactone (ALDACTONE) 50 MG tablet Take 1 tablet (50 mg total) by mouth once daily. 6/16/23 6/15/24 Yes Lovely Escalante MD   thiamine 100 MG tablet Take 1 tablet by mouth once daily. 7/13/22  Yes Provider, Historical   VITAMIN B COMPLEX ORAL Take 1 tablet by mouth once daily.   Yes Provider, Historical   XARELTO 20 mg Tab Take 20 mg by mouth once daily. 1/9/23  Yes Provider, Historical   ADVAIR DISKUS 250-50 mcg/dose diskus inhaler Inhale 1 puff into the lungs 2 (two) times a day. Controller 3/20/24 3/20/25  Joanne Ruiz NP   albuterol (VENTOLIN HFA) 90 mcg/actuation inhaler Inhale 1 puff into the lungs every 4 (four) hours as needed for Wheezing or Shortness of Breath. Rescue 2/14/23 2/14/24  Joanne Ruiz NP   folic acid (FOLVITE) 1 MG tablet Take 1 tablet (1 mg total) by mouth once daily. 2/7/23 2/14/24  Seymour Leon MD   furosemide (LASIX) 20 MG tablet Take 1 tablet (20 mg total) by mouth once daily. 12/12/23   Ellen Escalera NP   lancets (RELION THIN LANCETS) 26 gauge Misc 1 lancet by Misc.(Non-Drug; Combo Route) route 2 (two) times daily. 4/21/23 4/20/24  Joseph, Joanne A., NP   traZODone (DESYREL) 100 MG tablet Take 1 tablet (100 mg total) by mouth every evening. 12/12/23 3/11/24  Ellen Escalera NP       Allergies: Review of patient's allergies indicates:  No Known Allergies          OBJECTIVE:     Vital Signs (Most  Recent)  Temp: 97.9 °F (36.6 °C) (04/04/24 1354)  Pulse: (!) 43 (04/04/24 1417)  Resp: 18 (04/04/24 1417)  BP: (!) 179/81 (04/04/24 1417)  SpO2: 98 % (04/04/24 1417)    Physical Exam:  General: no acute distress  Mental Status: alert and oriented to person, place and time  HEENT: normocephalic, atraumatic  Chest: unlabored breathing  Heart: regular heart rate  Abdomen: nondistended  Extremity: moves all extremities    Laboratory  Lab Results   Component Value Date    INR 1.5 (H) 03/21/2024       Lab Results   Component Value Date    WBC 6.96 03/21/2024    HGB 12.0 (L) 03/21/2024    HCT 36.8 (L) 03/21/2024    MCV 84 03/21/2024     03/21/2024      Lab Results   Component Value Date    GLU 93 03/21/2024     03/21/2024    K 5.1 03/21/2024     03/21/2024    CO2 21 (L) 03/21/2024    BUN 19 03/21/2024    CREATININE 1.8 (H) 03/21/2024    CALCIUM 8.7 03/21/2024    MG 1.2 (L) 02/07/2023    ALT 12 03/21/2024    AST 21 03/21/2024    ALBUMIN 3.2 (L) 03/21/2024    BILITOT 0.5 03/21/2024    BILIDIR 0.2 02/12/2024       ASSESSMENT/PLAN:     Sedation Plan: local  Patient will undergo paracentesis.    Ed Nelson MD  Interventional Radiology  Department of Radiology

## 2024-04-04 NOTE — NURSING
Procedure complete. Vitals stable during procedure. Patient alert and oriented x4 unlabored on Room Air. Patient denies pain and is resting comfortably. Surgical site dressed with gauze and band aid. Dressing is clean, dry, and intact. 5L of fluid removed. Patient transported to Recovery.

## 2024-04-04 NOTE — PROCEDURES
Radiology Post-Procedure Note    Pre Op Diagnosis: ascites  Post Op Diagnosis: Same    Procedure: US-guided paracentesis    Procedure performed by: Ed Nelson MD    Written Informed Consent Obtained: Yes  Specimen Removed: YES 5 L serous ascites  Estimated Blood Loss: Minimal    Findings:   Moderate ascites.    Patient tolerated procedure well.    Ed Nelson MD  Interventional Radiology  Department of Radiology

## 2024-04-05 ENCOUNTER — PATIENT MESSAGE (OUTPATIENT)
Dept: CARDIOLOGY | Facility: CLINIC | Age: 64
End: 2024-04-05
Payer: MEDICAID

## 2024-04-05 ENCOUNTER — TELEPHONE (OUTPATIENT)
Dept: CARDIOLOGY | Facility: CLINIC | Age: 64
End: 2024-04-05
Payer: MEDICAID

## 2024-04-17 ENCOUNTER — TELEPHONE (OUTPATIENT)
Dept: INTERVENTIONAL RADIOLOGY/VASCULAR | Facility: HOSPITAL | Age: 64
End: 2024-04-17
Payer: MEDICAID

## 2024-04-17 NOTE — NURSING
Spoke to Adelita: Patient should arrive at 1:30, where to check in, no need to fast, may drive self.

## 2024-04-18 ENCOUNTER — HOSPITAL ENCOUNTER (OUTPATIENT)
Dept: INTERVENTIONAL RADIOLOGY/VASCULAR | Facility: HOSPITAL | Age: 64
Discharge: HOME | End: 2024-04-18
Attending: INTERNAL MEDICINE
Payer: MEDICAID

## 2024-04-18 VITALS
SYSTOLIC BLOOD PRESSURE: 186 MMHG | DIASTOLIC BLOOD PRESSURE: 92 MMHG | TEMPERATURE: 98 F | RESPIRATION RATE: 16 BRPM | BODY MASS INDEX: 31.84 KG/M2 | WEIGHT: 215 LBS | HEIGHT: 69 IN | HEART RATE: 51 BPM | OXYGEN SATURATION: 98 %

## 2024-04-18 DIAGNOSIS — R18.8 ASCITES: ICD-10-CM

## 2024-04-18 DIAGNOSIS — R18.8 OTHER ASCITES: ICD-10-CM

## 2024-04-18 PROCEDURE — 49083 ABD PARACENTESIS W/IMAGING: CPT | Performed by: RADIOLOGY

## 2024-04-18 PROCEDURE — 49083 ABD PARACENTESIS W/IMAGING: CPT | Mod: ,,, | Performed by: RADIOLOGY

## 2024-04-18 NOTE — H&P
Radiology History & Physical      SUBJECTIVE:     Chief Complaint: abdominal distention    History of Present Illness:  Jonny Isabel is a 63 y.o. male who presents for ultrasound guided paracentesis  Past Medical History:   Diagnosis Date    A-fib     Esophageal varices 5/8/2023    Other ascites 5/8/2023     Past Surgical History:   Procedure Laterality Date    ESOPHAGOGASTRODUODENOSCOPY N/A 1/17/2023    Procedure: EGD (ESOPHAGOGASTRODUODENOSCOPY);  Surgeon: Dewayne Navas MD;  Location: [facility] (2ND FLR);  Service: Endoscopy;  Laterality: N/A;    ESOPHAGOGASTRODUODENOSCOPY N/A 2/7/2024    Procedure: EGD (ESOPHAGOGASTRODUODENOSCOPY);  Surgeon: Nathan Goins MD;  Location: [facility] (2ND FLR);  Service: Endoscopy;  Laterality: N/A;  referral: Dr. Escalante /cirrhosis - labs- possible banding / prep ins on portal / 2nd floor - Pt c/o SOB at times./ Xarelto - message sent to clearance nurse per protocol - ERW  1/31/24- LVM for precall - ERW  2/1-precall complet-Kpvt  ok to hold Xarelto2 da       Home Meds:   Prior to Admission medications    Medication Sig Start Date End Date Taking? Authorizing Provider   ADVAIR DISKUS 250-50 mcg/dose diskus inhaler Inhale 1 puff into the lungs 2 (two) times a day. Controller 3/20/24 3/20/25 Yes Joanne Ruiz NP   amiodarone (PACERONE) 200 MG Tab Take 1 tablet (200 mg total) by mouth once daily. 12/12/23 12/11/24 Yes Ellen Escalera NP   ferrous sulfate 324 mg (65 mg iron) TbEC Take by mouth. 1/4/24  Yes Provider, Historical   furosemide (LASIX) 20 MG tablet Take 1 tablet (20 mg total) by mouth once daily. 12/12/23  Yes Ellen Escalera NP   levothyroxine (SYNTHROID) 50 MCG tablet TAKE 1 TABLET BY MOUTH IN THE MORNING WITH A FULL GLASS OF WATER 30-60 MINUTES BEFORE OTHER MEDICATIONS AND FOOD 12/12/23  Yes Ellen Escalera NP   lisinopriL-hydrochlorothiazide (PRINZIDE,ZESTORETIC) 10-12.5 mg per tablet Take 1 tablet by mouth once daily.   Yes Provider, Historical    metoprolol succinate (TOPROL-XL) 50 MG 24 hr tablet Take 1 tablet (50 mg total) by mouth once daily. 12/12/23 12/11/24 Yes Ellen Escalera NP   omega 6-ste-mpy-fish oil 1,000 mg (120 mg-180 mg) Cap 2 capsule DAILY (route: oral) 7/13/22  Yes Provider, Historical   ondansetron (ZOFRAN-ODT) 4 MG TbDL Take 1 tablet (4 mg total) by mouth every 8 (eight) hours as needed (nausea/vomiting). 3/21/24  Yes Bill Buck DO   pantoprazole (PROTONIX) 40 MG tablet Take 1 tablet by mouth 2 (two) times daily. 8/17/22  Yes Provider, Historical   potassium chloride (KLOR-CON) 10 MEQ TbSR Take 10 mEq by mouth once.   Yes Provider, Historical   simvastatin (ZOCOR) 40 MG tablet Take 1 tablet every day by oral route at bedtime.   Yes Provider, Historical   spironolactone (ALDACTONE) 50 MG tablet Take 1 tablet (50 mg total) by mouth once daily. 6/16/23 6/15/24 Yes Lovely Escalante MD   thiamine 100 MG tablet Take 1 tablet by mouth once daily. 7/13/22  Yes Provider, Historical   albuterol (VENTOLIN HFA) 90 mcg/actuation inhaler Inhale 1 puff into the lungs every 4 (four) hours as needed for Wheezing or Shortness of Breath. Rescue 2/14/23 2/14/24  Joanne Ruiz NP   folic acid (FOLVITE) 1 MG tablet Take 1 tablet (1 mg total) by mouth once daily. 2/7/23 2/14/24  Seymour Leon MD   lancets (RELION THIN LANCETS) 26 gauge Misc 1 lancet by Misc.(Non-Drug; Combo Route) route 2 (two) times daily. 4/21/23 4/20/24  Joanne Ruiz NP   metoprolol tartrate (LOPRESSOR) 100 MG tablet Take 100 mg by mouth. 12/27/23   Provider, Historical   traZODone (DESYREL) 100 MG tablet Take 1 tablet (100 mg total) by mouth every evening. 12/12/23 3/11/24  Ellen Escalera, NP   VITAMIN B COMPLEX ORAL Take 1 tablet by mouth once daily.    Provider, Historical   XARELTO 20 mg Tab Take 20 mg by mouth once daily. 1/9/23   Provider, Historical     Anticoagulants/Antiplatelets: no anticoagulation    Allergies: Review of patient's  allergies indicates:  No Known Allergies  Sedation History:  no adverse reactions    Review of Systems:   Hematological: no known coagulopathies  Respiratory: no shortness of breath  Cardiovascular: no chest pain  Gastrointestinal: no abdominal pain  Genito-Urinary: no dysuria  Musculoskeletal: negative  Neurological: no TIA or stroke symptoms         OBJECTIVE:     Vital Signs (Most Recent)  Temp: 98.1 °F (36.7 °C) (04/18/24 1314)  Pulse: (!) 51 (04/18/24 1314)  Resp: 16 (04/18/24 1314)  BP: (!) 187/89 (04/18/24 1314)  SpO2: 97 % (04/18/24 1314)    Physical Exam:  ASA: 2  Mallampati: n/a    General: no acute distress  Mental Status: alert and oriented to person, place and time  HEENT: normocephalic, atraumatic  Chest: unlabored breathing  Heart: regular heart rate  Abdomen: distended  Extremity: moves all extremities    ASSESSMENT/PLAN:     Sedation Plan: local  Patient will undergo ultrasound guided paracentesis.    Ashia Gallegos PA-C

## 2024-04-18 NOTE — PROCEDURES
Radiology Post-Procedure Note    Pre Op Diagnosis: Ascites  Post Op Diagnosis: Same    Procedure: Ultrasound Guided Paracentesis    Procedure performed by: Ashia Gallegos PA-C    Written Informed Consent Obtained: Yes  Specimen Removed: no specimens sent  Estimated Blood Loss: Minimal    Findings:   Successful paracentesis.  5L hazy yellow fluid from RLQ.  Albumin was not administered     Patient tolerated procedure well.    Ashia Gallegos PA-C

## 2024-04-18 NOTE — SEDATION DOCUMENTATION
IR Procedure - Paracentesis - is completed. Patient tolerated well. He/She is awake, alert, oriented. Vital signs are stable. 5000 mL hazy, yellow ascites is drained. Patient will return to IR pre/post to complete discharge.

## 2024-04-18 NOTE — NURSING
Patient is discharged from IR. AVS is printed and reviewed. Upcoming appointments and the [facility] number is highlighted for convenience. The opportunity to ask questions is provided. Patient is wheeled to front lobby to significant other.     Note: Patient declined IV access and requested we stop at 5000 mL; JONA notified.

## 2024-04-18 NOTE — DISCHARGE SUMMARY
Interventional Radiology Short Stay Discharge Summary      Admit Date: 4/18/2024  Discharge Date: 04/18/2024     Hospital Course: Uneventful    Discharge Diagnosis: ascites    Discharge Condition: Stable    Discharge Disposition: Home    Diet: Resume prior diet    Activity: activity as tolerated    Follow-up: With referring provider    Cindy Chava, PA-C [facility]

## 2024-04-22 ENCOUNTER — TELEPHONE (OUTPATIENT)
Dept: HEPATOLOGY | Facility: CLINIC | Age: 64
End: 2024-04-22
Payer: MEDICAID

## 2024-04-22 NOTE — TELEPHONE ENCOUNTER
The patient was contacted to reschedule [facility] Hepatology appointment.  No answer, an voicemail was left with the call back .

## 2024-04-23 ENCOUNTER — OFFICE VISIT (OUTPATIENT)
Dept: HEPATOLOGY | Facility: CLINIC | Age: 64
End: 2024-04-23
Payer: MEDICAID

## 2024-04-23 VITALS
BODY MASS INDEX: 28.35 KG/M2 | HEIGHT: 69 IN | TEMPERATURE: 98 F | DIASTOLIC BLOOD PRESSURE: 91 MMHG | HEART RATE: 41 BPM | WEIGHT: 191.38 LBS | SYSTOLIC BLOOD PRESSURE: 138 MMHG

## 2024-04-23 DIAGNOSIS — K70.31 ALCOHOLIC CIRRHOSIS OF LIVER WITH ASCITES: ICD-10-CM

## 2024-04-23 DIAGNOSIS — I85.10 SECONDARY ESOPHAGEAL VARICES WITHOUT BLEEDING: Primary | ICD-10-CM

## 2024-04-23 DIAGNOSIS — R74.8 ELEVATED LIVER ENZYMES: ICD-10-CM

## 2024-04-23 DIAGNOSIS — R18.8 OTHER ASCITES: ICD-10-CM

## 2024-04-23 PROCEDURE — 99215 OFFICE O/P EST HI 40 MIN: CPT | Mod: S$PBB,,, | Performed by: INTERNAL MEDICINE

## 2024-04-23 PROCEDURE — 3075F SYST BP GE 130 - 139MM HG: CPT | Mod: CPTII,,, | Performed by: INTERNAL MEDICINE

## 2024-04-23 PROCEDURE — 4010F ACE/ARB THERAPY RXD/TAKEN: CPT | Mod: CPTII,,, | Performed by: INTERNAL MEDICINE

## 2024-04-23 PROCEDURE — 99999 PR PBB SHADOW E&M-EST. PATIENT-LVL III: CPT | Mod: PBBFAC,,, | Performed by: INTERNAL MEDICINE

## 2024-04-23 PROCEDURE — 99213 OFFICE O/P EST LOW 20 MIN: CPT | Mod: PBBFAC,PO | Performed by: INTERNAL MEDICINE

## 2024-04-23 PROCEDURE — 3080F DIAST BP >= 90 MM HG: CPT | Mod: CPTII,,, | Performed by: INTERNAL MEDICINE

## 2024-04-23 PROCEDURE — 3008F BODY MASS INDEX DOCD: CPT | Mod: CPTII,,, | Performed by: INTERNAL MEDICINE

## 2024-04-23 RX ORDER — TRAZODONE HYDROCHLORIDE 50 MG/1
50 TABLET ORAL NIGHTLY PRN
COMMUNITY
Start: 2024-02-29

## 2024-04-23 RX ORDER — TRAMADOL HYDROCHLORIDE 50 MG/1
50 TABLET ORAL
COMMUNITY
Start: 2024-04-12 | End: 2024-06-06

## 2024-04-23 NOTE — PROGRESS NOTES
HEPATOLOGY FOLLOW UP    Referring Physician: Harriett Bird MD  Current Corresponding Physician: Harriett Bird MD, Julissa, Yuli Pérez MD, Christopher Burt III, MD    Jonny Isabel is here for follow up of decompensated alcohol-induced Cirrhosis    HPI  Seen by inpt hepatology team:  Admission 1/16/23-2/7/23: 61yo PMHx decompensated EtOH cirrhosis (ascites), NID-T2DM, afib s/p ablation and DCCV on xarelto, HTN. Was transferred from OSH on 01/16/2023 for epidural abscess and L3/L4 disc herniation. Surgery was deferred since the pt was too ill. MELD-Na 22. Was anemic, was transfused and underwent EGD- small varices noted.     Work up for liver disease since arrival notable for ASMA, AMA, viral hepatitis panel all negative. PETH 900.     Reportedly patient did not know of liver disease and was never instructed to stop drinking however multiple barriers to consideration of transplant including spinal abscess, possible bowel obstruction, continuing to drink EtOH (PETH 900)      Interval History  After Jonny Isabel's inpt stay I saw him 5/23. I did not see him again until 1/24. He has stopped drinking (peth negative since 5/8/23).  --BSwas 253- now is ~190- no longer on DM meds    Denies sypmtoms that would suggest HE. Has an increased abdo girth -has had 8 LVPs since the beginning of 2024, but no edema. Remains on lasix 20 mg daily.    Labs 3/21/24: ALT 12. AST 21, ALKP 86, Tbil 0.5, plts 272    Abdo US 10/5/23: cirrhosis with increased ascites; no HCC  CT abdo pelvis w contrast 1/26/23: Liver: Mildly enlarged.  Nodular contour, suggesting cirrhosis.  Diffusely decreased parenchymal attenuation.  Indeterminate 1.3 cm hepatic segment 7 hypodensity (axial 2:68).  Portal vein is patent.    EGD 1/17/23: sm EV; PHG    MELD 3.0: 16 at 3/21/2024  4:22 PM  MELD-Na: 17 at 3/21/2024  4:22 PM  Calculated from:  Serum Creatinine: 1.8 mg/dL at 3/21/2024  4:22 PM  Serum Sodium: 139 mmol/L (Using max of 137 mmol/L) at 3/21/2024   4:22 PM  Total Bilirubin: 0.5 mg/dL (Using min of 1 mg/dL) at 3/21/2024  2:32 PM  Serum Albumin: 3.2 g/dL at 3/21/2024  2:32 PM  INR(ratio): 1.5 at 3/21/2024  2:32 PM  Age at listing (hypothetical): 63 years  Sex: Male at 3/21/2024  4:22 PM       Outpatient Encounter Medications as of 4/23/2024   Medication Sig Dispense Refill    ADVAIR DISKUS 250-50 mcg/dose diskus inhaler Inhale 1 puff into the lungs 2 (two) times a day. Controller 180 each 3    amiodarone (PACERONE) 200 MG Tab Take 1 tablet (200 mg total) by mouth once daily. 30 tablet 11    ferrous sulfate 324 mg (65 mg iron) TbEC Take by mouth.      levothyroxine (SYNTHROID) 50 MCG tablet TAKE 1 TABLET BY MOUTH IN THE MORNING WITH A FULL GLASS OF WATER 30-60 MINUTES BEFORE OTHER MEDICATIONS AND FOOD 90 tablet 3    lisinopriL-hydrochlorothiazide (PRINZIDE,ZESTORETIC) 10-12.5 mg per tablet Take 1 tablet by mouth once daily.      metoprolol succinate (TOPROL-XL) 50 MG 24 hr tablet Take 1 tablet (50 mg total) by mouth once daily. 90 tablet 3    metoprolol tartrate (LOPRESSOR) 100 MG tablet Take 100 mg by mouth.      omega 3-dha-epa-fish oil 1,000 mg (120 mg-180 mg) Cap 2 capsule DAILY (route: oral)      ondansetron (ZOFRAN-ODT) 4 MG TbDL Take 1 tablet (4 mg total) by mouth every 8 (eight) hours as needed (nausea/vomiting). 9 tablet 0    pantoprazole (PROTONIX) 40 MG tablet Take 1 tablet by mouth 2 (two) times daily.      potassium chloride (KLOR-CON) 10 MEQ TbSR Take 10 mEq by mouth once.      simvastatin (ZOCOR) 40 MG tablet Take 1 tablet every day by oral route at bedtime.      thiamine 100 MG tablet Take 1 tablet by mouth once daily.      traMADoL (ULTRAM) 50 mg tablet Take 50 mg by mouth.      traZODone (DESYREL) 50 MG tablet Take 50 mg by mouth nightly as needed.      VITAMIN B COMPLEX ORAL Take 1 tablet by mouth once daily.      XARELTO 20 mg Tab Take 20 mg by mouth once daily.      albuterol (VENTOLIN HFA) 90 mcg/actuation inhaler Inhale 1 puff into the  lungs every 4 (four) hours as needed for Wheezing or Shortness of Breath. Rescue 18 g 0    folic acid (FOLVITE) 1 MG tablet Take 1 tablet (1 mg total) by mouth once daily. 30 tablet 11    furosemide (LASIX) 20 MG tablet Take 1 tablet (20 mg total) by mouth once daily. (Patient not taking: Reported on 2024) 60 tablet 11    [] lancets (RELION THIN LANCETS) 26 gauge Misc 1 lancet by Misc.(Non-Drug; Combo Route) route 2 (two) times daily. 100 each 11    spironolactone (ALDACTONE) 50 MG tablet Take 1 tablet (50 mg total) by mouth once daily. (Patient not taking: Reported on 2024) 30 tablet 11    traZODone (DESYREL) 100 MG tablet Take 1 tablet (100 mg total) by mouth every evening. 30 tablet 2     No facility-administered encounter medications on file as of 2024.     Review of patient's allergies indicates:  No Known Allergies  Past Medical History:   Diagnosis Date    A-fib     Esophageal varices 2023    Other ascites 2023       Review of Systems   Constitutional: Negative.    HENT: Negative.     Eyes: Negative.    Respiratory: Negative.     Cardiovascular: Negative.    Gastrointestinal: Negative.    Genitourinary: Negative.    Musculoskeletal: Negative.    Skin: Negative.    Neurological: Negative.    Psychiatric/Behavioral: Negative.       There were no vitals filed for this visit.      Physical Exam  Vitals reviewed.   Constitutional:       Appearance: He is well-developed.   HENT:      Head: Normocephalic and atraumatic.   Eyes:      General: No scleral icterus.     Conjunctiva/sclera: Conjunctivae normal.      Pupils: Pupils are equal, round, and reactive to light.   Neck:      Thyroid: No thyromegaly.   Cardiovascular:      Rate and Rhythm: Normal rate and regular rhythm.      Heart sounds: Normal heart sounds.   Pulmonary:      Effort: Pulmonary effort is normal.      Breath sounds: Normal breath sounds. No rales.   Abdominal:      General: Bowel sounds are normal. There is  distension.      Palpations: Abdomen is soft. There is no mass.      Tenderness: There is no abdominal tenderness.   Musculoskeletal:         General: Normal range of motion.      Cervical back: Normal range of motion and neck supple.   Skin:     General: Skin is warm and dry.      Findings: No rash.   Neurological:      Mental Status: He is alert and oriented to person, place, and time.         MELD 3.0: 16 at 3/21/2024  4:22 PM  MELD-Na: 17 at 3/21/2024  4:22 PM  Calculated from:  Serum Creatinine: 1.8 mg/dL at 3/21/2024  4:22 PM  Serum Sodium: 139 mmol/L (Using max of 137 mmol/L) at 3/21/2024  4:22 PM  Total Bilirubin: 0.5 mg/dL (Using min of 1 mg/dL) at 3/21/2024  2:32 PM  Serum Albumin: 3.2 g/dL at 3/21/2024  2:32 PM  INR(ratio): 1.5 at 3/21/2024  2:32 PM  Age at listing (hypothetical): 63 years  Sex: Male at 3/21/2024  4:22 PM      Lab Results   Component Value Date    GLU 93 03/21/2024    BUN 19 03/21/2024    CREATININE 1.8 (H) 03/21/2024    CALCIUM 8.7 03/21/2024     03/21/2024    K 5.1 03/21/2024     03/21/2024    PROT 8.4 03/21/2024    CO2 21 (L) 03/21/2024    ANIONGAP 11 03/21/2024    WBC 6.96 03/21/2024    RBC 4.36 (L) 03/21/2024    HGB 12.0 (L) 03/21/2024    HCT 36.8 (L) 03/21/2024    HCT 33 (L) 01/27/2023    MCV 84 03/21/2024    MCH 27.5 03/21/2024    MCHC 32.6 03/21/2024     Lab Results   Component Value Date    RDW 16.2 (H) 03/21/2024     03/21/2024    MPV 10.3 03/21/2024    GRAN 5.2 03/21/2024    GRAN 75.0 (H) 03/21/2024    LYMPH 1.1 03/21/2024    LYMPH 15.9 (L) 03/21/2024    MONO 0.5 03/21/2024    MONO 6.5 03/21/2024    EOSINOPHIL 1.1 03/21/2024    BASOPHIL 1.1 03/21/2024    EOS 0.1 03/21/2024    BASO 0.08 03/21/2024    APTT 49.1 (H) 11/09/2022    GROUPTRH A POS 01/16/2023     (H) 01/27/2023    ALBUMIN 3.2 (L) 03/21/2024    BILIDIR 0.2 02/12/2024    AST 21 03/21/2024    ALT 12 03/21/2024    ALKPHOS 86 03/21/2024    MG 1.2 (L) 02/07/2023    LABPROT 15.4 (H) 03/21/2024     INR 1.5 (H) 03/21/2024       Assessment and Plan:  Jonny Isabel is a 63 y.o. male with Cirrhosis  Has refractory ascites. Continue prn paracenteses. Recommend liver tx evaluation and consider TIPS placement for ascites control.  Labs monthly  Paracentesis prn  Amiodarone - need to change to alternate drug-need to see Dr Burt of cardiology   EGD -repeat 2/25  Quit smoking  Abdo Us now    Return 3 months  A total of 60 minutes was spent reviewing the patient's chart, examining the patient, reviewing labs and imaging and coordinating care with the patient's care team.

## 2024-04-23 NOTE — PATIENT INSTRUCTIONS
Continue taps for now  Liver tx evaluation- to see if is a good candidate for a TIPS - will need cardiac clearance; if not consider liver tx; also needs neurosurgical clearance  Get off amiodarone  Repeat EGD 2/25  Quit smoking  Monthly labs  Abdo US now  Return 3 months

## 2024-04-24 ENCOUNTER — TELEPHONE (OUTPATIENT)
Dept: INTERVENTIONAL RADIOLOGY/VASCULAR | Facility: HOSPITAL | Age: 64
End: 2024-04-24
Payer: MEDICAID

## 2024-04-24 ENCOUNTER — TELEPHONE (OUTPATIENT)
Dept: TRANSPLANT | Facility: CLINIC | Age: 64
End: 2024-04-24
Payer: MEDICAID

## 2024-04-24 NOTE — TELEPHONE ENCOUNTER
Referral received from Dr Escalante  Initial  referral from Harriett Bird MD   Patient with   Alcoholic cirrhosis of liver with ascites ICD-10-CM: K70.31 K74.60      . MELD 16    Referred for liver transplant for EVALUATION.    Referral completed and forwarded to [facility].          Insurance: EPIC   PRIMARY:   ID:  Contact #     SECONDARY:   ID:  Contact #

## 2024-04-24 NOTE — NURSING
Significant other advised to arrive at 1:00. Regularly scheduled patient who is familiar with the process and procedure.

## 2024-04-24 NOTE — TELEPHONE ENCOUNTER
----- Message from Lovely Escalante MD sent at 4/23/2024  3:39 PM CDT -----  Needs liver transplant evaluation

## 2024-04-25 ENCOUNTER — HOSPITAL ENCOUNTER (OUTPATIENT)
Dept: INTERVENTIONAL RADIOLOGY/VASCULAR | Facility: HOSPITAL | Age: 64
Discharge: HOME | End: 2024-04-25
Attending: INTERNAL MEDICINE
Payer: MEDICAID

## 2024-04-25 VITALS
HEIGHT: 69 IN | TEMPERATURE: 98 F | OXYGEN SATURATION: 100 % | WEIGHT: 187 LBS | DIASTOLIC BLOOD PRESSURE: 67 MMHG | SYSTOLIC BLOOD PRESSURE: 132 MMHG | HEART RATE: 41 BPM | BODY MASS INDEX: 27.7 KG/M2 | RESPIRATION RATE: 15 BRPM

## 2024-04-25 DIAGNOSIS — R18.8 OTHER ASCITES: ICD-10-CM

## 2024-04-25 DIAGNOSIS — R18.8 ASCITES: ICD-10-CM

## 2024-04-25 LAB
APPEARANCE FLD: NORMAL
BODY FLD TYPE: NORMAL
COLOR FLD: COLORLESS
LYMPHOCYTES NFR FLD MANUAL: 16 %
MESOTHL CELL NFR FLD MANUAL: 5 %
MONOS+MACROS NFR FLD MANUAL: 71 %
NEUTROPHILS NFR FLD MANUAL: 8 %
WBC # FLD: 168 /CU MM

## 2024-04-25 PROCEDURE — 25000003 PHARM REV CODE 250: Mod: TXP | Performed by: PHYSICIAN ASSISTANT

## 2024-04-25 PROCEDURE — 49083 ABD PARACENTESIS W/IMAGING: CPT | Mod: TXP,,, | Performed by: PHYSICIAN ASSISTANT

## 2024-04-25 PROCEDURE — 49083 ABD PARACENTESIS W/IMAGING: CPT | Mod: TXP | Performed by: STUDENT IN AN ORGANIZED HEALTH CARE EDUCATION/TRAINING PROGRAM

## 2024-04-25 PROCEDURE — 89051 BODY FLUID CELL COUNT: CPT | Mod: NTX | Performed by: INTERNAL MEDICINE

## 2024-04-25 RX ORDER — LIDOCAINE HYDROCHLORIDE 10 MG/ML
INJECTION INFILTRATION; PERINEURAL
Status: COMPLETED | OUTPATIENT
Start: 2024-04-25 | End: 2024-04-25

## 2024-04-25 RX ADMIN — LIDOCAINE HYDROCHLORIDE 5 ML: 10 INJECTION, SOLUTION INFILTRATION; PERINEURAL at 01:04

## 2024-04-25 NOTE — DISCHARGE SUMMARY
Interventional Radiology Short Stay Discharge Summary      Admit Date: 4/25/2024  Discharge Date: 04/25/2024     Hospital Course: Uneventful    Discharge Diagnosis: ascites    Discharge Condition: Stable    Discharge Disposition: Home    Diet: Resume prior diet    Activity: activity as tolerated    Follow-up: With referring provider    Cindy Chava, PA-C [facility]

## 2024-04-25 NOTE — H&P
Radiology History & Physical      SUBJECTIVE:     Chief Complaint: abdominal distention    History of Present Illness:  Jonny Isabel is a 63 y.o. male who presents for ultrasound guided paracentesis  Past Medical History:   Diagnosis Date    A-fib     Esophageal varices 5/8/2023    Other ascites 5/8/2023     Past Surgical History:   Procedure Laterality Date    ESOPHAGOGASTRODUODENOSCOPY N/A 1/17/2023    Procedure: EGD (ESOPHAGOGASTRODUODENOSCOPY);  Surgeon: Dewayne Navas MD;  Location: [facility] (2ND FLR);  Service: Endoscopy;  Laterality: N/A;    ESOPHAGOGASTRODUODENOSCOPY N/A 2/7/2024    Procedure: EGD (ESOPHAGOGASTRODUODENOSCOPY);  Surgeon: Nathan Goins MD;  [facility]  Service: Endoscopy;  Laterality: N/A;  referral: Dr. Escalante /cirrhosis - labs- possible banding / prep ins on portal / 2nd floor - Pt c/o SOB at times./ Xarelto - message sent to clearance nurse per protocol - ERW  1/31/24- LVM for precall - ERW  2/1-precall complet-Kpvt  ok to hold Xarelto2 da       Home Meds:   Prior to Admission medications    Medication Sig Start Date End Date Taking? Authorizing Provider   ADVAIR DISKUS 250-50 mcg/dose diskus inhaler Inhale 1 puff into the lungs 2 (two) times a day. Controller 3/20/24 3/20/25 Yes Joanne Ruiz NP   amiodarone (PACERONE) 200 MG Tab Take 1 tablet (200 mg total) by mouth once daily. 12/12/23 12/11/24 Yes Ellen Escalera NP   ferrous sulfate 324 mg (65 mg iron) TbEC Take by mouth. 1/4/24  Yes Provider, Historical   levothyroxine (SYNTHROID) 50 MCG tablet TAKE 1 TABLET BY MOUTH IN THE MORNING WITH A FULL GLASS OF WATER 30-60 MINUTES BEFORE OTHER MEDICATIONS AND FOOD 12/12/23  Yes Ellen Escalera NP   lisinopriL-hydrochlorothiazide (PRINZIDE,ZESTORETIC) 10-12.5 mg per tablet Take 1 tablet by mouth once daily.   Yes Provider, Historical   omega 3-dha-epa-fish oil 1,000 mg (120 mg-180 mg) Cap 2 capsule DAILY (route: oral) 7/13/22  Yes Provider, Historical    ondansetron (ZOFRAN-ODT) 4 MG TbDL Take 1 tablet (4 mg total) by mouth every 8 (eight) hours as needed (nausea/vomiting). 3/21/24  Yes Bill Buck,    pantoprazole (PROTONIX) 40 MG tablet Take 1 tablet by mouth 2 (two) times daily. 8/17/22  Yes Provider, Historical   thiamine 100 MG tablet Take 1 tablet by mouth once daily. 7/13/22  Yes Provider, Historical   traMADoL (ULTRAM) 50 mg tablet Take 50 mg by mouth. 4/12/24  Yes Provider, Historical   traZODone (DESYREL) 50 MG tablet Take 50 mg by mouth nightly as needed. 2/29/24  Yes Provider, Historical   XARELTO 20 mg Tab Take 20 mg by mouth once daily. 1/9/23  Yes Provider, Historical   albuterol (VENTOLIN HFA) 90 mcg/actuation inhaler Inhale 1 puff into the lungs every 4 (four) hours as needed for Wheezing or Shortness of Breath. Rescue 2/14/23 2/14/24  Joanne Ruiz NP   folic acid (FOLVITE) 1 MG tablet Take 1 tablet (1 mg total) by mouth once daily. 2/7/23 2/14/24  Seymour Leon MD   furosemide (LASIX) 20 MG tablet Take 1 tablet (20 mg total) by mouth once daily.  Patient not taking: Reported on 4/23/2024 12/12/23   Ellen Escalera NP   metoprolol succinate (TOPROL-XL) 50 MG 24 hr tablet Take 1 tablet (50 mg total) by mouth once daily. 12/12/23 12/11/24  Ellen Escalera NP   metoprolol tartrate (LOPRESSOR) 100 MG tablet Take 100 mg by mouth. 12/27/23   Provider, Historical   potassium chloride (KLOR-CON) 10 MEQ TbSR Take 10 mEq by mouth once.    Provider, Historical   simvastatin (ZOCOR) 40 MG tablet Take 1 tablet every day by oral route at bedtime.    Provider, Historical   spironolactone (ALDACTONE) 50 MG tablet Take 1 tablet (50 mg total) by mouth once daily.  Patient not taking: Reported on 4/23/2024 6/16/23 6/15/24  Lovely Escalante MD   VITAMIN B COMPLEX ORAL Take 1 tablet by mouth once daily.    Provider, Historical     Anticoagulants/Antiplatelets: no anticoagulation    Allergies: Review of patient's allergies  indicates:  No Known Allergies  Sedation History:  no adverse reactions    Review of Systems:   Hematological: no known coagulopathies  Respiratory: no shortness of breath  Cardiovascular: no chest pain  Gastrointestinal: no abdominal pain  Genito-Urinary: no dysuria  Musculoskeletal: negative  Neurological: no TIA or stroke symptoms         OBJECTIVE:     Vital Signs (Most Recent)  Temp: 97.8 °F (36.6 °C) (04/25/24 1310)  Pulse: (!) 41 (04/25/24 1333)  Resp: 15 (04/25/24 1333)  BP: (!) 144/67 (04/25/24 1333)  SpO2: 100 % (04/25/24 1333)    Physical Exam:  ASA: 2  Mallampati: n/a    General: no acute distress  Mental Status: alert and oriented to person, place and time  HEENT: normocephalic, atraumatic  Chest: unlabored breathing  Heart: regular heart rate  Abdomen: distended  Extremity: moves all extremities    ASSESSMENT/PLAN:     Sedation Plan: local  Patient will undergo ultrasound guided paracentesis.    Ashia Gallegos PA-C

## 2024-04-25 NOTE — TELEPHONE ENCOUNTER
Bernarda Geiger Shelley  Caller: Unspecified (Yesterday,  5:56 PM)  Initiated request with [facility]. Patient is deferred until [employer] contract is opened.    Pt called and notified Adelita benita girlfriend

## 2024-04-25 NOTE — PROCEDURES
Radiology Post-Procedure Note    Pre Op Diagnosis: Ascites  Post Op Diagnosis: Same    Procedure: Ultrasound Guided Paracentesis    Procedure performed by: Ashia Gallegos PA-C    Written Informed Consent Obtained: Yes  Specimen Removed: YES   Estimated Blood Loss: Minimal    Findings:   Successful paracentesis.  5L clear yellow fluid removed from RLQ.  Albumin was not administered     Patient tolerated procedure well.    Ashia Gallegos PA-C

## 2024-04-25 NOTE — SEDATION DOCUMENTATION
Procedure complete. Vitals stable during procedure, pt tolerated well. Patient alert and oriented x4 unlabored on Room Air. Patient denies pain and is resting comfortably. Surgical site dressed with gauze and tagederm. Dressing is clean, dry, and intact. 5000 mL of clear/yellow fluid removed; WBC with diff sample collected and sent to lab. Patient transported to recovery and education/AVS provided.

## 2024-05-01 ENCOUNTER — TELEPHONE (OUTPATIENT)
Dept: INTERVENTIONAL RADIOLOGY/VASCULAR | Facility: HOSPITAL | Age: 64
End: 2024-05-01
Payer: MEDICAID

## 2024-05-01 ENCOUNTER — HOSPITAL ENCOUNTER (OUTPATIENT)
Dept: RADIOLOGY | Facility: HOSPITAL | Age: 64
Discharge: HOME | End: 2024-05-01
Attending: INTERNAL MEDICINE
Payer: MEDICAID

## 2024-05-01 DIAGNOSIS — K70.31 ALCOHOLIC CIRRHOSIS OF LIVER WITH ASCITES: ICD-10-CM

## 2024-05-01 PROCEDURE — 76700 US EXAM ABDOM COMPLETE: CPT | Mod: TC,TXP

## 2024-05-01 PROCEDURE — 76700 US EXAM ABDOM COMPLETE: CPT | Mod: 26,NTX,, | Performed by: RADIOLOGY

## 2024-05-01 NOTE — NURSING
V/M left: advised to arrive at 1:00. Regularly scheduled patient who is familiar with the procedure and the IR appoinment process.

## 2024-05-07 DIAGNOSIS — R18.8 OTHER ASCITES: Primary | ICD-10-CM

## 2024-05-08 ENCOUNTER — TELEPHONE (OUTPATIENT)
Dept: INTERVENTIONAL RADIOLOGY/VASCULAR | Facility: HOSPITAL | Age: 64
End: 2024-05-08
Payer: MEDICAID

## 2024-05-09 ENCOUNTER — HOSPITAL ENCOUNTER (OUTPATIENT)
Dept: INTERVENTIONAL RADIOLOGY/VASCULAR | Facility: HOSPITAL | Age: 64
Discharge: HOME | End: 2024-05-09
Attending: RADIOLOGY
Payer: MEDICAID

## 2024-05-09 VITALS
DIASTOLIC BLOOD PRESSURE: 68 MMHG | OXYGEN SATURATION: 100 % | HEART RATE: 46 BPM | RESPIRATION RATE: 14 BRPM | HEIGHT: 69 IN | SYSTOLIC BLOOD PRESSURE: 149 MMHG | WEIGHT: 198 LBS | TEMPERATURE: 98 F | BODY MASS INDEX: 29.33 KG/M2

## 2024-05-09 DIAGNOSIS — R18.8 ASCITES: ICD-10-CM

## 2024-05-09 DIAGNOSIS — R18.8 OTHER ASCITES: ICD-10-CM

## 2024-05-09 LAB
APPEARANCE FLD: CLEAR
BODY FLD TYPE: NORMAL
COLOR FLD: COLORLESS
LYMPHOCYTES NFR FLD MANUAL: 28 %
MONOS+MACROS NFR FLD MANUAL: 67 %
NEUTROPHILS NFR FLD MANUAL: 5 %
WBC # FLD: 135 /CU MM

## 2024-05-09 PROCEDURE — 89051 BODY FLUID CELL COUNT: CPT | Mod: TXP | Performed by: INTERNAL MEDICINE

## 2024-05-09 PROCEDURE — 25000003 PHARM REV CODE 250: Mod: TXP | Performed by: PHYSICIAN ASSISTANT

## 2024-05-09 PROCEDURE — 49083 ABD PARACENTESIS W/IMAGING: CPT | Mod: TXP,,, | Performed by: PHYSICIAN ASSISTANT

## 2024-05-09 PROCEDURE — 49083 ABD PARACENTESIS W/IMAGING: CPT | Mod: TXP | Performed by: STUDENT IN AN ORGANIZED HEALTH CARE EDUCATION/TRAINING PROGRAM

## 2024-05-09 RX ORDER — LIDOCAINE HYDROCHLORIDE 10 MG/ML
INJECTION INFILTRATION; PERINEURAL
Status: COMPLETED | OUTPATIENT
Start: 2024-05-09 | End: 2024-05-09

## 2024-05-09 RX ADMIN — LIDOCAINE HYDROCHLORIDE 5 ML: 10 INJECTION, SOLUTION INFILTRATION; PERINEURAL at 01:05

## 2024-05-09 NOTE — SEDATION DOCUMENTATION
Pt arrived to pre/post for paracentesis. Pt oriented to unit and staff. Plan of care reviewed with patient, patient verbalizes understanding. Comfort measures utilized. Pt safely transferred from stretcher to procedural table. Fall risk reviewed with patient, fall risk interventions maintained. Safety strap applied, positioner pillows utilized to minimize pressure points. Blankets applied. Pt prepped and draped utilizing standard sterile technique. Patient placed on continuous monitoring, as required by sedation policy. Timeouts completed utilizing standard universal time-out, per department and facility policy. RN to remain at bedside, continuous monitoring maintained. Pt resting comfortably. Denies pain/discomfort. Will continue to monitor. See flow sheets for monitoring, medication administration, and updates.

## 2024-05-09 NOTE — PROGRESS NOTES
Bernarda Geiger  YouYesterday (7:48 AM)     SB  Patient is cleared for liver transplant evaluation.     Assigned to Michael Gupta

## 2024-05-09 NOTE — PLAN OF CARE
Pt VSS and in NAD. Pulse ox and bp equipment removed. Discharge instructions and AVS provided. Pt verbalized understanding, questions and concerns addressed. No further needs at this time. Pt discharged from recovery area at 1406.

## 2024-05-09 NOTE — PROCEDURES
Radiology Post-Procedure Note    Pre Op Diagnosis: Ascites  Post Op Diagnosis: Same    Procedure: Ultrasound Guided Paracentesis    Procedure performed by: Ashia Gallegos PA-C    Written Informed Consent Obtained: Yes  Specimen Removed: YES   Estimated Blood Loss: Minimal    Findings:   Successful paracentesis.  5L Clear yellow fluid.  Albumin was not administered     Patient tolerated procedure well.    Ashia Gallegos PA-C

## 2024-05-09 NOTE — DISCHARGE SUMMARY
Interventional Radiology Short Stay Discharge Summary      Admit Date: 5/9/2024  Discharge Date: 05/09/2024     Hospital Course: Uneventful    Discharge Diagnosis: ascites    Discharge Condition: Stable    Discharge Disposition: Home    Diet: Resume prior diet    Activity: activity as tolerated    Follow-up: With referring provider    Cindy Chava, PA-C [facility]

## 2024-05-09 NOTE — H&P
Radiology History & Physical      SUBJECTIVE:     Chief Complaint: abdominal distention    History of Present Illness:  Jonny Isabel is a 63 y.o. male who presents for ultrasound guided paracentesis  Past Medical History:   Diagnosis Date    A-fib     Esophageal varices 5/8/2023    Other ascites 5/8/2023     Past Surgical History:   Procedure Laterality Date    ESOPHAGOGASTRODUODENOSCOPY N/A 1/17/2023    Procedure: EGD (ESOPHAGOGASTRODUODENOSCOPY);  Surgeon: Dewayne Navas MD;  Location: [facility] (2ND FLR);  Service: Endoscopy;  Laterality: N/A;    ESOPHAGOGASTRODUODENOSCOPY N/A 2/7/2024    Procedure: EGD (ESOPHAGOGASTRODUODENOSCOPY);  Surgeon: Nathan Goins MD;  [facility]  Service: Endoscopy;  Laterality: N/A;  referral: Dr. Escalante /cirrhosis - labs- possible banding / prep ins on portal / 2nd floor - Pt c/o SOB at times./ Xarelto - message sent to clearance nurse per protocol - ERW  1/31/24- LVM for precall - ERW  2/1-precall complet-Kpvt  ok to hold Xarelto2 da       Home Meds:   Prior to Admission medications    Medication Sig Start Date End Date Taking? Authorizing Provider   ADVAIR DISKUS 250-50 mcg/dose diskus inhaler Inhale 1 puff into the lungs 2 (two) times a day. Controller 3/20/24 3/20/25  Joanne Ruiz NP   albuterol (VENTOLIN HFA) 90 mcg/actuation inhaler Inhale 1 puff into the lungs every 4 (four) hours as needed for Wheezing or Shortness of Breath. Rescue 2/14/23 2/14/24  Joanne Ruiz NP   amiodarone (PACERONE) 200 MG Tab Take 1 tablet (200 mg total) by mouth once daily. 12/12/23 12/11/24  Ellen Escalera NP   ferrous sulfate 324 mg (65 mg iron) TbEC Take by mouth. 1/4/24   Provider, Historical   folic acid (FOLVITE) 1 MG tablet Take 1 tablet (1 mg total) by mouth once daily. 2/7/23 2/14/24  Seymour Leon MD   furosemide (LASIX) 20 MG tablet Take 1 tablet (20 mg total) by mouth once daily.  Patient not taking: Reported on 4/23/2024 12/12/23    Ellen Escalera NP   levothyroxine (SYNTHROID) 50 MCG tablet TAKE 1 TABLET BY MOUTH IN THE MORNING WITH A FULL GLASS OF WATER 30-60 MINUTES BEFORE OTHER MEDICATIONS AND FOOD 12/12/23   Ellen Escalera NP   lisinopriL-hydrochlorothiazide (PRINZIDE,ZESTORETIC) 10-12.5 mg per tablet Take 1 tablet by mouth once daily.    Provider, Historical   metoprolol succinate (TOPROL-XL) 50 MG 24 hr tablet Take 1 tablet (50 mg total) by mouth once daily. 12/12/23 12/11/24  Ellen Escalera NP   metoprolol tartrate (LOPRESSOR) 100 MG tablet Take 100 mg by mouth. 12/27/23   Provider, Historical   omega 3-dha-epa-fish oil 1,000 mg (120 mg-180 mg) Cap 2 capsule DAILY (route: oral) 7/13/22   Provider, Historical   ondansetron (ZOFRAN-ODT) 4 MG TbDL Take 1 tablet (4 mg total) by mouth every 8 (eight) hours as needed (nausea/vomiting). 3/21/24   Bill Buck,    pantoprazole (PROTONIX) 40 MG tablet Take 1 tablet by mouth 2 (two) times daily. 8/17/22   Provider, Historical   potassium chloride (KLOR-CON) 10 MEQ TbSR Take 10 mEq by mouth once.    Provider, Historical   simvastatin (ZOCOR) 40 MG tablet Take 1 tablet every day by oral route at bedtime.    Provider, Historical   spironolactone (ALDACTONE) 50 MG tablet Take 1 tablet (50 mg total) by mouth once daily.  Patient not taking: Reported on 4/23/2024 6/16/23 6/15/24  Lovely Escalante MD   thiamine 100 MG tablet Take 1 tablet by mouth once daily. 7/13/22   Provider, Historical   traMADoL (ULTRAM) 50 mg tablet Take 50 mg by mouth. 4/12/24   Provider, Historical   traZODone (DESYREL) 50 MG tablet Take 50 mg by mouth nightly as needed. 2/29/24   Provider, Historical   VITAMIN B COMPLEX ORAL Take 1 tablet by mouth once daily.    Provider, Historical   XARELTO 20 mg Tab Take 20 mg by mouth once daily. 1/9/23   Provider, Historical     Anticoagulants/Antiplatelets:  xarelto    Allergies: Review of patient's allergies indicates:  No Known Allergies  Sedation History:   no adverse reactions    Review of Systems:   Hematological: no known coagulopathies  Respiratory: no shortness of breath  Cardiovascular: no chest pain  Gastrointestinal: no abdominal pain  Genito-Urinary: no dysuria  Musculoskeletal: negative  Neurological: no TIA or stroke symptoms         OBJECTIVE:     Vital Signs (Most Recent)       Physical Exam:  ASA: 2  Mallampati: n/a    General: no acute distress  Mental Status: alert and oriented to person, place and time  HEENT: normocephalic, atraumatic  Chest: unlabored breathing  Heart: regular heart rate  Abdomen: distended  Extremity: moves all extremities    ASSESSMENT/PLAN:     Sedation Plan: local  Patient will undergo ultrasound guided paracentesis.    Ashia Gallegos PA-C

## 2024-05-09 NOTE — SEDATION DOCUMENTATION
Procedure complete. Vitals stable during procedure. Patient alert and oriented x4 unlabored on Room Air. Patient denies pain and is resting comfortably. Surgical site dressed with gauze and tagederm. Dressing is clean, dry, and intact. 5 liter of fluid removed. Pt brought to recovery, then discharged with education and AVS.

## 2024-05-15 ENCOUNTER — TELEPHONE (OUTPATIENT)
Dept: INTERVENTIONAL RADIOLOGY/VASCULAR | Facility: HOSPITAL | Age: 64
End: 2024-05-15
Payer: MEDICAID

## 2024-05-15 NOTE — NURSING
V/M: Advised to arrive at 1:00. Regularly scheduled patient who is familiar with the procedure and the IR check in process.

## 2024-05-16 ENCOUNTER — HOSPITAL ENCOUNTER (OUTPATIENT)
Dept: INTERVENTIONAL RADIOLOGY/VASCULAR | Facility: HOSPITAL | Age: 64
Discharge: HOME | End: 2024-05-16
Attending: RADIOLOGY
Payer: MEDICAID

## 2024-05-16 ENCOUNTER — TELEPHONE (OUTPATIENT)
Dept: TRANSPLANT | Facility: CLINIC | Age: 64
End: 2024-05-16
Payer: MEDICAID

## 2024-05-16 VITALS
TEMPERATURE: 98 F | OXYGEN SATURATION: 98 % | WEIGHT: 200 LBS | BODY MASS INDEX: 29.62 KG/M2 | HEIGHT: 69 IN | SYSTOLIC BLOOD PRESSURE: 156 MMHG | DIASTOLIC BLOOD PRESSURE: 74 MMHG | RESPIRATION RATE: 18 BRPM | HEART RATE: 51 BPM

## 2024-05-16 DIAGNOSIS — R18.8 ASCITES: ICD-10-CM

## 2024-05-16 DIAGNOSIS — R18.8 OTHER ASCITES: ICD-10-CM

## 2024-05-16 LAB
APPEARANCE FLD: CLEAR
BODY FLD TYPE: NORMAL
COLOR FLD: COLORLESS
LYMPHOCYTES NFR FLD MANUAL: 17 %
MONOS+MACROS NFR FLD MANUAL: 79 %
NEUTROPHILS NFR FLD MANUAL: 4 %
WBC # FLD: 156 /CU MM

## 2024-05-16 PROCEDURE — 49083 ABD PARACENTESIS W/IMAGING: CPT | Mod: TXP,,, | Performed by: RADIOLOGY

## 2024-05-16 PROCEDURE — 89051 BODY FLUID CELL COUNT: CPT | Mod: NTX | Performed by: INTERNAL MEDICINE

## 2024-05-16 PROCEDURE — 49083 ABD PARACENTESIS W/IMAGING: CPT | Mod: TXP | Performed by: RADIOLOGY

## 2024-05-16 PROCEDURE — 25000003 PHARM REV CODE 250: Mod: TXP | Performed by: RADIOLOGY

## 2024-05-16 RX ORDER — LIDOCAINE HYDROCHLORIDE 10 MG/ML
INJECTION INFILTRATION; PERINEURAL
Status: COMPLETED | OUTPATIENT
Start: 2024-05-16 | End: 2024-05-16

## 2024-05-16 RX ADMIN — LIDOCAINE HYDROCHLORIDE 5 ML: 10 INJECTION, SOLUTION INFILTRATION; PERINEURAL at 01:05

## 2024-05-16 NOTE — TELEPHONE ENCOUNTER
Called to speak to Mr. Isabel about liver transplant evaluation.  His wife answered the phone. I asked for Jonny.   Jonny answered. I confirmed with patient that Dr. Escalante wants him to have OLT evaluation.  I began to explain what the liver tx evaluation entailed. He stopped me from continuing the conversation and told me he will call me back another day to discuss. I gave him my call back number.

## 2024-05-16 NOTE — PROCEDURES
Radiology Post-Procedure Note    Pre Op Diagnosis: Ascites    Post Op Diagnosis: Same    Procedure: US guided paracentesis.    Procedure performed by: Bob Appiah MD    Written Informed Consent Obtained: Yes  Specimen Removed: YES 1650 mL serous ascites  Estimated Blood Loss: Minimal    Findings:   Successful paracentesis.  No complications.    Patient tolerated procedure well.    Ed Nelson MD  Diagnostic and Interventional Radiologist  Department of Radiology

## 2024-05-16 NOTE — H&P
Radiology History & Physical      SUBJECTIVE:     Chief Complaint: abdominal distention    History of Present Illness:  Jonny Isabel is a 63 y.o. male who presents for ultrasound guided paracentesis  Past Medical History:   Diagnosis Date    A-fib     Esophageal varices 5/8/2023    Other ascites 5/8/2023     Past Surgical History:   Procedure Laterality Date    ESOPHAGOGASTRODUODENOSCOPY N/A 1/17/2023    Procedure: EGD (ESOPHAGOGASTRODUODENOSCOPY);  Surgeon: Dewayne Navas MD;  Location: [facility] (2ND FLR);  Service: Endoscopy;  Laterality: N/A;    ESOPHAGOGASTRODUODENOSCOPY N/A 2/7/2024    Procedure: EGD (ESOPHAGOGASTRODUODENOSCOPY);  Surgeon: Nathan Goins MD;  Location: [facility] (2ND FLR);  Service: Endoscopy;  Laterality: N/A;  referral: Dr. Escalante /cirrhosis - labs- possible banding / prep ins on portal / 2nd floor - Pt c/o SOB at times./ Xarelto - message sent to clearance nurse per protocol - ERW  1/31/24- LVM for precall - ERW  2/1-precall complet-Kpvt  ok to hold Xarelto2 da       Home Meds:   Prior to Admission medications    Medication Sig Start Date End Date Taking? Authorizing Provider   ADVAIR DISKUS 250-50 mcg/dose diskus inhaler Inhale 1 puff into the lungs 2 (two) times a day. Controller 3/20/24 3/20/25 Yes Joanne Ruiz NP   amiodarone (PACERONE) 200 MG Tab Take 1 tablet (200 mg total) by mouth once daily. 12/12/23 12/11/24 Yes Ellen Escalera NP   ferrous sulfate 324 mg (65 mg iron) TbEC Take by mouth. 1/4/24  Yes Provider, Historical   furosemide (LASIX) 20 MG tablet Take 1 tablet (20 mg total) by mouth once daily. 12/12/23  Yes Ellen Escalera NP   levothyroxine (SYNTHROID) 50 MCG tablet TAKE 1 TABLET BY MOUTH IN THE MORNING WITH A FULL GLASS OF WATER 30-60 MINUTES BEFORE OTHER MEDICATIONS AND FOOD 12/12/23  Yes Ellen Escalear NP   lisinopriL-hydrochlorothiazide (PRINZIDE,ZESTORETIC) 10-12.5 mg per tablet Take 1 tablet by mouth once daily.   Yes Provider, Historical    metoprolol succinate (TOPROL-XL) 50 MG 24 hr tablet Take 1 tablet (50 mg total) by mouth once daily. 12/12/23 12/11/24 Yes Ellen Escalera NP   metoprolol tartrate (LOPRESSOR) 100 MG tablet Take 100 mg by mouth. 12/27/23  Yes Provider, Historical   omega 3-dha-epa-fish oil 1,000 mg (120 mg-180 mg) Cap 2 capsule DAILY (route: oral) 7/13/22  Yes Provider, Historical   ondansetron (ZOFRAN-ODT) 4 MG TbDL Take 1 tablet (4 mg total) by mouth every 8 (eight) hours as needed (nausea/vomiting). 3/21/24  Yes Bill Buck,    pantoprazole (PROTONIX) 40 MG tablet Take 1 tablet by mouth 2 (two) times daily. 8/17/22  Yes Provider, Historical   potassium chloride (KLOR-CON) 10 MEQ TbSR Take 10 mEq by mouth once.   Yes Provider, Historical   simvastatin (ZOCOR) 40 MG tablet Take 1 tablet every day by oral route at bedtime.   Yes Provider, Historical   thiamine 100 MG tablet Take 1 tablet by mouth once daily. 7/13/22  Yes Provider, Historical   traMADoL (ULTRAM) 50 mg tablet Take 50 mg by mouth. 4/12/24  Yes Provider, Historical   traZODone (DESYREL) 50 MG tablet Take 50 mg by mouth nightly as needed. 2/29/24  Yes Provider, Historical   XARELTO 20 mg Tab Take 20 mg by mouth once daily. 1/9/23  Yes Provider, Historical   albuterol (VENTOLIN HFA) 90 mcg/actuation inhaler Inhale 1 puff into the lungs every 4 (four) hours as needed for Wheezing or Shortness of Breath. Rescue 2/14/23 2/14/24  Joanne Ruiz NP   folic acid (FOLVITE) 1 MG tablet Take 1 tablet (1 mg total) by mouth once daily. 2/7/23 2/14/24  Seymour Leon MD   spironolactone (ALDACTONE) 50 MG tablet Take 1 tablet (50 mg total) by mouth once daily. 6/16/23 6/15/24  Lovely Escalante MD   VITAMIN B COMPLEX ORAL Take 1 tablet by mouth once daily.    Provider, Historical     Anticoagulants/Antiplatelets:  xarelto    Allergies: Review of patient's allergies indicates:  No Known Allergies  Sedation History:  no adverse reactions    Review of  Systems:   Hematological: no known coagulopathies  Respiratory: no shortness of breath  Cardiovascular: no chest pain  Gastrointestinal: no abdominal pain  Genito-Urinary: no dysuria  Musculoskeletal: negative  Neurological: no TIA or stroke symptoms         OBJECTIVE:     Vital Signs (Most Recent)  Temp: 98.2 °F (36.8 °C) (05/16/24 1301)  Pulse: (!) 52 (05/16/24 1301)  Resp: 20 (05/16/24 1301)  BP: 133/86 (05/16/24 1303)  SpO2: 100 % (05/16/24 1301)    Physical Exam:  ASA: 2  Mallampati: n/a    General: no acute distress  Mental Status: alert and oriented to person, place and time  HEENT: normocephalic, atraumatic  Chest: unlabored breathing  Heart: regular heart rate  Abdomen: distended  Extremity: moves all extremities    ASSESSMENT/PLAN:     Sedation Plan: local  Patient will undergo ultrasound guided paracentesis.    Ashia Gallegos PA-C

## 2024-05-22 ENCOUNTER — TELEPHONE (OUTPATIENT)
Dept: INTERVENTIONAL RADIOLOGY/VASCULAR | Facility: HOSPITAL | Age: 64
End: 2024-05-22
Payer: MEDICAID

## 2024-05-23 ENCOUNTER — HOSPITAL ENCOUNTER (OUTPATIENT)
Dept: INTERVENTIONAL RADIOLOGY/VASCULAR | Facility: HOSPITAL | Age: 64
Discharge: HOME | End: 2024-05-23
Attending: RADIOLOGY
Payer: MEDICAID

## 2024-05-23 VITALS
DIASTOLIC BLOOD PRESSURE: 87 MMHG | TEMPERATURE: 98 F | SYSTOLIC BLOOD PRESSURE: 161 MMHG | OXYGEN SATURATION: 97 % | RESPIRATION RATE: 16 BRPM | HEART RATE: 50 BPM

## 2024-05-23 DIAGNOSIS — R18.8 ASCITES: ICD-10-CM

## 2024-05-23 DIAGNOSIS — R18.8 OTHER ASCITES: ICD-10-CM

## 2024-05-23 LAB
APPEARANCE FLD: CLEAR
BODY FLD TYPE: NORMAL
COLOR FLD: COLORLESS
LYMPHOCYTES NFR FLD MANUAL: 24 %
MESOTHL CELL NFR FLD MANUAL: 1 %
MONOS+MACROS NFR FLD MANUAL: 65 %
NEUTROPHILS NFR FLD MANUAL: 10 %
WBC # FLD: 111 /CU MM

## 2024-05-23 PROCEDURE — 25000003 PHARM REV CODE 250: Mod: NTX | Performed by: PHYSICIAN ASSISTANT

## 2024-05-23 PROCEDURE — 49083 ABD PARACENTESIS W/IMAGING: CPT | Mod: TXP | Performed by: RADIOLOGY

## 2024-05-23 PROCEDURE — 89051 BODY FLUID CELL COUNT: CPT | Mod: NTX | Performed by: INTERNAL MEDICINE

## 2024-05-23 PROCEDURE — 49083 ABD PARACENTESIS W/IMAGING: CPT | Mod: TXP,,, | Performed by: PHYSICIAN ASSISTANT

## 2024-05-23 RX ORDER — LIDOCAINE HYDROCHLORIDE 10 MG/ML
INJECTION INFILTRATION; PERINEURAL
Status: COMPLETED | OUTPATIENT
Start: 2024-05-23 | End: 2024-05-23

## 2024-05-23 RX ADMIN — LIDOCAINE HYDROCHLORIDE 5 ML: 10 INJECTION, SOLUTION INFILTRATION; PERINEURAL at 01:05

## 2024-05-23 NOTE — NURSING
Patient is discharged from IR. AVS is printed and reviewed. Upcoming appointments and the [facility] number is highlighted for convenience. The opportunity to ask questions is provided. Patient is wheeled to his wife in the front lobby to exit.

## 2024-05-23 NOTE — PROCEDURES
Radiology Post-Procedure Note    Pre Op Diagnosis: Ascites  Post Op Diagnosis: Same    Procedure: Ultrasound Guided Paracentesis    Procedure performed by: Ashia Gallegos PA-C    Written Informed Consent Obtained: Yes  Specimen Removed: YES   Estimated Blood Loss: Minimal    Findings:   Successful paracentesis.  5L clear yellow fluid from RLQ.  Albumin was not administered.    Pt requested to stop at 5L    Patient tolerated procedure well.    Ashia Gallegos PA-C

## 2024-05-23 NOTE — SEDATION DOCUMENTATION
IR Procedure - Paracentesis - is completed. Patient tolerated well. He is awake, alert, oriented. Vital signs are stable. 5000 mL clear, yellow ascites is drained. Patient will return to IR pre/post to complete discharge.

## 2024-05-23 NOTE — DISCHARGE SUMMARY
Interventional Radiology Short Stay Discharge Summary      Admit Date: 5/23/2024  Discharge Date: 05/23/2024     Hospital Course: Uneventful    Discharge Diagnosis: ascites    Discharge Condition: Stable    Discharge Disposition: Home    Diet: Resume prior diet    Activity: activity as tolerated    Follow-up: With referring provider    Cindy Chava, PA-C [facility]

## 2024-05-23 NOTE — H&P
Radiology History & Physical      SUBJECTIVE:     Chief Complaint: abdominal distention    History of Present Illness:  Jonny Isabel is a 63 y.o. male who presents for ultrasound guided paracentesis  Past Medical History:   Diagnosis Date    A-fib     Esophageal varices 5/8/2023    Other ascites 5/8/2023     Past Surgical History:   Procedure Laterality Date    ESOPHAGOGASTRODUODENOSCOPY N/A 1/17/2023    Procedure: EGD (ESOPHAGOGASTRODUODENOSCOPY);  Surgeon: Dewayne Navas MD;  Location: [facility] (2ND FLR);  Service: Endoscopy;  Laterality: N/A;    ESOPHAGOGASTRODUODENOSCOPY N/A 2/7/2024    Procedure: EGD (ESOPHAGOGASTRODUODENOSCOPY);  Surgeon: Nathan Goins MD;  [facility]  Service: Endoscopy;  Laterality: N/A;  referral: Dr. Escalante /cirrhosis - labs- possible banding / prep ins on portal / 2nd floor - Pt c/o SOB at times./ Xarelto - message sent to clearance nurse per protocol - ERW  1/31/24- LVM for precall - ERW  2/1-precall complet-Kpvt  ok to hold Xarelto2 da       Home Meds:   Prior to Admission medications    Medication Sig Start Date End Date Taking? Authorizing Provider   ADVAIR DISKUS 250-50 mcg/dose diskus inhaler Inhale 1 puff into the lungs 2 (two) times a day. Controller 3/20/24 3/20/25  Joanne Ruiz NP   albuterol (VENTOLIN HFA) 90 mcg/actuation inhaler Inhale 1 puff into the lungs every 4 (four) hours as needed for Wheezing or Shortness of Breath. Rescue 2/14/23 2/14/24  Joanne Ruiz NP   amiodarone (PACERONE) 200 MG Tab Take 1 tablet (200 mg total) by mouth once daily. 12/12/23 12/11/24  Ellen Escalera NP   ferrous sulfate 324 mg (65 mg iron) TbEC Take by mouth. 1/4/24   Provider, Historical   folic acid (FOLVITE) 1 MG tablet Take 1 tablet (1 mg total) by mouth once daily. 2/7/23 2/14/24  Seymour Leon MD   furosemide (LASIX) 20 MG tablet Take 1 tablet (20 mg total) by mouth once daily. 12/12/23   Ellen Escalera, BARBARA   levothyroxine  (SYNTHROID) 50 MCG tablet TAKE 1 TABLET BY MOUTH IN THE MORNING WITH A FULL GLASS OF WATER 30-60 MINUTES BEFORE OTHER MEDICATIONS AND FOOD 12/12/23   Ellen Escalera NP   lisinopriL-hydrochlorothiazide (PRINZIDE,ZESTORETIC) 10-12.5 mg per tablet Take 1 tablet by mouth once daily.    Provider, Historical   metoprolol succinate (TOPROL-XL) 50 MG 24 hr tablet Take 1 tablet (50 mg total) by mouth once daily. 12/12/23 12/11/24  Ellen Escalera NP   metoprolol tartrate (LOPRESSOR) 100 MG tablet Take 100 mg by mouth. 12/27/23   Provider, Historical   omega 3-dha-epa-fish oil 1,000 mg (120 mg-180 mg) Cap 2 capsule DAILY (route: oral) 7/13/22   Provider, Historical   ondansetron (ZOFRAN-ODT) 4 MG TbDL Take 1 tablet (4 mg total) by mouth every 8 (eight) hours as needed (nausea/vomiting). 3/21/24   Bill Buck,    pantoprazole (PROTONIX) 40 MG tablet Take 1 tablet by mouth 2 (two) times daily. 8/17/22   Provider, Historical   potassium chloride (KLOR-CON) 10 MEQ TbSR Take 10 mEq by mouth once.    Provider, Historical   simvastatin (ZOCOR) 40 MG tablet Take 1 tablet every day by oral route at bedtime.    Provider, Historical   spironolactone (ALDACTONE) 50 MG tablet Take 1 tablet (50 mg total) by mouth once daily. 6/16/23 6/15/24  Lovely Escalante MD   thiamine 100 MG tablet Take 1 tablet by mouth once daily. 7/13/22   Provider, Historical   traMADoL (ULTRAM) 50 mg tablet Take 50 mg by mouth. 4/12/24   Provider, Historical   traZODone (DESYREL) 50 MG tablet Take 50 mg by mouth nightly as needed. 2/29/24   Provider, Historical   VITAMIN B COMPLEX ORAL Take 1 tablet by mouth once daily.    Provider, Historical   XARELTO 20 mg Tab Take 20 mg by mouth once daily. 1/9/23   Provider, Historical     Anticoagulants/Antiplatelets:  xarelto    Allergies: Review of patient's allergies indicates:  No Known Allergies  Sedation History:  no adverse reactions    Review of Systems:   Hematological: no known  coagulopathies  Respiratory: no shortness of breath  Cardiovascular: no chest pain  Gastrointestinal: no abdominal pain  Genito-Urinary: no dysuria  Musculoskeletal: negative  Neurological: no TIA or stroke symptoms         OBJECTIVE:     Vital Signs (Most Recent)       Physical Exam:  ASA: 2  Mallampati: n/a    General: no acute distress  Mental Status: alert and oriented to person, place and time  HEENT: normocephalic, atraumatic  Chest: unlabored breathing  Heart: regular heart rate  Abdomen: distended  Extremity: moves all extremities    ASSESSMENT/PLAN:     Sedation Plan: local  Patient will undergo ultrasound guided paracentesis.    Ashia Gallegos PA-C

## 2024-05-29 ENCOUNTER — TELEPHONE (OUTPATIENT)
Dept: INTERVENTIONAL RADIOLOGY/VASCULAR | Facility: HOSPITAL | Age: 64
End: 2024-05-29
Payer: MEDICAID

## 2024-05-30 ENCOUNTER — HOSPITAL ENCOUNTER (OUTPATIENT)
Dept: INTERVENTIONAL RADIOLOGY/VASCULAR | Facility: HOSPITAL | Age: 64
Discharge: HOME | End: 2024-05-30
Attending: RADIOLOGY
Payer: MEDICAID

## 2024-05-30 VITALS
RESPIRATION RATE: 16 BRPM | OXYGEN SATURATION: 100 % | SYSTOLIC BLOOD PRESSURE: 166 MMHG | HEART RATE: 54 BPM | TEMPERATURE: 98 F | DIASTOLIC BLOOD PRESSURE: 74 MMHG

## 2024-05-30 DIAGNOSIS — R18.8 OTHER ASCITES: ICD-10-CM

## 2024-05-30 DIAGNOSIS — R18.8 ASCITES: ICD-10-CM

## 2024-05-30 LAB
APPEARANCE FLD: NORMAL
BODY FLD TYPE: NORMAL
COLOR FLD: COLORLESS
LYMPHOCYTES NFR FLD MANUAL: 28 %
MONOS+MACROS NFR FLD MANUAL: 64 %
NEUTROPHILS NFR FLD MANUAL: 8 %
WBC # FLD: 215 /CU MM

## 2024-05-30 PROCEDURE — 63600175 PHARM REV CODE 636 W HCPCS: Mod: JZ,JG,NTX | Performed by: RADIOLOGY

## 2024-05-30 PROCEDURE — 25000003 PHARM REV CODE 250: Mod: NTX | Performed by: RADIOLOGY

## 2024-05-30 PROCEDURE — 89051 BODY FLUID CELL COUNT: CPT | Mod: TXP | Performed by: INTERNAL MEDICINE

## 2024-05-30 PROCEDURE — 49083 ABD PARACENTESIS W/IMAGING: CPT | Mod: NTX | Performed by: RADIOLOGY

## 2024-05-30 PROCEDURE — 49083 ABD PARACENTESIS W/IMAGING: CPT | Mod: TXP,,, | Performed by: RADIOLOGY

## 2024-05-30 PROCEDURE — P9047 ALBUMIN (HUMAN), 25%, 50ML: HCPCS | Mod: JZ,JG,NTX | Performed by: RADIOLOGY

## 2024-05-30 RX ORDER — LIDOCAINE HYDROCHLORIDE 10 MG/ML
INJECTION INFILTRATION; PERINEURAL
Status: COMPLETED | OUTPATIENT
Start: 2024-05-30 | End: 2024-05-30

## 2024-05-30 RX ORDER — ALBUMIN HUMAN 250 G/1000ML
SOLUTION INTRAVENOUS
Status: COMPLETED | OUTPATIENT
Start: 2024-05-30 | End: 2024-05-30

## 2024-05-30 RX ADMIN — LIDOCAINE HYDROCHLORIDE 5 ML: 10 INJECTION, SOLUTION INFILTRATION; PERINEURAL at 01:05

## 2024-05-30 RX ADMIN — ALBUMIN (HUMAN) 50 G: 25 SOLUTION INTRAVENOUS at 02:05

## 2024-05-30 NOTE — SEDATION DOCUMENTATION
Procedure complete. Vitals stable during procedure. Patient alert and oriented x4 unlabored on Room Air. Patient denies pain and is resting comfortably. Surgical site dressed with gauze and tagederm. Dressing is clean, dry, and intact. 6700 ml of pink tinged fluid removed, 50g albumin infused. Patient transported to Recovery.

## 2024-05-30 NOTE — SEDATION DOCUMENTATION
Pt VSS and in NAD. PIV and CRM equipment removed. Discharge instructions and AVS provided. Pt verbalized understanding, questions and concerns addressed. No further needs at this time. Pt discharged from recovery area at 1430.

## 2024-05-30 NOTE — H&P
Radiology History & Physical        SUBJECTIVE:      Chief Complaint: abdominal distention     History of Present Illness:  Jonny Isabel is a 63 y.o. male who presents for ultrasound guided paracentesis       Past Medical History:   Diagnosis Date    A-fib      Esophageal varices 5/8/2023    Other ascites 5/8/2023            Past Surgical History:   Procedure Laterality Date    ESOPHAGOGASTRODUODENOSCOPY N/A 1/17/2023     Procedure: EGD (ESOPHAGOGASTRODUODENOSCOPY);  Surgeon: Dewayne Navas MD;  Location: [facility] (2ND FLR);  Service: Endoscopy;  Laterality: N/A;    ESOPHAGOGASTRODUODENOSCOPY N/A 2/7/2024     Procedure: EGD (ESOPHAGOGASTRODUODENOSCOPY);  Surgeon: Nathan Goins MD;  [facility]  Service: Endoscopy;  Laterality: N/A;  referral: Dr. Escalante /cirrhosis - labs- possible banding / prep ins on portal / 2nd floor - Pt c/o SOB at times./ Xarelto - message sent to clearance nurse per protocol - ERW  1/31/24- LVM for precall - ERW  2/1-precall complet-Kpvt  ok to hold Xarelto2 da         Home Meds:           Prior to Admission medications    Medication Sig Start Date End Date Taking? Authorizing Provider   ADVAIR DISKUS 250-50 mcg/dose diskus inhaler Inhale 1 puff into the lungs 2 (two) times a day. Controller 3/20/24 3/20/25   Joanne Ruiz NP   albuterol (VENTOLIN HFA) 90 mcg/actuation inhaler Inhale 1 puff into the lungs every 4 (four) hours as needed for Wheezing or Shortness of Breath. Rescue 2/14/23 2/14/24   Joanne Ruiz NP   amiodarone (PACERONE) 200 MG Tab Take 1 tablet (200 mg total) by mouth once daily. 12/12/23 12/11/24   Ellen Escalera NP   ferrous sulfate 324 mg (65 mg iron) TbEC Take by mouth. 1/4/24     Provider, Historical   folic acid (FOLVITE) 1 MG tablet Take 1 tablet (1 mg total) by mouth once daily. 2/7/23 2/14/24   Seymour Leon MD   furosemide (LASIX) 20 MG tablet Take 1 tablet (20 mg total) by mouth once daily. 12/12/23     Jw  Ellen SERRANO NP   levothyroxine (SYNTHROID) 50 MCG tablet TAKE 1 TABLET BY MOUTH IN THE MORNING WITH A FULL GLASS OF WATER 30-60 MINUTES BEFORE OTHER MEDICATIONS AND FOOD 12/12/23     Ellen Escalera NP   lisinopriL-hydrochlorothiazide (PRINZIDE,ZESTORETIC) 10-12.5 mg per tablet Take 1 tablet by mouth once daily.       Provider, Historical   metoprolol succinate (TOPROL-XL) 50 MG 24 hr tablet Take 1 tablet (50 mg total) by mouth once daily. 12/12/23 12/11/24   Ellen Escalera NP   metoprolol tartrate (LOPRESSOR) 100 MG tablet Take 100 mg by mouth. 12/27/23     Provider, Historical   omega 3-dha-epa-fish oil 1,000 mg (120 mg-180 mg) Cap 2 capsule DAILY (route: oral) 7/13/22     Provider, Historical   ondansetron (ZOFRAN-ODT) 4 MG TbDL Take 1 tablet (4 mg total) by mouth every 8 (eight) hours as needed (nausea/vomiting). 3/21/24     Bill Buck,    pantoprazole (PROTONIX) 40 MG tablet Take 1 tablet by mouth 2 (two) times daily. 8/17/22     Provider, Historical   potassium chloride (KLOR-CON) 10 MEQ TbSR Take 10 mEq by mouth once.       Provider, Historical   simvastatin (ZOCOR) 40 MG tablet Take 1 tablet every day by oral route at bedtime.       Provider, Historical   spironolactone (ALDACTONE) 50 MG tablet Take 1 tablet (50 mg total) by mouth once daily. 6/16/23 6/15/24   Lovely Escalante MD   thiamine 100 MG tablet Take 1 tablet by mouth once daily. 7/13/22     Provider, Historical   traMADoL (ULTRAM) 50 mg tablet Take 50 mg by mouth. 4/12/24     Provider, Historical   traZODone (DESYREL) 50 MG tablet Take 50 mg by mouth nightly as needed. 2/29/24     Provider, Historical   VITAMIN B COMPLEX ORAL Take 1 tablet by mouth once daily.       Provider, Historical   XARELTO 20 mg Tab Take 20 mg by mouth once daily. 1/9/23     Provider, Historical      Anticoagulants/Antiplatelets:  xarelto     Allergies: Review of patient's allergies indicates:  No Known Allergies  Sedation History:  no adverse  reactions     Review of Systems:   Hematological: no known coagulopathies  Respiratory: no shortness of breath  Cardiovascular: no chest pain  Gastrointestinal: no abdominal pain  Genito-Urinary: no dysuria  Musculoskeletal: negative  Neurological: no TIA or stroke symptoms          OBJECTIVE:      Vital Signs (Most Recent)     Physical Exam:  ASA: 2  Mallampati: n/a     General: no acute distress  Mental Status: alert and oriented to person, place and time  HEENT: normocephalic, atraumatic  Chest: unlabored breathing  Heart: regular heart rate  Abdomen: distended  Extremity: moves all extremities     ASSESSMENT/PLAN:      Sedation Plan: local  Patient will undergo ultrasound guided paracentesis.

## 2024-05-30 NOTE — PROCEDURES
Radiology Post-Procedure Note     Pre Op Diagnosis: Ascites  Post Op Diagnosis: Same     Procedure: US-guided paracentesis     Procedure performed by: Ed Nelson MD     Written Informed Consent Obtained: Yes  Specimen Removed: YES 6700 mL of serous ascites  Estimated Blood Loss: Minimal     Findings:   Moderate ascites.      Patient tolerated procedure well.     Ed Nelson MD  Interventional Radiology  Department of Radiology

## 2024-05-31 ENCOUNTER — TELEPHONE (OUTPATIENT)
Dept: TRANSPLANT | Facility: CLINIC | Age: 64
End: 2024-05-31
Payer: MEDICAID

## 2024-05-31 DIAGNOSIS — Z76.82 ORGAN TRANSPLANT CANDIDATE: Primary | ICD-10-CM

## 2024-05-31 NOTE — TELEPHONE ENCOUNTER
Returned phone call to patient's caregiver.  Discussed outpatient liver transplant evaluation.  The dates of June 20th and 21st.  Patient's caregiver knows that she will need to come with him to appointments.  Patient has EGD in [facility].  He needs a colonoscopy. Patient 's spouse will schedule at [facility].  Has h/o A fib and diabetes. Will order ECHO and PET stress as part of evaluation.

## 2024-06-05 ENCOUNTER — TELEPHONE (OUTPATIENT)
Dept: INTERVENTIONAL RADIOLOGY/VASCULAR | Facility: HOSPITAL | Age: 64
End: 2024-06-05
Payer: MEDICAID

## 2024-06-05 DIAGNOSIS — Z76.82 ORGAN TRANSPLANT CANDIDATE: Primary | ICD-10-CM

## 2024-06-06 ENCOUNTER — HOSPITAL ENCOUNTER (OUTPATIENT)
Dept: INTERVENTIONAL RADIOLOGY/VASCULAR | Facility: HOSPITAL | Age: 64
Discharge: HOME | End: 2024-06-06
Attending: INTERNAL MEDICINE
Payer: MEDICAID

## 2024-06-06 ENCOUNTER — LAB VISIT (OUTPATIENT)
Dept: LAB | Facility: HOSPITAL | Age: 64
End: 2024-06-06
Attending: INTERNAL MEDICINE
Payer: MEDICAID

## 2024-06-06 VITALS
DIASTOLIC BLOOD PRESSURE: 78 MMHG | HEART RATE: 47 BPM | OXYGEN SATURATION: 100 % | SYSTOLIC BLOOD PRESSURE: 166 MMHG | TEMPERATURE: 98 F | RESPIRATION RATE: 12 BRPM

## 2024-06-06 DIAGNOSIS — R18.8 ASCITES: ICD-10-CM

## 2024-06-06 DIAGNOSIS — R18.8 OTHER ASCITES: ICD-10-CM

## 2024-06-06 DIAGNOSIS — K70.31 ALCOHOLIC CIRRHOSIS OF LIVER WITH ASCITES: ICD-10-CM

## 2024-06-06 DIAGNOSIS — R74.8 ELEVATED LIVER ENZYMES: ICD-10-CM

## 2024-06-06 LAB
ALBUMIN SERPL BCP-MCNC: 2.8 G/DL (ref 3.5–5.2)
ALP SERPL-CCNC: 69 U/L (ref 55–135)
ALT SERPL W/O P-5'-P-CCNC: 11 U/L (ref 10–44)
ANION GAP SERPL CALC-SCNC: 4 MMOL/L (ref 8–16)
APPEARANCE FLD: CLEAR
AST SERPL-CCNC: 24 U/L (ref 10–40)
BASOPHILS # BLD AUTO: 0.09 K/UL (ref 0–0.2)
BASOPHILS NFR BLD: 1 % (ref 0–1.9)
BILIRUB SERPL-MCNC: 0.2 MG/DL (ref 0.1–1)
BODY FLD TYPE: NORMAL
BUN SERPL-MCNC: 23 MG/DL (ref 8–23)
CALCIUM SERPL-MCNC: 8.6 MG/DL (ref 8.7–10.5)
CHLORIDE SERPL-SCNC: 111 MMOL/L (ref 95–110)
CO2 SERPL-SCNC: 22 MMOL/L (ref 23–29)
COLOR FLD: COLORLESS
CREAT SERPL-MCNC: 2 MG/DL (ref 0.5–1.4)
DIFFERENTIAL METHOD BLD: ABNORMAL
EOSINOPHIL # BLD AUTO: 0.2 K/UL (ref 0–0.5)
EOSINOPHIL NFR BLD: 2.1 % (ref 0–8)
EOSINOPHIL NFR FLD MANUAL: 1 %
ERYTHROCYTE [DISTWIDTH] IN BLOOD BY AUTOMATED COUNT: 16.1 % (ref 11.5–14.5)
EST. GFR  (NO RACE VARIABLE): 37 ML/MIN/1.73 M^2
GLUCOSE SERPL-MCNC: 87 MG/DL (ref 70–110)
HCT VFR BLD AUTO: 33.5 % (ref 40–54)
HGB BLD-MCNC: 10.7 G/DL (ref 14–18)
IMM GRANULOCYTES # BLD AUTO: 0.11 K/UL (ref 0–0.04)
IMM GRANULOCYTES NFR BLD AUTO: 1.2 % (ref 0–0.5)
INR PPP: 1.2 (ref 0.8–1.2)
LYMPHOCYTES # BLD AUTO: 1.6 K/UL (ref 1–4.8)
LYMPHOCYTES NFR BLD: 17.3 % (ref 18–48)
LYMPHOCYTES NFR FLD MANUAL: 23 %
MCH RBC QN AUTO: 29.6 PG (ref 27–31)
MCHC RBC AUTO-ENTMCNC: 31.9 G/DL (ref 32–36)
MCV RBC AUTO: 93 FL (ref 82–98)
MONOCYTES # BLD AUTO: 0.8 K/UL (ref 0.3–1)
MONOCYTES NFR BLD: 8.7 % (ref 4–15)
MONOS+MACROS NFR FLD MANUAL: 72 %
NEUTROPHILS # BLD AUTO: 6.4 K/UL (ref 1.8–7.7)
NEUTROPHILS NFR BLD: 69.7 % (ref 38–73)
NEUTROPHILS NFR FLD MANUAL: 4 %
NRBC BLD-RTO: 0 /100 WBC
PLATELET # BLD AUTO: 229 K/UL (ref 150–450)
PMV BLD AUTO: 11.3 FL (ref 9.2–12.9)
POTASSIUM SERPL-SCNC: 5 MMOL/L (ref 3.5–5.1)
PROT SERPL-MCNC: 6.6 G/DL (ref 6–8.4)
PROTHROMBIN TIME: 12.5 SEC (ref 9–12.5)
RBC # BLD AUTO: 3.61 M/UL (ref 4.6–6.2)
SODIUM SERPL-SCNC: 137 MMOL/L (ref 136–145)
WBC # BLD AUTO: 9.13 K/UL (ref 3.9–12.7)
WBC # FLD: 135 /CU MM

## 2024-06-06 PROCEDURE — 49083 ABD PARACENTESIS W/IMAGING: CPT | Mod: TXP,,, | Performed by: PHYSICIAN ASSISTANT

## 2024-06-06 PROCEDURE — 49083 ABD PARACENTESIS W/IMAGING: CPT | Mod: TXP | Performed by: STUDENT IN AN ORGANIZED HEALTH CARE EDUCATION/TRAINING PROGRAM

## 2024-06-06 PROCEDURE — 89051 BODY FLUID CELL COUNT: CPT | Mod: NTX | Performed by: INTERNAL MEDICINE

## 2024-06-06 PROCEDURE — 85025 COMPLETE CBC W/AUTO DIFF WBC: CPT | Mod: TXP | Performed by: INTERNAL MEDICINE

## 2024-06-06 PROCEDURE — 85610 PROTHROMBIN TIME: CPT | Mod: TXP | Performed by: INTERNAL MEDICINE

## 2024-06-06 PROCEDURE — 36415 COLL VENOUS BLD VENIPUNCTURE: CPT | Mod: PO,TXP | Performed by: INTERNAL MEDICINE

## 2024-06-06 PROCEDURE — 25000003 PHARM REV CODE 250: Mod: NTX | Performed by: PHYSICIAN ASSISTANT

## 2024-06-06 PROCEDURE — 80053 COMPREHEN METABOLIC PANEL: CPT | Mod: TXP | Performed by: INTERNAL MEDICINE

## 2024-06-06 RX ORDER — LIDOCAINE HYDROCHLORIDE 10 MG/ML
INJECTION INFILTRATION; PERINEURAL
Status: COMPLETED | OUTPATIENT
Start: 2024-06-06 | End: 2024-06-06

## 2024-06-06 RX ORDER — TRIPROLIDINE/PSEUDOEPHEDRINE 2.5MG-60MG
800 TABLET ORAL DAILY
COMMUNITY

## 2024-06-06 RX ADMIN — LIDOCAINE HYDROCHLORIDE 5 ML: 10 INJECTION, SOLUTION INFILTRATION; PERINEURAL at 02:06

## 2024-06-06 RX ADMIN — LIDOCAINE HYDROCHLORIDE 5 ML: 10 INJECTION, SOLUTION INFILTRATION; PERINEURAL at 03:06

## 2024-06-06 NOTE — DISCHARGE SUMMARY
Interventional Radiology Short Stay Discharge Summary      Admit Date: 6/6/2024  Discharge Date: 06/06/2024     Hospital Course: Uneventful    Discharge Diagnosis: ascites    Discharge Condition: Stable    Discharge Disposition: Home    Diet: Resume prior diet    Activity: activity as tolerated    Follow-up: With referring provider    Cindy Chava, PA-C [facility]

## 2024-06-06 NOTE — PROCEDURES
Radiology Post-Procedure Note    Pre Op Diagnosis: Ascites  Post Op Diagnosis: Same    Procedure: Ultrasound Guided Paracentesis    Procedure performed by: Ashia Gallegos PA-C    Written Informed Consent Obtained: Yes  Specimen Removed: YES   Estimated Blood Loss: Minimal    Findings:   Successful paracentesis.  5L removed from RLQ and LLQ combined.  Albumin was not administered PRN per protocol.    Patient tolerated procedure well.    Ashia Gallegos PA-C

## 2024-06-06 NOTE — H&P
Radiology History & Physical      SUBJECTIVE:     Chief Complaint: abdominal distention    History of Present Illness:  Jonny Isabel is a 63 y.o. male who presents for ultrasound guided paracentesis  Past Medical History:   Diagnosis Date    A-fib     Esophageal varices 5/8/2023    Other ascites 5/8/2023     Past Surgical History:   Procedure Laterality Date    ESOPHAGOGASTRODUODENOSCOPY N/A 1/17/2023    Procedure: EGD (ESOPHAGOGASTRODUODENOSCOPY);  Surgeon: Dewayne Navas MD;  Location: [facility] (2ND FLR);  Service: Endoscopy;  Laterality: N/A;    ESOPHAGOGASTRODUODENOSCOPY N/A 2/7/2024    Procedure: EGD (ESOPHAGOGASTRODUODENOSCOPY);  Surgeon: Nathan Goins MD;  Location: [facility] (2ND FLR);  Service: Endoscopy;  Laterality: N/A;  referral: Dr. Escalante /cirrhosis - labs- possible banding / prep ins on portal / 2nd floor - Pt c/o SOB at times./ Xarelto - message sent to clearance nurse per protocol - ERW  1/31/24- LVM for precall - ERW  2/1-precall complet-Kpvt  ok to hold Xarelto2 da       Home Meds:   Prior to Admission medications    Medication Sig Start Date End Date Taking? Authorizing Provider   ADVAIR DISKUS 250-50 mcg/dose diskus inhaler Inhale 1 puff into the lungs 2 (two) times a day. Controller 3/20/24 3/20/25 Yes Joanne Ruiz NP   amiodarone (PACERONE) 200 MG Tab Take 1 tablet (200 mg total) by mouth once daily. 12/12/23 12/11/24 Yes Ellen Escalera NP   ferrous sulfate 324 mg (65 mg iron) TbEC Take by mouth. 1/4/24  Yes Provider, Historical   folic acid (FOLVITE) 1 MG tablet Take 1 tablet (1 mg total) by mouth once daily. 2/7/23 6/6/24 Yes Seymour Leon MD   levothyroxine (SYNTHROID) 50 MCG tablet TAKE 1 TABLET BY MOUTH IN THE MORNING WITH A FULL GLASS OF WATER 30-60 MINUTES BEFORE OTHER MEDICATIONS AND FOOD 12/12/23  Yes Ellen Escalera NP   lisinopriL-hydrochlorothiazide (PRINZIDE,ZESTORETIC) 10-12.5 mg per tablet Take 1 tablet by mouth once daily.   Yes Provider,  Historical   metoprolol succinate (TOPROL-XL) 50 MG 24 hr tablet Take 1 tablet (50 mg total) by mouth once daily. 12/12/23 12/11/24 Yes Ellen Escalera NP   omega 5-wuy-fpu-fish oil 1,000 mg (120 mg-180 mg) Cap 2 capsule DAILY (route: oral) 7/13/22  Yes Provider, Historical   ondansetron (ZOFRAN-ODT) 4 MG TbDL Take 1 tablet (4 mg total) by mouth every 8 (eight) hours as needed (nausea/vomiting). 3/21/24  Yes Bill Buck,    pantoprazole (PROTONIX) 40 MG tablet Take 1 tablet by mouth 2 (two) times daily. 8/17/22  Yes Provider, Historical   potassium chloride (KLOR-CON) 10 MEQ TbSR Take 10 mEq by mouth once.   Yes Provider, Historical   thiamine 100 MG tablet Take 1 tablet by mouth once daily. 7/13/22  Yes Provider, Historical   traZODone (DESYREL) 50 MG tablet Take 50 mg by mouth nightly as needed. 2/29/24  Yes Provider, Historical   VITAMIN B COMPLEX ORAL Take 1 tablet by mouth once daily.   Yes Provider, Historical   XARELTO 20 mg Tab Take 20 mg by mouth once daily. 1/9/23  Yes Provider, Historical   albuterol (VENTOLIN HFA) 90 mcg/actuation inhaler Inhale 1 puff into the lungs every 4 (four) hours as needed for Wheezing or Shortness of Breath. Rescue 2/14/23 2/14/24  Joanne Ruiz NP   ibuprofen 20 mg/mL oral liquid Take 800 mg by mouth once daily.    Provider, Historical   metoprolol tartrate (LOPRESSOR) 100 MG tablet Take 100 mg by mouth. 12/27/23   Provider, Historical   furosemide (LASIX) 20 MG tablet Take 1 tablet (20 mg total) by mouth once daily. 12/12/23 6/6/24  Ellen Escalera NP   simvastatin (ZOCOR) 40 MG tablet Take 1 tablet every day by oral route at bedtime.  6/6/24  Provider, Historical   spironolactone (ALDACTONE) 50 MG tablet Take 1 tablet (50 mg total) by mouth once daily. 6/16/23 6/6/24  Bzowej, Lovely H., MD   traMADoL (ULTRAM) 50 mg tablet Take 50 mg by mouth. 4/12/24 6/6/24  Provider, Historical     Anticoagulants/Antiplatelets: no anticoagulation    Allergies:  Review of patient's allergies indicates:  No Known Allergies  Sedation History:  no adverse reactions    Review of Systems:   Hematological: no known coagulopathies  Respiratory: no shortness of breath  Cardiovascular: no chest pain  Gastrointestinal: no abdominal pain  Genito-Urinary: no dysuria  Musculoskeletal: negative  Neurological: no TIA or stroke symptoms         OBJECTIVE:     Vital Signs (Most Recent)  Temp: 97.5 °F (36.4 °C) (06/06/24 1411)  Pulse: (!) 44 (patient on a beta blocker) (06/06/24 1411)  Resp: 12 (06/06/24 1411)  BP: (!) 177/84 (06/06/24 1411)  SpO2: 100 % (06/06/24 1411)    Physical Exam:  ASA: 2  Mallampati: n/a    General: no acute distress  Mental Status: alert and oriented to person, place and time  HEENT: normocephalic, atraumatic  Chest: unlabored breathing  Heart: regular heart rate  Abdomen: distended  Extremity: moves all extremities    ASSESSMENT/PLAN:     Sedation Plan: local  Patient will undergo ultrasound guided paracentesis.    Ashia Gallegos PA-C

## 2024-06-06 NOTE — SEDATION DOCUMENTATION
Tolerated procedure well. All dressing dry and intact. Patient discharged via wheelchair to wife. Patient denies need for discharge papers.

## 2024-06-10 ENCOUNTER — TELEPHONE (OUTPATIENT)
Dept: TRANSPLANT | Facility: CLINIC | Age: 64
End: 2024-06-10
Payer: MEDICAID

## 2024-06-10 DIAGNOSIS — Z76.82 ORGAN TRANSPLANT CANDIDATE: Primary | ICD-10-CM

## 2024-06-10 DIAGNOSIS — K76.0 FATTY LIVER: ICD-10-CM

## 2024-06-10 NOTE — TELEPHONE ENCOUNTER
Lovely Escalante MD  [facility] Pre-Liver Transplant Clinical    The Labs are stable except creatinine is a bit worse; f/u nephrology    Nephrology order in [facility]. Will schedule as part of  liver transplant evaluation

## 2024-06-11 ENCOUNTER — TELEPHONE (OUTPATIENT)
Dept: INTERVENTIONAL RADIOLOGY/VASCULAR | Facility: HOSPITAL | Age: 64
End: 2024-06-11
Payer: MEDICAID

## 2024-06-11 ENCOUNTER — HOSPITAL ENCOUNTER (OUTPATIENT)
Dept: RADIOLOGY | Facility: HOSPITAL | Age: 64
Discharge: HOME | End: 2024-06-11
Attending: INTERNAL MEDICINE
Payer: MEDICAID

## 2024-06-11 DIAGNOSIS — K70.31 ALCOHOLIC CIRRHOSIS OF LIVER WITH ASCITES: ICD-10-CM

## 2024-06-11 PROCEDURE — 76705 ECHO EXAM OF ABDOMEN: CPT | Mod: TC,TXP

## 2024-06-12 ENCOUNTER — HOSPITAL ENCOUNTER (OUTPATIENT)
Dept: INTERVENTIONAL RADIOLOGY/VASCULAR | Facility: HOSPITAL | Age: 64
Discharge: HOME | End: 2024-06-12
Attending: INTERNAL MEDICINE
Payer: MEDICAID

## 2024-06-12 VITALS
OXYGEN SATURATION: 94 % | SYSTOLIC BLOOD PRESSURE: 197 MMHG | HEIGHT: 69 IN | TEMPERATURE: 98 F | RESPIRATION RATE: 14 BRPM | HEART RATE: 51 BPM | DIASTOLIC BLOOD PRESSURE: 84 MMHG | WEIGHT: 200 LBS | BODY MASS INDEX: 29.62 KG/M2

## 2024-06-12 DIAGNOSIS — R18.8 ASCITES: ICD-10-CM

## 2024-06-12 DIAGNOSIS — R18.8 OTHER ASCITES: ICD-10-CM

## 2024-06-12 LAB
APPEARANCE FLD: NORMAL
BASOPHILS NFR FLD MANUAL: 1 %
BODY FLD TYPE: NORMAL
COLOR FLD: NORMAL
EOSINOPHIL NFR FLD MANUAL: 1 %
LYMPHOCYTES NFR FLD MANUAL: 19 %
MESOTHL CELL NFR FLD MANUAL: 3 %
MONOS+MACROS NFR FLD MANUAL: 37 %
NEUTROPHILS NFR FLD MANUAL: 39 %
WBC # FLD: 252 /CU MM

## 2024-06-12 PROCEDURE — 89051 BODY FLUID CELL COUNT: CPT | Mod: NTX | Performed by: INTERNAL MEDICINE

## 2024-06-12 PROCEDURE — 63600175 PHARM REV CODE 636 W HCPCS: Mod: JZ,JG,TXP | Performed by: PHYSICIAN ASSISTANT

## 2024-06-12 PROCEDURE — P9047 ALBUMIN (HUMAN), 25%, 50ML: HCPCS | Mod: JZ,JG,TXP | Performed by: PHYSICIAN ASSISTANT

## 2024-06-12 PROCEDURE — 25000003 PHARM REV CODE 250: Mod: TXP | Performed by: PHYSICIAN ASSISTANT

## 2024-06-12 RX ORDER — ALBUMIN HUMAN 250 G/1000ML
SOLUTION INTRAVENOUS
Status: COMPLETED | OUTPATIENT
Start: 2024-06-12 | End: 2024-06-12

## 2024-06-12 RX ORDER — LIDOCAINE HYDROCHLORIDE 10 MG/ML
INJECTION INFILTRATION; PERINEURAL
Status: COMPLETED | OUTPATIENT
Start: 2024-06-12 | End: 2024-06-12

## 2024-06-12 RX ADMIN — LIDOCAINE HYDROCHLORIDE 5 ML: 10 INJECTION, SOLUTION INFILTRATION; PERINEURAL at 01:06

## 2024-06-12 RX ADMIN — ALBUMIN (HUMAN) 75 G: 12.5 SOLUTION INTRAVENOUS at 02:06

## 2024-06-12 NOTE — H&P
Radiology History & Physical      SUBJECTIVE:     Chief Complaint: abdominal distention    History of Present Illness:  Jonny Isabel is a 63 y.o. male who presents for ultrasound guided paracentesis  Past Medical History:   Diagnosis Date    A-fib     Esophageal varices 5/8/2023    Other ascites 5/8/2023     Past Surgical History:   Procedure Laterality Date    ESOPHAGOGASTRODUODENOSCOPY N/A 1/17/2023    Procedure: EGD (ESOPHAGOGASTRODUODENOSCOPY);  Surgeon: Dewayne Navas MD;  Location: [facility] (2ND FLR);  Service: Endoscopy;  Laterality: N/A;    ESOPHAGOGASTRODUODENOSCOPY N/A 2/7/2024    Procedure: EGD (ESOPHAGOGASTRODUODENOSCOPY);  Surgeon: Nathan Goins MD;  [facility]  Service: Endoscopy;  Laterality: N/A;  referral: Dr. Escalante /cirrhosis - labs- possible banding / prep ins on portal / 2nd floor - Pt c/o SOB at times./ Xarelto - message sent to clearance nurse per protocol - ERW  1/31/24- LVM for precall - ERW  2/1-precall complet-Kpvt  ok to hold Xarelto2 da       Home Meds:   Prior to Admission medications    Medication Sig Start Date End Date Taking? Authorizing Provider   ADVAIR DISKUS 250-50 mcg/dose diskus inhaler Inhale 1 puff into the lungs 2 (two) times a day. Controller 3/20/24 3/20/25  Joanne Ruiz NP   albuterol (VENTOLIN HFA) 90 mcg/actuation inhaler Inhale 1 puff into the lungs every 4 (four) hours as needed for Wheezing or Shortness of Breath. Rescue 2/14/23 2/14/24  Joanne Ruiz NP   amiodarone (PACERONE) 200 MG Tab Take 1 tablet (200 mg total) by mouth once daily. 12/12/23 12/11/24  Ellen Escalera NP   ferrous sulfate 324 mg (65 mg iron) TbEC Take by mouth. 1/4/24   Provider, Historical   folic acid (FOLVITE) 1 MG tablet Take 1 tablet (1 mg total) by mouth once daily. 2/7/23 6/6/24  Seymour Leon MD   ibuprofen 20 mg/mL oral liquid Take 800 mg by mouth once daily.    Provider, Historical   levothyroxine (SYNTHROID) 50 MCG tablet TAKE 1  TABLET BY MOUTH IN THE MORNING WITH A FULL GLASS OF WATER 30-60 MINUTES BEFORE OTHER MEDICATIONS AND FOOD 12/12/23   Ellen Escalera NP   lisinopriL-hydrochlorothiazide (PRINZIDE,ZESTORETIC) 10-12.5 mg per tablet Take 1 tablet by mouth once daily.    Provider, Historical   metoprolol succinate (TOPROL-XL) 50 MG 24 hr tablet Take 1 tablet (50 mg total) by mouth once daily. 12/12/23 12/11/24  Ellen Escalera NP   metoprolol tartrate (LOPRESSOR) 100 MG tablet Take 100 mg by mouth. 12/27/23   Provider, Historical   omega 3-dha-epa-fish oil 1,000 mg (120 mg-180 mg) Cap 2 capsule DAILY (route: oral) 7/13/22   Provider, Historical   ondansetron (ZOFRAN-ODT) 4 MG TbDL Take 1 tablet (4 mg total) by mouth every 8 (eight) hours as needed (nausea/vomiting). 3/21/24   Bill Buck,    pantoprazole (PROTONIX) 40 MG tablet Take 1 tablet by mouth 2 (two) times daily. 8/17/22   Provider, Historical   potassium chloride (KLOR-CON) 10 MEQ TbSR Take 10 mEq by mouth once.    Provider, Historical   thiamine 100 MG tablet Take 1 tablet by mouth once daily. 7/13/22   Provider, Historical   traZODone (DESYREL) 50 MG tablet Take 50 mg by mouth nightly as needed. 2/29/24   Provider, Historical   VITAMIN B COMPLEX ORAL Take 1 tablet by mouth once daily.    Provider, Historical   XARELTO 20 mg Tab Take 20 mg by mouth once daily. 1/9/23   Provider, Historical     Anticoagulants/Antiplatelets:  xarelto    Allergies: Review of patient's allergies indicates:  No Known Allergies  Sedation History:  no adverse reactions    Review of Systems:   Hematological: no known coagulopathies  Respiratory: no shortness of breath  Cardiovascular: no chest pain  Gastrointestinal: no abdominal pain  Genito-Urinary: no dysuria  Musculoskeletal: negative  Neurological: no TIA or stroke symptoms         OBJECTIVE:     Vital Signs (Most Recent)       Physical Exam:  ASA: 2  Mallampati: n/a    General: no acute distress  Mental Status: alert and  oriented to person, place and time  HEENT: normocephalic, atraumatic  Chest: unlabored breathing  Heart: regular heart rate  Abdomen: distended  Extremity: moves all extremities    ASSESSMENT/PLAN:     Sedation Plan: local  Patient will undergo ultrasound guided paracentesis.    Ashia Gallegos PA-C

## 2024-06-12 NOTE — PLAN OF CARE
Pt VSS and in NAD. PIV and CRM equipment removed. Discharge instructions provided. Pt verbalized understanding, questions and concerns addressed. No further needs at this time. Pt discharged from recovery area at 1515. IR care complete.

## 2024-06-12 NOTE — SEDATION DOCUMENTATION
Pt arrived to c arm for paracentesis. Pt oriented to unit and staff, Pt safely transferred from stretcher to procedural table. Fall risk reviewed and comfort measures utilized with interventions. Safety strap applied, position pillows to minimize pressure points. Blankets applied. Pt prepped and draped utilizing standard sterile technique. Patient placed on continuous monitoring, as required by sedation policy. Timeouts implemented utilizing standard universal time-out per department and facility policy. Pt resting comfortably. Denies pain/discomfort. Will continue to monitor. See flow sheets for monitoring, medication administration, and updates. patient verbalizes understanding.

## 2024-06-12 NOTE — PROCEDURES
Radiology Post-Procedure Note    Pre Op Diagnosis: Ascites  Post Op Diagnosis: Same    Procedure: Ultrasound Guided Paracentesis    Procedure performed by: Ashia Gallegos PA-C    Written Informed Consent Obtained: Yes  Specimen Removed: YES   Estimated Blood Loss: Minimal    Findings:   Successful paracentesis.  10.8L karma fluid removed from RLQ.  Albumin was administered PRN per protocol.    Patient tolerated procedure well.    Ashia Gallegos PA-C

## 2024-06-12 NOTE — SEDATION DOCUMENTATION
Procedure completed. Patient tolerated well; VSS. Site CDI. Patient to be transported to pre/post for albumin admin and then d/c per orders; report to be given at the bedside.

## 2024-06-12 NOTE — DISCHARGE SUMMARY
Interventional Radiology Short Stay Discharge Summary      Admit Date: 6/12/2024  Discharge Date: 06/12/2024     Hospital Course: Uneventful    Discharge Diagnosis: ascites    Discharge Condition: Stable    Discharge Disposition: Home    Diet: Resume prior diet    Activity: activity as tolerated    Follow-up: With referring provider    Cindy Chava, PA-C [facility]

## 2024-06-13 DIAGNOSIS — Z76.82 ORGAN TRANSPLANT CANDIDATE: Primary | ICD-10-CM

## 2024-06-18 ENCOUNTER — LAB VISIT (OUTPATIENT)
Dept: LAB | Facility: HOSPITAL | Age: 64
End: 2024-06-18
Attending: INTERNAL MEDICINE
Payer: MEDICAID

## 2024-06-18 ENCOUNTER — TELEPHONE (OUTPATIENT)
Dept: INTERVENTIONAL RADIOLOGY/VASCULAR | Facility: HOSPITAL | Age: 64
End: 2024-06-18
Payer: MEDICAID

## 2024-06-18 DIAGNOSIS — I85.10 SECONDARY ESOPHAGEAL VARICES WITHOUT BLEEDING: ICD-10-CM

## 2024-06-18 PROCEDURE — 36415 COLL VENOUS BLD VENIPUNCTURE: CPT | Mod: PO,TXP | Performed by: INTERNAL MEDICINE

## 2024-06-18 PROCEDURE — 80321 ALCOHOLS BIOMARKERS 1OR 2: CPT | Mod: NTX | Performed by: INTERNAL MEDICINE

## 2024-06-19 ENCOUNTER — TELEPHONE (OUTPATIENT)
Dept: TRANSPLANT | Facility: CLINIC | Age: 64
End: 2024-06-19
Payer: MEDICAID

## 2024-06-19 ENCOUNTER — HOSPITAL ENCOUNTER (OUTPATIENT)
Dept: INTERVENTIONAL RADIOLOGY/VASCULAR | Facility: HOSPITAL | Age: 64
Discharge: HOME | End: 2024-06-19
Attending: INTERNAL MEDICINE
Payer: MEDICAID

## 2024-06-19 VITALS
WEIGHT: 185 LBS | BODY MASS INDEX: 27.4 KG/M2 | OXYGEN SATURATION: 99 % | HEART RATE: 50 BPM | RESPIRATION RATE: 16 BRPM | DIASTOLIC BLOOD PRESSURE: 72 MMHG | SYSTOLIC BLOOD PRESSURE: 158 MMHG | TEMPERATURE: 98 F | HEIGHT: 69 IN

## 2024-06-19 DIAGNOSIS — R18.8 ASCITES: ICD-10-CM

## 2024-06-19 DIAGNOSIS — R18.8 OTHER ASCITES: ICD-10-CM

## 2024-06-19 LAB
APPEARANCE FLD: NORMAL
BODY FLD TYPE: NORMAL
COLOR FLD: YELLOW
LYMPHOCYTES NFR FLD MANUAL: 68 %
MESOTHL CELL NFR FLD MANUAL: 5 %
MONOS+MACROS NFR FLD MANUAL: 22 %
NEUTROPHILS NFR FLD MANUAL: 5 %
WBC # FLD: 178 /CU MM

## 2024-06-19 PROCEDURE — 25000003 PHARM REV CODE 250: Mod: TXP | Performed by: PHYSICIAN ASSISTANT

## 2024-06-19 PROCEDURE — 89051 BODY FLUID CELL COUNT: CPT | Mod: TXP | Performed by: INTERNAL MEDICINE

## 2024-06-19 RX ORDER — LIDOCAINE HYDROCHLORIDE 10 MG/ML
INJECTION INFILTRATION; PERINEURAL
Status: COMPLETED | OUTPATIENT
Start: 2024-06-19 | End: 2024-06-19

## 2024-06-19 RX ADMIN — LIDOCAINE HYDROCHLORIDE 5 ML: 10 INJECTION, SOLUTION INFILTRATION; PERINEURAL at 03:06

## 2024-06-19 NOTE — DISCHARGE SUMMARY
Interventional Radiology Short Stay Discharge Summary      Admit Date: 6/19/2024  Discharge Date: 06/19/2024     Hospital Course: Uneventful    Discharge Diagnosis: ascites    Discharge Condition: Stable    Discharge Disposition: Home    Diet: Resume prior diet    Activity: activity as tolerated    Follow-up: With referring provider    Cindy Chava, PA-C [facility]

## 2024-06-19 NOTE — H&P
Radiology History & Physical      SUBJECTIVE:     Chief Complaint: abdominal distention    History of Present Illness:  Jonny Isabel is a 63 y.o. male who presents for ultrasound guided paracentesis  Past Medical History:   Diagnosis Date    A-fib     Esophageal varices 5/8/2023    Other ascites 5/8/2023     Past Surgical History:   Procedure Laterality Date    ESOPHAGOGASTRODUODENOSCOPY N/A 1/17/2023    Procedure: EGD (ESOPHAGOGASTRODUODENOSCOPY);  Surgeon: Dewayne Navas MD;  Location: [facility] (2ND FLR);  Service: Endoscopy;  Laterality: N/A;    ESOPHAGOGASTRODUODENOSCOPY N/A 2/7/2024    Procedure: EGD (ESOPHAGOGASTRODUODENOSCOPY);  Surgeon: Nathan Goins MD;  [facility]  Service: Endoscopy;  Laterality: N/A;  referral: Dr. Escalante /cirrhosis - labs- possible banding / prep ins on portal / 2nd floor - Pt c/o SOB at times./ Xarelto - message sent to clearance nurse per protocol - ERW  1/31/24- LVM for precall - ERW  2/1-precall complet-Kpvt  ok to hold Xarelto2 da       Home Meds:   Prior to Admission medications    Medication Sig Start Date End Date Taking? Authorizing Provider   ADVAIR DISKUS 250-50 mcg/dose diskus inhaler Inhale 1 puff into the lungs 2 (two) times a day. Controller 3/20/24 3/20/25  Joanne Ruiz NP   albuterol (VENTOLIN HFA) 90 mcg/actuation inhaler Inhale 1 puff into the lungs every 4 (four) hours as needed for Wheezing or Shortness of Breath. Rescue 2/14/23 2/14/24  Joanne Ruiz NP   amiodarone (PACERONE) 200 MG Tab Take 1 tablet (200 mg total) by mouth once daily. 12/12/23 12/11/24  Ellen Escalera NP   ferrous sulfate 324 mg (65 mg iron) TbEC Take by mouth. 1/4/24   Provider, Historical   folic acid (FOLVITE) 1 MG tablet Take 1 tablet (1 mg total) by mouth once daily. 2/7/23 6/6/24  Seymour Leon MD   ibuprofen 20 mg/mL oral liquid Take 800 mg by mouth once daily.    Provider, Historical   levothyroxine (SYNTHROID) 50 MCG tablet TAKE 1  TABLET BY MOUTH IN THE MORNING WITH A FULL GLASS OF WATER 30-60 MINUTES BEFORE OTHER MEDICATIONS AND FOOD 12/12/23   Ellen Escalera NP   lisinopriL-hydrochlorothiazide (PRINZIDE,ZESTORETIC) 10-12.5 mg per tablet Take 1 tablet by mouth once daily.    Provider, Historical   metoprolol succinate (TOPROL-XL) 50 MG 24 hr tablet Take 1 tablet (50 mg total) by mouth once daily. 12/12/23 12/11/24  Ellen Escalera NP   metoprolol tartrate (LOPRESSOR) 100 MG tablet Take 100 mg by mouth. 12/27/23   Provider, Historical   omega 3-dha-epa-fish oil 1,000 mg (120 mg-180 mg) Cap 2 capsule DAILY (route: oral) 7/13/22   Provider, Historical   ondansetron (ZOFRAN-ODT) 4 MG TbDL Take 1 tablet (4 mg total) by mouth every 8 (eight) hours as needed (nausea/vomiting). 3/21/24   Bill Buck,    pantoprazole (PROTONIX) 40 MG tablet Take 1 tablet by mouth 2 (two) times daily. 8/17/22   Provider, Historical   potassium chloride (KLOR-CON) 10 MEQ TbSR Take 10 mEq by mouth once.    Provider, Historical   thiamine 100 MG tablet Take 1 tablet by mouth once daily. 7/13/22   Provider, Historical   traZODone (DESYREL) 50 MG tablet Take 50 mg by mouth nightly as needed. 2/29/24   Provider, Historical   VITAMIN B COMPLEX ORAL Take 1 tablet by mouth once daily.    Provider, Historical   XARELTO 20 mg Tab Take 20 mg by mouth once daily. 1/9/23   Provider, Historical     Anticoagulants/Antiplatelets:  xarelto    Allergies: Review of patient's allergies indicates:  No Known Allergies  Sedation History:  no adverse reactions    Review of Systems:   Hematological: no known coagulopathies  Respiratory: no shortness of breath  Cardiovascular: no chest pain  Gastrointestinal: no abdominal pain  Genito-Urinary: no dysuria  Musculoskeletal: negative  Neurological: no TIA or stroke symptoms         OBJECTIVE:     Vital Signs (Most Recent)       Physical Exam:  ASA: 2  Mallampati: n/a    General: no acute distress  Mental Status: alert and  oriented to person, place and time  HEENT: normocephalic, atraumatic  Chest: unlabored breathing  Heart: regular heart rate  Abdomen: distended  Extremity: moves all extremities    ASSESSMENT/PLAN:     Sedation Plan: local  Patient will undergo ultrasound guided paracentesis.    Ashia Gallegos PA-C

## 2024-06-19 NOTE — SEDATION DOCUMENTATION
Procedure complete. 2400 ml dark yellow fluid removed from pt abd Patient alert and oriented x4 unlabored on Room Air. Patient denies pain and is resting comfortably. Surgical site dressed with bandaid. Dressing is clean, dry, and intact. Patient transported to recovery.    
Pt arrived to C ARM for paracentesis. Pt oriented to unit and staff. Plan of care reviewed with patient, patient verbalizes understanding. Comfort measures utilized. Pt safely transferred from stretcher to procedural table. Fall risk reviewed with patient, fall risk interventions maintained. Safety strap applied, positioner pillows utilized to minimize pressure points. Blankets applied. Pt prepped and draped utilizing standard sterile technique. Patient placed on continuous monitoring, as required by sedation policy. Timeouts completed utilizing standard universal time-out, per department and facility policy. RN to remain at bedside, continuous monitoring maintained. Pt resting comfortably. Denies pain/discomfort. Will continue to monitor. See flow sheets for monitoring, medication administration, and updates.     
Adequate: hears normal conversation without difficulty

## 2024-06-19 NOTE — TELEPHONE ENCOUNTER
Patient called to confirm that they will be attending the scheduled appointments for Liver Transplant Fast Pass Evaluation scheduled to start 6/19/24  at 0730.  Patient confirms that caregivers will be present for the scheduled appointments.  Patient appointments reviewed along with location and special instructions.  Patient questions answered at this time and number provided to call the office if there is any problem.

## 2024-06-19 NOTE — PROCEDURES
Radiology Post-Procedure Note    Pre Op Diagnosis: Ascites  Post Op Diagnosis: Same    Procedure: Ultrasound Guided Paracentesis    Procedure performed by: Ashia Gallegos PA-C    Written Informed Consent Obtained: Yes  Specimen Removed: YES   Estimated Blood Loss: Minimal    Findings:   Successful paracentesis.  2400 ml dark yellow fluid removed.  Albumin was not administered     Patient tolerated procedure well.    Ashia Gallegos PA-C

## 2024-06-20 ENCOUNTER — EVALUATION (OUTPATIENT)
Dept: TRANSPLANT | Facility: CLINIC | Age: 64
End: 2024-06-20
Payer: MEDICAID

## 2024-06-20 ENCOUNTER — CLINICAL SUPPORT (OUTPATIENT)
Dept: TRANSPLANT | Facility: CLINIC | Age: 64
End: 2024-06-20
Payer: MEDICAID

## 2024-06-20 ENCOUNTER — SOCIAL WORK (OUTPATIENT)
Dept: TRANSPLANT | Facility: CLINIC | Age: 64
End: 2024-06-20
Payer: MEDICAID

## 2024-06-20 ENCOUNTER — HOSPITAL ENCOUNTER (OUTPATIENT)
Dept: RADIOLOGY | Facility: HOSPITAL | Age: 64
Discharge: HOME | End: 2024-06-20
Attending: INTERNAL MEDICINE
Payer: MEDICAID

## 2024-06-20 ENCOUNTER — HOSPITAL ENCOUNTER (OUTPATIENT)
Dept: PULMONOLOGY | Facility: CLINIC | Age: 64
Discharge: HOME | End: 2024-06-20
Payer: MEDICAID

## 2024-06-20 VITALS
WEIGHT: 198.88 LBS | SYSTOLIC BLOOD PRESSURE: 151 MMHG | HEART RATE: 42 BPM | HEART RATE: 42 BPM | HEIGHT: 69 IN | DIASTOLIC BLOOD PRESSURE: 67 MMHG | TEMPERATURE: 97 F | TEMPERATURE: 97 F | WEIGHT: 198.88 LBS | OXYGEN SATURATION: 98 % | HEIGHT: 69 IN | RESPIRATION RATE: 18 BRPM | BODY MASS INDEX: 29.46 KG/M2 | SYSTOLIC BLOOD PRESSURE: 151 MMHG | DIASTOLIC BLOOD PRESSURE: 67 MMHG | SYSTOLIC BLOOD PRESSURE: 151 MMHG | HEART RATE: 42 BPM | TEMPERATURE: 97 F | WEIGHT: 198.88 LBS | OXYGEN SATURATION: 98 % | RESPIRATION RATE: 18 BRPM | DIASTOLIC BLOOD PRESSURE: 67 MMHG | OXYGEN SATURATION: 98 % | BODY MASS INDEX: 29.46 KG/M2 | HEIGHT: 69 IN | BODY MASS INDEX: 29.46 KG/M2 | RESPIRATION RATE: 18 BRPM

## 2024-06-20 DIAGNOSIS — Z76.82 ORGAN TRANSPLANT CANDIDATE: ICD-10-CM

## 2024-06-20 DIAGNOSIS — K70.31 ALCOHOLIC CIRRHOSIS OF LIVER WITH ASCITES: ICD-10-CM

## 2024-06-20 DIAGNOSIS — Z01.818 PRE-TRANSPLANT EVALUATION FOR LIVER TRANSPLANT: Primary | ICD-10-CM

## 2024-06-20 LAB
AMPHET+METHAMPHET UR QL: NEGATIVE
BARBITURATES UR QL SCN>200 NG/ML: NEGATIVE
BENZODIAZ UR QL SCN>200 NG/ML: NEGATIVE
BILIRUB UR QL STRIP: NEGATIVE
BZE UR QL SCN: NEGATIVE
CANNABINOIDS UR QL SCN: ABNORMAL
CLARITY UR REFRACT.AUTO: CLEAR
CLINICAL BIOCHEMIST REVIEW: NORMAL
COLOR UR AUTO: YELLOW
CREAT UR-MCNC: 97 MG/DL (ref 23–375)
ETHANOL UR-MCNC: <10 MG/DL
GLUCOSE UR QL STRIP: NEGATIVE
HGB UR QL STRIP: NEGATIVE
KETONES UR QL STRIP: NEGATIVE
LEUKOCYTE ESTERASE UR QL STRIP: NEGATIVE
METHADONE UR QL SCN>300 NG/ML: NEGATIVE
NITRITE UR QL STRIP: NEGATIVE
OPIATES UR QL SCN: NEGATIVE
PCP UR QL SCN>25 NG/ML: NEGATIVE
PH UR STRIP: 5 [PH] (ref 5–8)
PLPETH BLD-MCNC: <10 NG/ML
POPETH BLD-MCNC: <10 NG/ML
PROT UR QL STRIP: NEGATIVE
SP GR UR STRIP: 1.02 (ref 1–1.03)
TOXICOLOGY INFORMATION: ABNORMAL
URN SPEC COLLECT METH UR: NORMAL

## 2024-06-20 PROCEDURE — 81003 URINALYSIS AUTO W/O SCOPE: CPT | Mod: TXP | Performed by: INTERNAL MEDICINE

## 2024-06-20 PROCEDURE — 99204 OFFICE O/P NEW MOD 45 MIN: CPT | Mod: S$PBB,TXP,, | Performed by: NURSE PRACTITIONER

## 2024-06-20 PROCEDURE — 97802 MEDICAL NUTRITION INDIV IN: CPT | Mod: PBBFAC,TXP | Performed by: DIETITIAN, REGISTERED

## 2024-06-20 PROCEDURE — 99999 PR PBB SHADOW E&M-EST. PATIENT-LVL V: CPT | Mod: PBBFAC,TXP,, | Performed by: TRANSPLANT SURGERY

## 2024-06-20 PROCEDURE — 99215 OFFICE O/P EST HI 40 MIN: CPT | Mod: PBBFAC,25,TXP | Performed by: TRANSPLANT SURGERY

## 2024-06-20 PROCEDURE — 74170 CT ABD WO CNTRST FLWD CNTRST: CPT | Mod: 26,TXP,, | Performed by: RADIOLOGY

## 2024-06-20 PROCEDURE — 71250 CT THORAX DX C-: CPT | Mod: TC,TXP

## 2024-06-20 PROCEDURE — 99999 PR PBB SHADOW E&M-EST. PATIENT-LVL III: CPT | Mod: PBBFAC,,,

## 2024-06-20 PROCEDURE — 80307 DRUG TEST PRSMV CHEM ANLYZR: CPT | Mod: TXP | Performed by: INTERNAL MEDICINE

## 2024-06-20 PROCEDURE — 25500020 PHARM REV CODE 255: Mod: TXP | Performed by: INTERNAL MEDICINE

## 2024-06-20 PROCEDURE — 99213 OFFICE O/P EST LOW 20 MIN: CPT | Mod: PBBFAC,25

## 2024-06-20 PROCEDURE — 99999 PR PBB SHADOW E&M-EST. PATIENT-LVL III: CPT | Mod: PBBFAC,TXP,,

## 2024-06-20 PROCEDURE — 99205 OFFICE O/P NEW HI 60 MIN: CPT | Mod: S$PBB,TXP,, | Performed by: TRANSPLANT SURGERY

## 2024-06-20 PROCEDURE — 99999PBSHW PR PBB SHADOW TECHNICAL ONLY FILED TO HB: Mod: PBBFAC,TXP,,

## 2024-06-20 PROCEDURE — 71250 CT THORAX DX C-: CPT | Mod: 26,TXP,, | Performed by: RADIOLOGY

## 2024-06-20 PROCEDURE — 99213 OFFICE O/P EST LOW 20 MIN: CPT | Mod: PBBFAC,27,TXP,25

## 2024-06-20 PROCEDURE — 74170 CT ABD WO CNTRST FLWD CNTRST: CPT | Mod: TC,TXP

## 2024-06-20 RX ORDER — PROMETHAZINE HYDROCHLORIDE 12.5 MG/1
12.5 TABLET ORAL EVERY 6 HOURS PRN
COMMUNITY
Start: 2024-05-06

## 2024-06-20 RX ORDER — MOXIFLOXACIN 5 MG/ML
1 SOLUTION/ DROPS OPHTHALMIC 3 TIMES DAILY
Status: ON HOLD | COMMUNITY
Start: 2024-06-13 | End: 2024-08-02

## 2024-06-20 RX ORDER — ALIROCUMAB 75 MG/ML
75 INJECTION, SOLUTION SUBCUTANEOUS
Status: ON HOLD | COMMUNITY
End: 2024-08-05 | Stop reason: HOSPADM

## 2024-06-20 RX ORDER — IBUPROFEN 800 MG/1
800 TABLET ORAL DAILY PRN
COMMUNITY

## 2024-06-20 RX ADMIN — IOHEXOL 100 ML: 350 INJECTION, SOLUTION INTRAVENOUS at 04:06

## 2024-06-20 NOTE — PROGRESS NOTES
PRE-TRANSPLANT INFECTIOUS DISEASE CONSULT    Reason for Visit:  Pre-transplant evaluation  Referring Provider: Dr. Lovely Escalante     History of Present Illness:    63 y.o. 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  Ascites - once a week paracentesis.  Denies history of SBP     Infectious History:  Recent hospital admissions: No  Recent infections: No  Recent or current antibiotic use: No  History of recurrent infections *(sinus / pneumonia / UTI / SBP)*: No  History of diabetic foot wound or bone/joint infection: No  Recent dental infections, issues or procedures: No  History of chicken pox: No  History of shingles: No  History of STI: No  History of COVID infection: No    History of Immunosuppression:  Prior chemotherapy / immunosuppression: No  Prior transplant: No  History of splenectomy: No    Tuberculosis:  Prior screening for latent TB: No  Prior diagnosis of latent TB: No  Risk factors for TB *known exposure, incarceration, homelessness*: No    Geographical exposures:  Currently lives in [place] with wife   Lived in the following states: [place] ([place]), [place], [place], [place]  Lived or travelled to the [facility] [place]: No  International travel: No  Travel-associated illness: No    Social/Environmental:  Occupation:  Construction   Pets: Yes .  5 cats and a dog   Livestock: No  Fishing / hunting: No  Hobbies: Music/guitar   Water: [place] water  Consumption of raw/undercooked meat or seafood?  No  Tobacco: Yes. Smokes cigarettes  Alcohol: No  Recreational drug use:  Yes  Sexual partners: female/wife      Past Histories:   Past Medical History:   Diagnosis Date    A-fib     Esophageal varices 5/8/2023    Other ascites 5/8/2023     Past Surgical History:   Procedure Laterality Date    ESOPHAGOGASTRODUODENOSCOPY N/A 1/17/2023    Procedure: EGD (ESOPHAGOGASTRODUODENOSCOPY);  Surgeon: Dewayne Navas MD;  [facility] [place] [address]);   Service: Endoscopy;  Laterality: N/A;    ESOPHAGOGASTRODUODENOSCOPY N/A 2/7/2024    Procedure: EGD (ESOPHAGOGASTRODUODENOSCOPY);  Surgeon: Nathan Goins MD;  Location: [facility] ([address] [place] [address]);  Service: Endoscopy;  Laterality: N/A;  referral: Dr. Escalante /cirrhosis - labs- possible banding / prep ins on portal / 2nd floor - Pt c/o SOB at times./ Xarelto - message sent to clearance nurse per protocol - ERW  1/31/24- LVM for precall - ERW  2/1-precall complet-Kpvt  ok to hold Xarelto2 da     No family history on file.  Social History     Tobacco Use    Smoking status: Every Day     Current packs/day: 1.00     Average packs/day: 1 pack/day for 40.0 years (40.0 ttl pk-yrs)     Types: Cigarettes    Smokeless tobacco: Never   Substance Use Topics    Alcohol use: Not Currently     Alcohol/week: 6.0 standard drinks of alcohol     Types: 6 Cans of beer per week    Drug use: Not Currently     Review of patient's allergies indicates:  No Known Allergies      Current antibiotics:  Antibiotics (From admission, onward)      None            Review of Systems   Constitutional: Negative for chills, fever and night sweats.   HENT:  Negative for congestion.    Respiratory:  Negative for cough, hemoptysis and sputum production.    Hematologic/Lymphatic: Negative for adenopathy.   Skin:  Negative for poor wound healing, rash and suspicious lesions.   Musculoskeletal:  Negative for joint swelling.   Gastrointestinal:  Negative for diarrhea.   Genitourinary:  Negative for dysuria.   Allergic/Immunologic: Negative for persistent infections.   All other systems reviewed and are negative.         Objective  Physical Exam  Vitals reviewed.   Constitutional:       General: He is not in acute distress.     Appearance: Normal appearance. He is not ill-appearing, toxic-appearing or diaphoretic.   HENT:      Head: Normocephalic and atraumatic.   Eyes:      General: No scleral icterus.     Conjunctiva/sclera: Conjunctivae normal.   Cardiovascular:       "Rate and Rhythm: Normal rate.   Pulmonary:      Effort: Pulmonary effort is normal. No respiratory distress.   Abdominal:      General: There is no distension.   Musculoskeletal:      Cervical back: Normal range of motion.      Right lower leg: No edema.      Left lower leg: No edema.   Skin:     General: Skin is warm and dry.      Findings: No lesion or rash.      Comments: Tattoos   Neurological:      Mental Status: He is alert and oriented to person, place, and time.   Psychiatric:         Mood and Affect: Mood normal.         Behavior: Behavior normal.         Thought Content: Thought content normal.         Judgment: Judgment normal.           Labs:    CBC:   Lab Results   Component Value Date    WBC 8.52 06/20/2024    HGB 10.6 (L) 06/20/2024    HCT 33.2 (L) 06/20/2024    MCV 91 06/20/2024     06/20/2024    GRAN 6.5 06/20/2024    GRAN 76.3 (H) 06/20/2024    LYMPH 1.1 06/20/2024    LYMPH 12.3 (L) 06/20/2024    MONO 0.7 06/20/2024    MONO 7.7 06/20/2024    EOSINOPHIL 1.9 06/20/2024       Syphilis screening:   Lab Results   Component Value Date    TREPABIGMIGG Nonreactive 06/20/2024        TB screening: No results found for: "TBGOLDPLUS", "TSPOTSCREN"    HIV screening:   Lab Results   Component Value Date    MOM71CNDY Non-reactive 06/20/2024       Strongyloides IgG: No results found for: "STRONGANTIGG"    Hepatitis Serologies:   Lab Results   Component Value Date    HEPAIGG Non-reactive 06/20/2024    HEPBCAB Non-reactive 06/20/2024    HEPBSAB <3.00 06/20/2024    HEPBSAB Non-reactive 06/20/2024    HEPCAB Non-reactive 06/20/2024        Varicella IgG:   Lab Results   Component Value Date    VARICELLAINT Positive 06/20/2024         Immunization History   Administered Date(s) Administered    Influenza - Quadrivalent 01/19/2017, 12/14/2017    Influenza - Quadrivalent - PF *Preferred* (6 months and older) 01/19/2017, 12/14/2017    Pneumococcal Polysaccharide - 23 Valent 12/14/2017        Immunization " History:  Received all childhood vaccines: Yes  All household members receive annual flu vaccine: Yes  All household members are up to date on COVID vaccine: No    Flu - ? utd  COVID -J&J only . No boosters  Hep A - denies   Hep B - denies   PNA - 2017 PPSV  TDAP - unknown   Shingles - denies       Assessment and Plan    1. Risks of Infection: Available serologies were reviewed. No unusual risks of infection or significant barriers to transplantation were identified from the infectious disease standpoint given the information available at this time.    - Acute infectious issues: None   - Pending serologies: Quantiferon gold / T-spot, Strongyloides IgG, and VZV IgG   - Please call if any pending serologic testing is positive.    2. Immunizations:  Based on the patient's immunization history and serologies, the following immunizations are recommended:  - Hepatitis A    Patient does not have immunity to hepatitis A    Vaccination ordered today: Yes   - Hepatitis B    Patient does not have immunity to hepatitis B    Vaccination ordered today: Yes   - COVID    Current CDC vaccination recommendations were discussed with the patient   - Annual high dose influenza     Vaccination ordered today: Yes   - Prevnar 20    Vaccination ordered today: Yes   - Tdap    Vaccination ordered today: Yes   - Shingrix    Vaccination ordered today: Yes    Recommended Pre-Transplant Immunization Schedule   Vaccine  0m 1m 2m 6m   Pneumococcal conjugate vaccine (Prevnar 20) X      Tetanus-diphtheria-pertussis (Tdap)* X      Hepatitis A Vaccine (Havrix)** X   X   Hepatitis B Vaccine (Heplisav)** X X     Influenza (annual) X      Zoster Recombinant Vaccine (Shingrix) X  X           *Administer booster every 10 years.       **Administer if no immunity demonstrated on serologies               Patient will receive vaccines today in ID clinic, and will be scheduled for all subsequent doses to be given in ID clinic.  Lives locally      3. Counseling:    I discussed with the patient and his significant other the risk for increased susceptibility to infections following transplantation including increased risk for infection right after transplant and if rejection should occur.  The patient has been counseled on the importance of vaccinations to decrease risk of infection and severe illness. Specific guidance has been provided to the patient regarding the patient's occupation, hobbies and activities to avoid future infectious complications.     4. Transplant Candidacy: Based on available information, there are no identified significant barriers to transplantation from an infectious disease standpoint.  Final determination of transplant candidacy will be made once evaluation is complete and reviewed by the Selection Committee.      Follow up with infectious disease as needed.       The total time for evaluation and management services performed on 06/20/2024 was greater than 45 minutes.

## 2024-06-20 NOTE — PROGRESS NOTES
TRANSPLANT NUTRITIONAL ASSESSMENT    Referring Provider: Lovely Escalante MD    Reason for Visit: Pre-liver transplant work-up    Age: 63 y.o.  Sex: male    Patient Active Problem List   Diagnosis    Spinal stenosis    Type 2 diabetes mellitus without complication, without long-term current use of insulin    Elevated liver enzymes    Alcohol abuse    PAF (paroxysmal atrial fibrillation)    Small bowel obstruction    Lumbar herniated disc    Alcoholic cirrhosis of liver with ascites    Alcohol withdrawal syndrome without complication    Goals of care, counseling/discussion    Hypervolemia    Encounter for social work intervention    Cellulitis    Palliative care encounter    Debility    Pre-operative cardiovascular examination    Pulmonary hypertension    Simple chronic bronchitis    Tobacco use    Nausea    Other ascites    Esophageal varices    Hypertension associated with diabetes    Hyperlipidemia associated with type 2 diabetes mellitus    Class 1 obesity due to excess calories with serious comorbidity and body mass index (BMI) of 31.0 to 31.9 in adult    Impaired functional mobility, balance, gait, and endurance    Decreased strength of lower extremity    Decreased strength of trunk and back    Hypothyroidism    Insomnia    Chronic obstructive pulmonary disease     Past Medical History:   Diagnosis Date    A-fib     Esophageal varices 5/8/2023    Other ascites 5/8/2023     Lab Results   Component Value Date     (H) 06/20/2024    K 4.5 06/20/2024    PHOS 2.8 02/07/2023    MG 1.2 (L) 02/07/2023    ALBUMIN 3.2 (L) 06/20/2024    AMMONIA 65 (H) 01/23/2023    HGBA1C 5.4 06/20/2024    CALCIUM 8.6 (L) 06/20/2024     Other Pertinent Labs: none    Current Outpatient Medications   Medication Sig    ADVAIR DISKUS 250-50 mcg/dose diskus inhaler Inhale 1 puff into the lungs 2 (two) times a day. Controller    albuterol (VENTOLIN HFA) 90 mcg/actuation inhaler Inhale 1 puff into the lungs every 4 (four) hours as needed  "for Wheezing or Shortness of Breath. Rescue    alirocumab (PRALUENT PEN) 75 mg/mL PnIj Inject 75 mg into the skin every 14 (fourteen) days.    amiodarone (PACERONE) 200 MG Tab Take 1 tablet (200 mg total) by mouth once daily. (Patient taking differently: Take 100 mg by mouth every evening.)    ferrous sulfate 324 mg (65 mg iron) TbEC Take by mouth.    folic acid (FOLVITE) 1 MG tablet Take 1 tablet (1 mg total) by mouth once daily.    ibuprofen (ADVIL,MOTRIN) 800 MG tablet Take 800 mg by mouth daily as needed for Pain.    levothyroxine (SYNTHROID) 50 MCG tablet TAKE 1 TABLET BY MOUTH IN THE MORNING WITH A FULL GLASS OF WATER 30-60 MINUTES BEFORE OTHER MEDICATIONS AND FOOD    metoprolol succinate (TOPROL-XL) 50 MG 24 hr tablet Take 1 tablet (50 mg total) by mouth once daily. (Patient taking differently: Take 100 mg by mouth once daily.)    moxifloxacin (VIGAMOX) 0.5 % ophthalmic solution Place 1 drop into the left eye 3 (three) times daily.    omega 3-dha-epa-fish oil 1,000 mg (120 mg-180 mg) Cap 2 capsule DAILY (route: oral)    pantoprazole (PROTONIX) 40 MG tablet Take 1 tablet by mouth once daily.    potassium chloride (KLOR-CON) 10 MEQ TbSR Take 10 mEq by mouth once daily.    promethazine (PHENERGAN) 12.5 MG Tab Take 12.5 mg by mouth every 6 (six) hours as needed (nausea).    thiamine 100 MG tablet Take 1 tablet by mouth once daily.    traZODone (DESYREL) 50 MG tablet Take 50 mg by mouth nightly as needed.    VITAMIN B COMPLEX ORAL Take 1 tablet by mouth once daily.    XARELTO 20 mg Tab Take 20 mg by mouth once daily.     No current facility-administered medications for this visit.     Allergies: Patient has no known allergies.    Ht Readings from Last 1 Encounters:   06/20/24 5' 9" (1.753 m)     Wt Readings from Last 1 Encounters:   06/20/24 90.2 kg (198 lb 13.7 oz)      BMI: Body mass index is 29.37 kg/m².    Usual Weight: 188 lb  Weight Change/Time: weighed around 253 lb prior to liver disease dx, gets para 1 x/ " week, fluctuates 188-200 lb  Current Diet: regular  Appetite/Current Intake: fair   Exercise/Physical Activity: some mobility, SOB limits walking for extended period of time, in wheelchair for appointment today  LFI: 4.75  Nutritional/Herbal Supplements: folic acid, fish oil, B complex, thiamine  Potential Food/Medication Interactions:reviewed  Chewing/Swallowing Problems: none  Symptoms: nausea  Assessment of Lab Values: Glu 125, ALb 3.2  Support System: caregiver present, voices support, they both cook, she does the shopping     Estimated Kcal Need: [id number] kcal (20-25 kcal/kg)  Estimated Protein Need:  gm(1-1.5 gm/kg)    Nutritional History:   Pt reports the following diet recall:  B: skip, water with meds  Nasrin: sugar free iced tea, gatorade, water, occasional mini soda can  L: leftovers from dinner / sandwich (ham/turkey/chandra/mustard/honey wheat bread)  Snack: banana, SF popsicle, low carb ice cream  D: shrimp pasta / butter bean w/ sausage / pork loin / frozen pizza / chicken/ beef /pork/fish (baked) / broccoli/brussel sprouts/ carrots/ cauliflower /potatoes/ rice / mashed potatoes/ pasta    Nutritional Diagnoses  Problem: food- and nutrition-related knowledge deficit  Etiology: r/t no prior edu pre liver transplant nutrition recommendations  Symptoms: aeb diet recall    Educational Need? yes  Barriers: none identified  Discussed with: patient and caregiver  Interventions: Patient taught nutrition information regarding Pre-liver transplant work-up  . Pre liver transplant nutrition packet provided and discussed. Pt advised on the recommendations to follow a low sodium diet while taking in adequate protein.  This packet includes label low sodium reading tips, seasoning suggestions, protein intake goal amount (gm) for the individual patient, as well as oral supplement suggestions.   Exercise and physical activity are encouraged.    Goals/Recommendations: diet adherence  Actions Taken: instruct/provide  written information  Strategies Used: problem solving, goal setting, motivational interviewing  Patient and/or family comprehend instructions: yes , adherence expected  Outcome: Verbalizes understanding  Monitoring: Contact information provided, will f/u in clinic and communicate with the care team as needed.     Counseling Time: 15 minutes

## 2024-06-20 NOTE — PROGRESS NOTES
PRE EDUCATION TEACHING NOTE    Jonny Isabel was seen in clinic today.  Handbook on pre-liver transplant information (see outline below) was given to the patient and time was allowed for questions.  Patient's wife accompanied him.  Informed consent signed and written information given on selection criteria.      LIVER TRANSPLANT WORK-UP EDUCATION  I. NATIONAL REGISTRY LISTING  A. Information for listing  B. Regions  C. Per [facility], can be listed at more than one center  II. SURGERY  A. Length  B. Complications: bleeding, infection  C. Central lines, drains, Bose catheter, incision, endotracheal tube, NG tube  D. Transfusions, cell saver  III. SHORT TERM RECOVERY  A. ICU, PICU, Hospital stay  IV. LONG TERM RECOVERY  A. Labs at home  B. Clinic visits  C. Complications: infection, rejection, readmissions  D. Normal immunity and immunosuppression  E. Incidence of re-admit in 1st year  V. REJECTION  A. Incidence  B. Treatment: Solumedrol, Prograf, Thymoglobulin (actions and side effects)  VI. IMMUNOSUPPRESSIVES  A. Prednisone  B. Imuran/Cellcept  C. Cyclosporin/Prograf  D. Rapamune  E. Need for lifetime compliance  F. Actions and side effects  G. Costs  VII. RECURRENCE OF VIRAL HEPATITIS  VIII. Patient notified that HIV Testing is required for transplant evaluation and   repeated at scheduled intervals before and after transplant. Explained that   education will be provided, results will be discussed, and the appropriate   consults will be scheduled if needed.

## 2024-06-20 NOTE — PROGRESS NOTES
"Transplant Recipient Adult Psychosocial Assessment    Patient's girlfriend, Andrez, was present during this assessment per patient choice.     Jonny Isabel  [address]  [address]  [place] [address]  Telephone Information:   Mobile [phone]   Home  [phone] (home)  Work  There is no work phone number on file.  E-mail  [email or web address]    Sex: male  YOB: 1960  Age: 63 y.o.    Encounter Date: 6/20/2024  [place] Citizen: yes  Primary Language: English   Needed: no    Emergency Contact:  Name: Adelita Bingham  Relationship: significant other  Address: same as patient  Phone Numbers:  [phone] (mobile)    Family/Social Support:   Number of dependents/: Patient has no dependents   Marital history:  (3 previous marriages) - Patient is in a long term relationship with his girlfriend of 15 years  Other family dynamics: Patient reports very limited to no contact with most of his family members. Patient's mother, "Karsten", lives in [place] and patient has no contact with her. Patient has 3 adult children, Carlton, Stanton, and Prince, and reports he lost contact with them after Nina. Patient does not wish for his sons or mother to be involved in his medical care. Patient and his girlfriend, Andrez, have been together for 15 years. Patient reports having 1 good friend in his life and patient's girlfriend has friends who may be back up caregivers.    Household Composition:  Name: Jonny Isabel  Age: 63  Relationship: patient  Does person drive? no    Name: Adelita Bingham  Age: 59  Relationship: significant other  Does person drive? yes    Do you and your caregivers have access to reliable transportation? yes; patient does not drive and girlfriend provides transportation    PRIMARY CAREGIVER: Adelita Bingham (girlfriend) will be primary caregiver, phone number [phone]. Patient's girlfriend was present during this assessment and verbalizes commitment to being patient's caregiver. " Patient's girlfriend works as a  and can take time off, drives, and has       provided in-depth information to patient and caregiver regarding pre- and post-transplant caregiver role.   strongly encourages patient and caregiver to have concrete plan regarding post-transplant care giving, including back-up caregiver(s) to ensure care giving needs are met as needed.    Patient and Caregiver states understanding all aspects of caregiver role/commitment and is able/willing/committed to being caregiver to the fullest extent necessary.    Patient and Caregiver verbalizes understanding of the education provided today and caregiver responsibilities.         remains available. Patient and Caregiver agree to contact  in a timely manner if concerns arise.      Able to take time off work without financial concerns:  Patient reports low income, however did not indicate significant financial issues .     Additional Significant Others who will Assist with Transplant:  *Patient's girlfriend reports she has 2 friends who will assist as needed. However, she will need to contact them first. Patient's girlfriend will call SW so secondary caregivers can be confirmed and SW will provide education at that time.    Name: Berta Figueroa  City: [place] State: [place]  Relationship:  friend  Does person drive? yes  Phone: [phone]    Name: Lisa Mann  Relationship: friend  Does person drive? yes  Phone: [phone]    Living Will: no  Healthcare Power of : no  Advance Directives on file: <<no information> per medical record.  Verbally reviewed LW/HCPA information.   provided patient with copy of LW/HCPA documents and provided education on completion of forms.  SW discussed importance of having a Healthcare POA document in place as patient does not wish for his adult children or his mother to be medical decision makers. Patient would like his  girlfriend, Adelita, to be his POA.     Living Donors: Education and resource information given to patient.    Highest Education Level: High School (9-12) or GED  Reading Ability: 12th grade  Reports difficulty with:  vision, patient recently had cataracts surgery in left eye and will have surgery in right eye soon.  Learns Best By: patient does not have a preference, patient's caregiver prefers written information      Status: no  VA Benefits: no     Working for Income: No  If no, reason not working: Patient Choice - Retired  Patient is retired from working as a job supervisor. Patient last worked over 10 years ago.    Spouse/Significant Other Employment:     Disabled: Patient reports he was receiving disability but is now getting care home     Monthly Income: $943/mo (patient), with girlfriend's income combined $1900/mo    Able to afford all costs now and if transplanted, including medications: yes    Patient and Caregiver verbalizes understanding of personal responsibilities related to transplant costs and the importance of having a financial plan to ensure that patients transplant costs are fully covered.       provided fundraising information/education. Patient and Caregiververbalizes understanding.   remains available.    Insurance:   Payer/Plan Subscr  Sex Relation Sub. Ins. ID Effective Group Num   1. OPTUM MANAGED* [facility] 1960 Male Self [id number]                                    [address] [address]   2. MEDICAID - * [facility] 1960 Male Self [id number]* 23 [id number]                                   [address] [address]     Primary Insurance (for UNOS reporting): Public Insurance - Medicaid  Secondary Insurance (for UNOS reporting): Public Insurance - Medicaid  Patient and Caregiver verbalizes clear understanding that patient may experience difficulty obtaining and/or be denied insurance coverage post-surgery. This includes and is not  limited to disability insurance, life insurance, health insurance, burial insurance, long term care insurance, and other insurances.      Patient and Caregiver also reports understanding that future health concerns related to or unrelated to transplantation may not be covered by patient's insurance.  Resources and information provided and reviewed.     Patient and Caregiver provides verbal permission to release any necessary information to outside resources for patient care and discharge planning.  Resources and information provided are reviewed.      Dialysis Adherence: Patient denies any history of dialysis  Infusion Service: patient utilizing? no  Home Health: patient utilizing?  Patient has had past home health services (patient has name of company at home)  DME:  wheelchair (for long distances), walker, commode and hospital bed  Pulmonary/Cardiac Rehab: no   ADLS: Patient presented to clinic in his personal wheelchair and reports he uses it for long distances, patient reports he is able to walk independently at home, however he sleeps in a hospital bed on the first floor due to concerns going up the stairs of their townhouse. Patient reports he is able to complete ADLs, unless he has fluid build up and his girlfriend has to assist with shoes/socks.    Adherence: Patient reports good adherence with medical and medication regimens. Adherence education and counseling provided.     Per History Section:  Past Medical History:   Diagnosis Date    A-fib     Esophageal varices 5/8/2023    Other ascites 5/8/2023     Social History     Tobacco Use    Smoking status: Every Day     Current packs/day: 1.00     Average packs/day: 1 pack/day for 40.0 years (40.0 ttl pk-yrs)     Types: Cigarettes    Smokeless tobacco: Never   Substance Use Topics    Alcohol use: Not Currently     Alcohol/week: 6.0 standard drinks of alcohol     Types: 6 Cans of beer per week     Social History     Substance and Sexual Activity   Drug Use Not  "Currently     Social History     Substance and Sexual Activity   Sexual Activity Yes    Partners: Female       Per Today's Psychosocial:  Tobacco:  Patient currently smokes 1/2 pack of cigarettes per day. Patient was advised on smoking cessation and resources provided .  Alcohol:  Not at this time. Patient reports last alcohol use was at the beginning of 2023 when patient was hospitalized for Alcohol-Associated Cirrhosis. Patient reports drinking "too much" prior to that time, and describes drinking all day (beer and Jägermeister). Patient reports quitting in February 2023 after being hospitalized and denies any cravings, urges or relapses. Patient's PETH testing has been negative. Patient reports he has gone to in person AA meetings in the past and is open to returning for long term maintenance. SW also shared virtual AA resources with patient .  Illicit Drugs/Non-prescribed Medications:  Patient reports current THC use. Patient has history of cocaine use over 10 years ago and denies any issues since quitting .    Patient and Caregiver states clear understanding of the potential impact of substance use as it relates to transplant candidacy and is aware of possible random substance screening.  Substance abstinence/cessation counseling, education and resources provided and reviewed.     Arrests/DWI/Treatment/Rehab:  Patient reports previous DWI's "a while ago" and has done classes. Patient denies any outstanding legal concerns.     Psychiatric History:    Mental Health:  Patient denies any past mental health history  Psychiatrist/Counselor: none  Medications: none  Suicide/Homicide Issues: Patient denies any past SI or HI   Safety at home: Patient reports living in a safe environment at home    Knowledge: Patient and Caregiver states having clear understanding and realistic expectations regarding the potential risks and potential benefits of organ transplantation and organ donation and agrees to discuss with health " care team members and support system members, as well as to utilize available resources and express questions and/or concerns in order to further facilitate the pt informed decision-making.  Resources and information provided and reviewed.    Patient and Caregiver is aware of [facility]'s affiliation and/or partnership with agencies in home health care, LTAC, SNF, [facility], and other hospitals and clinics.    Understanding: Patient and Caregiver reports having a clear understanding of the many lifetime commitments involved with being a transplant recipient, including costs, compliance, medications, lab work, procedures, appointments, concrete and financial planning, preparedness, timely and appropriate communication of concerns, abstinence (ETOH, tobacco, illicit non-prescribed drugs), adherence to all health care team recommendations, support system and caregiver involvement, appropriate and timely resource utilization and follow-through, mental health counseling as needed/recommended, and patient and caregiver responsibilities.  Social Service Handbook, resources and detailed educational information provided and reviewed.  Educational information provided.    Patient and Caregiver also reports current and expected compliance with health care regime and states having a clear understanding of the importance of compliance.      Patient and Caregiver reports a clear understanding that risks and benefits may be involved with organ transplantation and with organ donation.       Patient and Caregiver also reports clear understanding that psychosocial risk factors may affect patient, and include but are not limited to feelings of depression, generalized anxiety, anxiety regarding dependence on others, post traumatic stress disorder, feelings of guilt and other emotional and/or mental concerns, and/or exacerbation of existing mental health concerns.  Detailed resources provided and discussed.      Patient and Caregiver agrees to  access appropriate resources in a timely manner as needed and/or as recommended, and to communicate concerns appropriately.  Patient and Caregiver also reports a clear understanding of treatment options available.     Patient and Caregiver received education in a group setting.   reviewed education, provided additional information, and answered questions.    Feelings or Concerns: Patient reports feeling nervous about transplant. SW provided education and support, and advised patient to speak with his Hepatologist and Nurse Coordinator regarding any medical questions or concerns he may be nervous about.     Coping: Identify Patient & Caregiver Strategies to [place]:   1. In the past, coping with major surgery and/or related stress - Patient has had support from his girlfriend   2. Currently & Pre-transplant - Patient enjoys playing his guitar and watching movies   3. At the time of surgery - Patient plans to have support from his girlfriend   4. During post-Transplant & Recovery Period - Patient plans to watch movies    Goals: Patient has goals to move out of the state and would like to live in [place] or [place] one day.    Interview Behavior: Patient and Caregiver presents as alert and oriented x 4, pleasant, calm, and asking and answering questions appropriately.          Transplant Social Work - Candidacy  Assessment/Plan:     Psychosocial Suitability: Patient presents as  a high risk  candidate for transplant at this time. Based on psychosocial risk factors, patient presents as  high risk due to not having a confirmed secondary caregiver plan at this time. Patient will speak with two friends and contact Transplant  so plan can be confirmed .    Once patient has a confirmed secondary caregiver, patient may be deemed lower risk due to limited finances, however denies significant financial concerns. Patient has a supportive girlfriend who will serve as his primary caregiver. Patient has  been sober for over 1 year and denies any cravings or urges. Patient denies mental health concerns. Final determination of transplant candidacy will be made once evaluation is complete and reviewed by the Liver Transplant Selection Committee.      Recommendations/Additional Comments:   -Patient to call SW when they have spoken with secondary caregivers. SW will call to confirm and provide education, and update patient's suitability at that time.   -Complete and file Healthcare POA document (patient does not wish for sons or mother to be his decision makers)  -Smoking Cessation  -Continue with AA for long term maintenance and support as needed  -Fundraising  -Patient to call transplant SW with any concerns or obstacles  -Plan was created in collaboration with patient and patient/caregiver verbalize agreement with plan as discussed.      Ambreen Alcantara LCSW

## 2024-06-20 NOTE — PROGRESS NOTES
Transplant Surgery  Liver Transplant Recipient Evaluation    Referring Provider: Harriett Bird    Subjective:     Reason for Visit: evaluation for liver transplant    History of Present Illness: Jonny Isabel is a 63 y.o. 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 Jonny reports ascites (recurrent paracentesis needed), encephalopathy, fatigue, and muscle wasting.    External provider notes reviewed: Yes    Review of Systems   Constitutional:  Positive for fatigue. Negative for activity change, appetite change, chills, diaphoresis, fever and unexpected weight change.   HENT:  Negative for congestion, dental problem, ear pain, facial swelling, mouth sores, nosebleeds, sore throat, tinnitus, trouble swallowing and voice change.    Eyes:  Negative for photophobia, pain and visual disturbance.   Respiratory:  Negative for apnea, cough, choking, chest tightness and shortness of breath.    Cardiovascular:  Negative for chest pain, palpitations and leg swelling.   Gastrointestinal:  Positive for abdominal distention. Negative for abdominal pain, blood in stool, constipation, diarrhea, nausea and vomiting.   Endocrine: Negative for cold intolerance and heat intolerance.   Genitourinary:  Negative for difficulty urinating, dysuria, flank pain, hematuria and urgency.   Musculoskeletal:  Negative for arthralgias and gait problem.   Skin:  Negative for color change, pallor and rash.   Neurological:  Negative for dizziness, tremors, seizures, syncope and light-headedness.   Hematological:  Negative for adenopathy. Does not bruise/bleed easily.   Psychiatric/Behavioral:  Negative for agitation and confusion.      Objective:     Physical Exam  Constitutional:       General: He is not in acute distress.     Appearance: He is well-developed.   HENT:      Head: Normocephalic and atraumatic.      Mouth/Throat:      Pharynx: No oropharyngeal  exudate.   Eyes:      General: No scleral icterus.     Conjunctiva/sclera: Conjunctivae normal.      Pupils: Pupils are equal, round, and reactive to light.   Cardiovascular:      Rate and Rhythm: Normal rate and regular rhythm.      Heart sounds: Normal heart sounds.   Pulmonary:      Effort: Pulmonary effort is normal. No respiratory distress.      Breath sounds: Normal breath sounds.   Abdominal:      General: Bowel sounds are normal. There is distension (+ascites).      Palpations: Abdomen is soft.      Tenderness: There is no abdominal tenderness. There is no guarding or rebound.   Musculoskeletal:         General: No swelling. Normal range of motion.      Cervical back: Normal range of motion and neck supple.   Lymphadenopathy:      Cervical: No cervical adenopathy.   Skin:     General: Skin is warm and dry.      Findings: No erythema or rash.   Neurological:      Mental Status: He is alert and oriented to person, place, and time.   Psychiatric:         Mood and Affect: Mood normal.         Behavior: Behavior normal.         Thought Content: Thought content normal.         MELD 3.0: 13 at 6/20/2024  7:23 AM  MELD-Na: 13 at 6/20/2024  7:23 AM  Calculated from:  Serum Creatinine: 1.7 mg/dL at 6/20/2024  7:23 AM  Serum Sodium: 136 mmol/L at 6/20/2024  7:23 AM  Total Bilirubin: 0.2 mg/dL (Using min of 1 mg/dL) at 6/20/2024  7:23 AM  Serum Albumin: 3.2 g/dL at 6/20/2024  7:23 AM  INR(ratio): 1.0 at 6/20/2024  7:23 AM  Age at listing (hypothetical): 63 years  Sex: Male at 6/20/2024  7:23 AM      Diagnostics:  The following labs have been reviewed: CBC  CMP  PT  INR  PTT  The following radiology images have been independently reviewed and interpreted: CT Abd/Pelvis    Diagnoses:  1. Pre-transplant evaluation for liver transplant    2. Organ transplant candidate    3. Alcoholic cirrhosis of liver with ascites        Transplant Surgery - Candidacy   Assessment/Plan:     Transplant Candidacy: Jonny Isabel is a 63 y.o.  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  Based on available information, Jonny is a suitable liver transplant candidate.  He will be presented to selection committee after all tests and evaluations are complete.    Additional testing to be completed according to Liver : Written Order Guideline for Adult Liver Transplant Evaluation (LI-02)    Interpretation of tests and discussion of patient management involves all members of the multidisciplinary transplant team.    Nicole Dennis MD       [facility] Patient Status  Functional Status: 50% - Requires considerable assistance and frequent medical care  Physical Capacity: No Limitations    Counseling: I discussed with Jonny the benefits of liver transplantation.  We discussed the evaluation and listing procedures.  We discussed the MELD system and the associated waiting times.  We discussed national and center specific survival results.  We discussed the option of being multiply listed in different OPOs.  We discussed the option of living donation versus  donor transplantation and the advantages and relative disadvantages of each.   We discussed the risks, benefits and potential complications related to surgery including the risks related to anesthesia, bleeding, infection, primary non-function of the allograft, the risk of reoperation as well as the risk of death.  We discussed the typical post-operative course, length of hospitalization, the long-term use of immunosuppressive therapy as well as the need for long-term routine follow-up.    Patient advised that it is recommended that all transplant candidates, and their close contacts and household members receive Covid vaccination.    Coronavirus disease (COVID-19) caused by severe acute respiratory virus coronavirus 2 (SARS-C0V 2) is associated with increased mortality in solid organ transplant recipients (SOT)  compared to non-transplant patients. Vaccine responses to vaccination are depressed against SARS-CoV2 compared to normal individuals but improve with third vaccination doses. Vaccination prior to SOT provides both the best opportunity for transplant candidates to develop protective immunity and to reduce the risk of serious COVID19 infections post transplantation. Organ transplant candidates at [facility] will be required to receive SARS-CoV-2 vaccination prior to being listed with a an active status, whenever possible. Exceptions will be made for disability related reasons or for sincerely held Gnosticist beliefs.     PHS: I discussed the use of organs from donors with PHS risk criteria, including the testing protocols utilized, as well as data from the literature regarding the likelihood of transmission of hepatitis or HIV.  The patient is willing to consider such grafts.  DCD: I discussed the use of organs recovered by donation after cardiac death (DCD), including slightly decreased graft survival and greater risk of arterial and biliary complications. The potential advantage to the recipient is possibly receiving a transplant sooner by accepting such an organ. The patient is willing to consider such grafts.  HBcAb: I discussed the use of organs from donors with HBcAb in conjunction with long term use of HBV antiviral drugs, such as lamivudine. The small but measurable risk of hepatitis B seroconversion was discussed as well as the potentially life long need to continue antiviral drugs. The patient is willing to consider such grafts.  HCV Non-viremic recipient: I discussed the use of HCV-positive organs in naive recipients, including the risk of viral transmission to the patients or others, potential insurance barriers for antiviral medication coverage, risk for fibrosing cholestatic hepatitis, death or graft loss. The potential advantage to the recipient is the possibility of  receiving a transplant sooner with decreased mortality risk by accepting such an organ. The patient is willing to consider such grafts.  LDLT: I discussed the nature of living donor liver transplant, including donor risks and more frequent recipient complications. The patient is willing to consider such grafts.  Covid: I discussed that this donor has tested positive for the covid virus, but with very low levels of virus and no evidence of covid disease. Although the risk of transmission is unknown, we believe this donor is not infectious and use of the abdominal organs is safe.  To date, these organs have been used with no evidence of transmission. The patient is willing to consider such grafts.

## 2024-06-20 NOTE — PROGRESS NOTES
PHARM.D. PRE-TRANSPLANT NOTE:    This patient's medication therapy was evaluated as part of his pre-transplant evaluation.      The following general pharmacologic concerns were noted:   Atrial Fibrillation  Xarelto use can increase therese-op bleed risk  Hold at least 24 hrs prior to procedure - 2 days prior if higher concern for bleed  Amiodarone therapy (patient reports 100 mg QD) - can increase concentration of tacrolimus - monitor troughs closely  Levothyroxine to be restarted post-op  Currently utilizes ibuprofen 800 mg - education on discontinuation of NSAIDs post-op/recommended tylenol use (max 3,000 mg/day)    The following concerns for post-operative pain management were noted: none    The following pharmacologic concerns related to HCV therapy were noted: Amiodarone use and significant concern for bradycardia    This patient's medication profile was reviewed for considerations for DAA Hepatitis C therapy:    [N]  No current inducers of CYP 3A4 or PGP  [Y]  No amiodarone on this patient's EMR profile in the last 24 months  [Y]  No past or current atrial fibrillation on this patient's EMR profile       Current Outpatient Medications   Medication Sig Dispense Refill    ADVAIR DISKUS 250-50 mcg/dose diskus inhaler Inhale 1 puff into the lungs 2 (two) times a day. Controller 180 each 3    albuterol (VENTOLIN HFA) 90 mcg/actuation inhaler Inhale 1 puff into the lungs every 4 (four) hours as needed for Wheezing or Shortness of Breath. Rescue 18 g 0    alirocumab (PRALUENT PEN) 75 mg/mL PnIj Inject 75 mg into the skin every 14 (fourteen) days.      amiodarone (PACERONE) 200 MG Tab Take 1 tablet (200 mg total) by mouth once daily. (Patient taking differently: Take 100 mg by mouth every evening.) 30 tablet 11    ferrous sulfate 324 mg (65 mg iron) TbEC Take by mouth.      folic acid (FOLVITE) 1 MG tablet Take 1 tablet (1 mg total) by mouth once daily. 30 tablet 11    ibuprofen (ADVIL,MOTRIN) 800 MG tablet Take 800 mg  by mouth daily as needed for Pain.      levothyroxine (SYNTHROID) 50 MCG tablet TAKE 1 TABLET BY MOUTH IN THE MORNING WITH A FULL GLASS OF WATER 30-60 MINUTES BEFORE OTHER MEDICATIONS AND FOOD 90 tablet 3    metoprolol succinate (TOPROL-XL) 50 MG 24 hr tablet Take 1 tablet (50 mg total) by mouth once daily. (Patient taking differently: Take 100 mg by mouth once daily.) 90 tablet 3    moxifloxacin (VIGAMOX) 0.5 % ophthalmic solution Place 1 drop into the left eye 3 (three) times daily.      omega 3-dha-epa-fish oil 1,000 mg (120 mg-180 mg) Cap 2 capsule DAILY (route: oral)      pantoprazole (PROTONIX) 40 MG tablet Take 1 tablet by mouth once daily.      potassium chloride (KLOR-CON) 10 MEQ TbSR Take 10 mEq by mouth once daily.      promethazine (PHENERGAN) 12.5 MG Tab Take 12.5 mg by mouth every 6 (six) hours as needed (nausea).      thiamine 100 MG tablet Take 1 tablet by mouth once daily.      traZODone (DESYREL) 50 MG tablet Take 50 mg by mouth nightly as needed.      VITAMIN B COMPLEX ORAL Take 1 tablet by mouth once daily.      XARELTO 20 mg Tab Take 20 mg by mouth once daily.       No current facility-administered medications for this visit.     I am available for consultation and can be contacted, as needed by the other members of the Transplant team.

## 2024-06-21 LAB
DLCO ADJ PRE: 10.47 ML/(MIN*MMHG) (ref 20.99–34.84)
DLCO SINGLE BREATH LLN: 20.99
DLCO SINGLE BREATH PRE REF: 32.5 %
DLCO SINGLE BREATH REF: 27.92
DLCOC SBVA LLN: 2.83
DLCOC SBVA PRE REF: 60.5 %
DLCOC SBVA REF: 4.03
DLCOC SINGLE BREATH LLN: 20.99
DLCOC SINGLE BREATH PRE REF: 37.5 %
DLCOC SINGLE BREATH REF: 27.92
DLCOCSBVAULN: 5.24
DLCOCSINGLEBREATHULN: 34.84
DLCOSINGLEBREATHULN: 34.84
DLCOVA LLN: 2.83
DLCOVA PRE REF: 52.4 %
DLCOVA PRE: 2.11 ML/(MIN*MMHG*L) (ref 2.83–5.24)
DLCOVA REF: 4.03
DLCOVAULN: 5.24
DLVAADJ PRE: 2.44 ML/(MIN*MMHG*L) (ref 2.83–5.24)
ERVN2 LLN: -16448.86
ERVN2 PRE REF: 51.3 %
ERVN2 PRE: 0.58 L (ref -16448.86–16451.14)
ERVN2 REF: 1.14
ERVN2ULN: ABNORMAL
FEF 25 75 LLN: 1.32
FEF 25 75 PRE REF: 28.5 %
FEF 25 75 REF: 2.77
FEV05 LLN: 1.55
FEV05 REF: 2.68
FEV1 FVC LLN: 65
FEV1 FVC PRE REF: 77.1 %
FEV1 FVC REF: 77
FEV1 LLN: 2.53
FEV1 PRE REF: 48.3 %
FEV1 REF: 3.38
FRCN2 LLN: 2.58
FRCN2 PRE REF: 87.9 %
FRCN2 REF: 3.57
FRCN2ULN: 4.56
FVC LLN: 3.32
FVC PRE REF: 62.5 %
FVC REF: 4.39
ICN2REF: 3.17
IVC PRE: 2.67 L (ref 3.32–5.46)
IVC SINGLE BREATH LLN: 3.32
IVC SINGLE BREATH PRE REF: 61 %
IVC SINGLE BREATH REF: 4.39
IVCSINGLEBREATHULN: 5.46
LLN IC N2: -9999996.83
PEF LLN: 6.6
PEF PRE REF: 40.4 %
PEF REF: 8.85
PHYSICIAN COMMENT: ABNORMAL
PRE DLCO: 9.06 ML/(MIN*MMHG) (ref 20.99–34.84)
PRE FEF 25 75: 0.79 L/S (ref 1.32–4.75)
PRE FET 100: 7.64 SEC
PRE FEV05 REF: 42.8 %
PRE FEV1 FVC: 59.55 % (ref 64.9–88.07)
PRE FEV1: 1.63 L (ref 2.53–4.19)
PRE FEV5: 1.15 L (ref 1.55–3.82)
PRE FRC N2: 3.14 L (ref 2.58–4.56)
PRE FVC: 2.74 L (ref 3.32–5.46)
PRE IC N2: 2.29 L (ref -9999996.83–#######.####)
PRE PEF: 3.58 L/S (ref 6.6–11.1)
PRE REF IC N2: 72.5 %
RVN2 LLN: 1.76
RVN2 PRE REF: 105 %
RVN2 PRE: 2.55 L (ref 1.76–3.1)
RVN2 REF: 2.43
RVN2TLCN2 LLN: 29.16
RVN2TLCN2 PRE REF: 123.2 %
RVN2TLCN2 PRE: 47 % (ref 29.16–47.12)
RVN2TLCN2 REF: 38.14
RVN2TLCN2ULN: 47.12
RVN2ULN: 3.1
TLCN2 LLN: 5.77
TLCN2 PRE REF: 78.4 %
TLCN2 PRE: 5.43 L (ref 5.77–8.07)
TLCN2 REF: 6.92
TLCN2ULN: 8.07
ULN IC N2: ABNORMAL
VA PRE: 4.29 L (ref 6.77–6.77)
VA SINGLE BREATH LLN: 6.77
VA SINGLE BREATH PRE REF: 63.3 %
VA SINGLE BREATH REF: 6.77
VASINGLEBREATHULN: 6.77
VCMAXN2 LLN: 3.32
VCMAXN2 PRE REF: 65.6 %
VCMAXN2 PRE: 2.88 L (ref 3.32–5.46)
VCMAXN2 REF: 4.39
VCMAXN2ULN: 5.46

## 2024-06-24 ENCOUNTER — TELEPHONE (OUTPATIENT)
Dept: TRANSPLANT | Facility: CLINIC | Age: 64
End: 2024-06-24
Payer: MEDICAID

## 2024-06-24 NOTE — TELEPHONE ENCOUNTER
----- Message from Tuyet Velazquez sent at 6/24/2024  9:14 AM CDT -----  Regarding: FW: Reschedule Existing Appointment  Contact: :Adelita velasco    Returned call to pt and his So. Appts have been re/donald'ed. So asked about the 24hr collection; they still have it from last week can they still turn it in. Will ask nurse and will give them a call back.  .  ----- Message -----  From: Betty Carrasco  Sent: 6/24/2024   9:02 AM CDT  To: [facility] Pre-Liver Transplant Non-Clinical  Subject: Reschedule Existing Appointment                  Reschedule Existing Appointment     Current appt date:06/21     Type of appt :Liver work up     Physician:     Reason for rescheduling:Patients girlfriend is calling to get appts reschedule due patient being sick. Requesting a call back     Caller:Adelita velasco     Contact Preference:[phone] (home)

## 2024-06-26 ENCOUNTER — TELEPHONE (OUTPATIENT)
Dept: INTERVENTIONAL RADIOLOGY/VASCULAR | Facility: HOSPITAL | Age: 64
End: 2024-06-26
Payer: MEDICAID

## 2024-06-26 ENCOUNTER — HOSPITAL ENCOUNTER (OUTPATIENT)
Dept: CARDIOLOGY | Facility: HOSPITAL | Age: 64
Discharge: HOME | End: 2024-06-26
Attending: INTERNAL MEDICINE
Payer: MEDICAID

## 2024-06-26 VITALS
HEART RATE: 44 BPM | BODY MASS INDEX: 27.4 KG/M2 | WEIGHT: 185 LBS | DIASTOLIC BLOOD PRESSURE: 74 MMHG | RESPIRATION RATE: 18 BRPM | SYSTOLIC BLOOD PRESSURE: 141 MMHG | HEIGHT: 69 IN

## 2024-06-26 DIAGNOSIS — Z76.82 ORGAN TRANSPLANT CANDIDATE: ICD-10-CM

## 2024-06-26 NOTE — NURSING
Pt here for DSE. Pt verified by 2 identifiers, allergies reviewed.  DSE pre assessment completed.  Attempted IV access x 2, unsuccessful.  Pt requesting to reschedule DSE testing at a later date.  States will call department to reschedule.  Direct number given to pt & wife to call department to schedule when ready.  Pt D/C'ed home via wc accompanied by wife in stable condition.

## 2024-07-02 ENCOUNTER — HOSPITAL ENCOUNTER (OUTPATIENT)
Dept: RADIOLOGY | Facility: HOSPITAL | Age: 64
Discharge: HOME | End: 2024-07-02
Attending: INTERNAL MEDICINE
Payer: MEDICAID

## 2024-07-02 ENCOUNTER — TELEPHONE (OUTPATIENT)
Dept: INTERVENTIONAL RADIOLOGY/VASCULAR | Facility: HOSPITAL | Age: 64
End: 2024-07-02
Payer: MEDICAID

## 2024-07-02 DIAGNOSIS — Z76.82 ORGAN TRANSPLANT CANDIDATE: ICD-10-CM

## 2024-07-02 PROCEDURE — 93975 VASCULAR STUDY: CPT | Mod: TC,TXP

## 2024-07-02 PROCEDURE — 76700 US EXAM ABDOM COMPLETE: CPT | Mod: TC,TXP

## 2024-07-02 PROCEDURE — 71046 X-RAY EXAM CHEST 2 VIEWS: CPT | Mod: 26,TXP,, | Performed by: RADIOLOGY

## 2024-07-02 PROCEDURE — 71046 X-RAY EXAM CHEST 2 VIEWS: CPT | Mod: TC,TXP

## 2024-07-02 PROCEDURE — 76700 US EXAM ABDOM COMPLETE: CPT | Mod: 26,TXP,, | Performed by: STUDENT IN AN ORGANIZED HEALTH CARE EDUCATION/TRAINING PROGRAM

## 2024-07-02 PROCEDURE — 93975 VASCULAR STUDY: CPT | Mod: 26,TXP,, | Performed by: STUDENT IN AN ORGANIZED HEALTH CARE EDUCATION/TRAINING PROGRAM

## 2024-07-02 NOTE — NURSING
Spoke to Adelita... advised to arrive at 1:00. Regularly scheduled patient who is familiar with the procedure and the departmental processes.

## 2024-07-03 ENCOUNTER — HOSPITAL ENCOUNTER (OUTPATIENT)
Dept: INTERVENTIONAL RADIOLOGY/VASCULAR | Facility: HOSPITAL | Age: 64
Discharge: HOME | End: 2024-07-03
Attending: INTERNAL MEDICINE
Payer: MEDICAID

## 2024-07-03 VITALS
HEART RATE: 59 BPM | SYSTOLIC BLOOD PRESSURE: 157 MMHG | OXYGEN SATURATION: 100 % | DIASTOLIC BLOOD PRESSURE: 74 MMHG | RESPIRATION RATE: 22 BRPM

## 2024-07-03 DIAGNOSIS — R18.8 OTHER ASCITES: ICD-10-CM

## 2024-07-03 DIAGNOSIS — R18.8 ASCITES: ICD-10-CM

## 2024-07-03 LAB
APPEARANCE FLD: CLEAR
BODY FLD TYPE: NORMAL
COLOR FLD: YELLOW
LYMPHOCYTES NFR FLD MANUAL: 79 %
MESOTHL CELL NFR FLD MANUAL: 3 %
MONOS+MACROS NFR FLD MANUAL: 10 %
NEUTROPHILS NFR FLD MANUAL: 8 %
WBC # FLD: 161 /CU MM

## 2024-07-03 PROCEDURE — 89051 BODY FLUID CELL COUNT: CPT | Mod: TXP | Performed by: INTERNAL MEDICINE

## 2024-07-03 PROCEDURE — 63600175 PHARM REV CODE 636 W HCPCS: Mod: JZ,JG,TXP | Performed by: PHYSICIAN ASSISTANT

## 2024-07-03 PROCEDURE — P9047 ALBUMIN (HUMAN), 25%, 50ML: HCPCS | Mod: JZ,JG,TXP | Performed by: PHYSICIAN ASSISTANT

## 2024-07-03 PROCEDURE — 25000003 PHARM REV CODE 250: Mod: TXP | Performed by: PHYSICIAN ASSISTANT

## 2024-07-03 RX ORDER — LIDOCAINE HYDROCHLORIDE 10 MG/ML
INJECTION INFILTRATION; PERINEURAL
Status: COMPLETED | OUTPATIENT
Start: 2024-07-03 | End: 2024-07-03

## 2024-07-03 RX ORDER — ALBUMIN HUMAN 250 G/1000ML
SOLUTION INTRAVENOUS
Status: COMPLETED | OUTPATIENT
Start: 2024-07-03 | End: 2024-07-03

## 2024-07-03 RX ADMIN — ALBUMIN (HUMAN) 75 G: 12.5 SOLUTION INTRAVENOUS at 02:07

## 2024-07-03 RX ADMIN — LIDOCAINE HYDROCHLORIDE 5 ML: 10 INJECTION, SOLUTION INFILTRATION; PERINEURAL at 01:07

## 2024-07-03 NOTE — PROCEDURES
Radiology Post-Procedure Note    Pre Op Diagnosis: Ascites  Post Op Diagnosis: Same    Procedure: Ultrasound Guided Paracentesis    Procedure performed by: Ashia Gallegos PA-C    Written Informed Consent Obtained: Yes  Specimen Removed: YES   Estimated Blood Loss: Minimal    Findings:   Successful paracentesis.  12L on LLQ.  Albumin was administered PRN per protocol.    Patient tolerated procedure well.    Ashia Gallegos PA-C

## 2024-07-03 NOTE — H&P
Radiology History & Physical      SUBJECTIVE:     Chief Complaint: abdominal distention    History of Present Illness:  Jonny Isabel is a 63 y.o. male who presents for ultrasound guided paracentesis  Past Medical History:   Diagnosis Date    A-fib     Esophageal varices 5/8/2023    Other ascites 5/8/2023     Past Surgical History:   Procedure Laterality Date    ESOPHAGOGASTRODUODENOSCOPY N/A 1/17/2023    Procedure: EGD (ESOPHAGOGASTRODUODENOSCOPY);  Surgeon: Dewayne Navas MD;  Location: [facility] (2ND FLR);  Service: Endoscopy;  Laterality: N/A;    ESOPHAGOGASTRODUODENOSCOPY N/A 2/7/2024    Procedure: EGD (ESOPHAGOGASTRODUODENOSCOPY);  Surgeon: Nathan Goins MD;  [facility]  Service: Endoscopy;  Laterality: N/A;  referral: Dr. Escalante /cirrhosis - labs- possible banding / prep ins on portal / 2nd floor - Pt c/o SOB at times./ Xarelto - message sent to clearance nurse per protocol - ERW  1/31/24- LVM for precall - ERW  2/1-precall complet-Kpvt  ok to hold Xarelto2 da       Home Meds:   Prior to Admission medications    Medication Sig Start Date End Date Taking? Authorizing Provider   ADVAIR DISKUS 250-50 mcg/dose diskus inhaler Inhale 1 puff into the lungs 2 (two) times a day. Controller 3/20/24 3/20/25  Joanne Ruiz NP   albuterol (VENTOLIN HFA) 90 mcg/actuation inhaler Inhale 1 puff into the lungs every 4 (four) hours as needed for Wheezing or Shortness of Breath. Rescue 2/14/23 6/26/24  Joanne Ruiz NP   alirocumab (PRALUENT PEN) 75 mg/mL PnIj Inject 75 mg into the skin every 14 (fourteen) days.    Provider, Historical   amiodarone (PACERONE) 200 MG Tab Take 1 tablet (200 mg total) by mouth once daily.  Patient taking differently: Take 100 mg by mouth every evening. 12/12/23 12/11/24  Ellen Escalera NP   ferrous sulfate 324 mg (65 mg iron) TbEC Take by mouth. 1/4/24   Provider, Historical   folic acid (FOLVITE) 1 MG tablet Take 1 tablet (1 mg total) by mouth once  daily. 2/7/23 6/26/24  Seymour Leon MD   ibuprofen (ADVIL,MOTRIN) 800 MG tablet Take 800 mg by mouth daily as needed for Pain.    Provider, Historical   levothyroxine (SYNTHROID) 50 MCG tablet TAKE 1 TABLET BY MOUTH IN THE MORNING WITH A FULL GLASS OF WATER 30-60 MINUTES BEFORE OTHER MEDICATIONS AND FOOD 12/12/23   Ellen Escalera NP   metoprolol succinate (TOPROL-XL) 50 MG 24 hr tablet Take 1 tablet (50 mg total) by mouth once daily.  Patient taking differently: Take 100 mg by mouth once daily. 12/12/23 12/11/24  Ellen Escalera NP   moxifloxacin (VIGAMOX) 0.5 % ophthalmic solution Place 1 drop into the left eye 3 (three) times daily. 6/13/24   Provider, Historical   omega 3-dha-epa-fish oil 1,000 mg (120 mg-180 mg) Cap 2 capsule DAILY (route: oral) 7/13/22   Provider, Historical   pantoprazole (PROTONIX) 40 MG tablet Take 1 tablet by mouth once daily. 8/17/22   Provider, Historical   potassium chloride (KLOR-CON) 10 MEQ TbSR Take 10 mEq by mouth once daily.    Provider, Historical   promethazine (PHENERGAN) 12.5 MG Tab Take 12.5 mg by mouth every 6 (six) hours as needed (nausea). 5/6/24   Provider, Historical   thiamine 100 MG tablet Take 1 tablet by mouth once daily. 7/13/22   Provider, Historical   traZODone (DESYREL) 50 MG tablet Take 50 mg by mouth nightly as needed. 2/29/24   Provider, Historical   VITAMIN B COMPLEX ORAL Take 1 tablet by mouth once daily.    Provider, Historical   XARELTO 20 mg Tab Take 20 mg by mouth once daily. 1/9/23   Provider, Historical     Anticoagulants/Antiplatelets:  xarelto    Allergies: Review of patient's allergies indicates:  No Known Allergies  Sedation History:  no adverse reactions    Review of Systems:   Hematological: no known coagulopathies  Respiratory: no shortness of breath  Cardiovascular: no chest pain  Gastrointestinal: no abdominal pain  Genito-Urinary: no dysuria  Musculoskeletal: negative  Neurological: no TIA or stroke symptoms         OBJECTIVE:      Vital Signs (Most Recent)       Physical Exam:  ASA: 2  Mallampati: n/a    General: no acute distress  Mental Status: alert and oriented to person, place and time  HEENT: normocephalic, atraumatic  Chest: unlabored breathing  Heart: regular heart rate  Abdomen: distended  Extremity: moves all extremities    ASSESSMENT/PLAN:     Sedation Plan: local  Patient will undergo ultrasound guided paracentesis.    Ashia Gallegos PA-C

## 2024-07-03 NOTE — SEDATION DOCUMENTATION
Patient presented for weekly paracentesis. Denied any issues or changes since last visit. Tolerated procedure well. 12,000ccs of cloudy karma ascites removed. Patient tolerated well. VSS. Albumin replaced per protocol, see MAR. AVS and d/c instructions given.

## 2024-07-03 NOTE — DISCHARGE SUMMARY
Interventional Radiology Short Stay Discharge Summary      Admit Date: 7/3/2024  Discharge Date: 07/03/2024     Hospital Course: Uneventful    Discharge Diagnosis: ascites    Discharge Condition: Stable    Discharge Disposition: Home    Diet: Resume prior diet    Activity: activity as tolerated    Follow-up: With referring provider    Cindy Chava, PA-C [facility]

## 2024-07-05 ENCOUNTER — HOSPITAL ENCOUNTER (OUTPATIENT)
Dept: RADIOLOGY | Facility: HOSPITAL | Age: 64
Discharge: HOME | End: 2024-07-05
Attending: INTERNAL MEDICINE
Payer: MEDICAID

## 2024-07-05 DIAGNOSIS — R18.8 OTHER ASCITES: ICD-10-CM

## 2024-07-05 DIAGNOSIS — Z76.82 ORGAN TRANSPLANT CANDIDATE: Primary | ICD-10-CM

## 2024-07-05 DIAGNOSIS — Z76.82 ORGAN TRANSPLANT CANDIDATE: ICD-10-CM

## 2024-07-05 PROCEDURE — 77080 DXA BONE DENSITY AXIAL: CPT | Mod: 26,TXP,, | Performed by: RADIOLOGY

## 2024-07-05 PROCEDURE — 77080 DXA BONE DENSITY AXIAL: CPT | Mod: TC,TXP

## 2024-07-09 ENCOUNTER — TELEPHONE (OUTPATIENT)
Dept: INTERVENTIONAL RADIOLOGY/VASCULAR | Facility: HOSPITAL | Age: 64
End: 2024-07-09
Payer: MEDICAID

## 2024-07-10 ENCOUNTER — HOSPITAL ENCOUNTER (OUTPATIENT)
Dept: INTERVENTIONAL RADIOLOGY/VASCULAR | Facility: HOSPITAL | Age: 64
Discharge: HOME | End: 2024-07-10
Attending: INTERNAL MEDICINE
Payer: MEDICAID

## 2024-07-10 ENCOUNTER — PATIENT MESSAGE (OUTPATIENT)
Dept: HEPATOLOGY | Facility: CLINIC | Age: 64
End: 2024-07-10
Payer: MEDICAID

## 2024-07-10 VITALS
DIASTOLIC BLOOD PRESSURE: 79 MMHG | OXYGEN SATURATION: 99 % | WEIGHT: 185 LBS | TEMPERATURE: 98 F | RESPIRATION RATE: 16 BRPM | HEART RATE: 49 BPM | SYSTOLIC BLOOD PRESSURE: 166 MMHG | HEIGHT: 69 IN | BODY MASS INDEX: 27.4 KG/M2

## 2024-07-10 DIAGNOSIS — Z76.82 ORGAN TRANSPLANT CANDIDATE: ICD-10-CM

## 2024-07-10 DIAGNOSIS — R18.8 OTHER ASCITES: ICD-10-CM

## 2024-07-10 LAB
APPEARANCE FLD: CLEAR
BODY FLD TYPE: NORMAL
COLOR FLD: YELLOW
LYMPHOCYTES NFR FLD MANUAL: 10 %
MONOS+MACROS NFR FLD MANUAL: 85 %
NEUTROPHILS NFR FLD MANUAL: 5 %
WBC # FLD: 151 /CU MM

## 2024-07-10 PROCEDURE — 25000003 PHARM REV CODE 250: Mod: TXP | Performed by: PHYSICIAN ASSISTANT

## 2024-07-10 PROCEDURE — 89051 BODY FLUID CELL COUNT: CPT | Mod: TXP | Performed by: INTERNAL MEDICINE

## 2024-07-10 RX ORDER — LIDOCAINE HYDROCHLORIDE 10 MG/ML
INJECTION INFILTRATION; PERINEURAL
Status: COMPLETED | OUTPATIENT
Start: 2024-07-10 | End: 2024-07-10

## 2024-07-10 RX ADMIN — LIDOCAINE HYDROCHLORIDE 5 ML: 10 INJECTION, SOLUTION INFILTRATION; PERINEURAL at 02:07

## 2024-07-10 NOTE — PROCEDURES
Radiology Post-Procedure Note    Pre Op Diagnosis: Ascites  Post Op Diagnosis: Same    Procedure: Ultrasound Guided Paracentesis    Procedure performed by: Ashia Gallegos PA-C    Written Informed Consent Obtained: Yes  Specimen Removed: YES   Estimated Blood Loss: Minimal    Findings:   Successful paracentesis.  5L removed from LLQ.  Albumin was not administered     Patient tolerated procedure well.    Ashia Gallegos PA-C

## 2024-07-10 NOTE — NURSING
Patient is discharged from IR. AVS is printed and reviewed. Upcoming appointments and the [facility] number is highlighted for convenience. The opportunity to ask questions is provided. Patient is wheeled to the front lobby where family is waiting.

## 2024-07-10 NOTE — H&P
Radiology History & Physical      SUBJECTIVE:     Chief Complaint: abdominal distention    History of Present Illness:  Jonny Isabel is a 63 y.o. male who presents for ultrasound guided paracentesis  Past Medical History:   Diagnosis Date    A-fib     Esophageal varices 5/8/2023    Other ascites 5/8/2023     Past Surgical History:   Procedure Laterality Date    ESOPHAGOGASTRODUODENOSCOPY N/A 1/17/2023    Procedure: EGD (ESOPHAGOGASTRODUODENOSCOPY);  Surgeon: Dewayne Navas MD;  Location: [facility] (2ND FLR);  Service: Endoscopy;  Laterality: N/A;    ESOPHAGOGASTRODUODENOSCOPY N/A 2/7/2024    Procedure: EGD (ESOPHAGOGASTRODUODENOSCOPY);  Surgeon: Nathan Goins MD;  [facility]  Service: Endoscopy;  Laterality: N/A;  referral: Dr. Escalante /cirrhosis - labs- possible banding / prep ins on portal / 2nd floor - Pt c/o SOB at times./ Xarelto - message sent to clearance nurse per protocol - ERW  1/31/24- LVM for precall - ERW  2/1-precall complet-Kpvt  ok to hold Xarelto2 da       Home Meds:   Prior to Admission medications    Medication Sig Start Date End Date Taking? Authorizing Provider   ADVAIR DISKUS 250-50 mcg/dose diskus inhaler Inhale 1 puff into the lungs 2 (two) times a day. Controller 3/20/24 3/20/25 Yes Joanne Ruiz NP   alirocumab (PRALUENT PEN) 75 mg/mL PnIj Inject 75 mg into the skin every 14 (fourteen) days.   Yes Provider, Historical   amiodarone (PACERONE) 200 MG Tab Take 1 tablet (200 mg total) by mouth once daily.  Patient taking differently: Take 100 mg by mouth every evening. 12/12/23 12/11/24 Yes Ellen Escalera NP   ferrous sulfate 324 mg (65 mg iron) TbEC Take by mouth. 1/4/24  Yes Provider, Historical   ibuprofen (ADVIL,MOTRIN) 800 MG tablet Take 800 mg by mouth daily as needed for Pain.   Yes Provider, Historical   levothyroxine (SYNTHROID) 50 MCG tablet TAKE 1 TABLET BY MOUTH IN THE MORNING WITH A FULL GLASS OF WATER 30-60 MINUTES BEFORE OTHER MEDICATIONS AND  FOOD 12/12/23  Yes Ellen Escalera NP   metoprolol succinate (TOPROL-XL) 50 MG 24 hr tablet Take 1 tablet (50 mg total) by mouth once daily.  Patient taking differently: Take 100 mg by mouth once daily. 12/12/23 12/11/24 Yes Ellen Escalera NP   moxifloxacin (VIGAMOX) 0.5 % ophthalmic solution Place 1 drop into the left eye 3 (three) times daily. 6/13/24  Yes Provider, Historical   omega 3-dha-epa-fish oil 1,000 mg (120 mg-180 mg) Cap 2 capsule DAILY (route: oral) 7/13/22  Yes Provider, Historical   pantoprazole (PROTONIX) 40 MG tablet Take 1 tablet by mouth once daily. 8/17/22  Yes Provider, Historical   potassium chloride (KLOR-CON) 10 MEQ TbSR Take 10 mEq by mouth once daily.   Yes Provider, Historical   promethazine (PHENERGAN) 12.5 MG Tab Take 12.5 mg by mouth every 6 (six) hours as needed (nausea). 5/6/24  Yes Provider, Historical   thiamine 100 MG tablet Take 1 tablet by mouth once daily. 7/13/22  Yes Provider, Historical   traZODone (DESYREL) 50 MG tablet Take 50 mg by mouth nightly as needed. 2/29/24  Yes Provider, Historical   VITAMIN B COMPLEX ORAL Take 1 tablet by mouth once daily.   Yes Provider, Historical   XARELTO 20 mg Tab Take 20 mg by mouth once daily. 1/9/23  Yes Provider, Historical   albuterol (VENTOLIN HFA) 90 mcg/actuation inhaler Inhale 1 puff into the lungs every 4 (four) hours as needed for Wheezing or Shortness of Breath. Rescue 2/14/23 6/26/24  Joanne Ruiz NP   folic acid (FOLVITE) 1 MG tablet Take 1 tablet (1 mg total) by mouth once daily. 2/7/23 6/26/24  Seymour Leon MD     Anticoagulants/Antiplatelets:  xarelto    Allergies: Review of patient's allergies indicates:  No Known Allergies  Sedation History:  no adverse reactions    Review of Systems:   Hematological: no known coagulopathies  Respiratory: no shortness of breath  Cardiovascular: no chest pain  Gastrointestinal: no abdominal pain  Genito-Urinary: no dysuria  Musculoskeletal: negative  Neurological: no  TIA or stroke symptoms         OBJECTIVE:     Vital Signs (Most Recent)  Temp: 97.8 °F (36.6 °C) (07/10/24 1403)  Pulse: (!) 51 (07/10/24 1403)  Resp: 16 (07/10/24 1403)  BP: (!) 185/77 (07/10/24 1403)  SpO2: 95 % (07/10/24 1403)    Physical Exam:  ASA: 2  Mallampati: n/a    General: no acute distress  Mental Status: alert and oriented to person, place and time  HEENT: normocephalic, atraumatic  Chest: unlabored breathing  Heart: regular heart rate  Abdomen: distended  Extremity: moves all extremities    ASSESSMENT/PLAN:     Sedation Plan: local  Patient will undergo ultrasound guided paracentesis.    Ashia Gallegos PA-C

## 2024-07-10 NOTE — SEDATION DOCUMENTATION
IR Procedure - Paracentesis - is completed. Patient tolerated well. He is awake, alert, oriented. Vital signs are within pre-procedure values. 5000 mL clear, yellow ascites is drained. Patient will return to IR pre/post to complete discharge.

## 2024-07-10 NOTE — DISCHARGE SUMMARY
Interventional Radiology Short Stay Discharge Summary      Admit Date: 7/10/2024  Discharge Date: 07/10/2024     Hospital Course: Uneventful    Discharge Diagnosis: ascites    Discharge Condition: Stable    Discharge Disposition: Home    Diet: Resume prior diet    Activity: activity as tolerated    Follow-up: With referring provider    Cindy Chava, PA-C [facility]

## 2024-07-11 ENCOUNTER — TELEPHONE (OUTPATIENT)
Dept: TRANSPLANT | Facility: CLINIC | Age: 64
End: 2024-07-11
Payer: MEDICAID

## 2024-07-11 NOTE — TELEPHONE ENCOUNTER
Patient missed his virtual appt with Dr. Escalante on 7/3/24.  I spoke to his wife Adelita.   She thought that the doctor was supposed to call Jonny's phone for the virtual visit.  I explained the correct process of how to do a virtual visit though the patient portal.  They did not know how virtual visits work but Adelita states that going forward she now understands.    She also would like more weekly paracentesis scheduled.  I will send a message to IR.

## 2024-07-12 ENCOUNTER — TELEPHONE (OUTPATIENT)
Dept: TRANSPLANT | Facility: CLINIC | Age: 64
End: 2024-07-12
Payer: MEDICAID

## 2024-07-12 DIAGNOSIS — K86.2 PANCREATIC CYST: ICD-10-CM

## 2024-07-12 DIAGNOSIS — J44.9 CHRONIC OBSTRUCTIVE PULMONARY DISEASE, UNSPECIFIED COPD TYPE: ICD-10-CM

## 2024-07-12 DIAGNOSIS — Z76.82 ORGAN TRANSPLANT CANDIDATE: Primary | ICD-10-CM

## 2024-07-12 NOTE — TELEPHONE ENCOUNTER
Patient notified of the following test and follow-up appts for liver tx evaluation:    - Follow-up with Dr. Escalante  - Consult to pulmonary clinic for pulmonary obstruction  - Pacreatic cyst clinic  - Labs  - Dobutamine stress Echo    Patient verbalized understanding.

## 2024-07-15 ENCOUNTER — TELEPHONE (OUTPATIENT)
Dept: TRANSPLANT | Facility: CLINIC | Age: 64
End: 2024-07-15
Payer: MEDICAID

## 2024-07-15 NOTE — TELEPHONE ENCOUNTER
"----- Message from Tuyet Velazquez sent at 7/15/2024 10:29 AM CDT -----    Rec'titus call from pt caregiver to [facility] f/u appts. Told her that Michael is out of the office, so I will send a message to another nurse to see what depart he needs to be seen in for "Pancreatic cyst clinic" Pt will also need:    Follow-up with Dr. Escalante  - Consult to pulmonary clinic for pulmonary obstruction  - Labs  - Dobutamine stress Echo  .  "

## 2024-07-16 ENCOUNTER — TELEPHONE (OUTPATIENT)
Dept: PULMONOLOGY | Facility: CLINIC | Age: 64
End: 2024-07-16
Payer: MEDICAID

## 2024-07-16 ENCOUNTER — TELEPHONE (OUTPATIENT)
Dept: GASTROENTEROLOGY | Facility: CLINIC | Age: 64
End: 2024-07-16

## 2024-07-16 ENCOUNTER — TELEPHONE (OUTPATIENT)
Dept: INTERVENTIONAL RADIOLOGY/VASCULAR | Facility: HOSPITAL | Age: 64
End: 2024-07-16
Payer: MEDICAID

## 2024-07-16 DIAGNOSIS — Z76.82 ORGAN TRANSPLANT CANDIDATE: Primary | ICD-10-CM

## 2024-07-16 NOTE — TELEPHONE ENCOUNTER
----- Message from Bryon Soria sent at 7/16/2024 12:10 PM CDT -----  Regarding: Appt  Contact: [phone]  Mita/Preliver Transplant calling to schedule new pt appt, dx:Z76.82 (ICD-10-CM) - Organ transplant candidate, J44.9 (ICD-10-CM) - Chronic obstructive pulmonary disease, unspecified COPD type.  Please call [phone]

## 2024-07-16 NOTE — TELEPHONE ENCOUNTER
Spoke with pt wife, informed her that I have received her message. Pt wife states that she will call back because she is not at home right now. I verbalized to pt wife that I understand.

## 2024-07-16 NOTE — TELEPHONE ENCOUNTER
----- Message from David Blunt sent at 7/16/2024 12:43 PM CDT -----  Regarding: Appointment  Contact: [phone]  Calling to schedule appointment due toZ76.82 (ICD-10-CM) - Organ transplant candidate  K86.2 (ICD-10-CM) - Pancreatic cyst per referral in EPIC. Please contact patient as soon as possible.

## 2024-07-16 NOTE — TELEPHONE ENCOUNTER
----- Message from Bryon Soria sent at 7/16/2024  2:31 PM CDT -----  Regarding: Pt Advice  Contact: [phone] .  Missed Callback         Adelita/spouse returning callback from missed call. Requesting to speak with somebody in Pulmonary office. Please call [phone] .

## 2024-07-17 ENCOUNTER — HOSPITAL ENCOUNTER (OUTPATIENT)
Dept: INTERVENTIONAL RADIOLOGY/VASCULAR | Facility: HOSPITAL | Age: 64
Discharge: HOME | End: 2024-07-17
Attending: INTERNAL MEDICINE
Payer: MEDICAID

## 2024-07-17 ENCOUNTER — TELEPHONE (OUTPATIENT)
Dept: TRANSPLANT | Facility: CLINIC | Age: 64
End: 2024-07-17
Payer: MEDICAID

## 2024-07-17 VITALS
OXYGEN SATURATION: 100 % | SYSTOLIC BLOOD PRESSURE: 183 MMHG | HEART RATE: 58 BPM | DIASTOLIC BLOOD PRESSURE: 82 MMHG | RESPIRATION RATE: 15 BRPM

## 2024-07-17 DIAGNOSIS — Z76.82 ORGAN TRANSPLANT CANDIDATE: ICD-10-CM

## 2024-07-17 DIAGNOSIS — R18.8 OTHER ASCITES: ICD-10-CM

## 2024-07-17 LAB
APPEARANCE FLD: CLEAR
BODY FLD TYPE: NORMAL
COLOR FLD: YELLOW
LYMPHOCYTES NFR FLD MANUAL: 65 %
MESOTHL CELL NFR FLD MANUAL: 3 %
MONOS+MACROS NFR FLD MANUAL: 29 %
NEUTROPHILS NFR FLD MANUAL: 3 %
WBC # FLD: 176 /CU MM

## 2024-07-17 PROCEDURE — 25000003 PHARM REV CODE 250: Mod: TXP | Performed by: PHYSICIAN ASSISTANT

## 2024-07-17 PROCEDURE — 89051 BODY FLUID CELL COUNT: CPT | Mod: TXP | Performed by: INTERNAL MEDICINE

## 2024-07-17 PROCEDURE — 49083 ABD PARACENTESIS W/IMAGING: CPT | Mod: LT,NTX | Performed by: RADIOLOGY

## 2024-07-17 PROCEDURE — 49083 ABD PARACENTESIS W/IMAGING: CPT | Mod: LT,TXP,, | Performed by: PHYSICIAN ASSISTANT

## 2024-07-17 PROCEDURE — 63600175 PHARM REV CODE 636 W HCPCS: Mod: JZ,JG,TXP | Performed by: PHYSICIAN ASSISTANT

## 2024-07-17 PROCEDURE — P9047 ALBUMIN (HUMAN), 25%, 50ML: HCPCS | Mod: JZ,JG,TXP | Performed by: PHYSICIAN ASSISTANT

## 2024-07-17 RX ORDER — LIDOCAINE HYDROCHLORIDE 10 MG/ML
INJECTION INFILTRATION; PERINEURAL
Status: COMPLETED | OUTPATIENT
Start: 2024-07-17 | End: 2024-07-17

## 2024-07-17 RX ORDER — ALBUMIN HUMAN 250 G/1000ML
SOLUTION INTRAVENOUS
Status: COMPLETED | OUTPATIENT
Start: 2024-07-17 | End: 2024-07-17

## 2024-07-17 RX ADMIN — LIDOCAINE HYDROCHLORIDE 5 ML: 10 INJECTION, SOLUTION INFILTRATION; PERINEURAL at 02:07

## 2024-07-17 RX ADMIN — ALBUMIN (HUMAN) 62.5 G: 25 SOLUTION INTRAVENOUS at 03:07

## 2024-07-17 RX ADMIN — ALBUMIN (HUMAN) 12.5 G: 25 SOLUTION INTRAVENOUS at 03:07

## 2024-07-17 NOTE — TELEPHONE ENCOUNTER
"----- Message from Tuyet Velazquez sent at 7/17/2024  8:24 AM CDT -----    Pulm appt [facility]'ed for8/1 and message sent to [facility] for an appt for pancreatic cyst. Called pt wife to [facility] stress test and f/u appt with Dr. Escalante, but there was no answer. [facility] for her to call back.  .  ----- Message -----  From: Tuyet Velazquez  Sent: 7/15/2024  10:31 AM CDT  To: Tuyet Velazquez      Rec'titus call from pt caregiver to [facility] f/u appts. Told her that Michael is out of the office, so I will send a message to another nurse to see what depart he needs to be seen in for "Pancreatic cyst clinic" Pt will also need:    Follow-up with Dr. Escalante  - Consult to pulmonary clinic for pulmonary obstruction  - Labs  - Dobutamine stress Echo  .  "

## 2024-07-17 NOTE — H&P
Radiology History & Physical      SUBJECTIVE:     Chief Complaint: abdominal distention    History of Present Illness:  Jonny Isabel is a 63 y.o. male who presents for ultrasound guided paracentesis  Past Medical History:   Diagnosis Date    A-fib     Esophageal varices 5/8/2023    Other ascites 5/8/2023     Past Surgical History:   Procedure Laterality Date    ESOPHAGOGASTRODUODENOSCOPY N/A 1/17/2023    Procedure: EGD (ESOPHAGOGASTRODUODENOSCOPY);  Surgeon: Dewayne Navas MD;  Location: [facility] (2ND FLR);  Service: Endoscopy;  Laterality: N/A;    ESOPHAGOGASTRODUODENOSCOPY N/A 2/7/2024    Procedure: EGD (ESOPHAGOGASTRODUODENOSCOPY);  Surgeon: Nathan Goins MD;  [facility]  Service: Endoscopy;  Laterality: N/A;  referral: Dr. Escalante /cirrhosis - labs- possible banding / prep ins on portal / 2nd floor - Pt c/o SOB at times./ Xarelto - message sent to clearance nurse per protocol - ERW  1/31/24- LVM for precall - ERW  2/1-precall complet-Kpvt  ok to hold Xarelto2 da       Home Meds:   Prior to Admission medications    Medication Sig Start Date End Date Taking? Authorizing Provider   ADVAIR DISKUS 250-50 mcg/dose diskus inhaler Inhale 1 puff into the lungs 2 (two) times a day. Controller 3/20/24 3/20/25  Joanne Ruiz NP   albuterol (VENTOLIN HFA) 90 mcg/actuation inhaler Inhale 1 puff into the lungs every 4 (four) hours as needed for Wheezing or Shortness of Breath. Rescue 2/14/23 6/26/24  Joanne Ruiz NP   alirocumab (PRALUENT PEN) 75 mg/mL PnIj Inject 75 mg into the skin every 14 (fourteen) days.    Provider, Historical   amiodarone (PACERONE) 200 MG Tab Take 1 tablet (200 mg total) by mouth once daily.  Patient taking differently: Take 100 mg by mouth every evening. 12/12/23 12/11/24  Ellen Escalera NP   ferrous sulfate 324 mg (65 mg iron) TbEC Take by mouth. 1/4/24   Provider, Historical   folic acid (FOLVITE) 1 MG tablet Take 1 tablet (1 mg total) by mouth once  daily. 2/7/23 6/26/24  Seymour Leon MD   ibuprofen (ADVIL,MOTRIN) 800 MG tablet Take 800 mg by mouth daily as needed for Pain.    Provider, Historical   levothyroxine (SYNTHROID) 50 MCG tablet TAKE 1 TABLET BY MOUTH IN THE MORNING WITH A FULL GLASS OF WATER 30-60 MINUTES BEFORE OTHER MEDICATIONS AND FOOD 12/12/23   Ellen Escalera NP   metoprolol succinate (TOPROL-XL) 50 MG 24 hr tablet Take 1 tablet (50 mg total) by mouth once daily.  Patient taking differently: Take 100 mg by mouth once daily. 12/12/23 12/11/24  Ellen Escalera NP   moxifloxacin (VIGAMOX) 0.5 % ophthalmic solution Place 1 drop into the left eye 3 (three) times daily. 6/13/24   Provider, Historical   omega 3-dha-epa-fish oil 1,000 mg (120 mg-180 mg) Cap 2 capsule DAILY (route: oral) 7/13/22   Provider, Historical   pantoprazole (PROTONIX) 40 MG tablet Take 1 tablet by mouth once daily. 8/17/22   Provider, Historical   potassium chloride (KLOR-CON) 10 MEQ TbSR Take 10 mEq by mouth once daily.    Provider, Historical   promethazine (PHENERGAN) 12.5 MG Tab Take 12.5 mg by mouth every 6 (six) hours as needed (nausea). 5/6/24   Provider, Historical   thiamine 100 MG tablet Take 1 tablet by mouth once daily. 7/13/22   Provider, Historical   traZODone (DESYREL) 50 MG tablet Take 50 mg by mouth nightly as needed. 2/29/24   Provider, Historical   VITAMIN B COMPLEX ORAL Take 1 tablet by mouth once daily.    Provider, Historical   XARELTO 20 mg Tab Take 20 mg by mouth once daily. 1/9/23   Provider, Historical     Anticoagulants/Antiplatelets:  xarelto    Allergies: Review of patient's allergies indicates:  No Known Allergies  Sedation History:  no adverse reactions    Review of Systems:   Hematological: no known coagulopathies  Respiratory: no shortness of breath  Cardiovascular: no chest pain  Gastrointestinal: no abdominal pain  Genito-Urinary: no dysuria  Musculoskeletal: negative  Neurological: no TIA or stroke symptoms         OBJECTIVE:      Vital Signs (Most Recent)       Physical Exam:  ASA: 2  Mallampati: n/a    General: no acute distress  Mental Status: alert and oriented to person, place and time  HEENT: normocephalic, atraumatic  Chest: unlabored breathing  Heart: regular heart rate  Abdomen: distended  Extremity: moves all extremities    ASSESSMENT/PLAN:     Sedation Plan: local  Patient will undergo ultrasound guided paracentesis.    Ashia Gallegos PA-C

## 2024-07-17 NOTE — PLAN OF CARE
Pt VSS and in NAD. PIV and CRM equipment removed. Discharge instructions and AVS provided. Pt verbalized understanding, questions and concerns addressed. No further needs at this time. Pt discharged from recovery area at 1605.

## 2024-07-17 NOTE — PROCEDURES
Radiology Post-Procedure Note    Pre Op Diagnosis: Ascites  Post Op Diagnosis: Same    Procedure: Ultrasound Guided Paracentesis    Procedure performed by: Ashia Gallegos PA-C    Written Informed Consent Obtained: Yes  Specimen Removed: YES   Estimated Blood Loss: Minimal    Findings:   Successful paracentesis.  12.5 L LLQ.  Albumin was administered PRN per protocol.    Patient tolerated procedure well.    Ashia Gallegos PA-C

## 2024-07-17 NOTE — SEDATION DOCUMENTATION
IR paracentesis completed, denies pain, 12.5L removed clear yellow fluid from LLQ abd, sent specimen to lab as requested, replaced with 75g albumin as per protocol, applied bandaid to puncture, CDI, VS stable afebrile, reviewed discharge instructions with pt, pt refused papers and uses portal, escorted pt to front of hospital, IR care complete

## 2024-07-17 NOTE — DISCHARGE SUMMARY
Interventional Radiology Short Stay Discharge Summary      Admit Date: 7/17/2024  Discharge Date: 07/17/2024     Hospital Course: Uneventful    Discharge Diagnosis: ascites    Discharge Condition: Stable    Discharge Disposition: Home    Diet: Resume prior diet    Activity: activity as tolerated    Follow-up: With referring provider    Cindy Chava, PA-C [facility]

## 2024-07-18 ENCOUNTER — TELEPHONE (OUTPATIENT)
Dept: TRANSPLANT | Facility: CLINIC | Age: 64
End: 2024-07-18
Payer: MEDICAID

## 2024-07-18 NOTE — TELEPHONE ENCOUNTER
"----- Message from Tuyet Velazquez sent at 7/18/2024 10:53 AM CDT -----    Called and sp to pt wife; she told me that they sp with pulm and she knows about the appts on 8/2. Told her that I still need to get him donald'ed with Dr. Escalante and a stress test, but she said that she will have to call back when she gets home.  .  ----- Message -----  From: Tuyet Velazquez  Sent: 7/17/2024   8:26 AM CDT  To: Tuyet Velazquez      Pulm appt donald'ed for8/1 and message sent to [facility] for an appt for pancreatic cyst. Called pt wife to [facility] stress test and f/u appt with Dr. Escalante, but there was no answer. LVM for her to call back.  .  ----- Message -----  From: Tuyet Velazquez  Sent: 7/15/2024  10:31 AM CDT  To: Tuyet Velazquez      Rec'titus call from pt caregiver to [facility] f/u appts. Told her that Michael is out of the office, so I will send a message to another nurse to see what depart he needs to be seen in for "Pancreatic cyst clinic" Pt will also need:    Follow-up with Dr. Escalante  - Consult to pulmonary clinic for pulmonary obstruction  - Labs  - Dobutamine stress Echo  .  "

## 2024-07-18 NOTE — TELEPHONE ENCOUNTER
"----- Message from Tuyet Velazquez sent at 7/18/2024 12:04 PM CDT -----    Returned call to pt wife; stress test and f/u appt with Dr. vAa bennett'ed. Pt still need to see GI for pancreatic cyst. Message was sent to [facility] for review.   .  ----- Message -----  From: Eladio Mcconnell  Sent: 7/18/2024  11:13 AM CDT  To: [facility] Pre-Liver Transplant Non-Clinical    Consult/Advisory    Name Of Caller: Self    Contact Preference?: [phone]    What is the nature of the call?: Returning call to [place]    Additional Notes:  "Thank you for all that you do for our patients"  "

## 2024-07-23 ENCOUNTER — TELEPHONE (OUTPATIENT)
Dept: INTERVENTIONAL RADIOLOGY/VASCULAR | Facility: HOSPITAL | Age: 64
End: 2024-07-23
Payer: MEDICAID

## 2024-07-23 NOTE — NURSING
Advised Adelita to arrive at 1:30. Regularly scheduled patient who is familiar with the procedure and the IR process.

## 2024-07-24 ENCOUNTER — HOSPITAL ENCOUNTER (OUTPATIENT)
Dept: INTERVENTIONAL RADIOLOGY/VASCULAR | Facility: HOSPITAL | Age: 64
Discharge: HOME | End: 2024-07-24
Attending: INTERNAL MEDICINE
Payer: MEDICAID

## 2024-07-24 VITALS
DIASTOLIC BLOOD PRESSURE: 90 MMHG | OXYGEN SATURATION: 100 % | HEART RATE: 52 BPM | TEMPERATURE: 98 F | SYSTOLIC BLOOD PRESSURE: 178 MMHG | RESPIRATION RATE: 15 BRPM

## 2024-07-24 DIAGNOSIS — R18.8 ASCITES: ICD-10-CM

## 2024-07-24 DIAGNOSIS — Z76.82 ORGAN TRANSPLANT CANDIDATE: ICD-10-CM

## 2024-07-24 DIAGNOSIS — R18.8 OTHER ASCITES: ICD-10-CM

## 2024-07-24 LAB
APPEARANCE FLD: CLEAR
BODY FLD TYPE: NORMAL
COLOR FLD: YELLOW
LYMPHOCYTES NFR FLD MANUAL: 80 %
MONOS+MACROS NFR FLD MANUAL: 16 %
NEUTROPHILS NFR FLD MANUAL: 4 %
WBC # FLD: 278 /CU MM

## 2024-07-24 PROCEDURE — 25000003 PHARM REV CODE 250: Mod: TXP | Performed by: PHYSICIAN ASSISTANT

## 2024-07-24 PROCEDURE — 49083 ABD PARACENTESIS W/IMAGING: CPT | Mod: TXP,,, | Performed by: PHYSICIAN ASSISTANT

## 2024-07-24 PROCEDURE — 49083 ABD PARACENTESIS W/IMAGING: CPT | Mod: TXP | Performed by: RADIOLOGY

## 2024-07-24 PROCEDURE — 89051 BODY FLUID CELL COUNT: CPT | Mod: TXP | Performed by: INTERNAL MEDICINE

## 2024-07-24 RX ORDER — LIDOCAINE HYDROCHLORIDE 10 MG/ML
INJECTION INFILTRATION; PERINEURAL
Status: COMPLETED | OUTPATIENT
Start: 2024-07-24 | End: 2024-07-24

## 2024-07-24 RX ADMIN — LIDOCAINE HYDROCHLORIDE 5 ML: 10 INJECTION, SOLUTION INFILTRATION; PERINEURAL at 02:07

## 2024-07-24 NOTE — H&P
Radiology History & Physical      SUBJECTIVE:     Chief Complaint: abdominal distention    History of Present Illness:  Jonny Isabel is a 63 y.o. male who presents for ultrasound guided paracentesis  Past Medical History:   Diagnosis Date    A-fib     Esophageal varices 5/8/2023    Other ascites 5/8/2023     Past Surgical History:   Procedure Laterality Date    ESOPHAGOGASTRODUODENOSCOPY N/A 1/17/2023    Procedure: EGD (ESOPHAGOGASTRODUODENOSCOPY);  Surgeon: Dewayne Navas MD;  Location: [facility] (2ND FLR);  Service: Endoscopy;  Laterality: N/A;    ESOPHAGOGASTRODUODENOSCOPY N/A 2/7/2024    Procedure: EGD (ESOPHAGOGASTRODUODENOSCOPY);  Surgeon: Nathan Goins MD;  [facility]  Service: Endoscopy;  Laterality: N/A;  referral: Dr. Escalante /cirrhosis - labs- possible banding / prep ins on portal / 2nd floor - Pt c/o SOB at times./ Xarelto - message sent to clearance nurse per protocol - ERW  1/31/24- LVM for precall - ERW  2/1-precall complet-Kpvt  ok to hold Xarelto2 da       Home Meds:   Prior to Admission medications    Medication Sig Start Date End Date Taking? Authorizing Provider   ADVAIR DISKUS 250-50 mcg/dose diskus inhaler Inhale 1 puff into the lungs 2 (two) times a day. Controller 3/20/24 3/20/25  Joanne Ruiz NP   albuterol (VENTOLIN HFA) 90 mcg/actuation inhaler Inhale 1 puff into the lungs every 4 (four) hours as needed for Wheezing or Shortness of Breath. Rescue 2/14/23 6/26/24  Joanne Ruiz NP   alirocumab (PRALUENT PEN) 75 mg/mL PnIj Inject 75 mg into the skin every 14 (fourteen) days.    Provider, Historical   amiodarone (PACERONE) 200 MG Tab Take 1 tablet (200 mg total) by mouth once daily.  Patient taking differently: Take 100 mg by mouth every evening. 12/12/23 12/11/24  Ellen Escalera NP   ferrous sulfate 324 mg (65 mg iron) TbEC Take by mouth. 1/4/24   Provider, Historical   folic acid (FOLVITE) 1 MG tablet Take 1 tablet (1 mg total) by mouth once  daily. 2/7/23 6/26/24  Seymour Leon MD   ibuprofen (ADVIL,MOTRIN) 800 MG tablet Take 800 mg by mouth daily as needed for Pain.    Provider, Historical   levothyroxine (SYNTHROID) 50 MCG tablet TAKE 1 TABLET BY MOUTH IN THE MORNING WITH A FULL GLASS OF WATER 30-60 MINUTES BEFORE OTHER MEDICATIONS AND FOOD 12/12/23   Ellen Escalera NP   metoprolol succinate (TOPROL-XL) 50 MG 24 hr tablet Take 1 tablet (50 mg total) by mouth once daily.  Patient taking differently: Take 100 mg by mouth once daily. 12/12/23 12/11/24  Ellen Escalera NP   moxifloxacin (VIGAMOX) 0.5 % ophthalmic solution Place 1 drop into the left eye 3 (three) times daily. 6/13/24   Provider, Historical   omega 3-dha-epa-fish oil 1,000 mg (120 mg-180 mg) Cap 2 capsule DAILY (route: oral) 7/13/22   Provider, Historical   pantoprazole (PROTONIX) 40 MG tablet Take 1 tablet by mouth once daily. 8/17/22   Provider, Historical   potassium chloride (KLOR-CON) 10 MEQ TbSR Take 10 mEq by mouth once daily.    Provider, Historical   promethazine (PHENERGAN) 12.5 MG Tab Take 12.5 mg by mouth every 6 (six) hours as needed (nausea). 5/6/24   Provider, Historical   thiamine 100 MG tablet Take 1 tablet by mouth once daily. 7/13/22   Provider, Historical   traZODone (DESYREL) 50 MG tablet Take 50 mg by mouth nightly as needed. 2/29/24   Provider, Historical   VITAMIN B COMPLEX ORAL Take 1 tablet by mouth once daily.    Provider, Historical   XARELTO 20 mg Tab Take 20 mg by mouth once daily. 1/9/23   Provider, Historical     Anticoagulants/Antiplatelets: xarelto    Allergies: Review of patient's allergies indicates:  No Known Allergies  Sedation History:  no adverse reactions    Review of Systems:   Hematological: no known coagulopathies  Respiratory: no shortness of breath  Cardiovascular: no chest pain  Gastrointestinal: no abdominal pain  Genito-Urinary: no dysuria  Musculoskeletal: negative  Neurological: no TIA or stroke symptoms         OBJECTIVE:      Vital Signs (Most Recent)       Physical Exam:  ASA: 2  Mallampati: n/a    General: no acute distress  Mental Status: alert and oriented to person, place and time  HEENT: normocephalic, atraumatic  Chest: unlabored breathing  Heart: regular heart rate  Abdomen: distended  Extremity: moves all extremities    ASSESSMENT/PLAN:     Sedation Plan: local  Patient will undergo ultrasound guided paracentesis.    Ashia Gallegos PA-C

## 2024-07-24 NOTE — SEDATION DOCUMENTATION
Procedure completed. Patient tolerated well; VSS. Site CDI. Patient to be discharged per orders.

## 2024-07-24 NOTE — DISCHARGE SUMMARY
Interventional Radiology Short Stay Discharge Summary      Admit Date: 7/24/2024  Discharge Date: 07/24/2024     Hospital Course: Uneventful    Discharge Diagnosis: ascites    Discharge Condition: Stable    Discharge Disposition: Home    Diet: Resume prior diet    Activity: activity as tolerated    Follow-up: With referring provider    Cindy Chava, PA-C [facility]

## 2024-07-24 NOTE — PROCEDURES
Radiology Post-Procedure Note    Pre Op Diagnosis: Ascites  Post Op Diagnosis: Same    Procedure: Ultrasound Guided Paracentesis    Procedure performed by: Ashia Gallegos PA-C    Written Informed Consent Obtained: Yes  Specimen Removed: YES   Estimated Blood Loss: Minimal    Findings:   Successful paracentesis.  5200 ml clear yellow fluid removed from LLQ.  Albumin was not administered     Patient tolerated procedure well.    Ashia Gallegos PA-C

## 2024-07-24 NOTE — NURSING
Patient is discharged from IR. Patient declined printed AVS. The opportunity to ask questions is provided. Patient is wheeled to the front lobby to exit.

## 2024-07-30 ENCOUNTER — TELEPHONE (OUTPATIENT)
Dept: TRANSPLANT | Facility: CLINIC | Age: 64
End: 2024-07-30
Payer: MEDICAID

## 2024-07-30 ENCOUNTER — TELEPHONE (OUTPATIENT)
Dept: INTERVENTIONAL RADIOLOGY/VASCULAR | Facility: HOSPITAL | Age: 64
End: 2024-07-30
Payer: MEDICAID

## 2024-07-30 NOTE — TELEPHONE ENCOUNTER
Returned phone call and spoke to both Mr. Isabel and his girlfriend.   Mr. Isabel informed me that he does not want to finish liver transplant evaluation and he does not want to have a liver transplant. He does want to continue seeing Dr. Escalante in hepatology to manage his symptoms.  Mr. Isabel states several reasons for stopping his liver tx eval:    He has 5 cats and a dog. He does not want to be away from his animals after transplant.  He has no friends or family for a caregiver plan.   He does not want to be a burden on his girlfriend.     I told Mr. Isabel that I will let Dr. Escalante know.

## 2024-07-31 ENCOUNTER — TELEPHONE (OUTPATIENT)
Dept: TRANSPLANT | Facility: CLINIC | Age: 64
End: 2024-07-31
Payer: MEDICAID

## 2024-07-31 ENCOUNTER — HOSPITAL ENCOUNTER (OUTPATIENT)
Dept: INTERVENTIONAL RADIOLOGY/VASCULAR | Facility: HOSPITAL | Age: 64
Discharge: HOME | End: 2024-07-31
Attending: INTERNAL MEDICINE
Payer: MEDICAID

## 2024-07-31 VITALS
HEIGHT: 69 IN | WEIGHT: 190 LBS | TEMPERATURE: 98 F | DIASTOLIC BLOOD PRESSURE: 72 MMHG | SYSTOLIC BLOOD PRESSURE: 174 MMHG | RESPIRATION RATE: 17 BRPM | OXYGEN SATURATION: 100 % | HEART RATE: 45 BPM | BODY MASS INDEX: 28.14 KG/M2

## 2024-07-31 DIAGNOSIS — R18.8 OTHER ASCITES: ICD-10-CM

## 2024-07-31 DIAGNOSIS — R18.8 ASCITES: ICD-10-CM

## 2024-07-31 DIAGNOSIS — Z76.82 ORGAN TRANSPLANT CANDIDATE: ICD-10-CM

## 2024-07-31 LAB
APPEARANCE FLD: CLEAR
BODY FLD TYPE: NORMAL
COLOR FLD: YELLOW
LYMPHOCYTES NFR FLD MANUAL: 56 %
MESOTHL CELL NFR FLD MANUAL: 6 %
MONOS+MACROS NFR FLD MANUAL: 31 %
NEUTROPHILS NFR FLD MANUAL: 7 %
WBC # FLD: 107 /CU MM

## 2024-07-31 PROCEDURE — P9047 ALBUMIN (HUMAN), 25%, 50ML: HCPCS | Mod: JZ,JG,TXP | Performed by: STUDENT IN AN ORGANIZED HEALTH CARE EDUCATION/TRAINING PROGRAM

## 2024-07-31 PROCEDURE — 89051 BODY FLUID CELL COUNT: CPT | Mod: TXP | Performed by: INTERNAL MEDICINE

## 2024-07-31 PROCEDURE — 63600175 PHARM REV CODE 636 W HCPCS: Mod: JZ,JG,TXP | Performed by: STUDENT IN AN ORGANIZED HEALTH CARE EDUCATION/TRAINING PROGRAM

## 2024-07-31 PROCEDURE — 49083 ABD PARACENTESIS W/IMAGING: CPT | Mod: TXP | Performed by: STUDENT IN AN ORGANIZED HEALTH CARE EDUCATION/TRAINING PROGRAM

## 2024-07-31 PROCEDURE — 25000003 PHARM REV CODE 250: Mod: TXP | Performed by: STUDENT IN AN ORGANIZED HEALTH CARE EDUCATION/TRAINING PROGRAM

## 2024-07-31 RX ORDER — LIDOCAINE HYDROCHLORIDE 10 MG/ML
INJECTION INFILTRATION; PERINEURAL
Status: COMPLETED | OUTPATIENT
Start: 2024-07-31 | End: 2024-07-31

## 2024-07-31 RX ORDER — ALBUMIN HUMAN 250 G/1000ML
SOLUTION INTRAVENOUS
Status: COMPLETED | OUTPATIENT
Start: 2024-07-31 | End: 2024-07-31

## 2024-07-31 RX ADMIN — LIDOCAINE HYDROCHLORIDE 3 ML: 10 INJECTION, SOLUTION INFILTRATION; PERINEURAL at 11:07

## 2024-07-31 RX ADMIN — ALBUMIN (HUMAN) 50 G: 25 SOLUTION INTRAVENOUS at 12:07

## 2024-07-31 NOTE — SEDATION DOCUMENTATION
Procedure complete. 7700 ml of clear/yellow ascites removed, replaced with 50g albumin 25% per protocol. Specimen collected and to be sent to lab. Patient alert and oriented x4 unlabored on Room Air. Patient denies pain and is resting comfortably. Surgical site dressed with bandaid. Dressing is clean, dry, and intact. Patient transported to recovery.

## 2024-07-31 NOTE — SEDATION DOCUMENTATION
Pt arrived to C ARM for paracentesis. Pt oriented to unit and staff. Plan of care reviewed with patient, patient verbalizes understanding. Comfort measures utilized. Pt safely transferred from stretcher to procedural table. Fall risk reviewed with patient, fall risk interventions maintained. Blankets applied. Pt prepped and draped utilizing standard sterile technique. Patient placed on continuous monitoring, as required by sedation policy. Timeouts to be completed utilizing standard universal time-out, per department and facility policy. RN to remain at bedside, continuous monitoring maintained. Pt resting comfortably. Denies pain/discomfort. Will continue to monitor. See flow sheets for monitoring, medication administration, and updates.

## 2024-07-31 NOTE — PROCEDURES
Interventional Radiology Post-Procedure Note    Pre Op Diagnosis: Ascites    Post Op Diagnosis: Same    Procedure: US guided paracentesis.    Procedure performed by: Angelika Theodore    Written Informed Consent Obtained: Yes  Specimen Removed: YES 7.7L serous fluid  Estimated Blood Loss: Minimal    Findings:   Successful paracentesis.  Albumin administered PRN per protocol. No complications.    Patient tolerated procedure well.    Angelika Theodore MD  Interventional Radiology

## 2024-07-31 NOTE — TELEPHONE ENCOUNTER
Patient is scheduled to see Dr. Escalante on 8/28/24 in person.  Patient has been notified.  He wants to discuss stopping his transplant evaluation and just being managed medically for his cirrhosis symptoms.

## 2024-07-31 NOTE — DISCHARGE SUMMARY
Radiology Discharge Summary      Hospital Course: No complications    Admit Date: 7/31/2024  Discharge Date: 07/31/2024     Instructions Given to Patient: Yes  Diet: Resume prior diet  Activity: activity as tolerated    Description of Condition on Discharge: Stable  Vital Signs (Most Recent): Temp: 98 °F (36.7 °C) (07/31/24 1059)  Pulse: (!) 41 (07/31/24 1113)  Resp: 16 (07/31/24 1113)  BP: (!) 152/71 (07/31/24 1113)  SpO2: 100 % (07/31/24 1113)    Discharge Disposition: Home    Discharge Diagnosis: ascites     Follow-up: Paracentesis PRN patient comfort    Angelika Theodore MD

## 2024-07-31 NOTE — H&P
H&P Note  Interventional Radiology    Reason for Consult: paracentesis    SUBJECTIVE:     Chief Complaint: ascites    History of Present Illness: 63M PMHx EtOH cirrhosis with recurrent ascites presenting for paracentesis.     Past Medical History:   Diagnosis Date    A-fib     Esophageal varices 5/8/2023    Other ascites 5/8/2023     Past Surgical History:   Procedure Laterality Date    ESOPHAGOGASTRODUODENOSCOPY N/A 1/17/2023    Procedure: EGD (ESOPHAGOGASTRODUODENOSCOPY);  Surgeon: Dewayne Navas MD;  Location: [facility] (2ND FLR);  Service: Endoscopy;  Laterality: N/A;    ESOPHAGOGASTRODUODENOSCOPY N/A 2/7/2024    Procedure: EGD (ESOPHAGOGASTRODUODENOSCOPY);  Surgeon: Nathan Goins MD;  [facility]  Service: Endoscopy;  Laterality: N/A;  referral: Dr. Escalante /cirrhosis - labs- possible banding / prep ins on portal / 2nd floor - Pt c/o SOB at times./ Xarelto - message sent to clearance nurse per protocol - ERW  1/31/24- LVM for precall - ERW  2/1-precall complet-Kpvt  ok to hold Xarelto2 da     No family history on file.  Social History     Tobacco Use    Smoking status: Every Day     Current packs/day: 1.00     Average packs/day: 1 pack/day for 40.0 years (40.0 ttl pk-yrs)     Types: Cigarettes    Smokeless tobacco: Never   Substance Use Topics    Alcohol use: Not Currently     Alcohol/week: 6.0 standard drinks of alcohol     Types: 6 Cans of beer per week    Drug use: Not Currently       Review of Systems:  Constitutional/General:No fever, chills, change in appetite or weight loss.  Hematological/Immuno: no known coagulopathies  Respiratory: no shortness of breath  Cardiovascular: no chest pain  Gastrointestinal: +distension  Genito-Urinary: no dysuria  Musculoskeletal: negative  Skin: Negative for rash, itching, pigmentation changes, nail or hair changes.  Neurological: no TIA or stroke symptoms  Psychiatric: normal mood/affect, good insight/judgement      OBJECTIVE:     Vital Signs  "Range (Last 24H):       Physical Exam:  General- Patient AAO x3 in NAD  ENT- EOMI  Neck- No masses  CV- Normal rate  Resp-  No increased WOB  GI- Distended  Extrem- No cyanosis, clubbing, edema  Derm- No rashes, masses, or lesions noted  Neuro-  No focal deficits noted    Physical Exam  There is no height or weight on file to calculate BMI.    Scheduled Meds:    hepatitis A virus vaccine (PF)  1,440 Units Intramuscular 1 time in Clinic/HOD    hepatitis A virus vaccine (PF)  1,440 Units Intramuscular 1 time in Clinic/HOD    hepatitis B vacc-CpG 1018 (PF)  0.5 mL Intramuscular 1 time in Clinic/HOD    hepatitis B vacc-CpG 1018 (PF)  0.5 mL Intramuscular 1 time in Clinic/HOD    pneumoc 20-martin conj-dip cr(PF)  0.5 mL Intramuscular 1 time in Clinic/HOD    Tdap  0.5 mL Intramuscular 1 time in Clinic/HOD    varicella-zoster gE vac,2 of 2   Intramuscular 1 time in Clinic/HOD     Continuous Infusions:   PRN Meds:    Allergies: Review of patient's allergies indicates:  No Known Allergies    Labs:  No results for input(s): "INR", "PT", "PTT" in the last 168 hours.  No results for input(s): "WBC", "HGB", "HCT", "MCV", "PLT" in the last 168 hours. No results for input(s): "GLU", "NA", "K", "CL", "CO2", "BUN", "CREATININE", "CALCIUM", "MG", "ALT", "AST", "ALBUMIN", "BILITOT", "BILIDIR" in the last 168 hours.    Vitals (Most Recent):       Consent obtained.     ASSESSMENT/PLAN:     63M PMHx EtOH cirrhosis with recurrent ascites presenting for paracentesis with local anesthesia.     There are no hospital problems to display for this patient.          Angelika Theodore MD    "

## 2024-07-31 NOTE — PLAN OF CARE
Pt VSS and in NAD. PIV and CRM equipment removed. Discharge instructions and AVS provided. Pt verbalized understanding, questions and concerns addressed. No further needs at this time. Pt discharged from recovery area at 1217.

## 2024-08-02 ENCOUNTER — HOSPITAL ENCOUNTER (OUTPATIENT)
Facility: HOSPITAL | Age: 64
LOS: 1 days | Discharge: HOME | End: 2024-08-05
Attending: STUDENT IN AN ORGANIZED HEALTH CARE EDUCATION/TRAINING PROGRAM | Admitting: FAMILY MEDICINE
Payer: MEDICAID

## 2024-08-02 DIAGNOSIS — R07.9 CHEST PAIN: ICD-10-CM

## 2024-08-02 DIAGNOSIS — E87.5 HYPERKALEMIA: ICD-10-CM

## 2024-08-02 DIAGNOSIS — R06.02 SOB (SHORTNESS OF BREATH): ICD-10-CM

## 2024-08-02 DIAGNOSIS — R42 DIZZINESS: ICD-10-CM

## 2024-08-02 DIAGNOSIS — D64.9 ACUTE ANEMIA: ICD-10-CM

## 2024-08-02 DIAGNOSIS — D62 ABLA (ACUTE BLOOD LOSS ANEMIA): Primary | ICD-10-CM

## 2024-08-02 DIAGNOSIS — D50.8: ICD-10-CM

## 2024-08-02 DIAGNOSIS — N17.9 AKI (ACUTE KIDNEY INJURY): ICD-10-CM

## 2024-08-02 PROBLEM — F17.200 TOBACCO DEPENDENCY: Status: ACTIVE | Noted: 2024-08-02

## 2024-08-02 LAB
ABO + RH BLD: NORMAL
ALBUMIN SERPL BCP-MCNC: 2.8 G/DL (ref 3.5–5.2)
ALP SERPL-CCNC: 62 U/L (ref 55–135)
ALT SERPL W/O P-5'-P-CCNC: 10 U/L (ref 10–44)
AMMONIA PLAS-SCNC: 45 UMOL/L (ref 10–50)
AMPHET+METHAMPHET UR QL: NEGATIVE
ANION GAP SERPL CALC-SCNC: 11 MMOL/L (ref 8–16)
AST SERPL-CCNC: 14 U/L (ref 10–40)
BARBITURATES UR QL SCN>200 NG/ML: NEGATIVE
BASOPHILS # BLD AUTO: 0.03 K/UL (ref 0–0.2)
BASOPHILS # BLD AUTO: 0.06 K/UL (ref 0–0.2)
BASOPHILS NFR BLD: 0.2 % (ref 0–1.9)
BASOPHILS NFR BLD: 0.4 % (ref 0–1.9)
BENZODIAZ UR QL SCN>200 NG/ML: ABNORMAL
BILIRUB SERPL-MCNC: 0.6 MG/DL (ref 0.1–1)
BILIRUB UR QL STRIP: NEGATIVE
BLD GP AB SCN CELLS X3 SERPL QL: NORMAL
BLD PROD TYP BPU: NORMAL
BLD PROD TYP BPU: NORMAL
BLOOD UNIT EXPIRATION DATE: NORMAL
BLOOD UNIT EXPIRATION DATE: NORMAL
BLOOD UNIT TYPE CODE: 5100
BLOOD UNIT TYPE CODE: 6200
BLOOD UNIT TYPE: NORMAL
BLOOD UNIT TYPE: NORMAL
BNP SERPL-MCNC: 19 PG/ML (ref 0–99)
BUN SERPL-MCNC: 41 MG/DL (ref 8–23)
BZE UR QL SCN: NEGATIVE
CALCIUM SERPL-MCNC: 8.1 MG/DL (ref 8.7–10.5)
CANNABINOIDS UR QL SCN: ABNORMAL
CHLORIDE SERPL-SCNC: 109 MMOL/L (ref 95–110)
CLARITY UR: CLEAR
CO2 SERPL-SCNC: 14 MMOL/L (ref 23–29)
CODING SYSTEM: NORMAL
CODING SYSTEM: NORMAL
COLOR UR: YELLOW
CREAT SERPL-MCNC: 2.2 MG/DL (ref 0.5–1.4)
CREAT UR-MCNC: 111.5 MG/DL (ref 23–375)
CROSSMATCH INTERPRETATION: NORMAL
CROSSMATCH INTERPRETATION: NORMAL
CTP QC/QA: YES
DIFFERENTIAL METHOD BLD: ABNORMAL
DIFFERENTIAL METHOD BLD: ABNORMAL
DISPENSE STATUS: NORMAL
DISPENSE STATUS: NORMAL
EOSINOPHIL # BLD AUTO: 0 K/UL (ref 0–0.5)
EOSINOPHIL # BLD AUTO: 0 K/UL (ref 0–0.5)
EOSINOPHIL NFR BLD: 0 % (ref 0–8)
EOSINOPHIL NFR BLD: 0.2 % (ref 0–8)
ERYTHROCYTE [DISTWIDTH] IN BLOOD BY AUTOMATED COUNT: 14.3 % (ref 11.5–14.5)
ERYTHROCYTE [DISTWIDTH] IN BLOOD BY AUTOMATED COUNT: 15.1 % (ref 11.5–14.5)
EST. GFR  (NO RACE VARIABLE): 33 ML/MIN/1.73 M^2
FIO2: 21 %
FIO2: 21 %
GLUCOSE SERPL-MCNC: 190 MG/DL (ref 70–110)
GLUCOSE UR QL STRIP: NEGATIVE
HCT VFR BLD AUTO: 19.2 % (ref 40–54)
HCT VFR BLD AUTO: 20.6 % (ref 40–54)
HCT VFR BLD AUTO: 22 % (ref 40–54)
HCT VFR BLD AUTO: 26.8 % (ref 40–54)
HGB BLD-MCNC: 6.7 G/DL (ref 14–18)
HGB BLD-MCNC: 6.7 G/DL (ref 14–18)
HGB BLD-MCNC: 7.5 G/DL (ref 14–18)
HGB BLD-MCNC: 8.9 G/DL (ref 14–18)
HGB UR QL STRIP: NEGATIVE
IMM GRANULOCYTES # BLD AUTO: 0.08 K/UL (ref 0–0.04)
IMM GRANULOCYTES # BLD AUTO: 0.12 K/UL (ref 0–0.04)
IMM GRANULOCYTES NFR BLD AUTO: 0.5 % (ref 0–0.5)
IMM GRANULOCYTES NFR BLD AUTO: 0.7 % (ref 0–0.5)
INR PPP: 1.2 (ref 0.8–1.2)
KETONES UR QL STRIP: NEGATIVE
LACTATE SERPL-SCNC: 2.1 MMOL/L (ref 0.5–2.2)
LDH SERPL L TO P-CCNC: 2 MMOL/L (ref 0.5–2.2)
LEUKOCYTE ESTERASE UR QL STRIP: NEGATIVE
LIPASE SERPL-CCNC: 23 U/L (ref 4–60)
LYMPHOCYTES # BLD AUTO: 1.2 K/UL (ref 1–4.8)
LYMPHOCYTES # BLD AUTO: 1.2 K/UL (ref 1–4.8)
LYMPHOCYTES NFR BLD: 7.1 % (ref 18–48)
LYMPHOCYTES NFR BLD: 7.5 % (ref 18–48)
MAGNESIUM SERPL-MCNC: 2 MG/DL (ref 1.6–2.6)
MCH RBC QN AUTO: 29.1 PG (ref 27–31)
MCH RBC QN AUTO: 30.3 PG (ref 27–31)
MCHC RBC AUTO-ENTMCNC: 32.5 G/DL (ref 32–36)
MCHC RBC AUTO-ENTMCNC: 34.9 G/DL (ref 32–36)
MCV RBC AUTO: 87 FL (ref 82–98)
MCV RBC AUTO: 90 FL (ref 82–98)
METHADONE UR QL SCN>300 NG/ML: NEGATIVE
MONOCYTES # BLD AUTO: 1.1 K/UL (ref 0.3–1)
MONOCYTES # BLD AUTO: 1.9 K/UL (ref 0.3–1)
MONOCYTES NFR BLD: 11.2 % (ref 4–15)
MONOCYTES NFR BLD: 6.8 % (ref 4–15)
NEUTROPHILS # BLD AUTO: 13.6 K/UL (ref 1.8–7.7)
NEUTROPHILS # BLD AUTO: 13.6 K/UL (ref 1.8–7.7)
NEUTROPHILS NFR BLD: 80.8 % (ref 38–73)
NEUTROPHILS NFR BLD: 84.6 % (ref 38–73)
NITRITE UR QL STRIP: NEGATIVE
NRBC BLD-RTO: 0 /100 WBC
NRBC BLD-RTO: 0 /100 WBC
NUM UNITS TRANS PACKED RBC: NORMAL
OPIATES UR QL SCN: NEGATIVE
PCO2 BLDA: 37 MMHG (ref 35–45)
PCP UR QL SCN>25 NG/ML: NEGATIVE
PH SMN: 7.33 [PH] (ref 7.35–7.45)
PH UR STRIP: 6 [PH] (ref 5–8)
PLATELET # BLD AUTO: 157 K/UL (ref 150–450)
PLATELET # BLD AUTO: 230 K/UL (ref 150–450)
PMV BLD AUTO: 11.2 FL (ref 9.2–12.9)
PMV BLD AUTO: 11.6 FL (ref 9.2–12.9)
PO2 BLDA: 27.9 MMHG (ref 40–60)
POC BASE DEFICIT: -5.7 MMOL/L (ref -2–2)
POC HCO3: 19.6 MMOL/L (ref 24–28)
POC PERFORMED BY: ABNORMAL
POC PERFORMED BY: NORMAL
POC SATURATED O2: 45.4 % (ref 95–100)
POCT GLUCOSE: 161 MG/DL (ref 70–110)
POCT GLUCOSE: 164 MG/DL (ref 70–110)
POCT GLUCOSE: 180 MG/DL (ref 70–110)
POCT GLUCOSE: 209 MG/DL (ref 70–110)
POTASSIUM SERPL-SCNC: 5.1 MMOL/L (ref 3.5–5.1)
PROT SERPL-MCNC: 5.8 G/DL (ref 6–8.4)
PROT UR QL STRIP: ABNORMAL
PROTHROMBIN TIME: 12.7 SEC (ref 9–12.5)
RBC # BLD AUTO: 2.21 M/UL (ref 4.6–6.2)
RBC # BLD AUTO: 2.3 M/UL (ref 4.6–6.2)
SARS-COV-2 RDRP RESP QL NAA+PROBE: NEGATIVE
SODIUM SERPL-SCNC: 134 MMOL/L (ref 136–145)
SP GR UR STRIP: >1.03 (ref 1–1.03)
SPECIMEN OUTDATE: NORMAL
SPECIMEN SOURCE: ABNORMAL
SPECIMEN SOURCE: NORMAL
TOXICOLOGY INFORMATION: ABNORMAL
TRANS ERYTHROCYTES VOL PATIENT: NORMAL ML
TROPONIN I SERPL DL<=0.01 NG/ML-MCNC: <0.006 NG/ML (ref 0–0.03)
URN SPEC COLLECT METH UR: ABNORMAL
UROBILINOGEN UR STRIP-ACNC: NEGATIVE EU/DL
WBC # BLD AUTO: 16.04 K/UL (ref 3.9–12.7)
WBC # BLD AUTO: 16.84 K/UL (ref 3.9–12.7)

## 2024-08-02 PROCEDURE — 81003 URINALYSIS AUTO W/O SCOPE: CPT | Mod: 59,NTX | Performed by: STUDENT IN AN ORGANIZED HEALTH CARE EDUCATION/TRAINING PROGRAM

## 2024-08-02 PROCEDURE — 85610 PROTHROMBIN TIME: CPT | Mod: NTX | Performed by: STUDENT IN AN ORGANIZED HEALTH CARE EDUCATION/TRAINING PROGRAM

## 2024-08-02 PROCEDURE — 86901 BLOOD TYPING SEROLOGIC RH(D): CPT | Mod: NTX | Performed by: STUDENT IN AN ORGANIZED HEALTH CARE EDUCATION/TRAINING PROGRAM

## 2024-08-02 PROCEDURE — 96365 THER/PROPH/DIAG IV INF INIT: CPT | Mod: NTX

## 2024-08-02 PROCEDURE — 25500020 PHARM REV CODE 255: Mod: NTX | Performed by: STUDENT IN AN ORGANIZED HEALTH CARE EDUCATION/TRAINING PROGRAM

## 2024-08-02 PROCEDURE — 80053 COMPREHEN METABOLIC PANEL: CPT | Mod: NTX | Performed by: STUDENT IN AN ORGANIZED HEALTH CARE EDUCATION/TRAINING PROGRAM

## 2024-08-02 PROCEDURE — 83690 ASSAY OF LIPASE: CPT | Mod: NTX | Performed by: STUDENT IN AN ORGANIZED HEALTH CARE EDUCATION/TRAINING PROGRAM

## 2024-08-02 PROCEDURE — 85025 COMPLETE CBC W/AUTO DIFF WBC: CPT | Mod: 91,NTX | Performed by: INTERNAL MEDICINE

## 2024-08-02 PROCEDURE — 83880 ASSAY OF NATRIURETIC PEPTIDE: CPT | Mod: NTX | Performed by: STUDENT IN AN ORGANIZED HEALTH CARE EDUCATION/TRAINING PROGRAM

## 2024-08-02 PROCEDURE — 83605 ASSAY OF LACTIC ACID: CPT | Mod: NTX | Performed by: STUDENT IN AN ORGANIZED HEALTH CARE EDUCATION/TRAINING PROGRAM

## 2024-08-02 PROCEDURE — 99900035 HC TECH TIME PER 15 MIN (STAT): Mod: NTX

## 2024-08-02 PROCEDURE — 83605 ASSAY OF LACTIC ACID: CPT | Mod: NTX

## 2024-08-02 PROCEDURE — 87040 BLOOD CULTURE FOR BACTERIA: CPT | Mod: 59,NTX | Performed by: STUDENT IN AN ORGANIZED HEALTH CARE EDUCATION/TRAINING PROGRAM

## 2024-08-02 PROCEDURE — 85018 HEMOGLOBIN: CPT | Mod: NTX | Performed by: INTERNAL MEDICINE

## 2024-08-02 PROCEDURE — S4991 NICOTINE PATCH NONLEGEND: HCPCS | Mod: NTX | Performed by: REGISTERED NURSE

## 2024-08-02 PROCEDURE — 84484 ASSAY OF TROPONIN QUANT: CPT | Mod: NTX | Performed by: STUDENT IN AN ORGANIZED HEALTH CARE EDUCATION/TRAINING PROGRAM

## 2024-08-02 PROCEDURE — 25500020 PHARM REV CODE 255: Mod: NTX | Performed by: RADIOLOGY

## 2024-08-02 PROCEDURE — 25000242 PHARM REV CODE 250 ALT 637 W/ HCPCS: Mod: NTX | Performed by: INTERNAL MEDICINE

## 2024-08-02 PROCEDURE — 86920 COMPATIBILITY TEST SPIN: CPT | Mod: NTX | Performed by: STUDENT IN AN ORGANIZED HEALTH CARE EDUCATION/TRAINING PROGRAM

## 2024-08-02 PROCEDURE — 83735 ASSAY OF MAGNESIUM: CPT | Mod: NTX | Performed by: STUDENT IN AN ORGANIZED HEALTH CARE EDUCATION/TRAINING PROGRAM

## 2024-08-02 PROCEDURE — 11000001 HC ACUTE MED/SURG PRIVATE ROOM: Mod: NTX

## 2024-08-02 PROCEDURE — 93010 ELECTROCARDIOGRAM REPORT: CPT | Mod: NTX,,, | Performed by: INTERNAL MEDICINE

## 2024-08-02 PROCEDURE — 96375 TX/PRO/DX INJ NEW DRUG ADDON: CPT | Mod: NTX

## 2024-08-02 PROCEDURE — 82803 BLOOD GASES ANY COMBINATION: CPT | Mod: NTX

## 2024-08-02 PROCEDURE — 93005 ELECTROCARDIOGRAM TRACING: CPT | Mod: NTX

## 2024-08-02 PROCEDURE — 86850 RBC ANTIBODY SCREEN: CPT | Mod: NTX | Performed by: STUDENT IN AN ORGANIZED HEALTH CARE EDUCATION/TRAINING PROGRAM

## 2024-08-02 PROCEDURE — 82962 GLUCOSE BLOOD TEST: CPT | Mod: NTX

## 2024-08-02 PROCEDURE — 85014 HEMATOCRIT: CPT | Mod: 91,NTX | Performed by: INTERNAL MEDICINE

## 2024-08-02 PROCEDURE — 80307 DRUG TEST PRSMV CHEM ANLYZR: CPT | Mod: NTX | Performed by: STUDENT IN AN ORGANIZED HEALTH CARE EDUCATION/TRAINING PROGRAM

## 2024-08-02 PROCEDURE — 63600175 PHARM REV CODE 636 W HCPCS: Mod: NTX | Performed by: RADIOLOGY

## 2024-08-02 PROCEDURE — 94640 AIRWAY INHALATION TREATMENT: CPT | Mod: NTX

## 2024-08-02 PROCEDURE — 99285 EMERGENCY DEPT VISIT HI MDM: CPT | Mod: 25,NTX

## 2024-08-02 PROCEDURE — 36415 COLL VENOUS BLD VENIPUNCTURE: CPT | Mod: NTX | Performed by: INTERNAL MEDICINE

## 2024-08-02 PROCEDURE — 82140 ASSAY OF AMMONIA: CPT | Mod: NTX | Performed by: STUDENT IN AN ORGANIZED HEALTH CARE EDUCATION/TRAINING PROGRAM

## 2024-08-02 PROCEDURE — 63600175 PHARM REV CODE 636 W HCPCS: Mod: NTX | Performed by: STUDENT IN AN ORGANIZED HEALTH CARE EDUCATION/TRAINING PROGRAM

## 2024-08-02 PROCEDURE — 36430 TRANSFUSION BLD/BLD COMPNT: CPT | Mod: NTX

## 2024-08-02 PROCEDURE — 25000003 PHARM REV CODE 250: Mod: NTX | Performed by: RADIOLOGY

## 2024-08-02 PROCEDURE — 94760 N-INVAS EAR/PLS OXIMETRY 1: CPT | Mod: NTX,XB

## 2024-08-02 PROCEDURE — P9016 RBC LEUKOCYTES REDUCED: HCPCS | Mod: NTX | Performed by: STUDENT IN AN ORGANIZED HEALTH CARE EDUCATION/TRAINING PROGRAM

## 2024-08-02 PROCEDURE — P9021 RED BLOOD CELLS UNIT: HCPCS | Mod: NTX | Performed by: STUDENT IN AN ORGANIZED HEALTH CARE EDUCATION/TRAINING PROGRAM

## 2024-08-02 PROCEDURE — 25000003 PHARM REV CODE 250: Mod: NTX | Performed by: STUDENT IN AN ORGANIZED HEALTH CARE EDUCATION/TRAINING PROGRAM

## 2024-08-02 PROCEDURE — 25000003 PHARM REV CODE 250: Mod: NTX | Performed by: REGISTERED NURSE

## 2024-08-02 PROCEDURE — 85025 COMPLETE CBC W/AUTO DIFF WBC: CPT | Mod: NTX | Performed by: STUDENT IN AN ORGANIZED HEALTH CARE EDUCATION/TRAINING PROGRAM

## 2024-08-02 PROCEDURE — 87635 SARS-COV-2 COVID-19 AMP PRB: CPT | Mod: NTX | Performed by: STUDENT IN AN ORGANIZED HEALTH CARE EDUCATION/TRAINING PROGRAM

## 2024-08-02 PROCEDURE — 86900 BLOOD TYPING SEROLOGIC ABO: CPT | Mod: NTX | Performed by: STUDENT IN AN ORGANIZED HEALTH CARE EDUCATION/TRAINING PROGRAM

## 2024-08-02 RX ORDER — SODIUM CHLORIDE 9 MG/ML
INJECTION, SOLUTION INTRAVENOUS
Status: COMPLETED | OUTPATIENT
Start: 2024-08-02 | End: 2024-08-02

## 2024-08-02 RX ORDER — FENTANYL CITRATE 50 UG/ML
INJECTION, SOLUTION INTRAMUSCULAR; INTRAVENOUS
Status: COMPLETED | OUTPATIENT
Start: 2024-08-02 | End: 2024-08-02

## 2024-08-02 RX ORDER — PROMETHAZINE HYDROCHLORIDE 25 MG/ML
INJECTION, SOLUTION INTRAMUSCULAR; INTRAVENOUS
Status: COMPLETED | OUTPATIENT
Start: 2024-08-02 | End: 2024-08-02

## 2024-08-02 RX ORDER — MIDAZOLAM HYDROCHLORIDE 2 MG/2ML
INJECTION, SOLUTION INTRAMUSCULAR; INTRAVENOUS
Status: COMPLETED | OUTPATIENT
Start: 2024-08-02 | End: 2024-08-02

## 2024-08-02 RX ORDER — SODIUM CHLORIDE 0.9 % (FLUSH) 0.9 %
10 SYRINGE (ML) INJECTION EVERY 12 HOURS PRN
Status: DISCONTINUED | OUTPATIENT
Start: 2024-08-02 | End: 2024-08-05 | Stop reason: HOSPADM

## 2024-08-02 RX ORDER — METOPROLOL TARTRATE 100 MG/1
100 TABLET ORAL DAILY
COMMUNITY
Start: 2024-07-16

## 2024-08-02 RX ORDER — FUROSEMIDE 20 MG/1
20 TABLET ORAL DAILY
Status: ON HOLD | COMMUNITY
Start: 2024-07-16 | End: 2024-08-05 | Stop reason: HOSPADM

## 2024-08-02 RX ORDER — GLUCAGON 1 MG
1 KIT INJECTION
Status: DISCONTINUED | OUTPATIENT
Start: 2024-08-02 | End: 2024-08-05 | Stop reason: HOSPADM

## 2024-08-02 RX ORDER — HYDROCODONE BITARTRATE AND ACETAMINOPHEN 500; 5 MG/1; MG/1
TABLET ORAL
Status: DISCONTINUED | OUTPATIENT
Start: 2024-08-02 | End: 2024-08-05 | Stop reason: HOSPADM

## 2024-08-02 RX ORDER — IBUPROFEN 200 MG
24 TABLET ORAL
Status: DISCONTINUED | OUTPATIENT
Start: 2024-08-02 | End: 2024-08-05 | Stop reason: HOSPADM

## 2024-08-02 RX ORDER — PANTOPRAZOLE SODIUM 40 MG/10ML
80 INJECTION, POWDER, LYOPHILIZED, FOR SOLUTION INTRAVENOUS DAILY
Status: DISCONTINUED | OUTPATIENT
Start: 2024-08-02 | End: 2024-08-05 | Stop reason: HOSPADM

## 2024-08-02 RX ORDER — IPRATROPIUM BROMIDE AND ALBUTEROL SULFATE 2.5; .5 MG/3ML; MG/3ML
3 SOLUTION RESPIRATORY (INHALATION) EVERY 4 HOURS PRN
Status: DISCONTINUED | OUTPATIENT
Start: 2024-08-02 | End: 2024-08-02

## 2024-08-02 RX ORDER — HYDROCODONE BITARTRATE AND ACETAMINOPHEN 500; 5 MG/1; MG/1
TABLET ORAL
Status: DISCONTINUED | OUTPATIENT
Start: 2024-08-03 | End: 2024-08-05 | Stop reason: HOSPADM

## 2024-08-02 RX ORDER — ALBUTEROL SULFATE 90 UG/1
2 INHALANT RESPIRATORY (INHALATION) EVERY 6 HOURS PRN
Status: DISCONTINUED | OUTPATIENT
Start: 2024-08-02 | End: 2024-08-05 | Stop reason: HOSPADM

## 2024-08-02 RX ORDER — LIDOCAINE HYDROCHLORIDE 10 MG/ML
INJECTION, SOLUTION INFILTRATION; PERINEURAL
Status: COMPLETED | OUTPATIENT
Start: 2024-08-02 | End: 2024-08-02

## 2024-08-02 RX ORDER — IBUPROFEN 200 MG
16 TABLET ORAL
Status: DISCONTINUED | OUTPATIENT
Start: 2024-08-02 | End: 2024-08-05 | Stop reason: HOSPADM

## 2024-08-02 RX ORDER — OMEGA-3-ACID ETHYL ESTERS 1 G/1
1 CAPSULE, LIQUID FILLED ORAL 2 TIMES DAILY
COMMUNITY
Start: 2024-07-16

## 2024-08-02 RX ORDER — ONDANSETRON HYDROCHLORIDE 2 MG/ML
4 INJECTION, SOLUTION INTRAVENOUS EVERY 8 HOURS PRN
Status: DISCONTINUED | OUTPATIENT
Start: 2024-08-02 | End: 2024-08-05 | Stop reason: HOSPADM

## 2024-08-02 RX ORDER — IBUPROFEN 200 MG
1 TABLET ORAL DAILY
Status: DISCONTINUED | OUTPATIENT
Start: 2024-08-02 | End: 2024-08-05 | Stop reason: DRUGHIGH

## 2024-08-02 RX ORDER — TALC
6 POWDER (GRAM) TOPICAL NIGHTLY PRN
Status: DISCONTINUED | OUTPATIENT
Start: 2024-08-02 | End: 2024-08-05 | Stop reason: HOSPADM

## 2024-08-02 RX ORDER — FOLIC ACID 1 MG/1
1 TABLET ORAL DAILY
COMMUNITY

## 2024-08-02 RX ORDER — INSULIN ASPART 100 [IU]/ML
0-5 INJECTION, SOLUTION INTRAVENOUS; SUBCUTANEOUS EVERY 6 HOURS PRN
Status: DISCONTINUED | OUTPATIENT
Start: 2024-08-02 | End: 2024-08-05 | Stop reason: HOSPADM

## 2024-08-02 RX ORDER — NALOXONE HCL 0.4 MG/ML
0.02 VIAL (ML) INJECTION
Status: DISCONTINUED | OUTPATIENT
Start: 2024-08-02 | End: 2024-08-05 | Stop reason: HOSPADM

## 2024-08-02 RX ORDER — PANTOPRAZOLE SODIUM 40 MG/10ML
80 INJECTION, POWDER, LYOPHILIZED, FOR SOLUTION INTRAVENOUS DAILY
Status: DISCONTINUED | OUTPATIENT
Start: 2024-08-02 | End: 2024-08-02

## 2024-08-02 RX ADMIN — MIDAZOLAM HYDROCHLORIDE 0.5 MG: 1 INJECTION, SOLUTION INTRAMUSCULAR; INTRAVENOUS at 09:08

## 2024-08-02 RX ADMIN — IOHEXOL 50 ML: 300 INJECTION, SOLUTION INTRAVENOUS at 09:08

## 2024-08-02 RX ADMIN — FENTANYL CITRATE 25 MCG: 50 INJECTION INTRAMUSCULAR; INTRAVENOUS at 08:08

## 2024-08-02 RX ADMIN — MELATONIN TAB 3 MG 6 MG: 3 TAB at 09:08

## 2024-08-02 RX ADMIN — LIDOCAINE HYDROCHLORIDE 3 ML: 10 INJECTION, SOLUTION INFILTRATION; PERINEURAL at 08:08

## 2024-08-02 RX ADMIN — FENTANYL CITRATE 50 MCG: 50 INJECTION INTRAMUSCULAR; INTRAVENOUS at 10:08

## 2024-08-02 RX ADMIN — CEFTRIAXONE SODIUM 2 G: 2 INJECTION, POWDER, FOR SOLUTION INTRAMUSCULAR; INTRAVENOUS at 03:08

## 2024-08-02 RX ADMIN — SODIUM CHLORIDE 50 ML/HR: 0.9 INJECTION, SOLUTION INTRAVENOUS at 08:08

## 2024-08-02 RX ADMIN — IOHEXOL 130 ML: 350 INJECTION, SOLUTION INTRAVENOUS at 02:08

## 2024-08-02 RX ADMIN — NICOTINE 1 PATCH: 14 PATCH, EXTENDED RELEASE TRANSDERMAL at 02:08

## 2024-08-02 RX ADMIN — PANTOPRAZOLE SODIUM 80 MG: 40 INJECTION, POWDER, LYOPHILIZED, FOR SOLUTION INTRAVENOUS at 03:08

## 2024-08-02 RX ADMIN — PROMETHAZINE HYDROCHLORIDE 25 MG: 25 INJECTION INTRAMUSCULAR; INTRAVENOUS at 08:08

## 2024-08-02 RX ADMIN — IPRATROPIUM BROMIDE AND ALBUTEROL SULFATE 3 ML: .5; 3 SOLUTION RESPIRATORY (INHALATION) at 09:08

## 2024-08-02 RX ADMIN — MIDAZOLAM HYDROCHLORIDE 0.5 MG: 1 INJECTION, SOLUTION INTRAMUSCULAR; INTRAVENOUS at 08:08

## 2024-08-02 RX ADMIN — FENTANYL CITRATE 25 MCG: 50 INJECTION INTRAMUSCULAR; INTRAVENOUS at 09:08

## 2024-08-03 LAB
ALBUMIN SERPL BCP-MCNC: 2.5 G/DL (ref 3.5–5.2)
ALBUMIN SERPL BCP-MCNC: 2.6 G/DL (ref 3.5–5.2)
ALP SERPL-CCNC: 59 U/L (ref 55–135)
ALT SERPL W/O P-5'-P-CCNC: 10 U/L (ref 10–44)
ANION GAP SERPL CALC-SCNC: 13 MMOL/L (ref 8–16)
ANION GAP SERPL CALC-SCNC: 9 MMOL/L (ref 8–16)
AST SERPL-CCNC: 18 U/L (ref 10–40)
BASOPHILS # BLD AUTO: 0.04 K/UL (ref 0–0.2)
BASOPHILS NFR BLD: 0.3 % (ref 0–1.9)
BILIRUB SERPL-MCNC: 0.5 MG/DL (ref 0.1–1)
BILIRUB UR QL STRIP: NEGATIVE
BLD PROD TYP BPU: NORMAL
BLOOD UNIT EXPIRATION DATE: NORMAL
BLOOD UNIT TYPE CODE: 6200
BLOOD UNIT TYPE: NORMAL
BUN SERPL-MCNC: 58 MG/DL (ref 8–23)
BUN SERPL-MCNC: 63 MG/DL (ref 8–23)
CALCIUM SERPL-MCNC: 7.6 MG/DL (ref 8.7–10.5)
CALCIUM SERPL-MCNC: 7.7 MG/DL (ref 8.7–10.5)
CHLORIDE SERPL-SCNC: 102 MMOL/L (ref 95–110)
CHLORIDE SERPL-SCNC: 105 MMOL/L (ref 95–110)
CLARITY UR: CLEAR
CO2 SERPL-SCNC: 15 MMOL/L (ref 23–29)
CO2 SERPL-SCNC: 17 MMOL/L (ref 23–29)
CODING SYSTEM: NORMAL
COLOR UR: YELLOW
CREAT SERPL-MCNC: 3.3 MG/DL (ref 0.5–1.4)
CREAT SERPL-MCNC: 3.7 MG/DL (ref 0.5–1.4)
CREAT UR-MCNC: 115.8 MG/DL (ref 23–375)
CREAT UR-MCNC: 115.8 MG/DL (ref 23–375)
CROSSMATCH INTERPRETATION: NORMAL
DIFFERENTIAL METHOD BLD: ABNORMAL
DISPENSE STATUS: NORMAL
EOSINOPHIL # BLD AUTO: 0 K/UL (ref 0–0.5)
EOSINOPHIL NFR BLD: 0 % (ref 0–8)
ERYTHROCYTE [DISTWIDTH] IN BLOOD BY AUTOMATED COUNT: 14.3 % (ref 11.5–14.5)
EST. GFR  (NO RACE VARIABLE): 18 ML/MIN/1.73 M^2
EST. GFR  (NO RACE VARIABLE): 20 ML/MIN/1.73 M^2
GLUCOSE SERPL-MCNC: 123 MG/DL (ref 70–110)
GLUCOSE SERPL-MCNC: 169 MG/DL (ref 70–110)
GLUCOSE UR QL STRIP: NEGATIVE
HCT VFR BLD AUTO: 22.2 % (ref 40–54)
HGB BLD-MCNC: 7.5 G/DL (ref 14–18)
HGB UR QL STRIP: ABNORMAL
IMM GRANULOCYTES # BLD AUTO: 0.11 K/UL (ref 0–0.04)
IMM GRANULOCYTES NFR BLD AUTO: 0.7 % (ref 0–0.5)
KETONES UR QL STRIP: NEGATIVE
LEUKOCYTE ESTERASE UR QL STRIP: NEGATIVE
LYMPHOCYTES # BLD AUTO: 0.9 K/UL (ref 1–4.8)
LYMPHOCYTES NFR BLD: 5.7 % (ref 18–48)
MCH RBC QN AUTO: 29.9 PG (ref 27–31)
MCHC RBC AUTO-ENTMCNC: 33.8 G/DL (ref 32–36)
MCV RBC AUTO: 88 FL (ref 82–98)
MICROSCOPIC COMMENT: NORMAL
MONOCYTES # BLD AUTO: 1.7 K/UL (ref 0.3–1)
MONOCYTES NFR BLD: 11.1 % (ref 4–15)
NEUTROPHILS # BLD AUTO: 12.9 K/UL (ref 1.8–7.7)
NEUTROPHILS NFR BLD: 82.2 % (ref 38–73)
NITRITE UR QL STRIP: NEGATIVE
NRBC BLD-RTO: 0 /100 WBC
NUM UNITS TRANS PACKED RBC: NORMAL
PH UR STRIP: 5 [PH] (ref 5–8)
PHOSPHATE SERPL-MCNC: 5.3 MG/DL (ref 2.7–4.5)
PLATELET # BLD AUTO: 149 K/UL (ref 150–450)
PMV BLD AUTO: 11.6 FL (ref 9.2–12.9)
POCT GLUCOSE: 128 MG/DL (ref 70–110)
POCT GLUCOSE: 136 MG/DL (ref 70–110)
POCT GLUCOSE: 142 MG/DL (ref 70–110)
POCT GLUCOSE: 144 MG/DL (ref 70–110)
POTASSIUM SERPL-SCNC: 4.8 MMOL/L (ref 3.5–5.1)
POTASSIUM SERPL-SCNC: 6.1 MMOL/L (ref 3.5–5.1)
PROT SERPL-MCNC: 5.5 G/DL (ref 6–8.4)
PROT UR QL STRIP: ABNORMAL
PROT UR-MCNC: 18 MG/DL (ref 0–15)
PROT/CREAT UR: 0.16 MG/G{CREAT} (ref 0–0.2)
RBC # BLD AUTO: 2.51 M/UL (ref 4.6–6.2)
RBC #/AREA URNS HPF: 0 /HPF (ref 0–4)
SODIUM SERPL-SCNC: 129 MMOL/L (ref 136–145)
SODIUM SERPL-SCNC: 132 MMOL/L (ref 136–145)
SODIUM UR-SCNC: <20 MMOL/L (ref 20–250)
SP GR UR STRIP: >1.03 (ref 1–1.03)
URN SPEC COLLECT METH UR: ABNORMAL
UROBILINOGEN UR STRIP-ACNC: NEGATIVE EU/DL
UUN UR-MCNC: 566 MG/DL (ref 140–1050)
WBC # BLD AUTO: 15.69 K/UL (ref 3.9–12.7)

## 2024-08-03 PROCEDURE — 36415 COLL VENOUS BLD VENIPUNCTURE: CPT | Mod: NTX | Performed by: REGISTERED NURSE

## 2024-08-03 PROCEDURE — 36415 COLL VENOUS BLD VENIPUNCTURE: CPT | Mod: NTX | Performed by: INTERNAL MEDICINE

## 2024-08-03 PROCEDURE — 36430 TRANSFUSION BLD/BLD COMPNT: CPT | Mod: NTX

## 2024-08-03 PROCEDURE — 94760 N-INVAS EAR/PLS OXIMETRY 1: CPT | Mod: NTX

## 2024-08-03 PROCEDURE — S4991 NICOTINE PATCH NONLEGEND: HCPCS | Mod: NTX | Performed by: REGISTERED NURSE

## 2024-08-03 PROCEDURE — G0378 HOSPITAL OBSERVATION PER HR: HCPCS | Mod: NTX

## 2024-08-03 PROCEDURE — 81000 URINALYSIS NONAUTO W/SCOPE: CPT | Mod: NTX | Performed by: INTERNAL MEDICINE

## 2024-08-03 PROCEDURE — 25000003 PHARM REV CODE 250: Mod: NTX | Performed by: INTERNAL MEDICINE

## 2024-08-03 PROCEDURE — 93010 ELECTROCARDIOGRAM REPORT: CPT | Mod: NTX,,, | Performed by: INTERNAL MEDICINE

## 2024-08-03 PROCEDURE — 80053 COMPREHEN METABOLIC PANEL: CPT | Mod: NTX | Performed by: REGISTERED NURSE

## 2024-08-03 PROCEDURE — 80069 RENAL FUNCTION PANEL: CPT | Mod: NTX | Performed by: INTERNAL MEDICINE

## 2024-08-03 PROCEDURE — 99900035 HC TECH TIME PER 15 MIN (STAT): Mod: NTX

## 2024-08-03 PROCEDURE — 84156 ASSAY OF PROTEIN URINE: CPT | Mod: NTX | Performed by: INTERNAL MEDICINE

## 2024-08-03 PROCEDURE — 63600175 PHARM REV CODE 636 W HCPCS: Mod: NTX | Performed by: INTERNAL MEDICINE

## 2024-08-03 PROCEDURE — 93005 ELECTROCARDIOGRAM TRACING: CPT | Mod: NTX

## 2024-08-03 PROCEDURE — 86920 COMPATIBILITY TEST SPIN: CPT | Mod: NTX | Performed by: STUDENT IN AN ORGANIZED HEALTH CARE EDUCATION/TRAINING PROGRAM

## 2024-08-03 PROCEDURE — P9016 RBC LEUKOCYTES REDUCED: HCPCS | Mod: NTX | Performed by: STUDENT IN AN ORGANIZED HEALTH CARE EDUCATION/TRAINING PROGRAM

## 2024-08-03 PROCEDURE — 25000003 PHARM REV CODE 250: Mod: NTX | Performed by: REGISTERED NURSE

## 2024-08-03 PROCEDURE — 25000003 PHARM REV CODE 250: Mod: NTX | Performed by: FAMILY MEDICINE

## 2024-08-03 PROCEDURE — 25000242 PHARM REV CODE 250 ALT 637 W/ HCPCS: Mod: NTX | Performed by: INTERNAL MEDICINE

## 2024-08-03 PROCEDURE — 84540 ASSAY OF URINE/UREA-N: CPT | Mod: NTX | Performed by: INTERNAL MEDICINE

## 2024-08-03 PROCEDURE — 84300 ASSAY OF URINE SODIUM: CPT | Mod: NTX | Performed by: INTERNAL MEDICINE

## 2024-08-03 PROCEDURE — 63600175 PHARM REV CODE 636 W HCPCS: Mod: NTX | Performed by: STUDENT IN AN ORGANIZED HEALTH CARE EDUCATION/TRAINING PROGRAM

## 2024-08-03 PROCEDURE — 86920 COMPATIBILITY TEST SPIN: CPT | Mod: NTX | Performed by: FAMILY MEDICINE

## 2024-08-03 PROCEDURE — 94761 N-INVAS EAR/PLS OXIMETRY MLT: CPT | Mod: NTX

## 2024-08-03 PROCEDURE — 94640 AIRWAY INHALATION TREATMENT: CPT | Mod: NTX,XB

## 2024-08-03 PROCEDURE — 85025 COMPLETE CBC W/AUTO DIFF WBC: CPT | Mod: NTX | Performed by: REGISTERED NURSE

## 2024-08-03 RX ORDER — TRAZODONE HYDROCHLORIDE 50 MG/1
50 TABLET ORAL NIGHTLY
Status: DISCONTINUED | OUTPATIENT
Start: 2024-08-03 | End: 2024-08-05 | Stop reason: HOSPADM

## 2024-08-03 RX ORDER — HYDROCODONE BITARTRATE AND ACETAMINOPHEN 500; 5 MG/1; MG/1
TABLET ORAL
Status: DISCONTINUED | OUTPATIENT
Start: 2024-08-03 | End: 2024-08-05 | Stop reason: HOSPADM

## 2024-08-03 RX ORDER — ALBUTEROL SULFATE 2.5 MG/.5ML
10 SOLUTION RESPIRATORY (INHALATION) ONCE
Status: COMPLETED | OUTPATIENT
Start: 2024-08-03 | End: 2024-08-03

## 2024-08-03 RX ORDER — SEVELAMER CARBONATE 800 MG/1
800 TABLET, FILM COATED ORAL
Status: DISCONTINUED | OUTPATIENT
Start: 2024-08-04 | End: 2024-08-05 | Stop reason: HOSPADM

## 2024-08-03 RX ORDER — SODIUM CITRATE AND CITRIC ACID MONOHYDRATE 334; 500 MG/5ML; MG/5ML
30 SOLUTION ORAL
Status: DISCONTINUED | OUTPATIENT
Start: 2024-08-03 | End: 2024-08-05 | Stop reason: HOSPADM

## 2024-08-03 RX ADMIN — SODIUM ZIRCONIUM CYCLOSILICATE 10 G: 5 POWDER, FOR SUSPENSION ORAL at 08:08

## 2024-08-03 RX ADMIN — ALBUTEROL SULFATE 10 MG: 2.5 SOLUTION RESPIRATORY (INHALATION) at 03:08

## 2024-08-03 RX ADMIN — SODIUM ZIRCONIUM CYCLOSILICATE 10 G: 5 POWDER, FOR SUSPENSION ORAL at 03:08

## 2024-08-03 RX ADMIN — HUMAN INSULIN 10 UNITS: 100 INJECTION, SOLUTION SUBCUTANEOUS at 03:08

## 2024-08-03 RX ADMIN — SODIUM CITRATE AND CITRIC ACID MONOHYDRATE 30 ML: 500; 334 SOLUTION ORAL at 03:08

## 2024-08-03 RX ADMIN — TRAZODONE HYDROCHLORIDE 50 MG: 50 TABLET ORAL at 08:08

## 2024-08-03 RX ADMIN — NICOTINE 1 PATCH: 14 PATCH, EXTENDED RELEASE TRANSDERMAL at 08:08

## 2024-08-03 RX ADMIN — PANTOPRAZOLE SODIUM 80 MG: 40 INJECTION, POWDER, LYOPHILIZED, FOR SOLUTION INTRAVENOUS at 08:08

## 2024-08-03 RX ADMIN — SODIUM CITRATE AND CITRIC ACID MONOHYDRATE 30 ML: 500; 334 SOLUTION ORAL at 08:08

## 2024-08-04 LAB
25(OH)D3+25(OH)D2 SERPL-MCNC: 10 NG/ML (ref 30–96)
ALBUMIN SERPL BCP-MCNC: 2.4 G/DL (ref 3.5–5.2)
ALP SERPL-CCNC: 54 U/L (ref 55–135)
ALT SERPL W/O P-5'-P-CCNC: 10 U/L (ref 10–44)
ANION GAP SERPL CALC-SCNC: 12 MMOL/L (ref 8–16)
AST SERPL-CCNC: 20 U/L (ref 10–40)
BASOPHILS # BLD AUTO: 0.03 K/UL (ref 0–0.2)
BASOPHILS NFR BLD: 0.3 % (ref 0–1.9)
BILIRUB SERPL-MCNC: 0.4 MG/DL (ref 0.1–1)
BLD PROD TYP BPU: NORMAL
BLOOD UNIT EXPIRATION DATE: NORMAL
BLOOD UNIT TYPE CODE: 6200
BLOOD UNIT TYPE: NORMAL
BUN SERPL-MCNC: 63 MG/DL (ref 8–23)
CALCIUM SERPL-MCNC: 7.5 MG/DL (ref 8.7–10.5)
CHLORIDE SERPL-SCNC: 104 MMOL/L (ref 95–110)
CK SERPL-CCNC: 471 U/L (ref 20–200)
CO2 SERPL-SCNC: 16 MMOL/L (ref 23–29)
CODING SYSTEM: NORMAL
CREAT SERPL-MCNC: 3.6 MG/DL (ref 0.5–1.4)
CROSSMATCH INTERPRETATION: NORMAL
DIFFERENTIAL METHOD BLD: ABNORMAL
DISPENSE STATUS: NORMAL
EOSINOPHIL # BLD AUTO: 0 K/UL (ref 0–0.5)
EOSINOPHIL NFR BLD: 0.3 % (ref 0–8)
ERYTHROCYTE [DISTWIDTH] IN BLOOD BY AUTOMATED COUNT: 14.5 % (ref 11.5–14.5)
EST. GFR  (NO RACE VARIABLE): 18 ML/MIN/1.73 M^2
GLUCOSE SERPL-MCNC: 116 MG/DL (ref 70–110)
HBV CORE AB SERPL QL IA: NORMAL
HBV SURFACE AB SER-ACNC: <3 MIU/ML
HBV SURFACE AB SER-ACNC: NORMAL M[IU]/ML
HBV SURFACE AG SERPL QL IA: NORMAL
HCT VFR BLD AUTO: 18.5 % (ref 40–54)
HGB BLD-MCNC: 6.5 G/DL (ref 14–18)
IMM GRANULOCYTES # BLD AUTO: 0.04 K/UL (ref 0–0.04)
IMM GRANULOCYTES NFR BLD AUTO: 0.4 % (ref 0–0.5)
LYMPHOCYTES # BLD AUTO: 0.7 K/UL (ref 1–4.8)
LYMPHOCYTES NFR BLD: 7.1 % (ref 18–48)
MCH RBC QN AUTO: 31 PG (ref 27–31)
MCHC RBC AUTO-ENTMCNC: 35.1 G/DL (ref 32–36)
MCV RBC AUTO: 88 FL (ref 82–98)
MONOCYTES # BLD AUTO: 1.1 K/UL (ref 0.3–1)
MONOCYTES NFR BLD: 11.2 % (ref 4–15)
NEUTROPHILS # BLD AUTO: 7.6 K/UL (ref 1.8–7.7)
NEUTROPHILS NFR BLD: 80.7 % (ref 38–73)
NRBC BLD-RTO: 0 /100 WBC
PLATELET # BLD AUTO: 131 K/UL (ref 150–450)
PMV BLD AUTO: 11.1 FL (ref 9.2–12.9)
POCT GLUCOSE: 110 MG/DL (ref 70–110)
POCT GLUCOSE: 115 MG/DL (ref 70–110)
POCT GLUCOSE: 127 MG/DL (ref 70–110)
POCT GLUCOSE: 128 MG/DL (ref 70–110)
POTASSIUM SERPL-SCNC: 4.4 MMOL/L (ref 3.5–5.1)
PROT SERPL-MCNC: 5.3 G/DL (ref 6–8.4)
PTH-INTACT SERPL-MCNC: 251.2 PG/ML (ref 9–77)
RBC # BLD AUTO: 2.1 M/UL (ref 4.6–6.2)
SODIUM SERPL-SCNC: 132 MMOL/L (ref 136–145)
TRANS ERYTHROCYTES VOL PATIENT: NORMAL ML
URATE SERPL-MCNC: 9 MG/DL (ref 3.4–7)
WBC # BLD AUTO: 9.43 K/UL (ref 3.9–12.7)

## 2024-08-04 PROCEDURE — 84550 ASSAY OF BLOOD/URIC ACID: CPT | Mod: NTX | Performed by: INTERNAL MEDICINE

## 2024-08-04 PROCEDURE — 36415 COLL VENOUS BLD VENIPUNCTURE: CPT | Mod: NTX | Performed by: INTERNAL MEDICINE

## 2024-08-04 PROCEDURE — 63600175 PHARM REV CODE 636 W HCPCS: Mod: NTX | Performed by: STUDENT IN AN ORGANIZED HEALTH CARE EDUCATION/TRAINING PROGRAM

## 2024-08-04 PROCEDURE — 86704 HEP B CORE ANTIBODY TOTAL: CPT | Mod: NTX | Performed by: INTERNAL MEDICINE

## 2024-08-04 PROCEDURE — 82306 VITAMIN D 25 HYDROXY: CPT | Mod: NTX | Performed by: INTERNAL MEDICINE

## 2024-08-04 PROCEDURE — 80053 COMPREHEN METABOLIC PANEL: CPT | Mod: NTX | Performed by: REGISTERED NURSE

## 2024-08-04 PROCEDURE — P9021 RED BLOOD CELLS UNIT: HCPCS | Mod: NTX | Performed by: FAMILY MEDICINE

## 2024-08-04 PROCEDURE — 82550 ASSAY OF CK (CPK): CPT | Mod: NTX | Performed by: INTERNAL MEDICINE

## 2024-08-04 PROCEDURE — 83970 ASSAY OF PARATHORMONE: CPT | Mod: NTX | Performed by: INTERNAL MEDICINE

## 2024-08-04 PROCEDURE — G0378 HOSPITAL OBSERVATION PER HR: HCPCS | Mod: NTX

## 2024-08-04 PROCEDURE — 25000003 PHARM REV CODE 250: Mod: NTX | Performed by: REGISTERED NURSE

## 2024-08-04 PROCEDURE — 85025 COMPLETE CBC W/AUTO DIFF WBC: CPT | Mod: NTX | Performed by: REGISTERED NURSE

## 2024-08-04 PROCEDURE — P9016 RBC LEUKOCYTES REDUCED: HCPCS | Mod: NTX | Performed by: INTERNAL MEDICINE

## 2024-08-04 PROCEDURE — 25000003 PHARM REV CODE 250: Mod: NTX | Performed by: FAMILY MEDICINE

## 2024-08-04 PROCEDURE — 86920 COMPATIBILITY TEST SPIN: CPT | Mod: NTX | Performed by: INTERNAL MEDICINE

## 2024-08-04 PROCEDURE — 25000003 PHARM REV CODE 250: Mod: NTX | Performed by: INTERNAL MEDICINE

## 2024-08-04 PROCEDURE — S4991 NICOTINE PATCH NONLEGEND: HCPCS | Mod: NTX | Performed by: REGISTERED NURSE

## 2024-08-04 PROCEDURE — 36430 TRANSFUSION BLD/BLD COMPNT: CPT | Mod: NTX

## 2024-08-04 PROCEDURE — 86706 HEP B SURFACE ANTIBODY: CPT | Mod: NTX | Performed by: INTERNAL MEDICINE

## 2024-08-04 PROCEDURE — 99900035 HC TECH TIME PER 15 MIN (STAT): Mod: NTX

## 2024-08-04 PROCEDURE — 94761 N-INVAS EAR/PLS OXIMETRY MLT: CPT | Mod: NTX

## 2024-08-04 PROCEDURE — 83521 IG LIGHT CHAINS FREE EACH: CPT | Mod: 59,NTX | Performed by: INTERNAL MEDICINE

## 2024-08-04 PROCEDURE — 87340 HEPATITIS B SURFACE AG IA: CPT | Mod: NTX | Performed by: INTERNAL MEDICINE

## 2024-08-04 RX ORDER — HYDROCODONE BITARTRATE AND ACETAMINOPHEN 500; 5 MG/1; MG/1
TABLET ORAL
Status: DISCONTINUED | OUTPATIENT
Start: 2024-08-04 | End: 2024-08-05 | Stop reason: HOSPADM

## 2024-08-04 RX ADMIN — PANTOPRAZOLE SODIUM 80 MG: 40 INJECTION, POWDER, LYOPHILIZED, FOR SOLUTION INTRAVENOUS at 08:08

## 2024-08-04 RX ADMIN — SODIUM CITRATE AND CITRIC ACID MONOHYDRATE 30 ML: 500; 334 SOLUTION ORAL at 01:08

## 2024-08-04 RX ADMIN — NICOTINE 1 PATCH: 14 PATCH, EXTENDED RELEASE TRANSDERMAL at 08:08

## 2024-08-04 RX ADMIN — SEVELAMER CARBONATE 800 MG: 800 TABLET, FILM COATED ORAL at 02:08

## 2024-08-04 RX ADMIN — MELATONIN TAB 3 MG 6 MG: 3 TAB at 07:08

## 2024-08-04 RX ADMIN — SODIUM CITRATE AND CITRIC ACID MONOHYDRATE 30 ML: 500; 334 SOLUTION ORAL at 08:08

## 2024-08-04 RX ADMIN — SEVELAMER CARBONATE 800 MG: 800 TABLET, FILM COATED ORAL at 08:08

## 2024-08-04 RX ADMIN — SODIUM CITRATE AND CITRIC ACID MONOHYDRATE 30 ML: 500; 334 SOLUTION ORAL at 07:08

## 2024-08-04 RX ADMIN — TRAZODONE HYDROCHLORIDE 50 MG: 50 TABLET ORAL at 08:08

## 2024-08-04 RX ADMIN — SODIUM ZIRCONIUM CYCLOSILICATE 10 G: 5 POWDER, FOR SUSPENSION ORAL at 01:08

## 2024-08-04 RX ADMIN — SODIUM CITRATE AND CITRIC ACID MONOHYDRATE 30 ML: 500; 334 SOLUTION ORAL at 02:08

## 2024-08-04 RX ADMIN — SEVELAMER CARBONATE 800 MG: 800 TABLET, FILM COATED ORAL at 05:08

## 2024-08-05 ENCOUNTER — CLINICAL SUPPORT (OUTPATIENT)
Dept: SMOKING CESSATION | Facility: CLINIC | Age: 64
End: 2024-08-05
Payer: COMMERCIAL

## 2024-08-05 VITALS
WEIGHT: 205 LBS | OXYGEN SATURATION: 97 % | TEMPERATURE: 98 F | HEIGHT: 69 IN | SYSTOLIC BLOOD PRESSURE: 116 MMHG | DIASTOLIC BLOOD PRESSURE: 59 MMHG | BODY MASS INDEX: 30.36 KG/M2 | HEART RATE: 72 BPM | RESPIRATION RATE: 14 BRPM

## 2024-08-05 DIAGNOSIS — F17.210 CIGARETTE SMOKER: Primary | ICD-10-CM

## 2024-08-05 LAB
ALBUMIN SERPL BCP-MCNC: 2.3 G/DL (ref 3.5–5.2)
ALP SERPL-CCNC: 63 U/L (ref 55–135)
ALT SERPL W/O P-5'-P-CCNC: 14 U/L (ref 10–44)
ANION GAP SERPL CALC-SCNC: 13 MMOL/L (ref 8–16)
AST SERPL-CCNC: 24 U/L (ref 10–40)
BASOPHILS # BLD AUTO: 0.04 K/UL (ref 0–0.2)
BASOPHILS NFR BLD: 0.5 % (ref 0–1.9)
BILIRUB SERPL-MCNC: 0.5 MG/DL (ref 0.1–1)
BUN SERPL-MCNC: 57 MG/DL (ref 8–23)
CALCIUM SERPL-MCNC: 7.5 MG/DL (ref 8.7–10.5)
CHLORIDE SERPL-SCNC: 102 MMOL/L (ref 95–110)
CO2 SERPL-SCNC: 20 MMOL/L (ref 23–29)
CREAT SERPL-MCNC: 3.3 MG/DL (ref 0.5–1.4)
DIFFERENTIAL METHOD BLD: ABNORMAL
EOSINOPHIL # BLD AUTO: 0.1 K/UL (ref 0–0.5)
EOSINOPHIL NFR BLD: 0.7 % (ref 0–8)
ERYTHROCYTE [DISTWIDTH] IN BLOOD BY AUTOMATED COUNT: 14.7 % (ref 11.5–14.5)
EST. GFR  (NO RACE VARIABLE): 20 ML/MIN/1.73 M^2
GLUCOSE SERPL-MCNC: 121 MG/DL (ref 70–110)
HCT VFR BLD AUTO: ABNORMAL % (ref 40–54)
HGB BLD-MCNC: ABNORMAL G/DL (ref 14–18)
IMM GRANULOCYTES # BLD AUTO: 0.04 K/UL (ref 0–0.04)
IMM GRANULOCYTES NFR BLD AUTO: 0.5 % (ref 0–0.5)
KAPPA LC SER QL IA: 11.86 MG/DL (ref 0.33–1.94)
KAPPA LC/LAMBDA SER IA: 1.68 (ref 0.26–1.65)
LAMBDA LC SER QL IA: 7.08 MG/DL (ref 0.57–2.63)
LYMPHOCYTES # BLD AUTO: 0.6 K/UL (ref 1–4.8)
LYMPHOCYTES NFR BLD: 6.7 % (ref 18–48)
MCH RBC QN AUTO: 29.9 PG (ref 27–31)
MCHC RBC AUTO-ENTMCNC: 34.2 G/DL (ref 32–36)
MCV RBC AUTO: 87 FL (ref 82–98)
MONOCYTES # BLD AUTO: 1.1 K/UL (ref 0.3–1)
MONOCYTES NFR BLD: 12.6 % (ref 4–15)
NEUTROPHILS # BLD AUTO: 7 K/UL (ref 1.8–7.7)
NEUTROPHILS NFR BLD: 79 % (ref 38–73)
NRBC BLD-RTO: 0 /100 WBC
PLATELET # BLD AUTO: 150 K/UL (ref 150–450)
PMV BLD AUTO: 10.5 FL (ref 9.2–12.9)
POCT GLUCOSE: 118 MG/DL (ref 70–110)
POCT GLUCOSE: 119 MG/DL (ref 70–110)
POTASSIUM SERPL-SCNC: 3.7 MMOL/L (ref 3.5–5.1)
PROT SERPL-MCNC: 5.6 G/DL (ref 6–8.4)
RBC # BLD AUTO: 2.98 M/UL (ref 4.6–6.2)
SODIUM SERPL-SCNC: 135 MMOL/L (ref 136–145)
WBC # BLD AUTO: 8.86 K/UL (ref 3.9–12.7)

## 2024-08-05 PROCEDURE — 99214 OFFICE O/P EST MOD 30 MIN: CPT | Mod: NTX,,, | Performed by: INTERNAL MEDICINE

## 2024-08-05 PROCEDURE — S4991 NICOTINE PATCH NONLEGEND: HCPCS | Mod: NTX | Performed by: INTERNAL MEDICINE

## 2024-08-05 PROCEDURE — 94761 N-INVAS EAR/PLS OXIMETRY MLT: CPT | Mod: NTX

## 2024-08-05 PROCEDURE — 63600175 PHARM REV CODE 636 W HCPCS: Mod: NTX | Performed by: STUDENT IN AN ORGANIZED HEALTH CARE EDUCATION/TRAINING PROGRAM

## 2024-08-05 PROCEDURE — 99232 SBSQ HOSP IP/OBS MODERATE 35: CPT | Mod: NSCH,NTX,, | Performed by: PHYSICIAN ASSISTANT

## 2024-08-05 PROCEDURE — 36415 COLL VENOUS BLD VENIPUNCTURE: CPT | Mod: NTX | Performed by: REGISTERED NURSE

## 2024-08-05 PROCEDURE — 80053 COMPREHEN METABOLIC PANEL: CPT | Mod: NTX | Performed by: REGISTERED NURSE

## 2024-08-05 PROCEDURE — 25000003 PHARM REV CODE 250: Mod: NTX | Performed by: INTERNAL MEDICINE

## 2024-08-05 PROCEDURE — 99900035 HC TECH TIME PER 15 MIN (STAT): Mod: NTX

## 2024-08-05 PROCEDURE — 99407 BEHAV CHNG SMOKING > 10 MIN: CPT | Mod: S$GLB,TXP,,

## 2024-08-05 PROCEDURE — 85025 COMPLETE CBC W/AUTO DIFF WBC: CPT | Mod: NTX | Performed by: REGISTERED NURSE

## 2024-08-05 PROCEDURE — G0378 HOSPITAL OBSERVATION PER HR: HCPCS | Mod: NTX

## 2024-08-05 PROCEDURE — 25000003 PHARM REV CODE 250: Mod: NTX | Performed by: REGISTERED NURSE

## 2024-08-05 PROCEDURE — S4991 NICOTINE PATCH NONLEGEND: HCPCS | Mod: NTX | Performed by: REGISTERED NURSE

## 2024-08-05 RX ORDER — IBUPROFEN 200 MG
1 TABLET ORAL DAILY
Status: DISCONTINUED | OUTPATIENT
Start: 2024-08-05 | End: 2024-08-05 | Stop reason: HOSPADM

## 2024-08-05 RX ORDER — PANTOPRAZOLE SODIUM 40 MG/1
40 TABLET, DELAYED RELEASE ORAL 2 TIMES DAILY
Status: DISCONTINUED | OUTPATIENT
Start: 2024-08-05 | End: 2024-08-05 | Stop reason: HOSPADM

## 2024-08-05 RX ORDER — IBUPROFEN 200 MG
1 TABLET ORAL DAILY
Qty: 28 PATCH | Refills: 0 | Status: SHIPPED | OUTPATIENT
Start: 2024-08-05 | End: 2024-09-04

## 2024-08-05 RX ORDER — PANTOPRAZOLE SODIUM 40 MG/1
40 TABLET, DELAYED RELEASE ORAL 2 TIMES DAILY
Qty: 180 TABLET | Refills: 3 | Status: SHIPPED | OUTPATIENT
Start: 2024-08-05 | End: 2025-08-05

## 2024-08-05 RX ADMIN — PANTOPRAZOLE SODIUM 80 MG: 40 INJECTION, POWDER, LYOPHILIZED, FOR SOLUTION INTRAVENOUS at 09:08

## 2024-08-05 RX ADMIN — NICOTINE 1 PATCH: 21 PATCH, EXTENDED RELEASE TRANSDERMAL at 03:08

## 2024-08-05 RX ADMIN — SODIUM CITRATE AND CITRIC ACID MONOHYDRATE 30 ML: 500; 334 SOLUTION ORAL at 01:08

## 2024-08-05 RX ADMIN — SODIUM CITRATE AND CITRIC ACID MONOHYDRATE 30 ML: 500; 334 SOLUTION ORAL at 02:08

## 2024-08-05 RX ADMIN — SEVELAMER CARBONATE 800 MG: 800 TABLET, FILM COATED ORAL at 09:08

## 2024-08-05 RX ADMIN — NICOTINE 1 PATCH: 14 PATCH, EXTENDED RELEASE TRANSDERMAL at 09:08

## 2024-08-05 RX ADMIN — SODIUM CITRATE AND CITRIC ACID MONOHYDRATE 30 ML: 500; 334 SOLUTION ORAL at 09:08

## 2024-08-05 RX ADMIN — SEVELAMER CARBONATE 800 MG: 800 TABLET, FILM COATED ORAL at 11:08

## 2024-08-06 LAB
BLD PROD TYP BPU: NORMAL
BLD PROD TYP BPU: NORMAL
BLOOD UNIT EXPIRATION DATE: NORMAL
BLOOD UNIT EXPIRATION DATE: NORMAL
BLOOD UNIT TYPE CODE: 6200
BLOOD UNIT TYPE CODE: 6200
BLOOD UNIT TYPE: NORMAL
BLOOD UNIT TYPE: NORMAL
CODING SYSTEM: NORMAL
CODING SYSTEM: NORMAL
CROSSMATCH INTERPRETATION: NORMAL
CROSSMATCH INTERPRETATION: NORMAL
DISPENSE STATUS: NORMAL
DISPENSE STATUS: NORMAL
NUM UNITS TRANS PACKED RBC: NORMAL
OHS QRS DURATION: 100 MS
OHS QRS DURATION: 90 MS
OHS QTC CALCULATION: 458 MS
OHS QTC CALCULATION: 484 MS
TRANS ERYTHROCYTES VOL PATIENT: NORMAL ML

## 2024-08-07 LAB
BACTERIA BLD CULT: NORMAL
BACTERIA BLD CULT: NORMAL

## 2024-08-08 ENCOUNTER — TELEPHONE (OUTPATIENT)
Dept: GASTROENTEROLOGY | Facility: CLINIC | Age: 64
End: 2024-08-08
Payer: MEDICAID

## 2024-08-12 ENCOUNTER — TELEPHONE (OUTPATIENT)
Dept: INTERVENTIONAL RADIOLOGY/VASCULAR | Facility: HOSPITAL | Age: 64
End: 2024-08-12
Payer: MEDICAID

## 2024-08-12 ENCOUNTER — TELEPHONE (OUTPATIENT)
Dept: TRANSPLANT | Facility: CLINIC | Age: 64
End: 2024-08-12
Payer: MEDICAID

## 2024-08-12 NOTE — TELEPHONE ENCOUNTER
----- Message from Tuyet Velazquez sent at 8/12/2024  8:59 AM CDT -----  Pt would like to speak to someone in office for an appt on Monday.  Pt is refusing to go to ED or be transferred to [facility] for further assistance  ----- Message -----  From: Oz Huggins MA  Sent: 8/12/2024   8:58 AM CDT  To: [facility] Pre-Liver Transplant Non-Clinical      ----- Message -----  From: Ara Velazquez  Sent: 8/10/2024   1:43 PM CDT  To: [facility] Staff    Type:  Sooner Apoointment Request    Caller is requesting a sooner appointment.  Caller declined first available appointment listed below.  Caller will not accept being placed on the waitlist and is requesting a message be sent to doctor.  Name of Caller: Pt  When is the first available appointment?  Symptoms: Draining of stomach, Abdomin pain and swelling  Would the patient rather a call back or a response via [facility]? Call  Best Call Back Number: [phone]  Additional Information: Pt would like to speak to someone in office for an appt on Monday.  Pt is refusing to go to ED or be transferred to [facility] for further assistance.

## 2024-08-12 NOTE — TELEPHONE ENCOUNTER
"Returned call to pt re: earlier appt for a paracentesis. Spoke with pt's wife, Adelita, who states that pt is short of breath & having some difficulty sleeping at night due to the fullness in his abdomen. She states that he did not have a para during his recent admission due to a procedure via his groin to fix a "GI bleed" caused by a previous paracentesis. Reached out to Orin in IR scheduling to see if appt can be moved up. Can move appt to 8/13 at 1330. Pt agreeable to this appt date/time.  "

## 2024-08-13 ENCOUNTER — HOSPITAL ENCOUNTER (OUTPATIENT)
Dept: INTERVENTIONAL RADIOLOGY/VASCULAR | Facility: HOSPITAL | Age: 64
Discharge: HOME | End: 2024-08-13
Attending: INTERNAL MEDICINE
Payer: MEDICAID

## 2024-08-13 VITALS
HEART RATE: 85 BPM | WEIGHT: 209 LBS | BODY MASS INDEX: 30.96 KG/M2 | TEMPERATURE: 98 F | HEIGHT: 69 IN | OXYGEN SATURATION: 98 % | DIASTOLIC BLOOD PRESSURE: 87 MMHG | SYSTOLIC BLOOD PRESSURE: 161 MMHG | RESPIRATION RATE: 18 BRPM

## 2024-08-13 DIAGNOSIS — R18.8 OTHER ASCITES: ICD-10-CM

## 2024-08-13 LAB
APPEARANCE FLD: CLEAR
BODY FLD TYPE: NORMAL
COLOR FLD: NORMAL
LYMPHOCYTES NFR FLD MANUAL: 26 %
MONOS+MACROS NFR FLD MANUAL: 34 %
NEUTROPHILS NFR FLD MANUAL: 40 %
WBC # FLD: 878 /CU MM

## 2024-08-13 PROCEDURE — 25000003 PHARM REV CODE 250: Mod: TXP | Performed by: STUDENT IN AN ORGANIZED HEALTH CARE EDUCATION/TRAINING PROGRAM

## 2024-08-13 PROCEDURE — P9047 ALBUMIN (HUMAN), 25%, 50ML: HCPCS | Mod: JZ,JG,TXP | Performed by: STUDENT IN AN ORGANIZED HEALTH CARE EDUCATION/TRAINING PROGRAM

## 2024-08-13 PROCEDURE — 63600175 PHARM REV CODE 636 W HCPCS: Mod: JZ,JG,TXP | Performed by: STUDENT IN AN ORGANIZED HEALTH CARE EDUCATION/TRAINING PROGRAM

## 2024-08-13 PROCEDURE — 89051 BODY FLUID CELL COUNT: CPT | Mod: TXP | Performed by: INTERNAL MEDICINE

## 2024-08-13 PROCEDURE — 49083 ABD PARACENTESIS W/IMAGING: CPT | Mod: TXP | Performed by: STUDENT IN AN ORGANIZED HEALTH CARE EDUCATION/TRAINING PROGRAM

## 2024-08-13 RX ORDER — LIDOCAINE HYDROCHLORIDE 10 MG/ML
INJECTION, SOLUTION INFILTRATION; PERINEURAL
Status: COMPLETED | OUTPATIENT
Start: 2024-08-13 | End: 2024-08-13

## 2024-08-13 RX ORDER — ALBUMIN HUMAN 250 G/1000ML
SOLUTION INTRAVENOUS
Status: COMPLETED | OUTPATIENT
Start: 2024-08-13 | End: 2024-08-13

## 2024-08-13 RX ADMIN — ALBUMIN (HUMAN) 12.5 G: 25 SOLUTION INTRAVENOUS at 03:08

## 2024-08-13 RX ADMIN — LIDOCAINE HYDROCHLORIDE 5 ML: 10 INJECTION, SOLUTION INFILTRATION; PERINEURAL at 02:08

## 2024-08-13 NOTE — PROCEDURES
"  Pre Op Diagnosis: ascites  Post Op Diagnosis: Same    Procedure: paracentesis    Procedure performed by: Bradley    Written Informed Consent Obtained: Yes  Specimen Removed: YES see imaging for total  Estimated Blood Loss: Minimal    Findings:   Successful paracentesis.    Patient tolerated procedure well.    Jt Huerta MD ([facility])  Interventional Radiology  [phone]      "

## 2024-08-13 NOTE — SEDATION DOCUMENTATION
IR Procedure - Paracentesis - is completed. Patient tolerated well. He is awake, alert, oriented. Vital signs are stable. 12,200 mL, dark red ascites is drained. 75 grams of Albumin is administered per protocol. One specimen sent to lab for test ordered by sending provider. Patient will return to IR pre/post to complete discharge.

## 2024-08-13 NOTE — DISCHARGE SUMMARY
"Radiology Discharge Summary      Hospital Course: No complications    Admit Date: 8/13/2024  Discharge Date: 8/13/2024     Instructions Given to Patient: Yes  Diet: Resume prior diet  Activity: activity as tolerated   Description of Condition on Discharge: Stable  Vital Signs (Most Recent): Temp: 98.1 °F (36.7 °C) (08/13/24 1401)  Pulse: 85 (08/13/24 1456)  Resp: 18 (08/13/24 1456)  BP: (!) 161/87 (08/13/24 1456)  SpO2: 98 % (08/13/24 1456)    Discharge Disposition: Home    Discharge Diagnosis: recurrent ascites     Follow-up: as scheduled    Jt Huerta MD ([facility])  Interventional Radiology  [phone]      "

## 2024-08-13 NOTE — H&P
Radiology History & Physical      SUBJECTIVE:     Chief Complaint: ascites    History of Present Illness:  Jonny Isabel is a 63 y.o. male who presents for paracentesis.    Past Medical History:   Diagnosis Date    A-fib     Esophageal varices 5/8/2023    Other ascites 5/8/2023     Past Surgical History:   Procedure Laterality Date    ESOPHAGOGASTRODUODENOSCOPY N/A 1/17/2023    Procedure: EGD (ESOPHAGOGASTRODUODENOSCOPY);  Surgeon: Dewayne Navas MD;  Location: [facility] (2ND FLR);  Service: Endoscopy;  Laterality: N/A;    ESOPHAGOGASTRODUODENOSCOPY N/A 2/7/2024    Procedure: EGD (ESOPHAGOGASTRODUODENOSCOPY);  Surgeon: Nathan Goins MD;  [facility]  Service: Endoscopy;  Laterality: N/A;  referral: Dr. Escalante /cirrhosis - labs- possible banding / prep ins on portal / 2nd floor - Pt c/o SOB at times./ Xarelto - message sent to clearance nurse per protocol - ERW  1/31/24- LVM for precall - ERW  2/1-precall complet-Kpvt  ok to hold Xarelto2 da       Home Meds:   Prior to Admission medications    Medication Sig Start Date End Date Taking? Authorizing Provider   ADVAIR DISKUS 250-50 mcg/dose diskus inhaler Inhale 1 puff into the lungs 2 (two) times a day. Controller 3/20/24 3/20/25 Yes Joanne Ruiz NP   amiodarone (PACERONE) 200 MG Tab Take 1 tablet (200 mg total) by mouth once daily. 12/12/23 12/11/24 Yes Ellen Escalera NP   ferrous sulfate 324 mg (65 mg iron) TbEC Take 324 mg by mouth once daily. 1/4/24  Yes Provider, Historical   folic acid (FOLVITE) 1 MG tablet Take 1 mg by mouth once daily.   Yes Provider, Historical   levothyroxine (SYNTHROID) 50 MCG tablet TAKE 1 TABLET BY MOUTH IN THE MORNING WITH A FULL GLASS OF WATER 30-60 MINUTES BEFORE OTHER MEDICATIONS AND FOOD  Patient taking differently: Take 50 mcg by mouth before breakfast. WITH A FULL GLASS OF WATER 30-60 MINUTES BEFORE OTHER MEDICATIONS AND FOOD 12/12/23  Yes Ellen Escalera NP   metoprolol tartrate (LOPRESSOR)  100 MG tablet Take 100 mg by mouth once daily. 7/16/24  Yes Provider, Historical   nicotine (NICODERM CQ) 21 mg/24 hr Place 1 patch onto the skin once daily. 8/5/24 9/4/24 Yes Shawn Mejia MD   omega-3 acid ethyl esters (LOVAZA) 1 gram capsule Take 1 capsule by mouth 2 (two) times daily. 7/16/24  Yes Provider, Historical   pantoprazole (PROTONIX) 40 MG tablet Take 1 tablet (40 mg total) by mouth 2 (two) times daily. 8/5/24 8/5/25 Yes Shawn Mejia MD   potassium chloride (KLOR-CON) 10 MEQ TbSR Take 10 mEq by mouth once daily.   Yes Provider, Historical   promethazine (PHENERGAN) 12.5 MG Tab Take 12.5 mg by mouth every 6 (six) hours as needed (nausea). 5/6/24  Yes Provider, Historical   thiamine 100 MG tablet Take 100 mg by mouth once daily. 7/13/22  Yes Provider, Historical   traZODone (DESYREL) 50 MG tablet Take 50 mg by mouth nightly as needed. 2/29/24  Yes Provider, Historical   VITAMIN B COMPLEX ORAL Take 1 tablet by mouth once daily.   Yes Provider, Historical   XARELTO 20 mg Tab Take 20 mg by mouth once daily. 1/9/23  Yes Provider, Historical   ibuprofen (ADVIL,MOTRIN) 800 MG tablet Take 800 mg by mouth daily as needed for Pain.    Provider, Historical     Anticoagulants/Antiplatelets: no anticoagulation    Allergies: Review of patient's allergies indicates:  No Known Allergies  Sedation History:  no adverse reactions    Review of Systems:   Hematological: no known coagulopathies  Respiratory: no shortness of breath  Cardiovascular: no chest pain  Gastrointestinal: no abdominal pain  Genito-Urinary: no dysuria  Musculoskeletal: negative  Neurological: no TIA or stroke symptoms         OBJECTIVE:     Vital Signs (Most Recent)  Temp: 98.1 °F (36.7 °C) (08/13/24 1401)  Pulse: (!) 112 (08/13/24 1401)  Resp: 20 (08/13/24 1401)  BP: (!) 174/99 (08/13/24 1401)  SpO2: 96 % (08/13/24 1401)    Physical Exam:  ASA: 2  Mallampati: 2    General: no acute distress  Mental Status: alert and oriented to person,  "place and time  HEENT: normocephalic, atraumatic  Chest: unlabored breathing  Heart: regular heart rate  Abdomen:  very distended  Extremity: moves all extremities    Laboratory  Lab Results   Component Value Date    INR 1.2 08/02/2024       Lab Results   Component Value Date    WBC 8.86 08/05/2024    HGB 8.9@RCBLD (L) 08/05/2024    HCT 26.0@RCBLD (L) 08/05/2024    MCV 87 08/05/2024     08/05/2024      Lab Results   Component Value Date     (H) 08/05/2024     (L) 08/05/2024    K 3.7 08/05/2024     08/05/2024    CO2 20 (L) 08/05/2024    BUN 57 (H) 08/05/2024    CREATININE 3.3 (H) 08/05/2024    CALCIUM 7.5 (L) 08/05/2024    MG 2.0 08/02/2024    ALT 14 08/05/2024    AST 24 08/05/2024    ALBUMIN 2.3 (L) 08/05/2024    BILITOT 0.5 08/05/2024    BILIDIR 0.1 06/20/2024       ASSESSMENT/PLAN:     Sedation Plan: local  Patient will undergo paracentesis.    Jt Huerta MD ([facility])  Interventional Radiology          "

## 2024-08-15 ENCOUNTER — TELEPHONE (OUTPATIENT)
Dept: TRANSPLANT | Facility: CLINIC | Age: 64
End: 2024-08-15
Payer: MEDICAID

## 2024-08-15 NOTE — TELEPHONE ENCOUNTER
Update:  Last time I spoke to Mr. Isabel he said that he didn't want to finish his liver tx evaluation or have a transplant.  I called today and spoke to his caregiver. I explained that if he has his heart stress test and colonoscopy his case would be able to be presented to liver tx selection committee. Caregiver states they will discuss this and let me know. Patient has a f/u appt with Dr. Escalante on the 28th of August.

## 2024-08-16 ENCOUNTER — TELEPHONE (OUTPATIENT)
Dept: TRANSPLANT | Facility: CLINIC | Age: 64
End: 2024-08-16
Payer: MEDICAID

## 2024-08-16 ENCOUNTER — HOSPITAL ENCOUNTER (INPATIENT)
Facility: HOSPITAL | Age: 64
LOS: 2 days | Discharge: HOME | DRG: 372 | End: 2024-08-20
Attending: EMERGENCY MEDICINE | Admitting: FAMILY MEDICINE
Payer: MEDICAID

## 2024-08-16 DIAGNOSIS — I48.0 PAF (PAROXYSMAL ATRIAL FIBRILLATION): ICD-10-CM

## 2024-08-16 DIAGNOSIS — Z72.0 TOBACCO USE: ICD-10-CM

## 2024-08-16 DIAGNOSIS — R07.9 CHEST PAIN: ICD-10-CM

## 2024-08-16 DIAGNOSIS — K65.2 SBP (SPONTANEOUS BACTERIAL PERITONITIS): Primary | ICD-10-CM

## 2024-08-16 LAB
ALBUMIN SERPL BCP-MCNC: 2.4 G/DL (ref 3.5–5.2)
ALP SERPL-CCNC: 137 U/L (ref 55–135)
ALT SERPL W/O P-5'-P-CCNC: 33 U/L (ref 10–44)
ANION GAP SERPL CALC-SCNC: 8 MMOL/L (ref 8–16)
AST SERPL-CCNC: 34 U/L (ref 10–40)
BASOPHILS # BLD AUTO: 0.09 K/UL (ref 0–0.2)
BASOPHILS NFR BLD: 0.7 % (ref 0–1.9)
BILIRUB SERPL-MCNC: 0.4 MG/DL (ref 0.1–1)
BUN SERPL-MCNC: 26 MG/DL (ref 8–23)
CALCIUM SERPL-MCNC: 8 MG/DL (ref 8.7–10.5)
CHLORIDE SERPL-SCNC: 110 MMOL/L (ref 95–110)
CO2 SERPL-SCNC: 17 MMOL/L (ref 23–29)
CREAT SERPL-MCNC: 1.9 MG/DL (ref 0.5–1.4)
DIFFERENTIAL METHOD BLD: ABNORMAL
EOSINOPHIL # BLD AUTO: 0.1 K/UL (ref 0–0.5)
EOSINOPHIL NFR BLD: 0.9 % (ref 0–8)
ERYTHROCYTE [DISTWIDTH] IN BLOOD BY AUTOMATED COUNT: 14.6 % (ref 11.5–14.5)
EST. GFR  (NO RACE VARIABLE): 39 ML/MIN/1.73 M^2
GLUCOSE SERPL-MCNC: 151 MG/DL (ref 70–110)
HCT VFR BLD AUTO: 34.3 % (ref 40–54)
HGB BLD-MCNC: 11.2 G/DL (ref 14–18)
IMM GRANULOCYTES # BLD AUTO: 0.11 K/UL (ref 0–0.04)
IMM GRANULOCYTES NFR BLD AUTO: 0.9 % (ref 0–0.5)
LACTATE SERPL-SCNC: 1.2 MMOL/L (ref 0.5–2.2)
LYMPHOCYTES # BLD AUTO: 0.8 K/UL (ref 1–4.8)
LYMPHOCYTES NFR BLD: 6.3 % (ref 18–48)
MCH RBC QN AUTO: 29.6 PG (ref 27–31)
MCHC RBC AUTO-ENTMCNC: 32.7 G/DL (ref 32–36)
MCV RBC AUTO: 91 FL (ref 82–98)
MONOCYTES # BLD AUTO: 0.9 K/UL (ref 0.3–1)
MONOCYTES NFR BLD: 6.9 % (ref 4–15)
NEUTROPHILS # BLD AUTO: 10.8 K/UL (ref 1.8–7.7)
NEUTROPHILS NFR BLD: 84.3 % (ref 38–73)
NRBC BLD-RTO: 0 /100 WBC
PLATELET # BLD AUTO: 373 K/UL (ref 150–450)
PMV BLD AUTO: 9.3 FL (ref 9.2–12.9)
POTASSIUM SERPL-SCNC: 4.4 MMOL/L (ref 3.5–5.1)
PROCALCITONIN SERPL IA-MCNC: 0.07 NG/ML
PROT SERPL-MCNC: 6 G/DL (ref 6–8.4)
RBC # BLD AUTO: 3.78 M/UL (ref 4.6–6.2)
SODIUM SERPL-SCNC: 135 MMOL/L (ref 136–145)
WBC # BLD AUTO: 12.77 K/UL (ref 3.9–12.7)

## 2024-08-16 PROCEDURE — 25000003 PHARM REV CODE 250: Mod: NTX | Performed by: FAMILY MEDICINE

## 2024-08-16 PROCEDURE — 96367 TX/PROPH/DG ADDL SEQ IV INF: CPT | Mod: NTX

## 2024-08-16 PROCEDURE — 96366 THER/PROPH/DIAG IV INF ADDON: CPT | Mod: NTX

## 2024-08-16 PROCEDURE — 87040 BLOOD CULTURE FOR BACTERIA: CPT | Mod: NTX | Performed by: EMERGENCY MEDICINE

## 2024-08-16 PROCEDURE — 63600175 PHARM REV CODE 636 W HCPCS: Mod: NTX | Performed by: EMERGENCY MEDICINE

## 2024-08-16 PROCEDURE — G0378 HOSPITAL OBSERVATION PER HR: HCPCS | Mod: NTX

## 2024-08-16 PROCEDURE — 25000003 PHARM REV CODE 250: Mod: NTX | Performed by: EMERGENCY MEDICINE

## 2024-08-16 PROCEDURE — 96365 THER/PROPH/DIAG IV INF INIT: CPT | Mod: NTX

## 2024-08-16 PROCEDURE — 83605 ASSAY OF LACTIC ACID: CPT | Mod: NTX | Performed by: EMERGENCY MEDICINE

## 2024-08-16 PROCEDURE — 49083 ABD PARACENTESIS W/IMAGING: CPT | Mod: NTX

## 2024-08-16 PROCEDURE — 63600175 PHARM REV CODE 636 W HCPCS: Mod: NTX | Performed by: FAMILY MEDICINE

## 2024-08-16 PROCEDURE — 80053 COMPREHEN METABOLIC PANEL: CPT | Mod: NTX | Performed by: EMERGENCY MEDICINE

## 2024-08-16 PROCEDURE — 85025 COMPLETE CBC W/AUTO DIFF WBC: CPT | Mod: NTX | Performed by: EMERGENCY MEDICINE

## 2024-08-16 PROCEDURE — 84145 PROCALCITONIN (PCT): CPT | Mod: NTX | Performed by: FAMILY MEDICINE

## 2024-08-16 RX ORDER — AMIODARONE HYDROCHLORIDE 200 MG/1
200 TABLET ORAL DAILY
Status: DISCONTINUED | OUTPATIENT
Start: 2024-08-17 | End: 2024-08-20 | Stop reason: HOSPADM

## 2024-08-16 RX ORDER — FLUTICASONE FUROATE AND VILANTEROL 100; 25 UG/1; UG/1
1 POWDER RESPIRATORY (INHALATION) DAILY
Status: DISCONTINUED | OUTPATIENT
Start: 2024-08-17 | End: 2024-08-20 | Stop reason: HOSPADM

## 2024-08-16 RX ORDER — FLUTICASONE PROPIONATE AND SALMETEROL 250; 50 UG/1; UG/1
1 POWDER RESPIRATORY (INHALATION) 2 TIMES DAILY
Status: DISCONTINUED | OUTPATIENT
Start: 2024-08-16 | End: 2024-08-16

## 2024-08-16 RX ORDER — SODIUM CHLORIDE 0.9 % (FLUSH) 0.9 %
10 SYRINGE (ML) INJECTION EVERY 12 HOURS PRN
Status: DISCONTINUED | OUTPATIENT
Start: 2024-08-16 | End: 2024-08-20 | Stop reason: HOSPADM

## 2024-08-16 RX ORDER — NALOXONE HCL 0.4 MG/ML
0.02 VIAL (ML) INJECTION
Status: DISCONTINUED | OUTPATIENT
Start: 2024-08-16 | End: 2024-08-20 | Stop reason: HOSPADM

## 2024-08-16 RX ORDER — IBUPROFEN 200 MG
16 TABLET ORAL
Status: DISCONTINUED | OUTPATIENT
Start: 2024-08-16 | End: 2024-08-20 | Stop reason: HOSPADM

## 2024-08-16 RX ORDER — GLUCAGON 1 MG
1 KIT INJECTION
Status: DISCONTINUED | OUTPATIENT
Start: 2024-08-16 | End: 2024-08-20 | Stop reason: HOSPADM

## 2024-08-16 RX ORDER — FOLIC ACID 1 MG/1
1 TABLET ORAL DAILY
Status: DISCONTINUED | OUTPATIENT
Start: 2024-08-17 | End: 2024-08-20 | Stop reason: HOSPADM

## 2024-08-16 RX ORDER — PANTOPRAZOLE SODIUM 40 MG/1
40 TABLET, DELAYED RELEASE ORAL 2 TIMES DAILY
Status: DISCONTINUED | OUTPATIENT
Start: 2024-08-16 | End: 2024-08-20 | Stop reason: HOSPADM

## 2024-08-16 RX ORDER — IBUPROFEN 200 MG
24 TABLET ORAL
Status: DISCONTINUED | OUTPATIENT
Start: 2024-08-16 | End: 2024-08-20 | Stop reason: HOSPADM

## 2024-08-16 RX ORDER — TRAZODONE HYDROCHLORIDE 50 MG/1
50 TABLET ORAL NIGHTLY PRN
Status: DISCONTINUED | OUTPATIENT
Start: 2024-08-16 | End: 2024-08-20 | Stop reason: HOSPADM

## 2024-08-16 RX ADMIN — CEFTRIAXONE SODIUM 2 G: 2 INJECTION, POWDER, FOR SOLUTION INTRAMUSCULAR; INTRAVENOUS at 06:08

## 2024-08-16 RX ADMIN — PIPERACILLIN SODIUM AND TAZOBACTAM SODIUM 4.5 G: 4; .5 INJECTION, POWDER, LYOPHILIZED, FOR SOLUTION INTRAVENOUS at 09:08

## 2024-08-16 RX ADMIN — PANTOPRAZOLE SODIUM 40 MG: 40 TABLET, DELAYED RELEASE ORAL at 09:08

## 2024-08-16 NOTE — ED PROVIDER NOTES
"Encounter Date: 8/16/2024       History     Chief Complaint   Patient presents with    Referral     Pt referred by liver doctor to come to ER due to "infection in blood". Complaining of nausea and pain to right side.     63-year-old male with a history of alcoholic cirrhosis presents after patient had a phone call saying that he needed to be admitted to the hospital because he had an infection that needed IV antibiotics.  Patient had a paracentesis done 3 days ago.  He says since then he has had some left-sided abdominal pain.  No fever.  Some nausea.  He gets paracentesis done every Wednesday.      Review of patient's allergies indicates:  No Known Allergies  Past Medical History:   Diagnosis Date    A-fib     Esophageal varices 5/8/2023    Other ascites 5/8/2023     Past Surgical History:   Procedure Laterality Date    ESOPHAGOGASTRODUODENOSCOPY N/A 1/17/2023    Procedure: EGD (ESOPHAGOGASTRODUODENOSCOPY);  Surgeon: Dewayne Navas MD;  [facility]  Service: Endoscopy;  Laterality: N/A;    ESOPHAGOGASTRODUODENOSCOPY N/A 2/7/2024    Procedure: EGD (ESOPHAGOGASTRODUODENOSCOPY);  Surgeon: Nathan Goins MD;  [facility]  Service: Endoscopy;  Laterality: N/A;  referral: Dr. Escalante /cirrhosis - labs- possible banding / prep ins on portal / 2nd floor - Pt c/o SOB at times./ Xarelto - message sent to clearance nurse per protocol - ERW  1/31/24- LVM for precall - ERW  2/1-precall complet-Kpvt  ok to hold Xarelto2 da     No family history on file.  Social History     Tobacco Use    Smoking status: Every Day     Current packs/day: 1.00     Average packs/day: 1 pack/day for 50.6 years (50.6 ttl pk-yrs)     Types: Cigarettes     Start date: 1974    Smokeless tobacco: Never    Tobacco comments:     Pt is a 1 pk/day cigarette smoker x 51 yrs.  Education provided on the risks of continued smoking and benefits of quitting. Pt declines referral to Ambulatory Smoking Cessation clinic. " Handout provided.    Substance Use Topics    Alcohol use: Not Currently     Alcohol/week: 6.0 standard drinks of alcohol     Types: 6 Cans of beer per week    Drug use: Not Currently     Review of Systems   Constitutional:  Negative for appetite change.   Eyes:  Negative for pain.   Respiratory:  Negative for shortness of breath.    Cardiovascular:  Negative for chest pain.   Gastrointestinal:  Positive for abdominal pain and nausea. Negative for vomiting.   Genitourinary:  Negative for frequency.   Musculoskeletal:  Negative for arthralgias and neck pain.   Neurological:  Negative for headaches.   Psychiatric/Behavioral:  Negative for confusion.        Physical Exam     Initial Vitals [08/16/24 1640]   BP Pulse Resp Temp SpO2   136/63 66 18 98.2 °F (36.8 °C) 96 %      MAP       --         Physical Exam    Nursing note and vitals reviewed.  HENT:   Head: Atraumatic.   Eyes: Conjunctivae and EOM are normal.   Neck:   Normal range of motion.  Cardiovascular:      Exam reveals no gallop and no friction rub.       No murmur heard.  Pulmonary/Chest: Breath sounds normal. No respiratory distress. He has no wheezes. He has no rales.   Abdominal: Abdomen is soft. Bowel sounds are normal. He exhibits distension. There is no abdominal tenderness.   Musculoskeletal:         General: No edema. Normal range of motion.      Cervical back: Normal range of motion.     Neurological: He is alert and oriented to person, place, and time.   Skin: Skin is warm and dry.   Psychiatric: He has a normal mood and affect.         ED Course   Procedures  Labs Reviewed   CBC W/ AUTO DIFFERENTIAL - Abnormal       Result Value    WBC 12.77 (*)     RBC 3.78 (*)     Hemoglobin 11.2 (*)     Hematocrit 34.3 (*)     MCV 91      MCH 29.6      MCHC 32.7      RDW 14.6 (*)     Platelets 373      MPV 9.3      Immature Granulocytes 0.9 (*)     Gran # (ANC) 10.8 (*)     Immature Grans (Abs) 0.11 (*)     Lymph # 0.8 (*)     Mono # 0.9      Eos # 0.1      Baso  # 0.09      nRBC 0      Gran % 84.3 (*)     Lymph % 6.3 (*)     Mono % 6.9      Eosinophil % 0.9      Basophil % 0.7      Differential Method Automated     COMPREHENSIVE METABOLIC PANEL - Abnormal    Sodium 135 (*)     Potassium 4.4      Chloride 110      CO2 17 (*)     Glucose 151 (*)     BUN 26 (*)     Creatinine 1.9 (*)     Calcium 8.0 (*)     Total Protein 6.0      Albumin 2.4 (*)     Total Bilirubin 0.4      Alkaline Phosphatase 137 (*)     AST 34      ALT 33      eGFR 39 (*)     Anion Gap 8     CULTURE, BLOOD   CULTURE, BLOOD   LACTIC ACID, PLASMA    Lactate (Lactic Acid) 1.2            Imaging Results    None          Medications   cefTRIAXone (ROCEPHIN) 2 g in D5W 100 mL IVPB (MB+) (has no administration in time range)     Medical Decision Making  63-year-old male with alcoholic cirrhosis presenting after outpatient paracentesis showed findings concerning for SBP.  Vital signs reassuring, patient appears nontoxic.  Will check labs and discuss with Hospital Medicine.    Amount and/or Complexity of Data Reviewed  Labs: ordered. Decision-making details documented in ED Course.               ED Course as of 08/16/24 1842   Fri Aug 16, 2024   1640 WBC & Diff,Body Fluid Ascites (8/13/24 2048)  Paracentesis performed 8/13. Fluid analysis shows clear red fluid.  878 WBCs. 40 Seggs. 26 Lymphs. 34 Monocytes/Macrophage.    Patient was instructed to report to the ED for SBP tx [CS]   1755 CBC Auto Differential(!) [SN]   1815 Lactic Acid Level: 1.2 [SN]   1841 Discussed case with Hospital Medicine, Dr. Armstrong, who will admit patient.  Will start empiric antibiotics for SBP. [SN]      ED Course User Index  [CS] Lianna Eckert PA-C  [SN] Liu Hunt MD                           Clinical Impression:  Final diagnoses:  [K65.2] SBP (spontaneous bacterial peritonitis) (Primary)          ED Disposition Condition    Observation Stable                Liu Hunt MD  08/16/24 1842       Liu Hunt,  MD  08/16/24 1740

## 2024-08-16 NOTE — TELEPHONE ENCOUNTER
Message received from Dr. Escalante to have pt go to ER to be admitted for SBP. Called pt , girlfriend Adelita Griffin states pt is sleeping. Informed Adelita he need to come to the ER now to be admitted for infection. Understanding stated, they will come in soon.

## 2024-08-16 NOTE — Clinical Note
Diagnosis: SBP (spontaneous bacterial peritonitis) [[id number]]   Future Attending Provider: MARIAH JARQUIN [[id number]]

## 2024-08-17 PROBLEM — E11.9 TYPE 2 DIABETES MELLITUS WITHOUT COMPLICATION, WITHOUT LONG-TERM CURRENT USE OF INSULIN: Status: RESOLVED | Noted: 2023-01-16 | Resolved: 2024-08-17

## 2024-08-17 LAB
ALBUMIN SERPL BCP-MCNC: 2.2 G/DL (ref 3.5–5.2)
ALP SERPL-CCNC: 128 U/L (ref 55–135)
ALT SERPL W/O P-5'-P-CCNC: 33 U/L (ref 10–44)
ANION GAP SERPL CALC-SCNC: 7 MMOL/L (ref 8–16)
AST SERPL-CCNC: 34 U/L (ref 10–40)
BILIRUB SERPL-MCNC: 0.4 MG/DL (ref 0.1–1)
BUN SERPL-MCNC: 24 MG/DL (ref 8–23)
CALCIUM SERPL-MCNC: 8.1 MG/DL (ref 8.7–10.5)
CHLORIDE SERPL-SCNC: 111 MMOL/L (ref 95–110)
CO2 SERPL-SCNC: 18 MMOL/L (ref 23–29)
CREAT SERPL-MCNC: 1.8 MG/DL (ref 0.5–1.4)
EST. GFR  (NO RACE VARIABLE): 42 ML/MIN/1.73 M^2
GLUCOSE SERPL-MCNC: 120 MG/DL (ref 70–110)
MAGNESIUM SERPL-MCNC: 1.7 MG/DL (ref 1.6–2.6)
POTASSIUM SERPL-SCNC: 4.6 MMOL/L (ref 3.5–5.1)
PROT SERPL-MCNC: 5.6 G/DL (ref 6–8.4)
SODIUM SERPL-SCNC: 136 MMOL/L (ref 136–145)

## 2024-08-17 PROCEDURE — 99285 EMERGENCY DEPT VISIT HI MDM: CPT | Mod: 25,NTX

## 2024-08-17 PROCEDURE — 25000003 PHARM REV CODE 250: Mod: NTX | Performed by: FAMILY MEDICINE

## 2024-08-17 PROCEDURE — 63600175 PHARM REV CODE 636 W HCPCS: Mod: NTX | Performed by: FAMILY MEDICINE

## 2024-08-17 PROCEDURE — S4991 NICOTINE PATCH NONLEGEND: HCPCS | Mod: NTX | Performed by: FAMILY MEDICINE

## 2024-08-17 PROCEDURE — 94640 AIRWAY INHALATION TREATMENT: CPT | Mod: NTX

## 2024-08-17 PROCEDURE — G0378 HOSPITAL OBSERVATION PER HR: HCPCS | Mod: NTX

## 2024-08-17 PROCEDURE — 83735 ASSAY OF MAGNESIUM: CPT | Mod: NTX | Performed by: FAMILY MEDICINE

## 2024-08-17 PROCEDURE — 80053 COMPREHEN METABOLIC PANEL: CPT | Mod: NTX | Performed by: FAMILY MEDICINE

## 2024-08-17 PROCEDURE — 94761 N-INVAS EAR/PLS OXIMETRY MLT: CPT | Mod: NTX

## 2024-08-17 PROCEDURE — 25000242 PHARM REV CODE 250 ALT 637 W/ HCPCS: Mod: NTX | Performed by: FAMILY MEDICINE

## 2024-08-17 RX ORDER — IBUPROFEN 200 MG
1 TABLET ORAL DAILY
Status: DISCONTINUED | OUTPATIENT
Start: 2024-08-17 | End: 2024-08-20 | Stop reason: HOSPADM

## 2024-08-17 RX ADMIN — AMIODARONE HYDROCHLORIDE 200 MG: 200 TABLET ORAL at 08:08

## 2024-08-17 RX ADMIN — PANTOPRAZOLE SODIUM 40 MG: 40 TABLET, DELAYED RELEASE ORAL at 08:08

## 2024-08-17 RX ADMIN — PIPERACILLIN SODIUM AND TAZOBACTAM SODIUM 4.5 G: 4; .5 INJECTION, POWDER, LYOPHILIZED, FOR SOLUTION INTRAVENOUS at 05:08

## 2024-08-17 RX ADMIN — NICOTINE 1 PATCH: 21 PATCH, EXTENDED RELEASE TRANSDERMAL at 01:08

## 2024-08-17 RX ADMIN — FLUTICASONE FUROATE AND VILANTEROL TRIFENATATE 1 PUFF: 100; 25 POWDER RESPIRATORY (INHALATION) at 08:08

## 2024-08-17 RX ADMIN — CEFTRIAXONE SODIUM 2 G: 2 INJECTION, POWDER, FOR SOLUTION INTRAMUSCULAR; INTRAVENOUS at 05:08

## 2024-08-17 RX ADMIN — FOLIC ACID 1 MG: 1 TABLET ORAL at 08:08

## 2024-08-17 RX ADMIN — PANTOPRAZOLE SODIUM 40 MG: 40 TABLET, DELAYED RELEASE ORAL at 10:08

## 2024-08-17 NOTE — PROGRESS NOTES
VIRTUAL NURSE: Pt arrived to unit. Permission received per patient to turn camera to view patient. VIP model explained; patient informed this VN will be working with bedside nurse and the rest of the care team. Plan of care reviewed with patient.  Educated patient on fall risk and fall risk precautions in place. Call light within reach, side rails up x2. Admission questions completed. Patient instucted to ask staff for assistance. Patient verbalized complete understanding. Patient denies complaints or any needs at this time. Will continue to be available and intervene as needed.             08/17/24 1315   Admission   Initial VN Admission Questions Complete   Communication Issues? None   Shift   Virtual Nurse - Rounding Complete   Virtual Nurse - Patient Verbalized Approval Of Camera Use   Safety/Activity   Patient Rounds bed in low position;call light in patient/parent reach;clutter free environment maintained;visualized patient   Safety Promotion/Fall Prevention instructed to call staff for mobility;side rails raised x 2   Positioning   Body Position supine   Head of Bed (HOB) Positioning HOB elevated         Labs, notes, orders, and careplan reviewed.

## 2024-08-17 NOTE — SUBJECTIVE & OBJECTIVE
Past Medical History:   Diagnosis Date    A-fib     Esophageal varices 5/8/2023    Other ascites 5/8/2023       Past Surgical History:   Procedure Laterality Date    ESOPHAGOGASTRODUODENOSCOPY N/A 1/17/2023    Procedure: EGD (ESOPHAGOGASTRODUODENOSCOPY);  Surgeon: Dewayne Navas MD;  Location: [facility] (2ND FLR);  Service: Endoscopy;  Laterality: N/A;    ESOPHAGOGASTRODUODENOSCOPY N/A 2/7/2024    Procedure: EGD (ESOPHAGOGASTRODUODENOSCOPY);  Surgeon: Nathan Goins MD;  Location: [facility] (2ND FLR);  Service: Endoscopy;  Laterality: N/A;  referral: Dr. Escalante /cirrhosis - labs- possible banding / prep ins on portal / 2nd floor - Pt c/o SOB at times./ Xarelto - message sent to clearance nurse per protocol - ERW  1/31/24- LVM for precall - ERW  2/1-precall complet-Kpvt  ok to hold Xarelto2 da       Review of patient's allergies indicates:  No Known Allergies  Family History    None       Tobacco Use    Smoking status: Every Day     Current packs/day: 1.00     Average packs/day: 1 pack/day for 50.6 years (50.6 ttl pk-yrs)     Types: Cigarettes     Start date: 1974    Smokeless tobacco: Never    Tobacco comments:     Pt is a 1 pk/day cigarette smoker x 51 yrs.  Education provided on the risks of continued smoking and benefits of quitting. Pt declines referral to Ambulatory Smoking Cessation clinic. Handout provided.    Substance and Sexual Activity    Alcohol use: Not Currently     Alcohol/week: 6.0 standard drinks of alcohol     Types: 6 Cans of beer per week    Drug use: Not Currently    Sexual activity: Yes     Partners: Female     Review of Systems   Constitutional:  Negative for chills and fever.   Gastrointestinal:  Negative for abdominal pain, nausea and vomiting.     Objective:     Vital Signs (Most Recent):  Temp: 98.9 °F (37.2 °C) (08/17/24 1215)  Pulse: 60 (08/17/24 1215)  Resp: 19 (08/17/24 1215)  BP: 127/72 (08/17/24 1215)  SpO2: 97 % (08/17/24 1215) Vital Signs (24h Range):  Temp:  [98 °F (36.7  °C)-98.9 °F (37.2 °C)] 98.9 °F (37.2 °C)  Pulse:  [50-66] 60  Resp:  [18-28] 19  SpO2:  [95 %-97 %] 97 %  BP: ()/(51-72) 127/72     Weight: 91.6 kg (201 lb 15.1 oz) (08/17/24 1229)  Body mass index is 29.82 kg/m².      Intake/Output Summary (Last 24 hours) at 8/17/2024 1632  Last data filed at 8/17/2024 0132  Gross per 24 hour   Intake 200 ml   Output --   Net 200 ml       Lines/Drains/Airways       Peripheral Intravenous Line  Duration                  Peripheral IV - Single Lumen 08/16/24 1852 20 G Anterior;Right Forearm <1 day                     Physical Exam  Vitals and nursing note reviewed.   Constitutional:       Appearance: He is well-developed.   HENT:      Head: Normocephalic and atraumatic.   Eyes:      General: No scleral icterus.     Pupils: Pupils are equal, round, and reactive to light.   Cardiovascular:      Rate and Rhythm: Normal rate and regular rhythm.      Heart sounds: Normal heart sounds.   Pulmonary:      Effort: Pulmonary effort is normal. No respiratory distress.      Breath sounds: Normal breath sounds.   Abdominal:      General: Bowel sounds are normal. There is no distension.      Palpations: Abdomen is soft.      Tenderness: There is no abdominal tenderness.   Musculoskeletal:         General: Normal range of motion.   Neurological:      Mental Status: He is alert and oriented to person, place, and time.      Comments: No asterixis   Psychiatric:         Behavior: Behavior normal.          Significant Labs:  Recent Lab Results         08/17/24  0643   08/16/24  2136   08/16/24  1725   08/16/24  1720        Procalcitonin   0.07  Comment: A concentration < 0.25 ng/mL represents a low risk of bacterial   infection.  Procalcitonin may not be accurate among patients with localized   infection, recent trauma or major surgery, immunosuppressed state,   invasive fungal infection, renal dysfunction. Decisions regarding   initiation or continuation of antibiotic therapy should not be based    solely on procalcitonin levels.             Albumin 2.2     2.4              137         ALT 33     33         Anion Gap 7     8         AST 34     34         Baso #     0.09         Basophil %     0.7         BILIRUBIN TOTAL 0.4  Comment: For infants and newborns, interpretation of results should be based  on gestational age, weight and in agreement with clinical  observations.    Premature Infant recommended reference ranges:  Up to 24 hours.............<8.0 mg/dL  Up to 48 hours............<12.0 mg/dL  3-5 days..................<15.0 mg/dL  6-29 days.................<15.0 mg/dL       0.4  Comment: For infants and newborns, interpretation of results should be based  on gestational age, weight and in agreement with clinical  observations.    Premature Infant recommended reference ranges:  Up to 24 hours.............<8.0 mg/dL  Up to 48 hours............<12.0 mg/dL  3-5 days..................<15.0 mg/dL  6-29 days.................<15.0 mg/dL           Blood Culture, Routine     No Growth to date  [P]   No Growth to date  [P]       BUN 24     26         Calcium 8.1     8.0         Chloride 111     110         CO2 18     17         Creatinine 1.8     1.9         Differential Method     Automated         eGFR 42     39         Eos #     0.1         Eos %     0.9         Glucose 120     151         Gran # (ANC)     10.8         Gran %     84.3         Hematocrit     34.3         Hemoglobin     11.2         Immature Grans (Abs)     0.11  Comment: Mild elevation in immature granulocytes is non specific and   can be seen in a variety of conditions including stress response,   acute inflammation, trauma and pregnancy. Correlation with other   laboratory and clinical findings is essential.           Immature Granulocytes     0.9         Lactic Acid Level     1.2  Comment: Falsely low lactic acid results can be found in samples   containing >=13.0 mg/dL total bilirubin and/or >=3.5 mg/dL   direct bilirubin.            Lymph #     0.8         Lymph %     6.3         Magnesium  1.7             MCH     29.6         MCHC     32.7         MCV     91         Mono #     0.9         Mono %     6.9         MPV     9.3         nRBC     0         Platelet Count     373         Potassium 4.6     4.4         PROTEIN TOTAL 5.6     6.0         RBC     3.78         RDW     14.6         Sodium 136     135         WBC     12.77                  [P] - Preliminary Result               Significant Imaging:  Imaging results within the past 24 hours have been reviewed.

## 2024-08-17 NOTE — SUBJECTIVE & OBJECTIVE
Interval History: NAEON. No acute concerns.     Review of Systems   Constitutional:  Negative for fever.   Respiratory:  Negative for shortness of breath.    Cardiovascular:  Negative for chest pain.   Gastrointestinal:  Negative for abdominal pain.   Genitourinary:  Negative for dysuria.   Neurological:  Negative for headaches.     Objective:     Vital Signs (Most Recent):  Temp: 98.9 °F (37.2 °C) (08/17/24 1215)  Pulse: 60 (08/17/24 1215)  Resp: 19 (08/17/24 1215)  BP: 127/72 (08/17/24 1215)  SpO2: 97 % (08/17/24 1215) Vital Signs (24h Range):  Temp:  [98 °F (36.7 °C)-98.9 °F (37.2 °C)] 98.9 °F (37.2 °C)  Pulse:  [50-66] 60  Resp:  [18-28] 19  SpO2:  [95 %-97 %] 97 %  BP: ()/(51-72) 127/72     Weight: 91.6 kg (201 lb 15.1 oz)  Body mass index is 29.82 kg/m².    Intake/Output Summary (Last 24 hours) at 8/17/2024 1603  Last data filed at 8/17/2024 0132  Gross per 24 hour   Intake 200 ml   Output --   Net 200 ml         Physical Exam  Vitals and nursing note reviewed.   Constitutional:       General: He is not in acute distress.     Appearance: He is well-developed. He is obese.   HENT:      Head: Normocephalic and atraumatic.   Eyes:      Conjunctiva/sclera: Conjunctivae normal.   Neck:      Vascular: No JVD.   Cardiovascular:      Rate and Rhythm: Normal rate and regular rhythm.      Heart sounds: Normal heart sounds.   Pulmonary:      Effort: Pulmonary effort is normal.      Breath sounds: Normal breath sounds.   Abdominal:      General: Bowel sounds are normal. There is distension.      Palpations: There is fluid wave.      Tenderness: There is no abdominal tenderness.   Musculoskeletal:      Cervical back: Neck supple.      Right lower leg: No edema.      Left lower leg: No edema.   Neurological:      Mental Status: He is alert.   Psychiatric:         Behavior: Behavior normal.             Significant Labs: All pertinent labs within the past 24 hours have been reviewed.  CBC:   Recent Labs   Lab  08/16/24  1725   WBC 12.77*   HGB 11.2*   HCT 34.3*        CMP:   Recent Labs   Lab 08/16/24  1725 08/17/24  0643   * 136   K 4.4 4.6    111*   CO2 17* 18*   * 120*   BUN 26* 24*   CREATININE 1.9* 1.8*   CALCIUM 8.0* 8.1*   PROT 6.0 5.6*   ALBUMIN 2.4* 2.2*   BILITOT 0.4 0.4   ALKPHOS 137* 128   AST 34 34   ALT 33 33   ANIONGAP 8 7*       Significant Imaging: I have reviewed all pertinent imaging results/findings within the past 24 hours.

## 2024-08-17 NOTE — ED NOTES
Pt is resting comfortably in bed at this time in no distress and has no complaints or issues. Pt is alert and oriented x4. Continuous cardaic monitor applied to pt and bed in lowest, locked position with side rails up and call light within reach. Will continue to monitor.

## 2024-08-17 NOTE — ASSESSMENT & PLAN NOTE
Patient with known Cirrhosis with Child's class Calculator for Child's score link- https://[email or web address].com/calc/[email or web address]/child-mathews-score-cirrhosis-mortality#creator-insights. Co-morbidities are present and inclusive of ascites and portal hypertension.  MELD-Na score calculated; MELD 3.0: 24 at 8/4/2024  6:30 AM  MELD-Na: 24 at 8/4/2024  6:30 AM  Calculated from:  Serum Creatinine: 3.6 mg/dL (Using max of 3 mg/dL) at 8/4/2024  6:30 AM  Serum Sodium: 132 mmol/L at 8/4/2024  6:30 AM  Total Bilirubin: 0.4 mg/dL (Using min of 1 mg/dL) at 8/4/2024  6:30 AM  Serum Albumin: 2.4 g/dL at 8/4/2024  6:30 AM  INR(ratio): 1.2 at 8/2/2024  1:30 AM  Age at listing (hypothetical): 63 years  Sex: Male at 8/4/2024  6:30 AM      Continue chronic meds. Etiology likely ETOH. Will avoid any hepatotoxic meds, and monitor CBC/CMP/INR for synthetic function.

## 2024-08-17 NOTE — HPI
62 YO M with PMHx significant for DM2, alcoholic cirrhosis on liver transplant list, HTN, hypohyroidism, HLD was sent to the ER by his liver doctor for abnormal labs on ascitic fluid. Per patient he has been following with liver transplant specialist and underwent paracentesis every Wednesday. Last Wednesday he went to get his paracentesis as usual and did not noticed any changes. Except for left abdominal soreness, no new acute abdominal issues. Denies fever or chills, no CP or SOB, no N/V, nor diarrhea or constipation. Hepatologic clinic called him to come to the ER because ascitic seems has more WBCs than normal R/O spontaneous bacterial peritonitis.     At time of interview patient denies significant abdominal pain.  Afebrile.  Leukocytosis noted.  Does have some degree of CKD as well

## 2024-08-17 NOTE — ED NOTES
Pt laying in bed resting with eyes closed. Rise and fall of chest noted. Denies any needs at this time. Bed locked and in the lowest position, side rails up x2, call bell in reach.

## 2024-08-17 NOTE — HPI
62 YO M with PMHx significant for DM2, alcoholic cirrhosis on liver transplant list, HTN, hypohyroidism, HLD was sent to the ER by his liver doctor for abnormal labs on ascitic fluid. Per patient he has been following with liver transplant specialist and underwent paracentesis every Wednesday. Last Wednesday he went to get his paracentesis as usual and did not noticed any changes. Except for left abdominal soreness, no new acute abdominal issues. Denies fever or chills, no CP or SOB, no N/V, nor diarrhea or constipation. Hepatologic clinic called him to come to the ER because ascitic seems has more WBCs than normal R/O spontaneous bacterial peritonitis. Labs in the ER Na+ 135, BUN/Cr 26/1.9, GFR 39, WBC 12.77 with RDW 14.6, Ca++ 8.0

## 2024-08-17 NOTE — SUBJECTIVE & OBJECTIVE
Past Medical History:   Diagnosis Date    A-fib     Esophageal varices 5/8/2023    Other ascites 5/8/2023       Past Surgical History:   Procedure Laterality Date    ESOPHAGOGASTRODUODENOSCOPY N/A 1/17/2023    Procedure: EGD (ESOPHAGOGASTRODUODENOSCOPY);  Surgeon: Dewayne Navas MD;  Location: [facility] (2ND FLR);  Service: Endoscopy;  Laterality: N/A;    ESOPHAGOGASTRODUODENOSCOPY N/A 2/7/2024    Procedure: EGD (ESOPHAGOGASTRODUODENOSCOPY);  Surgeon: Nathan Goins MD;  Location: [facility] (2ND FLR);  Service: Endoscopy;  Laterality: N/A;  referral: Dr. Escalante /cirrhosis - labs- possible banding / prep ins on portal / 2nd floor - Pt c/o SOB at times./ Xarelto - message sent to clearance nurse per protocol - ERW  1/31/24- LVM for precall - ERW  2/1-precall complet-Kpvt  ok to hold Xarelto2 da       Review of patient's allergies indicates:  No Known Allergies    Current Facility-Administered Medications on File Prior to Encounter   Medication    hepatitis A virus vaccine (Adult 19YRS up)(PF) (HAVRIX) 1440 Unit/1 mL syringe    hepatitis A virus vaccine (Adult 19YRS up)(PF) (HAVRIX) 1440 Unit/1 mL syringe    hepatitis B vacc-CpG 1018 (PF) 20 mcg/0.5 mL Syrg 20 mcg    hepatitis B vacc-CpG 1018 (PF) 20 mcg/0.5 mL Syrg 20 mcg    pneumoc 20-martin conj-dip cr(PF) (PREVNAR-20 (PF)) injection Syrg 0.5 mL    Tdap (BOOSTRIX) vaccine injection 0.5 mL    varicella-zoster gE vac,2 of 2 SusR     Current Outpatient Medications on File Prior to Encounter   Medication Sig    ADVAIR DISKUS 250-50 mcg/dose diskus inhaler Inhale 1 puff into the lungs 2 (two) times a day. Controller    amiodarone (PACERONE) 200 MG Tab Take 1 tablet (200 mg total) by mouth once daily.    ferrous sulfate 324 mg (65 mg iron) TbEC Take 324 mg by mouth once daily.    folic acid (FOLVITE) 1 MG tablet Take 1 mg by mouth once daily.    ibuprofen (ADVIL,MOTRIN) 800 MG tablet Take 800 mg by mouth daily as needed for Pain.    levothyroxine (SYNTHROID) 50 MCG  tablet TAKE 1 TABLET BY MOUTH IN THE MORNING WITH A FULL GLASS OF WATER 30-60 MINUTES BEFORE OTHER MEDICATIONS AND FOOD (Patient taking differently: Take 50 mcg by mouth before breakfast. WITH A FULL GLASS OF WATER 30-60 MINUTES BEFORE OTHER MEDICATIONS AND FOOD)    metoprolol tartrate (LOPRESSOR) 100 MG tablet Take 100 mg by mouth once daily.    nicotine (NICODERM CQ) 21 mg/24 hr Place 1 patch onto the skin once daily.    omega-3 acid ethyl esters (LOVAZA) 1 gram capsule Take 1 capsule by mouth 2 (two) times daily.    pantoprazole (PROTONIX) 40 MG tablet Take 1 tablet (40 mg total) by mouth 2 (two) times daily.    potassium chloride (KLOR-CON) 10 MEQ TbSR Take 10 mEq by mouth once daily.    promethazine (PHENERGAN) 12.5 MG Tab Take 12.5 mg by mouth every 6 (six) hours as needed (nausea).    thiamine 100 MG tablet Take 100 mg by mouth once daily.    traZODone (DESYREL) 50 MG tablet Take 50 mg by mouth nightly as needed.    VITAMIN B COMPLEX ORAL Take 1 tablet by mouth once daily.    XARELTO 20 mg Tab Take 20 mg by mouth once daily.     Family History    None       Tobacco Use    Smoking status: Every Day     Current packs/day: 1.00     Average packs/day: 1 pack/day for 50.6 years (50.6 ttl pk-yrs)     Types: Cigarettes     Start date: 1974    Smokeless tobacco: Never    Tobacco comments:     Pt is a 1 pk/day cigarette smoker x 51 yrs.  Education provided on the risks of continued smoking and benefits of quitting. Pt declines referral to Ambulatory Smoking Cessation clinic. Handout provided.    Substance and Sexual Activity    Alcohol use: Not Currently     Alcohol/week: 6.0 standard drinks of alcohol     Types: 6 Cans of beer per week    Drug use: Not Currently    Sexual activity: Yes     Partners: Female     Review of Systems   Constitutional:  Negative for activity change, appetite change, chills, fatigue and fever.   HENT:  Negative for congestion.    Respiratory:  Negative for cough, chest tightness, shortness  of breath and wheezing.    Cardiovascular:  Negative for chest pain, palpitations and leg swelling.   Gastrointestinal:  Positive for abdominal distention and abdominal pain. Negative for constipation, diarrhea, nausea and vomiting.   Musculoskeletal:  Negative for back pain and gait problem.   Skin:  Negative for rash.   Neurological:  Negative for dizziness, weakness and headaches.   Psychiatric/Behavioral:  Negative for agitation, confusion and hallucinations.    All other systems reviewed and are negative.    Objective:     Vital Signs (Most Recent):  Temp: 98.2 °F (36.8 °C) (08/16/24 1640)  Pulse: (!) 58 (08/16/24 1803)  Resp: 18 (08/16/24 1803)  BP: 131/72 (08/16/24 1802)  SpO2: 96 % (08/16/24 1803) Vital Signs (24h Range):  Temp:  [98.2 °F (36.8 °C)] 98.2 °F (36.8 °C)  Pulse:  [58-66] 58  Resp:  [18] 18  SpO2:  [96 %] 96 %  BP: (131-136)/(63-72) 131/72     Weight: 87.5 kg (193 lb)  Body mass index is 28.5 kg/m².     Physical Exam  Vitals and nursing note reviewed.   Constitutional:       General: He is not in acute distress.     Appearance: He is not toxic-appearing.   HENT:      Head: Normocephalic and atraumatic.      Mouth/Throat:      Mouth: Mucous membranes are moist.      Pharynx: No oropharyngeal exudate.   Eyes:      Extraocular Movements: Extraocular movements intact.      Pupils: Pupils are equal, round, and reactive to light.   Cardiovascular:      Rate and Rhythm: Regular rhythm. Bradycardia present.      Heart sounds: No murmur heard.     No friction rub. No gallop.   Pulmonary:      Effort: Pulmonary effort is normal. No respiratory distress.      Breath sounds: No wheezing or rales.   Chest:      Chest wall: No tenderness.   Abdominal:      General: Bowel sounds are normal. There is distension.      Tenderness: There is no abdominal tenderness. There is no guarding or rebound.   Musculoskeletal:         General: No swelling or tenderness.      Cervical back: No rigidity or tenderness.       "Right lower leg: No edema.      Left lower leg: No edema.   Skin:     Findings: No erythema or rash.   Neurological:      General: No focal deficit present.      Mental Status: He is alert and oriented to person, place, and time.      Cranial Nerves: No cranial nerve deficit.      Motor: No weakness.      Coordination: Coordination normal.      Gait: Gait normal.   Psychiatric:         Thought Content: Thought content normal.              CRANIAL NERVES     CN III, IV, VI   Pupils are equal, round, and reactive to light.       Significant Labs: All pertinent labs within the past 24 hours have been reviewed.  Blood Culture: No results for input(s): "LABBLOO" in the last 48 hours.  BMP:   Recent Labs   Lab 08/16/24  1725   *   *   K 4.4      CO2 17*   BUN 26*   CREATININE 1.9*   CALCIUM 8.0*     CBC:   Recent Labs   Lab 08/16/24  1725   WBC 12.77*   HGB 11.2*   HCT 34.3*        CMP:   Recent Labs   Lab 08/16/24  1725   *   K 4.4      CO2 17*   *   BUN 26*   CREATININE 1.9*   CALCIUM 8.0*   PROT 6.0   ALBUMIN 2.4*   BILITOT 0.4   ALKPHOS 137*   AST 34   ALT 33   ANIONGAP 8     Lactic Acid:   Recent Labs   Lab 08/16/24  1725   LACTATE 1.2     Troponin: No results for input(s): "TROPONINI", "TROPONINIHS" in the last 48 hours.  TSH: No results for input(s): "TSH" in the last 4320 hours.  Urine Culture: No results for input(s): "LABURIN" in the last 48 hours.  Urine Studies: No results for input(s): "COLORU", "APPEARANCEUA", "PHUR", "SPECGRAV", "PROTEINUA", "GLUCUA", "KETONESU", "BILIRUBINUA", "OCCULTUA", "NITRITE", "UROBILINOGEN", "LEUKOCYTESUR", "RBCUA", "WBCUA", "BACTERIA", "SQUAMEPITHEL", "HYALINECASTS" in the last 48 hours.    Invalid input(s): "WRIGHTSUR"  Recent Lab Results         08/16/24  1725        Albumin 2.4              ALT 33       Anion Gap 8       AST 34       Baso # 0.09       Basophil % 0.7       BILIRUBIN TOTAL 0.4  Comment: For infants and newborns, " interpretation of results should be based  on gestational age, weight and in agreement with clinical  observations.    Premature Infant recommended reference ranges:  Up to 24 hours.............<8.0 mg/dL  Up to 48 hours............<12.0 mg/dL  3-5 days..................<15.0 mg/dL  6-29 days.................<15.0 mg/dL         BUN 26       Calcium 8.0       Chloride 110       CO2 17       Creatinine 1.9       Differential Method Automated       eGFR 39       Eos # 0.1       Eos % 0.9       Glucose 151       Gran # (ANC) 10.8       Gran % 84.3       Hematocrit 34.3       Hemoglobin 11.2       Immature Grans (Abs) 0.11  Comment: Mild elevation in immature granulocytes is non specific and   can be seen in a variety of conditions including stress response,   acute inflammation, trauma and pregnancy. Correlation with other   laboratory and clinical findings is essential.         Immature Granulocytes 0.9       Lactic Acid Level 1.2  Comment: Falsely low lactic acid results can be found in samples   containing >=13.0 mg/dL total bilirubin and/or >=3.5 mg/dL   direct bilirubin.         Lymph # 0.8       Lymph % 6.3       MCH 29.6       MCHC 32.7       MCV 91       Mono # 0.9       Mono % 6.9       MPV 9.3       nRBC 0       Platelet Count 373       Potassium 4.4       PROTEIN TOTAL 6.0       RBC 3.78       RDW 14.6       Sodium 135       WBC 12.77               Significant Imaging: I have reviewed all pertinent imaging results/findings within the past 24 hours.

## 2024-08-17 NOTE — ASSESSMENT & PLAN NOTE
Discontinue nonselective beta blockers   Severity of illness; chronic liver failure-sequential organ failure assessment (MICHEL-SOFA) score is 2   MICHEL-SOFA score (<7)-- not critically ill, typically treated with a third-generation cephalosporin. Change Zosyn to Rocephin 2g daily. Will need a 5 day course of treatment.

## 2024-08-17 NOTE — PROGRESS NOTES
[facility] Medicine  Progress Note    Patient Name: Jonny Isabel  MRN: [id number]  Patient Class: OP- Observation   Admission Date: 8/16/2024  Length of Stay: 0 days  Attending Physician: Giovanna Leahy MD  Primary Care Provider: Yuli Costa MD        Subjective:     Principal Problem:SBP (spontaneous bacterial peritonitis)        HPI:  64 YO M with PMHx significant for DM2, alcoholic cirrhosis on liver transplant list, HTN, hypohyroidism, HLD was sent to the ER by his liver doctor for abnormal labs on ascitic fluid. Per patient he has been following with liver transplant specialist and underwent paracentesis every Wednesday. Last Wednesday he went to get his paracentesis as usual and did not noticed any changes. Except for left abdominal soreness, no new acute abdominal issues. Denies fever or chills, no CP or SOB, no N/V, nor diarrhea or constipation. Hepatologic clinic called him to come to the ER because ascitic seems has more WBCs than normal R/O spontaneous bacterial peritonitis. Labs in the ER Na+ 135, BUN/Cr 26/1.9, GFR 39, WBC 12.77 with RDW 14.6, Ca++ 8.0    Overview/Hospital Course:  No notes on file    Interval History: NAEON. No acute concerns.     Review of Systems   Constitutional:  Negative for fever.   Respiratory:  Negative for shortness of breath.    Cardiovascular:  Negative for chest pain.   Gastrointestinal:  Negative for abdominal pain.   Genitourinary:  Negative for dysuria.   Neurological:  Negative for headaches.     Objective:     Vital Signs (Most Recent):  Temp: 98.9 °F (37.2 °C) (08/17/24 1215)  Pulse: 60 (08/17/24 1215)  Resp: 19 (08/17/24 1215)  BP: 127/72 (08/17/24 1215)  SpO2: 97 % (08/17/24 1215) Vital Signs (24h Range):  Temp:  [98 °F (36.7 °C)-98.9 °F (37.2 °C)] 98.9 °F (37.2 °C)  Pulse:  [50-66] 60  Resp:  [18-28] 19  SpO2:  [95 %-97 %] 97 %  BP: ()/(51-72) 127/72     Weight: 91.6 kg (201 lb 15.1 oz)  Body mass index is 29.82  kg/m².    Intake/Output Summary (Last 24 hours) at 8/17/2024 1603  Last data filed at 8/17/2024 0132  Gross per 24 hour   Intake 200 ml   Output --   Net 200 ml         Physical Exam  Vitals and nursing note reviewed.   Constitutional:       General: He is not in acute distress.     Appearance: He is well-developed. He is obese.   HENT:      Head: Normocephalic and atraumatic.   Eyes:      Conjunctiva/sclera: Conjunctivae normal.   Neck:      Vascular: No JVD.   Cardiovascular:      Rate and Rhythm: Normal rate and regular rhythm.      Heart sounds: Normal heart sounds.   Pulmonary:      Effort: Pulmonary effort is normal.      Breath sounds: Normal breath sounds.   Abdominal:      General: Bowel sounds are normal. There is distension.      Palpations: There is fluid wave.      Tenderness: There is no abdominal tenderness.   Musculoskeletal:      Cervical back: Neck supple.      Right lower leg: No edema.      Left lower leg: No edema.   Neurological:      Mental Status: He is alert.   Psychiatric:         Behavior: Behavior normal.             Significant Labs: All pertinent labs within the past 24 hours have been reviewed.  CBC:   Recent Labs   Lab 08/16/24  1725   WBC 12.77*   HGB 11.2*   HCT 34.3*        CMP:   Recent Labs   Lab 08/16/24  1725 08/17/24  0643   * 136   K 4.4 4.6    111*   CO2 17* 18*   * 120*   BUN 26* 24*   CREATININE 1.9* 1.8*   CALCIUM 8.0* 8.1*   PROT 6.0 5.6*   ALBUMIN 2.4* 2.2*   BILITOT 0.4 0.4   ALKPHOS 137* 128   AST 34 34   ALT 33 33   ANIONGAP 8 7*       Significant Imaging: I have reviewed all pertinent imaging results/findings within the past 24 hours.    Assessment/Plan:      * SBP (spontaneous bacterial peritonitis)  Discontinue nonselective beta blockers   Severity of illness; chronic liver failure-sequential organ failure assessment (MICHEL-SOFA) score is 2   MICHEL-SOFA score (<7)-- not critically ill, typically treated with a third-generation  cephalosporin. Change Zosyn to Rocephin 2g daily. Will need a 5 day course of treatment.      Alcoholic cirrhosis of liver with ascites  Patient with known Cirrhosis. Co-morbidities are present and inclusive of ascites and portal hypertension.    MELD-Na score calculated; MELD 3.0: 24 at 8/4/2024  6:30 AM  MELD-Na: 24 at 8/4/2024  6:30 AM  Calculated from:  Serum Creatinine: 3.6 mg/dL (Using max of 3 mg/dL) at 8/4/2024  6:30 AM  Serum Sodium: 132 mmol/L at 8/4/2024  6:30 AM  Total Bilirubin: 0.4 mg/dL (Using min of 1 mg/dL) at 8/4/2024  6:30 AM  Serum Albumin: 2.4 g/dL at 8/4/2024  6:30 AM  INR(ratio): 1.2 at 8/2/2024  1:30 AM  Age at listing (hypothetical): 63 years  Sex: Male at 8/4/2024  6:30 AM      Continue chronic meds. Etiology likely ETOH. Will avoid any hepatotoxic meds, and monitor CBC/CMP/INR for synthetic function.   Continue weekly tap, q wednesdays    Hyperlipidemia associated with type 2 diabetes mellitus  Cont statin      Hypertension associated with diabetes  Continue home meds    Alcohol abuse  Encouraged continued cessation        VTE Risk Mitigation (From admission, onward)           Ordered     IP VTE HIGH RISK PATIENT  Once         08/16/24 1942     Place sequential compression device  Until discontinued         08/16/24 1942     Reason for No Pharmacological VTE Prophylaxis  Once        Question:  Reasons:  Answer:  Already adequately anticoagulated on oral Anticoagulants    08/16/24 1942                    Discharge Planning   DALILA:      Code Status: Full Code   Is the patient medically ready for discharge?:     Reason for patient still in hospital (select all that apply): Patient trending condition and Treatment             Giovanna Leahy MD  Department of Hospital Medicine   [facility]

## 2024-08-17 NOTE — ASSESSMENT & PLAN NOTE
PMN > 250 on recent paracentesis.  Asymptomatic   Agree with 3rd generation cephalosporin  Follow culture  Hold nonselective beta-blocker was   Dose 25% albumin 1.5g/kg body weight,maximum dose 100g if not already done and on day 3 1g/kg body weight, maximum dose 100g

## 2024-08-17 NOTE — ASSESSMENT & PLAN NOTE
Patient with known Cirrhosis. Co-morbidities are present and inclusive of ascites and portal hypertension.    MELD-Na score calculated; MELD 3.0: 24 at 8/4/2024  6:30 AM  MELD-Na: 24 at 8/4/2024  6:30 AM  Calculated from:  Serum Creatinine: 3.6 mg/dL (Using max of 3 mg/dL) at 8/4/2024  6:30 AM  Serum Sodium: 132 mmol/L at 8/4/2024  6:30 AM  Total Bilirubin: 0.4 mg/dL (Using min of 1 mg/dL) at 8/4/2024  6:30 AM  Serum Albumin: 2.4 g/dL at 8/4/2024  6:30 AM  INR(ratio): 1.2 at 8/2/2024  1:30 AM  Age at listing (hypothetical): 63 years  Sex: Male at 8/4/2024  6:30 AM      Continue chronic meds. Etiology likely ETOH. Will avoid any hepatotoxic meds, and monitor CBC/CMP/INR for synthetic function.   Continue weekly tap, q wednesdays   89

## 2024-08-17 NOTE — ED NOTES
Report received from Que HENNING. Nonskid socks provided to patient. Patient up to use restroom. No visible distress or discomfort, breathing even and unlabored.

## 2024-08-17 NOTE — CONSULTS
[place] - [facility]  Gastroenterology  Consult Note    Patient Name: Jonny Isabel  MRN: [id number]  Admission Date: 8/16/2024  Hospital Length of Stay: 0 days  Code Status: Full Code   Attending Provider: Giovanna Leahy MD   Consulting Provider: Bill Calles MD  Primary Care Physician: Yuli Costa MD  Principal Problem:SBP (spontaneous bacterial peritonitis)    Inpatient consult to Gastroenterology[facility]  Consult performed by: Bill Calles MD  Consult ordered by: Demetri Armstrong MD        Subjective:     HPI:  64 YO M with PMHx significant for DM2, alcoholic cirrhosis on liver transplant list, HTN, hypohyroidism, HLD was sent to the ER by his liver doctor for abnormal labs on ascitic fluid. Per patient he has been following with liver transplant specialist and underwent paracentesis every Wednesday. Last Wednesday he went to get his paracentesis as usual and did not noticed any changes. Except for left abdominal soreness, no new acute abdominal issues. Denies fever or chills, no CP or SOB, no N/V, nor diarrhea or constipation. Hepatologic clinic called him to come to the ER because ascitic seems has more WBCs than normal R/O spontaneous bacterial peritonitis.     At time of interview patient denies significant abdominal pain.  Afebrile.  Leukocytosis noted.  Does have some degree of CKD as well    Past Medical History:   Diagnosis Date    A-fib     Esophageal varices 5/8/2023    Other ascites 5/8/2023       Past Surgical History:   Procedure Laterality Date    ESOPHAGOGASTRODUODENOSCOPY N/A 1/17/2023    Procedure: EGD (ESOPHAGOGASTRODUODENOSCOPY);  Surgeon: Dewayne Navas MD;  Location: [address]);  Service: Endoscopy;  Laterality: N/A;    ESOPHAGOGASTRODUODENOSCOPY N/A 2/7/2024    Procedure: EGD (ESOPHAGOGASTRODUODENOSCOPY);  Surgeon: Nathan Goins MD;  Location: [facility] ([address] [place] [address]);  Service: Endoscopy;  Laterality: N/A;  referral: Dr. Escalante /cirrhosis - labs-  possible banding / prep ins on portal / 2nd floor - Pt c/o SOB at times./ Xarelto - message sent to clearance nurse per protocol - ERW  1/31/24- LVM for precall - ERW  2/1-precall complet-Kpvt  ok to hold Xarelto2 da       Review of patient's allergies indicates:  No Known Allergies  Family History    None       Tobacco Use    Smoking status: Every Day     Current packs/day: 1.00     Average packs/day: 1 pack/day for 50.6 years (50.6 ttl pk-yrs)     Types: Cigarettes     Start date: 1974    Smokeless tobacco: Never    Tobacco comments:     Pt is a 1 pk/day cigarette smoker x 51 yrs.  Education provided on the risks of continued smoking and benefits of quitting. Pt declines referral to Ambulatory Smoking Cessation clinic. Handout provided.    Substance and Sexual Activity    Alcohol use: Not Currently     Alcohol/week: 6.0 standard drinks of alcohol     Types: 6 Cans of beer per week    Drug use: Not Currently    Sexual activity: Yes     Partners: Female     Review of Systems   Constitutional:  Negative for chills and fever.   Gastrointestinal:  Negative for abdominal pain, nausea and vomiting.     Objective:     Vital Signs (Most Recent):  Temp: 98.9 °F (37.2 °C) (08/17/24 1215)  Pulse: 60 (08/17/24 1215)  Resp: 19 (08/17/24 1215)  BP: 127/72 (08/17/24 1215)  SpO2: 97 % (08/17/24 1215) Vital Signs (24h Range):  Temp:  [98 °F (36.7 °C)-98.9 °F (37.2 °C)] 98.9 °F (37.2 °C)  Pulse:  [50-66] 60  Resp:  [18-28] 19  SpO2:  [95 %-97 %] 97 %  BP: ()/(51-72) 127/72     Weight: 91.6 kg (201 lb 15.1 oz) (08/17/24 1229)  Body mass index is 29.82 kg/m².      Intake/Output Summary (Last 24 hours) at 8/17/2024 1632  Last data filed at 8/17/2024 0132  Gross per 24 hour   Intake 200 ml   Output --   Net 200 ml       Lines/Drains/Airways       Peripheral Intravenous Line  Duration                  Peripheral IV - Single Lumen 08/16/24 1852 20 G Anterior;Right Forearm <1 day                     Physical Exam  Vitals and nursing  note reviewed.   Constitutional:       Appearance: He is well-developed.   HENT:      Head: Normocephalic and atraumatic.   Eyes:      General: No scleral icterus.     Pupils: Pupils are equal, round, and reactive to light.   Cardiovascular:      Rate and Rhythm: Normal rate and regular rhythm.      Heart sounds: Normal heart sounds.   Pulmonary:      Effort: Pulmonary effort is normal. No respiratory distress.      Breath sounds: Normal breath sounds.   Abdominal:      General: Bowel sounds are normal. There is no distension.      Palpations: Abdomen is soft.      Tenderness: There is no abdominal tenderness.   Musculoskeletal:         General: Normal range of motion.   Neurological:      Mental Status: He is alert and oriented to person, place, and time.      Comments: No asterixis   Psychiatric:         Behavior: Behavior normal.          Significant Labs:  Recent Lab Results         08/17/24  0643   08/16/24  2136   08/16/24  1725   08/16/24  1720        Procalcitonin   0.07  Comment: A concentration < 0.25 ng/mL represents a low risk of bacterial   infection.  Procalcitonin may not be accurate among patients with localized   infection, recent trauma or major surgery, immunosuppressed state,   invasive fungal infection, renal dysfunction. Decisions regarding   initiation or continuation of antibiotic therapy should not be based   solely on procalcitonin levels.             Albumin 2.2     2.4              137         ALT 33     33         Anion Gap 7     8         AST 34     34         Baso #     0.09         Basophil %     0.7         BILIRUBIN TOTAL 0.4  Comment: For infants and newborns, interpretation of results should be based  on gestational age, weight and in agreement with clinical  observations.    Premature Infant recommended reference ranges:  Up to 24 hours.............<8.0 mg/dL  Up to 48 hours............<12.0 mg/dL  3-5 days..................<15.0 mg/dL  6-29 days.................<15.0  mg/dL       0.4  Comment: For infants and newborns, interpretation of results should be based  on gestational age, weight and in agreement with clinical  observations.    Premature Infant recommended reference ranges:  Up to 24 hours.............<8.0 mg/dL  Up to 48 hours............<12.0 mg/dL  3-5 days..................<15.0 mg/dL  6-29 days.................<15.0 mg/dL           Blood Culture, Routine     No Growth to date  [P]   No Growth to date  [P]       BUN 24     26         Calcium 8.1     8.0         Chloride 111     110         CO2 18     17         Creatinine 1.8     1.9         Differential Method     Automated         eGFR 42     39         Eos #     0.1         Eos %     0.9         Glucose 120     151         Gran # (ANC)     10.8         Gran %     84.3         Hematocrit     34.3         Hemoglobin     11.2         Immature Grans (Abs)     0.11  Comment: Mild elevation in immature granulocytes is non specific and   can be seen in a variety of conditions including stress response,   acute inflammation, trauma and pregnancy. Correlation with other   laboratory and clinical findings is essential.           Immature Granulocytes     0.9         Lactic Acid Level     1.2  Comment: Falsely low lactic acid results can be found in samples   containing >=13.0 mg/dL total bilirubin and/or >=3.5 mg/dL   direct bilirubin.           Lymph #     0.8         Lymph %     6.3         Magnesium  1.7             MCH     29.6         MCHC     32.7         MCV     91         Mono #     0.9         Mono %     6.9         MPV     9.3         nRBC     0         Platelet Count     373         Potassium 4.6     4.4         PROTEIN TOTAL 5.6     6.0         RBC     3.78         RDW     14.6         Sodium 136     135         WBC     12.77                  [P] - Preliminary Result               Significant Imaging:  Imaging results within the past 24 hours have been reviewed.  Assessment/Plan:     GI  * SBP (spontaneous  bacterial peritonitis)  PMN > 250 on recent paracentesis.  Asymptomatic   Agree with 3rd generation cephalosporin  Follow culture  Hold nonselective beta-blocker was   Dose 25% albumin 1.5g/kg body weight,maximum dose 100g if not already done and on day 3 1g/kg body weight, maximum dose 100g         Thank you for your consult. I will follow-up with patient. Please contact us if you have any additional questions.    Bill Calles MD  Gastroenterology  [place] - [facility]

## 2024-08-17 NOTE — ED NOTES
Pt laying in bed resting comfortably with eyes open watching tv. Denies any needs at this time. Bed locked and in the lowest position, side rails up x2, light off, call bell in reach.

## 2024-08-17 NOTE — H&P
"[facility] Medicine  History & Physical    Patient Name: Jonny Isabel  MRN: [id number]  Patient Class: OP- Observation  Admission Date: 8/16/2024  Attending Physician: Randolph Garduno,*   Primary Care Provider: Yuli Costa MD         Patient information was obtained from patient and ER records.     Subjective:     Principal Problem:SBP (spontaneous bacterial peritonitis)    Chief Complaint:   Chief Complaint   Patient presents with    Referral     Pt referred by liver doctor to come to ER due to "infection in blood". Complaining of nausea and pain to right side.        HPI: 62 YO M with PMHx significant for DM2, alcoholic cirrhosis on liver transplant list, HTN, hypohyroidism, HLD was sent to the ER by his liver doctor for abnormal labs on ascitic fluid. Per patient he has been following with liver transplant specialist and underwent paracentesis every Wednesday. Last Wednesday he went to get his paracentesis as usual and did not noticed any changes. Except for left abdominal soreness, no new acute abdominal issues. Denies fever or chills, no CP or SOB, no N/V, nor diarrhea or constipation. Hepatologic clinic called him to come to the ER because ascitic seems has more WBCs than normal R/O spontaneous bacterial peritonitis. Labs in the ER Na+ 135, BUN/Cr 26/1.9, GFR 39, WBC 12.77 with RDW 14.6, Ca++ 8.0    Past Medical History:   Diagnosis Date    A-fib     Esophageal varices 5/8/2023    Other ascites 5/8/2023       Past Surgical History:   Procedure Laterality Date    ESOPHAGOGASTRODUODENOSCOPY N/A 1/17/2023    Procedure: EGD (ESOPHAGOGASTRODUODENOSCOPY);  Surgeon: Dewayne Navas MD;  Location: [address]);  Service: Endoscopy;  Laterality: N/A;    ESOPHAGOGASTRODUODENOSCOPY N/A 2/7/2024    Procedure: EGD (ESOPHAGOGASTRODUODENOSCOPY);  Surgeon: Nathan Goins MD;  Location: [address]);  Service: Endoscopy;  Laterality: N/A;  referral: Dr. Escalante /cirrhosis - " labs- possible banding / prep ins on portal / 2nd floor - Pt c/o SOB at times./ Xarelto - message sent to clearance nurse per protocol - ERW  1/31/24- LVM for precall - ERW  2/1-precall complet-Kpvt  ok to hold Xarelto2 da       Review of patient's allergies indicates:  No Known Allergies    Current Facility-Administered Medications on File Prior to Encounter   Medication    hepatitis A virus vaccine (Adult 19YRS up)(PF) (HAVRIX) 1440 Unit/1 mL syringe    hepatitis A virus vaccine (Adult 19YRS up)(PF) (HAVRIX) 1440 Unit/1 mL syringe    hepatitis B vacc-CpG 1018 (PF) 20 mcg/0.5 mL Syrg 20 mcg    hepatitis B vacc-CpG 1018 (PF) 20 mcg/0.5 mL Syrg 20 mcg    pneumoc 20-martin conj-dip cr(PF) (PREVNAR-20 (PF)) injection Syrg 0.5 mL    Tdap (BOOSTRIX) vaccine injection 0.5 mL    varicella-zoster gE vac,2 of 2 SusR     Current Outpatient Medications on File Prior to Encounter   Medication Sig    ADVAIR DISKUS 250-50 mcg/dose diskus inhaler Inhale 1 puff into the lungs 2 (two) times a day. Controller    amiodarone (PACERONE) 200 MG Tab Take 1 tablet (200 mg total) by mouth once daily.    ferrous sulfate 324 mg (65 mg iron) TbEC Take 324 mg by mouth once daily.    folic acid (FOLVITE) 1 MG tablet Take 1 mg by mouth once daily.    ibuprofen (ADVIL,MOTRIN) 800 MG tablet Take 800 mg by mouth daily as needed for Pain.    levothyroxine (SYNTHROID) 50 MCG tablet TAKE 1 TABLET BY MOUTH IN THE MORNING WITH A FULL GLASS OF WATER 30-60 MINUTES BEFORE OTHER MEDICATIONS AND FOOD (Patient taking differently: Take 50 mcg by mouth before breakfast. WITH A FULL GLASS OF WATER 30-60 MINUTES BEFORE OTHER MEDICATIONS AND FOOD)    metoprolol tartrate (LOPRESSOR) 100 MG tablet Take 100 mg by mouth once daily.    nicotine (NICODERM CQ) 21 mg/24 hr Place 1 patch onto the skin once daily.    omega-3 acid ethyl esters (LOVAZA) 1 gram capsule Take 1 capsule by mouth 2 (two) times daily.    pantoprazole (PROTONIX) 40 MG tablet Take 1 tablet (40 mg total)  by mouth 2 (two) times daily.    potassium chloride (KLOR-CON) 10 MEQ TbSR Take 10 mEq by mouth once daily.    promethazine (PHENERGAN) 12.5 MG Tab Take 12.5 mg by mouth every 6 (six) hours as needed (nausea).    thiamine 100 MG tablet Take 100 mg by mouth once daily.    traZODone (DESYREL) 50 MG tablet Take 50 mg by mouth nightly as needed.    VITAMIN B COMPLEX ORAL Take 1 tablet by mouth once daily.    XARELTO 20 mg Tab Take 20 mg by mouth once daily.     Family History    None       Tobacco Use    Smoking status: Every Day     Current packs/day: 1.00     Average packs/day: 1 pack/day for 50.6 years (50.6 ttl pk-yrs)     Types: Cigarettes     Start date: 1974    Smokeless tobacco: Never    Tobacco comments:     Pt is a 1 pk/day cigarette smoker x 51 yrs.  Education provided on the risks of continued smoking and benefits of quitting. Pt declines referral to Ambulatory Smoking Cessation clinic. Handout provided.    Substance and Sexual Activity    Alcohol use: Not Currently     Alcohol/week: 6.0 standard drinks of alcohol     Types: 6 Cans of beer per week    Drug use: Not Currently    Sexual activity: Yes     Partners: Female     Review of Systems   Constitutional:  Negative for activity change, appetite change, chills, fatigue and fever.   HENT:  Negative for congestion.    Respiratory:  Negative for cough, chest tightness, shortness of breath and wheezing.    Cardiovascular:  Negative for chest pain, palpitations and leg swelling.   Gastrointestinal:  Positive for abdominal distention and abdominal pain. Negative for constipation, diarrhea, nausea and vomiting.   Musculoskeletal:  Negative for back pain and gait problem.   Skin:  Negative for rash.   Neurological:  Negative for dizziness, weakness and headaches.   Psychiatric/Behavioral:  Negative for agitation, confusion and hallucinations.    All other systems reviewed and are negative.    Objective:     Vital Signs (Most Recent):  Temp: 98.2 °F (36.8 °C)  (08/16/24 1640)  Pulse: (!) 58 (08/16/24 1803)  Resp: 18 (08/16/24 1803)  BP: 131/72 (08/16/24 1802)  SpO2: 96 % (08/16/24 1803) Vital Signs (24h Range):  Temp:  [98.2 °F (36.8 °C)] 98.2 °F (36.8 °C)  Pulse:  [58-66] 58  Resp:  [18] 18  SpO2:  [96 %] 96 %  BP: (131-136)/(63-72) 131/72     Weight: 87.5 kg (193 lb)  Body mass index is 28.5 kg/m².     Physical Exam  Vitals and nursing note reviewed.   Constitutional:       General: He is not in acute distress.     Appearance: He is not toxic-appearing.   HENT:      Head: Normocephalic and atraumatic.      Mouth/Throat:      Mouth: Mucous membranes are moist.      Pharynx: No oropharyngeal exudate.   Eyes:      Extraocular Movements: Extraocular movements intact.      Pupils: Pupils are equal, round, and reactive to light.   Cardiovascular:      Rate and Rhythm: Regular rhythm. Bradycardia present.      Heart sounds: No murmur heard.     No friction rub. No gallop.   Pulmonary:      Effort: Pulmonary effort is normal. No respiratory distress.      Breath sounds: No wheezing or rales.   Chest:      Chest wall: No tenderness.   Abdominal:      General: Bowel sounds are normal. There is distension.      Tenderness: There is no abdominal tenderness. There is no guarding or rebound.   Musculoskeletal:         General: No swelling or tenderness.      Cervical back: No rigidity or tenderness.      Right lower leg: No edema.      Left lower leg: No edema.   Skin:     Findings: No erythema or rash.   Neurological:      General: No focal deficit present.      Mental Status: He is alert and oriented to person, place, and time.      Cranial Nerves: No cranial nerve deficit.      Motor: No weakness.      Coordination: Coordination normal.      Gait: Gait normal.   Psychiatric:         Thought Content: Thought content normal.              CRANIAL NERVES     CN III, IV, VI   Pupils are equal, round, and reactive to light.       Significant Labs: All pertinent labs within the past 24  "hours have been reviewed.  Blood Culture: No results for input(s): "LABBLOO" in the last 48 hours.  BMP:   Recent Labs   Lab 08/16/24  1725   *   *   K 4.4      CO2 17*   BUN 26*   CREATININE 1.9*   CALCIUM 8.0*     CBC:   Recent Labs   Lab 08/16/24  1725   WBC 12.77*   HGB 11.2*   HCT 34.3*        CMP:   Recent Labs   Lab 08/16/24  1725   *   K 4.4      CO2 17*   *   BUN 26*   CREATININE 1.9*   CALCIUM 8.0*   PROT 6.0   ALBUMIN 2.4*   BILITOT 0.4   ALKPHOS 137*   AST 34   ALT 33   ANIONGAP 8     Lactic Acid:   Recent Labs   Lab 08/16/24  1725   LACTATE 1.2     Troponin: No results for input(s): "TROPONINI", "TROPONINIHS" in the last 48 hours.  TSH: No results for input(s): "TSH" in the last 4320 hours.  Urine Culture: No results for input(s): "LABURIN" in the last 48 hours.  Urine Studies: No results for input(s): "COLORU", "APPEARANCEUA", "PHUR", "SPECGRAV", "PROTEINUA", "GLUCUA", "KETONESU", "BILIRUBINUA", "OCCULTUA", "NITRITE", "UROBILINOGEN", "LEUKOCYTESUR", "RBCUA", "WBCUA", "BACTERIA", "SQUAMEPITHEL", "HYALINECASTS" in the last 48 hours.    Invalid input(s): "WRIGHTSUR"  Recent Lab Results         08/16/24  1725        Albumin 2.4              ALT 33       Anion Gap 8       AST 34       Baso # 0.09       Basophil % 0.7       BILIRUBIN TOTAL 0.4  Comment: For infants and newborns, interpretation of results should be based  on gestational age, weight and in agreement with clinical  observations.    Premature Infant recommended reference ranges:  Up to 24 hours.............<8.0 mg/dL  Up to 48 hours............<12.0 mg/dL  3-5 days..................<15.0 mg/dL  6-29 days.................<15.0 mg/dL         BUN 26       Calcium 8.0       Chloride 110       CO2 17       Creatinine 1.9       Differential Method Automated       eGFR 39       Eos # 0.1       Eos % 0.9       Glucose 151       Gran # (ANC) 10.8       Gran % 84.3       Hematocrit 34.3       Hemoglobin " 11.2       Immature Grans (Abs) 0.11  Comment: Mild elevation in immature granulocytes is non specific and   can be seen in a variety of conditions including stress response,   acute inflammation, trauma and pregnancy. Correlation with other   laboratory and clinical findings is essential.         Immature Granulocytes 0.9       Lactic Acid Level 1.2  Comment: Falsely low lactic acid results can be found in samples   containing >=13.0 mg/dL total bilirubin and/or >=3.5 mg/dL   direct bilirubin.         Lymph # 0.8       Lymph % 6.3       MCH 29.6       MCHC 32.7       MCV 91       Mono # 0.9       Mono % 6.9       MPV 9.3       nRBC 0       Platelet Count 373       Potassium 4.4       PROTEIN TOTAL 6.0       RBC 3.78       RDW 14.6       Sodium 135       WBC 12.77               Significant Imaging: I have reviewed all pertinent imaging results/findings within the past 24 hours.  Assessment/Plan:     * SBP (spontaneous bacterial peritonitis)  Repeat abdominal US  Will start zosyn  IR consult   F/U blood Cx      Hyperlipidemia associated with type 2 diabetes mellitus  Cont statin      Hypertension associated with diabetes  Resume home meds and monitor BP      Alcoholic cirrhosis of liver with ascites  Patient with known Cirrhosis with Child's class Calculator for Child's score link- https://[email or web address].com/calc/340/child-mathews-score-cirrhosis-mortality#creator-insights. Co-morbidities are present and inclusive of ascites and portal hypertension.  MELD-Na score calculated; MELD 3.0: 24 at 8/4/2024  6:30 AM  MELD-Na: 24 at 8/4/2024  6:30 AM  Calculated from:  Serum Creatinine: 3.6 mg/dL (Using max of 3 mg/dL) at 8/4/2024  6:30 AM  Serum Sodium: 132 mmol/L at 8/4/2024  6:30 AM  Total Bilirubin: 0.4 mg/dL (Using min of 1 mg/dL) at 8/4/2024  6:30 AM  Serum Albumin: 2.4 g/dL at 8/4/2024  6:30 AM  INR(ratio): 1.2 at 8/2/2024  1:30 AM  Age at listing (hypothetical): 63 years  Sex: Male at 8/4/2024  6:30 AM      Continue chronic  "meds. Etiology likely ETOH. Will avoid any hepatotoxic meds, and monitor CBC/CMP/INR for synthetic function.     Alcohol abuse  Has stopped drinking about a year ago  Monitor for delirium  Cont folic acid      Type 2 diabetes mellitus without complication, without long-term current use of insulin  Patient's FSGs are controlled on current medication regimen.  Last A1c reviewed-   Lab Results   Component Value Date    HGBA1C 5.4 06/20/2024     Most recent fingerstick glucose reviewed- No results for input(s): "POCTGLUCOSE" in the last 24 hours.  Current correctional scale  Medium  Maintain anti-hyperglycemic dose as follows-   Antihyperglycemics (From admission, onward)      None          Hold Oral hypoglycemics while patient is in the hospital.      VTE Risk Mitigation (From admission, onward)           Ordered     IP VTE HIGH RISK PATIENT  Once         08/16/24 1942     Place sequential compression device  Until discontinued         08/16/24 1942     Reason for No Pharmacological VTE Prophylaxis  Once        Question:  Reasons:  Answer:  Already adequately anticoagulated on oral Anticoagulants    08/16/24 1942                       On 08/16/2024, patient should be placed in hospital observation services under my care.    Time spent > 40 min         Demetri Armstrong MD  Department of Hospital Medicine  [place] - Emergency Dept          "

## 2024-08-17 NOTE — NURSING
Pt arrived to floor from ED per transport. Pt alert and oriented upon arrival. Vitals taken and stable. Pt oriented to roomset up and expectations. Call light in reach, safety measures in place.

## 2024-08-17 NOTE — ASSESSMENT & PLAN NOTE
"Patient's FSGs are controlled on current medication regimen.  Last A1c reviewed-   Lab Results   Component Value Date    HGBA1C 5.4 06/20/2024     Most recent fingerstick glucose reviewed- No results for input(s): "POCTGLUCOSE" in the last 24 hours.  Current correctional scale  Medium  Maintain anti-hyperglycemic dose as follows-   Antihyperglycemics (From admission, onward)      None          Hold Oral hypoglycemics while patient is in the hospital.  "

## 2024-08-18 LAB
ALBUMIN SERPL BCP-MCNC: 2.1 G/DL (ref 3.5–5.2)
ALP SERPL-CCNC: 112 U/L (ref 55–135)
ALT SERPL W/O P-5'-P-CCNC: 25 U/L (ref 10–44)
ANION GAP SERPL CALC-SCNC: 8 MMOL/L (ref 8–16)
AST SERPL-CCNC: 24 U/L (ref 10–40)
BILIRUB SERPL-MCNC: 0.3 MG/DL (ref 0.1–1)
BUN SERPL-MCNC: 25 MG/DL (ref 8–23)
CALCIUM SERPL-MCNC: 8 MG/DL (ref 8.7–10.5)
CHLORIDE SERPL-SCNC: 111 MMOL/L (ref 95–110)
CO2 SERPL-SCNC: 18 MMOL/L (ref 23–29)
CREAT SERPL-MCNC: 1.9 MG/DL (ref 0.5–1.4)
EST. GFR  (NO RACE VARIABLE): 39 ML/MIN/1.73 M^2
GLUCOSE SERPL-MCNC: 136 MG/DL (ref 70–110)
MAGNESIUM SERPL-MCNC: 1.8 MG/DL (ref 1.6–2.6)
POTASSIUM SERPL-SCNC: 4.6 MMOL/L (ref 3.5–5.1)
PROT SERPL-MCNC: 5.5 G/DL (ref 6–8.4)
SODIUM SERPL-SCNC: 137 MMOL/L (ref 136–145)

## 2024-08-18 PROCEDURE — 80053 COMPREHEN METABOLIC PANEL: CPT | Mod: NTX | Performed by: FAMILY MEDICINE

## 2024-08-18 PROCEDURE — 94761 N-INVAS EAR/PLS OXIMETRY MLT: CPT | Mod: NTX

## 2024-08-18 PROCEDURE — 36415 COLL VENOUS BLD VENIPUNCTURE: CPT | Mod: NTX | Performed by: FAMILY MEDICINE

## 2024-08-18 PROCEDURE — 25000003 PHARM REV CODE 250: Mod: NTX | Performed by: FAMILY MEDICINE

## 2024-08-18 PROCEDURE — 99900035 HC TECH TIME PER 15 MIN (STAT): Mod: NTX

## 2024-08-18 PROCEDURE — P9047 ALBUMIN (HUMAN), 25%, 50ML: HCPCS | Mod: JZ,JG,NTX | Performed by: FAMILY MEDICINE

## 2024-08-18 PROCEDURE — 94640 AIRWAY INHALATION TREATMENT: CPT | Mod: NTX,XB

## 2024-08-18 PROCEDURE — S4991 NICOTINE PATCH NONLEGEND: HCPCS | Mod: NTX | Performed by: FAMILY MEDICINE

## 2024-08-18 PROCEDURE — 11000001 HC ACUTE MED/SURG PRIVATE ROOM: Mod: NTX

## 2024-08-18 PROCEDURE — 63600175 PHARM REV CODE 636 W HCPCS: Mod: NTX | Performed by: FAMILY MEDICINE

## 2024-08-18 PROCEDURE — 83735 ASSAY OF MAGNESIUM: CPT | Mod: NTX | Performed by: FAMILY MEDICINE

## 2024-08-18 RX ORDER — ALBUMIN HUMAN 250 G/1000ML
100 SOLUTION INTRAVENOUS ONCE
Status: COMPLETED | OUTPATIENT
Start: 2024-08-18 | End: 2024-08-18

## 2024-08-18 RX ORDER — ONDANSETRON HYDROCHLORIDE 2 MG/ML
8 INJECTION, SOLUTION INTRAVENOUS EVERY 8 HOURS PRN
Status: DISCONTINUED | OUTPATIENT
Start: 2024-08-18 | End: 2024-08-20 | Stop reason: HOSPADM

## 2024-08-18 RX ADMIN — PANTOPRAZOLE SODIUM 40 MG: 40 TABLET, DELAYED RELEASE ORAL at 08:08

## 2024-08-18 RX ADMIN — NICOTINE 1 PATCH: 21 PATCH, EXTENDED RELEASE TRANSDERMAL at 08:08

## 2024-08-18 RX ADMIN — ONDANSETRON 8 MG: 2 INJECTION INTRAMUSCULAR; INTRAVENOUS at 12:08

## 2024-08-18 RX ADMIN — PANTOPRAZOLE SODIUM 40 MG: 40 TABLET, DELAYED RELEASE ORAL at 09:08

## 2024-08-18 RX ADMIN — ALBUMIN (HUMAN) 100 G: 12.5 SOLUTION INTRAVENOUS at 04:08

## 2024-08-18 RX ADMIN — FLUTICASONE FUROATE AND VILANTEROL TRIFENATATE 1 PUFF: 100; 25 POWDER RESPIRATORY (INHALATION) at 07:08

## 2024-08-18 RX ADMIN — AMIODARONE HYDROCHLORIDE 200 MG: 200 TABLET ORAL at 08:08

## 2024-08-18 RX ADMIN — FOLIC ACID 1 MG: 1 TABLET ORAL at 08:08

## 2024-08-18 RX ADMIN — CEFTRIAXONE SODIUM 2 G: 2 INJECTION, POWDER, FOR SOLUTION INTRAMUSCULAR; INTRAVENOUS at 05:08

## 2024-08-18 NOTE — SUBJECTIVE & OBJECTIVE
Subjective:     Interval History: ongoing LLQ soreness. Otherwise no c/o    Review of Systems   Constitutional:  Negative for chills and fever.   Gastrointestinal:  Positive for abdominal pain. Negative for nausea and vomiting.     Objective:     Vital Signs (Most Recent):  Temp: 98.4 °F (36.9 °C) (08/18/24 1140)  Pulse: 67 (08/18/24 1140)  Resp: 18 (08/18/24 1140)  BP: (!) 101/50 (08/18/24 1140)  SpO2: 95 % (08/18/24 1140) Vital Signs (24h Range):  Temp:  [97.9 °F (36.6 °C)-98.8 °F (37.1 °C)] 98.4 °F (36.9 °C)  Pulse:  [64-98] 67  Resp:  [18-20] 18  SpO2:  [94 %-99 %] 95 %  BP: ()/(48-85) 101/50     Weight: 91.6 kg (201 lb 15.1 oz) (08/17/24 1229)  Body mass index is 29.82 kg/m².    No intake or output data in the 24 hours ending 08/18/24 1401    Lines/Drains/Airways       Peripheral Intravenous Line  Duration                  Peripheral IV - Single Lumen 08/16/24 1852 20 G Anterior;Right Forearm 1 day                     Physical Exam  Vitals and nursing note reviewed.   Constitutional:       Appearance: He is well-developed.   HENT:      Head: Normocephalic and atraumatic.   Eyes:      General: No scleral icterus.     Pupils: Pupils are equal, round, and reactive to light.   Cardiovascular:      Rate and Rhythm: Normal rate and regular rhythm.      Heart sounds: Normal heart sounds.   Pulmonary:      Effort: Pulmonary effort is normal. No respiratory distress.      Breath sounds: Normal breath sounds.   Abdominal:      General: Bowel sounds are normal. There is no distension.      Palpations: Abdomen is soft.      Tenderness: There is no abdominal tenderness.   Musculoskeletal:         General: Normal range of motion.   Neurological:      Mental Status: He is alert and oriented to person, place, and time.      Comments: No asterixis   Psychiatric:         Behavior: Behavior normal.          Significant Labs:  Recent Lab Results         08/18/24  0342        Albumin 2.1              ALT 25        Anion Gap 8       AST 24       BILIRUBIN TOTAL 0.3  Comment: For infants and newborns, interpretation of results should be based  on gestational age, weight and in agreement with clinical  observations.    Premature Infant recommended reference ranges:  Up to 24 hours.............<8.0 mg/dL  Up to 48 hours............<12.0 mg/dL  3-5 days..................<15.0 mg/dL  6-29 days.................<15.0 mg/dL         BUN 25       Calcium 8.0       Chloride 111       CO2 18       Creatinine 1.9       eGFR 39       Glucose 136       Magnesium  1.8       Potassium 4.6       PROTEIN TOTAL 5.5       Sodium 137                 Significant Imaging:  Imaging results within the past 24 hours have been reviewed.

## 2024-08-18 NOTE — PROGRESS NOTES
[facility] Medicine  Progress Note    Patient Name: Jonny Isabel  MRN: [id number]  Patient Class: OP- Observation   Admission Date: 8/16/2024  Length of Stay: 0 days  Attending Physician: Giovanna Leahy MD  Primary Care Provider: Yuli Costa MD        Subjective:     Principal Problem:SBP (spontaneous bacterial peritonitis)        HPI:  64 YO M with PMHx significant for DM2, alcoholic cirrhosis on liver transplant list, HTN, hypohyroidism, HLD was sent to the ER by his liver doctor for abnormal labs on ascitic fluid. Per patient he has been following with liver transplant specialist and underwent paracentesis every Wednesday. Last Wednesday he went to get his paracentesis as usual and did not noticed any changes. Except for left abdominal soreness, no new acute abdominal issues. Denies fever or chills, no CP or SOB, no N/V, nor diarrhea or constipation. Hepatologic clinic called him to come to the ER because ascitic seems has more WBCs than normal R/O spontaneous bacterial peritonitis. Labs in the ER Na+ 135, BUN/Cr 26/1.9, GFR 39, WBC 12.77 with RDW 14.6, Ca++ 8.0    Overview/Hospital Course:  No notes on file    Interval History: NAEON. No acute concerns.     Review of Systems   Constitutional:  Negative for fever.   Respiratory:  Negative for shortness of breath.    Cardiovascular:  Negative for chest pain.   Gastrointestinal:  Negative for abdominal pain.   Genitourinary:  Negative for dysuria.   Neurological:  Negative for headaches.     Objective:     Vital Signs (Most Recent):  Temp: 98.4 °F (36.9 °C) (08/18/24 1140)  Pulse: 67 (08/18/24 1140)  Resp: 18 (08/18/24 1140)  BP: (!) 101/50 (08/18/24 1140)  SpO2: 95 % (08/18/24 1140) Vital Signs (24h Range):  Temp:  [97.9 °F (36.6 °C)-98.8 °F (37.1 °C)] 98.4 °F (36.9 °C)  Pulse:  [64-98] 67  Resp:  [18-20] 18  SpO2:  [94 %-99 %] 95 %  BP: ()/(48-85) 101/50     Weight: 91.6 kg (201 lb 15.1 oz)  Body mass index is 29.82 kg/m².  No  intake or output data in the 24 hours ending 08/18/24 1520        Physical Exam  Vitals and nursing note reviewed.   Constitutional:       General: He is not in acute distress.     Appearance: He is well-developed. He is obese.   HENT:      Head: Normocephalic and atraumatic.   Eyes:      Conjunctiva/sclera: Conjunctivae normal.   Neck:      Vascular: No JVD.   Cardiovascular:      Rate and Rhythm: Normal rate and regular rhythm.      Heart sounds: Normal heart sounds.   Pulmonary:      Effort: Pulmonary effort is normal.      Breath sounds: Normal breath sounds.   Abdominal:      General: Bowel sounds are normal. There is distension.      Palpations: There is fluid wave.      Tenderness: There is no abdominal tenderness.   Musculoskeletal:      Cervical back: Neck supple.      Right lower leg: No edema.      Left lower leg: No edema.   Neurological:      Mental Status: He is alert.   Psychiatric:         Behavior: Behavior normal.             Significant Labs: All pertinent labs within the past 24 hours have been reviewed.  CBC:   Recent Labs   Lab 08/16/24  1725   WBC 12.77*   HGB 11.2*   HCT 34.3*        CMP:   Recent Labs   Lab 08/16/24  1725 08/17/24  0643 08/18/24  0342   * 136 137   K 4.4 4.6 4.6    111* 111*   CO2 17* 18* 18*   * 120* 136*   BUN 26* 24* 25*   CREATININE 1.9* 1.8* 1.9*   CALCIUM 8.0* 8.1* 8.0*   PROT 6.0 5.6* 5.5*   ALBUMIN 2.4* 2.2* 2.1*   BILITOT 0.4 0.4 0.3   ALKPHOS 137* 128 112   AST 34 34 24   ALT 33 33 25   ANIONGAP 8 7* 8       Significant Imaging: I have reviewed all pertinent imaging results/findings within the past 24 hours.    Assessment/Plan:      * SBP (spontaneous bacterial peritonitis)  Discontinue nonselective beta blockers   Severity of illness; chronic liver failure-sequential organ failure assessment (MICHEL-SOFA) score is 2   MICHEL-SOFA score (<7)-- not critically ill, typically treated with a third-generation cephalosporin. Change Zosyn to Rocephin  "2g daily-- day 2. Will need a 5 day course of treatment.      Discussed with GI-- IV albumin ordered per their recs  "Dose 25% albumin 1.5g/kg body weight,maximum dose 100g and on day 3 1g/kg body weight, maximum dose 100g"     Alcoholic cirrhosis of liver with ascites  Patient with known Cirrhosis. Co-morbidities are present and inclusive of ascites and portal hypertension.    MELD-Na score calculated; MELD 3.0: 24 at 8/4/2024  6:30 AM  MELD-Na: 24 at 8/4/2024  6:30 AM  Calculated from:  Serum Creatinine: 3.6 mg/dL (Using max of 3 mg/dL) at 8/4/2024  6:30 AM  Serum Sodium: 132 mmol/L at 8/4/2024  6:30 AM  Total Bilirubin: 0.4 mg/dL (Using min of 1 mg/dL) at 8/4/2024  6:30 AM  Serum Albumin: 2.4 g/dL at 8/4/2024  6:30 AM  INR(ratio): 1.2 at 8/2/2024  1:30 AM  Age at listing (hypothetical): 63 years  Sex: Male at 8/4/2024  6:30 AM      Continue chronic meds. Etiology likely ETOH. Will avoid any hepatotoxic meds, and monitor CBC/CMP/INR for synthetic function.   Continue weekly tap, q wednesdays    Hyperlipidemia associated with type 2 diabetes mellitus  Continue statin      Hypertension associated with diabetes  Stable   Continue home meds    Alcohol abuse  Encouraged continued cessation        VTE Risk Mitigation (From admission, onward)           Ordered     IP VTE HIGH RISK PATIENT  Once         08/16/24 1942     Place sequential compression device  Until discontinued         08/16/24 1942     Reason for No Pharmacological VTE Prophylaxis  Once        Question:  Reasons:  Answer:  Already adequately anticoagulated on oral Anticoagulants    08/16/24 1942                    Discharge Planning   DALILA:      Code Status: Full Code   Is the patient medically ready for discharge?:     Reason for patient still in hospital (select all that apply): Patient trending condition and Treatment         Inpatient Upgrade Note    Jonny Isabel has warranted treatment spanning two or more midnights of hospital level care for the " management of  SBP . He continues to require IV antibiotics. His condition is also complicated by the following comorbidities: Hypertension, Diabetes, and liver cirrhosis .      Giovanna Leahy MD  Department of [facility] Medicine   [facility]

## 2024-08-18 NOTE — PROGRESS NOTES
[facility]  Progress Note    Patient Name: Jonny Isabel  MRN: [id number]  Admission Date: 8/16/2024  Hospital Length of Stay: 0 days  Code Status: Full Code   Attending Provider: Giovanna Leahy MD  Consulting Provider: Bill Calles MD  Primary Care Physician: Yuli Costa MD  Principal Problem: SBP (spontaneous bacterial peritonitis)        Subjective:     Interval History: ongoing LLQ soreness. Otherwise no c/o    Review of Systems   Constitutional:  Negative for chills and fever.   Gastrointestinal:  Positive for abdominal pain. Negative for nausea and vomiting.     Objective:     Vital Signs (Most Recent):  Temp: 98.4 °F (36.9 °C) (08/18/24 1140)  Pulse: 67 (08/18/24 1140)  Resp: 18 (08/18/24 1140)  BP: (!) 101/50 (08/18/24 1140)  SpO2: 95 % (08/18/24 1140) Vital Signs (24h Range):  Temp:  [97.9 °F (36.6 °C)-98.8 °F (37.1 °C)] 98.4 °F (36.9 °C)  Pulse:  [64-98] 67  Resp:  [18-20] 18  SpO2:  [94 %-99 %] 95 %  BP: ()/(48-85) 101/50     Weight: 91.6 kg (201 lb 15.1 oz) (08/17/24 1229)  Body mass index is 29.82 kg/m².    No intake or output data in the 24 hours ending 08/18/24 1401    Lines/Drains/Airways       Peripheral Intravenous Line  Duration                  Peripheral IV - Single Lumen 08/16/24 1852 20 G Anterior;Right Forearm 1 day                     Physical Exam  Vitals and nursing note reviewed.   Constitutional:       Appearance: He is well-developed.   HENT:      Head: Normocephalic and atraumatic.   Eyes:      General: No scleral icterus.     Pupils: Pupils are equal, round, and reactive to light.   Cardiovascular:      Rate and Rhythm: Normal rate and regular rhythm.      Heart sounds: Normal heart sounds.   Pulmonary:      Effort: Pulmonary effort is normal. No respiratory distress.      Breath sounds: Normal breath sounds.   Abdominal:      General: Bowel sounds are normal. There is no distension.      Palpations: Abdomen is soft.      Tenderness:  There is no abdominal tenderness.   Musculoskeletal:         General: Normal range of motion.   Neurological:      Mental Status: He is alert and oriented to person, place, and time.      Comments: No asterixis   Psychiatric:         Behavior: Behavior normal.          Significant Labs:  Recent Lab Results         08/18/24  0342        Albumin 2.1              ALT 25       Anion Gap 8       AST 24       BILIRUBIN TOTAL 0.3  Comment: For infants and newborns, interpretation of results should be based  on gestational age, weight and in agreement with clinical  observations.    Premature Infant recommended reference ranges:  Up to 24 hours.............<8.0 mg/dL  Up to 48 hours............<12.0 mg/dL  3-5 days..................<15.0 mg/dL  6-29 days.................<15.0 mg/dL         BUN 25       Calcium 8.0       Chloride 111       CO2 18       Creatinine 1.9       eGFR 39       Glucose 136       Magnesium  1.8       Potassium 4.6       PROTEIN TOTAL 5.5       Sodium 137                 Significant Imaging:  Imaging results within the past 24 hours have been reviewed.  Assessment/Plan:     GI  * SBP (spontaneous bacterial peritonitis)  PMN > 250 on recent paracentesis.  Asymptomatic   Agree with 3rd generation cephalosporin  Follow culture and daily CBC  Hold nonselective beta-blocker was   Dose 25% albumin 1.5g/kg body weight,maximum dose 100g and on day 3 1g/kg body weight, maximum dose 100g         Thank you for your consult. I will follow-up with patient. Please contact us if you have any additional questions.    Bill Calles MD  Gastroenterology  [place] [facility]

## 2024-08-18 NOTE — PLAN OF CARE
Problem: Adult Inpatient Plan of Care  Goal: Plan of Care Review  Outcome: Progressing  Chart check complete. Vitals, orders, labs, and progress notes reviewed. Care plan updated. Will monitor.         intact

## 2024-08-18 NOTE — SUBJECTIVE & OBJECTIVE
"Interval History: NAEON. No acute concerns. Tolerated paracentesis well today.     Review of Systems   Constitutional:  Negative for fever.   Respiratory:  Negative for shortness of breath.    Cardiovascular:  Negative for chest pain.   Gastrointestinal:  Negative for abdominal pain.   Genitourinary:  Negative for dysuria.   Neurological:  Negative for headaches.     Objective:     Vital Signs (Most Recent):  Temp: 98 °F (36.7 °C) (08/19/24 1624)  Pulse: (!) 55 (08/19/24 1624)  Resp: 17 (08/19/24 1624)  BP: 119/63 (08/19/24 1624)  SpO2: 98 % (08/19/24 1624) Vital Signs (24h Range):  Temp:  [97.7 °F (36.5 °C)-98.4 °F (36.9 °C)] 98 °F (36.7 °C)  Pulse:  [55-78] 55  Resp:  [16-20] 17  SpO2:  [94 %-98 %] 98 %  BP: (119-166)/(59-82) 119/63     Weight: 91.6 kg (201 lb 15.1 oz)  Body mass index is 29.82 kg/m².    Intake/Output Summary (Last 24 hours) at 8/19/2024 1728  Last data filed at 8/19/2024 1245  Gross per 24 hour   Intake 480 ml   Output 6150 ml   Net -5670 ml           Physical Exam  Vitals and nursing note reviewed.   Constitutional:       General: He is not in acute distress.     Appearance: He is well-developed. He is obese.   HENT:      Head: Normocephalic and atraumatic.   Eyes:      Conjunctiva/sclera: Conjunctivae normal.   Neck:      Vascular: No JVD.   Cardiovascular:      Rate and Rhythm: Normal rate and regular rhythm.      Heart sounds: Normal heart sounds.   Pulmonary:      Effort: Pulmonary effort is normal.      Breath sounds: Normal breath sounds.   Abdominal:      General: Bowel sounds are normal.      Tenderness: There is no abdominal tenderness.   Musculoskeletal:      Cervical back: Neck supple.      Right lower leg: No edema.      Left lower leg: No edema.   Neurological:      Mental Status: He is alert.   Psychiatric:         Behavior: Behavior normal.             Significant Labs: All pertinent labs within the past 24 hours have been reviewed.  CBC:   No results for input(s): "WBC", "HGB", " ""HCT", "PLT" in the last 48 hours.    CMP:   Recent Labs   Lab 08/18/24  0342 08/19/24  0211    138   K 4.6 4.6   * 110   CO2 18* 19*   * 111*   BUN 25* 23   CREATININE 1.9* 1.9*   CALCIUM 8.0* 8.5*   PROT 5.5* 5.9*   ALBUMIN 2.1* 3.2*   BILITOT 0.3 0.4   ALKPHOS 112 97   AST 24 25   ALT 25 24   ANIONGAP 8 9       Significant Imaging: I have reviewed all pertinent imaging results/findings within the past 24 hours.  "

## 2024-08-18 NOTE — ASSESSMENT & PLAN NOTE
PMN > 250 on recent paracentesis.  Asymptomatic   Agree with 3rd generation cephalosporin  Follow culture and daily CBC  Hold nonselective beta-blocker was   Dose 25% albumin 1.5g/kg body weight,maximum dose 100g and on day 3 1g/kg body weight, maximum dose 100g

## 2024-08-18 NOTE — PLAN OF CARE
Pt oriented, denies pain. Iv ABT initiated, ron well. Call light in reach, safety measures in place.

## 2024-08-19 LAB
ALBUMIN SERPL BCP-MCNC: 3.2 G/DL (ref 3.5–5.2)
ALP SERPL-CCNC: 97 U/L (ref 55–135)
ALT SERPL W/O P-5'-P-CCNC: 24 U/L (ref 10–44)
ANION GAP SERPL CALC-SCNC: 9 MMOL/L (ref 8–16)
APPEARANCE FLD: NORMAL
AST SERPL-CCNC: 25 U/L (ref 10–40)
BILIRUB SERPL-MCNC: 0.4 MG/DL (ref 0.1–1)
BODY FLD TYPE: NORMAL
BUN SERPL-MCNC: 23 MG/DL (ref 8–23)
CALCIUM SERPL-MCNC: 8.5 MG/DL (ref 8.7–10.5)
CHLORIDE SERPL-SCNC: 110 MMOL/L (ref 95–110)
CO2 SERPL-SCNC: 19 MMOL/L (ref 23–29)
COLOR FLD: NORMAL
CREAT SERPL-MCNC: 1.9 MG/DL (ref 0.5–1.4)
EST. GFR  (NO RACE VARIABLE): 39 ML/MIN/1.73 M^2
GLUCOSE SERPL-MCNC: 111 MG/DL (ref 70–110)
LYMPHOCYTES NFR FLD MANUAL: 28 %
MAGNESIUM SERPL-MCNC: 1.8 MG/DL (ref 1.6–2.6)
MONOS+MACROS NFR FLD MANUAL: 31 %
NEUTROPHILS NFR FLD MANUAL: 41 %
POTASSIUM SERPL-SCNC: 4.6 MMOL/L (ref 3.5–5.1)
PROT SERPL-MCNC: 5.9 G/DL (ref 6–8.4)
SODIUM SERPL-SCNC: 138 MMOL/L (ref 136–145)
WBC # FLD: 749 /CU MM

## 2024-08-19 PROCEDURE — 87070 CULTURE OTHR SPECIMN AEROBIC: CPT | Mod: NTX | Performed by: FAMILY MEDICINE

## 2024-08-19 PROCEDURE — 83735 ASSAY OF MAGNESIUM: CPT | Mod: NTX | Performed by: FAMILY MEDICINE

## 2024-08-19 PROCEDURE — 36415 COLL VENOUS BLD VENIPUNCTURE: CPT | Mod: NTX | Performed by: FAMILY MEDICINE

## 2024-08-19 PROCEDURE — 94761 N-INVAS EAR/PLS OXIMETRY MLT: CPT | Mod: NTX

## 2024-08-19 PROCEDURE — S4991 NICOTINE PATCH NONLEGEND: HCPCS | Mod: NTX | Performed by: FAMILY MEDICINE

## 2024-08-19 PROCEDURE — 25000003 PHARM REV CODE 250: Mod: NTX | Performed by: FAMILY MEDICINE

## 2024-08-19 PROCEDURE — 99232 SBSQ HOSP IP/OBS MODERATE 35: CPT | Mod: NTX,,, | Performed by: INTERNAL MEDICINE

## 2024-08-19 PROCEDURE — 63600175 PHARM REV CODE 636 W HCPCS: Mod: NTX | Performed by: FAMILY MEDICINE

## 2024-08-19 PROCEDURE — 94640 AIRWAY INHALATION TREATMENT: CPT | Mod: NTX

## 2024-08-19 PROCEDURE — 25000003 PHARM REV CODE 250: Mod: NTX | Performed by: RADIOLOGY

## 2024-08-19 PROCEDURE — 89051 BODY FLUID CELL COUNT: CPT | Mod: NTX | Performed by: FAMILY MEDICINE

## 2024-08-19 PROCEDURE — 63600175 PHARM REV CODE 636 W HCPCS: Mod: NTX | Performed by: RADIOLOGY

## 2024-08-19 PROCEDURE — 80053 COMPREHEN METABOLIC PANEL: CPT | Mod: NTX | Performed by: FAMILY MEDICINE

## 2024-08-19 PROCEDURE — 87075 CULTR BACTERIA EXCEPT BLOOD: CPT | Mod: NTX | Performed by: FAMILY MEDICINE

## 2024-08-19 PROCEDURE — 87205 SMEAR GRAM STAIN: CPT | Mod: NTX | Performed by: FAMILY MEDICINE

## 2024-08-19 PROCEDURE — 11000001 HC ACUTE MED/SURG PRIVATE ROOM: Mod: NTX

## 2024-08-19 PROCEDURE — P9047 ALBUMIN (HUMAN), 25%, 50ML: HCPCS | Mod: JZ,JG,NTX | Performed by: RADIOLOGY

## 2024-08-19 PROCEDURE — 0W9G3ZZ DRAINAGE OF PERITONEAL CAVITY, PERCUTANEOUS APPROACH: ICD-10-PCS | Performed by: RADIOLOGY

## 2024-08-19 RX ORDER — ALBUMIN HUMAN 250 G/1000ML
50 SOLUTION INTRAVENOUS ONCE
Status: COMPLETED | OUTPATIENT
Start: 2024-08-19 | End: 2024-08-19

## 2024-08-19 RX ORDER — HYDROMORPHONE HYDROCHLORIDE 1 MG/ML
INJECTION, SOLUTION INTRAMUSCULAR; INTRAVENOUS; SUBCUTANEOUS
Status: COMPLETED | OUTPATIENT
Start: 2024-08-19 | End: 2024-08-19

## 2024-08-19 RX ORDER — LIDOCAINE HYDROCHLORIDE 10 MG/ML
INJECTION, SOLUTION INFILTRATION; PERINEURAL
Status: COMPLETED | OUTPATIENT
Start: 2024-08-19 | End: 2024-08-19

## 2024-08-19 RX ADMIN — LIDOCAINE HYDROCHLORIDE 5 ML: 10 INJECTION, SOLUTION INFILTRATION; PERINEURAL at 09:08

## 2024-08-19 RX ADMIN — ALBUMIN (HUMAN) 50 G: 12.5 SOLUTION INTRAVENOUS at 01:08

## 2024-08-19 RX ADMIN — PANTOPRAZOLE SODIUM 40 MG: 40 TABLET, DELAYED RELEASE ORAL at 10:08

## 2024-08-19 RX ADMIN — PANTOPRAZOLE SODIUM 40 MG: 40 TABLET, DELAYED RELEASE ORAL at 09:08

## 2024-08-19 RX ADMIN — HYDROMORPHONE HYDROCHLORIDE 1 MG: 1 INJECTION, SOLUTION INTRAMUSCULAR; INTRAVENOUS; SUBCUTANEOUS at 09:08

## 2024-08-19 RX ADMIN — AMIODARONE HYDROCHLORIDE 200 MG: 200 TABLET ORAL at 10:08

## 2024-08-19 RX ADMIN — CEFTRIAXONE SODIUM 2 G: 2 INJECTION, POWDER, FOR SOLUTION INTRAMUSCULAR; INTRAVENOUS at 05:08

## 2024-08-19 RX ADMIN — FLUTICASONE FUROATE AND VILANTEROL TRIFENATATE 1 PUFF: 100; 25 POWDER RESPIRATORY (INHALATION) at 08:08

## 2024-08-19 RX ADMIN — NICOTINE 1 PATCH: 21 PATCH, EXTENDED RELEASE TRANSDERMAL at 10:08

## 2024-08-19 RX ADMIN — FOLIC ACID 1 MG: 1 TABLET ORAL at 10:08

## 2024-08-19 NOTE — PROGRESS NOTES
[facility] Medicine  Progress Note    Patient Name: Jonny Isabel  MRN: [id number]  Patient Class: IP- Inpatient   Admission Date: 8/16/2024  Length of Stay: 1 days  Attending Physician: Giovanna Leahy MD  Primary Care Provider: Yuli Costa MD        Subjective:     Principal Problem:SBP (spontaneous bacterial peritonitis)        HPI:  62 YO M with PMHx significant for DM2, alcoholic cirrhosis on liver transplant list, HTN, hypohyroidism, HLD was sent to the ER by his liver doctor for abnormal labs on ascitic fluid. Per patient he has been following with liver transplant specialist and underwent paracentesis every Wednesday. Last Wednesday he went to get his paracentesis as usual and did not noticed any changes. Except for left abdominal soreness, no new acute abdominal issues. Denies fever or chills, no CP or SOB, no N/V, nor diarrhea or constipation. Hepatologic clinic called him to come to the ER because ascitic seems has more WBCs than normal R/O spontaneous bacterial peritonitis. Labs in the ER Na+ 135, BUN/Cr 26/1.9, GFR 39, WBC 12.77 with RDW 14.6, Ca++ 8.0    Overview/Hospital Course:  No notes on file    Interval History: NAEON. No acute concerns. Tolerated paracentesis well today.     Review of Systems   Constitutional:  Negative for fever.   Respiratory:  Negative for shortness of breath.    Cardiovascular:  Negative for chest pain.   Gastrointestinal:  Negative for abdominal pain.   Genitourinary:  Negative for dysuria.   Neurological:  Negative for headaches.     Objective:     Vital Signs (Most Recent):  Temp: 98 °F (36.7 °C) (08/19/24 1624)  Pulse: (!) 55 (08/19/24 1624)  Resp: 17 (08/19/24 1624)  BP: 119/63 (08/19/24 1624)  SpO2: 98 % (08/19/24 1624) Vital Signs (24h Range):  Temp:  [97.7 °F (36.5 °C)-98.4 °F (36.9 °C)] 98 °F (36.7 °C)  Pulse:  [55-78] 55  Resp:  [16-20] 17  SpO2:  [94 %-98 %] 98 %  BP: (119-166)/(59-82) 119/63     Weight: 91.6 kg (201 lb 15.1 oz)  Body mass  "index is 29.82 kg/m².    Intake/Output Summary (Last 24 hours) at 8/19/2024 1728  Last data filed at 8/19/2024 1245  Gross per 24 hour   Intake 480 ml   Output 6150 ml   Net -5670 ml           Physical Exam  Vitals and nursing note reviewed.   Constitutional:       General: He is not in acute distress.     Appearance: He is well-developed. He is obese.   HENT:      Head: Normocephalic and atraumatic.   Eyes:      Conjunctiva/sclera: Conjunctivae normal.   Neck:      Vascular: No JVD.   Cardiovascular:      Rate and Rhythm: Normal rate and regular rhythm.      Heart sounds: Normal heart sounds.   Pulmonary:      Effort: Pulmonary effort is normal.      Breath sounds: Normal breath sounds.   Abdominal:      General: Bowel sounds are normal.      Tenderness: There is no abdominal tenderness.   Musculoskeletal:      Cervical back: Neck supple.      Right lower leg: No edema.      Left lower leg: No edema.   Neurological:      Mental Status: He is alert.   Psychiatric:         Behavior: Behavior normal.             Significant Labs: All pertinent labs within the past 24 hours have been reviewed.  CBC:   No results for input(s): "WBC", "HGB", "HCT", "PLT" in the last 48 hours.    CMP:   Recent Labs   Lab 08/18/24  0342 08/19/24  0211    138   K 4.6 4.6   * 110   CO2 18* 19*   * 111*   BUN 25* 23   CREATININE 1.9* 1.9*   CALCIUM 8.0* 8.5*   PROT 5.5* 5.9*   ALBUMIN 2.1* 3.2*   BILITOT 0.3 0.4   ALKPHOS 112 97   AST 24 25   ALT 25 24   ANIONGAP 8 9       Significant Imaging: I have reviewed all pertinent imaging results/findings within the past 24 hours.    Assessment/Plan:      * SBP (spontaneous bacterial peritonitis)  Discontinue nonselective beta blockers   Severity of illness; chronic liver failure-sequential organ failure assessment (MICHEL-SOFA) score is 2   MICHEL-SOFA score (<7)-- not critically ill, typically treated with a third-generation cephalosporin. Change Zosyn to Rocephin 2g daily-- day 3. " Will need a 5 day course of treatment.      Discussed with GI  Recs:  Repeat Para with similar PMN count, concerning for possible nidus of infection.  Notably had an embolization of a feeding vessel of the abd wall about 2 weeks ago  Obtain CT scan - will have to be non con given kidney function, rule out nidus of infection  Continue empiric Abx, may have to ask ID if we should broaden our coverage  Await cultures    Alcoholic cirrhosis of liver with ascites  Patient with known Cirrhosis. Co-morbidities are present and inclusive of ascites and portal hypertension.    MELD-Na score calculated; MELD 3.0: 24 at 8/4/2024  6:30 AM  MELD-Na: 24 at 8/4/2024  6:30 AM  Calculated from:  Serum Creatinine: 3.6 mg/dL (Using max of 3 mg/dL) at 8/4/2024  6:30 AM  Serum Sodium: 132 mmol/L at 8/4/2024  6:30 AM  Total Bilirubin: 0.4 mg/dL (Using min of 1 mg/dL) at 8/4/2024  6:30 AM  Serum Albumin: 2.4 g/dL at 8/4/2024  6:30 AM  INR(ratio): 1.2 at 8/2/2024  1:30 AM  Age at listing (hypothetical): 63 years  Sex: Male at 8/4/2024  6:30 AM      Continue chronic meds. Etiology likely ETOH. Will avoid any hepatotoxic meds, and monitor CBC/CMP/INR for synthetic function.   Continue weekly tap, q wednesdays    Hyperlipidemia associated with type 2 diabetes mellitus  Continue statin      Hypertension associated with diabetes  Stable   Continue home meds    Alcohol abuse  Encouraged continued cessation        VTE Risk Mitigation (From admission, onward)           Ordered     IP VTE HIGH RISK PATIENT  Once         08/16/24 1942     Place sequential compression device  Until discontinued         08/16/24 1942     Reason for No Pharmacological VTE Prophylaxis  Once        Question:  Reasons:  Answer:  Already adequately anticoagulated on oral Anticoagulants    08/16/24 1942                    Discharge Planning   DALILA:      Code Status: Full Code   Is the patient medically ready for discharge?:     Reason for patient still in hospital (select all  that apply): Patient trending condition and Treatment  Discharge Plan A: Home, Home with family, Other (Care At Home)          Giovanna Leahy MD  Department of Hospital Medicine   [facility]

## 2024-08-19 NOTE — SUBJECTIVE & OBJECTIVE
Subjective:     Interval History:   Feeling better today  Underwent para.   Studies still concerning for SBP  Abd pain improved, still some left sided pain however  Abd soft    Review of Systems   Constitutional:  Negative for chills and fever.   Respiratory:  Negative for choking and chest tightness.    Gastrointestinal:  Positive for abdominal distention. Negative for abdominal pain and vomiting.   Neurological:  Negative for dizziness and headaches.   Psychiatric/Behavioral:  Negative for agitation and behavioral problems.      Objective:     Vital Signs (Most Recent):  Temp: 97.7 °F (36.5 °C) (08/19/24 1118)  Pulse: (!) 57 (08/19/24 1118)  Resp: 17 (08/19/24 1118)  BP: 129/67 (08/19/24 1118)  SpO2: 96 % (08/19/24 1118) Vital Signs (24h Range):  Temp:  [97.7 °F (36.5 °C)-98.7 °F (37.1 °C)] 97.7 °F (36.5 °C)  Pulse:  [57-78] 57  Resp:  [16-20] 17  SpO2:  [94 %-96 %] 96 %  BP: (121-166)/(59-82) 129/67     Weight: 91.6 kg (201 lb 15.1 oz) (08/17/24 1229)  Body mass index is 29.82 kg/m².      Intake/Output Summary (Last 24 hours) at 8/19/2024 1511  Last data filed at 8/19/2024 0949  Gross per 24 hour   Intake --   Output 6150 ml   Net -6150 ml       Lines/Drains/Airways       Peripheral Intravenous Line  Duration                  Peripheral IV - Single Lumen 08/16/24 1852 20 G Anterior;Right Forearm 2 days                     Physical Exam  Vitals reviewed.   Constitutional:       Appearance: He is not toxic-appearing.   HENT:      Head: Normocephalic and atraumatic.   Eyes:      General: No scleral icterus.     Conjunctiva/sclera: Conjunctivae normal.   Abdominal:      General: There is distension.      Palpations: Abdomen is soft.      Tenderness: There is no abdominal tenderness.      Comments: Easily reducible umbilical hernia   Neurological:      Mental Status: He is alert and oriented to person, place, and time.      Gait: Gait normal.   Psychiatric:         Mood and Affect: Mood normal.         Behavior:  "Behavior normal.          Significant Labs:  Blood Culture: No results for input(s): "LABBLOO" in the last 48 hours.  CBC: No results for input(s): "WBC", "HGB", "HCT", "PLT" in the last 48 hours.  BMP:   Recent Labs   Lab 08/19/24 0211   *      K 4.6      CO2 19*   BUN 23   CREATININE 1.9*   CALCIUM 8.5*   MG 1.8     CMP:   Recent Labs   Lab 08/19/24 0211   *   CALCIUM 8.5*   ALBUMIN 3.2*   PROT 5.9*      K 4.6   CO2 19*      BUN 23   CREATININE 1.9*   ALKPHOS 97   ALT 24   AST 25   BILITOT 0.4     Coagulation: No results for input(s): "PT", "INR", "APTT" in the last 48 hours.  Peritoneal Fluid Cultures: No results for input(s): "AEROBICCULTU", "LABGRAM" in the last 48 hours.      Significant Imaging:  Imaging results within the past 24 hours have been reviewed.  "

## 2024-08-19 NOTE — PROGRESS NOTES
[facility] - Telemetry  Gastroenterology  Progress Note    Patient Name: Jonny Isabel  MRN: [id number]  Admission Date: 8/16/2024  Hospital Length of Stay: 1 days  Code Status: Full Code   Attending Provider: Giovanna Leahy MD  Consulting Provider: Kota Oconnell MD  Primary Care Physician: Yuli Costa MD  Principal Problem: SBP (spontaneous bacterial peritonitis)        Subjective:     Interval History:   Feeling better today  Underwent para.   Studies still concerning for SBP  Abd pain improved, still some left sided pain however  Abd soft    Review of Systems   Constitutional:  Negative for chills and fever.   Respiratory:  Negative for choking and chest tightness.    Gastrointestinal:  Positive for abdominal distention. Negative for abdominal pain and vomiting.   Neurological:  Negative for dizziness and headaches.   Psychiatric/Behavioral:  Negative for agitation and behavioral problems.      Objective:     Vital Signs (Most Recent):  Temp: 97.7 °F (36.5 °C) (08/19/24 1118)  Pulse: (!) 57 (08/19/24 1118)  Resp: 17 (08/19/24 1118)  BP: 129/67 (08/19/24 1118)  SpO2: 96 % (08/19/24 1118) Vital Signs (24h Range):  Temp:  [97.7 °F (36.5 °C)-98.7 °F (37.1 °C)] 97.7 °F (36.5 °C)  Pulse:  [57-78] 57  Resp:  [16-20] 17  SpO2:  [94 %-96 %] 96 %  BP: (121-166)/(59-82) 129/67     Weight: 91.6 kg (201 lb 15.1 oz) (08/17/24 1229)  Body mass index is 29.82 kg/m².      Intake/Output Summary (Last 24 hours) at 8/19/2024 1511  Last data filed at 8/19/2024 0949  Gross per 24 hour   Intake --   Output 6150 ml   Net -6150 ml       Lines/Drains/Airways       Peripheral Intravenous Line  Duration                  Peripheral IV - Single Lumen 08/16/24 1852 20 G Anterior;Right Forearm 2 days                     Physical Exam  Vitals reviewed.   Constitutional:       Appearance: He is not toxic-appearing.   HENT:      Head: Normocephalic and atraumatic.   Eyes:      General: No scleral icterus.     Conjunctiva/sclera: Conjunctivae  "normal.   Abdominal:      General: There is distension.      Palpations: Abdomen is soft.      Tenderness: There is no abdominal tenderness.      Comments: Easily reducible umbilical hernia   Neurological:      Mental Status: He is alert and oriented to person, place, and time.      Gait: Gait normal.   Psychiatric:         Mood and Affect: Mood normal.         Behavior: Behavior normal.          Significant Labs:  Blood Culture: No results for input(s): "LABBLOO" in the last 48 hours.  CBC: No results for input(s): "WBC", "HGB", "HCT", "PLT" in the last 48 hours.  BMP:   Recent Labs   Lab 08/19/24 0211   *      K 4.6      CO2 19*   BUN 23   CREATININE 1.9*   CALCIUM 8.5*   MG 1.8     CMP:   Recent Labs   Lab 08/19/24 0211   *   CALCIUM 8.5*   ALBUMIN 3.2*   PROT 5.9*      K 4.6   CO2 19*      BUN 23   CREATININE 1.9*   ALKPHOS 97   ALT 24   AST 25   BILITOT 0.4     Coagulation: No results for input(s): "PT", "INR", "APTT" in the last 48 hours.  Peritoneal Fluid Cultures: No results for input(s): "AEROBICCULTU", "LABGRAM" in the last 48 hours.      Significant Imaging:  Imaging results within the past 24 hours have been reviewed.  Assessment/Plan:     GI  * SBP (spontaneous bacterial peritonitis)  PMN > 250 on recent paracentesis.      Repeat Para with similar PMN count, concerning for possible nidus of infection.  Notably had an embolization of a feeding vessel of the abd wall about 2 weeks ago    Recs  Obtain CT scan - will have to be non con given kidney function, rule out nidus of infection  Continue empiric Abx, may have to ask ID if we should broaden our coverage  Await cultures    Supportive care    I will alert transplant Liver team of his admit.          Thank you for your consult. I will follow-up with patient. Please contact us if you have any additional questions.    Kota Oconnell MD  Gastroenterology  [facility] - Telemetry  "

## 2024-08-19 NOTE — CONSULTS
"Radiology Consult    Jonny Isabel is a 63 y.o. male with a history of SBP, imaged guided paracentesis requested. .  Past Medical History:   Diagnosis Date    A-fib     Esophageal varices 5/8/2023    Other ascites 5/8/2023     Past Surgical History:   Procedure Laterality Date    ESOPHAGOGASTRODUODENOSCOPY N/A 1/17/2023    Procedure: EGD (ESOPHAGOGASTRODUODENOSCOPY);  Surgeon: Dewayne Navas MD;  [facility]  Service: Endoscopy;  Laterality: N/A;    ESOPHAGOGASTRODUODENOSCOPY N/A 2/7/2024    Procedure: EGD (ESOPHAGOGASTRODUODENOSCOPY);  Surgeon: Nathan Goins MD;  [facility]  Service: Endoscopy;  Laterality: N/A;  referral: Dr. Escalante /cirrhosis - labs- possible banding / prep ins on portal / 2nd floor - Pt c/o SOB at times./ Xarelto - message sent to clearance nurse per protocol - ERW  1/31/24- LVM for precall - ERW  2/1-precall complet-Kpvt  ok to hold Xarelto2 da       Scheduled Meds:    amiodarone  200 mg Oral Daily    cefTRIAXone (Rocephin) IV (PEDS and ADULTS)  2 g Intravenous Q24H    fluticasone furoate-vilanteroL  1 puff Inhalation Daily    folic acid  1 mg Oral Daily    nicotine  1 patch Transdermal Daily    pantoprazole  40 mg Oral BID     Continuous Infusions:   PRN Meds:  Current Facility-Administered Medications:     dextrose 10%, 12.5 g, Intravenous, PRN    dextrose 10%, 25 g, Intravenous, PRN    glucagon (human recombinant), 1 mg, Intramuscular, PRN    glucose, 16 g, Oral, PRN    glucose, 24 g, Oral, PRN    HYDROmorphone, , Intravenous, PRN    naloxone, 0.02 mg, Intravenous, PRN    ondansetron, 8 mg, Intravenous, Q8H PRN    sodium chloride 0.9%, 10 mL, Intravenous, Q12H PRN    traZODone, 50 mg, Oral, Nightly PRN    Allergies: Review of patient's allergies indicates:  No Known Allergies    Labs:  No results for input(s): "INR", "PT", "PTT" in the last 168 hours.    Recent Labs   Lab 08/16/24  1725   WBC 12.77*   HGB 11.2*   HCT 34.3*   MCV 91       "   Recent Labs   Lab 08/19/24  0211   *      K 4.6      CO2 19*   BUN 23   CREATININE 1.9*   CALCIUM 8.5*   MG 1.8   ALT 24   AST 25   ALBUMIN 3.2*   BILITOT 0.4         Vitals (Most Recent):  Temp: 98.1 °F (36.7 °C) (08/19/24 0819)  Pulse: 78 (08/19/24 0921)  Resp: 18 (08/19/24 0921)  BP: 137/77 (08/19/24 0921)  SpO2: 96 % (08/19/24 0921)    Plan:   1. Plan for US-guided paracentesis under local anesthesia today.     Ed Nelson MD  Interventional Radiology  Department of Radiology

## 2024-08-19 NOTE — ASSESSMENT & PLAN NOTE
PMN > 250 on recent paracentesis.      Repeat Para with similar PMN count, concerning for possible nidus of infection.  Notably had an embolization of a feeding vessel of the abd wall about 2 weeks ago    Recs  Obtain CT scan - will have to be non con given kidney function, rule out nidus of infection  Continue empiric Abx, may have to ask ID if we should broaden our coverage  Await cultures    Supportive care    I will alert transplant Liver team of his admit.

## 2024-08-19 NOTE — PROCEDURES
Radiology Post-Procedure Note    Pre Op Diagnosis: SBP  Post Op Diagnosis: Same    Procedure: US-guided paracentesis    Procedure performed by: Ed Nelson MD    Written Informed Consent Obtained: Yes  Specimen Removed: YES 6100 mL of serosanguinous ascites  Estimated Blood Loss: Minimal    Findings:   Large volume ascites. Albumin ordered per protocol. No complications.     Patient tolerated procedure well.    Ed Nelson MD  Interventional Radiology  Department of Radiology

## 2024-08-19 NOTE — PLAN OF CARE
CM met with pt at bedside to discuss discharge planning. He lives at home with spouse. Pt is independent with ADL's. He has multiple DME( see list below)  and stated he gets Paracentesis weekly. Upon discharge, pts family member Adelita Bingham (spouse) [phone]  will provide transport home. CM added name and number to white board. Plans are when medically ready, he will discharge to home with family. Pt made aware to contact CM with any questions or concerns. CM  will continue to follow and assist with any discharge needs.   Future Appointments   Date Time Provider Department Center   8/28/2024  3:30 PM Lovely Escalante MD [facility]   9/4/2024 10:15 AM LAB, [facility]   9/5/2024  9:00 AM Bret Amaya MD [facility]   9/9/2024  4:00 PM [facility]ely [facility]   [facility] - Telemetry  Initial Discharge Assessment       Primary Care Provider: Yuli Costa MD    Admission Diagnosis: SBP (spontaneous bacterial peritonitis) [K65.2]  Chest pain [R07.9]    Admission Date: 8/16/2024  Expected Discharge Date:     Transition of Care Barriers: (P) None    Payor: MEDICAID / Plan: [facility] COMMUNITY PLAN [facility] ([place] MEDICAID) / Product Type: Managed Medicaid /     Extended Emergency Contact Information  Primary Emergency Contact: Adelita Bingham  Mobile Phone: [phone]  Relation: Spouse    Discharge Plan A: (P) Home, Home with family, Other ([facility])         [facility] - [place] - [address]  [address]  [place] [address]  Phone: [phone] Fax: [phone]      Initial Assessment (most recent)       Adult Discharge Assessment - 08/19/24 0950          Discharge Assessment    Assessment Type Discharge Planning Assessment     Confirmed/corrected address, phone number and insurance Yes     Confirmed Demographics Correct on Facesheet     Source of Information patient     When was your last doctors appointment? 08/14/24 (P)       Communicated DALILA with patient/caregiver Date not available/Unable to determine (P)      Reason For Admission SBP (P)      People in Home spouse (P)      Do you expect to return to your current living situation? Yes (P)      Do you have help at home or someone to help you manage your care at home? Yes (P)      Who are your caregiver(s) and their phone number(s)? Adelita Ziegleryder (spouse) [phone] (P)      Prior to hospitilization cognitive status: Alert/Oriented (P)      Current cognitive status: Alert/Oriented (P)      Walking or Climbing Stairs Difficulty no (P)      Dressing/Bathing Difficulty no (P)      Equipment Currently Used at Home walker, rolling;wheelchair;cane, straight;hospital bed (P)      Readmission within 30 days? No (P)      Patient currently being followed by outpatient case management? No (P)      Do you currently have service(s) that help you manage your care at home? Yes (P)      Name and Contact number of agency [facility] (P)      Is the pt/caregiver preference to resume services with current agency Yes (P)      Do you take prescription medications? Yes (P)      Do you have prescription coverage? Yes (P)      Coverage Medicaid (P)      Do you have any problems affording any of your prescribed medications? No (P)      Is the patient taking medications as prescribed? yes (P)      Who is going to help you get home at discharge? Adelita Bingham (spouse) [phone] (P)      How do you get to doctors appointments? family or friend will provide (P)      Are you on dialysis? No (P)      Do you take coumadin? No (P)    Xarelto    Discharge Plan A Home;Home with family;Other (P)    Care At Home    DME Needed Upon Discharge  none (P)      Discharge Plan discussed with: Patient (P)      Transition of Care Barriers None (P)         Physical Activity    On average, how many days per week do you engage in moderate to strenuous exercise (like a brisk walk)? 0 days (P)      On average, how many minutes do  you engage in exercise at this level? 0 min (P)         Financial Resource Strain    How hard is it for you to pay for the very basics like food, housing, medical care, and heating? Not hard at all (P)         Housing Stability    In the last 12 months, was there a time when you were not able to pay the mortgage or rent on time? No (P)      At any time in the past 12 months, were you homeless or living in a shelter (including now)? No (P)         Transportation Needs    Has the lack of transportation kept you from medical appointments, meetings, work or from getting things needed for daily living? No (P)         Food Insecurity    Within the past 12 months, you worried that your food would run out before you got the money to buy more. Never true (P)      Within the past 12 months, the food you bought just didn't last and you didn't have money to get more. Never true (P)         Stress    Do you feel stress - tense, restless, nervous, or anxious, or unable to sleep at night because your mind is troubled all the time - these days? Not at all (P)         Social Isolation    How often do you feel lonely or isolated from those around you?  Never (P)         Alcohol Use    Q1: How often do you have a drink containing alcohol? Never (P)      Q2: How many drinks containing alcohol do you have on a typical day when you are drinking? Patient does not drink (P)      Q3: How often do you have six or more drinks on one occasion? Never (P)         Utilities    In the past 12 months has the electric, gas, oil, or water company threatened to shut off services in your home? No (P)         Health Literacy    How often do you need to have someone help you when you read instructions, pamphlets, or other written material from your doctor or pharmacy? Never (P)         OTHER    Name(s) of People in Home Adelita Bingham (spouse) [phone] (P)

## 2024-08-19 NOTE — SEDATION DOCUMENTATION
Patient presents for paracentesis. Known to department outpatient. Tolerated procedure well no complications. Patient to be brought back to room and report to be given at bedside. Albumin ordered per protocol.

## 2024-08-20 ENCOUNTER — CLINICAL SUPPORT (OUTPATIENT)
Dept: SMOKING CESSATION | Facility: CLINIC | Age: 64
End: 2024-08-20
Payer: COMMERCIAL

## 2024-08-20 VITALS
SYSTOLIC BLOOD PRESSURE: 136 MMHG | DIASTOLIC BLOOD PRESSURE: 74 MMHG | WEIGHT: 201.94 LBS | HEART RATE: 71 BPM | OXYGEN SATURATION: 96 % | BODY MASS INDEX: 29.91 KG/M2 | HEIGHT: 69 IN | RESPIRATION RATE: 18 BRPM | TEMPERATURE: 98 F

## 2024-08-20 DIAGNOSIS — F17.210 CIGARETTE SMOKER: Primary | ICD-10-CM

## 2024-08-20 PROCEDURE — 25000003 PHARM REV CODE 250: Mod: NTX | Performed by: FAMILY MEDICINE

## 2024-08-20 PROCEDURE — 63600175 PHARM REV CODE 636 W HCPCS: Mod: NTX | Performed by: FAMILY MEDICINE

## 2024-08-20 PROCEDURE — 94640 AIRWAY INHALATION TREATMENT: CPT | Mod: NTX

## 2024-08-20 PROCEDURE — S4991 NICOTINE PATCH NONLEGEND: HCPCS | Mod: NTX | Performed by: FAMILY MEDICINE

## 2024-08-20 PROCEDURE — 99407 BEHAV CHNG SMOKING > 10 MIN: CPT | Mod: S$GLB,TXP,,

## 2024-08-20 PROCEDURE — 99232 SBSQ HOSP IP/OBS MODERATE 35: CPT | Mod: NTX,,, | Performed by: INTERNAL MEDICINE

## 2024-08-20 PROCEDURE — 94761 N-INVAS EAR/PLS OXIMETRY MLT: CPT | Mod: NTX

## 2024-08-20 RX ORDER — CIPROFLOXACIN 500 MG/1
500 TABLET ORAL EVERY 12 HOURS
Status: DISCONTINUED | OUTPATIENT
Start: 2024-08-21 | End: 2024-08-20 | Stop reason: HOSPADM

## 2024-08-20 RX ORDER — CIPROFLOXACIN 500 MG/1
500 TABLET ORAL EVERY 12 HOURS
Qty: 20 TABLET | Refills: 0 | Status: SHIPPED | OUTPATIENT
Start: 2024-08-21 | End: 2024-08-31

## 2024-08-20 RX ADMIN — PANTOPRAZOLE SODIUM 40 MG: 40 TABLET, DELAYED RELEASE ORAL at 10:08

## 2024-08-20 RX ADMIN — FLUTICASONE FUROATE AND VILANTEROL TRIFENATATE 1 PUFF: 100; 25 POWDER RESPIRATORY (INHALATION) at 08:08

## 2024-08-20 RX ADMIN — AMIODARONE HYDROCHLORIDE 200 MG: 200 TABLET ORAL at 10:08

## 2024-08-20 RX ADMIN — FOLIC ACID 1 MG: 1 TABLET ORAL at 10:08

## 2024-08-20 RX ADMIN — NICOTINE 1 PATCH: 21 PATCH, EXTENDED RELEASE TRANSDERMAL at 10:08

## 2024-08-20 RX ADMIN — CEFTRIAXONE SODIUM 2 G: 2 INJECTION, POWDER, FOR SOLUTION INTRAMUSCULAR; INTRAVENOUS at 03:08

## 2024-08-20 NOTE — PLAN OF CARE
Discharge orders noted. Additional clinical references attached.    Patient's discharge instructions given by bedside RN and reviewed via this VN with patient over phone.    Education provided on new medication, diagnosis, and follow-up appointments.    All questions answered. Teach back method used. Patient verbalized understanding.

## 2024-08-20 NOTE — PROGRESS NOTES
Individual Follow-Up Form    8/20/2024    Quit Date: To be determined    Clinical Status of Patient: Inpatient    Length of Service: 30 minutes    Comments: Smoking cessation education note: Pt is a 1 to 1.5 pk/day cigarette smoker x 51 yrs. 21 mg nicotine patch ordered Q day. Pt states ready to quit. Ambulatory referral to Smoking Cessation clinic following hospital discharge,.     Diagnosis: F17.210

## 2024-08-20 NOTE — SUBJECTIVE & OBJECTIVE
Subjective:     Interval History: Patient seen and examined at bedside. No acute events overnight. He reports feeling fine and asking when he can go home.     Review of Systems   Constitutional:  Negative for activity change and appetite change.   HENT:  Negative for congestion and dental problem.    Eyes:  Negative for discharge and itching.   Respiratory:  Negative for apnea and chest tightness.    Cardiovascular:  Negative for chest pain and leg swelling.   Gastrointestinal:  Negative for abdominal distention, abdominal pain, anal bleeding, blood in stool, constipation, diarrhea, nausea and vomiting.   Endocrine: Negative for cold intolerance and heat intolerance.   Genitourinary:  Negative for difficulty urinating and dysuria.   Musculoskeletal:  Negative for arthralgias and back pain.   Skin:  Negative for color change.   Allergic/Immunologic: Negative for environmental allergies and food allergies.   Neurological:  Negative for dizziness and facial asymmetry.   Hematological:  Negative for adenopathy.   Psychiatric/Behavioral:  Negative for agitation and behavioral problems.      Objective:     Vital Signs (Most Recent):  Temp: 97.2 °F (36.2 °C) (08/20/24 0320)  Pulse: 67 (08/20/24 0320)  Resp: 17 (08/20/24 0320)  BP: 117/76 (08/20/24 0320)  SpO2: 99 % (08/20/24 0320) Vital Signs (24h Range):  Temp:  [97.2 °F (36.2 °C)-98.6 °F (37 °C)] 97.2 °F (36.2 °C)  Pulse:  [55-78] 67  Resp:  [17-20] 17  SpO2:  [95 %-99 %] 99 %  BP: (117-166)/(63-82) 117/76     Weight: 91.6 kg (201 lb 15.1 oz) (08/17/24 1229)  Body mass index is 29.82 kg/m².      Intake/Output Summary (Last 24 hours) at 8/20/2024 0817  Last data filed at 8/19/2024 1854  Gross per 24 hour   Intake 480 ml   Output 6150 ml   Net -5670 ml       Lines/Drains/Airways       Peripheral Intravenous Line  Duration                  Peripheral IV - Single Lumen 08/16/24 1852 20 G Anterior;Right Forearm 3 days                     Physical Exam  Vitals reviewed.  "  Constitutional:       Appearance: Normal appearance.   HENT:      Head: Normocephalic.      Nose: Nose normal.      Mouth/Throat:      Mouth: Mucous membranes are moist.   Eyes:      Pupils: Pupils are equal, round, and reactive to light.   Cardiovascular:      Rate and Rhythm: Normal rate and regular rhythm.      Pulses: Normal pulses.      Heart sounds: Normal heart sounds.   Pulmonary:      Effort: Pulmonary effort is normal.      Breath sounds: Normal breath sounds.   Abdominal:      General: There is distension.      Palpations: Abdomen is soft. There is no mass.      Tenderness: There is no abdominal tenderness. There is no guarding or rebound.      Hernia: No hernia is present.   Musculoskeletal:         General: Normal range of motion.      Cervical back: Normal range of motion and neck supple.   Skin:     General: Skin is warm.      Capillary Refill: Capillary refill takes less than 2 seconds.   Neurological:      General: No focal deficit present.      Mental Status: He is alert and oriented to person, place, and time.   Psychiatric:         Mood and Affect: Mood normal.          Significant Labs:  CBC: No results for input(s): "WBC", "HGB", "HCT", "PLT" in the last 48 hours.  CMP:   Recent Labs   Lab 08/19/24  0211   *   CALCIUM 8.5*   ALBUMIN 3.2*   PROT 5.9*      K 4.6   CO2 19*      BUN 23   CREATININE 1.9*   ALKPHOS 97   ALT 24   AST 25   BILITOT 0.4         Significant Imaging:  CT: I have reviewed all results within the past 24 hours and my personal findings are:  pending CT scan read  "

## 2024-08-20 NOTE — PLAN OF CARE
1040  Previously scheduled appts with Dr Escalante (hep) on 8/28/2024 at 1530 & Dr Bret Amaya (GI) on 95/2024 at 0900 noted. Information added to the pt's discharge paperwork.      08/20/24 1200   Rounds   Attendance Provider;Nurse    Discharge Plan A Home with family   Why the patient remains in the hospital Requires continued medical care     1200  CM was informed by Dr Leahy that the pt is not medically stable to discharge home today. Patient was admitted with spontaneous bacterial peritonitis, is being followed by GI, & is receiving IV ceftriaxone.     1415  Patient resting quietly in bed when CM rounded. No family present. Pt stated he was told that he will dc today. Message sent to Dr Leahy questioning pt's discharge status.     CM informed the pt of previously scheduled appts with Dr Escalante & Dr Amaya. Pt verbalized understanding.     1430  CM was informed by Dr Leahy & Dr Oconnell () that the pt is medically stable to discharge home with PO cipro.     1545  Patient resting quietly in bed when CM rounded via [employer]. No family present. Patient in agreement with plan to discharge home today, denied the need for assistance with transportation at time of discharge, & verbalized understanding regarding the hospital follow up appointments.    Message sent to nurse Orin, charge nurse Cristina, & virtual nurse Ronda informing that the pt is cleared to discharge.     Message sent to [facility]  Beena Borjas informing of the pt's discharge status & requesting a home visit from BARBARA Escalera. Patient will be notified of home visit appt date & time. Information added to the pt's discharge paperwork.       Will continue to follow.

## 2024-08-20 NOTE — HOSPITAL COURSE
He  was started on IV abx. He was evaluated by GI. IR performed paracentesis. He tolerated the procedure well. PMN count still elevated. CTAP without definitive findings to suggest source of SBP. Cultures remain negative at this time. He was cleared for discharge with continued oral antibiotics.

## 2024-08-20 NOTE — DISCHARGE SUMMARY
[facility] Medicine  Discharge Summary      Patient Name: Jonny Isabel  MRN: [id number]  CAROLYN: [id number]  Patient Class: IP- Inpatient  Admission Date: 8/16/2024  Hospital Length of Stay: 2 days  Discharge Date and Time: 8/20/2024  5:20 PM  Attending Physician: Giovanna Leahy MD   Discharging Provider: Giovanna Leahy MD  Primary Care Provider: Yuli Costa MD    Primary Care Team: Networked reference to record PCT     HPI:   64 YO M with PMHx significant for DM2, alcoholic cirrhosis on liver transplant list, HTN, hypohyroidism, HLD was sent to the ER by his liver doctor for abnormal labs on ascitic fluid. Per patient he has been following with liver transplant specialist and underwent paracentesis every Wednesday. Last Wednesday he went to get his paracentesis as usual and did not noticed any changes. Except for left abdominal soreness, no new acute abdominal issues. Denies fever or chills, no CP or SOB, no N/V, nor diarrhea or constipation. Hepatologic clinic called him to come to the ER because ascitic seems has more WBCs than normal R/O spontaneous bacterial peritonitis. Labs in the ER Na+ 135, BUN/Cr 26/1.9, GFR 39, WBC 12.77 with RDW 14.6, Ca++ 8.0      Hospital Course:   He  was started on IV abx. He was evaluated by GI. IR performed paracentesis. He tolerated the procedure well. PMN count still elevated. CTAP without definitive findings to suggest source of SBP. Cultures remain negative at this time. He was cleared for discharge with continued oral antibiotics.      Goals of Care Treatment Preferences:  Code Status: Full Code      SDOH Screening:  The patient was screened for utility difficulties, food insecurity, transport difficulties, housing insecurity, and interpersonal safety and there were no concerns identified this admission.     Consults:   Consults (From admission, onward)          Status Ordering Provider     [facility]  Once         Provider:  (Not yet assigned)    ANAHY Maldonado     Inpatient consult to Interventional Radiology  Once        Provider:  (Not yet assigned)    Completed ANAHY GONZALEZ            Cardiac/Vascular  Hyperlipidemia associated with type 2 diabetes mellitus  Continue statin      Hypertension associated with diabetes  Continue home meds    GI  * SBP (spontaneous bacterial peritonitis)  Discussed with GI  Ok for DC. Continue oral antibiotics for 10- 14 days.    Alcoholic cirrhosis of liver with ascites  Continue weekly taps  Fu with hepatology    Other  Alcohol abuse  Encouraged continued cessation        Final Active Diagnoses:    Diagnosis Date Noted POA    PRINCIPAL PROBLEM:  SBP (spontaneous bacterial peritonitis) [K65.2] 08/16/2024 Yes    Alcoholic cirrhosis of liver with ascites [K70.31] 01/16/2023 Yes    Hypertension associated with diabetes [E11.59, I15.2] 06/05/2023 Yes    Hyperlipidemia associated with type 2 diabetes mellitus [E11.69, E78.5] 06/05/2023 Yes    Alcohol abuse [F10.10] 01/16/2023 Yes      Problems Resolved During this Admission:    Diagnosis Date Noted Date Resolved POA    Type 2 diabetes mellitus without complication, without long-term current use of insulin [E11.9] 01/16/2023 08/17/2024 Yes       Discharged Condition: stable    Disposition: Home or Self Care    Follow Up:   Follow-up Information       Lovely Escalante MD Follow up on 8/28/2024.    Specialties: Transplant, Hepatology  Why: at 3:30pm; hepatology appointment  Contact information:  [address] [facility] [address]  [phone]               Bret Amaya MD Follow up on 9/5/2024.    Specialty: Gastroenterology  Why: at 9:00 AM; GI appointment  Contact information:  [address] [place] [address]  [phone]               Ellen Escalera NP Follow up.    Specialty: Family Medicine  Why: Patient will be notified of a hospital follow up home visit from BARBARA Escalera  Contact  information:  [address]  [facility] [address]  [phone]                           Patient Instructions:      Diet Cardiac     Reason for not Ordering Smoking Cessation Referral     Order Specific Question Answer Comments   Reason for not ordering: Patient refused      Activity as tolerated       Significant Diagnostic Studies: Microbiology: Blood Culture   Lab Results   Component Value Date    LABBLOO No Growth to date 08/16/2024    LABBLOO No Growth to date 08/16/2024    LABBLOO No Growth to date 08/16/2024    LABBLOO No Growth to date 08/16/2024    LABBLOO No Growth to date 08/16/2024    and   Culture, Fluid  (Aerobic)- no growth  Culture, Anaerobe - IP    Pending Diagnostic Studies:       None           Medications:  Reconciled Home Medications:      Medication List        START taking these medications      ciprofloxacin HCl 500 MG tablet  Commonly known as: CIPRO  Take 1 tablet (500 mg total) by mouth every 12 (twelve) hours. for 10 days            CHANGE how you take these medications      levothyroxine 50 MCG tablet  Commonly known as: SYNTHROID  TAKE 1 TABLET BY MOUTH IN THE MORNING WITH A FULL GLASS OF WATER 30-60 MINUTES BEFORE OTHER MEDICATIONS AND FOOD  What changed:   how much to take  how to take this  when to take this  additional instructions            CONTINUE taking these medications      ADVAIR DISKUS 250-50 mcg/dose diskus inhaler  Generic drug: fluticasone-salmeterol 250-50 mcg/dose  Inhale 1 puff into the lungs 2 (two) times a day. Controller     amiodarone 200 MG Tab  Commonly known as: PACERONE  Take 1 tablet (200 mg total) by mouth once daily.     ferrous sulfate 324 mg (65 mg iron) Tbec  Take 324 mg by mouth once daily.     folic acid 1 MG tablet  Commonly known as: FOLVITE  Take 1 mg by mouth once daily.     ibuprofen 800 MG tablet  Commonly known as: ADVIL,MOTRIN  Take 800 mg by mouth daily as needed for Pain.     metoprolol tartrate 100 MG tablet  Commonly known as:  LOPRESSOR  Take 100 mg by mouth once daily.     nicotine 21 mg/24 hr  Commonly known as: NICODERM CQ  Place 1 patch onto the skin once daily.     omega-3 acid ethyl esters 1 gram capsule  Commonly known as: LOVAZA  Take 1 capsule by mouth 2 (two) times daily.     pantoprazole 40 MG tablet  Commonly known as: PROTONIX  Take 1 tablet (40 mg total) by mouth 2 (two) times daily.     potassium chloride 10 MEQ Tbsr  Commonly known as: KLOR-CON  Take 10 mEq by mouth once daily.     promethazine 12.5 MG Tab  Commonly known as: PHENERGAN  Take 12.5 mg by mouth every 6 (six) hours as needed (nausea).     thiamine 100 MG tablet  Take 100 mg by mouth once daily.     traZODone 50 MG tablet  Commonly known as: DESYREL  Take 50 mg by mouth nightly as needed.     VITAMIN B COMPLEX ORAL  Take 1 tablet by mouth once daily.     XARELTO 20 mg Tab  Generic drug: rivaroxaban  Take 20 mg by mouth once daily.              Indwelling Lines/Drains at time of discharge:   Lines/Drains/Airways       None                   Time spent on the discharge of patient: 33 minutes   Patient examined at bedside on day of discharge. Exam findings stable. he was deemed safe for discharge.        Giovanna Leahy MD  Department of Hospital Medicine  [facility]

## 2024-08-20 NOTE — PROGRESS NOTES
[facility] - Telemetry  Gastroenterology  Progress Note    Patient Name: Jonny Isabel  MRN: [id number]  Admission Date: 8/16/2024  Hospital Length of Stay: 2 days  Code Status: Full Code   Attending Provider: Giovanna Leahy MD  Consulting Provider: Jackelin Cramer MD  Primary Care Physician: Yuli Costa MD  Principal Problem: SBP (spontaneous bacterial peritonitis)        Subjective:     Interval History: Patient seen and examined at bedside. No acute events overnight. He reports feeling fine and asking when he can go home.     Review of Systems   Constitutional:  Negative for activity change and appetite change.   HENT:  Negative for congestion and dental problem.    Eyes:  Negative for discharge and itching.   Respiratory:  Negative for apnea and chest tightness.    Cardiovascular:  Negative for chest pain and leg swelling.   Gastrointestinal:  Negative for abdominal distention, abdominal pain, anal bleeding, blood in stool, constipation, diarrhea, nausea and vomiting.   Endocrine: Negative for cold intolerance and heat intolerance.   Genitourinary:  Negative for difficulty urinating and dysuria.   Musculoskeletal:  Negative for arthralgias and back pain.   Skin:  Negative for color change.   Allergic/Immunologic: Negative for environmental allergies and food allergies.   Neurological:  Negative for dizziness and facial asymmetry.   Hematological:  Negative for adenopathy.   Psychiatric/Behavioral:  Negative for agitation and behavioral problems.      Objective:     Vital Signs (Most Recent):  Temp: 97.2 °F (36.2 °C) (08/20/24 0320)  Pulse: 67 (08/20/24 0320)  Resp: 17 (08/20/24 0320)  BP: 117/76 (08/20/24 0320)  SpO2: 99 % (08/20/24 0320) Vital Signs (24h Range):  Temp:  [97.2 °F (36.2 °C)-98.6 °F (37 °C)] 97.2 °F (36.2 °C)  Pulse:  [55-78] 67  Resp:  [17-20] 17  SpO2:  [95 %-99 %] 99 %  BP: (117-166)/(63-82) 117/76     Weight: 91.6 kg (201 lb 15.1 oz) (08/17/24 1229)  Body mass index is 29.82  "kg/m².      Intake/Output Summary (Last 24 hours) at 8/20/2024 0817  Last data filed at 8/19/2024 1854  Gross per 24 hour   Intake 480 ml   Output 6150 ml   Net -5670 ml       Lines/Drains/Airways       Peripheral Intravenous Line  Duration                  Peripheral IV - Single Lumen 08/16/24 1852 20 G Anterior;Right Forearm 3 days                     Physical Exam  Vitals reviewed.   Constitutional:       Appearance: Normal appearance.   HENT:      Head: Normocephalic.      Nose: Nose normal.      Mouth/Throat:      Mouth: Mucous membranes are moist.   Eyes:      Pupils: Pupils are equal, round, and reactive to light.   Cardiovascular:      Rate and Rhythm: Normal rate and regular rhythm.      Pulses: Normal pulses.      Heart sounds: Normal heart sounds.   Pulmonary:      Effort: Pulmonary effort is normal.      Breath sounds: Normal breath sounds.   Abdominal:      General: There is distension.      Palpations: Abdomen is soft. There is no mass.      Tenderness: There is no abdominal tenderness. There is no guarding or rebound.      Hernia: No hernia is present.   Musculoskeletal:         General: Normal range of motion.      Cervical back: Normal range of motion and neck supple.   Skin:     General: Skin is warm.      Capillary Refill: Capillary refill takes less than 2 seconds.   Neurological:      General: No focal deficit present.      Mental Status: He is alert and oriented to person, place, and time.   Psychiatric:         Mood and Affect: Mood normal.          Significant Labs:  CBC: No results for input(s): "WBC", "HGB", "HCT", "PLT" in the last 48 hours.  CMP:   Recent Labs   Lab 08/19/24  0211   *   CALCIUM 8.5*   ALBUMIN 3.2*   PROT 5.9*      K 4.6   CO2 19*      BUN 23   CREATININE 1.9*   ALKPHOS 97   ALT 24   AST 25   BILITOT 0.4         Significant Imaging:  CT: I have reviewed all results within the past 24 hours and my personal findings are:  pending CT scan " read  Assessment/Plan:     GI  * SBP (spontaneous bacterial peritonitis)  PMN > 250 on recent paracentesis.      Repeat Para with similar PMN count, concerning for possible nidus of infection.  Notably had an embolization of a feeding vessel of the abd wall about 2 weeks ago    Recs  Obtain CT scan - will have to be non con given kidney function, rule out nidus of infection- pending read on CT   Continue empiric Abx, may have to ask ID if we should broaden our coverage  Await cultures    Supportive care    Liver transplant notified of patient.           Thank you for your consult. I will follow-up with patient. Please contact us if you have any additional questions.    Jackelin Cramer MD  Gastroenterology Fellow

## 2024-08-20 NOTE — ASSESSMENT & PLAN NOTE
PMN > 250 on recent paracentesis.      Repeat Para with similar PMN count, concerning for possible nidus of infection.  Notably had an embolization of a feeding vessel of the abd wall about 2 weeks ago    Recs  Obtain CT scan - will have to be non con given kidney function, rule out nidus of infection- pending read on CT   Continue empiric Abx, may have to ask ID if we should broaden our coverage  Await cultures    Supportive care    I will alert transplant Liver team of his admit.

## 2024-08-20 NOTE — PLAN OF CARE
Medicare Annual Wellness Visit    Sawyer Villafana. is here for Medicare AWV    Assessment & Plan   Medicare annual wellness visit, subsequent  Thrombocytopenia ([facility] [address]  - mild, monitor  Migraine with aura and without status migrainosus, not intractable   - increased with stress, continue naproxen, sumatriptan, monitor  REM sleep behavior disorder  - controlled  - OARRS reviewed, no concerns  -     clonazePAM (KLONOPIN) 2 MG tablet; Take 1 tablet by mouth at bedtime for 180 days. , Disp-90 tablet, R-1Normal  Lower abdominal pain  - with unintentional weight loss, needs further evaluation  -     CT ABDOMEN PELVIS W IV CONTRAST Additional Contrast? Radiologist Recommendation; Future  Unintentional weight loss  - since having some abdominal pain start with CT evaluation, had blood work already in the recent past  -     Edil[place] Additional Contrast? Radiologist Recommendation; Future      Recommendations for Preventive Services Due: see orders and patient instructions/AVS.  Recommended screening schedule for the next 5-10 years is provided to the patient in written form: see Patient Instructions/AVS.     Return in 2 months (on 4/28/2022) for weight loss. Subjective   The following acute and/or chronic problems were also addressed today:    REM sleep disorder controlled with clonazepam - went from 1-2 events per month to 1-2 events per year. Stopped escitalopram - didn't seem to do anything for mood    More migraines since wife has been sick. Imitrex and Naprosyn help. Pain in the right and left groin at locations of prior hernia repairs - no bulges noted. Has had some unintentional weight loss as well. No bowel changes. Patient's complete Health Risk Assessment and screening values have been reviewed and are found in Flowsheets. The following problems were reviewed today and where indicated follow up appointments were made and/or referrals ordered.     Positive Risk Factor Pt on RA with documented sats.  The proper method of use, as well as anticipated side effects, of this metered-dose inhaler are discussed and demonstrated to the patient.  Will continue to monitor.     Screenings with Interventions:        Alcohol Screening:  AUDIT Total Score: 4    A score of 8 or more is associated with harmful or hazardous drinking. A score of 13 or more in women, and 15 or more in men, is likely to indicate alcohol dependence. General Health and ACP:  General  In general, how would you say your health is?: Very Good  In the past 7 days, have you experienced any of the following: New or Increased Pain, New or Increased Fatigue, Loneliness, Social Isolation, Stress or Anger?: (!) Yes  Select all that apply: (!) New or Increased Fatigue  Do you get the social and emotional support that you need?: Yes  Do you have a Living Will?: Yes    Advance Directives     Power of  Living Will ACP-Advance Directive ACP-Power of     Not on File [place] on 05/31/16 [address] Habits/Nutrition:     Physical Activity: Insufficiently Active    Days of Exercise per Week: 1 day    Minutes of Exercise per Session: 10 min     Have you lost any weight without trying in the past 3 months?: (!) Yes  Body mass index: 18.79  Have you seen the dentist within the past year?: Yes               Objective   Vitals:    02/28/22 1307   BP: 130/70   Site: Left Upper Arm   Weight: 131 lb (59.4 kg)   Height: 5' 10\" (1.778 m)      Body mass index is 18.8 kg/m². Physical Exam  Vitals reviewed. Constitutional:       General: He is not in acute distress. Appearance: Normal appearance. He is well-developed. HENT:      Head: Normocephalic and atraumatic. Eyes:      General: No scleral icterus. Conjunctiva/sclera: Conjunctivae normal.   Neck:      Thyroid: No thyroid mass, thyromegaly or thyroid tenderness. Vascular: No carotid bruit. Cardiovascular:      Rate and Rhythm: Normal rate and regular rhythm. Heart sounds: Normal heart sounds. Pulmonary:      Effort: Pulmonary effort is normal. No respiratory distress. Breath sounds: Normal breath sounds.    Abdominal: General: Abdomen is flat. Bowel sounds are normal. There is no distension. Palpations: Abdomen is soft. There is no mass. Tenderness: There is no abdominal tenderness. Hernia: No hernia (no clear hernia on exam) is present. Musculoskeletal:      Cervical back: Neck supple. Right lower leg: No edema. Left lower leg: No edema. Lymphadenopathy:      Cervical: No cervical adenopathy. Skin:     General: Skin is warm and dry. Neurological:      General: No focal deficit present. Mental Status: He is alert and oriented to person, place, and time. Psychiatric:         Behavior: Behavior normal.         Thought Content: Thought content normal.         Judgment: Judgment normal.               Allergies   Allergen Reactions    Codeine      Dizzy breathing problem    Avelox [Moxifloxacin]      Achilles tendon discomfort    Demerol Hcl [Meperidine]      Dizzy      Pentazocine      Prior to Visit Medications    Medication Sig Taking? Authorizing Provider   SUMAtriptan (IMITREX) 100 MG tablet TAKE ONE TABLET BY MOUTH ONCE AS NEEDED FOR MIGRAINE Yes Tyra Donis MD   clonazePAM (KLONOPIN) 2 MG tablet Take 1 tablet by mouth at bedtime for 180 days. Yes Tyra Donis MD   atorvastatin (LIPITOR) 20 MG tablet Take 0.5 tablets by mouth daily TAKE ONE TABLET BY MOUTH DAILY Yes Donna Tamayo MD   magnesium oxide (MAG-OX) 400 MG tablet Take 400 mg by mouth daily. Yes Historical Provider, MD   Multiple Vitamins-Minerals (CENTRUM SILVER ULTRA MENS) TABS Take 1 tablet by mouth daily.  Yes Historical Provider, MD [place] (Including outside providers/suppliers regularly involved in providing care):   Patient Care Team:  Tyra Donis MD as PCP - General (Internal Medicine)  Tyra Donis MD as PCP - REHABILITATION [facility] Empaneled Provider  Be Lemus MD as Surgeon (General Surgery)    Reviewed and updated this visit:  Tobacco  Allergies  Meds  Med Hx  Surg Hx  Soc Hx  Fam Hx

## 2024-08-21 ENCOUNTER — TELEPHONE (OUTPATIENT)
Dept: INFECTIOUS DISEASES | Facility: CLINIC | Age: 64
End: 2024-08-21
Payer: MEDICAID

## 2024-08-21 ENCOUNTER — PATIENT MESSAGE (OUTPATIENT)
Dept: INFECTIOUS DISEASES | Facility: CLINIC | Age: 64
End: 2024-08-21
Payer: MEDICAID

## 2024-08-21 NOTE — TELEPHONE ENCOUNTER
----- Message from JEAN CARLOS Knowles, ANP sent at 8/21/2024  3:36 PM CDT -----  When you get a chance, will you reach out to this patient and find out when he can re-schedule his liver vaccines?  He canceled his appointment.  Thanks.

## 2024-08-21 NOTE — PLAN OF CARE
[facility] - Telemetry  Discharge Final Note    Primary Care Provider: Yuli Costa MD    Expected Discharge Date: 8/20/2024    Final Discharge Note (most recent)       Final Note - 08/21/24 0746          Final Note    Assessment Type Final Discharge Note (P)      Anticipated Discharge Disposition Home or Self Care (P)      Hospital Resources/Appts/Education Provided Appointments scheduled and added to AVS (P)                      Contact Info       Lovely Escalante MD   Specialty: Transplant, Hepatology    [address] [place] [address]   Phone: [phone]       Next Steps: Follow up on 8/28/2024    Instructions: at 3:30pm; hepatology appointment    Bret Amaya MD   Specialty: Gastroenterology    [address] [place] [address]   Phone: [phone]       Next Steps: Follow up on 9/5/2024    Instructions: at 9:00 AM; GI appointment    Ellen Escalera NP   Specialty: Family Medicine    [address] [place] [address]   Phone: [phone]       Next Steps: Follow up    Instructions: Patient will be notified of a hospital follow up home visit from BARBARA Escalera

## 2024-08-22 LAB
BACTERIA BLD CULT: NORMAL
BACTERIA BLD CULT: NORMAL

## 2024-08-23 LAB
BACTERIA FLD AEROBE CULT: NO GROWTH
BACTERIA SPEC ANAEROBE CULT: NORMAL
GRAM STN SPEC: NORMAL
GRAM STN SPEC: NORMAL

## 2024-08-28 ENCOUNTER — OFFICE VISIT (OUTPATIENT)
Dept: TRANSPLANT | Facility: CLINIC | Age: 64
End: 2024-08-28
Payer: MEDICAID

## 2024-08-28 VITALS
RESPIRATION RATE: 18 BRPM | HEART RATE: 62 BPM | HEIGHT: 69 IN | OXYGEN SATURATION: 98 % | WEIGHT: 200.94 LBS | DIASTOLIC BLOOD PRESSURE: 88 MMHG | SYSTOLIC BLOOD PRESSURE: 134 MMHG | BODY MASS INDEX: 29.76 KG/M2

## 2024-08-28 DIAGNOSIS — R18.8 OTHER ASCITES: ICD-10-CM

## 2024-08-28 DIAGNOSIS — R74.8 ELEVATED LIVER ENZYMES: ICD-10-CM

## 2024-08-28 DIAGNOSIS — K65.2 SBP (SPONTANEOUS BACTERIAL PERITONITIS): ICD-10-CM

## 2024-08-28 DIAGNOSIS — I85.10 SECONDARY ESOPHAGEAL VARICES WITHOUT BLEEDING: ICD-10-CM

## 2024-08-28 DIAGNOSIS — I25.10 CORONARY ARTERY DISEASE, UNSPECIFIED VESSEL OR LESION TYPE, UNSPECIFIED WHETHER ANGINA PRESENT, UNSPECIFIED WHETHER NATIVE OR TRANSPLANTED HEART: ICD-10-CM

## 2024-08-28 DIAGNOSIS — Z76.82 ORGAN TRANSPLANT CANDIDATE: ICD-10-CM

## 2024-08-28 DIAGNOSIS — N18.9 CHRONIC KIDNEY DISEASE, UNSPECIFIED CKD STAGE: ICD-10-CM

## 2024-08-28 DIAGNOSIS — K70.31 ALCOHOLIC CIRRHOSIS OF LIVER WITH ASCITES: Primary | ICD-10-CM

## 2024-08-28 PROCEDURE — 99215 OFFICE O/P EST HI 40 MIN: CPT | Mod: S$PBB,TXP,, | Performed by: INTERNAL MEDICINE

## 2024-08-28 PROCEDURE — 99215 OFFICE O/P EST HI 40 MIN: CPT | Mod: PBBFAC,PN,TXP | Performed by: INTERNAL MEDICINE

## 2024-08-28 PROCEDURE — 99999 PR PBB SHADOW E&M-EST. PATIENT-LVL V: CPT | Mod: PBBFAC,TXP,, | Performed by: INTERNAL MEDICINE

## 2024-08-28 NOTE — LETTER
September 8, 2024        Harriett Bird  [address]  [facility] [address]  Phone: [phone]  Fax: [phone]             [place] [facility]  [address] [facility] [address] [address]  Phone: [phone]  Fax: [phone]   Patient: Jonny Isabel   MR Number: [id number]   YOB: 1960   Date of Visit: 8/28/2024       Dear Dr. Harriett Bird    Thank you for referring Jonny Isabel to me for evaluation. Attached you will find relevant portions of my assessment and plan of care.    If you have questions, please do not hesitate to call me. I look forward to following Jonny Isabel along with you.    Sincerely,    Lovely Escalante MD    Enclosure    If you would like to receive this communication electronically, please contact [email or web address] or [phone] to request eTect Link access.    eTect Link is a tool which provides read-only access to select patient information with whom you have a relationship. Its easy to use and provides real time access to review your patients record including encounter summaries, notes, results, and demographic information.    If you feel you have received this communication in error or would no longer like to receive these types of communications, please e-mail [email or web address]

## 2024-08-28 NOTE — PATIENT INSTRUCTIONS
Needs stress test- will see if can be done in [place]  Needs echo  Needs to stop smoking  Needs cardiologist at main campus with Dr Mata; need to come off amiodarone; is xarelto needed  Urgent nephrology consult with dr Lee  Weekly paracenteses x 4 weeks then reassess  EGD 02/25  Recheck iron studies  Weekly labs  Check ammonia

## 2024-08-28 NOTE — PROGRESS NOTES
HEPATOLOGY FOLLOW UP    Referring Physician: Harriett Bird MD  Current Corresponding Physician: Harriett Bird MD, Julissa, Yuli Pérez MD, Christopher Burt III, MD    Jonny Isabel is here for follow up of decompensated alcohol-induced Cirrhosis    HPI  Seen by in hepatology team:  Admission 1/16/23-2/7/23: 61yo PMHx decompensated EtOH cirrhosis (ascites), NID-T2DM, afib s/p ablation and DCCV on xarelto, HTN. Was transferred from OSH on 01/16/2023 for epidural abscess and L3/L4 disc herniation. Surgery was deferred since the pt was too ill. MELD-Na 22. Was anemic, was transfused and underwent EGD- small varices noted.     Work up for liver disease since arrival notable for ASMA, AMA, viral hepatitis panel all negative. PETH 900.     Reportedly patient did not know of liver disease and was never instructed to stop drinking however multiple barriers to consideration of transplant including spinal abscess, possible bowel obstruction, continuing to drink EtOH (PETH 900)      Interval History  After Jonny Isabel's he is undergoing a liver transplant evaluation. He is not sure he wants to proceed with liver transplant. He has stopped drinking (peth negative since 5/8/23).    --weekly paracenteses  --HE-none  --admission 8/16/24-8/20/24: possible SBP (did have hemorrhagic ascites); CT unrevealing; tx w abx    Labs 8/19/24: ALT 32. AST 32, ALKP 116, Tbil 0.3, plts 278    Abdo US 7/2/24: cirrhosis with increased ascites; no HCC  CT abdo pelvis w contrast 8/19/24: Cirrhotic liver. Moderate volume ascites.  Heterogeneous area of ill-defined attenuation at the left abdomen suggesting clot/blood products, similar to slightly less conspicuous from comparison CTA    EGD 2/7/24: sm EV; PHG; mult gastric polyps    MELD 3.0: 24 at 8/4/2024  6:30 AM  MELD-Na: 24 at 8/4/2024  6:30 AM  Calculated from:  Serum Creatinine: 3.6 mg/dL (Using max of 3 mg/dL) at 8/4/2024  6:30 AM  Serum Sodium: 132 mmol/L at 8/4/2024  6:30 AM  Total  Bilirubin: 0.4 mg/dL (Using min of 1 mg/dL) at 8/4/2024  6:30 AM  Serum Albumin: 2.4 g/dL at 8/4/2024  6:30 AM  INR(ratio): 1.2 at 8/2/2024  1:30 AM  Age at listing (hypothetical): 63 years  Sex: Male at 8/4/2024  6:30 AM       Outpatient Encounter Medications as of 8/28/2024   Medication Sig Dispense Refill    ADVAIR DISKUS 250-50 mcg/dose diskus inhaler Inhale 1 puff into the lungs 2 (two) times a day. Controller 180 each 3    amiodarone (PACERONE) 200 MG Tab Take 1 tablet (200 mg total) by mouth once daily. 30 tablet 11    ciprofloxacin HCl (CIPRO) 500 MG tablet Take 1 tablet (500 mg total) by mouth every 12 (twelve) hours. for 10 days 20 tablet 0    ferrous sulfate 324 mg (65 mg iron) TbEC Take 324 mg by mouth once daily.      folic acid (FOLVITE) 1 MG tablet Take 1 mg by mouth once daily.      ibuprofen (ADVIL,MOTRIN) 800 MG tablet Take 800 mg by mouth daily as needed for Pain.      levothyroxine (SYNTHROID) 50 MCG tablet TAKE 1 TABLET BY MOUTH IN THE MORNING WITH A FULL GLASS OF WATER 30-60 MINUTES BEFORE OTHER MEDICATIONS AND FOOD (Patient taking differently: Take 50 mcg by mouth before breakfast. WITH A FULL GLASS OF WATER 30-60 MINUTES BEFORE OTHER MEDICATIONS AND FOOD) 90 tablet 3    metoprolol tartrate (LOPRESSOR) 100 MG tablet Take 100 mg by mouth once daily.      nicotine (NICODERM CQ) 21 mg/24 hr Place 1 patch onto the skin once daily. 28 patch 0    omega-3 acid ethyl esters (LOVAZA) 1 gram capsule Take 1 capsule by mouth 2 (two) times daily.      pantoprazole (PROTONIX) 40 MG tablet Take 1 tablet (40 mg total) by mouth 2 (two) times daily. 180 tablet 3    potassium chloride (KLOR-CON) 10 MEQ TbSR Take 10 mEq by mouth once daily.      promethazine (PHENERGAN) 12.5 MG Tab Take 12.5 mg by mouth every 6 (six) hours as needed (nausea).      thiamine 100 MG tablet Take 100 mg by mouth once daily.      traZODone (DESYREL) 50 MG tablet Take 50 mg by mouth nightly as needed.      VITAMIN B COMPLEX ORAL Take 1  tablet by mouth once daily.      XARELTO 20 mg Tab Take 20 mg by mouth once daily.      [DISCONTINUED] albuterol (VENTOLIN HFA) 90 mcg/actuation inhaler Inhale 1 puff into the lungs every 4 (four) hours as needed for Wheezing or Shortness of Breath. Rescue 18 g 0    [DISCONTINUED] alirocumab (PRALUENT PEN) 75 mg/mL PnIj Inject 75 mg into the skin every 14 (fourteen) days.      [DISCONTINUED] folic acid (FOLVITE) 1 MG tablet Take 1 tablet (1 mg total) by mouth once daily. 30 tablet 11    [DISCONTINUED] furosemide (LASIX) 20 MG tablet Take 20 mg by mouth once daily.      [DISCONTINUED] metoprolol succinate (TOPROL-XL) 50 MG 24 hr tablet Take 1 tablet (50 mg total) by mouth once daily. (Patient taking differently: Take 100 mg by mouth once daily.) 90 tablet 3    [DISCONTINUED] moxifloxacin (VIGAMOX) 0.5 % ophthalmic solution Place 1 drop into the left eye 3 (three) times daily.      [DISCONTINUED] omega 3-dha-epa-fish oil 1,000 mg (120 mg-180 mg) Cap 2 capsule DAILY (route: oral)      [DISCONTINUED] pantoprazole (PROTONIX) 40 MG tablet Take 40 mg by mouth once daily.      [] cefTRIAXone (ROCEPHIN) 2 g in D5W 100 mL IVPB (MB+)       [] iohexoL (OMNIPAQUE 350) injection 150 mL       [] sodium zirconium cyclosilicate packet 10 g       [DISCONTINUED] 0.9%  NaCl infusion (for blood administration)       [DISCONTINUED] 0.9%  NaCl infusion (for blood administration)       [DISCONTINUED] 0.9%  NaCl infusion (for blood administration)       [DISCONTINUED] 0.9%  NaCl infusion (for blood administration)       [DISCONTINUED] 0.9%  NaCl infusion (for blood administration)       [DISCONTINUED] albuterol inhaler 2 puff       [DISCONTINUED] amiodarone tablet 200 mg       [DISCONTINUED] cefTRIAXone (ROCEPHIN) 2 g in D5W 100 mL IVPB (MB+)       [DISCONTINUED] dextrose 10% bolus 125 mL 125 mL       [DISCONTINUED] dextrose 10% bolus 125 mL 125 mL       [DISCONTINUED] dextrose 10% bolus 125 mL 125 mL        [DISCONTINUED] dextrose 10% bolus 250 mL 250 mL       [DISCONTINUED] dextrose 10% bolus 250 mL 250 mL       [DISCONTINUED] dextrose 10% bolus 250 mL 250 mL       [DISCONTINUED] dextrose 10% bolus 250 mL 250 mL       [DISCONTINUED] fluticasone furoate-vilanteroL 100-25 mcg/dose diskus inhaler 1 puff       [DISCONTINUED] folic acid tablet 1 mg       [DISCONTINUED] glucagon (human recombinant) injection 1 mg       [DISCONTINUED] glucagon (human recombinant) injection 1 mg       [DISCONTINUED] glucagon (human recombinant) injection 1 mg       [DISCONTINUED] glucose chewable tablet 16 g       [DISCONTINUED] glucose chewable tablet 16 g       [DISCONTINUED] glucose chewable tablet 24 g       [DISCONTINUED] glucose chewable tablet 24 g       [DISCONTINUED] hepatitis A virus vaccine (Adult 19YRS up)(PF) (HAVRIX) 1440 Unit/1 mL syringe       [DISCONTINUED] hepatitis A virus vaccine (Adult 19YRS up)(PF) (HAVRIX) 1440 Unit/1 mL syringe       [DISCONTINUED] hepatitis B vacc-CpG 1018 (PF) 20 mcg/0.5 mL Syrg 20 mcg       [DISCONTINUED] hepatitis B vacc-CpG 1018 (PF) 20 mcg/0.5 mL Syrg 20 mcg       [DISCONTINUED] insulin aspart U-100 pen 0-5 Units       [DISCONTINUED] melatonin tablet 6 mg       [DISCONTINUED] naloxone 0.4 mg/mL injection 0.02 mg       [DISCONTINUED] naloxone 0.4 mg/mL injection 0.02 mg       [DISCONTINUED] nicotine 14 mg/24 hr 1 patch       [DISCONTINUED] nicotine 21 mg/24 hr 1 patch       [DISCONTINUED] ondansetron injection 4 mg       [DISCONTINUED] ondansetron injection 8 mg       [DISCONTINUED] pantoprazole EC tablet 40 mg       [DISCONTINUED] pantoprazole injection 80 mg       [DISCONTINUED] pantoprazole injection 80 mg       [DISCONTINUED] piperacillin-tazobactam (ZOSYN) 4.5 g in D5W 100 mL IVPB (MB+)       [DISCONTINUED] pneumoc 20-martin conj-dip cr(PF) (PREVNAR-20 (PF)) injection Syrg 0.5 mL       [DISCONTINUED] sevelamer carbonate tablet 800 mg       [DISCONTINUED] sodium chloride 0.9% flush 10 mL        [DISCONTINUED] sodium chloride 0.9% flush 10 mL       [DISCONTINUED] sodium citrate-citric acid 500-334 mg/5 ml solution 30 mL       [DISCONTINUED] Tdap (BOOSTRIX) vaccine injection 0.5 mL       [DISCONTINUED] traZODone tablet 50 mg       [DISCONTINUED] traZODone tablet 50 mg       [DISCONTINUED] varicella-zoster gE vac,2 of 2 SusR        No facility-administered encounter medications on file as of 8/28/2024.     Review of patient's allergies indicates:  No Known Allergies  Past Medical History:   Diagnosis Date    A-fib     Esophageal varices 5/8/2023    Other ascites 5/8/2023       Review of Systems   Constitutional: Negative.    HENT: Negative.     Eyes: Negative.    Respiratory: Negative.     Cardiovascular: Negative.    Gastrointestinal: Negative.    Genitourinary: Negative.    Musculoskeletal: Negative.    Skin: Negative.    Neurological: Negative.    Psychiatric/Behavioral: Negative.       Vitals:    08/28/24 1456   BP: 134/88   Pulse: 62   Resp: 18         Physical Exam  Vitals reviewed.   Constitutional:       Appearance: He is well-developed.   HENT:      Head: Normocephalic and atraumatic.   Eyes:      General: No scleral icterus.     Conjunctiva/sclera: Conjunctivae normal.      Pupils: Pupils are equal, round, and reactive to light.   Neck:      Thyroid: No thyromegaly.   Cardiovascular:      Rate and Rhythm: Normal rate and regular rhythm.      Heart sounds: Normal heart sounds.   Pulmonary:      Effort: Pulmonary effort is normal.      Breath sounds: Normal breath sounds. No rales.   Abdominal:      General: Bowel sounds are normal. There is distension.      Palpations: Abdomen is soft. There is no mass.      Tenderness: There is no abdominal tenderness.   Musculoskeletal:         General: Normal range of motion.      Cervical back: Normal range of motion and neck supple.   Skin:     General: Skin is warm and dry.      Findings: No rash.   Neurological:      Mental Status: He is alert and oriented to  person, place, and time.         MELD 3.0: 24 at 8/4/2024  6:30 AM  MELD-Na: 24 at 8/4/2024  6:30 AM  Calculated from:  Serum Creatinine: 3.6 mg/dL (Using max of 3 mg/dL) at 8/4/2024  6:30 AM  Serum Sodium: 132 mmol/L at 8/4/2024  6:30 AM  Total Bilirubin: 0.4 mg/dL (Using min of 1 mg/dL) at 8/4/2024  6:30 AM  Serum Albumin: 2.4 g/dL at 8/4/2024  6:30 AM  INR(ratio): 1.2 at 8/2/2024  1:30 AM  Age at listing (hypothetical): 63 years  Sex: Male at 8/4/2024  6:30 AM      Lab Results   Component Value Date     (H) 08/19/2024    BUN 23 08/19/2024    CREATININE 1.9 (H) 08/19/2024    CALCIUM 8.5 (L) 08/19/2024     08/19/2024    K 4.6 08/19/2024     08/19/2024    PROT 5.9 (L) 08/19/2024    CO2 19 (L) 08/19/2024    ANIONGAP 9 08/19/2024    WBC 12.77 (H) 08/16/2024    RBC 3.78 (L) 08/16/2024    HGB 11.2 (L) 08/16/2024    HCT 34.3 (L) 08/16/2024    HCT 33 (L) 01/27/2023    MCV 91 08/16/2024    MCH 29.6 08/16/2024    MCHC 32.7 08/16/2024     Lab Results   Component Value Date    RDW 14.6 (H) 08/16/2024     08/16/2024    MPV 9.3 08/16/2024    GRAN 10.8 (H) 08/16/2024    GRAN 84.3 (H) 08/16/2024    LYMPH 0.8 (L) 08/16/2024    LYMPH 6.3 (L) 08/16/2024    MONO 0.9 08/16/2024    MONO 6.9 08/16/2024    EOSINOPHIL 0.9 08/16/2024    BASOPHIL 0.7 08/16/2024    EOS 0.1 08/16/2024    EOS 1 06/12/2024    BASO 0.09 08/16/2024    BASO 1 06/12/2024    APTT 49.1 (H) 11/09/2022    GROUPTRH A POS 08/02/2024    BNP 19 08/02/2024    ALBUMIN 3.2 (L) 08/19/2024    BILIDIR 0.1 06/20/2024    AST 25 08/19/2024    ALT 24 08/19/2024    ALKPHOS 97 08/19/2024    MG 1.8 08/19/2024    LABPROT 12.7 (H) 08/02/2024    INR 1.2 08/02/2024    PSA 0.80 06/20/2024       Assessment and Plan:  Jonny Isabel is a 63 y.o. male with Cirrhosis  Has refractory ascites and is undergoing liver tx eval. Consider TIPS after tx eval completed. Will depend on creat.Other recommendations:  Decompensated cirrhosis: weekly labs; Amiodarone - need to  change to alternate drug; complete tx evaluation (still needs stress test, echo, needs to stop smoking, appt with Dr Mata to change amiodarone to alt med and see if xarelto can be changed to coumadin); recheck iron studies  Ascites: weekly Paracenteses; will consider TIPS once tx is completed  CKD: nephrology consult  Portal htn: EGD -repeat 2/25  Quit smoking  Abdo Us now  R/o HE: check ammonia level    Return 2 months  A total of 60 minutes was spent reviewing the patient's chart, examining the patient, reviewing labs and imaging and coordinating care with the patient's care team.

## 2024-08-29 ENCOUNTER — TELEPHONE (OUTPATIENT)
Dept: TRANSPLANT | Facility: CLINIC | Age: 64
End: 2024-08-29
Payer: MEDICAID

## 2024-08-29 NOTE — TELEPHONE ENCOUNTER
"[facility] Pre-Liver Transplant Clinical  Caller: Unspecified (Today,  9:10 AM)  Consult/Advisory    Name Of Caller: Adelita Bingham (Spouse)    Contact Preference?: [phone]    What is the nature of the call?: Returning call to Michael    Additional Notes:  "Thank you for all that you do for our patients"    ____________  Returned phone call. No answer. LVM  "

## 2024-08-30 ENCOUNTER — TELEPHONE (OUTPATIENT)
Dept: TRANSPLANT | Facility: CLINIC | Age: 64
End: 2024-08-30
Payer: MEDICAID

## 2024-08-30 NOTE — TELEPHONE ENCOUNTER
Returned call to patient's spouse.  She was asking for paracentesis orders.  Reviewed the orders in EPIC.  Patient has standing orders for paracentesis.  Patient's wife said she will call [facility] location to schedule para.

## 2024-09-03 ENCOUNTER — TELEPHONE (OUTPATIENT)
Dept: INTERVENTIONAL RADIOLOGY/VASCULAR | Facility: HOSPITAL | Age: 64
End: 2024-09-03
Payer: MEDICAID

## 2024-09-04 ENCOUNTER — LAB VISIT (OUTPATIENT)
Dept: LAB | Facility: HOSPITAL | Age: 64
End: 2024-09-04
Attending: INTERNAL MEDICINE
Payer: MEDICAID

## 2024-09-04 ENCOUNTER — HOSPITAL ENCOUNTER (OUTPATIENT)
Dept: INTERVENTIONAL RADIOLOGY/VASCULAR | Facility: HOSPITAL | Age: 64
Discharge: HOME | End: 2024-09-04
Attending: INTERNAL MEDICINE
Payer: MEDICAID

## 2024-09-04 VITALS
SYSTOLIC BLOOD PRESSURE: 154 MMHG | OXYGEN SATURATION: 97 % | HEART RATE: 54 BPM | DIASTOLIC BLOOD PRESSURE: 78 MMHG | RESPIRATION RATE: 16 BRPM | TEMPERATURE: 98 F | HEIGHT: 69 IN | WEIGHT: 200 LBS | BODY MASS INDEX: 29.62 KG/M2

## 2024-09-04 DIAGNOSIS — Z76.82 ORGAN TRANSPLANT CANDIDATE: ICD-10-CM

## 2024-09-04 DIAGNOSIS — R18.8 OTHER ASCITES: ICD-10-CM

## 2024-09-04 DIAGNOSIS — R18.8 ASCITES: ICD-10-CM

## 2024-09-04 LAB
ALBUMIN FLD-MCNC: 1.6 G/DL
ALBUMIN SERPL BCP-MCNC: 2.8 G/DL (ref 3.5–5.2)
ALP SERPL-CCNC: 116 U/L (ref 55–135)
ALT SERPL W/O P-5'-P-CCNC: 32 U/L (ref 10–44)
ANION GAP SERPL CALC-SCNC: 11 MMOL/L (ref 8–16)
APPEARANCE FLD: CLEAR
AST SERPL-CCNC: 32 U/L (ref 10–40)
BASOPHILS # BLD AUTO: 0.09 K/UL (ref 0–0.2)
BASOPHILS NFR BLD: 1.1 % (ref 0–1.9)
BILIRUB SERPL-MCNC: 0.3 MG/DL (ref 0.1–1)
BODY FLD TYPE: NORMAL
BUN SERPL-MCNC: 22 MG/DL (ref 8–23)
CALCIUM SERPL-MCNC: 9.5 MG/DL (ref 8.7–10.5)
CHLORIDE SERPL-SCNC: 108 MMOL/L (ref 95–110)
CO2 SERPL-SCNC: 17 MMOL/L (ref 23–29)
COLOR FLD: YELLOW
CREAT SERPL-MCNC: 1.9 MG/DL (ref 0.5–1.4)
DIFFERENTIAL METHOD BLD: ABNORMAL
EOSINOPHIL # BLD AUTO: 0.1 K/UL (ref 0–0.5)
EOSINOPHIL NFR BLD: 1.5 % (ref 0–8)
ERYTHROCYTE [DISTWIDTH] IN BLOOD BY AUTOMATED COUNT: 14.8 % (ref 11.5–14.5)
EST. GFR  (NO RACE VARIABLE): 39.4 ML/MIN/1.73 M^2
GLUCOSE SERPL-MCNC: 180 MG/DL (ref 70–110)
HCT VFR BLD AUTO: 35.1 % (ref 40–54)
HGB BLD-MCNC: 12.1 G/DL (ref 14–18)
IMM GRANULOCYTES # BLD AUTO: 0.05 K/UL (ref 0–0.04)
IMM GRANULOCYTES NFR BLD AUTO: 0.6 % (ref 0–0.5)
INR PPP: 1.3 (ref 0.8–1.2)
LYMPHOCYTES # BLD AUTO: 0.8 K/UL (ref 1–4.8)
LYMPHOCYTES NFR BLD: 9.9 % (ref 18–48)
LYMPHOCYTES NFR FLD MANUAL: 20 %
MCH RBC QN AUTO: 32.7 PG (ref 27–31)
MCHC RBC AUTO-ENTMCNC: 34.5 G/DL (ref 32–36)
MCV RBC AUTO: 95 FL (ref 82–98)
MESOTHL CELL NFR FLD MANUAL: 4 %
MONOCYTES # BLD AUTO: 0.5 K/UL (ref 0.3–1)
MONOCYTES NFR BLD: 5.6 % (ref 4–15)
MONOS+MACROS NFR FLD MANUAL: 66 %
NEUTROPHILS # BLD AUTO: 6.6 K/UL (ref 1.8–7.7)
NEUTROPHILS NFR BLD: 81.3 % (ref 38–73)
NEUTROPHILS NFR FLD MANUAL: 10 %
NRBC BLD-RTO: 0 /100 WBC
PLATELET # BLD AUTO: 278 K/UL (ref 150–450)
PMV BLD AUTO: 10.4 FL (ref 9.2–12.9)
POTASSIUM SERPL-SCNC: 4.7 MMOL/L (ref 3.5–5.1)
PROT FLD-MCNC: 3 G/DL
PROT SERPL-MCNC: 7.1 G/DL (ref 6–8.4)
PROTHROMBIN TIME: 13.6 SEC (ref 9–12.5)
RBC # BLD AUTO: 3.7 M/UL (ref 4.6–6.2)
SODIUM SERPL-SCNC: 136 MMOL/L (ref 136–145)
SPECIMEN SOURCE: NORMAL
SPECIMEN SOURCE: NORMAL
WBC # BLD AUTO: 8.07 K/UL (ref 3.9–12.7)
WBC # FLD: 283 /CU MM

## 2024-09-04 PROCEDURE — 84157 ASSAY OF PROTEIN OTHER: CPT | Mod: TXP | Performed by: INTERNAL MEDICINE

## 2024-09-04 PROCEDURE — 80053 COMPREHEN METABOLIC PANEL: CPT | Mod: TXP | Performed by: INTERNAL MEDICINE

## 2024-09-04 PROCEDURE — 25000003 PHARM REV CODE 250: Mod: TXP | Performed by: PHYSICIAN ASSISTANT

## 2024-09-04 PROCEDURE — 82042 OTHER SOURCE ALBUMIN QUAN EA: CPT | Mod: TXP | Performed by: INTERNAL MEDICINE

## 2024-09-04 PROCEDURE — 85025 COMPLETE CBC W/AUTO DIFF WBC: CPT | Mod: TXP | Performed by: INTERNAL MEDICINE

## 2024-09-04 PROCEDURE — 36415 COLL VENOUS BLD VENIPUNCTURE: CPT | Mod: PO,TXP | Performed by: INTERNAL MEDICINE

## 2024-09-04 PROCEDURE — 85610 PROTHROMBIN TIME: CPT | Mod: TXP | Performed by: INTERNAL MEDICINE

## 2024-09-04 PROCEDURE — 89051 BODY FLUID CELL COUNT: CPT | Mod: TXP | Performed by: INTERNAL MEDICINE

## 2024-09-04 RX ORDER — LIDOCAINE HYDROCHLORIDE 10 MG/ML
INJECTION, SOLUTION INFILTRATION; PERINEURAL
Status: COMPLETED | OUTPATIENT
Start: 2024-09-04 | End: 2024-09-04

## 2024-09-04 RX ADMIN — LIDOCAINE HYDROCHLORIDE 5 ML: 10 INJECTION, SOLUTION INFILTRATION; PERINEURAL at 11:09

## 2024-09-04 NOTE — DISCHARGE SUMMARY
Interventional Radiology Short Stay Discharge Summary      Admit Date: 9/4/2024  Discharge Date: 09/04/2024     Hospital Course: Uneventful    Discharge Diagnosis: ascites    Discharge Condition: Stable    Discharge Disposition: Home    Diet: Resume prior diet    Activity: activity as tolerated    Follow-up: With referring provider    Cindy Chava, PA-C [facility]

## 2024-09-04 NOTE — PROCEDURES
Radiology Post-Procedure Note    Pre Op Diagnosis: Ascites  Post Op Diagnosis: Same    Procedure: Ultrasound Guided Paracentesis    Procedure performed by: Ashia Gallegos PA-C    Written Informed Consent Obtained: Yes  Specimen Removed: YES   Estimated Blood Loss: Minimal    Findings:   Successful paracentesis.  5500 ml dark yellow fluid removed from RLQ.  Albumin was not administered PRN per protocol as patient refused    Patient tolerated procedure well.    Ashia Gallegos PA-C

## 2024-09-04 NOTE — NURSING
Patient is discharged from IR. Patient declined printed AVS. The opportunity to ask questions is provided. Patient is wheeled to his significant other in the front lobby to exit.

## 2024-09-04 NOTE — H&P
Radiology History & Physical      SUBJECTIVE:     Chief Complaint: abdominal distention    History of Present Illness:  Jonny Isabel is a 63 y.o. male who presents for ultrasound guided paracentesis  Past Medical History:   Diagnosis Date    A-fib     Esophageal varices 5/8/2023    Other ascites 5/8/2023     Past Surgical History:   Procedure Laterality Date    ESOPHAGOGASTRODUODENOSCOPY N/A 1/17/2023    Procedure: EGD (ESOPHAGOGASTRODUODENOSCOPY);  Surgeon: Dewayne Navas MD;  Location: [facility] (2ND FLR);  Service: Endoscopy;  Laterality: N/A;    ESOPHAGOGASTRODUODENOSCOPY N/A 2/7/2024    Procedure: EGD (ESOPHAGOGASTRODUODENOSCOPY);  Surgeon: Nathan Goins MD;  [facility]  Service: Endoscopy;  Laterality: N/A;  referral: Dr. Escalante /cirrhosis - labs- possible banding / prep ins on portal / 2nd floor - Pt c/o SOB at times./ Xarelto - message sent to clearance nurse per protocol - ERW  1/31/24- LVM for precall - ERW  2/1-precall complet-Kpvt  ok to hold Xarelto2 da       Home Meds:   Prior to Admission medications    Medication Sig Start Date End Date Taking? Authorizing Provider   ADVAIR DISKUS 250-50 mcg/dose diskus inhaler Inhale 1 puff into the lungs 2 (two) times a day. Controller 3/20/24 3/20/25  Joanne Ruiz NP   amiodarone (PACERONE) 200 MG Tab Take 1 tablet (200 mg total) by mouth once daily. 12/12/23 12/11/24  Ellen Escalera NP   ferrous sulfate 324 mg (65 mg iron) TbEC Take 324 mg by mouth once daily. 1/4/24   Provider, Historical   folic acid (FOLVITE) 1 MG tablet Take 1 mg by mouth once daily.    Provider, Historical   ibuprofen (ADVIL,MOTRIN) 800 MG tablet Take 800 mg by mouth daily as needed for Pain.  Patient not taking: Reported on 8/28/2024    Provider, Historical   levothyroxine (SYNTHROID) 50 MCG tablet TAKE 1 TABLET BY MOUTH IN THE MORNING WITH A FULL GLASS OF WATER 30-60 MINUTES BEFORE OTHER MEDICATIONS AND FOOD  Patient taking differently: Take 50 mcg by  mouth before breakfast. WITH A FULL GLASS OF WATER 30-60 MINUTES BEFORE OTHER MEDICATIONS AND FOOD 12/12/23   Ellen Escalera NP   metoprolol tartrate (LOPRESSOR) 100 MG tablet Take 100 mg by mouth once daily. 7/16/24   Provider, Historical   nicotine (NICODERM CQ) 21 mg/24 hr Place 1 patch onto the skin once daily. 8/5/24 9/4/24  Shawn Mejia MD   omega-3 acid ethyl esters (LOVAZA) 1 gram capsule Take 1 capsule by mouth 2 (two) times daily. 7/16/24   Provider, Historical   pantoprazole (PROTONIX) 40 MG tablet Take 1 tablet (40 mg total) by mouth 2 (two) times daily. 8/5/24 8/5/25  Shawn Mejia MD   potassium chloride (KLOR-CON) 10 MEQ TbSR Take 10 mEq by mouth once daily.    Provider, Historical   promethazine (PHENERGAN) 12.5 MG Tab Take 12.5 mg by mouth every 6 (six) hours as needed (nausea). 5/6/24   Provider, Historical   thiamine 100 MG tablet Take 100 mg by mouth once daily. 7/13/22   Provider, Historical   traZODone (DESYREL) 50 MG tablet Take 50 mg by mouth nightly as needed. 2/29/24   Provider, Historical   VITAMIN B COMPLEX ORAL Take 1 tablet by mouth once daily.    Provider, Historical   XARELTO 20 mg Tab Take 20 mg by mouth once daily. 1/9/23   Provider, Historical     Anticoagulants/Antiplatelets:  xarelto    Allergies: Review of patient's allergies indicates:  No Known Allergies  Sedation History:  no adverse reactions    Review of Systems:   Hematological: no known coagulopathies  Respiratory: no shortness of breath  Cardiovascular: no chest pain  Gastrointestinal: no abdominal pain  Genito-Urinary: no dysuria  Musculoskeletal: negative  Neurological: no TIA or stroke symptoms         OBJECTIVE:     Vital Signs (Most Recent)  Temp: 98.4 °F (36.9 °C) (09/04/24 1059)  Pulse: (!) 55 (09/04/24 1059)  Resp: 18 (09/04/24 1059)  BP: (!) 156/78 (09/04/24 1059)  SpO2: 99 % (09/04/24 1059)    Physical Exam:  ASA: 2  Mallampati: n/a    General: no acute distress  Mental Status: alert and  oriented to person, place and time  HEENT: normocephalic, atraumatic  Chest: unlabored breathing  Heart: regular heart rate  Abdomen: distended  Extremity: moves all extremities    ASSESSMENT/PLAN:     Sedation Plan: local  Patient will undergo ultrasound guided paracentesis.    Ashia Gallegos PA-C

## 2024-09-04 NOTE — SEDATION DOCUMENTATION
IR Procedure - Paracentesis - is completed. Patient tolerated well. He is awake, alert, oriented. Vital signs are stable. 5500 mL dark, yellow ascites is drained. Labs collected per Dr. Escalante's standing orders. Patient will return to IR pre/post to complete discharge.

## 2024-09-17 ENCOUNTER — TELEPHONE (OUTPATIENT)
Dept: INTERVENTIONAL RADIOLOGY/VASCULAR | Facility: HOSPITAL | Age: 64
End: 2024-09-17
Payer: MEDICAID

## 2024-09-18 ENCOUNTER — HOSPITAL ENCOUNTER (OUTPATIENT)
Dept: INTERVENTIONAL RADIOLOGY/VASCULAR | Facility: HOSPITAL | Age: 64
Discharge: HOME | End: 2024-09-18
Attending: INTERNAL MEDICINE
Payer: MEDICAID

## 2024-09-18 ENCOUNTER — TELEPHONE (OUTPATIENT)
Dept: TRANSPLANT | Facility: CLINIC | Age: 64
End: 2024-09-18
Payer: MEDICAID

## 2024-09-18 VITALS
DIASTOLIC BLOOD PRESSURE: 68 MMHG | HEIGHT: 69 IN | WEIGHT: 200 LBS | OXYGEN SATURATION: 99 % | HEART RATE: 53 BPM | SYSTOLIC BLOOD PRESSURE: 146 MMHG | BODY MASS INDEX: 29.62 KG/M2 | RESPIRATION RATE: 18 BRPM | TEMPERATURE: 98 F

## 2024-09-18 DIAGNOSIS — R18.8 ASCITES: ICD-10-CM

## 2024-09-18 DIAGNOSIS — R18.8 OTHER ASCITES: ICD-10-CM

## 2024-09-18 LAB
ALBUMIN FLD-MCNC: 1.6 G/DL
APPEARANCE FLD: NORMAL
BODY FLD TYPE: NORMAL
COLOR FLD: YELLOW
LYMPHOCYTES NFR FLD MANUAL: 77 %
MESOTHL CELL NFR FLD MANUAL: 3 %
MONOS+MACROS NFR FLD MANUAL: 5 %
NEUTROPHILS NFR FLD MANUAL: 15 %
PROT FLD-MCNC: 3.1 G/DL
SPECIMEN SOURCE: NORMAL
SPECIMEN SOURCE: NORMAL
WBC # FLD: 134 /CU MM

## 2024-09-18 PROCEDURE — 49083 ABD PARACENTESIS W/IMAGING: CPT | Mod: TXP | Performed by: RADIOLOGY

## 2024-09-18 PROCEDURE — 63600175 PHARM REV CODE 636 W HCPCS: Mod: JZ,JG,TXP | Performed by: PHYSICIAN ASSISTANT

## 2024-09-18 PROCEDURE — 89051 BODY FLUID CELL COUNT: CPT | Mod: TXP | Performed by: INTERNAL MEDICINE

## 2024-09-18 PROCEDURE — 84157 ASSAY OF PROTEIN OTHER: CPT | Mod: TXP | Performed by: INTERNAL MEDICINE

## 2024-09-18 PROCEDURE — 82042 OTHER SOURCE ALBUMIN QUAN EA: CPT | Mod: TXP | Performed by: INTERNAL MEDICINE

## 2024-09-18 PROCEDURE — P9047 ALBUMIN (HUMAN), 25%, 50ML: HCPCS | Mod: JZ,JG,TXP | Performed by: PHYSICIAN ASSISTANT

## 2024-09-18 PROCEDURE — 49083 ABD PARACENTESIS W/IMAGING: CPT | Mod: TXP,,, | Performed by: RADIOLOGY

## 2024-09-18 PROCEDURE — 25000003 PHARM REV CODE 250: Mod: TXP | Performed by: PHYSICIAN ASSISTANT

## 2024-09-18 RX ORDER — LIDOCAINE HYDROCHLORIDE 10 MG/ML
INJECTION, SOLUTION INFILTRATION; PERINEURAL
Status: COMPLETED | OUTPATIENT
Start: 2024-09-18 | End: 2024-09-18

## 2024-09-18 RX ORDER — ALBUMIN HUMAN 250 G/1000ML
SOLUTION INTRAVENOUS
Status: COMPLETED | OUTPATIENT
Start: 2024-09-18 | End: 2024-09-18

## 2024-09-18 RX ADMIN — ALBUMIN (HUMAN) 75 G: 12.5 SOLUTION INTRAVENOUS at 02:09

## 2024-09-18 RX ADMIN — LIDOCAINE HYDROCHLORIDE 5 ML: 10 INJECTION, SOLUTION INFILTRATION; PERINEURAL at 01:09

## 2024-09-18 NOTE — DISCHARGE SUMMARY
Interventional Radiology Short Stay Discharge Summary      Admit Date: 9/18/2024  Discharge Date: 09/18/2024     Hospital Course: Uneventful    Discharge Diagnosis: ascites    Discharge Condition: Stable    Discharge Disposition: Home    Diet: Resume prior diet    Activity: activity as tolerated    Follow-up: With referring provider    Cindy Chava, PA-C [facility]

## 2024-09-18 NOTE — H&P
Radiology History & Physical      SUBJECTIVE:     Chief Complaint: abdominal distention    History of Present Illness:  Jonny Isabel is a 63 y.o. male who presents for ultrasound guided paracentesis  Past Medical History:   Diagnosis Date    A-fib     Esophageal varices 5/8/2023    Other ascites 5/8/2023     Past Surgical History:   Procedure Laterality Date    ESOPHAGOGASTRODUODENOSCOPY N/A 1/17/2023    Procedure: EGD (ESOPHAGOGASTRODUODENOSCOPY);  Surgeon: Dewayne Navas MD;  Location: [facility] (2ND FLR);  Service: Endoscopy;  Laterality: N/A;    ESOPHAGOGASTRODUODENOSCOPY N/A 2/7/2024    Procedure: EGD (ESOPHAGOGASTRODUODENOSCOPY);  Surgeon: Nathan Goins MD;  [facility]  Service: Endoscopy;  Laterality: N/A;  referral: Dr. Escalante /cirrhosis - labs- possible banding / prep ins on portal / 2nd floor - Pt c/o SOB at times./ Xarelto - message sent to clearance nurse per protocol - ERW  1/31/24- LVM for precall - ERW  2/1-precall complet-Kpvt  ok to hold Xarelto2 da       Home Meds:   Prior to Admission medications    Medication Sig Start Date End Date Taking? Authorizing Provider   ADVAIR DISKUS 250-50 mcg/dose diskus inhaler Inhale 1 puff into the lungs 2 (two) times a day. Controller 3/20/24 3/20/25  Joanne Ruiz NP   amiodarone (PACERONE) 200 MG Tab Take 1 tablet (200 mg total) by mouth once daily. 12/12/23 12/11/24  Ellen Escalera NP   ferrous sulfate 324 mg (65 mg iron) TbEC Take 324 mg by mouth once daily. 1/4/24   Provider, Historical   folic acid (FOLVITE) 1 MG tablet Take 1 mg by mouth once daily.    Provider, Historical   ibuprofen (ADVIL,MOTRIN) 800 MG tablet Take 800 mg by mouth daily as needed for Pain.  Patient not taking: Reported on 8/28/2024    Provider, Historical   levothyroxine (SYNTHROID) 50 MCG tablet TAKE 1 TABLET BY MOUTH IN THE MORNING WITH A FULL GLASS OF WATER 30-60 MINUTES BEFORE OTHER MEDICATIONS AND FOOD  Patient taking differently: Take 50 mcg by  mouth before breakfast. WITH A FULL GLASS OF WATER 30-60 MINUTES BEFORE OTHER MEDICATIONS AND FOOD 12/12/23   Ellen Escalera NP   metoprolol tartrate (LOPRESSOR) 100 MG tablet Take 100 mg by mouth once daily. 7/16/24   Provider, Historical   omega-3 acid ethyl esters (LOVAZA) 1 gram capsule Take 1 capsule by mouth 2 (two) times daily. 7/16/24   Provider, Historical   pantoprazole (PROTONIX) 40 MG tablet Take 1 tablet (40 mg total) by mouth 2 (two) times daily. 8/5/24 8/5/25  Shawn Mejia MD   potassium chloride (KLOR-CON) 10 MEQ TbSR Take 10 mEq by mouth once daily.    Provider, Historical   promethazine (PHENERGAN) 12.5 MG Tab Take 12.5 mg by mouth every 6 (six) hours as needed (nausea). 5/6/24   Provider, Historical   thiamine 100 MG tablet Take 100 mg by mouth once daily. 7/13/22   Provider, Historical   traZODone (DESYREL) 50 MG tablet Take 50 mg by mouth nightly as needed. 2/29/24   Provider, Historical   VITAMIN B COMPLEX ORAL Take 1 tablet by mouth once daily.    Provider, Historical   XARELTO 20 mg Tab Take 20 mg by mouth once daily. 1/9/23   Provider, Historical     Anticoagulants/Antiplatelets:  xarelto    Allergies: Review of patient's allergies indicates:  No Known Allergies  Sedation History:  no adverse reactions    Review of Systems:   Hematological: no known coagulopathies  Respiratory: no shortness of breath  Cardiovascular: no chest pain  Gastrointestinal: no abdominal pain  Genito-Urinary: no dysuria  Musculoskeletal: negative  Neurological: no TIA or stroke symptoms         OBJECTIVE:     Vital Signs (Most Recent)       Physical Exam:  ASA: 2  Mallampati: n/a    General: no acute distress  Mental Status: alert and oriented to person, place and time  HEENT: normocephalic, atraumatic  Chest: unlabored breathing  Heart: regular heart rate  Abdomen: distended  Extremity: moves all extremities    ASSESSMENT/PLAN:     Sedation Plan: local  Patient will undergo ultrasound guided  paracentesis.    Ashia Gallegos PA-C

## 2024-09-18 NOTE — PROCEDURES
Radiology Post-Procedure Note    Pre Op Diagnosis: Ascites  Post Op Diagnosis: Same    Procedure: Ultrasound Guided Paracentesis    Procedure performed by: Ashia Gallegos PA-C    Written Informed Consent Obtained: Yes  Specimen Removed: YES   Estimated Blood Loss: Minimal    Findings:   Successful paracentesis.  12L clear yellow fluid from LLQ.  Albumin was administered PRN per protocol.    Patient tolerated procedure well.    Ashia Gallegos PA-C

## 2024-09-18 NOTE — TELEPHONE ENCOUNTER
Called Mr. Isabel to discuss his lab results as well as scheduling the rest of his appointments to finish liver transplant evaluation.  No answer. SAMY

## 2024-09-19 ENCOUNTER — TELEPHONE (OUTPATIENT)
Dept: TRANSPLANT | Facility: CLINIC | Age: 64
End: 2024-09-19
Payer: MEDICAID

## 2024-09-19 DIAGNOSIS — Z76.82 ORGAN TRANSPLANT CANDIDATE: Primary | ICD-10-CM

## 2024-09-19 NOTE — TELEPHONE ENCOUNTER
Spoke to Mr. Fuller's caregiver (wife) about  - getting updated MELD labs  - pharmacological stress test  -Nephrology consult for CKD  - Dr. Escalante wanted US now  - Consult with cardiology: Dr. Mata    Patient's caregiver states understanding

## 2024-09-24 ENCOUNTER — TELEPHONE (OUTPATIENT)
Dept: INTERVENTIONAL RADIOLOGY/VASCULAR | Facility: HOSPITAL | Age: 64
End: 2024-09-24
Payer: MEDICAID

## 2024-09-25 ENCOUNTER — TELEPHONE (OUTPATIENT)
Dept: TRANSPLANT | Facility: CLINIC | Age: 64
End: 2024-09-25

## 2024-09-25 ENCOUNTER — HOSPITAL ENCOUNTER (OUTPATIENT)
Dept: INTERVENTIONAL RADIOLOGY/VASCULAR | Facility: HOSPITAL | Age: 64
Discharge: HOME | End: 2024-09-25
Attending: INTERNAL MEDICINE
Payer: MEDICAID

## 2024-09-25 VITALS
SYSTOLIC BLOOD PRESSURE: 164 MMHG | RESPIRATION RATE: 20 BRPM | DIASTOLIC BLOOD PRESSURE: 88 MMHG | OXYGEN SATURATION: 99 % | HEART RATE: 51 BPM

## 2024-09-25 DIAGNOSIS — I50.9 CONGESTIVE HEART FAILURE, UNSPECIFIED HF CHRONICITY, UNSPECIFIED HEART FAILURE TYPE: Primary | ICD-10-CM

## 2024-09-25 DIAGNOSIS — R18.8 OTHER ASCITES: ICD-10-CM

## 2024-09-25 LAB
ALBUMIN FLD-MCNC: 1.7 G/DL
APPEARANCE FLD: CLEAR
BODY FLD TYPE: NORMAL
COLOR FLD: YELLOW
LYMPHOCYTES NFR FLD MANUAL: 38 %
MESOTHL CELL NFR FLD MANUAL: 25 %
MONOS+MACROS NFR FLD MANUAL: 15 %
NEUTROPHILS NFR FLD MANUAL: 22 %
PROT FLD-MCNC: 3 G/DL
SPECIMEN SOURCE: NORMAL
SPECIMEN SOURCE: NORMAL
WBC # FLD: 234 /CU MM

## 2024-09-25 PROCEDURE — 25000003 PHARM REV CODE 250: Mod: TXP | Performed by: PHYSICIAN ASSISTANT

## 2024-09-25 PROCEDURE — 89051 BODY FLUID CELL COUNT: CPT | Mod: TXP | Performed by: INTERNAL MEDICINE

## 2024-09-25 PROCEDURE — 84157 ASSAY OF PROTEIN OTHER: CPT | Mod: TXP | Performed by: INTERNAL MEDICINE

## 2024-09-25 PROCEDURE — 82042 OTHER SOURCE ALBUMIN QUAN EA: CPT | Mod: TXP | Performed by: INTERNAL MEDICINE

## 2024-09-25 RX ORDER — LIDOCAINE HYDROCHLORIDE 10 MG/ML
INJECTION, SOLUTION INFILTRATION; PERINEURAL
Status: COMPLETED | OUTPATIENT
Start: 2024-09-25 | End: 2024-09-25

## 2024-09-25 RX ADMIN — LIDOCAINE HYDROCHLORIDE 3 ML: 10 INJECTION, SOLUTION INFILTRATION; PERINEURAL at 01:09

## 2024-09-25 NOTE — DISCHARGE SUMMARY
Interventional Radiology Short Stay Discharge Summary      Admit Date: 9/25/2024  Discharge Date: 09/25/2024     Hospital Course: Uneventful    Discharge Diagnosis: ascites    Discharge Condition: Stable    Discharge Disposition: Home    Diet: Resume prior diet    Activity: activity as tolerated    Follow-up: With referring provider    Cindy Chava, PA-C [facility]

## 2024-09-25 NOTE — SEDATION DOCUMENTATION
Procedure complete, pt tolerated well. 5L clear/yellow fluid removed from abd; specimens collected to be sent to lab. Patient alert and oriented x4 unlabored on Room Air. Patient denies pain and is resting comfortably. Surgical site dressed with bandaid. Dressing is clean, dry, and intact. Patient transported to recovery to be discharged.

## 2024-09-25 NOTE — SEDATION DOCUMENTATION
Pt arrived to C ARM for paracentis. Pt oriented to unit and staff. Plan of care reviewed with patient, patient verbalizes understanding. Comfort measures utilized. Pt safely transferred from stretcher to procedural table. Fall risk reviewed with patient, fall risk interventions maintained. Blankets applied. Pt prepped and draped utilizing standard sterile technique. Patient placed on continuous monitoring, as required by sedation policy. Timeouts completed utilizing standard universal time-out, per department and facility policy. RN to remain at bedside, continuous monitoring maintained. Pt resting comfortably. Denies pain/discomfort. Will continue to monitor. See flow sheets for monitoring, medication administration, and updates.

## 2024-09-25 NOTE — PLAN OF CARE
Pt VSS and in NAD Discharge instructions and AVS provided. Pt verbalized understanding, questions and concerns addressed. No further needs at this time. Pt discharged from recovery area at .   [id number]

## 2024-09-25 NOTE — PROCEDURES
Radiology Post-Procedure Note    Pre Op Diagnosis: Ascites  Post Op Diagnosis: Same    Procedure: Ultrasound Guided Paracentesis    Procedure performed by: Ashia Gallegos PA-C    Written Informed Consent Obtained: Yes  Specimen Removed: YES   Estimated Blood Loss: Minimal    Findings:   Successful paracentesis.  5L RLQ.  Albumin was not administered PRN per protocol.    Patient tolerated procedure well.    Ashia Gallegos PA-C

## 2024-09-25 NOTE — H&P
Radiology History & Physical      SUBJECTIVE:     Chief Complaint: abdominal distention    History of Present Illness:  Jonny Isabel is a 63 y.o. male who presents for ultrasound guided paracentesis  Past Medical History:   Diagnosis Date    A-fib     Esophageal varices 5/8/2023    Other ascites 5/8/2023     Past Surgical History:   Procedure Laterality Date    ESOPHAGOGASTRODUODENOSCOPY N/A 1/17/2023    Procedure: EGD (ESOPHAGOGASTRODUODENOSCOPY);  Surgeon: Dewayne Navas MD;  Location: [facility] (2ND FLR);  Service: Endoscopy;  Laterality: N/A;    ESOPHAGOGASTRODUODENOSCOPY N/A 2/7/2024    Procedure: EGD (ESOPHAGOGASTRODUODENOSCOPY);  Surgeon: Ntahan Goins MD;  Location: [facility] (2ND FLR);  Service: Endoscopy;  Laterality: N/A;  referral: Dr. Escalante /cirrhosis - labs- possible banding / prep ins on portal / 2nd floor - Pt c/o SOB at times./ Xarelto - message sent to clearance nurse per protocol - ERW  1/31/24- LVM for precall - ERW  2/1-precall complet-Kpvt  ok to hold Xarelto2 da       Home Meds:   Prior to Admission medications    Medication Sig Start Date End Date Taking? Authorizing Provider   ADVAIR DISKUS 250-50 mcg/dose diskus inhaler Inhale 1 puff into the lungs 2 (two) times a day. Controller 3/20/24 3/20/25 Yes Joanne Ruiz NP   amiodarone (PACERONE) 200 MG Tab Take 1 tablet (200 mg total) by mouth once daily. 12/12/23 12/11/24 Yes Ellen Escalera NP   folic acid (FOLVITE) 1 MG tablet Take 1 mg by mouth once daily.   Yes Provider, Historical   metoprolol tartrate (LOPRESSOR) 100 MG tablet Take 100 mg by mouth once daily. 7/16/24  Yes Provider, Historical   omega-3 acid ethyl esters (LOVAZA) 1 gram capsule Take 1 capsule by mouth 2 (two) times daily. 7/16/24  Yes Provider, Historical   pantoprazole (PROTONIX) 40 MG tablet Take 1 tablet (40 mg total) by mouth 2 (two) times daily. 8/5/24 8/5/25 Yes Shawn Mejia MD   thiamine 100 MG tablet Take 100 mg by mouth once daily. 7/13/22   Yes Provider, Historical   traZODone (DESYREL) 50 MG tablet Take 50 mg by mouth nightly as needed. 2/29/24  Yes Provider, Historical   VITAMIN B COMPLEX ORAL Take 1 tablet by mouth once daily.   Yes Provider, Historical   XARELTO 20 mg Tab Take 20 mg by mouth once daily. 1/9/23  Yes Provider, Historical   ferrous sulfate 324 mg (65 mg iron) TbEC Take 324 mg by mouth once daily. 1/4/24   Provider, Historical   ibuprofen (ADVIL,MOTRIN) 800 MG tablet Take 800 mg by mouth daily as needed for Pain.  Patient not taking: Reported on 8/28/2024    Provider, Historical   levothyroxine (SYNTHROID) 50 MCG tablet TAKE 1 TABLET BY MOUTH IN THE MORNING WITH A FULL GLASS OF WATER 30-60 MINUTES BEFORE OTHER MEDICATIONS AND FOOD  Patient taking differently: Take 50 mcg by mouth before breakfast. WITH A FULL GLASS OF WATER 30-60 MINUTES BEFORE OTHER MEDICATIONS AND FOOD 12/12/23   Ellen Escalera, NP   potassium chloride (KLOR-CON) 10 MEQ TbSR Take 10 mEq by mouth once daily.    Provider, Historical   promethazine (PHENERGAN) 12.5 MG Tab Take 12.5 mg by mouth every 6 (six) hours as needed (nausea). 5/6/24   Provider, Historical     Anticoagulants/Antiplatelets:  xarelto    Allergies: Review of patient's allergies indicates:  No Known Allergies  Sedation History:  no adverse reactions    Review of Systems:   Hematological: no known coagulopathies  Respiratory: no shortness of breath  Cardiovascular: no chest pain  Gastrointestinal: no abdominal pain  Genito-Urinary: no dysuria  Musculoskeletal: negative  Neurological: no TIA or stroke symptoms         OBJECTIVE:     Vital Signs (Most Recent)  Temp: (P) 98 °F (36.7 °C) (09/25/24 1334)  Pulse: (!) 49 (09/25/24 1338)  Resp: (!) 21 (09/25/24 1338)  BP: 136/83 (09/25/24 1338)  SpO2: 100 % (09/25/24 1338)    Physical Exam:  ASA: 2  Mallampati: n/a    General: no acute distress  Mental Status: alert and oriented to person, place and time  HEENT: normocephalic, atraumatic  Chest: unlabored  breathing  Heart: regular heart rate  Abdomen: distended  Extremity: moves all extremities    ASSESSMENT/PLAN:     Sedation Plan: local  Patient will undergo ultrasound guided paracentesis.    Ashia Gallegos PA-C

## 2024-09-26 ENCOUNTER — TELEPHONE (OUTPATIENT)
Dept: NEPHROLOGY | Facility: CLINIC | Age: 64
End: 2024-09-26
Payer: MEDICAID

## 2024-09-27 ENCOUNTER — TELEPHONE (OUTPATIENT)
Dept: TRANSPLANT | Facility: CLINIC | Age: 64
End: 2024-09-27
Payer: MEDICAID

## 2024-09-27 NOTE — TELEPHONE ENCOUNTER
Called Mr. Isabel to ask if he had his colonoscopy.  No anwer. [facility]

## 2024-10-01 ENCOUNTER — TELEPHONE (OUTPATIENT)
Dept: INTERVENTIONAL RADIOLOGY/VASCULAR | Facility: HOSPITAL | Age: 64
End: 2024-10-01
Payer: MEDICAID

## 2024-10-02 ENCOUNTER — HOSPITAL ENCOUNTER (OUTPATIENT)
Dept: INTERVENTIONAL RADIOLOGY/VASCULAR | Facility: HOSPITAL | Age: 64
Discharge: HOME | End: 2024-10-02
Attending: INTERNAL MEDICINE
Payer: MEDICAID

## 2024-10-02 VITALS
OXYGEN SATURATION: 98 % | DIASTOLIC BLOOD PRESSURE: 77 MMHG | SYSTOLIC BLOOD PRESSURE: 167 MMHG | HEART RATE: 47 BPM | RESPIRATION RATE: 17 BRPM

## 2024-10-02 DIAGNOSIS — R18.8 OTHER ASCITES: ICD-10-CM

## 2024-10-02 LAB
ALBUMIN FLD-MCNC: 1.6 G/DL
APPEARANCE FLD: CLEAR
BODY FLD TYPE: NORMAL
COLOR FLD: YELLOW
LYMPHOCYTES NFR FLD MANUAL: 65 %
MONOS+MACROS NFR FLD MANUAL: 22 %
NEUTROPHILS NFR FLD MANUAL: 13 %
PROT FLD-MCNC: 3 G/DL
SPECIMEN SOURCE: NORMAL
SPECIMEN SOURCE: NORMAL
WBC # FLD: 329 /CU MM

## 2024-10-02 PROCEDURE — 84157 ASSAY OF PROTEIN OTHER: CPT | Mod: TXP | Performed by: INTERNAL MEDICINE

## 2024-10-02 PROCEDURE — 89051 BODY FLUID CELL COUNT: CPT | Mod: TXP | Performed by: INTERNAL MEDICINE

## 2024-10-02 PROCEDURE — 82042 OTHER SOURCE ALBUMIN QUAN EA: CPT | Mod: TXP | Performed by: INTERNAL MEDICINE

## 2024-10-02 PROCEDURE — 63600175 PHARM REV CODE 636 W HCPCS: Mod: TXP | Performed by: PHYSICIAN ASSISTANT

## 2024-10-02 RX ORDER — LIDOCAINE HYDROCHLORIDE 10 MG/ML
INJECTION, SOLUTION INFILTRATION; PERINEURAL
Status: COMPLETED | OUTPATIENT
Start: 2024-10-02 | End: 2024-10-02

## 2024-10-02 RX ADMIN — LIDOCAINE HYDROCHLORIDE 5 ML: 10 INJECTION, SOLUTION INFILTRATION; PERINEURAL at 12:10

## 2024-10-02 NOTE — DISCHARGE SUMMARY
Interventional Radiology Short Stay Discharge Summary      Admit Date: 10/2/2024  Discharge Date: 10/02/2024     Hospital Course: Uneventful    Discharge Diagnosis: ascites    Discharge Condition: Stable    Discharge Disposition: Home    Diet: Resume prior diet    Activity: activity as tolerated    Follow-up: With referring provider    Cindy Chava, PA-C [facility]

## 2024-10-02 NOTE — PROCEDURES
Radiology Post-Procedure Note    Pre Op Diagnosis: Ascites  Post Op Diagnosis: Same    Procedure: Ultrasound Guided Paracentesis    Procedure performed by: Ashia Gallegso PA-C    Written Informed Consent Obtained: Yes  Specimen Removed: YES   Estimated Blood Loss: Minimal    Findings:   Successful paracentesis.  5L LLQ.  Albumin was not administered PRN per protocol.    Patient tolerated procedure well.    Ashia Gallegos PA-C

## 2024-10-02 NOTE — SEDATION DOCUMENTATION
Patient presents for weekly paracentesis. Denies any issues or changes since last visit. Tolerated well. VSS.  Ok for discharge

## 2024-10-02 NOTE — H&P
Radiology History & Physical      SUBJECTIVE:     Chief Complaint: abdominal distention    History of Present Illness:  Jonny Isabel is a 63 y.o. male who presents for ultrasound guided paracentesis  Past Medical History:   Diagnosis Date    A-fib     Esophageal varices 5/8/2023    Other ascites 5/8/2023     Past Surgical History:   Procedure Laterality Date    ESOPHAGOGASTRODUODENOSCOPY N/A 1/17/2023    Procedure: EGD (ESOPHAGOGASTRODUODENOSCOPY);  Surgeon: Dewayne Navas MD;  Location: [facility] (2ND FLR);  Service: Endoscopy;  Laterality: N/A;    ESOPHAGOGASTRODUODENOSCOPY N/A 2/7/2024    Procedure: EGD (ESOPHAGOGASTRODUODENOSCOPY);  Surgeon: Nathan Goins MD;  [facility]  Service: Endoscopy;  Laterality: N/A;  referral: Dr. Escalante /cirrhosis - labs- possible banding / prep ins on portal / 2nd floor - Pt c/o SOB at times./ Xarelto - message sent to clearance nurse per protocol - ERW  1/31/24- LVM for precall - ERW  2/1-precall complet-Kpvt  ok to hold Xarelto2 da       Home Meds:   Prior to Admission medications    Medication Sig Start Date End Date Taking? Authorizing Provider   ADVAIR DISKUS 250-50 mcg/dose diskus inhaler Inhale 1 puff into the lungs 2 (two) times a day. Controller 3/20/24 3/20/25  Joanne Ruiz NP   amiodarone (PACERONE) 200 MG Tab Take 1 tablet (200 mg total) by mouth once daily. 12/12/23 12/11/24  Ellen Escalera NP   ferrous sulfate 324 mg (65 mg iron) TbEC Take 324 mg by mouth once daily. 1/4/24   Provider, Historical   folic acid (FOLVITE) 1 MG tablet Take 1 mg by mouth once daily.    Provider, Historical   ibuprofen (ADVIL,MOTRIN) 800 MG tablet Take 800 mg by mouth daily as needed for Pain.  Patient not taking: Reported on 8/28/2024    Provider, Historical   levothyroxine (SYNTHROID) 50 MCG tablet TAKE 1 TABLET BY MOUTH IN THE MORNING WITH A FULL GLASS OF WATER 30-60 MINUTES BEFORE OTHER MEDICATIONS AND FOOD  Patient taking differently: Take 50 mcg by  mouth before breakfast. WITH A FULL GLASS OF WATER 30-60 MINUTES BEFORE OTHER MEDICATIONS AND FOOD 12/12/23   Ellen Escalera NP   metoprolol tartrate (LOPRESSOR) 100 MG tablet Take 100 mg by mouth once daily. 7/16/24   Provider, Historical   omega-3 acid ethyl esters (LOVAZA) 1 gram capsule Take 1 capsule by mouth 2 (two) times daily. 7/16/24   Provider, Historical   pantoprazole (PROTONIX) 40 MG tablet Take 1 tablet (40 mg total) by mouth 2 (two) times daily. 8/5/24 8/5/25  Shawn Mejia MD   potassium chloride (KLOR-CON) 10 MEQ TbSR Take 10 mEq by mouth once daily.    Provider, Historical   promethazine (PHENERGAN) 12.5 MG Tab Take 12.5 mg by mouth every 6 (six) hours as needed (nausea). 5/6/24   Provider, Historical   thiamine 100 MG tablet Take 100 mg by mouth once daily. 7/13/22   Provider, Historical   traZODone (DESYREL) 50 MG tablet Take 50 mg by mouth nightly as needed. 2/29/24   Provider, Historical   VITAMIN B COMPLEX ORAL Take 1 tablet by mouth once daily.    Provider, Historical   XARELTO 20 mg Tab Take 20 mg by mouth once daily. 1/9/23   Provider, Historical     Anticoagulants/Antiplatelets:  xarelto    Allergies: Review of patient's allergies indicates:  No Known Allergies  Sedation History:  no adverse reactions    Review of Systems:   Hematological: no known coagulopathies  Respiratory: no shortness of breath  Cardiovascular: no chest pain  Gastrointestinal: no abdominal pain  Genito-Urinary: no dysuria  Musculoskeletal: negative  Neurological: no TIA or stroke symptoms         OBJECTIVE:     Vital Signs (Most Recent)       Physical Exam:  ASA: 2  Mallampati: n/a    General: no acute distress  Mental Status: alert and oriented to person, place and time  HEENT: normocephalic, atraumatic  Chest: unlabored breathing  Heart: regular heart rate  Abdomen: distended  Extremity: moves all extremities    ASSESSMENT/PLAN:     Sedation Plan: local  Patient will undergo ultrasound guided  paracentesis.    Ashia Gallegos PA-C

## 2024-10-08 ENCOUNTER — TELEPHONE (OUTPATIENT)
Dept: TRANSPLANT | Facility: CLINIC | Age: 64
End: 2024-10-08
Payer: MEDICAID

## 2024-10-08 ENCOUNTER — TELEPHONE (OUTPATIENT)
Dept: INTERVENTIONAL RADIOLOGY/VASCULAR | Facility: HOSPITAL | Age: 64
End: 2024-10-08
Payer: MEDICAID

## 2024-10-08 NOTE — NURSING
Signigicant other advised to arrive at 12:30. Regularly scheduled patient who is familiar with the procedure and the department's process.

## 2024-10-08 NOTE — TELEPHONE ENCOUNTER
Called and spoke to patient's spouse Adelita.  I explained that I noticed he cancelled his heart stress test again.  Spouse states that Mr. Isabel is feeling like he just does not want to finish evaluation. Talked to spouse, Adelita, and asked if she wanted me to call back next week so that her and Mr Isabel can have extra time to discuss finishing the evaluation. Adelita said yes. That would be good to give some extra time to discuss. She agreed to talk with me next week about Mr. Isabel's decision.

## 2024-10-09 ENCOUNTER — HOSPITAL ENCOUNTER (OUTPATIENT)
Dept: INTERVENTIONAL RADIOLOGY/VASCULAR | Facility: HOSPITAL | Age: 64
Discharge: HOME | End: 2024-10-09
Attending: INTERNAL MEDICINE
Payer: MEDICAID

## 2024-10-09 VITALS
OXYGEN SATURATION: 98 % | HEIGHT: 69 IN | RESPIRATION RATE: 18 BRPM | BODY MASS INDEX: 27.4 KG/M2 | WEIGHT: 185 LBS | HEART RATE: 60 BPM | TEMPERATURE: 98 F | DIASTOLIC BLOOD PRESSURE: 79 MMHG | SYSTOLIC BLOOD PRESSURE: 165 MMHG

## 2024-10-09 DIAGNOSIS — R18.8 OTHER ASCITES: ICD-10-CM

## 2024-10-09 DIAGNOSIS — R18.8 ASCITES: ICD-10-CM

## 2024-10-09 DIAGNOSIS — Z76.82 AWAITING LIVER TRANSPLANT: Primary | ICD-10-CM

## 2024-10-09 PROCEDURE — 49083 ABD PARACENTESIS W/IMAGING: CPT | Mod: TXP,,, | Performed by: PHYSICIAN ASSISTANT

## 2024-10-09 PROCEDURE — 49083 ABD PARACENTESIS W/IMAGING: CPT | Mod: TXP | Performed by: RADIOLOGY

## 2024-10-09 PROCEDURE — 63600175 PHARM REV CODE 636 W HCPCS: Mod: JZ,JG,TXP | Performed by: PHYSICIAN ASSISTANT

## 2024-10-09 PROCEDURE — P9047 ALBUMIN (HUMAN), 25%, 50ML: HCPCS | Mod: JZ,JG,TXP | Performed by: PHYSICIAN ASSISTANT

## 2024-10-09 RX ORDER — ALBUMIN HUMAN 250 G/1000ML
SOLUTION INTRAVENOUS
Status: COMPLETED | OUTPATIENT
Start: 2024-10-09 | End: 2024-10-09

## 2024-10-09 RX ORDER — LIDOCAINE HYDROCHLORIDE 10 MG/ML
INJECTION, SOLUTION INFILTRATION; PERINEURAL
Status: COMPLETED | OUTPATIENT
Start: 2024-10-09 | End: 2024-10-09

## 2024-10-09 RX ADMIN — LIDOCAINE HYDROCHLORIDE 5 ML: 10 INJECTION, SOLUTION INFILTRATION; PERINEURAL at 01:10

## 2024-10-09 RX ADMIN — ALBUMIN (HUMAN) 50 G: 25 SOLUTION INTRAVENOUS at 02:10

## 2024-10-09 NOTE — PLAN OF CARE
Pt VSS and in NAD. PIV and CRM equipment removed. Discharge instructions and AVS provided. Pt verbalized understanding, questions and concerns addressed. No further needs at this time. Pt discharged from recovery area at 1445. IR care complete.

## 2024-10-09 NOTE — SEDATION DOCUMENTATION
Procedure completed. Patient tolerated well; VSS. Site CDI. Patient to receive albumin and then discharged per orders.

## 2024-10-09 NOTE — H&P
Radiology History & Physical      SUBJECTIVE:     Chief Complaint: abdominal distention    History of Present Illness:  Jonny Isabel is a 63 y.o. male who presents for ultrasound guided paracentesis  Past Medical History:   Diagnosis Date    A-fib     Esophageal varices 5/8/2023    Other ascites 5/8/2023     Past Surgical History:   Procedure Laterality Date    ESOPHAGOGASTRODUODENOSCOPY N/A 1/17/2023    Procedure: EGD (ESOPHAGOGASTRODUODENOSCOPY);  Surgeon: Dewayne Navas MD;  Location: [facility] (2ND FLR);  Service: Endoscopy;  Laterality: N/A;    ESOPHAGOGASTRODUODENOSCOPY N/A 2/7/2024    Procedure: EGD (ESOPHAGOGASTRODUODENOSCOPY);  Surgeon: Nathan Goins MD;  [facility]  Service: Endoscopy;  Laterality: N/A;  referral: Dr. Escalante /cirrhosis - labs- possible banding / prep ins on portal / 2nd floor - Pt c/o SOB at times./ Xarelto - message sent to clearance nurse per protocol - ERW  1/31/24- LVM for precall - ERW  2/1-precall complet-Kpvt  ok to hold Xarelto2 da       Home Meds:   Prior to Admission medications    Medication Sig Start Date End Date Taking? Authorizing Provider   ADVAIR DISKUS 250-50 mcg/dose diskus inhaler Inhale 1 puff into the lungs 2 (two) times a day. Controller 3/20/24 3/20/25  Joanne Ruiz NP   amiodarone (PACERONE) 200 MG Tab Take 1 tablet (200 mg total) by mouth once daily. 12/12/23 12/11/24  Ellen Escalera NP   ferrous sulfate 324 mg (65 mg iron) TbEC Take 324 mg by mouth once daily. 1/4/24   Provider, Historical   folic acid (FOLVITE) 1 MG tablet Take 1 mg by mouth once daily.    Provider, Historical   ibuprofen (ADVIL,MOTRIN) 800 MG tablet Take 800 mg by mouth daily as needed for Pain.  Patient not taking: Reported on 8/28/2024    Provider, Historical   levothyroxine (SYNTHROID) 50 MCG tablet TAKE 1 TABLET BY MOUTH IN THE MORNING WITH A FULL GLASS OF WATER 30-60 MINUTES BEFORE OTHER MEDICATIONS AND FOOD  Patient taking differently: Take 50 mcg by  mouth before breakfast. WITH A FULL GLASS OF WATER 30-60 MINUTES BEFORE OTHER MEDICATIONS AND FOOD 12/12/23   Ellen Escalera NP   metoprolol tartrate (LOPRESSOR) 100 MG tablet Take 100 mg by mouth once daily. 7/16/24   Provider, Historical   omega-3 acid ethyl esters (LOVAZA) 1 gram capsule Take 1 capsule by mouth 2 (two) times daily. 7/16/24   Provider, Historical   pantoprazole (PROTONIX) 40 MG tablet Take 1 tablet (40 mg total) by mouth 2 (two) times daily. 8/5/24 8/5/25  Shawn Mejia MD   potassium chloride (KLOR-CON) 10 MEQ TbSR Take 10 mEq by mouth once daily.    Provider, Historical   promethazine (PHENERGAN) 12.5 MG Tab Take 12.5 mg by mouth every 6 (six) hours as needed (nausea). 5/6/24   Provider, Historical   thiamine 100 MG tablet Take 100 mg by mouth once daily. 7/13/22   Provider, Historical   traZODone (DESYREL) 50 MG tablet Take 50 mg by mouth nightly as needed. 2/29/24   Provider, Historical   VITAMIN B COMPLEX ORAL Take 1 tablet by mouth once daily.    Provider, Historical   XARELTO 20 mg Tab Take 20 mg by mouth once daily. 1/9/23   Provider, Historical     Anticoagulants/Antiplatelets:  xarelto    Allergies: Review of patient's allergies indicates:  No Known Allergies  Sedation History:  no adverse reactions    Review of Systems:   Hematological: no known coagulopathies  Respiratory: no shortness of breath  Cardiovascular: no chest pain  Gastrointestinal: no abdominal pain  Genito-Urinary: no dysuria  Musculoskeletal: negative  Neurological: no TIA or stroke symptoms         OBJECTIVE:     Vital Signs (Most Recent)       Physical Exam:  ASA: 2  Mallampati: n/a    General: no acute distress  Mental Status: alert and oriented to person, place and time  HEENT: normocephalic, atraumatic  Chest: unlabored breathing  Heart: regular heart rate  Abdomen: distended  Extremity: moves all extremities    ASSESSMENT/PLAN:     Sedation Plan: local  Patient will undergo ultrasound guided  paracentesis.    Ashia Gallegos PA-C

## 2024-10-09 NOTE — DISCHARGE SUMMARY
Interventional Radiology Short Stay Discharge Summary      Admit Date: 10/9/2024  Discharge Date: 10/09/2024     Hospital Course: Uneventful    Discharge Diagnosis: ascites    Discharge Condition: Stable    Discharge Disposition: Home    Diet: Resume prior diet    Activity: activity as tolerated    Follow-up: With referring provider    Cindy Chava, PA-C [facility]

## 2024-10-09 NOTE — PROCEDURES
Radiology Post-Procedure Note    Pre Op Diagnosis: Ascites  Post Op Diagnosis: Same    Procedure: Ultrasound Guided Paracentesis    Procedure performed by: Ashia Gallegos PA-C    Written Informed Consent Obtained: Yes  Specimen Removed: no specimens sent  Estimated Blood Loss: Minimal    Findings:   Successful paracentesis.  8400 ml dark yellow fluid from LLQ.  Albumin was administered PRN per protocol.    Patient tolerated procedure well.    Ashia Gallegos PA-C

## 2024-10-15 ENCOUNTER — TELEPHONE (OUTPATIENT)
Dept: INTERVENTIONAL RADIOLOGY/VASCULAR | Facility: HOSPITAL | Age: 64
End: 2024-10-15
Payer: MEDICAID

## 2024-10-22 ENCOUNTER — TELEPHONE (OUTPATIENT)
Dept: INTERVENTIONAL RADIOLOGY/VASCULAR | Facility: HOSPITAL | Age: 64
End: 2024-10-22
Payer: MEDICAID

## 2024-10-23 ENCOUNTER — HOSPITAL ENCOUNTER (OUTPATIENT)
Dept: INTERVENTIONAL RADIOLOGY/VASCULAR | Facility: HOSPITAL | Age: 64
Discharge: HOME | End: 2024-10-23
Attending: INTERNAL MEDICINE
Payer: MEDICAID

## 2024-10-23 VITALS
DIASTOLIC BLOOD PRESSURE: 118 MMHG | HEIGHT: 69 IN | TEMPERATURE: 98 F | RESPIRATION RATE: 16 BRPM | SYSTOLIC BLOOD PRESSURE: 182 MMHG | HEART RATE: 54 BPM | WEIGHT: 195 LBS | BODY MASS INDEX: 28.88 KG/M2 | OXYGEN SATURATION: 97 %

## 2024-10-23 DIAGNOSIS — Z76.82 AWAITING LIVER TRANSPLANT: ICD-10-CM

## 2024-10-23 DIAGNOSIS — R18.8 ASCITES: ICD-10-CM

## 2024-10-23 PROCEDURE — P9047 ALBUMIN (HUMAN), 25%, 50ML: HCPCS | Mod: JZ,JG,TXP | Performed by: PHYSICIAN ASSISTANT

## 2024-10-23 PROCEDURE — 49083 ABD PARACENTESIS W/IMAGING: CPT | Mod: TXP | Performed by: RADIOLOGY

## 2024-10-23 PROCEDURE — 63600175 PHARM REV CODE 636 W HCPCS: Mod: TXP | Performed by: PHYSICIAN ASSISTANT

## 2024-10-23 RX ORDER — ALBUMIN HUMAN 250 G/1000ML
SOLUTION INTRAVENOUS
Status: COMPLETED | OUTPATIENT
Start: 2024-10-23 | End: 2024-10-23

## 2024-10-23 RX ORDER — LIDOCAINE HYDROCHLORIDE 10 MG/ML
INJECTION, SOLUTION INFILTRATION; PERINEURAL
Status: COMPLETED | OUTPATIENT
Start: 2024-10-23 | End: 2024-10-23

## 2024-10-23 RX ADMIN — LIDOCAINE HYDROCHLORIDE 3 ML: 10 INJECTION, SOLUTION INFILTRATION; PERINEURAL at 01:10

## 2024-10-23 RX ADMIN — ALBUMIN (HUMAN) 75 G: 12.5 SOLUTION INTRAVENOUS at 02:10

## 2024-10-23 NOTE — SEDATION DOCUMENTATION
Pt arrived to C ARM for paracentesis. Pt oriented to unit and staff. Plan of care reviewed with patient, patient verbalizes understanding. Comfort measures utilized. Pt safely transferred from stretcher to procedural table. Fall risk reviewed with patient, fall risk interventions maintained. Pt prepped and draped utilizing standard sterile technique. Patient placed on continuous monitoring, as required by sedation policy. Timeouts to be completed utilizing standard universal time-out, per department and facility policy. RN to remain at bedside, continuous monitoring maintained. Pt resting comfortably. Denies pain/discomfort. Will continue to monitor. See flow sheets for monitoring, medication administration, and updates.

## 2024-10-23 NOTE — PROCEDURES
Radiology Post-Procedure Note    Pre Op Diagnosis: Ascites  Post Op Diagnosis: Same    Procedure: Ultrasound Guided Paracentesis    Procedure performed by: Ashia Gallegos PA-C    Written Informed Consent Obtained: Yes  Specimen Removed: none sent  Estimated Blood Loss: Minimal    Findings:   Successful paracentesis.  11 L removed LLQ.  Albumin was administered PRN per protocol.    Patient tolerated procedure well.    Ashia Gallegos PA-C

## 2024-10-23 NOTE — SEDATION DOCUMENTATION
Procedure complete, pt tolerated well. 14479 mL cloudy/karma ascites removed, replaced with 75g albumin. Patient alert and oriented x4 unlabored on Room Air. Patient denies pain and is resting comfortably. Surgical site dressed with bandaid. Dressing is clean, dry, and intact. Patient transported to recovery to be discharged.

## 2024-10-23 NOTE — DISCHARGE SUMMARY
Interventional Radiology Short Stay Discharge Summary      Admit Date: 10/23/2024  Discharge Date: 10/23/2024     Hospital Course: Uneventful    Discharge Diagnosis: ascites    Discharge Condition: Stable    Discharge Disposition: Home    Diet: Resume prior diet    Activity: activity as tolerated    Follow-up: With referring provider    Cindy Chava, PA-C [facility]

## 2024-10-23 NOTE — H&P
Radiology History & Physical      SUBJECTIVE:     Chief Complaint: abdominal distention    History of Present Illness:  Jonny Isabel is a 63 y.o. male who presents for ultrasound guided paracentesis  Past Medical History:   Diagnosis Date    A-fib     Esophageal varices 5/8/2023    Other ascites 5/8/2023     Past Surgical History:   Procedure Laterality Date    ESOPHAGOGASTRODUODENOSCOPY N/A 1/17/2023    Procedure: EGD (ESOPHAGOGASTRODUODENOSCOPY);  Surgeon: Dewayne Navas MD;  Location: [facility] (2ND FLR);  Service: Endoscopy;  Laterality: N/A;    ESOPHAGOGASTRODUODENOSCOPY N/A 2/7/2024    Procedure: EGD (ESOPHAGOGASTRODUODENOSCOPY);  Surgeon: Nathan Goins MD;  Location: [facility] (2ND FLR);  Service: Endoscopy;  Laterality: N/A;  referral: Dr. Escalante /cirrhosis - labs- possible banding / prep ins on portal / 2nd floor - Pt c/o SOB at times./ Xarelto - message sent to clearance nurse per protocol - ERW  1/31/24- LVM for precall - ERW  2/1-precall complet-Kpvt  ok to hold Xarelto2 da       Home Meds:   Prior to Admission medications    Medication Sig Start Date End Date Taking? Authorizing Provider   ADVAIR DISKUS 250-50 mcg/dose diskus inhaler Inhale 1 puff into the lungs 2 (two) times a day. Controller 3/20/24 3/20/25 Yes Joanne Ruiz NP   amiodarone (PACERONE) 200 MG Tab Take 1 tablet (200 mg total) by mouth once daily. 12/12/23 12/11/24 Yes Ellen Escalera NP   ferrous sulfate 324 mg (65 mg iron) TbEC Take 324 mg by mouth once daily. 1/4/24  Yes Provider, Historical   folic acid (FOLVITE) 1 MG tablet Take 1 mg by mouth once daily.   Yes Provider, Historical   levothyroxine (SYNTHROID) 50 MCG tablet TAKE 1 TABLET BY MOUTH IN THE MORNING WITH A FULL GLASS OF WATER 30-60 MINUTES BEFORE OTHER MEDICATIONS AND FOOD  Patient taking differently: Take 50 mcg by mouth before breakfast. WITH A FULL GLASS OF WATER 30-60 MINUTES BEFORE OTHER MEDICATIONS AND FOOD 12/12/23  Yes Ellen Escalera, NP    omega-3 acid ethyl esters (LOVAZA) 1 gram capsule Take 1 capsule by mouth 2 (two) times daily. 7/16/24  Yes Provider, Historical   pantoprazole (PROTONIX) 40 MG tablet Take 1 tablet (40 mg total) by mouth 2 (two) times daily. 8/5/24 8/5/25 Yes Shawn Mejia MD   ibuprofen (ADVIL,MOTRIN) 800 MG tablet Take 800 mg by mouth daily as needed for Pain.  Patient not taking: Reported on 8/28/2024    Provider, Historical   metoprolol tartrate (LOPRESSOR) 100 MG tablet Take 100 mg by mouth once daily. 7/16/24   Provider, Historical   potassium chloride (KLOR-CON) 10 MEQ TbSR Take 10 mEq by mouth once daily.    Provider, Historical   promethazine (PHENERGAN) 12.5 MG Tab Take 12.5 mg by mouth every 6 (six) hours as needed (nausea). 5/6/24   Provider, Historical   thiamine 100 MG tablet Take 100 mg by mouth once daily. 7/13/22   Provider, Historical   traZODone (DESYREL) 50 MG tablet Take 50 mg by mouth nightly as needed. 2/29/24   Provider, Historical   VITAMIN B COMPLEX ORAL Take 1 tablet by mouth once daily.    Provider, Historical   XARELTO 20 mg Tab Take 20 mg by mouth once daily. 1/9/23   Provider, Historical     Anticoagulants/Antiplatelets:  xarelto    Allergies: Review of patient's allergies indicates:  No Known Allergies  Sedation History:  no adverse reactions    Review of Systems:   Hematological: no known coagulopathies  Respiratory: no shortness of breath  Cardiovascular: no chest pain  Gastrointestinal: no abdominal pain  Genito-Urinary: no dysuria  Musculoskeletal: negative  Neurological: no TIA or stroke symptoms         OBJECTIVE:     Vital Signs (Most Recent)       Physical Exam:  ASA: 2  Mallampati: n/a    General: no acute distress  Mental Status: alert and oriented to person, place and time  HEENT: normocephalic, atraumatic  Chest: unlabored breathing  Heart: regular heart rate  Abdomen: distended  Extremity: moves all extremities    ASSESSMENT/PLAN:     Sedation Plan: local  Patient will undergo  ultrasound guided paracentesis.    Ashia Gallegos PA-C

## 2024-10-29 ENCOUNTER — TELEPHONE (OUTPATIENT)
Dept: INTERVENTIONAL RADIOLOGY/VASCULAR | Facility: HOSPITAL | Age: 64
End: 2024-10-29
Payer: MEDICAID

## 2024-10-30 ENCOUNTER — HOSPITAL ENCOUNTER (OUTPATIENT)
Dept: INTERVENTIONAL RADIOLOGY/VASCULAR | Facility: HOSPITAL | Age: 64
Discharge: HOME | End: 2024-10-30
Attending: INTERNAL MEDICINE
Payer: MEDICAID

## 2024-10-30 VITALS
BODY MASS INDEX: 28.14 KG/M2 | RESPIRATION RATE: 20 BRPM | SYSTOLIC BLOOD PRESSURE: 159 MMHG | TEMPERATURE: 98 F | HEART RATE: 51 BPM | OXYGEN SATURATION: 98 % | WEIGHT: 190 LBS | DIASTOLIC BLOOD PRESSURE: 75 MMHG | HEIGHT: 69 IN

## 2024-10-30 DIAGNOSIS — Z76.82 AWAITING LIVER TRANSPLANT: ICD-10-CM

## 2024-10-30 DIAGNOSIS — R18.8 OTHER ASCITES: ICD-10-CM

## 2024-10-30 PROCEDURE — 49083 ABD PARACENTESIS W/IMAGING: CPT | Mod: TXP,,, | Performed by: PHYSICIAN ASSISTANT

## 2024-10-30 PROCEDURE — 63600175 PHARM REV CODE 636 W HCPCS: Mod: TXP | Performed by: PHYSICIAN ASSISTANT

## 2024-10-30 PROCEDURE — 49083 ABD PARACENTESIS W/IMAGING: CPT | Mod: TXP | Performed by: RADIOLOGY

## 2024-10-30 RX ORDER — LIDOCAINE HYDROCHLORIDE 10 MG/ML
INJECTION, SOLUTION INFILTRATION; PERINEURAL
Status: COMPLETED | OUTPATIENT
Start: 2024-10-30 | End: 2024-10-30

## 2024-10-30 RX ADMIN — LIDOCAINE HYDROCHLORIDE 5 ML: 10 INJECTION, SOLUTION INFILTRATION; PERINEURAL at 01:10

## 2024-11-05 ENCOUNTER — TELEPHONE (OUTPATIENT)
Dept: INTERVENTIONAL RADIOLOGY/VASCULAR | Facility: HOSPITAL | Age: 64
End: 2024-11-05
Payer: MEDICAID

## 2024-11-05 NOTE — NURSING
Advised to arrive at 12:30. Regularly scheduled patient who is familiar with the procedure and the IR process.

## 2024-11-06 ENCOUNTER — HOSPITAL ENCOUNTER (OUTPATIENT)
Dept: INTERVENTIONAL RADIOLOGY/VASCULAR | Facility: HOSPITAL | Age: 64
Discharge: HOME | End: 2024-11-06
Attending: INTERNAL MEDICINE
Payer: MEDICAID

## 2024-11-06 VITALS
DIASTOLIC BLOOD PRESSURE: 82 MMHG | OXYGEN SATURATION: 98 % | WEIGHT: 195 LBS | SYSTOLIC BLOOD PRESSURE: 175 MMHG | HEIGHT: 69 IN | HEART RATE: 52 BPM | RESPIRATION RATE: 16 BRPM | BODY MASS INDEX: 28.88 KG/M2 | TEMPERATURE: 98 F

## 2024-11-06 DIAGNOSIS — Z76.82 AWAITING LIVER TRANSPLANT: ICD-10-CM

## 2024-11-06 PROCEDURE — 63600175 PHARM REV CODE 636 W HCPCS: Mod: TXP | Performed by: PHYSICIAN ASSISTANT

## 2024-11-06 RX ORDER — LIDOCAINE HYDROCHLORIDE 10 MG/ML
INJECTION, SOLUTION INFILTRATION; PERINEURAL
Status: COMPLETED | OUTPATIENT
Start: 2024-11-06 | End: 2024-11-06

## 2024-11-06 RX ADMIN — LIDOCAINE HYDROCHLORIDE 5 ML: 10 INJECTION, SOLUTION INFILTRATION; PERINEURAL at 01:11

## 2024-11-06 NOTE — PROCEDURES
Radiology Post-Procedure Note    Pre Op Diagnosis: Ascites  Post Op Diagnosis: Same    Procedure: Ultrasound Guided Paracentesis    Procedure performed by: Ashia Gallegos PA-C    Written Informed Consent Obtained: Yes  Specimen Removed: none sent  Estimated Blood Loss: Minimal    Findings:   Successful paracentesis.  5L removed LLQ.  Albumin was not administered PRN per protocol.    Patient tolerated procedure well.    Ashia Gallegos PA-C

## 2024-11-06 NOTE — H&P
Radiology History & Physical      SUBJECTIVE:     Chief Complaint: abdominal distention    History of Present Illness:  Jonny Isabel is a 63 y.o. male who presents for ultrasound guided paracentesis  Past Medical History:   Diagnosis Date    A-fib     Esophageal varices 5/8/2023    Other ascites 5/8/2023     Past Surgical History:   Procedure Laterality Date    ESOPHAGOGASTRODUODENOSCOPY N/A 1/17/2023    Procedure: EGD (ESOPHAGOGASTRODUODENOSCOPY);  Surgeon: Dewayne Navas MD;  Location: [facility] (2ND FLR);  Service: Endoscopy;  Laterality: N/A;    ESOPHAGOGASTRODUODENOSCOPY N/A 2/7/2024    Procedure: EGD (ESOPHAGOGASTRODUODENOSCOPY);  Surgeon: Nathan Goins MD;  [facility]  Service: Endoscopy;  Laterality: N/A;  referral: Dr. Escalante /cirrhosis - labs- possible banding / prep ins on portal / 2nd floor - Pt c/o SOB at times./ Xarelto - message sent to clearance nurse per protocol - ERW  1/31/24- LVM for precall - ERW  2/1-precall complet-Kpvt  ok to hold Xarelto2 da       Home Meds:   Prior to Admission medications    Medication Sig Start Date End Date Taking? Authorizing Provider   ADVAIR DISKUS 250-50 mcg/dose diskus inhaler Inhale 1 puff into the lungs 2 (two) times a day. Controller 3/20/24 3/20/25  Joanne Ruiz NP   amiodarone (PACERONE) 200 MG Tab Take 1 tablet (200 mg total) by mouth once daily. 12/12/23 12/11/24  Ellen Escalera NP   ferrous sulfate 324 mg (65 mg iron) TbEC Take 324 mg by mouth once daily. 1/4/24   Provider, Historical   folic acid (FOLVITE) 1 MG tablet Take 1 mg by mouth once daily.    Provider, Historical   ibuprofen (ADVIL,MOTRIN) 800 MG tablet Take 800 mg by mouth daily as needed for Pain.  Patient not taking: Reported on 8/28/2024    Provider, Historical   levothyroxine (SYNTHROID) 50 MCG tablet TAKE 1 TABLET BY MOUTH IN THE MORNING WITH A FULL GLASS OF WATER 30-60 MINUTES BEFORE OTHER MEDICATIONS AND FOOD  Patient taking differently: Take 50 mcg by  mouth before breakfast. WITH A FULL GLASS OF WATER 30-60 MINUTES BEFORE OTHER MEDICATIONS AND FOOD 12/12/23   Ellen Escalera NP   metoprolol tartrate (LOPRESSOR) 100 MG tablet Take 100 mg by mouth once daily. 7/16/24   Provider, Historical   omega-3 acid ethyl esters (LOVAZA) 1 gram capsule Take 1 capsule by mouth 2 (two) times daily. 7/16/24   Provider, Historical   pantoprazole (PROTONIX) 40 MG tablet Take 1 tablet (40 mg total) by mouth 2 (two) times daily. 8/5/24 8/5/25  Shawn Mejia MD   potassium chloride (KLOR-CON) 10 MEQ TbSR Take 10 mEq by mouth once daily.    Provider, Historical   promethazine (PHENERGAN) 12.5 MG Tab Take 12.5 mg by mouth every 6 (six) hours as needed (nausea). 5/6/24   Provider, Historical   thiamine 100 MG tablet Take 100 mg by mouth once daily. 7/13/22   Provider, Historical   traZODone (DESYREL) 50 MG tablet Take 50 mg by mouth nightly as needed. 2/29/24   Provider, Historical   VITAMIN B COMPLEX ORAL Take 1 tablet by mouth once daily.    Provider, Historical   XARELTO 20 mg Tab Take 20 mg by mouth once daily. 1/9/23   Provider, Historical     Anticoagulants/Antiplatelets:  xarelto    Allergies: Review of patient's allergies indicates:  No Known Allergies  Sedation History:  no adverse reactions    Review of Systems:   Hematological: no known coagulopathies  Respiratory: no shortness of breath  Cardiovascular: no chest pain  Gastrointestinal: no abdominal pain  Genito-Urinary: no dysuria  Musculoskeletal: negative  Neurological: no TIA or stroke symptoms         OBJECTIVE:     Vital Signs (Most Recent)       Physical Exam:  ASA: 2  Mallampati: n/a    General: no acute distress  Mental Status: alert and oriented to person, place and time  HEENT: normocephalic, atraumatic  Chest: unlabored breathing  Heart: regular heart rate  Abdomen: distended  Extremity: moves all extremities    ASSESSMENT/PLAN:     Sedation Plan: local  Patient will undergo ultrasound guided  paracentesis.    Ashia Gallegos PA-C

## 2024-11-06 NOTE — SEDATION DOCUMENTATION
IR Procedure - Paracentesis - is completed. Patient tolerated well. He is awake, alert, oriented. Vital signs are stable. 5000 mL hazy yellow ascites is drained. Patient will return to IR pre/post to complete discharge.

## 2024-11-06 NOTE — NURSING
Patient is discharged from IR. Patient declined printed AVS. The opportunity to ask questions is provided. Patient is wheeled to family vehicle on the ramp.

## 2024-11-06 NOTE — DISCHARGE SUMMARY
Interventional Radiology Short Stay Discharge Summary      Admit Date: 11/6/2024  Discharge Date: 11/06/2024     Hospital Course: Uneventful    Discharge Diagnosis: ascites    Discharge Condition: Stable    Discharge Disposition: Home    Diet: Resume prior diet    Activity: activity as tolerated    Follow-up: With referring provider    Cindy Chava, PA-C [facility]

## 2024-11-07 NOTE — ASSESSMENT & PLAN NOTE
Creatinine 4.7 on admit, baseline around 0.8. Pre-renal vs ATN. Less likely obstruction as pt has gomez.  Renal ultrasound with medical renal disease and no evidence of hydronephrosis.    - Avoid nephrotoxic agents such as NSAIDs, gadolinium and IV radiocontrast.  - Renally dose meds to current GFR.  - Torsemide 50 mg daily, continue OP  - Follow up with nephrology in 2 weeks         chest wall non-tender, breathing is unlabored without accessory muscle use, normal breath sounds

## 2024-11-13 ENCOUNTER — HOSPITAL ENCOUNTER (OUTPATIENT)
Dept: INTERVENTIONAL RADIOLOGY/VASCULAR | Facility: HOSPITAL | Age: 64
Discharge: HOME | End: 2024-11-13
Attending: INTERNAL MEDICINE
Payer: MEDICAID

## 2024-11-13 VITALS
OXYGEN SATURATION: 99 % | TEMPERATURE: 98 F | DIASTOLIC BLOOD PRESSURE: 76 MMHG | SYSTOLIC BLOOD PRESSURE: 151 MMHG | RESPIRATION RATE: 16 BRPM | HEART RATE: 50 BPM

## 2024-11-13 DIAGNOSIS — R18.8 OTHER ASCITES: ICD-10-CM

## 2024-11-13 DIAGNOSIS — Z76.82 AWAITING LIVER TRANSPLANT: ICD-10-CM

## 2024-11-13 PROCEDURE — 63600175 PHARM REV CODE 636 W HCPCS: Mod: TXP | Performed by: PHYSICIAN ASSISTANT

## 2024-11-13 RX ORDER — LIDOCAINE HYDROCHLORIDE 10 MG/ML
INJECTION, SOLUTION INFILTRATION; PERINEURAL
Status: COMPLETED | OUTPATIENT
Start: 2024-11-13 | End: 2024-11-13

## 2024-11-13 RX ADMIN — LIDOCAINE HYDROCHLORIDE 5 ML: 10 INJECTION, SOLUTION INFILTRATION; PERINEURAL at 11:11

## 2024-11-13 NOTE — H&P
Radiology History & Physical      SUBJECTIVE:     Chief Complaint: abdominal distention    History of Present Illness:  Jonny Isabel is a 63 y.o. male who presents for ultrasound guided paracentesis  Past Medical History:   Diagnosis Date    A-fib     Esophageal varices 5/8/2023    Other ascites 5/8/2023     Past Surgical History:   Procedure Laterality Date    ESOPHAGOGASTRODUODENOSCOPY N/A 1/17/2023    Procedure: EGD (ESOPHAGOGASTRODUODENOSCOPY);  Surgeon: Dewayne Navas MD;  Location: [facility] (2ND FLR);  Service: Endoscopy;  Laterality: N/A;    ESOPHAGOGASTRODUODENOSCOPY N/A 2/7/2024    Procedure: EGD (ESOPHAGOGASTRODUODENOSCOPY);  Surgeon: Nathan Goins MD;  [facility]  Service: Endoscopy;  Laterality: N/A;  referral: Dr. Escalante /cirrhosis - labs- possible banding / prep ins on portal / 2nd floor - Pt c/o SOB at times./ Xarelto - message sent to clearance nurse per protocol - ERW  1/31/24- LVM for precall - ERW  2/1-precall complet-Kpvt  ok to hold Xarelto2 da       Home Meds:   Prior to Admission medications    Medication Sig Start Date End Date Taking? Authorizing Provider   ADVAIR DISKUS 250-50 mcg/dose diskus inhaler Inhale 1 puff into the lungs 2 (two) times a day. Controller 3/20/24 3/20/25  Joanne Ruiz NP   amiodarone (PACERONE) 200 MG Tab Take 1 tablet (200 mg total) by mouth once daily. 12/12/23 12/11/24  Ellen Escalera NP   ferrous sulfate 324 mg (65 mg iron) TbEC Take 324 mg by mouth once daily. 1/4/24   Provider, Historical   folic acid (FOLVITE) 1 MG tablet Take 1 mg by mouth once daily.    Provider, Historical   ibuprofen (ADVIL,MOTRIN) 800 MG tablet Take 800 mg by mouth daily as needed for Pain.  Patient not taking: Reported on 8/28/2024    Provider, Historical   levothyroxine (SYNTHROID) 50 MCG tablet TAKE 1 TABLET BY MOUTH IN THE MORNING WITH A FULL GLASS OF WATER 30-60 MINUTES BEFORE OTHER MEDICATIONS AND FOOD  Patient taking differently: Take 50 mcg by  mouth before breakfast. WITH A FULL GLASS OF WATER 30-60 MINUTES BEFORE OTHER MEDICATIONS AND FOOD 12/12/23   Ellen Escalera NP   metoprolol tartrate (LOPRESSOR) 100 MG tablet Take 100 mg by mouth once daily. 7/16/24   Provider, Historical   omega-3 acid ethyl esters (LOVAZA) 1 gram capsule Take 1 capsule by mouth 2 (two) times daily. 7/16/24   Provider, Historical   pantoprazole (PROTONIX) 40 MG tablet Take 1 tablet (40 mg total) by mouth 2 (two) times daily. 8/5/24 8/5/25  Shawn Mejia MD   potassium chloride (KLOR-CON) 10 MEQ TbSR Take 10 mEq by mouth once daily.    Provider, Historical   promethazine (PHENERGAN) 12.5 MG Tab Take 12.5 mg by mouth every 6 (six) hours as needed (nausea). 5/6/24   Provider, Historical   thiamine 100 MG tablet Take 100 mg by mouth once daily. 7/13/22   Provider, Historical   traZODone (DESYREL) 50 MG tablet Take 50 mg by mouth nightly as needed. 2/29/24   Provider, Historical   VITAMIN B COMPLEX ORAL Take 1 tablet by mouth once daily.    Provider, Historical   XARELTO 20 mg Tab Take 20 mg by mouth once daily. 1/9/23   Provider, Historical     Anticoagulants/Antiplatelets:  xarelto    Allergies: Review of patient's allergies indicates:  No Known Allergies  Sedation History:  no adverse reactions    Review of Systems:   Hematological: no known coagulopathies  Respiratory: no shortness of breath  Cardiovascular: no chest pain  Gastrointestinal: no abdominal pain  Genito-Urinary: no dysuria  Musculoskeletal: negative  Neurological: no TIA or stroke symptoms         OBJECTIVE:     Vital Signs (Most Recent)       Physical Exam:  ASA: 2  Mallampati: n/a    General: no acute distress  Mental Status: alert and oriented to person, place and time  HEENT: normocephalic, atraumatic  Chest: unlabored breathing  Heart: regular heart rate  Abdomen: distended  Extremity: moves all extremities    ASSESSMENT/PLAN:     Sedation Plan: local  Patient will undergo ultrasound guided  paracentesis.    Ashia Gallegos PA-C

## 2024-11-13 NOTE — PROCEDURES
Radiology Post-Procedure Note    Pre Op Diagnosis: Ascites  Post Op Diagnosis: Same    Procedure: Ultrasound Guided Paracentesis    Procedure performed by: Ashia Gallegos PA-C    Written Informed Consent Obtained: Yes  Specimen Removed: none sent  Estimated Blood Loss: Minimal    Findings:   Successful paracentesis.  4900 ml hazy yellow fluid LLQ.  Albumin was not administered PRN per protocol.    Patient tolerated procedure well.    Ashia Gallegos PA-C

## 2024-11-13 NOTE — SEDATION DOCUMENTATION
IR Procedure - Paracentesis - is completed. Patient tolerated well. He is awake, alert, oriented. Vital signs are stable. 4900 mL hazy, karma ascites is drained. Patient will return to IR pre/post to complete discharge.

## 2024-11-13 NOTE — NURSING
Patient is discharged from IR. Patient declined printed AVS. The opportunity to ask questions is provided. Patient is wheeled to the front lobby to await significant other. Adelita is notified by phone that patient was done.

## 2024-11-13 NOTE — DISCHARGE SUMMARY
Interventional Radiology Short Stay Discharge Summary      Admit Date: 11/13/2024  Discharge Date: 11/13/2024     Hospital Course: Uneventful    Discharge Diagnosis: ascites    Discharge Condition: Stable    Discharge Disposition: Home    Diet: Resume prior diet    Activity: activity as tolerated    Follow-up: With referring provider    Cindy Chava, PA-C [facility]

## 2024-11-14 ENCOUNTER — TELEPHONE (OUTPATIENT)
Dept: TRANSPLANT | Facility: CLINIC | Age: 64
End: 2024-11-14
Payer: MEDICAID

## 2024-11-14 DIAGNOSIS — K70.31 ALCOHOLIC CIRRHOSIS OF LIVER WITH ASCITES: ICD-10-CM

## 2024-11-14 DIAGNOSIS — R18.8 OTHER ASCITES: ICD-10-CM

## 2024-11-14 DIAGNOSIS — Z01.818 PRE-TRANSPLANT EVALUATION FOR LIVER TRANSPLANT: ICD-10-CM

## 2024-11-14 DIAGNOSIS — Z76.82 ORGAN TRANSPLANT CANDIDATE: Primary | ICD-10-CM

## 2024-11-14 NOTE — TELEPHONE ENCOUNTER
Called and spoke to Mr. Fuller's caregiver (spouse)   His spouse says that the only way he will do the Dobutamine stress echo is if I scheduled it at [facility] because he didn't like his last experience here at [facility] cardiology lab. I will get it scheduled in [place].  I told his spouse that he still needs a colonoscopy. She said she thinks she can get him to do it.   Patient requests more paracentesis orders

## 2024-11-19 ENCOUNTER — HOSPITAL ENCOUNTER (OUTPATIENT)
Dept: INTERVENTIONAL RADIOLOGY/VASCULAR | Facility: HOSPITAL | Age: 64
Discharge: HOME | End: 2024-11-19
Attending: INTERNAL MEDICINE
Payer: MEDICAID

## 2024-11-19 VITALS
HEIGHT: 69 IN | SYSTOLIC BLOOD PRESSURE: 200 MMHG | WEIGHT: 195 LBS | RESPIRATION RATE: 18 BRPM | HEART RATE: 59 BPM | DIASTOLIC BLOOD PRESSURE: 76 MMHG | BODY MASS INDEX: 28.88 KG/M2 | TEMPERATURE: 98 F | OXYGEN SATURATION: 100 %

## 2024-11-19 DIAGNOSIS — K70.31 ALCOHOLIC CIRRHOSIS OF LIVER WITH ASCITES: ICD-10-CM

## 2024-11-19 DIAGNOSIS — R18.8 OTHER ASCITES: ICD-10-CM

## 2024-11-19 DIAGNOSIS — Z76.82 ORGAN TRANSPLANT CANDIDATE: ICD-10-CM

## 2024-11-19 PROCEDURE — 49083 ABD PARACENTESIS W/IMAGING: CPT | Mod: TXP | Performed by: STUDENT IN AN ORGANIZED HEALTH CARE EDUCATION/TRAINING PROGRAM

## 2024-11-19 NOTE — DISCHARGE SUMMARY
"Radiology Discharge Summary      Hospital Course: No complications    Admit Date: 11/19/2024  Discharge Date: 11/19/2024     Instructions Given to Patient: Yes  Diet: Resume prior diet  Activity: activity as tolerated and no driving for today    Description of Condition on Discharge: Stable  Vital Signs (Most Recent): Temp: 97.6 °F (36.4 °C) (11/19/24 1246)  Pulse: (!) 59 (11/19/24 1246)  Resp: 18 (11/19/24 1246)  BP: (!) 200/76 (11/19/24 1246)  SpO2: 100 % (11/19/24 1246)    Discharge Disposition: Home    Discharge Diagnosis: recurrent ascites     Follow-up: as scheduled    Jt Huerta MD ([place])  Interventional Radiology  [phone]      "

## 2024-11-19 NOTE — PROCEDURES
"  Pre Op Diagnosis: Recurrent ascites  Post Op Diagnosis: Same    Procedure: Paracentesis    Procedure performed by: Bradley    Written Informed Consent Obtained: Yes  Specimen Removed: YES see imaging for total  Estimated Blood Loss: Minimal    Findings:   Successful Paracentesis.     Patient tolerated procedure well.    Jt Huerta MD ([place])  Interventional Radiology  [phone]      "

## 2024-11-19 NOTE — H&P
Radiology History & Physical      SUBJECTIVE:     Chief Complaint: recurrent ascites    History of Present Illness:  Jonny Isabel is a 63 y.o. male who presents for paracentesis.    Past Medical History:   Diagnosis Date    A-fib     Esophageal varices 5/8/2023    Other ascites 5/8/2023     Past Surgical History:   Procedure Laterality Date    ESOPHAGOGASTRODUODENOSCOPY N/A 1/17/2023    Procedure: EGD (ESOPHAGOGASTRODUODENOSCOPY);  Surgeon: Dewayne Navas MD;  [facility]  Service: Endoscopy;  Laterality: N/A;    ESOPHAGOGASTRODUODENOSCOPY N/A 2/7/2024    Procedure: EGD (ESOPHAGOGASTRODUODENOSCOPY);  Surgeon: Nathan Goins MD;  [facility]  Service: Endoscopy;  Laterality: N/A;  referral: Dr. Escalante /cirrhosis - labs- possible banding / prep ins on portal / 2nd floor - Pt c/o SOB at times./ Xarelto - message sent to clearance nurse per protocol - ERW  1/31/24- LVM for precall - ERW  2/1-precall complet-Kpvt  ok to hold Xarelto2 da       Home Meds:   Prior to Admission medications    Medication Sig Start Date End Date Taking? Authorizing Provider   ADVAIR DISKUS 250-50 mcg/dose diskus inhaler Inhale 1 puff into the lungs 2 (two) times a day. Controller 3/20/24 3/20/25 Yes Joanne Ruiz NP   amiodarone (PACERONE) 200 MG Tab Take 1 tablet (200 mg total) by mouth once daily. 12/12/23 12/11/24 Yes Ellen Escalera NP   ferrous sulfate 324 mg (65 mg iron) TbEC Take 324 mg by mouth once daily. 1/4/24  Yes Provider, Historical   folic acid (FOLVITE) 1 MG tablet Take 1 mg by mouth once daily.   Yes Provider, Historical   ibuprofen (ADVIL,MOTRIN) 800 MG tablet Take 800 mg by mouth daily as needed for Pain.   Yes Provider, Historical   levothyroxine (SYNTHROID) 50 MCG tablet TAKE 1 TABLET BY MOUTH IN THE MORNING WITH A FULL GLASS OF WATER 30-60 MINUTES BEFORE OTHER MEDICATIONS AND FOOD  Patient taking differently: Take 50 mcg by mouth before breakfast. WITH A FULL GLASS OF  WATER 30-60 MINUTES BEFORE OTHER MEDICATIONS AND FOOD 12/12/23  Yes Ellen Escalera, NP   metoprolol tartrate (LOPRESSOR) 100 MG tablet Take 100 mg by mouth once daily. 7/16/24  Yes Provider, Historical   omega-3 acid ethyl esters (LOVAZA) 1 gram capsule Take 1 capsule by mouth 2 (two) times daily. 7/16/24  Yes Provider, Historical   pantoprazole (PROTONIX) 40 MG tablet Take 1 tablet (40 mg total) by mouth 2 (two) times daily. 8/5/24 8/5/25 Yes Shawn Mejia MD   potassium chloride (KLOR-CON) 10 MEQ TbSR Take 10 mEq by mouth once daily.   Yes Provider, Historical   promethazine (PHENERGAN) 12.5 MG Tab Take 12.5 mg by mouth every 6 (six) hours as needed (nausea). 5/6/24  Yes Provider, Historical   thiamine 100 MG tablet Take 100 mg by mouth once daily. 7/13/22  Yes Provider, Historical   traZODone (DESYREL) 50 MG tablet Take 50 mg by mouth nightly as needed. 2/29/24  Yes Provider, Historical   VITAMIN B COMPLEX ORAL Take 1 tablet by mouth once daily.   Yes Provider, Historical   XARELTO 20 mg Tab Take 20 mg by mouth once daily. 1/9/23  Yes Provider, Historical     Anticoagulants/Antiplatelets: no anticoagulation    Allergies: Review of patient's allergies indicates:  No Known Allergies  Sedation History:  no adverse reactions    Labs:  Lab Results   Component Value Date    INR 1.3 (H) 09/04/2024       Lab Results   Component Value Date    WBC 8.07 09/04/2024    HGB 12.1 (L) 09/04/2024    HCT 35.1 (L) 09/04/2024    MCV 95 09/04/2024     09/04/2024     Lab Results   Component Value Date     (H) 09/04/2024     09/04/2024    K 4.7 09/04/2024     09/04/2024    CO2 17 (L) 09/04/2024    BUN 22 09/04/2024    CREATININE 1.9 (H) 09/04/2024    CALCIUM 9.5 09/04/2024    MG 1.8 08/19/2024    ALT 32 09/04/2024    AST 32 09/04/2024    ALBUMIN 2.8 (L) 09/04/2024    BILITOT 0.3 09/04/2024    BILIDIR 0.1 06/20/2024       Review of Systems:   Hematological: no known coagulopathies  Respiratory: no  "shortness of breath  Cardiovascular: no chest pain  Gastrointestinal: no abdominal pain  Genito-Urinary: no dysuria  Musculoskeletal: negative  Neurological: no TIA or stroke symptoms         OBJECTIVE:     Vital Signs (Most Recent)  Temp: 97.6 °F (36.4 °C) (11/19/24 1246)  Pulse: (!) 59 (11/19/24 1246)  Resp: 18 (11/19/24 1246)  BP: (!) 200/76 (11/19/24 1246)  SpO2: 100 % (11/19/24 1246)    Physical Exam:  ASA: 2  Mallampati: 2    General: no acute distress  Mental Status: alert and oriented to person, place and time  HEENT: normocephalic, atraumatic  Chest: unlabored breathing  Heart: regular heart rate  Abdomen: nondistended  Extremity: moves all extremities    ASSESSMENT/PLAN:     Sedation Plan: local  Patient will undergo paracentesis.    Jt Huerta MD ([place])  Interventional Radiology        "

## 2024-11-21 ENCOUNTER — TELEPHONE (OUTPATIENT)
Dept: NEPHROLOGY | Facility: CLINIC | Age: 64
End: 2024-11-21
Payer: MEDICAID

## 2024-12-03 ENCOUNTER — OFFICE VISIT (OUTPATIENT)
Dept: TRANSPLANT | Facility: CLINIC | Age: 64
End: 2024-12-03
Payer: MEDICAID

## 2024-12-03 ENCOUNTER — LAB VISIT (OUTPATIENT)
Dept: LAB | Facility: HOSPITAL | Age: 64
End: 2024-12-03
Attending: INTERNAL MEDICINE
Payer: MEDICAID

## 2024-12-03 ENCOUNTER — TELEPHONE (OUTPATIENT)
Dept: INTERVENTIONAL RADIOLOGY/VASCULAR | Facility: HOSPITAL | Age: 64
End: 2024-12-03
Payer: MEDICAID

## 2024-12-03 VITALS
HEART RATE: 84 BPM | WEIGHT: 215.19 LBS | HEIGHT: 69 IN | DIASTOLIC BLOOD PRESSURE: 84 MMHG | SYSTOLIC BLOOD PRESSURE: 159 MMHG | BODY MASS INDEX: 31.87 KG/M2

## 2024-12-03 DIAGNOSIS — Z76.82 ORGAN TRANSPLANT CANDIDATE: ICD-10-CM

## 2024-12-03 DIAGNOSIS — F10.10 ALCOHOL ABUSE: Primary | ICD-10-CM

## 2024-12-03 DIAGNOSIS — R18.8 OTHER ASCITES: ICD-10-CM

## 2024-12-03 DIAGNOSIS — I25.10 CORONARY ARTERY DISEASE, UNSPECIFIED VESSEL OR LESION TYPE, UNSPECIFIED WHETHER ANGINA PRESENT, UNSPECIFIED WHETHER NATIVE OR TRANSPLANTED HEART: ICD-10-CM

## 2024-12-03 DIAGNOSIS — F17.200 TOBACCO DEPENDENCY: ICD-10-CM

## 2024-12-03 DIAGNOSIS — Z72.0 TOBACCO USE: ICD-10-CM

## 2024-12-03 DIAGNOSIS — J44.9 CHRONIC OBSTRUCTIVE PULMONARY DISEASE, UNSPECIFIED COPD TYPE: ICD-10-CM

## 2024-12-03 DIAGNOSIS — E66.811 CLASS 1 OBESITY DUE TO EXCESS CALORIES WITH SERIOUS COMORBIDITY AND BODY MASS INDEX (BMI) OF 31.0 TO 31.9 IN ADULT: ICD-10-CM

## 2024-12-03 DIAGNOSIS — I27.20 PULMONARY HYPERTENSION: ICD-10-CM

## 2024-12-03 DIAGNOSIS — I48.0 PAF (PAROXYSMAL ATRIAL FIBRILLATION): ICD-10-CM

## 2024-12-03 DIAGNOSIS — E66.09 CLASS 1 OBESITY DUE TO EXCESS CALORIES WITH SERIOUS COMORBIDITY AND BODY MASS INDEX (BMI) OF 31.0 TO 31.9 IN ADULT: ICD-10-CM

## 2024-12-03 DIAGNOSIS — K70.31 ALCOHOLIC CIRRHOSIS OF LIVER WITH ASCITES: ICD-10-CM

## 2024-12-03 DIAGNOSIS — I50.9 CONGESTIVE HEART FAILURE, UNSPECIFIED HF CHRONICITY, UNSPECIFIED HEART FAILURE TYPE: ICD-10-CM

## 2024-12-03 DIAGNOSIS — Z01.810 PRE-OPERATIVE CARDIOVASCULAR EXAMINATION: ICD-10-CM

## 2024-12-03 LAB
AFP SERPL-MCNC: <2 NG/ML (ref 0–8.4)
ALBUMIN SERPL BCP-MCNC: 3.3 G/DL (ref 3.5–5.2)
ALP SERPL-CCNC: 92 U/L (ref 40–150)
ALT SERPL W/O P-5'-P-CCNC: 10 U/L (ref 10–44)
ANION GAP SERPL CALC-SCNC: 12 MMOL/L (ref 8–16)
AST SERPL-CCNC: 15 U/L (ref 10–40)
BASOPHILS # BLD AUTO: 0.07 K/UL (ref 0–0.2)
BASOPHILS NFR BLD: 0.9 % (ref 0–1.9)
BILIRUB SERPL-MCNC: 0.4 MG/DL (ref 0.1–1)
BNP SERPL-MCNC: 24 PG/ML (ref 0–99)
BUN SERPL-MCNC: 19 MG/DL (ref 8–23)
CALCIUM SERPL-MCNC: 8.6 MG/DL (ref 8.7–10.5)
CHLORIDE SERPL-SCNC: 109 MMOL/L (ref 95–110)
CO2 SERPL-SCNC: 19 MMOL/L (ref 23–29)
CREAT SERPL-MCNC: 1.9 MG/DL (ref 0.5–1.4)
DIFFERENTIAL METHOD BLD: ABNORMAL
EOSINOPHIL # BLD AUTO: 0.1 K/UL (ref 0–0.5)
EOSINOPHIL NFR BLD: 1.4 % (ref 0–8)
ERYTHROCYTE [DISTWIDTH] IN BLOOD BY AUTOMATED COUNT: 14.7 % (ref 11.5–14.5)
EST. GFR  (NO RACE VARIABLE): 39.1 ML/MIN/1.73 M^2
GLUCOSE SERPL-MCNC: 106 MG/DL (ref 70–110)
HCT VFR BLD AUTO: 39 % (ref 40–54)
HGB BLD-MCNC: 12.2 G/DL (ref 14–18)
IMM GRANULOCYTES # BLD AUTO: 0.02 K/UL (ref 0–0.04)
IMM GRANULOCYTES NFR BLD AUTO: 0.3 % (ref 0–0.5)
INR PPP: 1.2 (ref 0.8–1.2)
LYMPHOCYTES # BLD AUTO: 1 K/UL (ref 1–4.8)
LYMPHOCYTES NFR BLD: 12.5 % (ref 18–48)
MAGNESIUM SERPL-MCNC: 1.7 MG/DL (ref 1.6–2.6)
MCH RBC QN AUTO: 27.6 PG (ref 27–31)
MCHC RBC AUTO-ENTMCNC: 31.3 G/DL (ref 32–36)
MCV RBC AUTO: 88 FL (ref 82–98)
MONOCYTES # BLD AUTO: 0.5 K/UL (ref 0.3–1)
MONOCYTES NFR BLD: 6.2 % (ref 4–15)
NEUTROPHILS # BLD AUTO: 6.2 K/UL (ref 1.8–7.7)
NEUTROPHILS NFR BLD: 78.7 % (ref 38–73)
NRBC BLD-RTO: 0 /100 WBC
PLATELET # BLD AUTO: 214 K/UL (ref 150–450)
PMV BLD AUTO: 10.3 FL (ref 9.2–12.9)
POTASSIUM SERPL-SCNC: 4.1 MMOL/L (ref 3.5–5.1)
PROT SERPL-MCNC: 7.5 G/DL (ref 6–8.4)
PROTHROMBIN TIME: 12.5 SEC (ref 9–12.5)
RBC # BLD AUTO: 4.42 M/UL (ref 4.6–6.2)
SODIUM SERPL-SCNC: 140 MMOL/L (ref 136–145)
T4 FREE SERPL-MCNC: 0.96 NG/DL (ref 0.71–1.51)
TSH SERPL DL<=0.005 MIU/L-ACNC: 9.24 UIU/ML (ref 0.4–4)
WBC # BLD AUTO: 7.9 K/UL (ref 3.9–12.7)

## 2024-12-03 PROCEDURE — 83735 ASSAY OF MAGNESIUM: CPT | Mod: TXP | Performed by: INTERNAL MEDICINE

## 2024-12-03 PROCEDURE — 99214 OFFICE O/P EST MOD 30 MIN: CPT | Mod: S$PBB,,, | Performed by: INTERNAL MEDICINE

## 2024-12-03 PROCEDURE — 80053 COMPREHEN METABOLIC PANEL: CPT | Mod: TXP | Performed by: INTERNAL MEDICINE

## 2024-12-03 PROCEDURE — 99213 OFFICE O/P EST LOW 20 MIN: CPT | Mod: PBBFAC | Performed by: INTERNAL MEDICINE

## 2024-12-03 PROCEDURE — 84443 ASSAY THYROID STIM HORMONE: CPT | Mod: TXP | Performed by: INTERNAL MEDICINE

## 2024-12-03 PROCEDURE — 83880 ASSAY OF NATRIURETIC PEPTIDE: CPT | Mod: TXP | Performed by: INTERNAL MEDICINE

## 2024-12-03 PROCEDURE — 80321 ALCOHOLS BIOMARKERS 1OR 2: CPT | Mod: TXP | Performed by: INTERNAL MEDICINE

## 2024-12-03 PROCEDURE — 1159F MED LIST DOCD IN RCRD: CPT | Mod: CPTII,,, | Performed by: INTERNAL MEDICINE

## 2024-12-03 PROCEDURE — 84439 ASSAY OF FREE THYROXINE: CPT | Mod: TXP | Performed by: INTERNAL MEDICINE

## 2024-12-03 PROCEDURE — 3077F SYST BP >= 140 MM HG: CPT | Mod: CPTII,,, | Performed by: INTERNAL MEDICINE

## 2024-12-03 PROCEDURE — 3008F BODY MASS INDEX DOCD: CPT | Mod: CPTII,,, | Performed by: INTERNAL MEDICINE

## 2024-12-03 PROCEDURE — 36415 COLL VENOUS BLD VENIPUNCTURE: CPT | Mod: TXP | Performed by: INTERNAL MEDICINE

## 2024-12-03 PROCEDURE — 3044F HG A1C LEVEL LT 7.0%: CPT | Mod: CPTII,,, | Performed by: INTERNAL MEDICINE

## 2024-12-03 PROCEDURE — 85610 PROTHROMBIN TIME: CPT | Mod: TXP | Performed by: INTERNAL MEDICINE

## 2024-12-03 PROCEDURE — 85025 COMPLETE CBC W/AUTO DIFF WBC: CPT | Mod: TXP | Performed by: INTERNAL MEDICINE

## 2024-12-03 PROCEDURE — 82105 ALPHA-FETOPROTEIN SERUM: CPT | Mod: TXP | Performed by: INTERNAL MEDICINE

## 2024-12-03 PROCEDURE — 99999 PR PBB SHADOW E&M-EST. PATIENT-LVL III: CPT | Mod: PBBFAC,,, | Performed by: INTERNAL MEDICINE

## 2024-12-03 PROCEDURE — 3079F DIAST BP 80-89 MM HG: CPT | Mod: CPTII,,, | Performed by: INTERNAL MEDICINE

## 2024-12-03 NOTE — PROGRESS NOTES
Subjective   Patient ID:  Jonny Isabel is a 64 y.o. 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 He is currently undergoing liver transplant evaluation. Sees Jigar Díaz NP for his general cardiology care.    As noted above, has alcohol-related cirrhosis.  Retired, previously working construction.  Diagnosed with cirrhosis approximately 2 years prior.  At present stays active by taking care of some basic work around the house but in general says that he is not very active.  With day-to-day activities denies cardiac symptoms such as chest discomfort, shortness of breath, palpitations, presyncope, syncope, leg swelling, PND, orthopnea.    PMH/FH/SH  History is significant for hypertension, atrial fibrillation/flutter, alcohol cirrhosis    No surgeries in the past    Mentions that his father had a myocardial infarction but no other cardiac history in the family.    He is a current smoker, averaging half pack per day.  He previously smoked 1 pack per day.  Smoking since age 13.  Denies alcohol use at present, quit 2 years prior.  Uses marijuana recreationally.      TTE 1/17/23  The left ventricle is small with normal systolic function.  The estimated ejection fraction is 65%.  Normal left ventricular diastolic function.  Normal right ventricular size with normal right ventricular systolic function.  Normal central venous pressure (3 mmHg).  The estimated PA systolic pressure is 30 mmHg.  Mild tricuspid regurgitation.    ROS       Objective     Physical Exam  Vitals reviewed.   Constitutional:       General: He is not in acute distress.  HENT:      Head: Atraumatic.   Eyes:      Extraocular Movements: Extraocular movements intact.   Cardiovascular:      Rate and Rhythm: Normal rate and regular rhythm.      Heart sounds: Normal heart sounds.   Pulmonary:      Breath  sounds: Normal breath sounds.   Abdominal:      Palpations: Abdomen is soft.      Tenderness: There is no abdominal tenderness.   Musculoskeletal:         General: Normal range of motion.      Right lower leg: No edema.      Left lower leg: No edema.   Neurological:      General: No focal deficit present.      Mental Status: He is alert and oriented to person, place, and time.            Assessment and Plan     1. Alcohol abuse    2. Coronary artery disease, unspecified vessel or lesion type, unspecified whether angina present, unspecified whether native or transplanted heart    3. Chronic obstructive pulmonary disease, unspecified COPD type    4. Pre-operative cardiovascular examination    5. PAF (paroxysmal atrial fibrillation)    6. Pulmonary hypertension    7. Class 1 obesity due to excess calories with serious comorbidity and body mass index (BMI) of 31.0 to 31.9 in adult    8. Other ascites    9. Alcoholic cirrhosis of liver with ascites    10. Tobacco use    11. Tobacco dependency        Plan:  Initially referred to me for evaluation of cardiac status as part of liver transplant evaluation.  When talking with him, given his extensive smoking history and risk for pulmonary hypertension given his cirrhosis, recommended a combined left and right heart catheterization to evaluate his coronary anatomy and also evaluate his pulmonary pressures.  However when discussing this with him, he tells me that he declined pursuing liver transplant as he wanted to continue smoking, and continue to take care of the pets in his house which would have been an issue to transplantation.  Therefore at this time would not pursue any further cardiac testing as he is asymptomatic and has stable cardiac function on most recent testing.  Continue follow-up with General Cardiology, however if liver transplant were to be pursued in the future would recommend combined left and right heart catheterization for reasons as noted above.        Advance Care Planning     Date: 12/03/2024    Power of   I initiated the process of voluntary advance care planning today and explained the importance of this process to the patient.  I introduced the concept of advance directives to the patient, as well. Then the patient received detailed information about the importance of designating a Health Care Power of  (HCPOA). He was also instructed to communicate with this person about their wishes for future healthcare, should he become sick and lose decision-making capacity. The patient has previously appointed a HCPOA.

## 2024-12-04 ENCOUNTER — HOSPITAL ENCOUNTER (OUTPATIENT)
Dept: INTERVENTIONAL RADIOLOGY/VASCULAR | Facility: HOSPITAL | Age: 64
Discharge: HOME | End: 2024-12-04
Attending: INTERNAL MEDICINE
Payer: MEDICAID

## 2024-12-04 VITALS
RESPIRATION RATE: 15 BRPM | SYSTOLIC BLOOD PRESSURE: 165 MMHG | HEART RATE: 55 BPM | DIASTOLIC BLOOD PRESSURE: 79 MMHG | TEMPERATURE: 98 F | OXYGEN SATURATION: 99 %

## 2024-12-04 DIAGNOSIS — R18.8 OTHER ASCITES: ICD-10-CM

## 2024-12-04 DIAGNOSIS — Z76.82 ORGAN TRANSPLANT CANDIDATE: ICD-10-CM

## 2024-12-04 DIAGNOSIS — K70.31 ALCOHOLIC CIRRHOSIS OF LIVER WITH ASCITES: ICD-10-CM

## 2024-12-04 PROCEDURE — P9047 ALBUMIN (HUMAN), 25%, 50ML: HCPCS | Mod: JZ,JG,TXP | Performed by: PHYSICIAN ASSISTANT

## 2024-12-04 PROCEDURE — 49083 ABD PARACENTESIS W/IMAGING: CPT | Mod: TXP | Performed by: RADIOLOGY

## 2024-12-04 PROCEDURE — 63600175 PHARM REV CODE 636 W HCPCS: Mod: JZ,JG,TXP | Performed by: PHYSICIAN ASSISTANT

## 2024-12-04 RX ORDER — ALBUMIN HUMAN 250 G/1000ML
SOLUTION INTRAVENOUS
Status: COMPLETED | OUTPATIENT
Start: 2024-12-04 | End: 2024-12-04

## 2024-12-04 RX ORDER — LIDOCAINE HYDROCHLORIDE 10 MG/ML
INJECTION, SOLUTION INFILTRATION; PERINEURAL
Status: COMPLETED | OUTPATIENT
Start: 2024-12-04 | End: 2024-12-04

## 2024-12-04 RX ADMIN — LIDOCAINE HYDROCHLORIDE 3 ML: 10 INJECTION, SOLUTION INFILTRATION; PERINEURAL at 01:12

## 2024-12-04 RX ADMIN — ALBUMIN (HUMAN) 50 G: 25 SOLUTION INTRAVENOUS at 02:12

## 2024-12-04 NOTE — NURSING
IR paracentesis completed, removed 7500 mL, no labs requested, replaced with 50g albumin, VS stable room air, denies pain, reviewed discharge instructions with pt, pt denies paper AVS, escorted pt to front of hospital, IR care complete

## 2024-12-04 NOTE — DISCHARGE SUMMARY
Interventional Radiology Short Stay Discharge Summary      Admit Date: 12/4/2024  Discharge Date: 12/04/2024     Hospital Course: Uneventful    Discharge Diagnosis: ascites    Discharge Condition: Stable    Discharge Disposition: Home    Diet: Resume prior diet    Activity: activity as tolerated    Follow-up: With referring provider    Cindy Chava, PA-C [facility]

## 2024-12-04 NOTE — SEDATION DOCUMENTATION
Procedure complete, pt tolerated well. 7500mL cloudy/yellow ascites removed, replacing with 50g albumin. Patient alert and oriented x4 unlabored on Room Air. Patient denies pain and is resting comfortably. Surgical site dressed with bandaid. Dressing is clean, dry, and intact. Patient transported to recovery.

## 2024-12-04 NOTE — PROCEDURES
Radiology Post-Procedure Note    Pre Op Diagnosis: Ascites  Post Op Diagnosis: Same    Procedure: Ultrasound Guided Paracentesis    Procedure performed by: Ashia Gallegos PA-C    Written Informed Consent Obtained: Yes  Specimen Removed: None sent  Estimated Blood Loss: Minimal    Findings:   Successful paracentesis.  7500 ml clear yellow fluid LLQ.  Albumin was administered PRN per protocol.    Patient tolerated procedure well.    Ashia Gallegos PA-C

## 2024-12-04 NOTE — SEDATION DOCUMENTATION
Pt arrived to C ARM room for paracentesis. Pt oriented to unit and staff. Plan of care reviewed with patient, patient verbalizes understanding. Comfort measures utilized. Fall risk reviewed with patient, fall risk interventions maintained. Safety strap applied, positioner pillows utilized to minimize pressure points. Blankets applied. Pt prepped and draped utilizing standard sterile technique. Patient placed on continuous monitoring, as required by sedation policy. Timeouts completed utilizing standard universal time-out, per department and facility policy. RN to remain at bedside, continuous monitoring maintained. Pt resting comfortably. Denies pain/discomfort. Will continue to monitor. See flow sheets for monitoring, medication administration, and updates.

## 2024-12-04 NOTE — H&P
Radiology History & Physical      SUBJECTIVE:     Chief Complaint: abdominal distention    History of Present Illness:  Jonny Isabel is a 64 y.o. male who presents for ultrasound guided paracentesis  Past Medical History:   Diagnosis Date    A-fib     Esophageal varices 5/8/2023    Other ascites 5/8/2023     Past Surgical History:   Procedure Laterality Date    ESOPHAGOGASTRODUODENOSCOPY N/A 1/17/2023    Procedure: EGD (ESOPHAGOGASTRODUODENOSCOPY);  Surgeon: Dewayne Navas MD;  Location: [facility] (2ND FLR);  Service: Endoscopy;  Laterality: N/A;    ESOPHAGOGASTRODUODENOSCOPY N/A 2/7/2024    Procedure: EGD (ESOPHAGOGASTRODUODENOSCOPY);  Surgeon: Nathan Goins MD;  Location: [facility] (2ND FLR);  Service: Endoscopy;  Laterality: N/A;  referral: Dr. Escalante /cirrhosis - labs- possible banding / prep ins on portal / 2nd floor - Pt c/o SOB at times./ Xarelto - message sent to clearance nurse per protocol - ERW  1/31/24- LVM for precall - ERW  2/1-precall complet-Kpvt  ok to hold Xarelto2 da       Home Meds:   Prior to Admission medications    Medication Sig Start Date End Date Taking? Authorizing Provider   ADVAIR DISKUS 250-50 mcg/dose diskus inhaler Inhale 1 puff into the lungs 2 (two) times a day. Controller 3/20/24 3/20/25  Joanne Ruiz NP   amiodarone (PACERONE) 200 MG Tab Take 1 tablet (200 mg total) by mouth once daily. 12/12/23 12/11/24  Ellen Escalera NP   levothyroxine (SYNTHROID) 50 MCG tablet TAKE 1 TABLET BY MOUTH IN THE MORNING WITH A FULL GLASS OF WATER 30-60 MINUTES BEFORE OTHER MEDICATIONS AND FOOD  Patient taking differently: Take 50 mcg by mouth before breakfast. WITH A FULL GLASS OF WATER 30-60 MINUTES BEFORE OTHER MEDICATIONS AND FOOD 12/12/23   Ellen Escalera NP   metoprolol tartrate (LOPRESSOR) 100 MG tablet Take 100 mg by mouth once daily. 7/16/24   Provider, Historical   omega-3 acid ethyl esters (LOVAZA) 1 gram capsule Take 1 capsule by mouth 2 (two) times daily. 7/16/24    Provider, Historical   pantoprazole (PROTONIX) 40 MG tablet Take 1 tablet (40 mg total) by mouth 2 (two) times daily. 8/5/24 8/5/25  Shawn Mejia MD   promethazine (PHENERGAN) 12.5 MG Tab Take 12.5 mg by mouth every 6 (six) hours as needed (nausea). 5/6/24   Provider, Historical   thiamine 100 MG tablet Take 100 mg by mouth once daily. 7/13/22   Provider, Historical   traZODone (DESYREL) 50 MG tablet Take 50 mg by mouth nightly as needed. 2/29/24   Provider, Historical   VITAMIN B COMPLEX ORAL Take 1 tablet by mouth once daily.    Provider, Historical   XARELTO 20 mg Tab Take 20 mg by mouth once daily. 1/9/23   Provider, Historical     Anticoagulants/Antiplatelets:  xarelto    Allergies: Review of patient's allergies indicates:  No Known Allergies  Sedation History:  no adverse reactions    Review of Systems:   Hematological: no known coagulopathies  Respiratory: no shortness of breath  Cardiovascular: no chest pain  Gastrointestinal: no abdominal pain  Genito-Urinary: no dysuria  Musculoskeletal: negative  Neurological: no TIA or stroke symptoms         OBJECTIVE:     Vital Signs (Most Recent)  Temp: 98.2 °F (36.8 °C) (12/04/24 1300)  Pulse: (!) 58 (12/04/24 1300)  Resp: 16 (12/04/24 1300)  BP: (!) 162/69 (12/04/24 1300)  SpO2: 97 % (12/04/24 1300)    Physical Exam:  ASA: 2  Mallampati: n/a    General: no acute distress  Mental Status: alert and oriented to person, place and time  HEENT: normocephalic, atraumatic  Chest: unlabored breathing  Heart: regular heart rate  Abdomen: distended  Extremity: moves all extremities    ASSESSMENT/PLAN:     Sedation Plan: local  Patient will undergo ultrasound guided paracentesis.    Ashia Gallegos PA-C

## 2024-12-05 LAB
CLINICAL BIOCHEMIST REVIEW: NORMAL
PLPETH BLD-MCNC: <10 NG/ML
POPETH BLD-MCNC: <10 NG/ML

## 2024-12-09 ENCOUNTER — TELEPHONE (OUTPATIENT)
Dept: INTERVENTIONAL RADIOLOGY/VASCULAR | Facility: CLINIC | Age: 64
End: 2024-12-09
Payer: MEDICAID

## 2024-12-09 ENCOUNTER — TELEPHONE (OUTPATIENT)
Dept: TRANSPLANT | Facility: CLINIC | Age: 64
End: 2024-12-09
Payer: MEDICAID

## 2024-12-09 DIAGNOSIS — Z76.82 ORGAN TRANSPLANT CANDIDATE: Primary | ICD-10-CM

## 2024-12-09 DIAGNOSIS — R18.8 OTHER ASCITES: ICD-10-CM

## 2024-12-09 NOTE — TELEPHONE ENCOUNTER
Returned phone call to Mr. Fuller's spouse regarding his need for a paracentesis.  Orders place in [facility] for a paracentesis. Placed his name on IR list requesting a para this week.

## 2024-12-10 ENCOUNTER — TELEPHONE (OUTPATIENT)
Dept: INTERVENTIONAL RADIOLOGY/VASCULAR | Facility: HOSPITAL | Age: 64
End: 2024-12-10
Payer: MEDICAID

## 2024-12-11 ENCOUNTER — HOSPITAL ENCOUNTER (OUTPATIENT)
Dept: INTERVENTIONAL RADIOLOGY/VASCULAR | Facility: HOSPITAL | Age: 64
Discharge: HOME | End: 2024-12-11
Attending: INTERNAL MEDICINE
Payer: MEDICAID

## 2024-12-11 VITALS
RESPIRATION RATE: 12 BRPM | HEIGHT: 69 IN | BODY MASS INDEX: 28.14 KG/M2 | WEIGHT: 190 LBS | HEART RATE: 53 BPM | DIASTOLIC BLOOD PRESSURE: 88 MMHG | SYSTOLIC BLOOD PRESSURE: 191 MMHG | OXYGEN SATURATION: 97 %

## 2024-12-11 DIAGNOSIS — Z76.82 ORGAN TRANSPLANT CANDIDATE: ICD-10-CM

## 2024-12-11 DIAGNOSIS — R18.8 ASCITES: ICD-10-CM

## 2024-12-11 DIAGNOSIS — R18.8 OTHER ASCITES: ICD-10-CM

## 2024-12-11 PROCEDURE — 63600175 PHARM REV CODE 636 W HCPCS: Mod: TXP | Performed by: PHYSICIAN ASSISTANT

## 2024-12-11 RX ORDER — LIDOCAINE HYDROCHLORIDE 10 MG/ML
INJECTION, SOLUTION INFILTRATION; PERINEURAL
Status: COMPLETED | OUTPATIENT
Start: 2024-12-11 | End: 2024-12-11

## 2024-12-11 RX ADMIN — LIDOCAINE HYDROCHLORIDE 5 ML: 10 INJECTION, SOLUTION INFILTRATION; PERINEURAL at 03:12

## 2024-12-11 NOTE — SEDATION DOCUMENTATION
Patient presented for weekly paracentesis. Tolerated well without issues. 4L of cloudy karma fluid removed. Patient ok for discharge

## 2024-12-11 NOTE — DISCHARGE SUMMARY
Interventional Radiology Short Stay Discharge Summary      Admit Date: 12/11/2024  Discharge Date: 12/11/2024     Hospital Course: Uneventful    Discharge Diagnosis: ascites    Discharge Condition: Stable    Discharge Disposition: Home    Diet: Resume prior diet    Activity: activity as tolerated    Follow-up: With referring provider    Cindy Chava, PA-C [facility]

## 2024-12-11 NOTE — H&P
Radiology History & Physical      SUBJECTIVE:     Chief Complaint: abdominal distention    History of Present Illness:  Jonny Isabel is a 64 y.o. male who presents for ultrasound guided paracentesis  Past Medical History:   Diagnosis Date    A-fib     Esophageal varices 5/8/2023    Other ascites 5/8/2023     Past Surgical History:   Procedure Laterality Date    ESOPHAGOGASTRODUODENOSCOPY N/A 1/17/2023    Procedure: EGD (ESOPHAGOGASTRODUODENOSCOPY);  Surgeon: Dewayne Navas MD;  Location: [facility] (2ND FLR);  Service: Endoscopy;  Laterality: N/A;    ESOPHAGOGASTRODUODENOSCOPY N/A 2/7/2024    Procedure: EGD (ESOPHAGOGASTRODUODENOSCOPY);  Surgeon: Nathan Goins MD;  Location: [facility] (2ND FLR);  Service: Endoscopy;  Laterality: N/A;  referral: Dr. Escalante /cirrhosis - labs- possible banding / prep ins on portal / 2nd floor - Pt c/o SOB at times./ Xarelto - message sent to clearance nurse per protocol - ERW  1/31/24- LVM for precall - ERW  2/1-precall complet-Kpvt  ok to hold Xarelto2 da       Home Meds:   Prior to Admission medications    Medication Sig Start Date End Date Taking? Authorizing Provider   ADVAIR DISKUS 250-50 mcg/dose diskus inhaler Inhale 1 puff into the lungs 2 (two) times a day. Controller 3/20/24 3/20/25  Joanne Ruiz NP   amiodarone (PACERONE) 200 MG Tab Take 1 tablet (200 mg total) by mouth once daily. 12/12/23 12/11/24  Ellen Escalera NP   levothyroxine (SYNTHROID) 50 MCG tablet TAKE 1 TABLET BY MOUTH IN THE MORNING WITH A FULL GLASS OF WATER 30-60 MINUTES BEFORE OTHER MEDICATIONS AND FOOD  Patient taking differently: Take 50 mcg by mouth before breakfast. WITH A FULL GLASS OF WATER 30-60 MINUTES BEFORE OTHER MEDICATIONS AND FOOD 12/12/23   Ellen Escalera NP   metoprolol tartrate (LOPRESSOR) 100 MG tablet Take 100 mg by mouth once daily. 7/16/24   Provider, Historical   omega-3 acid ethyl esters (LOVAZA) 1 gram capsule Take 1 capsule by mouth 2 (two) times daily. 7/16/24    Provider, Historical   pantoprazole (PROTONIX) 40 MG tablet Take 1 tablet (40 mg total) by mouth 2 (two) times daily. 8/5/24 8/5/25  Shawn Mejia MD   promethazine (PHENERGAN) 12.5 MG Tab Take 12.5 mg by mouth every 6 (six) hours as needed (nausea). 5/6/24   Provider, Historical   thiamine 100 MG tablet Take 100 mg by mouth once daily. 7/13/22   Provider, Historical   traZODone (DESYREL) 50 MG tablet Take 50 mg by mouth nightly as needed. 2/29/24   Provider, Historical   VITAMIN B COMPLEX ORAL Take 1 tablet by mouth once daily.    Provider, Historical   XARELTO 20 mg Tab Take 20 mg by mouth once daily. 1/9/23   Provider, Historical     Anticoagulants/Antiplatelets:  xarelto    Allergies: Review of patient's allergies indicates:  No Known Allergies  Sedation History:  no adverse reactions    Review of Systems:   Hematological: no known coagulopathies  Respiratory: no shortness of breath  Cardiovascular: no chest pain  Gastrointestinal: no abdominal pain  Genito-Urinary: no dysuria  Musculoskeletal: negative  Neurological: no TIA or stroke symptoms         OBJECTIVE:     Vital Signs (Most Recent)       Physical Exam:  ASA: 2  Mallampati: n/a    General: no acute distress  Mental Status: alert and oriented to person, place and time  HEENT: normocephalic, atraumatic  Chest: unlabored breathing  Heart: regular heart rate  Abdomen: distended  Extremity: moves all extremities    ASSESSMENT/PLAN:     Sedation Plan: local  Patient will undergo ultrasound guided paracentesis.    Ashia Gallegos PA-C

## 2024-12-11 NOTE — PROCEDURES
Radiology Post-Procedure Note    Pre Op Diagnosis: Ascites  Post Op Diagnosis: Same    Procedure: Ultrasound Guided Paracentesis    Procedure performed by: Ashia Gallegos PA-C    Written Informed Consent Obtained: Yes  Specimen Removed: none sent  Estimated Blood Loss: Minimal    Findings:   Successful paracentesis.  4L removed LLQ.  Albumin was not administered PRN per protocol.    Patient tolerated procedure well.    Ashia Gallegos PA-C

## 2024-12-13 ENCOUNTER — OFFICE VISIT (OUTPATIENT)
Dept: TRANSPLANT | Facility: CLINIC | Age: 64
End: 2024-12-13
Payer: MEDICAID

## 2024-12-13 DIAGNOSIS — K70.31 ALCOHOLIC CIRRHOSIS OF LIVER WITH ASCITES: ICD-10-CM

## 2024-12-13 DIAGNOSIS — I85.10 SECONDARY ESOPHAGEAL VARICES WITHOUT BLEEDING: ICD-10-CM

## 2024-12-13 DIAGNOSIS — N18.9 CHRONIC KIDNEY DISEASE, UNSPECIFIED CKD STAGE: Primary | ICD-10-CM

## 2024-12-13 DIAGNOSIS — R18.8 OTHER ASCITES: ICD-10-CM

## 2024-12-13 NOTE — Clinical Note
1. No TIPS 2. NO transplant- Michael - put on for selection next week to decline 3. Weekly imani 4. Labs every 4 weeks- Ewelina please schedule 5. EGD 02/25 6. Nephrology consult 7. Return visit 3 months- Ewelina please schedule

## 2024-12-13 NOTE — PATIENT INSTRUCTIONS
No TIPS  NO transplant  Weekly imani  Labs every 4 weeks  EGD 02/25  Nephrology  Return visit 3 months

## 2024-12-13 NOTE — PROGRESS NOTES
Pt presents with mom, c/o stomach ache, cough, fever, sore throat, denies headaches, ear pain and n/v. No otc medications today. Pt needs note for school. Symptom onset reported by pt was this morning, mom noticed symptoms a few days ago         The patient location is: home  The chief complaint leading to consultation is: f/o cirrhosis    Visit type: audiovisual    Face to Face time with patient: 15 minutes  30 minutes of total time spent on the encounter, which includes face to face time and non-face to face time preparing to see the patient (eg, review of tests), Obtaining and/or reviewing separately obtained history, Documenting clinical information in the electronic or other health record, Independently interpreting results (not separately reported) and communicating results to the patient/family/caregiver, or Care coordination (not separately reported).         Each patient to whom he or she provides medical services by telemedicine is:  (1) informed of the relationship between the physician and patient and the respective role of any other health care provider with respect to management of the patient; and (2) notified that he or she may decline to receive medical services by telemedicine and may withdraw from such care at any time.    Notes: HEPATOLOGY FOLLOW UP    Referring Physician: Harriett Bird MD  Current Corresponding Physician: Harriett Bird MD, Yuli Costa MD, Christopher Burt III, MD Jonny Isabel is here for follow up of decompensated alcohol-induced Cirrhosis    HPI  Seen by inpt hepatology team:  Admission 1/16/23-2/7/23: 63yo PMHx decompensated EtOH cirrhosis (ascites), NID-T2DM, afib s/p ablation and DCCV on xarelto, HTN. Was transferred from OSH on 01/16/2023 for epidural abscess and L3/L4 disc herniation. Surgery was deferred since the pt was too ill. MELD-Na 22. Was anemic, was transfused and underwent EGD- small varices noted.     Work up for liver disease since arrival notable for ASMA, AMA, viral hepatitis panel all negative. PETH 900.     Reportedly patient did not know of liver disease and was never instructed to stop drinking however multiple barriers to consideration of transplant including spinal abscess,  possible bowel obstruction, continuing to drink EtOH (PETH 900)      Interval History  After Jonny Isabel's he is undergoing a liver transplant evaluation. He states he does NOT want a liver transplant and does not want a TIPS. He stopped drinking (peth negative since 5/8/23).    --weekly paracenteses, although now only ~4 L each time  --HE-none  --admission 8/16/24-8/20/24: possible SBP (did have hemorrhagic ascites); CT unrevealing; tx w abx    Labs 12/3/24: ALT 10. AST 15, ALKP 92, Tbil 0.4, plts 214    Abdo US 8/16/24: Liver: Enlarged, measuring 19.3 cm. There is increased echogenicity of the hepatic parenchyma. There is a nodu   CT abdo pelvis w contrast 8/19/24: Cirrhotic liver. Moderate volume ascites.  Heterogeneous area of ill-defined attenuation at the left abdomen suggesting clot/blood products, similar to slightly less conspicuous from comparison CTA    EGD 2/7/24: sm EV; PHG; mult gastric polyps    MELD 3.0: 15 at 12/3/2024  1:44 PM  MELD-Na: 15 at 12/3/2024  1:44 PM  Calculated from:  Serum Creatinine: 1.9 mg/dL at 12/3/2024  1:44 PM  Serum Sodium: 140 mmol/L (Using max of 137 mmol/L) at 12/3/2024  1:44 PM  Total Bilirubin: 0.4 mg/dL (Using min of 1 mg/dL) at 12/3/2024  1:44 PM  Serum Albumin: 3.3 g/dL at 12/3/2024  1:44 PM  INR(ratio): 1.2 at 12/3/2024  1:44 PM  Age at listing (hypothetical): 64 years  Sex: Male at 12/3/2024  1:44 PM       Outpatient Encounter Medications as of 12/13/2024   Medication Sig Dispense Refill    ADVAIR DISKUS 250-50 mcg/dose diskus inhaler Inhale 1 puff into the lungs 2 (two) times a day. Controller 180 each 3    levothyroxine (SYNTHROID) 50 MCG tablet TAKE 1 TABLET BY MOUTH IN THE MORNING WITH A FULL GLASS OF WATER 30-60 MINUTES BEFORE OTHER MEDICATIONS AND FOOD (Patient taking differently: Take 50 mcg by mouth before breakfast. WITH A FULL GLASS OF WATER 30-60 MINUTES BEFORE OTHER MEDICATIONS AND FOOD) 90 tablet 3    metoprolol tartrate (LOPRESSOR) 100 MG tablet Take  100 mg by mouth once daily.      omega-3 acid ethyl esters (LOVAZA) 1 gram capsule Take 1 capsule by mouth 2 (two) times daily.      pantoprazole (PROTONIX) 40 MG tablet Take 1 tablet (40 mg total) by mouth 2 (two) times daily. 180 tablet 3    promethazine (PHENERGAN) 12.5 MG Tab Take 12.5 mg by mouth every 6 (six) hours as needed (nausea).      thiamine 100 MG tablet Take 100 mg by mouth once daily.      traZODone (DESYREL) 50 MG tablet Take 50 mg by mouth nightly as needed.      VITAMIN B COMPLEX ORAL Take 1 tablet by mouth once daily.      XARELTO 20 mg Tab Take 20 mg by mouth once daily.      amiodarone (PACERONE) 200 MG Tab Take 1 tablet (200 mg total) by mouth once daily. 30 tablet 11     No facility-administered encounter medications on file as of 12/13/2024.     Review of patient's allergies indicates:  No Known Allergies  Past Medical History:   Diagnosis Date    A-fib     Esophageal varices 5/8/2023    Other ascites 5/8/2023       Review of Systems   Constitutional: Negative.    HENT: Negative.     Eyes: Negative.    Respiratory: Negative.     Cardiovascular: Negative.    Gastrointestinal: Negative.    Genitourinary: Negative.    Musculoskeletal: Negative.    Skin: Negative.    Neurological: Negative.    Psychiatric/Behavioral: Negative.       There were no vitals filed for this visit.      MELD 3.0: 15 at 12/3/2024  1:44 PM  MELD-Na: 15 at 12/3/2024  1:44 PM  Calculated from:  Serum Creatinine: 1.9 mg/dL at 12/3/2024  1:44 PM  Serum Sodium: 140 mmol/L (Using max of 137 mmol/L) at 12/3/2024  1:44 PM  Total Bilirubin: 0.4 mg/dL (Using min of 1 mg/dL) at 12/3/2024  1:44 PM  Serum Albumin: 3.3 g/dL at 12/3/2024  1:44 PM  INR(ratio): 1.2 at 12/3/2024  1:44 PM  Age at listing (hypothetical): 64 years  Sex: Male at 12/3/2024  1:44 PM      Lab Results   Component Value Date     12/03/2024    BUN 19 12/03/2024    CREATININE 1.9 (H) 12/03/2024    CALCIUM 8.6 (L) 12/03/2024     12/03/2024    K 4.1  12/03/2024     12/03/2024    PROT 7.5 12/03/2024    CO2 19 (L) 12/03/2024    ANIONGAP 12 12/03/2024    WBC 7.90 12/03/2024    RBC 4.42 (L) 12/03/2024    HGB 12.2 (L) 12/03/2024    HCT 39.0 (L) 12/03/2024    HCT 33 (L) 01/27/2023    MCV 88 12/03/2024    MCH 27.6 12/03/2024    MCHC 31.3 (L) 12/03/2024     Lab Results   Component Value Date    RDW 14.7 (H) 12/03/2024     12/03/2024    MPV 10.3 12/03/2024    GRAN 6.2 12/03/2024    GRAN 78.7 (H) 12/03/2024    LYMPH 1.0 12/03/2024    LYMPH 12.5 (L) 12/03/2024    MONO 0.5 12/03/2024    MONO 6.2 12/03/2024    EOSINOPHIL 1.4 12/03/2024    BASOPHIL 0.9 12/03/2024    EOS 0.1 12/03/2024    EOS 1 06/12/2024    BASO 0.07 12/03/2024    BASO 1 06/12/2024    APTT 49.1 (H) 11/09/2022    GROUPTRH A POS 08/02/2024    BNP 24 12/03/2024    ALBUMIN 3.3 (L) 12/03/2024    BILIDIR 0.1 06/20/2024    AST 15 12/03/2024    ALT 10 12/03/2024    ALKPHOS 92 12/03/2024    MG 1.7 12/03/2024    LABPROT 12.5 12/03/2024    INR 1.2 12/03/2024    PSA 0.80 06/20/2024       Assessment and Plan:  Jonny Isabel is a 64 y.o. male with Cirrhosis  Has refractory ascites. Has decided he does not want a liver transplant and does not want to consider TIPS. Will present to committee and decline for listing. Other recommendations:  Decompensated cirrhosis: monthly labs; remain off Amiodarone   Ascites: weekly Paracenteses;   CKD: nephrology consult  Portal htn: EGD -repeat 2/25  Quit smoking  HCC screening every 6 months-due next 02/25 w AFP    Return 3 months  A total of 60 minutes was spent reviewing the patient's chart, examining the patient, reviewing labs and imaging and coordinating care with the patient's care team.

## 2024-12-13 NOTE — LETTER
December 13, 2024        Harriett Bird  [address]  [facility] [address]  Phone: [phone]  Fax: [phone]             [place] [facility]  [address] [facility] [address] [address]  Phone: [phone]  Fax: [phone]     Patient: Jonny Isabel   MR Number: [id number]   YOB: 1960   Date of Visit: 12/13/2024       Dear Dr. Harriett Bird    Thank you for referring Jonny Isabel to me for evaluation. Attached you will find relevant portions of my assessment and plan of care.    If you have questions, please do not hesitate to call me. I look forward to following Jonny Isabel along with you.    Sincerely,    Lovely Escalante MD    Enclosure    If you would like to receive this communication electronically, please contact [email or web address] or [phone] to request [employer] Link access.    [employer] Link is a tool which provides read-only access to select patient information with whom you have a relationship. Its easy to use and provides real time access to review your patients record including encounter summaries, notes, results, and demographic information.    If you feel you have received this communication in error or would no longer like to receive these types of communications, please e-mail [email or web address]

## 2024-12-16 ENCOUNTER — HOSPITAL ENCOUNTER (INPATIENT)
Facility: HOSPITAL | Age: 64
LOS: 1 days | Discharge: NURSING, REHAB OR LONG-TERM CARE | DRG: 394 | End: 2024-12-17
Attending: STUDENT IN AN ORGANIZED HEALTH CARE EDUCATION/TRAINING PROGRAM | Admitting: FAMILY MEDICINE
Payer: MEDICAID

## 2024-12-16 DIAGNOSIS — K92.0 COFFEE GROUND EMESIS: ICD-10-CM

## 2024-12-16 DIAGNOSIS — K56.609 INTESTINAL OBSTRUCTION, UNSPECIFIED CAUSE, UNSPECIFIED WHETHER PARTIAL OR COMPLETE: Primary | ICD-10-CM

## 2024-12-16 DIAGNOSIS — Z76.89 ENCOUNTER FOR ASSESSMENT FOR SMALL BOWEL OBSTRUCITON: ICD-10-CM

## 2024-12-16 DIAGNOSIS — R11.10 VOMITING, UNSPECIFIED VOMITING TYPE, UNSPECIFIED WHETHER NAUSEA PRESENT: ICD-10-CM

## 2024-12-16 DIAGNOSIS — K46.0 INCARCERATED HERNIA: ICD-10-CM

## 2024-12-16 PROBLEM — K43.9 VENTRAL HERNIA: Status: ACTIVE | Noted: 2024-12-16

## 2024-12-16 LAB
ABO + RH BLD: NORMAL
ALBUMIN SERPL BCP-MCNC: 3.2 G/DL (ref 3.5–5.2)
ALP SERPL-CCNC: 80 U/L (ref 40–150)
ALT SERPL W/O P-5'-P-CCNC: 14 U/L (ref 10–44)
ANION GAP SERPL CALC-SCNC: 10 MMOL/L (ref 8–16)
APTT PPP: 26.5 SEC (ref 21–32)
AST SERPL-CCNC: 21 U/L (ref 10–40)
BASOPHILS # BLD AUTO: 0.11 K/UL (ref 0–0.2)
BASOPHILS NFR BLD: 0.9 % (ref 0–1.9)
BILIRUB SERPL-MCNC: 0.3 MG/DL (ref 0.1–1)
BILIRUB UR QL STRIP: NEGATIVE
BLD GP AB SCN CELLS X3 SERPL QL: NORMAL
BUN SERPL-MCNC: 18 MG/DL (ref 8–23)
CALCIUM SERPL-MCNC: 8.4 MG/DL (ref 8.7–10.5)
CHLORIDE SERPL-SCNC: 107 MMOL/L (ref 95–110)
CLARITY UR: CLEAR
CO2 SERPL-SCNC: 20 MMOL/L (ref 23–29)
COLOR UR: YELLOW
CREAT SERPL-MCNC: 1.8 MG/DL (ref 0.5–1.4)
CRP SERPL-MCNC: 11.8 MG/L (ref 0–8.2)
DIFFERENTIAL METHOD BLD: ABNORMAL
EOSINOPHIL # BLD AUTO: 0.1 K/UL (ref 0–0.5)
EOSINOPHIL NFR BLD: 0.5 % (ref 0–8)
ERYTHROCYTE [DISTWIDTH] IN BLOOD BY AUTOMATED COUNT: 14.5 % (ref 11.5–14.5)
EST. GFR  (NO RACE VARIABLE): 42 ML/MIN/1.73 M^2
GLUCOSE SERPL-MCNC: 165 MG/DL (ref 70–110)
GLUCOSE UR QL STRIP: NEGATIVE
HCT VFR BLD AUTO: 36.4 % (ref 40–54)
HGB BLD-MCNC: 12 G/DL (ref 14–18)
HGB UR QL STRIP: NEGATIVE
IMM GRANULOCYTES # BLD AUTO: 0.05 K/UL (ref 0–0.04)
IMM GRANULOCYTES NFR BLD AUTO: 0.4 % (ref 0–0.5)
INR PPP: 1.1 (ref 0.8–1.2)
KETONES UR QL STRIP: NEGATIVE
LEUKOCYTE ESTERASE UR QL STRIP: NEGATIVE
LIPASE SERPL-CCNC: 28 U/L (ref 4–60)
LYMPHOCYTES # BLD AUTO: 1.1 K/UL (ref 1–4.8)
LYMPHOCYTES NFR BLD: 8.6 % (ref 18–48)
MCH RBC QN AUTO: 27.9 PG (ref 27–31)
MCHC RBC AUTO-ENTMCNC: 33 G/DL (ref 32–36)
MCV RBC AUTO: 85 FL (ref 82–98)
MONOCYTES # BLD AUTO: 0.5 K/UL (ref 0.3–1)
MONOCYTES NFR BLD: 4.4 % (ref 4–15)
NEUTROPHILS # BLD AUTO: 10.3 K/UL (ref 1.8–7.7)
NEUTROPHILS NFR BLD: 85.2 % (ref 38–73)
NITRITE UR QL STRIP: NEGATIVE
NRBC BLD-RTO: 0 /100 WBC
PH UR STRIP: 7 [PH] (ref 5–8)
PLATELET # BLD AUTO: 195 K/UL (ref 150–450)
PMV BLD AUTO: 10.7 FL (ref 9.2–12.9)
POTASSIUM SERPL-SCNC: 4.6 MMOL/L (ref 3.5–5.1)
PROT SERPL-MCNC: 6.9 G/DL (ref 6–8.4)
PROT UR QL STRIP: ABNORMAL
PROTHROMBIN TIME: 11.7 SEC (ref 9–12.5)
RBC # BLD AUTO: 4.3 M/UL (ref 4.6–6.2)
SODIUM SERPL-SCNC: 137 MMOL/L (ref 136–145)
SP GR UR STRIP: >1.03 (ref 1–1.03)
SPECIMEN OUTDATE: NORMAL
URN SPEC COLLECT METH UR: ABNORMAL
UROBILINOGEN UR STRIP-ACNC: NEGATIVE EU/DL
WBC # BLD AUTO: 12.15 K/UL (ref 3.9–12.7)

## 2024-12-16 PROCEDURE — 85730 THROMBOPLASTIN TIME PARTIAL: CPT | Mod: NTX | Performed by: STUDENT IN AN ORGANIZED HEALTH CARE EDUCATION/TRAINING PROGRAM

## 2024-12-16 PROCEDURE — 81003 URINALYSIS AUTO W/O SCOPE: CPT | Mod: NTX | Performed by: STUDENT IN AN ORGANIZED HEALTH CARE EDUCATION/TRAINING PROGRAM

## 2024-12-16 PROCEDURE — 63600175 PHARM REV CODE 636 W HCPCS: Mod: NTX | Performed by: EMERGENCY MEDICINE

## 2024-12-16 PROCEDURE — 12000002 HC ACUTE/MED SURGE SEMI-PRIVATE ROOM: Mod: NTX

## 2024-12-16 PROCEDURE — 25000003 PHARM REV CODE 250: Mod: NTX | Performed by: STUDENT IN AN ORGANIZED HEALTH CARE EDUCATION/TRAINING PROGRAM

## 2024-12-16 PROCEDURE — 85610 PROTHROMBIN TIME: CPT | Mod: NTX | Performed by: STUDENT IN AN ORGANIZED HEALTH CARE EDUCATION/TRAINING PROGRAM

## 2024-12-16 PROCEDURE — 85025 COMPLETE CBC W/AUTO DIFF WBC: CPT | Mod: NTX | Performed by: STUDENT IN AN ORGANIZED HEALTH CARE EDUCATION/TRAINING PROGRAM

## 2024-12-16 PROCEDURE — 83690 ASSAY OF LIPASE: CPT | Mod: NTX | Performed by: STUDENT IN AN ORGANIZED HEALTH CARE EDUCATION/TRAINING PROGRAM

## 2024-12-16 PROCEDURE — 63600175 PHARM REV CODE 636 W HCPCS: Mod: NTX | Performed by: STUDENT IN AN ORGANIZED HEALTH CARE EDUCATION/TRAINING PROGRAM

## 2024-12-16 PROCEDURE — 25500020 PHARM REV CODE 255: Mod: NTX | Performed by: STUDENT IN AN ORGANIZED HEALTH CARE EDUCATION/TRAINING PROGRAM

## 2024-12-16 PROCEDURE — 80053 COMPREHEN METABOLIC PANEL: CPT | Mod: NTX | Performed by: STUDENT IN AN ORGANIZED HEALTH CARE EDUCATION/TRAINING PROGRAM

## 2024-12-16 PROCEDURE — 86901 BLOOD TYPING SEROLOGIC RH(D): CPT | Mod: NTX | Performed by: STUDENT IN AN ORGANIZED HEALTH CARE EDUCATION/TRAINING PROGRAM

## 2024-12-16 PROCEDURE — 99223 1ST HOSP IP/OBS HIGH 75: CPT | Mod: 57,NTX,, | Performed by: SURGERY

## 2024-12-16 PROCEDURE — 86140 C-REACTIVE PROTEIN: CPT | Mod: NTX | Performed by: STUDENT IN AN ORGANIZED HEALTH CARE EDUCATION/TRAINING PROGRAM

## 2024-12-16 RX ORDER — FOLIC ACID 1 MG/1
1000 TABLET ORAL DAILY
Status: ON HOLD | COMMUNITY
Start: 2024-12-06

## 2024-12-16 RX ORDER — TOPICAL ANESTHETIC 200 MG/ML
SPRAY DENTAL; PERIODONTAL
Status: COMPLETED | OUTPATIENT
Start: 2024-12-16 | End: 2024-12-16

## 2024-12-16 RX ORDER — PANTOPRAZOLE SODIUM 40 MG/10ML
40 INJECTION, POWDER, LYOPHILIZED, FOR SOLUTION INTRAVENOUS
Status: COMPLETED | OUTPATIENT
Start: 2024-12-16 | End: 2024-12-16

## 2024-12-16 RX ORDER — HYDROMORPHONE HYDROCHLORIDE 1 MG/ML
0.5 INJECTION, SOLUTION INTRAMUSCULAR; INTRAVENOUS; SUBCUTANEOUS
Status: COMPLETED | OUTPATIENT
Start: 2024-12-16 | End: 2024-12-16

## 2024-12-16 RX ORDER — ALIROCUMAB 75 MG/ML
75 INJECTION, SOLUTION SUBCUTANEOUS
Status: ON HOLD | COMMUNITY

## 2024-12-16 RX ORDER — AMIODARONE HYDROCHLORIDE 200 MG/1
200 TABLET ORAL DAILY
Status: ON HOLD | COMMUNITY

## 2024-12-16 RX ORDER — HYDROMORPHONE HYDROCHLORIDE 1 MG/ML
1 INJECTION, SOLUTION INTRAMUSCULAR; INTRAVENOUS; SUBCUTANEOUS
Status: COMPLETED | OUTPATIENT
Start: 2024-12-16 | End: 2024-12-16

## 2024-12-16 RX ORDER — ONDANSETRON HYDROCHLORIDE 2 MG/ML
8 INJECTION, SOLUTION INTRAVENOUS ONCE
Status: COMPLETED | OUTPATIENT
Start: 2024-12-16 | End: 2024-12-16

## 2024-12-16 RX ORDER — ONDANSETRON HYDROCHLORIDE 2 MG/ML
4 INJECTION, SOLUTION INTRAVENOUS
Status: COMPLETED | OUTPATIENT
Start: 2024-12-16 | End: 2024-12-16

## 2024-12-16 RX ORDER — HALOPERIDOL 5 MG/ML
2 INJECTION INTRAMUSCULAR
Status: COMPLETED | OUTPATIENT
Start: 2024-12-16 | End: 2024-12-16

## 2024-12-16 RX ORDER — HYDROMORPHONE HYDROCHLORIDE 1 MG/ML
1 INJECTION, SOLUTION INTRAMUSCULAR; INTRAVENOUS; SUBCUTANEOUS
Status: COMPLETED | OUTPATIENT
Start: 2024-12-17 | End: 2024-12-17

## 2024-12-16 RX ADMIN — TOPICAL ANESTHETIC 1 EACH: 200 SPRAY DENTAL; PERIODONTAL at 06:12

## 2024-12-16 RX ADMIN — HYDROMORPHONE HYDROCHLORIDE 0.5 MG: 1 INJECTION, SOLUTION INTRAMUSCULAR; INTRAVENOUS; SUBCUTANEOUS at 06:12

## 2024-12-16 RX ADMIN — HYDROMORPHONE HYDROCHLORIDE 1 MG: 1 INJECTION, SOLUTION INTRAMUSCULAR; INTRAVENOUS; SUBCUTANEOUS at 10:12

## 2024-12-16 RX ADMIN — IOHEXOL 100 ML: 350 INJECTION, SOLUTION INTRAVENOUS at 04:12

## 2024-12-16 RX ADMIN — PANTOPRAZOLE SODIUM 40 MG: 40 INJECTION, POWDER, FOR SOLUTION INTRAVENOUS at 06:12

## 2024-12-16 RX ADMIN — HYDROMORPHONE HYDROCHLORIDE 0.5 MG: 1 INJECTION, SOLUTION INTRAMUSCULAR; INTRAVENOUS; SUBCUTANEOUS at 04:12

## 2024-12-16 RX ADMIN — HYDROMORPHONE HYDROCHLORIDE 0.5 MG: 1 INJECTION, SOLUTION INTRAMUSCULAR; INTRAVENOUS; SUBCUTANEOUS at 03:12

## 2024-12-16 RX ADMIN — ONDANSETRON 8 MG: 2 INJECTION INTRAMUSCULAR; INTRAVENOUS at 03:12

## 2024-12-16 RX ADMIN — HYDROMORPHONE HYDROCHLORIDE 0.5 MG: 1 INJECTION, SOLUTION INTRAMUSCULAR; INTRAVENOUS; SUBCUTANEOUS at 10:12

## 2024-12-16 RX ADMIN — ONDANSETRON 4 MG: 2 INJECTION INTRAMUSCULAR; INTRAVENOUS at 01:12

## 2024-12-16 RX ADMIN — HYDROMORPHONE HYDROCHLORIDE 1 MG: 1 INJECTION, SOLUTION INTRAMUSCULAR; INTRAVENOUS; SUBCUTANEOUS at 06:12

## 2024-12-16 RX ADMIN — HYDROMORPHONE HYDROCHLORIDE 1 MG: 1 INJECTION, SOLUTION INTRAMUSCULAR; INTRAVENOUS; SUBCUTANEOUS at 01:12

## 2024-12-16 RX ADMIN — HALOPERIDOL LACTATE 2 MG: 5 INJECTION, SOLUTION INTRAMUSCULAR at 06:12

## 2024-12-16 NOTE — CONSULTS
[facility] - Emergency Dept  General Surgery  Consult Note    Patient Name: Jonny Isabel  MRN: [id number]  Code Status: Prior  Admission Date: 12/16/2024  Hospital Length of Stay: 0 days  Attending Physician: Brent Wolf MD  Primary Care Provider: Yuli Costa MD    Patient information was obtained from patient and past medical records.     Inpatient consult to General Surgery  Consult performed by: Ewa Alan MD  Consult ordered by: Brent Wolf MD        Subjective:     Principal Problem: ventral hernia     History of Present Illness: Jonny Isabel is a 64 y.o. male with a history of a fib (on xarelto), decompensated EtOH cirrhosis c/b ascites requiring weekly paracentesis (last para 12/11), CKD3b, and known ventral hernia who presents to the ED with ~12 hours of abdominal pain associated with his hernia, as well as, N/V. States that he has had this hernia for many year and that it is typically reducible. He has not been about to reduce it sicne 9 pm (12/15). He has passed flatus today and his last bowel movement was last night just prior to presentation to the ED. On exam, the patient is afebrile, hemodynamically stable, without leukocytosis. His lab work is remarkable for a mild granulocytosis and creatinine consistent with his known CKD (Cr 1.8 which is his baseline). CT A/P with a small bowel containing ventral hernia without evidence of bowel ischemia. There does appear to be air within the bowel distal to the hernia suggesting against complete obstruction. General surgery consulted to evaluate his ventral hernia.     No current facility-administered medications on file prior to encounter.     Current Outpatient Medications on File Prior to Encounter   Medication Sig    ADVAIR DISKUS 250-50 mcg/dose diskus inhaler Inhale 1 puff into the lungs 2 (two) times a day. Controller    amiodarone (PACERONE) 200 MG Tab Take 1 tablet (200 mg total) by mouth once daily.    levothyroxine (SYNTHROID) 50 MCG tablet  TAKE 1 TABLET BY MOUTH IN THE MORNING WITH A FULL GLASS OF WATER 30-60 MINUTES BEFORE OTHER MEDICATIONS AND FOOD (Patient taking differently: Take 50 mcg by mouth before breakfast. WITH A FULL GLASS OF WATER 30-60 MINUTES BEFORE OTHER MEDICATIONS AND FOOD)    metoprolol tartrate (LOPRESSOR) 100 MG tablet Take 100 mg by mouth once daily.    omega-3 acid ethyl esters (LOVAZA) 1 gram capsule Take 1 capsule by mouth 2 (two) times daily.    pantoprazole (PROTONIX) 40 MG tablet Take 1 tablet (40 mg total) by mouth 2 (two) times daily.    promethazine (PHENERGAN) 12.5 MG Tab Take 12.5 mg by mouth every 6 (six) hours as needed (nausea).    thiamine 100 MG tablet Take 100 mg by mouth once daily.    traZODone (DESYREL) 50 MG tablet Take 50 mg by mouth nightly as needed.    VITAMIN B COMPLEX ORAL Take 1 tablet by mouth once daily.    XARELTO 20 mg Tab Take 20 mg by mouth once daily.       Review of patient's allergies indicates:  No Known Allergies    Past Medical History:   Diagnosis Date    A-fib     Esophageal varices 5/8/2023    Other ascites 5/8/2023     Past Surgical History:   Procedure Laterality Date    ESOPHAGOGASTRODUODENOSCOPY N/A 1/17/2023    Procedure: EGD (ESOPHAGOGASTRODUODENOSCOPY);  Surgeon: Dewayne Navas MD;  Location: [address]);  Service: Endoscopy;  Laterality: N/A;    ESOPHAGOGASTRODUODENOSCOPY N/A 2/7/2024    Procedure: EGD (ESOPHAGOGASTRODUODENOSCOPY);  Surgeon: Nathan Goins MD;  Location: [address]);  Service: Endoscopy;  Laterality: N/A;  referral: Dr. Escalante /cirrhosis - labs- possible banding / prep ins on portal / 2nd floor - Pt c/o SOB at times./ Xarelto - message sent to clearance nurse per protocol - ERW  1/31/24- LVM for precall - ERW  2/1-precall complet-Kpvt  ok to hold Xarelto2 da     Family History    None       Tobacco Use    Smoking status: Every Day     Current packs/day: 1.00     Average packs/day: 1.5 packs/day for 51.0 years (74.0 ttl pk-yrs)     Types:  Cigarettes     Start date: 1974    Smokeless tobacco: Never    Tobacco comments:     Enrolled in the [employer] on 9/16/15 (SCT Member ID # [id number]) Pt is a 1 to 1.5 pk/day cigarette smoker x 51 yrs. He states ready to quit. Ambulatory referral to Smoking Cessation clinic.   Substance and Sexual Activity    Alcohol use: Not Currently     Alcohol/week: 6.0 standard drinks of alcohol     Types: 6 Cans of beer per week    Drug use: Not Currently    Sexual activity: Yes     Partners: Female     Review of Systems   Constitutional:  Negative for chills and fever.   HENT:  Negative for trouble swallowing and voice change.    Eyes:  Negative for visual disturbance.   Respiratory:  Negative for chest tightness and shortness of breath.    Cardiovascular:  Negative for chest pain and palpitations.   Gastrointestinal:  Positive for abdominal pain, nausea and vomiting.   Endocrine: Negative.    Genitourinary:  Negative for flank pain.   Musculoskeletal: Negative.    Skin: Negative.    Neurological: Negative.    Hematological: Negative.    Psychiatric/Behavioral: Negative.       Objective:     Vital Signs (Most Recent):  Temp: 97.9 °F (36.6 °C) (12/16/24 0307)  Pulse: 62 (12/16/24 0746)  Resp: 16 (12/16/24 0746)  BP: (!) 167/76 (12/16/24 0746)  SpO2: 96 % (12/16/24 0751) Vital Signs (24h Range):  Temp:  [97.9 °F (36.6 °C)] 97.9 °F (36.6 °C)  Pulse:  [62-84] 62  Resp:  [14-17] 16  SpO2:  [96 %-100 %] 96 %  BP: (156-167)/(76-84) 167/76     Weight: 86.2 kg (190 lb)  Body mass index is 28.06 kg/m².     Physical Exam  Vitals reviewed.   Constitutional:       General: He is not in acute distress.     Appearance: Normal appearance. He is not toxic-appearing.   HENT:      Head: Normocephalic and atraumatic.      Nose: Nose normal.      Comments: NGT to LIWS  Cardiovascular:      Rate and Rhythm: Normal rate.      Pulses: Normal pulses.   Pulmonary:      Effort: Pulmonary effort is normal. No respiratory distress.   Abdominal:       General: Abdomen is flat.      Palpations: Abdomen is soft.      Comments: Small bowel containing ventral hernia with ascites, unable to reduce at bedside; no new skin changes or ulceration. Voluntary guarding and tenderness to the hernia when attempting to reduce   Skin:     General: Skin is warm.   Neurological:      General: No focal deficit present.      Mental Status: He is alert and oriented to person, place, and time.   Psychiatric:         Mood and Affect: Mood normal.         Behavior: Behavior normal.            I have reviewed all pertinent lab results within the past 24 hours.  CBC:   Recent Labs   Lab 12/16/24  0315   WBC 12.15   RBC 4.30*   HGB 12.0*   HCT 36.4*      MCV 85   MCH 27.9   MCHC 33.0     CMP:   Recent Labs   Lab 12/16/24 0315   *   CALCIUM 8.4*   ALBUMIN 3.2*   PROT 6.9      K 4.6   CO2 20*      BUN 18   CREATININE 1.8*   ALKPHOS 80   ALT 14   AST 21   BILITOT 0.3       Significant Diagnostics:  I have reviewed all pertinent imaging results/findings within the past 24 hours.    CT Abdomen Pelvis With IV Contrast NO Oral Contrast  12/16/2024    Cirrhotic appearance of the liver.  Moderate volume of ascites with hyperdense material on the left side of the hemiabdomen suggesting blood products in the ascites, similar to prior.  Overall volume of ascites is decreased slightly. Mild partial small bowel obstruction secondary to ventral hernia containing ascites and mildly distended loop of bowel.     Assessment/Plan:     Ventral hernia  64M with afib (on xarelto), CKDIIIb, decompensated EtOH cirrhosis (Child-De Jesus class B), and ventral hernia who presents with ~12 hours of abdominal pain and non reducible ventral hernia associated with N/V. Has passed flatus since presenting to the hospital. Last BM was just prior to presentation. Lab work is largely unremarkable. He has a moderate amount of ascites on his CT scan. Recommend paracentesis to help reduce the pressure  within his abdomen. I was unable to reduce his hernia on exam. It is possible that he will require surgical intervention. He is a high risk surgical patient (~ 30% chance of therese-op mortality after abdominal surgery, also on xarelto) who is at a very high risk of post operative complications as it pertains to his liver. Given the patients level of liver dysfunction, the patient would very likely need hepatology services, which [facility] does not have. Recommend transfer to a higher level of care for hepatology services. Will follow closely while awaiting transfer. If his clinical status changes will remain available for intervention.     Patient discussed with Dr. Torres and Dr. Lorna Alan MD  General Surgery  [facility] - Emergency Dept    Pt seen and examined.  Agree with note and plan.    Familia Torres M.D., F.A.C.S.  [facility] and General Surgery  [facility] [place]

## 2024-12-16 NOTE — Clinical Note
Diagnosis: Incarcerated hernia [366991]   Reason for IP Medical Treatment  (Clinical interventions that can only be accomplished in the IP setting? ) :: treatment of bowel obstructiona nd incarcerated hernia

## 2024-12-16 NOTE — PROVIDER PROGRESS NOTES - EMERGENCY DEPT.
Encounter Date: 12/16/2024    ED Physician Progress Notes            64-year-old male was admitted to [facility] medicine service with General surgery consult.  I was notified by Dr. Alan, general surgery resident working with Dr. Torres, that they recommend admission at a location where hepatology services are available because they anticipate patient will require surgical intervention and will be high risk for postoperative complications. Bedside reduction attempt by general surgery was unsuccessful 2/2 ascites contained within the hernia. Spoke with [facility] to update them on this new plan and placed transfer request. Spoke with Dr. Villegas, hospital medicine at [facility], who accepted patient as transfer with general surgery under Dr. Leslie as consulting service.

## 2024-12-16 NOTE — HPI
Jonny Isabel is a 64 y.o. male with a history of a fib (on xarelto), decompensated EtOH cirrhosis c/b ascites requiring weekly paracentesis (last para 12/11), CKD3b, and known ventral hernia who presents to the ED with ~12 hours of abdominal pain associated with his hernia, as well as, N/V. States that he has had this hernia for many year and that it is typically reducible. He has not been about to reduce it sicne 9 pm (12/15). He has passed flatus today and his last bowel movement was last night just prior to presentation to the ED. On exam, the patient is afebrile, hemodynamically stable, without leukocytosis. His lab work is remarkable for a mild granulocytosis and creatinine consistent with his known CKD (Cr 1.8 which is his baseline). CT A/P with a small bowel containing ventral hernia without evidence of bowel ischemia. There does appear to be air within the bowel distal to the hernia suggesting against complete obstruction. General surgery consulted to evaluate his ventral hernia.

## 2024-12-16 NOTE — ED NOTES
Patient resting in bed with wet towel over his eyes, chest raising and falling, states pain is still the same, medicine did not much help it, all vitals stable at this time, No acute distress noticed at this time, denies any needs, states last po intake yesterday 1500, states he does not feel like peeing at this time, will let us know, call light in reach.

## 2024-12-16 NOTE — ED PROVIDER NOTES
"Encounter Date: 12/16/2024       History     Chief Complaint   Patient presents with    Abdominal Pain     Pt Jonny Isabel  c/o abdominal with intermittent vomitting, "all day".   Equal Chest Rise and fall, respirations even and unlabored, Patient appears to be in NAD, GCS 15 VSS.        HPI  64-year-old male with history liver disease and ascites, on weekly paracentesis, last paracentesis 4 days ago, presents brought in by self through triage for 1 day of diffuse abdominal pain and several episodes of nonbilious nonbloody non coffee ground emesis over the last several hours.  Denies any other systemic or infectious symptoms or other acute complaints.  Review of patient's allergies indicates:  No Known Allergies  Past Medical History:   Diagnosis Date    A-fib     Esophageal varices 5/8/2023    Other ascites 5/8/2023     Past Surgical History:   Procedure Laterality Date    ESOPHAGOGASTRODUODENOSCOPY N/A 1/17/2023    Procedure: EGD (ESOPHAGOGASTRODUODENOSCOPY);  Surgeon: Dewayne Navas MD;  Location: [facility] ([address] [place] [address]);  Service: Endoscopy;  Laterality: N/A;    ESOPHAGOGASTRODUODENOSCOPY N/A 2/7/2024    Procedure: EGD (ESOPHAGOGASTRODUODENOSCOPY);  Surgeon: Nathan Goins MD;  Location: [facility] ([address] [place] [address]);  Service: Endoscopy;  Laterality: N/A;  referral: Dr. Escalante /cirrhosis - labs- possible banding / prep ins on portal / 2nd floor - Pt c/o SOB at times./ Xarelto - message sent to clearance nurse per protocol - ERW  1/31/24- LVM for precall - ERW  2/1-precall complet-Kpvt  ok to hold Xarelto2 da     No family history on file.  Social History     Tobacco Use    Smoking status: Every Day     Current packs/day: 1.00     Average packs/day: 1.5 packs/day for 51.0 years (74.0 ttl pk-yrs)     Types: Cigarettes     Start date: 1974    Smokeless tobacco: Never    Tobacco comments:     Enrolled in the [employer] on 9/16/15 ([employer] Member ID # [id number]) Pt is a 1 to 1.5 pk/day cigarette smoker x 51 yrs. He states " ready to quit. Ambulatory referral to Smoking Cessation clinic.   Substance Use Topics    Alcohol use: Not Currently     Alcohol/week: 6.0 standard drinks of alcohol     Types: 6 Cans of beer per week    Drug use: Not Currently   Medical surgical family social history reviewed  Review of Systems  Complete review of systems was conducted and was negative except as per HPI and as marked  Physical Exam     Initial Vitals   BP Pulse Resp Temp SpO2   12/16/24 0301 12/16/24 0301 12/16/24 0301 12/16/24 0307 12/16/24 0301   (!) 156/84 84 16 97.9 °F (36.6 °C) 100 %      MAP       --                Physical Exam  Physical  General: Awake, alert, no acute distress  Head: Normocephalic, atraumatic  Neck: Trachea midline, full range of motion, no meningismus  ENT: Atraumatic, Airway Patent  Cardio: Regular Rate, Regular Rhythm, Heart Sounds Normal, Capillary refill normal  Chest: Atraumatic, No respiratory distress no use of accessory muscles to breath, normal rate, sounds even and clear to auscultation  Abdomen: Soft, abdomen distended but not tense, soft and partially reducible umbilical hernia, overall nontender, no involuntary guarding, rigidity, or rebound.  Upper Ext: Atraumatic, Inspection normal, no swelling  Lower Ext: Atraumatic, Inspection normal, no swelling  Neuro: No gross cranial nerve abnormality, no lateralization, no gross sensory or motor deficits  ED Course   Procedures  Labs Reviewed   CBC W/ AUTO DIFFERENTIAL - Abnormal       Result Value    WBC 12.15      RBC 4.30 (*)     Hemoglobin 12.0 (*)     Hematocrit 36.4 (*)     MCV 85      MCH 27.9      MCHC 33.0      RDW 14.5      Platelets 195      MPV 10.7      Immature Granulocytes 0.4      Gran # (ANC) 10.3 (*)     Immature Grans (Abs) 0.05 (*)     Lymph # 1.1      Mono # 0.5      Eos # 0.1      Baso # 0.11      nRBC 0      Gran % 85.2 (*)     Lymph % 8.6 (*)     Mono % 4.4      Eosinophil % 0.5      Basophil % 0.9      Differential Method Automated      COMPREHENSIVE METABOLIC PANEL - Abnormal    Sodium 137      Potassium 4.6      Chloride 107      CO2 20 (*)     Glucose 165 (*)     BUN 18      Creatinine 1.8 (*)     Calcium 8.4 (*)     Total Protein 6.9      Albumin 3.2 (*)     Total Bilirubin 0.3      Alkaline Phosphatase 80      AST 21      ALT 14      eGFR 42 (*)     Anion Gap 10     C-REACTIVE PROTEIN - Abnormal    CRP 11.8 (*)    LIPASE    Lipase 28     URINALYSIS, REFLEX TO URINE CULTURE   APTT   PROTIME-INR   TYPE & SCREEN          Imaging Results              X-Ray Chest AP Portable (In process)                      CT Abdomen Pelvis With IV Contrast NO Oral Contrast (Final result)  Result time 12/16/24 05:39:20      Final result by Olu Merlos MD (12/16/24 05:39:20)                   Impression:      Cirrhotic appearance of the liver.  Moderate volume of ascites with hyperdense material on the left side of the hemiabdomen suggesting blood products in the ascites, similar to prior.  Overall volume of ascites is decreased slightly.    Mild partial small bowel obstruction secondary to ventral hernia containing ascites and mildly distended loop of bowel.    Additional findings as above.      Electronically signed by: Olu Merlos MD  Date:    12/16/2024  Time:    05:39               Narrative:    EXAMINATION:  CT ABDOMEN PELVIS WITH IV CONTRAST    CLINICAL HISTORY:  Abdominal pain, acute, nonlocalized;    TECHNIQUE:  Low dose axial images, sagittal and coronal reformations were obtained from the lung bases to the pubic symphysis following the IV administration of 100 mL of Omnipaque 350 .  Oral contrast was not administered.    COMPARISON:  08/19/2024.    FINDINGS:  Abdomen:    - Lower thorax:Unremarkable.    - Lung bases: No infiltrates and no nodules.    - Liver: Cirrhotic appearance of the liver, similar to prior.    - Gallbladder: Cholelithiasis.    - Bile Ducts: No evidence of intra or extra hepatic biliary ductal dilation.    - Spleen:  Similar to prior.    - Kidneys: No mass or hydronephrosis.    - Adrenals: Unremarkable.    - Pancreas: No mass or peripancreatic fat stranding.    - Retroperitoneum:  No significant adenopathy.    - Vascular: No abdominal aortic aneurysm.  Atherosclerotic calcifications similar to prior.    - Abdominal wall:  Moderate-sized ventral hernia containing ascites and mildly distended bowel loops.    Pelvis:    No pelvic mass or adenopathy.  Moderate volume of ascites throughout the abdomen and pelvis, decreased from prior, with heterogeneous hyper dense material present on the left side, similar to prior, possibly representing blood products.  Omental stranding similar to prior.    Bowel/Mesentery:    Mildly distended small bowel loops seen in the mid abdomen prior to entering the hernia suggesting a partial small bowel obstruction.  Sigmoid diverticulosis, similar to prior.  Normal appendix.    Bones:  Degenerative changes similar to prior.                                       Medications   HYDROmorphone injection 0.5 mg (0.5 mg Intravenous Given 12/16/24 0313)   ondansetron injection 8 mg (8 mg Intravenous Given 12/16/24 0315)   iohexoL (OMNIPAQUE 350) injection 100 mL (100 mLs Intravenous Given 12/16/24 0403)   benzocaine 20 % oral spray (1 each Mouth/Throat Given 12/16/24 0611)     Medical Decision Making              64-year-old male with history of cirrhosis and ascites presents for abdominal pain and vomiting.  He is hemodynamically stable but in obvious discomfort and repeatedly retching.  DX includes a broad range of intra-abdominal processes.  The exam is not largely consistent with SBP.  He is afebrile.  Will evaluate for acute pathology requiring intervention with appropriate studies, treat symptomatically, and reassess.    CT ultimately resulted showing bowel obstruction in the site of the ventral hernia.  On repeat assessment the exam of the hernias overall more consistent with a incarceration, in that it  is now much more firm and non reducible..  General surgery was consulted upon this read, they agree with the NG tube placement and will be coming to the ED quickly in order to attempt to reduction, versus possible operative intervention.  Patient is on Xarelto, which would complicate this, patient has also had higher surgical risk given liver disease.    Patient to be admitted for to medical service for additional management.  All elements of care discussed with the admitting team will be continuing care.    I assisted nursing with NG tube placement, including confirming tube placement radiographically.  It was put to low intermittent suction with immediate output of clear contents.  Further pain meds and Protonix ordered.                      Clinical Impression:  Final diagnoses:  [K46.0] Incarcerated hernia  [Z76.89] Encounter for assessment for small bowel obstruciton  [K56.609] Intestinal obstruction, unspecified cause, unspecified whether partial or complete (Primary)  [R11.10] Vomiting, unspecified vomiting type, unspecified whether nausea present          ED Disposition Condition    Admit Stable                Brent Wolf MD  12/16/24 7087       Brent Wolf MD  12/16/24 2606

## 2024-12-16 NOTE — ED NOTES
Xray at bedside with Dr. Wolf for NG tube placement verification Per Dr. Wolf NG tube to Left Nostril is intact okay to put NG tube on intermittent suction.

## 2024-12-16 NOTE — PHARMACY MED REC
"[facility]           Pharmacy  Admission Medication History     The home medication history was taken by Sylvia Vera.      Medication history obtained from Medications listed below were obtained from: Patient/family    Based on information gathered for medication list, you may go to "Admission" then "Reconcile Home Medications" tabs to review and/or act upon those items.     The home medication list has been updated by the Pharmacy department.   Please read ALL comments highlighted in yellow.   Please address this information as you see fit.    Feel free to contact us if you have any questions or require assistance.        No current facility-administered medications on file prior to encounter.     Current Outpatient Medications on File Prior to Encounter   Medication Sig Dispense Refill    ADVAIR DISKUS 250-50 mcg/dose diskus inhaler Inhale 1 puff into the lungs 2 (two) times a day. Controller 180 each 3    alirocumab (PRALUENT PEN) 75 mg/mL PnIj Inject 75 mg into the skin every 14 (fourteen) days.      amiodarone (PACERONE) 200 MG Tab Take 200 mg by mouth once daily.      folic acid (FOLVITE) 1 MG tablet Take 1,000 mcg by mouth once daily.      levothyroxine (SYNTHROID) 50 MCG tablet TAKE 1 TABLET BY MOUTH IN THE MORNING WITH A FULL GLASS OF WATER 30-60 MINUTES BEFORE OTHER MEDICATIONS AND FOOD (Patient taking differently: Take 50 mcg by mouth before breakfast. WITH A FULL GLASS OF WATER 30-60 MINUTES BEFORE OTHER MEDICATIONS AND FOOD) 90 tablet 3    metoprolol tartrate (LOPRESSOR) 100 MG tablet Take 100 mg by mouth once daily.      omega-3 acid ethyl esters (LOVAZA) 1 gram capsule Take 1 capsule by mouth 2 (two) times daily.      pantoprazole (PROTONIX) 40 MG tablet Take 1 tablet (40 mg total) by mouth 2 (two) times daily. 180 tablet 3    promethazine (PHENERGAN) 12.5 MG Tab Take 12.5 mg by mouth every 6 (six) hours as needed (nausea).      traZODone (DESYREL) 50 MG tablet Take 50 mg by " mouth nightly as needed.      VITAMIN B COMPLEX ORAL Take 1 tablet by mouth once daily.      XARELTO 20 mg Tab Take 20 mg by mouth once daily.      thiamine 100 MG tablet Take 100 mg by mouth once daily.         Please address this information as you see fit.  Feel free to contact us if you have any questions or require assistance.    Sylvia Vera  [phone]                  .

## 2024-12-16 NOTE — SUBJECTIVE & OBJECTIVE
No current facility-administered medications on file prior to encounter.     Current Outpatient Medications on File Prior to Encounter   Medication Sig    ADVAIR DISKUS 250-50 mcg/dose diskus inhaler Inhale 1 puff into the lungs 2 (two) times a day. Controller    amiodarone (PACERONE) 200 MG Tab Take 1 tablet (200 mg total) by mouth once daily.    levothyroxine (SYNTHROID) 50 MCG tablet TAKE 1 TABLET BY MOUTH IN THE MORNING WITH A FULL GLASS OF WATER 30-60 MINUTES BEFORE OTHER MEDICATIONS AND FOOD (Patient taking differently: Take 50 mcg by mouth before breakfast. WITH A FULL GLASS OF WATER 30-60 MINUTES BEFORE OTHER MEDICATIONS AND FOOD)    metoprolol tartrate (LOPRESSOR) 100 MG tablet Take 100 mg by mouth once daily.    omega-3 acid ethyl esters (LOVAZA) 1 gram capsule Take 1 capsule by mouth 2 (two) times daily.    pantoprazole (PROTONIX) 40 MG tablet Take 1 tablet (40 mg total) by mouth 2 (two) times daily.    promethazine (PHENERGAN) 12.5 MG Tab Take 12.5 mg by mouth every 6 (six) hours as needed (nausea).    thiamine 100 MG tablet Take 100 mg by mouth once daily.    traZODone (DESYREL) 50 MG tablet Take 50 mg by mouth nightly as needed.    VITAMIN B COMPLEX ORAL Take 1 tablet by mouth once daily.    XARELTO 20 mg Tab Take 20 mg by mouth once daily.       Review of patient's allergies indicates:  No Known Allergies    Past Medical History:   Diagnosis Date    A-fib     Esophageal varices 5/8/2023    Other ascites 5/8/2023     Past Surgical History:   Procedure Laterality Date    ESOPHAGOGASTRODUODENOSCOPY N/A 1/17/2023    Procedure: EGD (ESOPHAGOGASTRODUODENOSCOPY);  Surgeon: Dewayne Navas MD;  Location: [address]);  Service: Endoscopy;  Laterality: N/A;    ESOPHAGOGASTRODUODENOSCOPY N/A 2/7/2024    Procedure: EGD (ESOPHAGOGASTRODUODENOSCOPY);  Surgeon: Nathan Goins MD;  Location: [facility] ([address] [place] [address]);  Service: Endoscopy;  Laterality: N/A;  referral: Dr. Escalante /cirrhosis - labs- possible  banding / prep ins on portal / 2nd floor - Pt c/o SOB at times./ Xarelto - message sent to clearance nurse per protocol - ERW  1/31/24- LVM for precall - ERW  2/1-precall complet-Kpvt  ok to hold Xarelto2 da     Family History    None       Tobacco Use    Smoking status: Every Day     Current packs/day: 1.00     Average packs/day: 1.5 packs/day for 51.0 years (74.0 ttl pk-yrs)     Types: Cigarettes     Start date: 1974    Smokeless tobacco: Never    Tobacco comments:     Enrolled in the [employer] on 9/16/15 ([employer] Member ID # [id number]) Pt is a 1 to 1.5 pk/day cigarette smoker x 51 yrs. He states ready to quit. Ambulatory referral to Smoking Cessation clinic.   Substance and Sexual Activity    Alcohol use: Not Currently     Alcohol/week: 6.0 standard drinks of alcohol     Types: 6 Cans of beer per week    Drug use: Not Currently    Sexual activity: Yes     Partners: Female     Review of Systems   Constitutional:  Negative for chills and fever.   HENT:  Negative for trouble swallowing and voice change.    Eyes:  Negative for visual disturbance.   Respiratory:  Negative for chest tightness and shortness of breath.    Cardiovascular:  Negative for chest pain and palpitations.   Gastrointestinal:  Positive for abdominal pain, nausea and vomiting.   Endocrine: Negative.    Genitourinary:  Negative for flank pain.   Musculoskeletal: Negative.    Skin: Negative.    Neurological: Negative.    Hematological: Negative.    Psychiatric/Behavioral: Negative.       Objective:     Vital Signs (Most Recent):  Temp: 97.9 °F (36.6 °C) (12/16/24 0307)  Pulse: 62 (12/16/24 0746)  Resp: 16 (12/16/24 0746)  BP: (!) 167/76 (12/16/24 0746)  SpO2: 96 % (12/16/24 0751) Vital Signs (24h Range):  Temp:  [97.9 °F (36.6 °C)] 97.9 °F (36.6 °C)  Pulse:  [62-84] 62  Resp:  [14-17] 16  SpO2:  [96 %-100 %] 96 %  BP: (156-167)/(76-84) 167/76     Weight: 86.2 kg (190 lb)  Body mass index is 28.06 kg/m².     Physical Exam  Vitals reviewed.    Constitutional:       General: He is not in acute distress.     Appearance: Normal appearance. He is not toxic-appearing.   HENT:      Head: Normocephalic and atraumatic.      Nose: Nose normal.      Comments: NGT to LIWS  Cardiovascular:      Rate and Rhythm: Normal rate.      Pulses: Normal pulses.   Pulmonary:      Effort: Pulmonary effort is normal. No respiratory distress.   Abdominal:      General: Abdomen is flat.      Palpations: Abdomen is soft.      Comments: Small bowel containing ventral hernia with ascites, unable to reduce at bedside; no new skin changes or ulceration. Voluntary guarding and tenderness to the hernia when attempting to reduce   Skin:     General: Skin is warm.   Neurological:      General: No focal deficit present.      Mental Status: He is alert and oriented to person, place, and time.   Psychiatric:         Mood and Affect: Mood normal.         Behavior: Behavior normal.            I have reviewed all pertinent lab results within the past 24 hours.  CBC:   Recent Labs   Lab 12/16/24  0315   WBC 12.15   RBC 4.30*   HGB 12.0*   HCT 36.4*      MCV 85   MCH 27.9   MCHC 33.0     CMP:   Recent Labs   Lab 12/16/24  0315   *   CALCIUM 8.4*   ALBUMIN 3.2*   PROT 6.9      K 4.6   CO2 20*      BUN 18   CREATININE 1.8*   ALKPHOS 80   ALT 14   AST 21   BILITOT 0.3       Significant Diagnostics:  I have reviewed all pertinent imaging results/findings within the past 24 hours.    CT Abdomen Pelvis With IV Contrast NO Oral Contrast  12/16/2024    Cirrhotic appearance of the liver.  Moderate volume of ascites with hyperdense material on the left side of the hemiabdomen suggesting blood products in the ascites, similar to prior.  Overall volume of ascites is decreased slightly. Mild partial small bowel obstruction secondary to ventral hernia containing ascites and mildly distended loop of bowel.

## 2024-12-16 NOTE — ED NOTES
Patient laying down in bed, towel on his eyes, chest rising and falling, states pain is better at this time, it comes and goes, denies any needs at this time, connected to the monitor, call light and urinal at bedside, NG tube on low medium intermittent suction, patient in no acute distress at this time.

## 2024-12-16 NOTE — ASSESSMENT & PLAN NOTE
64M with afib (on xarelto), CKDIIIb, decompensated EtOH cirrhosis (Child-De Jesus class B), and ventral hernia who presents with ~12 hours of abdominal pain and inability to reduce his known ventral hernia associated with N/V. Has passed flatus since presenting to the hospital. Last BM was just prior to presentation. Lab work is largely unremarkable. He has a moderate amount of ascites on his CT scan. Recommend paracentesis to help reduce the pressure within his abdomen. I was unable to reduce his hernia on exam. It is possible that he will require surgical intervention. He is a high risk surgical patient (~ 30% chance of therese-op mortality after abdominal surgery, also on xarelto) who is at a very high risk of post operative complications as it pertains to his liver. Given the patients level of liver dysfunction, the patient would very likely need hepatology services, which [facility] does not have. Recommend transfer to a higher level of care for hepatology services. Will follow closely while awaiting transfer. If his clinical status changes will remain available for intervention.     Patient discussed with Dr. Torres and Dr. Rothman

## 2024-12-16 NOTE — ED NOTES
Patient laying down in bed, eyes closed, towel on his eyes, chest raising and falling, NG tube on intermittent medium suction, 200 ml output, greenish fluid, Patient's Bp cuff lose, readjusted, patient's wife at bedside, updated the plan of care, no acute distress noticed, denies needs at this time.

## 2024-12-17 ENCOUNTER — ANESTHESIA EVENT (OUTPATIENT)
Dept: SURGERY | Facility: HOSPITAL | Age: 64
End: 2024-12-17
Payer: MEDICAID

## 2024-12-17 ENCOUNTER — ANESTHESIA (OUTPATIENT)
Dept: SURGERY | Facility: HOSPITAL | Age: 64
End: 2024-12-17
Payer: MEDICAID

## 2024-12-17 ENCOUNTER — HOSPITAL ENCOUNTER (INPATIENT)
Facility: HOSPITAL | Age: 64
LOS: 5 days | Discharge: HOME | DRG: 354 | End: 2024-12-23
Attending: EMERGENCY MEDICINE | Admitting: STUDENT IN AN ORGANIZED HEALTH CARE EDUCATION/TRAINING PROGRAM
Payer: MEDICAID

## 2024-12-17 ENCOUNTER — TELEPHONE (OUTPATIENT)
Dept: HEPATOLOGY | Facility: CLINIC | Age: 64
End: 2024-12-17
Payer: MEDICAID

## 2024-12-17 ENCOUNTER — PATIENT MESSAGE (OUTPATIENT)
Dept: TRANSPLANT | Facility: CLINIC | Age: 64
End: 2024-12-17
Payer: MEDICAID

## 2024-12-17 VITALS
SYSTOLIC BLOOD PRESSURE: 164 MMHG | OXYGEN SATURATION: 96 % | HEART RATE: 60 BPM | TEMPERATURE: 98 F | DIASTOLIC BLOOD PRESSURE: 74 MMHG | BODY MASS INDEX: 28.06 KG/M2 | RESPIRATION RATE: 28 BRPM | WEIGHT: 190 LBS

## 2024-12-17 DIAGNOSIS — Z72.0 TOBACCO USE: ICD-10-CM

## 2024-12-17 DIAGNOSIS — K56.609 SMALL BOWEL OBSTRUCTION: Primary | ICD-10-CM

## 2024-12-17 DIAGNOSIS — K43.6 STRANGULATED HERNIA OF ABDOMINAL WALL: ICD-10-CM

## 2024-12-17 DIAGNOSIS — K74.60 HEPATIC CIRRHOSIS, UNSPECIFIED HEPATIC CIRRHOSIS TYPE, UNSPECIFIED WHETHER ASCITES PRESENT: Primary | ICD-10-CM

## 2024-12-17 PROBLEM — I10 HTN (HYPERTENSION): Status: ACTIVE | Noted: 2023-06-05

## 2024-12-17 PROBLEM — K70.31 ASCITES DUE TO ALCOHOLIC CIRRHOSIS: Status: ACTIVE | Noted: 2023-05-08

## 2024-12-17 LAB
ALBUMIN FLD-MCNC: 1.8 G/DL
ALBUMIN SERPL BCP-MCNC: 3 G/DL (ref 3.5–5.2)
ALP SERPL-CCNC: 83 U/L (ref 40–150)
ALT SERPL W/O P-5'-P-CCNC: 12 U/L (ref 10–44)
ANION GAP SERPL CALC-SCNC: 10 MMOL/L (ref 8–16)
APPEARANCE FLD: NORMAL
AST SERPL-CCNC: 15 U/L (ref 10–40)
BASOPHILS # BLD AUTO: 0.03 K/UL (ref 0–0.2)
BASOPHILS NFR BLD: 0.2 % (ref 0–1.9)
BILIRUB SERPL-MCNC: 0.5 MG/DL (ref 0.1–1)
BODY FLD TYPE: NORMAL
BODY FLUID SOURCE, LDH: NORMAL
BUN SERPL-MCNC: 18 MG/DL (ref 8–23)
CALCIUM SERPL-MCNC: 8.6 MG/DL (ref 8.7–10.5)
CHLORIDE SERPL-SCNC: 106 MMOL/L (ref 95–110)
CO2 SERPL-SCNC: 22 MMOL/L (ref 23–29)
COLOR FLD: YELLOW
CREAT SERPL-MCNC: 1.5 MG/DL (ref 0.5–1.4)
DIFFERENTIAL METHOD BLD: ABNORMAL
EOSINOPHIL # BLD AUTO: 0 K/UL (ref 0–0.5)
EOSINOPHIL NFR BLD: 0.1 % (ref 0–8)
ERYTHROCYTE [DISTWIDTH] IN BLOOD BY AUTOMATED COUNT: 14.5 % (ref 11.5–14.5)
EST. GFR  (NO RACE VARIABLE): 52 ML/MIN/1.73 M^2
GLUCOSE SERPL-MCNC: 146 MG/DL (ref 70–110)
GRAM STN SPEC: NORMAL
GRAM STN SPEC: NORMAL
HCT VFR BLD AUTO: 38 % (ref 40–54)
HGB BLD-MCNC: 12.4 G/DL (ref 14–18)
IMM GRANULOCYTES # BLD AUTO: 0.03 K/UL (ref 0–0.04)
IMM GRANULOCYTES NFR BLD AUTO: 0.2 % (ref 0–0.5)
LDH FLD L TO P-CCNC: 105 U/L
LYMPHOCYTES # BLD AUTO: 0.8 K/UL (ref 1–4.8)
LYMPHOCYTES NFR BLD: 6.1 % (ref 18–48)
LYMPHOCYTES NFR FLD MANUAL: 85 %
MCH RBC QN AUTO: 27.7 PG (ref 27–31)
MCHC RBC AUTO-ENTMCNC: 32.6 G/DL (ref 32–36)
MCV RBC AUTO: 85 FL (ref 82–98)
MESOTHL CELL NFR FLD MANUAL: 2 %
MONOCYTES # BLD AUTO: 0.9 K/UL (ref 0.3–1)
MONOCYTES NFR BLD: 6.7 % (ref 4–15)
MONOS+MACROS NFR FLD MANUAL: 2 %
NEUTROPHILS # BLD AUTO: 11.1 K/UL (ref 1.8–7.7)
NEUTROPHILS NFR BLD: 86.7 % (ref 38–73)
NEUTROPHILS NFR FLD MANUAL: 11 %
NRBC BLD-RTO: 0 /100 WBC
PLATELET # BLD AUTO: 198 K/UL (ref 150–450)
PMV BLD AUTO: 10.3 FL (ref 9.2–12.9)
POTASSIUM SERPL-SCNC: 4.3 MMOL/L (ref 3.5–5.1)
PROT FLD-MCNC: 3.3 G/DL
PROT SERPL-MCNC: 6.8 G/DL (ref 6–8.4)
RBC # BLD AUTO: 4.47 M/UL (ref 4.6–6.2)
SODIUM SERPL-SCNC: 138 MMOL/L (ref 136–145)
SPECIMEN SOURCE: NORMAL
SPECIMEN SOURCE: NORMAL
WBC # BLD AUTO: 12.85 K/UL (ref 3.9–12.7)
WBC # FLD: 437 /CU MM

## 2024-12-17 PROCEDURE — 89051 BODY FLUID CELL COUNT: CPT | Mod: NTX | Performed by: HOSPITALIST

## 2024-12-17 PROCEDURE — 63600175 PHARM REV CODE 636 W HCPCS: Mod: NTX | Performed by: STUDENT IN AN ORGANIZED HEALTH CARE EDUCATION/TRAINING PROGRAM

## 2024-12-17 PROCEDURE — 0W9G3ZZ DRAINAGE OF PERITONEAL CAVITY, PERCUTANEOUS APPROACH: ICD-10-PCS | Performed by: RADIOLOGY

## 2024-12-17 PROCEDURE — 82962 GLUCOSE BLOOD TEST: CPT | Mod: NTX | Performed by: SURGERY

## 2024-12-17 PROCEDURE — C1781 MESH (IMPLANTABLE): HCPCS | Mod: NTX | Performed by: SURGERY

## 2024-12-17 PROCEDURE — 85025 COMPLETE CBC W/AUTO DIFF WBC: CPT | Mod: NTX | Performed by: STUDENT IN AN ORGANIZED HEALTH CARE EDUCATION/TRAINING PROGRAM

## 2024-12-17 PROCEDURE — 37000009 HC ANESTHESIA EA ADD 15 MINS: Mod: NTX | Performed by: SURGERY

## 2024-12-17 PROCEDURE — 63600175 PHARM REV CODE 636 W HCPCS: Mod: NTX | Performed by: NURSE ANESTHETIST, CERTIFIED REGISTERED

## 2024-12-17 PROCEDURE — 71000015 HC POSTOP RECOV 1ST HR: Mod: NTX | Performed by: SURGERY

## 2024-12-17 PROCEDURE — 71000033 HC RECOVERY, INTIAL HOUR: Mod: NTX | Performed by: SURGERY

## 2024-12-17 PROCEDURE — 87075 CULTR BACTERIA EXCEPT BLOOD: CPT | Mod: NTX | Performed by: HOSPITALIST

## 2024-12-17 PROCEDURE — P9045 ALBUMIN (HUMAN), 5%, 250 ML: HCPCS | Mod: JZ,JG,NTX | Performed by: NURSE ANESTHETIST, CERTIFIED REGISTERED

## 2024-12-17 PROCEDURE — 27201423 OPTIME MED/SURG SUP & DEVICES STERILE SUPPLY: Mod: NTX | Performed by: SURGERY

## 2024-12-17 PROCEDURE — 87205 SMEAR GRAM STAIN: CPT | Mod: NTX | Performed by: HOSPITALIST

## 2024-12-17 PROCEDURE — 87070 CULTURE OTHR SPECIMN AEROBIC: CPT | Mod: NTX | Performed by: HOSPITALIST

## 2024-12-17 PROCEDURE — 83615 LACTATE (LD) (LDH) ENZYME: CPT | Mod: NTX | Performed by: HOSPITALIST

## 2024-12-17 PROCEDURE — 25000003 PHARM REV CODE 250: Mod: NTX

## 2024-12-17 PROCEDURE — 63600175 PHARM REV CODE 636 W HCPCS: Mod: NTX | Performed by: RADIOLOGY

## 2024-12-17 PROCEDURE — 36000707: Mod: NTX | Performed by: SURGERY

## 2024-12-17 PROCEDURE — 63600175 PHARM REV CODE 636 W HCPCS: Mod: NTX | Performed by: EMERGENCY MEDICINE

## 2024-12-17 PROCEDURE — 82042 OTHER SOURCE ALBUMIN QUAN EA: CPT | Mod: NTX | Performed by: HOSPITALIST

## 2024-12-17 PROCEDURE — 84157 ASSAY OF PROTEIN OTHER: CPT | Mod: NTX | Performed by: HOSPITALIST

## 2024-12-17 PROCEDURE — 49596 RPR AA HRN 1ST > 10 NCR/STRN: CPT | Mod: ,,, | Performed by: SURGERY

## 2024-12-17 PROCEDURE — 25000003 PHARM REV CODE 250: Mod: NTX | Performed by: NURSE ANESTHETIST, CERTIFIED REGISTERED

## 2024-12-17 PROCEDURE — 71000016 HC POSTOP RECOV ADDL HR: Mod: NTX | Performed by: SURGERY

## 2024-12-17 PROCEDURE — 25000003 PHARM REV CODE 250: Mod: NTX | Performed by: STUDENT IN AN ORGANIZED HEALTH CARE EDUCATION/TRAINING PROGRAM

## 2024-12-17 PROCEDURE — 80053 COMPREHEN METABOLIC PANEL: CPT | Mod: NTX | Performed by: STUDENT IN AN ORGANIZED HEALTH CARE EDUCATION/TRAINING PROGRAM

## 2024-12-17 PROCEDURE — 37000008 HC ANESTHESIA 1ST 15 MINUTES: Mod: NTX | Performed by: SURGERY

## 2024-12-17 PROCEDURE — 99223 1ST HOSP IP/OBS HIGH 75: CPT | Mod: 57,,, | Performed by: SURGERY

## 2024-12-17 PROCEDURE — 96374 THER/PROPH/DIAG INJ IV PUSH: CPT

## 2024-12-17 PROCEDURE — 36000706: Mod: NTX | Performed by: SURGERY

## 2024-12-17 PROCEDURE — 63600175 PHARM REV CODE 636 W HCPCS: Mod: NTX

## 2024-12-17 PROCEDURE — 99285 EMERGENCY DEPT VISIT HI MDM: CPT | Mod: 25

## 2024-12-17 DEVICE — IMPLANTABLE DEVICE: Type: IMPLANTABLE DEVICE | Site: ABDOMEN | Status: FUNCTIONAL

## 2024-12-17 RX ORDER — PANTOPRAZOLE SODIUM 40 MG/10ML
40 INJECTION, POWDER, LYOPHILIZED, FOR SOLUTION INTRAVENOUS DAILY
Status: DISCONTINUED | OUTPATIENT
Start: 2024-12-17 | End: 2024-12-17 | Stop reason: HOSPADM

## 2024-12-17 RX ORDER — MORPHINE SULFATE 4 MG/ML
8 INJECTION, SOLUTION INTRAMUSCULAR; INTRAVENOUS
Status: COMPLETED | OUTPATIENT
Start: 2024-12-17 | End: 2024-12-17

## 2024-12-17 RX ORDER — SODIUM CHLORIDE 9 MG/ML
1000 INJECTION, SOLUTION INTRAVENOUS
Status: DISCONTINUED | OUTPATIENT
Start: 2024-12-17 | End: 2024-12-17

## 2024-12-17 RX ORDER — ROCURONIUM BROMIDE 10 MG/ML
INJECTION, SOLUTION INTRAVENOUS
Status: DISCONTINUED | OUTPATIENT
Start: 2024-12-17 | End: 2024-12-18

## 2024-12-17 RX ORDER — FENTANYL CITRATE 50 UG/ML
INJECTION, SOLUTION INTRAMUSCULAR; INTRAVENOUS
Status: DISCONTINUED | OUTPATIENT
Start: 2024-12-17 | End: 2024-12-18

## 2024-12-17 RX ORDER — KETAMINE HCL IN 0.9 % NACL 50 MG/5 ML
SYRINGE (ML) INTRAVENOUS
Status: DISCONTINUED | OUTPATIENT
Start: 2024-12-17 | End: 2024-12-18

## 2024-12-17 RX ORDER — HYDROMORPHONE HYDROCHLORIDE 1 MG/ML
0.5 INJECTION, SOLUTION INTRAMUSCULAR; INTRAVENOUS; SUBCUTANEOUS
Status: DISCONTINUED | OUTPATIENT
Start: 2024-12-17 | End: 2024-12-17 | Stop reason: HOSPADM

## 2024-12-17 RX ORDER — ONDANSETRON HYDROCHLORIDE 2 MG/ML
4 INJECTION, SOLUTION INTRAVENOUS ONCE
Status: COMPLETED | OUTPATIENT
Start: 2024-12-17 | End: 2024-12-17

## 2024-12-17 RX ORDER — LEVOTHYROXINE SODIUM 20 UG/ML
35 INJECTION, SOLUTION INTRAVENOUS DAILY
Status: DISCONTINUED | OUTPATIENT
Start: 2024-12-17 | End: 2024-12-17

## 2024-12-17 RX ORDER — LEVOTHYROXINE SODIUM 50 UG/1
50 TABLET ORAL
Status: DISCONTINUED | OUTPATIENT
Start: 2024-12-17 | End: 2024-12-17

## 2024-12-17 RX ORDER — CEFTRIAXONE 1 G/1
1 INJECTION, POWDER, FOR SOLUTION INTRAMUSCULAR; INTRAVENOUS
Status: DISCONTINUED | OUTPATIENT
Start: 2024-12-17 | End: 2024-12-17 | Stop reason: HOSPADM

## 2024-12-17 RX ORDER — CEFAZOLIN 2 G/1
INJECTION, POWDER, FOR SOLUTION INTRAMUSCULAR; INTRAVENOUS
Status: DISCONTINUED | OUTPATIENT
Start: 2024-12-17 | End: 2024-12-18

## 2024-12-17 RX ORDER — DEXMEDETOMIDINE HYDROCHLORIDE 100 UG/ML
INJECTION, SOLUTION INTRAVENOUS
Status: DISCONTINUED | OUTPATIENT
Start: 2024-12-17 | End: 2024-12-18

## 2024-12-17 RX ORDER — EPHEDRINE SULFATE 50 MG/ML
INJECTION, SOLUTION INTRAVENOUS
Status: DISCONTINUED | OUTPATIENT
Start: 2024-12-17 | End: 2024-12-18

## 2024-12-17 RX ORDER — AMIODARONE HYDROCHLORIDE 200 MG/1
200 TABLET ORAL ONCE
Status: COMPLETED | OUTPATIENT
Start: 2024-12-17 | End: 2024-12-17

## 2024-12-17 RX ORDER — LIDOCAINE HYDROCHLORIDE 20 MG/ML
INJECTION INTRAVENOUS
Status: DISCONTINUED | OUTPATIENT
Start: 2024-12-17 | End: 2024-12-18

## 2024-12-17 RX ORDER — PHENYLEPHRINE HYDROCHLORIDE 10 MG/ML
INJECTION INTRAVENOUS
Status: DISCONTINUED | OUTPATIENT
Start: 2024-12-17 | End: 2024-12-18

## 2024-12-17 RX ORDER — ONDANSETRON HYDROCHLORIDE 2 MG/ML
8 INJECTION, SOLUTION INTRAVENOUS ONCE
Status: DISCONTINUED | OUTPATIENT
Start: 2024-12-17 | End: 2024-12-17

## 2024-12-17 RX ORDER — PROPOFOL 10 MG/ML
VIAL (ML) INTRAVENOUS
Status: DISCONTINUED | OUTPATIENT
Start: 2024-12-17 | End: 2024-12-18

## 2024-12-17 RX ORDER — THIAMINE HCL 100 MG
100 TABLET ORAL ONCE
Status: COMPLETED | OUTPATIENT
Start: 2024-12-17 | End: 2024-12-17

## 2024-12-17 RX ORDER — LIDOCAINE HYDROCHLORIDE 10 MG/ML
INJECTION, SOLUTION INFILTRATION; PERINEURAL
Status: COMPLETED | OUTPATIENT
Start: 2024-12-17 | End: 2024-12-17

## 2024-12-17 RX ORDER — HYDROMORPHONE HYDROCHLORIDE 1 MG/ML
0.5 INJECTION, SOLUTION INTRAMUSCULAR; INTRAVENOUS; SUBCUTANEOUS
Status: COMPLETED | OUTPATIENT
Start: 2024-12-17 | End: 2024-12-17

## 2024-12-17 RX ORDER — SUCCINYLCHOLINE CHLORIDE 20 MG/ML
INJECTION INTRAMUSCULAR; INTRAVENOUS
Status: DISCONTINUED | OUTPATIENT
Start: 2024-12-17 | End: 2024-12-18

## 2024-12-17 RX ORDER — METOPROLOL TARTRATE 1 MG/ML
5 INJECTION, SOLUTION INTRAVENOUS EVERY 6 HOURS
Status: DISCONTINUED | OUTPATIENT
Start: 2024-12-17 | End: 2024-12-17 | Stop reason: HOSPADM

## 2024-12-17 RX ORDER — FLUTICASONE FUROATE AND VILANTEROL 200; 25 UG/1; UG/1
1 POWDER RESPIRATORY (INHALATION) DAILY
Status: DISCONTINUED | OUTPATIENT
Start: 2024-12-18 | End: 2024-12-17 | Stop reason: HOSPADM

## 2024-12-17 RX ORDER — IBUPROFEN 200 MG
1 TABLET ORAL DAILY
Status: DISCONTINUED | OUTPATIENT
Start: 2024-12-17 | End: 2024-12-17 | Stop reason: HOSPADM

## 2024-12-17 RX ORDER — METOPROLOL TARTRATE 50 MG/1
100 TABLET ORAL ONCE
Status: COMPLETED | OUTPATIENT
Start: 2024-12-17 | End: 2024-12-17

## 2024-12-17 RX ORDER — ALBUMIN HUMAN 50 G/1000ML
SOLUTION INTRAVENOUS
Status: DISCONTINUED | OUTPATIENT
Start: 2024-12-17 | End: 2024-12-18

## 2024-12-17 RX ORDER — SODIUM CHLORIDE 9 MG/ML
1000 INJECTION, SOLUTION INTRAVENOUS CONTINUOUS
Status: ACTIVE | OUTPATIENT
Start: 2024-12-17 | End: 2024-12-17

## 2024-12-17 RX ORDER — ONDANSETRON HYDROCHLORIDE 2 MG/ML
INJECTION, SOLUTION INTRAVENOUS
Status: DISCONTINUED | OUTPATIENT
Start: 2024-12-17 | End: 2024-12-18

## 2024-12-17 RX ORDER — ONDANSETRON HYDROCHLORIDE 2 MG/ML
8 INJECTION, SOLUTION INTRAVENOUS ONCE
Status: COMPLETED | OUTPATIENT
Start: 2024-12-17 | End: 2024-12-17

## 2024-12-17 RX ORDER — MORPHINE SULFATE 4 MG/ML
4 INJECTION, SOLUTION INTRAMUSCULAR; INTRAVENOUS ONCE
Status: COMPLETED | OUTPATIENT
Start: 2024-12-17 | End: 2024-12-17

## 2024-12-17 RX ADMIN — MORPHINE SULFATE 4 MG: 4 INJECTION INTRAVENOUS at 06:12

## 2024-12-17 RX ADMIN — HYDROMORPHONE HYDROCHLORIDE 0.5 MG: 1 INJECTION, SOLUTION INTRAMUSCULAR; INTRAVENOUS; SUBCUTANEOUS at 10:12

## 2024-12-17 RX ADMIN — LEVOTHYROXINE SODIUM 50 MCG: 0.05 TABLET ORAL at 03:12

## 2024-12-17 RX ADMIN — MORPHINE SULFATE 8 MG: 4 INJECTION INTRAVENOUS at 09:12

## 2024-12-17 RX ADMIN — SODIUM CHLORIDE: 0.9 INJECTION, SOLUTION INTRAVENOUS at 11:12

## 2024-12-17 RX ADMIN — CEFTRIAXONE SODIUM 1 G: 1 INJECTION, POWDER, FOR SOLUTION INTRAMUSCULAR; INTRAVENOUS at 05:12

## 2024-12-17 RX ADMIN — ONDANSETRON 4 MG: 2 INJECTION INTRAMUSCULAR; INTRAVENOUS at 03:12

## 2024-12-17 RX ADMIN — HYDROMORPHONE HYDROCHLORIDE 0.5 MG: 1 INJECTION, SOLUTION INTRAMUSCULAR; INTRAVENOUS; SUBCUTANEOUS at 04:12

## 2024-12-17 RX ADMIN — GLYCOPYRROLATE 0.2 MG: 0.2 INJECTION, SOLUTION INTRAMUSCULAR; INTRAVENOUS at 11:12

## 2024-12-17 RX ADMIN — AMIODARONE HYDROCHLORIDE 200 MG: 200 TABLET ORAL at 04:12

## 2024-12-17 RX ADMIN — ROCURONIUM BROMIDE 50 MG: 10 INJECTION, SOLUTION INTRAVENOUS at 11:12

## 2024-12-17 RX ADMIN — METOROPROLOL TARTRATE 5 MG: 5 INJECTION, SOLUTION INTRAVENOUS at 06:12

## 2024-12-17 RX ADMIN — ONDANSETRON 8 MG: 2 INJECTION INTRAMUSCULAR; INTRAVENOUS at 04:12

## 2024-12-17 RX ADMIN — HYDROMORPHONE HYDROCHLORIDE 0.5 MG: 1 INJECTION, SOLUTION INTRAMUSCULAR; INTRAVENOUS; SUBCUTANEOUS at 01:12

## 2024-12-17 RX ADMIN — ONDANSETRON 4 MG: 2 INJECTION INTRAMUSCULAR; INTRAVENOUS at 11:12

## 2024-12-17 RX ADMIN — HYDROMORPHONE HYDROCHLORIDE 0.5 MG: 1 INJECTION, SOLUTION INTRAMUSCULAR; INTRAVENOUS; SUBCUTANEOUS at 03:12

## 2024-12-17 RX ADMIN — EPHEDRINE SULFATE 10 MG: 50 INJECTION INTRAVENOUS at 11:12

## 2024-12-17 RX ADMIN — HYDROMORPHONE HYDROCHLORIDE 0.5 MG: 1 INJECTION, SOLUTION INTRAMUSCULAR; INTRAVENOUS; SUBCUTANEOUS at 05:12

## 2024-12-17 RX ADMIN — DEXMEDETOMIDINE 8 MCG: 200 INJECTION, SOLUTION INTRAVENOUS at 11:12

## 2024-12-17 RX ADMIN — HYDROMORPHONE HYDROCHLORIDE 1 MG: 1 INJECTION, SOLUTION INTRAMUSCULAR; INTRAVENOUS; SUBCUTANEOUS at 01:12

## 2024-12-17 RX ADMIN — Medication 100 MG: at 03:12

## 2024-12-17 RX ADMIN — ROCURONIUM BROMIDE 10 MG: 10 INJECTION, SOLUTION INTRAVENOUS at 11:12

## 2024-12-17 RX ADMIN — SUCCINYLCHOLINE 160 MG: 20 INJECTION, SOLUTION INTRAMUSCULAR; INTRAVENOUS at 11:12

## 2024-12-17 RX ADMIN — ALBUMIN (HUMAN) 500 ML: 12.5 SOLUTION INTRAVENOUS at 11:12

## 2024-12-17 RX ADMIN — PHENYLEPHRINE HYDROCHLORIDE 100 MCG: 10 INJECTION INTRAVENOUS at 11:12

## 2024-12-17 RX ADMIN — PROPOFOL 150 MG: 10 INJECTION, EMULSION INTRAVENOUS at 11:12

## 2024-12-17 RX ADMIN — CEFAZOLIN 2 G: 2 INJECTION, POWDER, FOR SOLUTION INTRAMUSCULAR; INTRAVENOUS at 11:12

## 2024-12-17 RX ADMIN — SODIUM CHLORIDE 1000 ML: 9 INJECTION, SOLUTION INTRAVENOUS at 05:12

## 2024-12-17 RX ADMIN — HYDROMORPHONE HYDROCHLORIDE 0.5 MG: 1 INJECTION, SOLUTION INTRAMUSCULAR; INTRAVENOUS; SUBCUTANEOUS at 08:12

## 2024-12-17 RX ADMIN — LIDOCAINE HYDROCHLORIDE 5 ML: 10 INJECTION, SOLUTION INFILTRATION; PERINEURAL at 04:12

## 2024-12-17 RX ADMIN — FENTANYL CITRATE 100 MCG: 50 INJECTION, SOLUTION INTRAMUSCULAR; INTRAVENOUS at 11:12

## 2024-12-17 RX ADMIN — SODIUM CHLORIDE, POTASSIUM CHLORIDE, SODIUM LACTATE AND CALCIUM CHLORIDE 500 ML: 600; 310; 30; 20 INJECTION, SOLUTION INTRAVENOUS at 03:12

## 2024-12-17 RX ADMIN — METOPROLOL TARTRATE 100 MG: 50 TABLET, FILM COATED ORAL at 03:12

## 2024-12-17 RX ADMIN — OCTREOTIDE ACETATE 50 MCG/HR: 500 INJECTION, SOLUTION INTRAVENOUS; SUBCUTANEOUS at 06:12

## 2024-12-17 RX ADMIN — PANTOPRAZOLE SODIUM 40 MG: 40 INJECTION, POWDER, FOR SOLUTION INTRAVENOUS at 08:12

## 2024-12-17 RX ADMIN — Medication 30 MG: at 11:12

## 2024-12-17 RX ADMIN — LIDOCAINE HYDROCHLORIDE 100 MG: 20 INJECTION INTRAVENOUS at 11:12

## 2024-12-17 NOTE — ED NOTES
Pt appears asleep on stretcher. No needs identified at this time. VSS. Side rails up x2. Call bell within reach.

## 2024-12-17 NOTE — PROGRESS NOTES
[facility] Emergency Dept  [facility] Medicine  Consult Note    Patient Name: Jonny Isabel  MRN: [id number]  Patient Class: IP- Inpatient   Admission Date: 12/16/2024  Length of Stay: 1 days  Attending Physician: No att. providers found  Primary Care Provider: Yuli Costa MD        Subjective     Principal Problem:Ventral hernia      HPI  63 y/o M w/pmhx EtOH cirrhosis (states last drink 2yrs ago, w/MELD-Na score 15, hx of varicoele bleeding, DM2, COPD, afib s/p ablation (on Xarelto), hypothyroidism, HTN, CKD 3a, smoker. He follows w/Dr. Escalante, gets weekly paracenteses. Arrives w/abdominal pain/umbilical hernia which occurred suddenly. He notes vomiting (denies blood) and severe abd pain. States he has not passed gas in 24hrs.   He denies fevers, chest pain, SOB, headache.      Review of Systems   Constitutional:  Negative for fever.   Respiratory:  Negative for cough and shortness of breath.    Cardiovascular:  Negative for chest pain and palpitations.   Gastrointestinal:  Positive for abdominal distention, abdominal pain, nausea and vomiting. Negative for constipation and diarrhea.   Genitourinary:  Negative for dysuria.   Neurological:  Negative for dizziness.   Psychiatric/Behavioral:  Negative for hallucinations.      Objective:     Vital Signs (Most Recent):  Temp: 97.9 °F (36.6 °C) (12/16/24 0307)  Pulse: 82 (12/17/24 1401)  Resp: (!) 26 (12/17/24 1533)  BP: (!) 190/84 (12/17/24 1401)  SpO2: (!) 91 % (12/17/24 1401) Vital Signs (24h Range):  Pulse:  [] 82  Resp:  [13-33] 26  SpO2:  [89 %-97 %] 91 %  BP: (100-193)/(56-88) 190/84     Weight: 86.2 kg (190 lb)  Body mass index is 28.06 kg/m².    Intake/Output Summary (Last 24 hours) at 12/17/2024 1622  Last data filed at 12/17/2024 1422  Gross per 24 hour   Intake --   Output 500 ml   Net -500 ml         Physical Exam  Constitutional:       Appearance: Normal appearance. He is obese.   HENT:      Head: Normocephalic and atraumatic.   Eyes:      Extraocular  Movements: Extraocular movements intact.      Conjunctiva/sclera: Conjunctivae normal.      Pupils: Pupils are equal, round, and reactive to light.   Cardiovascular:      Rate and Rhythm: Normal rate and regular rhythm.      Pulses: Normal pulses.      Heart sounds: Normal heart sounds. No murmur heard.  Pulmonary:      Effort: Pulmonary effort is normal. No respiratory distress.      Breath sounds: Normal breath sounds. No stridor. No wheezing, rhonchi or rales.   Abdominal:      General: There is distension.      Tenderness: There is abdominal tenderness.      Hernia: A hernia is present.      Comments: Umbilical hernia, tender, erythematous  Ascites/distended abd  Absent bowel sounds to RLQ   Neurological:      General: No focal deficit present.      Mental Status: He is alert and oriented to person, place, and time.      Comments: Neg asterixis    Psychiatric:         Mood and Affect: Mood normal.         Behavior: Behavior normal.         Thought Content: Thought content normal.         Judgment: Judgment normal.             Significant Labs: All pertinent labs within the past 24 hours have been reviewed.  CBC:   Recent Labs   Lab 12/16/24 0315 12/17/24  0533   WBC 12.15 12.85*   HGB 12.0* 12.4*   HCT 36.4* 38.0*    198     CMP:   Recent Labs   Lab 12/16/24  0315 12/17/24  0533    138   K 4.6 4.3    106   CO2 20* 22*   * 146*   BUN 18 18   CREATININE 1.8* 1.5*   CALCIUM 8.4* 8.6*   PROT 6.9 6.8   ALBUMIN 3.2* 3.0*   BILITOT 0.3 0.5   ALKPHOS 80 83   AST 21 15   ALT 14 12   ANIONGAP 10 10       Significant Imaging: I have reviewed all pertinent imaging results/findings within the past 24 hours.    Assessment and Plan     * Ventral hernia  Presents w/signs, symptoms of umbilical hernia for 24hrs   Nausea, vomiting (dark color in NG tank)   Obstipation 24hrs   Seen by Gen Surg recommend transfer to Main    CTA shows  Mild partial small bowel obstruction secondary to ventral hernia  containing ascites and mildly distended loop of bowel.    PLAN:  Continue decompression w/NG  Paracentesis to relieve pressure (performed)   Opioid pain control as needed   Zofran   Transfer to [facility] as soon as practicable, NPO    Hypothyroid  50mcg Synthroid at home    Unable to give IV per Pharmacy      HTN (hypertension)  Patient's blood pressure range in the last 24 hours was: BP  Min: 100/56  Max: 193/88.The patient's inpatient anti-hypertensive regimen is listed below:  Current Antihypertensives  metoprolol injection 5 mg, Every 6 hours, Intravenous    Plan  - BP is uncontrolled, will adjust as follows: metoprolol IV, monitor... patient unable to take PO      Ascites due to alcoholic cirrhosis  Hx of EtOH cirrhosis MELD-Na 15   Hernia surg deferred to [facility] for Hepatology consult   Denies EtOH last two years  Neg asterixis     CTA finds: Cirrhotic appearance of the liver.  Moderate volume of ascites with hyperdense material on the left side of the hemiabdomen suggesting blood products in the ascites, similar to prior.  Overall volume of ascites is decreased slightly.    Afebrile, but slight concern for SBP    PLAN:  IR paracentesis  Rocephin 1g qd  Octreotide 50mcg/hr  Paracentesis cytology        A-fib  Patient has paroxysmal (<7 days) atrial fibrillation. Patient is currently in sinus rhythm. [id number] Score: 2. The patients heart rate in the last 24 hours is as follows:  Pulse  Min: 66  Max: 103     Antiarrhythmics  , Daily, Oral  metoprolol injection 5 mg, Every 6 hours, Intravenous    Anticoagulants       Plan  - Replete lytes with a goal of K>4, Mg >2  - Patient is anticoagulated, see medications listed above.... Currently holding xarelto anticipating surgery  - Patient's afib is currently controlled on 200mg Amio PO... monitor, convert to IV if unable to take PO        VTE Risk Mitigation (From admission, onward)           Ordered     Place sequential compression device  Until discontinued          12/17/24 0438                    Discharge Planning   DALILA:      Code Status: Prior   Medical Readiness for Discharge Date:                            Antonio Washburn MD  Department of Hospital Medicine   [place] - Emergency Dept

## 2024-12-17 NOTE — SUBJECTIVE & OBJECTIVE
[facility]   CONSULT NOTE    HPI  65 y/o M w/pmhx EtOH cirrhosis (states last drink 2yrs ago, w/MELD-Na score 15, hx of varicoele bleeding, DM2, COPD, afib s/p ablation (on Xarelto), hypothyroidism, HTN, CKD 3a, smoker. He follows w/Dr. Escalante, gets weekly paracenteses. Arrives w/abdominal pain/umbilical hernia which occurred suddenly. He notes vomiting (denies blood) and severe abd pain. States he has not passed gas in 24hrs.   He denies fevers, chest pain, SOB, headache.      Review of Systems   Constitutional:  Negative for fever.   Respiratory:  Negative for cough and shortness of breath.    Cardiovascular:  Negative for chest pain and palpitations.   Gastrointestinal:  Positive for abdominal distention, abdominal pain, nausea and vomiting. Negative for constipation and diarrhea.   Genitourinary:  Negative for dysuria.   Neurological:  Negative for dizziness.   Psychiatric/Behavioral:  Negative for hallucinations.      Objective:     Vital Signs (Most Recent):  Temp: 97.9 °F (36.6 °C) (12/16/24 0307)  Pulse: 82 (12/17/24 1401)  Resp: (!) 26 (12/17/24 1533)  BP: (!) 190/84 (12/17/24 1401)  SpO2: (!) 91 % (12/17/24 1401) Vital Signs (24h Range):  Pulse:  [] 82  Resp:  [13-33] 26  SpO2:  [89 %-97 %] 91 %  BP: (100-193)/(56-88) 190/84     Weight: 86.2 kg (190 lb)  Body mass index is 28.06 kg/m².    Intake/Output Summary (Last 24 hours) at 12/17/2024 1622  Last data filed at 12/17/2024 1422  Gross per 24 hour   Intake --   Output 500 ml   Net -500 ml         Physical Exam  Constitutional:       Appearance: Normal appearance. He is obese.   HENT:      Head: Normocephalic and atraumatic.   Eyes:      Extraocular Movements: Extraocular movements intact.      Conjunctiva/sclera: Conjunctivae normal.      Pupils: Pupils are equal, round, and reactive to light.   Cardiovascular:      Rate and Rhythm: Normal rate and regular rhythm.      Pulses: Normal pulses.      Heart sounds: Normal heart sounds. No murmur  heard.  Pulmonary:      Effort: Pulmonary effort is normal. No respiratory distress.      Breath sounds: Normal breath sounds. No stridor. No wheezing, rhonchi or rales.   Abdominal:      General: There is distension.      Tenderness: There is abdominal tenderness.      Hernia: A hernia is present.      Comments: Umbilical hernia, tender, erythematous  Ascites/distended abd  Absent bowel sounds to RLQ   Neurological:      General: No focal deficit present.      Mental Status: He is alert and oriented to person, place, and time.      Comments: Neg asterixis    Psychiatric:         Mood and Affect: Mood normal.         Behavior: Behavior normal.         Thought Content: Thought content normal.         Judgment: Judgment normal.             Significant Labs: All pertinent labs within the past 24 hours have been reviewed.  CBC:   Recent Labs   Lab 12/16/24 0315 12/17/24  0533   WBC 12.15 12.85*   HGB 12.0* 12.4*   HCT 36.4* 38.0*    198     CMP:   Recent Labs   Lab 12/16/24 0315 12/17/24  0533    138   K 4.6 4.3    106   CO2 20* 22*   * 146*   BUN 18 18   CREATININE 1.8* 1.5*   CALCIUM 8.4* 8.6*   PROT 6.9 6.8   ALBUMIN 3.2* 3.0*   BILITOT 0.3 0.5   ALKPHOS 80 83   AST 21 15   ALT 14 12   ANIONGAP 10 10       Significant Imaging: I have reviewed all pertinent imaging results/findings within the past 24 hours.

## 2024-12-17 NOTE — TELEPHONE ENCOUNTER
----- Message from Lovely Escalante MD sent at 12/17/2024 12:27 PM CST -----  Orders are in  ----- Message -----  From: Ewelina Daniel MA  Sent: 12/17/2024  10:43 AM CST  To: Lovely Escalante MD    Please place lab orders, f/u appt scheduled.  ----- Message -----  From: Lovely Escalante MD  Sent: 12/13/2024   1:59 PM CST  To: [facility] Pre-Liver Transplant Clinical; #    1. No TIPS  2. NO transplant- Michael - put on for selection next week to decline  3. Weekly imani  4. Labs every 4 weeks- Ewelina please schedule  5. EGD 02/25  6. Nephrology consult  7. Return visit 3 months- Ewelina please schedule

## 2024-12-17 NOTE — ED NOTES
Pt C/O pain 10/10. PRN pain medication administered. Urinals emptied and pt repositioned. No additional needs identified. POC updated. VSS. Side rails up x2. Call bell within reach.

## 2024-12-17 NOTE — CONSULTS
Inpatient Radiology Pre-procedure Note    History of Present Illness:  Jonny Isabel is a 64 y.o. male who presents for paracentesis.  Admission H&P reviewed.  Past Medical History:   Diagnosis Date    A-fib     Esophageal varices 5/8/2023    Other ascites 5/8/2023     Past Surgical History:   Procedure Laterality Date    ESOPHAGOGASTRODUODENOSCOPY N/A 1/17/2023    Procedure: EGD (ESOPHAGOGASTRODUODENOSCOPY);  Surgeon: Dewayne Navas MD;  Location: [facility] (2ND FLR);  Service: Endoscopy;  Laterality: N/A;    ESOPHAGOGASTRODUODENOSCOPY N/A 2/7/2024    Procedure: EGD (ESOPHAGOGASTRODUODENOSCOPY);  Surgeon: Nathan Goins MD;  [facility]  Service: Endoscopy;  Laterality: N/A;  referral: Dr. Escalante /cirrhosis - labs- possible banding / prep ins on portal / 2nd floor - Pt c/o SOB at times./ Xarelto - message sent to clearance nurse per protocol - ERW  1/31/24- LVM for precall - ERW  2/1-precall complet-Kpvt  ok to hold Xarelto2 da       Review of Systems:   As documented in primary team H&P    Home Meds:   Prior to Admission medications    Medication Sig Start Date End Date Taking? Authorizing Provider   ADVAIR DISKUS 250-50 mcg/dose diskus inhaler Inhale 1 puff into the lungs 2 (two) times a day. Controller 3/20/24 3/20/25 Yes Joanne Ruiz NP   alirocumab (PRALUENT PEN) 75 mg/mL PnIj Inject 75 mg into the skin every 14 (fourteen) days.   Yes Provider, Historical   amiodarone (PACERONE) 200 MG Tab Take 200 mg by mouth once daily.   Yes Provider, Historical   folic acid (FOLVITE) 1 MG tablet Take 1,000 mcg by mouth once daily. 12/6/24  Yes Provider, Historical   levothyroxine (SYNTHROID) 50 MCG tablet TAKE 1 TABLET BY MOUTH IN THE MORNING WITH A FULL GLASS OF WATER 30-60 MINUTES BEFORE OTHER MEDICATIONS AND FOOD  Patient taking differently: Take 50 mcg by mouth before breakfast. WITH A FULL GLASS OF WATER 30-60 MINUTES BEFORE OTHER MEDICATIONS AND FOOD 12/12/23  Yes Ellen Escalera, NP    metoprolol tartrate (LOPRESSOR) 100 MG tablet Take 100 mg by mouth once daily. 7/16/24  Yes Provider, Historical   omega-3 acid ethyl esters (LOVAZA) 1 gram capsule Take 1 capsule by mouth 2 (two) times daily. 7/16/24  Yes Provider, Historical   pantoprazole (PROTONIX) 40 MG tablet Take 1 tablet (40 mg total) by mouth 2 (two) times daily. 8/5/24 8/5/25 Yes Shawn Mejia MD   promethazine (PHENERGAN) 12.5 MG Tab Take 12.5 mg by mouth every 6 (six) hours as needed (nausea). 5/6/24  Yes Provider, Historical   traZODone (DESYREL) 50 MG tablet Take 50 mg by mouth nightly as needed. 2/29/24  Yes Provider, Historical   VITAMIN B COMPLEX ORAL Take 1 tablet by mouth once daily.   Yes Provider, Historical   XARELTO 20 mg Tab Take 20 mg by mouth once daily. 1/9/23  Yes Provider, Historical   thiamine 100 MG tablet Take 100 mg by mouth once daily. 7/13/22   Provider, Historical     Scheduled Meds:    cefTRIAXone (Rocephin) IV (PEDS and ADULTS)  1 g Intravenous Q24H    lactated ringers  500 mL Intravenous Once    levothyroxine  50 mcg Oral Before breakfast    morphine  4 mg Intravenous Once    pantoprazole  40 mg Intravenous Daily     Continuous Infusions:    octreotide (SANDOSTATIN) 500 mcg in 0.9% NaCl 100 mL infusion  50 mcg/hr Intravenous Continuous         PRN Meds:  Current Facility-Administered Medications:     HYDROmorphone, 0.5 mg, Intravenous, Q2H PRN  Anticoagulants/Antiplatelets: no anticoagulation    Allergies: Review of patient's allergies indicates:  No Known Allergies  Sedation Hx: have not been any systemic reactions    Labs:  Recent Labs   Lab 12/16/24  0650   INR 1.1       Recent Labs   Lab 12/17/24  0533   WBC 12.85*   HGB 12.4*   HCT 38.0*   MCV 85         Recent Labs   Lab 12/17/24  0533   *      K 4.3      CO2 22*   BUN 18   CREATININE 1.5*   CALCIUM 8.6*   ALT 12   AST 15   ALBUMIN 3.0*   BILITOT 0.5         Vitals:  Temp: 97.9 °F (36.6 °C) (12/16/24 0307)  Pulse: 82  (12/17/24 1401)  Resp: (!) 26 (12/17/24 1533)  BP: (!) 190/84 (12/17/24 1401)  SpO2: (!) 91 % (12/17/24 1401)     Physical Exam:  ASA: 3  Mallampati: 2    General: no acute distress  Mental Status: alert and oriented to person, place and time  HEENT: normocephalic, atraumatic  Chest: unlabored breathing  Heart: regular heart rate  Abdomen: nondistended  Extremity: moves all extremities    Plan: US guided paracentesis  Sedation Plan: Local only    Bob Appiah M.D.  Interventional Radiology  Department of Radiology

## 2024-12-17 NOTE — ASSESSMENT & PLAN NOTE
Patient has paroxysmal (<7 days) atrial fibrillation. Patient is currently in sinus rhythm. [id number] Score: 2. The patients heart rate in the last 24 hours is as follows:  Pulse  Min: 66  Max: 103     Antiarrhythmics  , Daily, Oral  metoprolol injection 5 mg, Every 6 hours, Intravenous    Anticoagulants       Plan  - Replete lytes with a goal of K>4, Mg >2  - Patient is anticoagulated, see medications listed above.... Currently holding xarelto anticipating surgery  - Patient's afib is currently controlled on 200mg Amio PO... monitor, convert to IV if unable to take PO

## 2024-12-17 NOTE — ASSESSMENT & PLAN NOTE
Presents w/signs, symptoms of umbilical hernia for 24hrs   Nausea, vomiting (dark color in NG tank)   Obstipation 24hrs   Seen by Gen Surg recommend transfer to [facility]    CTA shows  Mild partial small bowel obstruction secondary to ventral hernia containing ascites and mildly distended loop of bowel.    PLAN:  Continue decompression w/NG  Paracentesis to relieve pressure (performed)   Opioid pain control as needed   Zofran   Transfer to [facility] as soon as practicable, NPO

## 2024-12-17 NOTE — ED NOTES
Team notified that pt is requesting additional pain medicine and would like to speak to the provider. Also notified primary the NG tube has not had much output.

## 2024-12-17 NOTE — ED NOTES
Pt appears asleep on stretcher. No needs identified at this time. VSS. Side rails up x2. Call bell within reach.  IVF maintained.

## 2024-12-17 NOTE — ED NOTES
Assumed care of pt. Pt appears asleep. No needs identified at this time. Side rails up x2. Call bell within reach.

## 2024-12-17 NOTE — PROCEDURES
Radiology Post-Procedure Note    Pre Op Diagnosis: Ascites    Post Op Diagnosis: Same    Procedure: US guided paracentesis.    Procedure performed by: Bob Appiah MD    Written Informed Consent Obtained: Yes  Specimen Removed: YES Serous fluid  Estimated Blood Loss: Minimal    Findings:   Successful LLQ paracentesis.  Albumin administered per protocol. No complications.    Patient tolerated procedure well.    Bob Appiah M.D.  Diagnostic and Interventional Radiologist  Department of Radiology  Pager: [phone]

## 2024-12-17 NOTE — ED NOTES
Pt continuing to complain about pain. Pt also with 1 episode of emesis ~100cc post admin of oral meds and IVP zofran.

## 2024-12-17 NOTE — SEDATION DOCUMENTATION
IR paracentesis completed, removed   3600     mL, sent specimens to lab as requested, clear dark yellow fluid from LLQ abd, applied bandaid to site, VS stable, room air, denies pain from procedure, transported pt back to ED, bedside report given to JUVENCIO Awad

## 2024-12-17 NOTE — ASSESSMENT & PLAN NOTE
Patient's blood pressure range in the last 24 hours was: BP  Min: 100/56  Max: 193/88.The patient's inpatient anti-hypertensive regimen is listed below:  Current Antihypertensives  metoprolol injection 5 mg, Every 6 hours, Intravenous    Plan  - BP is uncontrolled, will adjust as follows: metoprolol IV, monitor... patient unable to take PO     Ears: no ear pain and no hearing problems.Nose: no nasal congestion and no nasal drainage.Mouth/Throat: no dysphagia, no hoarseness and no throat pain.Neck: no lumps, no pain, no stiffness and no swollen glands.

## 2024-12-17 NOTE — ED NOTES
Pt appears resting on stretcher. Pt appears uncomfortable. Pain is mildly controlled, rating pain 7/10 post pain administration. VSS. Side rails up x2. Call bell within reach.

## 2024-12-17 NOTE — PROVIDER PROGRESS NOTES - EMERGENCY DEPT.
Encounter Date: 12/16/2024    ED Physician Progress Notes        Patient remains boarding in the ER pending bed assignment over a [facility] for hepatology at recommendation of General surgery.  Patient required further medications overnight which were provided as needed, and prn, continuous fluids, Protonix were ordered.  He was reassessed at about 530 in the morning, his abdomen exam is unchanged from yesterday, and he is otherwise hemodynamically stable, understands plan.      Formal consult was put in for hospitalist service for ongoing management as well.

## 2024-12-17 NOTE — ASSESSMENT & PLAN NOTE
Hx of EtOH cirrhosis MELD-Na 15   Hernia surg deferred to Main for Hepatology consult   Denies EtOH last two years  Neg asterixis     CTA finds: Cirrhotic appearance of the liver.  Moderate volume of ascites with hyperdense material on the left side of the hemiabdomen suggesting blood products in the ascites, similar to prior.  Overall volume of ascites is decreased slightly.    Afebrile, but slight concern for SBP    PLAN:  IR paracentesis  Rocephin 1g qd  Octreotide 50mcg/hr  Paracentesis cytology

## 2024-12-17 NOTE — ED NOTES
Update from transfer center. Transfer  center speaking to hepatology team for update on level of care.

## 2024-12-18 ENCOUNTER — DOCUMENTATION ONLY (OUTPATIENT)
Dept: TRANSPLANT | Facility: CLINIC | Age: 64
End: 2024-12-18
Payer: MEDICAID

## 2024-12-18 LAB
ANION GAP SERPL CALC-SCNC: 6 MMOL/L (ref 8–16)
BASOPHILS # BLD AUTO: 0.07 K/UL (ref 0–0.2)
BASOPHILS NFR BLD: 0.6 % (ref 0–1.9)
BUN SERPL-MCNC: 20 MG/DL (ref 8–23)
CALCIUM SERPL-MCNC: 7.4 MG/DL (ref 8.7–10.5)
CHLORIDE SERPL-SCNC: 113 MMOL/L (ref 95–110)
CO2 SERPL-SCNC: 21 MMOL/L (ref 23–29)
CREAT SERPL-MCNC: 1.5 MG/DL (ref 0.5–1.4)
DIFFERENTIAL METHOD BLD: ABNORMAL
EOSINOPHIL # BLD AUTO: 0 K/UL (ref 0–0.5)
EOSINOPHIL NFR BLD: 0.3 % (ref 0–8)
ERYTHROCYTE [DISTWIDTH] IN BLOOD BY AUTOMATED COUNT: 15.9 % (ref 11.5–14.5)
EST. GFR  (NO RACE VARIABLE): 52 ML/MIN/1.73 M^2
GLUCOSE SERPL-MCNC: 126 MG/DL (ref 70–110)
HCT VFR BLD AUTO: 37.6 % (ref 40–54)
HGB BLD-MCNC: 12.1 G/DL (ref 14–18)
IMM GRANULOCYTES # BLD AUTO: 0.04 K/UL (ref 0–0.04)
IMM GRANULOCYTES NFR BLD AUTO: 0.3 % (ref 0–0.5)
LYMPHOCYTES # BLD AUTO: 1.1 K/UL (ref 1–4.8)
LYMPHOCYTES NFR BLD: 9.1 % (ref 18–48)
MAGNESIUM SERPL-MCNC: 1.7 MG/DL (ref 1.6–2.6)
MCH RBC QN AUTO: 28.7 PG (ref 27–31)
MCHC RBC AUTO-ENTMCNC: 32.2 G/DL (ref 32–36)
MCV RBC AUTO: 89 FL (ref 82–98)
MONOCYTES # BLD AUTO: 0.9 K/UL (ref 0.3–1)
MONOCYTES NFR BLD: 7.6 % (ref 4–15)
NEUTROPHILS # BLD AUTO: 9.8 K/UL (ref 1.8–7.7)
NEUTROPHILS NFR BLD: 82.1 % (ref 38–73)
NRBC BLD-RTO: 0 /100 WBC
PHOSPHATE SERPL-MCNC: 3.3 MG/DL (ref 2.7–4.5)
PLATELET # BLD AUTO: 206 K/UL (ref 150–450)
PMV BLD AUTO: 10.9 FL (ref 9.2–12.9)
POCT GLUCOSE: 117 MG/DL (ref 70–110)
POCT GLUCOSE: 128 MG/DL (ref 70–110)
POTASSIUM SERPL-SCNC: 4.4 MMOL/L (ref 3.5–5.1)
RBC # BLD AUTO: 4.22 M/UL (ref 4.6–6.2)
SODIUM SERPL-SCNC: 140 MMOL/L (ref 136–145)
WBC # BLD AUTO: 11.88 K/UL (ref 3.9–12.7)

## 2024-12-18 PROCEDURE — 63600175 PHARM REV CODE 636 W HCPCS: Mod: JZ,JG,NTX

## 2024-12-18 PROCEDURE — 83735 ASSAY OF MAGNESIUM: CPT | Mod: NTX | Performed by: STUDENT IN AN ORGANIZED HEALTH CARE EDUCATION/TRAINING PROGRAM

## 2024-12-18 PROCEDURE — 85025 COMPLETE CBC W/AUTO DIFF WBC: CPT | Mod: NTX | Performed by: STUDENT IN AN ORGANIZED HEALTH CARE EDUCATION/TRAINING PROGRAM

## 2024-12-18 PROCEDURE — 63600175 PHARM REV CODE 636 W HCPCS: Mod: NTX | Performed by: STUDENT IN AN ORGANIZED HEALTH CARE EDUCATION/TRAINING PROGRAM

## 2024-12-18 PROCEDURE — P9047 ALBUMIN (HUMAN), 25%, 50ML: HCPCS | Mod: JZ,JG,NTX

## 2024-12-18 PROCEDURE — 80048 BASIC METABOLIC PNL TOTAL CA: CPT | Mod: NTX | Performed by: STUDENT IN AN ORGANIZED HEALTH CARE EDUCATION/TRAINING PROGRAM

## 2024-12-18 PROCEDURE — 25000003 PHARM REV CODE 250: Mod: NTX | Performed by: STUDENT IN AN ORGANIZED HEALTH CARE EDUCATION/TRAINING PROGRAM

## 2024-12-18 PROCEDURE — 84100 ASSAY OF PHOSPHORUS: CPT | Mod: NTX | Performed by: STUDENT IN AN ORGANIZED HEALTH CARE EDUCATION/TRAINING PROGRAM

## 2024-12-18 PROCEDURE — 97162 PT EVAL MOD COMPLEX 30 MIN: CPT | Mod: NTX

## 2024-12-18 PROCEDURE — 94799 UNLISTED PULMONARY SVC/PX: CPT | Mod: NTX

## 2024-12-18 PROCEDURE — 88302 TISSUE EXAM BY PATHOLOGIST: CPT | Mod: NTX | Performed by: PATHOLOGY

## 2024-12-18 PROCEDURE — 20600001 HC STEP DOWN PRIVATE ROOM: Mod: NTX

## 2024-12-18 PROCEDURE — 97530 THERAPEUTIC ACTIVITIES: CPT | Mod: NTX

## 2024-12-18 PROCEDURE — 94761 N-INVAS EAR/PLS OXIMETRY MLT: CPT | Mod: NTX

## 2024-12-18 PROCEDURE — 99900035 HC TECH TIME PER 15 MIN (STAT): Mod: NTX

## 2024-12-18 PROCEDURE — 27000221 HC OXYGEN, UP TO 24 HOURS: Mod: NTX

## 2024-12-18 PROCEDURE — 25000003 PHARM REV CODE 250: Mod: NTX | Performed by: NURSE ANESTHETIST, CERTIFIED REGISTERED

## 2024-12-18 PROCEDURE — 88305 TISSUE EXAM BY PATHOLOGIST: CPT | Mod: NTX | Performed by: PATHOLOGY

## 2024-12-18 PROCEDURE — 63600175 PHARM REV CODE 636 W HCPCS: Mod: NTX

## 2024-12-18 PROCEDURE — 96372 THER/PROPH/DIAG INJ SC/IM: CPT | Performed by: STUDENT IN AN ORGANIZED HEALTH CARE EDUCATION/TRAINING PROGRAM

## 2024-12-18 PROCEDURE — 25000003 PHARM REV CODE 250: Mod: NTX | Performed by: SURGERY

## 2024-12-18 PROCEDURE — 97166 OT EVAL MOD COMPLEX 45 MIN: CPT | Mod: NTX

## 2024-12-18 PROCEDURE — 97112 NEUROMUSCULAR REEDUCATION: CPT | Mod: NTX

## 2024-12-18 PROCEDURE — 25000003 PHARM REV CODE 250: Mod: NTX

## 2024-12-18 RX ORDER — OXYCODONE HYDROCHLORIDE 5 MG/1
5 TABLET ORAL ONCE
Status: COMPLETED | OUTPATIENT
Start: 2024-12-18 | End: 2024-12-18

## 2024-12-18 RX ORDER — PROCHLORPERAZINE EDISYLATE 5 MG/ML
5 INJECTION INTRAMUSCULAR; INTRAVENOUS EVERY 6 HOURS PRN
Status: DISCONTINUED | OUTPATIENT
Start: 2024-12-18 | End: 2024-12-21

## 2024-12-18 RX ORDER — HEPARIN SODIUM 5000 [USP'U]/ML
5000 INJECTION, SOLUTION INTRAVENOUS; SUBCUTANEOUS EVERY 12 HOURS
Status: DISCONTINUED | OUTPATIENT
Start: 2024-12-18 | End: 2024-12-22

## 2024-12-18 RX ORDER — HYDROMORPHONE HYDROCHLORIDE 1 MG/ML
0.2 INJECTION, SOLUTION INTRAMUSCULAR; INTRAVENOUS; SUBCUTANEOUS EVERY 5 MIN PRN
Status: COMPLETED | OUTPATIENT
Start: 2024-12-18 | End: 2024-12-18

## 2024-12-18 RX ORDER — ACETAMINOPHEN 500 MG
1000 TABLET ORAL EVERY 8 HOURS
Status: DISCONTINUED | OUTPATIENT
Start: 2024-12-19 | End: 2024-12-22

## 2024-12-18 RX ORDER — ONDANSETRON HYDROCHLORIDE 2 MG/ML
8 INJECTION, SOLUTION INTRAVENOUS EVERY 6 HOURS PRN
Status: DISCONTINUED | OUTPATIENT
Start: 2024-12-18 | End: 2024-12-21

## 2024-12-18 RX ORDER — HALOPERIDOL 5 MG/ML
0.5 INJECTION INTRAMUSCULAR EVERY 10 MIN PRN
Status: DISCONTINUED | OUTPATIENT
Start: 2024-12-18 | End: 2024-12-18 | Stop reason: HOSPADM

## 2024-12-18 RX ORDER — SODIUM CHLORIDE 0.9 % (FLUSH) 0.9 %
10 SYRINGE (ML) INJECTION
Status: DISCONTINUED | OUTPATIENT
Start: 2024-12-18 | End: 2024-12-23 | Stop reason: HOSPADM

## 2024-12-18 RX ORDER — SODIUM CHLORIDE 0.9 % (FLUSH) 0.9 %
10 SYRINGE (ML) INJECTION
Status: DISCONTINUED | OUTPATIENT
Start: 2024-12-18 | End: 2024-12-18 | Stop reason: HOSPADM

## 2024-12-18 RX ORDER — IBUPROFEN 200 MG
800 TABLET ORAL EVERY 8 HOURS
Status: DISCONTINUED | OUTPATIENT
Start: 2024-12-19 | End: 2024-12-18

## 2024-12-18 RX ORDER — METHOCARBAMOL 100 MG/ML
500 INJECTION, SOLUTION INTRAMUSCULAR; INTRAVENOUS EVERY 8 HOURS
Status: COMPLETED | OUTPATIENT
Start: 2024-12-18 | End: 2024-12-20

## 2024-12-18 RX ORDER — HYDROMORPHONE HYDROCHLORIDE 1 MG/ML
1 INJECTION, SOLUTION INTRAMUSCULAR; INTRAVENOUS; SUBCUTANEOUS EVERY 4 HOURS PRN
Status: DISCONTINUED | OUTPATIENT
Start: 2024-12-18 | End: 2024-12-19

## 2024-12-18 RX ORDER — ACETAMINOPHEN 10 MG/ML
1000 INJECTION, SOLUTION INTRAVENOUS EVERY 8 HOURS
Status: COMPLETED | OUTPATIENT
Start: 2024-12-18 | End: 2024-12-18

## 2024-12-18 RX ORDER — ALBUMIN HUMAN 250 G/1000ML
25 SOLUTION INTRAVENOUS ONCE
Status: COMPLETED | OUTPATIENT
Start: 2024-12-18 | End: 2024-12-18

## 2024-12-18 RX ORDER — BUPIVACAINE HYDROCHLORIDE AND EPINEPHRINE 2.5; 5 MG/ML; UG/ML
INJECTION, SOLUTION EPIDURAL; INFILTRATION; INTRACAUDAL; PERINEURAL
Status: DISCONTINUED | OUTPATIENT
Start: 2024-12-18 | End: 2024-12-18 | Stop reason: HOSPADM

## 2024-12-18 RX ORDER — HYDROMORPHONE HYDROCHLORIDE 1 MG/ML
0.5 INJECTION, SOLUTION INTRAMUSCULAR; INTRAVENOUS; SUBCUTANEOUS EVERY 4 HOURS PRN
Status: DISCONTINUED | OUTPATIENT
Start: 2024-12-18 | End: 2024-12-19

## 2024-12-18 RX ORDER — MUPIROCIN 20 MG/G
OINTMENT TOPICAL 2 TIMES DAILY
Status: COMPLETED | OUTPATIENT
Start: 2024-12-18 | End: 2024-12-22

## 2024-12-18 RX ORDER — GLUCAGON 1 MG
1 KIT INJECTION
Status: DISCONTINUED | OUTPATIENT
Start: 2024-12-18 | End: 2024-12-18 | Stop reason: HOSPADM

## 2024-12-18 RX ADMIN — EPHEDRINE SULFATE 10 MG: 50 INJECTION INTRAVENOUS at 12:12

## 2024-12-18 RX ADMIN — HYDROMORPHONE HYDROCHLORIDE 0.2 MG: 1 INJECTION, SOLUTION INTRAMUSCULAR; INTRAVENOUS; SUBCUTANEOUS at 08:12

## 2024-12-18 RX ADMIN — HYDROMORPHONE HYDROCHLORIDE 0.2 MG: 1 INJECTION, SOLUTION INTRAMUSCULAR; INTRAVENOUS; SUBCUTANEOUS at 01:12

## 2024-12-18 RX ADMIN — SUGAMMADEX 200 MG: 100 INJECTION, SOLUTION INTRAVENOUS at 01:12

## 2024-12-18 RX ADMIN — ACETAMINOPHEN 1000 MG: 10 INJECTION, SOLUTION INTRAVENOUS at 01:12

## 2024-12-18 RX ADMIN — METHOCARBAMOL 500 MG: 1000 INJECTION, SOLUTION INTRAMUSCULAR; INTRAVENOUS at 09:12

## 2024-12-18 RX ADMIN — HALOPERIDOL LACTATE 0.5 MG: 5 INJECTION, SOLUTION INTRAMUSCULAR at 02:12

## 2024-12-18 RX ADMIN — ALBUMIN (HUMAN) 25 G: 12.5 SOLUTION INTRAVENOUS at 02:12

## 2024-12-18 RX ADMIN — MUPIROCIN: 20 OINTMENT TOPICAL at 09:12

## 2024-12-18 RX ADMIN — HYDROMORPHONE HYDROCHLORIDE 1 MG: 1 INJECTION, SOLUTION INTRAMUSCULAR; INTRAVENOUS; SUBCUTANEOUS at 01:12

## 2024-12-18 RX ADMIN — SODIUM CHLORIDE: 0.9 INJECTION, SOLUTION INTRAVENOUS at 12:12

## 2024-12-18 RX ADMIN — HYDROMORPHONE HYDROCHLORIDE 0.2 MG: 1 INJECTION, SOLUTION INTRAMUSCULAR; INTRAVENOUS; SUBCUTANEOUS at 02:12

## 2024-12-18 RX ADMIN — MUPIROCIN: 20 OINTMENT TOPICAL at 08:12

## 2024-12-18 RX ADMIN — HEPARIN SODIUM 5000 UNITS: 5000 INJECTION INTRAVENOUS; SUBCUTANEOUS at 08:12

## 2024-12-18 RX ADMIN — METHOCARBAMOL 500 MG: 1000 INJECTION, SOLUTION INTRAMUSCULAR; INTRAVENOUS at 05:12

## 2024-12-18 RX ADMIN — METHOCARBAMOL 500 MG: 1000 INJECTION, SOLUTION INTRAMUSCULAR; INTRAVENOUS at 01:12

## 2024-12-18 RX ADMIN — HEPARIN SODIUM 5000 UNITS: 5000 INJECTION INTRAVENOUS; SUBCUTANEOUS at 09:12

## 2024-12-18 RX ADMIN — HYDROMORPHONE HYDROCHLORIDE 0.2 MG: 1 INJECTION, SOLUTION INTRAMUSCULAR; INTRAVENOUS; SUBCUTANEOUS at 09:12

## 2024-12-18 RX ADMIN — HYDROMORPHONE HYDROCHLORIDE 1 MG: 1 INJECTION, SOLUTION INTRAMUSCULAR; INTRAVENOUS; SUBCUTANEOUS at 09:12

## 2024-12-18 RX ADMIN — ACETAMINOPHEN 1000 MG: 10 INJECTION, SOLUTION INTRAVENOUS at 05:12

## 2024-12-18 RX ADMIN — HYDROMORPHONE HYDROCHLORIDE 1 MG: 1 INJECTION, SOLUTION INTRAMUSCULAR; INTRAVENOUS; SUBCUTANEOUS at 05:12

## 2024-12-18 RX ADMIN — OXYCODONE 5 MG: 5 TABLET ORAL at 10:12

## 2024-12-18 RX ADMIN — ACETAMINOPHEN 1000 MG: 10 INJECTION, SOLUTION INTRAVENOUS at 09:12

## 2024-12-18 NOTE — PLAN OF CARE
Problem: Occupational Therapy  Goal: Occupational Therapy Goal  Description: Goals to be met by: 1/15/25.     Patient will increase functional independence with ADLs by performing:    UE Dressing with Stand-by Assistance.  LE Dressing with Stand-by Assistance.  Grooming while standing at sink with Stand-by Assistance.  Toileting from toilet with Stand-by Assistance for hygiene and clothing management.   Toilet transfer to toilet with Stand-by Assistance.    Outcome: Progressing     OT evaluation completed and goals established.

## 2024-12-18 NOTE — ASSESSMENT & PLAN NOTE
Ventral heria  - CT a/p demonstrated partial SBO 2/2 ventral hernia containing ascites and mildly distended loop of bowel  - general surgery consulted  - taken to OR class A 12/17 for ex lap with small bowel resection and retrorectus hernia repair with mesh.   - NGT in place , advance diet per gen surg recs

## 2024-12-18 NOTE — SUBJECTIVE & OBJECTIVE
Interval History:   No acute events overnight.   POD 1 from exploratory laparotomy with small bowel resection and retrorectus hernia repair with mesh.   Doing well this AM. Sore, but pain improved compared to prior to surgery.   NGT with no output overnight.   LLQ drain with 330cc serosanguinous output. RLQ drain with 100cc output.     Medications:  Continuous Infusions:  Scheduled Meds:   acetaminophen  1,000 mg Intravenous Q8H    Followed by    [START ON 12/19/2024] acetaminophen  1,000 mg Oral Q8H    heparin (porcine)  5,000 Units Subcutaneous Q12H    methocarbamol injection  500 mg Intravenous Q8H    mupirocin   Nasal BID     PRN Meds:  Current Facility-Administered Medications:     dextrose 50%, 12.5 g, Intravenous, PRN    glucagon (human recombinant), 1 mg, Intramuscular, PRN    haloperidol lactate, 0.5 mg, Intravenous, Q10 Min PRN    HYDROmorphone, 0.5 mg, Intravenous, Q4H PRN    HYDROmorphone, 1 mg, Intravenous, Q4H PRN    ondansetron, 8 mg, Intravenous, Q6H PRN    prochlorperazine, 5 mg, Intravenous, Q6H PRN    sodium chloride 0.9%, 10 mL, Intra-Catheter, PRN    sodium chloride 0.9%, 10 mL, Intravenous, PRN     Review of patient's allergies indicates:  No Known Allergies  Objective:     Vital Signs (Most Recent):  Temp: 98.1 °F (36.7 °C) (12/18/24 0700)  Pulse: 77 (12/18/24 0900)  Resp: 14 (12/18/24 0900)  BP: 132/72 (12/18/24 0800)  SpO2: 98 % (12/18/24 0900) Vital Signs (24h Range):  Temp:  [97.3 °F (36.3 °C)-98.6 °F (37 °C)] 98.1 °F (36.7 °C)  Pulse:  [] 77  Resp:  [11-33] 14  SpO2:  [89 %-100 %] 98 %  BP: (100-190)/() 132/72     Weight: 86.2 kg (190 lb)  Body mass index is 28.06 kg/m².    Intake/Output - Last 3 Shifts         12/16 0700  12/17 0659 12/17 0700 12/18 0659 12/18 0700 12/19 0659    IV Piggyback  1400     Total Intake(mL/kg)  1400 (16.2)     Urine (mL/kg/hr)  225     Drains  430 155    Total Output  655 155    Net  +745 -155                    Physical Exam  Constitutional:        General: He is not in acute distress.  HENT:      Head: Normocephalic and atraumatic.      Nose:      Comments: NGT  Eyes:      Extraocular Movements: Extraocular movements intact.      Conjunctiva/sclera: Conjunctivae normal.      Pupils: Pupils are equal, round, and reactive to light.   Cardiovascular:      Rate and Rhythm: Normal rate and regular rhythm.   Pulmonary:      Effort: Pulmonary effort is normal. No respiratory distress.   Abdominal:      Palpations: Abdomen is soft.      Comments: Prevena vac over midline incision with seal intact   Appropriate tenderness  LLQ (intraabdominal) drain with serosanguinous output   RLQ (subcutaneous)  drain with serosanguinous output   Neurological:      Mental Status: He is alert.          Significant Labs:  I have reviewed all pertinent lab results within the past 24 hours.  CBC:   Recent Labs   Lab 12/18/24  0157   WBC 11.88   RBC 4.22*   HGB 12.1*   HCT 37.6*      MCV 89   MCH 28.7   MCHC 32.2     CMP:   Recent Labs   Lab 12/17/24  0533 12/18/24  0250   * 126*   CALCIUM 8.6* 7.4*   ALBUMIN 3.0*  --    PROT 6.8  --     140   K 4.3 4.4   CO2 22* 21*    113*   BUN 18 20   CREATININE 1.5* 1.5*   ALKPHOS 83  --    ALT 12  --    AST 15  --    BILITOT 0.5  --        Significant Diagnostics:  I have reviewed all pertinent imaging results/findings within the past 24 hours.

## 2024-12-18 NOTE — PROGRESS NOTES
General surgery resident to bedside to troubleshoot NG. Unable to flush NG as correct equipment unavailable. Pt came to hospital with NG from different facility. Will monitor.

## 2024-12-18 NOTE — HPI
Jonny Isabel is a 64 y.o. male with past medical history of AFL s/p RFA (2015), PAF (on Xarelto), tobacco abuse (1 ppd x 50 years), COPD, DM2, HTN, alcoholic cirrhosis with esophageal varices and ascites requiring weekly paracentesis, CKD 3, ventral hernia who presents as transfer from [facility] with nausea, vomiting and abd pain. While at OSH he underwent CT a/p which demonstrated partial SBO 2/2 ventral hernia containing ascites and mildly distended loop of bowel, and patient was transferred to [facility] for general surgery evaluation, where he was taken to OR class A for exploratory laparotomy with small bowel resection and retrorectus hernia repair with mesh. He then was transferred to hospital medicine service. Patient evaluated in pacu. He reports abdominal soreness which is improved from initial presentation. He has NGT in place and denies nausea / vomiting. Of note, patient last underwent paracentesis on 12/17 by IR during which 3600 cc fluid was removed which was negative for SBP.

## 2024-12-18 NOTE — ASSESSMENT & PLAN NOTE
64y M w/ PMHx of decompensated EtOH cirrhosis (ascites requiring recurrent weekly paracentesis), NID-T2DM, afib s/p ablation and DCCV on xarelto, COPD, CKD3 HTN transferred from OSH with concern for incarcerated ventral hernia. S/p exploratory laparotomy with small bowel resection and retrorectus hernia repair with mesh on 12/17/2024. Doing well post-operatively.    - NPO. NGT to LIWS.   - Possibly replace current NG tube that he arrived with from [place] with one that out staff are more familiar with as his NG does not seem to be draining   - Prevena vac in place  - LLQ drain (intraabdominal) empty and record output q3h.   - RLQ drain (retrorectus) record output as needed.   - MMPC   - Okay for DVT ppx from surgical standpoint   - Rest of care per primary. Please call General Surgery with any questions or concerns.

## 2024-12-18 NOTE — PROGRESS NOTES
[facility] ([facility])  General Surgery  Progress Note    Subjective:     History of Present Illness:  No notes on file    Post-Op Info:  Procedure(s) (LRB):  LAPAROTOMY, EXPLORATORY (N/A)  REPAIR, HERNIA, VENTRAL (N/A)   1 Day Post-Op     Interval History:   No acute events overnight.   POD 1 from exploratory laparotomy with small bowel resection and retrorectus hernia repair with mesh.   Doing well this AM. Sore, but pain improved compared to prior to surgery.   NGT with no output overnight.   LLQ drain with 330cc serosanguinous output. RLQ drain with 100cc output.     Medications:  Continuous Infusions:  Scheduled Meds:   acetaminophen  1,000 mg Intravenous Q8H    Followed by    [START ON 12/19/2024] acetaminophen  1,000 mg Oral Q8H    heparin (porcine)  5,000 Units Subcutaneous Q12H    methocarbamol injection  500 mg Intravenous Q8H    mupirocin   Nasal BID     PRN Meds:  Current Facility-Administered Medications:     dextrose 50%, 12.5 g, Intravenous, PRN    glucagon (human recombinant), 1 mg, Intramuscular, PRN    haloperidol lactate, 0.5 mg, Intravenous, Q10 Min PRN    HYDROmorphone, 0.5 mg, Intravenous, Q4H PRN    HYDROmorphone, 1 mg, Intravenous, Q4H PRN    ondansetron, 8 mg, Intravenous, Q6H PRN    prochlorperazine, 5 mg, Intravenous, Q6H PRN    sodium chloride 0.9%, 10 mL, Intra-Catheter, PRN    sodium chloride 0.9%, 10 mL, Intravenous, PRN     Review of patient's allergies indicates:  No Known Allergies  Objective:     Vital Signs (Most Recent):  Temp: 98.1 °F (36.7 °C) (12/18/24 0700)  Pulse: 77 (12/18/24 0900)  Resp: 14 (12/18/24 0900)  BP: 132/72 (12/18/24 0800)  SpO2: 98 % (12/18/24 0900) Vital Signs (24h Range):  Temp:  [97.3 °F (36.3 °C)-98.6 °F (37 °C)] 98.1 °F (36.7 °C)  Pulse:  [] 77  Resp:  [11-33] 14  SpO2:  [89 %-100 %] 98 %  BP: (100-190)/() 132/72     Weight: 86.2 kg (190 lb)  Body mass index is 28.06 kg/m².    Intake/Output - Last 3 Shifts         12/16 0700  12/17 0659  12/17 0700  12/18 0659 12/18 0700  12/19 0659    IV Piggyback  1400     Total Intake(mL/kg)  1400 (16.2)     Urine (mL/kg/hr)  225     Drains  430 155    Total Output  655 155    Net  +745 -155                    Physical Exam  Constitutional:       General: He is not in acute distress.  HENT:      Head: Normocephalic and atraumatic.      Nose:      Comments: NGT  Eyes:      Extraocular Movements: Extraocular movements intact.      Conjunctiva/sclera: Conjunctivae normal.      Pupils: Pupils are equal, round, and reactive to light.   Cardiovascular:      Rate and Rhythm: Normal rate and regular rhythm.   Pulmonary:      Effort: Pulmonary effort is normal. No respiratory distress.   Abdominal:      Palpations: Abdomen is soft.      Comments: Prevena vac over midline incision with seal intact   Appropriate tenderness  LLQ (intraabdominal) drain with serosanguinous output   RLQ (retrorectus)  drain with serosanguinous output   Neurological:      Mental Status: He is alert.          Significant Labs:  I have reviewed all pertinent lab results within the past 24 hours.  CBC:   Recent Labs   Lab 12/18/24  0157   WBC 11.88   RBC 4.22*   HGB 12.1*   HCT 37.6*      MCV 89   MCH 28.7   MCHC 32.2     CMP:   Recent Labs   Lab 12/17/24  0533 12/18/24  0250   * 126*   CALCIUM 8.6* 7.4*   ALBUMIN 3.0*  --    PROT 6.8  --     140   K 4.3 4.4   CO2 22* 21*    113*   BUN 18 20   CREATININE 1.5* 1.5*   ALKPHOS 83  --    ALT 12  --    AST 15  --    BILITOT 0.5  --        Significant Diagnostics:  I have reviewed all pertinent imaging results/findings within the past 24 hours.  Assessment/Plan:     Ventral hernia  64y M w/ PMHx of decompensated EtOH cirrhosis (ascites requiring recurrent weekly paracentesis), NID-T2DM, afib s/p ablation and DCCV on xarelto, COPD, CKD3 HTN transferred from OSH with concern for incarcerated ventral hernia. S/p exploratory laparotomy with small bowel resection and retrorectus  hernia repair with mesh on 12/17/2024. Doing well post-operatively.    - Hospital Medicine to take over as primary this morning.   - NPO. NGT to LIWS.   - Prevena vac in place  - LLQ drain (intraabdominal) empty and record output q3h.   - RLQ drain (retrorectus) record output as needed.   - MMPC   - Okay for DVT ppx from surgical standpoint   - Rest of care per primary. Please call General Surgery with any questions or concerns.         Mary Serrano MD  General Surgery  [facility] - Surgery (2nd Fl)

## 2024-12-18 NOTE — ED NOTES
Phone call from pt partner Adelita Bingham. Requesting call back when pt is placed in a room and seen. Phone number in chart.

## 2024-12-18 NOTE — ED PROVIDER NOTES
Chief Complaint   [facility] transfer  (SBO, NG tube in place. )      History Of Present Illness   Jonny Isabel is a 64 y.o. male presenting with small-bowel obstruction due to hernia, sent here for general surgery evaluation.  Patient has a complex medical history and transfer was delayed due to capacity issues until concern was patient would need immediate surgery.      Review of patient's allergies indicates:  No Known Allergies    Current Facility-Administered Medications on File Prior to Encounter   Medication Dose Route Frequency Provider Last Rate Last Admin    [] 0.9% NaCl infusion  1,000 mL Intravenous Continuous Brent Wolf MD   Stopped at 24 1744    [COMPLETED] amiodarone tablet 200 mg  200 mg Oral Once Antonio De La Cruz MD   200 mg at 24 1607    [COMPLETED] HYDROmorphone injection 0.5 mg  0.5 mg Intravenous ED 1 Time Brent Wolf MD   0.5 mg at 24 0441    [COMPLETED] HYDROmorphone injection 1 mg  1 mg Intravenous ED 1 Time Brent Wolf MD   1 mg at 24 0107    [COMPLETED] lactated ringers bolus 500 mL  500 mL Intravenous Once Antonio De La Cruz MD   Stopped at 24 1746    [COMPLETED] LIDOcaine HCL 10 mg/ml (1%) injection   Other PRN Bob Appiah MD   5 mL at 24 1637    [COMPLETED] metoprolol tartrate (LOPRESSOR) tablet 100 mg  100 mg Oral Once Antonio De La Cruz MD   100 mg at 24 1558    [COMPLETED] morphine injection 4 mg  4 mg Intravenous Once Antonio De La Cruz MD   4 mg at 24 1812    [COMPLETED] ondansetron injection 4 mg  4 mg Intravenous Once Antonio De La Cruz MD   4 mg at 24 1558    [COMPLETED] ondansetron injection 8 mg  8 mg Intravenous Once Brent Wolf MD   8 mg at 24 0441    [COMPLETED] thiamine tablet 100 mg  100 mg Oral Once Antonio De La Cruz MD   100 mg at 24 1558    [DISCONTINUED] 0.9% NaCl infusion  1,000 mL Intravenous ED 1 Time Brent Wolf MD         [DISCONTINUED] cefTRIAXone injection 1 g  1 g Intravenous Q24H Antonio De La Cruz MD   1 g at 12/17/24 1713    [DISCONTINUED] fluticasone furoate-vilanteroL 200-25 mcg/dose diskus inhaler 1 puff  1 puff Inhalation Daily Antonio De La Cruz MD        [DISCONTINUED] HYDROmorphone injection 0.5 mg  0.5 mg Intravenous Q2H PRN Brent Wolf MD   0.5 mg at 12/17/24 1718    [DISCONTINUED] levothyroxine injection 35 mcg  35 mcg Intravenous Daily Antonio De La Cruz MD        [DISCONTINUED] levothyroxine tablet 50 mcg  50 mcg Oral Before breakfast Antonio De La Cruz MD   50 mcg at 12/17/24 1558    [DISCONTINUED] metoprolol injection 5 mg  5 mg Intravenous Q6H Antonio De La Cruz MD   5 mg at 12/17/24 1812    [DISCONTINUED] nicotine 21 mg/24 hr 1 patch  1 patch Transdermal Daily Antonio De La Cruz MD        [DISCONTINUED] octreotide (SANDOSTATIN) 500 mcg in 0.9% NaCl 100 mL infusion  50 mcg/hr Intravenous Continuous Antonio De La Cruz MD 10 mL/hr at 12/17/24 1812 50 mcg/hr at 12/17/24 1812    [DISCONTINUED] ondansetron injection 8 mg  8 mg Intravenous Once Brent Wolf MD        [DISCONTINUED] pantoprazole injection 40 mg  40 mg Intravenous Daily Brent Wolf MD   40 mg at 12/17/24 0802     Current Outpatient Medications on File Prior to Encounter   Medication Sig Dispense Refill    ADVAIR DISKUS 250-50 mcg/dose diskus inhaler Inhale 1 puff into the lungs 2 (two) times a day. Controller 180 each 3    alirocumab (PRALUENT PEN) 75 mg/mL PnIj Inject 75 mg into the skin every 14 (fourteen) days.      amiodarone (PACERONE) 200 MG Tab Take 200 mg by mouth once daily.      folic acid (FOLVITE) 1 MG tablet Take 1,000 mcg by mouth once daily.      levothyroxine (SYNTHROID) 50 MCG tablet TAKE 1 TABLET BY MOUTH IN THE MORNING WITH A FULL GLASS OF WATER 30-60 MINUTES BEFORE OTHER MEDICATIONS AND FOOD (Patient taking differently: Take 50 mcg by mouth before breakfast. WITH A FULL GLASS OF  WATER 30-60 MINUTES BEFORE OTHER MEDICATIONS AND FOOD) 90 tablet 3    metoprolol tartrate (LOPRESSOR) 100 MG tablet Take 100 mg by mouth once daily.      omega-3 acid ethyl esters (LOVAZA) 1 gram capsule Take 1 capsule by mouth 2 (two) times daily.      pantoprazole (PROTONIX) 40 MG tablet Take 1 tablet (40 mg total) by mouth 2 (two) times daily. 180 tablet 3    promethazine (PHENERGAN) 12.5 MG Tab Take 12.5 mg by mouth every 6 (six) hours as needed (nausea).      thiamine 100 MG tablet Take 100 mg by mouth once daily.      traZODone (DESYREL) 50 MG tablet Take 50 mg by mouth nightly as needed.      VITAMIN B COMPLEX ORAL Take 1 tablet by mouth once daily.      XARELTO 20 mg Tab Take 20 mg by mouth once daily.         Past History   As per HPI and below:  Past Medical History:   Diagnosis Date    A-fib     Esophageal varices 5/8/2023    Other ascites 5/8/2023     Past Surgical History:   Procedure Laterality Date    ESOPHAGOGASTRODUODENOSCOPY N/A 1/17/2023    Procedure: EGD (ESOPHAGOGASTRODUODENOSCOPY);  Surgeon: Dewayne Navas MD;  Location: [address]);  Service: Endoscopy;  Laterality: N/A;    ESOPHAGOGASTRODUODENOSCOPY N/A 2/7/2024    Procedure: EGD (ESOPHAGOGASTRODUODENOSCOPY);  Surgeon: Nathan Goins MD;  Location: [address]);  Service: Endoscopy;  Laterality: N/A;  referral: Dr. Escalante /cirrhosis - labs- possible banding / prep ins on portal / 2nd floor - Pt c/o SOB at times./ Xarelto - message sent to clearance nurse per protocol - ERW  1/31/24- LVM for precall - ERW  2/1-precall complet-Kpvt  ok to hold Xarelto2 da       Social History     Tobacco Use    Smoking status: Every Day     Current packs/day: 1.00     Average packs/day: 1.5 packs/day for 51.0 years (74.0 ttl pk-yrs)     Types: Cigarettes     Start date: 1974    Smokeless tobacco: Never    Tobacco comments:     Enrolled in the [email or web address] on 9/16/15 ([employer] Member ID # [id number]) Pt is a 1 to 1.5 pk/day cigarette smoker x 51  yrs. He states ready to quit. Ambulatory referral to Smoking Cessation clinic.   Substance Use Topics    Alcohol use: Not Currently     Alcohol/week: 6.0 standard drinks of alcohol     Types: 6 Cans of beer per week    Drug use: Not Currently       No family history on file.    Physical Exam     Vitals:    12/17/24 2132 12/17/24 2142 12/17/24 2148 12/17/24 2202   BP: (!) 153/76  (!) 157/100 (!) 150/75   Pulse: (!) 56  (!) 58 (!) 59   Resp: (!) 29 18 (!) 33 12   Temp:    98.3 °F (36.8 °C)   TempSrc:    Oral   SpO2: 97%  97% (!) 92%   Weight:         Appearance: Uncomfortable.  Skin: No rashes seen.  Good turgor.  No abrasions.  No ecchymoses.  Chest: Clear to auscultation bilaterally.  Good air movement.  No wheezes.  No rhonchi.  Cardiovascular: Regular rate and rhythm.  No murmurs. No gallops. No rubs.  Abdomen: Distended, diffusely tender, hernia appears incarcerated/strangulated.  Musculoskeletal: Good range of motion all joints.  No deformities.  Neck supple.  No meningismus.  Neurologic: Motor intact.  Sensation intact.  Cerebellar intact.  Cranial nerves intact.  Mental Status:  Alert and oriented x 3.  Appropriate, conversant.      Initial MDM   Small-bowel obstruction, transferred for general surgery care.  Will discuss with general surgery.    Medications Given     Medications   morphine injection 8 mg (8 mg Intravenous Given 12/17/24 2142)       Results and Course   Abnormal Labs Reviewed - No data to display    Imaging Results    None         ED Course as of 12/17/24 2253   Tue Dec 17, 2024   2120 Discussed with general surgery [DC]      ED Course User Index  [DC] Trevon Willson MD         Critical Care   Performed by: Trevon Willson MD   Authorized by: Trevon Willson MD    Total critical care time (exclusive of procedural time) : 30 minutes  Critical care was necessary to treat or prevent imminent or life-threatening deterioration of the following conditions:  strangulated hernia        MDM,  Impression and Plan   64 y.o. male with small-bowel obstruction.  General surgery to take to OR class a for repair.  Hospital Medicine involved as well, and will become primary team after the patient recovers from surgery.         Final diagnoses:  [K56.609] Small bowel obstruction (Primary)        ED Disposition Condition    Admit Stable                  Trevon Willson MD  12/17/24 0973

## 2024-12-18 NOTE — PT/OT/SLP EVAL
Physical Therapy Co-Evaluation and Co-Treatment  Co-evaluation with OT for maximal pt participation, safety, and activity tolerance    Patient Name:  Jonny Isabel   MRN:  [id number]  Admit Date: 12/17/2024  Admitting Diagnosis:  Small bowel obstruction   Length of Stay: 0 days  Recent Surgery: Procedure(s) (LRB):  LAPAROTOMY, EXPLORATORY (N/A)  REPAIR, HERNIA, VENTRAL (N/A) 1 Day Post-Op    Recommendations:     Discharge Recommendations:  Low Intensity Therapy  Discharge Equipment Recommendations: walker, rolling, grab bar, shower chair   Justification for Equipment: The mobility limitation cannot be sufficiently resolved by the use of a cane. Patient's functional mobility deficit can be sufficiently resolved with the use of a Rolling Walker. Patient's mobility limitation significantly impairs their ability to participate in one of more activities of daily living. The use of a Rolling Walker will significantly improve the patient's ability to participate in MRADLS and the patient will use it on regular basis in the home.   Barriers to discharge: Inaccessible home environment and Evolving Clinical Presentation    Assessment:     Jonny Isabel is a 64 y.o. male admitted with a medical diagnosis of Small bowel obstruction.  He presents with the following impairments/functional limitations: impaired endurance, pain, gait instability, impaired balance, decreased safety awareness, decreased lower extremity function, impaired functional mobility, impaired self care skills.     Pt agreeable to therapy on second attempt, very frustrated about NPO status but does cooperate with evaluation in PACU. Pt able to stand at EOB and take some small steps at EOB, limited by pain.     Rehab Prognosis: Fair; patient would benefit from acute skilled PT services to address these deficits and reach maximum level of function.      Treatment Tolerated: Fair    Highest level of mobility achieved this visit: small steps at bedside with  "CGA    Activity with RN/PCT: transfer with one person assist    Plan:     During this hospitalization, patient to be seen 3 x/week to address the identified rehab impairments via gait training, therapeutic activities, therapeutic exercises, neuromuscular re-education and progress towards the established goals.    Plan of Care Expires:  01/18/25    Subjective     RN notified prior to session. No family present upon PT entrance into room.    Chief Complaint: thirst  Patient/Family Comments/goals: "I'm going to die of thirst here."  Pain/Comfort:  Pain Rating 1: 10/10  Location - Orientation 1: generalized  Location 1: abdomen  Pain Addressed 1: Reposition, Distraction, Cessation of Activity    Social History:  Residence: lives with their partner  2 level apartment with 0 ALYSSA and 13 steps to second floor bedroom and bathroom.  Support available:  girlfriend (works at night)  Equipment Used: hospital bed  Equipment Owned (not using): Cane, Type: Single Point Cane, Walkers, Type: Standard Walker, and Hygiene Aides, Type: Tub Seat/Chair  Prior level of function: assist required for mobility and ADLs  Work: Retired   Drive: yes.       Objective:     Additional staff present: OT puneet    Patient found supine with: telemetry, oxygen, NG tube, blood pressure cuff, ANJU drain, wound vac, pulse ox (continuous), SCD     General Precautions: Standard, fall, diabetic   Orthopedic Precautions:N/A   Braces: N/A   Body mass index is 28.06 kg/m².  Oxygen Device: Nasal Cannula 2L    Vitals: BP (!) 140/69   Pulse 94   Temp 98.8 °F (37.1 °C) (Temporal)   Resp 16   Wt 86.2 kg (190 lb)   SpO2 95%   BMI 28.06 kg/m²     Exams:  Cognition:   Agitated and Cooperative  Command following: Follows one-step verbal commands  Fluency: clear/fluent  Hearing: Intact  Vision:  Intact  Skin Integrity: Visible skin intact    Physical Exam:   Edema - None noted  ROM - CANDY LEs WFL  Strength - CANDY LEs WFL   Sensation - Intact to light touch  Coordination " - No deficits noted    Outcome Measures:    AM-PAC 6 CLICK MOBILITY  Turning over in bed (including adjusting bedclothes, sheets and blankets)?: 3  Sitting down on and standing up from a chair with arms (e.g., wheelchair, bedside commode, etc.): 3  Moving from lying on back to sitting on the side of the bed?: 3  Moving to and from a bed to a chair (including a wheelchair)?: 3  Need to walk in hospital room?: 3  Climbing 3-5 steps with a railing?: 2  Basic Mobility Total Score: 17     Functional Mobility:    Bed Mobility:   Supine to Sit: minimum assistance; from L side of bed  Scooting anteriorly to EOB to have both feet planted on floor: minimum assistance  Sit to Supine: stand by assistance; to R side of bed    Sitting Balance at Edge of Bed:  Assistance Level Required: Stand-by Assistance  Time: 12 min  Postural deviations noted: no deviations noted    Transfers:   Sit > Stand Transfer: contact guard assistance with no assistive device  Stand > Sit Transfer: contact guard assistance with no assistive device  x1 trials from EOB    Standing Balance:  Assistance Level Required: Contact Guard Assistance  Patient used: no assistive device  Time: 2 min  Postural deviations noted: no deviations noted    Gait:   Patient ambulated: 3 lateral steps to L   Patient required: contact guard  Patient used:  no assistive device  Gait Pattern observed: swing-to gait  Gait Deviation(s): decreased step length and decreased luis  Impairments due to: impaired balance, pain, decreased endurance, and impaired postural control  all lines remained intact throughout ambulation trial    Education:  Time provided for education, counseling and discussion of health disposition in regards to patient's current status  All questions answered within PT scope of practice and to patient's satisfaction  PT role in POC to address current functional deficits  Pt educated on proper body mechanics, safety techniques, and energy conservation with PT  facilitation and cueing throughout session    Patient left supine with all lines intact, call button in reach, and RN notified.    GOALS:   Multidisciplinary Problems       Physical Therapy Goals          Problem: Physical Therapy    Goal Priority Disciplines Outcome Interventions   Physical Therapy Goal     PT, PT/OT Progressing    Description: Goals to met by 1/1/2025    1. Supine to sit with Stand-by Assistance  2. Sit to stand transfer with Stand-by Assistance  3. Bed to chair transfer with Supervision using LRAD  4. Gait  x 50 feet with Stand-by Assistance using LRAD   5. Ascend/descend 15 stair(s) with either Handrails Contact Guard Assistance using LRAD.   6. Stand for 5 minutes with Supervision using LRAD  7. Lower extremity exercise program x15 reps per Instruction, with assistance as needed in order to facilitate improved postural control and improvement in functional independence                       History:     Past Medical History:   Diagnosis Date    A-fib     Esophageal varices 5/8/2023    Other ascites 5/8/2023       Past Surgical History:   Procedure Laterality Date    ESOPHAGOGASTRODUODENOSCOPY N/A 1/17/2023    Procedure: EGD (ESOPHAGOGASTRODUODENOSCOPY);  Surgeon: Dewayne Navas MD;  Location: [address]);  Service: Endoscopy;  Laterality: N/A;    ESOPHAGOGASTRODUODENOSCOPY N/A 2/7/2024    Procedure: EGD (ESOPHAGOGASTRODUODENOSCOPY);  Surgeon: Nathan Goisn MD;  Location: [address]);  Service: Endoscopy;  Laterality: N/A;  referral: Dr. Escalante /cirrhosis - labs- possible banding / prep ins on portal / 2nd floor - Pt c/o SOB at times./ Xarelto - message sent to clearance nurse per protocol - ERW  1/31/24- LVM for precall - ERW  2/1-precall complet-Kpvt  ok to hold Xarelto2 da       Time Tracking:     PT Received On: 12/18/24  PT Start Time: 1315     PT Stop Time: 1333  PT Total Time (min): 18 min     Billable Minutes: Evaluation 1 procedure and Therapeutic Activity 9  lubna Weeks, PT, DPT  12/18/2024

## 2024-12-18 NOTE — NURSING TRANSFER
Nursing Transfer Note      12/18/2024   4:32 PM    Nurse giving handoff: JUVENCIO Bustamante  Nurse receiving handoff: JUVENCIO Ding    Transfer To: 1006    Transfer via bed    Transported by 2 PCTs    Order for Tele Monitor? No    Additional Lines: 2 ANJU drains, wound vac, NG tube    Patient belongings transferred with patient: No    Chart send with patient: Yes    Notified: spouse    Patient reassessed at: 1622

## 2024-12-18 NOTE — ASSESSMENT & PLAN NOTE
64y M w/ PMHx of decompensated EtOH cirrhosis (ascites requiring recurrent weekly paracentesis), NID-T2DM, afib s/p ablation and DCCV on xarelto, COPD, CKD3 HTN transferred from OSH with concern for incarcerated ventral hernia. S/p exploratory laparotomy with small bowel resection and retrorectus hernia repair with mesh on 12/17/2024. Doing well post-operatively.    - Hospital Medicine to take over as primary this morning.   - NPO. NGT to LIWS.   - Prevena vac in place  - LLQ drain (intraabdominal) empty and record output q3h.   - RLQ drain (subcutaneous) record output as needed.   - MMPC   - Okay for DVT ppx from surgical standpoint   - Rest of care per primary. Please call General Surgery with any questions or concerns.

## 2024-12-18 NOTE — ANESTHESIA PROCEDURE NOTES
Intubation    Date/Time: 12/17/2024 11:07 PM    Performed by: Jeannette Alarcon CRNA  Authorized by: Pedro Luis Fuchs Jr., MD    Intubation:     Induction:  Rapid sequence induction    Intubated:  Postinduction    Mask Ventilation:  Not attempted    Attempts:  1    Attempted By:  CRNA    Method of Intubation:  Video laryngoscopy    Blade:  Gavin 3    Laryngeal View Grade: Grade I - full view of cords      Difficult Airway Encountered?: No      Complications:  None    Airway Device:  Oral endotracheal tube    Airway Device Size:  7.0    Style/Cuff Inflation:  Cuffed (inflated to minimal occlusive pressure)    Tube secured:  22    Secured at:  The lips    Placement Verified By:  Capnometry    Complicating Factors:  None    Findings Post-Intubation:  BS equal bilateral

## 2024-12-18 NOTE — H&P
[facility] ([address] [place] [address] Medicine  History & Physical    Patient Name: Jonny Isabel  MRN: [id number]  Patient Class: Emergency  Admission Date: 12/17/2024  Attending Physician: Randolph Rodriguez*   Primary Care Provider: Yuli Costa MD         Patient information was obtained from patient, past medical records, and ER records.     Subjective:     Principal Problem:Small bowel obstruction    Chief Complaint:   Chief Complaint   Patient presents with    [facility] transfer      SBO, NG tube in place.         HPI: Jonny Isabel is a 64 y.o. male with past medical history of AFL s/p RFA (2015), PAF (on Xarelto), tobacco abuse (1 ppd x 50 years), COPD, DM2, HTN, alcoholic cirrhosis with esophageal varices and ascites requiring weekly paracentesis, CKD 3, ventral hernia who presents as transfer from [facility] with nausea, vomiting and abd pain. While at OSH he underwent CT a/p which demonstrated partial SBO 2/2 ventral hernia containing ascites and mildly distended loop of bowel, and [place] [facility] [place] where he was taken to OR class A for exploratory laparotomy with small bowel resection and retrorectus hernia repair with mesh. He then was transferred to hospital medicine service. Patient evaluated in pacu. He reports abdominal soreness which is improved from initial presentation. He has NGT in place and denies nausea / vomiting. Of note, patient last underwent paracentesis on 12/17 by IR during which 3600 cc fluid was removed which was negative for SBP.     Past Medical History:   Diagnosis Date    A-fib     Esophageal varices 5/8/2023    Other ascites 5/8/2023       Past Surgical History:   Procedure Laterality Date    ESOPHAGOGASTRODUODENOSCOPY N/A 1/17/2023    Procedure: EGD (ESOPHAGOGASTRODUODENOSCOPY);  Surgeon: Dewayne Navas MD;  Location: [facility] ([address] [place] [address]);  Service: Endoscopy;  Laterality: N/A;    ESOPHAGOGASTRODUODENOSCOPY N/A  2/7/2024    Procedure: EGD (ESOPHAGOGASTRODUODENOSCOPY);  Surgeon: Nathan Goins MD;  Location: [facility] ([address] [place] [address]);  Service: Endoscopy;  Laterality: N/A;  referral: Dr. Escalante /cirrhosis - labs- possible banding / prep ins on portal / 2nd floor - Pt c/o SOB at times./ Xarelto - message sent to clearance nurse per protocol - ERW  1/31/24- LVM for precall - ERW  2/1-precall complet-Kpvt  ok to hold Xarelto2 da       Review of patient's allergies indicates:  No Known Allergies    Current Facility-Administered Medications on File Prior to Encounter   Medication    [COMPLETED] amiodarone tablet 200 mg    [COMPLETED] lactated ringers bolus 500 mL    [COMPLETED] LIDOcaine HCL 10 mg/ml (1%) injection    [COMPLETED] metoprolol tartrate (LOPRESSOR) tablet 100 mg    [COMPLETED] morphine injection 4 mg    [COMPLETED] ondansetron injection 4 mg    [COMPLETED] thiamine tablet 100 mg    [DISCONTINUED] cefTRIAXone injection 1 g    [DISCONTINUED] fluticasone furoate-vilanteroL 200-25 mcg/dose diskus inhaler 1 puff    [DISCONTINUED] HYDROmorphone injection 0.5 mg    [DISCONTINUED] levothyroxine injection 35 mcg    [DISCONTINUED] levothyroxine tablet 50 mcg    [DISCONTINUED] metoprolol injection 5 mg    [DISCONTINUED] nicotine 21 mg/24 hr 1 patch    [DISCONTINUED] octreotide (SANDOSTATIN) 500 mcg in 0.9% NaCl 100 mL infusion    [DISCONTINUED] pantoprazole injection 40 mg     Current Outpatient Medications on File Prior to Encounter   Medication Sig    ADVAIR DISKUS 250-50 mcg/dose diskus inhaler Inhale 1 puff into the lungs 2 (two) times a day. Controller    alirocumab (PRALUENT PEN) 75 mg/mL PnIj Inject 75 mg into the skin every 14 (fourteen) days.    amiodarone (PACERONE) 200 MG Tab Take 200 mg by mouth once daily.    folic acid (FOLVITE) 1 MG tablet Take 1,000 mcg by mouth once daily.    levothyroxine (SYNTHROID) 50 MCG tablet TAKE 1 TABLET BY MOUTH IN THE MORNING WITH A FULL GLASS OF WATER 30-60 MINUTES BEFORE OTHER MEDICATIONS  AND FOOD (Patient taking differently: Take 50 mcg by mouth before breakfast. WITH A FULL GLASS OF WATER 30-60 MINUTES BEFORE OTHER MEDICATIONS AND FOOD)    metoprolol tartrate (LOPRESSOR) 100 MG tablet Take 100 mg by mouth once daily.    omega-3 acid ethyl esters (LOVAZA) 1 gram capsule Take 1 capsule by mouth 2 (two) times daily.    pantoprazole (PROTONIX) 40 MG tablet Take 1 tablet (40 mg total) by mouth 2 (two) times daily.    promethazine (PHENERGAN) 12.5 MG Tab Take 12.5 mg by mouth every 6 (six) hours as needed (nausea).    thiamine 100 MG tablet Take 100 mg by mouth once daily.    traZODone (DESYREL) 50 MG tablet Take 50 mg by mouth nightly as needed.    VITAMIN B COMPLEX ORAL Take 1 tablet by mouth once daily.    XARELTO 20 mg Tab Take 20 mg by mouth once daily.     Family History    None       Tobacco Use    Smoking status: Every Day     Current packs/day: 1.00     Average packs/day: 1.5 packs/day for 51.0 years (74.0 ttl pk-yrs)     Types: Cigarettes     Start date: 1974    Smokeless tobacco: Never    Tobacco comments:     Enrolled in the [employer] on 9/16/15 ([employer] Member ID # [id number]) Pt is a 1 to 1.5 pk/day cigarette smoker x 51 yrs. He states ready to quit. Ambulatory referral to Smoking Cessation clinic.   Substance and Sexual Activity    Alcohol use: Not Currently     Alcohol/week: 6.0 standard drinks of alcohol     Types: 6 Cans of beer per week    Drug use: Not Currently    Sexual activity: Yes     Partners: Female     Review of Systems   Constitutional:  Negative for chills and fever.   HENT:  Negative for congestion and sore throat.    Respiratory:  Negative for cough and shortness of breath.    Cardiovascular:  Negative for chest pain and leg swelling.   Gastrointestinal:  Positive for abdominal pain, nausea and vomiting. Negative for blood in stool.   Genitourinary:  Negative for decreased urine volume, dysuria, frequency and urgency.   Musculoskeletal:  Negative for arthralgias and  myalgias.   Skin:  Negative for color change and pallor.   Neurological:  Negative for dizziness, light-headedness and headaches.   Psychiatric/Behavioral:  Negative for agitation and confusion.      Objective:     Vital Signs (Most Recent):  Temp: 98.8 °F (37.1 °C) (12/18/24 1000)  Pulse: 75 (12/18/24 1300)  Resp: 18 (12/18/24 1340)  BP: (!) 150/69 (12/18/24 1200)  SpO2: 97 % (12/18/24 1300) Vital Signs (24h Range):  Temp:  [97.3 °F (36.3 °C)-98.8 °F (37.1 °C)] 98.8 °F (37.1 °C)  Pulse:  [56-98] 75  Resp:  [11-33] 18  SpO2:  [92 %-100 %] 97 %  BP: (115-186)/() 150/69     Weight: 86.2 kg (190 lb)  Body mass index is 28.06 kg/m².     Physical Exam  Constitutional:       General: He is not in acute distress.     Appearance: He is not toxic-appearing.   HENT:      Head: Normocephalic and atraumatic.      Nose:      Comments: Nasogastric tube  Cardiovascular:      Rate and Rhythm: Normal rate and regular rhythm.      Heart sounds: No murmur heard.     No friction rub. No gallop.   Pulmonary:      Effort: Pulmonary effort is normal. No respiratory distress.   Abdominal:      General: Abdomen is flat.      Palpations: Abdomen is soft.      Tenderness: There is abdominal tenderness. There is no guarding or rebound.      Comments: LLQ and RLQ drains  Prevena vac midline   Musculoskeletal:      Cervical back: Normal range of motion and neck supple.      Right lower leg: No edema.      Left lower leg: No edema.   Skin:     General: Skin is warm and dry.   Neurological:      Mental Status: He is alert. Mental status is at baseline.                Significant Labs: All pertinent labs within the past 24 hours have been reviewed.    Significant Imaging: I have reviewed all pertinent imaging results/findings within the past 24 hours.  Assessment/Plan:     * Small bowel obstruction  Ventral heria  - CT a/p demonstrated partial SBO 2/2 ventral hernia containing ascites and mildly distended loop of bowel  - general surgery  consulted  - taken to OR class A 12/17 for ex lap with small bowel resection and retrorectus hernia repair with mesh.   - NGT in place , advance diet per gen surg recs    Hypothyroid  Resume home synthroid as able      Ascites due to alcoholic cirrhosis  - [place] [facility] [place]. Escalante) - most recently on 12/13.  At that time patient said that he did not want a liver XPL and did not want a TIPS.  He has stopped drinking.  PETH neg since 5/8/23.  He has been undergoing weekly paracentesis (last 12/17)      A-fib      Antiarrhythmics   Amiodarone  Metoprolol    Anticoagulants  heparin (porcine) injection 5,000 Units, Every 12 hours, Subcutaneous    Plan  - Replete lytes with a goal of K>4, Mg >2  - resume PO medications as able  - hold xarelto for now, dvt ppx      VTE Risk Mitigation (From admission, onward)           Ordered     heparin (porcine) injection 5,000 Units  Every 12 hours         12/18/24 0121     IP VTE HIGH RISK PATIENT  Once         12/18/24 0121     Place sequential compression device  Until discontinued         12/18/24 0121                         Randolph Rodriguez MD  Department of Hospital Medicine  [facility] - Surgery ([facility])

## 2024-12-18 NOTE — TRANSFER OF CARE
Anesthesia Transfer of Care Note    Patient: Jonny Isabel    Procedure(s) Performed: Procedure(s) (LRB):  LAPAROTOMY, EXPLORATORY (N/A)    Patient location: PACU    Anesthesia Type: general    Transport from OR: Transported from OR on 6-10 L/min O2 by face mask with adequate spontaneous ventilation    Post pain: adequate analgesia    Post assessment: no apparent anesthetic complications    Post vital signs: stable    Level of consciousness: awake    Nausea/Vomiting: no nausea/vomiting    Complications: none    Transfer of care protocol was followed    Last vitals: Visit Vitals  BP (!) 150/75   Pulse (!) 59   Temp 36.8 °C (98.3 °F) (Oral)   Resp 12   Wt 86.2 kg (190 lb)   SpO2 (!) 92%   BMI 28.06 kg/m²

## 2024-12-18 NOTE — PLAN OF CARE
Problem: Physical Therapy  Goal: Physical Therapy Goal  Description: Goals to met by 1/1/2025    1. Supine to sit with Stand-by Assistance  2. Sit to stand transfer with Stand-by Assistance  3. Bed to chair transfer with Supervision using LRAD  4. Gait  x 50 feet with Stand-by Assistance using LRAD   5. Ascend/descend 15 stair(s) with either Handrails Contact Guard Assistance using LRAD.   6. Stand for 5 minutes with Supervision using LRAD  7. Lower extremity exercise program x15 reps per Instruction, with assistance as needed in order to facilitate improved postural control and improvement in functional independence    PT Eval: 12/18/2024

## 2024-12-18 NOTE — PT/OT/SLP EVAL
Occupational Therapy  Co-Evaluation & Treatment    Name: Jonny Isabel  MRN: [id number]  Admitting Diagnosis: <principal problem not specified>  Recent Surgery: Procedure(s) (LRB):  LAPAROTOMY, EXPLORATORY (N/A)  REPAIR, HERNIA, VENTRAL (N/A) 1 Day Post-Op    Recommendations:     Discharge Recommendations: Low Intensity Therapy  Discharge Equipment Recommendations:  walker, rolling, grab bar, shower chair  Barriers to discharge:  Inaccessible home environment, Decreased caregiver support (pt unable to ascend stairs  to 2nd floor at this time (bedroom and bathroom located upstairs), pt's girlfriend works at night)    Assessment:     Jonny Isabel is a 64 y.o. male with a medical diagnosis of <principal problem not specified>.  He presents with the following performance deficits affecting function: weakness, impaired endurance, impaired self care skills, impaired functional mobility, gait instability, impaired balance, pain, impaired cardiopulmonary response to activity, edema.      Pt agreeable to session and tolerated fairly well. Pt presenting to session perseverating on wanting water, requiring redirection to task throughout session. Pt limited in optimal participation in session this date secondary to increased c/o abdominal pain and decreased activity tolerance as compared to his baseline. Pt would benefit from skilled OT services in the acute care setting to improve activity tolerance and functional endurance, increase participation in self-care routines, improve functional strength needed for safety with functional transfers and mobility, and facilitate a return to PLOF and least restrictive home environment. Patient currently demonstrates a need for low intensity therapy on a scheduled basis secondary to a decline in functional status due to surgical procedure.    Rehab Prognosis: Good; patient would benefit from acute skilled OT services to address these deficits and reach maximum level of function.    "    Plan:     Patient to be seen 3 x/week to address the above listed problems via self-care/home management, therapeutic activities, therapeutic exercises, neuromuscular re-education  Plan of Care Expires: 01/15/25  Plan of Care Reviewed with: patient    Subjective     Chief Complaint: being thirsty - not able to drink water  Patient/Family Comments/goals: "Y'all have to let me drink something. I haven't had anything to drink in three days. I'm going to die of dehydration."    Occupational Profile:  Living Environment: Pt lives with his girlfriend in a two-story apartment with no ALYSSA. ~13 steps to get to second floor. Pt has been residing downstairs primarily and sleeping in a hospital bed in his living room. His primary bathroom is located upstairs and includes a walk-in shower. Pt stating that he has been unable to access this bathroom, so he has primarily been sponge bathing as of late.   Previous level of function: IND with ADLs  Equipment Used at Home: hospital bed  Assistance upon Discharge: Pt will have limited assistance from his girlfriend as needed (works at night).     Pain/Comfort:  Pain Rating 1: 10/10  Location - Orientation 1: generalized  Location 1: abdomen  Pain Addressed 1: Reposition, Distraction, Cessation of Activity  Pain Rating Post-Intervention 1: other (see comments) (not rated)    Patients cultural, spiritual, Shinto conflicts given the current situation: no    Objective:     Communicated with: Nursing prior to session.  Patient found HOB elevated with telemetry, oxygen, NG tube, wound vac, ANJU drain, blood pressure cuff, SCD, pulse ox (continuous) upon OT entry to room.    General Precautions: Standard, fall, diabetic  Orthopedic Precautions: N/A  Braces: N/A  Respiratory Status: Nasal cannula, flow 2 L/min    Occupational Performance:    Bed Mobility:    Patient completed Supine to Sit with minimum assistance  Patient completed Sit to Supine with stand by assistance    Functional " Mobility/Transfers:  Patient completed Sit <> Stand Transfer with contact guard assistance  with  no assistive device   Functional Mobility: Pt able to tolerated sitting EOB for ~5 minutes with CGA provided for safety. Pt performing STS from EOB and able to take ~3 L lateral steps toward HOB with CGA provided for safety. No LOB or SOB noted.     Activities of Daily Living:  Upper Body Dressing: moderate assistance to adjust hospital gown in sitting EOB    Cognitive/Visual Perceptual:  Cognitive/Psychosocial Skills:     -       Oriented to: Person, Place, Time, and Situation   -       Follows Commands/attention:Follows multistep  commands  -       Communication: clear/fluent  -       Memory: No Deficits noted  -       Safety awareness/insight to disability: impaired   -       Mood/Affect/Coping skills/emotional control: Appropriate to situation, Cooperative, and Agitated    Physical Exam:  Sensation:    -       Intact  light/touch BUE  Upper Extremity Range of Motion:     -       Right Upper Extremity: WFL  -       Left Upper Extremity: WFL  Upper Extremity Strength:    -       Right Upper Extremity: 4-/5  -       Left Upper Extremity: 4-/5   Strength:    -       Right Upper Extremity: WFL  -       Left Upper Extremity: WFL  Fine Motor Coordination:    -       Intact  Left hand thumb/finger opposition skills and Right hand thumb/finger opposition skills  Gross motor coordination:   WFL    AMPAC 6 Click ADL:  AMPAC Total Score: 16    Treatment & Education:  Provided education on the role of OT, POC, and therapy goals while in the acute care setting.   Provided education on the importance of continued mobilization and participation in OOB activities to increase functional endurance and activity tolerance for increased participation in ADL routines.   All questions/concerns within the scope of OT answered/addressed - pt verbalized understanding.   Instructed pt to call for assistance when wanting to ambulate or  participate in OOB activities to promote safety and prevent falls.   White board updated.    Co-evaluation/treatment performed to appropriately and safely assess patient's strength, endurance, functional mobility, and ADL performance while facilitating functional tasks in addition to accommodating for patient's activity tolerance and medical acuity.    Patient left HOB elevated with all lines intact, call button in reach, and nursing notified    GOALS:   Multidisciplinary Problems       Occupational Therapy Goals          Problem: Occupational Therapy    Goal Priority Disciplines Outcome Interventions   Occupational Therapy Goal     OT, PT/OT Progressing    Description: Goals to be met by: 1/15/25.     Patient will increase functional independence with ADLs by performing:    UE Dressing with Stand-by Assistance.  LE Dressing with Stand-by Assistance.  Grooming while standing at sink with Stand-by Assistance.  Toileting from toilet with Stand-by Assistance for hygiene and clothing management.   Toilet transfer to toilet with Stand-by Assistance.                         History:     Past Medical History:   Diagnosis Date    A-fib     Esophageal varices 5/8/2023    Other ascites 5/8/2023         Past Surgical History:   Procedure Laterality Date    ESOPHAGOGASTRODUODENOSCOPY N/A 1/17/2023    Procedure: EGD (ESOPHAGOGASTRODUODENOSCOPY);  Surgeon: Dewayne Navas MD;  Location: [address]);  Service: Endoscopy;  Laterality: N/A;    ESOPHAGOGASTRODUODENOSCOPY N/A 2/7/2024    Procedure: EGD (ESOPHAGOGASTRODUODENOSCOPY);  Surgeon: Nathan Goins MD;  Location: [address]);  Service: Endoscopy;  Laterality: N/A;  referral: Dr. Escalante /cirrhosis - labs- possible banding / prep ins on portal / 2nd floor - Pt c/o SOB at times./ Xarelto - message sent to clearance nurse per protocol - ERW  1/31/24- LVM for precall - ERW  2/1-precall complet-Kpvt  ok to hold Xarelto2 da       Time Tracking:     OT Date of  Treatment: 12/18/24  OT Start Time: 1320  OT Stop Time: 1333  OT Total Time (min): 13 min    Billable Minutes:Evaluation 5 minutes  Neuromuscular Re-education 8 minutes    12/18/2024

## 2024-12-18 NOTE — CONSULTS
[facility] - Emergency Dept  General Surgery  Consult Note    Inpatient consult to General Surgery  Consult performed by: Magalys Harrison MD  Consult ordered by: Trevon Willson MD        Subjective:     Chief Complaint/Reason for Admission: abdominal pain    History of Present Illness:   Patient is a 64y M w/ PMHx of decompensated EtOH cirrhosis (ascites requiring recurrent weekly paracentesis), NID-T2DM, afib s/p ablation and DCCV on xarelto, COPD, CKD3 HTN. Was transferred from OSH on 2023 for epidural abscess and L3/L4 disc herniation. Surgery was deferred since the pt was too ill. MELD-Na 22. He was transferred today with concern for a small bowel obstruction 2/2 ventral hernia.     Last underwent paracentesis with IR on  with no improvement in abdominal pain. Endorses nausea.     Current Facility-Administered Medications on File Prior to Encounter   Medication    [] 0.9% NaCl infusion    [COMPLETED] amiodarone tablet 200 mg    [COMPLETED] HYDROmorphone injection 0.5 mg    [COMPLETED] HYDROmorphone injection 0.5 mg    [COMPLETED] HYDROmorphone injection 1 mg    [COMPLETED] lactated ringers bolus 500 mL    [COMPLETED] LIDOcaine HCL 10 mg/ml (1%) injection    [COMPLETED] metoprolol tartrate (LOPRESSOR) tablet 100 mg    [COMPLETED] morphine injection 4 mg    [COMPLETED] ondansetron injection 4 mg    [COMPLETED] ondansetron injection 8 mg    [COMPLETED] thiamine tablet 100 mg    [DISCONTINUED] 0.9% NaCl infusion    [DISCONTINUED] cefTRIAXone injection 1 g    [DISCONTINUED] fluticasone furoate-vilanteroL 200-25 mcg/dose diskus inhaler 1 puff    [DISCONTINUED] HYDROmorphone injection 0.5 mg    [DISCONTINUED] levothyroxine injection 35 mcg    [DISCONTINUED] levothyroxine tablet 50 mcg    [DISCONTINUED] metoprolol injection 5 mg    [DISCONTINUED] nicotine 21 mg/24 hr 1 patch    [DISCONTINUED] octreotide (SANDOSTATIN) 500 mcg in 0.9% NaCl 100 mL infusion    [DISCONTINUED] ondansetron injection 8  mg    [DISCONTINUED] pantoprazole injection 40 mg     Current Outpatient Medications on File Prior to Encounter   Medication Sig    ADVAIR DISKUS 250-50 mcg/dose diskus inhaler Inhale 1 puff into the lungs 2 (two) times a day. Controller    alirocumab (PRALUENT PEN) 75 mg/mL PnIj Inject 75 mg into the skin every 14 (fourteen) days.    amiodarone (PACERONE) 200 MG Tab Take 200 mg by mouth once daily.    folic acid (FOLVITE) 1 MG tablet Take 1,000 mcg by mouth once daily.    levothyroxine (SYNTHROID) 50 MCG tablet TAKE 1 TABLET BY MOUTH IN THE MORNING WITH A FULL GLASS OF WATER 30-60 MINUTES BEFORE OTHER MEDICATIONS AND FOOD (Patient taking differently: Take 50 mcg by mouth before breakfast. WITH A FULL GLASS OF WATER 30-60 MINUTES BEFORE OTHER MEDICATIONS AND FOOD)    metoprolol tartrate (LOPRESSOR) 100 MG tablet Take 100 mg by mouth once daily.    omega-3 acid ethyl esters (LOVAZA) 1 gram capsule Take 1 capsule by mouth 2 (two) times daily.    pantoprazole (PROTONIX) 40 MG tablet Take 1 tablet (40 mg total) by mouth 2 (two) times daily.    promethazine (PHENERGAN) 12.5 MG Tab Take 12.5 mg by mouth every 6 (six) hours as needed (nausea).    thiamine 100 MG tablet Take 100 mg by mouth once daily.    traZODone (DESYREL) 50 MG tablet Take 50 mg by mouth nightly as needed.    VITAMIN B COMPLEX ORAL Take 1 tablet by mouth once daily.    XARELTO 20 mg Tab Take 20 mg by mouth once daily.       Review of patient's allergies indicates:  No Known Allergies    Past Medical History:   Diagnosis Date    A-fib     Esophageal varices 5/8/2023    Other ascites 5/8/2023     Past Surgical History:   Procedure Laterality Date    ESOPHAGOGASTRODUODENOSCOPY N/A 1/17/2023    Procedure: EGD (ESOPHAGOGASTRODUODENOSCOPY);  Surgeon: Dewayne Navas MD;  Location: [address];  Service: Endoscopy;  Laterality: N/A;    ESOPHAGOGASTRODUODENOSCOPY N/A 2/7/2024    Procedure: EGD (ESOPHAGOGASTRODUODENOSCOPY);  Surgeon: Nathan Goins MD;   Location: [facility] (2ND FLR);  Service: Endoscopy;  Laterality: N/A;  referral: Dr. Escalante /cirrhosis - labs- possible banding / prep ins on portal / 2nd floor - Pt c/o SOB at times./ Xarelto - message sent to clearance nurse per protocol - ERW  1/31/24- LVM for precall - ERW  2/1-precall complet-Kpvt  ok to hold Xarelto2 da     Family History    None       Tobacco Use    Smoking status: Every Day     Current packs/day: 1.00     Average packs/day: 1.5 packs/day for 51.0 years (74.0 ttl pk-yrs)     Types: Cigarettes     Start date: 1974    Smokeless tobacco: Never    Tobacco comments:     Enrolled in the [employer] on 9/16/15 ([employer] Member ID # [id number]) Pt is a 1 to 1.5 pk/day cigarette smoker x 51 yrs. He states ready to quit. Ambulatory referral to Smoking Cessation clinic.   Substance and Sexual Activity    Alcohol use: Not Currently     Alcohol/week: 6.0 standard drinks of alcohol     Types: 6 Cans of beer per week    Drug use: Not Currently    Sexual activity: Yes     Partners: Female     Review of Systems   Constitutional:  Positive for fatigue.   HENT: Negative.     Respiratory:  Positive for shortness of breath.    Gastrointestinal:  Positive for abdominal distention, abdominal pain, constipation, nausea and vomiting.   Genitourinary: Negative.    Musculoskeletal: Negative.    Skin: Negative.      Objective:     Vital Signs (Most Recent):  Temp: 98.6 °F (37 °C) (12/17/24 2036)  Pulse: 62 (12/17/24 2036)  Resp: 18 (12/17/24 2036)  BP: (!) 147/79 (12/17/24 2036)  SpO2: 95 % (12/17/24 2036) Vital Signs (24h Range):  Temp:  [98.6 °F (37 °C)] 98.6 °F (37 °C)  Pulse:  [] 62  Resp:  [13-33] 18  SpO2:  [89 %-97 %] 95 %  BP: (100-193)/(56-88) 147/79     Weight: 86.2 kg (190 lb)  Body mass index is 28.06 kg/m².    No intake or output data in the 24 hours ending 12/17/24 2138    Physical Exam  Constitutional:       General: He is not in acute distress.     Appearance: Normal appearance. He is ill-appearing  and toxic-appearing.   HENT:      Head: Normocephalic and atraumatic.      Mouth/Throat:      Mouth: Mucous membranes are moist.   Eyes:      Pupils: Pupils are equal, round, and reactive to light.   Cardiovascular:      Rate and Rhythm: Normal rate.   Pulmonary:      Effort: Pulmonary effort is normal. No respiratory distress.   Abdominal:      General: Abdomen is flat. There is distension.      Palpations: Abdomen is soft.      Tenderness: There is abdominal tenderness. There is guarding.      Comments: Umbilical hernia with overlying skin changes. Very firm to palpation. Exquisitely tender.   Musculoskeletal:         General: Normal range of motion.      Cervical back: Normal range of motion.   Skin:     General: Skin is warm and dry.      Coloration: Skin is pale.   Neurological:      General: No focal deficit present.      Mental Status: He is alert and oriented to person, place, and time.   Psychiatric:         Mood and Affect: Mood normal.         Behavior: Behavior normal.         Significant Labs:  All pertinent labs from the last 24 hours have been reviewed.    Significant Diagnostics:  I have reviewed all pertinent imaging results/findings within the past 24 hours.    Assessment/Plan:     Plan for OR class A for ex lap & hernia repair tonight. Consent obtained. Hosp med to take over in AM as primary, we will follow for management of hernia post op.     Plan discussed with hospital medicine and emergency medicine team.    Thank you for your consult. I will follow-up with patient. Please contact us if you have any additional questions.    Magalys Harrison MD  General Surgery  [facility] - Emergency Dept

## 2024-12-18 NOTE — ASSESSMENT & PLAN NOTE
- followed by [facility] (Dr. Escalante) - most recently on 12/13.  At that time patient said that he did not want a liver XPL and did not want a TIPS.  He has stopped drinking.  PETH neg since 5/8/23.  He has been undergoing weekly paracentesis (last 12/17)

## 2024-12-18 NOTE — ED NOTES
All clothing removed, ring given to family. Two IV in place and anesthesia consent witnessed. PT ready for surgical team.

## 2024-12-18 NOTE — ANESTHESIA PREPROCEDURE EVALUATION
[facility]  Anesthesia Pre-Operative Evaluation         Patient Name: Jonny Isabel  YOB: 1960  MRN: [id number]    SUBJECTIVE:     Pre-operative evaluation for Procedure(s) (LRB):  LAPAROTOMY, EXPLORATORY (N/A)     12/17/2024    Jonny Isabel is a 64 y.o. male w/ a significant PMHx of AFL s/p RFA (2015), PAF (on Xarelto), tobacco abuse (1 ppd x 50 years), COPD, DM2, HTN, decompensated alcoholic cirrhosis with esophageal varices and ascites requiring weekly paracentesis, CKD 3 who presented to [facility] for higher level of care for partial SBO and decompensated cirrhosis.    Patient now presents for the above procedure(s) due to concern for ischemic bowel.    TTE:    The left ventricle is small with normal systolic function.  The estimated ejection fraction is 65%.  Normal left ventricular diastolic function.  Normal right ventricular size with normal right ventricular systolic function.  Normal central venous pressure (3 mmHg).  The estimated PA systolic pressure is 30 mmHg.  Mild tricuspid regurgitation.       LDA:        Peripheral IV - Single Lumen 12/17/24 2132 20 G Posterior;Right Forearm (Active)   Number of days: 0       Prev airway: None documented.    Drips: None documented.      Patient Active Problem List   Diagnosis    Spinal stenosis    Elevated liver enzymes    Alcohol abuse    A-fib    Small bowel obstruction    Lumbar herniated disc    Alcoholic cirrhosis of liver with ascites    Alcohol withdrawal syndrome without complication    Goals of care, counseling/discussion    Hypervolemia    Encounter for social work intervention    Cellulitis    Palliative care encounter    Debility    Pre-operative cardiovascular examination    Pulmonary hypertension    Simple chronic bronchitis    Tobacco use    Nausea    Ascites due to alcoholic cirrhosis    Esophageal varices    HTN (hypertension)    Hyperlipidemia associated with type 2 diabetes mellitus    Class 1 obesity due to excess  calories with serious comorbidity and body mass index (BMI) of 31.0 to 31.9 in adult    Impaired functional mobility, balance, gait, and endurance    Decreased strength of lower extremity    Decreased strength of trunk and back    Hypothyroid    Insomnia    Chronic obstructive pulmonary disease    Organ transplant candidate    Tobacco dependency    ABLA (acute blood loss anemia)    SBP (spontaneous bacterial peritonitis)    Ventral hernia       Review of patient's allergies indicates:  No Known Allergies    Current Inpatient Medications:      No current facility-administered medications on file prior to encounter.     Current Outpatient Medications on File Prior to Encounter   Medication Sig Dispense Refill    ADVAIR DISKUS 250-50 mcg/dose diskus inhaler Inhale 1 puff into the lungs 2 (two) times a day. Controller 180 each 3    alirocumab (PRALUENT PEN) 75 mg/mL PnIj Inject 75 mg into the skin every 14 (fourteen) days.      amiodarone (PACERONE) 200 MG Tab Take 200 mg by mouth once daily.      folic acid (FOLVITE) 1 MG tablet Take 1,000 mcg by mouth once daily.      levothyroxine (SYNTHROID) 50 MCG tablet TAKE 1 TABLET BY MOUTH IN THE MORNING WITH A FULL GLASS OF WATER 30-60 MINUTES BEFORE OTHER MEDICATIONS AND FOOD (Patient taking differently: Take 50 mcg by mouth before breakfast. WITH A FULL GLASS OF WATER 30-60 MINUTES BEFORE OTHER MEDICATIONS AND FOOD) 90 tablet 3    metoprolol tartrate (LOPRESSOR) 100 MG tablet Take 100 mg by mouth once daily.      omega-3 acid ethyl esters (LOVAZA) 1 gram capsule Take 1 capsule by mouth 2 (two) times daily.      pantoprazole (PROTONIX) 40 MG tablet Take 1 tablet (40 mg total) by mouth 2 (two) times daily. 180 tablet 3    promethazine (PHENERGAN) 12.5 MG Tab Take 12.5 mg by mouth every 6 (six) hours as needed (nausea).      thiamine 100 MG tablet Take 100 mg by mouth once daily.      traZODone (DESYREL) 50 MG tablet Take 50 mg by mouth nightly as needed.      VITAMIN B COMPLEX  ORAL Take 1 tablet by mouth once daily.      XARELTO 20 mg Tab Take 20 mg by mouth once daily.         Past Surgical History:   Procedure Laterality Date    ESOPHAGOGASTRODUODENOSCOPY N/A 1/17/2023    Procedure: EGD (ESOPHAGOGASTRODUODENOSCOPY);  Surgeon: Dewayne Navas MD;  Location: [facility] (2ND FLR);  Service: Endoscopy;  Laterality: N/A;    ESOPHAGOGASTRODUODENOSCOPY N/A 2/7/2024    Procedure: EGD (ESOPHAGOGASTRODUODENOSCOPY);  Surgeon: Nathan Goins MD;  Location: [facility] (2ND FLR);  Service: Endoscopy;  Laterality: N/A;  referral: Dr. Escalante /cirrhosis - labs- possible banding / prep ins on portal / 2nd floor - Pt c/o SOB at times./ Xarelto - message sent to clearance nurse per protocol - ERW  1/31/24- LVM for precall - ERW  2/1-precall complet-Kpvt  ok to hold Xarelto2 da       Social History     Socioeconomic History    Marital status:    Tobacco Use    Smoking status: Every Day     Current packs/day: 1.00     Average packs/day: 1.5 packs/day for 51.0 years (74.0 ttl pk-yrs)     Types: Cigarettes     Start date: 1974    Smokeless tobacco: Never    Tobacco comments:     Enrolled in the [employer] on 9/16/15 ([employer] Member ID # [id number]) Pt is a 1 to 1.5 pk/day cigarette smoker x 51 yrs. He states ready to quit. Ambulatory referral to Smoking Cessation clinic.   Substance and Sexual Activity    Alcohol use: Not Currently     Alcohol/week: 6.0 standard drinks of alcohol     Types: 6 Cans of beer per week    Drug use: Not Currently    Sexual activity: Yes     Partners: Female     Social Drivers of Health     Financial Resource Strain: High Risk (12/3/2024)    Overall Financial Resource Strain (CARDIA)     Difficulty of Paying Living Expenses: Hard   Food Insecurity: Food Insecurity Present (12/3/2024)    Hunger Vital Sign     Worried About Running Out of Food in the Last Year: Sometimes true     Ran Out of Food in the Last Year: Sometimes true   Transportation Needs: No Transportation Needs  (8/19/2024)    TRANSPORTATION NEEDS     Transportation : No   Physical Activity: Insufficiently Active (12/3/2024)    Exercise Vital Sign     Days of Exercise per Week: 2 days     Minutes of Exercise per Session: 20 min   Stress: No Stress Concern Present (12/3/2024)    [employer] [place] [employer] - Occupational Stress Questionnaire     Feeling of Stress : Only a little   Housing Stability: Low Risk  (12/3/2024)    Housing Stability Vital Sign     Unable to Pay for Housing in the Last Year: No     Homeless in the Last Year: No       OBJECTIVE:     Vital Signs Range (Last 24H):  Temp:  [36.8 °C (98.3 °F)-37 °C (98.6 °F)]   Pulse:  []   Resp:  [12-33]   BP: (100-190)/()   SpO2:  [89 %-97 %]       Significant Labs:  Lab Results   Component Value Date    WBC 12.85 (H) 12/17/2024    HGB 12.4 (L) 12/17/2024    HCT 38.0 (L) 12/17/2024     12/17/2024    ALT 12 12/17/2024    AST 15 12/17/2024     12/17/2024    K 4.3 12/17/2024     12/17/2024    CREATININE 1.5 (H) 12/17/2024    BUN 18 12/17/2024    CO2 22 (L) 12/17/2024    TSH 9.238 (H) 12/03/2024    PSA 0.80 06/20/2024    INR 1.1 12/16/2024    HGBA1C 5.4 06/20/2024       Diagnostic Studies: No relevant studies.    EKG:   Results for orders placed or performed during the hospital encounter of 08/02/24   EKG 12-lead    Collection Time: 08/03/24  1:33 PM   Result Value Ref Range    QRS Duration 100 ms    [facility]HS QTC Calculation 458 ms    Narrative    Test Reason : E87.5,    Vent. Rate : 071 BPM     Atrial Rate : 071 BPM     P-R Int : 208 ms          QRS Dur : 100 ms      QT Int : 422 ms       P-R-T Axes : 071 -45 086 degrees     QTc Int : 458 ms    Normal sinus rhythm  Low voltage QRS  Left anterior fascicular block  Nonspecific ST abnormality  Abnormal ECG  When compared with ECG of 02-AUG-2024 01:15,  No significant change was found  Confirmed by Anthony BATISTA [facility] ([id number][facility] on 8/6/2024 6:10:13 PM    Referred By: JESSE   SELF      "      Confirmed By:Yousif Cruz MD       2D ECHO:  TTE:  Results for orders placed or performed during the hospital encounter of 01/16/23   Echo   Result Value Ref Range    BSA 2.3 m2    TDI SEPTAL 0.09 m/s    LV LATERAL E/E' RATIO 12.00 m/s    LV SEPTAL E/E' RATIO 12.00 m/s    LA WIDTH 3.10 cm    TDI LATERAL 0.09 m/s    LVIDd 4.82 3.5 - 6.0 cm    IVS 0.93 0.6 - 1.1 cm    PW 0.96 0.6 - 1.1 cm    LVIDs 2.95 2.1 - 4.0 cm    FS 39 28 - 44 %    LA Vol 73.80 cm3    Sinus 3.50 cm    STJ 2.57 cm    Ascending aorta 3.59 cm    LV mass 158.79 g    LA size 4.59 cm    RVDD 3.37 cm    TAPSE 2.08 cm    Left Ventricle Relative Wall Thickness 0.40 cm    AV mean gradient 7 mmHg    AV valve area 3.67 cm2    AV Velocity Ratio 0.77     AV index (prosthetic) 0.85     MV valve area p 1/2 method 4.66 cm2    E/A ratio 1.32     Mean e' 0.09 m/s    E wave deceleration time 162.82 msec    MV "A" wave duration 14.84 msec    Pulm vein S/D ratio 1.25     LVOT diameter 2.34 cm    LVOT area 4.3 cm2    LVOT peak marcial 1.58 m/s    LVOT peak VTI 30.52 cm    Ao peak marcial 2.05 m/s    Ao VTI 35.77 cm    LVOT stroke volume 131.19 cm3    AV peak gradient 17 mmHg    E/E' ratio 12.00 m/s    MV Peak E Marcial 1.08 m/s    TR Max Marcial 2.62 m/s    MV stenosis pressure 1/2 time 47.22 ms    MV Peak A Marcial 0.82 m/s    PV Peak S Marcial 0.66 m/s    PV Peak D Marcial 0.53 m/s    LV Systolic Volume 33.52 mL    LV Systolic Volume Index 15.1 mL/m2    LV Diastolic Volume 108.36 mL    LV Diastolic Volume Index 48.81 mL/m2    JACKI 33.2 mL/m2    LV Mass Index 72 g/m2    RA Major Axis 5.64 cm    Left Atrium Minor Axis 6.24 cm    Left Atrium Major Axis 5.97 cm    Triscuspid Valve Regurgitation Peak Gradient 27 mmHg    JACKI (MOD) 18.5 mL/m2    LA Vol (MOD) 41.09 cm3    RA Width 3.46 cm    Right Atrial Pressure (from IVC) 3 mmHg    EF 65 %    TV resting pulmonary artery pressure 30 mmHg    Narrative    · The left ventricle is small with normal systolic function.  · The estimated " ejection fraction is 65%.  · Normal left ventricular diastolic function.  · Normal right ventricular size with normal right ventricular systolic   function.  · Normal central venous pressure (3 mmHg).  · The estimated PA systolic pressure is 30 mmHg.  · Mild tricuspid regurgitation.          YAIMA:  No results found for this or any previous visit.    ASSESSMENT/PLAN:          Pre-op Assessment    I have reviewed the Patient Summary Reports.     I have reviewed the Nursing Notes.       Review of Systems  Anesthesia Hx:  No problems with previous Anesthesia                Social:  Smoker       Hematology/Oncology:  Hematology Normal   Oncology Normal                                   EENT/Dental:  EENT/Dental Normal           Cardiovascular:     Hypertension                                          Pulmonary:   COPD                     Renal/:  Chronic Renal Disease, CKD                Hepatic/GI:      Liver Disease,               Musculoskeletal:  Musculoskeletal Normal                Neurological:  Neurology Normal                                      Endocrine:  Diabetes Hypothyroidism          Dermatological:  Skin Normal    Psych:  Psychiatric Normal                    Physical Exam  General: Well nourished, Cooperative and Alert    Airway:  Mallampati: III   Mouth Opening: Normal  TM Distance: Normal  Tongue: Normal  Neck ROM: Normal ROM    Dental:  Intact    Chest/Lungs:  Clear to auscultation, Normal Respiratory Rate    Heart:  Rate: Normal  Rhythm: Regular Rhythm  Sounds: Normal        Anesthesia Plan  Type of Anesthesia, risks & benefits discussed:    Anesthesia Type: Gen ETT  Intra-op Monitoring Plan: Standard ASA Monitors  Post Op Pain Control Plan: multimodal analgesia and IV/PO Opioids PRN  Induction:  IV  Airway Plan: Direct, Post-Induction  ASA Score: 3 Emergent  Day of Surgery Review of History & Physical: H&P Update referred to the surgeon/provider.    Ready For Surgery From Anesthesia Perspective.      .

## 2024-12-18 NOTE — ASSESSMENT & PLAN NOTE
Antiarrhythmics   Amiodarone  Metoprolol    Anticoagulants  heparin (porcine) injection 5,000 Units, Every 12 hours, Subcutaneous    Plan  - Replete lytes with a goal of K>4, Mg >2  - resume PO medications as able  - hold xarelto for now, dvt ppx

## 2024-12-18 NOTE — ANESTHESIA POSTPROCEDURE EVALUATION
Anesthesia Post Evaluation    Patient: Jonny Isabel    Procedure(s) Performed: Procedure(s) (LRB):  LAPAROTOMY, EXPLORATORY (N/A)  REPAIR, HERNIA, VENTRAL (N/A)    Final Anesthesia Type: general      Patient location during evaluation: PACU  Patient participation: Yes- Able to Participate  Level of consciousness: awake and alert and oriented  Post-procedure vital signs: reviewed and stable  Pain management: adequate  Airway patency: patent    PONV status at discharge: No PONV  Anesthetic complications: no      Cardiovascular status: blood pressure returned to baseline, hemodynamically stable and stable  Respiratory status: unassisted, room air and spontaneous ventilation  Hydration status: euvolemic  Follow-up not needed.              Vitals Value Taken Time   /68 12/18/24 0500   Temp 36.4 °C (97.5 °F) 12/18/24 0300   Pulse 65 12/18/24 0604   Resp 10 12/18/24 0604   SpO2 98 % 12/18/24 0604   Vitals shown include unfiled device data.      Event Time   Out of Recovery 12/18/2024 01:30:00         Pain/Terrence Score: Pain Rating Prior to Med Admin: 8 (12/18/2024  5:42 AM)  Pain Rating Post Med Admin: 6 (12/17/2024  7:02 PM)  Terrence Score: 10 (12/18/2024  1:30 AM)

## 2024-12-19 LAB
ANION GAP SERPL CALC-SCNC: 8 MMOL/L (ref 8–16)
BASOPHILS # BLD AUTO: 0.08 K/UL (ref 0–0.2)
BASOPHILS NFR BLD: 0.9 % (ref 0–1.9)
BUN SERPL-MCNC: 22 MG/DL (ref 8–23)
CALCIUM SERPL-MCNC: 7.5 MG/DL (ref 8.7–10.5)
CHLORIDE SERPL-SCNC: 107 MMOL/L (ref 95–110)
CO2 SERPL-SCNC: 18 MMOL/L (ref 23–29)
CREAT SERPL-MCNC: 1.6 MG/DL (ref 0.5–1.4)
DIFFERENTIAL METHOD BLD: ABNORMAL
EOSINOPHIL # BLD AUTO: 0.1 K/UL (ref 0–0.5)
EOSINOPHIL NFR BLD: 0.9 % (ref 0–8)
ERYTHROCYTE [DISTWIDTH] IN BLOOD BY AUTOMATED COUNT: 14.7 % (ref 11.5–14.5)
EST. GFR  (NO RACE VARIABLE): 48.1 ML/MIN/1.73 M^2
GLUCOSE SERPL-MCNC: 103 MG/DL (ref 70–110)
HCT VFR BLD AUTO: 34.6 % (ref 40–54)
HGB BLD-MCNC: 10.5 G/DL (ref 14–18)
IMM GRANULOCYTES # BLD AUTO: 0.03 K/UL (ref 0–0.04)
IMM GRANULOCYTES NFR BLD AUTO: 0.3 % (ref 0–0.5)
LYMPHOCYTES # BLD AUTO: 0.9 K/UL (ref 1–4.8)
LYMPHOCYTES NFR BLD: 10.3 % (ref 18–48)
MAGNESIUM SERPL-MCNC: 1.8 MG/DL (ref 1.6–2.6)
MCH RBC QN AUTO: 27.3 PG (ref 27–31)
MCHC RBC AUTO-ENTMCNC: 30.3 G/DL (ref 32–36)
MCV RBC AUTO: 90 FL (ref 82–98)
MONOCYTES # BLD AUTO: 1 K/UL (ref 0.3–1)
MONOCYTES NFR BLD: 11.8 % (ref 4–15)
NEUTROPHILS # BLD AUTO: 6.7 K/UL (ref 1.8–7.7)
NEUTROPHILS NFR BLD: 75.8 % (ref 38–73)
NRBC BLD-RTO: 0 /100 WBC
PHOSPHATE SERPL-MCNC: 3 MG/DL (ref 2.7–4.5)
PLATELET # BLD AUTO: 181 K/UL (ref 150–450)
PMV BLD AUTO: 10.8 FL (ref 9.2–12.9)
POTASSIUM SERPL-SCNC: 4.2 MMOL/L (ref 3.5–5.1)
RBC # BLD AUTO: 3.85 M/UL (ref 4.6–6.2)
SODIUM SERPL-SCNC: 133 MMOL/L (ref 136–145)
WBC # BLD AUTO: 8.78 K/UL (ref 3.9–12.7)

## 2024-12-19 PROCEDURE — 20600001 HC STEP DOWN PRIVATE ROOM: Mod: NTX

## 2024-12-19 PROCEDURE — 63600175 PHARM REV CODE 636 W HCPCS: Mod: NTX

## 2024-12-19 PROCEDURE — 63600175 PHARM REV CODE 636 W HCPCS: Mod: NTX | Performed by: STUDENT IN AN ORGANIZED HEALTH CARE EDUCATION/TRAINING PROGRAM

## 2024-12-19 PROCEDURE — 83735 ASSAY OF MAGNESIUM: CPT | Mod: NTX | Performed by: STUDENT IN AN ORGANIZED HEALTH CARE EDUCATION/TRAINING PROGRAM

## 2024-12-19 PROCEDURE — 80048 BASIC METABOLIC PNL TOTAL CA: CPT | Mod: NTX | Performed by: STUDENT IN AN ORGANIZED HEALTH CARE EDUCATION/TRAINING PROGRAM

## 2024-12-19 PROCEDURE — 85025 COMPLETE CBC W/AUTO DIFF WBC: CPT | Mod: NTX | Performed by: STUDENT IN AN ORGANIZED HEALTH CARE EDUCATION/TRAINING PROGRAM

## 2024-12-19 PROCEDURE — 84100 ASSAY OF PHOSPHORUS: CPT | Mod: NTX | Performed by: STUDENT IN AN ORGANIZED HEALTH CARE EDUCATION/TRAINING PROGRAM

## 2024-12-19 PROCEDURE — 36415 COLL VENOUS BLD VENIPUNCTURE: CPT | Mod: NTX | Performed by: STUDENT IN AN ORGANIZED HEALTH CARE EDUCATION/TRAINING PROGRAM

## 2024-12-19 PROCEDURE — 25000003 PHARM REV CODE 250: Mod: NTX | Performed by: STUDENT IN AN ORGANIZED HEALTH CARE EDUCATION/TRAINING PROGRAM

## 2024-12-19 RX ORDER — HYDROMORPHONE HYDROCHLORIDE 2 MG/ML
1.5 INJECTION, SOLUTION INTRAMUSCULAR; INTRAVENOUS; SUBCUTANEOUS EVERY 4 HOURS PRN
Status: DISCONTINUED | OUTPATIENT
Start: 2024-12-19 | End: 2024-12-21

## 2024-12-19 RX ORDER — SODIUM CHLORIDE, SODIUM LACTATE, POTASSIUM CHLORIDE, CALCIUM CHLORIDE 600; 310; 30; 20 MG/100ML; MG/100ML; MG/100ML; MG/100ML
INJECTION, SOLUTION INTRAVENOUS CONTINUOUS
Status: DISCONTINUED | OUTPATIENT
Start: 2024-12-19 | End: 2024-12-21

## 2024-12-19 RX ORDER — HYDROMORPHONE HYDROCHLORIDE 1 MG/ML
1 INJECTION, SOLUTION INTRAMUSCULAR; INTRAVENOUS; SUBCUTANEOUS EVERY 4 HOURS PRN
Status: DISCONTINUED | OUTPATIENT
Start: 2024-12-19 | End: 2024-12-21

## 2024-12-19 RX ORDER — MAGNESIUM SULFATE HEPTAHYDRATE 40 MG/ML
4 INJECTION, SOLUTION INTRAVENOUS ONCE
Status: COMPLETED | OUTPATIENT
Start: 2024-12-19 | End: 2024-12-19

## 2024-12-19 RX ADMIN — ACETAMINOPHEN 1000 MG: 500 TABLET ORAL at 02:12

## 2024-12-19 RX ADMIN — ACETAMINOPHEN 1000 MG: 500 TABLET ORAL at 09:12

## 2024-12-19 RX ADMIN — METHOCARBAMOL 500 MG: 1000 INJECTION, SOLUTION INTRAMUSCULAR; INTRAVENOUS at 02:12

## 2024-12-19 RX ADMIN — MAGNESIUM SULFATE HEPTAHYDRATE 2 G: 40 INJECTION, SOLUTION INTRAVENOUS at 09:12

## 2024-12-19 RX ADMIN — MUPIROCIN: 20 OINTMENT TOPICAL at 09:12

## 2024-12-19 RX ADMIN — ACETAMINOPHEN 1000 MG: 500 TABLET ORAL at 06:12

## 2024-12-19 RX ADMIN — HYDROMORPHONE HYDROCHLORIDE 1 MG: 1 INJECTION, SOLUTION INTRAMUSCULAR; INTRAVENOUS; SUBCUTANEOUS at 03:12

## 2024-12-19 RX ADMIN — HYDROMORPHONE HYDROCHLORIDE 1.5 MG: 2 INJECTION INTRAMUSCULAR; INTRAVENOUS; SUBCUTANEOUS at 08:12

## 2024-12-19 RX ADMIN — HYDROMORPHONE HYDROCHLORIDE 1 MG: 1 INJECTION, SOLUTION INTRAMUSCULAR; INTRAVENOUS; SUBCUTANEOUS at 02:12

## 2024-12-19 RX ADMIN — HEPARIN SODIUM 5000 UNITS: 5000 INJECTION INTRAVENOUS; SUBCUTANEOUS at 09:12

## 2024-12-19 RX ADMIN — SODIUM CHLORIDE, POTASSIUM CHLORIDE, SODIUM LACTATE AND CALCIUM CHLORIDE: 600; 310; 30; 20 INJECTION, SOLUTION INTRAVENOUS at 06:12

## 2024-12-19 RX ADMIN — METHOCARBAMOL 500 MG: 1000 INJECTION, SOLUTION INTRAMUSCULAR; INTRAVENOUS at 06:12

## 2024-12-19 RX ADMIN — HYDROMORPHONE HYDROCHLORIDE 1 MG: 1 INJECTION, SOLUTION INTRAMUSCULAR; INTRAVENOUS; SUBCUTANEOUS at 09:12

## 2024-12-19 NOTE — PLAN OF CARE
Problem: Infection  Goal: Absence of Infection Signs and Symptoms  Outcome: Progressing     Problem: Diabetes Comorbidity  Goal: Blood Glucose Level Within Targeted Range  Outcome: Progressing     Problem: Wound  Goal: Optimal Coping  Outcome: Progressing  Goal: Optimal Functional Ability  Outcome: Progressing  Goal: Absence of Infection Signs and Symptoms  Outcome: Progressing  Goal: Improved Oral Intake  Outcome: Progressing  Goal: Optimal Pain Control and Function  Outcome: Progressing  Goal: Skin Health and Integrity  Outcome: Progressing  Goal: Optimal Wound Healing  Outcome: Progressing     Problem: Adult Inpatient Plan of Care  Goal: Plan of Care Review  Outcome: Progressing  Goal: Patient-Specific Goal (Individualized)  Outcome: Progressing  Goal: Absence of Hospital-Acquired Illness or Injury  Outcome: Progressing  Goal: Optimal Comfort and Wellbeing  Outcome: Progressing  Goal: Readiness for Transition of Care  Outcome: Progressing     Problem: Fall Injury Risk  Goal: Absence of Fall and Fall-Related Injury  Outcome: Progressing

## 2024-12-19 NOTE — SUBJECTIVE & OBJECTIVE
Interval History:   No events overnight. General surgery following. NPO. LR at 75cc/hr. NG tube replaced.      Review of Systems   Constitutional:  Negative for chills and fever.   HENT:  Negative for congestion and sore throat.    Respiratory:  Negative for cough and shortness of breath.    Cardiovascular:  Negative for chest pain and leg swelling.   Gastrointestinal:  Positive for abdominal pain, nausea and vomiting. Negative for blood in stool.   Genitourinary:  Negative for decreased urine volume, dysuria, frequency and urgency.   Musculoskeletal:  Negative for arthralgias and myalgias.   Skin:  Negative for color change and pallor.   Neurological:  Negative for dizziness, light-headedness and headaches.   Psychiatric/Behavioral:  Negative for agitation and confusion.      Objective:     Vital Signs (Most Recent):  Temp: 97.8 °F (36.6 °C) (12/19/24 1141)  Pulse: 80 (12/19/24 1558)  Resp: 18 (12/19/24 1443)  BP: (!) 155/74 (12/19/24 1141)  SpO2: 96 % (12/19/24 1141) Vital Signs (24h Range):  Temp:  [97.4 °F (36.3 °C)-98.1 °F (36.7 °C)] 97.8 °F (36.6 °C)  Pulse:  [71-86] 80  Resp:  [15-18] 18  SpO2:  [95 %-98 %] 96 %  BP: (140-167)/(70-77) 155/74     Weight: 86.1 kg (189 lb 13.1 oz)  Body mass index is 28.03 kg/m².    Intake/Output Summary (Last 24 hours) at 12/19/2024 1601  Last data filed at 12/19/2024 0616  Gross per 24 hour   Intake 240 ml   Output 2080 ml   Net -1840 ml         Physical Exam  Constitutional:       General: He is not in acute distress.     Appearance: He is not toxic-appearing.   HENT:      Head: Normocephalic and atraumatic.      Nose:      Comments: Nasogastric tube  Cardiovascular:      Rate and Rhythm: Normal rate and regular rhythm.      Heart sounds: No murmur heard.     No friction rub. No gallop.   Pulmonary:      Effort: Pulmonary effort is normal. No respiratory distress.   Abdominal:      General: Abdomen is flat.      Palpations: Abdomen is soft.      Tenderness: There is abdominal  tenderness. There is no guarding or rebound.      Comments: LLQ and RLQ drains  Prevena vac midline   Musculoskeletal:      Cervical back: Normal range of motion and neck supple.      Right lower leg: No edema.      Left lower leg: No edema.   Skin:     General: Skin is warm and dry.   Neurological:      Mental Status: He is alert. Mental status is at baseline.             Significant Labs: All pertinent labs within the past 24 hours have been reviewed.  CBC:   Recent Labs   Lab 12/18/24  0157 12/19/24  0448   WBC 11.88 8.78   HGB 12.1* 10.5*   HCT 37.6* 34.6*    181     CMP:   Recent Labs   Lab 12/18/24  0250 12/19/24  0447    133*   K 4.4 4.2   * 107   CO2 21* 18*   * 103   BUN 20 22   CREATININE 1.5* 1.6*   CALCIUM 7.4* 7.5*   ANIONGAP 6* 8       Significant Imaging: I have reviewed all pertinent imaging results/findings within the past 24 hours.

## 2024-12-19 NOTE — SUBJECTIVE & OBJECTIVE
Interval History:   No acute events overnight.   POD 2 from exploratory laparotomy   Doing well this AM. Sore, but pain improved compared to prior to surgery.   NGT with no output overnight.   LLQ drain with 1645cc serosanguinous output. RLQ drain with 140cc output.     Medications:  Continuous Infusions:  Scheduled Meds:   acetaminophen  1,000 mg Oral Q8H    heparin (porcine)  5,000 Units Subcutaneous Q12H    methocarbamol injection  500 mg Intravenous Q8H    mupirocin   Nasal BID     PRN Meds:  Current Facility-Administered Medications:     HYDROmorphone, 0.5 mg, Intravenous, Q4H PRN    HYDROmorphone, 1 mg, Intravenous, Q4H PRN    ondansetron, 8 mg, Intravenous, Q6H PRN    prochlorperazine, 5 mg, Intravenous, Q6H PRN    sodium chloride 0.9%, 10 mL, Intra-Catheter, PRN     Review of patient's allergies indicates:  No Known Allergies  Objective:     Vital Signs (Most Recent):  Temp: 97.9 °F (36.6 °C) (12/19/24 0743)  Pulse: 79 (12/19/24 0743)  Resp: 18 (12/19/24 0743)  BP: (!) 157/76 (12/19/24 0743)  SpO2: 96 % (12/19/24 0743) Vital Signs (24h Range):  Temp:  [97.4 °F (36.3 °C)-98.8 °F (37.1 °C)] 97.9 °F (36.6 °C)  Pulse:  [66-94] 79  Resp:  [13-19] 18  SpO2:  [95 %-98 %] 96 %  BP: (140-167)/(69-77) 157/76     Weight: 86.1 kg (189 lb 13.1 oz)  Body mass index is 28.03 kg/m².    Intake/Output - Last 3 Shifts         12/17 0700 12/18 0659 12/18 0700 12/19 0659 12/19 0700 12/20 0659    P.O.  240     IV Piggyback 1400      Total Intake(mL/kg) 1400 (16.2) 240 (2.8)     Urine (mL/kg/hr) 225 870 (0.4)     Drains 430 1785     Stool  0     Total Output 655 2655     Net +745 -2415            Urine Occurrence  1 x     Stool Occurrence  0 x              Physical Exam  Constitutional:       General: He is not in acute distress.  HENT:      Head: Normocephalic and atraumatic.      Nose:      Comments: NGT  Eyes:      Extraocular Movements: Extraocular movements intact.      Conjunctiva/sclera: Conjunctivae normal.      Pupils:  Pupils are equal, round, and reactive to light.   Cardiovascular:      Rate and Rhythm: Normal rate and regular rhythm.   Pulmonary:      Effort: Pulmonary effort is normal. No respiratory distress.   Abdominal:      Palpations: Abdomen is soft.      Comments: Prevena vac over midline incision with seal intact   Appropriate tenderness  LLQ (intraabdominal) drain with serosanguinous output   RLQ (subcutaneous)  drain with serosanguinous output   Neurological:      Mental Status: He is alert.          Significant Labs:  I have reviewed all pertinent lab results within the past 24 hours.  CBC:   Recent Labs   Lab 12/19/24  0448   WBC 8.78   RBC 3.85*   HGB 10.5*   HCT 34.6*      MCV 90   MCH 27.3   MCHC 30.3*     CMP:   Recent Labs   Lab 12/17/24  0533 12/18/24  0250 12/19/24  0447   *   < > 103   CALCIUM 8.6*   < > 7.5*   ALBUMIN 3.0*  --   --    PROT 6.8  --   --       < > 133*   K 4.3   < > 4.2   CO2 22*   < > 18*      < > 107   BUN 18   < > 22   CREATININE 1.5*   < > 1.6*   ALKPHOS 83  --   --    ALT 12  --   --    AST 15  --   --    BILITOT 0.5  --   --     < > = values in this interval not displayed.       Significant Diagnostics:  I have reviewed all pertinent imaging results/findings within the past 24 hours.

## 2024-12-19 NOTE — NURSING
"...[facility] Plan of Care Note    Dx:   Small bowel obstruction [K56.609]  Strangulated hernia of abdominal wall [K43.6]    Shift Events: Patient had large output from the left matteo drain. Provider notified and stated to not drain it will continue to fill up "it's ascites" due to liver failure. Patient complains of surgical pain. B/p elevated providers notified     Goals of Care: pain management     Neuro: a&o x4     Vital Signs: BP (!) 167/77 (BP Location: Left arm, Patient Position: Lying)   Pulse 83   Temp 97.4 °F (36.3 °C) (Oral)   Resp 16   Ht 5' 9" (1.753 m)   Wt 86.1 kg (189 lb 13.1 oz)   SpO2 96%   BMI 28.03 kg/m²     Respiratory: room air     Diet: Diet Clear Liquid      Is patient tolerating current diet? yes    GTTS: none     Urine Output/Bowel Movement:   I/O this shift:  In: 240 [P.O.:240]  Out: 1350 [Urine:420; Drains:930]  Last Bowel Movement: 12/10/24      Drains/Tubes/Tube Feeds (include total output/shift):   I/O this shift:  In: 240 [P.O.:240]  Out: 1350 [Urine:420; Drains:930]      Lines:  1 piv      Accuchecks: no    Skin: see flowsheet     Fall Risk Score: 6    Activity level? Stand by assist     Any scheduled procedures? None    Any safety concerns? No     Other: no   "

## 2024-12-19 NOTE — PROGRESS NOTES
[facility] Medicine  Progress Note    Patient Name: Jonny Isabel  MRN: [id number]  Patient Class: IP- Inpatient   Admission Date: 12/17/2024  Length of Stay: 1 days  Attending Physician: Seymour Leon MD  Primary Care Provider: Yuli Costa MD        Subjective     Principal Problem:Small bowel obstruction        HPI:  Jonny Isabel is a 64 y.o. male with past medical history of AFL s/p RFA (2015), PAF (on Xarelto), tobacco abuse (1 ppd x 50 years), COPD, DM2, HTN, alcoholic cirrhosis with esophageal varices and ascites requiring weekly paracentesis, CKD 3, ventral hernia who presents as transfer from [facility] with nausea, vomiting and abd pain. While at OSH he underwent CT a/p which demonstrated partial SBO 2/2 ventral hernia containing ascites and mildly distended loop of bowel, and patient was transferred to [facility] for general surgery evaluation, where he was taken to OR class A for exploratory laparotomy with small bowel resection and retrorectus hernia repair with mesh. He then was transferred to hospital medicine service. Patient evaluated in pacu. He reports abdominal soreness which is improved from initial presentation. He has NGT in place and denies nausea / vomiting. Of note, patient last underwent paracentesis on 12/17 by IR during which 3600 cc fluid was removed which was negative for SBP.     Overview/Hospital Course:  No notes on file    Interval History:   No events overnight. General surgery following. NPO. LR at 75cc/hr. NG tube replaced.      Review of Systems   Constitutional:  Negative for chills and fever.   HENT:  Negative for congestion and sore throat.    Respiratory:  Negative for cough and shortness of breath.    Cardiovascular:  Negative for chest pain and leg swelling.   Gastrointestinal:  Positive for abdominal pain, nausea and vomiting. Negative for blood in stool.   Genitourinary:  Negative for decreased urine volume, dysuria, frequency and urgency.    Musculoskeletal:  Negative for arthralgias and myalgias.   Skin:  Negative for color change and pallor.   Neurological:  Negative for dizziness, light-headedness and headaches.   Psychiatric/Behavioral:  Negative for agitation and confusion.      Objective:     Vital Signs (Most Recent):  Temp: 97.8 °F (36.6 °C) (12/19/24 1141)  Pulse: 80 (12/19/24 1558)  Resp: 18 (12/19/24 1443)  BP: (!) 155/74 (12/19/24 1141)  SpO2: 96 % (12/19/24 1141) Vital Signs (24h Range):  Temp:  [97.4 °F (36.3 °C)-98.1 °F (36.7 °C)] 97.8 °F (36.6 °C)  Pulse:  [71-86] 80  Resp:  [15-18] 18  SpO2:  [95 %-98 %] 96 %  BP: (140-167)/(70-77) 155/74     Weight: 86.1 kg (189 lb 13.1 oz)  Body mass index is 28.03 kg/m².    Intake/Output Summary (Last 24 hours) at 12/19/2024 1601  Last data filed at 12/19/2024 0616  Gross per 24 hour   Intake 240 ml   Output 2080 ml   Net -1840 ml         Physical Exam  Constitutional:       General: He is not in acute distress.     Appearance: He is not toxic-appearing.   HENT:      Head: Normocephalic and atraumatic.      Nose:      Comments: Nasogastric tube  Cardiovascular:      Rate and Rhythm: Normal rate and regular rhythm.      Heart sounds: No murmur heard.     No friction rub. No gallop.   Pulmonary:      Effort: Pulmonary effort is normal. No respiratory distress.   Abdominal:      General: Abdomen is flat.      Palpations: Abdomen is soft.      Tenderness: There is abdominal tenderness. There is no guarding or rebound.      Comments: LLQ and RLQ drains  Prevena vac midline   Musculoskeletal:      Cervical back: Normal range of motion and neck supple.      Right lower leg: No edema.      Left lower leg: No edema.   Skin:     General: Skin is warm and dry.   Neurological:      Mental Status: He is alert. Mental status is at baseline.             Significant Labs: All pertinent labs within the past 24 hours have been reviewed.  CBC:   Recent Labs   Lab 12/18/24  0157 12/19/24  0448   WBC 11.88 8.78   HGB  12.1* 10.5*   HCT 37.6* 34.6*    181     CMP:   Recent Labs   Lab 12/18/24  0250 12/19/24  0447    133*   K 4.4 4.2   * 107   CO2 21* 18*   * 103   BUN 20 22   CREATININE 1.5* 1.6*   CALCIUM 7.4* 7.5*   ANIONGAP 6* 8       Significant Imaging: I have reviewed all pertinent imaging results/findings within the past 24 hours.    Assessment and Plan     * Small bowel obstruction  Ventral heria  - CT a/p demonstrated partial SBO 2/2 ventral hernia containing ascites and mildly distended loop of bowel  - General surgery consulted  - OR on 12/17 for ex lap with small bowel resection and retrorectus hernia repair with mesh.   - NPO  - NGT in place to low intermittent wall suction  - DVT ppx    A-fib      Antiarrhythmics   Amiodarone  Metoprolol    Anticoagulants  heparin (porcine) injection 5,000 Units, Every 12 hours, Subcutaneous    Plan  - Replete lytes with a goal of K>4, Mg >2  - resume PO medications as able  - hold xarelto for now, dvt ppx    Hypothyroid  Resume home synthroid as able      Ascites due to alcoholic cirrhosis  - followed by [facility] hepatology (Dr. Escalante) - most recently on 12/13.  At that time patient said that he did not want a liver XPL and did not want a TIPS.  He has stopped drinking.  PETH neg since 5/8/23.  He has been undergoing weekly paracentesis (last 12/17)        VTE Risk Mitigation (From admission, onward)           Ordered     heparin (porcine) injection 5,000 Units  Every 12 hours         12/18/24 0121     IP VTE HIGH RISK PATIENT  Once         12/18/24 0121     Place sequential compression device  Until discontinued         12/18/24 0121                    Discharge Planning   DALILA: 12/27/2024     Code Status: Prior   Medical Readiness for Discharge Date:                    Please place Justification for DME        Seymour Leon MD  Department of Hospital Medicine   [facility]

## 2024-12-19 NOTE — PROGRESS NOTES
[facility]  General Surgery  Progress Note    Subjective:     History of Present Illness:  No notes on file    Post-Op Info:  Procedure(s) (LRB):  LAPAROTOMY, EXPLORATORY (N/A)  REPAIR, HERNIA, VENTRAL (N/A)   2 Days Post-Op     Interval History:   NAEON. POD 2 from exploratory laparotomy. Still with post operative soreness. NGT with no output overnight.   LLQ drain with 1645cc serosanguinous output. RLQ drain with 140cc output.   WBC stable at 8.7. Hgb slight drop from 12.1 to 10.5. Possibly dilutional    Medications:  Continuous Infusions:  Scheduled Meds:   acetaminophen  1,000 mg Oral Q8H    heparin (porcine)  5,000 Units Subcutaneous Q12H    methocarbamol injection  500 mg Intravenous Q8H    mupirocin   Nasal BID     PRN Meds:  Current Facility-Administered Medications:     HYDROmorphone, 0.5 mg, Intravenous, Q4H PRN    HYDROmorphone, 1 mg, Intravenous, Q4H PRN    ondansetron, 8 mg, Intravenous, Q6H PRN    prochlorperazine, 5 mg, Intravenous, Q6H PRN    sodium chloride 0.9%, 10 mL, Intra-Catheter, PRN     Review of patient's allergies indicates:  No Known Allergies  Objective:     Vital Signs (Most Recent):  Temp: 97.9 °F (36.6 °C) (12/19/24 0743)  Pulse: 79 (12/19/24 0743)  Resp: 18 (12/19/24 0743)  BP: (!) 157/76 (12/19/24 0743)  SpO2: 96 % (12/19/24 0743) Vital Signs (24h Range):  Temp:  [97.4 °F (36.3 °C)-98.8 °F (37.1 °C)] 97.9 °F (36.6 °C)  Pulse:  [66-94] 79  Resp:  [13-19] 18  SpO2:  [95 %-98 %] 96 %  BP: (140-167)/(69-77) 157/76     Weight: 86.1 kg (189 lb 13.1 oz)  Body mass index is 28.03 kg/m².    Intake/Output - Last 3 Shifts         12/17 0700 12/18 0659 12/18 0700 12/19 0659 12/19 0700  12/20 0659    P.O.  240     IV Piggyback 1400      Total Intake(mL/kg) 1400 (16.2) 240 (2.8)     Urine (mL/kg/hr) 225 870 (0.4)     Drains 430 1785     Stool  0     Total Output 655 2655     Net +745 -2415            Urine Occurrence  1 x     Stool Occurrence  0 x              Physical  Exam  Constitutional:       General: He is not in acute distress.  HENT:      Head: Normocephalic and atraumatic.      Nose:      Comments: NGT  Eyes:      Extraocular Movements: Extraocular movements intact.      Conjunctiva/sclera: Conjunctivae normal.      Pupils: Pupils are equal, round, and reactive to light.   Cardiovascular:      Rate and Rhythm: Normal rate and regular rhythm.   Pulmonary:      Effort: Pulmonary effort is normal. No respiratory distress.   Abdominal:      Palpations: Abdomen is soft.      Comments: Prevena vac over midline incision with seal intact   Appropriate tenderness  LLQ (intraabdominal) drain with serosanguinous output   RLQ (subcutaneous)  drain with serosanguinous output   Neurological:      Mental Status: He is alert.          Significant Labs:  I have reviewed all pertinent lab results within the past 24 hours.  CBC:   Recent Labs   Lab 12/19/24  0448   WBC 8.78   RBC 3.85*   HGB 10.5*   HCT 34.6*      MCV 90   MCH 27.3   MCHC 30.3*     CMP:   Recent Labs   Lab 12/17/24  0533 12/18/24  0250 12/19/24  0447   *   < > 103   CALCIUM 8.6*   < > 7.5*   ALBUMIN 3.0*  --   --    PROT 6.8  --   --       < > 133*   K 4.3   < > 4.2   CO2 22*   < > 18*      < > 107   BUN 18   < > 22   CREATININE 1.5*   < > 1.6*   ALKPHOS 83  --   --    ALT 12  --   --    AST 15  --   --    BILITOT 0.5  --   --     < > = values in this interval not displayed.       Significant Diagnostics:  I have reviewed all pertinent imaging results/findings within the past 24 hours.  Assessment/Plan:     Ventral hernia  64y M w/ PMHx of decompensated EtOH cirrhosis (ascites requiring recurrent weekly paracentesis), NID-T2DM, afib s/p ablation and DCCV on xarelto, COPD, CKD3 HTN transferred from OSH with concern for incarcerated ventral hernia. S/p exploratory laparotomy with small bowel resection and retrorectus hernia repair with mesh on 12/17/2024. Doing well post-operatively.    - NPO. NGT to  LIMARIAH.   - Possibly replace current NG tube that he arrived with from [facility] with one that out staff are more familiar with as his NG does not seem to be draining   - Prevena vac in place  - LLQ drain (intraabdominal) empty and record output q3h.   - RLQ drain (retrorectus) record output as needed.   - MMPC   - Okay for DVT ppx from surgical standpoint   - Rest of care per primary. Please call General Surgery with any questions or concerns.         Ethan Burt MD  General Surgery  [facility]

## 2024-12-19 NOTE — ASSESSMENT & PLAN NOTE
Ventral heria  - CT a/p demonstrated partial SBO 2/2 ventral hernia containing ascites and mildly distended loop of bowel  - General surgery consulted  - OR on 12/17 for ex lap with small bowel resection and retrorectus hernia repair with mesh.   - NPO  - NGT in place to low intermittent wall suction  - DVT ppx

## 2024-12-20 LAB
ANION GAP SERPL CALC-SCNC: 9 MMOL/L (ref 8–16)
BACTERIA SPEC AEROBE CULT: NO GROWTH
BACTERIA SPEC ANAEROBE CULT: NORMAL
BASOPHILS # BLD AUTO: 0.07 K/UL (ref 0–0.2)
BASOPHILS NFR BLD: 0.9 % (ref 0–1.9)
BUN SERPL-MCNC: 19 MG/DL (ref 8–23)
CALCIUM SERPL-MCNC: 7.7 MG/DL (ref 8.7–10.5)
CHLORIDE SERPL-SCNC: 106 MMOL/L (ref 95–110)
CO2 SERPL-SCNC: 17 MMOL/L (ref 23–29)
CREAT SERPL-MCNC: 1.5 MG/DL (ref 0.5–1.4)
DIFFERENTIAL METHOD BLD: ABNORMAL
EOSINOPHIL # BLD AUTO: 0.2 K/UL (ref 0–0.5)
EOSINOPHIL NFR BLD: 2.1 % (ref 0–8)
ERYTHROCYTE [DISTWIDTH] IN BLOOD BY AUTOMATED COUNT: 14.5 % (ref 11.5–14.5)
EST. GFR  (NO RACE VARIABLE): 52 ML/MIN/1.73 M^2
FINAL PATHOLOGIC DIAGNOSIS: NORMAL
GLUCOSE SERPL-MCNC: 71 MG/DL (ref 70–110)
GROSS: NORMAL
HCT VFR BLD AUTO: 34.5 % (ref 40–54)
HGB BLD-MCNC: 11.4 G/DL (ref 14–18)
IMM GRANULOCYTES # BLD AUTO: 0.04 K/UL (ref 0–0.04)
IMM GRANULOCYTES NFR BLD AUTO: 0.5 % (ref 0–0.5)
LYMPHOCYTES # BLD AUTO: 1 K/UL (ref 1–4.8)
LYMPHOCYTES NFR BLD: 13.6 % (ref 18–48)
Lab: NORMAL
MAGNESIUM SERPL-MCNC: 2.4 MG/DL (ref 1.6–2.6)
MCH RBC QN AUTO: 29.1 PG (ref 27–31)
MCHC RBC AUTO-ENTMCNC: 33 G/DL (ref 32–36)
MCV RBC AUTO: 88 FL (ref 82–98)
MONOCYTES # BLD AUTO: 1 K/UL (ref 0.3–1)
MONOCYTES NFR BLD: 12.6 % (ref 4–15)
NEUTROPHILS # BLD AUTO: 5.4 K/UL (ref 1.8–7.7)
NEUTROPHILS NFR BLD: 70.3 % (ref 38–73)
NRBC BLD-RTO: 0 /100 WBC
PHOSPHATE SERPL-MCNC: 2.8 MG/DL (ref 2.7–4.5)
PLATELET # BLD AUTO: 182 K/UL (ref 150–450)
PMV BLD AUTO: 10.8 FL (ref 9.2–12.9)
POTASSIUM SERPL-SCNC: 4 MMOL/L (ref 3.5–5.1)
RBC # BLD AUTO: 3.92 M/UL (ref 4.6–6.2)
SODIUM SERPL-SCNC: 132 MMOL/L (ref 136–145)
WBC # BLD AUTO: 7.62 K/UL (ref 3.9–12.7)

## 2024-12-20 PROCEDURE — 63600175 PHARM REV CODE 636 W HCPCS: Mod: NTX | Performed by: STUDENT IN AN ORGANIZED HEALTH CARE EDUCATION/TRAINING PROGRAM

## 2024-12-20 PROCEDURE — 25000003 PHARM REV CODE 250: Mod: NTX | Performed by: STUDENT IN AN ORGANIZED HEALTH CARE EDUCATION/TRAINING PROGRAM

## 2024-12-20 PROCEDURE — 84100 ASSAY OF PHOSPHORUS: CPT | Mod: NTX | Performed by: STUDENT IN AN ORGANIZED HEALTH CARE EDUCATION/TRAINING PROGRAM

## 2024-12-20 PROCEDURE — 25000003 PHARM REV CODE 250: Mod: NTX

## 2024-12-20 PROCEDURE — 20600001 HC STEP DOWN PRIVATE ROOM: Mod: NTX

## 2024-12-20 PROCEDURE — 83735 ASSAY OF MAGNESIUM: CPT | Mod: NTX | Performed by: STUDENT IN AN ORGANIZED HEALTH CARE EDUCATION/TRAINING PROGRAM

## 2024-12-20 PROCEDURE — 80048 BASIC METABOLIC PNL TOTAL CA: CPT | Mod: NTX | Performed by: STUDENT IN AN ORGANIZED HEALTH CARE EDUCATION/TRAINING PROGRAM

## 2024-12-20 PROCEDURE — 36415 COLL VENOUS BLD VENIPUNCTURE: CPT | Mod: NTX | Performed by: STUDENT IN AN ORGANIZED HEALTH CARE EDUCATION/TRAINING PROGRAM

## 2024-12-20 PROCEDURE — 85025 COMPLETE CBC W/AUTO DIFF WBC: CPT | Mod: NTX | Performed by: STUDENT IN AN ORGANIZED HEALTH CARE EDUCATION/TRAINING PROGRAM

## 2024-12-20 PROCEDURE — 0WUF0JZ SUPPLEMENT ABDOMINAL WALL WITH SYNTHETIC SUBSTITUTE, OPEN APPROACH: ICD-10-PCS | Performed by: SURGERY

## 2024-12-20 RX ORDER — TALC
6 POWDER (GRAM) TOPICAL NIGHTLY PRN
Status: DISCONTINUED | OUTPATIENT
Start: 2024-12-20 | End: 2024-12-23 | Stop reason: HOSPADM

## 2024-12-20 RX ADMIN — HYDROMORPHONE HYDROCHLORIDE 1.5 MG: 2 INJECTION INTRAMUSCULAR; INTRAVENOUS; SUBCUTANEOUS at 09:12

## 2024-12-20 RX ADMIN — HYDROMORPHONE HYDROCHLORIDE 1.5 MG: 2 INJECTION INTRAMUSCULAR; INTRAVENOUS; SUBCUTANEOUS at 02:12

## 2024-12-20 RX ADMIN — ACETAMINOPHEN 1000 MG: 500 TABLET ORAL at 02:12

## 2024-12-20 RX ADMIN — HEPARIN SODIUM 5000 UNITS: 5000 INJECTION INTRAVENOUS; SUBCUTANEOUS at 09:12

## 2024-12-20 RX ADMIN — MUPIROCIN: 20 OINTMENT TOPICAL at 09:12

## 2024-12-20 RX ADMIN — SODIUM CHLORIDE, POTASSIUM CHLORIDE, SODIUM LACTATE AND CALCIUM CHLORIDE: 600; 310; 30; 20 INJECTION, SOLUTION INTRAVENOUS at 05:12

## 2024-12-20 RX ADMIN — METHOCARBAMOL 500 MG: 1000 INJECTION, SOLUTION INTRAMUSCULAR; INTRAVENOUS at 02:12

## 2024-12-20 RX ADMIN — HYDROMORPHONE HYDROCHLORIDE 1.5 MG: 2 INJECTION INTRAMUSCULAR; INTRAVENOUS; SUBCUTANEOUS at 07:12

## 2024-12-20 RX ADMIN — HYDROMORPHONE HYDROCHLORIDE 1.5 MG: 2 INJECTION INTRAMUSCULAR; INTRAVENOUS; SUBCUTANEOUS at 05:12

## 2024-12-20 RX ADMIN — SODIUM CHLORIDE, POTASSIUM CHLORIDE, SODIUM LACTATE AND CALCIUM CHLORIDE: 600; 310; 30; 20 INJECTION, SOLUTION INTRAVENOUS at 07:12

## 2024-12-20 RX ADMIN — METHOCARBAMOL 500 MG: 1000 INJECTION, SOLUTION INTRAMUSCULAR; INTRAVENOUS at 09:12

## 2024-12-20 RX ADMIN — METHOCARBAMOL 500 MG: 1000 INJECTION, SOLUTION INTRAMUSCULAR; INTRAVENOUS at 05:12

## 2024-12-20 RX ADMIN — HYDROMORPHONE HYDROCHLORIDE 1.5 MG: 2 INJECTION INTRAMUSCULAR; INTRAVENOUS; SUBCUTANEOUS at 12:12

## 2024-12-20 RX ADMIN — Medication 6 MG: at 09:12

## 2024-12-20 RX ADMIN — METHOCARBAMOL 500 MG: 1000 INJECTION, SOLUTION INTRAMUSCULAR; INTRAVENOUS at 12:12

## 2024-12-20 RX ADMIN — ACETAMINOPHEN 1000 MG: 500 TABLET ORAL at 09:12

## 2024-12-20 NOTE — ASSESSMENT & PLAN NOTE
64y M w/ PMHx of decompensated EtOH cirrhosis (ascites requiring recurrent weekly paracentesis), NID-T2DM, afib s/p ablation and DCCV on xarelto, COPD, CKD3 HTN transferred from OSH with concern for incarcerated ventral hernia. S/p exploratory laparotomy with small bowel resection and retrorectus hernia repair with mesh on 12/17/2024. Doing well post-operatively.    - NPO. NGT to LIWS.   - Consider placing LLQ drain bulb to wall suction to help drain ascites. Would recommend careful monitoring output as he would require albumin replacement 2/2 fluid shifts  - Prevena vac in place  - LLQ drain (intraabdominal) empty and record output q3h.   - RLQ drain (retrorectus) record output as needed.   - MMPC   - Okay for DVT ppx from surgical standpoint   - Rest of care per primary. Please call General Surgery with any questions or concerns.

## 2024-12-20 NOTE — ASSESSMENT & PLAN NOTE
Ventral heria  - CT a/p demonstrated partial SBO 2/2 ventral hernia containing ascites and mildly distended loop of bowel  - General surgery consulted  - OR on 12/17 for ex lap with small bowel resection and retrorectus hernia repair with mesh.   - NPO  - PPN ordered  - NGT in place to low intermittent wall suction  - DVT ppx

## 2024-12-20 NOTE — PROGRESS NOTES
[facility] Medicine  Progress Note    Patient Name: Jonny Isabel  MRN: [id number]  Patient Class: IP- Inpatient   Admission Date: 12/17/2024  Length of Stay: 2 days  Attending Physician: Seymour Leon MD  Primary Care Provider: Yuli Costa MD        Subjective     Principal Problem:Small bowel obstruction        HPI:  Jonny Isabel is a 64 y.o. male with past medical history of AFL s/p RFA (2015), PAF (on Xarelto), tobacco abuse (1 ppd x 50 years), COPD, DM2, HTN, alcoholic cirrhosis with esophageal varices and ascites requiring weekly paracentesis, CKD 3, ventral hernia who presents as transfer from [facility] with nausea, vomiting and abd pain. While at OSH he underwent CT a/p which demonstrated partial SBO 2/2 ventral hernia containing ascites and mildly distended loop of bowel, and patient was transferred to [facility] for general surgery evaluation, where he was taken to OR class A for exploratory laparotomy with small bowel resection and retrorectus hernia repair with mesh. He then was transferred to hospital medicine service. Patient evaluated in pacu. He reports abdominal soreness which is improved from initial presentation. He has NGT in place and denies nausea / vomiting. Of note, patient last underwent paracentesis on 12/17 by IR during which 3600 cc fluid was removed which was negative for SBP.     Overview/Hospital Course:  No notes on file    Interval History:   No events overnight. General surgery following. NPO and NGT in place. Nursing to drain ascites fluid today. PPN ordered. Abdominal pain improved.      Review of Systems   Constitutional:  Negative for chills and fever.   HENT:  Negative for congestion and sore throat.    Respiratory:  Negative for cough and shortness of breath.    Cardiovascular:  Negative for chest pain and leg swelling.   Gastrointestinal:  Positive for abdominal pain, nausea and vomiting. Negative for blood in stool.   Genitourinary:  Negative for  decreased urine volume, dysuria, frequency and urgency.   Musculoskeletal:  Negative for arthralgias and myalgias.   Skin:  Negative for color change and pallor.   Neurological:  Negative for dizziness, light-headedness and headaches.   Psychiatric/Behavioral:  Negative for agitation and confusion.      Objective:     Vital Signs (Most Recent):  Temp: 97.5 °F (36.4 °C) (12/20/24 1142)  Pulse: 88 (12/20/24 1144)  Resp: 18 (12/20/24 1142)  BP: (!) 172/83 (12/20/24 1142)  SpO2: 98 % (12/20/24 1142) Vital Signs (24h Range):  Temp:  [97.5 °F (36.4 °C)-98.7 °F (37.1 °C)] 97.5 °F (36.4 °C)  Pulse:  [61-88] 88  Resp:  [16-18] 18  SpO2:  [95 %-98 %] 98 %  BP: (156-172)/(74-83) 172/83     Weight: 86.1 kg (189 lb 13.1 oz)  Body mass index is 28.03 kg/m².    Intake/Output Summary (Last 24 hours) at 12/20/2024 1257  Last data filed at 12/20/2024 0550  Gross per 24 hour   Intake 1353.69 ml   Output 1530 ml   Net -176.31 ml         Physical Exam  Constitutional:       General: He is not in acute distress.     Appearance: He is not toxic-appearing.   HENT:      Head: Normocephalic and atraumatic.      Nose:      Comments: Nasogastric tube  Cardiovascular:      Rate and Rhythm: Normal rate and regular rhythm.      Heart sounds: No murmur heard.     No friction rub. No gallop.   Pulmonary:      Effort: Pulmonary effort is normal. No respiratory distress.   Abdominal:      General: Abdomen is flat.      Palpations: Abdomen is soft.      Tenderness: There is abdominal tenderness. There is no guarding or rebound.      Comments: LLQ and RLQ drains  Prevena vac midline   Musculoskeletal:      Cervical back: Normal range of motion and neck supple.      Right lower leg: No edema.      Left lower leg: No edema.   Skin:     General: Skin is warm and dry.   Neurological:      Mental Status: He is alert. Mental status is at baseline.             Significant Labs: All pertinent labs within the past 24 hours have been reviewed.  CBC:   Recent  Labs   Lab 12/19/24  0448 12/20/24  0523   WBC 8.78 7.62   HGB 10.5* 11.4*   HCT 34.6* 34.5*    182     CMP:   Recent Labs   Lab 12/19/24  0447 12/20/24  0523   * 132*   K 4.2 4.0    106   CO2 18* 17*    71   BUN 22 19   CREATININE 1.6* 1.5*   CALCIUM 7.5* 7.7*   ANIONGAP 8 9       Significant Imaging: I have reviewed all pertinent imaging results/findings within the past 24 hours.    Assessment and Plan     * Small bowel obstruction  Ventral heria  - CT a/p demonstrated partial SBO 2/2 ventral hernia containing ascites and mildly distended loop of bowel  - General surgery consulted  - OR on 12/17 for ex lap with small bowel resection and retrorectus hernia repair with mesh.   - NPO  - PPN ordered  - NGT in place to low intermittent wall suction  - DVT ppx    A-fib      Antiarrhythmics   Amiodarone  Metoprolol    Anticoagulants  heparin (porcine) injection 5,000 Units, Every 12 hours, Subcutaneous    Plan  - Replete lytes with a goal of K>4, Mg >2  - resume PO medications as able  - hold xarelto for now, dvt ppx    Hypothyroid  Resume home synthroid as able      Ascites due to alcoholic cirrhosis  - Followed by [facility] (Dr. Escalante) - most recently on 12/13.  At that time patient said that he did not want a liver XPL and did not want a TIPS.  He has stopped drinking.  PETH neg since 5/8/23.  He has been undergoing weekly paracentesis on Wednesdays (last 12/17)  - Surgery placed orders to drain ascites fluid from abdominal drain      VTE Risk Mitigation (From admission, onward)           Ordered     heparin (porcine) injection 5,000 Units  Every 12 hours         12/18/24 0121     IP VTE HIGH RISK PATIENT  Once         12/18/24 0121     Place sequential compression device  Until discontinued         12/18/24 0121                    Discharge Planning   DALILA: 12/27/2024     Code Status: Prior   Medical Readiness for Discharge Date:                    Please place Justification for  DME        Seymour Leon MD  Department of [facility] Medicine   [facility]

## 2024-12-20 NOTE — PLAN OF CARE
[facility]  Initial Discharge Assessment       Primary Care Provider: Yuli Costa MD    Admission Diagnosis: Small bowel obstruction [K56.609]  Strangulated hernia of abdominal wall [K43.6]    Admission Date: 12/17/2024  Expected Discharge Date: 12/27/2024    Transition of Care Barriers: None    Payor: [employer] [place] / Plan: [employer] [facility] [place] / Product Type: Managed Care Transplant /     Extended Emergency Contact Information  Primary Emergency Contact: Adelita Bingham  Mobile Phone: [phone]  Relation: Spouse    Discharge Plan A: Home with family  Discharge Plan B: Home Health      [id number] [place] [address]  [address]  [place] [address]  Phone: [phone] Fax: [phone]      Initial Assessment (most recent)       Adult Discharge Assessment - 12/20/24 1556          Discharge Assessment    Assessment Type Discharge Planning Assessment     Confirmed/corrected address, phone number and insurance Yes     Confirmed Demographics Correct on Facesheet     Source of Information patient     Communicated DALILA with patient/caregiver Yes     People in Home spouse     Do you expect to return to your current living situation? Yes     Do you have help at home or someone to help you manage your care at home? Yes     Prior to hospitilization cognitive status: Alert/Oriented     Current cognitive status: Alert/Oriented     Home Layout Able to live on 1st floor     Do you take prescription medications? Yes     Do you have prescription coverage? Yes     Do you have any problems affording any of your prescribed medications? No     Who is going to help you get home at discharge? Wife     How do you get to doctors appointments? family or friend will provide     Are you on dialysis? No     Do you take coumadin? No     Discharge Plan A Home with family     Discharge Plan B Home Health     Discharge Plan discussed with: Patient     Transition of Care Barriers None     [facility]  --   no       Physical Activity    On average, how many days per week do you engage in moderate to strenuous exercise (like a brisk walk)? 0 days     On average, how many minutes do you engage in exercise at this level? 0 min        Financial Resource Strain    How hard is it for you to pay for the very basics like food, housing, medical care, and heating? Not hard at all        Housing Stability    In the last 12 months, was there a time when you were not able to pay the mortgage or rent on time? No     At any time in the past 12 months, were you homeless or living in a shelter (including now)? No        Transportation Needs    Has the lack of transportation kept you from medical appointments, meetings, work or from getting things needed for daily living? No        Food Insecurity    Within the past 12 months, you worried that your food would run out before you got the money to buy more. Never true     Within the past 12 months, the food you bought just didn't last and you didn't have money to get more. Never true        Stress    Do you feel stress - tense, restless, nervous, or anxious, or unable to sleep at night because your mind is troubled all the time - these days? Not at all        Social Isolation    How often do you feel lonely or isolated from those around you?  Never        Alcohol Use    Q1: How often do you have a drink containing alcohol? Never     Q2: How many drinks containing alcohol do you have on a typical day when you are drinking? Patient does not drink     Q3: How often do you have six or more drinks on one occasion? Never        Utilities    In the past 12 months has the electric, gas, oil, or water company threatened to shut off services in your home? No        Health Literacy    How often do you need to have someone help you when you read instructions, pamphlets, or other written material from your doctor or pharmacy? Never                   The CM met with the patient at bedside to  complete the DPA. The CM placed name and contact information on the blackboard in the patient's room.  Use preferred pharmacy / bedside delivery for any necessary  medications at the time of discharge.The patient is independent with all ADLs.  The patient is not on Dialysis or Coumadin. The patient's wife will provide assistance to the patient upon discharge. The patient's wife will provide transportation upon discharge .  The CM will continue to follow for course of hospitalization.

## 2024-12-20 NOTE — OP NOTE
Operative Note     ADMIT DATE: 12/17/2024    PROCEDURE DATE: 12/17/24     PRE-OP DIAGNOSIS: Small bowel obstruction [K56.609]  Strangulated hernia of abdominal wall [K43.6]      POST-OP DIAGNOSIS: Post-Op Diagnosis Codes:     * Small bowel obstruction [K56.609]     * Strangulated hernia of abdominal wall [K43.6]    Procedure(s):  LAPAROTOMY, EXPLORATORY  REPAIR, HERNIA, VENTRAL     SURGEON: Surgeons and Role:     * Domingo Francisco MD - Primary     * Magalys Harrison MD - Resident - Assisting    ANESTHESIA: Choice     OPERATIVE FINDINGS: Ventral hernia with incarcerated small bowel and ascites. Retrorectus mesh placement. Intraperitoneal drain placement, retrorectus drain placement.     INDICATION FOR PROCEDURE: This patient presents with a history of ventral hernia with incarcerated bowel, concern for possible strangulated small bowel.     PROCEDURE IN DETAIL:  Jonny Isabel is a 64 y.o. male brought to the operating room for definitive surgery of exploratory laparotomy. The patient was informed of the possible risks and complications of the procedure, including but not limited to anesthetic risks, bleeding, infection, and need for additional surgery.  The patient concurred with the proposed plan, and has given informed consent.  The site of surgery was properly noted/marked in the preoperative holding area.    An elliptical incision was made with a #15 blade in the midline to incorporate the size of the defect and small bowel from below the xiphoid to superior to the umbilicus. This was carried down to the fascia circumferentially with care taken not to enter the hernia sac. Small soft tissue flaps were then created to have a circumferential edge of healthy fascia around the hernia defect and along the length of the incision.  Next, the skin overlying the hernia sac was removed and passed off as specimen. The hernia sac was then entered and inter-sac adhesions were freed and hernia contents reduced. The  hernia sac was then excised and passed as specimen. Hemostasis was obtained. The hernia defect measured about 12 cm. The anterior rectus fascia on the  right side was incised the length of the incision, just lateral to the edge of the linea alba. The retrorectus space was entered in cranial and caudal directions and lateral to the exposed neurovascular bundles. Hemostasis was obtained. A 15 Fr round Ismael drain was placed intraperitoneal and brought out and secured in place in the left lower quadrant.  The process was repeated on the left side. Next, the midline hernia defect was closed with a running 2-0 PDS. The retrorectus space was then irrigated. A transversus abdominus plane (TAP) block was then performed with Exparel. A 20 cm x 20 cm piece of Surgimesh WN was cut to size and secured to the posterior sheath with interrupted 2-0 PDS in the cardinal directions.    A 15 Fr round Ismael drain was placed in the retrorectus space and brought out and secured in place in the right lower quadrant. The anterior fascia was then closed in two layers with an 0-PDS Stratafix symmetric suture. The wound was irrigated and hemostasis obtained. The incision was closed with multiple interrupted 3-0 Vicryl dermal sutures and skin closed with staples. The patient tolerated the procedure well and was extubated and taken to the PACU in stable condition. Sharp, sponge, and instrument counts were correct and verified at the end of the case. I was present and scrubbed for the entire case.    ESTIMATED BLOOD LOSS: Minimal    COMPLICATIONS: none    DISPOSITION: PACU - hemodynamically stable.

## 2024-12-20 NOTE — CONSULTS
Hospital Medicine Pharmacy Consult Note    PharmD Consult Received For:     Pharmacy to review parenteral nutrition (TPN)  The PPN contains 10% dextrose and 2.75% amino acids (AA) infusing at a rate of 42 mL/hr via peripheral line per dietician recommendations.    Recommend a 20% fat emulsion 250 mL. Baseline triglycerides to be drawn with am labs. Monitor TG weekly while receiving lipids.    Continue current electrolytes as ordered.  Continue daily BMP, Magnesium, and Phosphorus monitoring.   Blood glucose levels are within normal limits, continue current therapy. Continue POCT glucose testing every 6 hours.  Pharmacy will continue to follow and monitor TPN therapy.         Thank you for the consult,  Elias Anthony  [place] [phone]    **Note: Consults are reviewed Monday-Friday 7:00am-3:30pm. The above recommendations are only suggested. The recommendations should be considered in conjunction with all patient factors.**

## 2024-12-20 NOTE — SUBJECTIVE & OBJECTIVE
Interval History:   No acute events overnight.    Doing well this AM. Sore, but pain improved compared to prior to surgery.   NGT with 30 cc of output.   LLQ drain with 650cc serosanguinous output. RLQ drain with 40 cc output.     Medications:  Continuous Infusions:   lactated ringers   Intravenous Continuous 75 mL/hr at 12/20/24 0550 Rate Verify at 12/20/24 0550     Scheduled Meds:   acetaminophen  1,000 mg Oral Q8H    heparin (porcine)  5,000 Units Subcutaneous Q12H    methocarbamol injection  500 mg Intravenous Q8H    mupirocin   Nasal BID     PRN Meds:  Current Facility-Administered Medications:     HYDROmorphone, 1.5 mg, Intravenous, Q4H PRN    HYDROmorphone, 1 mg, Intravenous, Q4H PRN    ondansetron, 8 mg, Intravenous, Q6H PRN    prochlorperazine, 5 mg, Intravenous, Q6H PRN    sodium chloride 0.9%, 10 mL, Intra-Catheter, PRN     Review of patient's allergies indicates:  No Known Allergies  Objective:     Vital Signs (Most Recent):  Temp: 97.5 °F (36.4 °C) (12/20/24 0736)  Pulse: 68 (12/20/24 0736)  Resp: 18 (12/20/24 0736)  BP: (!) 156/74 (12/20/24 0736)  SpO2: 96 % (12/20/24 0736) Vital Signs (24h Range):  Temp:  [97.5 °F (36.4 °C)-98.7 °F (37.1 °C)] 97.5 °F (36.4 °C)  Pulse:  [61-86] 68  Resp:  [16-18] 18  SpO2:  [95 %-97 %] 96 %  BP: (155-172)/(74-83) 156/74     Weight: 86.1 kg (189 lb 13.1 oz)  Body mass index is 28.03 kg/m².    Intake/Output - Last 3 Shifts         12/18 0700 12/19 0659 12/19 0700 12/20 0659 12/20 0700 12/21 0659    P.O. 240 1080     I.V. (mL/kg)  873.7 (10.1)     IV Piggyback       Total Intake(mL/kg) 240 (2.8) 1953.7 (22.7)     Urine (mL/kg/hr) 870 (0.4) 1000 (0.5)     Drains 1785 720     Stool 0 0     Total Output 2655 1720     Net -2415 +233.7            Urine Occurrence 1 x      Stool Occurrence 0 x 0 x              Physical Exam  Constitutional:       General: He is not in acute distress.  HENT:      Head: Normocephalic and atraumatic.      Nose:      Comments: NGT  Eyes:       Extraocular Movements: Extraocular movements intact.      Conjunctiva/sclera: Conjunctivae normal.      Pupils: Pupils are equal, round, and reactive to light.   Cardiovascular:      Rate and Rhythm: Normal rate and regular rhythm.   Pulmonary:      Effort: Pulmonary effort is normal. No respiratory distress.   Abdominal:      Palpations: Abdomen is soft.      Comments: Prevena vac over midline incision with seal intact   Appropriate tenderness  LLQ (intraabdominal) drain with serous output   RLQ (subcutaneous)  drain with serosanguinous output   Neurological:      Mental Status: He is alert.          Significant Labs:  I have reviewed all pertinent lab results within the past 24 hours.  CBC:   Recent Labs   Lab 12/20/24  0523   WBC 7.62   RBC 3.92*   HGB 11.4*   HCT 34.5*      MCV 88   MCH 29.1   MCHC 33.0     CMP:   Recent Labs   Lab 12/17/24  0533 12/18/24  0250 12/20/24  0523   *   < > 71   CALCIUM 8.6*   < > 7.7*   ALBUMIN 3.0*  --   --    PROT 6.8  --   --       < > 132*   K 4.3   < > 4.0   CO2 22*   < > 17*      < > 106   BUN 18   < > 19   CREATININE 1.5*   < > 1.5*   ALKPHOS 83  --   --    ALT 12  --   --    AST 15  --   --    BILITOT 0.5  --   --     < > = values in this interval not displayed.       Significant Diagnostics:  I have reviewed all pertinent imaging results/findings within the past 24 hours.

## 2024-12-20 NOTE — ASSESSMENT & PLAN NOTE
- Followed by [facility] hepatology (Dr. Escalante) - most recently on 12/13.  At that time patient said that he did not want a liver XPL and did not want a TIPS.  He has stopped drinking.  PETH neg since 5/8/23.  He has been undergoing weekly paracentesis on Wednesdays (last 12/17)  - Surgery placed orders to drain ascites fluid from abdominal drain

## 2024-12-20 NOTE — SUBJECTIVE & OBJECTIVE
Interval History:   No events overnight. General surgery following. NPO and NGT in place. Nursing to drain ascites fluid today. PPN ordered. Abdominal pain improved.      Review of Systems   Constitutional:  Negative for chills and fever.   HENT:  Negative for congestion and sore throat.    Respiratory:  Negative for cough and shortness of breath.    Cardiovascular:  Negative for chest pain and leg swelling.   Gastrointestinal:  Positive for abdominal pain, nausea and vomiting. Negative for blood in stool.   Genitourinary:  Negative for decreased urine volume, dysuria, frequency and urgency.   Musculoskeletal:  Negative for arthralgias and myalgias.   Skin:  Negative for color change and pallor.   Neurological:  Negative for dizziness, light-headedness and headaches.   Psychiatric/Behavioral:  Negative for agitation and confusion.      Objective:     Vital Signs (Most Recent):  Temp: 97.5 °F (36.4 °C) (12/20/24 1142)  Pulse: 88 (12/20/24 1144)  Resp: 18 (12/20/24 1142)  BP: (!) 172/83 (12/20/24 1142)  SpO2: 98 % (12/20/24 1142) Vital Signs (24h Range):  Temp:  [97.5 °F (36.4 °C)-98.7 °F (37.1 °C)] 97.5 °F (36.4 °C)  Pulse:  [61-88] 88  Resp:  [16-18] 18  SpO2:  [95 %-98 %] 98 %  BP: (156-172)/(74-83) 172/83     Weight: 86.1 kg (189 lb 13.1 oz)  Body mass index is 28.03 kg/m².    Intake/Output Summary (Last 24 hours) at 12/20/2024 1257  Last data filed at 12/20/2024 0550  Gross per 24 hour   Intake 1353.69 ml   Output 1530 ml   Net -176.31 ml         Physical Exam  Constitutional:       General: He is not in acute distress.     Appearance: He is not toxic-appearing.   HENT:      Head: Normocephalic and atraumatic.      Nose:      Comments: Nasogastric tube  Cardiovascular:      Rate and Rhythm: Normal rate and regular rhythm.      Heart sounds: No murmur heard.     No friction rub. No gallop.   Pulmonary:      Effort: Pulmonary effort is normal. No respiratory distress.   Abdominal:      General: Abdomen is flat.       Palpations: Abdomen is soft.      Tenderness: There is abdominal tenderness. There is no guarding or rebound.      Comments: LLQ and RLQ drains  Prevena vac midline   Musculoskeletal:      Cervical back: Normal range of motion and neck supple.      Right lower leg: No edema.      Left lower leg: No edema.   Skin:     General: Skin is warm and dry.   Neurological:      Mental Status: He is alert. Mental status is at baseline.             Significant Labs: All pertinent labs within the past 24 hours have been reviewed.  CBC:   Recent Labs   Lab 12/19/24 0448 12/20/24  0523   WBC 8.78 7.62   HGB 10.5* 11.4*   HCT 34.6* 34.5*    182     CMP:   Recent Labs   Lab 12/19/24 0447 12/20/24  0523   * 132*   K 4.2 4.0    106   CO2 18* 17*    71   BUN 22 19   CREATININE 1.6* 1.5*   CALCIUM 7.5* 7.7*   ANIONGAP 8 9       Significant Imaging: I have reviewed all pertinent imaging results/findings within the past 24 hours.

## 2024-12-20 NOTE — NURSING
"..[facility] Plan of Care Note    Dx:   Small bowel obstruction [K56.609]  Strangulated hernia of abdominal wall [K43.6]    Shift Events:  Patient had noted drainage from insertion site of the left matteo drain.  Dressing changed noted sanguineous fluid on dressing. Patient tolerated well. Surgery on-call provider Maame notified of incisional leak and patient complaining of pain.  She stated she will come to bedside.   No new orders received.  Patient pain became better and more controlled toward end of shift.     Goals of Care: Pain management     Neuro: a&o x4     Vital Signs: BP (!) 172/78 (BP Location: Left arm, Patient Position: Lying)   Pulse 74   Temp 98.7 °F (37.1 °C) (Oral)   Resp 18   Ht 5' 9" (1.753 m)   Wt 86.1 kg (189 lb 13.1 oz)   SpO2 95%   BMI 28.03 kg/m²     Respiratory: room air     Diet: Diet NPO      Is patient tolerating current diet? yes    GTTS: see mar per MD order    Urine Output/Bowel Movement:   I/O this shift:  In: 873.7 [I.V.:873.7]  Out: 770 [Urine:350; Drains:420]  Last Bowel Movement: 12/10/24      Drains/Tubes/Tube Feeds (include total output/shift):   I/O this shift:  In: 873.7 [I.V.:873.7]  Out: 770 [Urine:350; Drains:420]      Lines: see lda       Accuchecks:: no    Skin: see flowsheet    Fall Risk Score: 11    Activity level? Stand by assist     Any scheduled procedures? None noted     Any safety concerns?  No     Other: no  "

## 2024-12-20 NOTE — PROGRESS NOTES
[facility]  General Surgery  Progress Note    Subjective:     History of Present Illness:  No notes on file    Post-Op Info:  Procedure(s) (LRB):  LAPAROTOMY, EXPLORATORY (N/A)  REPAIR, HERNIA, VENTRAL (N/A)   3 Days Post-Op     Interval History:   No acute events overnight.    Doing well this AM. Sore, but pain improved compared to prior to surgery.   NGT with 30 cc of output.   LLQ drain with 650cc serosanguinous output. RLQ drain with 40 cc output.     Medications:  Continuous Infusions:   lactated ringers   Intravenous Continuous 75 mL/hr at 12/20/24 0550 Rate Verify at 12/20/24 0550     Scheduled Meds:   acetaminophen  1,000 mg Oral Q8H    heparin (porcine)  5,000 Units Subcutaneous Q12H    methocarbamol injection  500 mg Intravenous Q8H    mupirocin   Nasal BID     PRN Meds:  Current Facility-Administered Medications:     HYDROmorphone, 1.5 mg, Intravenous, Q4H PRN    HYDROmorphone, 1 mg, Intravenous, Q4H PRN    ondansetron, 8 mg, Intravenous, Q6H PRN    prochlorperazine, 5 mg, Intravenous, Q6H PRN    sodium chloride 0.9%, 10 mL, Intra-Catheter, PRN     Review of patient's allergies indicates:  No Known Allergies  Objective:     Vital Signs (Most Recent):  Temp: 97.5 °F (36.4 °C) (12/20/24 0736)  Pulse: 68 (12/20/24 0736)  Resp: 18 (12/20/24 0736)  BP: (!) 156/74 (12/20/24 0736)  SpO2: 96 % (12/20/24 0736) Vital Signs (24h Range):  Temp:  [97.5 °F (36.4 °C)-98.7 °F (37.1 °C)] 97.5 °F (36.4 °C)  Pulse:  [61-86] 68  Resp:  [16-18] 18  SpO2:  [95 %-97 %] 96 %  BP: (155-172)/(74-83) 156/74     Weight: 86.1 kg (189 lb 13.1 oz)  Body mass index is 28.03 kg/m².    Intake/Output - Last 3 Shifts         12/18 0700  12/19 0659 12/19 0700 12/20 0659 12/20 0700  12/21 0659    P.O. 240 1080     I.V. (mL/kg)  873.7 (10.1)     IV Piggyback       Total Intake(mL/kg) 240 (2.8) 1953.7 (22.7)     Urine (mL/kg/hr) 870 (0.4) 1000 (0.5)     Drains 1785 720     Stool 0 0     Total Output 2655 1720     Net -2415 +233.7             Urine Occurrence 1 x      Stool Occurrence 0 x 0 x              Physical Exam  Constitutional:       General: He is not in acute distress.  HENT:      Head: Normocephalic and atraumatic.      Nose:      Comments: NGT  Eyes:      Extraocular Movements: Extraocular movements intact.      Conjunctiva/sclera: Conjunctivae normal.      Pupils: Pupils are equal, round, and reactive to light.   Cardiovascular:      Rate and Rhythm: Normal rate and regular rhythm.   Pulmonary:      Effort: Pulmonary effort is normal. No respiratory distress.   Abdominal:      Palpations: Abdomen is soft.      Comments: Prevena vac over midline incision with seal intact   Appropriate tenderness  LLQ (intraabdominal) drain with serous output   RLQ (subcutaneous)  drain with serosanguinous output   Neurological:      Mental Status: He is alert.          Significant Labs:  I have reviewed all pertinent lab results within the past 24 hours.  CBC:   Recent Labs   Lab 12/20/24  0523   WBC 7.62   RBC 3.92*   HGB 11.4*   HCT 34.5*      MCV 88   MCH 29.1   MCHC 33.0     CMP:   Recent Labs   Lab 12/17/24  0533 12/18/24  0250 12/20/24  0523   *   < > 71   CALCIUM 8.6*   < > 7.7*   ALBUMIN 3.0*  --   --    PROT 6.8  --   --       < > 132*   K 4.3   < > 4.0   CO2 22*   < > 17*      < > 106   BUN 18   < > 19   CREATININE 1.5*   < > 1.5*   ALKPHOS 83  --   --    ALT 12  --   --    AST 15  --   --    BILITOT 0.5  --   --     < > = values in this interval not displayed.       Significant Diagnostics:  I have reviewed all pertinent imaging results/findings within the past 24 hours.  Assessment/Plan:     Ventral hernia  64y M w/ PMHx of decompensated EtOH cirrhosis (ascites requiring recurrent weekly paracentesis), NID-T2DM, afib s/p ablation and DCCV on xarelto, COPD, CKD3 HTN transferred from OSH with concern for incarcerated ventral hernia. S/p exploratory laparotomy with small bowel resection and retrorectus hernia repair with  mesh on 12/17/2024. Doing well post-operatively.    - NPO. NGT to LIWS.   - Consider placing LLQ drain bulb to wall suction to help drain ascites. Would recommend careful monitoring output as he would require albumin replacement 2/2 fluid shifts  - Prevena vac in place  - LLQ drain (intraabdominal) empty and record output q3h.   - RLQ drain (retrorectus) record output as needed.   - MMPC   - Okay for DVT ppx from surgical standpoint   - Rest of care per primary. Please call General Surgery with any questions or concerns.         Prince Murry MD  General Surgery  [facility]

## 2024-12-21 LAB
ANION GAP SERPL CALC-SCNC: 14 MMOL/L (ref 8–16)
BASOPHILS # BLD AUTO: 0.02 K/UL (ref 0–0.2)
BASOPHILS NFR BLD: 0.2 % (ref 0–1.9)
BUN SERPL-MCNC: 22 MG/DL (ref 8–23)
CALCIUM SERPL-MCNC: 8.1 MG/DL (ref 8.7–10.5)
CHLORIDE SERPL-SCNC: 102 MMOL/L (ref 95–110)
CO2 SERPL-SCNC: 16 MMOL/L (ref 23–29)
CREAT SERPL-MCNC: 1.5 MG/DL (ref 0.5–1.4)
DIFFERENTIAL METHOD BLD: ABNORMAL
EOSINOPHIL # BLD AUTO: 0.2 K/UL (ref 0–0.5)
EOSINOPHIL NFR BLD: 1.7 % (ref 0–8)
ERYTHROCYTE [DISTWIDTH] IN BLOOD BY AUTOMATED COUNT: 14 % (ref 11.5–14.5)
EST. GFR  (NO RACE VARIABLE): 52 ML/MIN/1.73 M^2
GLUCOSE SERPL-MCNC: 67 MG/DL (ref 70–110)
HCT VFR BLD AUTO: 34.5 % (ref 40–54)
HGB BLD-MCNC: 11.3 G/DL (ref 14–18)
IMM GRANULOCYTES # BLD AUTO: 0.06 K/UL (ref 0–0.04)
IMM GRANULOCYTES NFR BLD AUTO: 0.7 % (ref 0–0.5)
LYMPHOCYTES # BLD AUTO: 0.9 K/UL (ref 1–4.8)
LYMPHOCYTES NFR BLD: 10.1 % (ref 18–48)
MAGNESIUM SERPL-MCNC: 2 MG/DL (ref 1.6–2.6)
MCH RBC QN AUTO: 28 PG (ref 27–31)
MCHC RBC AUTO-ENTMCNC: 32.8 G/DL (ref 32–36)
MCV RBC AUTO: 86 FL (ref 82–98)
MONOCYTES # BLD AUTO: 0.9 K/UL (ref 0.3–1)
MONOCYTES NFR BLD: 10.2 % (ref 4–15)
NEUTROPHILS # BLD AUTO: 6.7 K/UL (ref 1.8–7.7)
NEUTROPHILS NFR BLD: 77.1 % (ref 38–73)
NRBC BLD-RTO: 0 /100 WBC
PHOSPHATE SERPL-MCNC: 3.2 MG/DL (ref 2.7–4.5)
PLATELET # BLD AUTO: 211 K/UL (ref 150–450)
PMV BLD AUTO: 10.5 FL (ref 9.2–12.9)
POTASSIUM SERPL-SCNC: 4 MMOL/L (ref 3.5–5.1)
RBC # BLD AUTO: 4.03 M/UL (ref 4.6–6.2)
SODIUM SERPL-SCNC: 132 MMOL/L (ref 136–145)
TRIGL SERPL-MCNC: 162 MG/DL (ref 30–150)
WBC # BLD AUTO: 8.7 K/UL (ref 3.9–12.7)

## 2024-12-21 PROCEDURE — 25000003 PHARM REV CODE 250: Mod: NTX | Performed by: STUDENT IN AN ORGANIZED HEALTH CARE EDUCATION/TRAINING PROGRAM

## 2024-12-21 PROCEDURE — 63600175 PHARM REV CODE 636 W HCPCS: Mod: NTX | Performed by: STUDENT IN AN ORGANIZED HEALTH CARE EDUCATION/TRAINING PROGRAM

## 2024-12-21 PROCEDURE — 20600001 HC STEP DOWN PRIVATE ROOM: Mod: NTX

## 2024-12-21 PROCEDURE — 83735 ASSAY OF MAGNESIUM: CPT | Mod: NTX | Performed by: STUDENT IN AN ORGANIZED HEALTH CARE EDUCATION/TRAINING PROGRAM

## 2024-12-21 PROCEDURE — 84478 ASSAY OF TRIGLYCERIDES: CPT | Mod: NTX | Performed by: STUDENT IN AN ORGANIZED HEALTH CARE EDUCATION/TRAINING PROGRAM

## 2024-12-21 PROCEDURE — 94761 N-INVAS EAR/PLS OXIMETRY MLT: CPT | Mod: NTX

## 2024-12-21 PROCEDURE — 25000003 PHARM REV CODE 250: Mod: NTX

## 2024-12-21 PROCEDURE — 84100 ASSAY OF PHOSPHORUS: CPT | Mod: NTX | Performed by: STUDENT IN AN ORGANIZED HEALTH CARE EDUCATION/TRAINING PROGRAM

## 2024-12-21 PROCEDURE — 36415 COLL VENOUS BLD VENIPUNCTURE: CPT | Mod: NTX | Performed by: STUDENT IN AN ORGANIZED HEALTH CARE EDUCATION/TRAINING PROGRAM

## 2024-12-21 PROCEDURE — 25000003 PHARM REV CODE 250: Mod: NTX | Performed by: HOSPITALIST

## 2024-12-21 PROCEDURE — 80048 BASIC METABOLIC PNL TOTAL CA: CPT | Mod: NTX | Performed by: STUDENT IN AN ORGANIZED HEALTH CARE EDUCATION/TRAINING PROGRAM

## 2024-12-21 PROCEDURE — 85025 COMPLETE CBC W/AUTO DIFF WBC: CPT | Mod: NTX | Performed by: STUDENT IN AN ORGANIZED HEALTH CARE EDUCATION/TRAINING PROGRAM

## 2024-12-21 RX ORDER — THIAMINE HCL 100 MG
100 TABLET ORAL DAILY
Status: DISCONTINUED | OUTPATIENT
Start: 2024-12-22 | End: 2024-12-23 | Stop reason: HOSPADM

## 2024-12-21 RX ORDER — CALCIUM CARBONATE 200(500)MG
500 TABLET,CHEWABLE ORAL DAILY PRN
Status: DISCONTINUED | OUTPATIENT
Start: 2024-12-21 | End: 2024-12-23 | Stop reason: HOSPADM

## 2024-12-21 RX ORDER — POLYETHYLENE GLYCOL 3350 17 G/17G
17 POWDER, FOR SOLUTION ORAL DAILY
Status: DISCONTINUED | OUTPATIENT
Start: 2024-12-21 | End: 2024-12-23 | Stop reason: HOSPADM

## 2024-12-21 RX ORDER — HYDROCODONE BITARTRATE AND ACETAMINOPHEN 5; 325 MG/1; MG/1
1 TABLET ORAL EVERY 6 HOURS PRN
Status: DISCONTINUED | OUTPATIENT
Start: 2024-12-21 | End: 2024-12-23 | Stop reason: HOSPADM

## 2024-12-21 RX ORDER — PROMETHAZINE HYDROCHLORIDE 12.5 MG/1
12.5 TABLET ORAL EVERY 6 HOURS PRN
Status: DISCONTINUED | OUTPATIENT
Start: 2024-12-21 | End: 2024-12-23 | Stop reason: HOSPADM

## 2024-12-21 RX ORDER — OMEGA-3-ACID ETHYL ESTERS 1 G/1
1 CAPSULE, LIQUID FILLED ORAL 2 TIMES DAILY
Status: DISCONTINUED | OUTPATIENT
Start: 2024-12-21 | End: 2024-12-23 | Stop reason: HOSPADM

## 2024-12-21 RX ORDER — PANTOPRAZOLE SODIUM 40 MG/1
40 TABLET, DELAYED RELEASE ORAL 2 TIMES DAILY
Status: DISCONTINUED | OUTPATIENT
Start: 2024-12-21 | End: 2024-12-23 | Stop reason: HOSPADM

## 2024-12-21 RX ORDER — AMIODARONE HYDROCHLORIDE 200 MG/1
200 TABLET ORAL DAILY
Status: DISCONTINUED | OUTPATIENT
Start: 2024-12-21 | End: 2024-12-23 | Stop reason: HOSPADM

## 2024-12-21 RX ORDER — METOPROLOL TARTRATE 50 MG/1
100 TABLET ORAL DAILY
Status: DISCONTINUED | OUTPATIENT
Start: 2024-12-21 | End: 2024-12-23 | Stop reason: HOSPADM

## 2024-12-21 RX ORDER — HYDROCODONE BITARTRATE AND ACETAMINOPHEN 10; 325 MG/1; MG/1
1 TABLET ORAL EVERY 6 HOURS PRN
Status: DISCONTINUED | OUTPATIENT
Start: 2024-12-21 | End: 2024-12-23 | Stop reason: HOSPADM

## 2024-12-21 RX ORDER — SODIUM CHLORIDE 0.9 % (FLUSH) 0.9 %
10 SYRINGE (ML) INJECTION EVERY 12 HOURS PRN
Status: DISCONTINUED | OUTPATIENT
Start: 2024-12-21 | End: 2024-12-21

## 2024-12-21 RX ORDER — TRAZODONE HYDROCHLORIDE 50 MG/1
50 TABLET ORAL NIGHTLY PRN
Status: DISCONTINUED | OUTPATIENT
Start: 2024-12-21 | End: 2024-12-23 | Stop reason: HOSPADM

## 2024-12-21 RX ORDER — FOLIC ACID 1 MG/1
1000 TABLET ORAL DAILY
Status: DISCONTINUED | OUTPATIENT
Start: 2024-12-21 | End: 2024-12-23 | Stop reason: HOSPADM

## 2024-12-21 RX ORDER — LEVOTHYROXINE SODIUM 50 UG/1
50 TABLET ORAL
Status: DISCONTINUED | OUTPATIENT
Start: 2024-12-22 | End: 2024-12-23 | Stop reason: HOSPADM

## 2024-12-21 RX ADMIN — MUPIROCIN: 20 OINTMENT TOPICAL at 08:12

## 2024-12-21 RX ADMIN — PANTOPRAZOLE SODIUM 40 MG: 40 TABLET, DELAYED RELEASE ORAL at 08:12

## 2024-12-21 RX ADMIN — HYDROMORPHONE HYDROCHLORIDE 1.5 MG: 2 INJECTION INTRAMUSCULAR; INTRAVENOUS; SUBCUTANEOUS at 12:12

## 2024-12-21 RX ADMIN — ACETAMINOPHEN 1000 MG: 500 TABLET ORAL at 06:12

## 2024-12-21 RX ADMIN — HEPARIN SODIUM 5000 UNITS: 5000 INJECTION INTRAVENOUS; SUBCUTANEOUS at 08:12

## 2024-12-21 RX ADMIN — HEPARIN SODIUM 5000 UNITS: 5000 INJECTION INTRAVENOUS; SUBCUTANEOUS at 09:12

## 2024-12-21 RX ADMIN — METOPROLOL TARTRATE 100 MG: 50 TABLET, FILM COATED ORAL at 01:12

## 2024-12-21 RX ADMIN — HYDROCODONE BITARTRATE AND ACETAMINOPHEN 1 TABLET: 5; 325 TABLET ORAL at 06:12

## 2024-12-21 RX ADMIN — ACETAMINOPHEN 1000 MG: 500 TABLET ORAL at 10:12

## 2024-12-21 RX ADMIN — CALCIUM CARBONATE (ANTACID) CHEW TAB 500 MG 500 MG: 500 CHEW TAB at 09:12

## 2024-12-21 RX ADMIN — Medication 6 MG: at 08:12

## 2024-12-21 RX ADMIN — MUPIROCIN: 20 OINTMENT TOPICAL at 09:12

## 2024-12-21 RX ADMIN — CALCIUM CARBONATE (ANTACID) CHEW TAB 500 MG 500 MG: 500 CHEW TAB at 03:12

## 2024-12-21 RX ADMIN — AMIODARONE HYDROCHLORIDE 200 MG: 200 TABLET ORAL at 01:12

## 2024-12-21 RX ADMIN — OMEGA-3-ACID ETHYL ESTERS 1 G: 1 CAPSULE, LIQUID FILLED ORAL at 08:12

## 2024-12-21 RX ADMIN — FOLIC ACID 1000 MCG: 1 TABLET ORAL at 01:12

## 2024-12-21 RX ADMIN — HYDROCODONE BITARTRATE AND ACETAMINOPHEN 1 TABLET: 5; 325 TABLET ORAL at 12:12

## 2024-12-21 RX ADMIN — HYDROMORPHONE HYDROCHLORIDE 1.5 MG: 2 INJECTION INTRAMUSCULAR; INTRAVENOUS; SUBCUTANEOUS at 06:12

## 2024-12-21 NOTE — PROGRESS NOTES
[facility]  Progress Note    Patient Name: Jonny Isabel  MRN: [id number]  Patient Class: IP- Inpatient   Admission Date: 12/17/2024  Length of Stay: 3 days  Attending Physician: Lana Emerson MD  Primary Care Provider: Yuli Costa MD      Subjective     Principal Problem:Small bowel obstruction      HPI:  Jonny Isabel is a 64 y.o. male with past medical history of AFL s/p RFA (2015), PAF (on Xarelto), tobacco abuse (1 ppd x 50 years), COPD, DM2, HTN, alcoholic cirrhosis with esophageal varices and ascites requiring weekly paracentesis, CKD 3, ventral hernia who presents as transfer from [facility] with nausea, vomiting and abd pain. While at OSH he underwent CT a/p which demonstrated partial SBO 2/2 ventral hernia containing ascites and mildly distended loop of bowel, and patient was transferred to [facility] for general surgery evaluation, where he was taken to OR class A for exploratory laparotomy with small bowel resection and retrorectus hernia repair with mesh. He then was transferred to hospital medicine service. Patient evaluated in pacu. He reports abdominal soreness which is improved from initial presentation. He has NGT in place and denies nausea / vomiting. Of note, patient last underwent paracentesis on 12/17 by IR during which 3600 cc fluid was removed which was negative for SBP.     Overview/Hospital Course:  Patient taken to OR with General surgery for management of small-bowel obstruction and strangulated hernia on 12/17.  NG tube placed postoperatively and patient made NPO.  Pain gradually improved days following surgery.  NG tube able to be removed on 12/20.  Clear liquid diet started per surgery recommendations on 12/21.    Interval History:  Patient has no complaints this a.m except mild heartburn.    Review of Systems   All other systems reviewed and are negative.    Objective:     Vital Signs (Most Recent):  Temp: 97.6 °F (36.4 °C) (12/21/24 1140)  Pulse: 74  (12/21/24 1140)  Resp: 20 (12/21/24 1230)  BP: (!) 176/95 (12/21/24 1140)  SpO2: 97 % (12/21/24 1140) Vital Signs (24h Range):  Temp:  [97.5 °F (36.4 °C)-98.2 °F (36.8 °C)] 97.6 °F (36.4 °C)  Pulse:  [71-88] 74  Resp:  [16-20] 20  SpO2:  [93 %-97 %] 97 %  BP: (159-179)/(79-95) 176/95     Weight: 86.1 kg (189 lb 13.1 oz)  Body mass index is 28.03 kg/m².    Intake/Output Summary (Last 24 hours) at 12/21/2024 1240  Last data filed at 12/21/2024 0600  Gross per 24 hour   Intake --   Output 983 ml   Net -983 ml         Physical Exam  Constitutional:       General: He is not in acute distress.     Appearance: He is not toxic-appearing.   HENT:      Head: Normocephalic and atraumatic.     Cardiovascular:      Rate and Rhythm: Normal rate and regular rhythm.      Heart sounds: No murmur heard.     No friction rub. No gallop.   Pulmonary:      Effort: Pulmonary effort is normal. No respiratory distress.   Abdominal:      General: Abdomen is flat.      Palpations: Abdomen is soft.      Tenderness: There is no guarding or rebound.      Comments: LLQ and RLQ drains  Prevena vac midline   Musculoskeletal:      Cervical back: Normal range of motion and neck supple.      Right lower leg: No edema.      Left lower leg: No edema.   Skin:     General: Skin is warm and dry.   Neurological:      Mental Status: He is alert. Mental status is at baseline.   Psychiatric:         Mood and Affect: Mood normal.         Behavior: Behavior normal.         Thought Content: Thought content normal.         Judgment: Judgment normal.            Significant Labs: All pertinent labs within the past 24 hours have been reviewed.  CBC:   Recent Labs   Lab 12/20/24  0523 12/21/24  0334   WBC 7.62 8.70   HGB 11.4* 11.3*   HCT 34.5* 34.5*    211     CMP:   Recent Labs   Lab 12/20/24  0523 12/21/24  0334   * 132*   K 4.0 4.0    102   CO2 17* 16*   GLU 71 67*   BUN 19 22   CREATININE 1.5* 1.5*   CALCIUM 7.7* 8.1*   ANIONGAP 9 14        Significant Imaging: I have reviewed all pertinent imaging results/findings within the past 24 hours.    Assessment and Plan     * Small bowel obstruction  Ventral heria  - CT a/p demonstrated partial SBO 2/2 ventral hernia containing ascites and mildly distended loop of bowel  - General surgery consulted  - OR on 12/17 for ex lap with small bowel resection and retrorectus hernia repair with mesh.   -advance to clear liquid diet today  - DVT ppx    Ventral hernia  No acute management needed      Hypothyroid  We will resume thyroid supplementation today      Ascites due to alcoholic cirrhosis  - Followed by [facility] hepatology (Dr. Escalante) - most recently on 12/13.  At that time patient said that he did not want a liver XPL and did not want a TIPS.  He has stopped drinking.  PETH neg since 5/8/23.  He has been undergoing weekly paracentesis on Wednesdays (last 12/17)  - Surgery placed orders to drain ascites fluid from abdominal drain    A-fib      Antiarrhythmics  amiodarone tablet 200 mg, Daily, Oral  metoprolol tartrate (LOPRESSOR) tablet 100 mg, Daily, OralAmiodarone  Metoprolol    Anticoagulants  heparin (porcine) injection 5,000 Units, Every 12 hours, Subcutaneous    Plan  - Replete lytes with a goal of K>4, Mg >2  - resume p.o. meds today  - hold xarelto for now, dvt ppx      VTE Risk Mitigation (From admission, onward)           Ordered     heparin (porcine) injection 5,000 Units  Every 12 hours         12/18/24 0121     IP VTE HIGH RISK PATIENT  Once         12/18/24 0121     Place sequential compression device  Until discontinued         12/18/24 0121                    Discharge Planning   DALILA: 12/27/2024     Code Status: Prior   Medical Readiness for Discharge Date:   Discharge Plan A: Home with family        Please place Justification for DME        Lana Emerson MD, FACP, [facility]  Senior Hospitalist  Department of Hospital Medicine   [facility]

## 2024-12-21 NOTE — ASSESSMENT & PLAN NOTE
- Followed by [facility] [place] (Dr. Escalante) - most recently on 12/13.  At that time patient said that he did not want a liver XPL and did not want a TIPS.  He has stopped drinking.  PETH neg since 5/8/23.  He has been undergoing weekly paracentesis on Wednesdays (last 12/17)  - Surgery placed orders to drain ascites fluid from abdominal drain

## 2024-12-21 NOTE — ASSESSMENT & PLAN NOTE
64y M w/ PMHx of decompensated EtOH cirrhosis (ascites requiring recurrent weekly paracentesis), NID-T2DM, afib s/p ablation and DCCV on xarelto, COPD, CKD3 HTN transferred from OSH with concern for incarcerated ventral hernia. S/p exploratory laparotomy with small bowel resection and retrorectus hernia repair with mesh on 12/17/2024. Doing well post-operatively.    - Okay for CLD  - Consider placing LLQ drain bulb to wall suction to help drain ascites. Would recommend careful monitoring output as he would require albumin replacement 2/2 fluid shifts  - Recommend starting daily miralax for bowel regimen   - needs to ambulate and OOBTC  - Prevena vac in place  - LLQ drain (intraabdominal) empty and record output q3h.   - RLQ drain (retrorectus) record output as needed.   - MMPC   - Okay for DVT ppx from surgical standpoint   - Rest of care per primary. Please call General Surgery with any questions or concerns.

## 2024-12-21 NOTE — ASSESSMENT & PLAN NOTE
Ventral heria  - CT a/p demonstrated partial SBO 2/2 ventral hernia containing ascites and mildly distended loop of bowel  - General surgery consulted  - OR on 12/17 for ex lap with small bowel resection and retrorectus hernia repair with mesh.   -advance to clear liquid diet today  - DVT ppx

## 2024-12-21 NOTE — NURSING
"..[facility] Plan of Care Note    Dx:   Small bowel obstruction [K56.609]  Strangulated hernia of abdominal wall [K43.6]    Shift Events: patient experienced heartburn. Provider notified orders implemented as received. Patient left bulb placed to bulb suction and left matteo bulb switched out due to not draining appropriately. Surgery on-call and reevaluated drain and agree this was best call.  Dressing change implemented on left drain due to leakage. Patient asymptomatic and requiring pain medication for incisional pain. Patient does not have appropriate access to implement ordered TPN and fats on-call surgeon notified and charge nurse.     Goals of Care: pain management and infection prevention    Neuro: a&o x 4    Vital Signs: BP (!) 159/79 (BP Location: Right arm, Patient Position: Lying)   Pulse 79   Temp 97.6 °F (36.4 °C) (Oral)   Resp 16   Ht 5' 9" (1.753 m)   Wt 86.1 kg (189 lb 13.1 oz)   SpO2 95%   BMI 28.03 kg/m²     Respiratory: room air     Diet: Diet NPO Except for: Medication      Is patient tolerating current diet? Yes     GTTS: lr @75ml/hr    Urine Output/Bowel Movement:   I/O this shift:  In: -   Out: 245 [Drains:245]  Last Bowel Movement: 12/10/24      Drains/Tubes/Tube Feeds (include total output/shift):   I/O this shift:  In: -   Out: 245 [Drains:245]      Lines: see lda      Accuchecks: Q6hr    Skin: see flowsheet     Fall Risk Score: 9    Activity level? Stand by assist     Any scheduled procedures? None     Any safety concerns? No     Other: none   "

## 2024-12-21 NOTE — HOSPITAL COURSE
Patient taken to OR with General surgery for management of small-bowel obstruction and strangulated hernia on 12/17.  NG tube placed postoperatively and patient made NPO.  Pain gradually improved days following surgery.  NG tube able to be removed on 12/20.  Clear liquid diet started per surgery recommendations on 12/21 and advanced to full liquid 12/22.  Surgery pulled the left drain 12/22. Right drain and the Prevena removed 12/23. Pain controlled and tolerating diet at discharge by genera surgery.        Patient deemed appropriate for discharge. I personally saw, examined, and evaluated patient prior to departure. Plan discussed with patient, who was agreeable and amenable; medications were discussed and reviewed, outpatient follow-up scheduled, ER precautions were given, all questions were answered to the patient's satisfaction, and [facility] was subsequently discharged.

## 2024-12-21 NOTE — ASSESSMENT & PLAN NOTE
Antiarrhythmics  amiodarone tablet 200 mg, Daily, Oral  metoprolol tartrate (LOPRESSOR) tablet 100 mg, Daily, OralAmiodarone  Metoprolol    Anticoagulants  heparin (porcine) injection 5,000 Units, Every 12 hours, Subcutaneous    Plan  - Replete lytes with a goal of K>4, Mg >2  - resume p.o. meds today  - hold xarelto for now, dvt ppx

## 2024-12-21 NOTE — PROGRESS NOTES
[facility]  General Surgery  Progress Note    Subjective:     History of Present Illness:  No notes on file    Post-Op Info:  Procedure(s) (LRB):  LAPAROTOMY, EXPLORATORY (N/A)  REPAIR, HERNIA, VENTRAL (N/A)   4 Days Post-Op     Interval History:   No acute events overnight.    Doing well.   NGT removed yesterday.   Passing flatus.   Left ANJU with 835cc serosanguinous output.   Pain improved.   Feeling less bloated.    Medications:  Continuous Infusions:   lactated ringers   Intravenous Continuous 75 mL/hr at 12/20/24 1931 New Bag at 12/20/24 1931    Standard Custom Day One ADULT TPN for patient with NO electrolyte abnormality or NO renal dysfunction (PERIPHERAL)   Intravenous Continuous         Scheduled Meds:   acetaminophen  1,000 mg Oral Q8H    fat emulsion 20%  250 mL Intravenous Daily    heparin (porcine)  5,000 Units Subcutaneous Q12H    mupirocin   Nasal BID     PRN Meds:  Current Facility-Administered Medications:     calcium carbonate, 500 mg, Oral, Daily PRN    HYDROmorphone, 1.5 mg, Intravenous, Q4H PRN    HYDROmorphone, 1 mg, Intravenous, Q4H PRN    melatonin, 6 mg, Oral, Nightly PRN    ondansetron, 8 mg, Intravenous, Q6H PRN    prochlorperazine, 5 mg, Intravenous, Q6H PRN    sodium chloride 0.9%, 10 mL, Intra-Catheter, PRN    Flushing PICC/Midline Protocol, , , Until Discontinued **AND** sodium chloride 0.9%, 10 mL, Intravenous, Q12H PRN     Review of patient's allergies indicates:  No Known Allergies  Objective:     Vital Signs (Most Recent):  Temp: 97.5 °F (36.4 °C) (12/21/24 0744)  Pulse: 83 (12/21/24 0744)  Resp: 18 (12/21/24 0744)  BP: (!) 179/86 (12/21/24 0744)  SpO2: 96 % (12/21/24 0744) Vital Signs (24h Range):  Temp:  [97.5 °F (36.4 °C)-98.2 °F (36.8 °C)] 97.5 °F (36.4 °C)  Pulse:  [71-88] 83  Resp:  [16-18] 18  SpO2:  [93 %-98 %] 96 %  BP: (159-179)/(79-86) 179/86     Weight: 86.1 kg (189 lb 13.1 oz)  Body mass index is 28.03 kg/m².    Intake/Output - Last 3 Shifts         12/19  0700  12/20 0659 12/20 0700  12/21 0659 12/21 0700  12/22 0659    P.O. 1080      I.V. (mL/kg) 873.7 (10.1)      Total Intake(mL/kg) 1953.7 (22.7)      Urine (mL/kg/hr) 1000 (0.5) 260 (0.1)     Drains 720 843     Other  0     Stool 0 0     Total Output 1720 1103     Net +233.7 -1103            Urine Occurrence  1 x     Stool Occurrence 0 x 0 x              Physical Exam  Constitutional:       General: He is not in acute distress.  HENT:      Head: Normocephalic and atraumatic.   Eyes:      Extraocular Movements: Extraocular movements intact.      Conjunctiva/sclera: Conjunctivae normal.      Pupils: Pupils are equal, round, and reactive to light.   Cardiovascular:      Rate and Rhythm: Normal rate and regular rhythm.   Pulmonary:      Effort: Pulmonary effort is normal. No respiratory distress.   Abdominal:      Palpations: Abdomen is soft.      Comments: Prevena vac over midline incision with seal intact   Appropriate tenderness  LLQ (intraabdominal) drain with serous output   RLQ (subcutaneous)  drain with serosanguinous output   Neurological:      Mental Status: He is alert.          Significant Labs:  I have reviewed all pertinent lab results within the past 24 hours.  CBC:   Recent Labs   Lab 12/21/24  0334   WBC 8.70   RBC 4.03*   HGB 11.3*   HCT 34.5*      MCV 86   MCH 28.0   MCHC 32.8     CMP:   Recent Labs   Lab 12/17/24  0533 12/18/24  0250 12/21/24  0334   *   < > 67*   CALCIUM 8.6*   < > 8.1*   ALBUMIN 3.0*  --   --    PROT 6.8  --   --       < > 132*   K 4.3   < > 4.0   CO2 22*   < > 16*      < > 102   BUN 18   < > 22   CREATININE 1.5*   < > 1.5*   ALKPHOS 83  --   --    ALT 12  --   --    AST 15  --   --    BILITOT 0.5  --   --     < > = values in this interval not displayed.       Significant Diagnostics:  I have reviewed all pertinent imaging results/findings within the past 24 hours.  Assessment/Plan:     Ventral hernia  64y M w/ PMHx of decompensated EtOH cirrhosis (ascites  requiring recurrent weekly paracentesis), NID-T2DM, afib s/p ablation and DCCV on xarelto, COPD, CKD3 HTN transferred from OSH with concern for incarcerated ventral hernia. S/p exploratory laparotomy with small bowel resection and retrorectus hernia repair with mesh on 12/17/2024. Doing well post-operatively.    - Okay for CLD  - Consider placing LLQ drain bulb to wall suction to help drain ascites. Would recommend careful monitoring output as he would require albumin replacement 2/2 fluid shifts  - Recommend starting daily miralax for bowel regimen   - needs to ambulate and OOBTC  - Prevena vac in place  - LLQ drain (intraabdominal) empty and record output q3h.   - RLQ drain (retrorectus) record output as needed.   - MMPC   - Okay for DVT ppx from surgical standpoint   - Rest of care per primary. Please call General Surgery with any questions or concerns.         Mary Serrano MD  General Surgery  [facility]

## 2024-12-21 NOTE — NURSING
IV infiltrated.  MD Ki notified as pt was waiting for a midline.  MD okay with leaving out IV for now to see how he tolerates his diet.  Medications switched to PO.  PICC/Midline order cancelled.

## 2024-12-21 NOTE — SUBJECTIVE & OBJECTIVE
Interval History:  Patient has no complaints this a.m except mild heartburn.    Review of Systems   All other systems reviewed and are negative.    Objective:     Vital Signs (Most Recent):  Temp: 97.6 °F (36.4 °C) (12/21/24 1140)  Pulse: 74 (12/21/24 1140)  Resp: 20 (12/21/24 1230)  BP: (!) 176/95 (12/21/24 1140)  SpO2: 97 % (12/21/24 1140) Vital Signs (24h Range):  Temp:  [97.5 °F (36.4 °C)-98.2 °F (36.8 °C)] 97.6 °F (36.4 °C)  Pulse:  [71-88] 74  Resp:  [16-20] 20  SpO2:  [93 %-97 %] 97 %  BP: (159-179)/(79-95) 176/95     Weight: 86.1 kg (189 lb 13.1 oz)  Body mass index is 28.03 kg/m².    Intake/Output Summary (Last 24 hours) at 12/21/2024 1240  Last data filed at 12/21/2024 0600  Gross per 24 hour   Intake --   Output 983 ml   Net -983 ml         Physical Exam  Constitutional:       General: He is not in acute distress.     Appearance: He is not toxic-appearing.   HENT:      Head: Normocephalic and atraumatic.      Nose:      Comments: Nasogastric tube  Cardiovascular:      Rate and Rhythm: Normal rate and regular rhythm.      Heart sounds: No murmur heard.     No friction rub. No gallop.   Pulmonary:      Effort: Pulmonary effort is normal. No respiratory distress.   Abdominal:      General: Abdomen is flat.      Palpations: Abdomen is soft.      Tenderness: There is abdominal tenderness. There is no guarding or rebound.      Comments: LLQ and RLQ drains  Prevena vac midline   Musculoskeletal:      Cervical back: Normal range of motion and neck supple.      Right lower leg: No edema.      Left lower leg: No edema.   Skin:     General: Skin is warm and dry.   Neurological:      Mental Status: He is alert. Mental status is at baseline.   Psychiatric:         Mood and Affect: Mood normal.         Behavior: Behavior normal.         Thought Content: Thought content normal.         Judgment: Judgment normal.            Significant Labs: All pertinent labs within the past 24 hours have been reviewed.  CBC:   Recent  Labs   Lab 12/20/24  0523 12/21/24  0334   WBC 7.62 8.70   HGB 11.4* 11.3*   HCT 34.5* 34.5*    211     CMP:   Recent Labs   Lab 12/20/24  0523 12/21/24  0334   * 132*   K 4.0 4.0    102   CO2 17* 16*   GLU 71 67*   BUN 19 22   CREATININE 1.5* 1.5*   CALCIUM 7.7* 8.1*   ANIONGAP 9 14       Significant Imaging: I have reviewed all pertinent imaging results/findings within the past 24 hours.

## 2024-12-21 NOTE — SUBJECTIVE & OBJECTIVE
Interval History:   No acute events overnight.    Doing well.   NGT removed yesterday.   Passing flatus.   Left ANJU with 835cc serosanguinous output.   Pain improved.   Feeling less bloated.    Medications:  Continuous Infusions:   lactated ringers   Intravenous Continuous 75 mL/hr at 12/20/24 1931 New Bag at 12/20/24 1931    Standard Custom Day One ADULT TPN for patient with NO electrolyte abnormality or NO renal dysfunction (PERIPHERAL)   Intravenous Continuous         Scheduled Meds:   acetaminophen  1,000 mg Oral Q8H    fat emulsion 20%  250 mL Intravenous Daily    heparin (porcine)  5,000 Units Subcutaneous Q12H    mupirocin   Nasal BID     PRN Meds:  Current Facility-Administered Medications:     calcium carbonate, 500 mg, Oral, Daily PRN    HYDROmorphone, 1.5 mg, Intravenous, Q4H PRN    HYDROmorphone, 1 mg, Intravenous, Q4H PRN    melatonin, 6 mg, Oral, Nightly PRN    ondansetron, 8 mg, Intravenous, Q6H PRN    prochlorperazine, 5 mg, Intravenous, Q6H PRN    sodium chloride 0.9%, 10 mL, Intra-Catheter, PRN    Flushing PICC/Midline Protocol, , , Until Discontinued **AND** sodium chloride 0.9%, 10 mL, Intravenous, Q12H PRN     Review of patient's allergies indicates:  No Known Allergies  Objective:     Vital Signs (Most Recent):  Temp: 97.5 °F (36.4 °C) (12/21/24 0744)  Pulse: 83 (12/21/24 0744)  Resp: 18 (12/21/24 0744)  BP: (!) 179/86 (12/21/24 0744)  SpO2: 96 % (12/21/24 0744) Vital Signs (24h Range):  Temp:  [97.5 °F (36.4 °C)-98.2 °F (36.8 °C)] 97.5 °F (36.4 °C)  Pulse:  [71-88] 83  Resp:  [16-18] 18  SpO2:  [93 %-98 %] 96 %  BP: (159-179)/(79-86) 179/86     Weight: 86.1 kg (189 lb 13.1 oz)  Body mass index is 28.03 kg/m².    Intake/Output - Last 3 Shifts         12/19 0700 12/20 0659 12/20 0700 12/21 0659 12/21 0700 12/22 0659    P.O. 1080      I.V. (mL/kg) 873.7 (10.1)      Total Intake(mL/kg) 1953.7 (22.7)      Urine (mL/kg/hr) 1000 (0.5) 260 (0.1)     Drains 720 843     Other  0     Stool 0 0      Total Output 1720 1103     Net +233.7 -1103            Urine Occurrence  1 x     Stool Occurrence 0 x 0 x              Physical Exam  Constitutional:       General: He is not in acute distress.  HENT:      Head: Normocephalic and atraumatic.   Eyes:      Extraocular Movements: Extraocular movements intact.      Conjunctiva/sclera: Conjunctivae normal.      Pupils: Pupils are equal, round, and reactive to light.   Cardiovascular:      Rate and Rhythm: Normal rate and regular rhythm.   Pulmonary:      Effort: Pulmonary effort is normal. No respiratory distress.   Abdominal:      Palpations: Abdomen is soft.      Comments: Prevena vac over midline incision with seal intact   Appropriate tenderness  LLQ (intraabdominal) drain with serous output   RLQ (subcutaneous)  drain with serosanguinous output   Neurological:      Mental Status: He is alert.          Significant Labs:  I have reviewed all pertinent lab results within the past 24 hours.  CBC:   Recent Labs   Lab 12/21/24  0334   WBC 8.70   RBC 4.03*   HGB 11.3*   HCT 34.5*      MCV 86   MCH 28.0   MCHC 32.8     CMP:   Recent Labs   Lab 12/17/24  0533 12/18/24  0250 12/21/24  0334   *   < > 67*   CALCIUM 8.6*   < > 8.1*   ALBUMIN 3.0*  --   --    PROT 6.8  --   --       < > 132*   K 4.3   < > 4.0   CO2 22*   < > 16*      < > 102   BUN 18   < > 22   CREATININE 1.5*   < > 1.5*   ALKPHOS 83  --   --    ALT 12  --   --    AST 15  --   --    BILITOT 0.5  --   --     < > = values in this interval not displayed.       Significant Diagnostics:  I have reviewed all pertinent imaging results/findings within the past 24 hours.

## 2024-12-22 LAB
ANION GAP SERPL CALC-SCNC: 11 MMOL/L (ref 8–16)
BASOPHILS # BLD AUTO: 0.05 K/UL (ref 0–0.2)
BASOPHILS NFR BLD: 0.7 % (ref 0–1.9)
BUN SERPL-MCNC: 22 MG/DL (ref 8–23)
CALCIUM SERPL-MCNC: 8.2 MG/DL (ref 8.7–10.5)
CHLORIDE SERPL-SCNC: 107 MMOL/L (ref 95–110)
CO2 SERPL-SCNC: 16 MMOL/L (ref 23–29)
CREAT SERPL-MCNC: 1.5 MG/DL (ref 0.5–1.4)
DIFFERENTIAL METHOD BLD: ABNORMAL
EOSINOPHIL # BLD AUTO: 0.2 K/UL (ref 0–0.5)
EOSINOPHIL NFR BLD: 2.7 % (ref 0–8)
ERYTHROCYTE [DISTWIDTH] IN BLOOD BY AUTOMATED COUNT: 14.1 % (ref 11.5–14.5)
EST. GFR  (NO RACE VARIABLE): 52 ML/MIN/1.73 M^2
GLUCOSE SERPL-MCNC: 62 MG/DL (ref 70–110)
HCT VFR BLD AUTO: 31.8 % (ref 40–54)
HGB BLD-MCNC: 10.6 G/DL (ref 14–18)
IMM GRANULOCYTES # BLD AUTO: 0.04 K/UL (ref 0–0.04)
IMM GRANULOCYTES NFR BLD AUTO: 0.6 % (ref 0–0.5)
LYMPHOCYTES # BLD AUTO: 1.1 K/UL (ref 1–4.8)
LYMPHOCYTES NFR BLD: 15.8 % (ref 18–48)
MAGNESIUM SERPL-MCNC: 1.9 MG/DL (ref 1.6–2.6)
MCH RBC QN AUTO: 28.9 PG (ref 27–31)
MCHC RBC AUTO-ENTMCNC: 33.3 G/DL (ref 32–36)
MCV RBC AUTO: 87 FL (ref 82–98)
MONOCYTES # BLD AUTO: 0.9 K/UL (ref 0.3–1)
MONOCYTES NFR BLD: 12.4 % (ref 4–15)
NEUTROPHILS # BLD AUTO: 4.7 K/UL (ref 1.8–7.7)
NEUTROPHILS NFR BLD: 67.8 % (ref 38–73)
NRBC BLD-RTO: 0 /100 WBC
PHOSPHATE SERPL-MCNC: 3.1 MG/DL (ref 2.7–4.5)
PLATELET # BLD AUTO: 211 K/UL (ref 150–450)
PMV BLD AUTO: 10.3 FL (ref 9.2–12.9)
POTASSIUM SERPL-SCNC: 4.2 MMOL/L (ref 3.5–5.1)
RBC # BLD AUTO: 3.67 M/UL (ref 4.6–6.2)
SODIUM SERPL-SCNC: 134 MMOL/L (ref 136–145)
WBC # BLD AUTO: 6.95 K/UL (ref 3.9–12.7)

## 2024-12-22 PROCEDURE — 25000003 PHARM REV CODE 250: Mod: NTX | Performed by: STUDENT IN AN ORGANIZED HEALTH CARE EDUCATION/TRAINING PROGRAM

## 2024-12-22 PROCEDURE — 85025 COMPLETE CBC W/AUTO DIFF WBC: CPT | Mod: NTX | Performed by: STUDENT IN AN ORGANIZED HEALTH CARE EDUCATION/TRAINING PROGRAM

## 2024-12-22 PROCEDURE — 83735 ASSAY OF MAGNESIUM: CPT | Mod: NTX | Performed by: STUDENT IN AN ORGANIZED HEALTH CARE EDUCATION/TRAINING PROGRAM

## 2024-12-22 PROCEDURE — 97530 THERAPEUTIC ACTIVITIES: CPT | Mod: NTX

## 2024-12-22 PROCEDURE — 84100 ASSAY OF PHOSPHORUS: CPT | Mod: NTX | Performed by: STUDENT IN AN ORGANIZED HEALTH CARE EDUCATION/TRAINING PROGRAM

## 2024-12-22 PROCEDURE — 25000003 PHARM REV CODE 250: Mod: NTX

## 2024-12-22 PROCEDURE — 20600001 HC STEP DOWN PRIVATE ROOM: Mod: NTX

## 2024-12-22 PROCEDURE — 36415 COLL VENOUS BLD VENIPUNCTURE: CPT | Mod: NTX | Performed by: STUDENT IN AN ORGANIZED HEALTH CARE EDUCATION/TRAINING PROGRAM

## 2024-12-22 PROCEDURE — 63600175 PHARM REV CODE 636 W HCPCS: Mod: NTX | Performed by: HOSPITALIST

## 2024-12-22 PROCEDURE — 80048 BASIC METABOLIC PNL TOTAL CA: CPT | Mod: NTX | Performed by: STUDENT IN AN ORGANIZED HEALTH CARE EDUCATION/TRAINING PROGRAM

## 2024-12-22 PROCEDURE — 25000003 PHARM REV CODE 250: Mod: NTX | Performed by: HOSPITALIST

## 2024-12-22 RX ORDER — ENOXAPARIN SODIUM 100 MG/ML
40 INJECTION SUBCUTANEOUS EVERY 24 HOURS
Status: DISCONTINUED | OUTPATIENT
Start: 2024-12-22 | End: 2024-12-23 | Stop reason: HOSPADM

## 2024-12-22 RX ADMIN — POLYETHYLENE GLYCOL 3350 17 G: 17 POWDER, FOR SOLUTION ORAL at 10:12

## 2024-12-22 RX ADMIN — Medication 100 MG: at 10:12

## 2024-12-22 RX ADMIN — MUPIROCIN: 20 OINTMENT TOPICAL at 10:12

## 2024-12-22 RX ADMIN — TRAZODONE HYDROCHLORIDE 50 MG: 50 TABLET ORAL at 09:12

## 2024-12-22 RX ADMIN — HYDROCODONE BITARTRATE AND ACETAMINOPHEN 1 TABLET: 10; 325 TABLET ORAL at 06:12

## 2024-12-22 RX ADMIN — HYDROCODONE BITARTRATE AND ACETAMINOPHEN 1 TABLET: 10; 325 TABLET ORAL at 12:12

## 2024-12-22 RX ADMIN — Medication 6 MG: at 09:12

## 2024-12-22 RX ADMIN — ACETAMINOPHEN 1000 MG: 500 TABLET ORAL at 05:12

## 2024-12-22 RX ADMIN — METOPROLOL TARTRATE 100 MG: 50 TABLET, FILM COATED ORAL at 10:12

## 2024-12-22 RX ADMIN — MUPIROCIN: 20 OINTMENT TOPICAL at 09:12

## 2024-12-22 RX ADMIN — LEVOTHYROXINE SODIUM 50 MCG: 50 TABLET ORAL at 05:12

## 2024-12-22 RX ADMIN — AMIODARONE HYDROCHLORIDE 200 MG: 200 TABLET ORAL at 10:12

## 2024-12-22 RX ADMIN — ENOXAPARIN SODIUM 40 MG: 40 INJECTION SUBCUTANEOUS at 06:12

## 2024-12-22 RX ADMIN — PANTOPRAZOLE SODIUM 40 MG: 40 TABLET, DELAYED RELEASE ORAL at 09:12

## 2024-12-22 RX ADMIN — FOLIC ACID 1000 MCG: 1 TABLET ORAL at 10:12

## 2024-12-22 RX ADMIN — OMEGA-3-ACID ETHYL ESTERS 1 G: 1 CAPSULE, LIQUID FILLED ORAL at 09:12

## 2024-12-22 RX ADMIN — PANTOPRAZOLE SODIUM 40 MG: 40 TABLET, DELAYED RELEASE ORAL at 10:12

## 2024-12-22 RX ADMIN — OMEGA-3-ACID ETHYL ESTERS 1 G: 1 CAPSULE, LIQUID FILLED ORAL at 10:12

## 2024-12-22 NOTE — SUBJECTIVE & OBJECTIVE
Interval History: NAEON, AF, HDS. Tolerating diet with n/v. No BM but some gas.     Medications:  Continuous Infusions:  Scheduled Meds:   acetaminophen  1,000 mg Oral Q8H    amiodarone  200 mg Oral Daily    enoxparin  40 mg Subcutaneous Q24H (prophylaxis, 1700)    folic acid  1,000 mcg Oral Daily    levothyroxine  50 mcg Oral Before breakfast    metoprolol tartrate  100 mg Oral Daily    mupirocin   Nasal BID    omega-3 acid ethyl esters  1 g Oral BID    pantoprazole  40 mg Oral BID    polyethylene glycol  17 g Oral Daily    thiamine  100 mg Oral Daily     PRN Meds:  Current Facility-Administered Medications:     calcium carbonate, 500 mg, Oral, Daily PRN    HYDROcodone-acetaminophen, 1 tablet, Oral, Q6H PRN    HYDROcodone-acetaminophen, 1 tablet, Oral, Q6H PRN    melatonin, 6 mg, Oral, Nightly PRN    promethazine, 12.5 mg, Oral, Q6H PRN    sodium chloride 0.9%, 10 mL, Intra-Catheter, PRN    traZODone, 50 mg, Oral, Nightly PRN     Review of patient's allergies indicates:  No Known Allergies  Objective:     Vital Signs (Most Recent):  Temp: 97.2 °F (36.2 °C) (12/22/24 0743)  Pulse: (!) 53 (12/22/24 0743)  Resp: 18 (12/22/24 0743)  BP: (!) 144/67 (12/22/24 0743)  SpO2: 95 % (12/22/24 0743) Vital Signs (24h Range):  Temp:  [97.2 °F (36.2 °C)-98.2 °F (36.8 °C)] 97.2 °F (36.2 °C)  Pulse:  [53-74] 53  Resp:  [18-20] 18  SpO2:  [94 %-98 %] 95 %  BP: (125-176)/(62-95) 144/67     Weight: 86.1 kg (189 lb 13.1 oz)  Body mass index is 28.03 kg/m².    Intake/Output - Last 3 Shifts         12/20 0700 12/21 0659 12/21 0700 12/22 0659 12/22 0700 12/23 0659    P.O.  1560     I.V. (mL/kg)       Total Intake(mL/kg)  1560 (18.1)     Urine (mL/kg/hr) 560 (0.3) 774 (0.4)     Drains 843 590 110    Other 0      Stool 0      Total Output 1403 1364 110    Net -1403 +196 -110           Urine Occurrence 1 x      Stool Occurrence 0 x               Physical Exam  Constitutional:       General: He is not in acute distress.  HENT:      Head:  Normocephalic and atraumatic.   Eyes:      Extraocular Movements: Extraocular movements intact.      Conjunctiva/sclera: Conjunctivae normal.      Pupils: Pupils are equal, round, and reactive to light.   Cardiovascular:      Rate and Rhythm: Normal rate and regular rhythm.   Pulmonary:      Effort: Pulmonary effort is normal. No respiratory distress.   Abdominal:      Palpations: Abdomen is soft.      Comments: Prevena vac over midline incision with seal intact   Appropriate tenderness  LLQ (intraabdominal) drain with serous output   RLQ (subcutaneous)  drain with serosanguinous output   Neurological:      Mental Status: He is alert.          Significant Labs:  I have reviewed all pertinent lab results within the past 24 hours.    Significant Diagnostics:  I have reviewed all pertinent imaging results/findings within the past 24 hours.

## 2024-12-22 NOTE — ASSESSMENT & PLAN NOTE
64y M w/ PMHx of decompensated EtOH cirrhosis (ascites requiring recurrent weekly paracentesis), NID-T2DM, afib s/p ablation and DCCV on xarelto, COPD, CKD3 HTN transferred from OSH with concern for incarcerated ventral hernia. S/p exploratory laparotomy with small bowel resection and retrorectus hernia repair with mesh on 12/17/2024. Doing well post-operatively.    - Will remove right drain today and adjust provena  - Okay for regular diet  - Consider placing LLQ drain bulb to wall suction to help drain ascites. Would recommend careful monitoring output as he would require albumin replacement 2/2 fluid shifts  - needs to ambulate and OOBTC  - Prevena vac in place  - LLQ drain (intraabdominal) empty and record output q3h.   - RLQ drain (retrorectus) record output as needed.   - MMPC   - Okay for DVT ppx from surgical standpoint   - Rest of care per primary. Please call General Surgery with any questions or concerns.

## 2024-12-22 NOTE — PROGRESS NOTES
[facility]  Progress Note    Patient Name: Jonny Isabel  MRN: [id number]  Patient Class: IP- Inpatient   Admission Date: 12/17/2024  Length of Stay: 4 days  Attending Physician: Lana Emerson MD  Primary Care Provider: Yuli Costa MD        Subjective     Principal Problem:Small bowel obstruction        HPI:  Jonny Isabel is a 64 y.o. male with past medical history of AFL s/p RFA (2015), PAF (on Xarelto), tobacco abuse (1 ppd x 50 years), COPD, DM2, HTN, alcoholic cirrhosis with esophageal varices and ascites requiring weekly paracentesis, CKD 3, ventral hernia who presents as transfer from [facility] with nausea, vomiting and abd pain. While at OSH he underwent CT a/p which demonstrated partial SBO 2/2 ventral hernia containing ascites and mildly distended loop of bowel, and patient was transferred to [facility] for general surgery evaluation, where he was taken to OR class A for exploratory laparotomy with small bowel resection and retrorectus hernia repair with mesh. He then was transferred to hospital medicine service. Patient evaluated in pacu. He reports abdominal soreness which is improved from initial presentation. He has NGT in place and denies nausea / vomiting. Of note, patient last underwent paracentesis on 12/17 by IR during which 3600 cc fluid was removed which was negative for SBP.     Overview/Hospital Course:  Patient taken to OR with General surgery for management of small-bowel obstruction and strangulated hernia on 12/17.  NG tube placed postoperatively and patient made NPO.  Pain gradually improved days following surgery.  NG tube able to be removed on 12/20.  Clear liquid diet started per surgery recommendations on 12/21 and advanced to full liquid 12/22.  Surgery hold the left during today, plan is to pull the right drain tomorrow and the Prevena.    Interval History:  The patient has good pain control with current regimen and tolerated p.o. intake well.    Review  of Systems   Gastrointestinal:  Positive for abdominal pain.   All other systems reviewed and are negative.    Objective:     Vital Signs (Most Recent):  Temp: 97.7 °F (36.5 °C) (12/22/24 1206)  Pulse: (!) 45 (12/22/24 1206)  Resp: 20 (12/22/24 1228)  BP: 121/65 (12/22/24 1206)  SpO2: 95 % (12/22/24 1206) Vital Signs (24h Range):  Temp:  [97.2 °F (36.2 °C)-98.2 °F (36.8 °C)] 97.7 °F (36.5 °C)  Pulse:  [45-62] 45  Resp:  [18-20] 20  SpO2:  [94 %-98 %] 95 %  BP: (121-156)/(62-75) 121/65     Weight: 86.1 kg (189 lb 13.1 oz)  Body mass index is 28.03 kg/m².    Intake/Output Summary (Last 24 hours) at 12/22/2024 1424  Last data filed at 12/22/2024 1106  Gross per 24 hour   Intake 960 ml   Output 1089 ml   Net -129 ml         Physical Exam  Constitutional:       General: He is not in acute distress.     Appearance: He is not toxic-appearing.   HENT:      Head: Normocephalic and atraumatic.      Nose:      Comments: Nasogastric tube  Cardiovascular:      Rate and Rhythm: Normal rate and regular rhythm.      Heart sounds: No murmur heard.     No friction rub. No gallop.   Pulmonary:      Effort: Pulmonary effort is normal. No respiratory distress.   Abdominal:      General: Abdomen is flat.      Palpations: Abdomen is soft.      Tenderness: There is abdominal tenderness. There is no guarding or rebound.      Comments: LLQ and RLQ drains  Prevena vac midline   Musculoskeletal:      Cervical back: Normal range of motion and neck supple.      Right lower leg: No edema.      Left lower leg: No edema.   Skin:     General: Skin is warm and dry.   Neurological:      Mental Status: He is alert. Mental status is at baseline.   Psychiatric:         Mood and Affect: Mood normal.         Behavior: Behavior normal.         Thought Content: Thought content normal.         Judgment: Judgment normal.             Significant Labs: All pertinent labs within the past 24 hours have been reviewed.  CBC:   Recent Labs   Lab 12/21/24  1573  12/22/24  0417   WBC 8.70 6.95   HGB 11.3* 10.6*   HCT 34.5* 31.8*    211     CMP:   Recent Labs   Lab 12/21/24  0334 12/22/24  0417   * 134*   K 4.0 4.2    107   CO2 16* 16*   GLU 67* 62*   BUN 22 22   CREATININE 1.5* 1.5*   CALCIUM 8.1* 8.2*   ANIONGAP 14 11       Significant Imaging: I have reviewed all pertinent imaging results/findings within the past 24 hours.    Assessment and Plan     * Small bowel obstruction  Ventral heria  - CT a/p demonstrated partial SBO 2/2 ventral hernia containing ascites and mildly distended loop of bowel  - General surgery consulted  - OR on 12/17 for ex lap with small bowel resection and retrorectus hernia repair with mesh.   -advance to full liquid diet today  - DVT ppx with Lovenox    Ventral hernia  No acute management needed      Hypothyroid  We will resume thyroid supplementation today      Ascites due to alcoholic cirrhosis  - Followed by [facility] hepatology (Dr. Escalante) - most recently on 12/13.  At that time patient said that he did not want a liver XPL and did not want a TIPS.  He has stopped drinking.  PETH neg since 5/8/23.  He has been undergoing weekly paracentesis on Wednesdays (last 12/17)  - Surgery placed orders to drain ascites fluid from abdominal drain, left drain pulled today and may pull right tomorrow    A-fib      Antiarrhythmics  amiodarone tablet 200 mg, Daily, Oral  metoprolol tartrate (LOPRESSOR) tablet 100 mg, Daily, OralAmiodarone  Metoprolol    Anticoagulants  heparin (porcine) injection 5,000 Units, Every 12 hours, Subcutaneous    Plan  - Replete lytes with a goal of K>4, Mg >2  - resume p.o. meds today  - hold xarelto for now, dvt ppx      VTE Risk Mitigation (From admission, onward)           Ordered     enoxaparin injection 40 mg  Every 24 hours         12/22/24 0916     IP VTE HIGH RISK PATIENT  Once         12/18/24 0121     Place sequential compression device  Until discontinued         12/18/24 0121                    Discharge  Planning   DALILA: 12/27/2024     Code Status: Prior   Medical Readiness for Discharge Date:   Discharge Plan A: Home with family                Please place Justification for DME        Lana Emerson MD  Department of Hospital Medicine   [facility]

## 2024-12-22 NOTE — PT/OT/SLP PROGRESS
"Occupational Therapy   Treatment    Name: Jonny Isabel  MRN: [id number]  Admitting Diagnosis:  Small bowel obstruction  5 Days Post-Op    Recommendations:     Discharge Recommendations: No Therapy Indicated  Discharge Equipment Recommendations:  none  Barriers to discharge:  None    Assessment:     Jonny Isabel is a 64 y.o. male with a medical diagnosis of Small bowel obstruction. Pt progressing very well and with no c/o pain. Performance deficits affecting function are impaired endurance, impaired self care skills, gait instability.     Rehab Prognosis:  Good; patient would benefit from acute skilled OT services to address these deficits and reach maximum level of function.       Plan:     Patient to be seen 3 x/week to address the above listed problems via self-care/home management, therapeutic activities, therapeutic exercises  Plan of Care Expires: 01/15/25  Plan of Care Reviewed with: patient    Subjective     Patient/Family Comments/goals: "My wife wants me home so I can cook Xmas dinner."    Pain/Comfort:  Pain Rating 1: 0/10    Objective:     Communicated with: rn prior to session.  Patient found supine with wound vac, ANJU drain upon OT entry to room.    General Precautions: Standard, fall    Orthopedic Precautions:N/A  Braces: N/A  Respiratory Status: Room air     Occupational Performance:     Bed Mobility:    Patient completed Scooting/Bridging with independence  Patient completed Supine to Sit with independence  Patient completed Sit to Supine with independence     Functional Mobility/Transfers:  Patient completed Sit <> Stand Transfer with supervision  with  no assistive device   Functional Mobility: Pt walked 450' with (S) and no AD.    Activities of Daily Living:  Pt declined.    [facility] 6 Click ADL: 21    Treatment & Education:  Discussed OT POC and progress.  Encouraged 3x/daily walking with therapy/staff/family.    Patient left supine with all lines intact and call button in reach    GOALS: "   Multidisciplinary Problems       Occupational Therapy Goals          Problem: Occupational Therapy    Goal Priority Disciplines Outcome Interventions   Occupational Therapy Goal     OT, PT/OT Progressing    Description: Goals to be met by: 1/15/25.     Patient will increase functional independence with ADLs by performing:    UE Dressing with Stand-by Assistance.  LE Dressing with Stand-by Assistance.  Grooming while standing at sink with Stand-by Assistance.  Toileting from toilet with Stand-by Assistance for hygiene and clothing management.   Toilet transfer to toilet with Stand-by Assistance.                         Time Tracking:     OT Date of Treatment: 12/22/24  OT Start Time: 0900  OT Stop Time: 0910  OT Total Time (min): 10 min    Billable Minutes:Therapeutic Activity 10    OT/KARLEE: OT          12/22/2024

## 2024-12-22 NOTE — PLAN OF CARE
Problem: Infection  Goal: Absence of Infection Signs and Symptoms  12/22/2024 0221 by Freddy Vera LPN  Outcome: Progressing  12/22/2024 0211 by Freddy Vera LPN  Outcome: Progressing     Problem: Diabetes Comorbidity  Goal: Blood Glucose Level Within Targeted Range  12/22/2024 0221 by Freddy Vera LPN  Outcome: Progressing  12/22/2024 0211 by Freddy Vera LPN  Outcome: Progressing     Problem: Wound  Goal: Optimal Coping  12/22/2024 0221 by Freddy Vera LPN  Outcome: Progressing  12/22/2024 0211 by Freddy Vera LPN  Outcome: Progressing  Goal: Optimal Functional Ability  12/22/2024 0221 by Freddy Vera LPN  Outcome: Progressing  12/22/2024 0211 by Freddy Vera LPN  Outcome: Progressing  Goal: Absence of Infection Signs and Symptoms  12/22/2024 0221 by Freddy Vera LPN  Outcome: Progressing  12/22/2024 0211 by Freddy Vera LPN  Outcome: Progressing  Goal: Improved Oral Intake  12/22/2024 0221 by Freddy Vera LPN  Outcome: Progressing  12/22/2024 0211 by Freddy Vera LPN  Outcome: Progressing  Goal: Optimal Pain Control and Function  12/22/2024 0221 by Freddy Vera LPN  Outcome: Progressing  12/22/2024 0211 by Freddy Vera LPN  Outcome: Progressing  Goal: Skin Health and Integrity  12/22/2024 0221 by Freddy Vera LPN  Outcome: Progressing  12/22/2024 0211 by Freddy Vera LPN  Outcome: Progressing  Goal: Optimal Wound Healing  12/22/2024 0221 by Freddy Vera LPN  Outcome: Progressing  12/22/2024 0211 by Freddy Vera LPN  Outcome: Progressing     Problem: Adult Inpatient Plan of Care  Goal: Plan of Care Review  12/22/2024 0221 by Freddy Vera LPN  Outcome: Progressing  12/22/2024 0211 by Freddy Vera LPN  Outcome: Progressing  Goal: Patient-Specific Goal (Individualized)  12/22/2024 0221 by Freddy Vera LPN  Outcome: Progressing  12/22/2024 0211 by Freddy Vera LPN  Outcome: Progressing  Goal: Absence of  Hospital-Acquired Illness or Injury  12/22/2024 0221 by Freddy Vera LPN  Outcome: Progressing  12/22/2024 0211 by Freddy Vera LPN  Outcome: Progressing  Goal: Optimal Comfort and Wellbeing  12/22/2024 0221 by Freddy Vera LPN  Outcome: Progressing  12/22/2024 0211 by Freddy Vera LPN  Outcome: Progressing  Goal: Readiness for Transition of Care  12/22/2024 0221 by Freddy Vera LPN  Outcome: Progressing  12/22/2024 0211 by Freddy Vera LPN  Outcome: Progressing     Problem: Fall Injury Risk  Goal: Absence of Fall and Fall-Related Injury  12/22/2024 0221 by Freddy Vera LPN  Outcome: Progressing  12/22/2024 0211 by Freddy Vera LPN  Outcome: Progressing  [facility] Plan of Care Note  Dx SBO    Shift Events: physician notified patient requested for more pain medication, but throughout shift patient pain was under control.    Goals of Care: pain management,safety    Neuro:AAOx4    Vital Signs: stable    Respiratory: RA    Diet: clear liq    Is patient tolerating current diet? yes    GTTS: no    Urine Output/Bowel Movement:     Drains/Tubes/Tube Feeds (include total output/shift): Wilbur drain  RLQ & LLQ    Lines: no    Accuchecks:no    Skin: incision RLQ & LLQ incision    Fall Risk Score: 10    Activity level? standby    Any scheduled procedures? no    Any safety concerns? no    Other: no

## 2024-12-22 NOTE — PROGRESS NOTES
[facility]  General Surgery  Progress Note    Subjective:     History of Present Illness:  No notes on file    Post-Op Info:  Procedure(s) (LRB):  LAPAROTOMY, EXPLORATORY (N/A)  REPAIR, HERNIA, VENTRAL (N/A)   5 Days Post-Op     Interval History: NAEON, AF, HDS. Tolerating diet with n/v. No BM but some gas.     Medications:  Continuous Infusions:  Scheduled Meds:   acetaminophen  1,000 mg Oral Q8H    amiodarone  200 mg Oral Daily    enoxparin  40 mg Subcutaneous Q24H (prophylaxis, 1700)    folic acid  1,000 mcg Oral Daily    levothyroxine  50 mcg Oral Before breakfast    metoprolol tartrate  100 mg Oral Daily    mupirocin   Nasal BID    omega-3 acid ethyl esters  1 g Oral BID    pantoprazole  40 mg Oral BID    polyethylene glycol  17 g Oral Daily    thiamine  100 mg Oral Daily     PRN Meds:  Current Facility-Administered Medications:     calcium carbonate, 500 mg, Oral, Daily PRN    HYDROcodone-acetaminophen, 1 tablet, Oral, Q6H PRN    HYDROcodone-acetaminophen, 1 tablet, Oral, Q6H PRN    melatonin, 6 mg, Oral, Nightly PRN    promethazine, 12.5 mg, Oral, Q6H PRN    sodium chloride 0.9%, 10 mL, Intra-Catheter, PRN    traZODone, 50 mg, Oral, Nightly PRN     Review of patient's allergies indicates:  No Known Allergies  Objective:     Vital Signs (Most Recent):  Temp: 97.2 °F (36.2 °C) (12/22/24 0743)  Pulse: (!) 53 (12/22/24 0743)  Resp: 18 (12/22/24 0743)  BP: (!) 144/67 (12/22/24 0743)  SpO2: 95 % (12/22/24 0743) Vital Signs (24h Range):  Temp:  [97.2 °F (36.2 °C)-98.2 °F (36.8 °C)] 97.2 °F (36.2 °C)  Pulse:  [53-74] 53  Resp:  [18-20] 18  SpO2:  [94 %-98 %] 95 %  BP: (125-176)/(62-95) 144/67     Weight: 86.1 kg (189 lb 13.1 oz)  Body mass index is 28.03 kg/m².    Intake/Output - Last 3 Shifts         12/20 0700 12/21 0659 12/21 0700 12/22 0659 12/22 0700 12/23 0659    P.O.  1560     I.V. (mL/kg)       Total Intake(mL/kg)  1560 (18.1)     Urine (mL/kg/hr) 560 (0.3) 774 (0.4)     Drains 843 590 110    Other 0       Stool 0      Total Output 1403 1364 110    Net -1403 +196 -110           Urine Occurrence 1 x      Stool Occurrence 0 x               Physical Exam  Constitutional:       General: He is not in acute distress.  HENT:      Head: Normocephalic and atraumatic.   Eyes:      Extraocular Movements: Extraocular movements intact.      Conjunctiva/sclera: Conjunctivae normal.      Pupils: Pupils are equal, round, and reactive to light.   Cardiovascular:      Rate and Rhythm: Normal rate and regular rhythm.   Pulmonary:      Effort: Pulmonary effort is normal. No respiratory distress.   Abdominal:      Palpations: Abdomen is soft.      Comments: Prevena vac over midline incision with seal intact   Appropriate tenderness  LLQ (intraabdominal) drain with serous output   RLQ (subcutaneous)  drain with serosanguinous output   Neurological:      Mental Status: He is alert.          Significant Labs:  I have reviewed all pertinent lab results within the past 24 hours.    Significant Diagnostics:  I have reviewed all pertinent imaging results/findings within the past 24 hours.  Assessment/Plan:     Ventral hernia  64y M w/ PMHx of decompensated EtOH cirrhosis (ascites requiring recurrent weekly paracentesis), NID-T2DM, afib s/p ablation and DCCV on xarelto, COPD, CKD3 HTN transferred from OSH with concern for incarcerated ventral hernia. S/p exploratory laparotomy with small bowel resection and retrorectus hernia repair with mesh on 12/17/2024. Doing well post-operatively.    - Will remove right drain today and adjust provena  - Okay for regular diet  - Consider placing LLQ drain bulb to wall suction to help drain ascites. Would recommend careful monitoring output as he would require albumin replacement 2/2 fluid shifts  - needs to ambulate and OOBTC  - Prevena vac in place  - LLQ drain (intraabdominal) empty and record output q3h.   - RLQ drain (retrorectus) record output as needed.   - MMPC   - Okay for DVT ppx from surgical  standpoint   - Rest of care per primary. Please call General Surgery with any questions or concerns.         Ethan Burt MD  General Surgery  [facility]

## 2024-12-22 NOTE — ASSESSMENT & PLAN NOTE
- Followed by [facility] hepatology (Dr. Escalante) - most recently on 12/13.  At that time patient said that he did not want a liver XPL and did not want a TIPS.  He has stopped drinking.  PETH neg since 5/8/23.  He has been undergoing weekly paracentesis on Wednesdays (last 12/17)  - Surgery placed orders to drain ascites fluid from abdominal drain, left drain pulled today and may pull right tomorrow

## 2024-12-22 NOTE — SUBJECTIVE & OBJECTIVE
Interval History:  The patient has good pain control with current regimen and tolerated p.o. intake well.    Review of Systems   Gastrointestinal:  Positive for abdominal pain.   All other systems reviewed and are negative.    Objective:     Vital Signs (Most Recent):  Temp: 97.7 °F (36.5 °C) (12/22/24 1206)  Pulse: (!) 45 (12/22/24 1206)  Resp: 20 (12/22/24 1228)  BP: 121/65 (12/22/24 1206)  SpO2: 95 % (12/22/24 1206) Vital Signs (24h Range):  Temp:  [97.2 °F (36.2 °C)-98.2 °F (36.8 °C)] 97.7 °F (36.5 °C)  Pulse:  [45-62] 45  Resp:  [18-20] 20  SpO2:  [94 %-98 %] 95 %  BP: (121-156)/(62-75) 121/65     Weight: 86.1 kg (189 lb 13.1 oz)  Body mass index is 28.03 kg/m².    Intake/Output Summary (Last 24 hours) at 12/22/2024 1424  Last data filed at 12/22/2024 1106  Gross per 24 hour   Intake 960 ml   Output 1089 ml   Net -129 ml         Physical Exam  Constitutional:       General: He is not in acute distress.     Appearance: He is not toxic-appearing.   HENT:      Head: Normocephalic and atraumatic.      Nose:      Comments: Nasogastric tube  Cardiovascular:      Rate and Rhythm: Normal rate and regular rhythm.      Heart sounds: No murmur heard.     No friction rub. No gallop.   Pulmonary:      Effort: Pulmonary effort is normal. No respiratory distress.   Abdominal:      General: Abdomen is flat.      Palpations: Abdomen is soft.      Tenderness: There is abdominal tenderness. There is no guarding or rebound.      Comments: LLQ and RLQ drains  Prevena vac midline   Musculoskeletal:      Cervical back: Normal range of motion and neck supple.      Right lower leg: No edema.      Left lower leg: No edema.   Skin:     General: Skin is warm and dry.   Neurological:      Mental Status: He is alert. Mental status is at baseline.   Psychiatric:         Mood and Affect: Mood normal.         Behavior: Behavior normal.         Thought Content: Thought content normal.         Judgment: Judgment normal.             Significant  Labs: All pertinent labs within the past 24 hours have been reviewed.  CBC:   Recent Labs   Lab 12/21/24  0334 12/22/24  0417   WBC 8.70 6.95   HGB 11.3* 10.6*   HCT 34.5* 31.8*    211     CMP:   Recent Labs   Lab 12/21/24  0334 12/22/24  0417   * 134*   K 4.0 4.2    107   CO2 16* 16*   GLU 67* 62*   BUN 22 22   CREATININE 1.5* 1.5*   CALCIUM 8.1* 8.2*   ANIONGAP 14 11       Significant Imaging: I have reviewed all pertinent imaging results/findings within the past 24 hours.

## 2024-12-22 NOTE — NURSING
Pt states that the pain medication he is getting is not helping much.  MD notified.  Awaiting new orders.

## 2024-12-22 NOTE — PLAN OF CARE
Problem: Infection  Goal: Absence of Infection Signs and Symptoms  12/22/2024 0631 by Freddy Vera LPN  Outcome: Progressing  12/22/2024 0221 by Freddy Vera LPN  Outcome: Progressing  12/22/2024 0211 by Freddy Vera LPN  Outcome: Progressing     Problem: Diabetes Comorbidity  Goal: Blood Glucose Level Within Targeted Range  12/22/2024 0631 by Freddy Vera LPN  Outcome: Progressing  12/22/2024 0221 by Freddy Vera LPN  Outcome: Progressing  12/22/2024 0211 by Freddy Vera LPN  Outcome: Progressing     Problem: Wound  Goal: Optimal Coping  12/22/2024 0631 by Freddy Vera LPN  Outcome: Progressing  12/22/2024 0221 by Freddy Vera LPN  Outcome: Progressing  12/22/2024 0211 by Freddy Vera LPN  Outcome: Progressing  Goal: Optimal Functional Ability  12/22/2024 0631 by Freddy Vera LPN  Outcome: Progressing  12/22/2024 0221 by Freddy Vera LPN  Outcome: Progressing  12/22/2024 0211 by Freddy Vera LPN  Outcome: Progressing  Goal: Absence of Infection Signs and Symptoms  12/22/2024 0631 by Freddy Vera LPN  Outcome: Progressing  12/22/2024 0221 by Freddy Vera LPN  Outcome: Progressing  12/22/2024 0211 by Freddy Vera LPN  Outcome: Progressing  Goal: Improved Oral Intake  12/22/2024 0631 by Freddy Vera LPN  Outcome: Progressing  12/22/2024 0221 by Freddy Vera LPN  Outcome: Progressing  12/22/2024 0211 by Freddy Vera LPN  Outcome: Progressing  Goal: Optimal Pain Control and Function  12/22/2024 0631 by Freddy Vera LPN  Outcome: Progressing  12/22/2024 0221 by Freddy Vera LPN  Outcome: Progressing  12/22/2024 0211 by Freddy Vera LPN  Outcome: Progressing  Goal: Skin Health and Integrity  12/22/2024 0631 by Freddy Vera LPN  Outcome: Progressing  12/22/2024 0221 by Freddy Vera LPN  Outcome: Progressing  12/22/2024 0211 by Freddy Vera LPN  Outcome: Progressing  Goal: Optimal Wound  Healing  12/22/2024 0631 by Freddy Vera LPN  Outcome: Progressing  12/22/2024 0221 by Freddy Vera LPN  Outcome: Progressing  12/22/2024 0211 by Freddy Vera LPN  Outcome: Progressing     Problem: Adult Inpatient Plan of Care  Goal: Plan of Care Review  12/22/2024 0631 by Freddy Vera LPN  Outcome: Progressing  12/22/2024 0221 by Freddy Vera LPN  Outcome: Progressing  12/22/2024 0211 by Freddy Vera LPN  Outcome: Progressing  Goal: Patient-Specific Goal (Individualized)  12/22/2024 0631 by Freddy Vera LPN  Outcome: Progressing  12/22/2024 0221 by Freddy Vera LPN  Outcome: Progressing  12/22/2024 0211 by Freddy Vera LPN  Outcome: Progressing  Goal: Absence of Hospital-Acquired Illness or Injury  12/22/2024 0631 by Freddy Vera LPN  Outcome: Progressing  12/22/2024 0221 by Freddy Vera LPN  Outcome: Progressing  12/22/2024 0211 by Freddy Vera LPN  Outcome: Progressing  Goal: Optimal Comfort and Wellbeing  12/22/2024 0631 by Freddy Vera LPN  Outcome: Progressing  12/22/2024 0221 by Freddy Vera LPN  Outcome: Progressing  12/22/2024 0211 by Freddy Vera LPN  Outcome: Progressing  Goal: Readiness for Transition of Care  12/22/2024 0631 by Freddy Vera LPN  Outcome: Progressing  12/22/2024 0221 by Freddy Vera LPN  Outcome: Progressing  12/22/2024 0211 by Freddy Vera LPN  Outcome: Progressing     Problem: Fall Injury Risk  Goal: Absence of Fall and Fall-Related Injury  12/22/2024 0631 by Freddy Vera LPN  Outcome: Progressing  12/22/2024 0221 by Freddy Vera LPN  Outcome: Progressing  12/22/2024 0211 by Freddy Vera LPN  Outcome: Progressing

## 2024-12-23 ENCOUNTER — CONFERENCE (OUTPATIENT)
Dept: TRANSPLANT | Facility: CLINIC | Age: 64
End: 2024-12-23
Payer: MEDICAID

## 2024-12-23 VITALS
OXYGEN SATURATION: 95 % | HEART RATE: 57 BPM | HEIGHT: 69 IN | TEMPERATURE: 98 F | DIASTOLIC BLOOD PRESSURE: 68 MMHG | SYSTOLIC BLOOD PRESSURE: 119 MMHG | WEIGHT: 189.81 LBS | BODY MASS INDEX: 28.11 KG/M2 | RESPIRATION RATE: 18 BRPM

## 2024-12-23 LAB
ANION GAP SERPL CALC-SCNC: 10 MMOL/L (ref 8–16)
BASOPHILS # BLD AUTO: 0.07 K/UL (ref 0–0.2)
BASOPHILS NFR BLD: 1.2 % (ref 0–1.9)
BUN SERPL-MCNC: 19 MG/DL (ref 8–23)
CALCIUM SERPL-MCNC: 8.1 MG/DL (ref 8.7–10.5)
CHLORIDE SERPL-SCNC: 106 MMOL/L (ref 95–110)
CO2 SERPL-SCNC: 19 MMOL/L (ref 23–29)
CREAT SERPL-MCNC: 1.6 MG/DL (ref 0.5–1.4)
DIFFERENTIAL METHOD BLD: ABNORMAL
EOSINOPHIL # BLD AUTO: 0.2 K/UL (ref 0–0.5)
EOSINOPHIL NFR BLD: 3.6 % (ref 0–8)
ERYTHROCYTE [DISTWIDTH] IN BLOOD BY AUTOMATED COUNT: 14.3 % (ref 11.5–14.5)
EST. GFR  (NO RACE VARIABLE): 48.1 ML/MIN/1.73 M^2
GLUCOSE SERPL-MCNC: 86 MG/DL (ref 70–110)
HCT VFR BLD AUTO: 31.5 % (ref 40–54)
HGB BLD-MCNC: 10.1 G/DL (ref 14–18)
IMM GRANULOCYTES # BLD AUTO: 0.04 K/UL (ref 0–0.04)
IMM GRANULOCYTES NFR BLD AUTO: 0.7 % (ref 0–0.5)
LYMPHOCYTES # BLD AUTO: 1.1 K/UL (ref 1–4.8)
LYMPHOCYTES NFR BLD: 18 % (ref 18–48)
MAGNESIUM SERPL-MCNC: 1.7 MG/DL (ref 1.6–2.6)
MCH RBC QN AUTO: 28 PG (ref 27–31)
MCHC RBC AUTO-ENTMCNC: 32.1 G/DL (ref 32–36)
MCV RBC AUTO: 87 FL (ref 82–98)
MONOCYTES # BLD AUTO: 0.7 K/UL (ref 0.3–1)
MONOCYTES NFR BLD: 11.8 % (ref 4–15)
NEUTROPHILS # BLD AUTO: 3.8 K/UL (ref 1.8–7.7)
NEUTROPHILS NFR BLD: 64.7 % (ref 38–73)
NRBC BLD-RTO: 0 /100 WBC
PHOSPHATE SERPL-MCNC: 3.2 MG/DL (ref 2.7–4.5)
PLATELET # BLD AUTO: 199 K/UL (ref 150–450)
PMV BLD AUTO: 10 FL (ref 9.2–12.9)
POTASSIUM SERPL-SCNC: 3.7 MMOL/L (ref 3.5–5.1)
RBC # BLD AUTO: 3.61 M/UL (ref 4.6–6.2)
SODIUM SERPL-SCNC: 135 MMOL/L (ref 136–145)
WBC # BLD AUTO: 5.84 K/UL (ref 3.9–12.7)

## 2024-12-23 PROCEDURE — 25000003 PHARM REV CODE 250: Mod: NTX | Performed by: HOSPITALIST

## 2024-12-23 PROCEDURE — 85025 COMPLETE CBC W/AUTO DIFF WBC: CPT | Performed by: STUDENT IN AN ORGANIZED HEALTH CARE EDUCATION/TRAINING PROGRAM

## 2024-12-23 PROCEDURE — 36415 COLL VENOUS BLD VENIPUNCTURE: CPT | Performed by: STUDENT IN AN ORGANIZED HEALTH CARE EDUCATION/TRAINING PROGRAM

## 2024-12-23 PROCEDURE — 97535 SELF CARE MNGMENT TRAINING: CPT

## 2024-12-23 PROCEDURE — 84100 ASSAY OF PHOSPHORUS: CPT | Performed by: STUDENT IN AN ORGANIZED HEALTH CARE EDUCATION/TRAINING PROGRAM

## 2024-12-23 PROCEDURE — 80048 BASIC METABOLIC PNL TOTAL CA: CPT | Performed by: STUDENT IN AN ORGANIZED HEALTH CARE EDUCATION/TRAINING PROGRAM

## 2024-12-23 PROCEDURE — 97116 GAIT TRAINING THERAPY: CPT | Mod: CQ

## 2024-12-23 PROCEDURE — 83735 ASSAY OF MAGNESIUM: CPT | Performed by: STUDENT IN AN ORGANIZED HEALTH CARE EDUCATION/TRAINING PROGRAM

## 2024-12-23 RX ORDER — HYDROCODONE BITARTRATE AND ACETAMINOPHEN 10; 325 MG/1; MG/1
1 TABLET ORAL EVERY 6 HOURS PRN
Qty: 15 TABLET | Refills: 0 | Status: SHIPPED | OUTPATIENT
Start: 2024-12-23

## 2024-12-23 RX ADMIN — PANTOPRAZOLE SODIUM 40 MG: 40 TABLET, DELAYED RELEASE ORAL at 09:12

## 2024-12-23 RX ADMIN — METOPROLOL TARTRATE 100 MG: 50 TABLET, FILM COATED ORAL at 09:12

## 2024-12-23 RX ADMIN — Medication 100 MG: at 09:12

## 2024-12-23 RX ADMIN — POLYETHYLENE GLYCOL 3350 17 G: 17 POWDER, FOR SOLUTION ORAL at 09:12

## 2024-12-23 RX ADMIN — OMEGA-3-ACID ETHYL ESTERS 1 G: 1 CAPSULE, LIQUID FILLED ORAL at 09:12

## 2024-12-23 RX ADMIN — FOLIC ACID 1000 MCG: 1 TABLET ORAL at 09:12

## 2024-12-23 RX ADMIN — HYDROCODONE BITARTRATE AND ACETAMINOPHEN 1 TABLET: 10; 325 TABLET ORAL at 06:12

## 2024-12-23 RX ADMIN — HYDROCODONE BITARTRATE AND ACETAMINOPHEN 1 TABLET: 10; 325 TABLET ORAL at 12:12

## 2024-12-23 RX ADMIN — AMIODARONE HYDROCHLORIDE 200 MG: 200 TABLET ORAL at 09:12

## 2024-12-23 RX ADMIN — LEVOTHYROXINE SODIUM 50 MCG: 50 TABLET ORAL at 06:12

## 2024-12-23 NOTE — TELEPHONE ENCOUNTER
Liver Transplant Selection committee met on 12/18/2024 to discuss Mr. Isabel's transplant candidacy.    Jonny Isabel, MRN [id number] (Dr. Escalante/Michael) - MELD 12 ---DECLINED for liver transplant per patient's request.  Patient does not want to proceed w/ liver transplant.    Transplant episode closed per patient's request. Will have Mr. Isabel follow in hepatology to manage his ascites

## 2024-12-23 NOTE — ASSESSMENT & PLAN NOTE
64y M w/ PMHx of decompensated EtOH cirrhosis (ascites requiring recurrent weekly paracentesis), NID-T2DM, afib s/p ablation and DCCV on xarelto, COPD, CKD3 HTN transferred from OSH with concern for incarcerated ventral hernia. S/p exploratory laparotomy with small bowel resection and retrorectus hernia repair with mesh on 12/17/2024. Doing well post-operatively.    - Prevena will be removed prior to discharge  - Okay for regular diet  - Plan to remove left drain today and place stitch  - Patient will need to get back to his weekly paracentesis schedule  - needs to ambulate and OOBTC  - LLQ drain (intraabdominal) empty and record output q3h.   - MMPC   - Okay for DVT ppx from surgical standpoint   - Rest of care per primary. Please call General Surgery with any questions or concerns.

## 2024-12-23 NOTE — NURSING
"..[facility] Plan of Care Note    Dx:   Small bowel obstruction [K56.609]  Strangulated hernia of abdominal wall [K43.6]    Shift Events: no significant changes noted     Goals of Care:  Pain management     Neuro: A&Ox4     Vital Signs: /63 (BP Location: Left arm, Patient Position: Lying)   Pulse (!) 58   Temp 97.5 °F (36.4 °C) (Oral)   Resp 14   Ht 5' 9" (1.753 m)   Wt 86.1 kg (189 lb 13.1 oz)   SpO2 95%   BMI 28.03 kg/m²     Respiratory: room air     Diet: Diet Adult Regular Low Sodium,2gm; Standard Tray      Is patient tolerating current diet? yes    GTTS: none     Urine Output/Bowel Movement:   I/O this shift:  In: 440 [P.O.:440]  Out: 865 [Urine:520; Drains:345]  Last Bowel Movement: 12/18/24      Drains/Tubes/Tube Feeds (include total output/shift):   I/O this shift:  In: 440 [P.O.:440]  Out: 865 [Urine:520; Drains:345]      Lines:  see lda       Accuchecks: no    Skin: see flowsheet     Fall Risk Score: 7    Activity level?  Independent     Any scheduled procedures? no    Any safety concerns? no    Other: no  "

## 2024-12-23 NOTE — PT/OT/SLP PROGRESS
Physical Therapy Treatment    Patient Name:  Jonny Isabel   MRN:  [id number]    Recommendations:     Discharge Recommendations: No Therapy Indicated  Discharge Equipment Recommendations: none  Barriers to discharge:     Inaccessible home environment and Evolving Clinical Presentation     Assessment:     Jonny Isabel is a 64 y.o. male admitted with a medical diagnosis of Small bowel obstruction.  He presents with the following impairments/functional limitations: impaired endurance, pain, gait instability, impaired balance, decreased safety awareness, decreased lower extremity function, impaired functional mobility, impaired self care skills . Progressing with PT Intervention. Pt Progressing with improving gait distance. Pt would continue to benefit from skilled PT to address overall function al mobility, goals , and to return to functional baseline.  Goals remain appropriate.     Rehab Prognosis: Good; patient would benefit from acute skilled PT services to address these deficits and reach maximum level of function.    Recent Surgery: Procedure(s) (LRB):  LAPAROTOMY, EXPLORATORY (N/A)  REPAIR, HERNIA, VENTRAL (N/A) 6 Days Post-Op    Plan:     During this hospitalization, patient to be seen 3 x/week to address the identified rehab impairments via gait training, therapeutic activities, therapeutic exercises, neuromuscular re-education and progress toward the following goals:    Plan of Care Expires:  01/18/25    Subjective     Chief Complaint: no c/o  Patient/Family Comments/goals: I don't have to go up the steps at home I will have a room downstairs for the moment  Pain/Comfort:  Pain Rating 1: 0/10      Objective:     Communicated with RN prior to session.  Patient found  standing in room  with  (wound vac; ANJU drain) upon PT entry to room.     General Precautions: Standard, fall  Orthopedic Precautions: N/A  Braces: N/A  Respiratory Status: Room air     Functional Mobility:  Transfers:     Sit to Stand:  supervision  with no AD  Gait: pt amb with no  ft with SBA for safety  Stairs:  pt declined to attempt stair training      AM-PAC 6 CLICK MOBILITY  Turning over in bed (including adjusting bedclothes, sheets and blankets)?: 3  Sitting down on and standing up from a chair with arms (e.g., wheelchair, bedside commode, etc.): 3  Moving from lying on back to sitting on the side of the bed?: 3  Moving to and from a bed to a chair (including a wheelchair)?: 3  Need to walk in hospital room?: 3  Climbing 3-5 steps with a railing?: 2  Basic Mobility Total Score: 17       Treatment & Education:  Therapist provided instruction and educated of patient on progress, safety, d/c, PT POC,   proper body mechanics, energy conservation, and fall prevention strategies during tasks listed above, on the effects of prolonged immobility and the importance of performing OOB activity and exercises to promote healing and reduce recovery time   Updated white board with appropriate PT mobility information for medical team notification     Pt encouraged to ambulate in hallways 3x/day with nursing or family assistance to improve endurance.   Pt safe to ambulate in hallway with RN or PCT assistance  Call nursing/pct to transfer to chair/use bathroom. Pt stated understanding  Bedside table in front of patient and area set up for function, convenience, and safety. RN aware of patient's mobility needs and status. Questions/concerns addressed within PTA scope of practice, patient with no further questions. Time was provided for active listening, discussion of health disposition, and discussion of safe discharge. Pt?verbalized?agreement .    Patient left sitting edge of bed with all lines intact, call button in reach, and nsg notified..    GOALS:   Multidisciplinary Problems       Physical Therapy Goals          Problem: Physical Therapy    Goal Priority Disciplines Outcome Interventions   Physical Therapy Goal     PT, PT/OT Progressing    Description: Goals  to met by 1/1/2025    1. Supine to sit with Stand-by Assistance  2. Sit to stand transfer with Stand-by Assistance  3. Bed to chair transfer with Supervision using LRAD  4. Gait  x 50 feet with Stand-by Assistance using LRAD   5. Ascend/descend 15 stair(s) with either Handrails Contact Guard Assistance using LRAD.   6. Stand for 5 minutes with Supervision using LRAD  7. Lower extremity exercise program x15 reps per Instruction, with assistance as needed in order to facilitate improved postural control and improvement in functional independence                       Time Tracking:     PT Received On: 12/23/24  PT Start Time: 0902     PT Stop Time: 0910  PT Total Time (min): 8 min     Billable Minutes: Gait Training 8    Treatment Type: Treatment  PT/PTA: PTA     Number of PTA visits since last PT visit: 1 12/23/2024

## 2024-12-23 NOTE — DISCHARGE INSTRUCTIONS
Postoperative General Instructions    What to Expect:  It is normal to experience pain and swelling at the surgical site.  The pain usually decreases significantly after the first week but may last for many weeks.  Each day, the pain should be similar or better to the previous day. If it worsens, call the doctor's office.     Activity:  You should walk beginning on the day of surgery and increase activity slowly over the next two to four weeks.  Do not drive while taking prescription pain medication.  You may return to work when you feel ready.  Do not lift anything heavier than 10 lbs for 4 weeks     Wound Care:  You may shower. Let soap and water run over the incisions and pat dry. Do not submerge in a bathtub or pool.  If you have an open wound, you should change the dressing twice daily with moist gauze.     Diet:  You may resume your normal diet postoperatively.  Take a stool softener or laxative to avoid constipation.     Medication:  Pain Control  Take the prescribed Norco as needed if you have pain that is not controlled by ibuprofen.  Bowel Regularity  Take an over-the-counter stool softener/laxative (Colace, Miralax, or Milk of Magnesia) to avoid constipation.  Previous Home Medication  Restart your previous home medication unless otherwise instructed.    Call Your Doctor's Office If You Experience:  Fevers greater than 101.3°F.  Vomiting.  Spreading redness or drainage from the incisions.  Opening of the incisions.  Worsening pain not controlled by medication.  Chest pain or shortness of breath.    Follow-Up:  Follow-up with your surgeon as scheduled in two weeks.  If no follow-up appointment has been scheduled, call to schedule an appointment within 1-2 weeks of discharge.     Contact Information:  During normal business hours call the clinic with any questions or concerns. On weekends and evenings call [phone] to have the operators page the surgery resident on call after hours with questions or  concerns.

## 2024-12-23 NOTE — PT/OT/SLP PROGRESS
Occupational Therapy   Treatment and Discharge Summary    Name: Jonny Isabel  MRN: [id number]  Admitting Diagnosis:  Small bowel obstruction  6 Days Post-Op    Recommendations:     Discharge Recommendations: No Therapy Indicated  Discharge Equipment Recommendations:  none  Barriers to discharge:  None  Nursing staff Recommendations: Pt is safe to independently t/f self to bathroom and hallway.   Assessment:     Jonny Isabel is a 64 y.o. male with a medical diagnosis of Small bowel obstruction.  He presents with good participation with self ADLs and room functional mobility in acute setting. Performance deficits affecting function are other (comment) (none).     Patient Discharged from acute Occupational Therapy on 12/23/2024 .  Please refer to this note dated for functional status.    Reasons for Discontinuation of Therapy Services  Satisfactory goal achievement. and Therapist determines that the patient will no longer benefit from therapy services.       Plan:     Patient was seen 3 x/week to address the above listed problems via self-care/home management, therapeutic activities, therapeutic exercises  Plan of Care Expires: 12/23/24 today as Pt met goals.   Plan of Care Reviewed with: patient, spouse    Subjective     Chief Complaint: none  Patient/Family Comments/goals: ready for home  Pain/Comfort:  Pain Rating 1: other (see comments) (reported slight discomfort, but not pain quantified)  Location 1: abdomen  Pain Addressed 1: Reposition, Distraction    Objective:     Communicated with: RN prior to session.  Patient found HOB elevated with wound vac upon OT entry to room.    General Precautions: Standard, fall    Orthopedic Precautions:N/A  Braces: N/A  Respiratory Status: Room air     Occupational Performance:     Bed Mobility:    Patient completed Supine to Sit with independence     Functional Mobility/Transfers:  Patient completed Sit <> Stand Transfer with independence  with  no assistive device   Patient  completed Bed <> Chair Transfer using Step Transfer technique with independence with no assistive device  Patient completed Tub Transfer simulated step over technique with supervision with no AD and use of grab bars.   Functional Mobility: Independent ambulation in room.    Activities of Daily Living:  Grooming: independence oral hygiene in standing.   Pt already reported shaving attempt standing at sinkside this AM. Appeared fully dressed in personal clothes and reported requiring no external assistance.       [facility] 6 Click ADL: 24    Treatment & Education:  Pt educated on the following topics:  OT 's plan of care and purpose of visit, ADLs, importance of increased activity in hospital setting, transfer training, post-op precautions, home safety, energy conservation techniques, and to call for assistance with call button  Understanding was verbalized.     Patient left up in chair with all lines intact, call button in reach, and wife present    GOALS:   Multidisciplinary Problems       Occupational Therapy Goals       Not on file              Multidisciplinary Problems (Resolved)          Problem: Occupational Therapy    Goal Priority Disciplines Outcome Interventions   Occupational Therapy Goal   (Resolved)     OT, PT/OT Met    Description: Goals to be met by: 1/15/25.     Patient will increase functional independence with ADLs by performing:    UE Dressing with Stand-by Assistance.  LE Dressing with Stand-by Assistance.  Grooming while standing at sink with Stand-by Assistance.  Toileting from toilet with Stand-by Assistance for hygiene and clothing management.   Toilet transfer to toilet with Stand-by Assistance.                         Time Tracking:     OT Date of Treatment: 12/23/24  OT Start Time: 1136  OT Stop Time: 1147  OT Total Time (min): 11 min    Billable Minutes:Self Care/Home Management 11    OT/KARLEE: OT          12/23/2024

## 2024-12-23 NOTE — PLAN OF CARE
Problem: Occupational Therapy  Goal: Occupational Therapy Goal  Description: Goals to be met by: 1/15/25.     Patient will increase functional independence with ADLs by performing:    UE Dressing with Stand-by Assistance.  LE Dressing with Stand-by Assistance.  Grooming while standing at sink with Stand-by Assistance.  Toileting from toilet with Stand-by Assistance for hygiene and clothing management.   Toilet transfer to toilet with Stand-by Assistance.    Outcome: Met   Discharge OT' s services in acute setting.

## 2024-12-23 NOTE — PROGRESS NOTES
[facility]  General Surgery  Progress Note    Subjective:     History of Present Illness:  No notes on file    Post-Op Info:  Procedure(s) (LRB):  LAPAROTOMY, EXPLORATORY (N/A)  REPAIR, HERNIA, VENTRAL (N/A)   6 Days Post-Op     Interval History: NEAON, AF, HDS. Tolerating diet without n/v. Having gas no bowel movements. Right drain pulled yesterday. Left drain with ascites and prevena still running. WBC and Hgb stable.    Medications:  Continuous Infusions:  Scheduled Meds:   amiodarone  200 mg Oral Daily    enoxparin  40 mg Subcutaneous Q24H (prophylaxis, 1700)    folic acid  1,000 mcg Oral Daily    levothyroxine  50 mcg Oral Before breakfast    metoprolol tartrate  100 mg Oral Daily    omega-3 acid ethyl esters  1 g Oral BID    pantoprazole  40 mg Oral BID    polyethylene glycol  17 g Oral Daily    thiamine  100 mg Oral Daily     PRN Meds:  Current Facility-Administered Medications:     calcium carbonate, 500 mg, Oral, Daily PRN    HYDROcodone-acetaminophen, 1 tablet, Oral, Q6H PRN    HYDROcodone-acetaminophen, 1 tablet, Oral, Q6H PRN    melatonin, 6 mg, Oral, Nightly PRN    promethazine, 12.5 mg, Oral, Q6H PRN    sodium chloride 0.9%, 10 mL, Intra-Catheter, PRN    traZODone, 50 mg, Oral, Nightly PRN     Review of patient's allergies indicates:  No Known Allergies  Objective:     Vital Signs (Most Recent):  Temp: 97.5 °F (36.4 °C) (12/23/24 0444)  Pulse: (!) 58 (12/23/24 0444)  Resp: 14 (12/23/24 0653)  BP: 117/63 (12/23/24 0444)  SpO2: 95 % (12/23/24 0444) Vital Signs (24h Range):  Temp:  [97.2 °F (36.2 °C)-97.8 °F (36.6 °C)] 97.5 °F (36.4 °C)  Pulse:  [45-58] 58  Resp:  [14-20] 14  SpO2:  [95 %-97 %] 95 %  BP: (117-153)/(63-69) 117/63     Weight: 86.1 kg (189 lb 13.1 oz)  Body mass index is 28.03 kg/m².    Intake/Output - Last 3 Shifts         12/21 0700 12/22 0659 12/22 0700 12/23 0659 12/23 0700 12/24 0659    P.O. 1560 440     Total Intake(mL/kg) 1560 (18.1) 440 (5.1)     Urine (mL/kg/hr) 774 (0.4)  520 (0.3)     Drains 590 825     Other       Stool  0     Total Output 1364 1345     Net +196 -905            Stool Occurrence  0 x              Physical Exam  Constitutional:       General: He is not in acute distress.  HENT:      Head: Normocephalic and atraumatic.   Eyes:      Extraocular Movements: Extraocular movements intact.      Conjunctiva/sclera: Conjunctivae normal.      Pupils: Pupils are equal, round, and reactive to light.   Cardiovascular:      Rate and Rhythm: Normal rate and regular rhythm.   Pulmonary:      Effort: Pulmonary effort is normal. No respiratory distress.   Abdominal:      Palpations: Abdomen is soft.      Comments: Prevena vac over midline incision with seal intact   Appropriate tenderness  LLQ (intraabdominal) drain with serous output    Neurological:      Mental Status: He is alert.          Significant Labs:  I have reviewed all pertinent lab results within the past 24 hours.    Significant Diagnostics:  I have reviewed all pertinent imaging results/findings within the past 24 hours.  Assessment/Plan:     Ventral hernia  64y M w/ PMHx of decompensated EtOH cirrhosis (ascites requiring recurrent weekly paracentesis), NID-T2DM, afib s/p ablation and DCCV on xarelto, COPD, CKD3 HTN transferred from OSH with concern for incarcerated ventral hernia. S/p exploratory laparotomy with small bowel resection and retrorectus hernia repair with mesh on 12/17/2024. Doing well post-operatively.    - Prevena will be removed prior to discharge  - Okay for regular diet  - Plan to remove left drain today and place stitch  - Patient will need to get back to his weekly paracentesis schedule  - needs to ambulate and OOBTC  - LLQ drain (intraabdominal) empty and record output q3h.   - MMPC   - Okay for DVT ppx from surgical standpoint   - Rest of care per primary. Please call General Surgery with any questions or concerns.         Ethan Burt MD  General Surgery  [facility]

## 2024-12-23 NOTE — SUBJECTIVE & OBJECTIVE
Interval History: NEAON, AF, HDS. Tolerating diet without n/v. Having gas no bowel movements. Right drain pulled yesterday. Left drain with ascites and prevena still running. WBC and Hgb stable.    Medications:  Continuous Infusions:  Scheduled Meds:   amiodarone  200 mg Oral Daily    enoxparin  40 mg Subcutaneous Q24H (prophylaxis, 1700)    folic acid  1,000 mcg Oral Daily    levothyroxine  50 mcg Oral Before breakfast    metoprolol tartrate  100 mg Oral Daily    omega-3 acid ethyl esters  1 g Oral BID    pantoprazole  40 mg Oral BID    polyethylene glycol  17 g Oral Daily    thiamine  100 mg Oral Daily     PRN Meds:  Current Facility-Administered Medications:     calcium carbonate, 500 mg, Oral, Daily PRN    HYDROcodone-acetaminophen, 1 tablet, Oral, Q6H PRN    HYDROcodone-acetaminophen, 1 tablet, Oral, Q6H PRN    melatonin, 6 mg, Oral, Nightly PRN    promethazine, 12.5 mg, Oral, Q6H PRN    sodium chloride 0.9%, 10 mL, Intra-Catheter, PRN    traZODone, 50 mg, Oral, Nightly PRN     Review of patient's allergies indicates:  No Known Allergies  Objective:     Vital Signs (Most Recent):  Temp: 97.5 °F (36.4 °C) (12/23/24 0444)  Pulse: (!) 58 (12/23/24 0444)  Resp: 14 (12/23/24 0653)  BP: 117/63 (12/23/24 0444)  SpO2: 95 % (12/23/24 0444) Vital Signs (24h Range):  Temp:  [97.2 °F (36.2 °C)-97.8 °F (36.6 °C)] 97.5 °F (36.4 °C)  Pulse:  [45-58] 58  Resp:  [14-20] 14  SpO2:  [95 %-97 %] 95 %  BP: (117-153)/(63-69) 117/63     Weight: 86.1 kg (189 lb 13.1 oz)  Body mass index is 28.03 kg/m².    Intake/Output - Last 3 Shifts         12/21 0700  12/22 0659 12/22 0700 12/23 0659 12/23 0700 12/24 0659    P.O. 1560 440     Total Intake(mL/kg) 1560 (18.1) 440 (5.1)     Urine (mL/kg/hr) 774 (0.4) 520 (0.3)     Drains 590 825     Other       Stool  0     Total Output 1364 1345     Net +196 -905            Stool Occurrence  0 x              Physical Exam  Constitutional:       General: He is not in acute distress.  HENT:       Head: Normocephalic and atraumatic.   Eyes:      Extraocular Movements: Extraocular movements intact.      Conjunctiva/sclera: Conjunctivae normal.      Pupils: Pupils are equal, round, and reactive to light.   Cardiovascular:      Rate and Rhythm: Normal rate and regular rhythm.   Pulmonary:      Effort: Pulmonary effort is normal. No respiratory distress.   Abdominal:      Palpations: Abdomen is soft.      Comments: Prevena vac over midline incision with seal intact   Appropriate tenderness  LLQ (intraabdominal) drain with serous output    Neurological:      Mental Status: He is alert.          Significant Labs:  I have reviewed all pertinent lab results within the past 24 hours.    Significant Diagnostics:  I have reviewed all pertinent imaging results/findings within the past 24 hours.

## 2024-12-24 NOTE — PLAN OF CARE
[facility]  Discharge Final Note    Primary Care Provider: Yuli Costa MD  Expected Discharge Date: 12/23/2024    Patient medically ready for discharge to home.     Transportation by family.     Is family/patient aware of discharge: yes     Hospital follow up scheduled: yes       Final Discharge Note (most recent)       Final Note - 12/24/24 1119          Final Note    Assessment Type Final Discharge Note     Anticipated Discharge Disposition Home or Self Care     Hospital Resources/Appts/Education Provided Provided patient/caregiver with written discharge plan information                     Important Message from Medicare         Referral Info (most recent)       Referral Info    No documentation.                   Future Appointments   Date Time Provider Department Center   1/9/2025  2:45 PM Domingo Francisco MD [facility] GENSUR [facility]   1/14/2025  7:00 AM LAB, APPOINTMENT [facility] LAB [facility]   2/14/2025  7:00 AM LAB, APPOINTMENT [place] [facility]   3/14/2025  7:00 AM LAB, APPOINTMENT [facility] LAB [facility]   3/24/2025  9:30 AM Lovely Escalante MD [facility] HEPAT [facility]   4/14/2025  7:00 AM LAB, APPOINTMENT [facility] LAB [facility]     Kota Segura RN  Case Management  Ext: [id number]  12/24/2024

## 2024-12-26 ENCOUNTER — TELEPHONE (OUTPATIENT)
Dept: INTERVENTIONAL RADIOLOGY/VASCULAR | Facility: CLINIC | Age: 64
End: 2024-12-26
Payer: MEDICAID

## 2024-12-26 ENCOUNTER — PATIENT MESSAGE (OUTPATIENT)
Dept: TRANSPLANT | Facility: CLINIC | Age: 64
End: 2024-12-26
Payer: MEDICAID

## 2024-12-26 NOTE — DISCHARGE SUMMARY
[facility] Medicine  Discharge Summary      Patient Name: Jonny Isabel  MRN: [id number]  CAROLYN: [id number]  Patient Class: IP- Inpatient  Admission Date: 12/17/2024  Hospital Length of Stay: 5 days  Discharge Date and Time: 12/23/2024  3:00 PM  Attending Physician: Odalis att. providers found   Discharging Provider: Seymour Leon MD  Primary Care Provider: Yuli Costa MD  [facility] Medicine Team: [facility] HOSP MED Q Seymour Leon MD  Primary Care Team: [facility] MED     HPI:   Jonny Isabel is a 64 y.o. male with past medical history of AFL s/p RFA (2015), PAF (on Xarelto), tobacco abuse (1 ppd x 50 years), COPD, DM2, HTN, alcoholic cirrhosis with esophageal varices and ascites requiring weekly paracentesis, CKD 3, ventral hernia who presents as transfer from [facility] with nausea, vomiting and abd pain. While at OSH he underwent CT a/p which demonstrated partial SBO 2/2 ventral hernia containing ascites and mildly distended loop of bowel, and patient was transferred to [facility] for general surgery evaluation, where he was taken to OR class A for exploratory laparotomy with small bowel resection and retrorectus hernia repair with mesh. He then was transferred to hospital medicine service. Patient evaluated in pacu. He reports abdominal soreness which is improved from initial presentation. He has NGT in place and denies nausea / vomiting. Of note, patient last underwent paracentesis on 12/17 by IR during which 3600 cc fluid was removed which was negative for SBP.     Procedure(s) (LRB):  LAPAROTOMY, EXPLORATORY (N/A)  REPAIR, HERNIA, VENTRAL (N/A)      Hospital Course:   Patient taken to OR with General surgery for management of small-bowel obstruction and strangulated hernia on 12/17.  NG tube placed postoperatively and patient made NPO.  Pain gradually improved days following surgery.  NG tube able to be removed on 12/20.  Clear liquid diet started per surgery recommendations on 12/21 and  advanced to full liquid 12/22.  Surgery pulled the left drain 12/22. Right drain and the Prevena removed 12/23. Pain controlled and tolerating diet at discharge by genera surgery.        Patient deemed appropriate for discharge. I personally saw, examined, and evaluated patient prior to departure. Plan discussed with patient, who was agreeable and amenable; medications were discussed and reviewed, outpatient follow-up scheduled, ER precautions were given, all questions were answered to the patient's satisfaction, and Jonny Isabel was subsequently discharged.       Goals of Care Treatment Preferences:  Code Status: Full Code    Health care agent: Adelita Glaser  [facility] agent number: pt's significant other.                   SDOH Screening:  The patient was screened for utility difficulties, food insecurity, transport difficulties, housing insecurity, and interpersonal safety and there were no concerns identified this admission.     Consults:   Consults (From admission, onward)          Status Ordering Provider     Inpatient consult to General Surgery  Once        Provider:  (Not yet assigned)    Completed TOMMY RIOS            * Small bowel obstruction  Ventral heria  - CT a/p demonstrated partial SBO 2/2 ventral hernia containing ascites and mildly distended loop of bowel  - General surgery consulted  - OR on 12/17 for ex lap with small bowel resection and retrorectus hernia repair with mesh.   -advance to clear liquid diet today  - DVT ppx    A-fib      Antiarrhythmics  amiodarone tablet 200 mg, Daily, Oral  metoprolol tartrate (LOPRESSOR) tablet 100 mg, Daily, OralAmiodarone  Metoprolol    Anticoagulants  heparin (porcine) injection 5,000 Units, Every 12 hours, Subcutaneous    Plan  - Replete lytes with a goal of K>4, Mg >2  - resume p.o. meds today  - hold xarelto for now, dvt ppx    Ventral hernia  No acute management needed      Hypothyroid  We will resume thyroid supplementation today      Ascites  due to alcoholic cirrhosis  - Followed by [facility] hepatology (Dr. Escalante) - most recently on 12/13.  At that time patient said that he did not want a liver XPL and did not want a TIPS.  He has stopped drinking.  PETH neg since 5/8/23.  He has been undergoing weekly paracentesis on Wednesdays (last 12/17)  - Surgery placed orders to drain ascites fluid from abdominal drain, left drain pulled today and may pull right tomorrow      Final Active Diagnoses:    Diagnosis Date Noted POA    PRINCIPAL PROBLEM:  Small bowel obstruction [K56.609] 01/16/2023 Yes    A-fib [I48.91] 01/16/2023 Yes    Ventral hernia [K43.9] 12/16/2024 Yes    Hypothyroid [E03.9] 12/12/2023 Yes    Ascites due to alcoholic cirrhosis [K70.31] 05/08/2023 Yes      Problems Resolved During this Admission:       Discharged Condition: good    Disposition: Home or Self Care    Follow Up:    Patient Instructions:      Ambulatory referral/consult to Smoking Cessation Program   Standing Status: Future   Referral Priority: Routine Referral Type: Consultation   Referral Reason: Specialty Services Required   Requested Specialty: CTTS   Number of Visits Requested: 1     Ambulatory referral/consult to Internal Medicine   Standing Status: Future   Referral Priority: Routine Referral Type: Consultation   Referral Reason: Specialty Services Required   Requested Specialty: Internal Medicine   Number of Visits Requested: 1     Diet Adult Regular     Notify your health care provider if you experience any of the following:  increased confusion or weakness     Notify your health care provider if you experience any of the following:  persistent dizziness, light-headedness, or visual disturbances     Notify your health care provider if you experience any of the following:  worsening rash     Notify your health care provider if you experience any of the following:  severe persistent headache     Notify your health care provider if you experience any of the following:  difficulty  breathing or increased cough     Notify your health care provider if you experience any of the following:  redness, tenderness, or signs of infection (pain, swelling, redness, odor or green/yellow discharge around incision site)     Notify your health care provider if you experience any of the following:  severe uncontrolled pain     Notify your health care provider if you experience any of the following:  persistent nausea and vomiting or diarrhea     Notify your health care provider if you experience any of the following:  temperature >100.4     Reason for not Prescribing Nicotine Replacement     Order Specific Question Answer Comments   Reason for not Prescribing: Patient refused      Activity as tolerated       Significant Diagnostic Studies: Labs: All labs within the past 24 hours have been reviewed    Pending Diagnostic Studies:       None           Medications:  Reconciled Home Medications:      Medication List        START taking these medications      HYDROcodone-acetaminophen  mg per tablet  Commonly known as: NORCO  Take 1 tablet by mouth every 6 (six) hours as needed for Pain.            CHANGE how you take these medications      levothyroxine 50 MCG tablet  Commonly known as: SYNTHROID  TAKE 1 TABLET BY MOUTH IN THE MORNING WITH A FULL GLASS OF WATER 30-60 MINUTES BEFORE OTHER MEDICATIONS AND FOOD  What changed:   how much to take  how to take this  when to take this  additional instructions            CONTINUE taking these medications      ADVAIR DISKUS 250-50 mcg/dose diskus inhaler  Generic drug: fluticasone-salmeterol 250-50 mcg/dose  Inhale 1 puff into the lungs 2 (two) times a day. Controller     amiodarone 200 MG Tab  Commonly known as: PACERONE  Take 200 mg by mouth once daily.     folic acid 1 MG tablet  Commonly known as: FOLVITE  Take 1,000 mcg by mouth once daily.     metoprolol tartrate 100 MG tablet  Commonly known as: LOPRESSOR  Take 100 mg by mouth once daily.     omega-3 acid  ethyl esters 1 gram capsule  Commonly known as: LOVAZA  Take 1 capsule by mouth 2 (two) times daily.     pantoprazole 40 MG tablet  Commonly known as: PROTONIX  Take 1 tablet (40 mg total) by mouth 2 (two) times daily.     PRALUENT PEN 75 mg/mL Pnij  Generic drug: alirocumab  Inject 75 mg into the skin every 14 (fourteen) days.     promethazine 12.5 MG Tab  Commonly known as: PHENERGAN  Take 12.5 mg by mouth every 6 (six) hours as needed (nausea).     thiamine 100 MG tablet  Take 100 mg by mouth once daily.     traZODone 50 MG tablet  Commonly known as: DESYREL  Take 50 mg by mouth nightly as needed.     VITAMIN B COMPLEX ORAL  Take 1 tablet by mouth once daily.     XARELTO 20 mg Tab  Generic drug: rivaroxaban  Take 20 mg by mouth once daily.              Indwelling Lines/Drains at time of discharge:   Lines/Drains/Airways       Drain  Duration                  Closed/Suction Drain 12/18/24 Tube - 2 Left Abdomen Bulb 15 Fr. 8 days                    Time spent on the discharge of patient: 35 minutes         Seymour Leon MD  Department of Hospital Medicine  [facility]

## 2025-01-06 ENCOUNTER — HOSPITAL ENCOUNTER (OUTPATIENT)
Dept: INTERVENTIONAL RADIOLOGY/VASCULAR | Facility: HOSPITAL | Age: 65
Discharge: HOME | End: 2025-01-06
Attending: INTERNAL MEDICINE
Payer: MEDICAID

## 2025-01-06 VITALS
OXYGEN SATURATION: 98 % | RESPIRATION RATE: 16 BRPM | DIASTOLIC BLOOD PRESSURE: 68 MMHG | HEART RATE: 50 BPM | TEMPERATURE: 97 F | SYSTOLIC BLOOD PRESSURE: 153 MMHG

## 2025-01-06 DIAGNOSIS — R18.8 OTHER ASCITES: ICD-10-CM

## 2025-01-06 DIAGNOSIS — R18.8 ASCITES: ICD-10-CM

## 2025-01-06 DIAGNOSIS — Z76.82 ORGAN TRANSPLANT CANDIDATE: ICD-10-CM

## 2025-01-06 PROCEDURE — 49083 ABD PARACENTESIS W/IMAGING: CPT | Performed by: RADIOLOGY

## 2025-01-06 PROCEDURE — 63600175 PHARM REV CODE 636 W HCPCS: Performed by: PHYSICIAN ASSISTANT

## 2025-01-06 RX ORDER — LIDOCAINE HYDROCHLORIDE 10 MG/ML
INJECTION, SOLUTION INFILTRATION; PERINEURAL
Status: COMPLETED | OUTPATIENT
Start: 2025-01-06 | End: 2025-01-06

## 2025-01-06 RX ADMIN — LIDOCAINE HYDROCHLORIDE 5 ML: 10 INJECTION, SOLUTION INFILTRATION; PERINEURAL at 01:01

## 2025-01-06 NOTE — DISCHARGE SUMMARY
Interventional Radiology Short Stay Discharge Summary      Admit Date: 1/6/2025  Discharge Date: 01/06/2025     Hospital Course: Uneventful    Discharge Diagnosis: ascites    Discharge Condition: Stable    Discharge Disposition: Home    Diet: Resume prior diet    Activity: activity as tolerated    Follow-up: With referring provider    Cindy Chava, PA-C [facility]

## 2025-01-06 NOTE — PROCEDURES
Radiology Post-Procedure Note    Pre Op Diagnosis: Ascites  Post Op Diagnosis: Same    Procedure: Ultrasound Guided Paracentesis    Procedure performed by: Ashia Gallegos PA-C    Written Informed Consent Obtained: Yes  Specimen Removed: none sent  Estimated Blood Loss: Minimal    Findings:   Successful paracentesis.  3450 ml dark yellow fluid LLQ.  Albumin was not administered PRN per protocol.    Patient tolerated procedure well.    Ashia Gallegos PA-C

## 2025-01-06 NOTE — H&P
Radiology History & Physical      SUBJECTIVE:     Chief Complaint: abdominal distention    History of Present Illness:  Jonny Isabel is a 64 y.o. male who presents for ultrasound guided paracentesis  Past Medical History:   Diagnosis Date    A-fib     Esophageal varices 5/8/2023    Other ascites 5/8/2023     Past Surgical History:   Procedure Laterality Date    ESOPHAGOGASTRODUODENOSCOPY N/A 1/17/2023    Procedure: EGD (ESOPHAGOGASTRODUODENOSCOPY);  Surgeon: Dewayne Navas MD;  [facility]  Service: Endoscopy;  Laterality: N/A;    ESOPHAGOGASTRODUODENOSCOPY N/A 2/7/2024    Procedure: EGD (ESOPHAGOGASTRODUODENOSCOPY);  Surgeon: Nathan Goins MD;  [facility]  Service: Endoscopy;  Laterality: N/A;  referral: Dr. Escalante /cirrhosis - labs- possible banding / prep ins on portal / 2nd floor - Pt c/o SOB at times./ Xarelto - message sent to clearance nurse per protocol - ERW  1/31/24- LVM for precall - ERW  2/1-precall complet-Kpvt  ok to hold Xarelto2 da    LAPAROTOMY, EXPLORATORY N/A 12/17/2024    Procedure: LAPAROTOMY, EXPLORATORY;  Surgeon: Domingo Francisco MD;  Location: [facility] OR [address] [place] [address];  Service: General;  Laterality: N/A;    REPAIR, HERNIA, VENTRAL N/A 12/17/2024    Procedure: REPAIR, HERNIA, VENTRAL;  Surgeon: Domingo Francisco MD;  Location: [facility] OR [address] [place] [address];  Service: General;  Laterality: N/A;  with mesh       Home Meds:   Prior to Admission medications    Medication Sig Start Date End Date Taking? Authorizing Provider   ADVAIR DISKUS 250-50 mcg/dose diskus inhaler Inhale 1 puff into the lungs 2 (two) times a day. Controller 3/20/24 3/20/25  Joanne Ruiz NP   alirocumab (PRALUENT PEN) 75 mg/mL PnIj Inject 75 mg into the skin every 14 (fourteen) days.    Provider, Historical   amiodarone (PACERONE) 200 MG Tab Take 200 mg by mouth once daily.    Provider, Historical   folic acid (FOLVITE) 1 MG tablet Take 1,000 mcg by mouth once daily. 12/6/24   Provider,  Historical   HYDROcodone-acetaminophen (NORCO)  mg per tablet Take 1 tablet by mouth every 6 (six) hours as needed for Pain. 12/23/24   Seymour Leon MD   levothyroxine (SYNTHROID) 50 MCG tablet TAKE 1 TABLET BY MOUTH IN THE MORNING WITH A FULL GLASS OF WATER 30-60 MINUTES BEFORE OTHER MEDICATIONS AND FOOD  Patient taking differently: Take 50 mcg by mouth before breakfast. WITH A FULL GLASS OF WATER 30-60 MINUTES BEFORE OTHER MEDICATIONS AND FOOD 12/12/23   Ellen Escalera NP   metoprolol tartrate (LOPRESSOR) 100 MG tablet Take 100 mg by mouth once daily. 7/16/24   Provider, Historical   omega-3 acid ethyl esters (LOVAZA) 1 gram capsule Take 1 capsule by mouth 2 (two) times daily. 7/16/24   Provider, Historical   pantoprazole (PROTONIX) 40 MG tablet Take 1 tablet (40 mg total) by mouth 2 (two) times daily. 8/5/24 8/5/25  Shawn Mejia MD   promethazine (PHENERGAN) 12.5 MG Tab Take 12.5 mg by mouth every 6 (six) hours as needed (nausea). 5/6/24   Provider, Historical   thiamine 100 MG tablet Take 100 mg by mouth once daily. 7/13/22   Provider, Historical   traZODone (DESYREL) 50 MG tablet Take 50 mg by mouth nightly as needed. 2/29/24   Provider, Historical   VITAMIN B COMPLEX ORAL Take 1 tablet by mouth once daily.    Provider, Historical   XARELTO 20 mg Tab Take 20 mg by mouth once daily. 1/9/23   Provider, Historical     Anticoagulants/Antiplatelets:  xarelto    Allergies: Review of patient's allergies indicates:  No Known Allergies  Sedation History:  no adverse reactions    Review of Systems:   Hematological: no known coagulopathies  Respiratory: no shortness of breath  Cardiovascular: no chest pain  Gastrointestinal: no abdominal pain  Genito-Urinary: no dysuria  Musculoskeletal: negative  Neurological: no TIA or stroke symptoms         OBJECTIVE:     Vital Signs (Most Recent)  Temp: 97.1 °F (36.2 °C) (01/06/25 1253)  Pulse: (!) 52 (01/06/25 1253)  Resp: 16 (01/06/25 1253)  BP: (!) 211/94  (01/06/25 1253)  SpO2: 100 % (01/06/25 1253)    Physical Exam:  ASA: 2  Mallampati: n/a    General: no acute distress  Mental Status: alert and oriented to person, place and time  HEENT: normocephalic, atraumatic  Chest: unlabored breathing  Heart: regular heart rate  Abdomen: distended  Extremity: moves all extremities    ASSESSMENT/PLAN:     Sedation Plan: local  Patient will undergo ultrasound guided paracentesis.    Ashia Gallegos PA-C

## 2025-01-06 NOTE — SEDATION DOCUMENTATION
Procedure completed. Patient tolerated well; VSS. Site CDI. 3450 ml of clear, yellow ascites drained. Patient to be discharged per orders.

## 2025-01-06 NOTE — PLAN OF CARE
Pt VSS and in NAD. CRM equipment removed. Discharge instructions and AVS provided. Pt verbalized understanding, questions and concerns addressed. No further needs at this time. Pt discharged from recovery area at 1415.

## 2025-01-09 ENCOUNTER — OFFICE VISIT (OUTPATIENT)
Dept: SURGERY | Facility: CLINIC | Age: 65
End: 2025-01-09
Payer: MEDICAID

## 2025-01-09 VITALS
HEIGHT: 69 IN | HEART RATE: 50 BPM | OXYGEN SATURATION: 97 % | SYSTOLIC BLOOD PRESSURE: 168 MMHG | WEIGHT: 198.31 LBS | DIASTOLIC BLOOD PRESSURE: 80 MMHG | BODY MASS INDEX: 29.37 KG/M2

## 2025-01-09 DIAGNOSIS — K43.6 STRANGULATED HERNIA OF ABDOMINAL WALL: Primary | ICD-10-CM

## 2025-01-09 PROCEDURE — 3077F SYST BP >= 140 MM HG: CPT | Mod: CPTII,,, | Performed by: SURGERY

## 2025-01-09 PROCEDURE — 3079F DIAST BP 80-89 MM HG: CPT | Mod: CPTII,,, | Performed by: SURGERY

## 2025-01-09 PROCEDURE — 99213 OFFICE O/P EST LOW 20 MIN: CPT | Mod: PBBFAC | Performed by: SURGERY

## 2025-01-09 PROCEDURE — 99999 PR PBB SHADOW E&M-EST. PATIENT-LVL III: CPT | Mod: PBBFAC,,, | Performed by: SURGERY

## 2025-01-09 PROCEDURE — 99213 OFFICE O/P EST LOW 20 MIN: CPT | Mod: S$PBB,,, | Performed by: SURGERY

## 2025-01-09 RX ORDER — ALBUTEROL SULFATE 90 UG/1
2 INHALANT RESPIRATORY (INHALATION) 4 TIMES DAILY PRN
COMMUNITY
Start: 2024-12-21

## 2025-01-09 RX ORDER — METHOCARBAMOL 500 MG/1
500 TABLET, FILM COATED ORAL 3 TIMES DAILY
Qty: 30 TABLET | Refills: 0 | Status: SHIPPED | OUTPATIENT
Start: 2025-01-09 | End: 2025-01-19

## 2025-01-09 NOTE — PROGRESS NOTES
[facility]  General Surgery  Follow Up Clinic Note    Subjective:     Jonny Isabel is a 64 y.o. male who presents to clinic for follow up s/p retrorectus ventral hernia repair for incarcerated hernia. Pt reports that they are feeling well. Tolerating a regular diet and having regular bowel movements. Denies fever, chills, chest pain, and shortness of breath. Pain is well controlled. Denies redness or drainage of incision.    Medications:  Current Outpatient Medications on File Prior to Visit   Medication Sig Dispense Refill    ADVAIR DISKUS 250-50 mcg/dose diskus inhaler Inhale 1 puff into the lungs 2 (two) times a day. Controller 180 each 3    albuterol (PROVENTIL/VENTOLIN HFA) 90 mcg/actuation inhaler 2 puffs 4 (four) times daily as needed.      alirocumab (PRALUENT PEN) 75 mg/mL PnIj Inject 75 mg into the skin every 14 (fourteen) days.      amiodarone (PACERONE) 200 MG Tab Take 200 mg by mouth once daily.      folic acid (FOLVITE) 1 MG tablet Take 1,000 mcg by mouth once daily.      levothyroxine (SYNTHROID) 50 MCG tablet TAKE 1 TABLET BY MOUTH IN THE MORNING WITH A FULL GLASS OF WATER 30-60 MINUTES BEFORE OTHER MEDICATIONS AND FOOD 90 tablet 3    metoprolol tartrate (LOPRESSOR) 100 MG tablet Take 100 mg by mouth once daily.      omega-3 acid ethyl esters (LOVAZA) 1 gram capsule Take 1 capsule by mouth 2 (two) times daily.      pantoprazole (PROTONIX) 40 MG tablet Take 1 tablet (40 mg total) by mouth 2 (two) times daily. 180 tablet 3    promethazine (PHENERGAN) 12.5 MG Tab Take 12.5 mg by mouth every 6 (six) hours as needed (nausea).      thiamine 100 MG tablet Take 100 mg by mouth once daily.      traZODone (DESYREL) 50 MG tablet Take 50 mg by mouth nightly as needed.      VITAMIN B COMPLEX ORAL Take 1 tablet by mouth once daily.      XARELTO 20 mg Tab Take 20 mg by mouth once daily.      HYDROcodone-acetaminophen (NORCO)  mg per tablet Take 1 tablet by mouth every 6 (six) hours as  needed for Pain. (Patient not taking: Reported on 1/9/2025) 15 tablet 0     No current facility-administered medications on file prior to visit.         Objective:     PHYSICAL EXAM:  Vital Signs (Most Recent)  Pulse: (!) 50 (01/09/25 1600)  BP: (!) 168/80 (01/09/25 1600)  SpO2: 97 % (01/09/25 1600)    Physical Exam:  Gen: no apparent distress, awake and alert  CV: regular rate/rhythm  Pulm: non-labored breathing, equal and bilateral chest rise  Abd: soft, non-distended, non-tender, incision C/D/I  Ext: warm and well perfused, no edema  Skin: warm and dry, no lesions appreciated  Incision: c/d/I, no surrounding erythema or edema  Neuro: motor and sensation grossly intact and symmetric           Assessment:     Jonny Isabel is a 64 y.o. male presenting to clinic today for follow up s/p retrorectus ventral hernia repair on 12/17/24     Plan:       - Patient is recovering well  - Continue with lifting restrictions for 4 more weeks  - RTC MELODY Cordoba DPM   General Surgery     I have seen the patient, reviewed the documentation, and have helped formulate the  resident's assessment and plan. I agree with the findings above.     Domingo Francisco MD, ALEXANDRE, FACS  Acute Care Surgery & Surgical Critical Care  [facility]    I spent a total of 20 minutes on the day of the visit.This includes face to face time and non-face to face time preparing to see the patient (eg, review of tests), obtaining and/or reviewing separately obtained history, documenting clinical information in the electronic or other health record, independently interpreting results and communicating results to the patient/family/caregiver, or care coordinator.

## 2025-01-14 ENCOUNTER — TELEPHONE (OUTPATIENT)
Dept: INTERVENTIONAL RADIOLOGY/VASCULAR | Facility: HOSPITAL | Age: 65
End: 2025-01-14
Payer: MEDICAID

## 2025-01-15 ENCOUNTER — HOSPITAL ENCOUNTER (OUTPATIENT)
Dept: INTERVENTIONAL RADIOLOGY/VASCULAR | Facility: HOSPITAL | Age: 65
Discharge: HOME | End: 2025-01-15
Attending: INTERNAL MEDICINE
Payer: MEDICAID

## 2025-01-15 VITALS
HEART RATE: 51 BPM | SYSTOLIC BLOOD PRESSURE: 178 MMHG | RESPIRATION RATE: 18 BRPM | DIASTOLIC BLOOD PRESSURE: 69 MMHG | OXYGEN SATURATION: 98 %

## 2025-01-15 DIAGNOSIS — R18.8 OTHER ASCITES: ICD-10-CM

## 2025-01-15 DIAGNOSIS — Z76.82 ORGAN TRANSPLANT CANDIDATE: ICD-10-CM

## 2025-01-15 PROCEDURE — 49083 ABD PARACENTESIS W/IMAGING: CPT | Performed by: STUDENT IN AN ORGANIZED HEALTH CARE EDUCATION/TRAINING PROGRAM

## 2025-01-15 PROCEDURE — 63600175 PHARM REV CODE 636 W HCPCS: Performed by: STUDENT IN AN ORGANIZED HEALTH CARE EDUCATION/TRAINING PROGRAM

## 2025-01-15 RX ORDER — LIDOCAINE HYDROCHLORIDE 10 MG/ML
INJECTION, SOLUTION INFILTRATION; PERINEURAL
Status: COMPLETED | OUTPATIENT
Start: 2025-01-15 | End: 2025-01-15

## 2025-01-15 RX ADMIN — LIDOCAINE HYDROCHLORIDE 10 ML: 10 INJECTION, SOLUTION INFILTRATION; PERINEURAL at 12:01

## 2025-01-15 NOTE — H&P
Radiology History & Physical      SUBJECTIVE:     Chief Complaint: ascites    History of Present Illness:  Jonny Isabel is a 64 y.o. male who presents for paracentesis  Past Medical History:   Diagnosis Date    A-fib     Esophageal varices 5/8/2023    Other ascites 5/8/2023     Past Surgical History:   Procedure Laterality Date    ESOPHAGOGASTRODUODENOSCOPY N/A 1/17/2023    Procedure: EGD (ESOPHAGOGASTRODUODENOSCOPY);  Surgeon: Dewayne Navas MD;  [facility]  Service: Endoscopy;  Laterality: N/A;    ESOPHAGOGASTRODUODENOSCOPY N/A 2/7/2024    Procedure: EGD (ESOPHAGOGASTRODUODENOSCOPY);  Surgeon: Nathan Goins MD;  [facility]  Service: Endoscopy;  Laterality: N/A;  referral: Dr. Escalante /cirrhosis - labs- possible banding / prep ins on portal / 2nd floor - Pt c/o SOB at times./ Xarelto - message sent to clearance nurse per protocol - ERW  1/31/24- LVM for precall - ERW  2/1-precall complet-Kpvt  ok to hold Xarelto2 da    LAPAROTOMY, EXPLORATORY N/A 12/17/2024    Procedure: LAPAROTOMY, EXPLORATORY;  Surgeon: Domingo Francisco MD;  Location: [facility] OR [address] [place] [address];  Service: General;  Laterality: N/A;    REPAIR, HERNIA, VENTRAL N/A 12/17/2024    Procedure: REPAIR, HERNIA, VENTRAL;  Surgeon: Domingo Francisco MD;  Location: [facility] OR [address] [place] [address];  Service: General;  Laterality: N/A;  with mesh       Home Meds:   Prior to Admission medications    Medication Sig Start Date End Date Taking? Authorizing Provider   ADVAIR DISKUS 250-50 mcg/dose diskus inhaler Inhale 1 puff into the lungs 2 (two) times a day. Controller 3/20/24 3/20/25  Joanne Ruiz NP   albuterol (PROVENTIL/VENTOLIN HFA) 90 mcg/actuation inhaler 2 puffs 4 (four) times daily as needed. 12/21/24   Provider, Historical   alirocumab (PRALUENT PEN) 75 mg/mL PnIj Inject 75 mg into the skin every 14 (fourteen) days.    Provider, Historical   amiodarone (PACERONE) 200 MG Tab Take 200 mg by mouth once daily.    Provider,  Historical   folic acid (FOLVITE) 1 MG tablet Take 1,000 mcg by mouth once daily. 12/6/24   Provider, Historical   HYDROcodone-acetaminophen (NORCO)  mg per tablet Take 1 tablet by mouth every 6 (six) hours as needed for Pain.  Patient not taking: Reported on 1/9/2025 12/23/24   Seymour Leon MD   levothyroxine (SYNTHROID) 50 MCG tablet TAKE 1 TABLET BY MOUTH IN THE MORNING WITH A FULL GLASS OF WATER 30-60 MINUTES BEFORE OTHER MEDICATIONS AND FOOD 12/12/23   Ellen Escalera NP   methocarbamoL (ROBAXIN) 500 MG Tab Take 1 tablet (500 mg total) by mouth 3 (three) times daily. for 10 days 1/9/25 1/19/25  Jaqueline Wilkins MD   metoprolol tartrate (LOPRESSOR) 100 MG tablet Take 100 mg by mouth once daily. 7/16/24   Provider, Historical   omega-3 acid ethyl esters (LOVAZA) 1 gram capsule Take 1 capsule by mouth 2 (two) times daily. 7/16/24   Provider, Historical   pantoprazole (PROTONIX) 40 MG tablet Take 1 tablet (40 mg total) by mouth 2 (two) times daily. 8/5/24 8/5/25  Shawn Mejia MD   promethazine (PHENERGAN) 12.5 MG Tab Take 12.5 mg by mouth every 6 (six) hours as needed (nausea). 5/6/24   Provider, Historical   thiamine 100 MG tablet Take 100 mg by mouth once daily. 7/13/22   Provider, Historical   traZODone (DESYREL) 50 MG tablet Take 50 mg by mouth nightly as needed. 2/29/24   Provider, Historical   VITAMIN B COMPLEX ORAL Take 1 tablet by mouth once daily.    Provider, Historical   XARELTO 20 mg Tab Take 20 mg by mouth once daily. 1/9/23   Provider, Historical     Anticoagulants/Antiplatelets: no anticoagulation    Allergies: Review of patient's allergies indicates:  No Known Allergies  Sedation History:  no adverse reactions    Labs:  Lab Results   Component Value Date    INR 1.1 12/16/2024       Lab Results   Component Value Date    WBC 5.84 12/23/2024    HGB 10.1 (L) 12/23/2024    HCT 31.5 (L) 12/23/2024    MCV 87 12/23/2024     12/23/2024     Lab Results   Component Value Date     "GLU 86 12/23/2024     (L) 12/23/2024    K 3.7 12/23/2024     12/23/2024    CO2 19 (L) 12/23/2024    BUN 19 12/23/2024    CREATININE 1.6 (H) 12/23/2024    CALCIUM 8.1 (L) 12/23/2024    MG 1.7 12/23/2024    ALT 12 12/17/2024    AST 15 12/17/2024    ALBUMIN 3.0 (L) 12/17/2024    BILITOT 0.5 12/17/2024    BILIDIR 0.1 06/20/2024       Review of Systems:   Hematological: no known coagulopathies  Respiratory: no shortness of breath  Cardiovascular: no chest pain  Gastrointestinal: no abdominal pain  Genito-Urinary: no dysuria  Musculoskeletal: negative  Neurological: no TIA or stroke symptoms         OBJECTIVE:     Vital Signs (Most Recent)  Pulse: (!) 51 (01/15/25 1258)  Resp: 18 (01/15/25 1258)  BP: (!) 178/69 (01/15/25 1258)  SpO2: 98 % (01/15/25 1258)    Physical Exam:  ASA: 2  Mallampati: 2    General: no acute distress  Mental Status: alert and oriented to person, place and time  HEENT: normocephalic, atraumatic  Chest: unlabored breathing  Heart: regular heart rate  Abdomen: nondistended  Extremity: moves all extremities    ASSESSMENT/PLAN:     Sedation Plan: local  Patient will undergo paracentesis.    Jt Huerta MD ([place])  Interventional Radiology        "

## 2025-01-15 NOTE — SEDATION DOCUMENTATION
Pt arrived to suite for paraentesis. Pt oriented to unit and staff, Pt safely transferred from stretcher to procedural table. Fall risk reviewed and comfort measures utilized with interventions. Safety strap applied, position pillows to minimize pressure points. Blankets applied. Pt prepped and draped utilizing standard sterile technique. Patient placed on continuous monitoring. Timeouts implemented utilizing standard universal time-out per department and facility policy. RN to remain at bedside with continuous monitoring. Pt resting comfortably. Denies pain/discomfort.

## 2025-01-15 NOTE — PROCEDURES
"  Pre Op Diagnosis: ascites  Post Op Diagnosis: Same    Procedure: paracentesis    Procedure performed by: Bradley    Written Informed Consent Obtained: Yes  Specimen Removed: YES [id number]  Estimated Blood Loss: Minimal    Findings:   Successful Paracentesis.    Patient tolerated procedure well.    Jt Huerta MD ([place])  Interventional Radiology  [phone]      " Visit Summary for Usha Brower - Gender: Female - Date of Birth: [id number]  Date: [id number] - Duration: 5 minutes  Patient: Usha Brower  Provider: Yasmine Grijalva PA-C    Patient Contact Information  Address  [place]  [phone]    Visit Topics  Fever [Added By: Self - 2022-08-10]  Headache [Added By: Self - 2022-08-10]  Earache [Added By: Self - 2022-08-10]  have covid and a cough and soar throat [Added By: Self - 2022-08-10]    Triage Questions   What is your current physical address in the event of a medical emergency? Answer []  Are you allergic to any medications? Answer []  Are you now or could you be pregnant? Answer []  Do you have any immune system compromise or chronic lung   disease? Answer []  Do you have any vulnerable family members in the home (infant, pregnant, cancer, elderly)? Answer []     Conversation Transcripts  [0A][0A] [Notification] You are connected with Yasmine Grijalva PA-C, Urgent Care Specialist [0A][Notification] Mary[place] Main is located in [place]  [0A][Notification] Marylen Main has shared health history  Lionel Mercedes  [0A]    Diagnosis  COVID-19    Procedures  Value: 91095 Code: CPT-4 UNLISTED E&M SERVICE    Medications Prescribed    No prescriptions ordered    Provider Notes  [0A][0A] Continue with symptom directed managementFollow up with PCP in 1-2 days[0A]Proceed to ER if worsening symptoms, development of shortness of breath, chest pain, dizziness, intractable nausea or vomiting[0A]    Electronically signed by: Mary Anne Calix(NPI [id number])

## 2025-01-15 NOTE — SEDATION DOCUMENTATION
Procedure completed. Patient tolerated well; VSS. Site CDI.   Pt VSS and in NAD. PIV and CRM equipment removed. Discharge instructions and AVS provided. Pt verbalized understanding, questions and concerns addressed. No further needs at this time. Pt discharged from recovery area at 1308.

## 2025-01-20 ENCOUNTER — TELEPHONE (OUTPATIENT)
Dept: INTERVENTIONAL RADIOLOGY/VASCULAR | Facility: CLINIC | Age: 65
End: 2025-01-20
Payer: MEDICAID

## 2025-01-28 ENCOUNTER — TELEPHONE (OUTPATIENT)
Dept: INTERVENTIONAL RADIOLOGY/VASCULAR | Facility: HOSPITAL | Age: 65
End: 2025-01-28
Payer: MEDICAID

## 2025-01-29 ENCOUNTER — HOSPITAL ENCOUNTER (OUTPATIENT)
Dept: INTERVENTIONAL RADIOLOGY/VASCULAR | Facility: HOSPITAL | Age: 65
Discharge: HOME | End: 2025-01-29
Attending: INTERNAL MEDICINE
Payer: MEDICAID

## 2025-01-29 VITALS
OXYGEN SATURATION: 95 % | TEMPERATURE: 98 F | HEART RATE: 55 BPM | DIASTOLIC BLOOD PRESSURE: 84 MMHG | SYSTOLIC BLOOD PRESSURE: 194 MMHG | RESPIRATION RATE: 18 BRPM

## 2025-01-29 DIAGNOSIS — Z76.82 ORGAN TRANSPLANT CANDIDATE: ICD-10-CM

## 2025-01-29 DIAGNOSIS — R18.8 OTHER ASCITES: ICD-10-CM

## 2025-01-29 PROCEDURE — 49083 ABD PARACENTESIS W/IMAGING: CPT | Mod: ,,, | Performed by: RADIOLOGY

## 2025-01-29 PROCEDURE — 49083 ABD PARACENTESIS W/IMAGING: CPT | Performed by: RADIOLOGY

## 2025-01-29 NOTE — PROCEDURES
Radiology Post-Procedure Note    Pre Op Diagnosis: Ascites  Post Op Diagnosis: Same    Procedure: Ultrasound Guided Paracentesis    Procedure performed by: Ed Nelson MD    Written Informed Consent Obtained: Yes  Specimen Removed: none sent  Estimated Blood Loss: Minimal    Findings:   Successful paracentesis.  3500 ml removed.  Albumin was not administered PRN per protocol.    Patient tolerated procedure well.    Ashia Gallegos PA-C

## 2025-01-29 NOTE — DISCHARGE SUMMARY
Interventional Radiology Short Stay Discharge Summary      Admit Date: 1/29/2025  Discharge Date: 01/29/2025     Hospital Course: Uneventful    Discharge Diagnosis: ascites    Discharge Condition: Stable    Discharge Disposition: Home    Diet: Resume prior diet    Activity: activity as tolerated    Follow-up: With referring provider    Cindy Chava, PA-C [facility]

## 2025-01-29 NOTE — H&P
Radiology History & Physical      SUBJECTIVE:     Chief Complaint: abdominal distention    History of Present Illness:  Jonny Isabel is a 64 y.o. male who presents for ultrasound guided paracentesis  Past Medical History:   Diagnosis Date    A-fib     Esophageal varices 5/8/2023    Other ascites 5/8/2023     Past Surgical History:   Procedure Laterality Date    ESOPHAGOGASTRODUODENOSCOPY N/A 1/17/2023    Procedure: EGD (ESOPHAGOGASTRODUODENOSCOPY);  Surgeon: Dewayne Navas MD;  [facility]  Service: Endoscopy;  Laterality: N/A;    ESOPHAGOGASTRODUODENOSCOPY N/A 2/7/2024    Procedure: EGD (ESOPHAGOGASTRODUODENOSCOPY);  Surgeon: Nathan Goins MD;  [facility]  Service: Endoscopy;  Laterality: N/A;  referral: Dr. Escalante /cirrhosis - labs- possible banding / prep ins on portal / 2nd floor - Pt c/o SOB at times./ Xarelto - message sent to clearance nurse per protocol - ERW  1/31/24- LVM for precall - ERW  2/1-precall complet-Kpvt  ok to hold Xarelto2 da    LAPAROTOMY, EXPLORATORY N/A 12/17/2024    Procedure: LAPAROTOMY, EXPLORATORY;  Surgeon: Domingo Francisco MD;  Location: [facility] OR [address] [place] [address];  Service: General;  Laterality: N/A;    REPAIR, HERNIA, VENTRAL N/A 12/17/2024    Procedure: REPAIR, HERNIA, VENTRAL;  Surgeon: Domingo Francisco MD;  Location: [facility] OR [address] [place] [address];  Service: General;  Laterality: N/A;  with mesh       Home Meds:   Prior to Admission medications    Medication Sig Start Date End Date Taking? Authorizing Provider   ADVAIR DISKUS 250-50 mcg/dose diskus inhaler Inhale 1 puff into the lungs 2 (two) times a day. Controller 3/20/24 3/20/25  Joanne Ruiz NP   albuterol (PROVENTIL/VENTOLIN HFA) 90 mcg/actuation inhaler 2 puffs 4 (four) times daily as needed. 12/21/24   Provider, Historical   alirocumab (PRALUENT PEN) 75 mg/mL PnIj Inject 75 mg into the skin every 14 (fourteen) days.    Provider, Historical   amiodarone (PACERONE) 200 MG Tab Take 200 mg by mouth  once daily.    Provider, Historical   folic acid (FOLVITE) 1 MG tablet Take 1,000 mcg by mouth once daily. 12/6/24   Provider, Historical   HYDROcodone-acetaminophen (NORCO)  mg per tablet Take 1 tablet by mouth every 6 (six) hours as needed for Pain.  Patient not taking: Reported on 1/9/2025 12/23/24   Seymour Leon MD   levothyroxine (SYNTHROID) 50 MCG tablet TAKE 1 TABLET BY MOUTH IN THE MORNING WITH A FULL GLASS OF WATER 30-60 MINUTES BEFORE OTHER MEDICATIONS AND FOOD 12/12/23   Ellen Escalera NP   metoprolol tartrate (LOPRESSOR) 100 MG tablet Take 100 mg by mouth once daily. 7/16/24   Provider, Historical   omega-3 acid ethyl esters (LOVAZA) 1 gram capsule Take 1 capsule by mouth 2 (two) times daily. 7/16/24   Provider, Historical   pantoprazole (PROTONIX) 40 MG tablet Take 1 tablet (40 mg total) by mouth 2 (two) times daily. 8/5/24 8/5/25  Shawn Mejia MD   promethazine (PHENERGAN) 12.5 MG Tab Take 12.5 mg by mouth every 6 (six) hours as needed (nausea). 5/6/24   Provider, Historical   thiamine 100 MG tablet Take 100 mg by mouth once daily. 7/13/22   Provider, Historical   traZODone (DESYREL) 50 MG tablet Take 50 mg by mouth nightly as needed. 2/29/24   Provider, Historical   VITAMIN B COMPLEX ORAL Take 1 tablet by mouth once daily.    Provider, Historical   XARELTO 20 mg Tab Take 20 mg by mouth once daily. 1/9/23   Provider, Historical     Anticoagulants/Antiplatelets:  xarelto    Allergies: Review of patient's allergies indicates:  No Known Allergies  Sedation History:  no adverse reactions    Review of Systems:   Hematological: no known coagulopathies  Respiratory: no shortness of breath  Cardiovascular: no chest pain  Gastrointestinal: no abdominal pain  Genito-Urinary: no dysuria  Musculoskeletal: negative  Neurological: no TIA or stroke symptoms         OBJECTIVE:     Vital Signs (Most Recent)       Physical Exam:  ASA: 2  Mallampati: n/a    General: no acute distress  Mental  Status: alert and oriented to person, place and time  HEENT: normocephalic, atraumatic  Chest: unlabored breathing  Heart: regular heart rate  Abdomen: distended  Extremity: moves all extremities    ASSESSMENT/PLAN:     Sedation Plan: local  Patient will undergo ultrasound guided paracentesis.    Ashia Gallegos PA-C

## 2025-02-12 ENCOUNTER — HOSPITAL ENCOUNTER (OUTPATIENT)
Dept: INTERVENTIONAL RADIOLOGY/VASCULAR | Facility: HOSPITAL | Age: 65
Discharge: HOME | End: 2025-02-12
Attending: INTERNAL MEDICINE
Payer: MEDICAID

## 2025-02-12 VITALS
DIASTOLIC BLOOD PRESSURE: 93 MMHG | OXYGEN SATURATION: 99 % | HEIGHT: 69 IN | SYSTOLIC BLOOD PRESSURE: 191 MMHG | BODY MASS INDEX: 28.88 KG/M2 | WEIGHT: 195 LBS | TEMPERATURE: 98 F | RESPIRATION RATE: 15 BRPM | HEART RATE: 52 BPM

## 2025-02-12 DIAGNOSIS — R18.8 ASCITES: ICD-10-CM

## 2025-02-12 DIAGNOSIS — Z76.82 ORGAN TRANSPLANT CANDIDATE: ICD-10-CM

## 2025-02-12 DIAGNOSIS — K70.31 ALCOHOLIC CIRRHOSIS OF LIVER WITH ASCITES: ICD-10-CM

## 2025-02-12 DIAGNOSIS — R18.8 OTHER ASCITES: ICD-10-CM

## 2025-02-12 PROCEDURE — 49083 ABD PARACENTESIS W/IMAGING: CPT | Performed by: RADIOLOGY

## 2025-02-12 PROCEDURE — 63600175 PHARM REV CODE 636 W HCPCS: Performed by: PHYSICIAN ASSISTANT

## 2025-02-12 RX ORDER — LIDOCAINE HYDROCHLORIDE 10 MG/ML
INJECTION, SOLUTION INFILTRATION; PERINEURAL
Status: COMPLETED | OUTPATIENT
Start: 2025-02-12 | End: 2025-02-12

## 2025-02-12 RX ADMIN — LIDOCAINE HYDROCHLORIDE 10 ML: 10 INJECTION, SOLUTION INFILTRATION; PERINEURAL at 12:02

## 2025-02-12 NOTE — PROCEDURES
Radiology Post-Procedure Note    Pre Op Diagnosis: Ascites  Post Op Diagnosis: Same    Procedure: Ultrasound Guided Paracentesis    Procedure performed by: Ashia Gallegos PA-C    Written Informed Consent Obtained: Yes  Specimen Removed: none sent  Estimated Blood Loss: Minimal    Findings:   Successful paracentesis.  4100 ml LLQ.  Albumin was not administered PRN per protocol.    Patient tolerated procedure well.    Ashia Gallegos PA-C

## 2025-02-12 NOTE — SEDATION DOCUMENTATION
Procedure completed. Patient tolerated well; VSS. Site CDI. 4100 ml of clear, yellow ascites drained. Patient to be discharged per orders.

## 2025-02-12 NOTE — H&P
Radiology History & Physical      SUBJECTIVE:     Chief Complaint: abdominal distention    History of Present Illness:  Jonny Isabel is a 64 y.o. male who presents for ultrasound guided paracentesis  Past Medical History:   Diagnosis Date    A-fib     Esophageal varices 5/8/2023    Other ascites 5/8/2023     Past Surgical History:   Procedure Laterality Date    ESOPHAGOGASTRODUODENOSCOPY N/A 1/17/2023    Procedure: EGD (ESOPHAGOGASTRODUODENOSCOPY);  Surgeon: Dewayne Navas MD;  [facility]  Service: Endoscopy;  Laterality: N/A;    ESOPHAGOGASTRODUODENOSCOPY N/A 2/7/2024    Procedure: EGD (ESOPHAGOGASTRODUODENOSCOPY);  Surgeon: Nathan Goins MD;  [facility]  Service: Endoscopy;  Laterality: N/A;  referral: Dr. Escalante /cirrhosis - labs- possible banding / prep ins on portal / 2nd floor - Pt c/o SOB at times./ Xarelto - message sent to clearance nurse per protocol - ERW  1/31/24- LVM for precall - ERW  2/1-precall complet-Kpvt  ok to hold Xarelto2 da    LAPAROTOMY, EXPLORATORY N/A 12/17/2024    Procedure: LAPAROTOMY, EXPLORATORY;  Surgeon: Domingo Francisco MD;  Location: [facility] OR [address] [place] [address];  Service: General;  Laterality: N/A;    REPAIR, HERNIA, VENTRAL N/A 12/17/2024    Procedure: REPAIR, HERNIA, VENTRAL;  Surgeon: Domingo Francisco MD;  Location: [facility] OR [address] [place] [address];  Service: General;  Laterality: N/A;  with mesh       Home Meds:   Prior to Admission medications    Medication Sig Start Date End Date Taking? Authorizing Provider   ADVAIR DISKUS 250-50 mcg/dose diskus inhaler Inhale 1 puff into the lungs 2 (two) times a day. Controller 3/20/24 3/20/25  Joanne Ruiz NP   albuterol (PROVENTIL/VENTOLIN HFA) 90 mcg/actuation inhaler 2 puffs 4 (four) times daily as needed. 12/21/24   Provider, Historical   alirocumab (PRALUENT PEN) 75 mg/mL PnIj Inject 75 mg into the skin every 14 (fourteen) days.    Provider, Historical   amiodarone (PACERONE) 200 MG Tab Take 200 mg by mouth  once daily.    Provider, Historical   folic acid (FOLVITE) 1 MG tablet Take 1,000 mcg by mouth once daily. 12/6/24   Provider, Historical   HYDROcodone-acetaminophen (NORCO)  mg per tablet Take 1 tablet by mouth every 6 (six) hours as needed for Pain. 12/23/24   Seymour Leon MD   levothyroxine (SYNTHROID) 50 MCG tablet TAKE 1 TABLET BY MOUTH IN THE MORNING WITH A FULL GLASS OF WATER 30-60 MINUTES BEFORE OTHER MEDICATIONS AND FOOD 12/12/23   Ellen Escalera NP   metoprolol tartrate (LOPRESSOR) 100 MG tablet Take 100 mg by mouth once daily. 7/16/24   Provider, Historical   omega-3 acid ethyl esters (LOVAZA) 1 gram capsule Take 1 capsule by mouth 2 (two) times daily. 7/16/24   Provider, Historical   pantoprazole (PROTONIX) 40 MG tablet Take 1 tablet (40 mg total) by mouth 2 (two) times daily. 8/5/24 8/5/25  Shawn Mejia MD   promethazine (PHENERGAN) 12.5 MG Tab Take 12.5 mg by mouth every 6 (six) hours as needed (nausea). 5/6/24   Provider, Historical   thiamine 100 MG tablet Take 100 mg by mouth once daily. 7/13/22   Provider, Historical   traZODone (DESYREL) 50 MG tablet Take 50 mg by mouth nightly as needed. 2/29/24   Provider, Historical   VITAMIN B COMPLEX ORAL Take 1 tablet by mouth once daily.    Provider, Historical   XARELTO 20 mg Tab Take 20 mg by mouth once daily. 1/9/23   Provider, Historical     Anticoagulants/Antiplatelets: no anticoagulation    Allergies: Review of patient's allergies indicates:  No Known Allergies  Sedation History:  no adverse reactions    Review of Systems:   Hematological: no known coagulopathies  Respiratory: no shortness of breath  Cardiovascular: no chest pain  Gastrointestinal: no abdominal pain  Genito-Urinary: no dysuria  Musculoskeletal: negative  Neurological: no TIA or stroke symptoms         OBJECTIVE:     Vital Signs (Most Recent)       Physical Exam:  ASA: 2  Mallampati: n/a    General: no acute distress  Mental Status: alert and oriented to person,  place and time  HEENT: normocephalic, atraumatic  Chest: unlabored breathing  Heart: regular heart rate  Abdomen: distended  Extremity: moves all extremities    ASSESSMENT/PLAN:     Sedation Plan: local  Patient will undergo ultrasound guided paracentesis.    Ashia Gallegos PA-C

## 2025-02-12 NOTE — DISCHARGE SUMMARY
Interventional Radiology Short Stay Discharge Summary      Admit Date: 2/12/2025  Discharge Date: 02/12/2025     Hospital Course: Uneventful    Discharge Diagnosis: ascites    Discharge Condition: Stable    Discharge Disposition: Home    Diet: Resume prior diet    Activity: activity as tolerated    Follow-up: With referring provider    Cindy Chava, PA-C [facility]

## 2025-02-25 ENCOUNTER — TELEPHONE (OUTPATIENT)
Dept: INTERVENTIONAL RADIOLOGY/VASCULAR | Facility: HOSPITAL | Age: 65
End: 2025-02-25
Payer: MEDICAID

## 2025-02-25 ENCOUNTER — TELEPHONE (OUTPATIENT)
Dept: ENDOSCOPY | Facility: HOSPITAL | Age: 65
End: 2025-02-25
Payer: MEDICAID

## 2025-02-25 NOTE — NURSING
Advised to arrive at 12:30. Repeat patient who is familiar with the procedure and the IR process.

## 2025-02-26 ENCOUNTER — HOSPITAL ENCOUNTER (OUTPATIENT)
Dept: INTERVENTIONAL RADIOLOGY/VASCULAR | Facility: HOSPITAL | Age: 65
Discharge: HOME | End: 2025-02-26
Attending: INTERNAL MEDICINE
Payer: MEDICAID

## 2025-02-26 VITALS
SYSTOLIC BLOOD PRESSURE: 204 MMHG | TEMPERATURE: 99 F | RESPIRATION RATE: 20 BRPM | OXYGEN SATURATION: 97 % | DIASTOLIC BLOOD PRESSURE: 92 MMHG | HEART RATE: 59 BPM

## 2025-02-26 DIAGNOSIS — Z76.82 ORGAN TRANSPLANT CANDIDATE: ICD-10-CM

## 2025-02-26 DIAGNOSIS — K70.31 ALCOHOLIC CIRRHOSIS OF LIVER WITH ASCITES: ICD-10-CM

## 2025-02-26 DIAGNOSIS — R18.8 OTHER ASCITES: Primary | ICD-10-CM

## 2025-02-26 DIAGNOSIS — R18.8 OTHER ASCITES: ICD-10-CM

## 2025-02-26 DIAGNOSIS — R18.8 ASCITES: ICD-10-CM

## 2025-02-26 LAB
ANION GAP SERPL CALC-SCNC: 10 MMOL/L (ref 8–16)
BUN SERPL-MCNC: 18 MG/DL (ref 8–23)
CALCIUM SERPL-MCNC: 8.7 MG/DL (ref 8.7–10.5)
CHLORIDE SERPL-SCNC: 108 MMOL/L (ref 95–110)
CO2 SERPL-SCNC: 19 MMOL/L (ref 23–29)
CREAT SERPL-MCNC: 1.4 MG/DL (ref 0.5–1.4)
EST. GFR  (NO RACE VARIABLE): 56 ML/MIN/1.73 M^2
GLUCOSE SERPL-MCNC: 103 MG/DL (ref 70–110)
POTASSIUM SERPL-SCNC: 3.9 MMOL/L (ref 3.5–5.1)
SODIUM SERPL-SCNC: 137 MMOL/L (ref 136–145)

## 2025-02-26 PROCEDURE — 63600175 PHARM REV CODE 636 W HCPCS: Performed by: PHYSICIAN ASSISTANT

## 2025-02-26 PROCEDURE — 49083 ABD PARACENTESIS W/IMAGING: CPT | Performed by: RADIOLOGY

## 2025-02-26 PROCEDURE — 80048 BASIC METABOLIC PNL TOTAL CA: CPT | Performed by: PHYSICIAN ASSISTANT

## 2025-02-26 RX ORDER — LIDOCAINE HYDROCHLORIDE 10 MG/ML
INJECTION, SOLUTION INFILTRATION; PERINEURAL
Status: COMPLETED | OUTPATIENT
Start: 2025-02-26 | End: 2025-02-26

## 2025-02-26 RX ADMIN — LIDOCAINE HYDROCHLORIDE 5 ML: 10 INJECTION, SOLUTION INFILTRATION; PERINEURAL at 01:02

## 2025-02-26 NOTE — H&P
Radiology History & Physical      SUBJECTIVE:     Chief Complaint: abdominal distention    History of Present Illness:  Jonny Isabel is a 64 y.o. male who presents for ultrasound guided paracentesis  Past Medical History:   Diagnosis Date    A-fib     Esophageal varices 5/8/2023    Other ascites 5/8/2023     Past Surgical History:   Procedure Laterality Date    ESOPHAGOGASTRODUODENOSCOPY N/A 1/17/2023    Procedure: EGD (ESOPHAGOGASTRODUODENOSCOPY);  Surgeon: Dewayne Navas MD;  [facility]  Service: Endoscopy;  Laterality: N/A;    ESOPHAGOGASTRODUODENOSCOPY N/A 2/7/2024    Procedure: EGD (ESOPHAGOGASTRODUODENOSCOPY);  Surgeon: Nathan Goins MD;  [facility]  Service: Endoscopy;  Laterality: N/A;  referral: Dr. Escalante /cirrhosis - labs- possible banding / prep ins on portal / 2nd floor - Pt c/o SOB at times./ Xarelto - message sent to clearance nurse per protocol - ERW  1/31/24- LVM for precall - ERW  2/1-precall complet-Kpvt  ok to hold Xarelto2 da    LAPAROTOMY, EXPLORATORY N/A 12/17/2024    Procedure: LAPAROTOMY, EXPLORATORY;  Surgeon: Domingo Francisco MD;  Location: [facility] OR [address] [place] [address];  Service: General;  Laterality: N/A;    REPAIR, HERNIA, VENTRAL N/A 12/17/2024    Procedure: REPAIR, HERNIA, VENTRAL;  Surgeon: Domingo Francisco MD;  Location: [facility] OR [address] [place] [address];  Service: General;  Laterality: N/A;  with mesh       Home Meds:   Prior to Admission medications    Medication Sig Start Date End Date Taking? Authorizing Provider   ADVAIR DISKUS 250-50 mcg/dose diskus inhaler Inhale 1 puff into the lungs 2 (two) times a day. Controller 3/20/24 3/20/25  Joanne Ruiz NP   albuterol (PROVENTIL/VENTOLIN HFA) 90 mcg/actuation inhaler 2 puffs 4 (four) times daily as needed. 12/21/24   Provider, Historical   alirocumab (PRALUENT PEN) 75 mg/mL PnIj Inject 75 mg into the skin every 14 (fourteen) days.    Provider, Historical   amiodarone (PACERONE) 200 MG Tab Take 200 mg by mouth  once daily.    Provider, Historical   folic acid (FOLVITE) 1 MG tablet Take 1,000 mcg by mouth once daily. 12/6/24   Provider, Historical   HYDROcodone-acetaminophen (NORCO)  mg per tablet Take 1 tablet by mouth every 6 (six) hours as needed for Pain. 12/23/24   Seymour Leon MD   levothyroxine (SYNTHROID) 50 MCG tablet TAKE 1 TABLET BY MOUTH IN THE MORNING WITH A FULL GLASS OF WATER 30-60 MINUTES BEFORE OTHER MEDICATIONS AND FOOD 12/12/23   Ellen Escalera NP   metoprolol tartrate (LOPRESSOR) 100 MG tablet Take 100 mg by mouth once daily. 7/16/24   Provider, Historical   omega-3 acid ethyl esters (LOVAZA) 1 gram capsule Take 1 capsule by mouth 2 (two) times daily. 7/16/24   Provider, Historical   pantoprazole (PROTONIX) 40 MG tablet Take 1 tablet (40 mg total) by mouth 2 (two) times daily. 8/5/24 8/5/25  Shawn Mejia MD   promethazine (PHENERGAN) 12.5 MG Tab Take 12.5 mg by mouth every 6 (six) hours as needed (nausea). 5/6/24   Provider, Historical   thiamine 100 MG tablet Take 100 mg by mouth once daily. 7/13/22   Provider, Historical   traZODone (DESYREL) 50 MG tablet Take 50 mg by mouth nightly as needed. 2/29/24   Provider, Historical   VITAMIN B COMPLEX ORAL Take 1 tablet by mouth once daily.    Provider, Historical   XARELTO 20 mg Tab Take 20 mg by mouth once daily. 1/9/23   Provider, Historical     Anticoagulants/Antiplatelets: no anticoagulation    Allergies: Review of patient's allergies indicates:  No Known Allergies  Sedation History:  no adverse reactions    Review of Systems:   Hematological: no known coagulopathies  Respiratory: no shortness of breath  Cardiovascular: no chest pain  Gastrointestinal: no abdominal pain  Genito-Urinary: no dysuria  Musculoskeletal: negative  Neurological: no TIA or stroke symptoms         OBJECTIVE:     Vital Signs (Most Recent)       Physical Exam:  ASA: 2  Mallampati: n/a    General: no acute distress  Mental Status: alert and oriented to person,  place and time  HEENT: normocephalic, atraumatic  Chest: unlabored breathing  Heart: regular heart rate  Abdomen: distended  Extremity: moves all extremities    ASSESSMENT/PLAN:     Sedation Plan: local  Patient will undergo ultrasound guided paracentesis.    Ashia Gallegos PA-C

## 2025-02-26 NOTE — DISCHARGE SUMMARY
Interventional Radiology Short Stay Discharge Summary      Admit Date: 2/26/2025  Discharge Date: 02/26/2025     Hospital Course: Uneventful    Discharge Diagnosis: ascites    Discharge Condition: Stable    Discharge Disposition: Home    Diet: Resume prior diet    Activity: activity as tolerated    Follow-up: With referring provider    Cindy Chava, PA-C [facility]

## 2025-02-26 NOTE — PROCEDURES
Radiology Post-Procedure Note    Pre Op Diagnosis: Ascites  Post Op Diagnosis: Same    Procedure: Ultrasound Guided Paracentesis    Procedure performed by: Ashia Gallegos PA-C    Written Informed Consent Obtained: Yes  Specimen Removed: YES   Estimated Blood Loss: Minimal    Findings:   Successful paracentesis.  4100 ml removed LLQ.  Albumin was not administered PRN per protocol.    Patient tolerated procedure well.    Ashia Gallegos PA-C

## 2025-02-26 NOTE — SEDATION DOCUMENTATION
Procedure complete, pt tolerated well; vss throughout. 4100mL dark karma ascites removed. Patient alert and oriented x4 unlabored on Room Air. Patient denies pain and is resting comfortably. Surgical site dressed with mepore. Dressing is clean, dry, and intact. Patient transported to recovery to be CO'ed.

## 2025-02-27 ENCOUNTER — PATIENT MESSAGE (OUTPATIENT)
Dept: HEPATOLOGY | Facility: CLINIC | Age: 65
End: 2025-02-27
Payer: MEDICAID

## 2025-02-27 ENCOUNTER — TELEPHONE (OUTPATIENT)
Dept: HEPATOLOGY | Facility: CLINIC | Age: 65
End: 2025-02-27
Payer: MEDICAID

## 2025-02-27 NOTE — TELEPHONE ENCOUNTER
Called pt to reschedule his lab work to another location. Pt was rescheduled to [facility] location. Pt also asked about needing to schedule his paracentesis and phone number of IR [place] location was giving so that he can call to schedule it.

## 2025-02-27 NOTE — TELEPHONE ENCOUNTER
"----- Message from Tyrese sent at 2/27/2025 10:44 AM CST -----  Regarding: call back  Consult/Advisory:  Name Of Caller: Self Contact Preference?:[phone] What is the nature of the call?: Calling to speak w/  someone in regards to rescheduling his labs to a different location requesting call back Additional Notes:"Thank you for all that you do for our patients"  "

## 2025-03-05 ENCOUNTER — TELEPHONE (OUTPATIENT)
Dept: ENDOSCOPY | Facility: HOSPITAL | Age: 65
End: 2025-03-05
Payer: MEDICAID

## 2025-03-05 NOTE — TELEPHONE ENCOUNTER
Endoscopy Scheduling Department  [facility] [address] [place] [address]  Take the [facility] Elevators to 4th Floor Endoscopy Lab      Date: 3/5/25      Medical Record # [id number]      Dear  Jonny Isabel    An order for the following procedure(s) Colonoscopy was placed for you by   Ramon Pérez Mai, MD.    Since multiple attempts have been made to get in touch with you, this is the last notification. Please call the scheduling nurse to schedule this procedure as soon as possible.    If you have already scheduled this appointment, please disregard this letter. If you would like to cancel this request, please call the number listed below.     Sincerely,        Endoscopy Scheduling Department [phone]        Comments: Office hours are Monday through Friday 8-430p.

## 2025-03-07 ENCOUNTER — TELEPHONE (OUTPATIENT)
Dept: NEPHROLOGY | Facility: CLINIC | Age: 65
End: 2025-03-07
Payer: MEDICAID

## 2025-03-07 NOTE — TELEPHONE ENCOUNTER
Patient contacted in reference of clinic changes. Informed that provider will not be in clinic on original scheduled day. Patient states that he was also not aware of the location, and he does not have his own means of transportation. He will reach out in regards of seeing someone closer to his area. Next available would put him in July.

## 2025-03-11 ENCOUNTER — TELEPHONE (OUTPATIENT)
Dept: INTERVENTIONAL RADIOLOGY/VASCULAR | Facility: HOSPITAL | Age: 65
End: 2025-03-11
Payer: MEDICAID

## 2025-03-12 ENCOUNTER — HOSPITAL ENCOUNTER (OUTPATIENT)
Dept: INTERVENTIONAL RADIOLOGY/VASCULAR | Facility: HOSPITAL | Age: 65
Discharge: HOME | End: 2025-03-12
Attending: INTERNAL MEDICINE
Payer: MEDICAID

## 2025-03-12 VITALS
HEART RATE: 51 BPM | RESPIRATION RATE: 18 BRPM | TEMPERATURE: 99 F | SYSTOLIC BLOOD PRESSURE: 171 MMHG | WEIGHT: 195 LBS | BODY MASS INDEX: 28.88 KG/M2 | DIASTOLIC BLOOD PRESSURE: 84 MMHG | HEIGHT: 69 IN | OXYGEN SATURATION: 100 %

## 2025-03-12 DIAGNOSIS — R18.8 OTHER ASCITES: ICD-10-CM

## 2025-03-12 DIAGNOSIS — Z76.82 ORGAN TRANSPLANT CANDIDATE: ICD-10-CM

## 2025-03-12 DIAGNOSIS — R18.8 ASCITES: ICD-10-CM

## 2025-03-12 PROCEDURE — 49083 ABD PARACENTESIS W/IMAGING: CPT | Performed by: STUDENT IN AN ORGANIZED HEALTH CARE EDUCATION/TRAINING PROGRAM

## 2025-03-12 PROCEDURE — 63600175 PHARM REV CODE 636 W HCPCS: Performed by: PHYSICIAN ASSISTANT

## 2025-03-12 PROCEDURE — 49083 ABD PARACENTESIS W/IMAGING: CPT | Mod: ,,, | Performed by: PHYSICIAN ASSISTANT

## 2025-03-12 RX ORDER — LIDOCAINE HYDROCHLORIDE 10 MG/ML
INJECTION, SOLUTION INFILTRATION; PERINEURAL
Status: COMPLETED | OUTPATIENT
Start: 2025-03-12 | End: 2025-03-12

## 2025-03-12 RX ADMIN — LIDOCAINE HYDROCHLORIDE 5 ML: 10 INJECTION, SOLUTION INFILTRATION; PERINEURAL at 01:03

## 2025-03-12 NOTE — PROCEDURES
Radiology Post-Procedure Note    Pre Op Diagnosis: Ascites  Post Op Diagnosis: Same    Procedure: Ultrasound Guided Paracentesis    Procedure performed by: Ashia Gallegos PA-C    Written Informed Consent Obtained: Yes  Specimen Removed: none sent  Estimated Blood Loss: Minimal    Findings:   Successful paracentesis.  3000 ml cloud karma fluid removed LLQ.  Albumin was not administered PRN per protocol.    Patient tolerated procedure well.    Ashia Gallegos PA-C

## 2025-03-12 NOTE — H&P
Radiology History & Physical      SUBJECTIVE:     Chief Complaint: abdominal distention    History of Present Illness:  Jonny Isabel is a 64 y.o. male who presents for ultrasound guided paracentesis  Past Medical History:   Diagnosis Date    A-fib     Esophageal varices 5/8/2023    Other ascites 5/8/2023     Past Surgical History:   Procedure Laterality Date    ESOPHAGOGASTRODUODENOSCOPY N/A 1/17/2023    Procedure: EGD (ESOPHAGOGASTRODUODENOSCOPY);  Surgeon: Dewayne Navas MD;  [facility]  Service: Endoscopy;  Laterality: N/A;    ESOPHAGOGASTRODUODENOSCOPY N/A 2/7/2024    Procedure: EGD (ESOPHAGOGASTRODUODENOSCOPY);  Surgeon: Nathan Goins MD;  [facility]  Service: Endoscopy;  Laterality: N/A;  referral: Dr. Escalante /cirrhosis - labs- possible banding / prep ins on portal / 2nd floor - Pt c/o SOB at times./ Xarelto - message sent to clearance nurse per protocol - ERW  1/31/24- LVM for precall - ERW  2/1-precall complet-Kpvt  ok to hold Xarelto2 da    LAPAROTOMY, EXPLORATORY N/A 12/17/2024    Procedure: LAPAROTOMY, EXPLORATORY;  Surgeon: Domingo Francisco MD;  Location: [facility] OR [address] [place] [address];  Service: General;  Laterality: N/A;    REPAIR, HERNIA, VENTRAL N/A 12/17/2024    Procedure: REPAIR, HERNIA, VENTRAL;  Surgeon: Domingo Francisco MD;  Location: [facility] OR [address] [place] [address];  Service: General;  Laterality: N/A;  with mesh       Home Meds:   Prior to Admission medications    Medication Sig Start Date End Date Taking? Authorizing Provider   ADVAIR DISKUS 250-50 mcg/dose diskus inhaler Inhale 1 puff into the lungs 2 (two) times a day. Controller 3/20/24 3/20/25  Joanne Ruiz NP   albuterol (PROVENTIL/VENTOLIN HFA) 90 mcg/actuation inhaler 2 puffs 4 (four) times daily as needed. 12/21/24   Provider, Historical   alirocumab (PRALUENT PEN) 75 mg/mL PnIj Inject 75 mg into the skin every 14 (fourteen) days.    Provider, Historical   amiodarone (PACERONE) 200 MG Tab Take 200 mg by mouth  once daily.    Provider, Historical   folic acid (FOLVITE) 1 MG tablet Take 1,000 mcg by mouth once daily. 12/6/24   Provider, Historical   HYDROcodone-acetaminophen (NORCO)  mg per tablet Take 1 tablet by mouth every 6 (six) hours as needed for Pain. 12/23/24   Seymour Leon MD   levothyroxine (SYNTHROID) 50 MCG tablet TAKE 1 TABLET BY MOUTH IN THE MORNING WITH A FULL GLASS OF WATER 30-60 MINUTES BEFORE OTHER MEDICATIONS AND FOOD 12/12/23   Ellen Escalera NP   metoprolol tartrate (LOPRESSOR) 100 MG tablet Take 100 mg by mouth once daily. 7/16/24   Provider, Historical   omega-3 acid ethyl esters (LOVAZA) 1 gram capsule Take 1 capsule by mouth 2 (two) times daily. 7/16/24   Provider, Historical   pantoprazole (PROTONIX) 40 MG tablet Take 1 tablet (40 mg total) by mouth 2 (two) times daily. 8/5/24 8/5/25  Shawn Mejia MD   promethazine (PHENERGAN) 12.5 MG Tab Take 12.5 mg by mouth every 6 (six) hours as needed (nausea). 5/6/24   Provider, Historical   thiamine 100 MG tablet Take 100 mg by mouth once daily. 7/13/22   Provider, Historical   traZODone (DESYREL) 50 MG tablet Take 50 mg by mouth nightly as needed. 2/29/24   Provider, Historical   VITAMIN B COMPLEX ORAL Take 1 tablet by mouth once daily.    Provider, Historical   XARELTO 20 mg Tab Take 20 mg by mouth once daily. 1/9/23   Provider, Historical     Anticoagulants/Antiplatelets: no anticoagulation    Allergies: Review of patient's allergies indicates:  No Known Allergies  Sedation History:  no adverse reactions    Review of Systems:   Hematological: no known coagulopathies  Respiratory: no shortness of breath  Cardiovascular: no chest pain  Gastrointestinal: no abdominal pain  Genito-Urinary: no dysuria  Musculoskeletal: negative  Neurological: no TIA or stroke symptoms         OBJECTIVE:     Vital Signs (Most Recent)       Physical Exam:  ASA: 2  Mallampati: n/a    General: no acute distress  Mental Status: alert and oriented to person,  place and time  HEENT: normocephalic, atraumatic  Chest: unlabored breathing  Heart: regular heart rate  Abdomen: distended  Extremity: moves all extremities    ASSESSMENT/PLAN:     Sedation Plan: local  Patient will undergo ultrasound guided paracentesis.    Ashia Gallegos PA-C

## 2025-03-12 NOTE — SEDATION DOCUMENTATION
IR Procedure - Paracentesis - is completed. Patient tolerated well. He is awake, alert, oriented. Vital signs are stable. 3000 mL cloudy karma ascites is drained. Patient will return to IR pre/post to complete discharge.

## 2025-03-12 NOTE — Clinical Note
A pre-sedation assessment was completed by the PA-C immediately prior to sedation start.     Local Only.

## 2025-03-12 NOTE — NURSING
Patient is discharged from IR. Patient declined printed AVS. The opportunity to ask questions is provided. Patient is ambulatory from the unit and directed to the front lobby to exit.

## 2025-03-24 ENCOUNTER — OFFICE VISIT (OUTPATIENT)
Dept: HEPATOLOGY | Facility: CLINIC | Age: 65
End: 2025-03-24
Payer: MEDICAID

## 2025-03-24 VITALS
DIASTOLIC BLOOD PRESSURE: 68 MMHG | BODY MASS INDEX: 31.6 KG/M2 | SYSTOLIC BLOOD PRESSURE: 145 MMHG | HEART RATE: 52 BPM | WEIGHT: 213.38 LBS | HEIGHT: 69 IN

## 2025-03-24 DIAGNOSIS — I85.10 SECONDARY ESOPHAGEAL VARICES WITHOUT BLEEDING: Primary | ICD-10-CM

## 2025-03-24 DIAGNOSIS — K70.31 ALCOHOLIC CIRRHOSIS OF LIVER WITH ASCITES: ICD-10-CM

## 2025-03-24 DIAGNOSIS — K70.31 ASCITES DUE TO ALCOHOLIC CIRRHOSIS: ICD-10-CM

## 2025-03-24 PROCEDURE — 99999 PR PBB SHADOW E&M-EST. PATIENT-LVL III: CPT | Mod: PBBFAC,,, | Performed by: INTERNAL MEDICINE

## 2025-03-24 PROCEDURE — 1160F RVW MEDS BY RX/DR IN RCRD: CPT | Mod: CPTII,,, | Performed by: INTERNAL MEDICINE

## 2025-03-24 PROCEDURE — 3077F SYST BP >= 140 MM HG: CPT | Mod: CPTII,,, | Performed by: INTERNAL MEDICINE

## 2025-03-24 PROCEDURE — 3008F BODY MASS INDEX DOCD: CPT | Mod: CPTII,,, | Performed by: INTERNAL MEDICINE

## 2025-03-24 PROCEDURE — 99214 OFFICE O/P EST MOD 30 MIN: CPT | Mod: S$PBB,,, | Performed by: INTERNAL MEDICINE

## 2025-03-24 PROCEDURE — 1159F MED LIST DOCD IN RCRD: CPT | Mod: CPTII,,, | Performed by: INTERNAL MEDICINE

## 2025-03-24 PROCEDURE — 99213 OFFICE O/P EST LOW 20 MIN: CPT | Mod: PBBFAC | Performed by: INTERNAL MEDICINE

## 2025-03-24 PROCEDURE — 3078F DIAST BP <80 MM HG: CPT | Mod: CPTII,,, | Performed by: INTERNAL MEDICINE

## 2025-03-24 NOTE — PATIENT INSTRUCTIONS
Meld labs monthly including today  CMP every 2 weeks - mon or tues (before the wed taps)  Abdo  w AFP 6/25  EGD in [place] with Dr Oconnell  Return 4 months

## 2025-03-24 NOTE — PROGRESS NOTES
HEPATOLOGY FOLLOW UP    Referring Physician: Harriett Bird MD  Current Corresponding Physician: Harriett Bird MD, Julissa, Yuli Pérez MD, Christopher Burt III, MD    Jonny Isabel is here for follow up of decompensated alcohol-induced Cirrhosis    HPI  Seen by in hepatology team:  Admission 1/16/23-2/7/23: 61yo PMHx decompensated EtOH cirrhosis (ascites), NID-T2DM, afib s/p ablation and DCCV on xarelto, HTN. Was transferred from OSH on 01/16/2023 for epidural abscess and L3/L4 disc herniation. Surgery was deferred since the pt was too ill. MELD-Na 22. Was anemic, was transfused and underwent EGD- small varices noted.     Work up for liver disease since arrival notable for ASMA, AMA, viral hepatitis panel all negative. PETH 900.     Reportedly patient did not know of liver disease and was never instructed to stop drinking however multiple barriers to consideration of transplant including spinal abscess, possible bowel obstruction, continuing to drink EtOH (PETH 900)      Interval History  Jonny Isabel decided not to undergo a liver transplant . Evaluation was therefore stopped and he was not listed. evaluation. He also does not want a TIPS. He stopped drinking (peth negative since 5/8/23).    Admission 12/17/24-12/23/24: SBO-went to the OR 12/17: Ventral hernia repair-findings- ventral hernia with incarcerated small bowel and ascites; Retrorectus mesh placement. Intraperitoneal drain placement, retrorectus drain placement.     --need for imani has decreased to every 2 weeks  and only ~3 L each time  --HE-none  --no meld labs since 12/24    Labs 12/17/24: ALT 10. AST 15, ALKP 92, Tbil 0.4, plts 214    CT abdo pelvis w contrast 12/16/24: Cirrhotic appearance of the liver. Moderate volume of ascites with hyperdense material on the left side of the hemiabdomen suggesting blood products in the ascites, similar to prior. Overall volume of ascites is decreased slightly.     EGD 2/7/24: sm EV; PHG; mult gastric  polyps    MELD 3.0: 12 at 12/18/2024  2:50 AM  MELD-Na: 11 at 12/18/2024  2:50 AM  Calculated from:  Serum Creatinine: 1.5 mg/dL at 12/18/2024  2:50 AM  Serum Sodium: 140 mmol/L (Using max of 137 mmol/L) at 12/18/2024  2:50 AM  Total Bilirubin: 0.5 mg/dL (Using min of 1 mg/dL) at 12/17/2024  5:33 AM  Serum Albumin: 3 g/dL at 12/17/2024  5:33 AM  INR(ratio): 1.1 at 12/16/2024  6:50 AM  Age at listing (hypothetical): 64 years  Sex: Male at 12/18/2024  2:50 AM       Outpatient Encounter Medications as of 3/24/2025   Medication Sig Dispense Refill    ADVAIR DISKUS 250-50 mcg/dose diskus inhaler Inhale 1 puff into the lungs 2 (two) times a day. Controller 180 each 3    albuterol (PROVENTIL/VENTOLIN HFA) 90 mcg/actuation inhaler 2 puffs 4 (four) times daily as needed.      alirocumab (PRALUENT PEN) 75 mg/mL PnIj Inject 75 mg into the skin every 14 (fourteen) days.      amiodarone (PACERONE) 200 MG Tab Take 200 mg by mouth once daily.      folic acid (FOLVITE) 1 MG tablet Take 1,000 mcg by mouth once daily.      HYDROcodone-acetaminophen (NORCO)  mg per tablet Take 1 tablet by mouth every 6 (six) hours as needed for Pain. 15 tablet 0    levothyroxine (SYNTHROID) 50 MCG tablet TAKE 1 TABLET BY MOUTH IN THE MORNING WITH A FULL GLASS OF WATER 30-60 MINUTES BEFORE OTHER MEDICATIONS AND FOOD 90 tablet 3    metoprolol tartrate (LOPRESSOR) 100 MG tablet Take 100 mg by mouth once daily.      omega-3 acid ethyl esters (LOVAZA) 1 gram capsule Take 1 capsule by mouth 2 (two) times daily.      pantoprazole (PROTONIX) 40 MG tablet Take 1 tablet (40 mg total) by mouth 2 (two) times daily. 180 tablet 3    promethazine (PHENERGAN) 12.5 MG Tab Take 12.5 mg by mouth every 6 (six) hours as needed (nausea).      thiamine 100 MG tablet Take 100 mg by mouth once daily.      traZODone (DESYREL) 50 MG tablet Take 50 mg by mouth nightly as needed.      VITAMIN B COMPLEX ORAL Take 1 tablet by mouth once daily.      XARELTO 20 mg Tab Take 20  mg by mouth once daily.       No facility-administered encounter medications on file as of 3/24/2025.     Review of patient's allergies indicates:  No Known Allergies  Past Medical History:   Diagnosis Date    A-fib     Esophageal varices 5/8/2023    Other ascites 5/8/2023       Review of Systems   Constitutional: Negative.    HENT: Negative.     Eyes: Negative.    Respiratory: Negative.     Cardiovascular: Negative.    Gastrointestinal: Negative.    Genitourinary: Negative.    Musculoskeletal: Negative.    Skin: Negative.    Neurological: Negative.    Psychiatric/Behavioral: Negative.       Vitals:    03/24/25 0939   BP: (!) 145/68   Pulse: (!) 52     Physical Exam  Vitals reviewed.   Constitutional:       Appearance: Normal appearance.   Eyes:      General: No scleral icterus.  Cardiovascular:      Rate and Rhythm: Normal rate and regular rhythm.      Pulses: Normal pulses.   Pulmonary:      Effort: Pulmonary effort is normal.   Abdominal:      General: Abdomen is flat. There is distension.      Palpations: Abdomen is soft. There is no mass.      Tenderness: There is no abdominal tenderness.      Hernia: No hernia is present.   Skin:     General: Skin is warm and dry.   Neurological:      Mental Status: He is alert and oriented to person, place, and time.   Psychiatric:         Mood and Affect: Mood normal.          MELD 3.0: 12 at 12/18/2024  2:50 AM  MELD-Na: 11 at 12/18/2024  2:50 AM  Calculated from:  Serum Creatinine: 1.5 mg/dL at 12/18/2024  2:50 AM  Serum Sodium: 140 mmol/L (Using max of 137 mmol/L) at 12/18/2024  2:50 AM  Total Bilirubin: 0.5 mg/dL (Using min of 1 mg/dL) at 12/17/2024  5:33 AM  Serum Albumin: 3 g/dL at 12/17/2024  5:33 AM  INR(ratio): 1.1 at 12/16/2024  6:50 AM  Age at listing (hypothetical): 64 years  Sex: Male at 12/18/2024  2:50 AM      Lab Results   Component Value Date     02/26/2025    BUN 18 02/26/2025    CREATININE 1.4 02/26/2025    CALCIUM 8.7 02/26/2025      02/26/2025    K 3.9 02/26/2025     02/26/2025    PROT 6.8 12/17/2024    CO2 19 (L) 02/26/2025    ANIONGAP 10 02/26/2025    WBC 5.84 12/23/2024    RBC 3.61 (L) 12/23/2024    HGB 10.1 (L) 12/23/2024    HCT 31.5 (L) 12/23/2024    HCT 33 (L) 01/27/2023    MCV 87 12/23/2024    MCH 28.0 12/23/2024    MCHC 32.1 12/23/2024     Lab Results   Component Value Date    RDW 14.3 12/23/2024     12/23/2024    MPV 10.0 12/23/2024    GRAN 3.8 12/23/2024    GRAN 64.7 12/23/2024    LYMPH 1.1 12/23/2024    LYMPH 18.0 12/23/2024    MONO 0.7 12/23/2024    MONO 11.8 12/23/2024    EOSINOPHIL 3.6 12/23/2024    BASOPHIL 1.2 12/23/2024    EOS 0.2 12/23/2024    EOS 1 06/12/2024    BASO 0.07 12/23/2024    BASO 1 06/12/2024    APTT 26.5 12/16/2024    GROUPTRH A POS 12/16/2024    BNP 24 12/03/2024    TRIG 162 (H) 12/21/2024    ALBUMIN 3.0 (L) 12/17/2024    BILIDIR 0.1 06/20/2024    AST 15 12/17/2024    ALT 12 12/17/2024    ALKPHOS 83 12/17/2024    MG 1.7 12/23/2024    LABPROT 11.7 12/16/2024    INR 1.1 12/16/2024    PSA 0.80 06/20/2024       Assessment and Plan:  Jonny Isabel is a 64 y.o. male with Cirrhosis  Has refractory ascites, although frequency/amount of taps has decreased. He still does not want a liver transplant and does not want to consider TIPS. Do not have MELD labs to review since 12/24. Current recommendations:  Decompensated cirrhosis: monthly MELD labs; remain off Amiodarone   Ascites: Paracenteses every 2 weeks;   CKD: nephrology consult; hold on diuretics  Portal htn: EGD -repeat 2/25 (overdue- have placed order for carmelo)  Quit smoking  HCC screening every 6 months-due next 06/25 w AFP    Return 4 months  A total of 35 minutes was spent reviewing the patient's chart, examining the patient, reviewing labs and imaging and coordinating care with the patient's care team.

## 2025-03-25 ENCOUNTER — LAB VISIT (OUTPATIENT)
Dept: LAB | Facility: HOSPITAL | Age: 65
End: 2025-03-25
Attending: INTERNAL MEDICINE
Payer: MEDICAID

## 2025-03-25 ENCOUNTER — TELEPHONE (OUTPATIENT)
Dept: INTERVENTIONAL RADIOLOGY/VASCULAR | Facility: HOSPITAL | Age: 65
End: 2025-03-25
Payer: MEDICAID

## 2025-03-25 DIAGNOSIS — K70.31 ALCOHOLIC CIRRHOSIS OF LIVER WITH ASCITES: ICD-10-CM

## 2025-03-25 PROCEDURE — 80321 ALCOHOLS BIOMARKERS 1OR 2: CPT

## 2025-03-25 PROCEDURE — 85610 PROTHROMBIN TIME: CPT

## 2025-03-25 PROCEDURE — 36415 COLL VENOUS BLD VENIPUNCTURE: CPT | Mod: PO

## 2025-03-25 PROCEDURE — 85025 COMPLETE CBC W/AUTO DIFF WBC: CPT

## 2025-03-25 PROCEDURE — 82248 BILIRUBIN DIRECT: CPT

## 2025-03-25 PROCEDURE — 80053 COMPREHEN METABOLIC PANEL: CPT

## 2025-03-26 ENCOUNTER — RESULTS FOLLOW-UP (OUTPATIENT)
Dept: TRANSPLANT | Facility: CLINIC | Age: 65
End: 2025-03-26

## 2025-03-26 ENCOUNTER — HOSPITAL ENCOUNTER (OUTPATIENT)
Dept: INTERVENTIONAL RADIOLOGY/VASCULAR | Facility: HOSPITAL | Age: 65
Discharge: HOME | End: 2025-03-26
Attending: INTERNAL MEDICINE
Payer: MEDICAID

## 2025-03-26 VITALS
SYSTOLIC BLOOD PRESSURE: 190 MMHG | HEART RATE: 52 BPM | OXYGEN SATURATION: 100 % | BODY MASS INDEX: 29.62 KG/M2 | WEIGHT: 200 LBS | RESPIRATION RATE: 17 BRPM | DIASTOLIC BLOOD PRESSURE: 89 MMHG | HEIGHT: 69 IN

## 2025-03-26 DIAGNOSIS — K70.31 ALCOHOLIC CIRRHOSIS OF LIVER WITH ASCITES: ICD-10-CM

## 2025-03-26 DIAGNOSIS — R18.8 ASCITES: ICD-10-CM

## 2025-03-26 DIAGNOSIS — Z76.82 ORGAN TRANSPLANT CANDIDATE: ICD-10-CM

## 2025-03-26 DIAGNOSIS — R18.8 OTHER ASCITES: ICD-10-CM

## 2025-03-26 LAB
ABSOLUTE EOSINOPHIL (OHS): 0.23 K/UL
ABSOLUTE MONOCYTE (OHS): 0.77 K/UL (ref 0.3–1)
ABSOLUTE NEUTROPHIL COUNT (OHS): 6.05 K/UL (ref 1.8–7.7)
ALBUMIN SERPL BCP-MCNC: 3.4 G/DL (ref 3.5–5.2)
ALP SERPL-CCNC: 101 UNIT/L (ref 40–150)
ALT SERPL W/O P-5'-P-CCNC: 13 UNIT/L (ref 10–44)
ANION GAP (OHS): 9 MMOL/L (ref 8–16)
AST SERPL-CCNC: 19 UNIT/L (ref 11–45)
BASOPHILS # BLD AUTO: 0.09 K/UL
BASOPHILS NFR BLD AUTO: 1.1 %
BILIRUB DIRECT SERPL-MCNC: 0.2 MG/DL (ref 0.1–0.3)
BILIRUB SERPL-MCNC: 0.4 MG/DL (ref 0.1–1)
BUN SERPL-MCNC: 18 MG/DL (ref 8–23)
CALCIUM SERPL-MCNC: 8.5 MG/DL (ref 8.7–10.5)
CHLORIDE SERPL-SCNC: 107 MMOL/L (ref 95–110)
CO2 SERPL-SCNC: 21 MMOL/L (ref 23–29)
CREAT SERPL-MCNC: 1.7 MG/DL (ref 0.5–1.4)
ERYTHROCYTE [DISTWIDTH] IN BLOOD BY AUTOMATED COUNT: 15.4 % (ref 11.5–14.5)
GFR SERPLBLD CREATININE-BSD FMLA CKD-EPI: 45 ML/MIN/1.73/M2
GLUCOSE SERPL-MCNC: 92 MG/DL (ref 70–110)
HCT VFR BLD AUTO: 35.1 % (ref 40–54)
HGB BLD-MCNC: 10.5 GM/DL (ref 14–18)
IMM GRANULOCYTES # BLD AUTO: 0.03 K/UL (ref 0–0.04)
IMM GRANULOCYTES NFR BLD AUTO: 0.4 % (ref 0–0.5)
INR PPP: 1.4 (ref 0.8–1.2)
LYMPHOCYTES # BLD AUTO: 1.19 K/UL (ref 1–4.8)
MCH RBC QN AUTO: 26.7 PG (ref 27–50)
MCHC RBC AUTO-ENTMCNC: 29.9 G/DL (ref 32–36)
MCV RBC AUTO: 89 FL (ref 82–98)
NUCLEATED RBC (/100WBC) (OHS): 0 /100 WBC
PLATELET # BLD AUTO: 215 K/UL (ref 150–450)
PMV BLD AUTO: 11.8 FL (ref 9.2–12.9)
POTASSIUM SERPL-SCNC: 4.8 MMOL/L (ref 3.5–5.1)
PROT SERPL-MCNC: 7.4 GM/DL (ref 6–8.4)
PROTHROMBIN TIME: 14.7 SECONDS (ref 9–12.5)
RBC # BLD AUTO: 3.93 M/UL (ref 4.6–6.2)
RELATIVE EOSINOPHIL (OHS): 2.8 %
RELATIVE LYMPHOCYTE (OHS): 14.2 % (ref 18–48)
RELATIVE MONOCYTE (OHS): 9.2 % (ref 4–15)
RELATIVE NEUTROPHIL (OHS): 72.3 % (ref 38–73)
SODIUM SERPL-SCNC: 137 MMOL/L (ref 136–145)
WBC # BLD AUTO: 8.36 K/UL (ref 3.9–12.7)

## 2025-03-26 PROCEDURE — 49083 ABD PARACENTESIS W/IMAGING: CPT | Performed by: RADIOLOGY

## 2025-03-26 PROCEDURE — 63600175 PHARM REV CODE 636 W HCPCS: Performed by: PHYSICIAN ASSISTANT

## 2025-03-26 RX ORDER — LIDOCAINE HYDROCHLORIDE 10 MG/ML
INJECTION, SOLUTION INFILTRATION; PERINEURAL
Status: COMPLETED | OUTPATIENT
Start: 2025-03-26 | End: 2025-03-26

## 2025-03-26 RX ADMIN — LIDOCAINE HYDROCHLORIDE 8 ML: 10 INJECTION, SOLUTION INFILTRATION; PERINEURAL at 01:03

## 2025-03-26 NOTE — PROCEDURES
Radiology Post-Procedure Note    Pre Op Diagnosis: Ascites  Post Op Diagnosis: Same    Procedure: Ultrasound Guided Paracentesis    Procedure performed by: Ashia Gallegos PA-C    Written Informed Consent Obtained: Yes  Specimen Removed: none sent  Estimated Blood Loss: Minimal    Findings:   Successful paracentesis.  1800 ml LLQ.  Albumin was not administered PRN per protocol.    Patient tolerated procedure well.    Ashia Gallegos PA-C

## 2025-03-26 NOTE — H&P
Radiology History & Physical      SUBJECTIVE:     Chief Complaint: abdominal distention    History of Present Illness:  Jonny Isabel is a 64 y.o. male who presents for ultrasound guided paracentesis  Past Medical History:   Diagnosis Date    A-fib     Esophageal varices 5/8/2023    Other ascites 5/8/2023     Past Surgical History:   Procedure Laterality Date    ESOPHAGOGASTRODUODENOSCOPY N/A 1/17/2023    Procedure: EGD (ESOPHAGOGASTRODUODENOSCOPY);  Surgeon: Dewayne Navas MD;  [facility]  Service: Endoscopy;  Laterality: N/A;    ESOPHAGOGASTRODUODENOSCOPY N/A 2/7/2024    Procedure: EGD (ESOPHAGOGASTRODUODENOSCOPY);  Surgeon: Nathan Goins MD;  [facility]  Service: Endoscopy;  Laterality: N/A;  referral: Dr. Escalante /cirrhosis - labs- possible banding / prep ins on portal / 2nd floor - Pt c/o SOB at times./ Xarelto - message sent to clearance nurse per protocol - ERW  1/31/24- LVM for precall - ERW  2/1-precall complet-Kpvt  ok to hold Xarelto2 da    LAPAROTOMY, EXPLORATORY N/A 12/17/2024    Procedure: LAPAROTOMY, EXPLORATORY;  Surgeon: Domingo Francisco MD;  Location: [facility] OR [address] [place] [address];  Service: General;  Laterality: N/A;    REPAIR, HERNIA, VENTRAL N/A 12/17/2024    Procedure: REPAIR, HERNIA, VENTRAL;  Surgeon: Domingo Francisco MD;  Location: [facility] OR [address] [place] [address];  Service: General;  Laterality: N/A;  with mesh       Home Meds:   Prior to Admission medications    Medication Sig Start Date End Date Taking? Authorizing Provider   ADVAIR DISKUS 250-50 mcg/dose diskus inhaler Inhale 1 puff into the lungs 2 (two) times a day. Controller 3/20/24 3/20/25  Joanne Ruiz NP   albuterol (PROVENTIL/VENTOLIN HFA) 90 mcg/actuation inhaler 2 puffs 4 (four) times daily as needed. 12/21/24   Provider, Historical   alirocumab (PRALUENT PEN) 75 mg/mL PnIj Inject 75 mg into the skin every 14 (fourteen) days.  Patient not taking: Reported on 3/24/2025    Provider, Historical   amiodarone  (PACERONE) 200 MG Tab Take 200 mg by mouth once daily.  Patient not taking: Reported on 3/24/2025    Provider, Historical   folic acid (FOLVITE) 1 MG tablet Take 1,000 mcg by mouth once daily. 12/6/24   Provider, Historical   HYDROcodone-acetaminophen (NORCO)  mg per tablet Take 1 tablet by mouth every 6 (six) hours as needed for Pain.  Patient not taking: Reported on 3/24/2025 12/23/24   Seymour Leon MD   levothyroxine (SYNTHROID) 50 MCG tablet TAKE 1 TABLET BY MOUTH IN THE MORNING WITH A FULL GLASS OF WATER 30-60 MINUTES BEFORE OTHER MEDICATIONS AND FOOD 12/12/23   Ellen Escalera NP   metoprolol tartrate (LOPRESSOR) 100 MG tablet Take 100 mg by mouth once daily. 7/16/24   Provider, Historical   omega-3 acid ethyl esters (LOVAZA) 1 gram capsule Take 1 capsule by mouth 2 (two) times daily. 7/16/24   Provider, Historical   pantoprazole (PROTONIX) 40 MG tablet Take 1 tablet (40 mg total) by mouth 2 (two) times daily. 8/5/24 8/5/25  Shawn Mejia MD   promethazine (PHENERGAN) 12.5 MG Tab Take 12.5 mg by mouth every 6 (six) hours as needed (nausea).  Patient not taking: Reported on 3/24/2025 5/6/24   Provider, Historical   thiamine 100 MG tablet Take 100 mg by mouth once daily. 7/13/22   Provider, Historical   traZODone (DESYREL) 50 MG tablet Take 50 mg by mouth nightly as needed. 2/29/24   Provider, Historical   VITAMIN B COMPLEX ORAL Take 1 tablet by mouth once daily.    Provider, Historical   XARELTO 20 mg Tab Take 20 mg by mouth once daily. 1/9/23   Provider, Historical     Anticoagulants/Antiplatelets:  xarelto    Allergies: Review of patient's allergies indicates:  No Known Allergies  Sedation History:  no adverse reactions    Review of Systems:   Hematological: no known coagulopathies  Respiratory: no shortness of breath  Cardiovascular: no chest pain  Gastrointestinal: no abdominal pain  Genito-Urinary: no dysuria  Musculoskeletal: negative  Neurological: no TIA or stroke symptoms          OBJECTIVE:     Vital Signs (Most Recent)       Physical Exam:  ASA: 2  Mallampati: n/a    General: no acute distress  Mental Status: alert and oriented to person, place and time  HEENT: normocephalic, atraumatic  Chest: unlabored breathing  Heart: regular heart rate  Abdomen: distended  Extremity: moves all extremities    ASSESSMENT/PLAN:     Sedation Plan: local  Patient will undergo ultrasound guided paracentesis.    Ashia Gallegos PA-C

## 2025-03-26 NOTE — SEDATION DOCUMENTATION
Patient presented for biweekly para. Denies any issues or changes since last. Tolerated procedure well, no issues. Ok for discharge

## 2025-03-28 LAB
PLPETH BLD-MCNC: <10 NG/ML
POPETH BLD-MCNC: <10 NG/ML
TOXICOLOGIST REVIEW: NORMAL

## 2025-03-28 NOTE — PLAN OF CARE
Jen Chase (:  1996) is a 29 y.o. female,Established patient, here for evaluation of the following chief complaint(s):  Cough and Cold Symptoms (Pt states started  fever and now body aches, headache, and congested. Pt have asthma. )      ASSESSMENT/PLAN:    ICD-10-CM    1. Mild persistent reactive airway disease with wheezing with acute exacerbation  J45.31 montelukast (SINGULAIR) 10 MG tablet     predniSONE (DELTASONE) 20 MG tablet     albuterol sulfate HFA (VENTOLIN HFA) 108 (90 Base) MCG/ACT inhaler     brompheniramine-pseudoephedrine-DM (BROMFED DM) 2-30-10 MG/5ML syrup     azithromycin (ZITHROMAX) 250 MG tablet      2. Cough with fever  R05.9 POCT Influenza A/B DNA (Alere i)    R50.9         Results for orders placed or performed in visit on 25   POCT Influenza A/B DNA (Alere i)   Result Value Ref Range    Influenza virus A RNA neg     Influenza virus B RNA neg       Continue Albuterol as needed.  Tylenol or Ibuprofen as needed for fever or pain as directed  Cool mist humidifier recommended  Follow up with PCP in 2-3 days if symptoms persist or if symptoms worsen.    SUBJECTIVE/OBJECTIVE:    Cough  Associated symptoms include chills, a fever, headaches and wheezing.   Cold Symptoms   Associated symptoms include congestion, coughing, headaches and wheezing.     HPI:   29 y.o. female with a history of asthma presents with symptoms of wheezing, cough, body aches ongoing since 2 days. Denies sore throat or ear ache. Has taken Ibuprofen and Albuterol for symptoms no relief.    Vitals:    25 1701   BP: (!) 130/95   BP Site: Left Upper Arm   Patient Position: Sitting   BP Cuff Size: Medium Adult   Pulse: (!) 109   Temp: 98.6 °F (37 °C)   TempSrc: Oral   SpO2: 94%   Weight: (!) 149.2 kg (329 lb)       Review of Systems   Constitutional:  Positive for chills, fatigue and fever.   HENT:  Positive for congestion.    Respiratory:  Positive for cough and wheezing.    Neurological:     Problem: Infection  Goal: Absence of Infection Signs and Symptoms  Outcome: Progressing     Problem: Diabetes Comorbidity  Goal: Blood Glucose Level Within Targeted Range  Outcome: Progressing     Problem: Wound  Goal: Optimal Coping  Outcome: Progressing  Goal: Optimal Functional Ability  Outcome: Progressing  Goal: Absence of Infection Signs and Symptoms  Outcome: Progressing  Goal: Improved Oral Intake  Outcome: Progressing  Goal: Optimal Pain Control and Function  Outcome: Progressing  Goal: Skin Health and Integrity  Outcome: Progressing  Goal: Optimal Wound Healing  Outcome: Progressing     Problem: Adult Inpatient Plan of Care  Goal: Plan of Care Review  Outcome: Progressing  Goal: Patient-Specific Goal (Individualized)  Outcome: Progressing  Goal: Absence of Hospital-Acquired Illness or Injury  Outcome: Progressing  Goal: Optimal Comfort and Wellbeing  Outcome: Progressing  Goal: Readiness for Transition of Care  Outcome: Progressing     Problem: Fall Injury Risk  Goal: Absence of Fall and Fall-Related Injury  Outcome: Progressing

## 2025-04-22 ENCOUNTER — LAB VISIT (OUTPATIENT)
Dept: LAB | Facility: HOSPITAL | Age: 65
End: 2025-04-22
Attending: INTERNAL MEDICINE
Payer: MEDICAID

## 2025-04-22 DIAGNOSIS — K70.31 ALCOHOLIC CIRRHOSIS OF LIVER WITH ASCITES: ICD-10-CM

## 2025-04-22 LAB
ALBUMIN SERPL BCP-MCNC: 3.7 G/DL (ref 3.5–5.2)
ALP SERPL-CCNC: 112 UNIT/L (ref 40–150)
ALT SERPL W/O P-5'-P-CCNC: 18 UNIT/L (ref 10–44)
ANION GAP (OHS): 8 MMOL/L (ref 8–16)
AST SERPL-CCNC: 19 UNIT/L (ref 11–45)
BILIRUB SERPL-MCNC: 0.3 MG/DL (ref 0.1–1)
BUN SERPL-MCNC: 22 MG/DL (ref 8–23)
CALCIUM SERPL-MCNC: 8.5 MG/DL (ref 8.7–10.5)
CHLORIDE SERPL-SCNC: 105 MMOL/L (ref 95–110)
CO2 SERPL-SCNC: 24 MMOL/L (ref 23–29)
CREAT SERPL-MCNC: 1.6 MG/DL (ref 0.5–1.4)
GFR SERPLBLD CREATININE-BSD FMLA CKD-EPI: 48 ML/MIN/1.73/M2
GLUCOSE SERPL-MCNC: 81 MG/DL (ref 70–110)
POTASSIUM SERPL-SCNC: 4.8 MMOL/L (ref 3.5–5.1)
PROT SERPL-MCNC: 8.1 GM/DL (ref 6–8.4)
SODIUM SERPL-SCNC: 137 MMOL/L (ref 136–145)

## 2025-04-22 PROCEDURE — 82040 ASSAY OF SERUM ALBUMIN: CPT

## 2025-04-22 PROCEDURE — 36415 COLL VENOUS BLD VENIPUNCTURE: CPT | Mod: PO

## 2025-04-23 ENCOUNTER — HOSPITAL ENCOUNTER (OUTPATIENT)
Dept: INTERVENTIONAL RADIOLOGY/VASCULAR | Facility: HOSPITAL | Age: 65
Discharge: HOME | End: 2025-04-23
Attending: INTERNAL MEDICINE
Payer: MEDICAID

## 2025-04-23 VITALS
SYSTOLIC BLOOD PRESSURE: 207 MMHG | OXYGEN SATURATION: 99 % | DIASTOLIC BLOOD PRESSURE: 92 MMHG | WEIGHT: 200 LBS | RESPIRATION RATE: 20 BRPM | HEIGHT: 69 IN | TEMPERATURE: 99 F | HEART RATE: 50 BPM | BODY MASS INDEX: 29.62 KG/M2

## 2025-04-23 DIAGNOSIS — Z76.82 ORGAN TRANSPLANT CANDIDATE: ICD-10-CM

## 2025-04-23 DIAGNOSIS — R18.8 ASCITES: ICD-10-CM

## 2025-04-23 DIAGNOSIS — K70.31 ALCOHOLIC CIRRHOSIS OF LIVER WITH ASCITES: ICD-10-CM

## 2025-04-23 DIAGNOSIS — R18.8 OTHER ASCITES: ICD-10-CM

## 2025-04-23 PROCEDURE — 76705 ECHO EXAM OF ABDOMEN: CPT | Mod: TC

## 2025-04-23 NOTE — DISCHARGE SUMMARY
Interventional Radiology Short Stay Discharge Summary      Admit Date: 4/23/2025  Discharge Date: 04/23/2025     Hospital Course: Uneventful    Discharge Diagnosis: ascites    Discharge Condition: Stable    Discharge Disposition: Home    Diet: Resume prior diet    Activity: activity as tolerated    Follow-up: With referring provider    Cindy Chava, PA-C [facility]

## 2025-04-23 NOTE — H&P
Radiology History & Physical      SUBJECTIVE:     Chief Complaint: abdominal distention    History of Present Illness:  Jonny Isabel is a 64 y.o. male who presents for ultrasound guided paracentesis  Past Medical History:   Diagnosis Date    A-fib     Esophageal varices 5/8/2023    Other ascites 5/8/2023     Past Surgical History:   Procedure Laterality Date    ESOPHAGOGASTRODUODENOSCOPY N/A 1/17/2023    Procedure: EGD (ESOPHAGOGASTRODUODENOSCOPY);  Surgeon: Dewayne Navas MD;  [facility]  Service: Endoscopy;  Laterality: N/A;    ESOPHAGOGASTRODUODENOSCOPY N/A 2/7/2024    Procedure: EGD (ESOPHAGOGASTRODUODENOSCOPY);  Surgeon: Nathan Goins MD;  [facility]  Service: Endoscopy;  Laterality: N/A;  referral: Dr. Escalante /cirrhosis - labs- possible banding / prep ins on portal / 2nd floor - Pt c/o SOB at times./ Xarelto - message sent to clearance nurse per protocol - ERW  1/31/24- LVM for precall - ERW  2/1-precall complet-Kpvt  ok to hold Xarelto2 da    LAPAROTOMY, EXPLORATORY N/A 12/17/2024    Procedure: LAPAROTOMY, EXPLORATORY;  Surgeon: Domingo Francisco MD;  Location: [facility] OR [address] [place] [address];  Service: General;  Laterality: N/A;    REPAIR, HERNIA, VENTRAL N/A 12/17/2024    Procedure: REPAIR, HERNIA, VENTRAL;  Surgeon: Domingo Francisco MD;  Location: [facility] OR [address] [place] [address];  Service: General;  Laterality: N/A;  with mesh       Home Meds:   Prior to Admission medications    Medication Sig Start Date End Date Taking? Authorizing Provider   ADVAIR DISKUS 250-50 mcg/dose diskus inhaler Inhale 1 puff into the lungs 2 (two) times a day. Controller 3/20/24 3/20/25  Joanne Ruiz NP   albuterol (PROVENTIL/VENTOLIN HFA) 90 mcg/actuation inhaler 2 puffs 4 (four) times daily as needed. 12/21/24   Provider, Historical   alirocumab (PRALUENT PEN) 75 mg/mL PnIj Inject 75 mg into the skin every 14 (fourteen) days.  Patient not taking: Reported on 3/24/2025    Provider, Historical   amiodarone  (PACERONE) 200 MG Tab Take 200 mg by mouth once daily.  Patient not taking: Reported on 3/24/2025    Provider, Historical   folic acid (FOLVITE) 1 MG tablet Take 1,000 mcg by mouth once daily. 12/6/24   Provider, Historical   HYDROcodone-acetaminophen (NORCO)  mg per tablet Take 1 tablet by mouth every 6 (six) hours as needed for Pain.  Patient not taking: Reported on 3/24/2025 12/23/24   Seymour Leon MD   levothyroxine (SYNTHROID) 50 MCG tablet TAKE 1 TABLET BY MOUTH IN THE MORNING WITH A FULL GLASS OF WATER 30-60 MINUTES BEFORE OTHER MEDICATIONS AND FOOD 12/12/23   Ellen Escalera NP   metoprolol tartrate (LOPRESSOR) 100 MG tablet Take 100 mg by mouth once daily. 7/16/24   Provider, Historical   omega-3 acid ethyl esters (LOVAZA) 1 gram capsule Take 1 capsule by mouth 2 (two) times daily. 7/16/24   Provider, Historical   pantoprazole (PROTONIX) 40 MG tablet Take 1 tablet (40 mg total) by mouth 2 (two) times daily. 8/5/24 8/5/25  Shawn Mejia MD   promethazine (PHENERGAN) 12.5 MG Tab Take 12.5 mg by mouth every 6 (six) hours as needed (nausea).  Patient not taking: Reported on 3/24/2025 5/6/24   Provider, Historical   thiamine 100 MG tablet Take 100 mg by mouth once daily. 7/13/22   Provider, Historical   traZODone (DESYREL) 50 MG tablet Take 50 mg by mouth nightly as needed. 2/29/24   Provider, Historical   VITAMIN B COMPLEX ORAL Take 1 tablet by mouth once daily.    Provider, Historical   XARELTO 20 mg Tab Take 20 mg by mouth once daily. 1/9/23   Provider, Historical     Anticoagulants/Antiplatelets:  xarelto    Allergies: Review of patient's allergies indicates:  No Known Allergies  Sedation History:  no adverse reactions    Review of Systems:   Hematological: no known coagulopathies  Respiratory: no shortness of breath  Cardiovascular: no chest pain  Gastrointestinal: no abdominal pain  Genito-Urinary: no dysuria  Musculoskeletal: negative  Neurological: no TIA or stroke symptoms          OBJECTIVE:     Vital Signs (Most Recent)       Physical Exam:  ASA: 2  Mallampati: n/a    General: no acute distress  Mental Status: alert and oriented to person, place and time  HEENT: normocephalic, atraumatic  Chest: unlabored breathing  Heart: regular heart rate  Abdomen: distended  Extremity: moves all extremities    ASSESSMENT/PLAN:     Sedation Plan: local  Patient will undergo ultrasound guided paracentesis.    Ashia Gallegos PA-C

## 2025-04-23 NOTE — PROCEDURES
Radiology Post-Procedure Note    Pre Op Diagnosis: Ascites  Post Op Diagnosis: Same    Procedure: Ultrasound Guided Paracentesis    Procedure performed by: Ashia Gallegos PA-C    Written Informed Consent Obtained: Yes  During ultrasound evaluation, insufficient ascites identified for safe paracentesis. No paracentesis performed.      Ashia Gallegos PA-C

## 2025-04-24 ENCOUNTER — RESULTS FOLLOW-UP (OUTPATIENT)
Dept: TRANSPLANT | Facility: CLINIC | Age: 65
End: 2025-04-24

## 2025-05-13 ENCOUNTER — TELEPHONE (OUTPATIENT)
Dept: INTERVENTIONAL RADIOLOGY/VASCULAR | Facility: HOSPITAL | Age: 65
End: 2025-05-13
Payer: MEDICAID

## 2025-05-20 ENCOUNTER — PATIENT MESSAGE (OUTPATIENT)
Dept: HEPATOLOGY | Facility: CLINIC | Age: 65
End: 2025-05-20
Payer: MEDICAID

## 2025-05-20 ENCOUNTER — TELEPHONE (OUTPATIENT)
Dept: HEPATOLOGY | Facility: CLINIC | Age: 65
End: 2025-05-20
Payer: MEDICAID

## 2025-05-20 ENCOUNTER — LAB VISIT (OUTPATIENT)
Dept: LAB | Facility: HOSPITAL | Age: 65
End: 2025-05-20
Attending: INTERNAL MEDICINE
Payer: MEDICAID

## 2025-05-20 DIAGNOSIS — K74.60 HEPATIC CIRRHOSIS, UNSPECIFIED HEPATIC CIRRHOSIS TYPE, UNSPECIFIED WHETHER ASCITES PRESENT: Primary | ICD-10-CM

## 2025-05-20 DIAGNOSIS — K70.31 ALCOHOLIC CIRRHOSIS OF LIVER WITH ASCITES: ICD-10-CM

## 2025-05-20 LAB
ABSOLUTE EOSINOPHIL (OHS): 0.18 K/UL
ABSOLUTE MONOCYTE (OHS): 0.83 K/UL (ref 0.3–1)
ABSOLUTE NEUTROPHIL COUNT (OHS): 7.4 K/UL (ref 1.8–7.7)
ALBUMIN SERPL BCP-MCNC: 3.8 G/DL (ref 3.5–5.2)
ALP SERPL-CCNC: 111 UNIT/L (ref 40–150)
ALT SERPL W/O P-5'-P-CCNC: 17 UNIT/L (ref 10–44)
ANION GAP (OHS): 9 MMOL/L (ref 8–16)
AST SERPL-CCNC: 20 UNIT/L (ref 11–45)
BASOPHILS # BLD AUTO: 0.06 K/UL
BASOPHILS NFR BLD AUTO: 0.6 %
BILIRUB DIRECT SERPL-MCNC: 0.1 MG/DL (ref 0.1–0.3)
BILIRUB SERPL-MCNC: 0.3 MG/DL (ref 0.1–1)
BUN SERPL-MCNC: 24 MG/DL (ref 8–23)
CALCIUM SERPL-MCNC: 9.2 MG/DL (ref 8.7–10.5)
CHLORIDE SERPL-SCNC: 106 MMOL/L (ref 95–110)
CO2 SERPL-SCNC: 23 MMOL/L (ref 23–29)
CREAT SERPL-MCNC: 1.9 MG/DL (ref 0.5–1.4)
ERYTHROCYTE [DISTWIDTH] IN BLOOD BY AUTOMATED COUNT: 16 % (ref 11.5–14.5)
GFR SERPLBLD CREATININE-BSD FMLA CKD-EPI: 39 ML/MIN/1.73/M2
GLUCOSE SERPL-MCNC: 98 MG/DL (ref 70–110)
HCT VFR BLD AUTO: 34 % (ref 40–54)
HGB BLD-MCNC: 10.5 GM/DL (ref 14–18)
IMM GRANULOCYTES # BLD AUTO: 0.04 K/UL (ref 0–0.04)
IMM GRANULOCYTES NFR BLD AUTO: 0.4 % (ref 0–0.5)
INR PPP: 1.2 (ref 0.8–1.2)
LYMPHOCYTES # BLD AUTO: 1.31 K/UL (ref 1–4.8)
MCH RBC QN AUTO: 26.4 PG (ref 27–31)
MCHC RBC AUTO-ENTMCNC: 30.9 G/DL (ref 32–36)
MCV RBC AUTO: 86 FL (ref 82–98)
NUCLEATED RBC (/100WBC) (OHS): 0 /100 WBC
PLATELET # BLD AUTO: 228 K/UL (ref 150–450)
PMV BLD AUTO: 12.4 FL (ref 9.2–12.9)
POTASSIUM SERPL-SCNC: 5 MMOL/L (ref 3.5–5.1)
PROT SERPL-MCNC: 8.3 GM/DL (ref 6–8.4)
PROTHROMBIN TIME: 13.2 SECONDS (ref 9–12.5)
RBC # BLD AUTO: 3.97 M/UL (ref 4.6–6.2)
RELATIVE EOSINOPHIL (OHS): 1.8 %
RELATIVE LYMPHOCYTE (OHS): 13.3 % (ref 18–48)
RELATIVE MONOCYTE (OHS): 8.5 % (ref 4–15)
RELATIVE NEUTROPHIL (OHS): 75.4 % (ref 38–73)
SODIUM SERPL-SCNC: 138 MMOL/L (ref 136–145)
WBC # BLD AUTO: 9.82 K/UL (ref 3.9–12.7)

## 2025-05-20 PROCEDURE — 85610 PROTHROMBIN TIME: CPT

## 2025-05-20 PROCEDURE — 84460 ALANINE AMINO (ALT) (SGPT): CPT

## 2025-05-20 PROCEDURE — 36415 COLL VENOUS BLD VENIPUNCTURE: CPT | Mod: PO

## 2025-05-20 PROCEDURE — 85025 COMPLETE CBC W/AUTO DIFF WBC: CPT

## 2025-05-20 PROCEDURE — 80321 ALCOHOLS BIOMARKERS 1OR 2: CPT

## 2025-05-20 PROCEDURE — 82248 BILIRUBIN DIRECT: CPT

## 2025-05-22 ENCOUNTER — PATIENT MESSAGE (OUTPATIENT)
Dept: HEPATOLOGY | Facility: CLINIC | Age: 65
End: 2025-05-22
Payer: MEDICAID

## 2025-05-22 ENCOUNTER — TELEPHONE (OUTPATIENT)
Dept: HEPATOLOGY | Facility: CLINIC | Age: 65
End: 2025-05-22
Payer: MEDICAID

## 2025-05-22 LAB
PLPETH BLD-MCNC: <10 NG/ML
POPETH BLD-MCNC: <10 NG/ML
TOXICOLOGIST REVIEW: NORMAL

## 2025-05-22 NOTE — TELEPHONE ENCOUNTER
Pt scheduled for his 4 mths follow up virtual appointment. A message via [facility] was sent informing pt that was scheduled for this appointment.

## 2025-05-26 ENCOUNTER — LAB VISIT (OUTPATIENT)
Dept: LAB | Facility: HOSPITAL | Age: 65
End: 2025-05-26
Attending: INTERNAL MEDICINE
Payer: MEDICAID

## 2025-05-26 ENCOUNTER — RESULTS FOLLOW-UP (OUTPATIENT)
Dept: TRANSPLANT | Facility: CLINIC | Age: 65
End: 2025-05-26

## 2025-05-26 DIAGNOSIS — K74.60 HEPATIC CIRRHOSIS, UNSPECIFIED HEPATIC CIRRHOSIS TYPE, UNSPECIFIED WHETHER ASCITES PRESENT: Primary | ICD-10-CM

## 2025-05-26 DIAGNOSIS — K74.60 HEPATIC CIRRHOSIS, UNSPECIFIED HEPATIC CIRRHOSIS TYPE, UNSPECIFIED WHETHER ASCITES PRESENT: ICD-10-CM

## 2025-05-26 LAB
ABSOLUTE EOSINOPHIL (OHS): 0.19 K/UL
ABSOLUTE MONOCYTE (OHS): 0.61 K/UL (ref 0.3–1)
ABSOLUTE NEUTROPHIL COUNT (OHS): 6.94 K/UL (ref 1.8–7.7)
ALBUMIN SERPL BCP-MCNC: 3.6 G/DL (ref 3.5–5.2)
ALP SERPL-CCNC: 112 UNIT/L (ref 40–150)
ALT SERPL W/O P-5'-P-CCNC: 22 UNIT/L (ref 10–44)
ANION GAP (OHS): 7 MMOL/L (ref 8–16)
AST SERPL-CCNC: 24 UNIT/L (ref 11–45)
BASOPHILS # BLD AUTO: 0.08 K/UL
BASOPHILS NFR BLD AUTO: 0.9 %
BILIRUB DIRECT SERPL-MCNC: 0.1 MG/DL (ref 0.1–0.3)
BILIRUB SERPL-MCNC: 0.3 MG/DL (ref 0.1–1)
BUN SERPL-MCNC: 21 MG/DL (ref 8–23)
CALCIUM SERPL-MCNC: 9 MG/DL (ref 8.7–10.5)
CHLORIDE SERPL-SCNC: 105 MMOL/L (ref 95–110)
CO2 SERPL-SCNC: 22 MMOL/L (ref 23–29)
CREAT SERPL-MCNC: 1.9 MG/DL (ref 0.5–1.4)
ERYTHROCYTE [DISTWIDTH] IN BLOOD BY AUTOMATED COUNT: 15.9 % (ref 11.5–14.5)
GFR SERPLBLD CREATININE-BSD FMLA CKD-EPI: 39 ML/MIN/1.73/M2
GLUCOSE SERPL-MCNC: 143 MG/DL (ref 70–110)
HCT VFR BLD AUTO: 34.6 % (ref 40–54)
HGB BLD-MCNC: 10.4 GM/DL (ref 14–18)
IMM GRANULOCYTES # BLD AUTO: 0.05 K/UL (ref 0–0.04)
IMM GRANULOCYTES NFR BLD AUTO: 0.5 % (ref 0–0.5)
INR PPP: 1.3 (ref 0.8–1.2)
LYMPHOCYTES # BLD AUTO: 1.4 K/UL (ref 1–4.8)
MCH RBC QN AUTO: 25.7 PG (ref 27–31)
MCHC RBC AUTO-ENTMCNC: 30.1 G/DL (ref 32–36)
MCV RBC AUTO: 85 FL (ref 82–98)
NUCLEATED RBC (/100WBC) (OHS): 0 /100 WBC
PLATELET # BLD AUTO: 221 K/UL (ref 150–450)
PMV BLD AUTO: 12.4 FL (ref 9.2–12.9)
POTASSIUM SERPL-SCNC: 4.7 MMOL/L (ref 3.5–5.1)
PROT SERPL-MCNC: 8.1 GM/DL (ref 6–8.4)
PROTHROMBIN TIME: 13.9 SECONDS (ref 9–12.5)
RBC # BLD AUTO: 4.05 M/UL (ref 4.6–6.2)
RELATIVE EOSINOPHIL (OHS): 2 %
RELATIVE LYMPHOCYTE (OHS): 15.1 % (ref 18–48)
RELATIVE MONOCYTE (OHS): 6.6 % (ref 4–15)
RELATIVE NEUTROPHIL (OHS): 74.9 % (ref 38–73)
SODIUM SERPL-SCNC: 134 MMOL/L (ref 136–145)
WBC # BLD AUTO: 9.27 K/UL (ref 3.9–12.7)

## 2025-05-26 PROCEDURE — 80053 COMPREHEN METABOLIC PANEL: CPT

## 2025-05-26 PROCEDURE — 82248 BILIRUBIN DIRECT: CPT

## 2025-05-26 PROCEDURE — 85025 COMPLETE CBC W/AUTO DIFF WBC: CPT

## 2025-05-26 PROCEDURE — 36415 COLL VENOUS BLD VENIPUNCTURE: CPT | Mod: PO

## 2025-05-26 PROCEDURE — 85610 PROTHROMBIN TIME: CPT

## 2025-05-27 ENCOUNTER — TELEPHONE (OUTPATIENT)
Dept: HEPATOLOGY | Facility: CLINIC | Age: 65
End: 2025-05-27
Payer: MEDICAID

## 2025-05-27 NOTE — TELEPHONE ENCOUNTER
----- Message from Lovely Escalante MD sent at 5/26/2025 10:21 PM CDT -----  Labs in 2 weeks  ----- Message -----  From: Lab, Background User  Sent: 5/26/2025   5:15 PM CDT  To: Lovely Escalante MD

## 2025-06-17 ENCOUNTER — LAB VISIT (OUTPATIENT)
Dept: LAB | Facility: HOSPITAL | Age: 65
End: 2025-06-17
Attending: INTERNAL MEDICINE
Payer: MEDICAID

## 2025-06-17 DIAGNOSIS — K70.31 ALCOHOLIC CIRRHOSIS OF LIVER WITH ASCITES: ICD-10-CM

## 2025-06-17 DIAGNOSIS — K74.60 HEPATIC CIRRHOSIS, UNSPECIFIED HEPATIC CIRRHOSIS TYPE, UNSPECIFIED WHETHER ASCITES PRESENT: ICD-10-CM

## 2025-06-17 LAB
ABSOLUTE EOSINOPHIL (OHS): 0.24 K/UL
ABSOLUTE MONOCYTE (OHS): 0.83 K/UL (ref 0.3–1)
ABSOLUTE NEUTROPHIL COUNT (OHS): 7.1 K/UL (ref 1.8–7.7)
ALBUMIN SERPL BCP-MCNC: 4 G/DL (ref 3.5–5.2)
ALP SERPL-CCNC: 102 UNIT/L (ref 40–150)
ALT SERPL W/O P-5'-P-CCNC: 21 UNIT/L (ref 10–44)
ANION GAP (OHS): 8 MMOL/L (ref 8–16)
AST SERPL-CCNC: 21 UNIT/L (ref 11–45)
BASOPHILS # BLD AUTO: 0.11 K/UL
BASOPHILS NFR BLD AUTO: 1.1 %
BILIRUB DIRECT SERPL-MCNC: 0.1 MG/DL (ref 0.1–0.3)
BILIRUB SERPL-MCNC: 0.3 MG/DL (ref 0.1–1)
BUN SERPL-MCNC: 29 MG/DL (ref 8–23)
CALCIUM SERPL-MCNC: 9 MG/DL (ref 8.7–10.5)
CHLORIDE SERPL-SCNC: 106 MMOL/L (ref 95–110)
CO2 SERPL-SCNC: 22 MMOL/L (ref 23–29)
CREAT SERPL-MCNC: 1.7 MG/DL (ref 0.5–1.4)
ERYTHROCYTE [DISTWIDTH] IN BLOOD BY AUTOMATED COUNT: 17.1 % (ref 11.5–14.5)
GFR SERPLBLD CREATININE-BSD FMLA CKD-EPI: 45 ML/MIN/1.73/M2
GLUCOSE SERPL-MCNC: 101 MG/DL (ref 70–110)
HCT VFR BLD AUTO: 35.5 % (ref 40–54)
HGB BLD-MCNC: 11.1 GM/DL (ref 14–18)
IMM GRANULOCYTES # BLD AUTO: 0.05 K/UL (ref 0–0.04)
IMM GRANULOCYTES NFR BLD AUTO: 0.5 % (ref 0–0.5)
INR PPP: 1.2 (ref 0.8–1.2)
LYMPHOCYTES # BLD AUTO: 1.58 K/UL (ref 1–4.8)
MCH RBC QN AUTO: 26.8 PG (ref 27–31)
MCHC RBC AUTO-ENTMCNC: 31.3 G/DL (ref 32–36)
MCV RBC AUTO: 86 FL (ref 82–98)
NUCLEATED RBC (/100WBC) (OHS): 0 /100 WBC
PLATELET # BLD AUTO: 214 K/UL (ref 150–450)
PMV BLD AUTO: 12.2 FL (ref 9.2–12.9)
POTASSIUM SERPL-SCNC: 4.9 MMOL/L (ref 3.5–5.1)
PROT SERPL-MCNC: 8 GM/DL (ref 6–8.4)
PROTHROMBIN TIME: 13 SECONDS (ref 9–12.5)
RBC # BLD AUTO: 4.14 M/UL (ref 4.6–6.2)
RELATIVE EOSINOPHIL (OHS): 2.4 %
RELATIVE LYMPHOCYTE (OHS): 15.9 % (ref 18–48)
RELATIVE MONOCYTE (OHS): 8.4 % (ref 4–15)
RELATIVE NEUTROPHIL (OHS): 71.7 % (ref 38–73)
SODIUM SERPL-SCNC: 136 MMOL/L (ref 136–145)
WBC # BLD AUTO: 9.91 K/UL (ref 3.9–12.7)

## 2025-06-17 PROCEDURE — 80053 COMPREHEN METABOLIC PANEL: CPT

## 2025-06-17 PROCEDURE — 82248 BILIRUBIN DIRECT: CPT

## 2025-06-17 PROCEDURE — 36415 COLL VENOUS BLD VENIPUNCTURE: CPT | Mod: PO

## 2025-06-17 PROCEDURE — 85025 COMPLETE CBC W/AUTO DIFF WBC: CPT

## 2025-06-17 PROCEDURE — 85610 PROTHROMBIN TIME: CPT

## 2025-06-20 ENCOUNTER — RESULTS FOLLOW-UP (OUTPATIENT)
Dept: TRANSPLANT | Facility: CLINIC | Age: 65
End: 2025-06-20

## 2025-06-23 ENCOUNTER — TELEPHONE (OUTPATIENT)
Dept: ENDOSCOPY | Facility: HOSPITAL | Age: 65
End: 2025-06-23

## 2025-06-23 NOTE — TELEPHONE ENCOUNTER
An attempt was made to schedule the patient for a EGD. Pt was unable to complete scheduling at this time. PAT in place

## 2025-06-26 ENCOUNTER — TELEPHONE (OUTPATIENT)
Dept: ENDOSCOPY | Facility: HOSPITAL | Age: 65
End: 2025-06-26

## 2025-06-26 ENCOUNTER — CLINICAL SUPPORT (OUTPATIENT)
Dept: ENDOSCOPY | Facility: HOSPITAL | Age: 65
End: 2025-06-26
Attending: INTERNAL MEDICINE
Payer: MEDICAID

## 2025-06-26 DIAGNOSIS — I85.10 SECONDARY ESOPHAGEAL VARICES WITHOUT BLEEDING: ICD-10-CM

## 2025-06-26 NOTE — TELEPHONE ENCOUNTER
Contacted the patient to schedule an endoscopy procedure(s) Upper Endoscopy (EGD) . The patient did not answer the call and left a voice message requesting a call back.

## 2025-07-29 ENCOUNTER — OFFICE VISIT (OUTPATIENT)
Dept: HEPATOLOGY | Facility: CLINIC | Age: 65
End: 2025-07-29
Payer: MEDICAID

## 2025-07-29 DIAGNOSIS — K70.31 ALCOHOLIC CIRRHOSIS OF LIVER WITH ASCITES: Primary | ICD-10-CM

## 2025-07-29 DIAGNOSIS — K70.31 ASCITES DUE TO ALCOHOLIC CIRRHOSIS: ICD-10-CM

## 2025-07-29 DIAGNOSIS — K65.2 SBP (SPONTANEOUS BACTERIAL PERITONITIS): ICD-10-CM

## 2025-07-29 DIAGNOSIS — I85.10 SECONDARY ESOPHAGEAL VARICES WITHOUT BLEEDING: ICD-10-CM

## 2025-07-29 PROBLEM — R74.8 ELEVATED LIVER ENZYMES: Status: RESOLVED | Noted: 2023-01-16 | Resolved: 2025-07-29

## 2025-07-29 PROCEDURE — 98006 SYNCH AUDIO-VIDEO EST MOD 30: CPT | Mod: 95,,, | Performed by: INTERNAL MEDICINE

## 2025-07-29 PROCEDURE — 1159F MED LIST DOCD IN RCRD: CPT | Mod: CPTII,95,, | Performed by: INTERNAL MEDICINE

## 2025-07-29 PROCEDURE — 1160F RVW MEDS BY RX/DR IN RCRD: CPT | Mod: CPTII,95,, | Performed by: INTERNAL MEDICINE

## 2025-07-29 NOTE — PATIENT INSTRUCTIONS
F/u cardiology- can you stop the blood thinner; can you stop amiodarone; can you decrease vs stop diuretic since your creatinine is up  Repeat EGD now  Every 3 month labs  Abdo US now w AFP and in 6 months  Return 6 months

## 2025-07-29 NOTE — PROGRESS NOTES
The patient location is: in [place]  The chief complaint leading to consultation is: f/u decompensated cirrhosis    Visit type: audiovisual    Face to Face time with patient: 15 minutes  30 minutes of total time spent on the encounter, which includes face to face time and non-face to face time preparing to see the patient (eg, review of tests), Obtaining and/or reviewing separately obtained history, Documenting clinical information in the electronic or other health record, Independently interpreting results (not separately reported) and communicating results to the patient/family/caregiver, or Care coordination (not separately reported).         Each patient to whom he or she provides medical services by telemedicine is:  (1) informed of the relationship between the physician and patient and the respective role of any other health care provider with respect to management of the patient; and (2) notified that he or she may decline to receive medical services by telemedicine and may withdraw from such care at any time.    Notes: HEPATOLOGY FOLLOW UP    Referring Physician: Harriett Bird MD  Current Corresponding Physician: Harriett Bird MD, Yuli Costa MD, Christopher Burt III, MD Jonny Isabel is here for follow up of decompensated alcohol-induced Cirrhosis    HPI  Seen by inpt hepatology team:  Admission 1/16/23-2/7/23: 61yo PMHx decompensated EtOH cirrhosis (ascites), NID-T2DM, afib s/p ablation and DCCV on xarelto, HTN. Was transferred from OSH on 01/16/2023 for epidural abscess and L3/L4 disc herniation. Surgery was deferred since the pt was too ill. MELD-Na 22. Was anemic, was transfused and underwent EGD- small varices noted.     Work up for liver disease since arrival notable for ASMA, AMA, viral hepatitis panel all negative. PETH 900.     Reportedly patient did not know of liver disease and was never instructed to stop drinking however multiple barriers to consideration of transplant including  spinal abscess, possible bowel obstruction, continuing to drink EtOH (PETH 900)      Interval History  Jonny Isabel decided not to undergo a liver transplant . Evaluation was therefore stopped and he was not listed. evaluation. He also did not want a TIPS. He stopped drinking (peth negative since 5/8/23). Significant ascites has now resolved: no para since 3/25  --feeling well  --HE-none    Labs 6/17/25: ALT 10. AST 15, ALKP 92, Tbil 0.4, plts 214    CT abdo pelvis w contrast 12/16/24: Cirrhotic appearance of the liver. Moderate volume of ascites with hyperdense material on the left side of the hemiabdomen suggesting blood products in the ascites, similar to prior. Overall volume of ascites is decreased slightly.     EGD 2/7/24: sm EV; PHG; mult gastric polyps    MELD 3.0: 14 at 6/17/2025 10:27 AM  MELD-Na: 15 at 6/17/2025 10:27 AM  Calculated from:  Serum Creatinine: 1.7 mg/dL at 6/17/2025 10:27 AM  Serum Sodium: 136 mmol/L at 6/17/2025 10:27 AM  Total Bilirubin: 0.3 mg/dL (Using min of 1 mg/dL) at 6/17/2025 10:27 AM  Serum Albumin: 4 g/dL (Using max of 3.5 g/dL) at 6/17/2025 10:27 AM  INR(ratio): 1.2 at 6/17/2025 10:27 AM  Age at listing (hypothetical): 64 years  Sex: Male at 6/17/2025 10:27 AM       Outpatient Encounter Medications as of 7/29/2025   Medication Sig Dispense Refill    ADVAIR DISKUS 250-50 mcg/dose diskus inhaler Inhale 1 puff into the lungs 2 (two) times a day. Controller 180 each 3    albuterol (PROVENTIL/VENTOLIN HFA) 90 mcg/actuation inhaler 2 puffs 4 (four) times daily as needed.      alirocumab (PRALUENT PEN) 75 mg/mL PnIj Inject 75 mg into the skin every 14 (fourteen) days.      amiodarone (PACERONE) 200 MG Tab Take 200 mg by mouth once daily.      folic acid (FOLVITE) 1 MG tablet Take 1,000 mcg by mouth once daily.      HYDROcodone-acetaminophen (NORCO)  mg per tablet Take 1 tablet by mouth every 6 (six) hours as needed for Pain. 15 tablet 0    levothyroxine (SYNTHROID) 50 MCG tablet  TAKE 1 TABLET BY MOUTH IN THE MORNING WITH A FULL GLASS OF WATER 30-60 MINUTES BEFORE OTHER MEDICATIONS AND FOOD 90 tablet 3    metoprolol tartrate (LOPRESSOR) 100 MG tablet Take 100 mg by mouth once daily.      omega-3 acid ethyl esters (LOVAZA) 1 gram capsule Take 1 capsule by mouth 2 (two) times daily.      pantoprazole (PROTONIX) 40 MG tablet Take 1 tablet (40 mg total) by mouth 2 (two) times daily. 180 tablet 3    promethazine (PHENERGAN) 12.5 MG Tab Take 12.5 mg by mouth every 6 (six) hours as needed (nausea).      thiamine 100 MG tablet Take 100 mg by mouth once daily.      traZODone (DESYREL) 50 MG tablet Take 50 mg by mouth nightly as needed.      VITAMIN B COMPLEX ORAL Take 1 tablet by mouth once daily.      XARELTO 20 mg Tab Take 20 mg by mouth once daily.       No facility-administered encounter medications on file as of 7/29/2025.     Review of patient's allergies indicates:  No Known Allergies  Past Medical History:   Diagnosis Date    A-fib     Esophageal varices 5/8/2023    Other ascites 5/8/2023       Review of Systems   Constitutional: Negative.    HENT: Negative.     Eyes: Negative.    Respiratory: Negative.     Cardiovascular: Negative.    Gastrointestinal: Negative.    Genitourinary: Negative.    Musculoskeletal: Negative.    Skin: Negative.    Neurological: Negative.    Psychiatric/Behavioral: Negative.       There were no vitals filed for this visit.         MELD 3.0: 14 at 6/17/2025 10:27 AM  MELD-Na: 15 at 6/17/2025 10:27 AM  Calculated from:  Serum Creatinine: 1.7 mg/dL at 6/17/2025 10:27 AM  Serum Sodium: 136 mmol/L at 6/17/2025 10:27 AM  Total Bilirubin: 0.3 mg/dL (Using min of 1 mg/dL) at 6/17/2025 10:27 AM  Serum Albumin: 4 g/dL (Using max of 3.5 g/dL) at 6/17/2025 10:27 AM  INR(ratio): 1.2 at 6/17/2025 10:27 AM  Age at listing (hypothetical): 64 years  Sex: Male at 6/17/2025 10:27 AM      Lab Results   Component Value Date     06/17/2025     02/26/2025    BUN 29 (H)  06/17/2025    CREATININE 1.7 (H) 06/17/2025    CALCIUM 9.0 06/17/2025    CALCIUM 8.7 02/26/2025     06/17/2025     02/26/2025    K 4.9 06/17/2025    K 3.9 02/26/2025     06/17/2025     02/26/2025    PROT 8.0 06/17/2025    PROT 6.8 12/17/2024    CO2 22 (L) 06/17/2025    CO2 19 (L) 02/26/2025    ANIONGAP 8 06/17/2025    WBC 9.91 06/17/2025    RBC 4.14 (L) 06/17/2025    RBC 3.61 (L) 12/23/2024    HGB 11.1 (L) 06/17/2025    HGB 10.1 (L) 12/23/2024    HCT 35.5 (L) 06/17/2025    HCT 31.5 (L) 12/23/2024    HCT 33 (L) 01/27/2023    MCV 86 06/17/2025    MCV 87 12/23/2024    MCH 26.8 (L) 06/17/2025    MCHC 31.3 (L) 06/17/2025    MCHC 32.1 12/23/2024     Lab Results   Component Value Date    RDW 17.1 (H) 06/17/2025    RDW 14.3 12/23/2024     06/17/2025     12/23/2024    MPV 12.2 06/17/2025    GRAN 3.8 12/23/2024    GRAN 64.7 12/23/2024    LYMPH 15.9 (L) 06/17/2025    LYMPH 1.58 06/17/2025    LYMPH 1.1 12/23/2024    LYMPH 18.0 12/23/2024    MONO 8.4 06/17/2025    MONO 0.83 06/17/2025    MONO 0.7 12/23/2024    MONO 11.8 12/23/2024    EOSINOPHIL 3.6 12/23/2024    BASOPHIL 1.1 06/17/2025    BASOPHIL 0.11 06/17/2025    BASOPHIL 1.2 12/23/2024    EOS 2.4 06/17/2025    EOS 0.24 06/17/2025    EOS 0.2 12/23/2024    EOS 1 06/12/2024    BASO 0.07 12/23/2024    BASO 1 06/12/2024    APTT 26.5 12/16/2024    GROUPTRH A POS 12/16/2024    BNP 24 12/03/2024    TRIG 162 (H) 12/21/2024    ALBUMIN 4.0 06/17/2025    ALBUMIN 3.0 (L) 12/17/2024    BILIDIR 0.1 06/17/2025    BILIDIR 0.1 06/20/2024    AST 21 06/17/2025    AST 15 12/17/2024    ALT 21 06/17/2025    ALT 12 12/17/2024    ALKPHOS 102 06/17/2025    ALKPHOS 83 12/17/2024    MG 1.7 12/23/2024    LABPROT 11.7 12/16/2024    INR 1.2 06/17/2025    INR 1.1 12/16/2024    PSA 0.80 06/20/2024       Assessment and Plan:  Jonny Isabel is a 64 y.o. male with Cirrhosis  Had refractory ascites. Now improved. Current recommendations:  F/u cardiology- can you stop the  blood thinner; can you stop amiodarone; can you decrease vs stop diuretic since your creatinine is up  Repeat EGD now  Every 3 month labs  Abdo US now w AFP and in 6 months  Return 6 months  Return 6 months

## 2025-07-30 ENCOUNTER — TELEPHONE (OUTPATIENT)
Dept: HEPATOLOGY | Facility: CLINIC | Age: 65
End: 2025-07-30
Payer: MEDICAID

## 2025-07-30 ENCOUNTER — PATIENT MESSAGE (OUTPATIENT)
Dept: HEPATOLOGY | Facility: CLINIC | Age: 65
End: 2025-07-30
Payer: MEDICAID

## 2025-07-30 ENCOUNTER — TELEPHONE (OUTPATIENT)
Dept: GASTROENTEROLOGY | Facility: CLINIC | Age: 65
End: 2025-07-30
Payer: MEDICAID

## 2025-07-30 NOTE — TELEPHONE ENCOUNTER
Tried to contact pt to schedule his colonoscopy, no answer.  Left detail message for pt to contact clinic to schedule his colonoscopy. [phone]

## 2025-07-30 NOTE — TELEPHONE ENCOUNTER
Contacted pt via My Chart messages. Pt scheduled for labs ,on 9/16/25. US now scheduled on 8/12/25. Next set of labs will be on 12/15/25. US every 6 months will be on 2/9/26. Pt was placed on in 6 mths recall to schedule when January schedule opens up.

## 2025-08-04 ENCOUNTER — TELEPHONE (OUTPATIENT)
Dept: GASTROENTEROLOGY | Facility: CLINIC | Age: 65
End: 2025-08-04
Payer: MEDICAID

## 2025-08-04 NOTE — TELEPHONE ENCOUNTER
Tried to contact pt to schedule his colonoscopy, no answer.  Left detail message for pt to contact clinic to schedule his colonoscopy.  [phone]

## 2025-08-06 ENCOUNTER — TELEPHONE (OUTPATIENT)
Dept: ENDOSCOPY | Facility: HOSPITAL | Age: 65
End: 2025-08-06
Payer: MEDICAID

## 2025-08-08 ENCOUNTER — TELEPHONE (OUTPATIENT)
Dept: ENDOSCOPY | Facility: HOSPITAL | Age: 65
End: 2025-08-08
Payer: MEDICAID

## 2025-08-11 ENCOUNTER — TELEPHONE (OUTPATIENT)
Dept: CARDIOLOGY | Facility: CLINIC | Age: 65
End: 2025-08-11
Payer: MEDICAID

## 2025-08-12 ENCOUNTER — HOSPITAL ENCOUNTER (OUTPATIENT)
Dept: RADIOLOGY | Facility: HOSPITAL | Age: 65
Discharge: HOME | End: 2025-08-12
Attending: INTERNAL MEDICINE
Payer: MEDICAID

## 2025-08-12 ENCOUNTER — TELEPHONE (OUTPATIENT)
Dept: ENDOSCOPY | Facility: HOSPITAL | Age: 65
End: 2025-08-12
Payer: MEDICAID

## 2025-08-12 DIAGNOSIS — K70.31 ALCOHOLIC CIRRHOSIS OF LIVER WITH ASCITES: ICD-10-CM

## 2025-08-12 PROCEDURE — 76700 US EXAM ABDOM COMPLETE: CPT | Mod: TC

## 2025-08-12 PROCEDURE — 76700 US EXAM ABDOM COMPLETE: CPT | Mod: 26,,, | Performed by: RADIOLOGY

## 2025-08-14 ENCOUNTER — PATIENT MESSAGE (OUTPATIENT)
Dept: CARDIOLOGY | Facility: CLINIC | Age: 65
End: 2025-08-14
Payer: MEDICAID

## 2025-08-15 ENCOUNTER — OFFICE VISIT (OUTPATIENT)
Dept: CARDIOLOGY | Facility: CLINIC | Age: 65
End: 2025-08-15
Payer: MEDICAID

## 2025-08-15 VITALS
HEIGHT: 69 IN | HEART RATE: 54 BPM | BODY MASS INDEX: 32.62 KG/M2 | SYSTOLIC BLOOD PRESSURE: 157 MMHG | TEMPERATURE: 98 F | OXYGEN SATURATION: 97 % | DIASTOLIC BLOOD PRESSURE: 88 MMHG | WEIGHT: 220.25 LBS

## 2025-08-15 DIAGNOSIS — I10 PRIMARY HYPERTENSION: Primary | ICD-10-CM

## 2025-08-15 DIAGNOSIS — E78.5 HYPERLIPIDEMIA ASSOCIATED WITH TYPE 2 DIABETES MELLITUS: ICD-10-CM

## 2025-08-15 DIAGNOSIS — K70.31 ALCOHOLIC CIRRHOSIS OF LIVER WITH ASCITES: ICD-10-CM

## 2025-08-15 DIAGNOSIS — K70.31 ASCITES DUE TO ALCOHOLIC CIRRHOSIS: ICD-10-CM

## 2025-08-15 DIAGNOSIS — I48.0 PAROXYSMAL ATRIAL FIBRILLATION: ICD-10-CM

## 2025-08-15 DIAGNOSIS — E03.9 HYPOTHYROIDISM, UNSPECIFIED TYPE: ICD-10-CM

## 2025-08-15 DIAGNOSIS — J44.9 CHRONIC OBSTRUCTIVE PULMONARY DISEASE, UNSPECIFIED COPD TYPE: ICD-10-CM

## 2025-08-15 DIAGNOSIS — R00.1 BRADYCARDIA, SINUS: ICD-10-CM

## 2025-08-15 DIAGNOSIS — Z72.0 TOBACCO USE: ICD-10-CM

## 2025-08-15 DIAGNOSIS — F10.10 ALCOHOL ABUSE: ICD-10-CM

## 2025-08-15 DIAGNOSIS — E11.69 HYPERLIPIDEMIA ASSOCIATED WITH TYPE 2 DIABETES MELLITUS: ICD-10-CM

## 2025-08-15 LAB
OHS QRS DURATION: 94 MS
OHS QTC CALCULATION: 388 MS

## 2025-08-15 PROCEDURE — 99999 PR PBB SHADOW E&M-EST. PATIENT-LVL III: CPT | Mod: PBBFAC,,,

## 2025-08-15 PROCEDURE — 99213 OFFICE O/P EST LOW 20 MIN: CPT | Mod: PBBFAC,PN

## 2025-08-15 RX ORDER — METOPROLOL TARTRATE 50 MG/1
50 TABLET ORAL DAILY
Qty: 90 TABLET | Refills: 3 | Status: SHIPPED | OUTPATIENT
Start: 2025-08-15 | End: 2026-08-15

## 2025-08-15 RX ORDER — METOPROLOL TARTRATE 50 MG/1
50 TABLET ORAL 2 TIMES DAILY
Qty: 90 TABLET | Refills: 3 | Status: SHIPPED | OUTPATIENT
Start: 2025-08-15 | End: 2025-08-15

## (undated) DEVICE — ELECTRODE BLADE INSULATED 1 IN

## (undated) DEVICE — TIP YANKAUERS BULB NO VENT

## (undated) DEVICE — SUT COATED VICRYL 3-0 1X27

## (undated) DEVICE — ELECTRODE MEGADYNE RETURN DUAL

## (undated) DEVICE — Device

## (undated) DEVICE — TRAY GENERAL SURGERY OMC

## (undated) DEVICE — BLADE 4 INCH EDGE UN-INS

## (undated) DEVICE — RELOAD PROXIMATE CUT BLUE 75MM

## (undated) DEVICE — CLIPPER BLADE MOD 4406 (CAREF)

## (undated) DEVICE — SUT STRATAFIX 0 PDS+ 23CM 9IN

## (undated) DEVICE — SUT PDS II O CTX MONO 60

## (undated) DEVICE — BOVIE SUCTION

## (undated) DEVICE — GAUZE DRAIN N WVN 6PLY 4X4IN

## (undated) DEVICE — SUT PROLENE 0 CT1 30IN BLUE

## (undated) DEVICE — CUTTER PROXIMATE BLUE 75MM

## (undated) DEVICE — DRAPE ABDOMINAL TIBURON 14X11

## (undated) DEVICE — CLEANER CAUT TIP STRL 2X2IN

## (undated) DEVICE — COVER LIGHT HANDLE

## (undated) DEVICE — ELECTRODE REM PLYHSV RETURN 9

## (undated) DEVICE — GLOVE GAMMEX SURG LF PI SZ 7.5

## (undated) DEVICE — PENCIL VALLEYLAB TELSCP SMK

## (undated) DEVICE — SLEEVE SCD EXPRESS KNEE MEDIUM

## (undated) DEVICE — CONTAINER SPECIMEN OR STER 4OZ